# Patient Record
Sex: FEMALE | Race: WHITE | Employment: OTHER | URBAN - METROPOLITAN AREA
[De-identification: names, ages, dates, MRNs, and addresses within clinical notes are randomized per-mention and may not be internally consistent; named-entity substitution may affect disease eponyms.]

---

## 2017-03-29 DIAGNOSIS — G72.9 MYOPATHY, UNSPECIFIED: ICD-10-CM

## 2017-03-29 DIAGNOSIS — M34.9 SYSTEMIC SCLEROSIS (H): ICD-10-CM

## 2017-03-29 DIAGNOSIS — E83.59 TUMORAL CALCINOSIS: ICD-10-CM

## 2017-03-29 DIAGNOSIS — Z79.899 HISTORY OF ONGOING TREATMENT WITH HIGH-RISK MEDICATION: ICD-10-CM

## 2017-03-29 RX ORDER — PREDNISONE 10 MG/1
TABLET ORAL
Qty: 90 TABLET | Refills: 0 | Status: SHIPPED | OUTPATIENT
Start: 2017-03-29 | End: 2017-06-07

## 2017-03-29 NOTE — TELEPHONE ENCOUNTER
predniSONE (DELTASONE) 10 MG       Last Written Prescription Date:  8/31/16  Last Fill Quantity: 90,   # refills: 0  Last Office Visit : 7/21/16  Future Office visit:  NONE    Routing refill request to provider for review/approval because:   OVERDUE FOR 6 MOS RTC

## 2017-03-29 NOTE — TELEPHONE ENCOUNTER
Call placed to pt. Jeanine reports she has tapered down to 12.5 mg of prednisone daily.  Informed pt that she is over due for her follow up with Dr De La Torre. Was told that pt is on her way down to [place] with her 5 yr old granddaughter, but she will call upon return to schedule this appointment.    TASIA BlanchardN RN  Rheumatology RN Coordinator   [place]

## 2017-06-07 DIAGNOSIS — E83.59 TUMORAL CALCINOSIS: ICD-10-CM

## 2017-06-07 DIAGNOSIS — Z79.899 HISTORY OF ONGOING TREATMENT WITH HIGH-RISK MEDICATION: ICD-10-CM

## 2017-06-07 DIAGNOSIS — M34.9 SYSTEMIC SCLEROSIS (H): Primary | ICD-10-CM

## 2017-06-07 DIAGNOSIS — G72.9 MYOPATHY, UNSPECIFIED: ICD-10-CM

## 2017-06-07 RX ORDER — PREDNISONE 10 MG/1
TABLET ORAL
Qty: 30 TABLET | Refills: 0 | Status: SHIPPED | OUTPATIENT
Start: 2017-06-07 | End: 2017-08-17

## 2017-06-07 NOTE — TELEPHONE ENCOUNTER
predniSONE (DELTASONE) 10 MG tablet      Last Written Prescription Date:  3/29/2017  Last Fill Quantity: 90,   # refills: 0  Last Office Visit : 7/21/2016  Future Office visit:  6/21/2017 *[facility]                                 8/22/2017  *[facility]    Routing refill request to provider for review/approval because:  Last visit overdue > 6 months since last seen. Clinic nurse talked with patient regarding needs to be seen at last refill and Ms Schuler did call and schedule with you. *Pending appointment's with Dr De La Torre on 6/21/2017 at [facility], and  8/22/2017 at [facility].

## 2017-08-10 DIAGNOSIS — K21.9 GASTROESOPHAGEAL REFLUX DISEASE WITHOUT ESOPHAGITIS: ICD-10-CM

## 2017-08-10 DIAGNOSIS — E83.59 TUMORAL CALCINOSIS: ICD-10-CM

## 2017-08-10 DIAGNOSIS — I73.00 RAYNAUD'S DISEASE WITHOUT GANGRENE: ICD-10-CM

## 2017-08-10 DIAGNOSIS — M34.9 SYSTEMIC SCLEROSIS (H): ICD-10-CM

## 2017-08-10 DIAGNOSIS — G72.9 MYOPATHY: ICD-10-CM

## 2017-08-14 RX ORDER — PANTOPRAZOLE SODIUM 40 MG/1
40 TABLET, DELAYED RELEASE ORAL DAILY
Qty: 30 TABLET | Refills: 0 | Status: SHIPPED | OUTPATIENT
Start: 2017-08-14 | End: 2017-09-13

## 2017-08-14 NOTE — TELEPHONE ENCOUNTER
pantoprazole  40 MG      Last Written Prescription Date:  7/21/16  Last Fill Quantity: 90,   # refills: 3  Last Office Visit : 7/21/16  Future Office visit:  8/22/17    Routing refill request to provider for review/approval because:  OVER DUE MD APPT 7/21/16     PEND.APPT. 8/22/17

## 2017-08-17 DIAGNOSIS — Z79.899 HISTORY OF ONGOING TREATMENT WITH HIGH-RISK MEDICATION: ICD-10-CM

## 2017-08-17 DIAGNOSIS — E83.59 TUMORAL CALCINOSIS: ICD-10-CM

## 2017-08-17 DIAGNOSIS — G72.9 MYOPATHY, UNSPECIFIED: ICD-10-CM

## 2017-08-17 DIAGNOSIS — M34.9 SYSTEMIC SCLEROSIS (H): ICD-10-CM

## 2017-08-22 ENCOUNTER — OFFICE VISIT (OUTPATIENT)
Dept: RHEUMATOLOGY | Facility: CLINIC | Age: 57
End: 2017-08-22
Attending: INTERNAL MEDICINE
Payer: COMMERCIAL

## 2017-08-22 VITALS
DIASTOLIC BLOOD PRESSURE: 74 MMHG | BODY MASS INDEX: 29.06 KG/M2 | RESPIRATION RATE: 18 BRPM | TEMPERATURE: 97.9 F | HEART RATE: 69 BPM | WEIGHT: 196.8 LBS | SYSTOLIC BLOOD PRESSURE: 118 MMHG

## 2017-08-22 DIAGNOSIS — K21.9 GASTROESOPHAGEAL REFLUX DISEASE WITHOUT ESOPHAGITIS: ICD-10-CM

## 2017-08-22 DIAGNOSIS — R06.02 SHORTNESS OF BREATH: ICD-10-CM

## 2017-08-22 DIAGNOSIS — E83.59 TUMORAL CALCINOSIS: ICD-10-CM

## 2017-08-22 DIAGNOSIS — M34.9 SYSTEMIC SCLEROSIS (H): ICD-10-CM

## 2017-08-22 DIAGNOSIS — I73.00 RAYNAUD'S DISEASE WITHOUT GANGRENE: ICD-10-CM

## 2017-08-22 DIAGNOSIS — G72.9 MYOPATHY: ICD-10-CM

## 2017-08-22 DIAGNOSIS — M06.00 RHEUMATOID ARTHRITIS WITH NEGATIVE RHEUMATOID FACTOR, INVOLVING UNSPECIFIED SITE (H): Primary | ICD-10-CM

## 2017-08-22 LAB
ALBUMIN SERPL-MCNC: 3.5 G/DL (ref 3.4–5)
ALT SERPL W P-5'-P-CCNC: 39 U/L (ref 0–50)
AST SERPL W P-5'-P-CCNC: 38 U/L (ref 0–45)
BASOPHILS # BLD AUTO: 0 10E9/L (ref 0–0.2)
BASOPHILS NFR BLD AUTO: 0.4 %
CK SERPL-CCNC: 198 U/L (ref 30–225)
CREAT SERPL-MCNC: 1.01 MG/DL (ref 0.52–1.04)
CRP SERPL-MCNC: <2.9 MG/L (ref 0–8)
DIFFERENTIAL METHOD BLD: NORMAL
EOSINOPHIL # BLD AUTO: 0 10E9/L (ref 0–0.7)
EOSINOPHIL NFR BLD AUTO: 0.4 %
ERYTHROCYTE [DISTWIDTH] IN BLOOD BY AUTOMATED COUNT: 14.1 % (ref 10–15)
ERYTHROCYTE [SEDIMENTATION RATE] IN BLOOD BY WESTERGREN METHOD: 13 MM/H (ref 0–30)
GFR SERPL CREATININE-BSD FRML MDRD: 57 ML/MIN/1.7M2
HCT VFR BLD AUTO: 44.2 % (ref 35–47)
HGB BLD-MCNC: 14.2 G/DL (ref 11.7–15.7)
IMM GRANULOCYTES # BLD: 0.1 10E9/L (ref 0–0.4)
IMM GRANULOCYTES NFR BLD: 0.6 %
LYMPHOCYTES # BLD AUTO: 1.5 10E9/L (ref 0.8–5.3)
LYMPHOCYTES NFR BLD AUTO: 18 %
MCH RBC QN AUTO: 28.1 PG (ref 26.5–33)
MCHC RBC AUTO-ENTMCNC: 32.1 G/DL (ref 31.5–36.5)
MCV RBC AUTO: 88 FL (ref 78–100)
MONOCYTES # BLD AUTO: 0.5 10E9/L (ref 0–1.3)
MONOCYTES NFR BLD AUTO: 6.3 %
NEUTROPHILS # BLD AUTO: 6.4 10E9/L (ref 1.6–8.3)
NEUTROPHILS NFR BLD AUTO: 74.3 %
NRBC # BLD AUTO: 0 10*3/UL
NRBC BLD AUTO-RTO: 0 /100
PLATELET # BLD AUTO: 208 10E9/L (ref 150–450)
RBC # BLD AUTO: 5.05 10E12/L (ref 3.8–5.2)
WBC # BLD AUTO: 8.6 10E9/L (ref 4–11)

## 2017-08-22 PROCEDURE — 82040 ASSAY OF SERUM ALBUMIN: CPT | Performed by: INTERNAL MEDICINE

## 2017-08-22 PROCEDURE — 36415 COLL VENOUS BLD VENIPUNCTURE: CPT | Performed by: INTERNAL MEDICINE

## 2017-08-22 PROCEDURE — 85652 RBC SED RATE AUTOMATED: CPT | Performed by: INTERNAL MEDICINE

## 2017-08-22 PROCEDURE — 86140 C-REACTIVE PROTEIN: CPT | Performed by: INTERNAL MEDICINE

## 2017-08-22 PROCEDURE — 85025 COMPLETE CBC W/AUTO DIFF WBC: CPT | Performed by: INTERNAL MEDICINE

## 2017-08-22 PROCEDURE — 84460 ALANINE AMINO (ALT) (SGPT): CPT | Performed by: INTERNAL MEDICINE

## 2017-08-22 PROCEDURE — 82550 ASSAY OF CK (CPK): CPT | Performed by: INTERNAL MEDICINE

## 2017-08-22 PROCEDURE — 99212 OFFICE O/P EST SF 10 MIN: CPT | Mod: ZF

## 2017-08-22 PROCEDURE — 84450 TRANSFERASE (AST) (SGOT): CPT | Performed by: INTERNAL MEDICINE

## 2017-08-22 PROCEDURE — 82565 ASSAY OF CREATININE: CPT | Performed by: INTERNAL MEDICINE

## 2017-08-22 ASSESSMENT — PAIN SCALES - GENERAL: PAINLEVEL: NO PAIN (0)

## 2017-08-22 NOTE — NURSING NOTE
"Chief Complaint   Patient presents with     RECHECK     RA FOLLOW UP       Initial /74  Pulse 69  Temp 97.9  F (36.6  C) (Oral)  Resp 18  Wt 89.3 kg (196 lb 12.8 oz)  BMI 29.06 kg/m2 Estimated body mass index is 29.06 kg/(m^2) as calculated from the following:    Height as of 7/21/16: 1.753 m (5' 9\").    Weight as of this encounter: 89.3 kg (196 lb 12.8 oz).  Medication Reconciliation: complete   KNAIKA FORBES CMA      "

## 2017-08-22 NOTE — MR AVS SNAPSHOT
"              After Visit Summary   2017    Jeanine Schuler    MRN: [id number]           Patient Information     Date Of Birth          1960        Visit Information        Provider Department      2017 5:00 PM Nicolás De La Torre MD [facility] Rheumatology        Today's Diagnoses     Rheumatoid arthritis with negative rheumatoid factor, involving unspecified site (H)    -  1    Systemic sclerosis (H)        Shortness of breath           Follow-ups after your visit        Who to contact     If you have questions or need follow up information about today's clinic visit or your schedule please contact [facility] RHEUMATOLOGY directly at [phone].  Normal or non-critical lab and imaging results will be communicated to you by MyChart, letter or phone within 4 business days after the clinic has received the results. If you do not hear from us within 7 days, please contact the clinic through [facility] or phone. If you have a critical or abnormal lab result, we will notify you by phone as soon as possible.  Submit refill requests through [employer] or call your pharmacy and they will forward the refill request to us. Please allow 3 business days for your refill to be completed.          Additional Information About Your Visit        MyChart Information     [employer] lets you send messages to your doctor, view your test results, renew your prescriptions, schedule appointments and more. To sign up, go to [email or web address] . Click on \"Log in\" on the left side of the screen, which will take you to the Welcome page. Then click on \"Sign up Now\" on the right side of the page.     You will be asked to enter the access code listed below, as well as some personal information. Please follow the directions to create your username and password.     Your access code is: [email or web address]  Expires: 2017  6:31 AM     Your access code will  in 90 days. If you need help or a new code, please call your [place] clinic " or [phone].        Care EveryWhere ID     This is your Care EveryWhere ID. This could be used by other organizations to access your [place] medical records  [id number]        Your Vitals Were     Pulse Temperature Respirations BMI (Body Mass Index)          69 97.9  F (36.6  C) (Oral) 18 29.06 kg/m2         Blood Pressure from Last 3 Encounters:   08/22/17 118/74   07/21/16 113/77   04/19/16 (!) 121/95    Weight from Last 3 Encounters:   08/22/17 89.3 kg (196 lb 12.8 oz)   07/21/16 96.1 kg (211 lb 14.4 oz)   04/19/16 93 kg (205 lb)                 Today's Medication Changes          These changes are accurate as of: 8/22/17 11:59 PM.  If you have any questions, ask your nurse or doctor.               Stop taking these medicines if you haven't already. Please contact your care team if you have questions.     Abatacept 125 MG/ML Sosy   Commonly known as:  ORENCIA   Stopped by:  Nicolás De La Torre MD           study - Vascana/0.9% nitroglycerin in amphi-matrix vs. placebo topical cream   Commonly known as:  ids# [id number]   Stopped by:  Nicolás De La Torre MD                    Primary Care Provider Office Phone # Fax #    Aminta JOSE MD Ciaran [phone]       [address]   [facility] [address]        Equal Access to Services     [facility]: Hadii aad ku hadasho Sogomez, waaxda luqadaha, qaybta kaalmada nini, berny lorenzana. So [facility] [phone].    ATENCIÓN: Si habla español, tiene a encarnacion disposición servicios gratuitos de asistencia lingüística. Llame al [phone].    We comply with applicable federal civil rights laws and [place] laws. We do not discriminate on the basis of race, color, national origin, age, disability sex, sexual orientation or gender identity.            Thank you!     Thank you for choosing [facility] RHEUMATOLOGY  for your care. Our goal is always to provide you with excellent care. Hearing back from our patients is  "one way we can continue to improve our services. Please take a few minutes to complete the written survey that you may receive in the mail after your visit with us. Thank you!             Your Updated Medication List - Protect others around you: Learn how to safely use, store and throw away your medicines at www.disposemymeds.org.          This list is accurate as of: 8/22/17 11:59 PM.  Always use your most recent med list.                   Brand Name Dispense Instructions for use Diagnosis    insulin syringe-needle U-100 30G X 1/2\" 1 ML    BD insulin syringe ultrafine    100 each    1 Syringe once a week Use one syringe weekly  or as directed.    Systemic sclerosis (H), Raynaud's syndrome, Tumoral calcinosis, Encounter for long-term (current) use of other medications       LASIX 40 MG tablet   Generic drug:  furosemide      Take 40 mg by mouth 2 times daily.    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    Systemic sclerosis (H), Raynaud's syndrome, Tumoral calcinosis, Encounter for long-term (current) use of steroids       * methotrexate 50 MG/2ML injection CHEMO     4 mL    Inject 0.8 mL (20 mg) subcutaneously every Sunday.    Systemic sclerosis (H), Tumoral calcinosis, Myopathy       NONFORMULARY      Apply  topically. Nystatin/Triamcinolone/lidocaine cream; Apply to affected areas on lips TID        pantoprazole 40 MG EC tablet    PROTONIX    30 tablet    Take 1 tablet (40 mg) by mouth daily TAKE 30-60 MINUTES BEFORE A  MEAL    Gastroesophageal reflux disease without esophagitis, " Tumoral calcinosis, Systemic sclerosis (H), Myopathy, Raynaud's disease without gangrene       POTASSIMIN PO      40mg Monday Wednesday and Friday. 20 mg Tuesday, Thursday, Saturday and Sunday    Systemic sclerosis (H), Raynaud's syndrome, Tumoral calcinosis, Encounter for long-term (current) use of steroids       * predniSONE 2.5 MG tablet    DELTASONE    270 tablet    Take 3 tablets (7.5 mg) by mouth daily    Systemic sclerosis (H), Tumoral calcinosis       * predniSONE 10 MG tablet    DELTASONE    30 tablet    Take 1 tab daily (with 7.5 mg) daily dose 17.5 mg    Myopathy, unspecified, Systemic sclerosis (H), Tumoral calcinosis, History of ongoing treatment with high-risk medication       spironolactone-hydrochlorothiazide 25-25 MG per tablet    ALDACTAZIDE    30 tablet    Take 1 tablet by mouth daily    Edema       * Notice:  This list has 4 medication(s) that are the same as other medications prescribed for you. Read the directions carefully, and ask your doctor or other care provider to review them with you.

## 2017-08-22 NOTE — LETTER
8/22/2017      RE: Jeanine MARTÍN Schuler  [address] [address]       SUBJECTIVE:  The patient returns in f/u of her MCTD/SSC/RP/with h/o multiple DU.      PROBLEM LIST:    1.  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    2.  History of severe multiple digital ulcers, on fingers and toes.   3.  Marked keratoconjunctivitis sicca over the last several years.    4.  Marked iron deficiency anemia responsive to IV iron.    5.  Diffuse musculoskeletal pain   6.  Lower extremity edema managed with lasix and spironolactone   7.  Marked fatigue and diffuse clinical weakness.    8.  Dyspnea on exertion with lower extremity edema and other features including the anticentromere positivity worrisome for the potential development of pulmonary hypertension.    9.  Marked GERD with associated dysphagia.    10.  Status post gastric bypass with a history of intermittent constipation and diarrhea potentially consistent with bacterial small bowel overgrowth versus other gut effects of the prior surgery.    11.  History of positive rheumatoid factor consistent with MCTD, given the remainder of her clinical presentation.     12. Progressive Jaw tightening with inability to open mouth wider than 1 inch.   13. Diastolic dysfunction (3/2013)  14. Hypoalbuminemia      INTERVAL HISTORY:     Since we last saw her, she believes she is gradually feeling significantly better.  Indeed, for the first time in years she feels that her fatigue is well enough controlled that she can work through the night without a nap.  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  She actually thinks her muscle weakness is also stable or improved, and she has no significant dyspnea on exertion and no dry cough.       She does have a little bit of right  shoulder pain and the features of that are entirely consistent with impingement syndrome, and that has been going on about 6 weeks.  However, no other joints have been active and she denies any stiffness in any of the joints.       On the Orencia plus methotrexate she has had no fevers, chills, or signs or symptoms of infection.  She has not, however, tried to go down her prednisone and remains on 20 mg a day.      Other features of her disease including her GERD and her keratoconjunctivitis sicca have remained stable with no change.       She does continue to have quite significant Raynaud's, but it is under better control during the summer and she has had no digital ulcerations.     No chest pain, shortness of breath, rash, numbness/tingling in hands/feet.     AUGUST 22, 2017, INTERVAL HISTORY:  Since we have last seen the patient, she developed a right ankle wound about 2 months ago.  That opened for a while and it became hard to close, but at this point it has finally closed over.       She has had some stressors and she feels like that has contributed to her feeling like she needed more prednisone.  She had made it down to 12 mg a day for almost 6 months but increased to 20 mg a day in June when she was moving to a new house.  This was primarily due to an increase in fatigue and in joint pains.      At this point, her morning stiffness is 30-60 minutes despite remaining on the 20 mg a day of prednisone.  She does think her strength, however, is stable.      She continues to have a lot of Raynaud's phenomena but has not developed any distal digital ulcerations.       Her GERD and keratoconjunctivitis sicca, however, have remained stable and she thinks she has stable exercise tolerance.       She has not done her labs for many, many months and has not seen me for over a year, and we discussed that in some detail.         ROS as per HPI.  10-point ROS is otherwise negative.    HISTORY REVIEW:  Past Medical  History:   Diagnosis Date     Anemia      Cellulitis of leg 6/6/2013     Esophageal reflux     Better post-hiatal hernia repair and weight loss     Limb ischemia; ulcers of fingertips 4/22/2013     Raynaud's syndrome      Scleroderma (H)      Systemic sclerosis (H) 2009     Unspecified essential hypertension        Past Surgical History:   Procedure Laterality Date     BYPASS GASTRIC, CHOLECYSTECTOMY, COMBINED       CHOLECYSTECTOMY       COLONOSCOPY       ESOPHAGOSCOPY, GASTROSCOPY, DUODENOSCOPY (EGD), COMBINED N/A 3/10/2016    Procedure: COMBINED ESOPHAGOSCOPY, GASTROSCOPY, DUODENOSCOPY (EGD), BIOPSY SINGLE OR MULTIPLE;  Surgeon: Tricia Zambrano MD;  Location:  GI     INNER EAR SURGERY       PICC INSERTION  6/5/2013    5fr DL Power PICC, 44cm, left basilic vein, tip in low SVC     SURGICAL HISTORY OF -       Left ear surgery - incus transition, tympanoplasty     SURGICAL HISTORY OF -   6/05    Gastric bypass     SURGICAL HISTORY OF -   6/05    Cholecystectomy     SURGICAL HISTORY OF -   6/05    Hiatal hernia repair     TONSILLECTOMY         Family History   Problem Relation Age of Onset     CEREBROVASCULAR DISEASE Father      C.A.D. Father      C.A.D. Mother      C.A.D. 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      Social History Main Topics     Smoking status: Never Smoker      Smokeless tobacco: Never Used     Alcohol Use: Yes      occassionally     Drug Use: No     Sexually Active: Yes -- Male partner(s)     Birth Control/ Protection: None     Social History Narrative     Was  in December 2015. Longtime partner from [place].       Patient Active Problem List   Diagnosis     Essential hypertension     Thyrotoxicosis     Obesity     Prolonged QTc 460 ms,  at hr 67     Low  back pain - Suspect SI Joint dysfunction     Cough     Systemic sclerosis (H)     Tumoral calcinosis     Shortness of breath     Edema of both legs     Abnormal albumin     Myopathy     Hypoalbuminemia     Diastolic dysfunction     Limb ischemia; ulcers of fingertips     Cellulitis of leg     Other specified diffuse disease of connective tissue     Disturbed sleep rhythm     Pharyngeal dysphagia     Gastroesophageal reflux disease, esophagitis presence not specified     Rheumatoid arthritis with negative rheumatoid factor, involving unspecified site (H)     Raynaud's disease without gangrene       Allergies   Allergen Reactions     Lovenox Other (See Comments)     Blood clot      Norvasc [Amlodipine Besylate] Swelling     Lip swelling that happened on 2 different occasions     Erythromycin Rash     Rash on arms     OBJECTIVE:  Vitals: Blood pressure 118/74, pulse 69, temperature 97.9  F (36.6  C), temperature source Oral, resp. rate 18, weight 89.3 kg (196 lb 12.8 oz).  Constitutional: Pleasant woman, appears older than stated age.   Eyes: PERRL. EOMI. Normal conjunctivae and sclerae.   ENT: Dry mucous membranes. +Superior dental plate. Otherwise normal mucous membranes. Decreased oral aperture.  Neck: no mass or thyroid enlargement  Resp: lungs clear to auscultation  CV: Normal rate, regular rhythm, trace edema of ankles bilaterally  GI: Soft, nontender, nondistended. Spleen and liver not palpable.  Lymph: no cervical, supraclavicular nodes  MS:  All TMJ, neck, shoulder, elbow, wrist, MCP/PIP/DIP, spine, hip, knee, ankle, and foot MTP/IP joints were examined and  found normal. No active synovitis or deformity. Full ROM.  Hands and feet with vascular congestion.  Skin: No rash appreciated. Has tattoos present.   Neuro: Strength 5/5 in upper and lower extremities. No focal neurological deficits.     Component      Latest Ref Rng 1/26/2016   WBC      4.0 - 11.0 10e9/L 9.2   RBC Count      3.8 - 5.2 10e12/L 4.59    Hemoglobin      11.7 - 15.7 g/dL 13.2   Hematocrit      35.0 - 47.0 % 41.8   MCV      78 - 100 fl 91   MCH      26.5 - 33.0 pg 28.8   MCHC      31.5 - 36.5 g/dL 31.6   RDW      10.0 - 15.0 % 16.7 (H)   Platelet Count      150 - 450 10e9/L 195   Diff Method       Automated Method   % Neutrophils       62.9   % Lymphocytes       27.0   % Monocytes       9.1   % Eosinophils       0.4   % Basophils       0.2   % Immature Granulocytes       0.4   Nucleated RBCs      0 /100 0   Absolute Neutrophil      1.6 - 8.3 10e9/L 5.8   Absolute Lymphocytes      0.8 - 5.3 10e9/L 2.5   Absolute Monocytes      0.0 - 1.3 10e9/L 0.8   Absolute Eosinophils      0.0 - 0.7 10e9/L 0.0   Absolute Basophils      0.0 - 0.2 10e9/L 0.0   Abs Immature Granulocytes      0 - 0.4 10e9/L 0.0   Absolute Nucleated RBC       0.0   Color Urine       Yellow   Appearance Urine       Clear   Glucose Urine      NEG mg/dL Negative   Bilirubin Urine      NEG Negative   Ketones Urine      NEG mg/dL Negative   Specific Gravity Urine      1.003 - 1.035 1.010   Blood Urine      NEG Negative   pH Urine      5.0 - 7.0 pH 6.0   Protein Albumin Urine      NEG mg/dL Negative   Urobilinogen mg/dL      0.0 - 2.0 mg/dL Normal   Nitrite Urine      NEG Negative   Leukocyte Esterase Urine      NEG Negative   Source       Midstream Urine   WBC Urine      0 - 2 /HPF <1   RBC Urine      0 - 2 /HPF <1   Squamous Epithelial /HPF Urine      0 - 1 /HPF <1   Creatinine      0.52 - 1.04 mg/dL 1.11 (H)   GFR Estimate      >60 mL/min/1.7m2 51 (L)   GFR Estimate If Black      >60 mL/min/1.7m2 62   ALT      0 - 50 U/L 23   AST      0 - 45 U/L 16   Albumin      3.4 - 5.0 g/dL 3.4   CRP Inflammation      0.0 - 8.0 mg/L <2.9   Sed Rate      0 - 30 mm/h 9   CK Total      30 - 225 U/L 38   Aldolase       4.1     Biopsy from 3/10 EGD:  Esophagus, gastroesophageal junction, biopsies:   - Squamous mucosa and submucosa with marked chronic inflammation and   fibrosis   - No evidence of intestinal  metaplasia or dysplasia     Assessment/Plan:     My suspicion is this wound she has was probably due largely to the skin fragility from her chronic, heavy use of prednisone.      We have discussed that and her need to go down, and she will again try going down at this point to 17.5 mg a day.       She is way overdue for methotrexate labs and we will get those done as well, and I have renewed them for another year.       I would like to see her back in December and have pulmonary function tests at that time, for which she is also somewhat overdue, though her symptoms do not sound as though she is developing any progressive pulmonary disease.  We will need to be watching for digital ulcers as well with the colder weather, but she has been able to, despite very poor control of her Raynaud's overall, avoid those, so hopefully that will continue.       Nicolás De La Torre MD

## 2017-08-24 RX ORDER — PREDNISONE 10 MG/1
TABLET ORAL
Qty: 30 TABLET | Refills: 0 | Status: SHIPPED | OUTPATIENT
Start: 2017-08-24 | End: 2017-10-04

## 2017-08-24 NOTE — TELEPHONE ENCOUNTER
predniSONE 10 MG        Last Written Prescription Date:  6/7/17  Last Fill Quantity: 30,   # refills: 0  Last Office Visit : 8/22/17  Future Office visit:  NONE  *RT TO MD

## 2017-09-05 NOTE — PROGRESS NOTES
SUBJECTIVE:  The patient returns in f/u of her MCTD/SSC/RP/with h/o multiple DU.      PROBLEM LIST:    1.  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    2.  History of severe multiple digital ulcers, on fingers and toes.   3.  Marked keratoconjunctivitis sicca over the last several years.    4.  Marked iron deficiency anemia responsive to IV iron.    5.  Diffuse musculoskeletal pain   6.  Lower extremity edema managed with lasix and spironolactone   7.  Marked fatigue and diffuse clinical weakness.    8.  Dyspnea on exertion with lower extremity edema and other features including the anticentromere positivity worrisome for the potential development of pulmonary hypertension.    9.  Marked GERD with associated dysphagia.    10.  Status post gastric bypass with a history of intermittent constipation and diarrhea potentially consistent with bacterial small bowel overgrowth versus other gut effects of the prior surgery.    11.  History of positive rheumatoid factor consistent with MCTD, given the remainder of her clinical presentation.     12. Progressive Jaw tightening with inability to open mouth wider than 1 inch.   13. Diastolic dysfunction (3/2013)  14. Hypoalbuminemia      INTERVAL HISTORY:     Since we last saw her, she believes she is gradually feeling significantly better.  Indeed, for the first time in years she feels that her fatigue is well enough controlled that she can work through the night without a nap.  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  She actually thinks her muscle weakness is also stable or improved, and she has no significant dyspnea on exertion and no dry cough.       She does have a little bit of right shoulder pain and the features of that are entirely consistent with impingement syndrome,  and that has been going on about 6 weeks.  However, no other joints have been active and she denies any stiffness in any of the joints.       On the Orencia plus methotrexate she has had no fevers, chills, or signs or symptoms of infection.  She has not, however, tried to go down her prednisone and remains on 20 mg a day.      Other features of her disease including her GERD and her keratoconjunctivitis sicca have remained stable with no change.       She does continue to have quite significant Raynaud's, but it is under better control during the summer and she has had no digital ulcerations.     No chest pain, shortness of breath, rash, numbness/tingling in hands/feet.     AUGUST 22, 2017, INTERVAL HISTORY:  Since we have last seen the patient, she developed a right ankle wound about 2 months ago.  That opened for a while and it became hard to close, but at this point it has finally closed over.       She has had some stressors and she feels like that has contributed to her feeling like she needed more prednisone.  She had made it down to 12 mg a day for almost 6 months but increased to 20 mg a day in June when she was moving to a new house.  This was primarily due to an increase in fatigue and in joint pains.      At this point, her morning stiffness is 30-60 minutes despite remaining on the 20 mg a day of prednisone.  She does think her strength, however, is stable.      She continues to have a lot of Raynaud's phenomena but has not developed any distal digital ulcerations.       Her GERD and keratoconjunctivitis sicca, however, have remained stable and she thinks she has stable exercise tolerance.       She has not done her labs for many, many months and has not seen me for over a year, and we discussed that in some detail.         ROS as per HPI.  10-point ROS is otherwise negative.    HISTORY REVIEW:  Past Medical History:   Diagnosis Date     Anemia      Cellulitis of leg 6/6/2013     Esophageal reflux      Better post-hiatal hernia repair and weight loss     Limb ischemia; ulcers of fingertips 4/22/2013     Raynaud's syndrome      Scleroderma (H)      Systemic sclerosis (H) 2009     Unspecified essential hypertension        Past Surgical History:   Procedure Laterality Date     BYPASS GASTRIC, CHOLECYSTECTOMY, COMBINED       CHOLECYSTECTOMY       COLONOSCOPY       ESOPHAGOSCOPY, GASTROSCOPY, DUODENOSCOPY (EGD), COMBINED N/A 3/10/2016    Procedure: COMBINED ESOPHAGOSCOPY, GASTROSCOPY, DUODENOSCOPY (EGD), BIOPSY SINGLE OR MULTIPLE;  Surgeon: Tricia Zambrano MD;  Location:  GI     INNER EAR SURGERY       PICC INSERTION  6/5/2013    5fr DL Power PICC, 44cm, left basilic vein, tip in low SVC     SURGICAL HISTORY OF -       Left ear surgery - incus transition, tympanoplasty     SURGICAL HISTORY OF -   6/05    Gastric bypass     SURGICAL HISTORY OF -   6/05    Cholecystectomy     SURGICAL HISTORY OF -   6/05    Hiatal hernia repair     TONSILLECTOMY         Family History   Problem Relation Age of Onset     CEREBROVASCULAR DISEASE Father      C.A.D. Father      C.A.D. Mother      C.A.D. 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      Social History Main Topics     Smoking status: Never Smoker      Smokeless tobacco: Never Used     Alcohol Use: Yes      occassionally     Drug Use: No     Sexually Active: Yes -- Male partner(s)     Birth Control/ Protection: None     Social History Narrative     Was  in December 2015. Longtime partner from [place].       Patient Active Problem List   Diagnosis     Essential hypertension     Thyrotoxicosis     Obesity     Prolonged QTc 460 ms,  at hr 67     Low back pain - Suspect SI Joint dysfunction     Cough     Systemic sclerosis (H)     Tumoral  calcinosis     Shortness of breath     Edema of both legs     Abnormal albumin     Myopathy     Hypoalbuminemia     Diastolic dysfunction     Limb ischemia; ulcers of fingertips     Cellulitis of leg     Other specified diffuse disease of connective tissue     Disturbed sleep rhythm     Pharyngeal dysphagia     Gastroesophageal reflux disease, esophagitis presence not specified     Rheumatoid arthritis with negative rheumatoid factor, involving unspecified site (H)     Raynaud's disease without gangrene       Allergies   Allergen Reactions     Lovenox Other (See Comments)     Blood clot      Norvasc [Amlodipine Besylate] Swelling     Lip swelling that happened on 2 different occasions     Erythromycin Rash     Rash on arms     OBJECTIVE:  Vitals: Blood pressure 118/74, pulse 69, temperature 97.9  F (36.6  C), temperature source Oral, resp. rate 18, weight 89.3 kg (196 lb 12.8 oz).  Constitutional: Pleasant woman, appears older than stated age.   Eyes: PERRL. EOMI. Normal conjunctivae and sclerae.   ENT: Dry mucous membranes. +Superior dental plate. Otherwise normal mucous membranes. Decreased oral aperture.  Neck: no mass or thyroid enlargement  Resp: lungs clear to auscultation  CV: Normal rate, regular rhythm, trace edema of ankles bilaterally  GI: Soft, nontender, nondistended. Spleen and liver not palpable.  Lymph: no cervical, supraclavicular nodes  MS:  All TMJ, neck, shoulder, elbow, wrist, MCP/PIP/DIP, spine, hip, knee, ankle, and foot MTP/IP joints were examined and  found normal. No active synovitis or deformity. Full ROM.  Hands and feet with vascular congestion.  Skin: No rash appreciated. Has tattoos present.   Neuro: Strength 5/5 in upper and lower extremities. No focal neurological deficits.     Component      Latest Ref Rng 1/26/2016   WBC      4.0 - 11.0 10e9/L 9.2   RBC Count      3.8 - 5.2 10e12/L 4.59   Hemoglobin      11.7 - 15.7 g/dL 13.2   Hematocrit      35.0 - 47.0 % 41.8   MCV      78 - 100  fl 91   MCH      26.5 - 33.0 pg 28.8   MCHC      31.5 - 36.5 g/dL 31.6   RDW      10.0 - 15.0 % 16.7 (H)   Platelet Count      150 - 450 10e9/L 195   Diff Method       Automated Method   % Neutrophils       62.9   % Lymphocytes       27.0   % Monocytes       9.1   % Eosinophils       0.4   % Basophils       0.2   % Immature Granulocytes       0.4   Nucleated RBCs      0 /100 0   Absolute Neutrophil      1.6 - 8.3 10e9/L 5.8   Absolute Lymphocytes      0.8 - 5.3 10e9/L 2.5   Absolute Monocytes      0.0 - 1.3 10e9/L 0.8   Absolute Eosinophils      0.0 - 0.7 10e9/L 0.0   Absolute Basophils      0.0 - 0.2 10e9/L 0.0   Abs Immature Granulocytes      0 - 0.4 10e9/L 0.0   Absolute Nucleated RBC       0.0   Color Urine       Yellow   Appearance Urine       Clear   Glucose Urine      NEG mg/dL Negative   Bilirubin Urine      NEG Negative   Ketones Urine      NEG mg/dL Negative   Specific Gravity Urine      1.003 - 1.035 1.010   Blood Urine      NEG Negative   pH Urine      5.0 - 7.0 pH 6.0   Protein Albumin Urine      NEG mg/dL Negative   Urobilinogen mg/dL      0.0 - 2.0 mg/dL Normal   Nitrite Urine      NEG Negative   Leukocyte Esterase Urine      NEG Negative   Source       Midstream Urine   WBC Urine      0 - 2 /HPF <1   RBC Urine      0 - 2 /HPF <1   Squamous Epithelial /HPF Urine      0 - 1 /HPF <1   Creatinine      0.52 - 1.04 mg/dL 1.11 (H)   GFR Estimate      >60 mL/min/1.7m2 51 (L)   GFR Estimate If Black      >60 mL/min/1.7m2 62   ALT      0 - 50 U/L 23   AST      0 - 45 U/L 16   Albumin      3.4 - 5.0 g/dL 3.4   CRP Inflammation      0.0 - 8.0 mg/L <2.9   Sed Rate      0 - 30 mm/h 9   CK Total      30 - 225 U/L 38   Aldolase       4.1     Biopsy from 3/10 EGD:  Esophagus, gastroesophageal junction, biopsies:   - Squamous mucosa and submucosa with marked chronic inflammation and   fibrosis   - No evidence of intestinal metaplasia or dysplasia     Assessment/Plan:     My suspicion is this wound she has was probably  due largely to the skin fragility from her chronic, heavy use of prednisone.      We have discussed that and her need to go down, and she will again try going down at this point to 17.5 mg a day.       She is way overdue for methotrexate labs and we will get those done as well, and I have renewed them for another year.       I would like to see her back in December and have pulmonary function tests at that time, for which she is also somewhat overdue, though her symptoms do not sound as though she is developing any progressive pulmonary disease.  We will need to be watching for digital ulcers as well with the colder weather, but she has been able to, despite very poor control of her Raynaud's overall, avoid those, so hopefully that will continue.

## 2017-09-13 DIAGNOSIS — G72.9 MYOPATHY: ICD-10-CM

## 2017-09-13 DIAGNOSIS — E83.59 TUMORAL CALCINOSIS: ICD-10-CM

## 2017-09-13 DIAGNOSIS — K21.9 GASTROESOPHAGEAL REFLUX DISEASE WITHOUT ESOPHAGITIS: ICD-10-CM

## 2017-09-13 DIAGNOSIS — I73.00 RAYNAUD'S DISEASE WITHOUT GANGRENE: ICD-10-CM

## 2017-09-13 DIAGNOSIS — M34.9 SYSTEMIC SCLEROSIS (H): ICD-10-CM

## 2017-09-15 RX ORDER — PANTOPRAZOLE SODIUM 40 MG/1
TABLET, DELAYED RELEASE ORAL
Qty: 90 TABLET | Refills: 3 | Status: SHIPPED | OUTPATIENT
Start: 2017-09-15 | End: 2018-10-02

## 2017-09-15 NOTE — TELEPHONE ENCOUNTER
pantoprazole      Last Written Prescription Date:  8/14/17  Last Fill Quantity: 30,   # refills: 0  Last Office Visit : 8/22/17  Future Office visit:  NONE

## 2017-10-04 DIAGNOSIS — Z79.899 HISTORY OF ONGOING TREATMENT WITH HIGH-RISK MEDICATION: ICD-10-CM

## 2017-10-04 DIAGNOSIS — M34.9 SYSTEMIC SCLEROSIS (H): ICD-10-CM

## 2017-10-04 DIAGNOSIS — G72.9 MYOPATHY: ICD-10-CM

## 2017-10-04 DIAGNOSIS — E83.59 TUMORAL CALCINOSIS: ICD-10-CM

## 2017-10-05 NOTE — TELEPHONE ENCOUNTER
predniSONE 2.5 MG     Last Written Prescription Date:  8/31/16  Last Fill Quantity: 270,   # refills: 0  Last Office Visit : 8/22/17  Future Office visit:  NONE    predniSONE   10 MG      Last Written Prescription Date:  8/24/17  Last Fill Quantity: 30,   # refills: 0     RT TO MD/ AUTH

## 2017-10-08 RX ORDER — PREDNISONE 2.5 MG/1
TABLET ORAL
Qty: 270 TABLET | Refills: 1 | Status: SHIPPED | OUTPATIENT
Start: 2017-10-08 | End: 2019-07-11 | Stop reason: DRUGHIGH

## 2017-10-08 RX ORDER — PREDNISONE 10 MG/1
TABLET ORAL
Qty: 90 TABLET | Refills: 1 | Status: SHIPPED | OUTPATIENT
Start: 2017-10-08 | End: 2018-04-30

## 2017-11-27 DIAGNOSIS — M34.9 SYSTEMIC SCLEROSIS (H): ICD-10-CM

## 2017-11-27 DIAGNOSIS — E83.59 TUMORAL CALCINOSIS: ICD-10-CM

## 2017-11-29 NOTE — TELEPHONE ENCOUNTER
Last Written Prescription Date:  10/8/17  Last Fill Quantity: 270,   # refills: 1  Last Office Visit : 8/22/17  Future Office visit:  NONE

## 2017-11-30 RX ORDER — PREDNISONE 2.5 MG/1
7.5 TABLET ORAL DAILY
Qty: 270 TABLET | Refills: 1 | Status: SHIPPED | OUTPATIENT
Start: 2017-11-30 | End: 2018-10-15

## 2018-02-13 ENCOUNTER — OFFICE VISIT (OUTPATIENT)
Dept: RHEUMATOLOGY | Facility: CLINIC | Age: 58
End: 2018-02-13
Attending: INTERNAL MEDICINE
Payer: COMMERCIAL

## 2018-02-13 VITALS
DIASTOLIC BLOOD PRESSURE: 88 MMHG | WEIGHT: 202.7 LBS | BODY MASS INDEX: 29.93 KG/M2 | SYSTOLIC BLOOD PRESSURE: 142 MMHG | OXYGEN SATURATION: 99 % | HEART RATE: 75 BPM

## 2018-02-13 DIAGNOSIS — M06.00 RHEUMATOID ARTHRITIS WITH NEGATIVE RHEUMATOID FACTOR, INVOLVING UNSPECIFIED SITE (H): ICD-10-CM

## 2018-02-13 DIAGNOSIS — Z23 IMMUNIZATION DUE: Primary | ICD-10-CM

## 2018-02-13 DIAGNOSIS — I73.00 RAYNAUD'S DISEASE WITHOUT GANGRENE: ICD-10-CM

## 2018-02-13 DIAGNOSIS — M35.1 MCTD (MIXED CONNECTIVE TISSUE DISEASE) (H): ICD-10-CM

## 2018-02-13 DIAGNOSIS — R06.02 SHORTNESS OF BREATH: ICD-10-CM

## 2018-02-13 DIAGNOSIS — M34.9 SYSTEMIC SCLEROSIS (H): ICD-10-CM

## 2018-02-13 DIAGNOSIS — Z79.52 ON PREDNISONE THERAPY: ICD-10-CM

## 2018-02-13 PROCEDURE — G0463 HOSPITAL OUTPT CLINIC VISIT: HCPCS | Mod: 25

## 2018-02-13 PROCEDURE — G0008 ADMIN INFLUENZA VIRUS VAC: HCPCS | Mod: ZF

## 2018-02-13 PROCEDURE — 90686 IIV4 VACC NO PRSV 0.5 ML IM: CPT | Mod: ZF | Performed by: INTERNAL MEDICINE

## 2018-02-13 PROCEDURE — 25000128 H RX IP 250 OP 636: Mod: ZF | Performed by: INTERNAL MEDICINE

## 2018-02-13 RX ORDER — SPIRONOLACTONE 25 MG/1
25 TABLET ORAL DAILY
COMMUNITY
Start: 2017-10-16 | End: 2023-01-01

## 2018-02-13 RX ORDER — ATORVASTATIN CALCIUM 40 MG/1
40 TABLET, FILM COATED ORAL
COMMUNITY
Start: 2017-11-07 | End: 2020-08-21

## 2018-02-13 RX ORDER — ASPIRIN 81 MG/1
81 TABLET ORAL
COMMUNITY
Start: 2017-11-07 | End: 2020-08-21

## 2018-02-13 RX ORDER — HYDROXYCHLOROQUINE SULFATE 200 MG/1
200 TABLET, FILM COATED ORAL 2 TIMES DAILY
Qty: 60 TABLET | Refills: 5 | Status: SHIPPED | OUTPATIENT
Start: 2018-02-13 | End: 2018-08-28

## 2018-02-13 RX ADMIN — INFLUENZA A VIRUS A/MICHIGAN/45/2015 X-275 (H1N1) ANTIGEN (FORMALDEHYDE INACTIVATED), INFLUENZA A VIRUS A/HONG KONG/4801/2014 X-263B (H3N2) ANTIGEN (FORMALDEHYDE INACTIVATED), INFLUENZA B VIRUS B/PHUKET/3073/2013 ANTIGEN (FORMALDEHYDE INACTIVATED), AND INFLUENZA B VIRUS B/BRISBANE/60/2008 ANTIGEN (FORMALDEHYDE INACTIVATED) 0.5 ML: 15; 15; 15; 15 INJECTION, SUSPENSION INTRAMUSCULAR at 18:00

## 2018-02-13 ASSESSMENT — PAIN SCALES - GENERAL: PAINLEVEL: NO PAIN (0)

## 2018-02-13 NOTE — LETTER
2/13/2018      RE: Jeanine MARTÍN Schuler  [address] [facility] [address]       SUBJECTIVE:  The patient returns in f/u of her MCTD/SSC/RP/with h/o multiple DU.      PROBLEM LIST:    1.  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    2.  History of severe multiple digital ulcers, on fingers and toes.   3.  Marked keratoconjunctivitis sicca over the last several years.    4.  Marked iron deficiency anemia responsive to IV iron.    5.  Diffuse musculoskeletal pain   6.  Lower extremity edema managed with lasix and spironolactone   7.  Marked fatigue and diffuse clinical weakness.    8.  Dyspnea on exertion with lower extremity edema and other features including the anticentromere positivity worrisome for the potential development of pulmonary hypertension.    9.  Marked GERD with associated dysphagia.    10.  Status post gastric bypass with a history of intermittent constipation and diarrhea potentially consistent with bacterial small bowel overgrowth versus other gut effects of the prior surgery.    11.  History of positive rheumatoid factor consistent with MCTD, given the remainder of her clinical presentation.     12. Progressive Jaw tightening with inability to open mouth wider than 1 inch.   13. Diastolic dysfunction (3/2013)  14. Hypoalbuminemia      INTERVAL HISTORY:     Since we last saw her, she believes she is gradually feeling significantly better.  Indeed, for the first time in years she feels that her fatigue is well enough controlled that she can work through the night without a nap.  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  She actually thinks her muscle weakness is also stable or improved, and she has no significant dyspnea on exertion and no dry cough.       She does have a little bit of right  shoulder pain and the features of that are entirely consistent with impingement syndrome, and that has been going on about 6 weeks.  However, no other joints have been active and she denies any stiffness in any of the joints.       On the Orencia plus methotrexate she has had no fevers, chills, or signs or symptoms of infection.  She has not, however, tried to go down her prednisone and remains on 20 mg a day.      Other features of her disease including her GERD and her keratoconjunctivitis sicca have remained stable with no change.       She does continue to have quite significant Raynaud's, but it is under better control during the summer and she has had no digital ulcerations.     No chest pain, shortness of breath, rash, numbness/tingling in hands/feet.     AUGUST 22, 2017, INTERVAL HISTORY:  Since we have last seen the patient, she developed a right ankle wound about 2 months ago.  That opened for a while and it became hard to close, but at this point it has finally closed over.       She has had some stressors and she feels like that has contributed to her feeling like she needed more prednisone.  She had made it down to 12 mg a day for almost 6 months but increased to 20 mg a day in June when she was moving to a new house.  This was primarily due to an increase in fatigue and in joint pains.      At this point, her morning stiffness is 30-60 minutes despite remaining on the 20 mg a day of prednisone.  She does think her strength, however, is stable.      She continues to have a lot of Raynaud's phenomena but has not developed any distal digital ulcerations.       Her GERD and keratoconjunctivitis sicca, however, have remained stable and she thinks she has stable exercise tolerance.       She has not done her labs for many, many months and has not seen me for over a year, and we discussed that in some detail.     Feb. 13, 2018 INTERVAL HISTORY:  Since we have last seen her, she made it down to 12.5 mg a day  "of prednisone but then put herself back up to 20 mg a day because of increasing general body aches and fatigue as well as some inflammatory joint stiffness in her wrists.  Other joints have actually been okay.      She had stopped her methotrexate some time ago.  She works in a hospital and knew of several people who were admitted with \"methotrexate toxicity\" who  while in hospital and although she does not know the details, that frightened her enough that she decided to stop the drug.       She is getting worsening Raynaud's.  She really notes that this happens not only with cold, but with a variety of kinds of stress.  She has previously, at least for a short length of time, been on losartan and does not remember whether that helped at all.  I am pretty sure she has also been on calcium channel blockers, but those are relatively contraindicated in her at this point because of persistent bilateral lower extremity edema.       She is getting enough lower extremity edema and has been found to have vascular insufficiency that she will be having some vascular surgery to laser some of these areas, although an exact time for that has not been laid out.       In addition to getting Raynaud's in her hands, she also gets it in her feet, and when she does, potentially contributed to by the vascular insufficiency, she gets numbness in her feet.  She is not getting numbness in her hands by contrast.       The patient did have pulmonary function tests today.  Although she is not exercising regularly, in general she feels her exercise tolerance has been stable, and she is not having any dry cough.       She did have a decrease in her FVC to 3.68 from 4.30 and her DLCO to 21.77 from 24.74, but she has had some rib cage pain for about the last 3 weeks since she had a minor accident while dancing.  Notably then, her TLC is completely stable, going from 6.41 to 6.29.       She denies any other new problems.  Keratoconjunctivitis " sicca, GERD and other features have been stable and she denies any dysphagia.             ROS as per HPI.  10-point ROS is otherwise negative.    HISTORY REVIEW:  Past Medical History:   Diagnosis Date     Anemia      Cellulitis of leg 6/6/2013     Esophageal reflux     Better post-hiatal hernia repair and weight loss     Limb ischemia; ulcers of fingertips 4/22/2013     Raynaud's syndrome      Scleroderma (H)      Systemic sclerosis (H) 2009     Unspecified essential hypertension        Past Surgical History:   Procedure Laterality Date     BYPASS GASTRIC, CHOLECYSTECTOMY, COMBINED       CHOLECYSTECTOMY       COLONOSCOPY       ESOPHAGOSCOPY, GASTROSCOPY, DUODENOSCOPY (EGD), COMBINED N/A 3/10/2016    Procedure: COMBINED ESOPHAGOSCOPY, GASTROSCOPY, DUODENOSCOPY (EGD), BIOPSY SINGLE OR MULTIPLE;  Surgeon: Tricia Zambrano MD;  Location:  GI     INNER EAR SURGERY       PICC INSERTION  6/5/2013    5fr DL Power PICC, 44cm, left basilic vein, tip in low SVC     SURGICAL HISTORY OF -       Left ear surgery - incus transition, tympanoplasty     SURGICAL HISTORY OF -   6/05    Gastric bypass     SURGICAL HISTORY OF -   6/05    Cholecystectomy     SURGICAL HISTORY OF -   6/05    Hiatal hernia repair     TONSILLECTOMY         Family History   Problem Relation Age of Onset     CEREBROVASCULAR DISEASE Father      C.A.D. Father      C.A.D. Mother      C.A.D. 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      Social History Main Topics     Smoking status: Never Smoker      Smokeless tobacco: Never Used     Alcohol Use: Yes      occassionally     Drug Use: No     Sexually Active: Yes -- Male partner(s)     Birth Control/ Protection: None     Social History Narrative     Was  in December 2015. Longtime  partner from [place].       Patient Active Problem List   Diagnosis     Essential hypertension     Thyrotoxicosis     Obesity     Prolonged QTc 460 ms,  at hr 67     Low back pain - Suspect SI Joint dysfunction     Cough     Systemic sclerosis (H)     Tumoral calcinosis     Shortness of breath     Edema of both legs     Abnormal albumin     Myopathy     Hypoalbuminemia     Diastolic dysfunction     Limb ischemia; ulcers of fingertips     Cellulitis of leg     Other specified diffuse disease of connective tissue     Disturbed sleep rhythm     Pharyngeal dysphagia     Gastroesophageal reflux disease, esophagitis presence not specified     Rheumatoid arthritis with negative rheumatoid factor, involving unspecified site (H)     Raynaud's disease without gangrene       Allergies   Allergen Reactions     Lovenox Other (See Comments)     Blood clot      Norvasc [Amlodipine Besylate] Swelling     Lip swelling that happened on 2 different occasions     Erythromycin Rash     Rash on arms     OBJECTIVE:  Vitals: Blood pressure 142/88, pulse 75, weight 91.9 kg (202 lb 11.2 oz), SpO2 99 %.  Constitutional: Pleasant woman, appears older than stated age.   Eyes: PERRL. EOMI. Normal conjunctivae and sclerae.   ENT: Dry mucous membranes. +Superior dental plate. Otherwise normal mucous membranes. Decreased oral aperture.  Neck: no mass or thyroid enlargement  Resp: lungs clear to auscultation  CV: Normal rate, regular rhythm, trace edema of ankles bilaterally  GI: Soft, nontender, nondistended. Spleen and liver not palpable.  Lymph: no cervical, supraclavicular nodes  MS:  All TMJ, neck, shoulder, elbow, wrist, MCP/PIP/DIP, spine, hip, knee, ankle, and foot MTP/IP joints were examined and  found normal. No active synovitis or deformity. Full ROM.  Hands and feet with vascular congestion.  Skin: No rash appreciated. Has tattoos present.   Neuro: Strength 5/5 in upper and lower extremities. No focal neurological deficits.      Component      Latest Ref Rng 1/26/2016   WBC      4.0 - 11.0 10e9/L 9.2   RBC Count      3.8 - 5.2 10e12/L 4.59   Hemoglobin      11.7 - 15.7 g/dL 13.2   Hematocrit      35.0 - 47.0 % 41.8   MCV      78 - 100 fl 91   MCH      26.5 - 33.0 pg 28.8   MCHC      31.5 - 36.5 g/dL 31.6   RDW      10.0 - 15.0 % 16.7 (H)   Platelet Count      150 - 450 10e9/L 195   Diff Method       Automated Method   % Neutrophils       62.9   % Lymphocytes       27.0   % Monocytes       9.1   % Eosinophils       0.4   % Basophils       0.2   % Immature Granulocytes       0.4   Nucleated RBCs      0 /100 0   Absolute Neutrophil      1.6 - 8.3 10e9/L 5.8   Absolute Lymphocytes      0.8 - 5.3 10e9/L 2.5   Absolute Monocytes      0.0 - 1.3 10e9/L 0.8   Absolute Eosinophils      0.0 - 0.7 10e9/L 0.0   Absolute Basophils      0.0 - 0.2 10e9/L 0.0   Abs Immature Granulocytes      0 - 0.4 10e9/L 0.0   Absolute Nucleated RBC       0.0   Color Urine       Yellow   Appearance Urine       Clear   Glucose Urine      NEG mg/dL Negative   Bilirubin Urine      NEG Negative   Ketones Urine      NEG mg/dL Negative   Specific Gravity Urine      1.003 - 1.035 1.010   Blood Urine      NEG Negative   pH Urine      5.0 - 7.0 pH 6.0   Protein Albumin Urine      NEG mg/dL Negative   Urobilinogen mg/dL      0.0 - 2.0 mg/dL Normal   Nitrite Urine      NEG Negative   Leukocyte Esterase Urine      NEG Negative   Source       Midstream Urine   WBC Urine      0 - 2 /HPF <1   RBC Urine      0 - 2 /HPF <1   Squamous Epithelial /HPF Urine      0 - 1 /HPF <1   Creatinine      0.52 - 1.04 mg/dL 1.11 (H)   GFR Estimate      >60 mL/min/1.7m2 51 (L)   GFR Estimate If Black      >60 mL/min/1.7m2 62   ALT      0 - 50 U/L 23   AST      0 - 45 U/L 16   Albumin      3.4 - 5.0 g/dL 3.4   CRP Inflammation      0.0 - 8.0 mg/L <2.9   Sed Rate      0 - 30 mm/h 9   CK Total      30 - 225 U/L 38   Aldolase       4.1     Biopsy from 3/10 EGD:  Esophagus, gastroesophageal junction,  biopsies:   - Squamous mucosa and submucosa with marked chronic inflammation and   fibrosis   - No evidence of intestinal metaplasia or dysplasia     Assessment/Plan:     With  respect to her Raynaud's, particularly given the tendency for this to occur in her with stress as well as cold, I think it is worth giving her a trial of fluoxetine 20 mg a day, which in a crossover trial a few years ago appeared superior to low-dose calcium channel blockers.       We also again discussed the need for a steroid-sparing drug to try to get her prednisone down consistently below 10 mg a day.  She is willing to try this, and after discussion we decided to try hydroxychloroquine, which she was on back in 2009 before I ever saw her but we never resumed after that.  We did discuss that the onset of this can be very slow and she is aware that she will need eye exams annually.        She will try if she feels like she is doing better to go down on the prednisone, and she has multiple dosage forms available that she has gotten in the past from us that should help facilitate her decreasing the dose.      I would like to see her back in 4-6 months, sooner p.r.n.  This visit was 40 minutes in duration, over 50% in counseling.         My suspicion is this wound she has was probably due largely to the skin fragility from her chronic, heavy use of prednisone.      We have discussed that and her need to go down, and she will again try going down at this point to 17.5 mg a day.       She is way overdue for methotrexate labs and we will get those done as well, and I have renewed them for another year.       I would like to see her back in December and have pulmonary function tests at that time, for which she is also somewhat overdue, though her symptoms do not sound as though she is developing any progressive pulmonary disease.  We will need to be watching for digital ulcers as well with the colder weather, but she has been able to, despite  very poor control of her Raynaud's overall, avoid those, so hopefully that will continue.       Nicolás De La Torre MD

## 2018-02-13 NOTE — NURSING NOTE
"Chief Complaint   Patient presents with     RECHECK     Rheum follow up       Initial /88  Pulse 75  Wt 91.9 kg (202 lb 11.2 oz)  SpO2 99%  BMI 29.93 kg/m2 Estimated body mass index is 29.93 kg/(m^2) as calculated from the following:    Height as of 7/21/16: 1.753 m (5' 9\").    Weight as of this encounter: 91.9 kg (202 lb 11.2 oz).  Medication Reconciliation: complete   KANIKA FORBES CMA      "

## 2018-02-13 NOTE — MR AVS SNAPSHOT
"              After Visit Summary   2/13/2018    Jeanine Schuler    MRN: [id number]           Patient Information     Date Of Birth          1960        Visit Information        Provider Department      2/13/2018 5:30 PM Nicolás De La Torre MD [facility] Rheumatology        Today's Diagnoses     Immunization due    -  1    Raynaud's disease without gangrene        Rheumatoid arthritis with negative rheumatoid factor, involving unspecified site (H)        MCTD (mixed connective tissue disease) (H)        On prednisone therapy           Follow-ups after your visit        Who to contact     If you have questions or need follow up information about today's clinic visit or your schedule please contact [facility] RHEUMATOLOGY directly at [phone].  Normal or non-critical lab and imaging results will be communicated to you by MyChart, letter or phone within 4 business days after the clinic has received the results. If you do not hear from us within 7 days, please contact the clinic through [facility] or phone. If you have a critical or abnormal lab result, we will notify you by phone as soon as possible.  Submit refill requests through [facility] or call your pharmacy and they will forward the refill request to us. Please allow 3 business days for your refill to be completed.          Additional Information About Your Visit        MyChart Information     [facility] lets you send messages to your doctor, view your test results, renew your prescriptions, schedule appointments and more. To sign up, go to [email or web address] . Click on \"Log in\" on the left side of the screen, which will take you to the Welcome page. Then click on \"Sign up Now\" on the right side of the page.     You will be asked to enter the access code listed below, as well as some personal information. Please follow the directions to create your username and password.     Your access code is: [email or web address]  Expires: 4/30/2018  6:31 AM     Your access code " will  in 90 days. If you need help or a new code, please call your [facility] clinic or [phone].        Care EveryWhere ID     This is your Care EveryWhere ID. This could be used by other organizations to access your [facility] medical records  [id number]        Your Vitals Were     Pulse Pulse Oximetry BMI (Body Mass Index)             75 99% 29.93 kg/m2          Blood Pressure from Last 3 Encounters:   18 142/88   17 118/74   16 113/77    Weight from Last 3 Encounters:   18 91.9 kg (202 lb 11.2 oz)   17 89.3 kg (196 lb 12.8 oz)   16 96.1 kg (211 lb 14.4 oz)              Today, you had the following     No orders found for display         Today's Medication Changes          These changes are accurate as of 18 11:59 PM.  If you have any questions, ask your nurse or doctor.               Start taking these medicines.        Dose/Directions    FLUoxetine 20 MG capsule   Commonly known as:  PROzac   Used for:  Immunization due, Raynaud's disease without gangrene, Rheumatoid arthritis with negative rheumatoid factor, involving unspecified site (H), MCTD (mixed connective tissue disease) (H)   Started by:  Nicolás De La Torre MD        Dose:  20 mg   Take 1 capsule (20 mg) by mouth daily   Quantity:  30 capsule   Refills:  1       hydroxychloroquine 200 MG tablet   Commonly known as:  PLAQUENIL   Used for:  Immunization due, Raynaud's disease without gangrene, Rheumatoid arthritis with negative rheumatoid factor, involving unspecified site (H), MCTD (mixed connective tissue disease) (H)   Started by:  Nicolás De La Torre MD        Dose:  200 mg   Take 1 tablet (200 mg) by mouth 2 times daily Get annual eye exams for hydroxychloroquine (plaquenil) monitoring and fax to [phone].   Quantity:  60 tablet   Refills:  5         These medicines have changed or have updated prescriptions.        Dose/Directions    * predniSONE 2.5 MG tablet   Commonly known as:  DELTASONE   This  "may have changed:  Another medication with the same name was changed. Make sure you understand how and when to take each.   Used for:  Systemic sclerosis (H), Tumoral calcinosis        TAKE 3 TABS BY MOUTH EVERY DAY TAKE WITH 10MG TAB (TOTAL DAILY DOSE 17.5MG)   Quantity:  270 tablet   Refills:  1       * predniSONE 10 MG tablet   Commonly known as:  DELTASONE   This may have changed:    - how much to take  - when to take this  - additional instructions   Used for:  Myopathy, Systemic sclerosis (H), Tumoral calcinosis, History of ongoing treatment with high-risk medication        TAKE 1 TAB BY MOUTH EVERY DAY TAKE WITH 7.5MG  (TOTAL DAILY DOSE 17.5MG)   Quantity:  90 tablet   Refills:  1       * predniSONE 2.5 MG tablet   Commonly known as:  DELTASONE   This may have changed:  Another medication with the same name was changed. Make sure you understand how and when to take each.   Used for:  Systemic sclerosis (H), Tumoral calcinosis        Dose:  7.5 mg   Take 3 tablets (7.5 mg) by mouth daily TAKE WITH 10MG TAB FOR (TOTAL DAILY DOSE 17.5MG)   Quantity:  270 tablet   Refills:  1       * Notice:  This list has 3 medication(s) that are the same as other medications prescribed for you. Read the directions carefully, and ask your doctor or other care provider to review them with you.      Stop taking these medicines if you haven't already. Please contact your care team if you have questions.     insulin syringe-needle U-100 30G X 1/2\" 1 ML   Commonly known as:  BD insulin syringe ultrafine   Stopped by:  Nicolás De La Torre MD           methotrexate (PF) 25 MG/ML Soln   Stopped by:  Nicolás De La Torre MD           methotrexate 50 MG/2ML injection CHEMO   Stopped by:  Nicolás De La Torre MD           NONFORMULARY   Stopped by:  Nicolás De La Torre MD           spironolactone-hydrochlorothiazide 25-25 MG per tablet   Commonly known as:  ALDACTAZIDE   Stopped by:  Nicolás De La Torre MD                Where to get your medicines    "   These medications were sent to [facility]/pharmacy #[id number] - [place], [place] - [address]  [address], [place] [address]     Phone:  [phone]     FLUoxetine 20 MG capsule    hydroxychloroquine 200 MG tablet                Primary Care Provider Office Phone # Fax #    Aminta JOSE MD Ciaran [phone]       [address]  [address]  [place] [address]        Equal Access to Services     [facility] : Hadii aad ku hadasho Soomaali, waaxda luqadaha, qaybta kaalmada adeegyada, waxay idiin hayaan adeeg kharash la'shiran . So [facility] [phone].    ATENCIÓN: Si habla español, tiene a encarnacion disposición servicios gratuitos de asistencia lingüística. [facility] [phone].    We comply with applicable federal civil rights laws and [place] laws. We do not discriminate on the basis of race, color, national origin, age, disability, sex, sexual orientation, or gender identity.            Thank you!     Thank you for choosing [facility] RHEUMATOLOGY  for your care. Our goal is always to provide you with excellent care. Hearing back from our patients is one way we can continue to improve our services. Please take a few minutes to complete the written survey that you may receive in the mail after your visit with us. Thank you!             Your Updated Medication List - Protect others around you: Learn how to safely use, store and throw away your medicines at www.disposemymeds.org.          This list is accurate as of 2/13/18 11:59 PM.  Always use your most recent med list.                   Brand Name Dispense Instructions for use Diagnosis    aspirin EC 81 MG EC tablet      Take 81 mg by mouth    Immunization due, Raynaud's disease without gangrene, Rheumatoid arthritis with negative rheumatoid factor, involving unspecified site (H), MCTD (mixed connective tissue disease) (H)       atorvastatin 40 MG tablet    LIPITOR     Take 40 mg by mouth    Immunization due, Raynaud's  disease without gangrene, Rheumatoid arthritis with negative rheumatoid factor, involving unspecified site (H), MCTD (mixed connective tissue disease) (H)       FLUoxetine 20 MG capsule    PROzac    30 capsule    Take 1 capsule (20 mg) by mouth daily    Immunization due, Raynaud's disease without gangrene, Rheumatoid arthritis with negative rheumatoid factor, involving unspecified site (H), MCTD (mixed connective tissue disease) (H)       hydroxychloroquine 200 MG tablet    PLAQUENIL    60 tablet    Take 1 tablet (200 mg) by mouth 2 times daily Get annual eye exams for hydroxychloroquine (plaquenil) monitoring and fax to [phone].    Immunization due, Raynaud's disease without gangrene, Rheumatoid arthritis with negative rheumatoid factor, involving unspecified site (H), MCTD (mixed connective tissue disease) (H)       LASIX 40 MG tablet   Generic drug:  furosemide      Take 40 mg by mouth 2 times daily.    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), Systemic sclerosis (H), Raynaud's syndrome, Shortness of breath, Edema of both legs       MAGNESIUM OXIDE PO       Tumoral calcinosis, Hypoalbuminaemia, Myopathy, unspecified, Systemic sclerosis (H), Raynaud's syndrome, Other specified diffuse disease of connective tissue       pantoprazole 40 MG EC tablet    PROTONIX    90 tablet    TAKE 1 TABLET (40 MG) BY MOUTH DAILY TAKE 30-60 MINUTES BEFORE A MEAL    Gastroesophageal reflux disease without esophagitis, Tumoral calcinosis, Systemic sclerosis (H), Myopathy, Raynaud's disease without gangrene       POTASSIMIN PO      40mg Monday Wednesday and Friday. 20 mg Tuesday, Thursday, Saturday and Sunday    Systemic sclerosis (H), Raynaud's syndrome, Tumoral calcinosis, Encounter for long-term (current) use of steroids       * predniSONE 2.5 MG tablet    DELTASONE    270 tablet    TAKE 3 TABS BY MOUTH EVERY DAY TAKE WITH 10MG TAB (TOTAL DAILY DOSE 17.5MG)    Systemic  sclerosis (H), Tumoral calcinosis       * predniSONE 10 MG tablet    DELTASONE    90 tablet    TAKE 1 TAB BY MOUTH EVERY DAY TAKE WITH 7.5MG  (TOTAL DAILY DOSE 17.5MG)    Myopathy, Systemic sclerosis (H), Tumoral calcinosis, History of ongoing treatment with high-risk medication       * predniSONE 2.5 MG tablet    DELTASONE    270 tablet    Take 3 tablets (7.5 mg) by mouth daily TAKE WITH 10MG TAB FOR (TOTAL DAILY DOSE 17.5MG)    Systemic sclerosis (H), Tumoral calcinosis       spironolactone 25 MG tablet    ALDACTONE     Take 25 mg by mouth    Immunization due, Raynaud's disease without gangrene, Rheumatoid arthritis with negative rheumatoid factor, involving unspecified site (H), MCTD (mixed connective tissue disease) (H)       * Notice:  This list has 3 medication(s) that are the same as other medications prescribed for you. Read the directions carefully, and ask your doctor or other care provider to review them with you.

## 2018-02-14 NOTE — NURSING NOTE
Jeanine Schuler      1.  Has the patient received the information for the influenza vaccine? YES    2.  Does the patient have any of the following contraindications?     Allergy to eggs? No     Allergic reaction to previous influenza vaccines? No     Any other problems to previous influenza vaccines? No     Paralyzed by Guillain-Chester syndrome? No     Currently pregnant? NO     Current moderate or severe illness? No     Allergy to contact lens solution? No    3.  The vaccine has been administered in the usual fashion and the patient was instructed to wait 20 minutes before leaving the building in the event of an allergic reaction: YES    Vaccination given by [employer]IN[facility]ES.  Recorded by Skinny Forbes

## 2018-02-14 NOTE — PROGRESS NOTES
SUBJECTIVE:  The patient returns in f/u of her MCTD/SSC/RP/with h/o multiple DU.      PROBLEM LIST:    1.  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    2.  History of severe multiple digital ulcers, on fingers and toes.   3.  Marked keratoconjunctivitis sicca over the last several years.    4.  Marked iron deficiency anemia responsive to IV iron.    5.  Diffuse musculoskeletal pain   6.  Lower extremity edema managed with lasix and spironolactone   7.  Marked fatigue and diffuse clinical weakness.    8.  Dyspnea on exertion with lower extremity edema and other features including the anticentromere positivity worrisome for the potential development of pulmonary hypertension.    9.  Marked GERD with associated dysphagia.    10.  Status post gastric bypass with a history of intermittent constipation and diarrhea potentially consistent with bacterial small bowel overgrowth versus other gut effects of the prior surgery.    11.  History of positive rheumatoid factor consistent with MCTD, given the remainder of her clinical presentation.     12. Progressive Jaw tightening with inability to open mouth wider than 1 inch.   13. Diastolic dysfunction (3/2013)  14. Hypoalbuminemia      INTERVAL HISTORY:     Since we last saw her, she believes she is gradually feeling significantly better.  Indeed, for the first time in years she feels that her fatigue is well enough controlled that she can work through the night without a nap.  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  She actually thinks her muscle weakness is also stable or improved, and she has no significant dyspnea on exertion and no dry cough.       She does have a little bit of right shoulder pain and the features of that are entirely consistent with impingement syndrome,  and that has been going on about 6 weeks.  However, no other joints have been active and she denies any stiffness in any of the joints.       On the Orencia plus methotrexate she has had no fevers, chills, or signs or symptoms of infection.  She has not, however, tried to go down her prednisone and remains on 20 mg a day.      Other features of her disease including her GERD and her keratoconjunctivitis sicca have remained stable with no change.       She does continue to have quite significant Raynaud's, but it is under better control during the summer and she has had no digital ulcerations.     No chest pain, shortness of breath, rash, numbness/tingling in hands/feet.     AUGUST 22, 2017, INTERVAL HISTORY:  Since we have last seen the patient, she developed a right ankle wound about 2 months ago.  That opened for a while and it became hard to close, but at this point it has finally closed over.       She has had some stressors and she feels like that has contributed to her feeling like she needed more prednisone.  She had made it down to 12 mg a day for almost 6 months but increased to 20 mg a day in June when she was moving to a new house.  This was primarily due to an increase in fatigue and in joint pains.      At this point, her morning stiffness is 30-60 minutes despite remaining on the 20 mg a day of prednisone.  She does think her strength, however, is stable.      She continues to have a lot of Raynaud's phenomena but has not developed any distal digital ulcerations.       Her GERD and keratoconjunctivitis sicca, however, have remained stable and she thinks she has stable exercise tolerance.       She has not done her labs for many, many months and has not seen me for over a year, and we discussed that in some detail.     Feb. 13, 2018 INTERVAL HISTORY:  Since we have last seen her, she made it down to 12.5 mg a day of prednisone but then put herself back up to 20 mg a day because of increasing general  "body aches and fatigue as well as some inflammatory joint stiffness in her wrists.  Other joints have actually been okay.      She had stopped her methotrexate some time ago.  She works in a hospital and knew of several people who were admitted with \"methotrexate toxicity\" who  while in hospital and although she does not know the details, that frightened her enough that she decided to stop the drug.       She is getting worsening Raynaud's.  She really notes that this happens not only with cold, but with a variety of kinds of stress.  She has previously, at least for a short length of time, been on losartan and does not remember whether that helped at all.  I am pretty sure she has also been on calcium channel blockers, but those are relatively contraindicated in her at this point because of persistent bilateral lower extremity edema.       She is getting enough lower extremity edema and has been found to have vascular insufficiency that she will be having some vascular surgery to laser some of these areas, although an exact time for that has not been laid out.       In addition to getting Raynaud's in her hands, she also gets it in her feet, and when she does, potentially contributed to by the vascular insufficiency, she gets numbness in her feet.  She is not getting numbness in her hands by contrast.       The patient did have pulmonary function tests today.  Although she is not exercising regularly, in general she feels her exercise tolerance has been stable, and she is not having any dry cough.       She did have a decrease in her FVC to 3.68 from 4.30 and her DLCO to 21.77 from 24.74, but she has had some rib cage pain for about the last 3 weeks since she had a minor accident while dancing.  Notably then, her TLC is completely stable, going from 6.41 to 6.29.       She denies any other new problems.  Keratoconjunctivitis sicca, GERD and other features have been stable and she denies any dysphagia. "             ROS as per HPI.  10-point ROS is otherwise negative.    HISTORY REVIEW:  Past Medical History:   Diagnosis Date     Anemia      Cellulitis of leg 6/6/2013     Esophageal reflux     Better post-hiatal hernia repair and weight loss     Limb ischemia; ulcers of fingertips 4/22/2013     Raynaud's syndrome      Scleroderma (H)      Systemic sclerosis (H) 2009     Unspecified essential hypertension        Past Surgical History:   Procedure Laterality Date     BYPASS GASTRIC, CHOLECYSTECTOMY, COMBINED       CHOLECYSTECTOMY       COLONOSCOPY       ESOPHAGOSCOPY, GASTROSCOPY, DUODENOSCOPY (EGD), COMBINED N/A 3/10/2016    Procedure: COMBINED ESOPHAGOSCOPY, GASTROSCOPY, DUODENOSCOPY (EGD), BIOPSY SINGLE OR MULTIPLE;  Surgeon: Tricia Zambrano MD;  Location:  GI     INNER EAR SURGERY       PICC INSERTION  6/5/2013    5fr DL Power PICC, 44cm, left basilic vein, tip in low SVC     SURGICAL HISTORY OF -       Left ear surgery - incus transition, tympanoplasty     SURGICAL HISTORY OF -   6/05    Gastric bypass     SURGICAL HISTORY OF -   6/05    Cholecystectomy     SURGICAL HISTORY OF -   6/05    Hiatal hernia repair     TONSILLECTOMY         Family History   Problem Relation Age of Onset     CEREBROVASCULAR DISEASE Father      C.A.D. Father      C.A.D. Mother      C.A.D. 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      Social History Main Topics     Smoking status: Never Smoker      Smokeless tobacco: Never Used     Alcohol Use: Yes      occassionally     Drug Use: No     Sexually Active: Yes -- Male partner(s)     Birth Control/ Protection: None     Social History Narrative     Was  in December 2015. Longtime partner from [place].       Patient Active Problem List   Diagnosis     Essential  hypertension     Thyrotoxicosis     Obesity     Prolonged QTc 460 ms,  at hr 67     Low back pain - Suspect SI Joint dysfunction     Cough     Systemic sclerosis (H)     Tumoral calcinosis     Shortness of breath     Edema of both legs     Abnormal albumin     Myopathy     Hypoalbuminemia     Diastolic dysfunction     Limb ischemia; ulcers of fingertips     Cellulitis of leg     Other specified diffuse disease of connective tissue     Disturbed sleep rhythm     Pharyngeal dysphagia     Gastroesophageal reflux disease, esophagitis presence not specified     Rheumatoid arthritis with negative rheumatoid factor, involving unspecified site (H)     Raynaud's disease without gangrene       Allergies   Allergen Reactions     Lovenox Other (See Comments)     Blood clot      Norvasc [Amlodipine Besylate] Swelling     Lip swelling that happened on 2 different occasions     Erythromycin Rash     Rash on arms     OBJECTIVE:  Vitals: Blood pressure 142/88, pulse 75, weight 91.9 kg (202 lb 11.2 oz), SpO2 99 %.  Constitutional: Pleasant woman, appears older than stated age.   Eyes: PERRL. EOMI. Normal conjunctivae and sclerae.   ENT: Dry mucous membranes. +Superior dental plate. Otherwise normal mucous membranes. Decreased oral aperture.  Neck: no mass or thyroid enlargement  Resp: lungs clear to auscultation  CV: Normal rate, regular rhythm, trace edema of ankles bilaterally  GI: Soft, nontender, nondistended. Spleen and liver not palpable.  Lymph: no cervical, supraclavicular nodes  MS:  All TMJ, neck, shoulder, elbow, wrist, MCP/PIP/DIP, spine, hip, knee, ankle, and foot MTP/IP joints were examined and  found normal. No active synovitis or deformity. Full ROM.  Hands and feet with vascular congestion.  Skin: No rash appreciated. Has tattoos present.   Neuro: Strength 5/5 in upper and lower extremities. No focal neurological deficits.     Component      Latest Ref Rng 1/26/2016   WBC      4.0 - 11.0 10e9/L 9.2   RBC  Count      3.8 - 5.2 10e12/L 4.59   Hemoglobin      11.7 - 15.7 g/dL 13.2   Hematocrit      35.0 - 47.0 % 41.8   MCV      78 - 100 fl 91   MCH      26.5 - 33.0 pg 28.8   MCHC      31.5 - 36.5 g/dL 31.6   RDW      10.0 - 15.0 % 16.7 (H)   Platelet Count      150 - 450 10e9/L 195   Diff Method       Automated Method   % Neutrophils       62.9   % Lymphocytes       27.0   % Monocytes       9.1   % Eosinophils       0.4   % Basophils       0.2   % Immature Granulocytes       0.4   Nucleated RBCs      0 /100 0   Absolute Neutrophil      1.6 - 8.3 10e9/L 5.8   Absolute Lymphocytes      0.8 - 5.3 10e9/L 2.5   Absolute Monocytes      0.0 - 1.3 10e9/L 0.8   Absolute Eosinophils      0.0 - 0.7 10e9/L 0.0   Absolute Basophils      0.0 - 0.2 10e9/L 0.0   Abs Immature Granulocytes      0 - 0.4 10e9/L 0.0   Absolute Nucleated RBC       0.0   Color Urine       Yellow   Appearance Urine       Clear   Glucose Urine      NEG mg/dL Negative   Bilirubin Urine      NEG Negative   Ketones Urine      NEG mg/dL Negative   Specific Gravity Urine      1.003 - 1.035 1.010   Blood Urine      NEG Negative   pH Urine      5.0 - 7.0 pH 6.0   Protein Albumin Urine      NEG mg/dL Negative   Urobilinogen mg/dL      0.0 - 2.0 mg/dL Normal   Nitrite Urine      NEG Negative   Leukocyte Esterase Urine      NEG Negative   Source       Midstream Urine   WBC Urine      0 - 2 /HPF <1   RBC Urine      0 - 2 /HPF <1   Squamous Epithelial /HPF Urine      0 - 1 /HPF <1   Creatinine      0.52 - 1.04 mg/dL 1.11 (H)   GFR Estimate      >60 mL/min/1.7m2 51 (L)   GFR Estimate If Black      >60 mL/min/1.7m2 62   ALT      0 - 50 U/L 23   AST      0 - 45 U/L 16   Albumin      3.4 - 5.0 g/dL 3.4   CRP Inflammation      0.0 - 8.0 mg/L <2.9   Sed Rate      0 - 30 mm/h 9   CK Total      30 - 225 U/L 38   Aldolase       4.1     Biopsy from 3/10 EGD:  Esophagus, gastroesophageal junction, biopsies:   - Squamous mucosa and submucosa with marked chronic inflammation and    fibrosis   - No evidence of intestinal metaplasia or dysplasia     Assessment/Plan:     With  respect to her Raynaud's, particularly given the tendency for this to occur in her with stress as well as cold, I think it is worth giving her a trial of fluoxetine 20 mg a day, which in a crossover trial a few years ago appeared superior to low-dose calcium channel blockers.       We also again discussed the need for a steroid-sparing drug to try to get her prednisone down consistently below 10 mg a day.  She is willing to try this, and after discussion we decided to try hydroxychloroquine, which she was on back in 2009 before I ever saw her but we never resumed after that.  We did discuss that the onset of this can be very slow and she is aware that she will need eye exams annually.        She will try if she feels like she is doing better to go down on the prednisone, and she has multiple dosage forms available that she has gotten in the past from us that should help facilitate her decreasing the dose.      I would like to see her back in 4-6 months, sooner p.r.n.  This visit was 40 minutes in duration, over 50% in counseling.         My suspicion is this wound she has was probably due largely to the skin fragility from her chronic, heavy use of prednisone.      We have discussed that and her need to go down, and she will again try going down at this point to 17.5 mg a day.       She is way overdue for methotrexate labs and we will get those done as well, and I have renewed them for another year.       I would like to see her back in December and have pulmonary function tests at that time, for which she is also somewhat overdue, though her symptoms do not sound as though she is developing any progressive pulmonary disease.  We will need to be watching for digital ulcers as well with the colder weather, but she has been able to, despite very poor control of her Raynaud's overall, avoid those, so hopefully that will  continue.

## 2018-02-15 LAB
DLCOUNC-%PRED-PRE: 91 %
DLCOUNC-PRE: 21.77 ML/MIN/MMHG
DLCOUNC-PRED: 23.77 ML/MIN/MMHG
ERV-%PRED-PRE: 56 %
ERV-PRE: 0.44 L
ERV-PRED: 0.78 L
EXPTIME-PRE: 7.08 SEC
FEF2575-%PRED-PRE: 129 %
FEF2575-PRE: 3.31 L/SEC
FEF2575-PRED: 2.56 L/SEC
FEFMAX-%PRED-PRE: 116 %
FEFMAX-PRE: 8.31 L/SEC
FEFMAX-PRED: 7.16 L/SEC
FEV1-%PRED-PRE: 111 %
FEV1-PRE: 3.09 L
FEV1FEV6-PRE: 84 %
FEV1FEV6-PRED: 81 %
FEV1FVC-PRE: 84 %
FEV1FVC-PRED: 80 %
FEV1SVC-PRE: 85 %
FEV1SVC-PRED: 72 %
FIFMAX-PRE: 5.8 L/SEC
FRCPLETH-%PRED-PRE: 106 %
FRCPLETH-PRE: 3.11 L
FRCPLETH-PRED: 2.92 L
FVC-%PRED-PRE: 105 %
FVC-PRE: 3.68 L
FVC-PRED: 3.49 L
IC-%PRED-PRE: 102 %
IC-PRE: 3.18 L
IC-PRED: 3.1 L
RVPLETH-%PRED-PRE: 133 %
RVPLETH-PRE: 2.67 L
RVPLETH-PRED: 2.01 L
TLCPLETH-%PRED-PRE: 112 %
TLCPLETH-PRE: 6.29 L
TLCPLETH-PRED: 5.61 L
VA-%PRED-PRE: 88 %
VA-PRE: 5.03 L
VC-%PRED-PRE: 93 %
VC-PRE: 3.62 L
VC-PRED: 3.88 L

## 2018-04-19 DIAGNOSIS — Z23 IMMUNIZATION DUE: ICD-10-CM

## 2018-04-19 DIAGNOSIS — M06.00 RHEUMATOID ARTHRITIS WITH NEGATIVE RHEUMATOID FACTOR, INVOLVING UNSPECIFIED SITE (H): ICD-10-CM

## 2018-04-19 DIAGNOSIS — M35.1 MCTD (MIXED CONNECTIVE TISSUE DISEASE) (H): ICD-10-CM

## 2018-04-19 DIAGNOSIS — I73.00 RAYNAUD'S DISEASE WITHOUT GANGRENE: ICD-10-CM

## 2018-04-19 NOTE — TELEPHONE ENCOUNTER
prozac  Last Written Prescription Date:  2/13/18  Last Fill Quantity: 30  # refills: 1  Last Office Visit : 2/13/18  Future Office visit:  No future appt    Routing refill request to provider for review/approval because:  Non-Protocol

## 2018-04-30 DIAGNOSIS — E83.59 TUMORAL CALCINOSIS: ICD-10-CM

## 2018-04-30 DIAGNOSIS — M34.9 SYSTEMIC SCLEROSIS (H): ICD-10-CM

## 2018-04-30 DIAGNOSIS — G72.9 MYOPATHY: ICD-10-CM

## 2018-04-30 DIAGNOSIS — Z79.899 HISTORY OF ONGOING TREATMENT WITH HIGH-RISK MEDICATION: ICD-10-CM

## 2018-05-01 RX ORDER — PREDNISONE 10 MG/1
TABLET ORAL
Qty: 90 TABLET | Refills: 1 | Status: SHIPPED | OUTPATIENT
Start: 2018-05-01 | End: 2019-07-11 | Stop reason: DRUGHIGH

## 2018-05-01 NOTE — TELEPHONE ENCOUNTER
predniSONE (DELTASONE) 10 MG   Last Written Prescription Date:  10/8/17  Last Fill Quantity: 90,   # refills: 1  Last Office Visit : 2/13/18  Future Office visit:  NONE    Routing refill request to provider for review/approval because:  Drug not on the refill protocol

## 2018-07-21 DIAGNOSIS — Z23 IMMUNIZATION DUE: ICD-10-CM

## 2018-07-21 DIAGNOSIS — M35.1 MCTD (MIXED CONNECTIVE TISSUE DISEASE) (H): ICD-10-CM

## 2018-07-21 DIAGNOSIS — I73.00 RAYNAUD'S DISEASE WITHOUT GANGRENE: ICD-10-CM

## 2018-07-21 DIAGNOSIS — M06.00 RHEUMATOID ARTHRITIS WITH NEGATIVE RHEUMATOID FACTOR, INVOLVING UNSPECIFIED SITE (H): ICD-10-CM

## 2018-07-23 NOTE — TELEPHONE ENCOUNTER
FLUoxetine (PROZAC) 20 MG capsule      Last Written Prescription Date:  4/19/18  Last Fill Quantity: 90,   # refills: 0  Last Office Visit : 2/13/18  Future Office visit:  none    Routing refill request to on-call  provider for review/approval because:  Drug not on the Rheumatology refill protocol.

## 2018-08-28 DIAGNOSIS — I73.00 RAYNAUD'S DISEASE WITHOUT GANGRENE: ICD-10-CM

## 2018-08-28 DIAGNOSIS — Z23 IMMUNIZATION DUE: ICD-10-CM

## 2018-08-28 DIAGNOSIS — M35.1 MCTD (MIXED CONNECTIVE TISSUE DISEASE) (H): ICD-10-CM

## 2018-08-28 DIAGNOSIS — M06.00 RHEUMATOID ARTHRITIS WITH NEGATIVE RHEUMATOID FACTOR, INVOLVING UNSPECIFIED SITE (H): ICD-10-CM

## 2018-08-28 NOTE — LETTER
Dear Jeanine Schuler,     Regular eye exams are required while taking hydroxychloroquine (Plaquenil). Eye exams should be completed by an eye specialist who is experienced in monitoring for hydroxychloroquine toxicity (a rare effect of the drug that can damage your eyes and vision).  These may be yearly or as determined by your eye specialist.     Although vision problems and loss of sight while taking hydroxychloroquine are very rare, notify your doctor immediately if you notice changes in your vision. The goal of screening is to detect toxicity before your vision is significantly or noticeably impacted. Failing to get proper screening exams puts you at risk of vision changes which may or may not be reversible.    Per the American Academy of Ophthalmology recommendations (2016), screening tests performed may include a 10-2 visual field test, spectral-domain optical coherence tomography (SD OCT), or other screening tests as determined by the eye specialist, including a multifocal electroretinogram (mfERG) or fundus auto-fluorescence (FAF).    We received a refill request from your pharmacy. A copy of your current eye exam was not found in your medical record. Your hydroxychloroquine prescription has been refilled with a limited supply pending confirmation you have had an eye exam to test for hydroxychloroquine toxicity.      We encourage you to bring this letter to your eye exam to discuss the exams that will be performed during your visit. Please request your eye clinic fax or mail a copy of your eye exam report to our clinic indicating that testing was completed for hydroxychloroquine toxicity screening. The exam notes must specifically comment on the potential for hydroxychloroquine toxicity and outline recommended follow-up.    If you have questions about hydroxychloroquine or the information in this letter, please call the clinic at [phone] or talk to your provider at your next office  visit.                        1    Eye Specialist Letter  Dear Eye Specialist,  To ensure safe use of Plaquenil (hydroxychloroquine), we are requesting your assistance in providing the following information. Please return this form to our clinic via mail or fax, or incorporate this information into your visit summary. Your note must indicate whether or not there is evidence of toxicity from Plaquenil use. For questions regarding this form, please call [phone].  Patient Name:   :             Date of Exam:    The following exams were completed during this visit in accordance with the American Academy of Ophthalmology recommendations (2016):  ? 10-2 automated visual field  ? Spectral-domain optical coherence tomography (SD OCT)  ? Multifocal electroretinogram (mfERG)  ? Fundus autofluorescence (FAF)  ? Other (please specify)  Please select from the following:  ? The findings from the above exams are not suggestive of toxicity from Plaquenil (hydroxychloroquine).  ? The findings from the above exams may suggest toxicity from Plaquenil (hydroxychloroquine).  Directly contact the clinic and fax this form.  Please provide additional guidance on whether or not the medication may be continued from your perspective:  Date of next recommended Plaquenil (hydroxychloroquine) screening eye exam:   ? 5 years  ? 1 year  ? 6 months  Other (please specify):   Eye Specialist Name (print):                                                                Date:  Eye Specialist Signature:

## 2018-08-30 RX ORDER — HYDROXYCHLOROQUINE SULFATE 200 MG/1
200 TABLET, FILM COATED ORAL 2 TIMES DAILY
Qty: 180 TABLET | Refills: 0 | Status: SHIPPED | OUTPATIENT
Start: 2018-08-30 | End: 2018-12-05

## 2018-08-30 NOTE — TELEPHONE ENCOUNTER
HYDROXYCHLOROQUINE 200 MG TAB  Plaquenil      Last Written Prescription Date:  2/13/2018  Last Fill Quantity: 60,   # refills: 5  Last Office Visit: 2/13/2018  Future Office visit: None  Last Eye Exam: Not noted in chart. Eye exam letter sent.  Scheduling has been notified to contact the pt for appointment.      Routing refill request to provider for review/approval because:  Appointment due.

## 2018-10-02 DIAGNOSIS — I73.00 RAYNAUD'S DISEASE WITHOUT GANGRENE: ICD-10-CM

## 2018-10-02 DIAGNOSIS — M34.9 SYSTEMIC SCLEROSIS (H): ICD-10-CM

## 2018-10-02 DIAGNOSIS — E83.59 TUMORAL CALCINOSIS: ICD-10-CM

## 2018-10-02 DIAGNOSIS — G72.9 MYOPATHY: ICD-10-CM

## 2018-10-02 DIAGNOSIS — K21.9 GASTROESOPHAGEAL REFLUX DISEASE WITHOUT ESOPHAGITIS: ICD-10-CM

## 2018-10-03 RX ORDER — PANTOPRAZOLE SODIUM 40 MG/1
TABLET, DELAYED RELEASE ORAL
Qty: 90 TABLET | Refills: 2 | Status: SHIPPED | OUTPATIENT
Start: 2018-10-03 | End: 2019-02-11

## 2018-10-15 DIAGNOSIS — M34.9 SYSTEMIC SCLEROSIS (H): ICD-10-CM

## 2018-10-15 DIAGNOSIS — E83.59 TUMORAL CALCINOSIS: ICD-10-CM

## 2018-10-17 NOTE — TELEPHONE ENCOUNTER
predniSONE (DELTASONE) 2.5 MG tablet   Last Written Prescription Date:  11-30-17  Last Fill Quantity: 270,   # refills: 1  Last Office Visit : 2-13-18   Clinic note: She will try if she feels like she is doing better to go down on the prednisone, and she has multiple dosage forms available that she has gotten in the past from us that should help facilitate her decreasing the dose.     I would like to see her back in 4-6 months, sooner p.r.n.  This visit was 40 minutes in duration, over 50% in counseling. My suspicion is this wound she has was probably due largely to the skin fragility from her chronic, heavy use of prednisone. We have discussed that and her need to go down, and she will again try going down at this point to 17.5 mg a day.    Future Office visit:  12-18-18    Routing refill request to provider for review/approval because:  Last clinic visit more than 6 months ago. Daily prednisone dose more than 10 mg daily

## 2018-10-18 RX ORDER — PREDNISONE 2.5 MG/1
TABLET ORAL
Qty: 270 TABLET | Refills: 1 | Status: SHIPPED | OUTPATIENT
Start: 2018-10-18 | End: 2020-08-21

## 2018-12-05 DIAGNOSIS — M35.1 MCTD (MIXED CONNECTIVE TISSUE DISEASE) (H): ICD-10-CM

## 2018-12-05 DIAGNOSIS — M06.00 RHEUMATOID ARTHRITIS WITH NEGATIVE RHEUMATOID FACTOR, INVOLVING UNSPECIFIED SITE (H): ICD-10-CM

## 2018-12-05 DIAGNOSIS — Z23 IMMUNIZATION DUE: ICD-10-CM

## 2018-12-05 DIAGNOSIS — I73.00 RAYNAUD'S DISEASE WITHOUT GANGRENE: ICD-10-CM

## 2018-12-05 NOTE — LETTER
Dear Jeanine Schuler,    Jeanine Schuler  [address] [address]         Regular eye exams are required while taking hydroxychloroquine (Plaquenil). Eye exams should be completed by an eye specialist who is experienced in monitoring for hydroxychloroquine toxicity (a rare effect of the drug that can damage your eyes and vision).  These may be yearly or as determined by your eye specialist.     Although vision problems and loss of sight while taking hydroxychloroquine are very rare, notify your doctor immediately if you notice changes in your vision. The goal of screening is to detect toxicity before your vision is significantly or noticeably impacted. Failing to get proper screening exams puts you at risk of vision changes which may or may not be reversible.    Per the American Academy of Ophthalmology recommendations (2016), screening tests performed may include a 10-2 visual field test, spectral-domain optical coherence tomography (SD OCT), or other screening tests as determined by the eye specialist, including a multifocal electroretinogram (mfERG) or fundus auto-fluorescence (FAF).    We received a refill request from your pharmacy. A copy of your current eye exam was not found in your medical record. Your hydroxychloroquine prescription has been refilled with a limited supply pending confirmation you have had an eye exam to test for hydroxychloroquine toxicity.      We encourage you to bring this letter to your eye exam to discuss the exams that will be performed during your visit. Please request your eye clinic fax or mail a copy of your eye exam report to our clinic indicating that testing was completed for hydroxychloroquine toxicity screening. The exam notes must specifically comment on the potential for hydroxychloroquine toxicity and outline recommended follow-up.    If you have questions about hydroxychloroquine or the information in this letter, please call the clinic at  [phone] or talk to your provider at your next office visit.                        2    Eye Specialist Letter  Dear Eye Specialist,  To ensure safe use of Plaquenil (hydroxychloroquine), we are requesting your assistance in providing the following information. Please return this form to our clinic via mail or fax, or incorporate this information into your visit summary. Your note must indicate whether or not there is evidence of toxicity from Plaquenil use. For questions regarding this form, please call [phone].  Patient Name:   :             Date of Exam:    The following exams were completed during this visit in accordance with the American Academy of Ophthalmology recommendations (2016):  ? 10-2 automated visual field  ? Spectral-domain optical coherence tomography (SD OCT)  ? Multifocal electroretinogram (mfERG)  ? Fundus autofluorescence (FAF)  ? Other (please specify)  Please select from the following:  ? The findings from the above exams are not suggestive of toxicity from Plaquenil (hydroxychloroquine).  ? The findings from the above exams may suggest toxicity from Plaquenil (hydroxychloroquine).  Directly contact the clinic and fax this form.  Please provide additional guidance on whether or not the medication may be continued from your perspective:  Date of next recommended Plaquenil (hydroxychloroquine) screening eye exam:   ? 5 years  ? 1 year  ? 6 months  Other (please specify):   Eye Specialist Name (print):                                                                Date:  Eye Specialist Signature:

## 2018-12-06 ENCOUNTER — PATIENT OUTREACH (OUTPATIENT)
Dept: CARE COORDINATION | Facility: CLINIC | Age: 58
End: 2018-12-06

## 2018-12-10 RX ORDER — HYDROXYCHLOROQUINE SULFATE 200 MG/1
200 TABLET, FILM COATED ORAL 2 TIMES DAILY
Qty: 60 TABLET | Refills: 0 | Status: SHIPPED | OUTPATIENT
Start: 2018-12-10 | End: 2019-01-06

## 2018-12-10 NOTE — TELEPHONE ENCOUNTER
PLAQUENIL) 200 MG       Last Written Prescription Date:  8/30/18  Last Fill Quantity: 180,   # refills: 0  Last Office Visit : 2/13/18  Future Office visit:  12/18/18  *would like to see her back in December  LAST EYE EXAM   NONE   Routing refill request to provider for review/approval because:  RF PENDING   NO EYE EXAM     EYE EXAM REMINDER LETTER SENT

## 2018-12-14 DIAGNOSIS — M34.9 SYSTEMIC SCLEROSIS (H): ICD-10-CM

## 2018-12-14 DIAGNOSIS — E83.59 TUMORAL CALCINOSIS: ICD-10-CM

## 2018-12-17 RX ORDER — PREDNISONE 2.5 MG/1
TABLET ORAL
Qty: 270 TABLET | Refills: 1 | OUTPATIENT
Start: 2018-12-17

## 2019-01-02 DIAGNOSIS — M34.9 SYSTEMIC SCLEROSIS (H): ICD-10-CM

## 2019-01-02 DIAGNOSIS — E83.59 TUMORAL CALCINOSIS: ICD-10-CM

## 2019-01-06 DIAGNOSIS — M06.00 RHEUMATOID ARTHRITIS WITH NEGATIVE RHEUMATOID FACTOR, INVOLVING UNSPECIFIED SITE (H): ICD-10-CM

## 2019-01-06 DIAGNOSIS — Z23 IMMUNIZATION DUE: ICD-10-CM

## 2019-01-06 DIAGNOSIS — M35.1 MCTD (MIXED CONNECTIVE TISSUE DISEASE) (H): ICD-10-CM

## 2019-01-06 DIAGNOSIS — I73.00 RAYNAUD'S DISEASE WITHOUT GANGRENE: ICD-10-CM

## 2019-01-06 RX ORDER — PREDNISONE 2.5 MG/1
TABLET ORAL
Qty: 270 TABLET | Refills: 1 | OUTPATIENT
Start: 2019-01-06

## 2019-01-07 DIAGNOSIS — Z23 IMMUNIZATION DUE: ICD-10-CM

## 2019-01-07 DIAGNOSIS — M06.00 RHEUMATOID ARTHRITIS WITH NEGATIVE RHEUMATOID FACTOR, INVOLVING UNSPECIFIED SITE (H): ICD-10-CM

## 2019-01-07 DIAGNOSIS — M35.1 MCTD (MIXED CONNECTIVE TISSUE DISEASE) (H): ICD-10-CM

## 2019-01-07 DIAGNOSIS — I73.00 RAYNAUD'S DISEASE WITHOUT GANGRENE: ICD-10-CM

## 2019-01-07 NOTE — TELEPHONE ENCOUNTER
prozac  Last Written Prescription Date:  7/24/18  Last Fill Quantity: 90 # refills: 1  Last Office Visit : 2/13/18  Future Office visit:  No future appt    Routing refill request to provider for review/approval because:  Last appt > 6 months ago

## 2019-01-08 RX ORDER — HYDROXYCHLOROQUINE SULFATE 200 MG/1
200 TABLET, FILM COATED ORAL 2 TIMES DAILY
Qty: 60 TABLET | Refills: 0 | Status: SHIPPED | OUTPATIENT
Start: 2019-01-08 | End: 2020-12-22

## 2019-01-08 NOTE — TELEPHONE ENCOUNTER
hydroxychloroquine (PLAQUENIL) 200 MG   Last Written Prescription Date: 12/10/18  Last Fill Quantity: 60,   # refills: 0  Last Office Visit : 2/13/18  Future Office visit:  NONE   *12/18/18 NOSHOW  LAST EYE EXAM   NONE   Routing refill request to provider for review/approval because:  RF PENDING   NO EYE EXAM     EYE EXAM REMINDER LETTER SENT  12/5/18

## 2019-02-11 DIAGNOSIS — M34.9 SYSTEMIC SCLEROSIS (H): ICD-10-CM

## 2019-02-11 DIAGNOSIS — E83.59 TUMORAL CALCINOSIS: ICD-10-CM

## 2019-02-11 DIAGNOSIS — G72.9 MYOPATHY: ICD-10-CM

## 2019-02-11 DIAGNOSIS — I73.00 RAYNAUD'S DISEASE WITHOUT GANGRENE: ICD-10-CM

## 2019-02-11 DIAGNOSIS — K21.9 GASTROESOPHAGEAL REFLUX DISEASE WITHOUT ESOPHAGITIS: ICD-10-CM

## 2019-02-12 RX ORDER — PANTOPRAZOLE SODIUM 40 MG/1
TABLET, DELAYED RELEASE ORAL
Qty: 90 TABLET | Refills: 0 | Status: SHIPPED | OUTPATIENT
Start: 2019-02-12 | End: 2019-05-19

## 2019-02-12 NOTE — TELEPHONE ENCOUNTER
pantoprazole (PROTONIX) 40 MG   Last Written Prescription Date:  10/3/18  Last Fill Quantity: 90,   # refills: 2  Last Office Visit : 2/13/18  Future Office visit:  NONE    Routing refill request to provider for review/approval because:  30 DAY RF PENDING     OVER DUE APPT > 18 MOS   Scheduling has been notified to contact the pt for appointment

## 2019-04-22 DIAGNOSIS — M35.1 MCTD (MIXED CONNECTIVE TISSUE DISEASE) (H): ICD-10-CM

## 2019-04-22 DIAGNOSIS — Z23 IMMUNIZATION DUE: ICD-10-CM

## 2019-04-22 DIAGNOSIS — M06.00 RHEUMATOID ARTHRITIS WITH NEGATIVE RHEUMATOID FACTOR, INVOLVING UNSPECIFIED SITE (H): ICD-10-CM

## 2019-04-22 DIAGNOSIS — I73.00 RAYNAUD'S DISEASE WITHOUT GANGRENE: ICD-10-CM

## 2019-04-25 NOTE — TELEPHONE ENCOUNTER
hydroxychloroquine (PLAQUENIL) 200 MG   Last Written Prescription Date:  1/8/19  Last Fill Quantity: 60,   # refills: 0  Last Office Visit : 2/13/18  Future Office visit:  NONE   RTC 6 MOS    Routing refill request to provider for review/approval because:  RF PENDING   REPEAT / OVER DUE RTC (AUG. 2018)       NO EYE EXAM

## 2019-05-01 NOTE — TELEPHONE ENCOUNTER
Contacted pt. She is wishing to continue care with Dr De La Torre and will call back to schedule. Pt states she gets her eye exams at [facility] in [facility] and is probably overdue for that also. She will call them to get the last eye exam notes faxed to us, appropriate numbers provided to her.    Jose Matos, TASIAN RN  Rheumatology RN Coordinator  [facility]

## 2019-05-19 DIAGNOSIS — I73.00 RAYNAUD'S DISEASE WITHOUT GANGRENE: ICD-10-CM

## 2019-05-19 DIAGNOSIS — E83.59 TUMORAL CALCINOSIS: ICD-10-CM

## 2019-05-19 DIAGNOSIS — G72.9 MYOPATHY: ICD-10-CM

## 2019-05-19 DIAGNOSIS — K21.9 GASTROESOPHAGEAL REFLUX DISEASE WITHOUT ESOPHAGITIS: ICD-10-CM

## 2019-05-19 DIAGNOSIS — M34.9 SYSTEMIC SCLEROSIS (H): ICD-10-CM

## 2019-05-21 RX ORDER — PANTOPRAZOLE SODIUM 40 MG/1
TABLET, DELAYED RELEASE ORAL
Qty: 90 TABLET | Refills: 0 | Status: SHIPPED | OUTPATIENT
Start: 2019-05-21 | End: 2019-08-27

## 2019-07-11 ENCOUNTER — OFFICE VISIT (OUTPATIENT)
Dept: PULMONOLOGY | Facility: CLINIC | Age: 59
End: 2019-07-11
Attending: INTERNAL MEDICINE
Payer: COMMERCIAL

## 2019-07-11 VITALS
HEIGHT: 69 IN | BODY MASS INDEX: 25.77 KG/M2 | OXYGEN SATURATION: 96 % | HEART RATE: 108 BPM | TEMPERATURE: 97.7 F | WEIGHT: 174 LBS | RESPIRATION RATE: 16 BRPM | DIASTOLIC BLOOD PRESSURE: 83 MMHG | SYSTOLIC BLOOD PRESSURE: 134 MMHG

## 2019-07-11 DIAGNOSIS — R06.02 SHORTNESS OF BREATH: ICD-10-CM

## 2019-07-11 DIAGNOSIS — Z51.81 ENCOUNTER FOR THERAPEUTIC DRUG MONITORING: ICD-10-CM

## 2019-07-11 DIAGNOSIS — M35.00 SICCA SYNDROME (H): ICD-10-CM

## 2019-07-11 DIAGNOSIS — M34.9 SYSTEMIC SCLEROSIS (H): ICD-10-CM

## 2019-07-11 DIAGNOSIS — Z79.52 ON PREDNISONE THERAPY: ICD-10-CM

## 2019-07-11 DIAGNOSIS — I73.00 RAYNAUD'S DISEASE WITHOUT GANGRENE: ICD-10-CM

## 2019-07-11 DIAGNOSIS — M06.00 RHEUMATOID ARTHRITIS WITH NEGATIVE RHEUMATOID FACTOR, INVOLVING UNSPECIFIED SITE (H): Primary | ICD-10-CM

## 2019-07-11 PROCEDURE — G0463 HOSPITAL OUTPT CLINIC VISIT: HCPCS | Mod: ZF

## 2019-07-11 RX ORDER — SILDENAFIL 50 MG/1
50 TABLET, FILM COATED ORAL DAILY PRN
Qty: 30 TABLET | Refills: 3 | Status: SHIPPED | OUTPATIENT
Start: 2019-07-11 | End: 2020-08-11

## 2019-07-11 RX ORDER — LOSARTAN POTASSIUM 50 MG/1
25 TABLET ORAL DAILY
Qty: 45 TABLET | Refills: 3 | Status: SHIPPED | OUTPATIENT
Start: 2019-07-11 | End: 2020-09-01

## 2019-07-11 RX ORDER — HYDROXYCHLOROQUINE SULFATE 200 MG/1
200 TABLET, FILM COATED ORAL 2 TIMES DAILY
Qty: 180 TABLET | Refills: 3 | Status: SHIPPED | OUTPATIENT
Start: 2019-07-11 | End: 2020-09-23

## 2019-07-11 ASSESSMENT — MIFFLIN-ST. JEOR: SCORE: 1443.89

## 2019-07-11 ASSESSMENT — PAIN SCALES - GENERAL: PAINLEVEL: NO PAIN (0)

## 2019-07-11 NOTE — NURSING NOTE
Chief Complaint   Patient presents with     RECHECK     Scleroderma      Rachell Méndez CMA  Pt was due for PHQ2. Pt completed. Updated health maintenance. See Screenings.   Pt has not had mammogram done yet, contact information has been added to  AVS  Updated pt health maintenance on colonscopy. Pt has had it done in 2014

## 2019-07-11 NOTE — PROGRESS NOTES
SUBJECTIVE:  The patient returns in f/u of her MCTD/SSC/RP/with h/o multiple DU.      PROBLEM LIST:    1.  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    2.  History of severe multiple digital ulcers, on fingers and toes.   3.  Marked keratoconjunctivitis sicca over the last several years.    4.  Marked iron deficiency anemia responsive to IV iron.    5.  Diffuse musculoskeletal pain   6.  Lower extremity edema managed with lasix and spironolactone   7.  Marked fatigue and diffuse clinical weakness.    8.  Dyspnea on exertion with lower extremity edema and other features including the anticentromere positivity worrisome for the potential development of pulmonary hypertension.    9.  Marked GERD with associated dysphagia.    10.  Status post gastric bypass with a history of intermittent constipation and diarrhea potentially consistent with bacterial small bowel overgrowth versus other gut effects of the prior surgery.    11.  History of positive rheumatoid factor consistent with MCTD, given the remainder of her clinical presentation.     12. Progressive Jaw tightening with inability to open mouth wider than 1 inch.   13. Diastolic dysfunction (3/2013)  14. Hypoalbuminemia      INTERVAL HISTORY:     Since we last saw her, she believes she is gradually feeling significantly better.  Indeed, for the first time in years she feels that her fatigue is well enough controlled that she can work through the night without a nap.  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  She actually thinks her muscle weakness is also stable or improved, and she has no significant dyspnea on exertion and no dry cough.       She does have a little bit of right shoulder pain and the features of that are entirely consistent with impingement syndrome,  and that has been going on about 6 weeks.  However, no other joints have been active and she denies any stiffness in any of the joints.       On the Orencia plus methotrexate she has had no fevers, chills, or signs or symptoms of infection.  She has not, however, tried to go down her prednisone and remains on 20 mg a day.      Other features of her disease including her GERD and her keratoconjunctivitis sicca have remained stable with no change.       She does continue to have quite significant Raynaud's, but it is under better control during the summer and she has had no digital ulcerations.     No chest pain, shortness of breath, rash, numbness/tingling in hands/feet.     AUGUST 22, 2017, INTERVAL HISTORY:  Since we have last seen the patient, she developed a right ankle wound about 2 months ago.  That opened for a while and it became hard to close, but at this point it has finally closed over.       She has had some stressors and she feels like that has contributed to her feeling like she needed more prednisone.  She had made it down to 12 mg a day for almost 6 months but increased to 20 mg a day in June when she was moving to a new house.  This was primarily due to an increase in fatigue and in joint pains.      At this point, her morning stiffness is 30-60 minutes despite remaining on the 20 mg a day of prednisone.  She does think her strength, however, is stable.      She continues to have a lot of Raynaud's phenomena but has not developed any distal digital ulcerations.       Her GERD and keratoconjunctivitis sicca, however, have remained stable and she thinks she has stable exercise tolerance.       She has not done her labs for many, many months and has not seen me for over a year, and we discussed that in some detail.     Feb. 13, 2018 INTERVAL HISTORY:  Since we have last seen her, she made it down to 12.5 mg a day of prednisone but then put herself back up to 20 mg a day because of increasing general  "body aches and fatigue as well as some inflammatory joint stiffness in her wrists.  Other joints have actually been okay.      She had stopped her methotrexate some time ago.  She works in a hospital and knew of several people who were admitted with \"methotrexate toxicity\" who  while in hospital and although she does not know the details, that frightened her enough that she decided to stop the drug.       She is getting worsening Raynaud's.  She really notes that this happens not only with cold, but with a variety of kinds of stress.  She has previously, at least for a short length of time, been on losartan and does not remember whether that helped at all.  I am pretty sure she has also been on calcium channel blockers, but those are relatively contraindicated in her at this point because of persistent bilateral lower extremity edema.       She is getting enough lower extremity edema and has been found to have vascular insufficiency that she will be having some vascular surgery to laser some of these areas, although an exact time for that has not been laid out.       In addition to getting Raynaud's in her hands, she also gets it in her feet, and when she does, potentially contributed to by the vascular insufficiency, she gets numbness in her feet.  She is not getting numbness in her hands by contrast.       The patient did have pulmonary function tests today.  Although she is not exercising regularly, in general she feels her exercise tolerance has been stable, and she is not having any dry cough.       She did have a decrease in her FVC to 3.68 from 4.30 and her DLCO to 21.77 from 24.74, but she has had some rib cage pain for about the last 3 weeks since she had a minor accident while dancing.  Notably then, her TLC is completely stable, going from 6.41 to 6.29.       She denies any other new problems.  Keratoconjunctivitis sicca, GERD and other features have been stable and she denies any dysphagia.     July " 11, 2019 interval history:    Since we have last seen her she did have to miss appointment because of some family issues but she is actually been doing quite well.  Although she is continued to have some intermittent joint complaints she is been able to taper her prednisone down to 7.5 mg a day the lowest she has been on and the entire time I have been following her.    Although her joints have not worsened with that lower dose she does think she is getting some worsening of her Raynauds phenomena.  It can be pretty bad and the attacks can be hard to break even with rewarming.  She gets them in hands and feet.  Fortunately she has not had any digital ulcers.  She does recall that she was placed temporarily on tadalafil sometime ago by Dr. Bentley but she could not afford it long-term.  She thinks that may have been helpful however.    She denies any significant dyspnea on exertion or cough.  She has some muscle weakness but not marketed and she does not think that is any worse over time.  Fatigue has generally been her worst symptom and that may be slightly worse.    She does have some ongoing keratoconjunctivitis sicca but does not feel like she wants to go on a medicine for that.  She already has a full plate of dentures in both upper and lower.            ROS as per HPI.  10-point ROS is otherwise negative.    HISTORY REVIEW:  Past Medical History:   Diagnosis Date     Anemia      Cellulitis of leg 6/6/2013     Esophageal reflux     Better post-hiatal hernia repair and weight loss     Limb ischemia; ulcers of fingertips 4/22/2013     Raynaud's syndrome      Scleroderma (H)      Systemic sclerosis (H) 2009     Unspecified essential hypertension        Past Surgical History:   Procedure Laterality Date     BYPASS GASTRIC, CHOLECYSTECTOMY, COMBINED       CHOLECYSTECTOMY       COLONOSCOPY       ESOPHAGOSCOPY, GASTROSCOPY, DUODENOSCOPY (EGD), COMBINED N/A 3/10/2016    Procedure: COMBINED ESOPHAGOSCOPY, GASTROSCOPY,  DUODENOSCOPY (EGD), BIOPSY SINGLE OR MULTIPLE;  Surgeon: Tricia Zambrano MD;  Location:  GI     INNER EAR SURGERY       PICC INSERTION  6/5/2013    5fr DL Power PICC, 44cm, left basilic vein, tip in low SVC     SURGICAL HISTORY OF -       Left ear surgery - incus transition, tympanoplasty     SURGICAL HISTORY OF -   6/05    Gastric bypass     SURGICAL HISTORY OF -   6/05    Cholecystectomy     SURGICAL HISTORY OF -   6/05    Hiatal hernia repair     TONSILLECTOMY         Family History   Problem Relation Age of Onset     Cerebrovascular Disease Father      C.A.D. Father      C.A.D. Mother      C.A.D. 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      Social History Main Topics     Smoking status: Never Smoker      Smokeless tobacco: Never Used     Alcohol Use: Yes      occassionally     Drug Use: No     Sexually Active: Yes -- Male partner(s)     Birth Control/ Protection: None     Social History Narrative     Was  in December 2015. Longtime partner from [place].       Patient Active Problem List   Diagnosis     Essential hypertension     Thyrotoxicosis     Obesity     Prolonged QTc 460 ms,  at hr 67     Low back pain - Suspect SI Joint dysfunction     Cough     Systemic sclerosis (H)     Tumoral calcinosis     Shortness of breath     Edema of both legs     Abnormal albumin     Myopathy     Hypoalbuminemia     Diastolic dysfunction     Limb ischemia; ulcers of fingertips     Cellulitis of leg     Other specified diffuse disease of connective tissue     Disturbed sleep rhythm     Pharyngeal dysphagia     Gastroesophageal reflux disease, esophagitis presence not specified     Rheumatoid arthritis with negative rheumatoid factor, involving unspecified site (H)     Raynaud's disease  "without gangrene       Allergies   Allergen Reactions     Lovenox Other (See Comments)     Blood clot      Norvasc [Amlodipine Besylate] Swelling     Lip swelling that happened on 2 different occasions     Erythromycin Rash     Rash on arms     OBJECTIVE:  Vitals: Blood pressure 134/83, pulse 108, temperature 97.7  F (36.5  C), temperature source Oral, resp. rate 16, height 1.753 m (5' 9.02\"), weight 78.9 kg (174 lb), SpO2 96 %.  Constitutional: Pleasant woman, appears older than stated age.   Eyes: PERRL. EOMI. Normal conjunctivae and sclerae.   ENT: Dry mucous membranes. +Superior dental plate. Otherwise normal mucous membranes. Decreased oral aperture.  Neck: no mass or thyroid enlargement  Resp: lungs clear to auscultation  CV: Normal rate, regular rhythm, trace edema of ankles bilaterally  GI: Soft, nontender, nondistended. Spleen and liver not palpable.  Lymph: no cervical, supraclavicular nodes  MS:  All TMJ, neck, shoulder, elbow, wrist, MCP/PIP/DIP, spine, hip, knee, ankle, and foot MTP/IP joints were examined and  found normal. No active synovitis or deformity. Full ROM.  Hands and feet with vascular congestion.  Skin: No rash appreciated. Has tattoos present.   Neuro: Strength 5/5 in upper and lower extremities. No focal neurological deficits.     Component      Latest Ref Rng 1/26/2016   WBC      4.0 - 11.0 10e9/L 9.2   RBC Count      3.8 - 5.2 10e12/L 4.59   Hemoglobin      11.7 - 15.7 g/dL 13.2   Hematocrit      35.0 - 47.0 % 41.8   MCV      78 - 100 fl 91   MCH      26.5 - 33.0 pg 28.8   MCHC      31.5 - 36.5 g/dL 31.6   RDW      10.0 - 15.0 % 16.7 (H)   Platelet Count      150 - 450 10e9/L 195   Diff Method       Automated Method   % Neutrophils       62.9   % Lymphocytes       27.0   % Monocytes       9.1   % Eosinophils       0.4   % Basophils       0.2   % Immature Granulocytes       0.4   Nucleated RBCs      0 /100 0   Absolute Neutrophil      1.6 - 8.3 10e9/L 5.8   Absolute Lymphocytes      0.8 " - 5.3 10e9/L 2.5   Absolute Monocytes      0.0 - 1.3 10e9/L 0.8   Absolute Eosinophils      0.0 - 0.7 10e9/L 0.0   Absolute Basophils      0.0 - 0.2 10e9/L 0.0   Abs Immature Granulocytes      0 - 0.4 10e9/L 0.0   Absolute Nucleated RBC       0.0   Color Urine       Yellow   Appearance Urine       Clear   Glucose Urine      NEG mg/dL Negative   Bilirubin Urine      NEG Negative   Ketones Urine      NEG mg/dL Negative   Specific Gravity Urine      1.003 - 1.035 1.010   Blood Urine      NEG Negative   pH Urine      5.0 - 7.0 pH 6.0   Protein Albumin Urine      NEG mg/dL Negative   Urobilinogen mg/dL      0.0 - 2.0 mg/dL Normal   Nitrite Urine      NEG Negative   Leukocyte Esterase Urine      NEG Negative   Source       Midstream Urine   WBC Urine      0 - 2 /HPF <1   RBC Urine      0 - 2 /HPF <1   Squamous Epithelial /HPF Urine      0 - 1 /HPF <1   Creatinine      0.52 - 1.04 mg/dL 1.11 (H)   GFR Estimate      >60 mL/min/1.7m2 51 (L)   GFR Estimate If Black      >60 mL/min/1.7m2 62   ALT      0 - 50 U/L 23   AST      0 - 45 U/L 16   Albumin      3.4 - 5.0 g/dL 3.4   CRP Inflammation      0.0 - 8.0 mg/L <2.9   Sed Rate      0 - 30 mm/h 9   CK Total      30 - 225 U/L 38   Aldolase       4.1     Biopsy from 3/10 EGD:  Esophagus, gastroesophageal junction, biopsies:   - Squamous mucosa and submucosa with marked chronic inflammation and   fibrosis   - No evidence of intestinal metaplasia or dysplasia     Assessment/Plan:     Per the above problem list, with Raynaud's phenomena being the biggest current issue that is addressable.  Fortunately her fatigue though severe has been stable with prednisone taper enabling her to bring that down.    She has been off of Plaquenil for about 3 months that she does feel like musculoskeletal symptoms are getting a little worse off of it and she would like to resume it so we will do that.  She already has an ophthalmology visit scheduled.    With respect to her Raynauds we discussed the  various possible treatment options.  We have given her a trial of Prozac but that really had some paradoxical worsening of depressive symptoms for her where she really felt hopeless and very sad and so she stopped it pretty quickly.  I would instead like to start her on losartan at 25 mg daily and if she tolerates that we will go ahead and push it to 50 mg daily based on the results of the trial a number of years ago showing possible benefit of that drug.  The prolonged nature of her Raynauds makes me think that a lot of this is from intimal fibrotic proliferation which may be responsive to this mechanism.    In addition I will give her a prescription for sildenafil 50 mg tablets that she can break in half and use more on a as needed basis.  If those are highly effective and do not give her side effects she can even use the higher dose of 50 mg  as needed for her Raynauds.    I like to see her back in 3 to 4 months and reassess how she is doing with these changes.  I did tell her that if her joint symptoms are improved on the hydroxychloroquine and she feels like she is good enough to try to taper prednisone she should try to go down to 2-1/2 of the 2.5 mg tablets or 6.25 mg daily.

## 2019-07-11 NOTE — LETTER
7/11/2019       RE: Jeanine Schuler  [address] [facility] [address]     Dear Colleague,    Thank you for referring your patient, Jeanine Schuler, to the [facility] at [facility]. Please see a copy of my visit note below.    SUBJECTIVE:  The patient returns in f/u of her MCTD/SSC/RP/with h/o multiple DU.      PROBLEM LIST:    1.  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    2.  History of severe multiple digital ulcers, on fingers and toes.   3.  Marked keratoconjunctivitis sicca over the last several years.    4.  Marked iron deficiency anemia responsive to IV iron.    5.  Diffuse musculoskeletal pain   6.  Lower extremity edema managed with lasix and spironolactone   7.  Marked fatigue and diffuse clinical weakness.    8.  Dyspnea on exertion with lower extremity edema and other features including the anticentromere positivity worrisome for the potential development of pulmonary hypertension.    9.  Marked GERD with associated dysphagia.    10.  Status post gastric bypass with a history of intermittent constipation and diarrhea potentially consistent with bacterial small bowel overgrowth versus other gut effects of the prior surgery.    11.  History of positive rheumatoid factor consistent with MCTD, given the remainder of her clinical presentation.     12. Progressive Jaw tightening with inability to open mouth wider than 1 inch.   13. Diastolic dysfunction (3/2013)  14. Hypoalbuminemia      INTERVAL HISTORY:     Since we last saw her, she believes she is gradually feeling significantly better.  Indeed, for the first time in years she feels that her fatigue is well enough controlled that she can work through the night without a nap.  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  She actually thinks her muscle weakness is also stable or improved, and she has no significant dyspnea on exertion and no dry cough.       She does have a little bit of right shoulder pain and the features of that are entirely consistent with impingement syndrome, and that has been going on about 6 weeks.  However, no other joints have been active and she denies any stiffness in any of the joints.       On the Orencia plus methotrexate she has had no fevers, chills, or signs or symptoms of infection.  She has not, however, tried to go down her prednisone and remains on 20 mg a day.      Other features of her disease including her GERD and her keratoconjunctivitis sicca have remained stable with no change.       She does continue to have quite significant Raynaud's, but it is under better control during the summer and she has had no digital ulcerations.     No chest pain, shortness of breath, rash, numbness/tingling in hands/feet.     AUGUST 22, 2017, INTERVAL HISTORY:  Since we have last seen the patient, she developed a right ankle wound about 2 months ago.  That opened for a while and it became hard to close, but at this point it has finally closed over.       She has had some stressors and she feels like that has contributed to her feeling like she needed more prednisone.  She had made it down to 12 mg a day for almost 6 months but increased to 20 mg a day in June when she was moving to a new house.  This was primarily due to an increase in fatigue and in joint pains.      At this point, her morning stiffness is 30-60 minutes despite remaining on the 20 mg a day of prednisone.  She does think her strength, however, is stable.      She continues to have a lot of Raynaud's phenomena but has not developed any distal digital ulcerations.       Her GERD and keratoconjunctivitis sicca, however, have remained stable and she thinks she has stable exercise tolerance.       She  "has not done her labs for many, many months and has not seen me for over a year, and we discussed that in some detail.     2018 INTERVAL HISTORY:  Since we have last seen her, she made it down to 12.5 mg a day of prednisone but then put herself back up to 20 mg a day because of increasing general body aches and fatigue as well as some inflammatory joint stiffness in her wrists.  Other joints have actually been okay.      She had stopped her methotrexate some time ago.  She works in a hospital and knew of several people who were admitted with \"methotrexate toxicity\" who  while in hospital and although she does not know the details, that frightened her enough that she decided to stop the drug.       She is getting worsening Raynaud's.  She really notes that this happens not only with cold, but with a variety of kinds of stress.  She has previously, at least for a short length of time, been on losartan and does not remember whether that helped at all.  I am pretty sure she has also been on calcium channel blockers, but those are relatively contraindicated in her at this point because of persistent bilateral lower extremity edema.       She is getting enough lower extremity edema and has been found to have vascular insufficiency that she will be having some vascular surgery to laser some of these areas, although an exact time for that has not been laid out.       In addition to getting Raynaud's in her hands, she also gets it in her feet, and when she does, potentially contributed to by the vascular insufficiency, she gets numbness in her feet.  She is not getting numbness in her hands by contrast.       The patient did have pulmonary function tests today.  Although she is not exercising regularly, in general she feels her exercise tolerance has been stable, and she is not having any dry cough.       She did have a decrease in her FVC to 3.68 from 4.30 and her DLCO to 21.77 from 24.74, but she has had some " rib cage pain for about the last 3 weeks since she had a minor accident while dancing.  Notably then, her TLC is completely stable, going from 6.41 to 6.29.       She denies any other new problems.  Keratoconjunctivitis sicca, GERD and other features have been stable and she denies any dysphagia.     July 11, 2019 interval history:    Since we have last seen her she did have to miss appointment because of some family issues but she is actually been doing quite well.  Although she is continued to have some intermittent joint complaints she is been able to taper her prednisone down to 7.5 mg a day the lowest she has been on and the entire time I have been following her.    Although her joints have not worsened with that lower dose she does think she is getting some worsening of her Raynauds phenomena.  It can be pretty bad and the attacks can be hard to break even with rewarming.  She gets them in hands and feet.  Fortunately she has not had any digital ulcers.  She does recall that she was placed temporarily on tadalafil sometime ago by Dr. Bentley but she could not afford it long-term.  She thinks that may have been helpful however.    She denies any significant dyspnea on exertion or cough.  She has some muscle weakness but not marketed and she does not think that is any worse over time.  Fatigue has generally been her worst symptom and that may be slightly worse.    She does have some ongoing keratoconjunctivitis sicca but does not feel like she wants to go on a medicine for that.  She already has a full plate of dentures in both upper and lower.            ROS as per HPI.  10-point ROS is otherwise negative.    HISTORY REVIEW:  Past Medical History:   Diagnosis Date     Anemia      Cellulitis of leg 6/6/2013     Esophageal reflux     Better post-hiatal hernia repair and weight loss     Limb ischemia; ulcers of fingertips 4/22/2013     Raynaud's syndrome      Scleroderma (H)      Systemic sclerosis (H) 2009      Unspecified essential hypertension        Past Surgical History:   Procedure Laterality Date     BYPASS GASTRIC, CHOLECYSTECTOMY, COMBINED       CHOLECYSTECTOMY       COLONOSCOPY       ESOPHAGOSCOPY, GASTROSCOPY, DUODENOSCOPY (EGD), COMBINED N/A 3/10/2016    Procedure: COMBINED ESOPHAGOSCOPY, GASTROSCOPY, DUODENOSCOPY (EGD), BIOPSY SINGLE OR MULTIPLE;  Surgeon: Tricia Zambrano MD;  Location:  GI     INNER EAR SURGERY       PICC INSERTION  6/5/2013    5fr DL Power PICC, 44cm, left basilic vein, tip in low SVC     SURGICAL HISTORY OF -       Left ear surgery - incus transition, tympanoplasty     SURGICAL HISTORY OF -   6/05    Gastric bypass     SURGICAL HISTORY OF -   6/05    Cholecystectomy     SURGICAL HISTORY OF -   6/05    Hiatal hernia repair     TONSILLECTOMY         Family History   Problem Relation Age of Onset     Cerebrovascular Disease Father      C.A.D. Father      C.A.D. Mother      C.A.D. 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      Social History Main Topics     Smoking status: Never Smoker      Smokeless tobacco: Never Used     Alcohol Use: Yes      occassionally     Drug Use: No     Sexually Active: Yes -- Male partner(s)     Birth Control/ Protection: None     Social History Narrative     Was  in December 2015. Longtime partner from [place].       Patient Active Problem List   Diagnosis     Essential hypertension     Thyrotoxicosis     Obesity     Prolonged QTc 460 ms,  at hr 67     Low back pain - Suspect SI Joint dysfunction     Cough     Systemic sclerosis (H)     Tumoral calcinosis     Shortness of breath     Edema of both legs     Abnormal albumin     Myopathy     Hypoalbuminemia     Diastolic dysfunction     Limb ischemia; ulcers of fingertips      "Cellulitis of leg     Other specified diffuse disease of connective tissue     Disturbed sleep rhythm     Pharyngeal dysphagia     Gastroesophageal reflux disease, esophagitis presence not specified     Rheumatoid arthritis with negative rheumatoid factor, involving unspecified site (H)     Raynaud's disease without gangrene       Allergies   Allergen Reactions     Lovenox Other (See Comments)     Blood clot      Norvasc [Amlodipine Besylate] Swelling     Lip swelling that happened on 2 different occasions     Erythromycin Rash     Rash on arms     OBJECTIVE:  Vitals: Blood pressure 134/83, pulse 108, temperature 97.7  F (36.5  C), temperature source Oral, resp. rate 16, height 1.753 m (5' 9.02\"), weight 78.9 kg (174 lb), SpO2 96 %.  Constitutional: Pleasant woman, appears older than stated age.   Eyes: PERRL. EOMI. Normal conjunctivae and sclerae.   ENT: Dry mucous membranes. +Superior dental plate. Otherwise normal mucous membranes. Decreased oral aperture.  Neck: no mass or thyroid enlargement  Resp: lungs clear to auscultation  CV: Normal rate, regular rhythm, trace edema of ankles bilaterally  GI: Soft, nontender, nondistended. Spleen and liver not palpable.  Lymph: no cervical, supraclavicular nodes  MS:  All TMJ, neck, shoulder, elbow, wrist, MCP/PIP/DIP, spine, hip, knee, ankle, and foot MTP/IP joints were examined and  found normal. No active synovitis or deformity. Full ROM.  Hands and feet with vascular congestion.  Skin: No rash appreciated. Has tattoos present.   Neuro: Strength 5/5 in upper and lower extremities. No focal neurological deficits.     Component      Latest Ref Rng 1/26/2016   WBC      4.0 - 11.0 10e9/L 9.2   RBC Count      3.8 - 5.2 10e12/L 4.59   Hemoglobin      11.7 - 15.7 g/dL 13.2   Hematocrit      35.0 - 47.0 % 41.8   MCV      78 - 100 fl 91   MCH      26.5 - 33.0 pg 28.8   MCHC      31.5 - 36.5 g/dL 31.6   RDW      10.0 - 15.0 % 16.7 (H)   Platelet Count      150 - 450 10e9/L 195 "   Diff Method       Automated Method   % Neutrophils       62.9   % Lymphocytes       27.0   % Monocytes       9.1   % Eosinophils       0.4   % Basophils       0.2   % Immature Granulocytes       0.4   Nucleated RBCs      0 /100 0   Absolute Neutrophil      1.6 - 8.3 10e9/L 5.8   Absolute Lymphocytes      0.8 - 5.3 10e9/L 2.5   Absolute Monocytes      0.0 - 1.3 10e9/L 0.8   Absolute Eosinophils      0.0 - 0.7 10e9/L 0.0   Absolute Basophils      0.0 - 0.2 10e9/L 0.0   Abs Immature Granulocytes      0 - 0.4 10e9/L 0.0   Absolute Nucleated RBC       0.0   Color Urine       Yellow   Appearance Urine       Clear   Glucose Urine      NEG mg/dL Negative   Bilirubin Urine      NEG Negative   Ketones Urine      NEG mg/dL Negative   Specific Gravity Urine      1.003 - 1.035 1.010   Blood Urine      NEG Negative   pH Urine      5.0 - 7.0 pH 6.0   Protein Albumin Urine      NEG mg/dL Negative   Urobilinogen mg/dL      0.0 - 2.0 mg/dL Normal   Nitrite Urine      NEG Negative   Leukocyte Esterase Urine      NEG Negative   Source       Midstream Urine   WBC Urine      0 - 2 /HPF <1   RBC Urine      0 - 2 /HPF <1   Squamous Epithelial /HPF Urine      0 - 1 /HPF <1   Creatinine      0.52 - 1.04 mg/dL 1.11 (H)   GFR Estimate      >60 mL/min/1.7m2 51 (L)   GFR Estimate If Black      >60 mL/min/1.7m2 62   ALT      0 - 50 U/L 23   AST      0 - 45 U/L 16   Albumin      3.4 - 5.0 g/dL 3.4   CRP Inflammation      0.0 - 8.0 mg/L <2.9   Sed Rate      0 - 30 mm/h 9   CK Total      30 - 225 U/L 38   Aldolase       4.1     Biopsy from 3/10 EGD:  Esophagus, gastroesophageal junction, biopsies:   - Squamous mucosa and submucosa with marked chronic inflammation and   fibrosis   - No evidence of intestinal metaplasia or dysplasia     Assessment/Plan:     Per the above problem list, with Raynaud's phenomena being the biggest current issue that is addressable.  Fortunately her fatigue though severe has been stable with prednisone taper enabling her  to bring that down.    She has been off of Plaquenil for about 3 months that she does feel like musculoskeletal symptoms are getting a little worse off of it and she would like to resume it so we will do that.  She already has an ophthalmology visit scheduled.    With respect to her Raynauds we discussed the various possible treatment options.  We have given her a trial of Prozac but that really had some paradoxical worsening of depressive symptoms for her where she really felt hopeless and very sad and so she stopped it pretty quickly.  I would instead like to start her on losartan at 25 mg daily and if she tolerates that we will go ahead and push it to 50 mg daily based on the results of the trial a number of years ago showing possible benefit of that drug.  The prolonged nature of her Raynauds makes me think that a lot of this is from intimal fibrotic proliferation which may be responsive to this mechanism.    In addition I will give her a prescription for sildenafil 50 mg tablets that she can break in half and use more on a as needed basis.  If those are highly effective and do not give her side effects she can even use the higher dose of 50 mg  as needed for her Raynauds.    I like to see her back in 3 to 4 months and reassess how she is doing with these changes.  I did tell her that if her joint symptoms are improved on the hydroxychloroquine and she feels like she is good enough to try to taper prednisone she should try to go down to 2-1/2 of the 2.5 mg tablets or 6.25 mg daily.      Again, thank you for allowing me to participate in the care of your patient.      Sincerely,    Nicolás De La Torre MD

## 2019-07-11 NOTE — TELEPHONE ENCOUNTER
Pt was seen in clinic and this was addressed.    TASIA BlanchardN RN  Rheumatology RN Coordinator  [facility]

## 2019-07-11 NOTE — PATIENT INSTRUCTIONS
Preventive Care:     Breast Cancer Screening: During our visit today, we discussed that it is recommended you receive breast cancer screening. Please call or make an appointment with your primary care provider to discuss this with them. You may also call the [facility] scheduling line ([phone]) to set up a mammography appointment at the [facility] within the [facility].

## 2019-07-12 RX ORDER — HYDROXYCHLOROQUINE SULFATE 200 MG/1
200 TABLET, FILM COATED ORAL 2 TIMES DAILY
Qty: 60 TABLET | Refills: 0
Start: 2019-07-12

## 2019-07-15 ENCOUNTER — TELEPHONE (OUTPATIENT)
Dept: RHEUMATOLOGY | Facility: CLINIC | Age: 59
End: 2019-07-15

## 2019-07-15 DIAGNOSIS — M06.00 RHEUMATOID ARTHRITIS WITH NEGATIVE RHEUMATOID FACTOR, INVOLVING UNSPECIFIED SITE (H): ICD-10-CM

## 2019-07-15 DIAGNOSIS — I73.00 RAYNAUD'S DISEASE WITHOUT GANGRENE: ICD-10-CM

## 2019-07-15 DIAGNOSIS — R06.02 SHORTNESS OF BREATH: ICD-10-CM

## 2019-07-15 DIAGNOSIS — M34.9 SYSTEMIC SCLEROSIS (H): ICD-10-CM

## 2019-07-15 NOTE — TELEPHONE ENCOUNTER
Received PA request for Sildenafil from Pharmacy. This patient is seen at the [facility]. Will route to [facility] Rheum Pool.     TASIA ScottN, RN   Rheumatology Care Coordinator   [facility]

## 2019-07-22 NOTE — TELEPHONE ENCOUNTER
Central Prior Authorization Team   Phone: [phone]    PA Initiation    Medication: sildenafil (VIAGRA) 50 MG tablet  Insurance Company: [employer] - Phone [phone] Fax [phone]  Pharmacy Filling the Rx: [id number] - [place], [place] - [address]  Filling Pharmacy Phone: [phone]  Filling Pharmacy Fax:    Start Date: 7/22/2019

## 2019-07-26 NOTE — TELEPHONE ENCOUNTER
Called [facility], spoke to Priscila. Had to answer a couple more questions (apparently I entered our phone# as our fax# so they were unable to reach me, that has now been corrected). Should have a determination soon.

## 2019-07-29 NOTE — TELEPHONE ENCOUNTER
I am fine with her taking 20 mg tid if that is what will be approved.     The 50 mg/d was probably just to give her a dose that would be cheaper out of pocket or through [employer].

## 2019-07-29 NOTE — TELEPHONE ENCOUNTER
Rec'd voicemail from insurance stating they need to know why patient cannot take the Sildenafil 20mg twice daily (which I noted that patient had failed the 25mg twice daily), or even 20mg three times daily?  [employer] appeals [phone].

## 2019-07-30 NOTE — TELEPHONE ENCOUNTER
Prior Authorization Approval (20mg three times daily dose)    Authorization Effective Date: 7/30/2019  Authorization Expiration Date: 1/26/2020  Medication: sildenafil (VIAGRA) 50 MG tablet - P/A APPROVED  Approved Dose/Quantity: 90  Reference #: [id number]   Insurance Company: [employer] - Phone [phone] Fax [phone]  Expected CoPay:       CoPay Card Available:      Foundation Assistance Needed:    Which Pharmacy is filling the prescription (Not needed for infusion/clinic administered): [id number] - [place], [place] - [address]  Pharmacy Notified: No - new script has not yet been sent to pharmacy.  Patient Notified:

## 2019-07-30 NOTE — TELEPHONE ENCOUNTER
UPDATE: I spoke with Priscila from insurance to cancel P/A request, and advised her of Dr De La Torre's notation that he would ok with Sildenafil 20mg TID if would be approved. Turns out this also needs a Prior Auth, but because this is the suggested dose, it's likely that it will be approved. I answered all the criteria questions on the phone, and will have a determination by end of day.

## 2019-08-01 RX ORDER — SILDENAFIL 25 MG/1
25 TABLET, FILM COATED ORAL 3 TIMES DAILY
Qty: 90 TABLET | Refills: 5 | Status: CANCELLED | OUTPATIENT
Start: 2019-08-01

## 2019-08-01 RX ORDER — SILDENAFIL CITRATE 20 MG/1
20 TABLET ORAL 3 TIMES DAILY
Qty: 90 TABLET | Refills: 5 | Status: SHIPPED | OUTPATIENT
Start: 2019-08-01 | End: 2020-08-11

## 2019-08-01 NOTE — TELEPHONE ENCOUNTER
Received verbal order for Revatio 20 mg, take 1 tab by mouth three times daily, qty 90 tabs with 5 refills from [facility].     Revatio order completed.    TASIA BlanchardN RN  Rheumatology RN Coordinator  [facility]

## 2019-08-27 DIAGNOSIS — M34.9 SYSTEMIC SCLEROSIS (H): ICD-10-CM

## 2019-08-27 DIAGNOSIS — E83.59 TUMORAL CALCINOSIS: ICD-10-CM

## 2019-08-27 DIAGNOSIS — G72.9 MYOPATHY: ICD-10-CM

## 2019-08-27 DIAGNOSIS — I73.00 RAYNAUD'S DISEASE WITHOUT GANGRENE: ICD-10-CM

## 2019-08-27 DIAGNOSIS — K21.9 GASTROESOPHAGEAL REFLUX DISEASE WITHOUT ESOPHAGITIS: ICD-10-CM

## 2019-08-28 RX ORDER — PANTOPRAZOLE SODIUM 40 MG/1
40 TABLET, DELAYED RELEASE ORAL
Qty: 90 TABLET | Refills: 3 | Status: SHIPPED | OUTPATIENT
Start: 2019-08-28 | End: 2020-09-01

## 2019-09-15 ENCOUNTER — HOSPITAL ENCOUNTER (EMERGENCY)
Facility: CLINIC | Age: 59
Discharge: HOME | End: 2019-09-15
Attending: EMERGENCY MEDICINE | Admitting: EMERGENCY MEDICINE
Payer: COMMERCIAL

## 2019-09-15 ENCOUNTER — APPOINTMENT (OUTPATIENT)
Dept: GENERAL RADIOLOGY | Facility: CLINIC | Age: 59
End: 2019-09-15
Attending: EMERGENCY MEDICINE
Payer: COMMERCIAL

## 2019-09-15 VITALS
RESPIRATION RATE: 16 BRPM | HEART RATE: 70 BPM | WEIGHT: 175 LBS | SYSTOLIC BLOOD PRESSURE: 136 MMHG | DIASTOLIC BLOOD PRESSURE: 90 MMHG | BODY MASS INDEX: 25.83 KG/M2 | TEMPERATURE: 98.7 F | OXYGEN SATURATION: 99 %

## 2019-09-15 DIAGNOSIS — S69.92XA WRIST INJURY, LEFT, INITIAL ENCOUNTER: ICD-10-CM

## 2019-09-15 PROCEDURE — 73110 X-RAY EXAM OF WRIST: CPT | Mod: TC,LT

## 2019-09-15 PROCEDURE — 99283 EMERGENCY DEPT VISIT LOW MDM: CPT | Mod: Z6 | Performed by: EMERGENCY MEDICINE

## 2019-09-15 PROCEDURE — 99284 EMERGENCY DEPT VISIT MOD MDM: CPT | Performed by: EMERGENCY MEDICINE

## 2019-09-15 PROCEDURE — 29125 APPL SHORT ARM SPLINT STATIC: CPT | Mod: LT | Performed by: EMERGENCY MEDICINE

## 2019-09-15 NOTE — ED PROVIDER NOTES
History     Chief Complaint   Patient presents with     Wrist Pain     The history is provided by the patient.     Jeanine Schuler is a 58 year old female who is presenting to the emergency department with complaints of left wrist pain. The patient states that last night she was sitting on a barstool when her  tapped on the window. She claims that she turned around too fast and lost her balance. She fell off of the barstool, but does not remember falling onto her wrist. When she woke up it started to hurt and was swollen. The pain is located right in her left wrist. She mentions that she was not drunk, she sometimes loses her balance when she moves to fast.    Allergies:  Allergies   Allergen Reactions     Lovenox Other (See Comments)     Blood clot      Norvasc [Amlodipine Besylate] Swelling     Lip swelling that happened on 2 different occasions     Erythromycin Rash     Rash on arms       Problem List:    Patient Active Problem List    Diagnosis Date Noted     Raynaud's disease without gangrene 02/16/2016     Priority: Medium     Rheumatoid arthritis with negative rheumatoid factor, involving unspecified site (H) 12/15/2015     Priority: Medium     Pharyngeal dysphagia 11/17/2015     Priority: Medium     Gastroesophageal reflux disease, esophagitis presence not specified 11/17/2015     Priority: Medium     Disturbed sleep rhythm 04/07/2015     Priority: Medium     Other specified diffuse disease of connective tissue 10/28/2014     Priority: Medium     Cellulitis of leg 06/06/2013     Priority: Medium     Limb ischemia; ulcers of fingertips 04/22/2013     Priority: Medium     Hypoalbuminemia 03/22/2013     Priority: Medium     Diagnosis updated by automated process. Provider to review and confirm.       Diastolic dysfunction 03/22/2013     Priority: Medium     Noted on Cardiac Echo 3/2013 from [facility]       Abnormal albumin 02/21/2013     Priority: Medium     Myopathy 02/21/2013      Priority: Medium     Problem list name updated by automated process. Provider to review       Edema of both legs 02/14/2013     Priority: Medium     Shortness of breath 07/26/2012     Priority: Medium     Systemic sclerosis (H) 07/28/2011     Priority: Medium     Tumoral calcinosis 07/28/2011     Priority: Medium     Low back pain - Suspect SI Joint dysfunction 09/27/2005     Priority: Medium     September 27, 2005: Started 2 weeks ago.  bilateral buttocks - better with walking, worse with sitting and driving.  Tight/stiff - lossens up.  Painful to lay down.  Constant ache.  8-10/10 at its worse.  No injury.  Tender with massage.  No radiculopathy.  Relief with muscle relaxant.       Cough 09/27/2005     Priority: Medium     September 27, 2005: Persistent cough occl productive of stringy mucus, wheezes with cough.  Started 2 weeks ago.  No rhinorrhea or post nasal drip        Prolonged QTc 460 ms,  at hr 67 06/24/2005     Priority: Medium     June 24, 2005: Will check calcium, magnesium, potassium.  Will compare to old EKGs.  Reviewed meds and none are on the list for prolongation.  Anesthesiology should be aware at surgery of prolonged QT.       Essential hypertension 03/02/2005     Priority: Medium     April 22, 2005: BP controlled on prinizide 40/25, norvasc 5 September 27, 2005: BP controlled/marginal off meds post-gastric bypass.  Problem list name updated by automated process. Provider to review       Thyrotoxicosis 03/02/2005     Priority: Medium     Insomnia, lost weight since 12/02 (max was 346).  Globus sensation in neck.  Normal bowel movements.  Normal skin dryness.  No anxiety or palpitations.  TSH was normal in 2002 with Htn work-up, now <0.03 with work-up for gastric bypass.  April 22, 2005: Suspect thyroiditis which has now normalized.  I would recommend rechecking in 3 months.  Problem list name updated by automated process. Provider to review       Obesity 03/02/2005     Priority: Medium      March 2, 2005:  Gastric bypass scheduled for 6/27/05 September 27, 2005: Weight loss 50 pounds  Problem list name updated by automated process. Provider to review          Past Medical History:    Past Medical History:   Diagnosis Date     Anemia      Cellulitis of leg 6/6/2013     Esophageal reflux      Limb ischemia; ulcers of fingertips 4/22/2013     Raynaud's syndrome      Scleroderma (H)      Systemic sclerosis (H) 2009     Unspecified essential hypertension        Past Surgical History:    Past Surgical History:   Procedure Laterality Date     BYPASS GASTRIC, CHOLECYSTECTOMY, COMBINED       CHOLECYSTECTOMY       COLONOSCOPY       ESOPHAGOSCOPY, GASTROSCOPY, DUODENOSCOPY (EGD), COMBINED N/A 3/10/2016    Procedure: COMBINED ESOPHAGOSCOPY, GASTROSCOPY, DUODENOSCOPY (EGD), BIOPSY SINGLE OR MULTIPLE;  Surgeon: Tricia Zambrano MD;  Location:  GI     INNER EAR SURGERY       PICC INSERTION  6/5/2013    5fr DL Power PICC, 44cm, left basilic vein, tip in low SVC     SURGICAL HISTORY OF -       Left ear surgery - incus transition, tympanoplasty     SURGICAL HISTORY OF -   6/05    Gastric bypass     SURGICAL HISTORY OF -   6/05    Cholecystectomy     SURGICAL HISTORY OF -   6/05    Hiatal hernia repair     TONSILLECTOMY         Family History:    Family History   Problem Relation Age of Onset     Cerebrovascular Disease Father      C.A.D. Father      C.A.D. Mother      C.A.D. Sister         56 yo smoker       Social History:  Marital Status:   [2]  Social History     Tobacco Use     Smoking status: Never Smoker     Smokeless tobacco: Never Used   Substance Use Topics     Alcohol use: Yes     Comment: occassionally     Drug use: No        Medications:      aspirin EC 81 MG EC tablet   atorvastatin (LIPITOR) 40 MG tablet   furosemide (LASIX) 40 MG tablet   hydroxychloroquine (PLAQUENIL) 200 MG tablet   hydroxychloroquine (PLAQUENIL) 200 MG tablet   losartan (COZAAR) 50 MG tablet   MAGNESIUM OXIDE PO    pantoprazole (PROTONIX) 40 MG EC tablet   Potassium (POTASSIMIN PO)   predniSONE (DELTASONE) 2.5 MG tablet   sildenafil (REVATIO) 20 MG tablet   sildenafil (VIAGRA) 50 MG tablet   spironolactone (ALDACTONE) 25 MG tablet         Review of Systems   All other systems reviewed and are negative.      Physical Exam   BP: 139/80  Pulse: 76  Temp: 98.7  F (37.1  C)  Resp: 16  Weight: 79.4 kg (175 lb)  SpO2: 97 %      Physical Exam   Constitutional: She is oriented to person, place, and time. She appears well-developed and well-nourished. No distress.   HENT:   Head: Normocephalic and atraumatic.   Right Ear: External ear normal.   Left Ear: External ear normal.   Eyes: Conjunctivae are normal. No scleral icterus.   Neck: Normal range of motion. Neck supple.   Pulmonary/Chest: Effort normal. No respiratory distress. She exhibits no tenderness.   Abdominal: Soft. There is no rebound and no guarding.   Musculoskeletal: She exhibits edema and tenderness. She exhibits no deformity.        Left wrist: She exhibits decreased range of motion, tenderness and swelling.   Tenderness to palpation over the proximal dorsal wrist and over the anatomical snuffbox.  Decreased range of motion of the wrist secondary to pain.   Neurological: She is alert and oriented to person, place, and time. No cranial nerve deficit or sensory deficit.   Skin: Skin is dry. No rash noted. She is not diaphoretic. No pallor.   Psychiatric: She has a normal mood and affect. Her behavior is normal. Thought content normal.   Nursing note and vitals reviewed.      ED Course        Procedures             Results for orders placed or performed during the hospital encounter of 09/15/19 (from the past 24 hour(s))   XR Wrist Left G/E 3 Views    Narrative    WRIST LEFT THREE OR MORE VIEWS   9/15/2019 1:09 PM     HISTORY: Fall, wrist pain      Impression    IMPRESSION: No definitive evidence of fracture. However, if there is  continued clinical suspicion of occult  fracture, MRI versus delayed  repeat radiographs 7-10 days following injury should be considered.       Medications - No data to display    Assessments & Plan (with Medical Decision Making)  58-year-old with a left wrist injury.  There is concern for an occult scaphoid fracture so the patient was placed in a Titan wrist splint with thumb support and asked to follow-up within a week or so with her primary care physician to reevaluate.  I stressed the importance of following up on an injury like this as wrist fractures can be missed.  The diagnosis, treatment options, risks and follow-up discussed with a competent patient who agrees with the plan.     I have reviewed the nursing notes.    I have reviewed the findings, diagnosis, plan and need for follow up with the patient.      New Prescriptions    No medications on file       Final diagnoses:   Wrist injury, left, initial encounter     This document serves as a record of services personally performed by Paco Burks MD. It was created on their behalf by Rosa Elena Matos, a trained medical scribe. The creation of this record is based on the provider's personal observations and the statements of the patient. This document has been checked and approved by the attending provider.  Note: Chart documentation done in part with Dragon Voice Recognition software. Although reviewed after completion, some word and grammatical errors may remain.  9/15/2019   [facility] EMERGENCY DEPARTMENT     Paco Burks MD  09/15/19 9824

## 2019-09-15 NOTE — ED AVS SNAPSHOT
[facility] Emergency Department  [address] [place] [address]  Phone:  [phone]  Fax:  [phone]                                    Jeanine Schuler   MRN: [id number]    Department:  [facility] Emergency Department   Date of Visit:  9/15/2019           After Visit Summary Signature Page    I have received my discharge instructions, and my questions have been answered. I have discussed any challenges I see with this plan with the nurse or doctor.    ..........................................................................................................................................  Patient/Patient Representative Signature      ..........................................................................................................................................  Patient Representative Print Name and Relationship to Patient    ..................................................               ................................................  Date                                   Time    ..........................................................................................................................................  Reviewed by Signature/Title    ...................................................              ..............................................  Date                                               Time          22EPIC Rev 08/18

## 2019-09-15 NOTE — DISCHARGE INSTRUCTIONS
Return to the ER if you develop new or worsening symptoms.  Please immobilize your wrist until your follow-up appointment with your doctor.  Sometimes wrist fractures can be subtle.  You may need to have a repeat x-ray in a week or so.  Your x-ray today did not show any definitive fractures.

## 2019-10-21 ENCOUNTER — HOSPITAL ENCOUNTER (EMERGENCY)
Facility: CLINIC | Age: 59
Discharge: HOME | End: 2019-10-21
Attending: PHYSICIAN ASSISTANT | Admitting: PHYSICIAN ASSISTANT
Payer: COMMERCIAL

## 2019-10-21 VITALS
DIASTOLIC BLOOD PRESSURE: 72 MMHG | HEART RATE: 86 BPM | OXYGEN SATURATION: 96 % | RESPIRATION RATE: 18 BRPM | TEMPERATURE: 98.4 F | SYSTOLIC BLOOD PRESSURE: 125 MMHG

## 2019-10-21 DIAGNOSIS — R19.7 DIARRHEA: ICD-10-CM

## 2019-10-21 DIAGNOSIS — E86.0 DEHYDRATION: ICD-10-CM

## 2019-10-21 DIAGNOSIS — J10.1 INFLUENZA A: ICD-10-CM

## 2019-10-21 LAB
ANION GAP SERPL CALCULATED.3IONS-SCNC: 8 MMOL/L (ref 3–14)
BASOPHILS # BLD AUTO: 0 10E9/L (ref 0–0.2)
BASOPHILS NFR BLD AUTO: 0.3 %
BUN SERPL-MCNC: 10 MG/DL (ref 7–30)
CALCIUM SERPL-MCNC: 8.6 MG/DL (ref 8.5–10.1)
CHLORIDE SERPL-SCNC: 109 MMOL/L (ref 94–109)
CO2 SERPL-SCNC: 22 MMOL/L (ref 20–32)
CREAT SERPL-MCNC: 0.81 MG/DL (ref 0.52–1.04)
DIFFERENTIAL METHOD BLD: ABNORMAL
EOSINOPHIL NFR BLD AUTO: 0.9 %
ERYTHROCYTE [DISTWIDTH] IN BLOOD BY AUTOMATED COUNT: 13.3 % (ref 10–15)
FLUAV+FLUBV AG SPEC QL: NEGATIVE
FLUAV+FLUBV AG SPEC QL: POSITIVE
GFR SERPL CREATININE-BSD FRML MDRD: 81 ML/MIN/{1.73_M2}
GLUCOSE SERPL-MCNC: 69 MG/DL (ref 70–99)
HCT VFR BLD AUTO: 39.4 % (ref 35–47)
HGB BLD-MCNC: 12.9 G/DL (ref 11.7–15.7)
IMM GRANULOCYTES # BLD: 0 10E9/L (ref 0–0.4)
IMM GRANULOCYTES NFR BLD: 0.3 %
LYMPHOCYTES # BLD AUTO: 0.9 10E9/L (ref 0.8–5.3)
LYMPHOCYTES NFR BLD AUTO: 26 %
MCH RBC QN AUTO: 29.3 PG (ref 26.5–33)
MCHC RBC AUTO-ENTMCNC: 32.7 G/DL (ref 31.5–36.5)
MCV RBC AUTO: 89 FL (ref 78–100)
MONOCYTES # BLD AUTO: 0.4 10E9/L (ref 0–1.3)
MONOCYTES NFR BLD AUTO: 10.7 %
NEUTROPHILS # BLD AUTO: 2 10E9/L (ref 1.6–8.3)
NEUTROPHILS NFR BLD AUTO: 61.8 %
NRBC # BLD AUTO: 0 10*3/UL
NRBC BLD AUTO-RTO: 0 /100
PLATELET # BLD AUTO: 122 10E9/L (ref 150–450)
POTASSIUM SERPL-SCNC: 3.5 MMOL/L (ref 3.4–5.3)
RBC # BLD AUTO: 4.41 10E12/L (ref 3.8–5.2)
SODIUM SERPL-SCNC: 139 MMOL/L (ref 133–144)
SPECIMEN SOURCE: ABNORMAL
WBC # BLD AUTO: 3.3 10E9/L (ref 4–11)

## 2019-10-21 PROCEDURE — 87804 INFLUENZA ASSAY W/OPTIC: CPT | Mod: 59 | Performed by: PHYSICIAN ASSISTANT

## 2019-10-21 PROCEDURE — 96360 HYDRATION IV INFUSION INIT: CPT

## 2019-10-21 PROCEDURE — 85025 COMPLETE CBC W/AUTO DIFF WBC: CPT | Performed by: PHYSICIAN ASSISTANT

## 2019-10-21 PROCEDURE — 80048 BASIC METABOLIC PNL TOTAL CA: CPT | Performed by: PHYSICIAN ASSISTANT

## 2019-10-21 PROCEDURE — 25000128 H RX IP 250 OP 636: Performed by: PHYSICIAN ASSISTANT

## 2019-10-21 PROCEDURE — 99284 EMERGENCY DEPT VISIT MOD MDM: CPT | Mod: Z6 | Performed by: PHYSICIAN ASSISTANT

## 2019-10-21 PROCEDURE — 96361 HYDRATE IV INFUSION ADD-ON: CPT

## 2019-10-21 PROCEDURE — 99284 EMERGENCY DEPT VISIT MOD MDM: CPT | Mod: 25

## 2019-10-21 RX ORDER — BENZONATATE 200 MG/1
200 CAPSULE ORAL 3 TIMES DAILY PRN
Qty: 21 CAPSULE | Refills: 0 | Status: SHIPPED | OUTPATIENT
Start: 2019-10-21 | End: 2020-08-21

## 2019-10-21 RX ORDER — SODIUM CHLORIDE 9 MG/ML
INJECTION, SOLUTION INTRAVENOUS CONTINUOUS
Status: DISCONTINUED | OUTPATIENT
Start: 2019-10-21 | End: 2019-10-21 | Stop reason: HOSPADM

## 2019-10-21 RX ADMIN — SODIUM CHLORIDE 1000 ML: 9 INJECTION, SOLUTION INTRAVENOUS at 13:49

## 2019-10-21 RX ADMIN — SODIUM CHLORIDE 1000 ML: 9 INJECTION, SOLUTION INTRAVENOUS at 15:01

## 2019-10-21 NOTE — ED PROVIDER NOTES
History     Chief Complaint   Patient presents with     Cough     HPI  Jeanine Schuler is a 59 year old female with rheumatoid arthritis and Sjogren's disease who presents for evaluation of watery diarrhea 5-7 times per day for the past 6 days.  No blood or pus in it.  Denies any fevers.  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  She reported onset of respiratory symptoms 2.5 days ago with rhinorrhea, feeling warm, episodes of chills, and dry cough.  She has attempted OTC cold medications and Tylenol with limited results.  Denies any dysuria, frequency, urgency, flank pain, or gross hematuria.  No skin rashes.  No recent travel.        Allergies:  Allergies   Allergen Reactions     Lovenox Other (See Comments)     Blood clot      Norvasc [Amlodipine Besylate] Swelling     Lip swelling that happened on 2 different occasions     Erythromycin Rash     Rash on arms       Problem List:    Patient Active Problem List    Diagnosis Date Noted     Raynaud's disease without gangrene 02/16/2016     Priority: Medium     Rheumatoid arthritis with negative rheumatoid factor, involving unspecified site (H) 12/15/2015     Priority: Medium     Pharyngeal dysphagia 11/17/2015     Priority: Medium     Gastroesophageal reflux disease, esophagitis presence not specified 11/17/2015     Priority: Medium     Disturbed sleep rhythm 04/07/2015     Priority: Medium     Other specified diffuse disease of connective tissue 10/28/2014     Priority: Medium     Cellulitis of leg 06/06/2013     Priority: Medium     Limb ischemia; ulcers of fingertips 04/22/2013     Priority: Medium     Hypoalbuminemia 03/22/2013     Priority: Medium     Diagnosis updated by automated process. Provider to review and confirm.       Diastolic dysfunction 03/22/2013     Priority: Medium     Noted on Cardiac Echo 3/2013 from [facility]       Abnormal albumin 02/21/2013     Priority: Medium      Myopathy 02/21/2013     Priority: Medium     Problem list name updated by automated process. Provider to review       Edema of both legs 02/14/2013     Priority: Medium     Shortness of breath 07/26/2012     Priority: Medium     Systemic sclerosis (H) 07/28/2011     Priority: Medium     Tumoral calcinosis 07/28/2011     Priority: Medium     Low back pain - Suspect SI Joint dysfunction 09/27/2005     Priority: Medium     September 27, 2005: Started 2 weeks ago.  bilateral buttocks - better with walking, worse with sitting and driving.  Tight/stiff - lossens up.  Painful to lay down.  Constant ache.  8-10/10 at its worse.  No injury.  Tender with massage.  No radiculopathy.  Relief with muscle relaxant.       Cough 09/27/2005     Priority: Medium     September 27, 2005: Persistent cough occl productive of stringy mucus, wheezes with cough.  Started 2 weeks ago.  No rhinorrhea or post nasal drip        Prolonged QTc 460 ms,  at hr 67 06/24/2005     Priority: Medium     June 24, 2005: Will check calcium, magnesium, potassium.  Will compare to old EKGs.  Reviewed meds and none are on the list for prolongation.  Anesthesiology should be aware at surgery of prolonged QT.       Essential hypertension 03/02/2005     Priority: Medium     April 22, 2005: BP controlled on prinizide 40/25, norvasc 5 September 27, 2005: BP controlled/marginal off meds post-gastric bypass.  Problem list name updated by automated process. Provider to review       Thyrotoxicosis 03/02/2005     Priority: Medium     Insomnia, lost weight since 12/02 (max was 346).  Globus sensation in neck.  Normal bowel movements.  Normal skin dryness.  No anxiety or palpitations.  TSH was normal in 2002 with Htn work-up, now <0.03 with work-up for gastric bypass.  April 22, 2005: Suspect thyroiditis which has now normalized.  I would recommend rechecking in 3 months.  Problem list name updated by automated process. Provider to review       Obesity 03/02/2005      Priority: Medium     March 2, 2005:  Gastric bypass scheduled for 6/27/05 September 27, 2005: Weight loss 50 pounds  Problem list name updated by automated process. Provider to review          Past Medical History:    Past Medical History:   Diagnosis Date     Anemia      Cellulitis of leg 6/6/2013     Esophageal reflux      Limb ischemia; ulcers of fingertips 4/22/2013     Raynaud's syndrome      Scleroderma (H)      Systemic sclerosis (H) 2009     Unspecified essential hypertension        Past Surgical History:    Past Surgical History:   Procedure Laterality Date     BYPASS GASTRIC, CHOLECYSTECTOMY, COMBINED       CHOLECYSTECTOMY       COLONOSCOPY       ESOPHAGOSCOPY, GASTROSCOPY, DUODENOSCOPY (EGD), COMBINED N/A 3/10/2016    Procedure: COMBINED ESOPHAGOSCOPY, GASTROSCOPY, DUODENOSCOPY (EGD), BIOPSY SINGLE OR MULTIPLE;  Surgeon: Tricia Zambrano MD;  Location:  GI     INNER EAR SURGERY       PICC INSERTION  6/5/2013    5fr DL Power PICC, 44cm, left basilic vein, tip in low SVC     SURGICAL HISTORY OF -       Left ear surgery - incus transition, tympanoplasty     SURGICAL HISTORY OF -   6/05    Gastric bypass     SURGICAL HISTORY OF -   6/05    Cholecystectomy     SURGICAL HISTORY OF -   6/05    Hiatal hernia repair     TONSILLECTOMY         Family History:    Family History   Problem Relation Age of Onset     Cerebrovascular Disease Father      C.A.D. Father      C.A.D. Mother      C.A.D. Sister         56 yo smoker       Social History:  Marital Status:   [2]  Social History     Tobacco Use     Smoking status: Never Smoker     Smokeless tobacco: Never Used   Substance Use Topics     Alcohol use: Yes     Comment: occassionally     Drug use: No        Medications:    benzonatate (TESSALON) 200 MG capsule  aspirin EC 81 MG EC tablet  atorvastatin (LIPITOR) 40 MG tablet  furosemide (LASIX) 40 MG tablet  hydroxychloroquine (PLAQUENIL) 200 MG tablet  hydroxychloroquine (PLAQUENIL) 200 MG  tablet  losartan (COZAAR) 50 MG tablet  MAGNESIUM OXIDE PO  pantoprazole (PROTONIX) 40 MG EC tablet  Potassium (POTASSIMIN PO)  predniSONE (DELTASONE) 2.5 MG tablet  sildenafil (REVATIO) 20 MG tablet  sildenafil (VIAGRA) 50 MG tablet  spironolactone (ALDACTONE) 25 MG tablet          Review of Systems   All other systems reviewed and are negative.      Physical Exam   BP: 124/65  Pulse: 85  Temp: 98.4  F (36.9  C)  Resp: 18  SpO2: 96 %      Physical Exam  Vitals signs and nursing note reviewed.   Constitutional:       General: She is not in acute distress.     Appearance: She is not diaphoretic.   HENT:      Head: Normocephalic and atraumatic.      Right Ear: Tympanic membrane, ear canal and external ear normal.      Left Ear: Tympanic membrane, ear canal and external ear normal.      Nose: Nose normal.      Mouth/Throat:      Mouth: Mucous membranes are dry.      Pharynx: No oropharyngeal exudate or posterior oropharyngeal erythema.   Eyes:      General: No scleral icterus.        Right eye: No discharge.         Left eye: No discharge.      Extraocular Movements: Extraocular movements intact.      Conjunctiva/sclera: Conjunctivae normal.      Pupils: Pupils are equal, round, and reactive to light.   Neck:      Musculoskeletal: Normal range of motion and neck supple.      Thyroid: No thyromegaly.   Cardiovascular:      Rate and Rhythm: Normal rate and regular rhythm.      Heart sounds: Normal heart sounds. No murmur.   Pulmonary:      Effort: Pulmonary effort is normal. No respiratory distress.      Breath sounds: Normal breath sounds. No wheezing or rales.   Chest:      Chest wall: No tenderness.   Abdominal:      General: Bowel sounds are normal. There is no distension.      Palpations: Abdomen is soft. There is no mass.      Tenderness: There is no tenderness. There is no guarding or rebound.   Musculoskeletal: Normal range of motion.         General: No tenderness or deformity.   Lymphadenopathy:      Cervical:  No cervical adenopathy.   Skin:     General: Skin is warm and dry.      Capillary Refill: Capillary refill takes less than 2 seconds.      Findings: No erythema or rash.   Neurological:      Mental Status: She is alert and oriented to person, place, and time.      Cranial Nerves: No cranial nerve deficit.   Psychiatric:         Behavior: Behavior normal.         Thought Content: Thought content normal.         ED Course        Procedures               Critical Care time:  none               Results for orders placed or performed during the hospital encounter of 10/21/19 (from the past 24 hour(s))   CBC with platelets differential   Result Value Ref Range    WBC 3.3 (L) 4.0 - 11.0 10e9/L    RBC Count 4.41 3.8 - 5.2 10e12/L    Hemoglobin 12.9 11.7 - 15.7 g/dL    Hematocrit 39.4 35.0 - 47.0 %    MCV 89 78 - 100 fl    MCH 29.3 26.5 - 33.0 pg    MCHC 32.7 31.5 - 36.5 g/dL    RDW 13.3 10.0 - 15.0 %    Platelet Count 122 (L) 150 - 450 10e9/L    Diff Method Automated Method     % Neutrophils 61.8 %    % Lymphocytes 26.0 %    % Monocytes 10.7 %    % Eosinophils 0.9 %    % Basophils 0.3 %    % Immature Granulocytes 0.3 %    Nucleated RBCs 0 0 /100    Absolute Neutrophil 2.0 1.6 - 8.3 10e9/L    Absolute Lymphocytes 0.9 0.8 - 5.3 10e9/L    Absolute Monocytes 0.4 0.0 - 1.3 10e9/L    Absolute Basophils 0.0 0.0 - 0.2 10e9/L    Abs Immature Granulocytes 0.0 0 - 0.4 10e9/L    Absolute Nucleated RBC 0.0    Basic metabolic panel   Result Value Ref Range    Sodium 139 133 - 144 mmol/L    Potassium 3.5 3.4 - 5.3 mmol/L    Chloride 109 94 - 109 mmol/L    Carbon Dioxide 22 20 - 32 mmol/L    Anion Gap 8 3 - 14 mmol/L    Glucose 69 (L) 70 - 99 mg/dL    Urea Nitrogen 10 7 - 30 mg/dL    Creatinine 0.81 0.52 - 1.04 mg/dL    GFR Estimate 81 >60 mL/min/[1.73_m2]    GFR Estimate If Black >90 >60 mL/min/[1.73_m2]    Calcium 8.6 8.5 - 10.1 mg/dL   Influenza A/B antigen   Result Value Ref Range    Influenza A/B Agn Specimen Nasopharyngeal      Influenza A Positive (A) NEG^Negative    Influenza B Negative NEG^Negative       Medications   0.9% sodium chloride BOLUS (has no administration in time range)     Followed by   0.9% sodium chloride BOLUS (1,000 mLs Intravenous New Bag 10/21/19 4410)     Followed by   sodium chloride 0.9% infusion (has no administration in time range)       Assessments & Plan (with Medical Decision Making)     Influenza A  Diarrhea  Dehydration     59 year old female with rheumatoid arthritis and Sjogren's disease who presents for evaluation of watery diarrhea without blood or pus in it for the past 6 days occurring up to 7 times per day.  Reports onset of respiratory symptoms 2.5 days prior with feeling warm, episodes of chills, dry cough, and rhinorrhea.  Increased arthralgias.  Denies any travel.  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  On exam blood pressure 124/65, pulse 85, temperature 98.4, and oxygen saturation 96% on room air.  Dry oral mucous membranes.  Otherwise, the exam is benign.  IV established and she was given 2 L of IV normal saline for rehydration purposes.  She felt much improved after this.  She did not have any lightheadedness upon standing on is able to ambulate well at the end of her visit.  She did urinate.  Laboratory levels displayed a positive influenza A.  Basic metabolic panel was normal as well as a stable CBC.  White blood cell count on the low end of normal at 3300 with a normal differential.  More suggestive of a viral illness.  Mildly low platelet count of 122,000.  Patient does not have any symptoms of spontaneous bleeding or bruising currently.  Also likely secondary to the viral illness.  Symptomatic care measures discussed.  She is beyond the window for Tamiflu treatment.  She has not had any diarrhea during her ED stay.  Discussed to watch for any blood or pus in the stool.  Return if symptoms worsening.  Note provided for work.  Push clear fluids.   Tessalon Perles provided for the dry cough.  She was in agreement with this plan and was suitable for discharge.     I have reviewed the nursing notes.    I have reviewed the findings, diagnosis, plan and need for follow up with the patient.       New Prescriptions    BENZONATATE (TESSALON) 200 MG CAPSULE    Take 1 capsule (200 mg) by mouth 3 times daily as needed for cough       Final diagnoses:   Influenza A   Diarrhea   Dehydration       Disclaimer: This note consists of symbols derived from keyboarding, dictation and/or voice recognition software. As a result, there may be errors in the script that have gone undetected. Please consider this when interpreting information found in this chart.      10/21/2019   Emmanuel Jacobs PA-C   [facility] EMERGENCY DEPARTMENT     Emmanuel Jacobs PA-C  10/21/19 1602

## 2019-10-21 NOTE — ED TRIAGE NOTES
Pt presents with concerns of possible influenza.  Pt states that she has a non productive cough, started yesterday.  Pt states that she was exposed to Influenza A by two co workers.  Pt has chills, fevers, and weakness. No medications taken for the above.

## 2019-10-21 NOTE — LETTER
October 21, 2019      To Whom It May Concern:      Jeanine Schuler was seen in our Emergency Department today, 10/21/19.  I expect her condition to improve over the next few days.  She may return to work when improved.  She cannot work on 10/21/2019, 10/22/2019, and 10/23/2019 due to having Influenza A.  Please excuse her.        Sincerely,            Emmanuel Jacobs PA-C

## 2019-10-21 NOTE — ED AVS SNAPSHOT
[facility] Emergency Department  [address] [place] [address]  Phone:  [phone]  Fax:  [phone]                                    Jeanine Schuler   MRN: [id number]    Department:  [facility] Emergency Department   Date of Visit:  10/21/2019           After Visit Summary Signature Page    I have received my discharge instructions, and my questions have been answered. I have discussed any challenges I see with this plan with the nurse or doctor.    ..........................................................................................................................................  Patient/Patient Representative Signature      ..........................................................................................................................................  Patient Representative Print Name and Relationship to Patient    ..................................................               ................................................  Date                                   Time    ..........................................................................................................................................  Reviewed by Signature/Title    ...................................................              ..............................................  Date                                               Time          22EPIC Rev 08/18

## 2019-11-02 DIAGNOSIS — M34.9 SYSTEMIC SCLEROSIS (H): ICD-10-CM

## 2019-11-02 DIAGNOSIS — M06.00 RHEUMATOID ARTHRITIS WITH NEGATIVE RHEUMATOID FACTOR, INVOLVING UNSPECIFIED SITE (H): ICD-10-CM

## 2019-11-02 DIAGNOSIS — R06.02 SHORTNESS OF BREATH: ICD-10-CM

## 2019-11-02 DIAGNOSIS — I73.00 RAYNAUD'S DISEASE WITHOUT GANGRENE: ICD-10-CM

## 2019-11-04 RX ORDER — LOSARTAN POTASSIUM 25 MG/1
TABLET ORAL
Qty: 30 TABLET | Refills: 3 | OUTPATIENT
Start: 2019-11-04

## 2019-11-04 RX ORDER — LOSARTAN POTASSIUM 50 MG/1
25 TABLET ORAL DAILY
Qty: 45 TABLET | Refills: 2 | Status: SHIPPED | OUTPATIENT
Start: 2019-07-11 | End: 2020-08-11

## 2019-11-04 NOTE — TELEPHONE ENCOUNTER
Pharmacy was called and they said they never received Rx sent 7/11/19 for the Losartan 50 mg tabs take 1/2 tab daily-- #45 + 3 refills.  Resending as ordered with refills adjusted.

## 2019-12-02 DIAGNOSIS — E83.59 TUMORAL CALCINOSIS: ICD-10-CM

## 2019-12-02 DIAGNOSIS — M34.9 SYSTEMIC SCLEROSIS (H): ICD-10-CM

## 2019-12-03 NOTE — TELEPHONE ENCOUNTER
predniSONE (DELTASONE) 2.5 MG   Last Written Prescription Date:  10/18/18  Last Fill Quantity: 270,   # refills: 1  Last Office Visit : 7/11/19  Future Office visit:  NONE    Routing refill request to provider for review/approval because:  CLARIFY  DOSE   I like to see her back in 3 to 4 months and reassess how she is doing with these changes.  I did tell her that if her joint symptoms are improved on the hydroxychloroquine and she feels like she is good enough to try to taper prednisone she should try to go down to 2-1/2 of the 2.5 mg tablets or 6.25 mg daily.

## 2019-12-05 NOTE — TELEPHONE ENCOUNTER
Called Jeanine and left a message to have her return my call when she can.    Krystal Mendoza, BSN RN   Rheumatology Clinic Nurse   [facility]

## 2020-01-22 RX ORDER — PREDNISONE 2.5 MG/1
TABLET ORAL
Qty: 270 TABLET | Refills: 1 | OUTPATIENT
Start: 2020-01-22

## 2020-01-22 NOTE — TELEPHONE ENCOUNTER
Pt never returned call.  Will decline medication until patient calls into clinic.    Mariela Mike RN  Rheumatology Clinic

## 2020-08-11 ENCOUNTER — VIRTUAL VISIT (OUTPATIENT)
Dept: RHEUMATOLOGY | Facility: CLINIC | Age: 60
End: 2020-08-11
Attending: INTERNAL MEDICINE
Payer: COMMERCIAL

## 2020-08-11 DIAGNOSIS — M35.1 MCTD (MIXED CONNECTIVE TISSUE DISEASE) (H): ICD-10-CM

## 2020-08-11 DIAGNOSIS — G72.9 MYOPATHY: ICD-10-CM

## 2020-08-11 DIAGNOSIS — K21.9 GASTROESOPHAGEAL REFLUX DISEASE WITHOUT ESOPHAGITIS: ICD-10-CM

## 2020-08-11 DIAGNOSIS — R06.02 SHORTNESS OF BREATH: ICD-10-CM

## 2020-08-11 DIAGNOSIS — M34.9 SYSTEMIC SCLEROSIS (H): Primary | ICD-10-CM

## 2020-08-11 ASSESSMENT — PAIN SCALES - GENERAL: PAINLEVEL: NO PAIN (0)

## 2020-08-11 NOTE — PROGRESS NOTES
"Jeanine Schuler is a 59 year old female who is being evaluated via a billable telephone visit.      The patient has been notified of following:     \"This telephone visit will be conducted via a call between you and your physician/provider. We have found that certain health care needs can be provided without the need for a physical exam.  This service lets us provide the care you need with a short phone conversation.  If a prescription is necessary we can send it directly to your pharmacy.  If lab work is needed we can place an order for that and you can then stop by our lab to have the test done at a later time.    Telephone visits are billed at different rates depending on your insurance coverage. During this emergency period, for some insurers they may be billed the same as an in-person visit.  Please reach out to your insurance provider with any questions.    If during the course of the call the physician/provider feels a telephone visit is not appropriate, you will not be charged for this service.\"    Patient has given verbal consent for Telephone visit?  Yes    What phone number would you like to be contacted at? [phone]    How would you like to obtain your AVS? MyChart     SUBJECTIVE:  The patient returns in f/u of her MCTD/SSC/RP/with h/o multiple DU.      PROBLEM LIST:    1.  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    2.  History of severe multiple digital ulcers, on fingers and toes.   3.  Marked keratoconjunctivitis sicca over the last several years.    4.  Marked iron deficiency anemia responsive to IV iron.    5.  Diffuse musculoskeletal pain   6.  Lower extremity edema managed with lasix and spironolactone   7.  Marked fatigue and diffuse clinical weakness.    8.  Dyspnea on exertion with lower extremity edema and other features including the anticentromere positivity worrisome for the potential development " of pulmonary hypertension.    9.  Marked GERD with associated dysphagia.    10.  Status post gastric bypass with a history of intermittent constipation and diarrhea potentially consistent with bacterial small bowel overgrowth versus other gut effects of the prior surgery.    11.  History of positive rheumatoid factor consistent with MCTD, given the remainder of her clinical presentation.     12. Progressive Jaw tightening with inability to open mouth wider than 1 inch.   13. Diastolic dysfunction (3/2013)  14. Hypoalbuminemia      INTERVAL HISTORY:       AUGUST 22, 2017, INTERVAL HISTORY:  Since we have last seen the patient, she developed a right ankle wound about 2 months ago.  That opened for a while and it became hard to close, but at this point it has finally closed over.       She has had some stressors and she feels like that has contributed to her feeling like she needed more prednisone.  She had made it down to 12 mg a day for almost 6 months but increased to 20 mg a day in June when she was moving to a new house.  This was primarily due to an increase in fatigue and in joint pains.      At this point, her morning stiffness is 30-60 minutes despite remaining on the 20 mg a day of prednisone.  She does think her strength, however, is stable.      She continues to have a lot of Raynaud's phenomena but has not developed any distal digital ulcerations.       Her GERD and keratoconjunctivitis sicca, however, have remained stable and she thinks she has stable exercise tolerance.       She has not done her labs for many, many months and has not seen me for over a year, and we discussed that in some detail.     Feb. 13, 2018 INTERVAL HISTORY:  Since we have last seen her, she made it down to 12.5 mg a day of prednisone but then put herself back up to 20 mg a day because of increasing general body aches and fatigue as well as some inflammatory joint stiffness in her wrists.  Other joints have actually been okay.     "  She had stopped her methotrexate some time ago.  She works in a hospital and knew of several people who were admitted with \"methotrexate toxicity\" who  while in hospital and although she does not know the details, that frightened her enough that she decided to stop the drug.       She is getting worsening Raynaud's.  She really notes that this happens not only with cold, but with a variety of kinds of stress.  She has previously, at least for a short length of time, been on losartan and does not remember whether that helped at all.  I am pretty sure she has also been on calcium channel blockers, but those are relatively contraindicated in her at this point because of persistent bilateral lower extremity edema.       She is getting enough lower extremity edema and has been found to have vascular insufficiency that she will be having some vascular surgery to laser some of these areas, although an exact time for that has not been laid out.       In addition to getting Raynaud's in her hands, she also gets it in her feet, and when she does, potentially contributed to by the vascular insufficiency, she gets numbness in her feet.  She is not getting numbness in her hands by contrast.       The patient did have pulmonary function tests today.  Although she is not exercising regularly, in general she feels her exercise tolerance has been stable, and she is not having any dry cough.       She did have a decrease in her FVC to 3.68 from 4.30 and her DLCO to 21.77 from 24.74, but she has had some rib cage pain for about the last 3 weeks since she had a minor accident while dancing.  Notably then, her TLC is completely stable, going from 6.41 to 6.29.       She denies any other new problems.  Keratoconjunctivitis sicca, GERD and other features have been stable and she denies any dysphagia.     2019 interval history:    Since we have last seen her she did have to miss appointment because of some family issues but " she is actually been doing quite well.  Although she is continued to have some intermittent joint complaints she is been able to taper her prednisone down to 7.5 mg a day the lowest she has been on and the entire time I have been following her.    Although her joints have not worsened with that lower dose she does think she is getting some worsening of her Raynauds phenomena.  It can be pretty bad and the attacks can be hard to break even with rewarming.  She gets them in hands and feet.  Fortunately she has not had any digital ulcers.  She does recall that she was placed temporarily on tadalafil sometime ago by Dr. Bentley but she could not afford it long-term.  She thinks that may have been helpful however.    She denies any significant dyspnea on exertion or cough.  She has some muscle weakness but not marketed and she does not think that is any worse over time.  Fatigue has generally been her worst symptom and that may be slightly worse.    She does have some ongoing keratoconjunctivitis sicca but does not feel like she wants to go on a medicine for that.  She already has a full plate of dentures in both upper and lower.            ROS as per HPI.  10-point ROS is otherwise negative.    HISTORY REVIEW:  Past Medical History:   Diagnosis Date     Anemia      Cellulitis of leg 6/6/2013     Esophageal reflux     Better post-hiatal hernia repair and weight loss     Limb ischemia; ulcers of fingertips 4/22/2013     Raynaud's syndrome      Scleroderma (H)      Systemic sclerosis (H) 2009     Unspecified essential hypertension        Past Surgical History:   Procedure Laterality Date     BYPASS GASTRIC, CHOLECYSTECTOMY, COMBINED       CHOLECYSTECTOMY       COLONOSCOPY       ESOPHAGOSCOPY, GASTROSCOPY, DUODENOSCOPY (EGD), COMBINED N/A 3/10/2016    Procedure: COMBINED ESOPHAGOSCOPY, GASTROSCOPY, DUODENOSCOPY (EGD), BIOPSY SINGLE OR MULTIPLE;  Surgeon: Tricia Zambrano MD;  Location:  GI     INNER EAR  SURGERY       PICC INSERTION  6/5/2013    5fr DL Power PICC, 44cm, left basilic vein, tip in low SVC     SURGICAL HISTORY OF -       Left ear surgery - incus transition, tympanoplasty     SURGICAL HISTORY OF -   6/05    Gastric bypass     SURGICAL HISTORY OF -   6/05    Cholecystectomy     SURGICAL HISTORY OF -   6/05    Hiatal hernia repair     TONSILLECTOMY         Family History   Problem Relation Age of Onset     Cerebrovascular Disease Father      C.A.D. Father      C.A.D. Mother      C.A.D. 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      Social History Main Topics     Smoking status: Never Smoker      Smokeless tobacco: Never Used     Alcohol Use: Yes      occassionally     Drug Use: No     Sexually Active: Yes -- Male partner(s)     Birth Control/ Protection: None     Social History Narrative     Was  in December 2015. Longtime partner from [place].       Patient Active Problem List   Diagnosis     Essential hypertension     Thyrotoxicosis     Obesity     Prolonged QTc 460 ms,  at hr 67     Low back pain - Suspect SI Joint dysfunction     Cough     Systemic sclerosis (H)     Tumoral calcinosis     Shortness of breath     Edema of both legs     Abnormal albumin     Myopathy     Hypoalbuminemia     Diastolic dysfunction     Limb ischemia; ulcers of fingertips     Cellulitis of leg     Other specified diffuse disease of connective tissue     Disturbed sleep rhythm     Pharyngeal dysphagia     Gastroesophageal reflux disease, esophagitis presence not specified     Rheumatoid arthritis with negative rheumatoid factor, involving unspecified site (H)     Raynaud's disease without gangrene       Allergies    Allergen Reactions     Lovenox Other (See Comments)     Blood clot      Norvasc [Amlodipine Besylate] Swelling     Lip swelling that happened on 2 different occasions     Erythromycin Rash     Rash on arms     OBJECTIVE:  Vitals: There were no vitals taken for this visit.  Telephone visit.      Component      Latest Ref Rng 1/26/2016   WBC      4.0 - 11.0 10e9/L 9.2   RBC Count      3.8 - 5.2 10e12/L 4.59   Hemoglobin      11.7 - 15.7 g/dL 13.2   Hematocrit      35.0 - 47.0 % 41.8   MCV      78 - 100 fl 91   MCH      26.5 - 33.0 pg 28.8   MCHC      31.5 - 36.5 g/dL 31.6   RDW      10.0 - 15.0 % 16.7 (H)   Platelet Count      150 - 450 10e9/L 195   Diff Method       Automated Method   % Neutrophils       62.9   % Lymphocytes       27.0   % Monocytes       9.1   % Eosinophils       0.4   % Basophils       0.2   % Immature Granulocytes       0.4   Nucleated RBCs      0 /100 0   Absolute Neutrophil      1.6 - 8.3 10e9/L 5.8   Absolute Lymphocytes      0.8 - 5.3 10e9/L 2.5   Absolute Monocytes      0.0 - 1.3 10e9/L 0.8   Absolute Eosinophils      0.0 - 0.7 10e9/L 0.0   Absolute Basophils      0.0 - 0.2 10e9/L 0.0   Abs Immature Granulocytes      0 - 0.4 10e9/L 0.0   Absolute Nucleated RBC       0.0   Color Urine       Yellow   Appearance Urine       Clear   Glucose Urine      NEG mg/dL Negative   Bilirubin Urine      NEG Negative   Ketones Urine      NEG mg/dL Negative   Specific Gravity Urine      1.003 - 1.035 1.010   Blood Urine      NEG Negative   pH Urine      5.0 - 7.0 pH 6.0   Protein Albumin Urine      NEG mg/dL Negative   Urobilinogen mg/dL      0.0 - 2.0 mg/dL Normal   Nitrite Urine      NEG Negative   Leukocyte Esterase Urine      NEG Negative   Source       Midstream Urine   WBC Urine      0 - 2 /HPF <1   RBC Urine      0 - 2 /HPF <1   Squamous Epithelial /HPF Urine      0 - 1 /HPF <1   Creatinine      0.52 - 1.04 mg/dL 1.11 (H)   GFR Estimate      >60 mL/min/1.7m2 51 (L)   GFR Estimate If Black      >60  mL/min/1.7m2 62   ALT      0 - 50 U/L 23   AST      0 - 45 U/L 16   Albumin      3.4 - 5.0 g/dL 3.4   CRP Inflammation      0.0 - 8.0 mg/L <2.9   Sed Rate      0 - 30 mm/h 9   CK Total      30 - 225 U/L 38   Aldolase       4.1     Biopsy from 3/10 EGD:  Esophagus, gastroesophageal junction, biopsies:   - Squamous mucosa and submucosa with marked chronic inflammation and   fibrosis   - No evidence of intestinal metaplasia or dysplasia     August 11, 2020 interval history:    Since we last seen the patient she feels she was doing relatively stably on till July 7.  At that point she had the abrupt onset of a bad dry cough.  She did not have significant fevers but did not feel well.  She was seen had a negative coronavirus test but a chest x-ray apparently showed evidence of some infiltrates and she was placed on doxycycline.  She says she did not improve with that therapy and a CT angios was performed.  That showed bilateral pulmonary consolidation thought consistent with infection.  There was note that the pattern could actually be somewhat typical for sarcoid but it was noted to have worsened since the prior study performed there at [facility] in October 2019.  She also was noted to have mediastinal lymphadenopathy and debris in the esophagus potentially consistent with her systemic sclerosis.  No pulmonary embolism was found.  Based on the results of that she was placed on Levaquin.  She continued to fail to improve and a third antibiotic was also tried.    Finally she was seen by pulmonologist and a bronchoscopy was performed.  I am able to review the results of that and there was a high percentage of neutrophils and lymphocytes and that BAL fluid.  Viral cultures however Gram stain and culture were all unremarkable and there were no malignant cells found.    She was placed on 40 mg a day of prednisone at the time of the bronchoscopy once it was clear that there was no acute infection.  She does not have a  follow-up appointment yet.  Although her cough is mostly gone she does not feel good and feels very dyspneic just going from the bed to the bathroom or crossed the room.  She is not convinced she has had any improvement with 40 mg daily prednisone and if anything think she is worse.    In reviewing her extensive record I see that a repeat TARA was performed and a reflexive was done when it was positive.  She continues to have an anticentromere antibody which she had previously, but now notably has a high titer Gonzalez and high titer RNP antibody.  She previously was borderline positive for RNP and Gonzalez negative.  SSA is also higher titer than it was previously.    With all these autoantibody positivity she however denies any specific features that would suggest systemic lupus.  She specifically denies sun sensitivity sun sensitive rashes including a malar rash, morning stiffness in any of her joints or any joint swelling and any pleurisy at the time of her  at CT angiogram or otherwise.  \  Her Raynaud's phenomena has been stable as have other features of her limited cutaneous systemic sclerosis.    Physical examination is deferred today because of the telephone status.    Impression:    Per the prior problem list but now with the development of an inflammatory lung infiltrate based on BAL, of unclear etiology.    It is truly not clear to me if her inflammation in the lungs is related to underlying autoimmunity which she definitely has, or due to  partially treated infection.    What is clear is that she does not feel better and think she is actually worsening which is concerning.    She would like a second opinion from a pulmonologist in our system so we will go about getting that done.  I did discuss with her that they will probably need to see her imaging although that may be able to push be pushed over to our system.    I will wait to arrange follow-up until after she has had that appointment and we can discuss  next steps in her.    This telephone visit commenced at 4:04 PM was completed at 4:39 PM, 35 minutes in duration over 50% of which was in counseling      MARTÍN De La Torre MD, PhD    Rheumatology

## 2020-08-11 NOTE — PROGRESS NOTES
Left voicemail for patient to call back to set up telemedicine visit, will call again before appointment time.  Radha Resendiz CMA on 8/11/2020 at 3:08 PM

## 2020-08-11 NOTE — LETTER
"8/11/2020       RE: [facility] Schuler  [id number] [address] [facility] [address]     Dear Colleague,    Thank you for referring your patient, Jeanine Schuler, to the [facility] RHEUMATOLOGY at [facility]. Please see a copy of my visit note below.    Left voicemail for patient to call back to set up telemedicine visit, will call again before appointment time.  Radha Resendiz CMA on 8/11/2020 at 3:08 PM      Jeanine Schuler is a 59 year old female who is being evaluated via a billable telephone visit.      The patient has been notified of following:     \"This telephone visit will be conducted via a call between you and your physician/provider. We have found that certain health care needs can be provided without the need for a physical exam.  This service lets us provide the care you need with a short phone conversation.  If a prescription is necessary we can send it directly to your pharmacy.  If lab work is needed we can place an order for that and you can then stop by our lab to have the test done at a later time.    Telephone visits are billed at different rates depending on your insurance coverage. During this emergency period, for some insurers they may be billed the same as an in-person visit.  Please reach out to your insurance provider with any questions.    If during the course of the call the physician/provider feels a telephone visit is not appropriate, you will not be charged for this service.\"    Patient has given verbal consent for Telephone visit?  Yes    What phone number would you like to be contacted at? [phone]    How would you like to obtain your AVS? MyChart     SUBJECTIVE:  The patient returns in f/u of her MCTD/SSC/RP/with h/o multiple DU.      PROBLEM LIST:    1.  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    2.  History of severe multiple digital ulcers, on fingers and " toes.   3.  Marked keratoconjunctivitis sicca over the last several years.    4.  Marked iron deficiency anemia responsive to IV iron.    5.  Diffuse musculoskeletal pain   6.  Lower extremity edema managed with lasix and spironolactone   7.  Marked fatigue and diffuse clinical weakness.    8.  Dyspnea on exertion with lower extremity edema and other features including the anticentromere positivity worrisome for the potential development of pulmonary hypertension.    9.  Marked GERD with associated dysphagia.    10.  Status post gastric bypass with a history of intermittent constipation and diarrhea potentially consistent with bacterial small bowel overgrowth versus other gut effects of the prior surgery.    11.  History of positive rheumatoid factor consistent with MCTD, given the remainder of her clinical presentation.     12. Progressive Jaw tightening with inability to open mouth wider than 1 inch.   13. Diastolic dysfunction (3/2013)  14. Hypoalbuminemia      INTERVAL HISTORY:       AUGUST 22, 2017, INTERVAL HISTORY:  Since we have last seen the patient, she developed a right ankle wound about 2 months ago.  That opened for a while and it became hard to close, but at this point it has finally closed over.       She has had some stressors and she feels like that has contributed to her feeling like she needed more prednisone.  She had made it down to 12 mg a day for almost 6 months but increased to 20 mg a day in June when she was moving to a new house.  This was primarily due to an increase in fatigue and in joint pains.      At this point, her morning stiffness is 30-60 minutes despite remaining on the 20 mg a day of prednisone.  She does think her strength, however, is stable.      She continues to have a lot of Raynaud's phenomena but has not developed any distal digital ulcerations.       Her GERD and keratoconjunctivitis sicca, however, have remained stable and she thinks she has stable exercise tolerance.   "     She has not done her labs for many, many months and has not seen me for over a year, and we discussed that in some detail.     2018 INTERVAL HISTORY:  Since we have last seen her, she made it down to 12.5 mg a day of prednisone but then put herself back up to 20 mg a day because of increasing general body aches and fatigue as well as some inflammatory joint stiffness in her wrists.  Other joints have actually been okay.      She had stopped her methotrexate some time ago.  She works in a hospital and knew of several people who were admitted with \"methotrexate toxicity\" who  while in hospital and although she does not know the details, that frightened her enough that she decided to stop the drug.       She is getting worsening Raynaud's.  She really notes that this happens not only with cold, but with a variety of kinds of stress.  She has previously, at least for a short length of time, been on losartan and does not remember whether that helped at all.  I am pretty sure she has also been on calcium channel blockers, but those are relatively contraindicated in her at this point because of persistent bilateral lower extremity edema.       She is getting enough lower extremity edema and has been found to have vascular insufficiency that she will be having some vascular surgery to laser some of these areas, although an exact time for that has not been laid out.       In addition to getting Raynaud's in her hands, she also gets it in her feet, and when she does, potentially contributed to by the vascular insufficiency, she gets numbness in her feet.  She is not getting numbness in her hands by contrast.       The patient did have pulmonary function tests today.  Although she is not exercising regularly, in general she feels her exercise tolerance has been stable, and she is not having any dry cough.       She did have a decrease in her FVC to 3.68 from 4.30 and her DLCO to 21.77 from 24.74, but she has " had some rib cage pain for about the last 3 weeks since she had a minor accident while dancing.  Notably then, her TLC is completely stable, going from 6.41 to 6.29.       She denies any other new problems.  Keratoconjunctivitis sicca, GERD and other features have been stable and she denies any dysphagia.     July 11, 2019 interval history:    Since we have last seen her she did have to miss appointment because of some family issues but she is actually been doing quite well.  Although she is continued to have some intermittent joint complaints she is been able to taper her prednisone down to 7.5 mg a day the lowest she has been on and the entire time I have been following her.    Although her joints have not worsened with that lower dose she does think she is getting some worsening of her Raynauds phenomena.  It can be pretty bad and the attacks can be hard to break even with rewarming.  She gets them in hands and feet.  Fortunately she has not had any digital ulcers.  She does recall that she was placed temporarily on tadalafil sometime ago by Dr. Bentley but she could not afford it long-term.  She thinks that may have been helpful however.    She denies any significant dyspnea on exertion or cough.  She has some muscle weakness but not marketed and she does not think that is any worse over time.  Fatigue has generally been her worst symptom and that may be slightly worse.    She does have some ongoing keratoconjunctivitis sicca but does not feel like she wants to go on a medicine for that.  She already has a full plate of dentures in both upper and lower.            ROS as per HPI.  10-point ROS is otherwise negative.    HISTORY REVIEW:  Past Medical History:   Diagnosis Date     Anemia      Cellulitis of leg 6/6/2013     Esophageal reflux     Better post-hiatal hernia repair and weight loss     Limb ischemia; ulcers of fingertips 4/22/2013     Raynaud's syndrome      Scleroderma (H)      Systemic sclerosis (H)  2009     Unspecified essential hypertension        Past Surgical History:   Procedure Laterality Date     BYPASS GASTRIC, CHOLECYSTECTOMY, COMBINED       CHOLECYSTECTOMY       COLONOSCOPY       ESOPHAGOSCOPY, GASTROSCOPY, DUODENOSCOPY (EGD), COMBINED N/A 3/10/2016    Procedure: COMBINED ESOPHAGOSCOPY, GASTROSCOPY, DUODENOSCOPY (EGD), BIOPSY SINGLE OR MULTIPLE;  Surgeon: Tricia Zambrano MD;  Location:  GI     INNER EAR SURGERY       PICC INSERTION  6/5/2013    5fr DL Power PICC, 44cm, left basilic vein, tip in low SVC     SURGICAL HISTORY OF -       Left ear surgery - incus transition, tympanoplasty     SURGICAL HISTORY OF -   6/05    Gastric bypass     SURGICAL HISTORY OF -   6/05    Cholecystectomy     SURGICAL HISTORY OF -   6/05    Hiatal hernia repair     TONSILLECTOMY         Family History   Problem Relation Age of Onset     Cerebrovascular Disease Father      C.A.D. Father      C.A.D. Mother      C.A.D. 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.      Social History Main Topics     Smoking status: Never Smoker      Smokeless tobacco: Never Used     Alcohol Use: Yes      occassionally     Drug Use: No     Sexually Active: Yes -- Male partner(s)     Birth Control/ Protection: None     Social History Narrative     Was  in December 2015. Longtime partner from [place].       Patient Active Problem List   Diagnosis     Essential hypertension     Thyrotoxicosis     Obesity     Prolonged QTc 460 ms,  at hr 67     Low back pain - Suspect SI Joint dysfunction     Cough     Systemic sclerosis (H)     Tumoral calcinosis     Shortness of breath     Edema of both legs     Abnormal albumin      Myopathy     Hypoalbuminemia     Diastolic dysfunction     Limb ischemia; ulcers of fingertips     Cellulitis of leg     Other specified diffuse disease of connective tissue     Disturbed sleep rhythm     Pharyngeal dysphagia     Gastroesophageal reflux disease, esophagitis presence not specified     Rheumatoid arthritis with negative rheumatoid factor, involving unspecified site (H)     Raynaud's disease without gangrene       Allergies   Allergen Reactions     Lovenox Other (See Comments)     Blood clot      Norvasc [Amlodipine Besylate] Swelling     Lip swelling that happened on 2 different occasions     Erythromycin Rash     Rash on arms     OBJECTIVE:  Vitals: There were no vitals taken for this visit.  Telephone visit.      Component      Latest Ref Rng 1/26/2016   WBC      4.0 - 11.0 10e9/L 9.2   RBC Count      3.8 - 5.2 10e12/L 4.59   Hemoglobin      11.7 - 15.7 g/dL 13.2   Hematocrit      35.0 - 47.0 % 41.8   MCV      78 - 100 fl 91   MCH      26.5 - 33.0 pg 28.8   MCHC      31.5 - 36.5 g/dL 31.6   RDW      10.0 - 15.0 % 16.7 (H)   Platelet Count      150 - 450 10e9/L 195   Diff Method       Automated Method   % Neutrophils       62.9   % Lymphocytes       27.0   % Monocytes       9.1   % Eosinophils       0.4   % Basophils       0.2   % Immature Granulocytes       0.4   Nucleated RBCs      0 /100 0   Absolute Neutrophil      1.6 - 8.3 10e9/L 5.8   Absolute Lymphocytes      0.8 - 5.3 10e9/L 2.5   Absolute Monocytes      0.0 - 1.3 10e9/L 0.8   Absolute Eosinophils      0.0 - 0.7 10e9/L 0.0   Absolute Basophils      0.0 - 0.2 10e9/L 0.0   Abs Immature Granulocytes      0 - 0.4 10e9/L 0.0   Absolute Nucleated RBC       0.0   Color Urine       Yellow   Appearance Urine       Clear   Glucose Urine      NEG mg/dL Negative   Bilirubin Urine      NEG Negative   Ketones Urine      NEG mg/dL Negative   Specific Gravity Urine      1.003 - 1.035 1.010   Blood Urine      NEG Negative   pH Urine      5.0 - 7.0 pH 6.0    Protein Albumin Urine      NEG mg/dL Negative   Urobilinogen mg/dL      0.0 - 2.0 mg/dL Normal   Nitrite Urine      NEG Negative   Leukocyte Esterase Urine      NEG Negative   Source       Midstream Urine   WBC Urine      0 - 2 /HPF <1   RBC Urine      0 - 2 /HPF <1   Squamous Epithelial /HPF Urine      0 - 1 /HPF <1   Creatinine      0.52 - 1.04 mg/dL 1.11 (H)   GFR Estimate      >60 mL/min/1.7m2 51 (L)   GFR Estimate If Black      >60 mL/min/1.7m2 62   ALT      0 - 50 U/L 23   AST      0 - 45 U/L 16   Albumin      3.4 - 5.0 g/dL 3.4   CRP Inflammation      0.0 - 8.0 mg/L <2.9   Sed Rate      0 - 30 mm/h 9   CK Total      30 - 225 U/L 38   Aldolase       4.1     Biopsy from 3/10 EGD:  Esophagus, gastroesophageal junction, biopsies:   - Squamous mucosa and submucosa with marked chronic inflammation and   fibrosis   - No evidence of intestinal metaplasia or dysplasia     August 11, 2020 interval history:    Since we last seen the patient she feels she was doing relatively stably on till July 7.  At that point she had the abrupt onset of a bad dry cough.  She did not have significant fevers but did not feel well.  She was seen had a negative coronavirus test but a chest x-ray apparently showed evidence of some infiltrates and she was placed on doxycycline.  She says she did not improve with that therapy and a CT angios was performed.  That showed bilateral pulmonary consolidation thought consistent with infection.  There was note that the pattern could actually be somewhat typical for sarcoid but it was noted to have worsened since the prior study performed there at [facility] in October 2019.  She also was noted to have mediastinal lymphadenopathy and debris in the esophagus potentially consistent with her systemic sclerosis.  No pulmonary embolism was found.  Based on the results of that she was placed on Levaquin.  She continued to fail to improve and a third antibiotic was also tried.    Finally she was seen  by pulmonologist and a bronchoscopy was performed.  I am able to review the results of that and there was a high percentage of neutrophils and lymphocytes and that BAL fluid.  Viral cultures however Gram stain and culture were all unremarkable and there were no malignant cells found.    She was placed on 40 mg a day of prednisone at the time of the bronchoscopy once it was clear that there was no acute infection.  She does not have a follow-up appointment yet.  Although her cough is mostly gone she does not feel good and feels very dyspneic just going from the bed to the bathroom or crossed the room.  She is not convinced she has had any improvement with 40 mg daily prednisone and if anything think she is worse.    In reviewing her extensive record I see that a repeat TARA was performed and a reflexive was done when it was positive.  She continues to have an anticentromere antibody which she had previously, but now notably has a high titer Gonzalez and high titer RNP antibody.  She previously was borderline positive for RNP and Gonzalez negative.  SSA is also higher titer than it was previously.    With all these autoantibody positivity she however denies any specific features that would suggest systemic lupus.  She specifically denies sun sensitivity sun sensitive rashes including a malar rash, morning stiffness in any of her joints or any joint swelling and any pleurisy at the time of her  at CT angiogram or otherwise.  \Raynaud's phenomena has been stable as have other features of her limited cutaneous systemic sclerosis.    Physical examination is deferred today because of the telephone status.    Impression:    Per the prior problem list but now with the development of an inflammatory lung infiltrate based on BAL, of unclear etiology.    It is truly not clear to me if her inflammation in the lungs is related to underlying autoimmunity which she definitely has, or due to  partially treated infection.    What is  clear is that she does not feel better and think she is actually worsening which is concerning.    She would like a second opinion from a pulmonologist in our system so we will go about getting that done.  I did discuss with her that they will probably need to see her imaging although that may be able to push be pushed over to our system.    I will wait to arrange follow-up until after she has had that appointment and we can discuss next steps in her.    This telephone visit commenced at 4:04 PM was completed at 4:39 PM, 35 minutes in duration over 50% of which was in counseling      MARTÍN De La Torre MD, PhD    Rheumatology

## 2020-08-12 DIAGNOSIS — J84.9 ILD (INTERSTITIAL LUNG DISEASE) (H): Primary | ICD-10-CM

## 2020-08-14 PROCEDURE — 00000346 ZZHCL STATISTIC REVIEW OUTSIDE SLIDES TC 88321: Performed by: INTERNAL MEDICINE

## 2020-08-17 LAB — COPATH REPORT: NORMAL

## 2020-08-21 ENCOUNTER — ANCILLARY PROCEDURE (OUTPATIENT)
Dept: CT IMAGING | Facility: CLINIC | Age: 60
End: 2020-08-21
Attending: INTERNAL MEDICINE
Payer: COMMERCIAL

## 2020-08-21 ENCOUNTER — OFFICE VISIT (OUTPATIENT)
Dept: PULMONOLOGY | Facility: CLINIC | Age: 60
End: 2020-08-21
Attending: INTERNAL MEDICINE
Payer: COMMERCIAL

## 2020-08-21 VITALS
WEIGHT: 160 LBS | BODY MASS INDEX: 23.7 KG/M2 | OXYGEN SATURATION: 100 % | HEART RATE: 85 BPM | SYSTOLIC BLOOD PRESSURE: 126 MMHG | DIASTOLIC BLOOD PRESSURE: 85 MMHG | RESPIRATION RATE: 17 BRPM | HEIGHT: 69 IN

## 2020-08-21 DIAGNOSIS — J84.9 ILD (INTERSTITIAL LUNG DISEASE) (H): ICD-10-CM

## 2020-08-21 DIAGNOSIS — R05.9 COUGH: ICD-10-CM

## 2020-08-21 DIAGNOSIS — J84.9 ILD (INTERSTITIAL LUNG DISEASE) (H): Primary | ICD-10-CM

## 2020-08-21 DIAGNOSIS — R06.02 SHORTNESS OF BREATH: ICD-10-CM

## 2020-08-21 LAB
6 MIN WALK (FT): 940 FT
6 MIN WALK (M): 287 M
ALBUMIN SERPL-MCNC: 2.7 G/DL (ref 3.4–5)
ALP SERPL-CCNC: 90 U/L (ref 40–150)
ALT SERPL W P-5'-P-CCNC: 13 U/L (ref 0–50)
ANION GAP SERPL CALCULATED.3IONS-SCNC: 7 MMOL/L (ref 3–14)
AST SERPL W P-5'-P-CCNC: 15 U/L (ref 0–45)
BASOPHILS # BLD AUTO: 0 10E9/L (ref 0–0.2)
BASOPHILS NFR BLD AUTO: 0.2 %
BILIRUB DIRECT SERPL-MCNC: 0.3 MG/DL (ref 0–0.2)
BILIRUB SERPL-MCNC: 0.9 MG/DL (ref 0.2–1.3)
BUN SERPL-MCNC: 9 MG/DL (ref 7–30)
CALCIUM SERPL-MCNC: 8.6 MG/DL (ref 8.5–10.1)
CHLORIDE SERPL-SCNC: 94 MMOL/L (ref 94–109)
CK SERPL-CCNC: 27 U/L (ref 30–225)
CO2 SERPL-SCNC: 28 MMOL/L (ref 20–32)
CREAT SERPL-MCNC: 0.85 MG/DL (ref 0.52–1.04)
DIFFERENTIAL METHOD BLD: ABNORMAL
EOSINOPHIL # BLD AUTO: 0 10E9/L (ref 0–0.7)
EOSINOPHIL NFR BLD AUTO: 0 %
ERYTHROCYTE [DISTWIDTH] IN BLOOD BY AUTOMATED COUNT: 15.1 % (ref 10–15)
GFR SERPL CREATININE-BSD FRML MDRD: 76 ML/MIN/{1.73_M2}
GLUCOSE SERPL-MCNC: 87 MG/DL (ref 70–99)
HCT VFR BLD AUTO: 40.9 % (ref 35–47)
HGB BLD-MCNC: 13 G/DL (ref 11.7–15.7)
IMM GRANULOCYTES # BLD: 0.1 10E9/L (ref 0–0.4)
IMM GRANULOCYTES NFR BLD: 0.6 %
LYMPHOCYTES # BLD AUTO: 1.3 10E9/L (ref 0.8–5.3)
LYMPHOCYTES NFR BLD AUTO: 14.7 %
MCH RBC QN AUTO: 27.6 PG (ref 26.5–33)
MCHC RBC AUTO-ENTMCNC: 31.8 G/DL (ref 31.5–36.5)
MCV RBC AUTO: 87 FL (ref 78–100)
MONOCYTES # BLD AUTO: 0.7 10E9/L (ref 0–1.3)
MONOCYTES NFR BLD AUTO: 8.1 %
NEUTROPHILS # BLD AUTO: 6.6 10E9/L (ref 1.6–8.3)
NEUTROPHILS NFR BLD AUTO: 76.4 %
NRBC # BLD AUTO: 0 10*3/UL
NRBC BLD AUTO-RTO: 0 /100
PLATELET # BLD AUTO: 236 10E9/L (ref 150–450)
POTASSIUM SERPL-SCNC: 3.5 MMOL/L (ref 3.4–5.3)
PROT SERPL-MCNC: 6.5 G/DL (ref 6.8–8.8)
RBC # BLD AUTO: 4.71 10E12/L (ref 3.8–5.2)
SODIUM SERPL-SCNC: 130 MMOL/L (ref 133–144)
WBC # BLD AUTO: 8.6 10E9/L (ref 4–11)

## 2020-08-21 PROCEDURE — 85025 COMPLETE CBC W/AUTO DIFF WBC: CPT | Performed by: INTERNAL MEDICINE

## 2020-08-21 PROCEDURE — 86606 ASPERGILLUS ANTIBODY: CPT | Performed by: INTERNAL MEDICINE

## 2020-08-21 PROCEDURE — 82787 IGG 1 2 3 OR 4 EACH: CPT | Performed by: INTERNAL MEDICINE

## 2020-08-21 PROCEDURE — 86200 CCP ANTIBODY: CPT | Performed by: INTERNAL MEDICINE

## 2020-08-21 PROCEDURE — G0463 HOSPITAL OUTPT CLINIC VISIT: HCPCS

## 2020-08-21 PROCEDURE — 82550 ASSAY OF CK (CPK): CPT | Performed by: INTERNAL MEDICINE

## 2020-08-21 PROCEDURE — 36415 COLL VENOUS BLD VENIPUNCTURE: CPT | Performed by: INTERNAL MEDICINE

## 2020-08-21 PROCEDURE — 80048 BASIC METABOLIC PNL TOTAL CA: CPT | Performed by: INTERNAL MEDICINE

## 2020-08-21 PROCEDURE — 86255 FLUORESCENT ANTIBODY SCREEN: CPT | Performed by: INTERNAL MEDICINE

## 2020-08-21 PROCEDURE — 82784 ASSAY IGA/IGD/IGG/IGM EACH: CPT | Performed by: INTERNAL MEDICINE

## 2020-08-21 PROCEDURE — 86331 IMMUNODIFFUSION OUCHTERLONY: CPT | Performed by: INTERNAL MEDICINE

## 2020-08-21 PROCEDURE — 80076 HEPATIC FUNCTION PANEL: CPT | Performed by: INTERNAL MEDICINE

## 2020-08-21 PROCEDURE — 86235 NUCLEAR ANTIGEN ANTIBODY: CPT | Performed by: INTERNAL MEDICINE

## 2020-08-21 PROCEDURE — 82085 ASSAY OF ALDOLASE: CPT | Performed by: INTERNAL MEDICINE

## 2020-08-21 RX ORDER — BENZONATATE 100 MG/1
100 CAPSULE ORAL 3 TIMES DAILY PRN
Qty: 60 CAPSULE | Refills: 0 | Status: SHIPPED | OUTPATIENT
Start: 2020-08-21 | End: 2021-03-17

## 2020-08-21 ASSESSMENT — MIFFLIN-ST. JEOR: SCORE: 1375.14

## 2020-08-21 ASSESSMENT — PAIN SCALES - GENERAL: PAINLEVEL: NO PAIN (0)

## 2020-08-21 NOTE — NURSING NOTE
Chief Complaint   Patient presents with     Consult     New Interstitial Lung HX. Crest    Medications reviewed and vital signs taken.   Juan Rosa, CMA

## 2020-08-21 NOTE — PROGRESS NOTES
[facility]  ILD Clinic - Initial Visit -  August 21, 2020         Assessment and Plan:   Jeanine Schuler is a 59 year old female who presents for initial evaluation of interstitial lung disease.    1) Interstitial lung disease  -  Repeat TTE  Repeat Bronch w/ cryo?  CBC, CMP    RTC:   Influenza and other vaccinations:       Beena Cuellar MD [facility]  Pulmonary and Critical Care Medicine          Problem List:     1. Interstitial lung disease  a. Bronchoscopy with BAL ([facility]; 8/4/2020)  i. BAL (superior lingula) cell count (49% neutrophils, 45% lymphocytes, 3% monocytes, 3% eosinophils)  ii. Negative for malignant cells, silver stain negative for pneumocystis and fungal organisms.  iii. Pathology (anterior lingual segments of the left upper lobe): Benign bronchial epithelium and alveolar spaces.  There is no evidence of malignancy.  The findings are overall nondiagnostic.        History of Present Illness:     Jeanine Schuler is a 59 year old female who presents for initial evaluation of interstitial lung disease.         Serology ([facility]; 7/27/2020)  Chromatin DNP antibody -6.6 (less than 1.0)  Ribosomal antibody-negative  SSA-2.0 (less than 1.0)  SSB-negative anti-SM RNP-greater than 80 (less than 1.0)  SCL 70-negative  Maddy 1-negative  Centromere antibody -greater than 8.0 (less than 1.0)  SM IgG-negative  Anti-RNP-4.8 (less than 1.0)  Anti-DS DNA- negative  TARA - positive    6/10/2009  TARA- 1: 1280  RF-20 (less than 9)            Review of Systems:     Please see HPI, otherwise the complete 10 point ROS is negative.           Past Medical and Surgical History:     Past Medical History:   Diagnosis Date     Anemia      Cellulitis of leg 6/6/2013     Esophageal reflux     Better post-hiatal hernia repair and weight loss     Limb ischemia; ulcers of fingertips 4/22/2013     Raynaud's syndrome      Scleroderma (H)      Systemic sclerosis  (H) 2009     Unspecified essential hypertension      Past Surgical History:   Procedure Laterality Date     BYPASS GASTRIC, CHOLECYSTECTOMY, COMBINED       CHOLECYSTECTOMY       COLONOSCOPY       ESOPHAGOSCOPY, GASTROSCOPY, DUODENOSCOPY (EGD), COMBINED N/A 3/10/2016    Procedure: COMBINED ESOPHAGOSCOPY, GASTROSCOPY, DUODENOSCOPY (EGD), BIOPSY SINGLE OR MULTIPLE;  Surgeon: Tricia Zambrano MD;  Location:  GI     INNER EAR SURGERY       PICC INSERTION  6/5/2013    5fr DL Power PICC, 44cm, left basilic vein, tip in low SVC     SURGICAL HISTORY OF -       Left ear surgery - incus transition, tympanoplasty     SURGICAL HISTORY OF -   6/05    Gastric bypass     SURGICAL HISTORY OF -   6/05    Cholecystectomy     SURGICAL HISTORY OF -   6/05    Hiatal hernia repair     TONSILLECTOMY             Family History:     Family History   Problem Relation Age of Onset     Cerebrovascular Disease Father      C.A.D. Father      C.A.D. Mother      C.A.D. Sister         56 yo smoker            Social History:     Social History     Socioeconomic History     Marital status:      Spouse name: Not on file     Number of children: Not on file     Years of education: Not on file     Highest education level: Not on file   Occupational History     Not on file   Social Needs     Financial resource strain: Not on file     Food insecurity     Worry: Not on file     Inability: Not on file     Transportation needs     Medical: Not on file     Non-medical: Not on file   Tobacco Use     Smoking status: Never Smoker     Smokeless tobacco: Never Used   Substance and Sexual Activity     Alcohol use: Yes     Comment: occassionally     Drug use: No     Sexual activity: Yes     Partners: Male     Birth control/protection: None   Lifestyle     Physical activity     Days per week: Not on file     Minutes per session: Not on file     Stress: Not on file   Relationships     Social connections     Talks on phone: Not on file     Gets  together: Not on file     Attends Orthodoxy service: Not on file     Active member of club or organization: Not on file     Attends meetings of clubs or organizations: Not on file     Relationship status: Not on file     Intimate partner violence     Fear of current or ex partner: Not on file     Emotionally abused: Not on file     Physically abused: Not on file     Forced sexual activity: Not on file   Other Topics Concern     Parent/sibling w/ CABG, MI or angioplasty before 65F 55M? Not Asked   Social History Narrative     Not on file            Medications:     Current Outpatient Medications   Medication     aspirin EC 81 MG EC tablet     atorvastatin (LIPITOR) 40 MG tablet     benzonatate (TESSALON) 200 MG capsule     furosemide (LASIX) 40 MG tablet     hydroxychloroquine (PLAQUENIL) 200 MG tablet     hydroxychloroquine (PLAQUENIL) 200 MG tablet     losartan (COZAAR) 50 MG tablet     MAGNESIUM OXIDE PO     pantoprazole (PROTONIX) 40 MG EC tablet     Potassium (POTASSIMIN PO)     predniSONE (DELTASONE) 2.5 MG tablet     spironolactone (ALDACTONE) 25 MG tablet     No current facility-administered medications for this visit.             Physical Exam:   There were no vitals taken for this visit.    GENERAL: alert, NAD  HEENT: NCAT, EOMI, no scleral icterus, oral mucosa moist and without lesions  Neck: no cervical or supraclavicular adenopathy  Lungs: good air flow, no crackles, rhonchi or wheezing  CV: RRR, S1S2, no murmurs noted  Abdomen: normoactive BS, soft, non tender  Neuro: AAO X 3  Psychiatric: normal affect  Skin: no rash, jaundice or lesions on limited exam  Extremities: No clubbing, cyanosis or edema. No digital edema, no synovitis or joint swelling.  No ulcers, skin thickening or fissures.          Imaging:     CT Chest ([facility]; 7/20/2020)  1. Bilateral pulmonary consolidation is probably due to infection. Other inflammatory processes are also on the differential. Although radiographic pattern is  somewhat typical of sarcoid, this is less likely given significant worsening since 10/23/2019. Correlate clinically.  2. Mediastinal lymphadenopathy, probably reactive.  3. No pulmonary embolism.  4. Postoperative changes of gastric bypass with debris in the esophagus. Although this is present, the pulmonary consolidation is not typical of aspiration.           Pulmonary Function Tests:     Date Place TLC (%) FVC (%) FEV1 (%) FEV1/FVC DLCO (%) Note                                                               PFT interpretation (August 21, 2020):  Maneuver: Meets ATS criteria    Severity of Obstruction  Mild - FEV1 > 70%  Moderate - FEV1 50-69% (ATS 2005 has  Moderately Severe - 50-59% and Moderate -60-69% )  Severe - FEV1 35-49%  Very Severe - FEV <35%    Severity of Restriction  Mild - TLC 65-80%; FVC 60-80%  Moderate - TLC 50-64%; FVC 50-60%  Severe - TLC <50%; FVC < 50%    Diffusing Capacity for Carbon Monoxide (DLCO)  Mild - >60% and <LLN  Moderate - 40-60%  Severe - <40%         Laboratory:       No results found for this or any previous visit (from the past 168 hour(s)).    BAL (8/4/2020)  Negative for Legionella, RVP, Bordetella, C pneumoniae, mycoplasma pneumonia    A. Lingula bronchial alveolar lavage, cytology  1. 49% neutrophils, 45% lymphocytes, 3% monocytes, and 3% eosinophils.  2. Negative for malignant cells.  3. Silver stain negative for pneumocystis and fungal organisms.  B. Lingula and anterior segments, left upper lobe transbronchial biopsy?-Negative for malignancy, see microscopic description.    A. The smear has the following differential 49% neutrophils, 45% lymphocytes, 3% monocytes, and 3% macrophages. There is no evidence of malignancy. A silver stain is applied, with an appropriate positive control, and is negative for pneumocystis and fungal organisms.  B. The biopsy shows benign bronchial epithelium and alveolar spaces. There is no evidence of malignancy. The findings are overall  nondiagnostic.         Cardiac:     Echocardiogram ([facility]; 10/16/2017)  Interpretation Summary    * Normal left ventricular size and systolic function, estimated LVEF 60-65%.    * Normal regional wall motion.    * Normal right ventricular size and systolic function.    * The left ventricular diastolic function is mildly abnormal (Grade I).    * There is no hemodynamically significant valve disease.    * Normal estimated right ventricular systolic pressure of 28 mmHg.    * The proximal ascending aorta is mildly dilated measuring  3.9 cm.    * The IVC is normal in size (< 2.1 cm), > 50% respiratory variance, RA  pressure normal at 3 mmHg.    * Compared to prior study on 8/26/16, there has been no significant change  in left ventricular systolic function with side by side comparison.  There has  been no significant change in the size of the proximal ascending aorta  (previously measured 4.0 cm).         Other:

## 2020-08-21 NOTE — PROGRESS NOTES
[facility]  ILD Clinic - Initial Visit -  August 21, 2020         Assessment and Plan:   Jeanine Schuler is a 59 year old female who presents for initial evaluation of interstitial lung disease.    1) Interstitial lung disease  - Her CT from 7/27 (contrast study) and from today (8/21; non-contrast high resolution study) were reviewed.  There seems to be interval slight improvement in her bronchocentric, predominantly central infiltrates.  Some are nodular appearing, especially in the periphery and appears confluent centrally.  This does not appear consistent with typical IIP associated with MCTD or systemic sclerosis, which is usually in NSIP pattern.   - Her spirometry is normal.  There is an isolated increase in RV suggesting air trapping.   - Given her symptoms of weight loss and night sweats, infectious etiology needs to be ruled out.  She did undergo bronchoscopy with BAL with negative aerobic culture and and negative for Legionella, RVP, Bordetella, C pneumoniae, mycoplasma pneumonia.  I would recommend repeat bronchoscopy and BAL with nocardia, AFB smear and culture.  In addition, I will discuss her case with our interventional pulmonary colleagues to see if cryobiopsy of the infiltrate is possible.  I will also review her most recent CT at our multidisciplinary ILD conference.   - Repeat TTE; She has positive anti-centromere antibodies, and her last TTE was in 2017.    - Repeat labs today     RTC: 4 months or sooner if need arises      Beena Cuellar MD [facility]  Pulmonary and Critical Care Medicine          Problem List:     1. Interstitial lung disease  a. Bronchoscopy with BAL ([facility]; 8/4/2020)  i. BAL (superior lingula) cell count (49% neutrophils, 45% lymphocytes, 3% monocytes, 3% eosinophils)  ii. Negative for malignant cells, silver stain negative for pneumocystis and fungal organisms.  iii. Pathology (anterior lingual segments of the  left upper lobe): Benign bronchial epithelium and alveolar spaces.  There is no evidence of malignancy.  The findings are overall nondiagnostic.    2. Mixed connective tissue disease/limited cutaneous systemic sclerosis  a. On Plaquinil    3. GERD    4. HFpEF    5. History of gastric bypass 2005        History of Present Illness:     Jeanine Schuler is a 59 year old female who presents for initial evaluation of interstitial lung disease.     She has a history of mixed connective tissue disease/limited cutaneous systemic sclerosis (anticentromere antibody positive, phospholipid antibody positive; history of digital ulcers, musculoskeletal pain or lower extremity edema), followed by Dr. Borjas in rheumatology, HFpEF, history of gastric bypass, GERD.  She was in her usual state of health until around 7/7/2020, she developed a dry cough.  She received a 10-day course of amoxicillin and doxycycline, without any improvement in her symptoms.  She denies overt dyspnea on exertion, but also attributes this to being a relatively sedentary lifestyle.  She reports feeling generalized fatigue.    She was seen by pulmonologist Dr. Augusta Amos [facility]) and underwent a bronchoscopy with BAL and transbronchial biopsy on 8/4/2020.  Microbiological work-up was negative for Legionella, RVP, Bordetella, C pneumoniae, mycoplasma pneumonia.  BAL cell count showed neutrophilic predominance (49% N, 45% L).  Transbronchial biopsy specimens were read as normal bronchial epithelium and alveolar tissue.    She is empirically started on prednisone 40 mg on 8/5/2020, which she took for about 5 days.  She reported no change in her symptoms, and developed oral thrush.  Prednisone was therefore discontinued.    She states that she continues to feel dyspneic and fatigued with daily activities.  She is able to do her activities of daily living without assistance, but does not participate  "in any exercise programs.    She reports unintentional weight loss.  She has lost about 50 pounds since 2016, and states she thinks she lost about 10 pounds since the beginning of her symptoms in July.  She also reports night sweats a few times a week since July.    She has been diagnosed with her mixed connective tissue disease around 2009, and has been on Plaquenil since then.  She reports that her symptoms such as digital ulcers and Raynaud's phenomenon has improved.  She does have arthralgia of her hip, but denies any stiffness or pain of other joints.  She does have limited mobility of her jaw and limited aperture of her mouth.  She also reports sicca symptoms.    She does have a history of GERD, and is on pantoprazole 40 mg daily.  She generally reports good control, but due to stress of her lung disease recently, she reports reflux symptoms.    Her cough has overall slightly improved, but she would have what she describes as \"coughing fits\" more than once a day.            Review of Systems:     Please see HPI, otherwise the complete 10 point ROS is negative.           Past Medical and Surgical History:     Past Medical History:   Diagnosis Date     Anemia      Cellulitis of leg 6/6/2013     Esophageal reflux     Better post-hiatal hernia repair and weight loss     Limb ischemia; ulcers of fingertips 4/22/2013     Raynaud's syndrome      Scleroderma (H)      Systemic sclerosis (H) 2009     Unspecified essential hypertension      Past Surgical History:   Procedure Laterality Date     BYPASS GASTRIC, CHOLECYSTECTOMY, COMBINED       CHOLECYSTECTOMY       COLONOSCOPY       ESOPHAGOSCOPY, GASTROSCOPY, DUODENOSCOPY (EGD), COMBINED N/A 3/10/2016    Procedure: COMBINED ESOPHAGOSCOPY, GASTROSCOPY, DUODENOSCOPY (EGD), BIOPSY SINGLE OR MULTIPLE;  Surgeon: Tricia Zambrano MD;  Location:  GI     INNER EAR SURGERY       PICC INSERTION  6/5/2013    5fr DL Power PICC, 44cm, left basilic vein, tip in low SVC "     SURGICAL HISTORY OF -       Left ear surgery - incus transition, tympanoplasty     SURGICAL HISTORY OF -   6/05    Gastric bypass     SURGICAL HISTORY OF -   6/05    Cholecystectomy     SURGICAL HISTORY OF -   6/05    Hiatal hernia repair     TONSILLECTOMY             Family History:     Family History   Problem Relation Age of Onset     Cerebrovascular Disease Father      C.A.D. Father      C.A.D. Mother      C.A.D. Sister         54 yo smoker     Chronic Obstructive Pulmonary Disease Sister      Cerebrovascular Disease Brother             Social History:     Social History     Socioeconomic History     Marital status:      Spouse name: Not on file     Number of children: Not on file     Years of education: Not on file     Highest education level: Not on file   Occupational History     Not on file   Social Needs     Financial resource strain: Not on file     Food insecurity     Worry: Not on file     Inability: Not on file     Transportation needs     Medical: Not on file     Non-medical: Not on file   Tobacco Use     Smoking status: Never Smoker     Smokeless tobacco: Never Used   Substance and Sexual Activity     Alcohol use: Yes     Comment: occassionally     Drug use: No     Sexual activity: Yes     Partners: Male     Birth control/protection: None   Lifestyle     Physical activity     Days per week: Not on file     Minutes per session: Not on file     Stress: Not on file   Relationships     Social connections     Talks on phone: Not on file     Gets together: Not on file     Attends Christianity service: Not on file     Active member of club or organization: Not on file     Attends meetings of clubs or organizations: Not on file     Relationship status: Not on file     Intimate partner violence     Fear of current or ex partner: Not on file     Emotionally abused: Not on file     Physically abused: Not on file     Forced sexual activity: Not on file   Other Topics Concern     Parent/sibling w/ CABG, MI  "or angioplasty before 65F 55M? 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  She has been on medical leave since 7/27/2020        Moved about 4 years ago to a new house. No known water damage or mold.        She lives in a normal area with forms around her house, but denies any exposure to grain dust, hay, other farm animals.        No unusual hobbies                Medications:     Current Outpatient Medications   Medication     benzonatate (TESSALON) 100 MG capsule     furosemide (LASIX) 40 MG tablet     hydroxychloroquine (PLAQUENIL) 200 MG tablet     hydroxychloroquine (PLAQUENIL) 200 MG tablet     losartan (COZAAR) 50 MG tablet     pantoprazole (PROTONIX) 40 MG EC tablet     Potassium (POTASSIMIN PO)     spironolactone (ALDACTONE) 25 MG tablet     No current facility-administered medications for this visit.             Physical Exam:   /85   Pulse 85   Resp 17   Ht 1.753 m (5' 9\")   Wt 72.6 kg (160 lb)   SpO2 100%   BMI 23.63 kg/m      GENERAL: alert, NAD  HEENT: NCAT, EOMI  Neck: no cervical or supraclavicular adenopathy  Lungs: Good airflow, no crackles or wheezing.  CV: RRR, S1S2, no murmurs noted  Abdomen: normoactive BS, soft, non tender  Neuro: AAO X 3  Psychiatric: normal affect  Skin: no rash, jaundice or lesions on limited exam  Extremities: No clubbing or cyanosis.  There are some pitting under digits, but no open ulcers.  No digital edema.         Imaging:     CT Chest ([facility]; 7/20/2020)  1. Bilateral pulmonary consolidation is probably due to infection. Other inflammatory processes are also on the differential. Although radiographic pattern is somewhat typical of sarcoid, this is less likely given significant worsening since 10/23/2019. Correlate clinically.  2. Mediastinal lymphadenopathy, probably reactive.  3. No pulmonary embolism.  4. Postoperative changes of gastric bypass with debris in the esophagus. Although this " is present, the pulmonary consolidation is not typical of aspiration.           Pulmonary Function Tests:     PFT interpretation (August 21, 2020):  Maneuver: Meets ATS criteria for TLC but nor kaela or diffusing capacity  Spirometry: normal spirometry  Lung volumes: RV is increased, suggesting air trapping.   Diffusing capacity: There is no impairment in diffusing capacity.     Six-minute walk test  Date 6MWD RA SpO2 Resting O2 req Exercise O2 req Lowest SpO2 on amb   8/21/20 940 97 RA RA 90                    Laboratory:     Serology ([facility]; 7/27/2020)  Chromatin DNP antibody -6.6 (less than 1.0)  Ribosomal antibody-negative  SSA-2.0 (less than 1.0)  SSB-negative anti-SM RNP-greater than 80 (less than 1.0)  SCL 70-negative  Maddy 1-negative  Centromere antibody -greater than 8.0 (less than 1.0)  SM IgG-negative  Anti-RNP-4.8 (less than 1.0)  Anti-DS DNA- negative  TARA - positive    6/10/2009  TARA- 1: 1280  RF-20 (less than 9)    Recent Results (from the past 168 hour(s))   6 minute walk test    Collection Time: 08/21/20 12:00 AM   Result Value Ref Range    6 min walk (FT) 940 ft    6 Min Walk (M) 287 m   General PFT Lab (Please always keep checked)    Collection Time: 08/21/20  8:28 AM   Result Value Ref Range    FVC-Pred 3.44 L    FVC-Pre 3.33 L    FVC-%Pred-Pre 96 %    FEV1-Pre 2.86 L    FEV1-%Pred-Pre 104 %    FEV1FVC-Pred 80 %    FEV1FVC-Pre 86 %    FEFMax-Pred 7.08 L/sec    FEFMax-Pre 5.28 L/sec    FEFMax-%Pred-Pre 74 %    FEF2575-Pred 2.49 L/sec    FEF2575-Pre 3.52 L/sec    ETL8585-%Pred-Pre 141 %    ExpTime-Pre 5.08 sec    FIFMax-Pre 5.99 L/sec    VC-Pred 3.84 L    VC-Pre 3.13 L    VC-%Pred-Pre 81 %    IC-Pred 2.85 L    IC-Pre 2.10 L    IC-%Pred-Pre 73 %    ERV-Pred 0.99 L    ERV-Pre 1.03 L    ERV-%Pred-Pre 103 %    FEV1FEV6-Pred 81 %    FEV1FEV6-Pre 86 %    FRCPleth-Pred 2.93 L    FRCPleth-Pre 3.63 L    FRCPleth-%Pred-Pre 124 %    RVPleth-Pred 2.04 L    RVPleth-Pre 2.60 L    RVPleth-%Pred-Pre 127 %     TLCPleth-Pred 5.61 L    TLCPleth-Pre 5.73 L    TLCPleth-%Pred-Pre 102 %    DLCOunc-Pred 23.35 ml/min/mmHg    DLCOunc-Pre 19.65 ml/min/mmHg    DLCOunc-%Pred-Pre 84 %    VA-Pre 4.54 L    VA-%Pred-Pre 80 %    FEV1SVC-Pred 71 %    FEV1SVC-Pre 91 %   General PFT Lab (Please always keep checked)    Collection Time: 08/21/20 11:11 AM   Result Value Ref Range    FIO2-Pre 21.00 %   Hepatic panel    Collection Time: 08/21/20 12:15 PM   Result Value Ref Range    Bilirubin Direct 0.3 (H) 0.0 - 0.2 mg/dL    Bilirubin Total 0.9 0.2 - 1.3 mg/dL    Albumin 2.7 (L) 3.4 - 5.0 g/dL    Protein Total 6.5 (L) 6.8 - 8.8 g/dL    Alkaline Phosphatase 90 40 - 150 U/L    ALT 13 0 - 50 U/L    AST 15 0 - 45 U/L   CBC with platelets differential    Collection Time: 08/21/20 12:15 PM   Result Value Ref Range    WBC 8.6 4.0 - 11.0 10e9/L    RBC Count 4.71 3.8 - 5.2 10e12/L    Hemoglobin 13.0 11.7 - 15.7 g/dL    Hematocrit 40.9 35.0 - 47.0 %    MCV 87 78 - 100 fl    MCH 27.6 26.5 - 33.0 pg    MCHC 31.8 31.5 - 36.5 g/dL    RDW 15.1 (H) 10.0 - 15.0 %    Platelet Count 236 150 - 450 10e9/L    Diff Method Automated Method     % Neutrophils 76.4 %    % Lymphocytes 14.7 %    % Monocytes 8.1 %    % Eosinophils 0.0 %    % Basophils 0.2 %    % Immature Granulocytes 0.6 %    Nucleated RBCs 0 0 /100    Absolute Neutrophil 6.6 1.6 - 8.3 10e9/L    Absolute Lymphocytes 1.3 0.8 - 5.3 10e9/L    Absolute Monocytes 0.7 0.0 - 1.3 10e9/L    Absolute Eosinophils 0.0 0.0 - 0.7 10e9/L    Absolute Basophils 0.0 0.0 - 0.2 10e9/L    Abs Immature Granulocytes 0.1 0 - 0.4 10e9/L    Absolute Nucleated RBC 0.0    Basic metabolic panel    Collection Time: 08/21/20 12:15 PM   Result Value Ref Range    Sodium 130 (L) 133 - 144 mmol/L    Potassium 3.5 3.4 - 5.3 mmol/L    Chloride 94 94 - 109 mmol/L    Carbon Dioxide 28 20 - 32 mmol/L    Anion Gap 7 3 - 14 mmol/L    Glucose 87 70 - 99 mg/dL    Urea Nitrogen 9 7 - 30 mg/dL    Creatinine 0.85 0.52 - 1.04 mg/dL    GFR Estimate 76  >60 mL/min/[1.73_m2]    GFR Estimate If Black 88 >60 mL/min/[1.73_m2]    Calcium 8.6 8.5 - 10.1 mg/dL   CK total    Collection Time: 08/21/20 12:15 PM   Result Value Ref Range    CK Total 27 (L) 30 - 225 U/L       BAL (8/4/2020)  Negative for Legionella, RVP, Bordetella, C pneumoniae, mycoplasma pneumonia    A. Lingula bronchial alveolar lavage, cytology  1. 49% neutrophils, 45% lymphocytes, 3% monocytes, and 3% eosinophils.  2. Negative for malignant cells.  3. Silver stain negative for pneumocystis and fungal organisms.  B. Lingula and anterior segments, left upper lobe transbronchial biopsy?-Negative for malignancy, see microscopic description.    Pathology:   A. The smear has the following differential 49% neutrophils, 45% lymphocytes, 3% monocytes, and 3% macrophages. There is no evidence of malignancy. A silver stain is applied, with an appropriate positive control, and is negative for pneumocystis and fungal organisms.  B. The biopsy shows benign bronchial epithelium and alveolar spaces. There is no evidence of malignancy. The findings are overall nondiagnostic.             Cardiac:     Echocardiogram ([facility]; 10/16/2017)  Interpretation Summary    * Normal left ventricular size and systolic function, estimated LVEF 60-65%.    * Normal regional wall motion.    * Normal right ventricular size and systolic function.    * The left ventricular diastolic function is mildly abnormal (Grade I).    * There is no hemodynamically significant valve disease.    * Normal estimated right ventricular systolic pressure of 28 mmHg.    * The proximal ascending aorta is mildly dilated measuring  3.9 cm.    * The IVC is normal in size (< 2.1 cm), > 50% respiratory variance, RA  pressure normal at 3 mmHg.    * Compared to prior study on 8/26/16, there has been no significant change  in left ventricular systolic function with side by side comparison.  There has  been no significant change in the size of the proximal  ascending aorta  (previously measured 4.0 cm).         Other:

## 2020-08-21 NOTE — LETTER
8/21/2020         RE: Jeanine Schuler  [address]  [facility] [address]        Dear Colleague,    Thank you for referring your patient, Jeanine Schuler, to the [facility]. Please see a copy of my visit note below.    [facility]  ILD Clinic - Initial Visit -  August 21, 2020         Assessment and Plan:   Jeanine Schuler is a 59 year old female who presents for initial evaluation of interstitial lung disease.    1) Interstitial lung disease  - Her CT from 7/27 (contrast study) and from today (8/21; non-contrast high resolution study) were reviewed.  There seems to be interval slight improvement in her bronchocentric, predominantly central infiltrates.  Some are nodular appearing, especially in the periphery and appears confluent centrally.  This does not appear consistent with typical IIP associated with MCTD or systemic sclerosis, which is usually in NSIP pattern.   - Her spirometry is normal.  There is an isolated increase in RV suggesting air trapping.   - Given her symptoms of weight loss and night sweats, infectious etiology needs to be ruled out.  She did undergo bronchoscopy with BAL with negative aerobic culture and and negative for Legionella, RVP, Bordetella, C pneumoniae, mycoplasma pneumonia.  I would recommend repeat bronchoscopy and BAL with nocardia, AFB smear and culture.  In addition, I will discuss her case with our interventional pulmonary colleagues to see if cryobiopsy of the infiltrate is possible.  I will also review her most recent CT at our multidisciplinary ILD conference.   - Repeat TTE; She has positive anti-centromere antibodies, and her last TTE was in 2017.    - Repeat labs today     RTC: 4 months or sooner if need arises      Beena Cuellar MD [facility]  Pulmonary and Critical Care Medicine          Problem List:     1. Interstitial lung disease  a. Bronchoscopy with BAL ([facility]; 8/4/2020)  i. BAL (superior lingula) cell count (49% neutrophils, 45% lymphocytes, 3% monocytes, 3% eosinophils)  ii. Negative for malignant cells, silver stain negative for pneumocystis and fungal organisms.  iii. Pathology (anterior lingual segments of the left upper lobe): Benign bronchial epithelium and alveolar spaces.  There is no evidence of malignancy.  The findings are overall nondiagnostic.    2. Mixed connective tissue disease/limited cutaneous systemic sclerosis  a. On Plaquinil    3. GERD    4. HFpEF    5. History of gastric bypass 2005        History of Present Illness:     Jeanine Schuler is a 59 year old female who presents for initial evaluation of interstitial lung disease.     She has a history of mixed connective tissue disease/limited cutaneous systemic sclerosis (anticentromere antibody positive, phospholipid antibody positive; history of digital ulcers, musculoskeletal pain or lower extremity edema), followed by Dr. Borjas in rheumatology, HFpEF, history of gastric bypass, GERD.  She was in her usual state of health until around 7/7/2020, she developed a dry cough.  She received a 10-day course of amoxicillin and doxycycline, without any improvement in her symptoms.  She denies overt dyspnea on exertion, but also attributes this to being a relatively sedentary lifestyle.  She reports feeling generalized fatigue.    She was seen by pulmonologist Dr. Augusta Amos [facility]) and underwent a bronchoscopy with BAL and transbronchial biopsy on 8/4/2020.  Microbiological work-up was negative for Legionella, RVP, Bordetella, C pneumoniae, mycoplasma pneumonia.  BAL cell count showed neutrophilic predominance (49% N, 45% L).  Transbronchial biopsy specimens were read as normal bronchial epithelium and alveolar tissue.    She is empirically started on prednisone 40 mg on 8/5/2020, which she took for about 5 days.  She reported no  "change in her symptoms, and developed oral thrush.  Prednisone was therefore discontinued.    She states that she continues to feel dyspneic and fatigued with daily activities.  She is able to do her activities of daily living without assistance, but does not participate in any exercise programs.    She reports unintentional weight loss.  She has lost about 50 pounds since 2016, and states she thinks she lost about 10 pounds since the beginning of her symptoms in July.  She also reports night sweats a few times a week since July.    She has been diagnosed with her mixed connective tissue disease around 2009, and has been on Plaquenil since then.  She reports that her symptoms such as digital ulcers and Raynaud's phenomenon has improved.  She does have arthralgia of her hip, but denies any stiffness or pain of other joints.  She does have limited mobility of her jaw and limited aperture of her mouth.  She also reports sicca symptoms.    She does have a history of GERD, and is on pantoprazole 40 mg daily.  She generally reports good control, but due to stress of her lung disease recently, she reports reflux symptoms.    Her cough has overall slightly improved, but she would have what she describes as \"coughing fits\" more than once a day.            Review of Systems:     Please see HPI, otherwise the complete 10 point ROS is negative.           Past Medical and Surgical History:     Past Medical History:   Diagnosis Date     Anemia      Cellulitis of leg 6/6/2013     Esophageal reflux     Better post-hiatal hernia repair and weight loss     Limb ischemia; ulcers of fingertips 4/22/2013     Raynaud's syndrome      Scleroderma (H)      Systemic sclerosis (H) 2009     Unspecified essential hypertension      Past Surgical History:   Procedure Laterality Date     BYPASS GASTRIC, CHOLECYSTECTOMY, COMBINED       CHOLECYSTECTOMY       COLONOSCOPY       ESOPHAGOSCOPY, GASTROSCOPY, DUODENOSCOPY (EGD), COMBINED N/A 3/10/2016    " Procedure: COMBINED ESOPHAGOSCOPY, GASTROSCOPY, DUODENOSCOPY (EGD), BIOPSY SINGLE OR MULTIPLE;  Surgeon: Tricia Zambrano MD;  Location:  GI     INNER EAR SURGERY       PICC INSERTION  6/5/2013    5fr DL Power PICC, 44cm, left basilic vein, tip in low SVC     SURGICAL HISTORY OF -       Left ear surgery - incus transition, tympanoplasty     SURGICAL HISTORY OF -   6/05    Gastric bypass     SURGICAL HISTORY OF -   6/05    Cholecystectomy     SURGICAL HISTORY OF -   6/05    Hiatal hernia repair     TONSILLECTOMY             Family History:     Family History   Problem Relation Age of Onset     Cerebrovascular Disease Father      C.A.D. Father      C.A.D. Mother      C.A.D. Sister         56 yo smoker     Chronic Obstructive Pulmonary Disease Sister      Cerebrovascular Disease Brother             Social History:     Social History     Socioeconomic History     Marital status:      Spouse name: Not on file     Number of children: Not on file     Years of education: Not on file     Highest education level: Not on file   Occupational History     Not on file   Social Needs     Financial resource strain: Not on file     Food insecurity     Worry: Not on file     Inability: Not on file     Transportation needs     Medical: Not on file     Non-medical: Not on file   Tobacco Use     Smoking status: Never Smoker     Smokeless tobacco: Never Used   Substance and Sexual Activity     Alcohol use: Yes     Comment: occassionally     Drug use: No     Sexual activity: Yes     Partners: Male     Birth control/protection: None   Lifestyle     Physical activity     Days per week: Not on file     Minutes per session: Not on file     Stress: Not on file   Relationships     Social connections     Talks on phone: Not on file     Gets together: Not on file     Attends Yazidi service: Not on file     Active member of club or organization: Not on file     Attends meetings of clubs or organizations: Not on file      "Relationship status: Not on file     Intimate partner violence     Fear of current or ex partner: Not on file     Emotionally abused: Not on file     Physically abused: Not on file     Forced sexual activity: Not on file   Other Topics Concern     Parent/sibling w/ CABG, MI or angioplasty before 65F 55M? 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  She has been on medical leave since 7/27/2020        Moved about 4 years ago to a new house. No known water damage or mold.        She lives in a normal area with forms around her house, but denies any exposure to grain dust, hay, other farm animals.        No unusual hobbies                Medications:     Current Outpatient Medications   Medication     benzonatate (TESSALON) 100 MG capsule     furosemide (LASIX) 40 MG tablet     hydroxychloroquine (PLAQUENIL) 200 MG tablet     hydroxychloroquine (PLAQUENIL) 200 MG tablet     losartan (COZAAR) 50 MG tablet     pantoprazole (PROTONIX) 40 MG EC tablet     Potassium (POTASSIMIN PO)     spironolactone (ALDACTONE) 25 MG tablet     No current facility-administered medications for this visit.             Physical Exam:   /85   Pulse 85   Resp 17   Ht 1.753 m (5' 9\")   Wt 72.6 kg (160 lb)   SpO2 100%   BMI 23.63 kg/m      GENERAL: alert, NAD  HEENT: NCAT, EOMI  Neck: no cervical or supraclavicular adenopathy  Lungs: Good airflow, no crackles or wheezing.  CV: RRR, S1S2, no murmurs noted  Abdomen: normoactive BS, soft, non tender  Neuro: AAO X 3  Psychiatric: normal affect  Skin: no rash, jaundice or lesions on limited exam  Extremities: No clubbing or cyanosis.  There are some pitting under digits, but no open ulcers.  No digital edema.         Imaging:     CT Chest ([facility]; 7/20/2020)  1. Bilateral pulmonary consolidation is probably due to infection. Other inflammatory processes are also on the differential. Although radiographic pattern is somewhat " typical of sarcoid, this is less likely given significant worsening since 10/23/2019. Correlate clinically.  2. Mediastinal lymphadenopathy, probably reactive.  3. No pulmonary embolism.  4. Postoperative changes of gastric bypass with debris in the esophagus. Although this is present, the pulmonary consolidation is not typical of aspiration.           Pulmonary Function Tests:     PFT interpretation (August 21, 2020):  Maneuver: Meets ATS criteria for TLC but nor kaela or diffusing capacity  Spirometry: normal spirometry  Lung volumes: RV is increased, suggesting air trapping.   Diffusing capacity: There is no impairment in diffusing capacity.     Six-minute walk test  Date 6MWD RA SpO2 Resting O2 req Exercise O2 req Lowest SpO2 on amb   8/21/20 940 97 RA RA 90                    Laboratory:     Serology ([facility]; 7/27/2020)  Chromatin DNP antibody -6.6 (less than 1.0)  Ribosomal antibody-negative  SSA-2.0 (less than 1.0)  SSB-negative anti-SM RNP-greater than 80 (less than 1.0)  SCL 70-negative  Maddy 1-negative  Centromere antibody -greater than 8.0 (less than 1.0)  SM IgG-negative  Anti-RNP-4.8 (less than 1.0)  Anti-DS DNA- negative  TARA - positive    6/10/2009  TARA- 1: 1280  RF-20 (less than 9)    Recent Results (from the past 168 hour(s))   6 minute walk test    Collection Time: 08/21/20 12:00 AM   Result Value Ref Range    6 min walk (FT) 940 ft    6 Min Walk (M) 287 m   General PFT Lab (Please always keep checked)    Collection Time: 08/21/20  8:28 AM   Result Value Ref Range    FVC-Pred 3.44 L    FVC-Pre 3.33 L    FVC-%Pred-Pre 96 %    FEV1-Pre 2.86 L    FEV1-%Pred-Pre 104 %    FEV1FVC-Pred 80 %    FEV1FVC-Pre 86 %    FEFMax-Pred 7.08 L/sec    FEFMax-Pre 5.28 L/sec    FEFMax-%Pred-Pre 74 %    FEF2575-Pred 2.49 L/sec    FEF2575-Pre 3.52 L/sec    JJH4510-%Pred-Pre 141 %    ExpTime-Pre 5.08 sec    FIFMax-Pre 5.99 L/sec    VC-Pred 3.84 L    VC-Pre 3.13 L    VC-%Pred-Pre 81 %    IC-Pred 2.85 L    IC-Pre 2.10  L    IC-%Pred-Pre 73 %    ERV-Pred 0.99 L    ERV-Pre 1.03 L    ERV-%Pred-Pre 103 %    FEV1FEV6-Pred 81 %    FEV1FEV6-Pre 86 %    FRCPleth-Pred 2.93 L    FRCPleth-Pre 3.63 L    FRCPleth-%Pred-Pre 124 %    RVPleth-Pred 2.04 L    RVPleth-Pre 2.60 L    RVPleth-%Pred-Pre 127 %    TLCPleth-Pred 5.61 L    TLCPleth-Pre 5.73 L    TLCPleth-%Pred-Pre 102 %    DLCOunc-Pred 23.35 ml/min/mmHg    DLCOunc-Pre 19.65 ml/min/mmHg    DLCOunc-%Pred-Pre 84 %    VA-Pre 4.54 L    VA-%Pred-Pre 80 %    FEV1SVC-Pred 71 %    FEV1SVC-Pre 91 %   General PFT Lab (Please always keep checked)    Collection Time: 08/21/20 11:11 AM   Result Value Ref Range    FIO2-Pre 21.00 %   Hepatic panel    Collection Time: 08/21/20 12:15 PM   Result Value Ref Range    Bilirubin Direct 0.3 (H) 0.0 - 0.2 mg/dL    Bilirubin Total 0.9 0.2 - 1.3 mg/dL    Albumin 2.7 (L) 3.4 - 5.0 g/dL    Protein Total 6.5 (L) 6.8 - 8.8 g/dL    Alkaline Phosphatase 90 40 - 150 U/L    ALT 13 0 - 50 U/L    AST 15 0 - 45 U/L   CBC with platelets differential    Collection Time: 08/21/20 12:15 PM   Result Value Ref Range    WBC 8.6 4.0 - 11.0 10e9/L    RBC Count 4.71 3.8 - 5.2 10e12/L    Hemoglobin 13.0 11.7 - 15.7 g/dL    Hematocrit 40.9 35.0 - 47.0 %    MCV 87 78 - 100 fl    MCH 27.6 26.5 - 33.0 pg    MCHC 31.8 31.5 - 36.5 g/dL    RDW 15.1 (H) 10.0 - 15.0 %    Platelet Count 236 150 - 450 10e9/L    Diff Method Automated Method     % Neutrophils 76.4 %    % Lymphocytes 14.7 %    % Monocytes 8.1 %    % Eosinophils 0.0 %    % Basophils 0.2 %    % Immature Granulocytes 0.6 %    Nucleated RBCs 0 0 /100    Absolute Neutrophil 6.6 1.6 - 8.3 10e9/L    Absolute Lymphocytes 1.3 0.8 - 5.3 10e9/L    Absolute Monocytes 0.7 0.0 - 1.3 10e9/L    Absolute Eosinophils 0.0 0.0 - 0.7 10e9/L    Absolute Basophils 0.0 0.0 - 0.2 10e9/L    Abs Immature Granulocytes 0.1 0 - 0.4 10e9/L    Absolute Nucleated RBC 0.0    Basic metabolic panel    Collection Time: 08/21/20 12:15 PM   Result Value Ref Range    Sodium  130 (L) 133 - 144 mmol/L    Potassium 3.5 3.4 - 5.3 mmol/L    Chloride 94 94 - 109 mmol/L    Carbon Dioxide 28 20 - 32 mmol/L    Anion Gap 7 3 - 14 mmol/L    Glucose 87 70 - 99 mg/dL    Urea Nitrogen 9 7 - 30 mg/dL    Creatinine 0.85 0.52 - 1.04 mg/dL    GFR Estimate 76 >60 mL/min/[1.73_m2]    GFR Estimate If Black 88 >60 mL/min/[1.73_m2]    Calcium 8.6 8.5 - 10.1 mg/dL   CK total    Collection Time: 08/21/20 12:15 PM   Result Value Ref Range    CK Total 27 (L) 30 - 225 U/L       BAL (8/4/2020)  Negative for Legionella, RVP, Bordetella, C pneumoniae, mycoplasma pneumonia    A. Lingula bronchial alveolar lavage, cytology  1. 49% neutrophils, 45% lymphocytes, 3% monocytes, and 3% eosinophils.  2. Negative for malignant cells.  3. Silver stain negative for pneumocystis and fungal organisms.  B. Lingula and anterior segments, left upper lobe transbronchial biopsy?-Negative for malignancy, see microscopic description.    Pathology:   A. The smear has the following differential 49% neutrophils, 45% lymphocytes, 3% monocytes, and 3% macrophages. There is no evidence of malignancy. A silver stain is applied, with an appropriate positive control, and is negative for pneumocystis and fungal organisms.  B. The biopsy shows benign bronchial epithelium and alveolar spaces. There is no evidence of malignancy. The findings are overall nondiagnostic.             Cardiac:     Echocardiogram ([facility]; 10/16/2017)  Interpretation Summary    * Normal left ventricular size and systolic function, estimated LVEF 60-65%.    * Normal regional wall motion.    * Normal right ventricular size and systolic function.    * The left ventricular diastolic function is mildly abnormal (Grade I).    * There is no hemodynamically significant valve disease.    * Normal estimated right ventricular systolic pressure of 28 mmHg.    * The proximal ascending aorta is mildly dilated measuring  3.9 cm.    * The IVC is normal in size (< 2.1 cm), > 50%  respiratory variance, RA  pressure normal at 3 mmHg.    * Compared to prior study on 8/26/16, there has been no significant change  in left ventricular systolic function with side by side comparison.  There has  been no significant change in the size of the proximal ascending aorta  (previously measured 4.0 cm).         Other:           Again, thank you for allowing me to participate in the care of your patient.        Sincerely,        Beena Cuellar MD

## 2020-08-21 NOTE — PROGRESS NOTES
[facility]  ILD Clinic - Initial Visit -  August 21, 2020         Assessment and Plan:   Jeanine Schuler is a 59 year old female who presents for initial evaluation of interstitial lung disease.    1) Interstitial lung disease  -  Repeat TTE  Repeat Bronch w/ cryo?  CBC, CMP    RTC:   Influenza and other vaccinations:       Beena Cuellar MD [facility]  Pulmonary and Critical Care Medicine          Problem List:     1. Interstitial lung disease  a. Bronchoscopy with BAL ([facility]; 8/4/2020)  i. BAL (superior lingula) cell count (49% neutrophils, 45% lymphocytes, 3% monocytes, 3% eosinophils)  ii. Negative for malignant cells, silver stain negative for pneumocystis and fungal organisms.  iii. Pathology (anterior lingual segments of the left upper lobe): Benign bronchial epithelium and alveolar spaces.  There is no evidence of malignancy.  The findings are overall nondiagnostic.        History of Present Illness:     Jeanine Schuler is a 59 year old female who presents for initial evaluation of interstitial lung disease.     July 7, dry cough   Amox, doxy 10d, no improvement,     Prednisone started 8/5 -   40mg x 5 days, no relifef in symptoms. Got thrush.     Deep breath, fatigue,     Clean house, take care   No stairs in the house, but going from one room to another, fatigue.    Dry cough - less severe, but a couple times a day. Worse no associateion with food, or position no nasal congestion.     Dx sclerodrem 2009 has been on Plaquinil, nothing else  Raynaud's better  Hip pain , no rashes or other skin changes    Weight loss -     October 2019 had Influenza A and was treated with pneumonia     Lost weight (50lb since 2016), lost 10lb since July   Night sweats occasionally - since last episode once or twice a week.       Serology ([facility]; 7/27/2020)  Chromatin DNP antibody -6.6 (less than 1.0)  Ribosomal antibody-negative  SSA-2.0 (less than  1.0)  SSB-negative anti-SM RNP-greater than 80 (less than 1.0)  SCL 70-negative  Maddy 1-negative  Centromere antibody -greater than 8.0 (less than 1.0)  SM IgG-negative  Anti-RNP-4.8 (less than 1.0)  Anti-DS DNA- negative  TARA - positive    6/10/2009  TARA- 1: 1280  RF-20 (less than 9)            Review of Systems:     Please see HPI, otherwise the complete 10 point ROS is negative.           Past Medical and Surgical History:     Past Medical History:   Diagnosis Date     Anemia      Cellulitis of leg 6/6/2013     Esophageal reflux     Better post-hiatal hernia repair and weight loss     Limb ischemia; ulcers of fingertips 4/22/2013     Raynaud's syndrome      Scleroderma (H)      Systemic sclerosis (H) 2009     Unspecified essential hypertension      Past Surgical History:   Procedure Laterality Date     BYPASS GASTRIC, CHOLECYSTECTOMY, COMBINED       CHOLECYSTECTOMY       COLONOSCOPY       ESOPHAGOSCOPY, GASTROSCOPY, DUODENOSCOPY (EGD), COMBINED N/A 3/10/2016    Procedure: COMBINED ESOPHAGOSCOPY, GASTROSCOPY, DUODENOSCOPY (EGD), BIOPSY SINGLE OR MULTIPLE;  Surgeon: Tricia Zambrano MD;  Location:  GI     INNER EAR SURGERY       PICC INSERTION  6/5/2013    5fr DL Power PICC, 44cm, left basilic vein, tip in low SVC     SURGICAL HISTORY OF -       Left ear surgery - incus transition, tympanoplasty     SURGICAL HISTORY OF -   6/05    Gastric bypass     SURGICAL HISTORY OF -   6/05    Cholecystectomy     SURGICAL HISTORY OF -   6/05    Hiatal hernia repair     TONSILLECTOMY             Family History:     Family History   Problem Relation Age of Onset     Cerebrovascular Disease Father      C.A.D. Father      C.A.D. Mother      C.A.D. Sister         54 yo smoker            Social History:     Social History     Socioeconomic History     Marital status:      Spouse name: Not on file     Number of children: Not on file     Years of education: Not on file     Highest education level: Not on file  "  Occupational History     Not on file   Social Needs     Financial resource strain: Not on file     Food insecurity     Worry: Not on file     Inability: Not on file     Transportation needs     Medical: Not on file     Non-medical: Not on file   Tobacco Use     Smoking status: Never Smoker     Smokeless tobacco: Never Used   Substance and Sexual Activity     Alcohol use: Yes     Comment: occassionally     Drug use: No     Sexual activity: Yes     Partners: Male     Birth control/protection: None   Lifestyle     Physical activity     Days per week: Not on file     Minutes per session: Not on file     Stress: Not on file   Relationships     Social connections     Talks on phone: Not on file     Gets together: Not on file     Attends Taoism service: Not on file     Active member of club or organization: Not on file     Attends meetings of clubs or organizations: Not on file     Relationship status: Not on file     Intimate partner violence     Fear of current or ex partner: Not on file     Emotionally abused: Not on file     Physically abused: Not on file     Forced sexual activity: Not on file   Other Topics Concern     Parent/sibling w/ CABG, MI or angioplasty before 65F 55M? Not Asked   Social History Narrative     Not on file            Medications:     Current Outpatient Medications   Medication     aspirin EC 81 MG EC tablet     atorvastatin (LIPITOR) 40 MG tablet     benzonatate (TESSALON) 200 MG capsule     furosemide (LASIX) 40 MG tablet     hydroxychloroquine (PLAQUENIL) 200 MG tablet     hydroxychloroquine (PLAQUENIL) 200 MG tablet     losartan (COZAAR) 50 MG tablet     MAGNESIUM OXIDE PO     pantoprazole (PROTONIX) 40 MG EC tablet     Potassium (POTASSIMIN PO)     predniSONE (DELTASONE) 2.5 MG tablet     spironolactone (ALDACTONE) 25 MG tablet     No current facility-administered medications for this visit.             Physical Exam:   /85   Pulse 85   Resp 17   Ht 1.753 m (5' 9\")   Wt 72.6 " kg (160 lb)   SpO2 100%   BMI 23.63 kg/m      GENERAL: alert, NAD  HEENT: NCAT, EOMI, no scleral icterus, oral mucosa moist and without lesions  Neck: no cervical or supraclavicular adenopathy  Lungs: good air flow, no crackles, rhonchi or wheezing  CV: RRR, S1S2, no murmurs noted  Abdomen: normoactive BS, soft, non tender  Neuro: AAO X 3  Psychiatric: normal affect  Skin: no rash, jaundice or lesions on limited exam  Extremities: No clubbing, cyanosis or edema. No digital edema, no synovitis or joint swelling.  No ulcers, skin thickening or fissures.          Imaging:     CT Chest ([facility]; 7/20/2020)  1. Bilateral pulmonary consolidation is probably due to infection. Other inflammatory processes are also on the differential. Although radiographic pattern is somewhat typical of sarcoid, this is less likely given significant worsening since 10/23/2019. Correlate clinically.  2. Mediastinal lymphadenopathy, probably reactive.  3. No pulmonary embolism.  4. Postoperative changes of gastric bypass with debris in the esophagus. Although this is present, the pulmonary consolidation is not typical of aspiration.           Pulmonary Function Tests:     Date Place TLC (%) FVC (%) FEV1 (%) FEV1/FVC DLCO (%) Note                                                               PFT interpretation (August 21, 2020):  Maneuver: Meets ATS criteria    Severity of Obstruction  Mild - FEV1 > 70%  Moderate - FEV1 50-69% (ATS 2005 has  Moderately Severe - 50-59% and Moderate -60-69% )  Severe - FEV1 35-49%  Very Severe - FEV <35%    Severity of Restriction  Mild - TLC 65-80%; FVC 60-80%  Moderate - TLC 50-64%; FVC 50-60%  Severe - TLC <50%; FVC < 50%    Diffusing Capacity for Carbon Monoxide (DLCO)  Mild - >60% and <LLN  Moderate - 40-60%  Severe - <40%         Laboratory:       No results found for this or any previous visit (from the past 168 hour(s)).    BAL (8/4/2020)  Negative for Legionella, RVP, Bordetella, C pneumoniae,  mycoplasma pneumonia    A. Lingula bronchial alveolar lavage, cytology  1. 49% neutrophils, 45% lymphocytes, 3% monocytes, and 3% eosinophils.  2. Negative for malignant cells.  3. Silver stain negative for pneumocystis and fungal organisms.  B. Lingula and anterior segments, left upper lobe transbronchial biopsy?-Negative for malignancy, see microscopic description.    A. The smear has the following differential 49% neutrophils, 45% lymphocytes, 3% monocytes, and 3% macrophages. There is no evidence of malignancy. A silver stain is applied, with an appropriate positive control, and is negative for pneumocystis and fungal organisms.  B. The biopsy shows benign bronchial epithelium and alveolar spaces. There is no evidence of malignancy. The findings are overall nondiagnostic.         Cardiac:     Echocardiogram ([facility]; 10/16/2017)  Interpretation Summary    * Normal left ventricular size and systolic function, estimated LVEF 60-65%.    * Normal regional wall motion.    * Normal right ventricular size and systolic function.    * The left ventricular diastolic function is mildly abnormal (Grade I).    * There is no hemodynamically significant valve disease.    * Normal estimated right ventricular systolic pressure of 28 mmHg.    * The proximal ascending aorta is mildly dilated measuring  3.9 cm.    * The IVC is normal in size (< 2.1 cm), > 50% respiratory variance, RA  pressure normal at 3 mmHg.    * Compared to prior study on 8/26/16, there has been no significant change  in left ventricular systolic function with side by side comparison.  There has  been no significant change in the size of the proximal ascending aorta  (previously measured 4.0 cm).         Other:

## 2020-08-22 LAB
ALDOLASE SERPL-CCNC: 2.8 U/L (ref 1.5–8.1)
ENA SCL70 IGG SER IA-ACNC: <0.2 AI (ref 0–0.9)

## 2020-08-24 LAB
ANCA AB PATTERN SER IF-IMP: NORMAL
C-ANCA TITR SER IF: NORMAL {TITER}
CCP AB SER IA-ACNC: 1 U/ML

## 2020-08-25 ENCOUNTER — ANCILLARY PROCEDURE (OUTPATIENT)
Dept: CARDIOLOGY | Facility: CLINIC | Age: 60
End: 2020-08-25
Attending: INTERNAL MEDICINE
Payer: COMMERCIAL

## 2020-08-25 DIAGNOSIS — R06.02 SHORTNESS OF BREATH: ICD-10-CM

## 2020-08-25 LAB
A FUMIGATUS1 AB SER QL ID: NORMAL
A FUMIGATUS6 AB SER QL ID: NORMAL
A PULLULANS AB SER QL ID: NORMAL
DLCOUNC-%PRED-PRE: 84 %
DLCOUNC-PRE: 19.65 ML/MIN/MMHG
DLCOUNC-PRED: 23.35 ML/MIN/MMHG
ERV-%PRED-PRE: 103 %
ERV-PRE: 1.03 L
ERV-PRED: 0.99 L
EXPTIME-PRE: 5.08 SEC
FEF2575-%PRED-PRE: 141 %
FEF2575-PRE: 3.52 L/SEC
FEF2575-PRED: 2.49 L/SEC
FEFMAX-%PRED-PRE: 74 %
FEFMAX-PRE: 5.28 L/SEC
FEFMAX-PRED: 7.08 L/SEC
FEV1-%PRED-PRE: 104 %
FEV1-PRE: 2.86 L
FEV1FEV6-PRE: 86 %
FEV1FEV6-PRED: 81 %
FEV1FVC-PRE: 86 %
FEV1FVC-PRED: 80 %
FEV1SVC-PRE: 91 %
FEV1SVC-PRED: 71 %
FIFMAX-PRE: 5.99 L/SEC
FIO2-PRE: 21 %
FRCPLETH-%PRED-PRE: 124 %
FRCPLETH-PRE: 3.63 L
FRCPLETH-PRED: 2.93 L
FVC-%PRED-PRE: 96 %
FVC-PRE: 3.33 L
FVC-PRED: 3.44 L
IC-%PRED-PRE: 73 %
IC-PRE: 2.1 L
IC-PRED: 2.85 L
IGG SERPL-MCNC: 768 MG/DL (ref 610–1616)
IGG1 SER-MCNC: 394 MG/DL (ref 382–929)
IGG2 SER-MCNC: 250 MG/DL (ref 242–700)
IGG3 SER-MCNC: 87 MG/DL (ref 22–176)
IGG4 SER-MCNC: 19 MG/DL (ref 4–86)
PIGEON DROP IGE QN: NORMAL
RVPLETH-%PRED-PRE: 127 %
RVPLETH-PRE: 2.6 L
RVPLETH-PRED: 2.04 L
S RECTIVIRGULA AB SER QL ID: NORMAL
T VULGARIS AB SER QL ID: NORMAL
TLCPLETH-%PRED-PRE: 102 %
TLCPLETH-PRE: 5.73 L
TLCPLETH-PRED: 5.61 L
VA-%PRED-PRE: 80 %
VA-PRE: 4.54 L
VC-%PRED-PRE: 81 %
VC-PRE: 3.13 L
VC-PRED: 3.84 L

## 2020-08-26 DIAGNOSIS — I73.00 RAYNAUD'S DISEASE WITHOUT GANGRENE: ICD-10-CM

## 2020-08-26 DIAGNOSIS — M06.00 RHEUMATOID ARTHRITIS WITH NEGATIVE RHEUMATOID FACTOR, INVOLVING UNSPECIFIED SITE (H): ICD-10-CM

## 2020-08-26 DIAGNOSIS — K21.9 GASTROESOPHAGEAL REFLUX DISEASE WITHOUT ESOPHAGITIS: ICD-10-CM

## 2020-08-26 DIAGNOSIS — R06.02 SHORTNESS OF BREATH: ICD-10-CM

## 2020-08-26 DIAGNOSIS — E83.59 TUMORAL CALCINOSIS: ICD-10-CM

## 2020-08-26 DIAGNOSIS — G72.9 MYOPATHY: ICD-10-CM

## 2020-08-26 DIAGNOSIS — M34.9 SYSTEMIC SCLEROSIS (H): ICD-10-CM

## 2020-08-26 LAB
A FLAVUS AB SER QL ID: NORMAL
A FUMIGATUS2 AB SER QL ID: NORMAL
A FUMIGATUS3 AB SER QL ID: NORMAL
S VIRIDIS AB SER QL ID: NORMAL
T CANDIDUS AB SER QL: NORMAL

## 2020-08-28 ENCOUNTER — TELEPHONE (OUTPATIENT)
Dept: PULMONOLOGY | Facility: CLINIC | Age: 60
End: 2020-08-28

## 2020-08-28 NOTE — TELEPHONE ENCOUNTER
Called patient to schedule next available virtual visit with IP provider, Per Kaleigh. Left VM with hours and phone number.

## 2020-08-31 NOTE — TELEPHONE ENCOUNTER
Oncology/Surgical Oncology Referral Request:     Specialty Requested: Medical Oncology     Referring Provider: Beena Cuellar MD    Referring Clinic/Organization: [facility]    Records location: [facility]     Requested Provider (if specified): Not Specified       '

## 2020-09-01 RX ORDER — PANTOPRAZOLE SODIUM 40 MG/1
40 TABLET, DELAYED RELEASE ORAL DAILY
Qty: 90 TABLET | Refills: 3 | Status: SHIPPED | OUTPATIENT
Start: 2020-09-01 | End: 2020-11-17 | Stop reason: ALTCHOICE

## 2020-09-01 RX ORDER — LOSARTAN POTASSIUM 50 MG/1
25 TABLET ORAL DAILY
Qty: 45 TABLET | Refills: 3 | Status: SHIPPED | OUTPATIENT
Start: 2020-09-01 | End: 2023-01-01

## 2020-09-01 NOTE — TELEPHONE ENCOUNTER
Action    Action Taken 9/1/20: Imaging from [facility] resolved, and now viewable to PACS  12:38 PM       RECORDS STATUS - ALL OTHER DIAGNOSIS      RECORDS RECEIVED FROM: Epic, [facility]   DATE RECEIVED: 9/1/20   NOTES STATUS DETAILS   OFFICE NOTE from referring provider Ramin Cuellar: 8/21/20   OFFICE NOTE from medical oncologist     DISCHARGE SUMMARY from hospital     DISCHARGE REPORT from the ER     OPERATIVE REPORT CE - [facility] 8/4/20: Bronchoscopy   MEDICATION LIST     CLINICAL TRIAL TREATMENTS TO DATE     LABS     PATHOLOGY REPORTS [facility] 8/14/20: Path Consult   ANYTHING RELATED TO DIAGNOSIS Epic 8/21/20   GENONOMIC TESTING     TYPE:     IMAGING (NEED IMAGES & REPORT)     XR PACS 8/4/20, 7/27/20, 7/20/20, 7/9/20, 10/23/19: [facility], Report in CE   CT SCANS PACS 7/20/20: [facility], Report in CE   MRI     MAMMO     ULTRASOUND     PET

## 2020-09-01 NOTE — TELEPHONE ENCOUNTER
losartan 50 mg tablet (COZAAR)   Last Written Prescription Date:  7/11/2019  Last Fill Quantity: 45,   # refills: 3  Last Office Visit : 8/11/2020  Future Office visit:  None  45 Tabs, 3 Refills sent to pharm 9/1/2020      pantoprazole 40 mg tablet,delayed release (PROTONIX)   Last Written Prescription Date:  8/28/2019  Last Fill Quantity: 90,   # refills: 3  Last Office Visit : 8/11/2020  Future Office visit:  None  90 Tabs, 3 Refills sent to pharm 9/1/2020      Mell Islas RN  Central Triage Red Flags/Med Refills    Warnings Override History for losartan (COZAAR) 50 MG tablet [[id number]]     Overridden by Nicolás De La Torre MD on Jul 11, 2019 3:06 PM    Drug-Drug    1. POTASSIUM-SPARING DIURETICS / ANGIOTENSIN II RECEPTOR ANTAGONISTS [Level: Major]    Other Orders:  spironolactone (ALDACTONE) 25 MG tablet

## 2020-09-01 NOTE — TELEPHONE ENCOUNTER
RECORDS RECEIVED FROM: N/A   DATE RECEIVED: 10/7/2020   NOTES STATUS DETAILS   OFFICE NOTE from referring provider N/A    OFFICE NOTE from other specialist Internal 7/21/16, 11/17/15 Office visit with Dr. Nicolás De La Torre ([facility])     3/2/16 Office visit with Dr. Tricia Zambrano ([facility] GI)   DISCHARGE SUMMARY from hospital N/A    OPERATIVE REPORT Internal    MEDICATION LIST Internal         ENDOSCOPY  Internal EGD: 3/10/16   COLONOSCOPY N/A    ERCP N/A    EUS N/A    STOOL TESTING N/A    PERTINENT LABS Internal    PATHOLOGY REPORTS (RELATED) Internal 3/10/16   IMAGING (CT, MRI, EGD) Internal/ Care Everywhere XR Abdomen: 3/10/16  XR Chest: 3/10/16    [facility]:  - XR Chest: 8/4/2020, 7/27/2020, 7/9/2020, 10/23/19  - CT Chest: 7/20/2020  - CT Abdomen Pelvis: 1/21/18  * All images are in PACS. -[facility]      REFERRAL INFORMATION    Date referral was placed: 10/7/2020   Date all records received:    Date records were scanned into EPIC:    Date records were sent to Provider to review:    Date and recommendation received from provider:  LETTER SENT  SCHEDULE APPOINTMENT   Date patient was contacted to schedule:      9/1/2020 11:08am Fax request sent to [facility] ([phone]) for images noted above. -yulissa

## 2020-09-14 ENCOUNTER — PRE VISIT (OUTPATIENT)
Dept: PULMONOLOGY | Facility: CLINIC | Age: 60
End: 2020-09-14

## 2020-09-14 ENCOUNTER — VIRTUAL VISIT (OUTPATIENT)
Dept: PULMONOLOGY | Facility: CLINIC | Age: 60
End: 2020-09-14
Attending: INTERNAL MEDICINE
Payer: COMMERCIAL

## 2020-09-14 DIAGNOSIS — J84.9 ILD (INTERSTITIAL LUNG DISEASE) (H): Primary | ICD-10-CM

## 2020-09-14 NOTE — PROGRESS NOTES
"Jeanine Schuler is a 59 year old female who is being evaluated via a billable telephone visit.      The patient has been notified of following:     \"This telephone visit will be conducted via a call between you and your physician/provider. We have found that certain health care needs can be provided without the need for a physical exam.  This service lets us provide the care you need with a short phone conversation.  If a prescription is necessary we can send it directly to your pharmacy.  If lab work is needed we can place an order for that and you can then stop by our lab to have the test done at a later time.    Telephone visits are billed at different rates depending on your insurance coverage. During this emergency period, for some insurers they may be billed the same as an in-person visit.  Please reach out to your insurance provider with any questions.    If during the course of the call the physician/provider feels a telephone visit is not appropriate, you will not be charged for this service.\"    Patient has given verbal consent for Telephone visit?  Yes    What phone number would you like to be contacted at? [phone]    How would you like to obtain your AVS? MyChart     Vitals - Patient Reported  Weight (Patient Reported): 71.3 kg (157 lb 1.6 oz)  Height (Patient Reported): 175.3 cm (5' 9.02\")  BMI (Based on Pt Reported Ht/Wt): 23.19  Pain Score: Mild Pain (2)  Pain Loc: Other - see comment(heartburn)    Jorge Alberto Martinez LPN    59F with atypical lung findings in the setting of systemic sclerosis. Referred for cryo lung biopsy.    We discussed the procedures and risks, including pneumothorax, prolonged pneumothorax and severe bleeding.    We will schedule for next available.    Phone call duration: 7 minutes    Wesley Woodward MD      "

## 2020-09-15 ENCOUNTER — PREP FOR PROCEDURE (OUTPATIENT)
Dept: PULMONOLOGY | Facility: CLINIC | Age: 60
End: 2020-09-15

## 2020-09-15 DIAGNOSIS — J84.9 ILD (INTERSTITIAL LUNG DISEASE) (H): Primary | ICD-10-CM

## 2020-09-16 ENCOUNTER — PREP FOR PROCEDURE (OUTPATIENT)
Dept: PULMONOLOGY | Facility: CLINIC | Age: 60
End: 2020-09-16

## 2020-09-16 ENCOUNTER — TELEPHONE (OUTPATIENT)
Dept: PULMONOLOGY | Facility: CLINIC | Age: 60
End: 2020-09-16

## 2020-09-16 DIAGNOSIS — E04.1 THYROID NODULE: Primary | ICD-10-CM

## 2020-09-16 DIAGNOSIS — Z11.59 ENCOUNTER FOR SCREENING FOR OTHER VIRAL DISEASES: Primary | ICD-10-CM

## 2020-09-16 PROBLEM — J84.9 ILD (INTERSTITIAL LUNG DISEASE) (H): Status: ACTIVE | Noted: 2020-09-16

## 2020-09-16 NOTE — TELEPHONE ENCOUNTER
Spoke with patient to schedule procedure with Dr. Tacho Brooke   Procedure was scheduled on 10/01 at [facility]  Patient will have H&P with at [facility]    Patient is aware a COVID-19 test is needed before their procedure. The test should be with-in 4 days of their procedure.   Test Details: Date 09/28  Location [facility]    Patient is aware a / is needed day of surgery.   Surgery letter was sent via [employer], patient has my direct contact information for any further questions.

## 2020-09-16 NOTE — TELEPHONE ENCOUNTER
Discussed Dr. Cuellar's recommendation with patient. Patient informed she has already reached out to [facility] and will be establishing care with new PCP next week.     Patient intends to call scheduling at [facility] to set up lab-only and US appt before end of this week.   Placed requested thyroid lab orders and thyroid US.

## 2020-09-16 NOTE — TELEPHONE ENCOUNTER
"----- Message from Beena Cuellar MD sent at 9/16/2020  2:45 PM CDT -----  Thank you.    I think it's a good idea for her to establish care with a PCP locally.  In the meantime, we can order a thyroid ultrasound.  Can we also draw TSH, T3 and T4?    ThanksBeena  ----- Message -----  From: Edel Mcgovern, GABI  Sent: 9/15/2020   2:36 PM CDT  To: Beena Cuellar MD    I just spoke with Jeanine;   She seems to think she had some form of a thyroid study about 15+ years ago where she had ingested a radioactive dye and had a scan, her recollection is that everything was normal at that time and hasn't thought about it since. She doesn't think it was an ultrasound.    She doesn't have a PCP to forward this on to, but is open to establishing care at a location near her in [place] or [place] ([facility]) if necessary- says she usually \"doctors\" with [facility] if something comes up.     Do you want her to find a PCP first and then get the thyroid US?     I'm not sure how you'd want to proceed so I told Jeanine I would be in touch with her again once you had a chance to respond to me.     Meanwhile, she added that she spoke with Dr. Woodward yesterday and it sounds like they are working to get her scheduled within the next 2 weeks for the cryoprobe.    Thanks in advance for your responseEdel  ----- Message -----  From: Beena Cuellar MD  Sent: 9/14/2020  10:24 AM CDT  To: Interstitial Lung Disease Clinic Nurses-    There is a comment on a thyroid nodule - I do not see a thyroid ultrasound, but do you mind double checking with the patient?   If you don't mind sending this to her PCP that would be great.  We can order the thyroid ultrasound if s/he would like.      "

## 2020-09-18 ENCOUNTER — HOSPITAL ENCOUNTER (OUTPATIENT)
Dept: ULTRASOUND IMAGING | Facility: CLINIC | Age: 60
Discharge: HOME | End: 2020-09-18
Attending: INTERNAL MEDICINE | Admitting: INTERNAL MEDICINE
Payer: COMMERCIAL

## 2020-09-18 DIAGNOSIS — E04.1 THYROID NODULE: ICD-10-CM

## 2020-09-18 LAB
T3FREE SERPL-MCNC: 1.7 PG/ML (ref 2.3–4.2)
T4 FREE SERPL-MCNC: 1.3 NG/DL (ref 0.76–1.46)
TSH SERPL DL<=0.005 MIU/L-ACNC: 1.69 MU/L (ref 0.4–4)

## 2020-09-18 PROCEDURE — 36415 COLL VENOUS BLD VENIPUNCTURE: CPT | Performed by: INTERNAL MEDICINE

## 2020-09-18 PROCEDURE — 84481 FREE ASSAY (FT-3): CPT | Performed by: INTERNAL MEDICINE

## 2020-09-18 PROCEDURE — 76536 US EXAM OF HEAD AND NECK: CPT

## 2020-09-18 PROCEDURE — 84439 ASSAY OF FREE THYROXINE: CPT | Performed by: INTERNAL MEDICINE

## 2020-09-18 PROCEDURE — 84443 ASSAY THYROID STIM HORMONE: CPT | Performed by: INTERNAL MEDICINE

## 2020-09-23 ENCOUNTER — OFFICE VISIT (OUTPATIENT)
Dept: FAMILY MEDICINE | Facility: CLINIC | Age: 60
End: 2020-09-23
Payer: COMMERCIAL

## 2020-09-23 VITALS
HEART RATE: 64 BPM | TEMPERATURE: 97.2 F | DIASTOLIC BLOOD PRESSURE: 74 MMHG | BODY MASS INDEX: 23.01 KG/M2 | RESPIRATION RATE: 14 BRPM | WEIGHT: 155.8 LBS | OXYGEN SATURATION: 90 % | SYSTOLIC BLOOD PRESSURE: 132 MMHG

## 2020-09-23 DIAGNOSIS — I71.21 ASCENDING AORTIC ANEURYSM (H): ICD-10-CM

## 2020-09-23 DIAGNOSIS — I10 ESSENTIAL HYPERTENSION: ICD-10-CM

## 2020-09-23 DIAGNOSIS — I51.89 DIASTOLIC DYSFUNCTION: ICD-10-CM

## 2020-09-23 DIAGNOSIS — R06.02 SHORTNESS OF BREATH: ICD-10-CM

## 2020-09-23 DIAGNOSIS — Z23 NEED FOR VACCINATION: ICD-10-CM

## 2020-09-23 DIAGNOSIS — Z12.39 BREAST CANCER SCREENING: ICD-10-CM

## 2020-09-23 DIAGNOSIS — J84.9 ILD (INTERSTITIAL LUNG DISEASE) (H): ICD-10-CM

## 2020-09-23 DIAGNOSIS — Z01.818 PREOP GENERAL PHYSICAL EXAM: Primary | ICD-10-CM

## 2020-09-23 DIAGNOSIS — R05.3 CHRONIC COUGH: ICD-10-CM

## 2020-09-23 DIAGNOSIS — Z23 NEED FOR PROPHYLACTIC VACCINATION AND INOCULATION AGAINST INFLUENZA: ICD-10-CM

## 2020-09-23 PROBLEM — R93.89 ABNORMAL CT OF THE CHEST: Status: ACTIVE | Noted: 2020-08-04

## 2020-09-23 PROCEDURE — 99215 OFFICE O/P EST HI 40 MIN: CPT | Mod: 25 | Performed by: FAMILY MEDICINE

## 2020-09-23 PROCEDURE — 90682 RIV4 VACC RECOMBINANT DNA IM: CPT | Performed by: FAMILY MEDICINE

## 2020-09-23 PROCEDURE — 90471 IMMUNIZATION ADMIN: CPT | Performed by: FAMILY MEDICINE

## 2020-09-23 PROCEDURE — 90472 IMMUNIZATION ADMIN EACH ADD: CPT | Performed by: FAMILY MEDICINE

## 2020-09-23 PROCEDURE — 90715 TDAP VACCINE 7 YRS/> IM: CPT | Performed by: FAMILY MEDICINE

## 2020-09-23 ASSESSMENT — PAIN SCALES - GENERAL: PAINLEVEL: NO PAIN (0)

## 2020-09-23 NOTE — PATIENT INSTRUCTIONS
"  Preparing for Your Surgery  Getting started  A surgery nurse will call you to review your health history and instructions. They will give you an arrival time based on your scheduled surgery time.  Please be ready to share the following:    Your doctor's clinic name and phone number    Your medical, surgical and anesthesia history    A list of allergies and sensitivities    A list of medicines, including herbal treatments and over-the-counter drugs    Whether the patient has a legal guardian (ask how to send us the papers in advance)  If your child is having surgery, please ask for a copy of Preparing for Your Child's Surgery.    Preparing for surgery    Within 30 days of surgery: Have an exam at your family clinic (primary care clinic), or go to a pre-operative clinic. This exam is called a \"History and Physical,\" or H&P.    At your H&P exam, talk to your care team about all medicines you take. If you need to stop any medicines before surgery, ask when to start taking them again.  ? We do this for your safety. Many medicines can make you bleed too much during surgery. Some change how well surgery (anesthesia) drugs work.    Call your insurance company to see what it will and won't pay for. Ask if they need to pre-approve the surgery. (If no insurance, call [phone].)    Call your surgeon's clinic if there's any change in your health. This includes signs of a cold or flu (sore throat, runny nose, cough, rash, fever). It also includes a scrape or scratch near the surgery site.    If you have questions on the day of surgery, call your surgery center.  Eating and drinking guidelines  For your safety: Unless your surgeon tells you otherwise, follow the guidelines below.    Eat and drink as usual until 8 hours before surgery. After that, no food or milk.    Drink clear liquids until 2 hours before surgery. These are liquids you can see through, like water, Gatorade and Propel Water. You may also have black coffee " Warfarin dosing per pharmacist    Nika Jenkins is a 70 y.o. male. Height: 5' 6\" (167.6 cm)    Weight: 126.1 kg (278 lb)    Indication:  Hx of LLE DVT (3/2017)    Goal INR:  2-3    Home dose:  6 mg qhs     Risk factors or significant drug interactions:  metronidazole    Other anticoagulants:  none    Daily Monitoring  Date  INR     Warfarin dose HGB              Notes  5/2  1.9  6 mg  14.2    Pharmacy consulted to dose warfarin on 5/2/17. Spoke with patient who was unable to tell me his current warfarin dose. PTA med list was updated per medication list from nursing home. Will continue warfarin 6 mg qhs. Daily INR. Pharmacy will continue to follow. Please call with any questions.     Thank you,  Griffin Guillen, PharmD  Clinical Pharmacist  [phone] and tea (no cream or milk).    Nothing by mouth within 2 hours of surgery. This includes gum, candy and breath mints.    Stop alcohol the midnight before surgery.    If your family clinic tells you to take medicine on the morning of surgery, it's okay to take it with a sip of water.  Preventing infection    Shower or bathe the night before and morning of your surgery. Follow the instructions your clinic gave you. (If no instructions, use regular soap.)    Don't shave or clip hair near your surgery site. This can lead to skin infection.    Don't smoke the morning of surgery. Smoking increases the risk of infection. You may chew nicotine gum up to 2 hours before surgery. A nicotine patch is okay.  ? Note: Some surgeries require you to completely quit smoking and nicotine. Check with your surgeon.    Your care team will make every effort to keep you safe from infection. We will:  ? Clean our hands often with soap and water (or an alcohol-based hand rub).  ? Clean the skin at your surgery site with a special soap that kills germs. We'll also remove hair from the site as needed.  ? Wear special hair covers, masks, gowns and gloves during surgery.  ? Give antibiotic medicine, if prescribed. Not all surgeries need antibiotics.  What to bring on the day of surgery    Photo ID and insurance card    Copy of your health care directive, if you have one    Glasses and hearing aides (bring cases)  ? You can't wear contacts during surgery    Inhaler and eye drops, if you use them (tell us about these when you arrive)    CPAP machine or breathing device, if you use them    A few personal items, if spending the night    If you have . . .  ? A pacemaker or ICD (cardiac defibrillator): Bring the ID card.  ? An implanted stimulator: Bring the remote control.  ? A legal guardian: Bring a copy of the certified (court-stamped) guardianship papers.  Please remove any jewelry, including body piercings. Leave jewelry and other valuables at  home.  If you're going home the day of surgery  Important: If you don't follow the rules below, we must cancel your surgery.     Arrange for someone to drive you home after surgery. You may not drive, take a taxi or take public transportation by yourself (unless you'll have local anesthesia only).    Arrange for a responsible adult to stay with you overnight. If you don't, we may keep you in the hospital overnight, and you may need to pay the costs yourself.  Questions?   If you have any questions for your care team, list them here: _________________________________________________________________________________________________________________________________________________________________________________________________________________________________________________________________________________________________________________________  For informational purposes only. Not to replace the advice of your health care provider. Copyright   1066-5491 [employer]. All rights reserved. Clinically reviewed by Charlotte Pemberton MD. [employer] [id number] - REV 07/19.      Please take your medications as prescribed except.    No aspirin or NSAID (Ibuprofen, Aleve, Motrin, Naproxen, ect.) for 7 days before the procedure  Hold Losartan for 24 hours before the procedure - resume after the procedure   Take your other prescribed medications as usual on am of procedure with small sip of water.

## 2020-09-23 NOTE — PROGRESS NOTES
[address] [address]  Phone: [phone]  Fax: [phone]  Primary Provider: No Ref-Primary, Physician  Pre-op Performing Provider: DON HENRY    PREOPERATIVE EVALUATION:  Today's date: 9/23/2020    Jeanine Schuler is a 59 year old female who presents for a preoperative evaluation.    Surgical Information:  Surgery Details 9/22/2020   Surgery/Procedure: Cryogenic bronchoscopy   Surgery Location: [facility]   Surgeon: Pulmonologist   Surgery Date: 10/1/2020   Time of Surgery: 11:00   Where patient plans to recover: At home with family     Fax number for surgical facility: Note does not need to be faxed, will be available electronically in Epic.  Type of Anesthesia Anticipated: to be determined    Subjective     HPI related to upcoming procedure:   Preop Questions 9/22/2020   1. Have you ever had a heart attack or stroke? No   2. Have you ever had surgery on your heart or blood vessels, such as a stent placement, a coronary artery bypass, or surgery on an artery in your head, neck, heart, or legs? No   3. Do you have chest pain with activity? No   4. Do you have a history of  heart failure? No   5. Do you currently have a cold, bronchitis or symptoms of other infection? No   6. Do you have a cough, shortness of breath, or wheezing? YES - reason for surgery   7. Do you or anyone in your family have previous history of blood clots? YES - mother, brother   8. Do you or does anyone in your family have a serious bleeding problem such as prolonged bleeding following surgeries or cuts? No   9. Have you ever had problems with anemia or been told to take iron pills? YES - 10 years ago    10. Have you had any abnormal blood loss such as black, tarry or bloody stools, or abnormal vaginal bleeding? No   11. Have you ever had a blood transfusion? No   Are you willing to have a blood transfusion if it is medically needed before, during, or after your surgery? Yes   13. Have  "you or any of your relatives ever had problems with anesthesia? No   14. Do you have sleep apnea, excessive snoring or daytime drowsiness? No   15. Do you have any artifical heart valves or other implanted medical devices like a pacemaker, defibrillator, or continuous glucose monitor? No   16. Do you have artificial joints? No   17. Are you allergic to latex? No   18. Is there any chance that you may be pregnant? No     Patient does not have a Health Care Directive or Living Will: Discussed advance care planning with patient; information given to patient to review.    [place] reviewed: No concern - not on high risk medication    Jeanine is here today for pre-op physical. She is scheduled for bronchoscopy with transbronchial biopsy using cryoprobe on 10/1/2020 with Dr. Brooke at [facility] for ILD with chronic cough and SOB.  Stated that she had a bronchoscopy recently but was not able to obtain the sample as intended and therefore was recommended for this procedure. It is expected to be the same day procedure, but may have to observe overnight if complications or recovery concern arise.  It is also is planned for general anesthesia. No personal or family history of anesthesia complication.  She was on steroid orally for 5 days about a month ago.  Not taking Aspirin or other form of blood thinner.  Last dose of NSAID was weeks ago.       She has complex medical history which was updated.  Her medication list was also updated.  She is seeing a rheumatologist for Raynaud's disease, collagen vascular disease, crest syndrome, rheumatoid arthritis, and Sjogren's disease.  She takes Plaquenil for it.  Overall they are stable and controlled    She also sees a cardiologist at [facility] for systemic sclerosis and dilated ascending aorta.  She has no history of MI or CAD.  Last cardiology's appointment was in 3/2019 - and everything was \"stable\".  Her EKG at that time was also \"stable\".  Chart reviewed, she " "had an EKG on 7/20/2020 at [facility] which showed stable \"sinus rhythm with marked left axis deviation and moderate intraventricular conduct delay\" and compared to EKG on 3/27/2019, the \"T-wave abnormality was no longer present\".  She had an echocardiogram done on 8/21/2020 which showed normal left ventricular function with ejection of 55-60% as well as normal right ventricular function, chamber size, wall motion and thickness.  Aortic is mildly dilated, but is overall stable.  She takes Lasix, spironolactone, and losartan for high blood pressure and chronic leg swelling which are stable and controlled.    She generally is doing well and has no concern today. No headache or dizziness. No URI symptoms include running nose, nasal congestion, ST, fever or chill.  No chest pain; SOB is at baseline.  No N/V/D/C and denied of having problem with urination.  She does not smokes.  Stated that she had asthma when she was very young and has not used the inhaler for years.      Review of Systems  Constitutional, neuro, ENT, endocrine, pulmonary, cardiac, gastrointestinal, genitourinary, musculoskeletal, integument and psychiatric systems are negative, except as otherwise noted.    Patient Active Problem List    Diagnosis Date Noted     ILD (interstitial lung disease) (H) 09/16/2020     Priority: Medium     Added automatically from request for surgery [id number]       Raynaud's disease without gangrene 02/16/2016     Priority: Medium     Rheumatoid arthritis with negative rheumatoid factor, involving unspecified site (H) 12/15/2015     Priority: Medium     Pharyngeal dysphagia 11/17/2015     Priority: Medium     Gastroesophageal reflux disease, esophagitis presence not specified 11/17/2015     Priority: Medium     Disturbed sleep rhythm 04/07/2015     Priority: Medium     Other specified diffuse disease of connective tissue 10/28/2014     Priority: Medium     Cellulitis of leg 06/06/2013     Priority: Medium     Limb " ischemia; ulcers of fingertips 04/22/2013     Priority: Medium     Hypoalbuminemia 03/22/2013     Priority: Medium     Diagnosis updated by automated process. Provider to review and confirm.       Diastolic dysfunction 03/22/2013     Priority: Medium     Noted on Cardiac Echo 3/2013 from [facility]       Abnormal albumin 02/21/2013     Priority: Medium     Myopathy 02/21/2013     Priority: Medium     Problem list name updated by automated process. Provider to review       Edema of both legs 02/14/2013     Priority: Medium     Shortness of breath 07/26/2012     Priority: Medium     Systemic sclerosis (H) 07/28/2011     Priority: Medium     Tumoral calcinosis 07/28/2011     Priority: Medium     Low back pain - Suspect SI Joint dysfunction 09/27/2005     Priority: Medium     September 27, 2005: Started 2 weeks ago.  bilateral buttocks - better with walking, worse with sitting and driving.  Tight/stiff - lossens up.  Painful to lay down.  Constant ache.  8-10/10 at its worse.  No injury.  Tender with massage.  No radiculopathy.  Relief with muscle relaxant.       Cough 09/27/2005     Priority: Medium     September 27, 2005: Persistent cough occl productive of stringy mucus, wheezes with cough.  Started 2 weeks ago.  No rhinorrhea or post nasal drip        Prolonged QTc 460 ms,  at hr 67 06/24/2005     Priority: Medium     June 24, 2005: Will check calcium, magnesium, potassium.  Will compare to old EKGs.  Reviewed meds and none are on the list for prolongation.  Anesthesiology should be aware at surgery of prolonged QT.       Essential hypertension 03/02/2005     Priority: Medium     April 22, 2005: BP controlled on prinizide 40/25, norvasc 5 September 27, 2005: BP controlled/marginal off meds post-gastric bypass.  Problem list name updated by automated process. Provider to review       Thyrotoxicosis 03/02/2005     Priority: Medium     Insomnia, lost weight since 12/02 (max was 346).  Globus  sensation in neck.  Normal bowel movements.  Normal skin dryness.  No anxiety or palpitations.  TSH was normal in 2002 with Htn work-up, now <0.03 with work-up for gastric bypass.  April 22, 2005: Suspect thyroiditis which has now normalized.  I would recommend rechecking in 3 months.  Problem list name updated by automated process. Provider to review       Obesity 03/02/2005     Priority: Medium     March 2, 2005:  Gastric bypass scheduled for 6/27/05 September 27, 2005: Weight loss 50 pounds  Problem list name updated by automated process. Provider to review        Past Medical History:   Diagnosis Date     Anemia      Cellulitis of leg 6/6/2013     Esophageal reflux     Better post-hiatal hernia repair and weight loss     Limb ischemia; ulcers of fingertips 4/22/2013     Raynaud's syndrome      Scleroderma (H)      Systemic sclerosis (H) 2009     Unspecified essential hypertension      Past Surgical History:   Procedure Laterality Date     BYPASS GASTRIC, CHOLECYSTECTOMY, COMBINED       CHOLECYSTECTOMY       COLONOSCOPY       ESOPHAGOSCOPY, GASTROSCOPY, DUODENOSCOPY (EGD), COMBINED N/A 3/10/2016    Procedure: COMBINED ESOPHAGOSCOPY, GASTROSCOPY, DUODENOSCOPY (EGD), BIOPSY SINGLE OR MULTIPLE;  Surgeon: Tricia Zambrano MD;  Location:  GI     INNER EAR SURGERY       PICC INSERTION  6/5/2013    5fr DL Power PICC, 44cm, left basilic vein, tip in low SVC     SURGICAL HISTORY OF -       Left ear surgery - incus transition, tympanoplasty     SURGICAL HISTORY OF -   6/05    Gastric bypass     SURGICAL HISTORY OF -   6/05    Cholecystectomy     SURGICAL HISTORY OF -   6/05    Hiatal hernia repair     TONSILLECTOMY       Current Outpatient Medications   Medication Sig Dispense Refill     benzonatate (TESSALON) 100 MG capsule Take 1 capsule (100 mg) by mouth 3 times daily as needed for cough 60 capsule 0     furosemide (LASIX) 40 MG tablet Take 40 mg by mouth daily       hydroxychloroquine (PLAQUENIL) 200 MG  tablet Take 1 tablet (200 mg) by mouth 2 times daily * PLEASE SCHEDULE EYE EXAM 60 tablet 0     losartan (COZAAR) 50 MG tablet Take 0.5 tablets (25 mg) by mouth daily 45 tablet 3     pantoprazole (PROTONIX) 40 MG EC tablet Take 1 tablet (40 mg) by mouth daily 90 tablet 3     Potassium (POTASSIMIN PO) 50mg 3 times daily       spironolactone (ALDACTONE) 25 MG tablet Take 25 mg by mouth         Allergies   Allergen Reactions     Lovenox Other (See Comments)     Blood clot      Norvasc [Amlodipine Besylate] Swelling     Lip swelling that happened on 2 different occasions     Erythromycin Rash     Rash on arms        Social History     Tobacco Use     Smoking status: Never Smoker     Smokeless tobacco: Never Used   Substance Use Topics     Alcohol use: Yes     Comment: occassionally     Family History   Problem Relation Age of Onset     Cerebrovascular Disease Father      Heart Disease Father      Hypertension Father      Heart Disease Mother      Hypertension Mother      Chronic Obstructive Pulmonary Disease Sister      Heart Disease Sister      Hypertension Sister      Cerebrovascular Disease Brother      Heart Disease Brother      Kidney Disease Brother         on dialysis     Diabetes Brother         borderline     No Known Problems Son      No Known Problems Son      No Known Problems Daughter      Cancer Brother         small cell     Heart Disease Brother      Heart Disease Brother      Heart Disease Brother      Heart Disease Brother         tripple A     Heart Disease Sister      Substance Abuse Sister         alcoholism     History   Drug Use No            Objective   /74   Pulse 64   Temp 97.2  F (36.2  C) (Temporal)   Resp 14   Wt 70.7 kg (155 lb 12.8 oz)   SpO2 90%   BMI 23.01 kg/m    Physical Exam      GENERAL APPEARANCE: healthy, alert and no distress     EYES: EOMI,- PERRL     HENT: ear canals and TM's normal. Nares are non-congested. Oropharynx is pink and moist, no ulcers or lesions. No  tender with palpation to the sinuses.     NECK: no adenopathy, no asymmetry.  No tender with palpation to the cervical spine and its para-spinous muscle bilaterally.     RESP: lungs clear to auscultation - no rales, rhonchi or wheezes     CV: regular rates and rhythm and no murmur.     ABDOMEN:  soft, nontender, nondistended with no palpable masses and bowel sounds normal     MS: extremities normal- no gross deformities noted.  No edema.  All 4 extremities are equally in strength.     NEURO: Normal strength and tone,  mentation intact and speech normal.  No focal neurological deficit     PSYCH: mentation appears normal. and affect normal/bright         Recent Labs   Lab Test 08/21/20  1215 10/21/19  1348   HGB 13.0 12.9    122*   * 139   POTASSIUM 3.5 3.5   CR 0.85 0.81        PRE-OP Diagnostics:  Recent Results (from the past 720 hour(s))   T4 free    Collection Time: 09/18/20  2:45 PM   Result Value Ref Range    T4 Free 1.30 0.76 - 1.46 ng/dL   T3 Free    Collection Time: 09/18/20  2:45 PM   Result Value Ref Range    Free T3 1.7 (L) 2.3 - 4.2 pg/mL   TSH    Collection Time: 09/18/20  2:45 PM   Result Value Ref Range    TSH 1.69 0.40 - 4.00 mU/L     No EKG this visit, completed in the last 90 days. - was done at [facility] - stable if not better as compared last year's EKG       Assessment & Plan   The proposed surgical procedure is considered INTERMEDIATE risk.    REVISED CARDIAC RISK INDEX  The patient has the following serious cardiovascular risks for perioperative complications:  No serious cardiac risks = 0 points    INTERPRETATION: 1 points: Class II ( low risk - 0.9% complication rate)         ICD-10-CM    1. Preop general physical exam  Z01.818    2. ILD (interstitial lung disease) (H)  J84.9    3. Chronic cough  R05    4. Shortness of breath  R06.02    5. Essential hypertension  I10    6. Diastolic dysfunction  I51.89    7. Ascending aortic aneurysm (H)  I71.2    8. Breast cancer screening  " Z12.39 *MA Screening Digital Bilateral   9. Need for vaccination  Z23 TDAP VACCINE (ADACEL) [23627.002]     1st  Administration  [15515]   10. Need for prophylactic vaccination and inoculation against influenza  Z23 INFLUENZA QUAD, RECOMBINANT, P-FREE (RIV4) (FLUBLOCK) [24274]     Vaccine Administration, Each Additional [48727]     1.  HTN: Blood pressure is stable and normal.  2.  Diastolic dysfunction  - stable/resolved.  No symptoms.  Last month's echocardiogram showed EF of 50-60% with no abnormality.  3.  Ascending aortic aneurysm - last month's echocardiogram showed stable mildly dilated aortic.    The patient has the following additional risks and recommendations for perioperative complications:     - Consider consult Hospitalist / IM to assist with post-op medical management as needed for HTN and other rheumatological conditions    CARDIOVASCULAR:  - No history of MI, CAD, CVA or diabetes.  Blood pressure is well controlled.  - Medical records indicated of history of diastolic dysfunction.  Last month echocardiogram was normal with ejection fraction of 55-60%.  She is completely asymptomatic.  - Medical record indicated she has a history of prolonged QT.  EKG on 7/20/2020 at [facility] showed stable \"sinus rhythm with marked left axis deviation and moderate intraventricular conduct delay\" and as compared to EKG on 3/27/2019, \"T wave abnormality was no longer present\".  I elected not to repeat the EKG today.  Can please consider to avoid any medication that worsen the QT prolongation.  -Overall she is stable and is doing well.  I do not believe further cardiac work-up is needed for this procedure.    DIABETES:  She has no history of diabetes.    PULMONARY:   She is having a pulmonary procedure, further recommendation per the pulmonologist.   - Incentive spirometry post-op when appropriate   - Consider Respiratory Therapy (Respiratory Care IP Consult) post-op as needed for obstructive symptoms   - Oxygen " as needed to maintain O2 sat above 92% during and after procedure    OBSTRUCTIVE SLEEP APNEA:   No history of sleep apnea.  No risk for sleep apnea identified.    ANEMIA/BLEEDING/CLOTTING:   She has a history of anemia many years ago.  No history of blood transfusion.  She is okay to be transfused if necessary.    No history of DVT/PE.  DVT/PE prophylaxis per surgeon's recommendations/desire.  Please be aware that she is allergic to Lovenox.    SOCIAL and SUBSTANCE:   No social or substance concerned.    INFECTION:   No infection risks identified.  Prophylactic antibiotic for the procedure per surgeon's recommendation/desire.    COVID-19 screening was scheduled appropriate before procedure.     MEDICATION INSTRUCTIONS:  Patient is to take all scheduled medications on the day of surgery EXCEPT for modifications listed below:     Please take your medications as prescribed except.    No aspirin or NSAID (Ibuprofen, Aleve, Motrin, Naproxen, ect.) for 7 days before the procedure  Hold Losartan for 24 hours before the procedure - resume after the procedure   Take your other prescribed medications as usual on am of procedure with small sip of water.    RECOMMENDATION:  APPROVAL GIVEN to proceed with proposed procedure, without further diagnostic evaluation.    Return in about 1 month (around 10/23/2020) for Physical Exam.    Signed Electronically by: Katia Carver Mai, MD    Copy of this evaluation report is provided to requesting physician.    Preop [facility] Preop Guidelines    Revised Cardiac Risk Index

## 2020-09-28 DIAGNOSIS — Z11.59 ENCOUNTER FOR SCREENING FOR OTHER VIRAL DISEASES: ICD-10-CM

## 2020-09-28 PROCEDURE — U0003 INFECTIOUS AGENT DETECTION BY NUCLEIC ACID (DNA OR RNA); SEVERE ACUTE RESPIRATORY SYNDROME CORONAVIRUS 2 (SARS-COV-2) (CORONAVIRUS DISEASE [COVID-19]), AMPLIFIED PROBE TECHNIQUE, MAKING USE OF HIGH THROUGHPUT TECHNOLOGIES AS DESCRIBED BY CMS-2020-01-R: HCPCS | Performed by: INTERNAL MEDICINE

## 2020-09-29 LAB
SARS-COV-2 RNA SPEC QL NAA+PROBE: NOT DETECTED
SPECIMEN SOURCE: NORMAL

## 2020-09-30 DIAGNOSIS — Z11.59 ENCOUNTER FOR SCREENING FOR OTHER VIRAL DISEASES: Primary | ICD-10-CM

## 2020-09-30 NOTE — TELEPHONE ENCOUNTER
Patient was rescheduled to 10/06 due to Dr. Brooke not being available.   Patient confirmed new Date/time.   New COVID test was scheduled.

## 2020-10-03 DIAGNOSIS — Z11.59 ENCOUNTER FOR SCREENING FOR OTHER VIRAL DISEASES: ICD-10-CM

## 2020-10-03 PROCEDURE — U0003 INFECTIOUS AGENT DETECTION BY NUCLEIC ACID (DNA OR RNA); SEVERE ACUTE RESPIRATORY SYNDROME CORONAVIRUS 2 (SARS-COV-2) (CORONAVIRUS DISEASE [COVID-19]), AMPLIFIED PROBE TECHNIQUE, MAKING USE OF HIGH THROUGHPUT TECHNOLOGIES AS DESCRIBED BY CMS-2020-01-R: HCPCS | Performed by: INTERNAL MEDICINE

## 2020-10-04 LAB
SARS-COV-2 RNA SPEC QL NAA+PROBE: NOT DETECTED
SPECIMEN SOURCE: NORMAL

## 2020-10-05 ENCOUNTER — OFFICE VISIT (OUTPATIENT)
Dept: FAMILY MEDICINE | Facility: CLINIC | Age: 60
End: 2020-10-05
Payer: COMMERCIAL

## 2020-10-05 ENCOUNTER — PATIENT OUTREACH (OUTPATIENT)
Dept: GASTROENTEROLOGY | Facility: CLINIC | Age: 60
End: 2020-10-05

## 2020-10-05 VITALS
RESPIRATION RATE: 14 BRPM | HEART RATE: 77 BPM | WEIGHT: 157 LBS | BODY MASS INDEX: 23.18 KG/M2 | DIASTOLIC BLOOD PRESSURE: 74 MMHG | SYSTOLIC BLOOD PRESSURE: 112 MMHG | OXYGEN SATURATION: 100 % | TEMPERATURE: 97.4 F

## 2020-10-05 DIAGNOSIS — E03.9 HYPOTHYROIDISM, UNSPECIFIED TYPE: ICD-10-CM

## 2020-10-05 DIAGNOSIS — Z98.84 S/P GASTRIC BYPASS: ICD-10-CM

## 2020-10-05 DIAGNOSIS — E53.8 VITAMIN B 12 DEFICIENCY: ICD-10-CM

## 2020-10-05 DIAGNOSIS — I10 ESSENTIAL HYPERTENSION: ICD-10-CM

## 2020-10-05 DIAGNOSIS — Z82.49 FH: CAD (CORONARY ARTERY DISEASE): ICD-10-CM

## 2020-10-05 DIAGNOSIS — D50.9 IRON DEFICIENCY ANEMIA, UNSPECIFIED IRON DEFICIENCY ANEMIA TYPE: ICD-10-CM

## 2020-10-05 DIAGNOSIS — Z00.00 ENCOUNTER FOR ROUTINE ADULT HEALTH EXAMINATION WITHOUT ABNORMAL FINDINGS: Primary | ICD-10-CM

## 2020-10-05 DIAGNOSIS — Z23 NEED FOR VACCINATION: ICD-10-CM

## 2020-10-05 LAB
CHOLEST SERPL-MCNC: 127 MG/DL
FERRITIN SERPL-MCNC: 151 NG/ML (ref 8–252)
FOLATE SERPL-MCNC: 12 NG/ML
HDLC SERPL-MCNC: 80 MG/DL
LDLC SERPL CALC-MCNC: 33 MG/DL
NONHDLC SERPL-MCNC: 47 MG/DL
TRIGL SERPL-MCNC: 69 MG/DL
VIT B12 SERPL-MCNC: 545 PG/ML (ref 193–986)

## 2020-10-05 PROCEDURE — 80061 LIPID PANEL: CPT | Performed by: FAMILY MEDICINE

## 2020-10-05 PROCEDURE — 82728 ASSAY OF FERRITIN: CPT | Performed by: FAMILY MEDICINE

## 2020-10-05 PROCEDURE — 82746 ASSAY OF FOLIC ACID SERUM: CPT | Performed by: FAMILY MEDICINE

## 2020-10-05 PROCEDURE — 90750 HZV VACC RECOMBINANT IM: CPT | Performed by: FAMILY MEDICINE

## 2020-10-05 PROCEDURE — 36415 COLL VENOUS BLD VENIPUNCTURE: CPT | Performed by: FAMILY MEDICINE

## 2020-10-05 PROCEDURE — 90471 IMMUNIZATION ADMIN: CPT | Performed by: FAMILY MEDICINE

## 2020-10-05 PROCEDURE — 99396 PREV VISIT EST AGE 40-64: CPT | Mod: 25 | Performed by: FAMILY MEDICINE

## 2020-10-05 PROCEDURE — G0145 SCR C/V CYTO,THINLAYER,RESCR: HCPCS | Performed by: FAMILY MEDICINE

## 2020-10-05 PROCEDURE — 82306 VITAMIN D 25 HYDROXY: CPT | Performed by: FAMILY MEDICINE

## 2020-10-05 PROCEDURE — 82607 VITAMIN B-12: CPT | Performed by: FAMILY MEDICINE

## 2020-10-05 PROCEDURE — 87624 HPV HI-RISK TYP POOLED RSLT: CPT | Performed by: FAMILY MEDICINE

## 2020-10-05 ASSESSMENT — PAIN SCALES - GENERAL: PAINLEVEL: NO PAIN (0)

## 2020-10-05 NOTE — PROGRESS NOTES
Subjective     Jeanine Schuler is a 59 year old female who presents to clinic today for the following health issues:    HPI         Chief Complaint   Patient presents with     Establish Care       {additonal problems for provider to add (Optional):369367}    Review of Systems   {ROS COMP (Optional):651146}      Objective    /74   Pulse 77   Temp 97.4  F (36.3  C) (Temporal)   Resp 14   Wt 71.2 kg (157 lb)   SpO2 100%   BMI 23.18 kg/m    Body mass index is 23.18 kg/m .  Physical Exam   {Exam List (Optional):565124}    {Diagnostic Test Results (Optional):664675}        {PROVIDER CHARTING PREFERENCE:128086}

## 2020-10-05 NOTE — PROGRESS NOTES
Called patent to reschedule appt with Dr Zambrano as she is unavailable, patient mailbox is full unable to leave message. Will send a my chart to ask patient to call to reschedule the appt.

## 2020-10-05 NOTE — PROGRESS NOTES
SUBJECTIVE:   CC: Jeanine Schuler is an 59 year old woman who presents for preventive health visit.       Patient has been advised of split billing requirements and indicates understanding: No  HPI     Jeanine has a complex medical history which include systemic sclerosis, HTN, RA, CREST, strong family history of CAD and anemia who presents for a routine physical care.  Generally she is doing well. She is seeing Pulmonologist for her breathing problem, Rheumatologist for her immunological and rheumatological problem and cardiologist for her heart history. She is scheduled to see a GI specialist next week or hiatal hernia.  Has not had a physical exam for years. Last colonoscopy was in 2014 and it was normal - cleared for 10 years.  Stated that she has not has a mammogram or pap smears for years as well. She is scheduled for bronchoscopy next week.  Overall she is doing well and her chronic medical conditions are stable and tolerable. Been taking the medications as prescribed with no side effect. Been on these meds for years. No headache or dizziness.  No acute visual change. No URI symptoms include running nose, nasal congestion, coughing, fever or chill. No CP/SOB. No N/V/D/C. No problem with urination.  No abdominal pain. She is post-menopausal - no history of abnormal pap or STD.  Denied of STD risks.  No leg swelling or pain. Does not exercise.  Been feeling more tire and sleeping more - been taking naps as well.  Feels safe at home - not working.  No weight change and denies of being depressed. No other concerns today.      Today's PHQ-2 Score:   PHQ-2 ( 1999 [employer]) 10/5/2020   Q1: Little interest or pleasure in doing things 0   Q2: Feeling down, depressed or hopeless 0   PHQ-2 Score 0       Abuse: Current or Past (Physical, Sexual or Emotional) - No  Do you feel safe in your environment? Yes    Have you ever done Advance Care Planning? (For example, a Health Directive, POLST, or a discussion with a  medical provider or your loved ones about your wishes): Not addressed     Social History     Tobacco Use     Smoking status: Never Smoker     Smokeless tobacco: Never Used   Substance Use Topics     Alcohol use: Yes     Comment: occassionally     If you drink alcohol do you typically have >3 drinks per day or >7 drinks per week? Not applicable    No flowsheet data found.    Reviewed orders with patient.  Reviewed health maintenance and updated orders accordingly - Yes    BP Readings from Last 3 Encounters:   10/05/20 112/74   09/23/20 132/74   08/21/20 126/85    Wt Readings from Last 3 Encounters:   10/05/20 71.2 kg (157 lb)   09/23/20 70.7 kg (155 lb 12.8 oz)   08/21/20 72.6 kg (160 lb)                  Patient Active Problem List   Diagnosis     HTN (hypertension)     Thyrotoxicosis     Prolonged QTc 460 ms,  at hr 67     Low back pain - Suspect SI Joint dysfunction     Chronic cough     Systemic sclerosis (H)     Tumoral calcinosis     Shortness of breath     Collagen vascular disease (H)     Disturbed sleep rhythm     Pharyngeal dysphagia     Gastroesophageal reflux disease, esophagitis presence not specified     Rheumatoid arthritis with negative rheumatoid factor, involving unspecified site (H)     ILD (interstitial lung disease) (H)     Abnormal CT of the chest     Iron deficiency anemia     Ascending aortic aneurysm (H)     FH: CAD (coronary artery disease)     Hypothyroidism     Kidney stone     S/P dilatation of esophageal stricture     S/P gastric bypass     Vitamin B 12 deficiency     Sjogren's disease (H)     Raynaud's disease     CREST syndrome (H)     Past Surgical History:   Procedure Laterality Date     BYPASS GASTRIC, CHOLECYSTECTOMY, COMBINED       CHOLECYSTECTOMY       COLONOSCOPY       ESOPHAGOSCOPY, GASTROSCOPY, DUODENOSCOPY (EGD), COMBINED N/A 3/10/2016    Procedure: COMBINED ESOPHAGOSCOPY, GASTROSCOPY, DUODENOSCOPY (EGD), BIOPSY SINGLE OR MULTIPLE;  Surgeon: Tricia Zambrano  MD Naomy;  Location:  GI     INNER EAR SURGERY       PICC INSERTION  6/5/2013    5fr DL Power PICC, 44cm, left basilic vein, tip in low SVC     SURGICAL HISTORY OF -       Left ear surgery - incus transition, tympanoplasty     SURGICAL HISTORY OF -   6/05    Gastric bypass     SURGICAL HISTORY OF -   6/05    Cholecystectomy     SURGICAL HISTORY OF -   6/05    Hiatal hernia repair     TONSILLECTOMY         Social History     Tobacco Use     Smoking status: Never Smoker     Smokeless tobacco: Never Used   Substance Use Topics     Alcohol use: Yes     Comment: occassionally     Family History   Problem Relation Age of Onset     Cerebrovascular Disease Father      Heart Disease Father      Hypertension Father      Heart Disease Mother      Hypertension Mother      Chronic Obstructive Pulmonary Disease Sister      Heart Disease Sister      Hypertension Sister      Cerebrovascular Disease Brother      Heart Disease Brother      Kidney Disease Brother         on dialysis     Diabetes Brother         borderline     No Known Problems Son      No Known Problems Son      No Known Problems Daughter      Cancer Brother         small cell     Heart Disease Brother      Heart Disease Brother      Heart Disease Brother      Heart Disease Brother         tripple A     Heart Disease Sister      Substance Abuse Sister         alcoholism         Current Outpatient Medications   Medication Sig Dispense Refill     benzonatate (TESSALON) 100 MG capsule Take 1 capsule (100 mg) by mouth 3 times daily as needed for cough 60 capsule 0     furosemide (LASIX) 40 MG tablet Take 40 mg by mouth daily       hydroxychloroquine (PLAQUENIL) 200 MG tablet Take 1 tablet (200 mg) by mouth 2 times daily * PLEASE SCHEDULE EYE EXAM 60 tablet 0     losartan (COZAAR) 50 MG tablet Take 0.5 tablets (25 mg) by mouth daily 45 tablet 3     pantoprazole (PROTONIX) 40 MG EC tablet Take 1 tablet (40 mg) by mouth daily 90 tablet 3     Potassium (POTASSIMIN PO)  50mg 3 times daily       spironolactone (ALDACTONE) 25 MG tablet Take 25 mg by mouth       Allergies   Allergen Reactions     Lovenox Other (See Comments)     Blood clot      Norvasc [Amlodipine Besylate] Swelling     Lip swelling that happened on 2 different occasions     Erythromycin Rash     Rash on arms       Mammogram Screening: Patient over age 50, mutual decision to screen reflected in health maintenance.  Pertinent mammograms are reviewed under the imaging tab.  History of abnormal Pap smear: NO - age 30-65 PAP every 5 years with negative HPV co-testing recommended     Reviewed and updated as needed this visit by clinical staff  Tobacco  Allergies  Meds      Soc Hx        Reviewed and updated as needed this visit by Provider                Past Medical History:   Diagnosis Date     Anemia      Cellulitis of leg 6/6/2013     Esophageal reflux     Better post-hiatal hernia repair and weight loss     Limb ischemia; ulcers of fingertips 4/22/2013     Raynaud's syndrome      Rheumatoid arthritis (H) \     Scleroderma (H)      Sjogren-Jake syndrome      Systemic sclerosis (H) 2009     Unspecified essential hypertension       Past Surgical History:   Procedure Laterality Date     BYPASS GASTRIC, CHOLECYSTECTOMY, COMBINED       CHOLECYSTECTOMY       COLONOSCOPY       ESOPHAGOSCOPY, GASTROSCOPY, DUODENOSCOPY (EGD), COMBINED N/A 3/10/2016    Procedure: COMBINED ESOPHAGOSCOPY, GASTROSCOPY, DUODENOSCOPY (EGD), BIOPSY SINGLE OR MULTIPLE;  Surgeon: Tricia Zambrano MD;  Location:  GI     INNER EAR SURGERY       PICC INSERTION  6/5/2013    5fr DL Power PICC, 44cm, left basilic vein, tip in low SVC     SURGICAL HISTORY OF -       Left ear surgery - incus transition, tympanoplasty     SURGICAL HISTORY OF -   6/05    Gastric bypass     SURGICAL HISTORY OF -   6/05    Cholecystectomy     SURGICAL HISTORY OF -   6/05    Hiatal hernia repair     TONSILLECTOMY         Review of Systems  CONSTITUTIONAL:  NEGATIVE for fever, chills, change in weight  INTEGUMENTARY/SKIN: NEGATIVE for worrisome rashes, moles or lesions  EYES: NEGATIVE for vision changes or irritation  BREAST: NEGATIVE for masses, tenderness or discharge  CV: NEGATIVE for chest pain, palpitations or peripheral edema  GI: NEGATIVE for nausea, abdominal pain, heartburn, or change in bowel habits  : NEGATIVE for unusual urinary or vaginal symptoms. No vaginal bleeding.  MUSCULOSKELETAL:  Affirms polyarticular joint pain   NEURO: NEGATIVE for weakness, dizziness or paresthesias  ENDOCRINE: NEGATIVE for temperature intolerance, skin/hair changes  HEME/ALLERGY/IMMUNE: NEGATIVE for bleeding problems  PSYCHIATRIC: NEGATIVE for changes in mood or affect      OBJECTIVE:   /74   Pulse 77   Temp 97.4  F (36.3  C) (Temporal)   Resp 14   Wt 71.2 kg (157 lb)   SpO2 100%   BMI 23.18 kg/m    Physical Exam   GENERAL: healthy, alert and no distress.  Speaking in full sentences.  EYES: Eyes grossly normal to inspection, PERRL and conjunctivae and sclerae normal.  No nystagmus.  All 4 visual fields intact.  HENT: ear canals and TM's normal.  Nares are non-congested. Oropharynx is pink and moist. No tender with palpation to the sinuses.   NECK: no adenopathy, supple, no lymphadenopathy or thyromegaly.  No tender with palpation to the cervical spine or its paraspinous muscle.  RESP: lungs clear to auscultation - no rales, rhonchi or wheezes.  Good respiratory effort throughout.  BREAST: offer but decline; pt has no concerns   CV: regular rate and rhythm, no murmur  ABDOMEN: soft, nontender, nondistended, no palpable masses organomegaly with normal culture.  No CVA tenderness.   (female): normal female external genitalia, normal urethral meatus, vaginal mucosa, normal cervix/adnexa/uterus without masses or discharge. Pending for pap and HPV.  MS: no gross musculoskeletal defects noted, no edema. Pain with hip flexion and  shoulder abduction; knees, ankles,  elbows, wrists exams were normal. Fine motor skills of the fingers are intact. Back is straight, no tender with palpation to the spine.  SKIN: no suspicious lesions or rashes; purple coloring on dorsal aspects of bilateral hands   NEURO: Normal strength and tone, mentation intact and speech normal.  Cranial nerve II through XII intact.  DTRs +2 throughout.  No focal neurological deficit.  PSYCH: mentation appears normal, affect normal/bright.  Thoughts intact, suicidal/homicidal ideation.  No hallucination.    Diagnostic Test Results:  Labs reviewed in Epic  Results for orders placed or performed in visit on 10/05/20   Lipid panel reflex to direct LDL Fasting     Status: None   Result Value Ref Range    Cholesterol 127 <200 mg/dL    Triglycerides 69 <150 mg/dL    HDL Cholesterol 80 >49 mg/dL    LDL Cholesterol Calculated 33 <100 mg/dL    Non HDL Cholesterol 47 <130 mg/dL   Vitamin B12     Status: None   Result Value Ref Range    Vitamin B12 545 193 - 986 pg/mL   Folate     Status: None   Result Value Ref Range    Folate 12.0 >5.4 ng/mL   Ferritin     Status: None   Result Value Ref Range    Ferritin 151 8 - 252 ng/mL       ASSESSMENT/PLAN:   1. Encounter for routine adult health examination without abnormal findings  Jeanine has a very complex medical history but overall she is doing well today.  UTD for immunization and received her shingle vaccination today.  Topics appropriate for her age discussed include safety issue and healthy lifestyle modification as well as falling and depression prevention. Recommended daily exercise for at least 30 minutes as tolerated.  Emphasized on healthy diet with adequate fluid intake and resting. Feels safe at home.  Follow up in 1 year, earlier as needed.  Labs as ordered below.  UTD for her colon cancer screening.  Scheduled for mammogram and pending for pap with HPV .  All of her questions were answered.    - Pap imaged thin layer screen with HPV - recommended age 30 - 65 years  (select HPV order below)  - HPV High Risk Types DNA Cervical    2. Hypothyroidism, unspecified type  Stable and no symptoms.  Last TSH check was (9/18/20) and was normal.  Last free T4 was low (1.3) on (9/18/20); patient is feeling well, will continue to monitor for sx consistent with hypothyroidism. Not on med for it. Followed by endocrinology.     3. Essential hypertension  BP is normal and stable. Continue with the current doses of furosemide, spironolactone and losartan, been tolerating themwell.  Emphasized on healthy/low salt diet, daily excercise and weight loss.  Avoid high sugar/caffeine intake. Lab as ordered.  Follow up in 6 month, earlier as needed.    4. Iron deficiency anemia, unspecified iron deficiency anemia type    Last Hgb 12.4 (7/20/20) --> 13.0 (8/21/20). Has history of gastric bypass.  Last iron study was years ago. Recheck the ferritin level. Will treat with iron supplement if lab result indicated.     - Ferritin    5. FH: CAD (coronary artery disease)  She has a very significant family history for CAD. Will aggressively monitoring and treating any modifiable risk factors.  No smoking or drug use.  BP has been controlled.  Her weight has been stable with the BMI goal.  No diabetes and her cholesterol has been good.  Encouraged to exercise daily.  Follow up with cardiologist as per his/her recommendation.    - Lipid panel reflex to direct LDL Fasting    6. S/P gastric bypass    Has h/o gastric bypass, patient is at risk for a pernicious anemia, due to a B12 and folate deficiency. She is known to have a history of B12, vitamin D and iron deficiency.  Labs as ordered    - Vitamin B12  - Folate  - Ferritin  - Vitamin D Deficiency    7. Vitamin B 12 deficiency  With a h/o gastric bypass, she is at risk for B12 and folate deficiency; will check B12 level today, last B12 was normal (737) back in 2005.  Continue with the B12 supplememt.     - Vitamin B12    8. Need for vaccination  Recommend Shingrix  "for all patients per CDC guidelines for shingles prevention.   - SHINGRIX [48140]  - 1st  Administration  [18627]      Her rheumatological and immunological conditions overall are stable and controlled.  Encouraged her to work with the rheumatologist as per his/her recommendation.  No change in medication today.        She will continue to work with pulmonologist for further evaluation of the pulmonary mass.  She is scheduled for a bronchoscopy next week.    She is also scheduled to see a GI specialist nlater this week for hiatal hernia.  She may need upper endoscopy.  She is up-to-date for colonoscopy.    Patient has been advised of split billing requirements and indicates understanding: Yes  COUNSELING:  Reviewed preventive health counseling, as reflected in patient instructions    Estimated body mass index is 23.18 kg/m  as calculated from the following:    Height as of 8/21/20: 1.753 m (5' 9\").    Weight as of this encounter: 71.2 kg (157 lb).        She reports that she has never smoked. She has never used smokeless tobacco.      Counseling Resources:  ATP IV Guidelines  Pooled Cohorts Equation Calculator  Breast Cancer Risk Calculator  BRCA-Related Cancer Risk Assessment: FHS-7 Tool  FRAX Risk Assessment  ICSI Preventive Guidelines  Dietary Guidelines for Americans, 2010  USDA's MyPlate  ASA Prophylaxis  Lung CA Screening    Katia Carver Mai, MD  [facility]  "

## 2020-10-06 ENCOUNTER — ANESTHESIA EVENT (OUTPATIENT)
Dept: SURGERY | Facility: CLINIC | Age: 60
End: 2020-10-06
Payer: COMMERCIAL

## 2020-10-06 ENCOUNTER — HOSPITAL ENCOUNTER (OUTPATIENT)
Facility: CLINIC | Age: 60
Discharge: HOME | End: 2020-10-06
Attending: INTERNAL MEDICINE | Admitting: INTERNAL MEDICINE
Payer: COMMERCIAL

## 2020-10-06 ENCOUNTER — APPOINTMENT (OUTPATIENT)
Dept: GENERAL RADIOLOGY | Facility: CLINIC | Age: 60
End: 2020-10-06
Attending: INTERNAL MEDICINE
Payer: COMMERCIAL

## 2020-10-06 ENCOUNTER — ANESTHESIA (OUTPATIENT)
Dept: SURGERY | Facility: CLINIC | Age: 60
End: 2020-10-06
Payer: COMMERCIAL

## 2020-10-06 VITALS
TEMPERATURE: 98.1 F | HEART RATE: 88 BPM | HEIGHT: 69 IN | BODY MASS INDEX: 23.12 KG/M2 | SYSTOLIC BLOOD PRESSURE: 107 MMHG | WEIGHT: 156.09 LBS | OXYGEN SATURATION: 98 % | RESPIRATION RATE: 18 BRPM | DIASTOLIC BLOOD PRESSURE: 76 MMHG

## 2020-10-06 DIAGNOSIS — J84.9 ILD (INTERSTITIAL LUNG DISEASE) (H): ICD-10-CM

## 2020-10-06 DIAGNOSIS — E55.9 VITAMIN D DEFICIENCY: Primary | ICD-10-CM

## 2020-10-06 LAB
APPEARANCE FLD: CLEAR
CD19 CELLS NFR BRONCH: 2 %
CD3 CELLS NFR BRONCH: 95 %
CD3+CD4+ CELLS NFR BRONCH: 73 %
CD3+CD4+ CELLS/CD3+CD8+ CLL BRONCH: 3.65 %
CD3+CD8+ CELLS NFR BRONCH: 20 %
CD3-CD16+CD56+ CELLS NFR SPEC: 2 %
COLOR FLD: COLORLESS
DEPRECATED CALCIDIOL+CALCIFEROL SERPL-MC: 12 UG/L (ref 20–75)
EOSINOPHIL NFR FLD MANUAL: 2 %
GLUCOSE BLDC GLUCOMTR-MCNC: 59 MG/DL (ref 70–99)
GRAM STN SPEC: NORMAL
IFC SPECIMEN: NORMAL
LYMPHOCYTES NFR FLD MANUAL: 47 %
NEUTS BAND NFR FLD MANUAL: 19 %
OTHER CELLS FLD MANUAL: 32 %
SPECIMEN SOURCE FLD: NORMAL
SPECIMEN SOURCE: NORMAL
T-CELL SUBSET INTERPRETATION: NORMAL
WBC # FLD AUTO: 236 /UL

## 2020-10-06 PROCEDURE — 87205 SMEAR GRAM STAIN: CPT | Performed by: INTERNAL MEDICINE

## 2020-10-06 PROCEDURE — 761N000007 HC RECOVERY PHASE 2 EACH 15 MINS: Performed by: INTERNAL MEDICINE

## 2020-10-06 PROCEDURE — 999N000139 HC STATISTIC PRE-PROCEDURE ASSESSMENT II: Performed by: INTERNAL MEDICINE

## 2020-10-06 PROCEDURE — 87206 SMEAR FLUORESCENT/ACID STAI: CPT | Performed by: INTERNAL MEDICINE

## 2020-10-06 PROCEDURE — 272N000001 HC OR GENERAL SUPPLY STERILE: Performed by: INTERNAL MEDICINE

## 2020-10-06 PROCEDURE — 88312 SPECIAL STAINS GROUP 1: CPT | Mod: 26 | Performed by: PATHOLOGY

## 2020-10-06 PROCEDURE — 87081 CULTURE SCREEN ONLY: CPT | Performed by: INTERNAL MEDICINE

## 2020-10-06 PROCEDURE — 71045 X-RAY EXAM CHEST 1 VIEW: CPT | Mod: 26 | Performed by: RADIOLOGY

## 2020-10-06 PROCEDURE — 88305 TISSUE EXAM BY PATHOLOGIST: CPT | Mod: 26 | Performed by: PATHOLOGY

## 2020-10-06 PROCEDURE — 87070 CULTURE OTHR SPECIMN AEROBIC: CPT | Performed by: INTERNAL MEDICINE

## 2020-10-06 PROCEDURE — 999N001018 HC STATISTIC H-CELL BLOCK W/STAIN: Performed by: INTERNAL MEDICINE

## 2020-10-06 PROCEDURE — 86356 MONONUCLEAR CELL ANTIGEN: CPT | Performed by: INTERNAL MEDICINE

## 2020-10-06 PROCEDURE — 87102 FUNGUS ISOLATION CULTURE: CPT | Performed by: INTERNAL MEDICINE

## 2020-10-06 PROCEDURE — 250N000009 HC RX 250: Performed by: NURSE ANESTHETIST, CERTIFIED REGISTERED

## 2020-10-06 PROCEDURE — 999N000065 XR CHEST PORT 1 VW

## 2020-10-06 PROCEDURE — 999N001017 HC STATISTIC GLUCOSE BY METER IP

## 2020-10-06 PROCEDURE — 258N000003 HC RX IP 258 OP 636: Performed by: NURSE ANESTHETIST, CERTIFIED REGISTERED

## 2020-10-06 PROCEDURE — 370N000002 HC ANESTHESIA TECHNICAL FEE, EACH ADDTL 15 MIN: Performed by: INTERNAL MEDICINE

## 2020-10-06 PROCEDURE — 87305 ASPERGILLUS AG IA: CPT | Performed by: INTERNAL MEDICINE

## 2020-10-06 PROCEDURE — 370N000001 HC ANESTHESIA TECHNICAL FEE, 1ST 30 MIN: Performed by: INTERNAL MEDICINE

## 2020-10-06 PROCEDURE — 250N000011 HC RX IP 250 OP 636: Performed by: NURSE ANESTHETIST, CERTIFIED REGISTERED

## 2020-10-06 PROCEDURE — 87015 SPECIMEN INFECT AGNT CONCNTJ: CPT | Mod: 91 | Performed by: INTERNAL MEDICINE

## 2020-10-06 PROCEDURE — 250N000011 HC RX IP 250 OP 636: Performed by: ANESTHESIOLOGY

## 2020-10-06 PROCEDURE — 999N000179 XR SURGERY CARM FLUORO LESS THAN 5 MIN W STILLS: Mod: TC

## 2020-10-06 PROCEDURE — 87581 M.PNEUMON DNA AMP PROBE: CPT | Performed by: INTERNAL MEDICINE

## 2020-10-06 PROCEDURE — 88305 TISSUE EXAM BY PATHOLOGIST: CPT | Mod: TC | Performed by: INTERNAL MEDICINE

## 2020-10-06 PROCEDURE — 89051 BODY FLUID CELL COUNT: CPT | Performed by: INTERNAL MEDICINE

## 2020-10-06 PROCEDURE — 761N000003 HC RECOVERY PHASE 1 LEVEL 2 FIRST HR: Performed by: INTERNAL MEDICINE

## 2020-10-06 PROCEDURE — 360N000022 HC SURGERY LEVEL 3 1ST 30 MIN - UMMC: Performed by: INTERNAL MEDICINE

## 2020-10-06 PROCEDURE — 87102 FUNGUS ISOLATION CULTURE: CPT | Mod: 91 | Performed by: INTERNAL MEDICINE

## 2020-10-06 PROCEDURE — 87015 SPECIMEN INFECT AGNT CONCNTJ: CPT | Performed by: INTERNAL MEDICINE

## 2020-10-06 PROCEDURE — 31628 BRONCHOSCOPY/LUNG BX EACH: CPT | Performed by: INTERNAL MEDICINE

## 2020-10-06 PROCEDURE — 87107 FUNGI IDENTIFICATION MOLD: CPT | Performed by: INTERNAL MEDICINE

## 2020-10-06 PROCEDURE — 360N000023 HC SURGERY LEVEL 3 EA 15 ADDTL MIN UMMC: Performed by: INTERNAL MEDICINE

## 2020-10-06 PROCEDURE — 88108 CYTOPATH CONCENTRATE TECH: CPT | Mod: TC | Performed by: INTERNAL MEDICINE

## 2020-10-06 PROCEDURE — 31624 DX BRONCHOSCOPE/LAVAGE: CPT | Mod: 51 | Performed by: INTERNAL MEDICINE

## 2020-10-06 PROCEDURE — G0145 SCR C/V CYTO,THINLAYER,RESCR: HCPCS | Performed by: INTERNAL MEDICINE

## 2020-10-06 PROCEDURE — 87116 MYCOBACTERIA CULTURE: CPT | Performed by: INTERNAL MEDICINE

## 2020-10-06 PROCEDURE — 88312 SPECIAL STAINS GROUP 1: CPT | Mod: TC,91 | Performed by: INTERNAL MEDICINE

## 2020-10-06 RX ORDER — ERGOCALCIFEROL 1.25 MG/1
50000 CAPSULE, LIQUID FILLED ORAL WEEKLY
Qty: 12 CAPSULE | Refills: 0 | Status: SHIPPED | OUTPATIENT
Start: 2020-10-06 | End: 2020-12-23

## 2020-10-06 RX ORDER — LIDOCAINE 40 MG/G
CREAM TOPICAL
Status: DISCONTINUED | OUTPATIENT
Start: 2020-10-06 | End: 2020-10-06 | Stop reason: HOSPADM

## 2020-10-06 RX ORDER — MEPERIDINE HYDROCHLORIDE 25 MG/ML
12.5 INJECTION INTRAMUSCULAR; INTRAVENOUS; SUBCUTANEOUS
Status: DISCONTINUED | OUTPATIENT
Start: 2020-10-06 | End: 2020-10-06

## 2020-10-06 RX ORDER — SODIUM CHLORIDE, SODIUM LACTATE, POTASSIUM CHLORIDE, CALCIUM CHLORIDE 600; 310; 30; 20 MG/100ML; MG/100ML; MG/100ML; MG/100ML
INJECTION, SOLUTION INTRAVENOUS CONTINUOUS
Status: DISCONTINUED | OUTPATIENT
Start: 2020-10-06 | End: 2020-10-06 | Stop reason: HOSPADM

## 2020-10-06 RX ORDER — FENTANYL CITRATE 50 UG/ML
INJECTION, SOLUTION INTRAMUSCULAR; INTRAVENOUS PRN
Status: DISCONTINUED | OUTPATIENT
Start: 2020-10-06 | End: 2020-10-06

## 2020-10-06 RX ORDER — ONDANSETRON 2 MG/ML
INJECTION INTRAMUSCULAR; INTRAVENOUS PRN
Status: DISCONTINUED | OUTPATIENT
Start: 2020-10-06 | End: 2020-10-06

## 2020-10-06 RX ORDER — PROPOFOL 10 MG/ML
INJECTION, EMULSION INTRAVENOUS PRN
Status: DISCONTINUED | OUTPATIENT
Start: 2020-10-06 | End: 2020-10-06

## 2020-10-06 RX ORDER — DEXAMETHASONE SODIUM PHOSPHATE 4 MG/ML
INJECTION, SOLUTION INTRA-ARTICULAR; INTRALESIONAL; INTRAMUSCULAR; INTRAVENOUS; SOFT TISSUE PRN
Status: DISCONTINUED | OUTPATIENT
Start: 2020-10-06 | End: 2020-10-06

## 2020-10-06 RX ORDER — PROPOFOL 10 MG/ML
INJECTION, EMULSION INTRAVENOUS CONTINUOUS PRN
Status: DISCONTINUED | OUTPATIENT
Start: 2020-10-06 | End: 2020-10-06

## 2020-10-06 RX ORDER — LIDOCAINE HYDROCHLORIDE 20 MG/ML
INJECTION, SOLUTION INFILTRATION; PERINEURAL PRN
Status: DISCONTINUED | OUTPATIENT
Start: 2020-10-06 | End: 2020-10-06

## 2020-10-06 RX ORDER — NALOXONE HYDROCHLORIDE 0.4 MG/ML
.1-.4 INJECTION, SOLUTION INTRAMUSCULAR; INTRAVENOUS; SUBCUTANEOUS
Status: DISCONTINUED | OUTPATIENT
Start: 2020-10-06 | End: 2020-10-06 | Stop reason: HOSPADM

## 2020-10-06 RX ORDER — ONDANSETRON 4 MG/1
4 TABLET, ORALLY DISINTEGRATING ORAL EVERY 30 MIN PRN
Status: DISCONTINUED | OUTPATIENT
Start: 2020-10-06 | End: 2020-10-06

## 2020-10-06 RX ORDER — ONDANSETRON 2 MG/ML
4 INJECTION INTRAMUSCULAR; INTRAVENOUS EVERY 30 MIN PRN
Status: DISCONTINUED | OUTPATIENT
Start: 2020-10-06 | End: 2020-10-06

## 2020-10-06 RX ORDER — SODIUM CHLORIDE, SODIUM LACTATE, POTASSIUM CHLORIDE, CALCIUM CHLORIDE 600; 310; 30; 20 MG/100ML; MG/100ML; MG/100ML; MG/100ML
INJECTION, SOLUTION INTRAVENOUS CONTINUOUS PRN
Status: DISCONTINUED | OUTPATIENT
Start: 2020-10-06 | End: 2020-10-06

## 2020-10-06 RX ADMIN — PROPOFOL 150 MG: 10 INJECTION, EMULSION INTRAVENOUS at 13:14

## 2020-10-06 RX ADMIN — ONDANSETRON 4 MG: 2 INJECTION INTRAMUSCULAR; INTRAVENOUS at 14:01

## 2020-10-06 RX ADMIN — LIDOCAINE HYDROCHLORIDE 80 MG: 20 INJECTION, SOLUTION INFILTRATION; PERINEURAL at 13:14

## 2020-10-06 RX ADMIN — DEXAMETHASONE SODIUM PHOSPHATE 4 MG: 4 INJECTION, SOLUTION INTRA-ARTICULAR; INTRALESIONAL; INTRAMUSCULAR; INTRAVENOUS; SOFT TISSUE at 13:14

## 2020-10-06 RX ADMIN — SUGAMMADEX 150 MG: 100 INJECTION, SOLUTION INTRAVENOUS at 13:30

## 2020-10-06 RX ADMIN — ROCURONIUM BROMIDE 50 MG: 10 INJECTION INTRAVENOUS at 13:14

## 2020-10-06 RX ADMIN — SODIUM CHLORIDE, POTASSIUM CHLORIDE, SODIUM LACTATE AND CALCIUM CHLORIDE: 600; 310; 30; 20 INJECTION, SOLUTION INTRAVENOUS at 13:00

## 2020-10-06 RX ADMIN — PROPOFOL 150 MCG/KG/MIN: 10 INJECTION, EMULSION INTRAVENOUS at 13:14

## 2020-10-06 RX ADMIN — FENTANYL CITRATE 100 MCG: 50 INJECTION, SOLUTION INTRAMUSCULAR; INTRAVENOUS at 13:14

## 2020-10-06 RX ADMIN — ONDANSETRON 4 MG: 2 INJECTION INTRAMUSCULAR; INTRAVENOUS at 13:14

## 2020-10-06 ASSESSMENT — MIFFLIN-ST. JEOR: SCORE: 1357.38

## 2020-10-06 NOTE — PROCEDURES
INTERVENTIONAL PULMONOLOGY     Procedure(s):    A flexible bronchoscopy  Airway exam  BAL (1 site)  Transbronchial cryoprobe lung biopsies (1 sites)    Indication:  ILD, abnormal CT chest    Attending of Record:  David Mendez MD    Interventional Pulmonary Fellow   None    Trainees Present:   None     Medications:    General Anesthesia - See anesthesia flowsheet for details    Sedation Time:   General Anesthesia    Time Out:  Performed    I have reviewed the patient's medical record and indication for the procedure. Physical examination was performed.  The risks, benefits and alternatives of the procedure were discussed with the the patient in detail and she had the opportunity to ask questions.  I discussed in particular the potential complications including risks of minor or life-threatening bleeding and/or infection, respiratory failure, vocal cord trauma / paralysis, pneumothorax, and discomfort. Sedation risks were also discussed including abnormal heart rhythms, low blood pressure, and respiratory failure. All questions were answered to the best of my ability.  Verbal and written informed consent was obtained.  The proposed procedure and the patient's identification were verified prior to the procedure by the physician and the nurse.    The patient was assessed for the adequacy for the procedure and to receive medications.   Mental Status:  Alert and oriented x 3  Airway examination:  Class I (Complete visualization of soft palate)  Pulmonary:  Clear to ausculation bilaterally  CV:  RRR, no murmurs or gallops  ASA Grade:  (II)  Mild systemic disease    After clinical evaluation and reviewing the indication, risks, alternatives and benefits of the procedure the patient was deemed to be in satisfactory condition to undergo the procedure.      A Tuberculosis risk assessment was performed:  The patient has no known RISK of Tuberculosis    The procedure was performed in a negative airflow room: The patient  could not be moved to a negative airflow room because of no risk of TB    Maneuvers / Procedure:      Airway Examination: A complete airway examination was performed from the distal trachea to the subsegmental level in each lobe of both lungs.  Pertinent findings include no endobronchial lesion.       BAL: The bronchoscope was wedged into the lingula bronchus. A total of 90cc of saline was instilled and a total of 45 was aspirated. This was sent for analysis.   Transbronchial Cryoprobe Lung Biopsies: One Site -  The bronchoscope was inserted.  Epinephrine was administered.  The 1.9 mm cryoprobewas then advanced to the Lingula Inferior with fluoroscopic guidance.  The probe was charged between 4 and 4 seconds.  A total of 4 biopsies were obtained.    Any disposable equipment was visually inspected and deemed to be intact immediately post procedure.      Recommendations:     -->  CXR post procedure  -->  Home, follow up with Dr. Beena Mendez MD  [phone]

## 2020-10-06 NOTE — OR NURSING
"Text page sent to Dr. Teddy Mendez, with pulmonary, stating:    \"CXR was completed in PACU on pt Schuler in pacu bay 20. Please review and advise Diamond PACU RN. Thank you.  [phone] ext [phone]\"    Will await response from Dr. Mendez.  "

## 2020-10-06 NOTE — DISCHARGE INSTRUCTIONS
[facility], [place]  Same-Day BRONCHOSCOPY Procedure (with or without biopsy and/or intervention)  Adult Discharge Orders & Instructions     For 24 hours after BRONCHOSCOPY     1. You may cough up a small amount of blood for a day or two  2. Get plenty of rest.  A responsible adult must stay with you for at least 24 hours after you leave the hospital.   3. Do not drive or use heavy equipment.  If you have weakness or tingling, don't drive or use heavy equipment until this feeling goes away.  4. Do not drink alcohol.  5. Avoid strenuous or risky activities (gym, yoga, cycling, etc.).  Ask for help when climbing stairs.   6. You may feel lightheaded.  IF so, sit for a few minutes before standing.  Have someone help you get up.   7. If you have nausea (feel sick to your stomach): Drink only clear liquids such as apple juice, ginger ale, broth or 7-Up.  Rest may also help.  Be sure to drink enough fluids.  Move to a regular diet as you feel able.  8. You may have a slight fever. Call the doctor if your fever is over 100 F (37.7 C) (taken under the tongue) or lasts longer than 24 hours.  9. You may have a dry mouth, a sore throat, muscle aches or trouble sleeping.  These are normal and should go away after 24 hours.  10. Do not make important or legal decisions.     Call your doctor  for any of the followin. If you begin to cough up bright red blood, or large clots of blood   2. If you become increasing more short of breath or begin to have chest pains  3. Difficulty swallowing or feeling as though food or liquids are getting stuck   4. Sore throat lasting more than 2 days or pain that has worsened over time  5. Nausea and/or vomiting that is not resolving or has not responded to anti-nausea medications if prescribed to you   6. If you experience a fever above 100.5 F (38 C) for more than 24 hours.   7. It has been over 8 to 10 hours since surgery and you are still not able to  urinate (pass water).      To contact a doctor, call:  [  ] Dr. Mendez's office at [phone] (Monday thru Friday 8:00am to 4:30pm)  [  ] [phone] and ask for the PULMONARY MEDICINE resident on call (answered 24 hours a day)  [  ] Emergency Department: [facility]: [phone]    Take it easy when you get home.  Remember, same day surgery DOES NOT MEAN SAME DAY RECOVERY!  Healing is a gradual process.  You will need some time to recover - you may be more tired than you realize at first.  Rest and relax for at least the first 24 hours at home.  You'll feel better and heal faster if you take good care of yourself.          Post Bronchoscopy Patient Instructions:    October 6, 2020  Jeanine Schuler    Your procedure completed (bronchoscopy with biopsies) without any immediate complications.  You may cough up scant amount of blood for the next 12 hours. If you have excessive cough with blood, chest pain, shortness of breath, please report to the closest emergency room.    You may experience low grade (less than 100.5 F) fever next 24 hours, if so you can take tylenol. If the fever persists more than 24 hours contact to our office or your primary care provider.    Our office (Thoracic/Pulmonary--[phone]) will call you as soon as the results of biopsies are ready.    May resume your regular diet as it was prior to procedure.    Should you have any question, please do not hesitate to call our office.    JAMA Mendez MD

## 2020-10-06 NOTE — OR NURSING
Return call from Dr. Teddy Mendez, with pulmonary, stating CXR looks good and ok for patient to proceed to discharge area.

## 2020-10-06 NOTE — ANESTHESIA PROCEDURE NOTES
Airway   Date/Time: 10/6/2020 1:17 PM   Patient location during procedure: OR    Staff -   CRNA: Rachell Bullard APRN CRNA  Performed By: CRNA    Consent for Airway   Urgency: elective    Indications and Patient Condition  Indications for airway management: laureano-procedural  Induction type:intravenousMask difficulty assessment: 2 - vent by mask + OA or adjuvant +/- NMBA    Final Airway Details  Final airway type: endotracheal airway  Successful airway:ETT - single  Endotracheal Airway Details   ETT size (mm): 8.5  Cuffed: yes  Successful intubation technique: video laryngoscopy (due to large tube needed for procedure and limited mouth opening)  Grade View of Cords: 1  Adjucts: stylet  Measured from: gums/teeth  Secured at (cm): 21  Secured with: silk tape  Bite block used: None    Post intubation assessment   Placement verified by: capnometry, equal breath sounds and chest rise   Number of attempts at approach: 1  Secured with:silk tape  Ease of procedure: easy  Dentition: Intact (edentulous)Additional Comments  8.5 ETT lubricated.  Cords fully visualized with CMAC 3.  ETT passed easily through glottis.

## 2020-10-06 NOTE — ANESTHESIA CARE TRANSFER NOTE
Patient: Jeanine Schuler    Procedure(s):  BRONCHOSCOPY, FLEXIBLE, WITH TRANSBRONCHIAL BIOPSY x4 USING CRYOPROBE, bronchial alveolar lavage    Diagnosis: ILD (interstitial lung disease) (H) [J84.9]  Diagnosis Additional Information: No value filed.    Anesthesia Type:   General     Note:  Airway :Nasal Cannula    Comments: Pt to PACU.  Frequent coughing spells.  Able to make needs known.  Report to RN.      Vitals: (Last set prior to Anesthesia Care Transfer)    CRNA VITALS  10/6/2020 1304 - 10/6/2020 1342      10/6/2020             Pulse:  93    SpO2:  100 %    Resp Rate (observed):  (!) 2                Electronically Signed By: LÓPEZ Norman CRNA  October 6, 2020  1:42 PM

## 2020-10-06 NOTE — ANESTHESIA POSTPROCEDURE EVALUATION
Anesthesia POST Procedure Evaluation    Patient: Jeanine Schuler   MRN:     [id number] Gender:   female   Age:    59 year old :      1960        Preoperative Diagnosis: ILD (interstitial lung disease) (H) [J84.9]   Procedure(s):  BRONCHOSCOPY, FLEXIBLE, WITH TRANSBRONCHIAL BIOPSY x4 USING CRYOPROBE, bronchial alveolar lavage   Postop Comments: No value filed.     Anesthesia Type: General       Disposition: Outpatient   Postop Pain Control: Uneventful            Sign Out: Well controlled pain   PONV: No   Neuro/Psych: Uneventful            Sign Out: Acceptable/Baseline neuro status   Airway/Respiratory: Uneventful            Sign Out: Acceptable/Baseline resp. status   CV/Hemodynamics: Uneventful            Sign Out: Acceptable CV status   Other NRE: NONE   DID A NON-ROUTINE EVENT OCCUR? No         Last Anesthesia Record Vitals:  CRNA VITALS  10/6/2020 1304 - 10/6/2020 1354      10/6/2020             Pulse:  93    SpO2:  100 %    Resp Rate (observed):  (!) 2          Last PACU Vitals:  Vitals Value Taken Time   BP 94/74 10/06/20 1350   Temp 37  C (98.6  F) 10/06/20 1340   Pulse 85 10/06/20 1352   Resp 20 10/06/20 1345   SpO2 99 % 10/06/20 1352   Temp src     NIBP     Pulse     SpO2     Resp     Temp     Ht Rate     Temp 2     Vitals shown include unvalidated device data.      Electronically Signed By: Dustin Butt MD, 2020, 1:54 PM

## 2020-10-07 ENCOUNTER — TELEPHONE (OUTPATIENT)
Dept: GASTROENTEROLOGY | Facility: CLINIC | Age: 60
End: 2020-10-07

## 2020-10-07 ENCOUNTER — PRE VISIT (OUTPATIENT)
Dept: GASTROENTEROLOGY | Facility: CLINIC | Age: 60
End: 2020-10-07

## 2020-10-07 PROCEDURE — 88305 TISSUE EXAM BY PATHOLOGIST: CPT | Mod: 26 | Performed by: PATHOLOGY

## 2020-10-07 PROCEDURE — 88108 CYTOPATH CONCENTRATE TECH: CPT | Mod: 26 | Performed by: PATHOLOGY

## 2020-10-08 LAB
ASPERGILLUS GALACTOMANNAN ANTIGEN BAL: NEGATIVE
BACTERIA SPEC CULT: NO GROWTH
COPATH REPORT: NORMAL
COPATH REPORT: NORMAL
GALACTOMANNAN AG SERPL-ACNC: 0.07
SPECIMEN SOURCE: NORMAL

## 2020-10-09 ENCOUNTER — VIRTUAL VISIT (OUTPATIENT)
Dept: GASTROENTEROLOGY | Facility: CLINIC | Age: 60
End: 2020-10-09
Payer: COMMERCIAL

## 2020-10-09 VITALS — WEIGHT: 150 LBS | HEIGHT: 69 IN | BODY MASS INDEX: 22.22 KG/M2

## 2020-10-09 DIAGNOSIS — K21.9 GASTROESOPHAGEAL REFLUX DISEASE, UNSPECIFIED WHETHER ESOPHAGITIS PRESENT: Primary | ICD-10-CM

## 2020-10-09 DIAGNOSIS — R63.4 WEIGHT LOSS: ICD-10-CM

## 2020-10-09 DIAGNOSIS — R13.10 DYSPHAGIA, UNSPECIFIED TYPE: ICD-10-CM

## 2020-10-09 DIAGNOSIS — R05.9 COUGH: ICD-10-CM

## 2020-10-09 LAB
ACID FAST STN SPEC QL: NORMAL
COPATH REPORT: NORMAL
PAP: NORMAL
SPECIMEN SOURCE: NORMAL

## 2020-10-09 PROCEDURE — 99203 OFFICE O/P NEW LOW 30 MIN: CPT | Mod: 95 | Performed by: PHYSICIAN ASSISTANT

## 2020-10-09 ASSESSMENT — MIFFLIN-ST. JEOR: SCORE: 1329.78

## 2020-10-09 ASSESSMENT — PAIN SCALES - GENERAL
PAINLEVEL: NO PAIN (0)
PAINLEVEL: NO PAIN (0)

## 2020-10-09 NOTE — PROGRESS NOTES
GI CLINIC VISIT    CC/REFERRING MD:  No ref. provider found  REASON FOR CONSULTATION: cough, dysphagia, GERD    ASSESSMENT/PLAN:  Jeanine Schuler is a 59 year old women with a past medical history of mixed connective tissue disease/limited cutaneous systemic sclerosis, history of gastric bypass, GERD, heart failure with preserved ejection fraction presenting for evaluation of dysphasia and epigastric pain in the setting of worsened cough.      1. Cough, dysphagia, GERD, weight loss  Patient has had a history of GERD and dysphagia, with symptoms worse in the summer around the same time of worsening cough (undergoing workup with pulmonology).  Does have history of grade B esophagitis on most recent upper endoscopy, repeat EGD to assess for extent healing was not completed.  Also did not complete esophagram and gastric emptying study.  Given worsening symptoms in addition to significant weight loss, would recommend evaluation for upper endoscopy for esophagitis, Schatzki ring, and esophageal malignancy.  It is also possible that symptoms are due to underlying scleroderma and altered esophageal motility.  Discussed good swallowing hygiene and lifestyle modifications for GERD.  Would recommend following up with esophageal specialist with future consideration of manometry, potentially thoracic surgery referral to discuss hiatal hernia.     In regards to weight loss, patient reports no significant change in diet.  Has had chest imaging without malignancy.  Will evaluate for GI malignancy with colonoscopy and upper endoscopy.  TSH is in with within normal limits.  Is scheduled for mammogram, and has had recent Pap.  Pending results of endoscopic work-up, could consider CT scan of abdomen and pelvis.  --Upper endoscopy to evaluate symptoms  -- Swallowing recommendations: take small bites and drink sips of water in between. Look forward when you swallow and tuck your chin. Do not talk and eat at the same time.   GERD  Lifestyle Modifications  -- Avoid foods high in fat or high fructose corn syrup  -- do not eat within three hours of laying down or going to bed  -- raise the head of your bed 6-8 inches  -- avoid alcohol  -- aim for BMI of less than <25 and lose extra weight as needed  -- continue pantoprazole 40 mg a day     RTC 4-6 weeks with esophageal specialist     Thank you for this consultation.  It was a pleasure to participate in the care of this patient; please contact us with any further questions. I spent a total of 30 minutes face-to-face (doximity) with Jeanine Schuler during today's office visit.  Over 50% of which was counseling/coordinating care regarding the above delineated issues. Please see note for details.     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      She has previously been seen by Dr. Zambrano, most recently in 2016. Please see her documentation for additional details.  Previously followed for dysphasia and GERD.  EGD 10/31/2014- no stricture was noted, empirically dilated up to 20 mm with TTS dilator. Esophageal biopsies were negative for EOE. EGD 2016- LA Grade B esophagitis -was recommended to have follow-up EGD, gastric emptying study and esophagram which were not completed.    Today the patient notes since her last GI visit with 2016, has had occasional episodes of dysphagia and epigastric pain which resolve on their own.    Describes increased coughing in July which per patient started out of the blue.  Initially this was attributed to pneumonia, has been evaluated by pulmonology with recent bronchoscopy.  Potentially thought to be due to  interstitial lung disease.    Has had chest CTs for her symptoms.  Most recently 8/21/2020.  At this time was found to have a patulous debris-filled esophagus, consistent with impaired motility and history of scleroderma.    The patient describes the following symptoms:    Epigastric pain- Right side of rib cage, this used to be about one time a week. Now it occurs almost every day unless she eats soft food. Can be triggered by rice, noodles. Drinking water goes ok. Feels like there is something stuck there. This will last an hour after a meal. Will occasionally have to cough up food. Describes when this comes up she will have thick mucus. No pills getting stuck. Does have nausea, no vomiting. These episodes will last 2-3 minutes and go away. Lately one time a week.     GERD- avoids anything spicy, avoids drinking pop as this will cause a burning sensation. Can wake up in the middle of the night and taste something weird. Taking protonix 40 mg 1-2 tablets a day.     Cough- non-productive. Initially was at night, now seems more at the day. Tessalon does help with symptoms.     ROS:    No fevers or chills   (night sweats have improved)  + weight loss (30 lbs in the last 6 months)   No blurry vision, double vision or change in vision  No sore throat  No lymphadenopathy  No headache, + paraesthesias (fingers and toes), or weakness in a limb  + shortness of breath or wheezing  No chest pain or pressure  + arthralgias or myalgias  No rashes or skin changes  No odynophagia  + dysphagia- per above   No BRBPR, hematochezia, melena  No dysuria, frequency or urgency  No hot/cold intolerance or polyria  No anxiety or depression    PROBLEM LIST  Patient Active Problem List    Diagnosis Date Noted     ILD (interstitial lung disease) (H) 09/16/2020     Priority: Medium     Added automatically from request for surgery [id number]       Abnormal CT of the chest 08/04/2020     Priority: Medium     Rheumatoid arthritis with negative  rheumatoid factor, involving unspecified site (H) 12/15/2015     Priority: Medium     Pharyngeal dysphagia 11/17/2015     Priority: Medium     Gastroesophageal reflux disease, esophagitis presence not specified 11/17/2015     Priority: Medium     IMO Regulatory Load OCT 2020       Disturbed sleep rhythm 04/07/2015     Priority: Medium     Hypothyroidism 10/10/2012     Priority: Medium     Shortness of breath 07/26/2012     Priority: Medium     S/P dilatation of esophageal stricture 02/10/2012     Priority: Medium     FH: CAD (coronary artery disease) 09/11/2011     Priority: Medium     Sjogren's disease (H) 09/11/2011     Priority: Medium     Raynaud's disease 09/11/2011     Priority: Medium     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 Nicolás Murdockitor:   1. Limited cutaneous systemic sclerosis with high titer anticentromere antibody positive, but also phospholipid antibodies.   2. Severe multiple digital ulcers, on fingers and toes. Overall symptoms of Raynaud's seems to be worse.   3. Marked keratoconjunctivitis sicca over the last several years.   4. Marked iron deficiency anemia responsive to IV iron.   5. Diffuse musculoskeletal pain for which she has been taking Naprosyn.   6. Development of 2+ bilateral lower extremity edema with the addition of diltiazem to her regimen and prior contraindication to dihydropyridines secondary to lip swelling and tingling with amlodipine.   7. Marked fatigue and diffuse clinical weakness.   8. Dyspnea on  exertion with lower extremity edema and other features including the anticentromere positivity worrisome for the potential development of pulmonary hypertension.   9. Marked GERD with associated dysphagia.   10. Status post gastric bypass with a history of intermittent constipation and diarrhea potentially consistent with bacterial small bowel overgrowth versus other gut effects of the prior surgery.   11. History of positive rheumatoid factor consistent with MCTD, given the remainder of her clinical presentation.   12. Progressive Jaw tightening with inability to open mouth wider than 1 inch.        S/P gastric bypass 07/23/2009     Priority: Medium     Vitamin B 12 deficiency 07/23/2009     Priority: Medium     Kidney stone 05/10/2007     Priority: Medium     Low back pain - Suspect SI Joint dysfunction 09/27/2005     Priority: Medium     September 27, 2005: Started 2 weeks ago.  bilateral buttocks - better with walking, worse with sitting and driving.  Tight/stiff - lossens up.  Painful to lay down.  Constant ache.  8-10/10 at its worse.  No injury.  Tender with massage.  No radiculopathy.  Relief with muscle relaxant.       Chronic cough 09/27/2005     Priority: Medium     September 27, 2005: Persistent cough occl productive of stringy mucus, wheezes with cough.  Started 2 weeks ago.  No rhinorrhea or post nasal drip        Prolonged QTc 460 ms,  at hr 67 06/24/2005     Priority: Medium     June 24, 2005: Will check calcium, magnesium, potassium.  Will compare to old EKGs.  Reviewed meds and none are on the list for prolongation.  Anesthesiology should be aware at surgery of prolonged QT.       HTN (hypertension) 03/02/2005     Priority: Medium     April 22, 2005: BP controlled on prinizide 40/25, norvasc 5 September 27, 2005: BP controlled/marginal off meds post-gastric bypass.  Problem list name updated by automated process. Provider to review    Overview:   Normal EF, no significant pulmonary  hypertension.        Thyrotoxicosis 03/02/2005     Priority: Medium     Insomnia, lost weight since 12/02 (max was 346).  Globus sensation in neck.  Normal bowel movements.  Normal skin dryness.  No anxiety or palpitations.  TSH was normal in 2002 with Htn work-up, now <0.03 with work-up for gastric bypass.  April 22, 2005: Suspect thyroiditis which has now normalized.  I would recommend rechecking in 3 months.  Problem list name updated by automated process. Provider to review         PERTINENT PAST MEDICAL HISTORY:  Past Medical History:   Diagnosis Date     Anemia      Cellulitis of leg 6/6/2013     Esophageal reflux     Better post-hiatal hernia repair and weight loss     Limb ischemia; ulcers of fingertips 4/22/2013     Raynaud's syndrome      Rheumatoid arthritis (H) \     Scleroderma (H)      Sjogren-Jake syndrome      Systemic sclerosis (H) 2009     Unspecified essential hypertension        PREVIOUS SURGERIES:  Past Surgical History:   Procedure Laterality Date     BRONCHOSCOPY FLEXIBLE,L CRYOBIOPSY N/A 10/6/2020    Procedure: BRONCHOSCOPY, FLEXIBLE, WITH TRANSBRONCHIAL BIOPSY x4 USING CRYOPROBE, bronchial alveolar lavage;  Surgeon: Teddy Mendez MD;  Location: [facility] OR     BYPASS GASTRIC, CHOLECYSTECTOMY, COMBINED       CHOLECYSTECTOMY       COLONOSCOPY       ESOPHAGOSCOPY, GASTROSCOPY, DUODENOSCOPY (EGD), COMBINED N/A 3/10/2016    Procedure: COMBINED ESOPHAGOSCOPY, GASTROSCOPY, DUODENOSCOPY (EGD), BIOPSY SINGLE OR MULTIPLE;  Surgeon: Tricia Zambrano MD;  Location: [facility] GI     INNER EAR SURGERY       PICC INSERTION  6/5/2013    5fr DL Power PICC, 44cm, left basilic vein, tip in low SVC     SURGICAL HISTORY OF -       Left ear surgery - incus transition, tympanoplasty     SURGICAL HISTORY OF -   6/05    Gastric bypass     SURGICAL HISTORY OF -   6/05    Cholecystectomy     SURGICAL HISTORY OF -   6/05    Hiatal hernia repair     TONSILLECTOMY         PREVIOUS ENDOSCOPY:  EGD was  10/31/2014; no stricture was noted but she was empirically dilated up to 20 mm with TTS dilator. The dilating balloon was inflated at GE junction and then run up through the esophagus per the outside endoscopic report. A polypoid lesion was found in her gastric pouch (biopsies showed mild, inactive, chronic gastritis, no H pylori) and esophageal biopsies for eosinophilic esophagitis were negative (lower esophagus biopsies were suggestive of reflux, mid-esophagus biopsies with mild, non-specific lymphocytic infiltrate)    2016:   - LA Grade B reflux esophagitis. Rule out Quiroga's                        esophagus. Biopsied. Injected with 2 ML of epinephrine                        for hemostasis.                        - Lumen of the esophagus was noted to be dilated upon                        entrance of the endoscope. There were no obstructing                        lesions, narrowing.   - Eleazar-en-Y gastrojejunostomy. The gastrojejunal                        anastomosis had healthy appearing mucosa. A fistula was                        seen at the staple line. Both the surgical GJ and the                        fistula opening were traversed. Both led to the jejunum.                        - A medium amount of food (residue) in the stomach.                        - Normal examined jejunum.     Last colonoscopy in 2008 or 2009     ALLERGIES:     Allergies   Allergen Reactions     Lovenox Other (See Comments)     Blood clot      Norvasc [Amlodipine Besylate] Swelling     Lip swelling that happened on 2 different occasions     Erythromycin Rash     Rash on arms       PERTINENT MEDICATIONS:    Current Outpatient Medications:      benzonatate (TESSALON) 100 MG capsule, Take 1 capsule (100 mg) by mouth 3 times daily as needed for cough, Disp: 60 capsule, Rfl: 0     furosemide (LASIX) 40 MG tablet, Take 40 mg by mouth daily, Disp: , Rfl:      hydroxychloroquine (PLAQUENIL) 200 MG tablet, Take 1 tablet (200 mg) by mouth 2  times daily * PLEASE SCHEDULE EYE EXAM, Disp: 60 tablet, Rfl: 0     losartan (COZAAR) 50 MG tablet, Take 0.5 tablets (25 mg) by mouth daily, Disp: 45 tablet, Rfl: 3     pantoprazole (PROTONIX) 40 MG EC tablet, Take 1 tablet (40 mg) by mouth daily, Disp: 90 tablet, Rfl: 3     Potassium (POTASSIMIN PO), 50mg 3 times daily, Disp: , Rfl:      spironolactone (ALDACTONE) 25 MG tablet, Take 25 mg by mouth, Disp: , Rfl:      vitamin D2 (ERGOCALCIFEROL) 28784 units (1250 mcg) capsule, Take 1 capsule (50,000 Units) by mouth once a week for 12 doses, Disp: 12 capsule, Rfl: 0    SOCIAL HISTORY:  No drugs, alcohol occasionally   Social History     Socioeconomic History     Marital status:      Spouse name: Not on file     Number of children: Not on file     Years of education: Not on file     Highest education level: Not on file   Occupational History     Not on file   Social Needs     Financial resource strain: Not on file     Food insecurity     Worry: Not on file     Inability: Not on file     Transportation needs     Medical: Not on file     Non-medical: Not on file   Tobacco Use     Smoking status: Never Smoker     Smokeless tobacco: Never Used   Substance and Sexual Activity     Alcohol use: Yes     Comment: occassionally     Drug use: No     Sexual activity: Yes     Partners: Male     Birth control/protection: None   Lifestyle     Physical activity     Days per week: Not on file     Minutes per session: Not on file     Stress: Not on file   Relationships     Social connections     Talks on phone: Not on file     Gets together: Not on file     Attends [employer] service: Not on file     Active member of club or organization: Not on file     Attends meetings of clubs or organizations: Not on file     Relationship status: Not on file     Intimate partner violence     Fear of current or ex partner: Not on file     Emotionally abused: Not on file     Physically abused: Not on file     Forced sexual activity: Not on file    Other Topics Concern     Parent/sibling w/ CABG, MI or angioplasty before 65F 55M? 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  She has been on medical leave since 7/27/2020        Moved about 4 years ago to a new house. No known water damage or mold.        She lives in a normal area with forms around her house, but denies any exposure to grain dust, hay, other farm animals.        No unusual hobbies           FAMILY HISTORY:  FH of CRC: none   FH of IBD: none   Brother with diverticulitis   Family History   Problem Relation Age of Onset     Cerebrovascular Disease Father      Heart Disease Father      Hypertension Father      Heart Disease Mother      Hypertension Mother      Chronic Obstructive Pulmonary Disease Sister      Heart Disease Sister      Hypertension Sister      Cerebrovascular Disease Brother      Heart Disease Brother      Kidney Disease Brother         on dialysis     Diabetes Brother         borderline     No Known Problems Son      No Known Problems Son      No Known Problems Daughter      Cancer Brother         small cell     Heart Disease Brother      Heart Disease Brother      Heart Disease Brother      Heart Disease Brother         tripple A     Heart Disease Sister      Substance Abuse Sister         alcoholism       Past/family/social history reviewed and no changes    PHYSICAL EXAMINATION:  General appearance: Healthy appearing adult, in no acute distress  Eyes: Sclera anicteric, Pupils round and reactive to light  Ears, nose, mouth and throat: No obvious external lesions of ears and nose, Hearing intact  Neck: Symmetric, No obvious external lesions  Respiratory: Normal respiration, no use of accessory muscles   MSK: grossly normal movement of upper extremities   Skin: No rashes or jaundice   Psychiatric: Oriented to person, place and time, Appropriate mood and affect.     PERTINENT STUDIES:  Office Visit on 10/05/2020    Component Date Value Ref Range Status     Cholesterol 10/05/2020 127  <200 mg/dL Final     Triglycerides 10/05/2020 69  <150 mg/dL Final     HDL Cholesterol 10/05/2020 80  >49 mg/dL Final     LDL Cholesterol Calculated 10/05/2020 33  <100 mg/dL Final     Non HDL Cholesterol 10/05/2020 47  <130 mg/dL Final     Vitamin B12 10/05/2020 545  193 - 986 pg/mL Final     Folate 10/05/2020 12.0  >5.4 ng/mL Final     Ferritin 10/05/2020 151  8 - 252 ng/mL Final     Vitamin D Deficiency screening 10/05/2020 12* 20 - 75 ug/L Final

## 2020-10-09 NOTE — LETTER
10/9/2020       RE: Jeanine Schuler  [address]  [facility] [address]     Dear Colleague,    Thank you for referring your patient, Jeanine Schuler, to the [facility] GASTROENTEROLOGY CLINIC [place] at [facility]. Please see a copy of my visit note below.    GI CLINIC VISIT    CC/REFERRING MD:  No ref. provider found  REASON FOR CONSULTATION: cough, dysphagia, GERD    ASSESSMENT/PLAN:  Jeanine Schuler is a 59 year old women with a past medical history of mixed connective tissue disease/limited cutaneous systemic sclerosis, history of gastric bypass, GERD, heart failure with preserved ejection fraction presenting for evaluation of dysphasia and epigastric pain in the setting of worsened cough.      1. Cough, dysphagia, GERD, weight loss  Patient has had a history of GERD and dysphagia, with symptoms worse in the summer around the same time of worsening cough (undergoing workup with pulmonology).  Does have history of grade B esophagitis on most recent upper endoscopy, repeat EGD to assess for extent healing was not completed.  Also did not complete esophagram and gastric emptying study.  Given worsening symptoms in addition to significant weight loss, would recommend evaluation for upper endoscopy for esophagitis, Schatzki ring, and esophageal malignancy.  It is also possible that symptoms are due to underlying scleroderma and altered esophageal motility.  Discussed good swallowing hygiene and lifestyle modifications for GERD.  Would recommend following up with esophageal specialist with future consideration of manometry, potentially thoracic surgery referral to discuss hiatal hernia.     In regards to weight loss, patient reports no significant change in diet.  Has had chest imaging without malignancy.  Will evaluate for GI malignancy with colonoscopy and upper endoscopy.  TSH is in with within normal limits.  Is scheduled for mammogram, and has had recent  Pap.  Pending results of endoscopic work-up, could consider CT scan of abdomen and pelvis.  --Upper endoscopy to evaluate symptoms  -- Swallowing recommendations: take small bites and drink sips of water in between. Look forward when you swallow and tuck your chin. Do not talk and eat at the same time.   GERD Lifestyle Modifications  -- Avoid foods high in fat or high fructose corn syrup  -- do not eat within three hours of laying down or going to bed  -- raise the head of your bed 6-8 inches  -- avoid alcohol  -- aim for BMI of less than <25 and lose extra weight as needed  -- continue pantoprazole 40 mg a day     RTC 4-6 weeks with esophageal specialist     Thank you for this consultation.  It was a pleasure to participate in the care of this patient; please contact us with any further questions. I spent a total of 30 minutes [facility] Jeanine MARTÍN Schuler during today's office visit.  Over 50% of which was counseling/coordinating care regarding the above delineated issues. Please see note for details.     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      She has previously been seen by Dr. Zambrano, most recently in 2016. Please see her documentation for additional details.  Previously followed for dysphasia and GERD.  EGD 10/31/2014- no stricture was noted, empirically dilated up to 20 mm with TTS dilator. Esophageal biopsies were negative for EOE. EGD 2016- LA Grade B esophagitis -was recommended to have follow-up EGD, gastric emptying study and esophagram which were not completed.    Today the patient notes since her last GI visit with 2016, has had  occasional episodes of dysphagia and epigastric pain which resolve on their own.    Describes increased coughing in July which per patient started out of the blue.  Initially this was attributed to pneumonia, has been evaluated by pulmonology with recent bronchoscopy.  Potentially thought to be due to interstitial lung disease.    Has had chest CTs for her symptoms.  Most recently 8/21/2020.  At this time was found to have a patulous debris-filled esophagus, consistent with impaired motility and history of scleroderma.    The patient describes the following symptoms:    Epigastric pain- Right side of rib cage, this used to be about one time a week. Now it occurs almost every day unless she eats soft food. Can be triggered by rice, noodles. Drinking water goes ok. Feels like there is something stuck there. This will last an hour after a meal. Will occasionally have to cough up food. Describes when this comes up she will have thick mucus. No pills getting stuck. Does have nausea, no vomiting. These episodes will last 2-3 minutes and go away. Lately one time a week.     GERD- avoids anything spicy, avoids drinking pop as this will cause a burning sensation. Can wake up in the middle of the night and taste something weird. Taking protonix 40 mg 1-2 tablets a day.     Cough- non-productive. Initially was at night, now seems more at the day. Tessalon does help with symptoms.     ROS:    No fevers or chills   (night sweats have improved)  + weight loss (30 lbs in the last 6 months)   No blurry vision, double vision or change in vision  No sore throat  No lymphadenopathy  No headache, + paraesthesias (fingers and toes), or weakness in a limb  + shortness of breath or wheezing  No chest pain or pressure  + arthralgias or myalgias  No rashes or skin changes  No odynophagia  + dysphagia- per above   No BRBPR, hematochezia, melena  No dysuria, frequency or urgency  No hot/cold intolerance or polyria  No anxiety or  depression    PROBLEM LIST  Patient Active Problem List    Diagnosis Date Noted     ILD (interstitial lung disease) (H) 09/16/2020     Priority: Medium     Added automatically from request for surgery [id number]       Abnormal CT of the chest 08/04/2020     Priority: Medium     Rheumatoid arthritis with negative rheumatoid factor, involving unspecified site (H) 12/15/2015     Priority: Medium     Pharyngeal dysphagia 11/17/2015     Priority: Medium     Gastroesophageal reflux disease, esophagitis presence not specified 11/17/2015     Priority: Medium     IMO Regulatory Load OCT 2020       Disturbed sleep rhythm 04/07/2015     Priority: Medium     Hypothyroidism 10/10/2012     Priority: Medium     Shortness of breath 07/26/2012     Priority: Medium     S/P dilatation of esophageal stricture 02/10/2012     Priority: Medium     FH: CAD (coronary artery disease) 09/11/2011     Priority: Medium     Sjogren's disease (H) 09/11/2011     Priority: Medium     Raynaud's disease 09/11/2011     Priority: Medium     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 Echo 10/25/2011 4.0 cm        Systemic sclerosis (H) 07/28/2011     Priority: Medium     Tumoral calcinosis 07/28/2011     Priority: Medium     Iron deficiency anemia 07/30/2009     Priority: Medium     Collagen vascular disease (H) 07/23/2009     Priority: Medium     Overview:   7/26/12 Rheumatology at the [facility] Dr. Nicolás Murdockitor:   1. Limited cutaneous systemic sclerosis with high titer anticentromere antibody positive, but also phospholipid antibodies.   2. Severe multiple digital ulcers, on fingers and toes. Overall symptoms of Raynaud's seems to be worse.   3. Marked keratoconjunctivitis sicca over the last several years.   4. Marked iron deficiency anemia responsive to IV iron.   5. Diffuse musculoskeletal pain for  which she has been taking Naprosyn.   6. Development of 2+ bilateral lower extremity edema with the addition of diltiazem to her regimen and prior contraindication to dihydropyridines secondary to lip swelling and tingling with amlodipine.   7. Marked fatigue and diffuse clinical weakness.   8. Dyspnea on exertion with lower extremity edema and other features including the anticentromere positivity worrisome for the potential development of pulmonary hypertension.   9. Marked GERD with associated dysphagia.   10. Status post gastric bypass with a history of intermittent constipation and diarrhea potentially consistent with bacterial small bowel overgrowth versus other gut effects of the prior surgery.   11. History of positive rheumatoid factor consistent with MCTD, given the remainder of her clinical presentation.   12. Progressive Jaw tightening with inability to open mouth wider than 1 inch.        S/P gastric bypass 07/23/2009     Priority: Medium     Vitamin B 12 deficiency 07/23/2009     Priority: Medium     Kidney stone 05/10/2007     Priority: Medium     Low back pain - Suspect SI Joint dysfunction 09/27/2005     Priority: Medium     September 27, 2005: Started 2 weeks ago.  bilateral buttocks - better with walking, worse with sitting and driving.  Tight/stiff - lossens up.  Painful to lay down.  Constant ache.  8-10/10 at its worse.  No injury.  Tender with massage.  No radiculopathy.  Relief with muscle relaxant.       Chronic cough 09/27/2005     Priority: Medium     September 27, 2005: Persistent cough occl productive of stringy mucus, wheezes with cough.  Started 2 weeks ago.  No rhinorrhea or post nasal drip        Prolonged QTc 460 ms,  at hr 67 06/24/2005     Priority: Medium     June 24, 2005: Will check calcium, magnesium, potassium.  Will compare to old EKGs.  Reviewed meds and none are on the list for prolongation.  Anesthesiology should be aware at surgery of prolonged QT.       HTN  (hypertension) 03/02/2005     Priority: Medium     April 22, 2005: BP controlled on prinizide 40/25, norvasc 5 September 27, 2005: BP controlled/marginal off meds post-gastric bypass.  Problem list name updated by automated process. Provider to review    Overview:   Normal EF, no significant pulmonary hypertension.        Thyrotoxicosis 03/02/2005     Priority: Medium     Insomnia, lost weight since 12/02 (max was 346).  Globus sensation in neck.  Normal bowel movements.  Normal skin dryness.  No anxiety or palpitations.  TSH was normal in 2002 with Htn work-up, now <0.03 with work-up for gastric bypass.  April 22, 2005: Suspect thyroiditis which has now normalized.  I would recommend rechecking in 3 months.  Problem list name updated by automated process. Provider to review       PERTINENT PAST MEDICAL HISTORY:  Past Medical History:   Diagnosis Date     Anemia      Cellulitis of leg 6/6/2013     Esophageal reflux     Better post-hiatal hernia repair and weight loss     Limb ischemia; ulcers of fingertips 4/22/2013     Raynaud's syndrome      Rheumatoid arthritis (H) \     Scleroderma (H)      Sjogren-Jake syndrome      Systemic sclerosis (H) 2009     Unspecified essential hypertension      PREVIOUS SURGERIES:  Past Surgical History:   Procedure Laterality Date     BRONCHOSCOPY FLEXIBLE,L CRYOBIOPSY N/A 10/6/2020    Procedure: BRONCHOSCOPY, FLEXIBLE, WITH TRANSBRONCHIAL BIOPSY x4 USING CRYOPROBE, bronchial alveolar lavage;  Surgeon: Teddy Mendez MD;  Location: [facility]     BYPASS GASTRIC, CHOLECYSTECTOMY, COMBINED       CHOLECYSTECTOMY       COLONOSCOPY       ESOPHAGOSCOPY, GASTROSCOPY, DUODENOSCOPY (EGD), COMBINED N/A 3/10/2016    Procedure: COMBINED ESOPHAGOSCOPY, GASTROSCOPY, DUODENOSCOPY (EGD), BIOPSY SINGLE OR MULTIPLE;  Surgeon: Tricia Zambrano MD;  Location: [facility]     INNER EAR SURGERY       PICC INSERTION  6/5/2013    5fr DL Power PICC, 44cm, left basilic vein, tip in low SVC      SURGICAL HISTORY OF -       Left ear surgery - incus transition, tympanoplasty     SURGICAL HISTORY OF -   6/05    Gastric bypass     SURGICAL HISTORY OF -   6/05    Cholecystectomy     SURGICAL HISTORY OF -   6/05    Hiatal hernia repair     TONSILLECTOMY       PREVIOUS ENDOSCOPY:  EGD was 10/31/2014; no stricture was noted but she was empirically dilated up to 20 mm with TTS dilator. The dilating balloon was inflated at GE junction and then run up through the esophagus per the outside endoscopic report. A polypoid lesion was found in her gastric pouch (biopsies showed mild, inactive, chronic gastritis, no H pylori) and esophageal biopsies for eosinophilic esophagitis were negative (lower esophagus biopsies were suggestive of reflux, mid-esophagus biopsies with mild, non-specific lymphocytic infiltrate)    2016:   - LA Grade B reflux esophagitis. Rule out Quiroga's                        esophagus. Biopsied. Injected with 2 ML of epinephrine                        for hemostasis.                        - Lumen of the esophagus was noted to be dilated upon                        entrance of the endoscope. There were no obstructing                        lesions, narrowing.   - Eleazar-en-Y gastrojejunostomy. The gastrojejunal                        anastomosis had healthy appearing mucosa. A fistula was                        seen at the staple line. Both the surgical GJ and the                        fistula opening were traversed. Both led to the jejunum.                        - A medium amount of food (residue) in the stomach.                        - Normal examined jejunum.     Last colonoscopy in 2008 or 2009     ALLERGIES:  Allergies   Allergen Reactions     Lovenox Other (See Comments)     Blood clot      Norvasc [Amlodipine Besylate] Swelling     Lip swelling that happened on 2 different occasions     Erythromycin Rash     Rash on arms     PERTINENT MEDICATIONS:  Current Outpatient Medications:       benzonatate (TESSALON) 100 MG capsule, Take 1 capsule (100 mg) by mouth 3 times daily as needed for cough, Disp: 60 capsule, Rfl: 0     furosemide (LASIX) 40 MG tablet, Take 40 mg by mouth daily, Disp: , Rfl:      hydroxychloroquine (PLAQUENIL) 200 MG tablet, Take 1 tablet (200 mg) by mouth 2 times daily * PLEASE SCHEDULE EYE EXAM, Disp: 60 tablet, Rfl: 0     losartan (COZAAR) 50 MG tablet, Take 0.5 tablets (25 mg) by mouth daily, Disp: 45 tablet, Rfl: 3     pantoprazole (PROTONIX) 40 MG EC tablet, Take 1 tablet (40 mg) by mouth daily, Disp: 90 tablet, Rfl: 3     Potassium (POTASSIMIN PO), 50mg 3 times daily, Disp: , Rfl:      spironolactone (ALDACTONE) 25 MG tablet, Take 25 mg by mouth, Disp: , Rfl:      vitamin D2 (ERGOCALCIFEROL) 86059 units (1250 mcg) capsule, Take 1 capsule (50,000 Units) by mouth once a week for 12 doses, Disp: 12 capsule, Rfl: 0    SOCIAL HISTORY:  No drugs, alcohol occasionally   Social History     Socioeconomic History     Marital status:      Spouse name: Not on file     Number of children: Not on file     Years of education: Not on file     Highest education level: Not on file   Occupational History     Not on file   Social Needs     Financial resource strain: Not on file     Food insecurity     Worry: Not on file     Inability: Not on file     Transportation needs     Medical: Not on file     Non-medical: Not on file   Tobacco Use     Smoking status: Never Smoker     Smokeless tobacco: Never Used   Substance and Sexual Activity     Alcohol use: Yes     Comment: occassionally     Drug use: No     Sexual activity: Yes     Partners: Male     Birth control/protection: None   Lifestyle     Physical activity     Days per week: Not on file     Minutes per session: Not on file     Stress: Not on file   Relationships     Social connections     Talks on phone: Not on file     Gets together: Not on file     Attends Zoroastrian service: Not on file     Active member of club or organization: Not  on file     Attends meetings of clubs or organizations: Not on file     Relationship status: Not on file     Intimate partner violence     Fear of current or ex partner: Not on file     Emotionally abused: Not on file     Physically abused: Not on file     Forced sexual activity: Not on file   Other Topics Concern     Parent/sibling w/ CABG, MI or angioplasty before 65F 55M? 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  She has been on medical leave since 7/27/2020        Moved about 4 years ago to a new house. No known water damage or mold.        She lives in a normal area with forms around her house, but denies any exposure to grain dust, hay, other farm animals.        No unusual hobbies     FAMILY HISTORY:  FH of CRC: none   FH of IBD: none   Brother with diverticulitis   Family History   Problem Relation Age of Onset     Cerebrovascular Disease Father      Heart Disease Father      Hypertension Father      Heart Disease Mother      Hypertension Mother      Chronic Obstructive Pulmonary Disease Sister      Heart Disease Sister      Hypertension Sister      Cerebrovascular Disease Brother      Heart Disease Brother      Kidney Disease Brother         on dialysis     Diabetes Brother         borderline     No Known Problems Son      No Known Problems Son      No Known Problems Daughter      Cancer Brother         small cell     Heart Disease Brother      Heart Disease Brother      Heart Disease Brother      Heart Disease Brother         tripple A     Heart Disease Sister      Substance Abuse Sister         alcoholism     Past/family/social history reviewed and no changes    PHYSICAL EXAMINATION:  General appearance: Healthy appearing adult, in no acute distress  Eyes: Sclera anicteric, Pupils round and reactive to light  Ears, nose, mouth and throat: No obvious external lesions of ears and nose, Hearing intact  Neck: Symmetric, No obvious external  lesions  Respiratory: Normal respiration, no use of accessory muscles   MSK: grossly normal movement of upper extremities   Skin: No rashes or jaundice   Psychiatric: Oriented to person, place and time, Appropriate mood and affect.     PERTINENT STUDIES:  Office Visit on 10/05/2020   Component Date Value Ref Range Status     Cholesterol 10/05/2020 127  <200 mg/dL Final     Triglycerides 10/05/2020 69  <150 mg/dL Final     HDL Cholesterol 10/05/2020 80  >49 mg/dL Final     LDL Cholesterol Calculated 10/05/2020 33  <100 mg/dL Final     Non HDL Cholesterol 10/05/2020 47  <130 mg/dL Final     Vitamin B12 10/05/2020 545  193 - 986 pg/mL Final     Folate 10/05/2020 12.0  >5.4 ng/mL Final     Ferritin 10/05/2020 151  8 - 252 ng/mL Final     Vitamin D Deficiency screening 10/05/2020 12* 20 - 75 ug/L Final       Again, thank you for allowing me to participate in the care of your patient.  Sincerely,    Flora Elizabeth PA-C

## 2020-10-09 NOTE — LETTER
10/9/2020         RE: Jean[place] Schuler  [id number] [facility] [address]        Dear Colleague,    Thank you for referring your patient, Jeanine Schuler, to the [facility] [place]. Please see a copy of my visit note below.    GI CLINIC VISIT    CC/REFERRING MD:  No ref. provider found  REASON FOR CONSULTATION: cough, dysphagia, GERD    ASSESSMENT/PLAN:  Jeanine Schuler is a 59 year old women with a past medical history of mixed connective tissue disease/limited cutaneous systemic sclerosis, history of gastric bypass, GERD, heart failure with preserved ejection fraction presenting for evaluation of dysphasia and epigastric pain in the setting of worsened cough.      1. Cough, dysphagia, GERD, weight loss  Patient has had a history of GERD and dysphagia, with symptoms worse in the summer around the same time of worsening cough (undergoing workup with pulmonology).  Does have history of grade B esophagitis on most recent upper endoscopy, repeat EGD to assess for extent healing was not completed.  Also did not complete esophagram and gastric emptying study.  Given worsening symptoms in addition to significant weight loss, would recommend evaluation for upper endoscopy for esophagitis, Schatzki ring, and esophageal malignancy.  It is also possible that symptoms are due to underlying scleroderma and altered esophageal motility.  Discussed good swallowing hygiene and lifestyle modifications for GERD.  Would recommend following up with esophageal specialist with future consideration of manometry, potentially thoracic surgery referral to discuss hiatal hernia.     In regards to weight loss, patient reports no significant change in diet.  Has had chest imaging without malignancy.  Will evaluate for GI malignancy with colonoscopy and upper endoscopy.  TSH is in with within normal limits.  Is scheduled for mammogram, and has had recent Pap.  Pending results of endoscopic  work-up, could consider CT scan of abdomen and pelvis.  --Upper endoscopy to evaluate symptoms  -- Swallowing recommendations: take small bites and drink sips of water in between. Look forward when you swallow and tuck your chin. Do not talk and eat at the same time.   GERD Lifestyle Modifications  -- Avoid foods high in fat or high fructose corn syrup  -- do not eat within three hours of laying down or going to bed  -- raise the head of your bed 6-8 inches  -- avoid alcohol  -- aim for BMI of less than <25 and lose extra weight as needed  -- continue pantoprazole 40 mg a day     RTC 4-6 weeks with esophageal specialist     Thank you for this consultation.  It was a pleasure to participate in the care of this patient; please contact us with any further questions. I spent a total of 30 minutes face-to-face (doximity) with Jeanine Schuler during today's office visit.  Over 50% of which was counseling/coordinating care regarding the above delineated issues. Please see note for details.     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      She has previously been seen by Dr. Zambrano, most recently in 2016. Please see her documentation for additional details.  Previously followed for dysphasia and GERD.  EGD 10/31/2014- no stricture was noted, empirically dilated up to 20 mm with TTS dilator. Esophageal biopsies were negative for EOE. EGD 2016- LA Grade B esophagitis -was recommended to have follow-up EGD, gastric emptying study and esophagram which were not completed.    Today the patient notes since her last GI visit with 2016, has had occasional episodes of dysphagia  and epigastric pain which resolve on their own.    Describes increased coughing in July which per patient started out of the blue.  Initially this was attributed to pneumonia, has been evaluated by pulmonology with recent bronchoscopy.  Potentially thought to be due to interstitial lung disease.    Has had chest CTs for her symptoms.  Most recently 8/21/2020.  At this time was found to have a patulous debris-filled esophagus, consistent with impaired motility and history of scleroderma.    The patient describes the following symptoms:    Epigastric pain- Right side of rib cage, this used to be about one time a week. Now it occurs almost every day unless she eats soft food. Can be triggered by rice, noodles. Drinking water goes ok. Feels like there is something stuck there. This will last an hour after a meal. Will occasionally have to cough up food. Describes when this comes up she will have thick mucus. No pills getting stuck. Does have nausea, no vomiting. These episodes will last 2-3 minutes and go away. Lately one time a week.     GERD- avoids anything spicy, avoids drinking pop as this will cause a burning sensation. Can wake up in the middle of the night and taste something weird. Taking protonix 40 mg 1-2 tablets a day.     Cough- non-productive. Initially was at night, now seems more at the day. Tessalon does help with symptoms.     ROS:    No fevers or chills   (night sweats have improved)  + weight loss (30 lbs in the last 6 months)   No blurry vision, double vision or change in vision  No sore throat  No lymphadenopathy  No headache, + paraesthesias (fingers and toes), or weakness in a limb  + shortness of breath or wheezing  No chest pain or pressure  + arthralgias or myalgias  No rashes or skin changes  No odynophagia  + dysphagia- per above   No BRBPR, hematochezia, melena  No dysuria, frequency or urgency  No hot/cold intolerance or polyria  No anxiety or depression    PROBLEM LIST  Patient Active  Problem List    Diagnosis Date Noted     ILD (interstitial lung disease) (H) 09/16/2020     Priority: Medium     Added automatically from request for surgery [id number]       Abnormal CT of the chest 08/04/2020     Priority: Medium     Rheumatoid arthritis with negative rheumatoid factor, involving unspecified site (H) 12/15/2015     Priority: Medium     Pharyngeal dysphagia 11/17/2015     Priority: Medium     Gastroesophageal reflux disease, esophagitis presence not specified 11/17/2015     Priority: Medium     IMO Regulatory Load OCT 2020       Disturbed sleep rhythm 04/07/2015     Priority: Medium     Hypothyroidism 10/10/2012     Priority: Medium     Shortness of breath 07/26/2012     Priority: Medium     S/P dilatation of esophageal stricture 02/10/2012     Priority: Medium     FH: CAD (coronary artery disease) 09/11/2011     Priority: Medium     Sjogren's disease (H) 09/11/2011     Priority: Medium     Raynaud's disease 09/11/2011     Priority: Medium     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 Echo 10/25/2011 4.0 cm        Systemic sclerosis (H) 07/28/2011     Priority: Medium     Tumoral calcinosis 07/28/2011     Priority: Medium     Iron deficiency anemia 07/30/2009     Priority: Medium     Collagen vascular disease (H) 07/23/2009     Priority: Medium     Overview:   7/26/12 Rheumatology at the [facility] Dr. Nicolás Murdockitor:   1. Limited cutaneous systemic sclerosis with high titer anticentromere antibody positive, but also phospholipid antibodies.   2. Severe multiple digital ulcers, on fingers and toes. Overall symptoms of Raynaud's seems to be worse.   3. Marked keratoconjunctivitis sicca over the last several years.   4. Marked iron deficiency anemia responsive to IV iron.   5. Diffuse musculoskeletal pain for which she has been taking Naprosyn.   6.  Development of 2+ bilateral lower extremity edema with the addition of diltiazem to her regimen and prior contraindication to dihydropyridines secondary to lip swelling and tingling with amlodipine.   7. Marked fatigue and diffuse clinical weakness.   8. Dyspnea on exertion with lower extremity edema and other features including the anticentromere positivity worrisome for the potential development of pulmonary hypertension.   9. Marked GERD with associated dysphagia.   10. Status post gastric bypass with a history of intermittent constipation and diarrhea potentially consistent with bacterial small bowel overgrowth versus other gut effects of the prior surgery.   11. History of positive rheumatoid factor consistent with MCTD, given the remainder of her clinical presentation.   12. Progressive Jaw tightening with inability to open mouth wider than 1 inch.        S/P gastric bypass 07/23/2009     Priority: Medium     Vitamin B 12 deficiency 07/23/2009     Priority: Medium     Kidney stone 05/10/2007     Priority: Medium     Low back pain - Suspect SI Joint dysfunction 09/27/2005     Priority: Medium     September 27, 2005: Started 2 weeks ago.  bilateral buttocks - better with walking, worse with sitting and driving.  Tight/stiff - lossens up.  Painful to lay down.  Constant ache.  8-10/10 at its worse.  No injury.  Tender with massage.  No radiculopathy.  Relief with muscle relaxant.       Chronic cough 09/27/2005     Priority: Medium     September 27, 2005: Persistent cough occl productive of stringy mucus, wheezes with cough.  Started 2 weeks ago.  No rhinorrhea or post nasal drip        Prolonged QTc 460 ms,  at hr 67 06/24/2005     Priority: Medium     June 24, 2005: Will check calcium, magnesium, potassium.  Will compare to old EKGs.  Reviewed meds and none are on the list for prolongation.  Anesthesiology should be aware at surgery of prolonged QT.       HTN (hypertension) 03/02/2005     Priority: Medium      April 22, 2005: BP controlled on prinizide 40/25, norvasc 5 September 27, 2005: BP controlled/marginal off meds post-gastric bypass.  Problem list name updated by automated process. Provider to review    Overview:   Normal EF, no significant pulmonary hypertension.        Thyrotoxicosis 03/02/2005     Priority: Medium     Insomnia, lost weight since 12/02 (max was 346).  Globus sensation in neck.  Normal bowel movements.  Normal skin dryness.  No anxiety or palpitations.  TSH was normal in 2002 with Htn work-up, now <0.03 with work-up for gastric bypass.  April 22, 2005: Suspect thyroiditis which has now normalized.  I would recommend rechecking in 3 months.  Problem list name updated by automated process. Provider to review         PERTINENT PAST MEDICAL HISTORY:  Past Medical History:   Diagnosis Date     Anemia      Cellulitis of leg 6/6/2013     Esophageal reflux     Better post-hiatal hernia repair and weight loss     Limb ischemia; ulcers of fingertips 4/22/2013     Raynaud's syndrome      Rheumatoid arthritis (H) \     Scleroderma (H)      Sjogren-Jake syndrome      Systemic sclerosis (H) 2009     Unspecified essential hypertension        PREVIOUS SURGERIES:  Past Surgical History:   Procedure Laterality Date     BRONCHOSCOPY FLEXIBLE,L CRYOBIOPSY N/A 10/6/2020    Procedure: BRONCHOSCOPY, FLEXIBLE, WITH TRANSBRONCHIAL BIOPSY x4 USING CRYOPROBE, bronchial alveolar lavage;  Surgeon: Teddy Mendez MD;  Location: [facility]     BYPASS GASTRIC, CHOLECYSTECTOMY, COMBINED       CHOLECYSTECTOMY       COLONOSCOPY       ESOPHAGOSCOPY, GASTROSCOPY, DUODENOSCOPY (EGD), COMBINED N/A 3/10/2016    Procedure: COMBINED ESOPHAGOSCOPY, GASTROSCOPY, DUODENOSCOPY (EGD), BIOPSY SINGLE OR MULTIPLE;  Surgeon: Tricia Zambrano MD;  Location: [facility]     INNER EAR SURGERY       PICC INSERTION  6/5/2013    5fr DL Power PICC, 44cm, left basilic vein, tip in low SVC     SURGICAL HISTORY OF -       Left ear surgery -  incus transition, tympanoplasty     SURGICAL HISTORY OF -   6/05    Gastric bypass     SURGICAL HISTORY OF -   6/05    Cholecystectomy     SURGICAL HISTORY OF -   6/05    Hiatal hernia repair     TONSILLECTOMY         PREVIOUS ENDOSCOPY:  EGD was 10/31/2014; no stricture was noted but she was empirically dilated up to 20 mm with TTS dilator. The dilating balloon was inflated at GE junction and then run up through the esophagus per the outside endoscopic report. A polypoid lesion was found in her gastric pouch (biopsies showed mild, inactive, chronic gastritis, no H pylori) and esophageal biopsies for eosinophilic esophagitis were negative (lower esophagus biopsies were suggestive of reflux, mid-esophagus biopsies with mild, non-specific lymphocytic infiltrate)    2016:   - LA Grade B reflux esophagitis. Rule out Quiroga's                        esophagus. Biopsied. Injected with 2 ML of epinephrine                        for hemostasis.                        - Lumen of the esophagus was noted to be dilated upon                        entrance of the endoscope. There were no obstructing                        lesions, narrowing.   - Eleazar-en-Y gastrojejunostomy. The gastrojejunal                        anastomosis had healthy appearing mucosa. A fistula was                        seen at the staple line. Both the surgical GJ and the                        fistula opening were traversed. Both led to the jejunum.                        - A medium amount of food (residue) in the stomach.                        - Normal examined jejunum.     Last colonoscopy in 2008 or 2009     ALLERGIES:     Allergies   Allergen Reactions     Lovenox Other (See Comments)     Blood clot      Norvasc [Amlodipine Besylate] Swelling     Lip swelling that happened on 2 different occasions     Erythromycin Rash     Rash on arms       PERTINENT MEDICATIONS:    Current Outpatient Medications:      benzonatate (TESSALON) 100 MG capsule, Take 1  capsule (100 mg) by mouth 3 times daily as needed for cough, Disp: 60 capsule, Rfl: 0     furosemide (LASIX) 40 MG tablet, Take 40 mg by mouth daily, Disp: , Rfl:      hydroxychloroquine (PLAQUENIL) 200 MG tablet, Take 1 tablet (200 mg) by mouth 2 times daily * PLEASE SCHEDULE EYE EXAM, Disp: 60 tablet, Rfl: 0     losartan (COZAAR) 50 MG tablet, Take 0.5 tablets (25 mg) by mouth daily, Disp: 45 tablet, Rfl: 3     pantoprazole (PROTONIX) 40 MG EC tablet, Take 1 tablet (40 mg) by mouth daily, Disp: 90 tablet, Rfl: 3     Potassium (POTASSIMIN PO), 50mg 3 times daily, Disp: , Rfl:      spironolactone (ALDACTONE) 25 MG tablet, Take 25 mg by mouth, Disp: , Rfl:      vitamin D2 (ERGOCALCIFEROL) 96798 units (1250 mcg) capsule, Take 1 capsule (50,000 Units) by mouth once a week for 12 doses, Disp: 12 capsule, Rfl: 0    SOCIAL HISTORY:  No drugs, alcohol occasionally   Social History     Socioeconomic History     Marital status:      Spouse name: Not on file     Number of children: Not on file     Years of education: Not on file     Highest education level: Not on file   Occupational History     Not on file   Social Needs     Financial resource strain: Not on file     Food insecurity     Worry: Not on file     Inability: Not on file     Transportation needs     Medical: Not on file     Non-medical: Not on file   Tobacco Use     Smoking status: Never Smoker     Smokeless tobacco: Never Used   Substance and Sexual Activity     Alcohol use: Yes     Comment: occassionally     Drug use: No     Sexual activity: Yes     Partners: Male     Birth control/protection: None   Lifestyle     Physical activity     Days per week: Not on file     Minutes per session: Not on file     Stress: Not on file   Relationships     Social connections     Talks on phone: Not on file     Gets together: Not on file     Attends Synagogue service: Not on file     Active member of club or organization: Not on file     Attends meetings of clubs or  organizations: Not on file     Relationship status: Not on file     Intimate partner violence     Fear of current or ex partner: Not on file     Emotionally abused: Not on file     Physically abused: Not on file     Forced sexual activity: Not on file   Other Topics Concern     Parent/sibling w/ CABG, MI or angioplasty before 65F 55M? 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  She has been on medical leave since 7/27/2020        Moved about 4 years ago to a new house. No known water damage or mold.        She lives in a normal area with forms around her house, but denies any exposure to grain dust, hay, other farm animals.        No unusual hobbies           FAMILY HISTORY:  FH of CRC: none   FH of IBD: none   Brother with diverticulitis   Family History   Problem Relation Age of Onset     Cerebrovascular Disease Father      Heart Disease Father      Hypertension Father      Heart Disease Mother      Hypertension Mother      Chronic Obstructive Pulmonary Disease Sister      Heart Disease Sister      Hypertension Sister      Cerebrovascular Disease Brother      Heart Disease Brother      Kidney Disease Brother         on dialysis     Diabetes Brother         borderline     No Known Problems Son      No Known Problems Son      No Known Problems Daughter      Cancer Brother         small cell     Heart Disease Brother      Heart Disease Brother      Heart Disease Brother      Heart Disease Brother         tripple A     Heart Disease Sister      Substance Abuse Sister         alcoholism       Past/family/social history reviewed and no changes    PHYSICAL EXAMINATION:  General appearance: Healthy appearing adult, in no acute distress  Eyes: Sclera anicteric, Pupils round and reactive to light  Ears, nose, mouth and throat: No obvious external lesions of ears and nose, Hearing intact  Neck: Symmetric, No obvious external lesions  Respiratory: Normal respiration,  "no use of accessory muscles   MSK: grossly normal movement of upper extremities   Skin: No rashes or jaundice   Psychiatric: Oriented to person, place and time, Appropriate mood and affect.     PERTINENT STUDIES:  Office Visit on 10/05/2020   Component Date Value Ref Range Status     Cholesterol 10/05/2020 127  <200 mg/dL Final     Triglycerides 10/05/2020 69  <150 mg/dL Final     HDL Cholesterol 10/05/2020 80  >49 mg/dL Final     LDL Cholesterol Calculated 10/05/2020 33  <100 mg/dL Final     Non HDL Cholesterol 10/05/2020 47  <130 mg/dL Final     Vitamin B12 10/05/2020 545  193 - 986 pg/mL Final     Folate 10/05/2020 12.0  >5.4 ng/mL Final     Ferritin 10/05/2020 151  8 - 252 ng/mL Final     Vitamin D Deficiency screening 10/05/2020 12* 20 - 75 ug/L Final           Jeanine Schuler is a 59 year old female who is being evaluated via a billable video visit.      The patient has been notified of following:     \"This video visit will be conducted via a call between you and your physician/provider. We have found that certain health care needs can be provided without the need for an in-person physical exam.  This service lets us provide the care you need with a video conversation.  If a prescription is necessary we can send it directly to your pharmacy.  If lab work is needed we can place an order for that and you can then stop by our lab to have the test done at a later time.    Video visits are billed at different rates depending on your insurance coverage.  Please reach out to your insurance provider with any questions.    If during the course of the call the physician/provider feels a video visit is not appropriate, you will not be charged for this service.\"    Patient has given verbal consent for Video visit? Yes. However, Pt stated her connection is poor in [place] so if the connection does not work for video, can do telephone visit.  How would you like to obtain your AVS? MyChart  If you are dropped from the " video visit, the video invite should be resent to: Text to cell phone: [phone]  Will anyone else be joining your video visit? No      Video-Visit Details    Type of service:  Video Visit    Video Start Time: 7:10 am  Video End Time: 7:40 am    Originating Location (pt. Location): Home    Distant Location (provider location):  [facility] [place] (provider's home)    Platform used for Video Visit: Doxmimi Elizabeth PA-C            Again, thank you for allowing me to participate in the care of your patient.        Sincerely,        Flora Elizabeth PA-C

## 2020-10-09 NOTE — NURSING NOTE
"Chief Complaint   Patient presents with     New Patient     hiatal hernia, chronic cough -epigastric pain       Vitals:    10/09/20 0639   Weight: 68 kg (150 lb)   Height: 1.753 m (5' 9\")       Body mass index is 22.15 kg/m .      Luke Fisher LPN                        "

## 2020-10-09 NOTE — PATIENT INSTRUCTIONS
It was a pleasure taking care of you today.  I've included a brief summary of our discussion and care plan from today's visit below.  Please review this information with your primary care provider.  ______________________________________________________________________    My recommendations are summarized as follows:    --Upper endoscopy to evaluate symptoms  -- Swallowing recommendations: take small bites and drink sips of water in between. Look forward when you swallow and tuck your chin. Do not talk and eat at the same time.   GERD Lifestyle Modifications  -- Avoid foods high in fat or high fructose corn syrup  -- do not eat within three hours of laying down or going to bed  -- raise the head of your bed 6-8 inches  -- avoid alcohol  -- aim for BMI of less than <25 and lose extra weight as needed  -- continue pantoprazole 40 mg a day     Return to GI Clinic in 4-6  Weeks with esophageal specialist  to review your progress.    ______________________________________________________________________    How do I schedule labs, imaging studies, or procedures that were ordered in clinic today?   Labs: To schedule lab appointment at the [facility], use my chart or call [phone]. If you have a [facility] lab closer to home where you are regularly seen you can give them a call.     Procedures: If a colonoscopy, upper endoscopy, breath test, esophageal manometry, or pH impedence was ordered today, our endoscopy team will call you to schedule this. If you have not heard from our endoscopy team within a week, please call [phone] to schedule.     Imaging Studies: If you were scheduled for a CT scan, X-ray, MRI, ultrasound, HIDA scan or other imaging study, please call [phone] to have this scheduled.     Referral: If a referral to another specialty was ordered, expect a phone call or follow instructions above. If you have not heard from anyone regarding your referral in a week, please call our  clinic to check the status.     Who do I call with any questions after my visit?  Please be in touch if there are any further questions that arise following today's visit.  There are multiple ways to contact your gastroenterology care team.        During business hours, you may reach a Gastroenterology nurse at [phone]      To schedule or reschedule an appointment, please call [phone].       You can always send a secure message through [employer].  [employer] messages are answered by your nurse or doctor typically within 24 hours.  Please allow extra time on weekends and holidays.        For urgent/emergent questions after business hours, you may reach the on-call GI Fellow by contacting the [facility]  at [phone].     How will I get the results of any tests ordered?    You will receive all of your results.  If you have signed up for [employer], any tests ordered at your visit will be available to you after your physician reviews them.  Typically this takes 1-2 weeks.  If there are urgent results that require a change in your care plan, your physician or nurse will call you to discuss the next steps.      What is [employer]?  [employer] is a secure way for you to access all of your healthcare records from the [facility].  It is a web based computer program, so you can sign on to it from any location.  It also allows you to send secure messages to your care team.  I recommend signing up for [employer] access if you have not already done so and are comfortable with using a computer.      How to I schedule a follow-up visit?  If you did not schedule a follow-up visit today, please call [phone] to schedule a follow-up office visit.      Sincerely,    Flora Elizabeth PA-C  Division of Gastroenterology, Hepatology & Nutrition  [facility]

## 2020-10-09 NOTE — PROGRESS NOTES
"Jeanine Schuler is a 59 year old female who is being evaluated via a billable video visit.      The patient has been notified of following:     \"This video visit will be conducted via a call between you and your physician/provider. We have found that certain health care needs can be provided without the need for an in-person physical exam.  This service lets us provide the care you need with a video conversation.  If a prescription is necessary we can send it directly to your pharmacy.  If lab work is needed we can place an order for that and you can then stop by our lab to have the test done at a later time.    Video visits are billed at different rates depending on your insurance coverage.  Please reach out to your insurance provider with any questions.    If during the course of the call the physician/provider feels a video visit is not appropriate, you will not be charged for this service.\"    Patient has given verbal consent for Video visit? Yes. However, Pt stated her connection is poor in [facility] so if the connection does not work for video, can do telephone visit.  How would you like to obtain your AVS? MyChart  If you are dropped from the video visit, the video invite should be resent to: Text to cell phone: [phone]  Will anyone else be joining your video visit? No      Video-Visit Details    Type of service:  Video Visit    Video Start Time: 7:10 am  Video End Time: 7:40 am    Originating Location (pt. Location): Home    Distant Location (provider location):  [facility] [place] (provider's home)    Platform used for Video Visit: De Elizabeth PA-C        "

## 2020-10-10 LAB
M PNEUMO DNA SPEC QL NAA+PROBE: NOT DETECTED
SPECIMEN SOURCE: NORMAL

## 2020-10-12 LAB
FINAL DIAGNOSIS: NORMAL
HPV HR 12 DNA CVX QL NAA+PROBE: NEGATIVE
HPV16 DNA SPEC QL NAA+PROBE: NEGATIVE
HPV18 DNA SPEC QL NAA+PROBE: NEGATIVE
SPECIMEN DESCRIPTION: NORMAL
SPECIMEN SOURCE CVX/VAG CYTO: NORMAL

## 2020-10-19 DIAGNOSIS — Z11.59 ENCOUNTER FOR SCREENING FOR OTHER VIRAL DISEASES: Primary | ICD-10-CM

## 2020-10-20 LAB
BACTERIA SPEC CULT: NORMAL
SPECIMEN SOURCE: NORMAL

## 2020-10-30 ENCOUNTER — HOSPITAL ENCOUNTER (OUTPATIENT)
Dept: MAMMOGRAPHY | Facility: CLINIC | Age: 60
Discharge: HOME | End: 2020-10-30
Attending: FAMILY MEDICINE | Admitting: FAMILY MEDICINE
Payer: COMMERCIAL

## 2020-10-30 DIAGNOSIS — Z00.00 ENCOUNTER FOR ROUTINE ADULT HEALTH EXAMINATION WITHOUT ABNORMAL FINDINGS: ICD-10-CM

## 2020-10-30 PROCEDURE — 77067 SCR MAMMO BI INCL CAD: CPT

## 2020-11-03 ENCOUNTER — PATIENT OUTREACH (OUTPATIENT)
Dept: PULMONOLOGY | Facility: CLINIC | Age: 60
End: 2020-11-03

## 2020-11-03 DIAGNOSIS — J84.9 ILD (INTERSTITIAL LUNG DISEASE) (H): Primary | ICD-10-CM

## 2020-11-03 LAB
BACTERIA SPEC CULT: NORMAL
FUNGUS SPEC CULT: ABNORMAL
FUNGUS SPEC CULT: ABNORMAL
SPECIMEN SOURCE: ABNORMAL
SPECIMEN SOURCE: NORMAL

## 2020-11-03 RX ORDER — SULFAMETHOXAZOLE AND TRIMETHOPRIM 400; 80 MG/1; MG/1
1 TABLET ORAL DAILY
Qty: 30 TABLET | Refills: 3 | Status: SHIPPED | OUTPATIENT
Start: 2020-11-03 | End: 2021-03-17

## 2020-11-03 RX ORDER — PREDNISONE 10 MG/1
TABLET ORAL
Qty: 120 TABLET | Refills: 3 | Status: SHIPPED | OUTPATIENT
Start: 2020-11-03 | End: 2021-07-08 | Stop reason: DRUGHIGH

## 2020-11-04 NOTE — PROGRESS NOTES
Dr Cuellar reviewed results from bronch, her cultures have been finalized now, all negative.  I would recommend starting prednisone at a dose of 40 mg daily, taper by 10 mg every 3 weeks until daily dose of 10 mg, to stay on that until she has an appointment with me a mind December, which is fine.  Can you also please start her on PJP PPx with Bactrim?  Thank you.  Informed patient. Agreed with plan.

## 2020-11-13 DIAGNOSIS — Z11.59 ENCOUNTER FOR SCREENING FOR OTHER VIRAL DISEASES: ICD-10-CM

## 2020-11-13 PROCEDURE — U0003 INFECTIOUS AGENT DETECTION BY NUCLEIC ACID (DNA OR RNA); SEVERE ACUTE RESPIRATORY SYNDROME CORONAVIRUS 2 (SARS-COV-2) (CORONAVIRUS DISEASE [COVID-19]), AMPLIFIED PROBE TECHNIQUE, MAKING USE OF HIGH THROUGHPUT TECHNOLOGIES AS DESCRIBED BY CMS-2020-01-R: HCPCS | Performed by: INTERNAL MEDICINE

## 2020-11-16 LAB
SARS-COV-2 RNA SPEC QL NAA+PROBE: NOT DETECTED
SPECIMEN SOURCE: NORMAL

## 2020-11-17 ENCOUNTER — HOSPITAL ENCOUNTER (OUTPATIENT)
Facility: AMBULATORY SURGERY CENTER | Age: 60
Discharge: HOME | End: 2020-11-17
Attending: INTERNAL MEDICINE | Admitting: INTERNAL MEDICINE
Payer: COMMERCIAL

## 2020-11-17 VITALS
RESPIRATION RATE: 16 BRPM | TEMPERATURE: 98 F | HEIGHT: 69 IN | OXYGEN SATURATION: 94 % | SYSTOLIC BLOOD PRESSURE: 128 MMHG | BODY MASS INDEX: 22.96 KG/M2 | WEIGHT: 155 LBS | DIASTOLIC BLOOD PRESSURE: 55 MMHG | HEART RATE: 72 BPM

## 2020-11-17 DIAGNOSIS — R13.19 ESOPHAGEAL DYSPHAGIA: Primary | ICD-10-CM

## 2020-11-17 DIAGNOSIS — K21.9 GASTROESOPHAGEAL REFLUX DISEASE, UNSPECIFIED WHETHER ESOPHAGITIS PRESENT: ICD-10-CM

## 2020-11-17 LAB
COLONOSCOPY: NORMAL
UPPER GI ENDOSCOPY: NORMAL

## 2020-11-17 PROCEDURE — 43239 EGD BIOPSY SINGLE/MULTIPLE: CPT

## 2020-11-17 PROCEDURE — 88305 TISSUE EXAM BY PATHOLOGIST: CPT | Mod: GC | Performed by: PATHOLOGY

## 2020-11-17 PROCEDURE — 45380 COLONOSCOPY AND BIOPSY: CPT | Mod: 33

## 2020-11-17 PROCEDURE — 88341 IMHCHEM/IMCYTCHM EA ADD ANTB: CPT | Mod: GC | Performed by: PATHOLOGY

## 2020-11-17 PROCEDURE — 88342 IMHCHEM/IMCYTCHM 1ST ANTB: CPT | Mod: GC | Performed by: PATHOLOGY

## 2020-11-17 PROCEDURE — 88312 SPECIAL STAINS GROUP 1: CPT | Mod: GC | Performed by: PATHOLOGY

## 2020-11-17 RX ORDER — LIDOCAINE 40 MG/G
CREAM TOPICAL
Status: DISCONTINUED | OUTPATIENT
Start: 2020-11-17 | End: 2020-11-18 | Stop reason: HOSPADM

## 2020-11-17 RX ORDER — ONDANSETRON 2 MG/ML
4 INJECTION INTRAMUSCULAR; INTRAVENOUS
Status: DISCONTINUED | OUTPATIENT
Start: 2020-11-17 | End: 2020-11-18 | Stop reason: HOSPADM

## 2020-11-17 RX ORDER — SIMETHICONE
LIQUID (ML) MISCELLANEOUS PRN
Status: DISCONTINUED | OUTPATIENT
Start: 2020-11-17 | End: 2020-11-17 | Stop reason: HOSPADM

## 2020-11-17 RX ORDER — FENTANYL CITRATE 50 UG/ML
INJECTION, SOLUTION INTRAMUSCULAR; INTRAVENOUS PRN
Status: DISCONTINUED | OUTPATIENT
Start: 2020-11-17 | End: 2020-11-17 | Stop reason: HOSPADM

## 2020-11-17 ASSESSMENT — MIFFLIN-ST. JEOR: SCORE: 1347.46

## 2020-11-17 NOTE — DISCHARGE INSTRUCTIONS
TIME-SENSITIVE Colonoscopy Appointment Information & Prep Instructions    Procedure: Colonoscopy  Date: ***  Check-in Time: ***  Provider:  ***  Location: [facility]  [address] [place] [address]  1st Floor, [address]  Questions: [phone]-Option#3.      [email or web address]      Procedure: Colonoscopy  Date: ***  Check-in Time: ***  Provider:  ***  Location: [facility] [place]  [address]  [address]th [place] [address]  Questions: [phone]-Option#3.      [email or web address]    Procedure: Colonoscopy  Date:   Check-in Time:   Provider:    Location: [facility]  [address] [place] [address], [address]  [place] [address]    Phone: [phone]  [email or web address]           _________________________________________

## 2020-11-23 LAB — COPATH REPORT: NORMAL

## 2020-12-02 ENCOUNTER — TELEPHONE (OUTPATIENT)
Dept: GASTROENTEROLOGY | Facility: CLINIC | Age: 60
End: 2020-12-02

## 2020-12-02 NOTE — TELEPHONE ENCOUNTER
[facility] Call Center    Phone Message    May a detailed message be left on voicemail: yes     Reason for Call: Requesting Results   Name/type of test: Colonoscopy and EGD  Date of test: 11/17/20  Was test done at a location other than [facility]?: No      Action Taken: Message routed to:  [facility] ([facility] Gastro    Travel Screening: Not Applicable

## 2020-12-03 LAB
MYCOBACTERIUM SPEC CULT: NORMAL
MYCOBACTERIUM SPEC CULT: NORMAL
SPECIMEN SOURCE: NORMAL

## 2020-12-03 NOTE — TELEPHONE ENCOUNTER
Returned pt call regarding questions/clarification of egd/colonoscopy results and recommendations. Pt will start omeprazole, confirmed it was sent to the correct pharmacy. Pt will call back to schedule follow up visit in clinic.

## 2020-12-09 ENCOUNTER — VIRTUAL VISIT (OUTPATIENT)
Dept: PULMONOLOGY | Facility: CLINIC | Age: 60
End: 2020-12-09
Attending: INTERNAL MEDICINE
Payer: COMMERCIAL

## 2020-12-09 DIAGNOSIS — Z79.899 ENCOUNTER FOR LONG-TERM (CURRENT) USE OF HIGH-RISK MEDICATION: ICD-10-CM

## 2020-12-09 DIAGNOSIS — J84.9 ILD (INTERSTITIAL LUNG DISEASE) (H): Primary | ICD-10-CM

## 2020-12-09 PROCEDURE — 99214 OFFICE O/P EST MOD 30 MIN: CPT | Mod: 95 | Performed by: INTERNAL MEDICINE

## 2020-12-09 RX ORDER — PREDNISONE 5 MG/1
5 TABLET ORAL DAILY
Qty: 100 TABLET | Refills: 0 | Status: SHIPPED | OUTPATIENT
Start: 2020-12-09 | End: 2021-07-08 | Stop reason: DRUGHIGH

## 2020-12-09 NOTE — LETTER
12/9/2020         RE: [facility] [facility]uler  [id number] [facility] [address]        Dear Colleague,    Thank you for referring your patient, Jeanine Schuler, to the [facility]. Please see a copy of my visit note below.    [facility]  ILD Clinic - Return VIDEOVisit -  December 9, 2020    Jeanine Schuler is a 60 year old female who is being evaluated via a billable video visit.      Video-Visit Details    Type of service:  Video Visit    Video Start Time: 10:30 AM  Video End Time: 10:57 AM    Originating Location (pt. Location): Home    Distant Location (provider location):  [facility]     Platform used for Video Visit: Marilin Cuellar MD         Assessment and Plan:   Jeanine Schuler is a 59 year old female who presents for initial evaluation of interstitial lung disease.    1) Interstitial lung disease  - Her CT from 7/27 (contrast study) and from today (8/21; non-contrast high resolution study) were reviewed.  There seems to be interval slight improvement in her bronchocentric, predominantly central infiltrates.  Some are nodular appearing, especially in the periphery and appears confluent centrally.  This does not appear consistent with typical IIP associated with MCTD or systemic sclerosis, which is usually in NSIP pattern.   - Her case was reviewed at ILD conference, and cryobiopsy was recommended.  She underwent biopsy on 10/6/2020.  Pathology was reviewed at ILD conference on 10/12/2020 which showed ALI, what appears to be an acute exacerbation of underlying ILD with OP.  Few nonnecrotizing granulomas.  DDx include NSIP vs. HP vs. Sarcoidosis.   - She was started on Prednisone 40mg daily, tapered by 10mg every 3 weeks.  Since going down on her dose recently she has noticed increased dyspnea and some GI  symptoms.  She was given a script to Presnisone 5mg to slow down on her taper to down by 5mg every 2 weeks.    - Repeat PFT and CT to assess response to Prednisone.  We discussed if there is significant response, we may start her on steroid sparing agents such as mycophenolate.     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 North    RTC: 3 months or sooner if need arises  Influenza vaccination: She has already received Influenza vaccination for this year.     Beena Cuellar MD [employer]  Pulmonary and Critical Care Medicine          Problem List:     1. Interstitial lung disease  a. Bronchoscopy with BAL ([facility]; 8/4/2020)  i. BAL (superior lingula) cell count (49% neutrophils, 45% lymphocytes, 3% monocytes, 3% eosinophils)  ii. Negative for malignant cells, silver stain negative for pneumocystis and fungal organisms.  iii. Pathology (anterior lingual segments of the left upper lobe): Benign bronchial epithelium and alveolar spaces.  There is no evidence of malignancy.  The findings are overall nondiagnostic.    b. Bronchoscopy with BAL and cryobiopsy ([facility]; 10/6/2020)  i. BAL (, other cells 32, L 47, N 19, E 2; CD4: CD8 3.65)  ii. Microbiology-all negative except for penicillium species from fungal culture only.  Aspergillus galactomannan was negative  iii. LUNG, LEFT UPPER LOBE, TRANSBRONCHIAL CRYOBIOPSY:   - Fragments of respiratory mucosa and alveolated lung tissue with nonnecrotizing granulomas with   multinucleated giant cells, foci of organizing pneumonia and nonspecific chronic interstitial inflammation   with reactive pneumocytes type II hyperplasia.   - GMS and AFB special stains are negative for fungal and acid-fast organisms, respectively.   COMMENT:   In absence of infection (ie. Mycobacteria, please correlate with  laboratory cultures and serology), the   histologic findings raise the differential diagnosis of hypersensitivity pneumonitis and  sarcoidosis. Clinical   and radiologic correlation is necessary. The case will be discussed at the ILD interdepartmental conference.     2. Mixed connective tissue disease/limited cutaneous systemic sclerosis  a. On Plaquinil    3. GERD    4. HFpEF    5. History of gastric bypass 2005        History of Present Illness:     Jeanine Schuler is a 59 year old female who presents for initial evaluation of interstitial lung disease.     Since her last visit, she underwent a bronchoscopy with BAL and cryobiopsy on 10/6 by Dr. Dancer.  Her case was reviewed at ILD conference and her disease was thought to be most compatible with infectious etiology versus NSIP, although HP or sarcoidosis could not be ruled out.  Granulomas were not the predominant feature of her pathological slides, and this showed ALI with some organizing pneumonia.  Consensus recommendation was to start prednisone and assess for response.  She was started on prednisone 40 mg daily, to be tapered by tomograms every 3 weeks until daily dose reached 10 mg, then continue.    She states that since starting prednisone 40 mg, she has noticed increasing her energy, and improvement in her cough.  However, she went from 40 mg daily to 30 mg daily last Friday and noted some nausea, vomiting, and diarrhea.  She states her dyspnea comes and goes.  She does continue to feel fatigued with even minimal activities, which is also episodic.  She is still having night sweats occasionally, but not on a daily basis anymore.         Review of Systems:     Please see HPI, otherwise the complete 10 point ROS is negative.           Past Medical and Surgical History:     Past Medical History:   Diagnosis Date     Anemia      Cellulitis of leg 6/6/2013     Esophageal reflux     Better post-hiatal hernia repair and weight loss     Limb ischemia; ulcers of fingertips 4/22/2013     Raynaud's syndrome      Rheumatoid arthritis (H) \     Scleroderma (H)      Sjogren-Jake syndrome       Systemic sclerosis (H) 2009     Unspecified essential hypertension      Past Surgical History:   Procedure Laterality Date     BRONCHOSCOPY FLEXIBLE,L CRYOBIOPSY N/A 10/6/2020    Procedure: BRONCHOSCOPY, FLEXIBLE, WITH TRANSBRONCHIAL BIOPSY x4 USING CRYOPROBE, bronchial alveolar lavage;  Surgeon: Teddy Mendez MD;  Location: [facility] OR     BYPASS GASTRIC, CHOLECYSTECTOMY, COMBINED       CHOLECYSTECTOMY       COLONOSCOPY       COLONOSCOPY N/A 11/17/2020    Procedure: COLONOSCOPY, WITH POLYPECTOMY AND BIOPSY;  Surgeon: Jamie Cárdenas MD;  Location: [facility] OR     ESOPHAGOSCOPY, GASTROSCOPY, DUODENOSCOPY (EGD), COMBINED N/A 3/10/2016    Procedure: COMBINED ESOPHAGOSCOPY, GASTROSCOPY, DUODENOSCOPY (EGD), BIOPSY SINGLE OR MULTIPLE;  Surgeon: Tricia Zambrano MD;  Location:  GI     ESOPHAGOSCOPY, GASTROSCOPY, DUODENOSCOPY (EGD), COMBINED N/A 11/17/2020    Procedure: ESOPHAGOGASTRODUODENOSCOPY, WITH BIOPSY and Dilation;  Surgeon: Jamie Cárdenas MD;  Location: [facility] OR     INNER EAR SURGERY       PICC INSERTION  6/5/2013    5fr DL Power PICC, 44cm, left basilic vein, tip in low SVC     SURGICAL HISTORY OF -       Left ear surgery - incus transition, tympanoplasty     SURGICAL HISTORY OF -   6/05    Gastric bypass     SURGICAL HISTORY OF -   6/05    Cholecystectomy     SURGICAL HISTORY OF -   6/05    Hiatal hernia repair     TONSILLECTOMY             Family History:     Family History   Problem Relation Age of Onset     Cerebrovascular Disease Father      Heart Disease Father      Hypertension Father      Heart Disease Mother      Hypertension Mother      Chronic Obstructive Pulmonary Disease Sister      Heart Disease Sister      Hypertension Sister      Cerebrovascular Disease Brother      Heart Disease Brother      Kidney Disease Brother         on dialysis     Diabetes Brother         borderline     No Known Problems Son      No Known Problems Son      No Known Problems Daughter      Cancer Brother          small cell     Heart Disease Brother      Heart Disease Brother      Heart Disease Brother      Heart Disease Brother         sunny A     Heart Disease Sister      Substance Abuse Sister         alcoholism     Crohn's Disease No family hx of      Ulcerative Colitis No family hx of      GERD No family hx of      Celiac Disease No family hx of             Social History:     Social History     Socioeconomic History     Marital status:      Spouse name: Not on file     Number of children: Not on file     Years of education: Not on file     Highest education level: Not on file   Occupational History     Not on file   Social Needs     Financial resource strain: Not on file     Food insecurity     Worry: Not on file     Inability: Not on file     Transportation needs     Medical: Not on file     Non-medical: Not on file   Tobacco Use     Smoking status: Never Smoker     Smokeless tobacco: Never Used   Substance and Sexual Activity     Alcohol use: Yes     Comment: occassionally     Drug use: No     Sexual activity: Yes     Partners: Male     Birth control/protection: None   Lifestyle     Physical activity     Days per week: Not on file     Minutes per session: Not on file     Stress: Not on file   Relationships     Social connections     Talks on phone: Not on file     Gets together: Not on file     Attends Je[employer] witness service: Not on file     Active member of club or organization: Not on file     Attends meetings of clubs or organizations: Not on file     Relationship status: Not on file     Intimate partner violence     Fear of current or ex partner: Not on file     Emotionally abused: Not on file     Physically abused: Not on file     Forced sexual activity: Not on file   Other Topics Concern     Parent/sibling w/ CABG, MI or angioplasty before 65F 55M? 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  She has been on medical leave since 7/27/2020         Moved about 4 years ago to a new house. No known water damage or mold.        She lives in a normal area with forms around her house, but denies any exposure to grain dust, hay, other farm animals.        No unusual hobbies                Medications:     Current Outpatient Medications   Medication     benzonatate (TESSALON) 100 MG capsule     furosemide (LASIX) 40 MG tablet     hydroxychloroquine (PLAQUENIL) 200 MG tablet     losartan (COZAAR) 50 MG tablet     Potassium (POTASSIMIN PO)     predniSONE (DELTASONE) 10 MG tablet     spironolactone (ALDACTONE) 25 MG tablet     sulfamethoxazole-trimethoprim (BACTRIM) 400-80 MG tablet     vitamin D2 (ERGOCALCIFEROL) 24332 units (1250 mcg) capsule     omeprazole (PRILOSEC) 20 MG DR capsule     No current facility-administered medications for this visit.             Physical Exam:   Constitutional - looks well, in no apparent distress  Eyes - no redness or discharge  Respiratory -breathing appears comfortable.  No cough.  Skin - No appreciable discoloration or lesions (very limited exam)  Neurological - No apparent tremors. Speech fluent and articlate  Psychiatric - no signs of delirium or anxiety     Exam limited to that easily identified on a virtual visit. The rest of a comprehensive physical examination is deferred due to PHE (public health emergency) video visit restrictions.           Imaging:     CT Chest ([facility]; 7/20/2020)  1. Bilateral pulmonary consolidation is probably due to infection. Other inflammatory processes are also on the differential. Although radiographic pattern is somewhat typical of sarcoid, this is less likely given significant worsening since 10/23/2019. Correlate clinically.  2. Mediastinal lymphadenopathy, probably reactive.  3. No pulmonary embolism.  4. Postoperative changes of gastric bypass with debris in the esophagus. Although this is present, the pulmonary consolidation is not typical of aspiration.           Pulmonary Function  Tests:     PFT interpretation (August 21, 2020):  Maneuver: Meets ATS criteria for TLC but nor kaela or diffusing capacity  Spirometry: normal spirometry  Lung volumes: RV is increased, suggesting air trapping.   Diffusing capacity: There is no impairment in diffusing capacity.     Six-minute walk test  Date 6MWD RA SpO2 Resting O2 req Exercise O2 req Lowest SpO2 on amb   8/21/20 940 97 RA RA 90                    Laboratory:     Serology ([facility]; 7/27/2020)  Chromatin DNP antibody -6.6 (less than 1.0)  Ribosomal antibody-negative  SSA-2.0 (less than 1.0)  SSB-negative anti-SM RNP-greater than 80 (less than 1.0)  SCL 70-negative  Maddy 1-negative  Centromere antibody -greater than 8.0 (less than 1.0)  SM IgG-negative  Anti-RNP-4.8 (less than 1.0)  Anti-DS DNA- negative  TARA - positive    6/10/2009  TARA- 1: 1280  RF-20 (less than 9)    No results found for this or any previous visit (from the past 168 hour(s)).    BAL (8/4/2020)  Negative for Legionella, RVP, Bordetella, C pneumoniae, mycoplasma pneumonia    A. Lingula bronchial alveolar lavage, cytology  1. 49% neutrophils, 45% lymphocytes, 3% monocytes, and 3% eosinophils.  2. Negative for malignant cells.  3. Silver stain negative for pneumocystis and fungal organisms.  B. Lingula and anterior segments, left upper lobe transbronchial biopsy?-Negative for malignancy, see microscopic description.    Pathology:   A. The smear has the following differential 49% neutrophils, 45% lymphocytes, 3% monocytes, and 3% macrophages. There is no evidence of malignancy. A silver stain is applied, with an appropriate positive control, and is negative for pneumocystis and fungal organisms.  B. The biopsy shows benign bronchial epithelium and alveolar spaces. There is no evidence of malignancy. The findings are overall nondiagnostic.             Cardiac:     TTE ([facility]; 8/25/2020)  Interpretation Summary  Global and regional left ventricular function is normal with an EF of  "55-60%.  Right ventricular function, chamber size, wall motion, and thickness are  normal.  Pulmonary artery systolic pressure cannot be assessed.  Ascending aorta 3.8 cm.  Aortic index 20mm/m2,mild dilation.  The inferior vena cava is normal.  No pericardial effusion is present.    Echocardiogram ([facility]; 10/16/2017)  Interpretation Summary    * Normal left ventricular size and systolic function, estimated LVEF 60-65%.    * Normal regional wall motion.    * Normal right ventricular size and systolic function.    * The left ventricular diastolic function is mildly abnormal (Grade I).    * There is no hemodynamically significant valve disease.    * Normal estimated right ventricular systolic pressure of 28 mmHg.    * The proximal ascending aorta is mildly dilated measuring  3.9 cm.    * The IVC is normal in size (< 2.1 cm), > 50% respiratory variance, RA  pressure normal at 3 mmHg.    * Compared to prior study on 8/26/16, there has been no significant change  in left ventricular systolic function with side by side comparison.  There has  been no significant change in the size of the proximal ascending aorta  (previously measured 4.0 cm).         Other:           Jeanine Schuler is a 60 year old female who is being evaluated via a billable video visit.      The patient has been notified of following:     \"This video visit will be conducted via a call between you and your physician/provider. We have found that certain health care needs can be provided without the need for an in-person physical exam.  This service lets us provide the care you need with a video conversation.  If a prescription is necessary we can send it directly to your pharmacy.  If lab work is needed we can place an order for that and you can then stop by our lab to have the test done at a later time.    Video visits are billed at different rates depending on your insurance coverage.  Please reach out to your insurance provider with any " "questions.    If during the course of the call the physician/provider feels a video visit is not appropriate, you will not be charged for this service.\"    Patient has given verbal consent for Video visit? Yes  How would you like to obtain your AVS? MyChart  If you are dropped from the video visit, the video invite should be resent to: Text to cell phone: [phone]  Will anyone else be joining your video visit? No            Again, thank you for allowing me to participate in the care of your patient.        Sincerely,        Beena Cuellar MD    "

## 2020-12-09 NOTE — PROGRESS NOTES
[facility]  ILD Clinic - Return VIDEOVisit -  December 9, 2020    Jeanine Schuler is a 60 year old female who is being evaluated via a billable video visit.      Video-Visit Details    Type of service:  Video Visit    Video Start Time: 10:30 AM  Video End Time: 10:57 AM    Originating Location (pt. Location): Home    Distant Location (provider location):  [facility]     Platform used for Video Visit: Marilin Cuellar MD         Assessment and Plan:   Jeanine Schuler is a 59 year old female who presents for initial evaluation of interstitial lung disease.    1) Interstitial lung disease  - Her CT from 7/27 (contrast study) and from today (8/21; non-contrast high resolution study) were reviewed.  There seems to be interval slight improvement in her bronchocentric, predominantly central infiltrates.  Some are nodular appearing, especially in the periphery and appears confluent centrally.  This does not appear consistent with typical IIP associated with MCTD or systemic sclerosis, which is usually in NSIP pattern.   - Her case was reviewed at ILD conference, and cryobiopsy was recommended.  She underwent biopsy on 10/6/2020.  Pathology was reviewed at ILD conference on 10/12/2020 which showed ALI, what appears to be an acute exacerbation of underlying ILD with OP.  Few nonnecrotizing granulomas.  DDx include NSIP vs. HP vs. Sarcoidosis.   - She was started on Prednisone 40mg daily, tapered by 10mg every 3 weeks.  Since going down on her dose recently she has noticed increased dyspnea and some GI symptoms.  She was given a script to Presnisone 5mg to slow down on her taper to down by 5mg every 2 weeks.    - Repeat PFT and CT to assess response to Prednisone.  We discussed if there is significant response, we may start her on steroid sparing agents such as mycophenolate.     2) History of  limited cutaneous systemic sclerosis  - On Plaquinil, followed by Dr. North    RTC: 3 months or sooner if need arises  Influenza vaccination: She has already received Influenza vaccination for this year.     Beena Cuellar MD [facility]  Pulmonary and Critical Care Medicine          Problem List:     1. Interstitial lung disease  a. Bronchoscopy with BAL ([facility]; 8/4/2020)  i. BAL (superior lingula) cell count (49% neutrophils, 45% lymphocytes, 3% monocytes, 3% eosinophils)  ii. Negative for malignant cells, silver stain negative for pneumocystis and fungal organisms.  iii. Pathology (anterior lingual segments of the left upper lobe): Benign bronchial epithelium and alveolar spaces.  There is no evidence of malignancy.  The findings are overall nondiagnostic.    b. Bronchoscopy with BAL and cryobiopsy ([facility]; 10/6/2020)  i. BAL (, other cells 32, L 47, N 19, E 2; CD4: CD8 3.65)  ii. Microbiology-all negative except for penicillium species from fungal culture only.  Aspergillus galactomannan was negative  iii. LUNG, LEFT UPPER LOBE, TRANSBRONCHIAL CRYOBIOPSY:   - Fragments of respiratory mucosa and alveolated lung tissue with nonnecrotizing granulomas with   multinucleated giant cells, foci of organizing pneumonia and nonspecific chronic interstitial inflammation   with reactive pneumocytes type II hyperplasia.   - GMS and AFB special stains are negative for fungal and acid-fast organisms, respectively.   COMMENT:   In absence of infection (ie. Mycobacteria, please correlate with  laboratory cultures and serology), the   histologic findings raise the differential diagnosis of hypersensitivity pneumonitis and sarcoidosis. Clinical   and radiologic correlation is necessary. The case will be discussed at the ILD interdepartmental conference.     2. Mixed connective tissue disease/limited cutaneous systemic sclerosis  a. On Plaquinil    3. GERD    4. HFpEF    5. History of gastric bypass 2005        History of  Present Illness:     Jeanine Schuler is a 59 year old female who presents for initial evaluation of interstitial lung disease.     Since her last visit, she underwent a bronchoscopy with BAL and cryobiopsy on 10/6 by Dr. Dancer.  Her case was reviewed at ILD conference and her disease was thought to be most compatible with infectious etiology versus NSIP, although HP or sarcoidosis could not be ruled out.  Granulomas were not the predominant feature of her pathological slides, and this showed ALI with some organizing pneumonia.  Consensus recommendation was to start prednisone and assess for response.  She was started on prednisone 40 mg daily, to be tapered by tomograms every 3 weeks until daily dose reached 10 mg, then continue.    She states that since starting prednisone 40 mg, she has noticed increasing her energy, and improvement in her cough.  However, she went from 40 mg daily to 30 mg daily last Friday and noted some nausea, vomiting, and diarrhea.  She states her dyspnea comes and goes.  She does continue to feel fatigued with even minimal activities, which is also episodic.  She is still having night sweats occasionally, but not on a daily basis anymore.         Review of Systems:     Please see HPI, otherwise the complete 10 point ROS is negative.           Past Medical and Surgical History:     Past Medical History:   Diagnosis Date     Anemia      Cellulitis of leg 6/6/2013     Esophageal reflux     Better post-hiatal hernia repair and weight loss     Limb ischemia; ulcers of fingertips 4/22/2013     Raynaud's syndrome      Rheumatoid arthritis (H) \     Scleroderma (H)      Sjogren-Jake syndrome      Systemic sclerosis (H) 2009     Unspecified essential hypertension      Past Surgical History:   Procedure Laterality Date     BRONCHOSCOPY FLEXIBLE,L CRYOBIOPSY N/A 10/6/2020    Procedure: BRONCHOSCOPY, FLEXIBLE, WITH TRANSBRONCHIAL BIOPSY x4 USING CRYOPROBE, bronchial alveolar lavage;  Surgeon:  Teddy Mendez MD;  Location: [facility] OR     BYPASS GASTRIC, CHOLECYSTECTOMY, COMBINED       CHOLECYSTECTOMY       COLONOSCOPY       COLONOSCOPY N/A 11/17/2020    Procedure: COLONOSCOPY, WITH POLYPECTOMY AND BIOPSY;  Surgeon: Jamie Cárdenas MD;  Location: [facility] OR     ESOPHAGOSCOPY, GASTROSCOPY, DUODENOSCOPY (EGD), COMBINED N/A 3/10/2016    Procedure: COMBINED ESOPHAGOSCOPY, GASTROSCOPY, DUODENOSCOPY (EGD), BIOPSY SINGLE OR MULTIPLE;  Surgeon: Tricia Zambrano MD;  Location: [facility] GI     ESOPHAGOSCOPY, GASTROSCOPY, DUODENOSCOPY (EGD), COMBINED N/A 11/17/2020    Procedure: ESOPHAGOGASTRODUODENOSCOPY, WITH BIOPSY and Dilation;  Surgeon: Jamie Cárdenas MD;  Location: [facility] OR     INNER EAR SURGERY       PICC INSERTION  6/5/2013    5fr DL Power PICC, 44cm, left basilic vein, tip in low SVC     SURGICAL HISTORY OF -       Left ear surgery - incus transition, tympanoplasty     SURGICAL HISTORY OF -   6/05    Gastric bypass     SURGICAL HISTORY OF -   6/05    Cholecystectomy     SURGICAL HISTORY OF -   6/05    Hiatal hernia repair     TONSILLECTOMY             Family History:     Family History   Problem Relation Age of Onset     Cerebrovascular Disease Father      Heart Disease Father      Hypertension Father      Heart Disease Mother      Hypertension Mother      Chronic Obstructive Pulmonary Disease Sister      Heart Disease Sister      Hypertension Sister      Cerebrovascular Disease Brother      Heart Disease Brother      Kidney Disease Brother         on dialysis     Diabetes Brother         borderline     No Known Problems Son      No Known Problems Son      No Known Problems Daughter      Cancer Brother         small cell     Heart Disease Brother      Heart Disease Brother      Heart Disease Brother      Heart Disease Brother         tripple A     Heart Disease Sister      Substance Abuse Sister         alcoholism     Crohn's Disease No family hx of      Ulcerative Colitis No family hx of      GERD No  family hx of      Celiac Disease No family hx of             Social History:     Social History     Socioeconomic History     Marital status:      Spouse name: Not on file     Number of children: Not on file     Years of education: Not on file     Highest education level: Not on file   Occupational History     Not on file   Social Needs     Financial resource strain: Not on file     Food insecurity     Worry: Not on file     Inability: Not on file     Transportation needs     Medical: Not on file     Non-medical: Not on file   Tobacco Use     Smoking status: Never Smoker     Smokeless tobacco: Never Used   Substance and Sexual Activity     Alcohol use: Yes     Comment: occassionally     Drug use: No     Sexual activity: Yes     Partners: Male     Birth control/protection: None   Lifestyle     Physical activity     Days per week: Not on file     Minutes per session: Not on file     Stress: Not on file   Relationships     Social connections     Talks on phone: Not on file     Gets together: Not on file     Attends Yazdanism service: Not on file     Active member of club or organization: Not on file     Attends meetings of clubs or organizations: Not on file     Relationship status: Not on file     Intimate partner violence     Fear of current or ex partner: Not on file     Emotionally abused: Not on file     Physically abused: Not on file     Forced sexual activity: Not on file   Other Topics Concern     Parent/sibling w/ CABG, MI or angioplasty before 65F 55M? 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  She has been on medical leave since 7/27/2020        Moved about 4 years ago to a new house. No known water damage or mold.        She lives in a normal area with forms around her house, but denies any exposure to grain dust, hay, other farm animals.        No unusual hobbies                Medications:     Current Outpatient Medications   Medication      benzonatate (TESSALON) 100 MG capsule     furosemide (LASIX) 40 MG tablet     hydroxychloroquine (PLAQUENIL) 200 MG tablet     losartan (COZAAR) 50 MG tablet     Potassium (POTASSIMIN PO)     predniSONE (DELTASONE) 10 MG tablet     spironolactone (ALDACTONE) 25 MG tablet     sulfamethoxazole-trimethoprim (BACTRIM) 400-80 MG tablet     vitamin D2 (ERGOCALCIFEROL) 87205 units (1250 mcg) capsule     omeprazole (PRILOSEC) 20 MG DR capsule     No current facility-administered medications for this visit.             Physical Exam:   Constitutional - looks well, in no apparent distress  Eyes - no redness or discharge  Respiratory -breathing appears comfortable.  No cough.  Skin - No appreciable discoloration or lesions (very limited exam)  Neurological - No apparent tremors. Speech fluent and articlate  Psychiatric - no signs of delirium or anxiety     Exam limited to that easily identified on a virtual visit. The rest of a comprehensive physical examination is deferred due to PHE (public health emergency) video visit restrictions.           Imaging:     CT Chest ([facility]; 7/20/2020)  1. Bilateral pulmonary consolidation is probably due to infection. Other inflammatory processes are also on the differential. Although radiographic pattern is somewhat typical of sarcoid, this is less likely given significant worsening since 10/23/2019. Correlate clinically.  2. Mediastinal lymphadenopathy, probably reactive.  3. No pulmonary embolism.  4. Postoperative changes of gastric bypass with debris in the esophagus. Although this is present, the pulmonary consolidation is not typical of aspiration.           Pulmonary Function Tests:     PFT interpretation (August 21, 2020):  Maneuver: Meets ATS criteria for TLC but nor kaela or diffusing capacity  Spirometry: normal spirometry  Lung volumes: RV is increased, suggesting air trapping.   Diffusing capacity: There is no impairment in diffusing capacity.     Six-minute walk  test  Date 6MWD RA SpO2 Resting O2 req Exercise O2 req Lowest SpO2 on amb   8/21/20 940 97 RA RA 90                    Laboratory:     Serology ([facility]; 7/27/2020)  Chromatin DNP antibody -6.6 (less than 1.0)  Ribosomal antibody-negative  SSA-2.0 (less than 1.0)  SSB-negative anti-SM RNP-greater than 80 (less than 1.0)  SCL 70-negative  Maddy 1-negative  Centromere antibody -greater than 8.0 (less than 1.0)  SM IgG-negative  Anti-RNP-4.8 (less than 1.0)  Anti-DS DNA- negative  TARA - positive    6/10/2009  TARA- 1: 1280  RF-20 (less than 9)    No results found for this or any previous visit (from the past 168 hour(s)).    BAL (8/4/2020)  Negative for Legionella, RVP, Bordetella, C pneumoniae, mycoplasma pneumonia    A. Lingula bronchial alveolar lavage, cytology  1. 49% neutrophils, 45% lymphocytes, 3% monocytes, and 3% eosinophils.  2. Negative for malignant cells.  3. Silver stain negative for pneumocystis and fungal organisms.  B. Lingula and anterior segments, left upper lobe transbronchial biopsy?-Negative for malignancy, see microscopic description.    Pathology:   A. The smear has the following differential 49% neutrophils, 45% lymphocytes, 3% monocytes, and 3% macrophages. There is no evidence of malignancy. A silver stain is applied, with an appropriate positive control, and is negative for pneumocystis and fungal organisms.  B. The biopsy shows benign bronchial epithelium and alveolar spaces. There is no evidence of malignancy. The findings are overall nondiagnostic.             Cardiac:     TTE ([facility]; 8/25/2020)  Interpretation Summary  Global and regional left ventricular function is normal with an EF of 55-60%.  Right ventricular function, chamber size, wall motion, and thickness are  normal.  Pulmonary artery systolic pressure cannot be assessed.  Ascending aorta 3.8 cm.  Aortic index 20mm/m2,mild dilation.  The inferior vena cava is normal.  No pericardial effusion is present.    Echocardiogram  ([facility]; 10/16/2017)  Interpretation Summary    * Normal left ventricular size and systolic function, estimated LVEF 60-65%.    * Normal regional wall motion.    * Normal right ventricular size and systolic function.    * The left ventricular diastolic function is mildly abnormal (Grade I).    * There is no hemodynamically significant valve disease.    * Normal estimated right ventricular systolic pressure of 28 mmHg.    * The proximal ascending aorta is mildly dilated measuring  3.9 cm.    * The IVC is normal in size (< 2.1 cm), > 50% respiratory variance, RA  pressure normal at 3 mmHg.    * Compared to prior study on 8/26/16, there has been no significant change  in left ventricular systolic function with side by side comparison.  There has  been no significant change in the size of the proximal ascending aorta  (previously measured 4.0 cm).         Other:

## 2020-12-09 NOTE — PROGRESS NOTES
"Jeanine Schuler is a 60 year old female who is being evaluated via a billable video visit.      The patient has been notified of following:     \"This video visit will be conducted via a call between you and your physician/provider. We have found that certain health care needs can be provided without the need for an in-person physical exam.  This service lets us provide the care you need with a video conversation.  If a prescription is necessary we can send it directly to your pharmacy.  If lab work is needed we can place an order for that and you can then stop by our lab to have the test done at a later time.    Video visits are billed at different rates depending on your insurance coverage.  Please reach out to your insurance provider with any questions.    If during the course of the call the physician/provider feels a video visit is not appropriate, you will not be charged for this service.\"    Patient has given verbal consent for Video visit? Yes  How would you like to obtain your AVS? MyChart  If you are dropped from the video visit, the video invite should be resent to: Text to cell phone: [phone]  Will anyone else be joining your video visit? No        "

## 2020-12-16 ENCOUNTER — TELEPHONE (OUTPATIENT)
Dept: PULMONOLOGY | Facility: CLINIC | Age: 60
End: 2020-12-16

## 2020-12-16 NOTE — TELEPHONE ENCOUNTER
----- Message from Daiana Musa RN sent at 12/16/2020  4:29 PM CST -----  Hi Ali,    This is the patient that needs PFT's and a chest CT.  The orders are in.    Thank you!    Daiana

## 2020-12-18 DIAGNOSIS — M35.1 MCTD (MIXED CONNECTIVE TISSUE DISEASE) (H): ICD-10-CM

## 2020-12-18 DIAGNOSIS — M06.00 RHEUMATOID ARTHRITIS WITH NEGATIVE RHEUMATOID FACTOR, INVOLVING UNSPECIFIED SITE (H): ICD-10-CM

## 2020-12-18 DIAGNOSIS — Z23 IMMUNIZATION DUE: ICD-10-CM

## 2020-12-18 DIAGNOSIS — I73.00 RAYNAUD'S DISEASE WITHOUT GANGRENE: ICD-10-CM

## 2020-12-18 NOTE — LETTER
Jeanine Schuler  [id number] [address] [address]    Dear Jeanine Schuler,     Regular eye exams are required while taking hydroxychloroquine (Plaquenil). Eye exams should be completed by an eye specialist who is experienced in monitoring for hydroxychloroquine toxicity (a rare effect of the drug that can damage your eyes and vision).  These may be yearly or as determined by your eye specialist.     Although vision problems and loss of sight while taking hydroxychloroquine are very rare, notify your doctor immediately if you notice changes in your vision. The goal of screening is to detect toxicity before your vision is significantly or noticeably impacted. Failing to get proper screening exams puts you at risk of vision changes which may or may not be reversible.    Per the American Academy of Ophthalmology recommendations (2016), screening tests performed may include a 10-2 visual field test, spectral-domain optical coherence tomography (SD OCT), or other screening tests as determined by the eye specialist, including a multifocal electroretinogram (mfERG) or fundus auto-fluorescence (FAF).    We received a refill request from your pharmacy. A copy of your current eye exam was not found in your medical record. Your hydroxychloroquine prescription has been refilled with a limited supply pending confirmation you have had an eye exam to test for hydroxychloroquine toxicity.      We encourage you to bring this letter to your eye exam to discuss the exams that will be performed during your visit. Please request your eye clinic fax or mail a copy of your eye exam report to our clinic indicating that testing was completed for hydroxychloroquine toxicity screening. The exam notes must specifically comment on the potential for hydroxychloroquine toxicity and outline recommended follow-up.    If you have questions about hydroxychloroquine or the information in this letter, please call the clinic at [phone] or talk  to your provider at your next office visit.                        2    Eye Specialist Letter  Dear Eye Specialist,  To ensure safe use of Plaquenil (hydroxychloroquine), we are requesting your assistance in providing the following information. Please return this form to our clinic via mail or fax, or incorporate this information into your visit summary. Your note must indicate whether or not there is evidence of toxicity from Plaquenil use. For questions regarding this form, please call [phone].  Patient Name:   :             Date of Exam:    The following exams were completed during this visit in accordance with the American Academy of Ophthalmology recommendations (2016):  ? 10-2 automated visual field  ? Spectral-domain optical coherence tomography (SD OCT)  ? Multifocal electroretinogram (mfERG)  ? Fundus autofluorescence (FAF)  ? Other (please specify)  Please select from the following:  ? The findings from the above exams are not suggestive of toxicity from Plaquenil (hydroxychloroquine).  ? The findings from the above exams may suggest toxicity from Plaquenil (hydroxychloroquine).  Directly contact the clinic and fax this form.  Please provide additional guidance on whether or not the medication may be continued from your perspective:  Date of next recommended Plaquenil (hydroxychloroquine) screening eye exam:   ? 5 years  ? 1 year  ? 6 months  Other (please specify):   Eye Specialist Name (print):                                                                Date:  Eye Specialist Signature:

## 2020-12-22 ENCOUNTER — ANCILLARY PROCEDURE (OUTPATIENT)
Dept: CT IMAGING | Facility: CLINIC | Age: 60
End: 2020-12-22
Attending: INTERNAL MEDICINE
Payer: COMMERCIAL

## 2020-12-22 DIAGNOSIS — J84.9 ILD (INTERSTITIAL LUNG DISEASE) (H): ICD-10-CM

## 2020-12-22 PROCEDURE — 71250 CT THORAX DX C-: CPT | Mod: GC | Performed by: RADIOLOGY

## 2020-12-22 PROCEDURE — 94726 PLETHYSMOGRAPHY LUNG VOLUMES: CPT | Performed by: INTERNAL MEDICINE

## 2020-12-22 PROCEDURE — 94375 RESPIRATORY FLOW VOLUME LOOP: CPT | Performed by: INTERNAL MEDICINE

## 2020-12-22 PROCEDURE — 94729 DIFFUSING CAPACITY: CPT | Performed by: INTERNAL MEDICINE

## 2020-12-22 NOTE — TELEPHONE ENCOUNTER
hydroxychloroquine 200 mg tablet (PLAQUENIL)    Plaquenil      Last Written Prescription Date:  1/8/2019  Last Fill Quantity: 60,   # refills: 0  Last Office Visit: 8/11/2020  Future Office visit: None  Last Eye Exam: ???     Routing refill request to provider for review/approval because:  Needing updated eye exam on file  Letter sent      Provider notified       Mell Islas RN  Central Triage Red Flags/Med Refills

## 2020-12-23 RX ORDER — HYDROXYCHLOROQUINE SULFATE 200 MG/1
TABLET, FILM COATED ORAL
Qty: 180 TABLET | Refills: 1 | Status: ON HOLD | OUTPATIENT
Start: 2020-12-23 | End: 2021-12-21

## 2020-12-27 LAB
DLCOUNC-%PRED-PRE: 75 %
DLCOUNC-PRE: 17.64 ML/MIN/MMHG
DLCOUNC-PRED: 23.25 ML/MIN/MMHG
ERV-%PRED-PRE: 119 %
ERV-PRE: 1.27 L
ERV-PRED: 1.07 L
EXPTIME-PRE: 6.77 SEC
FEF2575-%PRED-PRE: 135 %
FEF2575-PRE: 3.31 L/SEC
FEF2575-PRED: 2.44 L/SEC
FEFMAX-%PRED-PRE: 111 %
FEFMAX-PRE: 7.86 L/SEC
FEFMAX-PRED: 7.03 L/SEC
FEV1-%PRED-PRE: 111 %
FEV1-PRE: 3.02 L
FEV1FEV6-PRE: 83 %
FEV1FEV6-PRED: 81 %
FEV1FVC-PRE: 84 %
FEV1FVC-PRED: 80 %
FEV1SVC-PRE: 82 %
FEV1SVC-PRED: 71 %
FIFMAX-PRE: 5.52 L/SEC
FRCPLETH-%PRED-PRE: 109 %
FRCPLETH-PRE: 3.22 L
FRCPLETH-PRED: 2.93 L
FVC-%PRED-PRE: 105 %
FVC-PRE: 3.61 L
FVC-PRED: 3.41 L
IC-%PRED-PRE: 86 %
IC-PRE: 2.39 L
IC-PRED: 2.75 L
RVPLETH-%PRED-PRE: 94 %
RVPLETH-PRE: 1.94 L
RVPLETH-PRED: 2.05 L
TLCPLETH-%PRED-PRE: 99 %
TLCPLETH-PRE: 5.61 L
TLCPLETH-PRED: 5.61 L
VA-%PRED-PRE: 84 %
VA-PRE: 4.78 L
VC-%PRED-PRE: 95 %
VC-PRE: 3.66 L
VC-PRED: 3.82 L

## 2021-02-04 ENCOUNTER — VIRTUAL VISIT (OUTPATIENT)
Dept: PULMONOLOGY | Facility: CLINIC | Age: 61
End: 2021-02-04
Attending: INTERNAL MEDICINE
Payer: COMMERCIAL

## 2021-02-04 DIAGNOSIS — Z79.52 ON PREDNISONE THERAPY: ICD-10-CM

## 2021-02-04 DIAGNOSIS — I73.00 RAYNAUD'S DISEASE WITHOUT GANGRENE: ICD-10-CM

## 2021-02-04 DIAGNOSIS — G58.7 MONONEURITIS MULTIPLEX: ICD-10-CM

## 2021-02-04 DIAGNOSIS — M34.9 SYSTEMIC SCLEROSIS (H): Primary | ICD-10-CM

## 2021-02-04 DIAGNOSIS — E55.9 VITAMIN D DEFICIENCY: ICD-10-CM

## 2021-02-04 PROCEDURE — 99215 OFFICE O/P EST HI 40 MIN: CPT | Mod: 95 | Performed by: INTERNAL MEDICINE

## 2021-02-04 RX ORDER — PANTOPRAZOLE SODIUM 40 MG/1
TABLET, DELAYED RELEASE ORAL
COMMUNITY
Start: 2020-09-30 | End: 2021-10-07

## 2021-02-04 NOTE — PROGRESS NOTES
Jeanine is a 60 year old who is being evaluated via a billable video visit.      How would you like to obtain your AVS? [employer]  If the video visit is dropped, the invitation should be resent by: Other e-mail: [email or web address]  Will anyone else be joining your video visit? No          Video-Visit Details    Type of service:  Video Visit      Originating Location (pt. Location): Home    Distant Location (provider location):  [facility] AND [facility]     Platform used for Video Visit: Coraid       SUBJECTIVE:  The patient returns in f/u of her MCTD/SSC/RP/with h/o multiple DU.      PROBLEM LIST:    1.  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    2.  History of severe multiple digital ulcers, on fingers and toes.   3.  Marked keratoconjunctivitis sicca over the last several years.    4.  Marked iron deficiency anemia responsive to IV iron.    5.  Diffuse musculoskeletal pain   6.  Lower extremity edema managed with lasix and spironolactone   7.  Marked fatigue and diffuse clinical weakness.    8.  Dyspnea on exertion with lower extremity edema and other features including the anticentromere positivity worrisome for the potential development of pulmonary hypertension.    9.  Marked GERD with associated dysphagia.    10.  Status post gastric bypass with a history of intermittent constipation and diarrhea potentially consistent with bacterial small bowel overgrowth versus other gut effects of the prior surgery.    11.  History of positive rheumatoid factor consistent with MCTD, given the remainder of her clinical presentation.     12. Progressive Jaw tightening with inability to open mouth wider than 1 inch.   13. Diastolic dysfunction (3/2013)  14. Hypoalbuminemia  15. ILD cryobiopsy Pathology was reviewed at ILD conference on 10/12/2020 which showed ALI, what appears to be an acute exacerbation of  underlying ILD with OP.  Few nonnecrotizing granulomas  16. Development of intermittent moderate/severe RLE Neuropathy with steroid taper below 30 mg prednisone/d Jan 2021    Plan/recommendation:    She continues to have a very complex case with very complex pathophysiology which was only further accentuated by her pulmonary work-up.    She clearly has multiple features of systemic sclerosis including severe Raynaud's and associated cyanosis and severe esophageal dysmotility.  She did have a recent GERD and will be seeing Dr. Cárdenas back periodically for that.    What is somewhat intriguing right now is the finding of granulomas on her lung biopsy.  She now has also developed neuropathy in the right lower extremity but not elsewhere.    This really raises the possibility of sarcoidosis and overlap with her autoimmunity.  Given the overall magnitude of her autoimmunity however, her neuropathy could still be due to phospholipid antibodies or to immune complexes as well.    Given all this complexity I am going to once again do a large laboratory work-up.  We will do a urinary calcium and an ACE level but also an immune complex work-up and repeat her phospholipid antibodies which has not been done for some time.    In the meantime, since she noticed the development of the neuropathy with taper of the prednisone from 30 mg a day to 25 mg a day I am going to put her up to 40 mg a day again for the next several weeks.  That will give us time to get these results and give her the opportunity to record whether the symptoms improve and remain improved on higher dose steroid.    I would then like to see her back in about 3 weeks.  Depending on what we find both symptomatically and with laboratory work-up I will plan to confer with Dr. Cuellar, to consider options for longer-term therapy.  If we really think that she might have sarcoidosis, this will get complex as TNF inhibition is often optimal therapy for that but would  "potentially be a poor choice for the rest of her autoimmune pathophysiology, as it has occasionally been shown to exacerbate or worsen patients with the spectrum of activity.    This video visit was 60 minutes in duration, 27 minutes in face-to-face with the patient, and 33 minutes in extensive chart review,  and documentation, all completed on DOS.      INTERVAL HISTORY:       2017, INTERVAL HISTORY:  Since we have last seen the patient, she developed a right ankle wound about 2 months ago.  That opened for a while and it became hard to close, but at this point it has finally closed over.       She has had some stressors and she feels like that has contributed to her feeling like she needed more prednisone.  She had made it down to 12 mg a day for almost 6 months but increased to 20 mg a day in  when she was moving to a new house.  This was primarily due to an increase in fatigue and in joint pains.      At this point, her morning stiffness is 30-60 minutes despite remaining on the 20 mg a day of prednisone.  She does think her strength, however, is stable.      She continues to have a lot of Raynaud's phenomena but has not developed any distal digital ulcerations.       Her GERD and keratoconjunctivitis sicca, however, have remained stable and she thinks she has stable exercise tolerance.       She has not done her labs for many, many months and has not seen me for over a year, and we discussed that in some detail.     2018 INTERVAL HISTORY:  Since we have last seen her, she made it down to 12.5 mg a day of prednisone but then put herself back up to 20 mg a day because of increasing general body aches and fatigue as well as some inflammatory joint stiffness in her wrists.  Other joints have actually been okay.      She had stopped her methotrexate some time ago.  She works in a hospital and knew of several people who were admitted with \"methotrexate toxicity\" who  while in " hospital and although she does not know the details, that frightened her enough that she decided to stop the drug.       She is getting worsening Raynaud's.  She really notes that this happens not only with cold, but with a variety of kinds of stress.  She has previously, at least for a short length of time, been on losartan and does not remember whether that helped at all.  I am pretty sure she has also been on calcium channel blockers, but those are relatively contraindicated in her at this point because of persistent bilateral lower extremity edema.       She is getting enough lower extremity edema and has been found to have vascular insufficiency that she will be having some vascular surgery to laser some of these areas, although an exact time for that has not been laid out.       In addition to getting Raynaud's in her hands, she also gets it in her feet, and when she does, potentially contributed to by the vascular insufficiency, she gets numbness in her feet.  She is not getting numbness in her hands by contrast.       The patient did have pulmonary function tests today.  Although she is not exercising regularly, in general she feels her exercise tolerance has been stable, and she is not having any dry cough.       She did have a decrease in her FVC to 3.68 from 4.30 and her DLCO to 21.77 from 24.74, but she has had some rib cage pain for about the last 3 weeks since she had a minor accident while dancing.  Notably then, her TLC is completely stable, going from 6.41 to 6.29.       She denies any other new problems.  Keratoconjunctivitis sicca, GERD and other features have been stable and she denies any dysphagia.     July 11, 2019 interval history:    Since we have last seen her she did have to miss appointment because of some family issues but she is actually been doing quite well.  Although she is continued to have some intermittent joint complaints she is been able to taper her prednisone down to 7.5 mg a  day the lowest she has been on and the entire time I have been following her.    Although her joints have not worsened with that lower dose she does think she is getting some worsening of her Raynauds phenomena.  It can be pretty bad and the attacks can be hard to break even with rewarming.  She gets them in hands and feet.  Fortunately she has not had any digital ulcers.  She does recall that she was placed temporarily on tadalafil sometime ago by Dr. Bentley but she could not afford it long-term.  She thinks that may have been helpful however.    She denies any significant dyspnea on exertion or cough.  She has some muscle weakness but not marketed and she does not think that is any worse over time.  Fatigue has generally been her worst symptom and that may be slightly worse.    She does have some ongoing keratoconjunctivitis sicca but does not feel like she wants to go on a medicine for that.  She already has a full plate of dentures in both upper and lower.    February 4, 2021 interval history:    Since I have last seen the patient she has had pulmonary follow-up, and because of the atypical infiltrates on high-resolution CT scan had a cryobiopsy.  The read of that as above in the problem list.  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    She was placed on 40 mg daily prednisone and tapered by 5 mg every several weeks.  When she got down to 25 mg a day several weeks ago she started developing intermittent but fairly severe numbness of the right lower extremity below the knee.  She has not noted a clear-cut rash there and she has no pain there just numbness.  She always has some cyanosis there and is not sure that that is worse.  She is getting a lot of cyanosis in her hands as well and at times her left thumb goes completely white.  She has not had development of any digital ischemia however.    She denies any asymmetric swelling  of the leg or elsewhere.  She also denies any significant new rashes.    She continues to have some keratoconjunctivitis sicca but the actual dryness is largely limited to her mouth and if anything she has been having more watery eyes than usual.    Her biggest symptom other than for the new neuropathy is actually fatigue.  That has been very bad for her for a long time but she feels it is currently even worse.  She is uncertain how much it has worsened with the steroid taper per se.        ROS as per HPI.  10-point ROS is otherwise negative.    HISTORY REVIEW:  Past Medical History:   Diagnosis Date     Anemia      Cellulitis of leg 6/6/2013     Esophageal reflux     Better post-hiatal hernia repair and weight loss     Limb ischemia; ulcers of fingertips 4/22/2013     Raynaud's syndrome      Scleroderma (H)      Systemic sclerosis (H) 2009     Unspecified essential hypertension        Past Surgical History:   Procedure Laterality Date     BYPASS GASTRIC, CHOLECYSTECTOMY, COMBINED       CHOLECYSTECTOMY       COLONOSCOPY       ESOPHAGOSCOPY, GASTROSCOPY, DUODENOSCOPY (EGD), COMBINED N/A 3/10/2016    Procedure: COMBINED ESOPHAGOSCOPY, GASTROSCOPY, DUODENOSCOPY (EGD), BIOPSY SINGLE OR MULTIPLE;  Surgeon: Tricia Zambrano MD;  Location:  GI     INNER EAR SURGERY       PICC INSERTION  6/5/2013    5fr DL Power PICC, 44cm, left basilic vein, tip in low SVC     SURGICAL HISTORY OF -       Left ear surgery - incus transition, tympanoplasty     SURGICAL HISTORY OF -   6/05    Gastric bypass     SURGICAL HISTORY OF -   6/05    Cholecystectomy     SURGICAL HISTORY OF -   6/05    Hiatal hernia repair     TONSILLECTOMY         Family History   Problem Relation Age of Onset     Cerebrovascular Disease Father      C.A.D. Father      C.A.D. Mother      C.A.REBECA. 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      Social History Main Topics     Smoking status: Never Smoker      Smokeless tobacco: Never Used     Alcohol Use: Yes      occassionally     Drug Use: No     Sexually Active: Yes -- Male partner(s)     Birth Control/ Protection: None     Social History Narrative     Was  in December 2015. Longtime partner from [place].       Patient Active Problem List   Diagnosis     Essential hypertension     Thyrotoxicosis     Obesity     Prolonged QTc 460 ms,  at hr 67     Low back pain - Suspect SI Joint dysfunction     Cough     Systemic sclerosis (H)     Tumoral calcinosis     Shortness of breath     Edema of both legs     Abnormal albumin     Myopathy     Hypoalbuminemia     Diastolic dysfunction     Limb ischemia; ulcers of fingertips     Cellulitis of leg     Other specified diffuse disease of connective tissue     Disturbed sleep rhythm     Pharyngeal dysphagia     Gastroesophageal reflux disease, esophagitis presence not specified     Rheumatoid arthritis with negative rheumatoid factor, involving unspecified site (H)     Raynaud's disease without gangrene       Allergies   Allergen Reactions     Lovenox Other (See Comments)     Blood clot      Norvasc [Amlodipine Besylate] Swelling     Lip swelling that happened on 2 different occasions     Erythromycin Rash     Rash on arms     OBJECTIVE:  Vitals: There were no vitals taken for this visit.  Physical examination by video shows her to be in no acute distress HEENT examination is essentially normal.  She is speaking in full sentences with no apparent shortness of breath.  Her hands are cyanotic but not severely so.  There are no ulcers no deformities and no notable swelling.     Component      Latest Ref Rng 1/26/2016   WBC      4.0 - 11.0 10e9/L 9.2   RBC Count      3.8 -  5.2 10e12/L 4.59   Hemoglobin      11.7 - 15.7 g/dL 13.2   Hematocrit      35.0 - 47.0 % 41.8   MCV      78 - 100 fl 91   MCH      26.5 - 33.0 pg 28.8   MCHC      31.5 - 36.5 g/dL 31.6   RDW      10.0 - 15.0 % 16.7 (H)   Platelet Count      150 - 450 10e9/L 195   Diff Method       Automated Method   % Neutrophils       62.9   % Lymphocytes       27.0   % Monocytes       9.1   % Eosinophils       0.4   % Basophils       0.2   % Immature Granulocytes       0.4   Nucleated RBCs      0 /100 0   Absolute Neutrophil      1.6 - 8.3 10e9/L 5.8   Absolute Lymphocytes      0.8 - 5.3 10e9/L 2.5   Absolute Monocytes      0.0 - 1.3 10e9/L 0.8   Absolute Eosinophils      0.0 - 0.7 10e9/L 0.0   Absolute Basophils      0.0 - 0.2 10e9/L 0.0   Abs Immature Granulocytes      0 - 0.4 10e9/L 0.0   Absolute Nucleated RBC       0.0   Color Urine       Yellow   Appearance Urine       Clear   Glucose Urine      NEG mg/dL Negative   Bilirubin Urine      NEG Negative   Ketones Urine      NEG mg/dL Negative   Specific Gravity Urine      1.003 - 1.035 1.010   Blood Urine      NEG Negative   pH Urine      5.0 - 7.0 pH 6.0   Protein Albumin Urine      NEG mg/dL Negative   Urobilinogen mg/dL      0.0 - 2.0 mg/dL Normal   Nitrite Urine      NEG Negative   Leukocyte Esterase Urine      NEG Negative   Source       Midstream Urine   WBC Urine      0 - 2 /HPF <1   RBC Urine      0 - 2 /HPF <1   Squamous Epithelial /HPF Urine      0 - 1 /HPF <1   Creatinine      0.52 - 1.04 mg/dL 1.11 (H)   GFR Estimate      >60 mL/min/1.7m2 51 (L)   GFR Estimate If Black      >60 mL/min/1.7m2 62   ALT      0 - 50 U/L 23   AST      0 - 45 U/L 16   Albumin      3.4 - 5.0 g/dL 3.4   CRP Inflammation      0.0 - 8.0 mg/L <2.9   Sed Rate      0 - 30 mm/h 9   CK Total      30 - 225 U/L 38   Aldolase       4.1     Biopsy from 3/10 EGD:  Esophagus, gastroesophageal junction, biopsies:   - Squamous mucosa and submucosa with marked chronic inflammation and   fibrosis   - No  evidence of intestinal metaplasia or dysplasia     August 11, 2020 interval history:    Since we last seen the patient she feels she was doing relatively stably on till July 7.  At that point she had the abrupt onset of a bad dry cough.  She did not have significant fevers but did not feel well.  She was seen had a negative coronavirus test but a chest x-ray apparently showed evidence of some infiltrates and she was placed on doxycycline.  She says she did not improve with that therapy and a CT angios was performed.  That showed bilateral pulmonary consolidation thought consistent with infection.  There was note that the pattern could actually be somewhat typical for sarcoid but it was noted to have worsened since the prior study performed there at [facility] in October 2019.  She also was noted to have mediastinal lymphadenopathy and debris in the esophagus potentially consistent with her systemic sclerosis.  No pulmonary embolism was found.  Based on the results of that she was placed on Levaquin.  She continued to fail to improve and a third antibiotic was also tried.    Finally she was seen by pulmonologist and a bronchoscopy was performed.  I am able to review the results of that and there was a high percentage of neutrophils and lymphocytes and that BAL fluid.  Viral cultures however Gram stain and culture were all unremarkable and there were no malignant cells found.    She was placed on 40 mg a day of prednisone at the time of the bronchoscopy once it was clear that there was no acute infection.  She does not have a follow-up appointment yet.  Although her cough is mostly gone she does not feel good and feels very dyspneic just going from the bed to the bathroom or crossed the room.  She is not convinced she has had any improvement with 40 mg daily prednisone and if anything think she is worse.    In reviewing her extensive record I see that a repeat TARA was performed and a reflexive was done when it was  positive.  She continues to have an anticentromere antibody which she had previously, but now notably has a high titer Gonzalez and high titer RNP antibody.  She previously was borderline positive for RNP and Gonzalez negative.  SSA is also higher titer than it was previously.    With all these autoantibody positivity she however denies any specific features that would suggest systemic lupus.  She specifically denies sun sensitivity sun sensitive rashes including a malar rash, morning stiffness in any of her joints or any joint swelling and any pleurisy at the time of her  at CT angiogram or otherwise.  \  Her Raynaud's phenomena has been stable as have other features of her limited cutaneous systemic sclerosis.    Physical examination is deferred today because of the telephone status.    Impression:    Per the prior problem list but now with the development of an inflammatory lung infiltrate based on BAL, of unclear etiology.    It is truly not clear to me if her inflammation in the lungs is related to underlying autoimmunity which she definitely has, or due to  partially treated infection.    What is clear is that she does not feel better and think she is actually worsening which is concerning.    She would like a second opinion from a pulmonologist in our system so we will go about getting that done.  I did discuss with her that they will probably need to see her imaging although that may be able to push be pushed over to our system.    I will wait to arrange follow-up until after she has had that appointment and we can discuss next steps in her.    This telephone visit commenced at 4:04 PM was completed at 4:39 PM, 35 minutes in duration over 50% of which was in counseling      MARTÍN De La Torre MD, PhD    Rheumatology

## 2021-02-04 NOTE — LETTER
2/4/2021         RE: Jeanine Schuler  [address]  [facility] [address]        Dear Colleague,    Thank you for referring your patient, Jeanine Schuler, to the [facility]. Please see a copy of my visit note below.    Jeanine is a 60 year old who is being evaluated via a billable video visit.      How would you like to obtain your AVS? [employer]  If the video visit is dropped, the invitation should be resent by: Other e-mail: [email or web address]  Will anyone else be joining your video visit? No          Video-Visit Details    Type of service:  Video Visit      Originating Location (pt. Location): Home    Distant Location (provider location):  [facility]     Platform used for Video Visit: [employer]       SUBJECTIVE:  The patient returns in f/u of her MCTD/SSC/RP/with h/o multiple DU.      PROBLEM LIST:    1.  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    2.  History of severe multiple digital ulcers, on fingers and toes.   3.  Marked keratoconjunctivitis sicca over the last several years.    4.  Marked iron deficiency anemia responsive to IV iron.    5.  Diffuse musculoskeletal pain   6.  Lower extremity edema managed with lasix and spironolactone   7.  Marked fatigue and diffuse clinical weakness.    8.  Dyspnea on exertion with lower extremity edema and other features including the anticentromere positivity worrisome for the potential development of pulmonary hypertension.    9.  Marked GERD with associated dysphagia.    10.  Status post gastric bypass with a history of intermittent constipation and diarrhea potentially consistent with bacterial small bowel overgrowth versus other gut effects of the prior surgery.    11.  History of positive rheumatoid factor consistent with MCTD, given the remainder of her clinical  presentation.     12. Progressive Jaw tightening with inability to open mouth wider than 1 inch.   13. Diastolic dysfunction (3/2013)  14. Hypoalbuminemia  15. ILD cryobiopsy Pathology was reviewed at ILD conference on 10/12/2020 which showed ALI, what appears to be an acute exacerbation of underlying ILD with OP.  Few nonnecrotizing granulomas  16. Development of intermittent moderate/severe RLE Neuropathy with steroid taper below 30 mg prednisone/d Jan 2021    Plan/recommendation:    She continues to have a very complex case with very complex pathophysiology which was only further accentuated by her pulmonary work-up.    She clearly has multiple features of systemic sclerosis including severe Raynaud's and associated cyanosis and severe esophageal dysmotility.  She did have a recent GERD and will be seeing Dr. Cárdenas back periodically for that.    What is somewhat intriguing right now is the finding of granulomas on her lung biopsy.  She now has also developed neuropathy in the right lower extremity but not elsewhere.    This really raises the possibility of sarcoidosis and overlap with her autoimmunity.  Given the overall magnitude of her autoimmunity however, her neuropathy could still be due to phospholipid antibodies or to immune complexes as well.    Given all this complexity I am going to once again do a large laboratory work-up.  We will do a urinary calcium and an ACE level but also an immune complex work-up and repeat her phospholipid antibodies which has not been done for some time.    In the meantime, since she noticed the development of the neuropathy with taper of the prednisone from 30 mg a day to 25 mg a day I am going to put her up to 40 mg a day again for the next several weeks.  That will give us time to get these results and give her the opportunity to record whether the symptoms improve and remain improved on higher dose steroid.    I would then like to see her back in about 3 weeks.  Depending  on what we find both symptomatically and with laboratory work-up I will plan to confer with Dr. Cuellar, to consider options for longer-term therapy.  If we really think that she might have sarcoidosis, this will get complex as TNF inhibition is often optimal therapy for that but would potentially be a poor choice for the rest of her autoimmune pathophysiology, as it has occasionally been shown to exacerbate or worsen patients with the spectrum of activity.    This video visit was 60 minutes in duration, 27 minutes in face-to-face with the patient, and 33 minutes in extensive chart review,  and documentation, all completed on DOS.      INTERVAL HISTORY:       AUGUST 22, 2017, INTERVAL HISTORY:  Since we have last seen the patient, she developed a right ankle wound about 2 months ago.  That opened for a while and it became hard to close, but at this point it has finally closed over.       She has had some stressors and she feels like that has contributed to her feeling like she needed more prednisone.  She had made it down to 12 mg a day for almost 6 months but increased to 20 mg a day in June when she was moving to a new house.  This was primarily due to an increase in fatigue and in joint pains.      At this point, her morning stiffness is 30-60 minutes despite remaining on the 20 mg a day of prednisone.  She does think her strength, however, is stable.      She continues to have a lot of Raynaud's phenomena but has not developed any distal digital ulcerations.       Her GERD and keratoconjunctivitis sicca, however, have remained stable and she thinks she has stable exercise tolerance.       She has not done her labs for many, many months and has not seen me for over a year, and we discussed that in some detail.     Feb. 13, 2018 INTERVAL HISTORY:  Since we have last seen her, she made it down to 12.5 mg a day of prednisone but then put herself back up to 20 mg a day because of increasing general body  "aches and fatigue as well as some inflammatory joint stiffness in her wrists.  Other joints have actually been okay.      She had stopped her methotrexate some time ago.  She works in a hospital and knew of several people who were admitted with \"methotrexate toxicity\" who  while in hospital and although she does not know the details, that frightened her enough that she decided to stop the drug.       She is getting worsening Raynaud's.  She really notes that this happens not only with cold, but with a variety of kinds of stress.  She has previously, at least for a short length of time, been on losartan and does not remember whether that helped at all.  I am pretty sure she has also been on calcium channel blockers, but those are relatively contraindicated in her at this point because of persistent bilateral lower extremity edema.       She is getting enough lower extremity edema and has been found to have vascular insufficiency that she will be having some vascular surgery to laser some of these areas, although an exact time for that has not been laid out.       In addition to getting Raynaud's in her hands, she also gets it in her feet, and when she does, potentially contributed to by the vascular insufficiency, she gets numbness in her feet.  She is not getting numbness in her hands by contrast.       The patient did have pulmonary function tests today.  Although she is not exercising regularly, in general she feels her exercise tolerance has been stable, and she is not having any dry cough.       She did have a decrease in her FVC to 3.68 from 4.30 and her DLCO to 21.77 from 24.74, but she has had some rib cage pain for about the last 3 weeks since she had a minor accident while dancing.  Notably then, her TLC is completely stable, going from 6.41 to 6.29.       She denies any other new problems.  Keratoconjunctivitis sicca, GERD and other features have been stable and she denies any dysphagia.     " 2019 interval history:    Since we have last seen her she did have to miss appointment because of some family issues but she is actually been doing quite well.  Although she is continued to have some intermittent joint complaints she is been able to taper her prednisone down to 7.5 mg a day the lowest she has been on and the entire time I have been following her.    Although her joints have not worsened with that lower dose she does think she is getting some worsening of her Raynauds phenomena.  It can be pretty bad and the attacks can be hard to break even with rewarming.  She gets them in hands and feet.  Fortunately she has not had any digital ulcers.  She does recall that she was placed temporarily on tadalafil sometime ago by Dr. Bentley but she could not afford it long-term.  She thinks that may have been helpful however.    She denies any significant dyspnea on exertion or cough.  She has some muscle weakness but not marketed and she does not think that is any worse over time.  Fatigue has generally been her worst symptom and that may be slightly worse.    She does have some ongoing keratoconjunctivitis sicca but does not feel like she wants to go on a medicine for that.  She already has a full plate of dentures in both upper and lower.    February 4, 2021 interval history:    Since I have last seen the patient she has had pulmonary follow-up, and because of the atypical infiltrates on high-resolution CT scan had a cryobiopsy.  The read of that as above in the problem list.  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    She was placed on 40 mg daily prednisone and tapered by 5 mg every several weeks.  When she got down to 25 mg a day several weeks ago she started developing intermittent but fairly severe numbness of the right lower extremity below the knee.  She has not noted a clear-cut rash there and she has no pain there just  numbness.  She always has some cyanosis there and is not sure that that is worse.  She is getting a lot of cyanosis in her hands as well and at times her left thumb goes completely white.  She has not had development of any digital ischemia however.    She denies any asymmetric swelling of the leg or elsewhere.  She also denies any significant new rashes.    She continues to have some keratoconjunctivitis sicca but the actual dryness is largely limited to her mouth and if anything she has been having more watery eyes than usual.    Her biggest symptom other than for the new neuropathy is actually fatigue.  That has been very bad for her for a long time but she feels it is currently even worse.  She is uncertain how much it has worsened with the steroid taper per se.        ROS as per HPI.  10-point ROS is otherwise negative.    HISTORY REVIEW:  Past Medical History:   Diagnosis Date     Anemia      Cellulitis of leg 6/6/2013     Esophageal reflux     Better post-hiatal hernia repair and weight loss     Limb ischemia; ulcers of fingertips 4/22/2013     Raynaud's syndrome      Scleroderma (H)      Systemic sclerosis (H) 2009     Unspecified essential hypertension        Past Surgical History:   Procedure Laterality Date     BYPASS GASTRIC, CHOLECYSTECTOMY, COMBINED       CHOLECYSTECTOMY       COLONOSCOPY       ESOPHAGOSCOPY, GASTROSCOPY, DUODENOSCOPY (EGD), COMBINED N/A 3/10/2016    Procedure: COMBINED ESOPHAGOSCOPY, GASTROSCOPY, DUODENOSCOPY (EGD), BIOPSY SINGLE OR MULTIPLE;  Surgeon: Tricia Zambrano MD;  Location:  GI     INNER EAR SURGERY       PICC INSERTION  6/5/2013    5fr DL Power PICC, 44cm, left basilic vein, tip in low SVC     SURGICAL HISTORY OF -       Left ear surgery - incus transition, tympanoplasty     SURGICAL HISTORY OF -   6/05    Gastric bypass     SURGICAL HISTORY OF -   6/05    Cholecystectomy     SURGICAL HISTORY OF -   6/05    Hiatal hernia repair     TONSILLECTOMY          Family History   Problem Relation Age of Onset     Cerebrovascular Disease Father      C.A.D. Father      C.A.D. Mother      C.A.D. 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      Social History Main Topics     Smoking status: Never Smoker      Smokeless tobacco: Never Used     Alcohol Use: Yes      occassionally     Drug Use: No     Sexually Active: Yes -- Male partner(s)     Birth Control/ Protection: None     Social History Narrative     Was  in December 2015. Longtime partner from [place].       Patient Active Problem List   Diagnosis     Essential hypertension     Thyrotoxicosis     Obesity     Prolonged QTc 460 ms,  at hr 67     Low back pain - Suspect SI Joint dysfunction     Cough     Systemic sclerosis (H)     Tumoral calcinosis     Shortness of breath     Edema of both legs     Abnormal albumin     Myopathy     Hypoalbuminemia     Diastolic dysfunction     Limb ischemia; ulcers of fingertips     Cellulitis of leg     Other specified diffuse disease of connective tissue     Disturbed sleep rhythm     Pharyngeal dysphagia     Gastroesophageal reflux disease, esophagitis presence not specified     Rheumatoid arthritis with negative rheumatoid factor, involving unspecified site (H)     Raynaud's disease without gangrene       Allergies   Allergen Reactions     Lovenox Other (See Comments)     Blood clot      Norvasc [Amlodipine Besylate] Swelling     Lip swelling that happened on 2 different occasions     Erythromycin Rash     Rash on arms     OBJECTIVE:  Vitals: There were no vitals taken for this visit.  Physical examination by video shows her to be in no acute distress HEENT examination is  essentially normal.  She is speaking in full sentences with no apparent shortness of breath.  Her hands are cyanotic but not severely so.  There are no ulcers no deformities and no notable swelling.     Component      Latest Ref Rng 1/26/2016   WBC      4.0 - 11.0 10e9/L 9.2   RBC Count      3.8 - 5.2 10e12/L 4.59   Hemoglobin      11.7 - 15.7 g/dL 13.2   Hematocrit      35.0 - 47.0 % 41.8   MCV      78 - 100 fl 91   MCH      26.5 - 33.0 pg 28.8   MCHC      31.5 - 36.5 g/dL 31.6   RDW      10.0 - 15.0 % 16.7 (H)   Platelet Count      150 - 450 10e9/L 195   Diff Method       Automated Method   % Neutrophils       62.9   % Lymphocytes       27.0   % Monocytes       9.1   % Eosinophils       0.4   % Basophils       0.2   % Immature Granulocytes       0.4   Nucleated RBCs      0 /100 0   Absolute Neutrophil      1.6 - 8.3 10e9/L 5.8   Absolute Lymphocytes      0.8 - 5.3 10e9/L 2.5   Absolute Monocytes      0.0 - 1.3 10e9/L 0.8   Absolute Eosinophils      0.0 - 0.7 10e9/L 0.0   Absolute Basophils      0.0 - 0.2 10e9/L 0.0   Abs Immature Granulocytes      0 - 0.4 10e9/L 0.0   Absolute Nucleated RBC       0.0   Color Urine       Yellow   Appearance Urine       Clear   Glucose Urine      NEG mg/dL Negative   Bilirubin Urine      NEG Negative   Ketones Urine      NEG mg/dL Negative   Specific Gravity Urine      1.003 - 1.035 1.010   Blood Urine      NEG Negative   pH Urine      5.0 - 7.0 pH 6.0   Protein Albumin Urine      NEG mg/dL Negative   Urobilinogen mg/dL      0.0 - 2.0 mg/dL Normal   Nitrite Urine      NEG Negative   Leukocyte Esterase Urine      NEG Negative   Source       Midstream Urine   WBC Urine      0 - 2 /HPF <1   RBC Urine      0 - 2 /HPF <1   Squamous Epithelial /HPF Urine      0 - 1 /HPF <1   Creatinine      0.52 - 1.04 mg/dL 1.11 (H)   GFR Estimate      >60 mL/min/1.7m2 51 (L)   GFR Estimate If Black      >60 mL/min/1.7m2 62   ALT      0 - 50 U/L 23   AST      0 - 45 U/L 16   Albumin      3.4 - 5.0 g/dL  3.4   CRP Inflammation      0.0 - 8.0 mg/L <2.9   Sed Rate      0 - 30 mm/h 9   CK Total      30 - 225 U/L 38   Aldolase       4.1     Biopsy from 3/10 EGD:  Esophagus, gastroesophageal junction, biopsies:   - Squamous mucosa and submucosa with marked chronic inflammation and   fibrosis   - No evidence of intestinal metaplasia or dysplasia     August 11, 2020 interval history:    Since we last seen the patient she feels she was doing relatively stably on till July 7.  At that point she had the abrupt onset of a bad dry cough.  She did not have significant fevers but did not feel well.  She was seen had a negative coronavirus test but a chest x-ray apparently showed evidence of some infiltrates and she was placed on doxycycline.  She says she did not improve with that therapy and a CT angios was performed.  That showed bilateral pulmonary consolidation thought consistent with infection.  There was note that the pattern could actually be somewhat typical for sarcoid but it was noted to have worsened since the prior study performed there at [facility] in October 2019.  She also was noted to have mediastinal lymphadenopathy and debris in the esophagus potentially consistent with her systemic sclerosis.  No pulmonary embolism was found.  Based on the results of that she was placed on Levaquin.  She continued to fail to improve and a third antibiotic was also tried.    Finally she was seen by pulmonologist and a bronchoscopy was performed.  I am able to review the results of that and there was a high percentage of neutrophils and lymphocytes and that BAL fluid.  Viral cultures however Gram stain and culture were all unremarkable and there were no malignant cells found.    She was placed on 40 mg a day of prednisone at the time of the bronchoscopy once it was clear that there was no acute infection.  She does not have a follow-up appointment yet.  Although her cough is mostly gone she does not feel good and feels very  dyspneic just going from the bed to the bathroom or crossed the room.  She is not convinced she has had any improvement with 40 mg daily prednisone and if anything think she is worse.    In reviewing her extensive record I see that a repeat TARA was performed and a reflexive was done when it was positive.  She continues to have an anticentromere antibody which she had previously, but now notably has a high titer Gonzalez and high titer RNP antibody.  She previously was borderline positive for RNP and Gonzalez negative.  SSA is also higher titer than it was previously.    With all these autoantibody positivity she however denies any specific features that would suggest systemic lupus.  She specifically denies sun sensitivity sun sensitive rashes including a malar rash, morning stiffness in any of her joints or any joint swelling and any pleurisy at the time of her  at CT angiogram or otherwise.  \  Her Raynaud's phenomena has been stable as have other features of her limited cutaneous systemic sclerosis.    Physical examination is deferred today because of the telephone status.    Impression:    Per the prior problem list but now with the development of an inflammatory lung infiltrate based on BAL, of unclear etiology.    It is truly not clear to me if her inflammation in the lungs is related to underlying autoimmunity which she definitely has, or due to  partially treated infection.    What is clear is that she does not feel better and think she is actually worsening which is concerning.    She would like a second opinion from a pulmonologist in our system so we will go about getting that done.  I did discuss with her that they will probably need to see her imaging although that may be able to push be pushed over to our system.    I will wait to arrange follow-up until after she has had that appointment and we can discuss next steps in her.    This telephone visit commenced at 4:04 PM was completed at 4:39 PM, 35  minutes in duration over 50% of which was in counseling      MARTÍN De La Torre MD, PhD    Rheumatology

## 2021-02-05 DIAGNOSIS — E55.9 VITAMIN D DEFICIENCY: ICD-10-CM

## 2021-02-05 DIAGNOSIS — I73.00 RAYNAUD'S DISEASE WITHOUT GANGRENE: ICD-10-CM

## 2021-02-05 DIAGNOSIS — M34.9 SYSTEMIC SCLEROSIS (H): ICD-10-CM

## 2021-02-05 DIAGNOSIS — G58.7 MONONEURITIS MULTIPLEX: ICD-10-CM

## 2021-02-05 DIAGNOSIS — Z79.52 ON PREDNISONE THERAPY: ICD-10-CM

## 2021-02-05 LAB
BASOPHILS # BLD AUTO: 0 10E9/L (ref 0–0.2)
BASOPHILS NFR BLD AUTO: 0.4 %
CALCIUM SERPL-MCNC: 9.2 MG/DL (ref 8.5–10.1)
CALCIUM UR-MCNC: <5 MG/DL
CALCIUM/CREAT UR: NORMAL G/G CR
CREAT UR-MCNC: 12 MG/DL
CRP SERPL-MCNC: <2.9 MG/L (ref 0–8)
DIFFERENTIAL METHOD BLD: ABNORMAL
EOSINOPHIL NFR BLD AUTO: 0.3 %
ERYTHROCYTE [DISTWIDTH] IN BLOOD BY AUTOMATED COUNT: 15.7 % (ref 10–15)
HCT VFR BLD AUTO: 42.6 % (ref 35–47)
HGB BLD-MCNC: 14.2 G/DL (ref 11.7–15.7)
IMM GRANULOCYTES # BLD: 0.1 10E9/L (ref 0–0.4)
IMM GRANULOCYTES NFR BLD: 1 %
LYMPHOCYTES # BLD AUTO: 2.1 10E9/L (ref 0.8–5.3)
LYMPHOCYTES NFR BLD AUTO: 21.9 %
MCH RBC QN AUTO: 29.6 PG (ref 26.5–33)
MCHC RBC AUTO-ENTMCNC: 33.3 G/DL (ref 31.5–36.5)
MCV RBC AUTO: 89 FL (ref 78–100)
MONOCYTES # BLD AUTO: 0.6 10E9/L (ref 0–1.3)
MONOCYTES NFR BLD AUTO: 6.4 %
NEUTROPHILS # BLD AUTO: 6.6 10E9/L (ref 1.6–8.3)
NEUTROPHILS NFR BLD AUTO: 70 %
NRBC # BLD AUTO: 0 10*3/UL
NRBC BLD AUTO-RTO: 0 /100
PLATELET # BLD AUTO: 227 10E9/L (ref 150–450)
PTH-INTACT SERPL-MCNC: 226 PG/ML (ref 18–80)
RBC # BLD AUTO: 4.8 10E12/L (ref 3.8–5.2)
WBC # BLD AUTO: 9.4 10E9/L (ref 4–11)

## 2021-02-05 PROCEDURE — 83970 ASSAY OF PARATHORMONE: CPT | Performed by: INTERNAL MEDICINE

## 2021-02-05 PROCEDURE — 82164 ANGIOTENSIN I ENZYME TEST: CPT | Mod: 90 | Performed by: INTERNAL MEDICINE

## 2021-02-05 PROCEDURE — 85613 RUSSELL VIPER VENOM DILUTED: CPT | Performed by: INTERNAL MEDICINE

## 2021-02-05 PROCEDURE — 86146 BETA-2 GLYCOPROTEIN ANTIBODY: CPT | Performed by: INTERNAL MEDICINE

## 2021-02-05 PROCEDURE — 36415 COLL VENOUS BLD VENIPUNCTURE: CPT | Performed by: INTERNAL MEDICINE

## 2021-02-05 PROCEDURE — 82306 VITAMIN D 25 HYDROXY: CPT | Performed by: INTERNAL MEDICINE

## 2021-02-05 PROCEDURE — 86332 IMMUNE COMPLEX ASSAY: CPT | Mod: 90 | Performed by: INTERNAL MEDICINE

## 2021-02-05 PROCEDURE — 85025 COMPLETE CBC W/AUTO DIFF WBC: CPT | Performed by: INTERNAL MEDICINE

## 2021-02-05 PROCEDURE — 99N1023 PR STATISTIC INR NC: Performed by: INTERNAL MEDICINE

## 2021-02-05 PROCEDURE — 86140 C-REACTIVE PROTEIN: CPT | Performed by: INTERNAL MEDICINE

## 2021-02-05 PROCEDURE — 99N1035 PR STATISTIC THROMBIN TIME NC: Performed by: INTERNAL MEDICINE

## 2021-02-05 PROCEDURE — 86160 COMPLEMENT ANTIGEN: CPT | Performed by: INTERNAL MEDICINE

## 2021-02-05 PROCEDURE — 86147 CARDIOLIPIN ANTIBODY EA IG: CPT | Performed by: INTERNAL MEDICINE

## 2021-02-05 PROCEDURE — 99000 SPECIMEN HANDLING OFFICE-LAB: CPT | Performed by: INTERNAL MEDICINE

## 2021-02-05 PROCEDURE — 85730 THROMBOPLASTIN TIME PARTIAL: CPT | Performed by: INTERNAL MEDICINE

## 2021-02-05 PROCEDURE — 82310 ASSAY OF CALCIUM: CPT | Performed by: INTERNAL MEDICINE

## 2021-02-05 PROCEDURE — 82340 ASSAY OF CALCIUM IN URINE: CPT | Performed by: INTERNAL MEDICINE

## 2021-02-06 LAB
CARDIOLIPIN ANTIBODY IGG: <1.6 GPL-U/ML (ref 0–19.9)
CARDIOLIPIN ANTIBODY IGM: 2 MPL-U/ML (ref 0–19.9)

## 2021-02-07 LAB
ACE SERPL-CCNC: 12 U/L (ref 9–67)
C3 SERPL-MCNC: 108 MG/DL (ref 81–157)
C4 SERPL-MCNC: 14 MG/DL (ref 13–39)

## 2021-02-08 LAB
B2 GLYCOPROT1 IGG SERPL IA-ACNC: <0.6 U/ML
B2 GLYCOPROT1 IGM SERPL IA-ACNC: 17 U/ML
DEPRECATED CALCIDIOL+CALCIFEROL SERPL-MC: 6 UG/L (ref 20–75)

## 2021-02-09 LAB — LA PPP-IMP: NEGATIVE

## 2021-02-11 LAB
IC SERPL C1Q BIND-ACNC: 1.8 UG EQ/ML (ref 0–3.9)
IC SERPL RAJI CELL-ACNC: 23 UGE/ML

## 2021-02-25 ENCOUNTER — VIRTUAL VISIT (OUTPATIENT)
Dept: PULMONOLOGY | Facility: CLINIC | Age: 61
End: 2021-02-25
Attending: INTERNAL MEDICINE
Payer: COMMERCIAL

## 2021-02-25 DIAGNOSIS — F19.982 DRUG-INDUCED INSOMNIA (H): ICD-10-CM

## 2021-02-25 DIAGNOSIS — J84.9 ILD (INTERSTITIAL LUNG DISEASE) (H): ICD-10-CM

## 2021-02-25 DIAGNOSIS — T38.0X5A ADVERSE EFFECT OF PREDNISONE, INITIAL ENCOUNTER: ICD-10-CM

## 2021-02-25 DIAGNOSIS — M34.9 SYSTEMIC SCLEROSIS (H): Primary | ICD-10-CM

## 2021-02-25 PROCEDURE — 99215 OFFICE O/P EST HI 40 MIN: CPT | Mod: 95 | Performed by: INTERNAL MEDICINE

## 2021-02-25 RX ORDER — ERGOCALCIFEROL 1.25 MG/1
50000 CAPSULE, LIQUID FILLED ORAL WEEKLY
COMMUNITY
End: 2023-01-01

## 2021-02-25 RX ORDER — TEMAZEPAM 15 MG/1
15 CAPSULE ORAL
Qty: 30 CAPSULE | Refills: 1 | Status: SHIPPED | OUTPATIENT
Start: 2021-02-25 | End: 2022-01-28

## 2021-02-25 NOTE — LETTER
2/25/2021         RE: Jeanine Schuler  [address]  [facility] [address]        Dear Colleague,    Thank you for referring your patient, Jeanine Schuler, to the [facility]. Please see a copy of my visit note below.    Jeanine is a 60 year old who is being evaluated via a billable video visit.      How would you like to obtain your AVS? [email or web address]  If the video visit is dropped, the invitation should be resent by: Other e-mail: [email or web address]  Will anyone else be joining your video visit? No    Video-Visit Details    Type of service:  Video Visit    Originating Location (pt. Location): Home    Distant Location (provider location):  [facility]     Platform used for Video Visit: [employer]       SUBJECTIVE:  The patient returns in f/u of her MCTD/SSC/RP/with h/o multiple DU.      PROBLEM LIST:    1.  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    2.  History of severe multiple digital ulcers, on fingers and toes.   3.  Marked keratoconjunctivitis sicca over the last several years.    4.  Marked iron deficiency anemia responsive to IV iron.    5.  Diffuse musculoskeletal pain   6.  Lower extremity edema managed with lasix and spironolactone   7.  Marked fatigue and diffuse clinical weakness.    8.  Dyspnea on exertion with lower extremity edema and other features including the anticentromere positivity worrisome for the potential development of pulmonary hypertension.    9.  Marked GERD with associated dysphagia.    10.  Status post gastric bypass with a history of intermittent constipation and diarrhea potentially consistent with bacterial small bowel overgrowth versus other gut effects of the prior surgery.    11.  History of positive rheumatoid factor consistent with MCTD, given the remainder of her clinical  presentation.     12. Progressive Jaw tightening with inability to open mouth wider than 1 inch.   13. Diastolic dysfunction (3/2013)  14. Hypoalbuminemia  15. ILD cryobiopsy Pathology was reviewed at ILD conference on 10/12/2020 which showed ALI, what appears to be an acute exacerbation of underlying ILD with OP.  Few nonnecrotizing granulomas  16. Development of intermittent moderate/severe RLE Neuropathy with steroid taper below 30 mg prednisone/d Jan 2021    Plan/recommendation:    She continues to have a very complex case with very complex pathophysiology which was only further accentuated by her pulmonary work-up.    She clearly has multiple features of systemic sclerosis including severe Raynaud's and associated cyanosis and severe esophageal dysmotility.  She did have a recent GERD and will be seeing Dr. Cárdenas back periodically for that.    What is somewhat intriguing right now is the finding of granulomas on her lung biopsy.  She now has also developed neuropathy in the right lower extremity but not elsewhere.    This really raises the possibility of sarcoidosis and overlap with her autoimmunity.  Given the overall magnitude of her autoimmunity however, her neuropathy could still be due to phospholipid antibodies or to immune complexes as well.    Given all this complexity I am going to once again do a large laboratory work-up.  We will do a urinary calcium and an ACE level but also an immune complex work-up and repeat her phospholipid antibodies which has not been done for some time.    In the meantime, since she noticed the development of the neuropathy with taper of the prednisone from 30 mg a day to 25 mg a day I am going to put her up to 40 mg a day again for the next several weeks.  That will give us time to get these results and give her the opportunity to record whether the symptoms improve and remain improved on higher dose steroid.    I would then like to see her back in about 3 weeks.  Depending  on what we find both symptomatically and with laboratory work-up I will plan to confer with Dr. Cuellar, to consider options for longer-term therapy.  If we really think that she might have sarcoidosis, this will get complex as TNF inhibition is often optimal therapy for that but would potentially be a poor choice for the rest of her autoimmune pathophysiology, as it has occasionally been shown to exacerbate or worsen patients with the spectrum of activity.    This video visit was 60 minutes in duration, 27 minutes in face-to-face with the patient, and 33 minutes in extensive chart review,  and documentation, all completed on DOS.      INTERVAL HISTORY:       AUGUST 22, 2017, INTERVAL HISTORY:  Since we have last seen the patient, she developed a right ankle wound about 2 months ago.  That opened for a while and it became hard to close, but at this point it has finally closed over.       She has had some stressors and she feels like that has contributed to her feeling like she needed more prednisone.  She had made it down to 12 mg a day for almost 6 months but increased to 20 mg a day in June when she was moving to a new house.  This was primarily due to an increase in fatigue and in joint pains.      At this point, her morning stiffness is 30-60 minutes despite remaining on the 20 mg a day of prednisone.  She does think her strength, however, is stable.      She continues to have a lot of Raynaud's phenomena but has not developed any distal digital ulcerations.       Her GERD and keratoconjunctivitis sicca, however, have remained stable and she thinks she has stable exercise tolerance.       She has not done her labs for many, many months and has not seen me for over a year, and we discussed that in some detail.     Feb. 13, 2018 INTERVAL HISTORY:  Since we have last seen her, she made it down to 12.5 mg a day of prednisone but then put herself back up to 20 mg a day because of increasing general body  "aches and fatigue as well as some inflammatory joint stiffness in her wrists.  Other joints have actually been okay.      She had stopped her methotrexate some time ago.  She works in a hospital and knew of several people who were admitted with \"methotrexate toxicity\" who  while in hospital and although she does not know the details, that frightened her enough that she decided to stop the drug.       She is getting worsening Raynaud's.  She really notes that this happens not only with cold, but with a variety of kinds of stress.  She has previously, at least for a short length of time, been on losartan and does not remember whether that helped at all.  I am pretty sure she has also been on calcium channel blockers, but those are relatively contraindicated in her at this point because of persistent bilateral lower extremity edema.       She is getting enough lower extremity edema and has been found to have vascular insufficiency that she will be having some vascular surgery to laser some of these areas, although an exact time for that has not been laid out.       In addition to getting Raynaud's in her hands, she also gets it in her feet, and when she does, potentially contributed to by the vascular insufficiency, she gets numbness in her feet.  She is not getting numbness in her hands by contrast.       The patient did have pulmonary function tests today.  Although she is not exercising regularly, in general she feels her exercise tolerance has been stable, and she is not having any dry cough.       She did have a decrease in her FVC to 3.68 from 4.30 and her DLCO to 21.77 from 24.74, but she has had some rib cage pain for about the last 3 weeks since she had a minor accident while dancing.  Notably then, her TLC is completely stable, going from 6.41 to 6.29.       She denies any other new problems.  Keratoconjunctivitis sicca, GERD and other features have been stable and she denies any dysphagia.     " 2019 interval history:    Since we have last seen her she did have to miss appointment because of some family issues but she is actually been doing quite well.  Although she is continued to have some intermittent joint complaints she is been able to taper her prednisone down to 7.5 mg a day the lowest she has been on and the entire time I have been following her.    Although her joints have not worsened with that lower dose she does think she is getting some worsening of her Raynauds phenomena.  It can be pretty bad and the attacks can be hard to break even with rewarming.  She gets them in hands and feet.  Fortunately she has not had any digital ulcers.  She does recall that she was placed temporarily on tadalafil sometime ago by Dr. Bentley but she could not afford it long-term.  She thinks that may have been helpful however.    She denies any significant dyspnea on exertion or cough.  She has some muscle weakness but not marketed and she does not think that is any worse over time.  Fatigue has generally been her worst symptom and that may be slightly worse.    She does have some ongoing keratoconjunctivitis sicca but does not feel like she wants to go on a medicine for that.  She already has a full plate of dentures in both upper and lower.    February 4, 2021 interval history:    Since I have last seen the patient she has had pulmonary follow-up, and because of the atypical infiltrates on high-resolution CT scan had a cryobiopsy.  The read of that as above in the problem list.  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    She was placed on 40 mg daily prednisone and tapered by 5 mg every several weeks.  When she got down to 25 mg a day several weeks ago she started developing intermittent but fairly severe numbness of the right lower extremity below the knee.  She has not noted a clear-cut rash there and she has no pain there just  numbness.  She always has some cyanosis there and is not sure that that is worse.  She is getting a lot of cyanosis in her hands as well and at times her left thumb goes completely white.  She has not had development of any digital ischemia however.    She denies any asymmetric swelling of the leg or elsewhere.  She also denies any significant new rashes.    She continues to have some keratoconjunctivitis sicca but the actual dryness is largely limited to her mouth and if anything she has been having more watery eyes than usual.    Her biggest symptom other than for the new neuropathy is actually fatigue.  That has been very bad for her for a long time but she feels it is currently even worse.  She is uncertain how much it has worsened with the steroid taper per se.        ROS as per HPI.  10-point ROS is otherwise negative.    HISTORY REVIEW:  Past Medical History:   Diagnosis Date     Anemia      Cellulitis of leg 6/6/2013     Esophageal reflux     Better post-hiatal hernia repair and weight loss     Limb ischemia; ulcers of fingertips 4/22/2013     Raynaud's syndrome      Scleroderma (H)      Systemic sclerosis (H) 2009     Unspecified essential hypertension        Past Surgical History:   Procedure Laterality Date     BYPASS GASTRIC, CHOLECYSTECTOMY, COMBINED       CHOLECYSTECTOMY       COLONOSCOPY       ESOPHAGOSCOPY, GASTROSCOPY, DUODENOSCOPY (EGD), COMBINED N/A 3/10/2016    Procedure: COMBINED ESOPHAGOSCOPY, GASTROSCOPY, DUODENOSCOPY (EGD), BIOPSY SINGLE OR MULTIPLE;  Surgeon: Tricia Zambrano MD;  Location:  GI     INNER EAR SURGERY       PICC INSERTION  6/5/2013    5fr DL Power PICC, 44cm, left basilic vein, tip in low SVC     SURGICAL HISTORY OF -       Left ear surgery - incus transition, tympanoplasty     SURGICAL HISTORY OF -   6/05    Gastric bypass     SURGICAL HISTORY OF -   6/05    Cholecystectomy     SURGICAL HISTORY OF -   6/05    Hiatal hernia repair     TONSILLECTOMY          Family History   Problem Relation Age of Onset     Cerebrovascular Disease Father      C.A.D. Father      C.A.D. Mother      C.A.D. 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      Social History Main Topics     Smoking status: Never Smoker      Smokeless tobacco: Never Used     Alcohol Use: Yes      occassionally     Drug Use: No     Sexually Active: Yes -- Male partner(s)     Birth Control/ Protection: None     Social History Narrative     Was  in December 2015. Longtime partner from [place].       Patient Active Problem List   Diagnosis     Essential hypertension     Thyrotoxicosis     Obesity     Prolonged QTc 460 ms,  at hr 67     Low back pain - Suspect SI Joint dysfunction     Cough     Systemic sclerosis (H)     Tumoral calcinosis     Shortness of breath     Edema of both legs     Abnormal albumin     Myopathy     Hypoalbuminemia     Diastolic dysfunction     Limb ischemia; ulcers of fingertips     Cellulitis of leg     Other specified diffuse disease of connective tissue     Disturbed sleep rhythm     Pharyngeal dysphagia     Gastroesophageal reflux disease, esophagitis presence not specified     Rheumatoid arthritis with negative rheumatoid factor, involving unspecified site (H)     Raynaud's disease without gangrene       Allergies   Allergen Reactions     Lovenox Other (See Comments)     Blood clot      Norvasc [Amlodipine Besylate] Swelling     Lip swelling that happened on 2 different occasions     Erythromycin Rash     Rash on arms     OBJECTIVE:  Vitals: There were no vitals taken for this visit.  Physical examination by video shows her to be in no acute distress HEENT examination is  essentially normal.  She is speaking in full sentences with no apparent shortness of breath.  Her hands are cyanotic but not severely so.  There are no ulcers no deformities and no notable swelling.     Component      Latest Ref Rng 1/26/2016   WBC      4.0 - 11.0 10e9/L 9.2   RBC Count      3.8 - 5.2 10e12/L 4.59   Hemoglobin      11.7 - 15.7 g/dL 13.2   Hematocrit      35.0 - 47.0 % 41.8   MCV      78 - 100 fl 91   MCH      26.5 - 33.0 pg 28.8   MCHC      31.5 - 36.5 g/dL 31.6   RDW      10.0 - 15.0 % 16.7 (H)   Platelet Count      150 - 450 10e9/L 195   Diff Method       Automated Method   % Neutrophils       62.9   % Lymphocytes       27.0   % Monocytes       9.1   % Eosinophils       0.4   % Basophils       0.2   % Immature Granulocytes       0.4   Nucleated RBCs      0 /100 0   Absolute Neutrophil      1.6 - 8.3 10e9/L 5.8   Absolute Lymphocytes      0.8 - 5.3 10e9/L 2.5   Absolute Monocytes      0.0 - 1.3 10e9/L 0.8   Absolute Eosinophils      0.0 - 0.7 10e9/L 0.0   Absolute Basophils      0.0 - 0.2 10e9/L 0.0   Abs Immature Granulocytes      0 - 0.4 10e9/L 0.0   Absolute Nucleated RBC       0.0   Color Urine       Yellow   Appearance Urine       Clear   Glucose Urine      NEG mg/dL Negative   Bilirubin Urine      NEG Negative   Ketones Urine      NEG mg/dL Negative   Specific Gravity Urine      1.003 - 1.035 1.010   Blood Urine      NEG Negative   pH Urine      5.0 - 7.0 pH 6.0   Protein Albumin Urine      NEG mg/dL Negative   Urobilinogen mg/dL      0.0 - 2.0 mg/dL Normal   Nitrite Urine      NEG Negative   Leukocyte Esterase Urine      NEG Negative   Source       Midstream Urine   WBC Urine      0 - 2 /HPF <1   RBC Urine      0 - 2 /HPF <1   Squamous Epithelial /HPF Urine      0 - 1 /HPF <1   Creatinine      0.52 - 1.04 mg/dL 1.11 (H)   GFR Estimate      >60 mL/min/1.7m2 51 (L)   GFR Estimate If Black      >60 mL/min/1.7m2 62   ALT      0 - 50 U/L 23   AST      0 - 45 U/L 16   Albumin      3.4 - 5.0 g/dL  3.4   CRP Inflammation      0.0 - 8.0 mg/L <2.9   Sed Rate      0 - 30 mm/h 9   CK Total      30 - 225 U/L 38   Aldolase       4.1     Biopsy from 3/10 EGD:  Esophagus, gastroesophageal junction, biopsies:   - Squamous mucosa and submucosa with marked chronic inflammation and   fibrosis   - No evidence of intestinal metaplasia or dysplasia     August 11, 2020 interval history:    Since we last seen the patient she feels she was doing relatively stably on till July 7.  At that point she had the abrupt onset of a bad dry cough.  She did not have significant fevers but did not feel well.  She was seen had a negative coronavirus test but a chest x-ray apparently showed evidence of some infiltrates and she was placed on doxycycline.  She says she did not improve with that therapy and a CT angios was performed.  That showed bilateral pulmonary consolidation thought consistent with infection.  There was note that the pattern could actually be somewhat typical for sarcoid but it was noted to have worsened since the prior study performed there at [facility] in October 2019.  She also was noted to have mediastinal lymphadenopathy and debris in the esophagus potentially consistent with her systemic sclerosis.  No pulmonary embolism was found.  Based on the results of that she was placed on Levaquin.  She continued to fail to improve and a third antibiotic was also tried.    Finally she was seen by pulmonologist and a bronchoscopy was performed.  I am able to review the results of that and there was a high percentage of neutrophils and lymphocytes and that BAL fluid.  Viral cultures however Gram stain and culture were all unremarkable and there were no malignant cells found.    She was placed on 40 mg a day of prednisone at the time of the bronchoscopy once it was clear that there was no acute infection.  She does not have a follow-up appointment yet.  Although her cough is mostly gone she does not feel good and feels very  dyspneic just going from the bed to the bathroom or crossed the room.  She is not convinced she has had any improvement with 40 mg daily prednisone and if anything think she is worse.    In reviewing her extensive record I see that a repeat TARA was performed and a reflexive was done when it was positive.  She continues to have an anticentromere antibody which she had previously, but now notably has a high titer Gonzalez and high titer RNP antibody.  She previously was borderline positive for RNP and Gonzalez negative.  SSA is also higher titer than it was previously.    With all these autoantibody positivity she however denies any specific features that would suggest systemic lupus.  She specifically denies sun sensitivity sun sensitive rashes including a malar rash, morning stiffness in any of her joints or any joint swelling and any pleurisy at the time of her  at CT angiogram or otherwise.  \  Her Raynaud's phenomena has been stable as have other features of her limited cutaneous systemic sclerosis.        February 25, 2021 interval history:    At the time that I saw the patient about 3 weeks ago we decided that because her neuropathy sounded rather rapidly progressive, and her HRCT scan was being read as potentially consistent with sarcoidosis, that neurosarcoid was on the differential.  I also wondered about an immune complex mediated small vessel vasculitis causing neuropathy.  We therefore did laboratory testing as well as started her on some steroid.    Laboratory testing has been largely unrevealing.  She did not have elevated calcium or elevated calcium in the urine.  I did also do a parathyroid hormone in case we did find that however her parathyroid hormone was significantly elevated.  Her vitamin D was very low and she has started supplementation for that.    Most notably however her prednisone at 40 mg a day virtually resolved her symptoms over 3 to 4 days.  She had at least an 80% decrease in her pain  and numbness although she still has some residual tingling in the legs bilaterally.    She is having some prednisone side effects.  Specifically she is having a hard time sleeping and is also feeling irritable which she thinks could be due partly due to the loss of sleep but partly directly due to the prednisone.  She has got a lot of prior experience with prednisone and has had some difficulties with it before.    She did have some right-sided chest pain.  That seem to occur with a deep breath.  She has a cardiologist and saw them will be getting a follow-up echo.  She is no longer having that.    In going through her history she does not believe she is ever been on Imuran and is quite sure and I am sure as well that we have never had her on TNF inhibition.  She was on methotrexate and tolerated oral methotrexate poorly due to nausea but was better able to tolerate subcutaneous methotrexate.      Impression:    Per the problem list, with known RNP positivity and more recently with this HRCT finding that is concerning for the possibility of overlapping sarcoidosis.    She had a very nice response to steroid quite quickly, and so we now have the conundrum of not knowing exactly why.  I certainly suspect that she has an immune mediated neuropathy given this rapid response and neurosarcoid is a concern.    I have sent a message to Dr. Cuellar in pulmonary for her thoughts.  I have also ordered a repeat HRCT that to be done in another week or so before she is seen back in pulmonary.    I am hoping these can be reviewed so that there could be a decision whether we can feel strongly enough that presumptively this could be sarcoidosis to put her on appropriate therapy for that or whether a biopsy might be justified.    We could simply presumptively try steroid sparing medications.  I am somewhat concerned about using TNF inhibitors however unless we are pretty confident this is sarcoid, as they have been associated with  worsening of systemic lupus patients and autoimmune associated interstitial lung disease in some settings.    We also have the  finding of the elevated PTH, and very low vitamin D levels, which can be seen in sarcoidosis, but is unclear here given normal Calcium levels. and will refer her to endocrinology for that.    I have given her prescription for temazepam to help her with the sleep and irritability.  I did warn her to be careful about driving in the morning until she knows how it affects her as it does have a long half-life.    I will plan on seeing her back in about 6 to 8 weeks sooner as needed.    This video visit commenced at 3:03 PM and was completed at 3:25 PM for 23 minutes of face-to-face time, with an additional 17 minutes of chart review, physician to physician messaging,  and documentation completed on DOS.     MARTÍN De La Torre MD, PhD    Rheumatology

## 2021-02-25 NOTE — PROGRESS NOTES
Jeanine is a 60 year old who is being evaluated via a billable video visit.      How would you like to obtain your AVS? [employer]  If the video visit is dropped, the invitation should be resent by: Other e-mail: [employer]  Will anyone else be joining your video visit? No    Video-Visit Details    Type of service:  Video Visit    Originating Location (pt. Location): Home    Distant Location (provider location):  [facility]     Platform used for Video Visit: [employer]       SUBJECTIVE:  The patient returns in f/u of her MCTD/SSC/RP/with h/o multiple DU.      PROBLEM LIST:    1.  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    2.  History of severe multiple digital ulcers, on fingers and toes.   3.  Marked keratoconjunctivitis sicca over the last several years.    4.  Marked iron deficiency anemia responsive to IV iron.    5.  Diffuse musculoskeletal pain   6.  Lower extremity edema managed with lasix and spironolactone   7.  Marked fatigue and diffuse clinical weakness.    8.  Dyspnea on exertion with lower extremity edema and other features including the anticentromere positivity worrisome for the potential development of pulmonary hypertension.    9.  Marked GERD with associated dysphagia.    10.  Status post gastric bypass with a history of intermittent constipation and diarrhea potentially consistent with bacterial small bowel overgrowth versus other gut effects of the prior surgery.    11.  History of positive rheumatoid factor consistent with MCTD, given the remainder of her clinical presentation.     12. Progressive Jaw tightening with inability to open mouth wider than 1 inch.   13. Diastolic dysfunction (3/2013)  14. Hypoalbuminemia  15. ILD cryobiopsy Pathology was reviewed at ILD conference on 10/12/2020 which showed ALI, what appears to be an acute exacerbation of  underlying ILD with OP.  Few nonnecrotizing granulomas  16. Development of intermittent moderate/severe RLE Neuropathy with steroid taper below 30 mg prednisone/d Jan 2021    Plan/recommendation:    She continues to have a very complex case with very complex pathophysiology which was only further accentuated by her pulmonary work-up.    She clearly has multiple features of systemic sclerosis including severe Raynaud's and associated cyanosis and severe esophageal dysmotility.  She did have a recent GERD and will be seeing Dr. Cárdenas back periodically for that.    What is somewhat intriguing right now is the finding of granulomas on her lung biopsy.  She now has also developed neuropathy in the right lower extremity but not elsewhere.    This really raises the possibility of sarcoidosis and overlap with her autoimmunity.  Given the overall magnitude of her autoimmunity however, her neuropathy could still be due to phospholipid antibodies or to immune complexes as well.    Given all this complexity I am going to once again do a large laboratory work-up.  We will do a urinary calcium and an ACE level but also an immune complex work-up and repeat her phospholipid antibodies which has not been done for some time.    In the meantime, since she noticed the development of the neuropathy with taper of the prednisone from 30 mg a day to 25 mg a day I am going to put her up to 40 mg a day again for the next several weeks.  That will give us time to get these results and give her the opportunity to record whether the symptoms improve and remain improved on higher dose steroid.    I would then like to see her back in about 3 weeks.  Depending on what we find both symptomatically and with laboratory work-up I will plan to confer with Dr. Cuellar, to consider options for longer-term therapy.  If we really think that she might have sarcoidosis, this will get complex as TNF inhibition is often optimal therapy for that but would  "potentially be a poor choice for the rest of her autoimmune pathophysiology, as it has occasionally been shown to exacerbate or worsen patients with the spectrum of activity.    This video visit was 60 minutes in duration, 27 minutes in face-to-face with the patient, and 33 minutes in extensive chart review,  and documentation, all completed on DOS.      INTERVAL HISTORY:       2017, INTERVAL HISTORY:  Since we have last seen the patient, she developed a right ankle wound about 2 months ago.  That opened for a while and it became hard to close, but at this point it has finally closed over.       She has had some stressors and she feels like that has contributed to her feeling like she needed more prednisone.  She had made it down to 12 mg a day for almost 6 months but increased to 20 mg a day in  when she was moving to a new house.  This was primarily due to an increase in fatigue and in joint pains.      At this point, her morning stiffness is 30-60 minutes despite remaining on the 20 mg a day of prednisone.  She does think her strength, however, is stable.      She continues to have a lot of Raynaud's phenomena but has not developed any distal digital ulcerations.       Her GERD and keratoconjunctivitis sicca, however, have remained stable and she thinks she has stable exercise tolerance.       She has not done her labs for many, many months and has not seen me for over a year, and we discussed that in some detail.     2018 INTERVAL HISTORY:  Since we have last seen her, she made it down to 12.5 mg a day of prednisone but then put herself back up to 20 mg a day because of increasing general body aches and fatigue as well as some inflammatory joint stiffness in her wrists.  Other joints have actually been okay.      She had stopped her methotrexate some time ago.  She works in a hospital and knew of several people who were admitted with \"methotrexate toxicity\" who  while in " hospital and although she does not know the details, that frightened her enough that she decided to stop the drug.       She is getting worsening Raynaud's.  She really notes that this happens not only with cold, but with a variety of kinds of stress.  She has previously, at least for a short length of time, been on losartan and does not remember whether that helped at all.  I am pretty sure she has also been on calcium channel blockers, but those are relatively contraindicated in her at this point because of persistent bilateral lower extremity edema.       She is getting enough lower extremity edema and has been found to have vascular insufficiency that she will be having some vascular surgery to laser some of these areas, although an exact time for that has not been laid out.       In addition to getting Raynaud's in her hands, she also gets it in her feet, and when she does, potentially contributed to by the vascular insufficiency, she gets numbness in her feet.  She is not getting numbness in her hands by contrast.       The patient did have pulmonary function tests today.  Although she is not exercising regularly, in general she feels her exercise tolerance has been stable, and she is not having any dry cough.       She did have a decrease in her FVC to 3.68 from 4.30 and her DLCO to 21.77 from 24.74, but she has had some rib cage pain for about the last 3 weeks since she had a minor accident while dancing.  Notably then, her TLC is completely stable, going from 6.41 to 6.29.       She denies any other new problems.  Keratoconjunctivitis sicca, GERD and other features have been stable and she denies any dysphagia.     July 11, 2019 interval history:    Since we have last seen her she did have to miss appointment because of some family issues but she is actually been doing quite well.  Although she is continued to have some intermittent joint complaints she is been able to taper her prednisone down to 7.5 mg a  day the lowest she has been on and the entire time I have been following her.    Although her joints have not worsened with that lower dose she does think she is getting some worsening of her Raynauds phenomena.  It can be pretty bad and the attacks can be hard to break even with rewarming.  She gets them in hands and feet.  Fortunately she has not had any digital ulcers.  She does recall that she was placed temporarily on tadalafil sometime ago by Dr. Bentley but she could not afford it long-term.  She thinks that may have been helpful however.    She denies any significant dyspnea on exertion or cough.  She has some muscle weakness but not marketed and she does not think that is any worse over time.  Fatigue has generally been her worst symptom and that may be slightly worse.    She does have some ongoing keratoconjunctivitis sicca but does not feel like she wants to go on a medicine for that.  She already has a full plate of dentures in both upper and lower.    February 4, 2021 interval history:    Since I have last seen the patient she has had pulmonary follow-up, and because of the atypical infiltrates on high-resolution CT scan had a cryobiopsy.  The read of that as above in the problem list.  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    She was placed on 40 mg daily prednisone and tapered by 5 mg every several weeks.  When she got down to 25 mg a day several weeks ago she started developing intermittent but fairly severe numbness of the right lower extremity below the knee.  She has not noted a clear-cut rash there and she has no pain there just numbness.  She always has some cyanosis there and is not sure that that is worse.  She is getting a lot of cyanosis in her hands as well and at times her left thumb goes completely white.  She has not had development of any digital ischemia however.    She denies any asymmetric swelling  of the leg or elsewhere.  She also denies any significant new rashes.    She continues to have some keratoconjunctivitis sicca but the actual dryness is largely limited to her mouth and if anything she has been having more watery eyes than usual.    Her biggest symptom other than for the new neuropathy is actually fatigue.  That has been very bad for her for a long time but she feels it is currently even worse.  She is uncertain how much it has worsened with the steroid taper per se.        ROS as per HPI.  10-point ROS is otherwise negative.    HISTORY REVIEW:  Past Medical History:   Diagnosis Date     Anemia      Cellulitis of leg 6/6/2013     Esophageal reflux     Better post-hiatal hernia repair and weight loss     Limb ischemia; ulcers of fingertips 4/22/2013     Raynaud's syndrome      Scleroderma (H)      Systemic sclerosis (H) 2009     Unspecified essential hypertension        Past Surgical History:   Procedure Laterality Date     BYPASS GASTRIC, CHOLECYSTECTOMY, COMBINED       CHOLECYSTECTOMY       COLONOSCOPY       ESOPHAGOSCOPY, GASTROSCOPY, DUODENOSCOPY (EGD), COMBINED N/A 3/10/2016    Procedure: COMBINED ESOPHAGOSCOPY, GASTROSCOPY, DUODENOSCOPY (EGD), BIOPSY SINGLE OR MULTIPLE;  Surgeon: Tricia Zambrano MD;  Location:  GI     INNER EAR SURGERY       PICC INSERTION  6/5/2013    5fr DL Power PICC, 44cm, left basilic vein, tip in low SVC     SURGICAL HISTORY OF -       Left ear surgery - incus transition, tympanoplasty     SURGICAL HISTORY OF -   6/05    Gastric bypass     SURGICAL HISTORY OF -   6/05    Cholecystectomy     SURGICAL HISTORY OF -   6/05    Hiatal hernia repair     TONSILLECTOMY         Family History   Problem Relation Age of Onset     Cerebrovascular Disease Father      C.A.D. Father      C.A.D. Mother      C.A.REBECA. 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      Social History Main Topics     Smoking status: Never Smoker      Smokeless tobacco: Never Used     Alcohol Use: Yes      occassionally     Drug Use: No     Sexually Active: Yes -- Male partner(s)     Birth Control/ Protection: None     Social History Narrative     Was  in December 2015. Longtime partner from [place].       Patient Active Problem List   Diagnosis     Essential hypertension     Thyrotoxicosis     Obesity     Prolonged QTc 460 ms,  at hr 67     Low back pain - Suspect SI Joint dysfunction     Cough     Systemic sclerosis (H)     Tumoral calcinosis     Shortness of breath     Edema of both legs     Abnormal albumin     Myopathy     Hypoalbuminemia     Diastolic dysfunction     Limb ischemia; ulcers of fingertips     Cellulitis of leg     Other specified diffuse disease of connective tissue     Disturbed sleep rhythm     Pharyngeal dysphagia     Gastroesophageal reflux disease, esophagitis presence not specified     Rheumatoid arthritis with negative rheumatoid factor, involving unspecified site (H)     Raynaud's disease without gangrene       Allergies   Allergen Reactions     Lovenox Other (See Comments)     Blood clot      Norvasc [Amlodipine Besylate] Swelling     Lip swelling that happened on 2 different occasions     Erythromycin Rash     Rash on arms     OBJECTIVE:  Vitals: There were no vitals taken for this visit.  Physical examination by video shows her to be in no acute distress HEENT examination is essentially normal.  She is speaking in full sentences with no apparent shortness of breath.  Her hands are cyanotic but not severely so.  There are no ulcers no deformities and no notable swelling.     Component      Latest Ref Rng 1/26/2016   WBC      4.0 - 11.0 10e9/L 9.2   RBC Count      3.8 -  5.2 10e12/L 4.59   Hemoglobin      11.7 - 15.7 g/dL 13.2   Hematocrit      35.0 - 47.0 % 41.8   MCV      78 - 100 fl 91   MCH      26.5 - 33.0 pg 28.8   MCHC      31.5 - 36.5 g/dL 31.6   RDW      10.0 - 15.0 % 16.7 (H)   Platelet Count      150 - 450 10e9/L 195   Diff Method       Automated Method   % Neutrophils       62.9   % Lymphocytes       27.0   % Monocytes       9.1   % Eosinophils       0.4   % Basophils       0.2   % Immature Granulocytes       0.4   Nucleated RBCs      0 /100 0   Absolute Neutrophil      1.6 - 8.3 10e9/L 5.8   Absolute Lymphocytes      0.8 - 5.3 10e9/L 2.5   Absolute Monocytes      0.0 - 1.3 10e9/L 0.8   Absolute Eosinophils      0.0 - 0.7 10e9/L 0.0   Absolute Basophils      0.0 - 0.2 10e9/L 0.0   Abs Immature Granulocytes      0 - 0.4 10e9/L 0.0   Absolute Nucleated RBC       0.0   Color Urine       Yellow   Appearance Urine       Clear   Glucose Urine      NEG mg/dL Negative   Bilirubin Urine      NEG Negative   Ketones Urine      NEG mg/dL Negative   Specific Gravity Urine      1.003 - 1.035 1.010   Blood Urine      NEG Negative   pH Urine      5.0 - 7.0 pH 6.0   Protein Albumin Urine      NEG mg/dL Negative   Urobilinogen mg/dL      0.0 - 2.0 mg/dL Normal   Nitrite Urine      NEG Negative   Leukocyte Esterase Urine      NEG Negative   Source       Midstream Urine   WBC Urine      0 - 2 /HPF <1   RBC Urine      0 - 2 /HPF <1   Squamous Epithelial /HPF Urine      0 - 1 /HPF <1   Creatinine      0.52 - 1.04 mg/dL 1.11 (H)   GFR Estimate      >60 mL/min/1.7m2 51 (L)   GFR Estimate If Black      >60 mL/min/1.7m2 62   ALT      0 - 50 U/L 23   AST      0 - 45 U/L 16   Albumin      3.4 - 5.0 g/dL 3.4   CRP Inflammation      0.0 - 8.0 mg/L <2.9   Sed Rate      0 - 30 mm/h 9   CK Total      30 - 225 U/L 38   Aldolase       4.1     Biopsy from 3/10 EGD:  Esophagus, gastroesophageal junction, biopsies:   - Squamous mucosa and submucosa with marked chronic inflammation and   fibrosis   - No  evidence of intestinal metaplasia or dysplasia     August 11, 2020 interval history:    Since we last seen the patient she feels she was doing relatively stably on till July 7.  At that point she had the abrupt onset of a bad dry cough.  She did not have significant fevers but did not feel well.  She was seen had a negative coronavirus test but a chest x-ray apparently showed evidence of some infiltrates and she was placed on doxycycline.  She says she did not improve with that therapy and a CT angios was performed.  That showed bilateral pulmonary consolidation thought consistent with infection.  There was note that the pattern could actually be somewhat typical for sarcoid but it was noted to have worsened since the prior study performed there at [facility] in October 2019.  She also was noted to have mediastinal lymphadenopathy and debris in the esophagus potentially consistent with her systemic sclerosis.  No pulmonary embolism was found.  Based on the results of that she was placed on Levaquin.  She continued to fail to improve and a third antibiotic was also tried.    Finally she was seen by pulmonologist and a bronchoscopy was performed.  I am able to review the results of that and there was a high percentage of neutrophils and lymphocytes and that BAL fluid.  Viral cultures however Gram stain and culture were all unremarkable and there were no malignant cells found.    She was placed on 40 mg a day of prednisone at the time of the bronchoscopy once it was clear that there was no acute infection.  She does not have a follow-up appointment yet.  Although her cough is mostly gone she does not feel good and feels very dyspneic just going from the bed to the bathroom or crossed the room.  She is not convinced she has had any improvement with 40 mg daily prednisone and if anything think she is worse.    In reviewing her extensive record I see that a repeat TARA was performed and a reflexive was done when it was  positive.  She continues to have an anticentromere antibody which she had previously, but now notably has a high titer Gonzalez and high titer RNP antibody.  She previously was borderline positive for RNP and Gonzalez negative.  SSA is also higher titer than it was previously.    With all these autoantibody positivity she however denies any specific features that would suggest systemic lupus.  She specifically denies sun sensitivity sun sensitive rashes including a malar rash, morning stiffness in any of her joints or any joint swelling and any pleurisy at the time of her  at CT angiogram or otherwise.  \  Her Raynaud's phenomena has been stable as have other features of her limited cutaneous systemic sclerosis.        February 25, 2021 interval history:    At the time that I saw the patient about 3 weeks ago we decided that because her neuropathy sounded rather rapidly progressive, and her HRCT scan was being read as potentially consistent with sarcoidosis, that neurosarcoid was on the differential.  I also wondered about an immune complex mediated small vessel vasculitis causing neuropathy.  We therefore did laboratory testing as well as started her on some steroid.    Laboratory testing has been largely unrevealing.  She did not have elevated calcium or elevated calcium in the urine.  I did also do a parathyroid hormone in case we did find that however her parathyroid hormone was significantly elevated.  Her vitamin D was very low and she has started supplementation for that.    Most notably however her prednisone at 40 mg a day virtually resolved her symptoms over 3 to 4 days.  She had at least an 80% decrease in her pain and numbness although she still has some residual tingling in the legs bilaterally.    She is having some prednisone side effects.  Specifically she is having a hard time sleeping and is also feeling irritable which she thinks could be due partly due to the loss of sleep but partly directly due to  the prednisone.  She has got a lot of prior experience with prednisone and has had some difficulties with it before.    She did have some right-sided chest pain.  That seem to occur with a deep breath.  She has a cardiologist and saw them will be getting a follow-up echo.  She is no longer having that.    In going through her history she does not believe she is ever been on Imuran and is quite sure and I am sure as well that we have never had her on TNF inhibition.  She was on methotrexate and tolerated oral methotrexate poorly due to nausea but was better able to tolerate subcutaneous methotrexate.      Impression:    Per the problem list, with known RNP positivity and more recently with this HRCT finding that is concerning for the possibility of overlapping sarcoidosis.    She had a very nice response to steroid quite quickly, and so we now have the conundrum of not knowing exactly why.  I certainly suspect that she has an immune mediated neuropathy given this rapid response and neurosarcoid is a concern.    I have sent a message to Dr. Cuellar in pulmonary for her thoughts.  I have also ordered a repeat HRCT that to be done in another week or so before she is seen back in pulmonary.    I am hoping these can be reviewed so that there could be a decision whether we can feel strongly enough that presumptively this could be sarcoidosis to put her on appropriate therapy for that or whether a biopsy might be justified.    We could simply presumptively try steroid sparing medications.  I am somewhat concerned about using TNF inhibitors however unless we are pretty confident this is sarcoid, as they have been associated with worsening of systemic lupus patients and autoimmune associated interstitial lung disease in some settings.    We also have the  finding of the elevated PTH, and very low vitamin D levels, which can be seen in sarcoidosis, but is unclear here given normal Calcium levels. and will refer her to  endocrinology for that.    I have given her prescription for temazepam to help her with the sleep and irritability.  I did warn her to be careful about driving in the morning until she knows how it affects her as it does have a long half-life.    I will plan on seeing her back in about 6 to 8 weeks sooner as needed.    This video visit commenced at 3:03 PM and was completed at 3:25 PM for 23 minutes of face-to-face time, with an additional 17 minutes of chart review, physician to physician messaging,  and documentation completed on DOS.     MARTÍN De La Torre MD, PhD    Rheumatology

## 2021-03-16 ENCOUNTER — TELEPHONE (OUTPATIENT)
Dept: RHEUMATOLOGY | Facility: CLINIC | Age: 61
End: 2021-03-16

## 2021-03-16 DIAGNOSIS — R05.9 COUGH: ICD-10-CM

## 2021-03-16 DIAGNOSIS — J84.9 ILD (INTERSTITIAL LUNG DISEASE) (H): ICD-10-CM

## 2021-03-16 DIAGNOSIS — R79.89 ELEVATED PARATHYROID HORMONE: Primary | ICD-10-CM

## 2021-03-16 NOTE — TELEPHONE ENCOUNTER
----- Message from Nicolás De La Torre MD sent at 2/25/2021  3:26 PM CST -----  Regarding: endocrine referral  Could you please refer her to endocrine because of her elevated PTH.  I do not think anything needs to be done about it urgently except to watch it but she should be plugged in.

## 2021-03-16 NOTE — TELEPHONE ENCOUNTER
Referral placed per Dr De La Torre's request. Informed pt that this had been done and that she should get a call to schedule the appointment. Asked pt to contact us if she hasn't heard from scheduling by the end of the week.    TASIA BlanchardN, RN  Scleroderma Care Coordinator  [facility]

## 2021-03-17 ENCOUNTER — TELEPHONE (OUTPATIENT)
Dept: ENDOCRINOLOGY | Facility: CLINIC | Age: 61
End: 2021-03-17

## 2021-03-17 RX ORDER — SULFAMETHOXAZOLE AND TRIMETHOPRIM 400; 80 MG/1; MG/1
TABLET ORAL
Qty: 30 TABLET | Refills: 3 | Status: SHIPPED | OUTPATIENT
Start: 2021-03-17 | End: 2021-10-06

## 2021-03-17 RX ORDER — BENZONATATE 100 MG/1
CAPSULE ORAL
Qty: 60 CAPSULE | Refills: 0 | Status: SHIPPED | OUTPATIENT
Start: 2021-03-17 | End: 2021-05-04

## 2021-03-17 NOTE — TELEPHONE ENCOUNTER
LVM for pt to c/b to schedule 1st available new visit virtual/in-person.     Ok to schedule 1st available AND then send encounter for triage, if beyond 2 weeks. We will call them with sooner appt if needed.

## 2021-03-18 NOTE — TELEPHONE ENCOUNTER
APPT NOTES: E34.9 (ICD-10-CM) - Elevated parathyroid hormone, Nicolás De La Torre, per pt   APPT DATE: 3.31.21    NOTES (FOR ALL VISITS) STATUS DETAILS   OFFICE NOTES from referring provider Internal  Nicolás De La Torre   OFFICE NOTES from other specialist Internal     ED NOTES na    OPERATIVE REPORT  (thyroid, pituitary, adrenal, parathyroid)  (All op notes related to diagnoses) na    MEDICATION LIST Internal     IMAGING      DEXASCAN    (ALL) Internal  4.22.14,    MRI (BRAIN)    (ALL) na    XR (Chest)    (ALL) ce/Internal  Internal - 10.6.20, 3.10.16,   [facility]- 8/4/20, 7/27/20, 7/20/20, 7/9/20, 10.23.19, 10.31.10   CT (HEAD/NECK/CHEST/ABDOMEN)    (ALL) ce/Internal  [facility]- 7/20/20, 1.21.18, 6.8.11, 10.31.10,   Internal - 12.22.20, 8.21.20, 5.31.11   NUCLEAR    (ALL)  na    ULTRASOUND (HEAD/NECK) FNA BIOPSIES    (ALL) These images have pathology reports that need to be collected na    ULTRASOUND (HEAD/NECK)    (ALL) Internal  9/18/20    LABS     DIABETES: HBGA1C, CREATININE, FASTING LIPIDS, MICROALBUMIN URINE, POTASSIUM, TSH, T4    THYROID: TSH, T4, CBC, THYRODLONULIN, TOTAL T3, FREE T4, CALCITONIN, CEA Internal /ce  Calcium 2.5.21   Cbc 7.20.20   Creatinine 2.5.21  Glucose by meter 10.6.20  HBGA1C 3.4.14  LIPIDS 10.5.20  Potassium 6/7/13  T3- 9.18.20   TSH- 9/18/20  T4- 9/18/20   Vitamin D Deficiency 2.5.21  Parathyroid Hormone Intact 2.5.21    PATHOLOGY REPORTS WITH CASE NUMBER  *All pathology reports, list case number related to DX  *Just report, no path slides  *Surgical path reports for endocrine organs (ovaries, testes, pancreas, etc)          Action 3.18.21 sv    Action Taken [facility]  CXR-  8/4/20, 7/27/20, 7/20/20, 7/9/20, 10.23.19, 10.31.10  CT- 7/20/20, 1.21.18, 6.8.11, 10.31.10--- received all requested images in PACS--

## 2021-03-22 ENCOUNTER — IMMUNIZATION (OUTPATIENT)
Dept: PEDIATRICS | Facility: CLINIC | Age: 61
End: 2021-03-22
Payer: COMMERCIAL

## 2021-03-22 PROCEDURE — 0001A PR COVID VAC PFIZER DIL RECON 30 MCG/0.3 ML IM: CPT

## 2021-03-22 PROCEDURE — 91300 PR COVID VAC PFIZER DIL RECON 30 MCG/0.3 ML IM: CPT

## 2021-03-29 NOTE — PROGRESS NOTES
Jeanine is a 60 year old who is being evaluated via a billable video visit.      How would you like to obtain your AVS? [facility]     Text video visit link to: [phone] ([employer])    Will anyone else be joining your video visit? Judy Paul MA

## 2021-03-30 ASSESSMENT — ENCOUNTER SYMPTOMS
SINUS CONGESTION: 1
NUMBNESS: 1
PALPITATIONS: 0
DIZZINESS: 1
FATIGUE: 1
TASTE DISTURBANCE: 1
SORE THROAT: 1
SHORTNESS OF BREATH: 1
DISTURBANCES IN COORDINATION: 0
STIFFNESS: 1
EXERCISE INTOLERANCE: 1
SLEEP DISTURBANCES DUE TO BREATHING: 0
POLYPHAGIA: 0
HEMOPTYSIS: 0
MYALGIAS: 1
WEAKNESS: 1
NECK PAIN: 0
SPUTUM PRODUCTION: 0
HYPERTENSION: 0
NAIL CHANGES: 0
HOARSE VOICE: 0
WEIGHT GAIN: 1
HEADACHES: 1
RECTAL PAIN: 0
SINUS PAIN: 0
EYE PAIN: 0
PANIC: 0
POSTURAL DYSPNEA: 0
POLYDIPSIA: 0
MUSCLE CRAMPS: 0
WHEEZING: 0
INCREASED ENERGY: 1
TREMORS: 0
BLOOD IN STOOL: 0
CONSTIPATION: 0
DYSURIA: 0
SKIN CHANGES: 1
SNORES LOUDLY: 0
NAUSEA: 1
NECK MASS: 0
SEIZURES: 0
SYNCOPE: 0
TROUBLE SWALLOWING: 1
EYE WATERING: 1
NIGHT SWEATS: 0
COUGH DISTURBING SLEEP: 1
DYSPNEA ON EXERTION: 1
HALLUCINATIONS: 0
ARTHRALGIAS: 1
MEMORY LOSS: 1
JOINT SWELLING: 0
JAUNDICE: 0
POOR WOUND HEALING: 0
PARALYSIS: 0
DEPRESSION: 0
WEIGHT LOSS: 0
ABDOMINAL PAIN: 0
EYE REDNESS: 0
INSOMNIA: 1
BLOATING: 0
SPEECH CHANGE: 0
DIARRHEA: 1
ALTERED TEMPERATURE REGULATION: 1
NERVOUS/ANXIOUS: 0
FLANK PAIN: 0
BACK PAIN: 0
ORTHOPNEA: 0
DIFFICULTY URINATING: 0
TINGLING: 1
BOWEL INCONTINENCE: 0
HEMATURIA: 0
LOSS OF CONSCIOUSNESS: 0
COUGH: 1
DECREASED CONCENTRATION: 0
EYE IRRITATION: 0
DECREASED APPETITE: 1
VOMITING: 1
CHILLS: 1
FEVER: 0
HEARTBURN: 0
DOUBLE VISION: 0
MUSCLE WEAKNESS: 1
LEG PAIN: 0
SMELL DISTURBANCE: 0
HYPOTENSION: 0
LIGHT-HEADEDNESS: 0

## 2021-03-30 NOTE — PROGRESS NOTES
[facility]  ILD Clinic - Return Visit-  March 31, 20201         Assessment and Plan:   Jeanine Schuler is a 59 year old female who presents for initial evaluation of interstitial lung disease.    1) Interstitial lung disease  - Her CT from 7/27 (contrast study) and from today (8/21; non-contrast high resolution study) were reviewed.  There seems to be interval slight improvement in her bronchocentric, predominantly central infiltrates.  Some are nodular appearing, especially in the periphery and appears confluent centrally.  Sarcoidosis is still in the differential however definite tissue diagnosis has not been obtained.   - On prednisone, her PFT and nodular consolidations improved along with symptomatic improvement.  However her PFT from today shows 200mL decline in FVC and 150mL decline in FEV1 despite increased dose of Prednisone.  Will repeat CT today.   - Treatment options include MMF vs. TNFa inhibitor - MMF has been used to treat sarcoidosis as a first line agent in some centers, and I believe this would be a reasonable option for her.  I have discussed this with Dr. De La Torre who is also in agreement as well.   - Her case was reviewed at ILD conference, and cryobiopsy was recommended.  She underwent biopsy on 10/6/2020.  Pathology was reviewed at ILD conference on 10/12/2020 which showed ALI, what appears to be an acute exacerbation of underlying ILD with OP.  Few nonnecrotizing granulomas.  DDx include NSIP vs. HP vs. Sarcoidosis.   -We will obtain sarcoidosis labs, such as 1, 25-OH vitamin D, soluble IL2R, fungal serologies and HIV.   - She was started on Prednisone 40mg daily, tapered by 10mg every 3 weeks, however she developed neuropathy while on 20mg of prednisone and her dose was recently increased back to 40mg daily. Taper per Dr. De La Torre.    2) History of limited cutaneous systemic sclerosis  - On Plaquinil, followed by   [facility]    ADDENDUM: Her case was reviewed at Sarcoid conference on 4/8/2021.  Imaging: Upper lobe predominant perilymphatic nodules, some of which are confluent.  Dramatic improvement from 8/2022 most recent CT.  There is no lymphadenopathy currently, however there were some and earlier studies at [facility].  There are some is a continuation in the bases suggestive of small vessel or small airways disease.  Given the clinical context of her mixed connective tissue disease and patulous esophagus, chronic aspiration is suspected.  Pathology: There are lymphoid aggregates in many places, including lymphocytic infiltrate in the alveolar septum, compatible with cellular NSIP.  This is a nonspecific finding that can be seen in early sarcoidosis as well.  There are some noncaseating granulomas (transitioning to naked granulomas).  This may be compatible with early sarcoidosis versus chronic HP.  There is also NSIP, but given the clinical context idiopathic NSIP seems unlikely.  Fungal and AFB stains were negative.    Plan: Recommend methotrexate subcu, since she did not tolerate p.o. in the past.  We will obtain EKG, Zio patch and echocardiogram to rule out cardiac involvement and to screen for pulmonary hypertension.  Will send referral to neurology Dr. Abel for possible large or small fiber neuropathy.    RTC: 3 months or sooner if need arises  Influenza vaccination: She has already received Influenza vaccination for this year. She has received     Beena Cuellar MD [employer]  Pulmonary and Critical Care Medicine          Problem List:     1. Interstitial lung disease  a. Symptom: Cough, fatigue, sone subjective dyspnea  b. Treatment: (11/2020-) Prednisone, currently at 40mg   c. Diagnosis: Pathology: NSIP vs. HP, sarcoidosis can not be r/o. (ALI, what appears to be an acute exacerbation of underlying ILD with OP.  Few nonnecrotizing granulomas)  Radiology: bronchocentric nodular infiltrates  d. Serology:TARA 1: 1280,  "anticentromere antibody greater than 8, anti-RNP-4.8 (less than 1), chromatin DNP-6.6 (less than 1.0)  e. Other w/u  i. Bronchoscopy with BAL ([facility]; 8/4/2020)  1. BAL (superior lingula) cell count (49% neutrophils, 45% lymphocytes, 3% monocytes, 3% eosinophils)  2. Negative for malignant cells, silver stain negative for pneumocystis and fungal organisms.  3. Pathology (anterior lingual segments of the left upper lobe): Benign bronchial epithelium and alveolar spaces.  There is no evidence of malignancy.  The findings are overall nondiagnostic.  ii. Bronchoscopy with BAL and cryobiopsy ([facility]; 10/6/2020)  1. BAL (, other cells 32, L 47, N 19, E 2; CD4: CD8 3.65)  2. Microbiology-all negative except for penicillium species from fungal culture only.  Aspergillus galactomannan was negative  iii. LUNG, LEFT UPPER LOBE, TRANSBRONCHIAL CRYOBIOPSY:   - Fragments of respiratory mucosa and alveolated lung tissue with nonnecrotizing granulomas with multinucleated giant cells, foci of organizing pneumonia and nonspecific chronic interstitial inflammation   with reactive pneumocytes type II hyperplasia.   - GMS and AFB special stains are negative for fungal and acid-fast organisms, respectively.   COMMENT:   In absence of infection (ie. Mycobacteria, please correlate with  laboratory cultures and serology), the histologic findings raise the differential diagnosis of hypersensitivity pneumonitis and sarcoidosis. Clinical   and radiologic correlation is necessary. The case will be discussed at the ILD interdepartmental conference.     2. Mixed connective tissue disease/limited cutaneous systemic sclerosis  a. Followed by Dr. Indio lopez. Treatment: (\"years ago\") Methotrexate PO -> SQ due to GI intolerance, unclear why stoped  Plaquinil    3. GERD    4. HFpEF    5. History of gastric bypass 2005        History of Present Illness:     Jeanine Schuler is a 59 year old female who presents for initial evaluation of " interstitial lung disease. She was last seen by me virtually in 12/2020.     Since her last visit, she is unsure if there has been any change in her respiratory status.  She currently complains of extreme fatigue and pain/numbness in her feet and legs.  She occasionally also experiences pain in her upper chest/throat area.  In addition, she has gained about 5 to 10 pounds on prednisone.    Since her last visit, her prednisone dose has been tapered slowly (5 mg every 3 weeks).  However, when she reached about 25 mg, she started noticing increased pain in her feet.  This has progressively worsened on lower doses of 20 mg, and she was evaluated by Dr. Loomis that week, who increase her prednisone to 40 mg daily, which she is currently on.    With initiation of higher doses of prednisone, she feels as though her pain has improved somewhat but has not resolved.  She denies any fever/chills, but states she still occasionally experiences night sweats, about once a week.  This has improved in terms of frequency with initiation of prednisone.           Review of Systems:     Please see HPI, otherwise the complete 10 point ROS is negative.           Past Medical and Surgical History:     Past Medical History:   Diagnosis Date     Anemia      Cellulitis of leg 6/6/2013     Esophageal reflux     Better post-hiatal hernia repair and weight loss     Limb ischemia; ulcers of fingertips 4/22/2013     Raynaud's syndrome      Rheumatoid arthritis (H) \     Scleroderma (H)      Sjogren-Jake syndrome      Systemic sclerosis (H) 2009     Unspecified essential hypertension      Past Surgical History:   Procedure Laterality Date     BRONCHOSCOPY FLEXIBLE,L CRYOBIOPSY N/A 10/6/2020    Procedure: BRONCHOSCOPY, FLEXIBLE, WITH TRANSBRONCHIAL BIOPSY x4 USING CRYOPROBE, bronchial alveolar lavage;  Surgeon: Teddy Mendez MD;  Location: [facility] OR     BYPASS GASTRIC, CHOLECYSTECTOMY, COMBINED       CHOLECYSTECTOMY       COLONOSCOPY        COLONOSCOPY N/A 11/17/2020    Procedure: COLONOSCOPY, WITH POLYPECTOMY AND BIOPSY;  Surgeon: Jamie Cárdenas MD;  [place] [facility] [place]     ESOPHAGOSCOPY, GASTROSCOPY, DUODENOSCOPY (EGD), COMBINED N/A 3/10/2016    Procedure: COMBINED ESOPHAGOSCOPY, GASTROSCOPY, DUODENOSCOPY (EGD), BIOPSY SINGLE OR MULTIPLE;  Surgeon: Tricia Zambrano MD;  Location:  GI     ESOPHAGOSCOPY, GASTROSCOPY, DUODENOSCOPY (EGD), COMBINED N/A 11/17/2020    Procedure: ESOPHAGOGASTRODUODENOSCOPY, WITH BIOPSY and Dilation;  Surgeon: Jamie Cárdenas MD;  [place] [facility] [place]     INNER EAR SURGERY       PICC INSERTION  6/5/2013    5fr DL Power PICC, 44cm, left basilic vein, tip in low SVC     SURGICAL HISTORY OF -       Left ear surgery - incus transition, tympanoplasty     SURGICAL HISTORY OF -   6/05    Gastric bypass     SURGICAL HISTORY OF -   6/05    Cholecystectomy     SURGICAL HISTORY OF -   6/05    Hiatal hernia repair     TONSILLECTOMY             Family History:     Family History   Problem Relation Age of Onset     Cerebrovascular Disease Father      Heart Disease Father      Hypertension Father      Heart Disease Mother      Hypertension Mother      Chronic Obstructive Pulmonary Disease Sister      Heart Disease Sister      Hypertension Sister      Cerebrovascular Disease Brother      Heart Disease Brother      Kidney Disease Brother         on dialysis     Diabetes Brother         borderline     No Known Problems Son      No Known Problems Son      No Known Problems Daughter      Cancer Brother         small cell     Heart Disease Brother      Heart Disease Brother      Heart Disease Brother      Heart Disease Brother         tripple A     Heart Disease Sister      Substance Abuse Sister         alcoholism     Crohn's Disease No family hx of      Ulcerative Colitis No family hx of      GERD No family hx of      Celiac Disease No family hx of             Social History:     Social History     Socioeconomic History     Marital  status:      Spouse name: Not on file     Number of children: Not on file     Years of education: Not on file     Highest education level: Not on file   Occupational History     Not on file   Social Needs     Financial resource strain: Not on file     Food insecurity     Worry: Not on file     Inability: Not on file     Transportation needs     Medical: Not on file     Non-medical: Not on file   Tobacco Use     Smoking status: Never Smoker     Smokeless tobacco: Never Used   Substance and Sexual Activity     Alcohol use: Yes     Comment: occassionally     Drug use: No     Sexual activity: Yes     Partners: Male     Birth control/protection: None   Lifestyle     Physical activity     Days per week: Not on file     Minutes per session: Not on file     Stress: Not on file   Relationships     Social connections     Talks on phone: Not on file     Gets together: Not on file     Attends [employer] service: Not on file     Active member of club or organization: Not on file     Attends meetings of clubs or organizations: Not on file     Relationship status: Not on file     Intimate partner violence     Fear of current or ex partner: Not on file     Emotionally abused: Not on file     Physically abused: Not on file     Forced sexual activity: Not on file   Other Topics Concern     Parent/sibling w/ CABG, MI or angioplasty before 65F 55M? 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  She has been on medical leave since 7/27/2020        Moved about 4 years ago to a new house. No known water damage or mold.        She lives in a normal area with forms around her house, but denies any exposure to grain dust, hay, other farm animals.        No unusual hobbies                Medications:     Current Outpatient Medications   Medication     benzonatate (TESSALON) 100 MG capsule     furosemide (LASIX) 40 MG tablet     hydroxychloroquine (PLAQUENIL) 200 MG tablet      "losartan (COZAAR) 50 MG tablet     omeprazole (PRILOSEC) 20 MG DR capsule     pantoprazole (PROTONIX) 40 MG EC tablet     Potassium (POTASSIMIN PO)     predniSONE (DELTASONE) 10 MG tablet     predniSONE (DELTASONE) 5 MG tablet     spironolactone (ALDACTONE) 25 MG tablet     sulfamethoxazole-trimethoprim (BACTRIM) 400-80 MG tablet     temazepam (RESTORIL) 15 MG capsule     vitamin D2 (ERGOCALCIFEROL) 73139 units (1250 mcg) capsule     No current facility-administered medications for this visit.             Physical Exam:   BP (!) 156/61   Pulse 70   Resp 17   Ht 1.753 m (5' 9\")   Wt 72.6 kg (160 lb)   SpO2 90%   BMI 23.63 kg/m    GENERAL: alert, NAD  HEENT: NCAT, EOMI, masked  Neck: no cervical or supraclavicular adenopathy  Lungs: good air flow, no crackles, rhonchi or wheezing  CV: RRR, S1S2, no murmurs noted  Abdomen: normoactive BS, soft, non tender  Neuro: AAO X 3  Psychiatric: normal affect, good eye contact  Skin: no rash, jaundice or lesions on limited exam  Extremities: No clubbing, cyanosis or edema.  No digital edema, no synovitis or joint swelling.  No ulcers, skin thickening or fissure.           Imaging:     CT Chest ([facility]; 7/20/2020)  1. Bilateral pulmonary consolidation is probably due to infection. Other inflammatory processes are also on the differential. Although radiographic pattern is somewhat typical of sarcoid, this is less likely given significant worsening since 10/23/2019. Correlate clinically.  2. Mediastinal lymphadenopathy, probably reactive.  3. No pulmonary embolism.  4. Postoperative changes of gastric bypass with debris in the esophagus. Although this is present, the pulmonary consolidation is not typical of aspiration.           Pulmonary Function Tests:     PFT interpretation (March 31, 2021):  Maneuver: ATS criteria is not met.  Interpret with caution.  Spirometry: Normal spirometry.  Lung volumes: Normal lung volumes.  Diffusing capacity: No impairment in diffusing " capacity.  Compared to her prior examination on 12/2020, there has been 200 earlier decline in FVC, while TLC remained essentially stable.    Six-minute walk test  Date 6MWD RA SpO2 Resting O2 req Exercise O2 req Lowest SpO2 on amb   8/21/20 940 97 RA RA 90                    Laboratory:     Serology ([facility]; 7/27/2020)  Chromatin DNP antibody -6.6 (less than 1.0)  Ribosomal antibody-negative  SSA-2.0 (less than 1.0)  SSB-negative anti-SM RNP-greater than 80 (less than 1.0)  SCL 70-negative  Maddy 1-negative  Centromere antibody -greater than 8.0 (less than 1.0)  SM IgG-negative  Anti-RNP-4.8 (less than 1.0)  Anti-DS DNA- negative  TARA - positive    6/10/2009  TARA- 1: 1280  RF-20 (less than 9)    No results found for this or any previous visit (from the past 168 hour(s)).    BAL (8/4/2020)  Negative for Legionella, RVP, Bordetella, C pneumoniae, mycoplasma pneumonia    A. Lingula bronchial alveolar lavage, cytology  1. 49% neutrophils, 45% lymphocytes, 3% monocytes, and 3% eosinophils.  2. Negative for malignant cells.  3. Silver stain negative for pneumocystis and fungal organisms.  B. Lingula and anterior segments, left upper lobe transbronchial biopsy?-Negative for malignancy, see microscopic description.    Pathology:   A. The smear has the following differential 49% neutrophils, 45% lymphocytes, 3% monocytes, and 3% macrophages. There is no evidence of malignancy. A silver stain is applied, with an appropriate positive control, and is negative for pneumocystis and fungal organisms.  B. The biopsy shows benign bronchial epithelium and alveolar spaces. There is no evidence of malignancy. The findings are overall nondiagnostic.             Cardiac:     TTE ([facility]; 8/25/2020)  Interpretation Summary  Global and regional left ventricular function is normal with an EF of 55-60%.  Right ventricular function, chamber size, wall motion, and thickness are  normal.  Pulmonary artery systolic pressure cannot be  assessed.  Ascending aorta 3.8 cm.  Aortic index 20mm/m2,mild dilation.  The inferior vena cava is normal.  No pericardial effusion is present.    Echocardiogram ([facility]; 10/16/2017)  Interpretation Summary    * Normal left ventricular size and systolic function, estimated LVEF 60-65%.    * Normal regional wall motion.    * Normal right ventricular size and systolic function.    * The left ventricular diastolic function is mildly abnormal (Grade I).    * There is no hemodynamically significant valve disease.    * Normal estimated right ventricular systolic pressure of 28 mmHg.    * The proximal ascending aorta is mildly dilated measuring  3.9 cm.    * The IVC is normal in size (< 2.1 cm), > 50% respiratory variance, RA  pressure normal at 3 mmHg.    * Compared to prior study on 8/26/16, there has been no significant change  in left ventricular systolic function with side by side comparison.  There has  been no significant change in the size of the proximal ascending aorta  (previously measured 4.0 cm).         Other:

## 2021-03-31 ENCOUNTER — OFFICE VISIT (OUTPATIENT)
Dept: PULMONOLOGY | Facility: CLINIC | Age: 61
End: 2021-03-31
Attending: INTERNAL MEDICINE
Payer: COMMERCIAL

## 2021-03-31 ENCOUNTER — VIRTUAL VISIT (OUTPATIENT)
Dept: ENDOCRINOLOGY | Facility: CLINIC | Age: 61
End: 2021-03-31
Attending: INTERNAL MEDICINE
Payer: COMMERCIAL

## 2021-03-31 ENCOUNTER — PRE VISIT (OUTPATIENT)
Dept: ENDOCRINOLOGY | Facility: CLINIC | Age: 61
End: 2021-03-31

## 2021-03-31 ENCOUNTER — APPOINTMENT (OUTPATIENT)
Dept: LAB | Facility: CLINIC | Age: 61
End: 2021-03-31
Payer: COMMERCIAL

## 2021-03-31 VITALS
HEART RATE: 70 BPM | OXYGEN SATURATION: 90 % | BODY MASS INDEX: 23.7 KG/M2 | RESPIRATION RATE: 17 BRPM | DIASTOLIC BLOOD PRESSURE: 61 MMHG | WEIGHT: 160 LBS | SYSTOLIC BLOOD PRESSURE: 156 MMHG | HEIGHT: 69 IN

## 2021-03-31 DIAGNOSIS — E55.9 HYPOVITAMINOSIS D: ICD-10-CM

## 2021-03-31 DIAGNOSIS — Z98.84 S/P GASTRIC BYPASS: Primary | ICD-10-CM

## 2021-03-31 DIAGNOSIS — J84.9 ILD (INTERSTITIAL LUNG DISEASE) (H): Primary | ICD-10-CM

## 2021-03-31 DIAGNOSIS — M35.9 COLLAGEN VASCULAR DISEASE (H): ICD-10-CM

## 2021-03-31 DIAGNOSIS — Z78.0 POST-MENOPAUSAL: ICD-10-CM

## 2021-03-31 DIAGNOSIS — Z79.52 ON CORTICOSTEROID THERAPY: ICD-10-CM

## 2021-03-31 DIAGNOSIS — Z79.899 ENCOUNTER FOR LONG-TERM (CURRENT) USE OF HIGH-RISK MEDICATION: ICD-10-CM

## 2021-03-31 DIAGNOSIS — R79.89 HIGH SERUM PARATHYROID HORMONE (PTH): ICD-10-CM

## 2021-03-31 PROBLEM — R93.89 ABNORMAL CT OF THE CHEST: Status: RESOLVED | Noted: 2020-08-04 | Resolved: 2021-03-31

## 2021-03-31 LAB
ALBUMIN SERPL-MCNC: 3.5 G/DL (ref 3.4–5)
ALP SERPL-CCNC: 72 U/L (ref 40–150)
ALT SERPL W P-5'-P-CCNC: 31 U/L (ref 0–50)
ANION GAP SERPL CALCULATED.3IONS-SCNC: 5 MMOL/L (ref 3–14)
AST SERPL W P-5'-P-CCNC: 22 U/L (ref 0–45)
BASOPHILS # BLD AUTO: 0.1 10E9/L (ref 0–0.2)
BASOPHILS NFR BLD AUTO: 0.4 %
BILIRUB DIRECT SERPL-MCNC: 0.2 MG/DL (ref 0–0.2)
BILIRUB SERPL-MCNC: 0.6 MG/DL (ref 0.2–1.3)
BUN SERPL-MCNC: 17 MG/DL (ref 7–30)
CALCIUM SERPL-MCNC: 9.4 MG/DL (ref 8.5–10.1)
CHLORIDE SERPL-SCNC: 101 MMOL/L (ref 94–109)
CO2 SERPL-SCNC: 31 MMOL/L (ref 20–32)
CREAT SERPL-MCNC: 1.2 MG/DL (ref 0.52–1.04)
CRP SERPL-MCNC: <2.9 MG/L (ref 0–8)
DEPRECATED CALCIDIOL+CALCIFEROL SERPL-MC: 29 UG/L (ref 20–75)
DIFFERENTIAL METHOD BLD: ABNORMAL
EOSINOPHIL # BLD AUTO: 0 10E9/L (ref 0–0.7)
EOSINOPHIL NFR BLD AUTO: 0.2 %
ERYTHROCYTE [DISTWIDTH] IN BLOOD BY AUTOMATED COUNT: 15 % (ref 10–15)
ERYTHROCYTE [SEDIMENTATION RATE] IN BLOOD BY WESTERGREN METHOD: 9 MM/H (ref 0–30)
GFR SERPL CREATININE-BSD FRML MDRD: 50 ML/MIN/{1.73_M2}
GLUCOSE SERPL-MCNC: 85 MG/DL (ref 70–99)
HAV IGG SER QL IA: NONREACTIVE
HBV CORE AB SERPL QL IA: NONREACTIVE
HBV SURFACE AB SERPL IA-ACNC: 2.05 M[IU]/ML
HBV SURFACE AG SERPL QL IA: NONREACTIVE
HCT VFR BLD AUTO: 43.4 % (ref 35–47)
HCV AB SERPL QL IA: NONREACTIVE
HGB BLD-MCNC: 13.9 G/DL (ref 11.7–15.7)
HIV 1+2 AB+HIV1 P24 AG SERPL QL IA: NONREACTIVE
IMM GRANULOCYTES # BLD: 0.2 10E9/L (ref 0–0.4)
IMM GRANULOCYTES NFR BLD: 1.6 %
LYMPHOCYTES # BLD AUTO: 3.5 10E9/L (ref 0.8–5.3)
LYMPHOCYTES NFR BLD AUTO: 27.4 %
MCH RBC QN AUTO: 30.9 PG (ref 26.5–33)
MCHC RBC AUTO-ENTMCNC: 32 G/DL (ref 31.5–36.5)
MCV RBC AUTO: 96 FL (ref 78–100)
MONOCYTES # BLD AUTO: 1 10E9/L (ref 0–1.3)
MONOCYTES NFR BLD AUTO: 7.6 %
NEUTROPHILS # BLD AUTO: 8.1 10E9/L (ref 1.6–8.3)
NEUTROPHILS NFR BLD AUTO: 62.8 %
NRBC # BLD AUTO: 0 10*3/UL
NRBC BLD AUTO-RTO: 0 /100
PLATELET # BLD AUTO: 251 10E9/L (ref 150–450)
POTASSIUM SERPL-SCNC: 3.2 MMOL/L (ref 3.4–5.3)
PROT SERPL-MCNC: 6.4 G/DL (ref 6.8–8.8)
RBC # BLD AUTO: 4.5 10E12/L (ref 3.8–5.2)
SODIUM SERPL-SCNC: 138 MMOL/L (ref 133–144)
WBC # BLD AUTO: 12.9 10E9/L (ref 4–11)

## 2021-03-31 PROCEDURE — G0463 HOSPITAL OUTPT CLINIC VISIT: HCPCS | Mod: 25

## 2021-03-31 PROCEDURE — 94375 RESPIRATORY FLOW VOLUME LOOP: CPT | Performed by: INTERNAL MEDICINE

## 2021-03-31 PROCEDURE — 82652 VIT D 1 25-DIHYDROXY: CPT | Performed by: INTERNAL MEDICINE

## 2021-03-31 PROCEDURE — 94726 PLETHYSMOGRAPHY LUNG VOLUMES: CPT | Performed by: INTERNAL MEDICINE

## 2021-03-31 PROCEDURE — 82306 VITAMIN D 25 HYDROXY: CPT | Performed by: INTERNAL MEDICINE

## 2021-03-31 PROCEDURE — 86612 BLASTOMYCES ANTIBODY: CPT | Performed by: INTERNAL MEDICINE

## 2021-03-31 PROCEDURE — 83520 IMMUNOASSAY QUANT NOS NONAB: CPT | Performed by: INTERNAL MEDICINE

## 2021-03-31 PROCEDURE — 85025 COMPLETE CBC W/AUTO DIFF WBC: CPT | Performed by: INTERNAL MEDICINE

## 2021-03-31 PROCEDURE — 87389 HIV-1 AG W/HIV-1&-2 AB AG IA: CPT | Performed by: INTERNAL MEDICINE

## 2021-03-31 PROCEDURE — 86803 HEPATITIS C AB TEST: CPT | Performed by: INTERNAL MEDICINE

## 2021-03-31 PROCEDURE — 80048 BASIC METABOLIC PNL TOTAL CA: CPT | Performed by: INTERNAL MEDICINE

## 2021-03-31 PROCEDURE — 80076 HEPATIC FUNCTION PANEL: CPT | Performed by: INTERNAL MEDICINE

## 2021-03-31 PROCEDURE — 99214 OFFICE O/P EST MOD 30 MIN: CPT | Mod: 25 | Performed by: INTERNAL MEDICINE

## 2021-03-31 PROCEDURE — 82164 ANGIOTENSIN I ENZYME TEST: CPT | Performed by: INTERNAL MEDICINE

## 2021-03-31 PROCEDURE — 87340 HEPATITIS B SURFACE AG IA: CPT | Performed by: INTERNAL MEDICINE

## 2021-03-31 PROCEDURE — 86481 TB AG RESPONSE T-CELL SUSP: CPT | Performed by: INTERNAL MEDICINE

## 2021-03-31 PROCEDURE — 86606 ASPERGILLUS ANTIBODY: CPT | Performed by: INTERNAL MEDICINE

## 2021-03-31 PROCEDURE — 87385 HISTOPLASMA CAPSUL AG IA: CPT | Performed by: INTERNAL MEDICINE

## 2021-03-31 PROCEDURE — 86635 COCCIDIOIDES ANTIBODY: CPT | Performed by: INTERNAL MEDICINE

## 2021-03-31 PROCEDURE — 36415 COLL VENOUS BLD VENIPUNCTURE: CPT | Performed by: INTERNAL MEDICINE

## 2021-03-31 PROCEDURE — 86704 HEP B CORE ANTIBODY TOTAL: CPT | Performed by: INTERNAL MEDICINE

## 2021-03-31 PROCEDURE — 86706 HEP B SURFACE ANTIBODY: CPT | Performed by: INTERNAL MEDICINE

## 2021-03-31 PROCEDURE — 86140 C-REACTIVE PROTEIN: CPT | Performed by: INTERNAL MEDICINE

## 2021-03-31 PROCEDURE — 86698 HISTOPLASMA ANTIBODY: CPT | Performed by: INTERNAL MEDICINE

## 2021-03-31 PROCEDURE — 86708 HEPATITIS A ANTIBODY: CPT | Performed by: INTERNAL MEDICINE

## 2021-03-31 PROCEDURE — 94729 DIFFUSING CAPACITY: CPT | Performed by: INTERNAL MEDICINE

## 2021-03-31 PROCEDURE — 85652 RBC SED RATE AUTOMATED: CPT | Performed by: INTERNAL MEDICINE

## 2021-03-31 PROCEDURE — 99205 OFFICE O/P NEW HI 60 MIN: CPT | Mod: 95

## 2021-03-31 ASSESSMENT — MIFFLIN-ST. JEOR: SCORE: 1370.14

## 2021-03-31 ASSESSMENT — PAIN SCALES - GENERAL: PAINLEVEL: NO PAIN (0)

## 2021-03-31 NOTE — LETTER
3/31/2021         RE: Jeanine Schuler  [address]  [facility] [address]        Dear Colleague,    Thank you for referring your patient, Jeanine Schuler, to the [facility] [place]. Please see a copy of my visit note below.    General acute hospital Lung [facility] and [facility]  ILD Clinic - Return Visit-  March 31, 20201         Assessment and Plan:   Jeanine Schuler is a 59 year old female who presents for initial evaluation of interstitial lung disease.    1) Interstitial lung disease  - Her CT from 7/27 (contrast study) and from today (8/21; non-contrast high resolution study) were reviewed.  There seems to be interval slight improvement in her bronchocentric, predominantly central infiltrates.  Some are nodular appearing, especially in the periphery and appears confluent centrally.  Sarcoidosis is still in the differential however definite tissue diagnosis has not been obtained.   - On prednisone, her PFT and nodular consolidations improved along with symptomatic improvement.  However her PFT from today shows 200mL decline in FVC and 150mL decline in FEV1 despite increased dose of Prednisone.  Will repeat CT today.   - Treatment options include MMF vs. TNFa inhibitor - MMF has been used to treat sarcoidosis as a first line agent in some centers, and I believe this would be a reasonable option for her.  I have discussed this with Dr. De La Torre who is also in agreement as well.   - Her case was reviewed at ILD conference, and cryobiopsy was recommended.  She underwent biopsy on 10/6/2020.  Pathology was reviewed at ILD conference on 10/12/2020 which showed ALI, what appears to be an acute exacerbation of underlying ILD with OP.  Few nonnecrotizing granulomas.  DDx include NSIP vs. HP vs. Sarcoidosis.   -We will obtain sarcoidosis labs, such as 1, 25-OH vitamin D, soluble IL2R, fungal serologies and HIV.   - She was started on  Prednisone 40mg daily, tapered by 10mg every 3 weeks, however she developed neuropathy while on 20mg of prednisone and her dose was recently increased back to 40mg daily. Taper per Dr. De La Torre.    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 North    RTC: 3 months or sooner if need arises  Influenza vaccination: She has already received Influenza vaccination for this year. She has received     Beena Cuellar MD [employer]  Pulmonary and Critical Care Medicine          Problem List:     1. Interstitial lung disease  a. Symptom: Cough, fatigue, sone subjective dyspnea  b. Treatment: (11/2020-) Prednisone, currently at 40mg   c. Diagnosis: Pathology: NSIP vs. HP, sarcoidosis can not be r/o. (ALI, what appears to be an acute exacerbation of underlying ILD with OP.  Few nonnecrotizing granulomas)  Radiology: bronchocentric nodular infiltrates  d. Serology:TARA 1: 1280, anticentromere antibody greater than 8, anti-RNP-4.8 (less than 1), chromatin DNP-6.6 (less than 1.0)  e. Other w/u  i. Bronchoscopy with BAL ([facility]; 8/4/2020)  1. BAL (superior lingula) cell count (49% neutrophils, 45% lymphocytes, 3% monocytes, 3% eosinophils)  2. Negative for malignant cells, silver stain negative for pneumocystis and fungal organisms.  3. Pathology (anterior lingual segments of the left upper lobe): Benign bronchial epithelium and alveolar spaces.  There is no evidence of malignancy.  The findings are overall nondiagnostic.  ii. Bronchoscopy with BAL and cryobiopsy ([facility]; 10/6/2020)  1. BAL (, other cells 32, L 47, N 19, E 2; CD4: CD8 3.65)  2. Microbiology-all negative except for penicillium species from fungal culture only.  Aspergillus galactomannan was negative  iii. LUNG, LEFT UPPER LOBE, TRANSBRONCHIAL CRYOBIOPSY:   - Fragments of respiratory mucosa and alveolated lung tissue with nonnecrotizing granulomas with multinucleated giant cells, foci of organizing pneumonia and  "nonspecific chronic interstitial inflammation   with reactive pneumocytes type II hyperplasia.   - GMS and AFB special stains are negative for fungal and acid-fast organisms, respectively.   COMMENT:   In absence of infection (ie. Mycobacteria, please correlate with  laboratory cultures and serology), the histologic findings raise the differential diagnosis of hypersensitivity pneumonitis and sarcoidosis. Clinical   and radiologic correlation is necessary. The case will be discussed at the ILD interdepartmental conference.     2. Mixed connective tissue disease/limited cutaneous systemic sclerosis  a. Followed by Dr. Indio lopez. Treatment: (\"years ago\") Methotrexate PO -> SQ due to GI intolerance, unclear why stoped  Plaquinil    3. GERD    4. HFpEF    5. History of gastric bypass 2005        History of Present Illness:     Jeanine Schuler is a 59 year old female who presents for initial evaluation of interstitial lung disease. She was last seen by me virtually in 12/2020.     Since her last visit, she is unsure if there has been any change in her respiratory status.  She currently complains of extreme fatigue and pain/numbness in her feet and legs.  She occasionally also experiences pain in her upper chest/throat area.  In addition, she has gained about 5 to 10 pounds on prednisone.    Since her last visit, her prednisone dose has been tapered slowly (5 mg every 3 weeks).  However, when she reached about 25 mg, she started noticing increased pain in her feet.  This has progressively worsened on lower doses of 20 mg, and she was evaluated by Dr. Loomis that week, who increase her prednisone to 40 mg daily, which she is currently on.    With initiation of higher doses of prednisone, she feels as though her pain has improved somewhat but has not resolved.  She denies any fever/chills, but states she still occasionally experiences night sweats, about once a week.  This has improved in terms of frequency with " initiation of prednisone.           Review of Systems:     Please see HPI, otherwise the complete 10 point ROS is negative.           Past Medical and Surgical History:     Past Medical History:   Diagnosis Date     Anemia      Cellulitis of leg 6/6/2013     Esophageal reflux     Better post-hiatal hernia repair and weight loss     Limb ischemia; ulcers of fingertips 4/22/2013     Raynaud's syndrome      Rheumatoid arthritis (H) \     Scleroderma (H)      Sjogren-Jake syndrome      Systemic sclerosis (H) 2009     Unspecified essential hypertension      Past Surgical History:   Procedure Laterality Date     BRONCHOSCOPY FLEXIBLE,L CRYOBIOPSY N/A 10/6/2020    Procedure: BRONCHOSCOPY, FLEXIBLE, WITH TRANSBRONCHIAL BIOPSY x4 USING CRYOPROBE, bronchial alveolar lavage;  Surgeon: Teddy Mendez MD;  Location:  OR     BYPASS GASTRIC, CHOLECYSTECTOMY, COMBINED       CHOLECYSTECTOMY       COLONOSCOPY       COLONOSCOPY N/A 11/17/2020    Procedure: COLONOSCOPY, WITH POLYPECTOMY AND BIOPSY;  Surgeon: Jamie Cárdenas MD;  Location: [facility] OR     ESOPHAGOSCOPY, GASTROSCOPY, DUODENOSCOPY (EGD), COMBINED N/A 3/10/2016    Procedure: COMBINED ESOPHAGOSCOPY, GASTROSCOPY, DUODENOSCOPY (EGD), BIOPSY SINGLE OR MULTIPLE;  Surgeon: Tricia Zambrano MD;  Location:  GI     ESOPHAGOSCOPY, GASTROSCOPY, DUODENOSCOPY (EGD), COMBINED N/A 11/17/2020    Procedure: ESOPHAGOGASTRODUODENOSCOPY, WITH BIOPSY and Dilation;  Surgeon: Jamie Cárdenas MD;  Location: [facility] OR     INNER EAR SURGERY       PICC INSERTION  6/5/2013    5fr DL Power PICC, 44cm, left basilic vein, tip in low SVC     SURGICAL HISTORY OF -       Left ear surgery - incus transition, tympanoplasty     SURGICAL HISTORY OF -   6/05    Gastric bypass     SURGICAL HISTORY OF -   6/05    Cholecystectomy     SURGICAL HISTORY OF -   6/05    Hiatal hernia repair     TONSILLECTOMY             Family History:     Family History   Problem Relation Age of Onset      Cerebrovascular Disease Father      Heart Disease Father      Hypertension Father      Heart Disease Mother      Hypertension Mother      Chronic Obstructive Pulmonary Disease Sister      Heart Disease Sister      Hypertension Sister      Cerebrovascular Disease Brother      Heart Disease Brother      Kidney Disease Brother         on dialysis     Diabetes Brother         borderline     No Known Problems Son      No Known Problems Son      No Known Problems Daughter      Cancer Brother         small cell     Heart Disease Brother      Heart Disease Brother      Heart Disease Brother      Heart Disease Brother         tripple A     Heart Disease Sister      Substance Abuse Sister         alcoholism     Crohn's Disease No family hx of      Ulcerative Colitis No family hx of      GERD No family hx of      Celiac Disease No family hx of             Social History:     Social History     Socioeconomic History     Marital status:      Spouse name: Not on file     Number of children: Not on file     Years of education: Not on file     Highest education level: Not on file   Occupational History     Not on file   Social Needs     Financial resource strain: Not on file     Food insecurity     Worry: Not on file     Inability: Not on file     Transportation needs     Medical: Not on file     Non-medical: Not on file   Tobacco Use     Smoking status: Never Smoker     Smokeless tobacco: Never Used   Substance and Sexual Activity     Alcohol use: Yes     Comment: occassionally     Drug use: No     Sexual activity: Yes     Partners: Male     Birth control/protection: None   Lifestyle     Physical activity     Days per week: Not on file     Minutes per session: Not on file     Stress: Not on file   Relationships     Social connections     Talks on phone: Not on file     Gets together: Not on file     Attends Confucianist service: Not on file     Active member of club or organization: Not on file     Attends meetings of clubs or  "organizations: Not on file     Relationship status: Not on file     Intimate partner violence     Fear of current or ex partner: Not on file     Emotionally abused: Not on file     Physically abused: Not on file     Forced sexual activity: Not on file   Other Topics Concern     Parent/sibling w/ CABG, MI or angioplasty before 65F 55M? 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  She has been on medical leave since 7/27/2020        Moved about 4 years ago to a new house. No known water damage or mold.        She lives in a normal area with forms around her house, but denies any exposure to grain dust, hay, other farm animals.        No unusual hobbies                Medications:     Current Outpatient Medications   Medication     benzonatate (TESSALON) 100 MG capsule     furosemide (LASIX) 40 MG tablet     hydroxychloroquine (PLAQUENIL) 200 MG tablet     losartan (COZAAR) 50 MG tablet     omeprazole (PRILOSEC) 20 MG DR capsule     pantoprazole (PROTONIX) 40 MG EC tablet     Potassium (POTASSIMIN PO)     predniSONE (DELTASONE) 10 MG tablet     predniSONE (DELTASONE) 5 MG tablet     spironolactone (ALDACTONE) 25 MG tablet     sulfamethoxazole-trimethoprim (BACTRIM) 400-80 MG tablet     temazepam (RESTORIL) 15 MG capsule     vitamin D2 (ERGOCALCIFEROL) 20682 units (1250 mcg) capsule     No current facility-administered medications for this visit.             Physical Exam:   BP (!) 156/61   Pulse 70   Resp 17   Ht 1.753 m (5' 9\")   Wt 72.6 kg (160 lb)   SpO2 90%   BMI 23.63 kg/m    GENERAL: alert, NAD  HEENT: NCAT, EOMI, masked  Neck: no cervical or supraclavicular adenopathy  Lungs: good air flow, no crackles, rhonchi or wheezing  CV: RRR, S1S2, no murmurs noted  Abdomen: normoactive BS, soft, non tender  Neuro: AAO X 3  Psychiatric: normal affect, good eye contact  Skin: no rash, jaundice or lesions on limited exam  Extremities: No clubbing, cyanosis or " edema.  No digital edema, no synovitis or joint swelling.  No ulcers, skin thickening or fissure.           Imaging:     CT Chest ([facility]; 7/20/2020)  1. Bilateral pulmonary consolidation is probably due to infection. Other inflammatory processes are also on the differential. Although radiographic pattern is somewhat typical of sarcoid, this is less likely given significant worsening since 10/23/2019. Correlate clinically.  2. Mediastinal lymphadenopathy, probably reactive.  3. No pulmonary embolism.  4. Postoperative changes of gastric bypass with debris in the esophagus. Although this is present, the pulmonary consolidation is not typical of aspiration.           Pulmonary Function Tests:     PFT interpretation (March 31, 2021):  Maneuver: ATS criteria is not met.  Interpret with caution.  Spirometry: Normal spirometry.  Lung volumes: Normal lung volumes.  Diffusing capacity: No impairment in diffusing capacity.  Compared to her prior examination on 12/2020, there has been 200 earlier decline in FVC, while TLC remained essentially stable.    Six-minute walk test  Date 6MWD RA SpO2 Resting O2 req Exercise O2 req Lowest SpO2 on amb   8/21/20 940 97 RA RA 90                    Laboratory:     Serology ([facility]; 7/27/2020)  Chromatin DNP antibody -6.6 (less than 1.0)  Ribosomal antibody-negative  SSA-2.0 (less than 1.0)  SSB-negative anti-SM RNP-greater than 80 (less than 1.0)  SCL 70-negative  Maddy 1-negative  Centromere antibody -greater than 8.0 (less than 1.0)  SM IgG-negative  Anti-RNP-4.8 (less than 1.0)  Anti-DS DNA- negative  TARA - positive    6/10/2009  TARA- 1: 1280  RF-20 (less than 9)    No results found for this or any previous visit (from the past 168 hour(s)).    BAL (8/4/2020)  Negative for Legionella, RVP, Bordetella, C pneumoniae, mycoplasma pneumonia    A. Lingula bronchial alveolar lavage, cytology  1. 49% neutrophils, 45% lymphocytes, 3% monocytes, and 3% eosinophils.  2. Negative for  malignant cells.  3. Silver stain negative for pneumocystis and fungal organisms.  B. Lingula and anterior segments, left upper lobe transbronchial biopsy?-Negative for malignancy, see microscopic description.    Pathology:   A. The smear has the following differential 49% neutrophils, 45% lymphocytes, 3% monocytes, and 3% macrophages. There is no evidence of malignancy. A silver stain is applied, with an appropriate positive control, and is negative for pneumocystis and fungal organisms.  B. The biopsy shows benign bronchial epithelium and alveolar spaces. There is no evidence of malignancy. The findings are overall nondiagnostic.             Cardiac:     TTE ([facility]; 8/25/2020)  Interpretation Summary  Global and regional left ventricular function is normal with an EF of 55-60%.  Right ventricular function, chamber size, wall motion, and thickness are  normal.  Pulmonary artery systolic pressure cannot be assessed.  Ascending aorta 3.8 cm.  Aortic index 20mm/m2,mild dilation.  The inferior vena cava is normal.  No pericardial effusion is present.    Echocardiogram ([facility]; 10/16/2017)  Interpretation Summary    * Normal left ventricular size and systolic function, estimated LVEF 60-65%.    * Normal regional wall motion.    * Normal right ventricular size and systolic function.    * The left ventricular diastolic function is mildly abnormal (Grade I).    * There is no hemodynamically significant valve disease.    * Normal estimated right ventricular systolic pressure of 28 mmHg.    * The proximal ascending aorta is mildly dilated measuring  3.9 cm.    * The IVC is normal in size (< 2.1 cm), > 50% respiratory variance, RA  pressure normal at 3 mmHg.    * Compared to prior study on 8/26/16, there has been no significant change  in left ventricular systolic function with side by side comparison.  There has  been no significant change in the size of the proximal ascending aorta  (previously measured 4.0 cm).          Other:           Again, thank you for allowing me to participate in the care of your patient.        Sincerely,        Beena Cuellar MD

## 2021-03-31 NOTE — PROGRESS NOTES
Endocrine Consult Video visit note-     Attending Assessment/Plan :     High PTH with normal calcium.  This is most likely secondary hyperparathyroidism due to the hypovitaminosis D and inadequate calcium intake in the setting of history of bariatric surgery .  She had kidney stone in 2007 and one month ago by report. I am unable to document the recent stone .  Her dietary calcium intake is low and she is not on calcium supplements    Hypovitaminosis D has been documented twice since 10/5/2020. It was lst documented in 2011. The history of bartiatric surgery   She is uncertain of her current vitamin D dose.   Vitamin D levels are pending from today.     Labs from 3/31/2021 1,25 OHD 84.2, 25OHD 29, Ca 9.4, albumin 3.5, creatinine 1.2  She should have repeat calcium, phos, PTH in about 6 months.      Right thyroid nodule - subcm.      Low bone density noted 2014 age [age].  Repeat DXA      Postmenopausal woman.  This is a risk for the bone    On corticosteroid- prednisone 40 mg/day now .  This is a risk for the bone .    History of bariatric surgery, BMI 22.9    Due to the COVID 19 pandemic this visit was a telephone/video visit in order to help prevent spread of infection in this high risk patient and the general population. The patient gave verbal consent for the visit today. I have independently reviewed and interpreted labs, imaging as indicated.     Chart review/prep time 1  [id number]; 1981-0012  Visit Start time doximity invite [id number]  Visit Stop time 1716  _43_ minutes spent on the date of the encounter doing chart review, history and exam, documentation and further activities as noted above.    Lilia Valenzuela MD    Chief complaint:  Jeanine is a 60 year old female seen in consultation at the request of Dr De La Torre For high PTH.  I have reviewed Care Everywhere including [facility] lab reports, imaging reports and provider notes as indicated.        HISTORY OF PRESENT ILLNESS    Jeanine notes she has been  "sick since July 4.  She wonders about the high PTH.      Following the bariatric surgery, which she says was a abdi en Y gastric bypass, she lost about 100 lbs from around 280 peak.  She is currently at 150-160, with weight loss from 200 when off steriods .   She is currently on prednisone 40 mg/day since 11/2020.  She notes that every time she tapers she has more symptoms which causes her to increase the dose.  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      She had a kidney stone about one month ago and also in 2007.  She has history of rib fracture- tripped/ran into countertop - (approx 13 years ago).    Herh maximum adult height was 5'11.  She is now 5'9\".      She is on vitamin D ? Unknown dose - \"maximum dose\" once/day- she has been on it about a month. She was not on it before.  She is not on calcium.  She does not drink milk - due to lactose intolerance  She eats cheese/cottage cheese < daily  No ice cream or yogurt.      2/10/2005 TSH < 0.03  5/8/2005 TSH 2.17  5/19/11 25OHD 7  4/27/12 [facility] TSH 7.96  2/14/13 TSH 6.36, free T4 1.43  7/20/2020 [facility] Ca 9.1  8/21/2020 Ca 8.6, albumin 2.7, alk phos 90  9/18/2020 TSH 1.69, free t4 1.3, free T3 1.7  10/5/2020 25OHD 12, ferritin 151  2/5/2021 , 25OHD 6, calcium 9.2, ACE 12, urine calcium < 5 mg/dl    Imaging as read by me:   3/7/2005 thyroid US heterogeneous thyroid per report - I am unable to see images on PACS  3/18/05 123I thyroid uptake/scan: mostly homogeneous - ? Cold nodule right inferior ; 24 hour uptake 49%  4/22/14 DXA lowest T-score -1.6 left femoral neck   9/18/2020 thyroid US:   Right thyroid nodule 0.6 x 0.5 x 0.9 cm   12/22/2020 chest CT as read by me pertaining to endocrine organs: subtle thyroid heterogeneity; adrenals appear normal     REVIEW OF SYSTEMS  Feels worse again in the last 2 months  Weight is stable   Not sleeping -   Energy got down to 145 Sept/Oct and she is now back up to 160  Cardiac: " negative  Respiratory: ROUSE with walking; uses shower chair; cooks sitting ; don't have nearly the energy /stamina I had 6 months ago  GI: nausea and vomiting every other day; awakened from sleep with abdominal pain the other night; diarreha x 2-3 /week; retch  2/2021 was falling when leg went numb. This resolved with steroid treatment  alof of numbness and neuropathy in feet  Joint pain  Pain in feet and toes-- knife like  No bone pain  Right upper chest pain x 1 month - ? Muscular ; blue emu spray or tylenol resolves.    No back pain-- doesn't like peole touching low back due to tenderness;   LMP prior to ablation around 2014  Prednisone is making me irritable .  10 system ROS otherwise as per the HPI or negative    Past Medical History  Past Medical History:   Diagnosis Date     Anemia      Cellulitis of leg 6/6/2013     Esophageal reflux     Better post-hiatal hernia repair and weight loss     Limb ischemia; ulcers of fingertips 4/22/2013     Raynaud's syndrome      Rheumatoid arthritis (H) \     Scleroderma (H)      Sjogren-Jake syndrome      Systemic sclerosis (H) 2009     Unspecified essential hypertension      Ascending aortic aneurysm    Past Surgical History:   Procedure Laterality Date     BRONCHOSCOPY FLEXIBLE,L CRYOBIOPSY N/A 10/6/2020    Procedure: BRONCHOSCOPY, FLEXIBLE, WITH TRANSBRONCHIAL BIOPSY x4 USING CRYOPROBE, bronchial alveolar lavage;  Surgeon: Teddy Mendez MD;  Location:  OR     BYPASS GASTRIC, CHOLECYSTECTOMY, COMBINED       CHOLECYSTECTOMY       COLONOSCOPY       COLONOSCOPY N/A 11/17/2020    Procedure: COLONOSCOPY, WITH POLYPECTOMY AND BIOPSY;  Surgeon: Jamie Cárdenas MD;  Location: [facility] OR     ESOPHAGOSCOPY, GASTROSCOPY, DUODENOSCOPY (EGD), COMBINED N/A 3/10/2016    Procedure: COMBINED ESOPHAGOSCOPY, GASTROSCOPY, DUODENOSCOPY (EGD), BIOPSY SINGLE OR MULTIPLE;  Surgeon: Tricia Zambrano MD;  Location:  GI     ESOPHAGOSCOPY, GASTROSCOPY, DUODENOSCOPY (EGD),  COMBINED N/A 11/17/2020    Procedure: ESOPHAGOGASTRODUODENOSCOPY, WITH BIOPSY and Dilation;  Surgeon: Jamie Cárdenas MD;  Location: [facility] OR     INNER EAR SURGERY       PICC INSERTION  6/5/2013    5fr DL Power PICC, 44cm, left basilic vein, tip in low SVC     SURGICAL HISTORY OF -       Left ear surgery - incus transition, tympanoplasty     SURGICAL HISTORY OF -   6/05    Gastric bypass     SURGICAL HISTORY OF -   6/05    Cholecystectomy     SURGICAL HISTORY OF -   6/05    Hiatal hernia repair     TONSILLECTOMY         Medications  Current Outpatient Medications   Medication Sig Dispense Refill     benzonatate (TESSALON) 100 MG capsule Take 1 capsule (100 mg) by mouth three times a day as needed for cough 60 capsule 0     furosemide (LASIX) 40 MG tablet Take 40 mg by mouth daily       hydroxychloroquine (PLAQUENIL) 200 MG tablet Take 1 tablet (200 mg) by mouth twice a day. Annual Plaquenil toxicity eye screening required. 180 tablet 1     losartan (COZAAR) 50 MG tablet Take 0.5 tablets (25 mg) by mouth daily 45 tablet 3     omeprazole (PRILOSEC) 20 MG DR capsule Take 1 capsule (20 mg) by mouth 2 times daily 180 capsule 3     pantoprazole (PROTONIX) 40 MG EC tablet        Potassium (POTASSIMIN PO) 50mg 3 times daily       predniSONE (DELTASONE) 10 MG tablet Take 40 mg daily for 3 weeks, then taper by 10 mg every 3 weeks until to 10 mg, stay on that until follow up. 120 tablet 3     predniSONE (DELTASONE) 5 MG tablet Take 1 tablet (5 mg) by mouth daily 100 tablet 0     spironolactone (ALDACTONE) 25 MG tablet Take 25 mg by mouth       sulfamethoxazole-trimethoprim (BACTRIM) 400-80 MG tablet Take 1 tablet by mouth once daily. 30 tablet 3     temazepam (RESTORIL) 15 MG capsule Take 1 capsule (15 mg) by mouth nightly as needed for sleep 30 capsule 1     vitamin D2 (ERGOCALCIFEROL) 30622 units (1250 mcg) capsule Take 50,000 Units by mouth once a week       Vitamin :D dose is actually unknown    Allergies  Allergies  "  Allergen Reactions     Lovenox Other (See Comments)     Blood clot      Norvasc [Amlodipine Besylate] Swelling     Lip swelling that happened on 2 different occasions     Erythromycin Rash     Rash on arms       Family History  Family History   Problem Relation Age of Onset     Cerebrovascular Disease Father      Heart Disease Father      Hypertension Father      Heart Disease Mother      Hypertension Mother      Chronic Obstructive Pulmonary Disease Sister      Heart Disease Sister      Hypertension Sister      Cerebrovascular Disease Brother      Heart Disease Brother      Kidney Disease Brother         on dialysis     Diabetes Brother         borderline     No Known Problems Son      No Known Problems Son      No Known Problems Daughter      Cancer Brother         small cell     Heart Disease Brother      Heart Disease Brother      Heart Disease Brother      Heart Disease Brother         tripple A     Heart Disease Sister      Substance Abuse Sister         alcoholism     Crohn's Disease No family hx of      Ulcerative Colitis No family hx of      GERD No family hx of      Celiac Disease No family hx of      Social History  Social History     Tobacco Use     Smoking status: Never Smoker     Smokeless tobacco: Never Used   Substance Use Topics     Alcohol use: Yes     Comment: occassionally     Drug use: No       Physical Exam  There were no vitals taken for this visit.  There is no height or weight on file to calculate BMI.   BP Readings from Last 1 Encounters:   03/31/21 (!) 156/61      Pulse Readings from Last 1 Encounters:   03/31/21 70      Resp Readings from Last 1 Encounters:   03/31/21 17      Temp Readings from Last 1 Encounters:   11/17/20 98  F (36.7  C) (Temporal)      SpO2 Readings from Last 1 Encounters:   03/31/21 90%      Wt Readings from Last 1 Encounters:   03/31/21 72.6 kg (160 lb)      Ht Readings from Last 1 Encounters:   03/31/21 1.753 m (5' 9\")     GENERAL: middle aged woman in  no " distress. She appears to be in a public place.   SKIN: Visible skin clear. No significant rash, abnormal pigmentation or lesions.  EYES: Eyes grossly normal to inspection.  No discharge or erythema, or obvious scleral/conjunctival abnormalities.  NECK: no visible masses  RESP: No audible wheeze, cough, or visible cyanosis.  No visible retractions or increased work of breathing.    NEURO: .  Awake, alert, responds appropriately to questions.  Mentation and speech fluent.  PSYCH:affect herminia, judgement and insight intact, and appearance well-groomed.    DATA REVIEW    Results for OSCAR REYES (MRN [id number]) as of 3/31/2021 17:11   Ref. Range 10/5/2020 15:19 10/5/2020 15:20 2/5/2021 13:21 2/5/2021 13:36 3/31/2021 13:34   Sodium Latest Ref Range: 133 - 144 mmol/L     138   Potassium Latest Ref Range: 3.4 - 5.3 mmol/L     3.2 (L)   Chloride Latest Ref Range: 94 - 109 mmol/L     101   Carbon Dioxide Latest Ref Range: 20 - 32 mmol/L     31   Urea Nitrogen Latest Ref Range: 7 - 30 mg/dL     17   Creatinine Latest Ref Range: 0.52 - 1.04 mg/dL     1.20 (H)   GFR Estimate Latest Ref Range: >60 mL/min/1.73_m2     50 (L)   GFR Estimate If Black Latest Ref Range: >60 mL/min/1.73_m2     58 (L)   Calcium Latest Ref Range: 8.5 - 10.1 mg/dL   9.2  9.4   Anion Gap Latest Ref Range: 3 - 14 mmol/L     5   Albumin Latest Ref Range: 3.4 - 5.0 g/dL     3.5   Protein Total Latest Ref Range: 6.8 - 8.8 g/dL     6.4 (L)   Bilirubin Total Latest Ref Range: 0.2 - 1.3 mg/dL     0.6   Alkaline Phosphatase Latest Ref Range: 40 - 150 U/L     72   ALT Latest Ref Range: 0 - 50 U/L     31   AST Latest Ref Range: 0 - 45 U/L     22   Angiotensin Converting Enzyme Latest Ref Range: 9 - 67 U/L   12     Bilirubin Direct Latest Ref Range: 0.0 - 0.2 mg/dL     0.2   Calcium Urine g/g Cr Latest Units: g/g Cr    Unable to calculate due to low value    Calcium Urine mg/dL Latest Units: mg/dL    <5.0    Cholesterol Latest Ref Range: <200 mg/dL 127        Complement C1Q Latest Ref Range: 0.0 - 3.9 ug Eq/mL   1.8     Creatinine Urine Latest Units: mg/dL    12    CRP Inflammation Latest Ref Range: 0.0 - 8.0 mg/L   <2.9  <2.9   Ferritin Latest Ref Range: 8 - 252 ng/mL 151       Folate Latest Ref Range: >5.4 ng/mL  12.0      HDL Cholesterol Latest Ref Range: >49 mg/dL 80       LDL Cholesterol Calculated Latest Ref Range: <100 mg/dL 33       Non HDL Cholesterol Latest Ref Range: <130 mg/dL 47       Triglycerides Latest Ref Range: <150 mg/dL 69       Vitamin B12 Latest Ref Range: 193 - 986 pg/mL 545       Vitamin D Deficiency screening Latest Ref Range: 20 - 75 ug/L 12 (L)  6 (L)       CT CHEST W/O CONTRAST  12/22/2020 9:45 AM     History:  Lung nodule (Pulmonary nodule); Interstitial lung disease;  ILD; ILD (interstitial lung disease) (H).      COMPARISON: 8/21/2020.     TECHNIQUE: CT imaging obtained through the chest without intravenous  contrast. Coronal and axial MIP reformatted images obtained.     FINDINGS:     Overall improvement of the severity with unchanged distribution of the  patchy and nodular and small groundglass opacities spread throughout  the lung fields involving all pulmonary lobes although the posterior  inferior aspects of the upper lobes appear most heavily involved.  There is decreased consolidation associated with these findings. Some  subpleural reticular interstitial opacities remain in the lung bases  although these findings also appear. The central airways are clear.  There is no pneumothorax or pleural effusion. There is no  honeycombing. There may be subtle air trapping on expiratory images.     The heart is normal size ascending aorta and main pulmonary artery are  normal caliber. There is moderate atherosclerotic calcification of the  thoracic aorta and mild calcification of the coronary arteries. There  is no suspicious lymphadenopathy in the chest.     Limited evaluation of the upper abdomen: Patulous esophagus.  Postsurgical changes  of the stomach with a small hiatal hernia.  Cholecystectomy. Moderate atherosclerotic calcification of the  abdominal aorta. Unchanged small calcified splenic artery aneurysm.  Small medial splenule. Mildly atrophic left kidney which is partially  visualized and similar to prior. Otherwise unremarkable.     Bones: Mild degenerative changes of the spine. No suspicious osseous  lesions.                                                                      IMPRESSION: Overall improvement in the severity/quantity with  relatively unchanged distribution of the patchy nodular and  groundglass opacities throughout the lung fields. Decreased associated  consolidation. Subtle air trapping on expiratory views raises the  possibility of hypersensitivity pneumonitis and sarcoidosis although  the distribution and characterization of the nodular and groundglass  opacities are  somewhat nonspecific and atypical for these entities. A  mixed process interstitial lung disease may be possible. Atypical  infectious process cannot be excluded, although as above the severity  of this process is improving compared with 8/21/2020.     I have personally reviewed the examination and initial interpretation  and I agree with the findings.     BETH KELLOGG MD

## 2021-03-31 NOTE — PATIENT INSTRUCTIONS
Send me a mychart and let me know what dose of vitamin D  you are taking    Repeat the bone density one of these days ([phone] to schedule)

## 2021-03-31 NOTE — LETTER
3/31/2021       RE: Jeanine Schuler  [address]  [facility] [address]     Dear Colleague,    Thank you for referring your patient, Jeanine Schuler, to the [facility] [place] at [facility]. Please see a copy of my visit note below.    Jeanine is a 60 year old who is being evaluated via a billable video visit.      How would you like to obtain your AVS? [facility]     Text video visit link to: [phone] (Doximity)    Will anyone else be joining your video visit? No     Kari Paul MA        Endocrine Consult Video visit note-     Attending Assessment/Plan :     High PTH with normal calcium.  This is most likely secondary hyperparathyroidism due to the hypovitaminosis D and inadequate calcium intake in the setting of history of bariatric surgery .  She had kidney stone in 2007 and one month ago by report. I am unable to   Her dietary calcium intake is low and she is not on calcium supplements    Hypovitaminosis D has been documented twice since 10/5/2020. It was lst documented in 2011. The history of bartiatric surgery   She is uncertain of her current vitamin D dose.   Vitamin D levels are pending from today.     Right thyroid nodule - subcm.      Low bone density noted 2014 age [age].  Repeat DXA      Postmenopausal woman.  This is a risk for the bone    On corticosteroid- prednisone 40 mg/day now .  This is a risk for the bone .    History of bariatric surgery, BMI 22.9    Due to the COVID 19 pandemic this visit was a telephone/video visit in order to help prevent spread of infection in this high risk patient and the general population. The patient gave verbal consent for the visit today. I have independently reviewed and interpreted labs, imaging as indicated.     Chart review/prep time 1  [id number]; [id number]  Visit Start time doximity invite [id number]  Visit Stop time 1716  _43_ minutes spent on the date of the encounter doing  "chart review, history and exam, documentation and further activities as noted above.    Lilia Valenzuela MD    Chief complaint:  Jeanine is a 60 year old female seen in consultation at the request of Dr De La Torre For high PTH.  I have reviewed Care Everywhere including [facility] lab reports, imaging reports and provider notes as indicated.        HISTORY OF PRESENT ILLNESS    Jeanine notes she has been sick since July 4.  She wonders about the high PTH.      Following the bariatric surgery, which she says was a abdi en Y gastric bypass, she lost about 100 lbs from around 280 peak.  She is currently at 150-160, with weight loss from 200 when off steriods .   She is currently on prednisone 40 mg/day since 11/2020.  She notes that every time she tapers she has more symptoms which causes her to increase the dose.  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      She had a kidney stone about one month ago and also in 2007.  She has history of rib fracture- tripped/ran into countertop - (approx 13 years ago).    Herh maximum adult height was 5'11.  She is now 5'9\".      She is on vitamin D ? Unknown dose - \"maximum dose\" once/day- she has been on it about a month. She was not on it before.  She is not on calcium.  She does not drink milk - due to lactose intolerance  She eats cheese/cottage cheese < daily  No ice cream or yogurt.      2/10/2005 TSH < 0.03  5/8/2005 TSH 2.17  5/19/11 25OHD 7  4/27/12 [facility] TSH 7.96  2/14/13 TSH 6.36, free T4 1.43  7/20/2020 [facility] Ca 9.1  8/21/2020 Ca 8.6, albumin 2.7, alk phos 90  9/18/2020 TSH 1.69, free t4 1.3, free T3 1.7  10/5/2020 25OHD 12, ferritin 151  2/5/2021 , 25OHD 6, calcium 9.2, ACE 12, urine calcium < 5 mg/dl    Imaging as read by me:   3/7/2005 thyroid US heterogeneous thyroid per report - I am unable to see images on PACS  3/18/05 123I thyroid uptake/scan: mostly homogeneous - ? Cold nodule right inferior ; 24 hour uptake " 49%  4/22/14 DXA lowest T-score -1.6 left femoral neck   9/18/2020 thyroid US:   Right thyroid nodule 0.6 x 0.5 x 0.9 cm   12/22/2020 chest CT as read by me pertaining to endocrine organs: subtle thyroid heterogeneity; adrenals appear normal     REVIEW OF SYSTEMS  Feels worse again in the last 2 months  Weight is stable   Not sleeping -   Energy got down to 145 Sept/Oct and she is now back up to 160  Cardiac: negative  Respiratory: ROUSE with walking; uses shower chair; cooks sitting ; don't have nearly the energy /stamina I had 6 months ago  GI: nausea and vomiting every other day; awakened from sleep with abdominal pain the other night; diarreha x 2-3 /week; retch  2/2021 was falling when leg went numb. This resolved with steroid treatment  alof of numbness and neuropathy in feet  Joint pain  Pain in feet and toes-- knife like  No bone pain  Right upper chest pain x 1 month - ? Muscular ; blue emu spray or tylenol resolves.    No back pain-- doesn't like peole touching low back due to tenderness;   LMP prior to ablation around 2014  Prednisone is making me irritable .  10 system ROS otherwise as per the HPI or negative    Past Medical History  Past Medical History:   Diagnosis Date     Anemia      Cellulitis of leg 6/6/2013     Esophageal reflux     Better post-hiatal hernia repair and weight loss     Limb ischemia; ulcers of fingertips 4/22/2013     Raynaud's syndrome      Rheumatoid arthritis (H) \     Scleroderma (H)      Sjogren-Jake syndrome      Systemic sclerosis (H) 2009     Unspecified essential hypertension      Ascending aortic aneurysm    Past Surgical History:   Procedure Laterality Date     BRONCHOSCOPY FLEXIBLE,L CRYOBIOPSY N/A 10/6/2020    Procedure: BRONCHOSCOPY, FLEXIBLE, WITH TRANSBRONCHIAL BIOPSY x4 USING CRYOPROBE, bronchial alveolar lavage;  Surgeon: Teddy Mendez MD;  Location: [facility] OR     BYPASS GASTRIC, CHOLECYSTECTOMY, COMBINED       CHOLECYSTECTOMY       COLONOSCOPY        COLONOSCOPY N/A 11/17/2020    Procedure: COLONOSCOPY, WITH POLYPECTOMY AND BIOPSY;  Surgeon: Jamie Cárdenas MD;  Location: [facility] OR     ESOPHAGOSCOPY, GASTROSCOPY, DUODENOSCOPY (EGD), COMBINED N/A 3/10/2016    Procedure: COMBINED ESOPHAGOSCOPY, GASTROSCOPY, DUODENOSCOPY (EGD), BIOPSY SINGLE OR MULTIPLE;  Surgeon: Tricia Zambrano MD;  Location:  GI     ESOPHAGOSCOPY, GASTROSCOPY, DUODENOSCOPY (EGD), COMBINED N/A 11/17/2020    Procedure: ESOPHAGOGASTRODUODENOSCOPY, WITH BIOPSY and Dilation;  Surgeon: Jamie Cárdenas MD;  Location: [facility] OR     INNER EAR SURGERY       PICC INSERTION  6/5/2013    5fr DL Power PICC, 44cm, left basilic vein, tip in low SVC     SURGICAL HISTORY OF -       Left ear surgery - incus transition, tympanoplasty     SURGICAL HISTORY OF -   6/05    Gastric bypass     SURGICAL HISTORY OF -   6/05    Cholecystectomy     SURGICAL HISTORY OF -   6/05    Hiatal hernia repair     TONSILLECTOMY         Medications  Current Outpatient Medications   Medication Sig Dispense Refill     benzonatate (TESSALON) 100 MG capsule Take 1 capsule (100 mg) by mouth three times a day as needed for cough 60 capsule 0     furosemide (LASIX) 40 MG tablet Take 40 mg by mouth daily       hydroxychloroquine (PLAQUENIL) 200 MG tablet Take 1 tablet (200 mg) by mouth twice a day. Annual Plaquenil toxicity eye screening required. 180 tablet 1     losartan (COZAAR) 50 MG tablet Take 0.5 tablets (25 mg) by mouth daily 45 tablet 3     omeprazole (PRILOSEC) 20 MG DR capsule Take 1 capsule (20 mg) by mouth 2 times daily 180 capsule 3     pantoprazole (PROTONIX) 40 MG EC tablet        Potassium (POTASSIMIN PO) 50mg 3 times daily       predniSONE (DELTASONE) 10 MG tablet Take 40 mg daily for 3 weeks, then taper by 10 mg every 3 weeks until to 10 mg, stay on that until follow up. 120 tablet 3     predniSONE (DELTASONE) 5 MG tablet Take 1 tablet (5 mg) by mouth daily 100 tablet 0     spironolactone (ALDACTONE) 25 MG tablet  Take 25 mg by mouth       sulfamethoxazole-trimethoprim (BACTRIM) 400-80 MG tablet Take 1 tablet by mouth once daily. 30 tablet 3     temazepam (RESTORIL) 15 MG capsule Take 1 capsule (15 mg) by mouth nightly as needed for sleep 30 capsule 1     vitamin D2 (ERGOCALCIFEROL) 91402 units (1250 mcg) capsule Take 50,000 Units by mouth once a week       Vitamin :D dose is actually unknown    Allergies  Allergies   Allergen Reactions     Lovenox Other (See Comments)     Blood clot      Norvasc [Amlodipine Besylate] Swelling     Lip swelling that happened on 2 different occasions     Erythromycin Rash     Rash on arms       Family History  Family History   Problem Relation Age of Onset     Cerebrovascular Disease Father      Heart Disease Father      Hypertension Father      Heart Disease Mother      Hypertension Mother      Chronic Obstructive Pulmonary Disease Sister      Heart Disease Sister      Hypertension Sister      Cerebrovascular Disease Brother      Heart Disease Brother      Kidney Disease Brother         on dialysis     Diabetes Brother         borderline     No Known Problems Son      No Known Problems Son      No Known Problems Daughter      Cancer Brother         small cell     Heart Disease Brother      Heart Disease Brother      Heart Disease Brother      Heart Disease Brother         tripple A     Heart Disease Sister      Substance Abuse Sister         alcoholism     Crohn's Disease No family hx of      Ulcerative Colitis No family hx of      GERD No family hx of      Celiac Disease No family hx of      Social History  Social History     Tobacco Use     Smoking status: Never Smoker     Smokeless tobacco: Never Used   Substance Use Topics     Alcohol use: Yes     Comment: occassionally     Drug use: No       Physical Exam  There were no vitals taken for this visit.  There is no height or weight on file to calculate BMI.   BP Readings from Last 1 Encounters:   03/31/21 (!) 156/61      Pulse Readings from  "Last 1 Encounters:   03/31/21 70      Resp Readings from Last 1 Encounters:   03/31/21 17      Temp Readings from Last 1 Encounters:   11/17/20 98  F (36.7  C) (Temporal)      SpO2 Readings from Last 1 Encounters:   03/31/21 90%      Wt Readings from Last 1 Encounters:   03/31/21 72.6 kg (160 lb)      Ht Readings from Last 1 Encounters:   03/31/21 1.753 m (5' 9\")     GENERAL: middle aged woman in  no distress. She appears to be in a public place.   SKIN: Visible skin clear. No significant rash, abnormal pigmentation or lesions.  EYES: Eyes grossly normal to inspection.  No discharge or erythema, or obvious scleral/conjunctival abnormalities.  NECK: no visible masses  RESP: No audible wheeze, cough, or visible cyanosis.  No visible retractions or increased work of breathing.    NEURO: .  Awake, alert, responds appropriately to questions.  Mentation and speech fluent.  PSYCH:affect herminia, judgement and insight intact, and appearance well-groomed.    DATA REVIEW    Results for OSCAR REYES (MRN [id number]) as of 3/31/2021 17:11   Ref. Range 10/5/2020 15:19 10/5/2020 15:20 2/5/2021 13:21 2/5/2021 13:36 3/31/2021 13:34   Sodium Latest Ref Range: 133 - 144 mmol/L     138   Potassium Latest Ref Range: 3.4 - 5.3 mmol/L     3.2 (L)   Chloride Latest Ref Range: 94 - 109 mmol/L     101   Carbon Dioxide Latest Ref Range: 20 - 32 mmol/L     31   Urea Nitrogen Latest Ref Range: 7 - 30 mg/dL     17   Creatinine Latest Ref Range: 0.52 - 1.04 mg/dL     1.20 (H)   GFR Estimate Latest Ref Range: >60 mL/min/1.73_m2     50 (L)   GFR Estimate If Black Latest Ref Range: >60 mL/min/1.73_m2     58 (L)   Calcium Latest Ref Range: 8.5 - 10.1 mg/dL   9.2  9.4   Anion Gap Latest Ref Range: 3 - 14 mmol/L     5   Albumin Latest Ref Range: 3.4 - 5.0 g/dL     3.5   Protein Total Latest Ref Range: 6.8 - 8.8 g/dL     6.4 (L)   Bilirubin Total Latest Ref Range: 0.2 - 1.3 mg/dL     0.6   Alkaline Phosphatase Latest Ref Range: 40 - 150 U/L     " 72   ALT Latest Ref Range: 0 - 50 U/L     31   AST Latest Ref Range: 0 - 45 U/L     22   Angiotensin Converting Enzyme Latest Ref Range: 9 - 67 U/L   12     Bilirubin Direct Latest Ref Range: 0.0 - 0.2 mg/dL     0.2   Calcium Urine g/g Cr Latest Units: g/g Cr    Unable to calculate due to low value    Calcium Urine mg/dL Latest Units: mg/dL    <5.0    Cholesterol Latest Ref Range: <200 mg/dL 127       Complement C1Q Latest Ref Range: 0.0 - 3.9 ug Eq/mL   1.8     Creatinine Urine Latest Units: mg/dL    12    CRP Inflammation Latest Ref Range: 0.0 - 8.0 mg/L   <2.9  <2.9   Ferritin Latest Ref Range: 8 - 252 ng/mL 151       Folate Latest Ref Range: >5.4 ng/mL  12.0      HDL Cholesterol Latest Ref Range: >49 mg/dL 80       LDL Cholesterol Calculated Latest Ref Range: <100 mg/dL 33       Non HDL Cholesterol Latest Ref Range: <130 mg/dL 47       Triglycerides Latest Ref Range: <150 mg/dL 69       Vitamin B12 Latest Ref Range: 193 - 986 pg/mL 545       Vitamin D Deficiency screening Latest Ref Range: 20 - 75 ug/L 12 (L)  6 (L)       CT CHEST W/O CONTRAST  12/22/2020 9:45 AM     History:  Lung nodule (Pulmonary nodule); Interstitial lung disease;  ILD; ILD (interstitial lung disease) (H).      COMPARISON: 8/21/2020.     TECHNIQUE: CT imaging obtained through the chest without intravenous  contrast. Coronal and axial MIP reformatted images obtained.     FINDINGS:     Overall improvement of the severity with unchanged distribution of the  patchy and nodular and small groundglass opacities spread throughout  the lung fields involving all pulmonary lobes although the posterior  inferior aspects of the upper lobes appear most heavily involved.  There is decreased consolidation associated with these findings. Some  subpleural reticular interstitial opacities remain in the lung bases  although these findings also appear. The central airways are clear.  There is no pneumothorax or pleural effusion. There is no  honeycombing. There  may be subtle air trapping on expiratory images.     The heart is normal size ascending aorta and main pulmonary artery are  normal caliber. There is moderate atherosclerotic calcification of the  thoracic aorta and mild calcification of the coronary arteries. There  is no suspicious lymphadenopathy in the chest.     Limited evaluation of the upper abdomen: Patulous esophagus.  Postsurgical changes of the stomach with a small hiatal hernia.  Cholecystectomy. Moderate atherosclerotic calcification of the  abdominal aorta. Unchanged small calcified splenic artery aneurysm.  Small medial splenule. Mildly atrophic left kidney which is partially  visualized and similar to prior. Otherwise unremarkable.     Bones: Mild degenerative changes of the spine. No suspicious osseous  lesions.                                                                      IMPRESSION: Overall improvement in the severity/quantity with  relatively unchanged distribution of the patchy nodular and  groundglass opacities throughout the lung fields. Decreased associated  consolidation. Subtle air trapping on expiratory views raises the  possibility of hypersensitivity pneumonitis and sarcoidosis although  the distribution and characterization of the nodular and groundglass  opacities are  somewhat nonspecific and atypical for these entities. A  mixed process interstitial lung disease may be possible. Atypical  infectious process cannot be excluded, although as above the severity  of this process is improving compared with 8/21/2020.     I have personally reviewed the examination and initial interpretation  and I agree with the findings.     BETH KELLOGG MD      Again, thank you for allowing me to participate in the care of your patient.      Sincerely,    Lilia Valenzuela MD

## 2021-04-01 ENCOUNTER — HOSPITAL ENCOUNTER (OUTPATIENT)
Dept: CT IMAGING | Facility: CLINIC | Age: 61
Discharge: HOME | End: 2021-04-01
Attending: INTERNAL MEDICINE | Admitting: INTERNAL MEDICINE
Payer: COMMERCIAL

## 2021-04-01 LAB
1,25(OH)2D SERPL-MCNC: 84.2 PG/ML (ref 19.9–79.3)
ACE SERPL-CCNC: 15 U/L (ref 9–67)
DLCOUNC-%PRED-PRE: 81 %
DLCOUNC-PRE: 18.88 ML/MIN/MMHG
DLCOUNC-PRED: 23.21 ML/MIN/MMHG
ERV-%PRED-PRE: 89 %
ERV-PRE: 0.9 L
ERV-PRED: 1 L
EXPTIME-PRE: 5.76 SEC
FEF2575-%PRED-PRE: 128 %
FEF2575-PRE: 3.1 L/SEC
FEF2575-PRED: 2.42 L/SEC
FEFMAX-%PRED-PRE: 98 %
FEFMAX-PRE: 6.92 L/SEC
FEFMAX-PRED: 7.01 L/SEC
FEV1-%PRED-PRE: 106 %
FEV1-PRE: 2.86 L
FEV1FEV6-PRE: 83 %
FEV1FEV6-PRED: 81 %
FEV1FVC-PRE: 84 %
FEV1FVC-PRED: 80 %
FEV1SVC-PRE: 87 %
FEV1SVC-PRED: 71 %
FIFMAX-PRE: 4.85 L/SEC
FRCPLETH-%PRED-PRE: 114 %
FRCPLETH-PRE: 3.35 L
FRCPLETH-PRED: 2.93 L
FVC-%PRED-PRE: 100 %
FVC-PRE: 3.39 L
FVC-PRED: 3.39 L
GAMMA INTERFERON BACKGROUND BLD IA-ACNC: 0.05 IU/ML
IC-%PRED-PRE: 85 %
IC-PRE: 2.4 L
IC-PRED: 2.81 L
M TB IFN-G CD4+ BCKGRND COR BLD-ACNC: 9.95 IU/ML
M TB TUBERC IFN-G BLD QL: NEGATIVE
MITOGEN IGNF BCKGRD COR BLD-ACNC: 0 IU/ML
MITOGEN IGNF BCKGRD COR BLD-ACNC: 0 IU/ML
RVPLETH-%PRED-PRE: 119 %
RVPLETH-PRE: 2.45 L
RVPLETH-PRED: 2.06 L
TLCPLETH-%PRED-PRE: 102 %
TLCPLETH-PRE: 5.75 L
TLCPLETH-PRED: 5.61 L
VA-%PRED-PRE: 83 %
VA-PRE: 4.73 L
VC-%PRED-PRE: 86 %
VC-PRE: 3.3 L
VC-PRED: 3.81 L

## 2021-04-01 PROCEDURE — 71250 CT THORAX DX C-: CPT

## 2021-04-02 LAB — LAB SCANNED RESULT: ABNORMAL

## 2021-04-04 LAB
ASPERGILLUS AB TITR SER CF: NORMAL {TITER}
B DERMAT AB SER-ACNC: 0.1 IV
COCCIDIOIDES AB TITR SER CF: NORMAL {TITER}
H CAPSUL MYC AB TITR SER CF: NORMAL {TITER}
H CAPSUL YST AB TITR SER CF: NORMAL {TITER}

## 2021-04-05 LAB — LAB SCANNED RESULT: NORMAL

## 2021-04-06 ENCOUNTER — TELEPHONE (OUTPATIENT)
Dept: PULMONOLOGY | Facility: CLINIC | Age: 61
End: 2021-04-06

## 2021-04-06 NOTE — TELEPHONE ENCOUNTER
Left voicemail informing patient of follow up provided by Dr. Cuellar; mentioned that CT would be further reviewed at upcoming [place] meeting later this week and additional follow up call would be made to patient. Direct call back number provided if further conversation desired.     Edel Mcgovern, RN, BSN  ILD Nurse Care Coordinator  (P) [phone]

## 2021-04-06 NOTE — TELEPHONE ENCOUNTER
----- Message from Beena Cuellar MD sent at 4/5/2021  4:42 PM CDT -----  Regarding: RE: Got CT ???  So it looks further improved from her prior study despite her decline in PFT - we should review it at [facility].  ----- Message -----  From: Daiana Musa RN  Sent: 4/2/2021   4:16 PM CDT  To: Beena Cuellar MD, #  Subject: FW: Got CT ???                                   Since we aren't having a meeting on Monday, wondering if you want to do anything with this next week or wait until it is reviewed the following week?    Daiana  ----- Message -----  From: Wesley Camejo  Sent: 4/2/2021   9:19 AM CDT  To: Interstitial [facility] Disease Clinic Nurses-  Subject: Got CT ???                                       Hello girls. She had her Chest CT done yesterday - please review ...Thanks !!!!

## 2021-04-08 ENCOUNTER — VIRTUAL VISIT (OUTPATIENT)
Dept: PULMONOLOGY | Facility: CLINIC | Age: 61
End: 2021-04-08
Attending: INTERNAL MEDICINE
Payer: COMMERCIAL

## 2021-04-08 DIAGNOSIS — M34.9 SYSTEMIC SCLEROSIS (H): ICD-10-CM

## 2021-04-08 DIAGNOSIS — M06.00 RHEUMATOID ARTHRITIS WITH NEGATIVE RHEUMATOID FACTOR, INVOLVING UNSPECIFIED SITE (H): ICD-10-CM

## 2021-04-08 DIAGNOSIS — I73.00 RAYNAUD'S DISEASE WITHOUT GANGRENE: ICD-10-CM

## 2021-04-08 DIAGNOSIS — Z51.81 ENCOUNTER FOR THERAPEUTIC DRUG MONITORING: ICD-10-CM

## 2021-04-08 DIAGNOSIS — D86.9 SARCOIDOSIS: ICD-10-CM

## 2021-04-08 DIAGNOSIS — M35.1 MCTD (MIXED CONNECTIVE TISSUE DISEASE) (H): ICD-10-CM

## 2021-04-08 DIAGNOSIS — R06.02 SHORTNESS OF BREATH: Primary | ICD-10-CM

## 2021-04-08 DIAGNOSIS — G62.9 NEUROPATHY: ICD-10-CM

## 2021-04-08 DIAGNOSIS — E83.59 TUMORAL CALCINOSIS: ICD-10-CM

## 2021-04-08 DIAGNOSIS — M35.9 COLLAGEN VASCULAR DISEASE (H): Primary | ICD-10-CM

## 2021-04-08 PROCEDURE — 99215 OFFICE O/P EST HI 40 MIN: CPT | Mod: 95 | Performed by: INTERNAL MEDICINE

## 2021-04-08 RX ORDER — METHOTREXATE 25 MG/ML
10 INJECTION, SOLUTION INTRA-ARTERIAL; INTRAMUSCULAR; INTRAVENOUS
Qty: 4 ML | Refills: 2 | Status: ON HOLD | OUTPATIENT
Start: 2021-04-08 | End: 2021-12-21

## 2021-04-08 RX ORDER — PEN NEEDLE, DIABETIC 29 G X1/2"
1 NEEDLE, DISPOSABLE MISCELLANEOUS WEEKLY
Qty: 100 EACH | Refills: 0 | Status: ON HOLD | OUTPATIENT
Start: 2021-04-08 | End: 2021-12-21

## 2021-04-08 RX ORDER — FOLIC ACID 1 MG/1
1 TABLET ORAL DAILY
Qty: 90 TABLET | Refills: 3 | Status: SHIPPED | OUTPATIENT
Start: 2021-04-08 | End: 2023-01-01

## 2021-04-08 NOTE — LETTER
4/8/2021         RE: Jeanine Schuler  [address]  [facility] [address]        Dear Colleague,    Thank you for referring your patient, Jeanine Schuler, to the [facility]. Please see a copy of my visit note below.    Jeanine is a 60 year old who is being evaluated via a billable video visit.      How would you like to obtain your AVS? [employer]  If the video visit is dropped, the invitation should be resent by: Other e-mail: [email or web address]  Will anyone else be joining your video visit? No  Video-Visit Details    Type of service:  Video Visit    Originating Location (pt. Location): Home    Distant Location (provider location):  [facility]     Platform used for Video Visit: [employer]       SUBJECTIVE:  The patient returns in f/u of her MCTD/SSC/RP/with h/o multiple DU.      PROBLEM LIST:    1.  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    2.  History of severe multiple digital ulcers, on fingers and toes.   3.  Marked keratoconjunctivitis sicca over the last several years.    4.  Marked iron deficiency anemia responsive to IV iron.    5.  Diffuse musculoskeletal pain   6.  Lower extremity edema managed with lasix and spironolactone   7.  Marked fatigue and diffuse clinical weakness.    8.  Dyspnea on exertion with lower extremity edema and other features including the anticentromere positivity worrisome for the potential development of pulmonary hypertension.    9.  Marked GERD with associated dysphagia.    10.  Status post gastric bypass with a history of intermittent constipation and diarrhea potentially consistent with bacterial small bowel overgrowth versus other gut effects of the prior surgery.    11.  History of positive rheumatoid factor consistent with MCTD, given the remainder of her clinical presentation.      12. Progressive Jaw tightening with inability to open mouth wider than 1 inch.   13. Diastolic dysfunction (3/2013)  14. Hypoalbuminemia  15. ILD cryobiopsy Pathology was reviewed at ILD conference on 10/12/2020 which showed ALI, what appears to be an acute exacerbation of underlying ILD with OP.  Few nonnecrotizing granulomas  16. Development of intermittent moderate/severe RLE Neuropathy with steroid taper below 30 mg prednisone/d Jan 2021    Plan/recommendation:    She continues to have a very complex case with very complex pathophysiology which was only further accentuated by her pulmonary work-up.    She clearly has multiple features of systemic sclerosis including severe Raynaud's and associated cyanosis and severe esophageal dysmotility.  She did have a recent GERD and will be seeing Dr. Cárdenas back periodically for that.    What is somewhat intriguing right now is the finding of granulomas on her lung biopsy.  She now has also developed neuropathy in the right lower extremity but not elsewhere.    This really raises the possibility of sarcoidosis and overlap with her autoimmunity.  Given the overall magnitude of her autoimmunity however, her neuropathy could still be due to phospholipid antibodies or to immune complexes as well.    Given all this complexity I am going to once again do a large laboratory work-up.  We will do a urinary calcium and an ACE level but also an immune complex work-up and repeat her phospholipid antibodies which has not been done for some time.    In the meantime, since she noticed the development of the neuropathy with taper of the prednisone from 30 mg a day to 25 mg a day I am going to put her up to 40 mg a day again for the next several weeks.  That will give us time to get these results and give her the opportunity to record whether the symptoms improve and remain improved on higher dose steroid.    I would then like to see her back in about 3 weeks.  Depending on what we find  both symptomatically and with laboratory work-up I will plan to confer with Dr. Cuellar, to consider options for longer-term therapy.  If we really think that she might have sarcoidosis, this will get complex as TNF inhibition is often optimal therapy for that but would potentially be a poor choice for the rest of her autoimmune pathophysiology, as it has occasionally been shown to exacerbate or worsen patients with the spectrum of activity.    This video visit was 60 minutes in duration, 27 minutes in face-to-face with the patient, and 33 minutes in extensive chart review,  and documentation, all completed on DOS.      INTERVAL HISTORY:       AUGUST 22, 2017, INTERVAL HISTORY:  Since we have last seen the patient, she developed a right ankle wound about 2 months ago.  That opened for a while and it became hard to close, but at this point it has finally closed over.       She has had some stressors and she feels like that has contributed to her feeling like she needed more prednisone.  She had made it down to 12 mg a day for almost 6 months but increased to 20 mg a day in June when she was moving to a new house.  This was primarily due to an increase in fatigue and in joint pains.      At this point, her morning stiffness is 30-60 minutes despite remaining on the 20 mg a day of prednisone.  She does think her strength, however, is stable.      She continues to have a lot of Raynaud's phenomena but has not developed any distal digital ulcerations.       Her GERD and keratoconjunctivitis sicca, however, have remained stable and she thinks she has stable exercise tolerance.       She has not done her labs for many, many months and has not seen me for over a year, and we discussed that in some detail.     Feb. 13, 2018 INTERVAL HISTORY:  Since we have last seen her, she made it down to 12.5 mg a day of prednisone but then put herself back up to 20 mg a day because of increasing general body aches and fatigue  "as well as some inflammatory joint stiffness in her wrists.  Other joints have actually been okay.      She had stopped her methotrexate some time ago.  She works in a hospital and knew of several people who were admitted with \"methotrexate toxicity\" who  while in hospital and although she does not know the details, that frightened her enough that she decided to stop the drug.       She is getting worsening Raynaud's.  She really notes that this happens not only with cold, but with a variety of kinds of stress.  She has previously, at least for a short length of time, been on losartan and does not remember whether that helped at all.  I am pretty sure she has also been on calcium channel blockers, but those are relatively contraindicated in her at this point because of persistent bilateral lower extremity edema.       She is getting enough lower extremity edema and has been found to have vascular insufficiency that she will be having some vascular surgery to laser some of these areas, although an exact time for that has not been laid out.       In addition to getting Raynaud's in her hands, she also gets it in her feet, and when she does, potentially contributed to by the vascular insufficiency, she gets numbness in her feet.  She is not getting numbness in her hands by contrast.       The patient did have pulmonary function tests today.  Although she is not exercising regularly, in general she feels her exercise tolerance has been stable, and she is not having any dry cough.       She did have a decrease in her FVC to 3.68 from 4.30 and her DLCO to 21.77 from 24.74, but she has had some rib cage pain for about the last 3 weeks since she had a minor accident while dancing.  Notably then, her TLC is completely stable, going from 6.41 to 6.29.       She denies any other new problems.  Keratoconjunctivitis sicca, GERD and other features have been stable and she denies any dysphagia.     2019 interval " history:    Since we have last seen her she did have to miss appointment because of some family issues but she is actually been doing quite well.  Although she is continued to have some intermittent joint complaints she is been able to taper her prednisone down to 7.5 mg a day the lowest she has been on and the entire time I have been following her.    Although her joints have not worsened with that lower dose she does think she is getting some worsening of her Raynauds phenomena.  It can be pretty bad and the attacks can be hard to break even with rewarming.  She gets them in hands and feet.  Fortunately she has not had any digital ulcers.  She does recall that she was placed temporarily on tadalafil sometime ago by Dr. Bentley but she could not afford it long-term.  She thinks that may have been helpful however.    She denies any significant dyspnea on exertion or cough.  She has some muscle weakness but not marketed and she does not think that is any worse over time.  Fatigue has generally been her worst symptom and that may be slightly worse.    She does have some ongoing keratoconjunctivitis sicca but does not feel like she wants to go on a medicine for that.  She already has a full plate of dentures in both upper and lower.    February 4, 2021 interval history:    Since I have last seen the patient she has had pulmonary follow-up, and because of the atypical infiltrates on high-resolution CT scan had a cryobiopsy.  The read of that as above in the problem list.  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    She was placed on 40 mg daily prednisone and tapered by 5 mg every several weeks.  When she got down to 25 mg a day several weeks ago she started developing intermittent but fairly severe numbness of the right lower extremity below the knee.  She has not noted a clear-cut rash there and she has no pain there just numbness.  She  always has some cyanosis there and is not sure that that is worse.  She is getting a lot of cyanosis in her hands as well and at times her left thumb goes completely white.  She has not had development of any digital ischemia however.    She denies any asymmetric swelling of the leg or elsewhere.  She also denies any significant new rashes.    She continues to have some keratoconjunctivitis sicca but the actual dryness is largely limited to her mouth and if anything she has been having more watery eyes than usual.    Her biggest symptom other than for the new neuropathy is actually fatigue.  That has been very bad for her for a long time but she feels it is currently even worse.  She is uncertain how much it has worsened with the steroid taper per se.        ROS as per HPI.  10-point ROS is otherwise negative.    HISTORY REVIEW:  Past Medical History:   Diagnosis Date     Anemia      Cellulitis of leg 6/6/2013     Esophageal reflux     Better post-hiatal hernia repair and weight loss     Limb ischemia; ulcers of fingertips 4/22/2013     Raynaud's syndrome      Scleroderma (H)      Systemic sclerosis (H) 2009     Unspecified essential hypertension        Past Surgical History:   Procedure Laterality Date     BYPASS GASTRIC, CHOLECYSTECTOMY, COMBINED       CHOLECYSTECTOMY       COLONOSCOPY       ESOPHAGOSCOPY, GASTROSCOPY, DUODENOSCOPY (EGD), COMBINED N/A 3/10/2016    Procedure: COMBINED ESOPHAGOSCOPY, GASTROSCOPY, DUODENOSCOPY (EGD), BIOPSY SINGLE OR MULTIPLE;  Surgeon: Tricia Zambrano MD;  Location:  GI     INNER EAR SURGERY       PICC INSERTION  6/5/2013    5fr DL Power PICC, 44cm, left basilic vein, tip in low SVC     SURGICAL HISTORY OF -       Left ear surgery - incus transition, tympanoplasty     SURGICAL HISTORY OF -   6/05    Gastric bypass     SURGICAL HISTORY OF -   6/05    Cholecystectomy     SURGICAL HISTORY OF -   6/05    Hiatal hernia repair     TONSILLECTOMY         Family History    Problem Relation Age of Onset     Cerebrovascular Disease Father      C.A.D. Father      C.A.D. Mother      C.A.D. 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      Social History Main Topics     Smoking status: Never Smoker      Smokeless tobacco: Never Used     Alcohol Use: Yes      occassionally     Drug Use: No     Sexually Active: Yes -- Male partner(s)     Birth Control/ Protection: None     Social History Narrative     Was  in December 2015. Longtime partner from [place].       Patient Active Problem List   Diagnosis     Essential hypertension     Thyrotoxicosis     Obesity     Prolonged QTc 460 ms,  at hr 67     Low back pain - Suspect SI Joint dysfunction     Cough     Systemic sclerosis (H)     Tumoral calcinosis     Shortness of breath     Edema of both legs     Abnormal albumin     Myopathy     Hypoalbuminemia     Diastolic dysfunction     Limb ischemia; ulcers of fingertips     Cellulitis of leg     Other specified diffuse disease of connective tissue     Disturbed sleep rhythm     Pharyngeal dysphagia     Gastroesophageal reflux disease, esophagitis presence not specified     Rheumatoid arthritis with negative rheumatoid factor, involving unspecified site (H)     Raynaud's disease without gangrene       Allergies   Allergen Reactions     Lovenox Other (See Comments)     Blood clot      Norvasc [Amlodipine Besylate] Swelling     Lip swelling that happened on 2 different occasions     Erythromycin Rash     Rash on arms     OBJECTIVE:  Vitals: There were no vitals taken for this visit.  Physical examination by video shows her to be in no acute distress HEENT examination is essentially normal.   She is speaking in full sentences with no apparent shortness of breath.  Her hands are cyanotic but not severely so.  There are no ulcers no deformities and no notable swelling.     Component      Latest Ref Rng 1/26/2016   WBC      4.0 - 11.0 10e9/L 9.2   RBC Count      3.8 - 5.2 10e12/L 4.59   Hemoglobin      11.7 - 15.7 g/dL 13.2   Hematocrit      35.0 - 47.0 % 41.8   MCV      78 - 100 fl 91   MCH      26.5 - 33.0 pg 28.8   MCHC      31.5 - 36.5 g/dL 31.6   RDW      10.0 - 15.0 % 16.7 (H)   Platelet Count      150 - 450 10e9/L 195   Diff Method       Automated Method   % Neutrophils       62.9   % Lymphocytes       27.0   % Monocytes       9.1   % Eosinophils       0.4   % Basophils       0.2   % Immature Granulocytes       0.4   Nucleated RBCs      0 /100 0   Absolute Neutrophil      1.6 - 8.3 10e9/L 5.8   Absolute Lymphocytes      0.8 - 5.3 10e9/L 2.5   Absolute Monocytes      0.0 - 1.3 10e9/L 0.8   Absolute Eosinophils      0.0 - 0.7 10e9/L 0.0   Absolute Basophils      0.0 - 0.2 10e9/L 0.0   Abs Immature Granulocytes      0 - 0.4 10e9/L 0.0   Absolute Nucleated RBC       0.0   Color Urine       Yellow   Appearance Urine       Clear   Glucose Urine      NEG mg/dL Negative   Bilirubin Urine      NEG Negative   Ketones Urine      NEG mg/dL Negative   Specific Gravity Urine      1.003 - 1.035 1.010   Blood Urine      NEG Negative   pH Urine      5.0 - 7.0 pH 6.0   Protein Albumin Urine      NEG mg/dL Negative   Urobilinogen mg/dL      0.0 - 2.0 mg/dL Normal   Nitrite Urine      NEG Negative   Leukocyte Esterase Urine      NEG Negative   Source       Midstream Urine   WBC Urine      0 - 2 /HPF <1   RBC Urine      0 - 2 /HPF <1   Squamous Epithelial /HPF Urine      0 - 1 /HPF <1   Creatinine      0.52 - 1.04 mg/dL 1.11 (H)   GFR Estimate      >60 mL/min/1.7m2 51 (L)   GFR Estimate If Black      >60 mL/min/1.7m2 62   ALT      0 - 50 U/L 23   AST      0 - 45 U/L 16   Albumin      3.4 - 5.0 g/dL 3.4   CRP  Inflammation      0.0 - 8.0 mg/L <2.9   Sed Rate      0 - 30 mm/h 9   CK Total      30 - 225 U/L 38   Aldolase       4.1     Biopsy from 3/10 EGD:  Esophagus, gastroesophageal junction, biopsies:   - Squamous mucosa and submucosa with marked chronic inflammation and   fibrosis   - No evidence of intestinal metaplasia or dysplasia     August 11, 2020 interval history:    Since we last seen the patient she feels she was doing relatively stably on till July 7.  At that point she had the abrupt onset of a bad dry cough.  She did not have significant fevers but did not feel well.  She was seen had a negative coronavirus test but a chest x-ray apparently showed evidence of some infiltrates and she was placed on doxycycline.  She says she did not improve with that therapy and a CT angios was performed.  That showed bilateral pulmonary consolidation thought consistent with infection.  There was note that the pattern could actually be somewhat typical for sarcoid but it was noted to have worsened since the prior study performed there at [facility] in October 2019.  She also was noted to have mediastinal lymphadenopathy and debris in the esophagus potentially consistent with her systemic sclerosis.  No pulmonary embolism was found.  Based on the results of that she was placed on Levaquin.  She continued to fail to improve and a third antibiotic was also tried.    Finally she was seen by pulmonologist and a bronchoscopy was performed.  I am able to review the results of that and there was a high percentage of neutrophils and lymphocytes and that BAL fluid.  Viral cultures however Gram stain and culture were all unremarkable and there were no malignant cells found.    She was placed on 40 mg a day of prednisone at the time of the bronchoscopy once it was clear that there was no acute infection.  She does not have a follow-up appointment yet.  Although her cough is mostly gone she does not feel good and feels very dyspneic  just going from the bed to the bathroom or crossed the room.  She is not convinced she has had any improvement with 40 mg daily prednisone and if anything think she is worse.    In reviewing her extensive record I see that a repeat TARA was performed and a reflexive was done when it was positive.  She continues to have an anticentromere antibody which she had previously, but now notably has a high titer Gonzalez and high titer RNP antibody.  She previously was borderline positive for RNP and Gonzalez negative.  SSA is also higher titer than it was previously.    With all these autoantibody positivity she however denies any specific features that would suggest systemic lupus.  She specifically denies sun sensitivity sun sensitive rashes including a malar rash, morning stiffness in any of her joints or any joint swelling and any pleurisy at the time of her  at CT angiogram or otherwise.  \  Her Raynaud's phenomena has been stable as have other features of her limited cutaneous systemic sclerosis.        February 25, 2021 interval history:    At the time that I saw the patient about 3 weeks ago we decided that because her neuropathy sounded rather rapidly progressive, and her HRCT scan was being read as potentially consistent with sarcoidosis, that neurosarcoid was on the differential.  I also wondered about an immune complex mediated small vessel vasculitis causing neuropathy.  We therefore did laboratory testing as well as started her on some steroid.    Laboratory testing has been largely unrevealing.  She did not have elevated calcium or elevated calcium in the urine.  I did also do a parathyroid hormone in case we did find that however her parathyroid hormone was significantly elevated.  Her vitamin D was very low and she has started supplementation for that.    Most notably however her prednisone at 40 mg a day virtually resolved her symptoms over 3 to 4 days.  She had at least an 80% decrease in her pain and  numbness although she still has some residual tingling in the legs bilaterally.    She is having some prednisone side effects.  Specifically she is having a hard time sleeping and is also feeling irritable which she thinks could be due partly due to the loss of sleep but partly directly due to the prednisone.  She has got a lot of prior experience with prednisone and has had some difficulties with it before.    She did have some right-sided chest pain.  That seem to occur with a deep breath.  She has a cardiologist and saw them will be getting a follow-up echo.  She is no longer having that.    In going through her history she does not believe she is ever been on Imuran and is quite sure and I am sure as well that we have never had her on TNF inhibition.  She was on methotrexate and tolerated oral methotrexate poorly due to nausea but was better able to tolerate subcutaneous methotrexate.      Impression:    Per the problem list, with known RNP positivity and more recently with this HRCT finding that is concerning for the possibility of overlapping sarcoidosis.    She had a very nice response to steroid quite quickly, and so we now have the conundrum of not knowing exactly why.  I certainly suspect that she has an immune mediated neuropathy given this rapid response and neurosarcoid is a concern.    I have sent a message to Dr. Cuellar in pulmonary for her thoughts.  I have also ordered a repeat HRCT that to be done in another week or so before she is seen back in pulmonary.    I am hoping these can be reviewed so that there could be a decision whether we can feel strongly enough that presumptively this could be sarcoidosis to put her on appropriate therapy for that or whether a biopsy might be justified.    We could simply presumptively try steroid sparing medications.  I am somewhat concerned about using TNF inhibitors however unless we are pretty confident this is sarcoid, as they have been associated with  worsening of systemic lupus patients and autoimmune associated interstitial lung disease in some settings.    We also have the  finding of the elevated PTH, and very low vitamin D levels, which can be seen in sarcoidosis, but is unclear here given normal Calcium levels. and will refer her to endocrinology for that.    I have given her prescription for temazepam to help her with the sleep and irritability.  I did warn her to be careful about driving in the morning until she knows how it affects her as it does have a long half-life.    I will plan on seeing her back in about 6 to 8 weeks sooner as needed.      April 8, 2021 interval history:    Since we have last seen the patient she has had initial evaluation done, and Dr. Cuellar has a recent note on the chart and an addendum placed a day after the patient's studies were reviewed in sarcoid conference.    The upshot of that is that there is evidence from her studies, in particular the biopsy studies that suggest that this could be sarcoidosis.  Because she has neurologic symptoms and inadequately explain dyspnea on exertion she will also be getting neurology evaluation and cardiac evaluation.    She remains at this time on 40 mg a day of prednisone.  She is not liking this dose.  Although she still has dyspnea on exertion she also is irritable and on edge on that dose and is also experiencing some nausea..    After consideration of the issues, Dr. Cuellar had recommended the patient go on track today.  She has in fact been on both this and MMF before and although she had some difficulties at time with tolerating these medication she think she would tolerate them again.  We have briefly discussed possibility of using a TNF inhibitor but that does give me pause, given that she has had features of mixed connective tissue disease and in TARA positive individuals TNF inhibitors have at times worsen their disease process, particularly in those with lupus associated  autoantibodies.  She has had in the past of borderline positive RNP.    She believes most of her other historical clinical features are stable.  She still gets pretty bad Raynaud's phenomena and some features of arthritis although the inflammatory features are currently pretty well controlled on this dose of prednisone.    Physical examination by video shows her to be in no acute distress HEENT examination is normal.  Her upper extremities show diffuse puffiness of her hands consistent with what there has been in the past but no significant individual joint swelling and no significant deformities.    Impression:    Per the problem list but now with features of lung disease potentially consistent with sarcoidosis and overlap with her prior autoimmune features.    I think this is indeed tricky management and I am in favor of starting with methotrexate and I would also have a low threshold to add MMF to methotrexate which we do routinely with our systemic sclerosis patients and in some other situations.    I would prefer for the time being to avoid TNF inhibition unless it is absolutely necessary.    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    I also would like to try to start coming down on her prednisone will go down to 30 mg daily.    I will communicate all this with Dr. Cuellar.  I would like to see her back in approximately 6 weeks with methotrexate monitoring labs at that time.  She will let us know if she is having difficulties in the meantime.    This video visit commenced at 4:37 PM was completed at 5:11 PM, 34 minutes in face-to-face time, with an additional 11 minutes in chart review, , messaging and documentation completed on DOS.     MARTÍN De La Torre MD, PhD    Rheumatology

## 2021-04-08 NOTE — PROGRESS NOTES
Jeanine is a 60 year old who is being evaluated via a billable video visit.      How would you like to obtain your AVS? [email or web address]  If the video visit is dropped, the invitation should be resent by: Other e-mail: [email or web address]  Will anyone else be joining your video visit? No  Video-Visit Details    Type of service:  Video Visit    Originating Location (pt. Location): Home    Distant Location (provider location):  [facility]     Platform used for Video Visit: [employer]       SUBJECTIVE:  The patient returns in f/u of her MCTD/SSC/RP/with h/o multiple DU.      PROBLEM LIST:    1.  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    2.  History of severe multiple digital ulcers, on fingers and toes.   3.  Marked keratoconjunctivitis sicca over the last several years.    4.  Marked iron deficiency anemia responsive to IV iron.    5.  Diffuse musculoskeletal pain   6.  Lower extremity edema managed with lasix and spironolactone   7.  Marked fatigue and diffuse clinical weakness.    8.  Dyspnea on exertion with lower extremity edema and other features including the anticentromere positivity worrisome for the potential development of pulmonary hypertension.    9.  Marked GERD with associated dysphagia.    10.  Status post gastric bypass with a history of intermittent constipation and diarrhea potentially consistent with bacterial small bowel overgrowth versus other gut effects of the prior surgery.    11.  History of positive rheumatoid factor consistent with MCTD, given the remainder of her clinical presentation.     12. Progressive Jaw tightening with inability to open mouth wider than 1 inch.   13. Diastolic dysfunction (3/2013)  14. Hypoalbuminemia  15. ILD cryobiopsy Pathology was reviewed at ILD conference on 10/12/2020 which showed ALI, what appears to be an acute exacerbation of underlying  ILD with OP.  Few nonnecrotizing granulomas  16. Development of intermittent moderate/severe RLE Neuropathy with steroid taper below 30 mg prednisone/d Jan 2021    Plan/recommendation:    She continues to have a very complex case with very complex pathophysiology which was only further accentuated by her pulmonary work-up.    She clearly has multiple features of systemic sclerosis including severe Raynaud's and associated cyanosis and severe esophageal dysmotility.  She did have a recent GERD and will be seeing Dr. Cárdenas back periodically for that.    What is somewhat intriguing right now is the finding of granulomas on her lung biopsy.  She now has also developed neuropathy in the right lower extremity but not elsewhere.    This really raises the possibility of sarcoidosis and overlap with her autoimmunity.  Given the overall magnitude of her autoimmunity however, her neuropathy could still be due to phospholipid antibodies or to immune complexes as well.    Given all this complexity I am going to once again do a large laboratory work-up.  We will do a urinary calcium and an ACE level but also an immune complex work-up and repeat her phospholipid antibodies which has not been done for some time.    In the meantime, since she noticed the development of the neuropathy with taper of the prednisone from 30 mg a day to 25 mg a day I am going to put her up to 40 mg a day again for the next several weeks.  That will give us time to get these results and give her the opportunity to record whether the symptoms improve and remain improved on higher dose steroid.    I would then like to see her back in about 3 weeks.  Depending on what we find both symptomatically and with laboratory work-up I will plan to confer with Dr. Cuellar, to consider options for longer-term therapy.  If we really think that she might have sarcoidosis, this will get complex as TNF inhibition is often optimal therapy for that but would potentially be a  "poor choice for the rest of her autoimmune pathophysiology, as it has occasionally been shown to exacerbate or worsen patients with the spectrum of activity.    This video visit was 60 minutes in duration, 27 minutes in face-to-face with the patient, and 33 minutes in extensive chart review,  and documentation, all completed on DOS.      INTERVAL HISTORY:       2017, INTERVAL HISTORY:  Since we have last seen the patient, she developed a right ankle wound about 2 months ago.  That opened for a while and it became hard to close, but at this point it has finally closed over.       She has had some stressors and she feels like that has contributed to her feeling like she needed more prednisone.  She had made it down to 12 mg a day for almost 6 months but increased to 20 mg a day in  when she was moving to a new house.  This was primarily due to an increase in fatigue and in joint pains.      At this point, her morning stiffness is 30-60 minutes despite remaining on the 20 mg a day of prednisone.  She does think her strength, however, is stable.      She continues to have a lot of Raynaud's phenomena but has not developed any distal digital ulcerations.       Her GERD and keratoconjunctivitis sicca, however, have remained stable and she thinks she has stable exercise tolerance.       She has not done her labs for many, many months and has not seen me for over a year, and we discussed that in some detail.     2018 INTERVAL HISTORY:  Since we have last seen her, she made it down to 12.5 mg a day of prednisone but then put herself back up to 20 mg a day because of increasing general body aches and fatigue as well as some inflammatory joint stiffness in her wrists.  Other joints have actually been okay.      She had stopped her methotrexate some time ago.  She works in a hospital and knew of several people who were admitted with \"methotrexate toxicity\" who  while in hospital and " although she does not know the details, that frightened her enough that she decided to stop the drug.       She is getting worsening Raynaud's.  She really notes that this happens not only with cold, but with a variety of kinds of stress.  She has previously, at least for a short length of time, been on losartan and does not remember whether that helped at all.  I am pretty sure she has also been on calcium channel blockers, but those are relatively contraindicated in her at this point because of persistent bilateral lower extremity edema.       She is getting enough lower extremity edema and has been found to have vascular insufficiency that she will be having some vascular surgery to laser some of these areas, although an exact time for that has not been laid out.       In addition to getting Raynaud's in her hands, she also gets it in her feet, and when she does, potentially contributed to by the vascular insufficiency, she gets numbness in her feet.  She is not getting numbness in her hands by contrast.       The patient did have pulmonary function tests today.  Although she is not exercising regularly, in general she feels her exercise tolerance has been stable, and she is not having any dry cough.       She did have a decrease in her FVC to 3.68 from 4.30 and her DLCO to 21.77 from 24.74, but she has had some rib cage pain for about the last 3 weeks since she had a minor accident while dancing.  Notably then, her TLC is completely stable, going from 6.41 to 6.29.       She denies any other new problems.  Keratoconjunctivitis sicca, GERD and other features have been stable and she denies any dysphagia.     July 11, 2019 interval history:    Since we have last seen her she did have to miss appointment because of some family issues but she is actually been doing quite well.  Although she is continued to have some intermittent joint complaints she is been able to taper her prednisone down to 7.5 mg a day the  lowest she has been on and the entire time I have been following her.    Although her joints have not worsened with that lower dose she does think she is getting some worsening of her Raynauds phenomena.  It can be pretty bad and the attacks can be hard to break even with rewarming.  She gets them in hands and feet.  Fortunately she has not had any digital ulcers.  She does recall that she was placed temporarily on tadalafil sometime ago by Dr. Bentley but she could not afford it long-term.  She thinks that may have been helpful however.    She denies any significant dyspnea on exertion or cough.  She has some muscle weakness but not marketed and she does not think that is any worse over time.  Fatigue has generally been her worst symptom and that may be slightly worse.    She does have some ongoing keratoconjunctivitis sicca but does not feel like she wants to go on a medicine for that.  She already has a full plate of dentures in both upper and lower.    February 4, 2021 interval history:    Since I have last seen the patient she has had pulmonary follow-up, and because of the atypical infiltrates on high-resolution CT scan had a cryobiopsy.  The read of that as above in the problem list.  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    She was placed on 40 mg daily prednisone and tapered by 5 mg every several weeks.  When she got down to 25 mg a day several weeks ago she started developing intermittent but fairly severe numbness of the right lower extremity below the knee.  She has not noted a clear-cut rash there and she has no pain there just numbness.  She always has some cyanosis there and is not sure that that is worse.  She is getting a lot of cyanosis in her hands as well and at times her left thumb goes completely white.  She has not had development of any digital ischemia however.    She denies any asymmetric swelling of the  leg or elsewhere.  She also denies any significant new rashes.    She continues to have some keratoconjunctivitis sicca but the actual dryness is largely limited to her mouth and if anything she has been having more watery eyes than usual.    Her biggest symptom other than for the new neuropathy is actually fatigue.  That has been very bad for her for a long time but she feels it is currently even worse.  She is uncertain how much it has worsened with the steroid taper per se.        ROS as per HPI.  10-point ROS is otherwise negative.    HISTORY REVIEW:  Past Medical History:   Diagnosis Date     Anemia      Cellulitis of leg 6/6/2013     Esophageal reflux     Better post-hiatal hernia repair and weight loss     Limb ischemia; ulcers of fingertips 4/22/2013     Raynaud's syndrome      Scleroderma (H)      Systemic sclerosis (H) 2009     Unspecified essential hypertension        Past Surgical History:   Procedure Laterality Date     BYPASS GASTRIC, CHOLECYSTECTOMY, COMBINED       CHOLECYSTECTOMY       COLONOSCOPY       ESOPHAGOSCOPY, GASTROSCOPY, DUODENOSCOPY (EGD), COMBINED N/A 3/10/2016    Procedure: COMBINED ESOPHAGOSCOPY, GASTROSCOPY, DUODENOSCOPY (EGD), BIOPSY SINGLE OR MULTIPLE;  Surgeon: Tricia Zambrano MD;  Location:  GI     INNER EAR SURGERY       PICC INSERTION  6/5/2013    5fr DL Power PICC, 44cm, left basilic vein, tip in low SVC     SURGICAL HISTORY OF -       Left ear surgery - incus transition, tympanoplasty     SURGICAL HISTORY OF -   6/05    Gastric bypass     SURGICAL HISTORY OF -   6/05    Cholecystectomy     SURGICAL HISTORY OF -   6/05    Hiatal hernia repair     TONSILLECTOMY         Family History   Problem Relation Age of Onset     Cerebrovascular Disease Father      OR[place]O.A.D. Father      [place].ARubiD. Mother      [place].AMECHELLE. 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      Social History Main Topics     Smoking status: Never Smoker      Smokeless tobacco: Never Used     Alcohol Use: Yes      occassionally     Drug Use: No     Sexually Active: Yes -- Male partner(s)     Birth Control/ Protection: None     Social History Narrative     Was  in December 2015. Longtime partner from [place].       Patient Active Problem List   Diagnosis     Essential hypertension     Thyrotoxicosis     Obesity     Prolonged QTc 460 ms,  at hr 67     Low back pain - Suspect SI Joint dysfunction     Cough     Systemic sclerosis (H)     Tumoral calcinosis     Shortness of breath     Edema of both legs     Abnormal albumin     Myopathy     Hypoalbuminemia     Diastolic dysfunction     Limb ischemia; ulcers of fingertips     Cellulitis of leg     Other specified diffuse disease of connective tissue     Disturbed sleep rhythm     Pharyngeal dysphagia     Gastroesophageal reflux disease, esophagitis presence not specified     Rheumatoid arthritis with negative rheumatoid factor, involving unspecified site (H)     Raynaud's disease without gangrene       Allergies   Allergen Reactions     Lovenox Other (See Comments)     Blood clot      Norvasc [Amlodipine Besylate] Swelling     Lip swelling that happened on 2 different occasions     Erythromycin Rash     Rash on arms     OBJECTIVE:  Vitals: There were no vitals taken for this visit.  Physical examination by video shows her to be in no acute distress HEENT examination is essentially normal.  She is speaking in full sentences with no apparent shortness of breath.  Her hands are cyanotic but not severely so.  There are no ulcers no deformities and no notable swelling.     Component      Latest Ref Rng 1/26/2016   WBC      4.0 - 11.0 10e9/L 9.2   RBC Count      3.8 - 5.2  10e12/L 4.59   Hemoglobin      11.7 - 15.7 g/dL 13.2   Hematocrit      35.0 - 47.0 % 41.8   MCV      78 - 100 fl 91   MCH      26.5 - 33.0 pg 28.8   MCHC      31.5 - 36.5 g/dL 31.6   RDW      10.0 - 15.0 % 16.7 (H)   Platelet Count      150 - 450 10e9/L 195   Diff Method       Automated Method   % Neutrophils       62.9   % Lymphocytes       27.0   % Monocytes       9.1   % Eosinophils       0.4   % Basophils       0.2   % Immature Granulocytes       0.4   Nucleated RBCs      0 /100 0   Absolute Neutrophil      1.6 - 8.3 10e9/L 5.8   Absolute Lymphocytes      0.8 - 5.3 10e9/L 2.5   Absolute Monocytes      0.0 - 1.3 10e9/L 0.8   Absolute Eosinophils      0.0 - 0.7 10e9/L 0.0   Absolute Basophils      0.0 - 0.2 10e9/L 0.0   Abs Immature Granulocytes      0 - 0.4 10e9/L 0.0   Absolute Nucleated RBC       0.0   Color Urine       Yellow   Appearance Urine       Clear   Glucose Urine      NEG mg/dL Negative   Bilirubin Urine      NEG Negative   Ketones Urine      NEG mg/dL Negative   Specific Gravity Urine      1.003 - 1.035 1.010   Blood Urine      NEG Negative   pH Urine      5.0 - 7.0 pH 6.0   Protein Albumin Urine      NEG mg/dL Negative   Urobilinogen mg/dL      0.0 - 2.0 mg/dL Normal   Nitrite Urine      NEG Negative   Leukocyte Esterase Urine      NEG Negative   Source       Midstream Urine   WBC Urine      0 - 2 /HPF <1   RBC Urine      0 - 2 /HPF <1   Squamous Epithelial /HPF Urine      0 - 1 /HPF <1   Creatinine      0.52 - 1.04 mg/dL 1.11 (H)   GFR Estimate      >60 mL/min/1.7m2 51 (L)   GFR Estimate If Black      >60 mL/min/1.7m2 62   ALT      0 - 50 U/L 23   AST      0 - 45 U/L 16   Albumin      3.4 - 5.0 g/dL 3.4   CRP Inflammation      0.0 - 8.0 mg/L <2.9   Sed Rate      0 - 30 mm/h 9   CK Total      30 - 225 U/L 38   Aldolase       4.1     Biopsy from 3/10 EGD:  Esophagus, gastroesophageal junction, biopsies:   - Squamous mucosa and submucosa with marked chronic inflammation and   fibrosis   - No evidence  of intestinal metaplasia or dysplasia     August 11, 2020 interval history:    Since we last seen the patient she feels she was doing relatively stably on till July 7.  At that point she had the abrupt onset of a bad dry cough.  She did not have significant fevers but did not feel well.  She was seen had a negative coronavirus test but a chest x-ray apparently showed evidence of some infiltrates and she was placed on doxycycline.  She says she did not improve with that therapy and a CT angios was performed.  That showed bilateral pulmonary consolidation thought consistent with infection.  There was note that the pattern could actually be somewhat typical for sarcoid but it was noted to have worsened since the prior study performed there at [facility] in October 2019.  She also was noted to have mediastinal lymphadenopathy and debris in the esophagus potentially consistent with her systemic sclerosis.  No pulmonary embolism was found.  Based on the results of that she was placed on Levaquin.  She continued to fail to improve and a third antibiotic was also tried.    Finally she was seen by pulmonologist and a bronchoscopy was performed.  I am able to review the results of that and there was a high percentage of neutrophils and lymphocytes and that BAL fluid.  Viral cultures however Gram stain and culture were all unremarkable and there were no malignant cells found.    She was placed on 40 mg a day of prednisone at the time of the bronchoscopy once it was clear that there was no acute infection.  She does not have a follow-up appointment yet.  Although her cough is mostly gone she does not feel good and feels very dyspneic just going from the bed to the bathroom or crossed the room.  She is not convinced she has had any improvement with 40 mg daily prednisone and if anything think she is worse.    In reviewing her extensive record I see that a repeat TARA was performed and a reflexive was done when it was  positive.  She continues to have an anticentromere antibody which she had previously, but now notably has a high titer Gonzalez and high titer RNP antibody.  She previously was borderline positive for RNP and Gonzalez negative.  SSA is also higher titer than it was previously.    With all these autoantibody positivity she however denies any specific features that would suggest systemic lupus.  She specifically denies sun sensitivity sun sensitive rashes including a malar rash, morning stiffness in any of her joints or any joint swelling and any pleurisy at the time of her  at CT angiogram or otherwise.  \  Her Raynaud's phenomena has been stable as have other features of her limited cutaneous systemic sclerosis.        February 25, 2021 interval history:    At the time that I saw the patient about 3 weeks ago we decided that because her neuropathy sounded rather rapidly progressive, and her HRCT scan was being read as potentially consistent with sarcoidosis, that neurosarcoid was on the differential.  I also wondered about an immune complex mediated small vessel vasculitis causing neuropathy.  We therefore did laboratory testing as well as started her on some steroid.    Laboratory testing has been largely unrevealing.  She did not have elevated calcium or elevated calcium in the urine.  I did also do a parathyroid hormone in case we did find that however her parathyroid hormone was significantly elevated.  Her vitamin D was very low and she has started supplementation for that.    Most notably however her prednisone at 40 mg a day virtually resolved her symptoms over 3 to 4 days.  She had at least an 80% decrease in her pain and numbness although she still has some residual tingling in the legs bilaterally.    She is having some prednisone side effects.  Specifically she is having a hard time sleeping and is also feeling irritable which she thinks could be due partly due to the loss of sleep but partly directly due to  the prednisone.  She has got a lot of prior experience with prednisone and has had some difficulties with it before.    She did have some right-sided chest pain.  That seem to occur with a deep breath.  She has a cardiologist and saw them will be getting a follow-up echo.  She is no longer having that.    In going through her history she does not believe she is ever been on Imuran and is quite sure and I am sure as well that we have never had her on TNF inhibition.  She was on methotrexate and tolerated oral methotrexate poorly due to nausea but was better able to tolerate subcutaneous methotrexate.      Impression:    Per the problem list, with known RNP positivity and more recently with this HRCT finding that is concerning for the possibility of overlapping sarcoidosis.    She had a very nice response to steroid quite quickly, and so we now have the conundrum of not knowing exactly why.  I certainly suspect that she has an immune mediated neuropathy given this rapid response and neurosarcoid is a concern.    I have sent a message to Dr. Cuellar in pulmonary for her thoughts.  I have also ordered a repeat HRCT that to be done in another week or so before she is seen back in pulmonary.    I am hoping these can be reviewed so that there could be a decision whether we can feel strongly enough that presumptively this could be sarcoidosis to put her on appropriate therapy for that or whether a biopsy might be justified.    We could simply presumptively try steroid sparing medications.  I am somewhat concerned about using TNF inhibitors however unless we are pretty confident this is sarcoid, as they have been associated with worsening of systemic lupus patients and autoimmune associated interstitial lung disease in some settings.    We also have the  finding of the elevated PTH, and very low vitamin D levels, which can be seen in sarcoidosis, but is unclear here given normal Calcium levels. and will refer her to  endocrinology for that.    I have given her prescription for temazepam to help her with the sleep and irritability.  I did warn her to be careful about driving in the morning until she knows how it affects her as it does have a long half-life.    I will plan on seeing her back in about 6 to 8 weeks sooner as needed.      April 8, 2021 interval history:    Since we have last seen the patient she has had initial evaluation done, and Dr. Cuellar has a recent note on the chart and an addendum placed a day after the patient's studies were reviewed in sarcoid conference.    The upshot of that is that there is evidence from her studies, in particular the biopsy studies that suggest that this could be sarcoidosis.  Because she has neurologic symptoms and inadequately explain dyspnea on exertion she will also be getting neurology evaluation and cardiac evaluation.    She remains at this time on 40 mg a day of prednisone.  She is not liking this dose.  Although she still has dyspnea on exertion she also is irritable and on edge on that dose and is also experiencing some nausea..    After consideration of the issues, Dr. Cuellar had recommended the patient go on track today.  She has in fact been on both this and MMF before and although she had some difficulties at time with tolerating these medication she think she would tolerate them again.  We have briefly discussed possibility of using a TNF inhibitor but that does give me pause, given that she has had features of mixed connective tissue disease and in TARA positive individuals TNF inhibitors have at times worsen their disease process, particularly in those with lupus associated autoantibodies.  She has had in the past of borderline positive RNP.    She believes most of her other historical clinical features are stable.  She still gets pretty bad Raynaud's phenomena and some features of arthritis although the inflammatory features are currently pretty well controlled on this  dose of prednisone.    Physical examination by video shows her to be in no acute distress HEENT examination is normal.  Her upper extremities show diffuse puffiness of her hands consistent with what there has been in the past but no significant individual joint swelling and no significant deformities.    Impression:    Per the problem list but now with features of lung disease potentially consistent with sarcoidosis and overlap with her prior autoimmune features.    I think this is indeed tricky management and I am in favor of starting with methotrexate and I would also have a low threshold to add MMF to methotrexate which we do routinely with our systemic sclerosis patients and in some other situations.    I would prefer for the time being to avoid TNF inhibition unless it is absolutely necessary.    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    I also would like to try to start coming down on her prednisone will go down to 30 mg daily.    I will communicate all this with Dr. Cuellar.  I would like to see her back in approximately 6 weeks with methotrexate monitoring labs at that time.  She will let us know if she is having difficulties in the meantime.    This video visit commenced at 4:37 PM was completed at 5:11 PM, 34 minutes in face-to-face time, with an additional 11 minutes in chart review, , messaging and documentation completed on DOS.     MARTÍN De La Torre MD, PhD    Rheumatology

## 2021-04-09 ENCOUNTER — TELEPHONE (OUTPATIENT)
Dept: PULMONOLOGY | Facility: CLINIC | Age: 61
End: 2021-04-09

## 2021-04-09 ENCOUNTER — PATIENT OUTREACH (OUTPATIENT)
Dept: PULMONOLOGY | Facility: CLINIC | Age: 61
End: 2021-04-09

## 2021-04-09 DIAGNOSIS — Z51.81 ENCOUNTER FOR THERAPEUTIC DRUG MONITORING: ICD-10-CM

## 2021-04-09 DIAGNOSIS — M35.9 COLLAGEN VASCULAR DISEASE (H): ICD-10-CM

## 2021-04-09 DIAGNOSIS — M06.00 RHEUMATOID ARTHRITIS WITH NEGATIVE RHEUMATOID FACTOR, INVOLVING UNSPECIFIED SITE (H): ICD-10-CM

## 2021-04-09 DIAGNOSIS — M35.1 MCTD (MIXED CONNECTIVE TISSUE DISEASE) (H): ICD-10-CM

## 2021-04-09 LAB
ALBUMIN SERPL-MCNC: 2.9 G/DL (ref 3.4–5)
ALBUMIN UR-MCNC: NEGATIVE MG/DL
ALT SERPL W P-5'-P-CCNC: 53 U/L (ref 0–50)
APPEARANCE UR: ABNORMAL
AST SERPL W P-5'-P-CCNC: 46 U/L (ref 0–45)
BACTERIA #/AREA URNS HPF: ABNORMAL /HPF
BASOPHILS # BLD AUTO: 0 10E9/L (ref 0–0.2)
BASOPHILS NFR BLD AUTO: 0.5 %
BILIRUB UR QL STRIP: NEGATIVE
COLOR UR AUTO: YELLOW
CREAT SERPL-MCNC: 1.04 MG/DL (ref 0.52–1.04)
CRP SERPL-MCNC: <2.9 MG/L (ref 0–8)
DIFFERENTIAL METHOD BLD: NORMAL
ENA RNP IGG SER IA-ACNC: 1.2 AI (ref 0–0.9)
ENA SM IGG SER-ACNC: <0.2 AI (ref 0–0.9)
ENA SS-A IGG SER IA-ACNC: 0.7 AI (ref 0–0.9)
ENA SS-B IGG SER IA-ACNC: <0.2 AI (ref 0–0.9)
EOSINOPHIL # BLD AUTO: 0 10E9/L (ref 0–0.7)
EOSINOPHIL NFR BLD AUTO: 0.4 %
ERYTHROCYTE [DISTWIDTH] IN BLOOD BY AUTOMATED COUNT: 14.8 % (ref 10–15)
ERYTHROCYTE [SEDIMENTATION RATE] IN BLOOD BY WESTERGREN METHOD: 9 MM/H (ref 0–30)
GFR SERPL CREATININE-BSD FRML MDRD: 59 ML/MIN/{1.73_M2}
GLUCOSE UR STRIP-MCNC: NEGATIVE MG/DL
HCT VFR BLD AUTO: 40.2 % (ref 35–47)
HGB BLD-MCNC: 13.2 G/DL (ref 11.7–15.7)
HGB UR QL STRIP: NEGATIVE
IMM GRANULOCYTES # BLD: 0.1 10E9/L (ref 0–0.4)
IMM GRANULOCYTES NFR BLD: 1.1 %
KETONES UR STRIP-MCNC: NEGATIVE MG/DL
LEUKOCYTE ESTERASE UR QL STRIP: ABNORMAL
LYMPHOCYTES # BLD AUTO: 3 10E9/L (ref 0.8–5.3)
LYMPHOCYTES NFR BLD AUTO: 37.3 %
MCH RBC QN AUTO: 31 PG (ref 26.5–33)
MCHC RBC AUTO-ENTMCNC: 32.8 G/DL (ref 31.5–36.5)
MCV RBC AUTO: 94 FL (ref 78–100)
MONOCYTES # BLD AUTO: 0.6 10E9/L (ref 0–1.3)
MONOCYTES NFR BLD AUTO: 7.5 %
MUCOUS THREADS #/AREA URNS LPF: PRESENT /LPF
NEUTROPHILS # BLD AUTO: 4.3 10E9/L (ref 1.6–8.3)
NEUTROPHILS NFR BLD AUTO: 53.2 %
NITRATE UR QL: NEGATIVE
NRBC # BLD AUTO: 0 10*3/UL
NRBC BLD AUTO-RTO: 0 /100
PH UR STRIP: 5 PH (ref 5–7)
PLATELET # BLD AUTO: 221 10E9/L (ref 150–450)
RBC # BLD AUTO: 4.26 10E12/L (ref 3.8–5.2)
RBC #/AREA URNS AUTO: <1 /HPF (ref 0–2)
SOURCE: ABNORMAL
SP GR UR STRIP: 1.01 (ref 1–1.03)
SQUAMOUS #/AREA URNS AUTO: 1 /HPF (ref 0–1)
UROBILINOGEN UR STRIP-MCNC: 2 MG/DL (ref 0–2)
WBC # BLD AUTO: 8 10E9/L (ref 4–11)
WBC #/AREA URNS AUTO: 180 /HPF (ref 0–5)

## 2021-04-09 PROCEDURE — 82565 ASSAY OF CREATININE: CPT | Performed by: INTERNAL MEDICINE

## 2021-04-09 PROCEDURE — 86225 DNA ANTIBODY NATIVE: CPT | Performed by: INTERNAL MEDICINE

## 2021-04-09 PROCEDURE — 86038 ANTINUCLEAR ANTIBODIES: CPT | Performed by: INTERNAL MEDICINE

## 2021-04-09 PROCEDURE — 36415 COLL VENOUS BLD VENIPUNCTURE: CPT | Performed by: INTERNAL MEDICINE

## 2021-04-09 PROCEDURE — 86140 C-REACTIVE PROTEIN: CPT | Performed by: INTERNAL MEDICINE

## 2021-04-09 PROCEDURE — 86160 COMPLEMENT ANTIGEN: CPT | Performed by: INTERNAL MEDICINE

## 2021-04-09 PROCEDURE — 85025 COMPLETE CBC W/AUTO DIFF WBC: CPT | Performed by: INTERNAL MEDICINE

## 2021-04-09 PROCEDURE — 82040 ASSAY OF SERUM ALBUMIN: CPT | Performed by: INTERNAL MEDICINE

## 2021-04-09 PROCEDURE — 86200 CCP ANTIBODY: CPT | Performed by: INTERNAL MEDICINE

## 2021-04-09 PROCEDURE — 85652 RBC SED RATE AUTOMATED: CPT | Performed by: INTERNAL MEDICINE

## 2021-04-09 PROCEDURE — 84450 TRANSFERASE (AST) (SGOT): CPT | Performed by: INTERNAL MEDICINE

## 2021-04-09 PROCEDURE — 86039 ANTINUCLEAR ANTIBODIES (ANA): CPT | Performed by: INTERNAL MEDICINE

## 2021-04-09 PROCEDURE — 81001 URINALYSIS AUTO W/SCOPE: CPT | Performed by: INTERNAL MEDICINE

## 2021-04-09 PROCEDURE — 86235 NUCLEAR ANTIGEN ANTIBODY: CPT | Performed by: INTERNAL MEDICINE

## 2021-04-09 PROCEDURE — 86431 RHEUMATOID FACTOR QUANT: CPT | Performed by: INTERNAL MEDICINE

## 2021-04-09 PROCEDURE — 84460 ALANINE AMINO (ALT) (SGPT): CPT | Performed by: INTERNAL MEDICINE

## 2021-04-09 NOTE — PROGRESS NOTES
Contacted patient to discuss plan from sarcoidosis conference.  Diagnosis most consistent with sarcoidosis.  Patient will need to get additional testing (echom zio patch, EKG) and see neurology for small fiber neuropathy and ophthalmology to assess for occular sarcoid involvement.  Plan was to start patient on methotrexate after Dr. Cuellar spoke with Dr. De La Torre to get his okay.  Patient states that she had a virtual appointment with Dr. De La Torre.  Dr. De La Torre prescribed subcutaneous methotrexate after reviewing Dr. Cuellar's chart addendum from sarcoidosis meeting.   Patient states that she did have an echocardiogram done in March at an outside facility.  She is fine proceeding with the other testing and referrals.  WIll obtain outside echo result and schedule patient for other testing and appointments.

## 2021-04-09 NOTE — TELEPHONE ENCOUNTER
Pt was seen yesterday virtually and called to schedule 6 week follow up with Dr. De La Torre. She requested in-person and appt was scheduled. Details confirmed with pt.

## 2021-04-10 DIAGNOSIS — N34.2 INFECTIVE URETHRITIS: ICD-10-CM

## 2021-04-10 DIAGNOSIS — M35.1 MCTD (MIXED CONNECTIVE TISSUE DISEASE) (H): Primary | ICD-10-CM

## 2021-04-10 DIAGNOSIS — D84.9 IMMUNOSUPPRESSED STATUS (H): ICD-10-CM

## 2021-04-12 ENCOUNTER — IMMUNIZATION (OUTPATIENT)
Dept: PEDIATRICS | Facility: CLINIC | Age: 61
End: 2021-04-12
Attending: INTERNAL MEDICINE
Payer: COMMERCIAL

## 2021-04-12 LAB
ANA PAT SER IF-IMP: ABNORMAL
ANA SER QL IF: POSITIVE
ANA TITR SER IF: ABNORMAL {TITER}
C3 SERPL-MCNC: 100 MG/DL (ref 81–157)
C4 SERPL-MCNC: 14 MG/DL (ref 13–39)
RHEUMATOID FACT SER NEPH-ACNC: 8 IU/ML (ref 0–20)

## 2021-04-12 PROCEDURE — 0002A PR COVID VAC PFIZER DIL RECON 30 MCG/0.3 ML IM: CPT

## 2021-04-12 PROCEDURE — 91300 PR COVID VAC PFIZER DIL RECON 30 MCG/0.3 ML IM: CPT

## 2021-04-13 LAB
CCP AB SER IA-ACNC: <1 U/ML
DSDNA AB SER-ACNC: <1 IU/ML

## 2021-04-14 DIAGNOSIS — D84.9 IMMUNOSUPPRESSED STATUS (H): ICD-10-CM

## 2021-04-14 DIAGNOSIS — M35.1 MCTD (MIXED CONNECTIVE TISSUE DISEASE) (H): ICD-10-CM

## 2021-04-14 DIAGNOSIS — N34.2 INFECTIVE URETHRITIS: ICD-10-CM

## 2021-04-14 LAB
ALBUMIN UR-MCNC: NEGATIVE MG/DL
APPEARANCE UR: CLEAR
BACTERIA #/AREA URNS HPF: ABNORMAL /HPF
BILIRUB UR QL STRIP: NEGATIVE
COLOR UR AUTO: YELLOW
GLUCOSE UR STRIP-MCNC: NEGATIVE MG/DL
HGB UR QL STRIP: NEGATIVE
HYALINE CASTS #/AREA URNS LPF: 3 /LPF (ref 0–2)
KETONES UR STRIP-MCNC: NEGATIVE MG/DL
LEUKOCYTE ESTERASE UR QL STRIP: ABNORMAL
MUCOUS THREADS #/AREA URNS LPF: PRESENT /LPF
NITRATE UR QL: NEGATIVE
PH UR STRIP: 5 PH (ref 5–7)
RBC #/AREA URNS AUTO: 0 /HPF (ref 0–2)
SOURCE: ABNORMAL
SP GR UR STRIP: 1.02 (ref 1–1.03)
SQUAMOUS #/AREA URNS AUTO: 1 /HPF (ref 0–1)
UROBILINOGEN UR STRIP-MCNC: 0 MG/DL (ref 0–2)
WBC #/AREA URNS AUTO: 42 /HPF (ref 0–5)

## 2021-04-14 PROCEDURE — 81001 URINALYSIS AUTO W/SCOPE: CPT | Performed by: INTERNAL MEDICINE

## 2021-04-14 PROCEDURE — 87086 URINE CULTURE/COLONY COUNT: CPT | Performed by: INTERNAL MEDICINE

## 2021-04-14 PROCEDURE — 87186 SC STD MICRODIL/AGAR DIL: CPT | Performed by: INTERNAL MEDICINE

## 2021-04-14 PROCEDURE — 87088 URINE BACTERIA CULTURE: CPT | Performed by: INTERNAL MEDICINE

## 2021-04-15 NOTE — LETTER
"9/14/2020       RE: Jeanine Schuler  [address]  [facility] [address]     Dear Colleague,    Thank you for referring your patient, Jeanine Schuler, to the [facility] at [facility]. Please see a copy of my visit note below.    Jeanine Schuler is a 59 year old female who is being evaluated via a billable telephone visit.      The patient has been notified of following:     \"This telephone visit will be conducted via a call between you and your physician/provider. We have found that certain health care needs can be provided without the need for a physical exam.  This service lets us provide the care you need with a short phone conversation.  If a prescription is necessary we can send it directly to your pharmacy.  If lab work is needed we can place an order for that and you can then stop by our lab to have the test done at a later time.    Telephone visits are billed at different rates depending on your insurance coverage. During this emergency period, for some insurers they may be billed the same as an in-person visit.  Please reach out to your insurance provider with any questions.    If during the course of the call the physician/provider feels a telephone visit is not appropriate, you will not be charged for this service.\"    Patient has given verbal consent for Telephone visit?  Yes    What phone number would you like to be contacted at? [phone]    How would you like to obtain your AVS? Frantz     Vitals - Patient Reported  Weight (Patient Reported): 71.3 kg (157 lb 1.6 oz)  Height (Patient Reported): 175.3 cm (5' 9.02\")  BMI (Based on Pt Reported Ht/Wt): 23.19  Pain Score: Mild Pain (2)  Pain Loc: Other - see comment(heartburn)    Jorge Alberto Martinez LPN    59F with atypical lung findings in the setting of systemic sclerosis. Referred for cryo lung biopsy.    We discussed the procedures and risks, including pneumothorax, prolonged " pneumothorax and severe bleeding.    We will schedule for next available.    Phone call duration: 7 minutes    Wesley Woodward MD        Again, thank you for allowing me to participate in the care of your patient.      Sincerely,    Wesley Woodward MD       Nasalis-Muscle-Based Myocutaneous Island Pedicle Flap Text: Using a #15 blade, an incision was made around the donor flap to the level of the nasalis muscle. Wide lateral undermining was then performed in both the subcutaneous plane above the nasalis muscle, and in a submuscular plane just above periosteum. This allowed the formation of a free nasalis muscle axial pedicle (based on the angular artery) which was still attached to the actual cutaneous flap, increasing its mobility and vascular viability. Hemostasis was obtained with pinpoint electrocoagulation. The flap was mobilized into position and the pivotal anchor points positioned and stabilized with buried interrupted sutures. Subcutaneous and dermal tissues were closed in a multilayered fashion with sutures. Tissue redundancies were excised, and the epidermal edges were apposed without significant tension and sutured with sutures.

## 2021-04-16 ENCOUNTER — TELEPHONE (OUTPATIENT)
Dept: RHEUMATOLOGY | Facility: CLINIC | Age: 61
End: 2021-04-16

## 2021-04-16 DIAGNOSIS — N30.01 ACUTE CYSTITIS WITH HEMATURIA: Primary | ICD-10-CM

## 2021-04-16 LAB
BACTERIA SPEC CULT: ABNORMAL
SPECIMEN SOURCE: ABNORMAL

## 2021-04-16 RX ORDER — NITROFURANTOIN 25; 75 MG/1; MG/1
100 CAPSULE ORAL 2 TIMES DAILY
Qty: 14 CAPSULE | Refills: 0 | Status: SHIPPED | OUTPATIENT
Start: 2021-04-16 | End: 2021-11-12

## 2021-04-16 NOTE — TELEPHONE ENCOUNTER
From: Nicolás De La Torre MD   Sent: 4/16/2021  12:18 PM CDT   To: Lisbeth Kong RN     Her strain is resistant to Bactrim; which explains things.     Please ask her if she has antibiotic preference. If not would probably give 3 days of Nitrofurantoin...     Called and spoke with pt, she has no preference on antibiotics.  Pharmacy indicated.  Will send to Dr. De La Torre to fill prescription.    Mariela Mike RN  Rheumatology Clinic

## 2021-04-19 ENCOUNTER — ANCILLARY PROCEDURE (OUTPATIENT)
Dept: CARDIOLOGY | Facility: CLINIC | Age: 61
End: 2021-04-19
Attending: INTERNAL MEDICINE
Payer: COMMERCIAL

## 2021-04-19 ENCOUNTER — APPOINTMENT (OUTPATIENT)
Dept: LAB | Facility: CLINIC | Age: 61
End: 2021-04-19
Payer: COMMERCIAL

## 2021-04-19 ENCOUNTER — OFFICE VISIT (OUTPATIENT)
Dept: OPHTHALMOLOGY | Facility: CLINIC | Age: 61
End: 2021-04-19
Attending: OPHTHALMOLOGY
Payer: COMMERCIAL

## 2021-04-19 DIAGNOSIS — D86.9 SARCOIDOSIS: ICD-10-CM

## 2021-04-19 DIAGNOSIS — H25.13 NUCLEAR SCLEROSIS OF BOTH EYES: ICD-10-CM

## 2021-04-19 DIAGNOSIS — R06.02 SHORTNESS OF BREATH: ICD-10-CM

## 2021-04-19 DIAGNOSIS — G62.9 NEUROPATHY: ICD-10-CM

## 2021-04-19 DIAGNOSIS — Z79.899 HIGH RISK MEDICATION USE: ICD-10-CM

## 2021-04-19 DIAGNOSIS — M35.01 SJOGREN'S SYNDROME WITH KERATOCONJUNCTIVITIS SICCA (H): Primary | ICD-10-CM

## 2021-04-19 DIAGNOSIS — H35.89 RETINAL PIGMENT EPITHELIAL MOTTLING OF MACULA: ICD-10-CM

## 2021-04-19 PROCEDURE — 93246 EXT ECG>7D<15D RECORDING: CPT

## 2021-04-19 PROCEDURE — 93248 EXT ECG>7D<15D REV&INTERPJ: CPT | Performed by: INTERNAL MEDICINE

## 2021-04-19 PROCEDURE — 99204 OFFICE O/P NEW MOD 45 MIN: CPT | Performed by: OPHTHALMOLOGY

## 2021-04-19 PROCEDURE — 92134 CPTRZ OPH DX IMG PST SGM RTA: CPT | Performed by: OPHTHALMOLOGY

## 2021-04-19 PROCEDURE — G0463 HOSPITAL OUTPT CLINIC VISIT: HCPCS

## 2021-04-19 PROCEDURE — 92250 FUNDUS PHOTOGRAPHY W/I&R: CPT | Performed by: OPHTHALMOLOGY

## 2021-04-19 ASSESSMENT — CONF VISUAL FIELD
METHOD: COUNTING FINGERS
OS_NORMAL: 1
OD_NORMAL: 1

## 2021-04-19 ASSESSMENT — VISUAL ACUITY
METHOD: SNELLEN - LINEAR
OS_SC: 20/25
METHOD_MR: DECLINES MR TODAY
OD_SC: 20/20
OD_SC+: -1

## 2021-04-19 ASSESSMENT — TONOMETRY
OS_IOP_MMHG: 15
OD_IOP_MMHG: 15
IOP_METHOD: TONOPEN

## 2021-04-19 ASSESSMENT — CUP TO DISC RATIO
OS_RATIO: 0.4
OD_RATIO: 0.4

## 2021-04-19 ASSESSMENT — EXTERNAL EXAM - RIGHT EYE: OD_EXAM: NORMAL

## 2021-04-19 ASSESSMENT — EXTERNAL EXAM - LEFT EYE: OS_EXAM: NORMAL

## 2021-04-19 NOTE — NURSING NOTE
Chief Complaints and History of Present Illnesses   Patient presents with     Uveitis Evaluation     Chief Complaint(s) and History of Present Illness(es)     Uveitis Evaluation     Laterality: both eyes    Quality: Blurry va with the discharge       Associated symptoms: tearing, dryness (with Sjogren's but have decreased) and flashes.  Negative for floaters and photophobia    Treatments tried: no treatments    Pain scale: 0/10              Comments     Since sx with Sarcoid her eyes have had more discharge  Plaquienil use for the past 5 yrs and has never had an exam for plaquienil use.  Dary Cardoso COT 1:06 PM April 19, 2021

## 2021-04-19 NOTE — PATIENT INSTRUCTIONS
The eyes are healthy without signs of uveitis from Sarcoidosis. You can try some artificial tears such as Refresh or Systane over the counter to see if this helps with the watering. If you have any pain/redness/light sensitivity, this could be a sign of uveitis, so call/send us a my chart message for an appointment    We will plan to do a check in 6 months just to be sure there are no signs of changes to the eyes from Plaquenil    In the meantime, continue taking the Plaquenil at the current dose and adjusting your Methotrexate as your Prednisone dose reduces

## 2021-04-19 NOTE — PROGRESS NOTES
Chief Complaint/Presenting Concern: Uveitis evaluation    History of Present Illness:   Jeanine Schuler is a 60 year old patient who presents for evaluation of possible signs of uveitis from Dr. Beena Cuellar, Pulmonologist in [facility].    There is no known history of uveitis, but there was a diagnosis of Sjogren's made years ago, treated with punctal cauter on the right lower lid and punctal plug on the left lower lid. Ms. Schuler reports having more watering of the eyes with intermittent blurring, but no eye pain. She wears sunglasses outside but does not need them in the house.    Additional Ocular History:   1. Sjogren's Syndrome diagnosed in 2009. Previously with punctal cautery right lower lid and punctal laser left lower lid.     Relevant Past Medical/Family/Social History:  1. Plaquenil use (5 years) without known eye issues (but no prior baseline eye scans)  2. Hypertension  3. Systemic Sclerosis/CREST  4. Rheumatoid Arthritis  5. Raynaud's  6. GERD   7. Sarcoidosis, recently re-started Methotrexate this week, previously used as well. No prior infusion therapy. There has been some breathing issues since Fall 2020 necessitating prednisone use and methotrexate.     No family history of eye conditions. Lives in [place], [place].     Relevant Review of Systems: Breathing still short and fatigue, but less coughing. Still limited in activity.     Laboratory Testing April 2021 (reviewed)  Slight elevation of ALT, AST. Normal creatinine, CBC. Negative RF, CCP.TARA + 1:640    Current eye related medications: Plaquenil 200 mg twice daily (last five years), Prednisone 30 mg daily (tapering down since last week)), Sub-cutaneous Methotrexate 10 mg/0.4mL (with plans to increase), No eye drops regularly    Retina/Uveitis Imaging:  OCT Spectralis Macula April 19, 2021  right eye: Normal contour, no fluid. One small outer segment change just inferior to fovea, otherwise normal outer segments. Normal choroidal  thickness.   left eye: Normal contour, no fluid. One small outer segment change just under fovea, otherwise normal outer segments. Normal choroidal thickness.     Optos Fundus Autofluorescence April 19, 2021 ([place] attempted, not comfortable)  right eye: Generally normal Optos autofluorescence without hyper-AF spots at macula  left eye: Generally normal Optos autofluorescence without hyper-AF spots at macula    Assessment:    1. Sjogren's syndrome with keratoconjunctivitis sicca (H)  With eye watering and mild dry spots.    2. 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 Discussed below.     3. Nuclear sclerosis of both eyes  Not visually significant    4. Sarcoidosis  With persistent breathing issues    5. High risk medication use  Plaquenil 200 mg twice daily,Prednisone 30 mg daily,Sub-cutaneous Methotrexate 10 mg/0.4mL weekly (with plans to increase)    Plan/Recommendations:    Discussed findings with patient. There are no signs of anterior nor posterior uveitis in either eye from Sarcoidosis. Advised patient on warning signs of uveitis such as pain/redness/light sensitivity/floaters, which could be a sign of uveitis, so call/send us a [employer] message for an appointment. No steroid drops needed at this time as there is no uveitis in either eye    The eyes have been watering recently, which may be related to the eyes compensating for mild dryness. Recommended artificial tears such as Refresh or Systane over the counter to see if this helps with the watering.     Do not recommend additional diagnostic testing at this time    We did baseline eye scans today to screen for signs of retina toxicity from Plaquenil given five years of use. There are mild changes in the retina of each eye which are likely related to aging of the retina more than Plaquenil. We will recommend repeat imaging with some additional testing in 6 months to  recheck. In the interim, recommended continued use of Plaquenil 200 mg twice daily     Support the use of Methotrexate as prednisone is taper. This can also help the Sjogren's and reduce risk of uveitis. As no signs of prior or current uveitis, no specific treatment modifications needed specifically for the eyes. Mild LFT changes noted and being monitored by Rheumatology     Return for 6 months , Tonopen, VF 10-2 OU, no dilation but with, OCT (ordered).     Physician Attestation     Attending Physician Attestation:  Complete documentation of historical and exam elements from today's encounter can be found in the full encounter summary report (not reduplicated in this progress note). I personally obtained the chief complaint(s) and history of present illness. I confirmed and edited as necessary the review of systems, past medical/surgical history, family history, social history, and examination findings as documented by others; and I examined the patient myself. I personally reviewed the relevant tests, images, and reports as documented above. I formulated and edited as necessary the assessment and plan and discussed the findings and management plan with the patient and any family members present at the time of the visit.  José Luis De Leon M.D., Uveitis and Medical Retina, April 19, 2021

## 2021-04-19 NOTE — LETTER
4/19/2021       RE: Jeanine Schuler  [address]  [facility] [address]     Dear Colleague,    Thank you for referring your patient, Jeanine Schuler, to the [facility] at [facility]. Please see a copy of my visit note below.    Chief Complaint/Presenting Concern: Uveitis evaluation    History of Present Illness:   Jeanine Schuler is a 60 year old patient who presents for evaluation of possible signs of uveitis from Dr. Beena Cuellar, pulmonologist in the [facility].    There is no known history of uveitis, but there was a diagnosis of Sjogren's made years ago, treated with punctal cauter on the right lower lid and punctal plug on the left lower lid. Ms. Schuler reports having more watering of the eyes with intermittent blurring, but no eye pain. She wears sunglasses outside but does not need them in the house.    Additional Ocular History:   1. Sjogren's Syndrome diagnosed in 2009. Previously with punctal cautery right lower lid and punctal laser left lower lid.     Relevant Past Medical/Family/Social History:  1. Plaquenil use (5 years) without known eye issues (but no prior baseline eye scans)  2. Hypertension  3. Systemic sclerosis/CREST  4. Rheumatoid Arthritis  5. Raynaud's  6. GERD   7. Sarcoidosis, recently re-started Methotrexate this week, previously used as well. No prior infusion therapy. There has been some breathing issues since fall 2020 necessitating prednisone use and methotrexate.     No family history of eye conditions. Lives in [place], [place].     Relevant Review of Systems: Breathing still short and fatigue, but less coughing. Still limited in activity.     Laboratory Testing April 2021 (reviewed)  Slight elevation of ALT, AST. Normal creatinine, CBC. Negative RF, CCP.TARA + 1:640    Current eye related medications: Plaquenil 200 mg twice daily (last five years), Prednisone 30 mg daily (tapering down since last week)),  Sub-cutaneous Methotrexate 10 mg/0.4mL (with plans to increase), No eye drops regularly    Retina/Uveitis Imaging:  OCT Spectralis Macula April 19, 2021  right eye: Normal contour, no fluid. One small outer segment change just inferior to fovea, otherwise normal outer segments. Normal choroidal thickness.   left eye: Normal contour, no fluid. One small outer segment change just under fovea, otherwise normal outer segments. Normal choroidal thickness.     Optos Fundus Autofluorescence April 19, 2021 ([place] attempted, not comfortable)  right eye: Generally normal Optos autofluorescence without hyper-AF spots at macula  left eye: Generally normal Optos autofluorescence without hyper-AF spots at macula    Assessment:    1. Sjogren's syndrome with keratoconjunctivitis sicca (H)  With eye watering and mild dry spots.    2. Nuclear sclerosis of both eyes  Not visually significant    3. Sarcoidosis  With persistent breathing issues    4. High risk medication use  Plaquenil 200 mg twice daily,Prednisone 30 mg daily,Sub-cutaneous Methotrexate 10 mg/0.4mL weekly (with plans to increase)    Plan/Recommendations:    Discussed findings with patient. There are no signs of anterior nor posterior uveitis in either eye from Sarcoidosis. Advised patient on warning signs of uveitis such as pain/redness/light sensitivity/floaters, which could be a sign of uveitis, so call/send us a [employer] chart message for an appointment. No steroid drops needed at this time as there is no uveitis in either eye    The eyes have been watering recently, which may be related to the eyes compensating for mild dryness. Recommended artificial tears such as Refresh or Systane over the counter to see if this helps with the watering.     Do not recommend additional diagnostic testing at this time    We did baseline eye scans today to screen for signs of retina toxicity from Plaquenil given five years of use. There are mild changes in the retina of each eye  which are likely related to aging of the retina more than Plaquenil. We will recommend repeat imaging with some additional testing in 6 months to recheck. In the interim, recommended continued use of Plaquenil 200 mg twice daily         Support the use of Methotrexate as prednisone is taper. This can also help the Sjogren's and reduce risk of uveitis. As no signs of prior or current uveitis, no specific treatment modifications needed specifically for the eyes. Mild LFT changes noted and being monitored by Rheumatology     Return for 6 months , Tonopen, VF 10-2 OU, no dilation but with, OCT (ordered).     Physician Attestation     Attending Physician Attestation:  Complete documentation of historical and exam elements from today's encounter can be found in the full encounter summary report (not reduplicated in this progress note). I personally obtained the chief complaint(s) and history of present illness. I confirmed and edited as necessary the review of systems, past medical/surgical history, family history, social history, and examination findings as documented by others; and I examined the patient myself. I personally reviewed the relevant tests, images, and reports as documented above. I formulated and edited as necessary the assessment and plan and discussed the findings and management plan with the patient and any family members present at the time of the visit.  José Luis De Leon M.D., Uveitis and Medical Retina, April 19, 2021     Sincerely,    José Luis De Leon MD  [facility] Dept of Ophthalmology  Uveitis and Medical Retina

## 2021-04-19 NOTE — PROGRESS NOTES
Per Dr. Cuellar, patient to have Zio monitor placed.  Diagnosis: Shortness of breath and sarcoidosis  Monitor placed: Yes  Patient Instructed: Yes  Patient verbalized understanding: Yes  Holter # [id number]

## 2021-04-20 LAB — INTERPRETATION ECG - MUSE: NORMAL

## 2021-05-04 DIAGNOSIS — R05.9 COUGH: ICD-10-CM

## 2021-05-04 RX ORDER — BENZONATATE 100 MG/1
CAPSULE ORAL
Qty: 60 CAPSULE | Refills: 0 | Status: SHIPPED | OUTPATIENT
Start: 2021-05-04 | End: 2021-08-19

## 2021-05-11 ENCOUNTER — OFFICE VISIT (OUTPATIENT)
Dept: NEUROLOGY | Facility: CLINIC | Age: 61
End: 2021-05-11
Attending: INTERNAL MEDICINE
Payer: COMMERCIAL

## 2021-05-11 VITALS
TEMPERATURE: 97.3 F | BODY MASS INDEX: 25.7 KG/M2 | HEART RATE: 71 BPM | WEIGHT: 174 LBS | SYSTOLIC BLOOD PRESSURE: 142 MMHG | DIASTOLIC BLOOD PRESSURE: 74 MMHG

## 2021-05-11 DIAGNOSIS — G62.9 NEUROPATHY: Primary | ICD-10-CM

## 2021-05-11 LAB — CK SERPL-CCNC: 31 U/L (ref 30–225)

## 2021-05-11 NOTE — LETTER
5/11/2021       RE: Jeanine Schuler  [id number] [facility] [address]     Dear Colleague,    Thank you for referring your patient, Jeanine Schuler, to the [facility] at [facility]. Please see a copy of my visit note below.    Chief Complaint: numbness and pain in feet    History of Present Illness:    Jeanine cShuler is a 60 year old woman who I am seeing at the request of Dr. De La Torre for neuropathy evaluation. She has a complex history that includes MCTD/limited cutaneous systemic sclerosis and suspected sarcoidosis. Her current immunotherapy is methotrexate (started April 2021) and prednisone (started November 2020).       She reports that her left foot has felt numb and tingling for a couple years. In January 2021 the sensory symptoms changed. The tingling sensation became painful and the area of numbness became more dense. Her left foot is now affected below the ankle. The left foot started before the right foot, but now the right foot feels similar to the left. She feels weak in her legs, especially the left. She does not describe foot drop, but she feels weak in her left leg when climb stairs or getting up from a chair. In January 2021 she noticed numbness and paresthesias (no pain) in her finger tips. Both hands, all fingers are affected. She also feels weak in her hands.     In February 2021 she reduced prednisone to 20 mg. During that time her right leg worsened. She noticed more more numbness and weakness in her foot. Things like driving ad walking were difficult. She increased prednisone to 40 mg and symptoms resolved. She was subsequently started on methotrexate as a steroid sparing agent.     Prior pertinent laboratory work-up:  4/9/21: TARA 1:640. RNP 1.2. Negative/normal DS DNA, SSa, SSb, SM ab, ESR, CRP, RF  3/21: Normal ACE, Hep B, Hep C, HIV  2/21: Beta 2 glycoprotein 17.   10/20: Left transbronchial upper lobe biopsy showed  nonnecrotizing granulomas with multinucleated giant cells, foci of organizing pneumonia and nonspecific chronic interstitial inflammation with reactive pneumocytes type II hyperplasia.     Prior pertinent radiology work-up:  4/21: CT chest showed micronodular infiltrative processes in the bilateral lungs with mid lung predominance are again noted and do not appear significantly worsened or improved since the prior study from 12/22/2020. This has a  pattern which can be associated with sarcoidosis    Prior electrophysiologic work-up:  None    Other data:   3/21: PFT , Normal inspiratory flow rate and diffusion capacity    Past Medical History:   Past Medical History:   Diagnosis Date     Anemia      Bariatric surgery status 06/27/2005    abdi en Y- [facility];     Cellulitis of leg 06/06/2013     Esophageal reflux     Better post-hiatal hernia repair and weight loss     Hyperplastic colonic polyp      Hypovitaminosis D 2011     ILD (interstitial lung disease) (H)      Kidney stone 04/29/2007     Kidney stone 05/10/2007    surgically removed     Limb ischemia; ulcers of fingertips 04/22/2013     Raynaud's syndrome      Rheumatoid arthritis (H) \     Scleroderma (H)      Sjogren-Jake syndrome      Systemic sclerosis (H) 2009     Unspecified essential hypertension      Past Surgical History:  Past Surgical History:   Procedure Laterality Date     BRONCHOSCOPY FLEXIBLE,L CRYOBIOPSY N/A 10/06/2020    Procedure: BRONCHOSCOPY, FLEXIBLE, WITH TRANSBRONCHIAL BIOPSY x4 USING CRYOPROBE, bronchial alveolar lavage;  Surgeon: Teddy Mendez MD;  Location: [facility] OR     BYPASS GASTRIC, CHOLECYSTECTOMY, COMBINED  06/27/2005    [facility]     CHOLECYSTECTOMY       COLONOSCOPY       COLONOSCOPY N/A 11/17/2020    Procedure: COLONOSCOPY, WITH POLYPECTOMY AND BIOPSY;  Surgeon: Jamie Cárdenas MD;  Location: [facility] OR     ESOPHAGOSCOPY, GASTROSCOPY, DUODENOSCOPY (EGD), COMBINED N/A 03/10/2016    Procedure: COMBINED  "ESOPHAGOSCOPY, GASTROSCOPY, DUODENOSCOPY (EGD), BIOPSY SINGLE OR MULTIPLE;  Surgeon: Tricia Zambrano MD;  Location:  GI     ESOPHAGOSCOPY, GASTROSCOPY, DUODENOSCOPY (EGD), COMBINED N/A 11/17/2020    Procedure: ESOPHAGOGASTRODUODENOSCOPY, WITH BIOPSY and Dilation;  Surgeon: Jamie Cárdenas MD;  Location: [facility] OR     INNER EAR SURGERY       PICC INSERTION  06/05/2013    5fr DL Power PICC, 44cm, left basilic vein, tip in low SVC     SURGICAL HISTORY OF -       Left ear surgery - incus transition, tympanoplasty     SURGICAL HISTORY OF -   06/01/2005    Hiatal hernia repair     TONSILLECTOMY       Family history:    There is no known family history of hereditary neuropathies or other neuromuscular disorders.    Social History:    Rare alcohol. She denies tobacco or illicit drug use.     Medical Allergies:    Allergies   Allergen Reactions     Lovenox Other (See Comments)     Blood clot      Norvasc [Amlodipine Besylate] Swelling     Lip swelling that happened on 2 different occasions     Erythromycin Rash     Rash on arms     Current Medications:    Current Outpatient Medications   Medication     benzonatate (TESSALON) 100 MG capsule     folic acid (FOLVITE) 1 MG tablet     furosemide (LASIX) 40 MG tablet     hydroxychloroquine (PLAQUENIL) 200 MG tablet     insulin syringe-needle U-100 (BD INSULIN SYRINGE ULTRAFINE) 30G X 1/2\" 1 ML miscellaneous     losartan (COZAAR) 50 MG tablet     methotrexate 50 MG/2ML injection     nitroFURantoin macrocrystal-monohydrate (MACROBID) 100 MG capsule     omeprazole (PRILOSEC) 20 MG DR capsule     pantoprazole (PROTONIX) 40 MG EC tablet     Potassium (POTASSIMIN PO)     predniSONE (DELTASONE) 10 MG tablet     predniSONE (DELTASONE) 5 MG tablet     spironolactone (ALDACTONE) 25 MG tablet     sulfamethoxazole-trimethoprim (BACTRIM) 400-80 MG tablet     temazepam (RESTORIL) 15 MG capsule     vitamin D2 (ERGOCALCIFEROL) 09910 units (1250 mcg) capsule     No current " facility-administered medications for this visit.      Review of Systems: A complete review of systems was obtained and was negative except for what was noted above.    Physical examination:    BP (!) 142/74 (BP Location: Left arm, Patient Position: Sitting)   Pulse 71   Temp 97.3  F (36.3  C) (Temporal)   Wt 78.9 kg (174 lb)   BMI 25.70 kg/m      General Appearance: NAD    Skin: Mild lower limb edema.      Musculoskeletal:  There is no scoliosis, lordosis, kyphosis, pes cavus, or hammertoes.    Neurologic examination:    Mental status:  Patient is alert, attentive, and oriented x 3.  Language is coherent and fluent without aphasia.  Memory, comprehension and ability to follow commands were intact.       Cranial nerves:  Pupils were round and reacted to light.  Extraocular movements full. There was no face, jaw, palate or tongue weakness or atrophy. Hearing was grossly intact.  Shoulder shrug was normal.       Motor exam: No atrophy or fasciculations.  Manual muscle testing revealed the following MRC grade muscle power:   Right Left   Neck flexion 5    Neck extension 5 5   Shoulder ext rotation 4 4   Shoulder abduction:  4+ 4+   Elbow extension: 5 5   Elbow flexion:  5 5   Wrist flexion:  5 5   Wrist extension:  5 5   FDI 5 4   APB 5 5   Hip flexion 4 4   Knee flexion 5 5   Knee extension 5 5   Dorsiflexion 5 5   Plantar flexion 5 5     Complex motor skills: No tremor or ataxia     Sensory exam:  Vibration reduced in toes in 4-5 seconds. Right slightly more affected than the left. Pin reduced in the left foot below the ankle and the right foot in the toe tips. Pin slightly reduced in the finger tips as well.     Gait: Antalgic. She walks with a limb, but stable.      Deep tendon reflexes:   Right Left   Triceps 2 2   Biceps 2 2   Brachioradialis 2 2   Knee jerk 2 2   Ankle jerk 0 0   Plantar responses were flexor bilaterally.       Assessment:    Jeanine Schuler is a 60 year old woman with clinical evidence of  a large fiber polyneuropathy. Presently her examination largely conforms to a length-dependant pattern, but the evolution raises concern for a multifocal neuropathy that has now become almost confluent. She also has a pattern of weakness that is suggestive of a superimposed myopathy. In the context of a complex combination of  MCTD/limited cutaneous systemic sclerosis and suspected sarcoidosis these findings raise concern for a vasculitic neuropathy related to the connective tissue disease or inflammatory neuropathy/myopathy related to sarcoid. I am going to check her CK and aldolase today. I will arrange NCS/EMG for this week or next. Pending the NCS/EMG I anticipate proceeding with a nerve and possibly muscle biopsy, specifically to look for the presence of sarcoid or vasculitic changes. The specifics of the biopsy will be determined at the time of the NCS/EMG. Additional plan discussed, as detailed below. All questions answered.     Plan:      1. Labs today: CK, aldolase  2. Nerve conduction studies/EMG: Characterize polyneuropathy and also look for myopathy  3. Pending results of #2 will determine appropriateness of nerve and/or muscle biopsy  4. Gait instability: Referral for physical therapy provided  5. Symptomatic management of pain and paresthesias: Discussed symptomatic medications. She defers for now, but if positive sensory symptoms become more problematic.   6. Foot care: Encouraged daily foot inspection, supportive/comfortable foot wear, good foot hygeine  7. Follow up in 2-3 months, but will be in touch before re: anticipated biopsy as above.  ---  5/11/21: Normal CK, aldolase  5/18/21: NCS showed an axonal polyneuropathy with asymmetric peroneal motor responses. The peroneal motor responses could be due to a multifocal pattern of neuropathy (which raises the possibility that sarcoid or MCTD associated vasculitis are contributory), but radiculopathy can also cause these electrophysiologic findings. We  will proceed with nerve biopsy of the RIGHT sural or superficial peroneal nerve to look for sarcoid or vasculitic changes.     Again, thank you for allowing me to participate in the care of your patient.      Sincerely,    Farooq Abel MD

## 2021-05-11 NOTE — PROGRESS NOTES
Chief Complaint: numbness and pain in feet    History of Present Illness:    Jeanine Schuler is a 60 year old woman who I am seeing at the request of Dr. De La Torre for neuropathy evaluation. She has a complex history that includes MCTD/limited cutaneous systemic sclerosis and suspected sarcoidosis. Her current immunotherapy is methotrexate (started April 2021) and prednisone (started November 2020).       She reports that her left foot has felt numb and tingling for a couple years. In January 2021 the sensory symptoms changed. The tingling sensation became painful and the area of numbness became more dense. Her left foot is now affected below the ankle. The left foot started before the right foot, but now the right foot feels similar to the left. She feels weak in her legs, especially the left. She does not describe foot drop, but she feels weak in her left leg when climb stairs or getting up from a chair. In January 2021 she noticed numbness and paresthesias (no pain) in her finger tips. Both hands, all fingers are affected. She also feels weak in her hands.     In February 2021 she reduced prednisone to 20 mg. During that time her right leg worsened. She noticed more more numbness and weakness in her foot. Things like driving ad walking were difficult. She increased prednisone to 40 mg and symptoms resolved. She was subsequently started on methotrexate as a steroid sparing agent.     Prior pertinent laboratory work-up:  4/9/21: TARA 1:640. RNP 1.2. Negative/normal DS DNA, SSa, SSb, SM ab, ESR, CRP, RF  3/21: Normal ACE, Hep B, Hep C, HIV  2/21: Beta 2 glycoprotein 17.   10/20: Left transbronchial upper lobe biopsy showed nonnecrotizing granulomas with multinucleated giant cells, foci of organizing pneumonia and nonspecific chronic interstitial inflammation with reactive pneumocytes type II hyperplasia.     Prior pertinent radiology work-up:  4/21: CT chest showed micronodular infiltrative processes in the bilateral  lungs with mid lung predominance are again noted and do not appear significantly worsened or improved since the prior study from 12/22/2020. This has a  pattern which can be associated with sarcoidosis    Prior electrophysiologic work-up:  None    Other data:   3/21: PFT , Normal inspiratory flow rate and diffusion capacity    Past Medical History:   Past Medical History:   Diagnosis Date     Anemia      Bariatric surgery status 06/27/2005    [facility]di en Y- [facility];     Cellulitis of leg 06/06/2013     Esophageal reflux     Better post-hiatal hernia repair and weight loss     Hyperplastic colonic polyp      Hypovitaminosis D 2011     ILD (interstitial lung disease) (H)      Kidney stone 04/29/2007     Kidney stone 05/10/2007    surgically removed     Limb ischemia; ulcers of fingertips 04/22/2013     Raynaud's syndrome      Rheumatoid arthritis (H) \     Scleroderma (H)      Sjogren-Jake syndrome      Systemic sclerosis (H) 2009     Unspecified essential hypertension      Past Surgical History:  Past Surgical History:   Procedure Laterality Date     BRONCHOSCOPY FLEXIBLE,L CRYOBIOPSY N/A 10/06/2020    Procedure: BRONCHOSCOPY, FLEXIBLE, WITH TRANSBRONCHIAL BIOPSY x4 USING CRYOPROBE, bronchial alveolar lavage;  Surgeon: Teddy Mendez MD;  Location:  OR     BYPASS GASTRIC, CHOLECYSTECTOMY, COMBINED  06/27/2005    [facility]     CHOLECYSTECTOMY       COLONOSCOPY       COLONOSCOPY N/A 11/17/2020    Procedure: COLONOSCOPY, WITH POLYPECTOMY AND BIOPSY;  Surgeon: Jamie Cárdenas MD;  Location: [facility] OR     ESOPHAGOSCOPY, GASTROSCOPY, DUODENOSCOPY (EGD), COMBINED N/A 03/10/2016    Procedure: COMBINED ESOPHAGOSCOPY, GASTROSCOPY, DUODENOSCOPY (EGD), BIOPSY SINGLE OR MULTIPLE;  Surgeon: Tricia Zambrano MD;  Location:  GI     ESOPHAGOSCOPY, GASTROSCOPY, DUODENOSCOPY (EGD), COMBINED N/A 11/17/2020    Procedure: ESOPHAGOGASTRODUODENOSCOPY, WITH BIOPSY and Dilation;  Surgeon: Jamie Cárdenas  "MD;  Location: [facility] OR     INNER EAR SURGERY       PICC INSERTION  06/05/2013    5fr DL Power PICC, 44cm, left basilic vein, tip in low SVC     SURGICAL HISTORY OF -       Left ear surgery - incus transition, tympanoplasty     SURGICAL HISTORY OF -   06/01/2005    Hiatal hernia repair     TONSILLECTOMY       Family history:    There is no known family history of hereditary neuropathies or other neuromuscular disorders.    Social History:    Rare alcohol. She denies tobacco or illicit drug use.     Medical Allergies:    Allergies   Allergen Reactions     Lovenox Other (See Comments)     Blood clot      Norvasc [Amlodipine Besylate] Swelling     Lip swelling that happened on 2 different occasions     Erythromycin Rash     Rash on arms     Current Medications:    Current Outpatient Medications   Medication     benzonatate (TESSALON) 100 MG capsule     folic acid (FOLVITE) 1 MG tablet     furosemide (LASIX) 40 MG tablet     hydroxychloroquine (PLAQUENIL) 200 MG tablet     insulin syringe-needle U-100 (BD INSULIN SYRINGE ULTRAFINE) 30G X 1/2\" 1 ML miscellaneous     losartan (COZAAR) 50 MG tablet     methotrexate 50 MG/2ML injection     nitroFURantoin macrocrystal-monohydrate (MACROBID) 100 MG capsule     omeprazole (PRILOSEC) 20 MG DR capsule     pantoprazole (PROTONIX) 40 MG EC tablet     Potassium (POTASSIMIN PO)     predniSONE (DELTASONE) 10 MG tablet     predniSONE (DELTASONE) 5 MG tablet     spironolactone (ALDACTONE) 25 MG tablet     sulfamethoxazole-trimethoprim (BACTRIM) 400-80 MG tablet     temazepam (RESTORIL) 15 MG capsule     vitamin D2 (ERGOCALCIFEROL) 69189 units (1250 mcg) capsule     No current facility-administered medications for this visit.      Review of Systems: A complete review of systems was obtained and was negative except for what was noted above.    Physical examination:    BP (!) 142/74 (BP Location: Left arm, Patient Position: Sitting)   Pulse 71   Temp 97.3  F (36.3  C) (Temporal)   Wt " 78.9 kg (174 lb)   BMI 25.70 kg/m      General Appearance: NAD    Skin: Mild lower limb edema.      Musculoskeletal:  There is no scoliosis, lordosis, kyphosis, pes cavus, or hammertoes.    Neurologic examination:    Mental status:  Patient is alert, attentive, and oriented x 3.  Language is coherent and fluent without aphasia.  Memory, comprehension and ability to follow commands were intact.       Cranial nerves:  Pupils were round and reacted to light.  Extraocular movements full. There was no face, jaw, palate or tongue weakness or atrophy. Hearing was grossly intact.  Shoulder shrug was normal.       Motor exam: No atrophy or fasciculations.  Manual muscle testing revealed the following MRC grade muscle power:   Right Left   Neck flexion 5    Neck extension 5 5   Shoulder ext rotation 4 4   Shoulder abduction:  4+ 4+   Elbow extension: 5 5   Elbow flexion:  5 5   Wrist flexion:  5 5   Wrist extension:  5 5   FDI 5 4   APB 5 5   Hip flexion 4 4   Knee flexion 5 5   Knee extension 5 5   Dorsiflexion 5 5   Plantar flexion 5 5     Complex motor skills: No tremor or ataxia     Sensory exam:  Vibration reduced in toes in 4-5 seconds. Right slightly more affected than the left. Pin reduced in the left foot below the ankle and the right foot in the toe tips. Pin slightly reduced in the finger tips as well.     Gait: Antalgic. She walks with a limb, but stable.      Deep tendon reflexes:   Right Left   Triceps 2 2   Biceps 2 2   Brachioradialis 2 2   Knee jerk 2 2   Ankle jerk 0 0   Plantar responses were flexor bilaterally.       Assessment:    Jeanine Schuler is a 60 year old woman with clinical evidence of a large fiber polyneuropathy. Presently her examination largely conforms to a length-dependant pattern, but the evolution raises concern for a multifocal neuropathy that has now become almost confluent. She also has a pattern of weakness that is suggestive of a superimposed myopathy. In the context of a complex  combination of  MCTD/limited cutaneous systemic sclerosis and suspected sarcoidosis these findings raise concern for a vasculitic neuropathy related to the connective tissue disease or inflammatory neuropathy/myopathy related to sarcoid. I am going to check her CK and aldolase today. I will arrange NCS/EMG for this week or next. Pending the NCS/EMG I anticipate proceeding with a nerve and possibly muscle biopsy, specifically to look for the presence of sarcoid or vasculitic changes. The specifics of the biopsy will be determined at the time of the NCS/EMG. Additional plan discussed, as detailed below. All questions answered.     Plan:      1. Labs today: CK, aldolase  2. Nerve conduction studies/EMG: Characterize polyneuropathy and also look for myopathy  3. Pending results of #2 will determine appropriateness of nerve and/or muscle biopsy  4. Gait instability: Referral for physical therapy provided  5. Symptomatic management of pain and paresthesias: Discussed symptomatic medications. She defers for now, but if positive sensory symptoms become more problematic.   6. Foot care: Encouraged daily foot inspection, supportive/comfortable foot wear, good foot hygeine  7. Follow up in 2-3 months, but will be in touch before re: anticipated biopsy as above.  ---  5/11/21: Normal CK, aldolase  5/18/21: NCS showed an axonal polyneuropathy with asymmetric peroneal motor responses. The peroneal motor responses could be due to a multifocal pattern of neuropathy (which raises the possibility that sarcoid or MCTD associated vasculitis are contributory), but radiculopathy can also cause these electrophysiologic findings. We will proceed with nerve biopsy of the RIGHT sural or superficial peroneal nerve to look for sarcoid or vasculitic changes.

## 2021-05-13 ENCOUNTER — OFFICE VISIT (OUTPATIENT)
Dept: PULMONOLOGY | Facility: CLINIC | Age: 61
End: 2021-05-13
Attending: INTERNAL MEDICINE
Payer: COMMERCIAL

## 2021-05-13 VITALS — HEART RATE: 52 BPM | DIASTOLIC BLOOD PRESSURE: 76 MMHG | SYSTOLIC BLOOD PRESSURE: 129 MMHG | OXYGEN SATURATION: 96 %

## 2021-05-13 DIAGNOSIS — M34.9 SYSTEMIC SCLEROSIS (H): ICD-10-CM

## 2021-05-13 DIAGNOSIS — M35.1 MCTD (MIXED CONNECTIVE TISSUE DISEASE) (H): ICD-10-CM

## 2021-05-13 DIAGNOSIS — I73.00 RAYNAUD'S DISEASE WITHOUT GANGRENE: ICD-10-CM

## 2021-05-13 DIAGNOSIS — G62.9 NEUROPATHY: ICD-10-CM

## 2021-05-13 DIAGNOSIS — E83.59 TUMORAL CALCINOSIS: ICD-10-CM

## 2021-05-13 DIAGNOSIS — J84.9 ILD (INTERSTITIAL LUNG DISEASE) (H): ICD-10-CM

## 2021-05-13 DIAGNOSIS — M35.9 COLLAGEN VASCULAR DISEASE (H): Primary | ICD-10-CM

## 2021-05-13 DIAGNOSIS — Z51.81 ENCOUNTER FOR THERAPEUTIC DRUG MONITORING: ICD-10-CM

## 2021-05-13 DIAGNOSIS — D86.9 SARCOIDOSIS: ICD-10-CM

## 2021-05-13 LAB — ALDOLASE SERPL-CCNC: 2.2 U/L (ref 1.5–8.1)

## 2021-05-13 PROCEDURE — G0463 HOSPITAL OUTPT CLINIC VISIT: HCPCS

## 2021-05-13 PROCEDURE — 99215 OFFICE O/P EST HI 40 MIN: CPT | Mod: GC | Performed by: INTERNAL MEDICINE

## 2021-05-13 RX ORDER — MYCOPHENOLATE MOFETIL 250 MG/1
250 CAPSULE ORAL 2 TIMES DAILY
Qty: 120 CAPSULE | Refills: 1 | Status: ON HOLD | OUTPATIENT
Start: 2021-05-13 | End: 2021-12-21

## 2021-05-13 NOTE — LETTER
5/13/2021         RE: Jeanine Schuler  [address]  [facility] [address]        Dear Colleague,    Thank you for referring your patient, Jeanine Schuler, to the [facility] LUNG [facility]ENCE AND [facility]. Please see a copy of my visit note below.      In Person-Visit       SUBJECTIVE:  The patient returns in f/u of her MCTD/SSC/RP/with h/o multiple DU.      PROBLEM LIST:    1.  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    2.  History of severe multiple digital ulcers, on fingers and toes.   3.  Marked keratoconjunctivitis sicca over the last several years.    4.  Marked iron deficiency anemia responsive to IV iron.    5.  Diffuse musculoskeletal pain   6.  Lower extremity edema managed with lasix and spironolactone   7.  Marked fatigue and diffuse clinical weakness.    8.  Dyspnea on exertion with lower extremity edema and other features including the anticentromere positivity worrisome for the potential development of pulmonary hypertension.    9.  Marked GERD with associated dysphagia.    10.  Status post gastric bypass with a history of intermittent constipation and diarrhea potentially consistent with bacterial small bowel overgrowth versus other gut effects of the prior surgery.    11.  History of positive rheumatoid factor consistent with MCTD, given the remainder of her clinical presentation.     12. Progressive Jaw tightening with inability to open mouth wider than 1 inch.   13. Diastolic dysfunction (3/2013)  14. Hypoalbuminemia  15. ILD cryobiopsy Pathology was reviewed at ILD conference on 10/12/2020 which showed ALI, what appears to be an acute exacerbation of underlying ILD with OP.  Few nonnecrotizing granulomas  16. Development of intermittent moderate/severe RLE Neuropathy with steroid taper below 30 mg prednisone/d Jan 2021    Plan/recommendation:    She continues to have a  very complex case with very complex pathophysiology which was only further accentuated by her pulmonary work-up.    She clearly has multiple features of systemic sclerosis including severe Raynaud's and associated cyanosis and severe esophageal dysmotility.  She did have a recent GERD and will be seeing Dr. Cárdenas back periodically for that.    What is somewhat intriguing right now is the finding of granulomas on her lung biopsy.  She now has also developed neuropathy in the right lower extremity but not elsewhere.    This really raises the possibility of sarcoidosis and overlap with her autoimmunity.  The patient has been on prednisone for possible sarcoidosis since November. Overall her pulmonary disease has not appreciably improved, however her neuropathic symptoms have at a higher dose.    She was started on methotrexate 1 month ago with uptitration of dose with prednisone taper. Currently at 12.5 mg methotrexate and 30 mg prednisone.  Given that she is not having as much of a improvement as hoped, will start low dose CellCept at 250 mg BID, with plan to increase to 500 mg BID in a couple weeks if she is doing well. Have chosen this over TNF-inhibitor, which would be primary treatment for sarcoidosis, however may exacerbate other autoimmune etiologies.    To be complete for her neuropathy and granulomatous pulmonary disease, will also order ANCA IgG with reflex to titers. If she has concerning ANCA pattern, this will change her therapeutic strategy.      -Continue prednisone taper of 5 mg q2week and increase methotrexate by 0.1 mL u0hrtly, with goal dose of 0.8 mL qWeek  -Start  mg BID x 2 weeks, if tolerating increase to 500 mg BID  -Check ANCA titers  -Continue  mg BID  -Continue Bactrim for PCP ppx  -F/u w/ Neurology for EMG  -F/u w/ pulmonary  -Ok to consider trial of sildenafil/tadalafil for Raynauds but patient does not want to start too many medications at once, so will hold off until next  "visit    RTC 7/15/21    Magda Anton MD  Internal Medicine-Dermatology, PGY-5    Staffed with Dr. De La Torre    I saw the patient with the resident.  My exam and recomendations are as described.      Nicolás De La Torre MD      INTERVAL HISTORY:       2017, INTERVAL HISTORY:  Since we have last seen the patient, she developed a right ankle wound about 2 months ago.  That opened for a while and it became hard to close, but at this point it has finally closed over.       She has had some stressors and she feels like that has contributed to her feeling like she needed more prednisone.  She had made it down to 12 mg a day for almost 6 months but increased to 20 mg a day in  when she was moving to a new house.  This was primarily due to an increase in fatigue and in joint pains.      At this point, her morning stiffness is 30-60 minutes despite remaining on the 20 mg a day of prednisone.  She does think her strength, however, is stable.      She continues to have a lot of Raynaud's phenomena but has not developed any distal digital ulcerations.       Her GERD and keratoconjunctivitis sicca, however, have remained stable and she thinks she has stable exercise tolerance.       She has not done her labs for many, many months and has not seen me for over a year, and we discussed that in some detail.     2018 INTERVAL HISTORY:  Since we have last seen her, she made it down to 12.5 mg a day of prednisone but then put herself back up to 20 mg a day because of increasing general body aches and fatigue as well as some inflammatory joint stiffness in her wrists.  Other joints have actually been okay.      She had stopped her methotrexate some time ago.  She works in a hospital and knew of several people who were admitted with \"methotrexate toxicity\" who  while in hospital and although she does not know the details, that frightened her enough that she decided to stop the drug.       She is getting " worsening Raynaud's.  She really notes that this happens not only with cold, but with a variety of kinds of stress.  She has previously, at least for a short length of time, been on losartan and does not remember whether that helped at all.  I am pretty sure she has also been on calcium channel blockers, but those are relatively contraindicated in her at this point because of persistent bilateral lower extremity edema.       She is getting enough lower extremity edema and has been found to have vascular insufficiency that she will be having some vascular surgery to laser some of these areas, although an exact time for that has not been laid out.       In addition to getting Raynaud's in her hands, she also gets it in her feet, and when she does, potentially contributed to by the vascular insufficiency, she gets numbness in her feet.  She is not getting numbness in her hands by contrast.       The patient did have pulmonary function tests today.  Although she is not exercising regularly, in general she feels her exercise tolerance has been stable, and she is not having any dry cough.       She did have a decrease in her FVC to 3.68 from 4.30 and her DLCO to 21.77 from 24.74, but she has had some rib cage pain for about the last 3 weeks since she had a minor accident while dancing.  Notably then, her TLC is completely stable, going from 6.41 to 6.29.       She denies any other new problems.  Keratoconjunctivitis sicca, GERD and other features have been stable and she denies any dysphagia.     July 11, 2019 interval history:    Since we have last seen her she did have to miss appointment because of some family issues but she is actually been doing quite well.  Although she is continued to have some intermittent joint complaints she is been able to taper her prednisone down to 7.5 mg a day the lowest she has been on and the entire time I have been following her.    Although her joints have not worsened with that lower  dose she does think she is getting some worsening of her Raynauds phenomena.  It can be pretty bad and the attacks can be hard to break even with rewarming.  She gets them in hands and feet.  Fortunately she has not had any digital ulcers.  She does recall that she was placed temporarily on tadalafil sometime ago by Dr. Bentley but she could not afford it long-term.  She thinks that may have been helpful however.    She denies any significant dyspnea on exertion or cough.  She has some muscle weakness but not marketed and she does not think that is any worse over time.  Fatigue has generally been her worst symptom and that may be slightly worse.    She does have some ongoing keratoconjunctivitis sicca but does not feel like she wants to go on a medicine for that.  She already has a full plate of dentures in both upper and lower.    February 4, 2021 interval history:    Since I have last seen the patient she has had pulmonary follow-up, and because of the atypical infiltrates on high-resolution CT scan had a cryobiopsy.  The read of that as above in the problem list.  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    She was placed on 40 mg daily prednisone and tapered by 5 mg every several weeks.  When she got down to 25 mg a day several weeks ago she started developing intermittent but fairly severe numbness of the right lower extremity below the knee.  She has not noted a clear-cut rash there and she has no pain there just numbness.  She always has some cyanosis there and is not sure that that is worse.  She is getting a lot of cyanosis in her hands as well and at times her left thumb goes completely white.  She has not had development of any digital ischemia however.    She denies any asymmetric swelling of the leg or elsewhere.  She also denies any significant new rashes.    She continues to have some keratoconjunctivitis sicca but the  actual dryness is largely limited to her mouth and if anything she has been having more watery eyes than usual.    Her biggest symptom other than for the new neuropathy is actually fatigue.  That has been very bad for her for a long time but she feels it is currently even worse.  She is uncertain how much it has worsened with the steroid taper per se.        ROS as per HPI.  10-point ROS is otherwise negative.    HISTORY REVIEW:  Past Medical History:   Diagnosis Date     Anemia      Cellulitis of leg 6/6/2013     Esophageal reflux     Better post-hiatal hernia repair and weight loss     Limb ischemia; ulcers of fingertips 4/22/2013     Raynaud's syndrome      Scleroderma (H)      Systemic sclerosis (H) 2009     Unspecified essential hypertension        Past Surgical History:   Procedure Laterality Date     BYPASS GASTRIC, CHOLECYSTECTOMY, COMBINED       CHOLECYSTECTOMY       COLONOSCOPY       ESOPHAGOSCOPY, GASTROSCOPY, DUODENOSCOPY (EGD), COMBINED N/A 3/10/2016    Procedure: COMBINED ESOPHAGOSCOPY, GASTROSCOPY, DUODENOSCOPY (EGD), BIOPSY SINGLE OR MULTIPLE;  Surgeon: Tricia Zambrano MD;  Location:  GI     INNER EAR SURGERY       PICC INSERTION  6/5/2013    5fr DL Power PICC, 44cm, left basilic vein, tip in low SVC     SURGICAL HISTORY OF -       Left ear surgery - incus transition, tympanoplasty     SURGICAL HISTORY OF -   6/05    Gastric bypass     SURGICAL HISTORY OF -   6/05    Cholecystectomy     SURGICAL HISTORY OF -   6/05    Hiatal hernia repair     TONSILLECTOMY         Family History   Problem Relation Age of Onset     Cerebrovascular Disease Father      C.A.D. Father      C.A.D. Mother      C.A.D. 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      Social History Main Topics     Smoking status: Never Smoker      Smokeless tobacco: Never Used     Alcohol Use: Yes      occassionally     Drug Use: No     Sexually Active: Yes -- Male partner(s)     Birth Control/ Protection: None     Social History Narrative     Was  in December 2015. Longtime partner from [place].       Patient Active Problem List   Diagnosis     Essential hypertension     Thyrotoxicosis     Obesity     Prolonged QTc 460 ms,  at hr 67     Low back pain - Suspect SI Joint dysfunction     Cough     Systemic sclerosis (H)     Tumoral calcinosis     Shortness of breath     Edema of both legs     Abnormal albumin     Myopathy     Hypoalbuminemia     Diastolic dysfunction     Limb ischemia; ulcers of fingertips     Cellulitis of leg     Other specified diffuse disease of connective tissue     Disturbed sleep rhythm     Pharyngeal dysphagia     Gastroesophageal reflux disease, esophagitis presence not specified     Rheumatoid arthritis with negative rheumatoid factor, involving unspecified site (H)     Raynaud's disease without gangrene       Allergies   Allergen Reactions     Lovenox Other (See Comments)     Blood clot      Norvasc [Amlodipine Besylate] Swelling     Lip swelling that happened on 2 different occasions     Erythromycin Rash     Rash on arms     OBJECTIVE:  Vitals: Reviewed  Gen: A+Ox3, NAD  HEENT: NCAT, EOMI  Pulm: CTAB  CVS: RRR, no m/r/g  Skin: purpuric patches on fingertips and toes, no ulcerations/erosions. There is platelike xerosis of lower extremities; hyperpigmented nonblanchable ovoid patches on shins, mildly tender  -mild sclerosis of distal fingers  Msk: No active synovitis       Component      Latest Ref Rng 1/26/2016   WBC      4.0 - 11.0 10e9/L 9.2   RBC Count      3.8 - 5.2 10e12/L 4.59   Hemoglobin      11.7 - 15.7 g/dL 13.2   Hematocrit      35.0 - 47.0 % 41.8   MCV      78 - 100 fl 91   MCH      26.5 -  33.0 pg 28.8   MCHC      31.5 - 36.5 g/dL 31.6   RDW      10.0 - 15.0 % 16.7 (H)   Platelet Count      150 - 450 10e9/L 195   Diff Method       Automated Method   % Neutrophils       62.9   % Lymphocytes       27.0   % Monocytes       9.1   % Eosinophils       0.4   % Basophils       0.2   % Immature Granulocytes       0.4   Nucleated RBCs      0 /100 0   Absolute Neutrophil      1.6 - 8.3 10e9/L 5.8   Absolute Lymphocytes      0.8 - 5.3 10e9/L 2.5   Absolute Monocytes      0.0 - 1.3 10e9/L 0.8   Absolute Eosinophils      0.0 - 0.7 10e9/L 0.0   Absolute Basophils      0.0 - 0.2 10e9/L 0.0   Abs Immature Granulocytes      0 - 0.4 10e9/L 0.0   Absolute Nucleated RBC       0.0   Color Urine       Yellow   Appearance Urine       Clear   Glucose Urine      NEG mg/dL Negative   Bilirubin Urine      NEG Negative   Ketones Urine      NEG mg/dL Negative   Specific Gravity Urine      1.003 - 1.035 1.010   Blood Urine      NEG Negative   pH Urine      5.0 - 7.0 pH 6.0   Protein Albumin Urine      NEG mg/dL Negative   Urobilinogen mg/dL      0.0 - 2.0 mg/dL Normal   Nitrite Urine      NEG Negative   Leukocyte Esterase Urine      NEG Negative   Source       Midstream Urine   WBC Urine      0 - 2 /HPF <1   RBC Urine      0 - 2 /HPF <1   Squamous Epithelial /HPF Urine      0 - 1 /HPF <1   Creatinine      0.52 - 1.04 mg/dL 1.11 (H)   GFR Estimate      >60 mL/min/1.7m2 51 (L)   GFR Estimate If Black      >60 mL/min/1.7m2 62   ALT      0 - 50 U/L 23   AST      0 - 45 U/L 16   Albumin      3.4 - 5.0 g/dL 3.4   CRP Inflammation      0.0 - 8.0 mg/L <2.9   Sed Rate      0 - 30 mm/h 9   CK Total      30 - 225 U/L 38   Aldolase       4.1     Biopsy from 3/10 EGD:  Esophagus, gastroesophageal junction, biopsies:   - Squamous mucosa and submucosa with marked chronic inflammation and   fibrosis   - No evidence of intestinal metaplasia or dysplasia     August 11, 2020 interval history:    Since we last seen the patient she feels she was doing  relatively stably on till July 7.  At that point she had the abrupt onset of a bad dry cough.  She did not have significant fevers but did not feel well.  She was seen had a negative coronavirus test but a chest x-ray apparently showed evidence of some infiltrates and she was placed on doxycycline.  She says she did not improve with that therapy and a CT angios was performed.  That showed bilateral pulmonary consolidation thought consistent with infection.  There was note that the pattern could actually be somewhat typical for sarcoid but it was noted to have worsened since the prior study performed there at [facility] in October 2019.  She also was noted to have mediastinal lymphadenopathy and debris in the esophagus potentially consistent with her systemic sclerosis.  No pulmonary embolism was found.  Based on the results of that she was placed on Levaquin.  She continued to fail to improve and a third antibiotic was also tried.    Finally she was seen by pulmonologist and a bronchoscopy was performed.  I am able to review the results of that and there was a high percentage of neutrophils and lymphocytes and that BAL fluid.  Viral cultures however Gram stain and culture were all unremarkable and there were no malignant cells found.    She was placed on 40 mg a day of prednisone at the time of the bronchoscopy once it was clear that there was no acute infection.  She does not have a follow-up appointment yet.  Although her cough is mostly gone she does not feel good and feels very dyspneic just going from the bed to the bathroom or crossed the room.  She is not convinced she has had any improvement with 40 mg daily prednisone and if anything think she is worse.    In reviewing her extensive record I see that a repeat TARA was performed and a reflexive was done when it was positive.  She continues to have an anticentromere antibody which she had previously, but now notably has a high titer Smith and high titer  RNP antibody.  She previously was borderline positive for RNP and Gonzalez negative.  SSA is also higher titer than it was previously.    With all these autoantibody positivity she however denies any specific features that would suggest systemic lupus.  She specifically denies sun sensitivity sun sensitive rashes including a malar rash, morning stiffness in any of her joints or any joint swelling and any pleurisy at the time of her  at CT angiogram or otherwise.  \  Her Raynaud's phenomena has been stable as have other features of her limited cutaneous systemic sclerosis.        February 25, 2021 interval history:    At the time that I saw the patient about 3 weeks ago we decided that because her neuropathy sounded rather rapidly progressive, and her HRCT scan was being read as potentially consistent with sarcoidosis, that neurosarcoid was on the differential.  I also wondered about an immune complex mediated small vessel vasculitis causing neuropathy.  We therefore did laboratory testing as well as started her on some steroid.    Laboratory testing has been largely unrevealing.  She did not have elevated calcium or elevated calcium in the urine.  I did also do a parathyroid hormone in case we did find that however her parathyroid hormone was significantly elevated.  Her vitamin D was very low and she has started supplementation for that.    Most notably however her prednisone at 40 mg a day virtually resolved her symptoms over 3 to 4 days.  She had at least an 80% decrease in her pain and numbness although she still has some residual tingling in the legs bilaterally.    She is having some prednisone side effects.  Specifically she is having a hard time sleeping and is also feeling irritable which she thinks could be due partly due to the loss of sleep but partly directly due to the prednisone.  She has got a lot of prior experience with prednisone and has had some difficulties with it before.    She did have some  right-sided chest pain.  That seem to occur with a deep breath.  She has a cardiologist and saw them will be getting a follow-up echo.  She is no longer having that.    In going through her history she does not believe she is ever been on Imuran and is quite sure and I am sure as well that we have never had her on TNF inhibition.  She was on methotrexate and tolerated oral methotrexate poorly due to nausea but was better able to tolerate subcutaneous methotrexate.      Impression:    Per the problem list, with known RNP positivity and more recently with this HRCT finding that is concerning for the possibility of overlapping sarcoidosis.    She had a very nice response to steroid quite quickly, and so we now have the conundrum of not knowing exactly why.  I certainly suspect that she has an immune mediated neuropathy given this rapid response and neurosarcoid is a concern.    I have sent a message to Dr. Cuellar in pulmonary for her thoughts.  I have also ordered a repeat HRCT that to be done in another week or so before she is seen back in pulmonary.    I am hoping these can be reviewed so that there could be a decision whether we can feel strongly enough that presumptively this could be sarcoidosis to put her on appropriate therapy for that or whether a biopsy might be justified.    We could simply presumptively try steroid sparing medications.  I am somewhat concerned about using TNF inhibitors however unless we are pretty confident this is sarcoid, as they have been associated with worsening of systemic lupus patients and autoimmune associated interstitial lung disease in some settings.    We also have the  finding of the elevated PTH, and very low vitamin D levels, which can be seen in sarcoidosis, but is unclear here given normal Calcium levels. and will refer her to endocrinology for that.    I have given her prescription for temazepam to help her with the sleep and irritability.  I did warn her to be careful  about driving in the morning until she knows how it affects her as it does have a long half-life.    I will plan on seeing her back in about 6 to 8 weeks sooner as needed.      April 8, 2021 interval history:    Since we have last seen the patient she has had initial evaluation done, and Dr. Cuellar has a recent note on the chart and an addendum placed a day after the patient's studies were reviewed in sarcoid conference.    The upshot of that is that there is evidence from her studies, in particular the biopsy studies that suggest that this could be sarcoidosis.  Because she has neurologic symptoms and inadequately explain dyspnea on exertion she will also be getting neurology evaluation and cardiac evaluation.    She remains at this time on 40 mg a day of prednisone.  She is not liking this dose.  Although she still has dyspnea on exertion she also is irritable and on edge on that dose and is also experiencing some nausea..    After consideration of the issues, Dr. Cuellar had recommended the patient go on track today.  She has in fact been on both this and MMF before and although she had some difficulties at time with tolerating these medication she think she would tolerate them again.  We have briefly discussed possibility of using a TNF inhibitor but that does give me pause, given that she has had features of mixed connective tissue disease and in TARA positive individuals TNF inhibitors have at times worsen their disease process, particularly in those with lupus associated autoantibodies.  She has had in the past of borderline positive RNP.    She believes most of her other historical clinical features are stable.  She still gets pretty bad Raynaud's phenomena and some features of arthritis although the inflammatory features are currently pretty well controlled on this dose of prednisone.    Physical examination by video shows her to be in no acute distress HEENT examination is normal.  Her upper extremities show  diffuse puffiness of her hands consistent with what there has been in the past but no significant individual joint swelling and no significant deformities.      Interval history:    Tapering by 5 mg of prednisone q2 weeks. Currently on 30 mg.  She started methotrexate 1 month ago. Increasing by 0.1 mL every 2 weeks. Currently on 0.5 mL better.  Still with dyspnea on exertion upon minimal exertion. Feels fatigued. Doesn't feel that she has noticed improvement, however she does state that her neuropathy is improved on the higher dose of prednisone.  Patient is seeing Neurology for neuropathy. They will do EMG next Tuesday.   Raynauds still severe but controllable. She has chronic purple discoloration of hands/feets. Previously was not able to get sildenafil due to insurance not covering.  Denies F/C. Denies hemoptysis or hematuria.          Again, thank you for allowing me to participate in the care of your patient.        Sincerely,        Nicolás De La Torre MD

## 2021-05-13 NOTE — NURSING NOTE
Chief Complaint   Patient presents with     Follow Up     6wk f/u     Vitals were taken and medications were reconciled.     JOHN Wilhelm

## 2021-05-13 NOTE — PROGRESS NOTES
In Person-Visit       SUBJECTIVE:  The patient returns in f/u of her MCTD/SSC/RP/with h/o multiple DU.      PROBLEM LIST:    1.  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    2.  History of severe multiple digital ulcers, on fingers and toes.   3.  Marked keratoconjunctivitis sicca over the last several years.    4.  Marked iron deficiency anemia responsive to IV iron.    5.  Diffuse musculoskeletal pain   6.  Lower extremity edema managed with lasix and spironolactone   7.  Marked fatigue and diffuse clinical weakness.    8.  Dyspnea on exertion with lower extremity edema and other features including the anticentromere positivity worrisome for the potential development of pulmonary hypertension.    9.  Marked GERD with associated dysphagia.    10.  Status post gastric bypass with a history of intermittent constipation and diarrhea potentially consistent with bacterial small bowel overgrowth versus other gut effects of the prior surgery.    11.  History of positive rheumatoid factor consistent with MCTD, given the remainder of her clinical presentation.     12. Progressive Jaw tightening with inability to open mouth wider than 1 inch.   13. Diastolic dysfunction (3/2013)  14. Hypoalbuminemia  15. ILD cryobiopsy Pathology was reviewed at ILD conference on 10/12/2020 which showed ALI, what appears to be an acute exacerbation of underlying ILD with OP.  Few nonnecrotizing granulomas  16. Development of intermittent moderate/severe RLE Neuropathy with steroid taper below 30 mg prednisone/d Jan 2021    Plan/recommendation:    She continues to have a very complex case with very complex pathophysiology which was only further accentuated by her pulmonary work-up.    She clearly has multiple features of systemic sclerosis including severe Raynaud's and associated cyanosis and severe esophageal dysmotility.  She did have a recent GERD and  will be seeing Dr. Cárdenas back periodically for that.    What is somewhat intriguing right now is the finding of granulomas on her lung biopsy.  She now has also developed neuropathy in the right lower extremity but not elsewhere.    This really raises the possibility of sarcoidosis and overlap with her autoimmunity.  The patient has been on prednisone for possible sarcoidosis since November. Overall her pulmonary disease has not appreciably improved, however her neuropathic symptoms have at a higher dose.    She was started on methotrexate 1 month ago with uptitration of dose with prednisone taper. Currently at 12.5 mg methotrexate and 30 mg prednisone.  Given that she is not having as much of a improvement as hoped, will start low dose CellCept at 250 mg BID, with plan to increase to 500 mg BID in a couple weeks if she is doing well. Have chosen this over TNF-inhibitor, which would be primary treatment for sarcoidosis, however may exacerbate other autoimmune etiologies.    To be complete for her neuropathy and granulomatous pulmonary disease, will also order ANCA IgG with reflex to titers. If she has concerning ANCA pattern, this will change her therapeutic strategy.      -Continue prednisone taper of 5 mg q2week and increase methotrexate by 0.1 mL [id number], with goal dose of 0.8 mL qWeek  -Start  mg BID x 2 weeks, if tolerating increase to 500 mg BID  -Check ANCA titers  -Continue  mg BID  -Continue Bactrim for PCP ppx  -F/u w/ Neurology for EMG  -F/u w/ pulmonary  -Ok to consider trial of sildenafil/tadalafil for Raynauds but patient does not want to start too many medications at once, so will hold off until next visit    RTC 7/15/21    Magda Anton MD  Internal Medicine-Dermatology, PGY-5    Staffed with Dr. De La Torre    I saw the patient with the resident.  My exam and recomendations are as described.      Nicolás De La Torre MD      INTERVAL HISTORY:       AUGUST 22, 2017, INTERVAL HISTORY:   "Since we have last seen the patient, she developed a right ankle wound about 2 months ago.  That opened for a while and it became hard to close, but at this point it has finally closed over.       She has had some stressors and she feels like that has contributed to her feeling like she needed more prednisone.  She had made it down to 12 mg a day for almost 6 months but increased to 20 mg a day in  when she was moving to a new house.  This was primarily due to an increase in fatigue and in joint pains.      At this point, her morning stiffness is 30-60 minutes despite remaining on the 20 mg a day of prednisone.  She does think her strength, however, is stable.      She continues to have a lot of Raynaud's phenomena but has not developed any distal digital ulcerations.       Her GERD and keratoconjunctivitis sicca, however, have remained stable and she thinks she has stable exercise tolerance.       She has not done her labs for many, many months and has not seen me for over a year, and we discussed that in some detail.     2018 INTERVAL HISTORY:  Since we have last seen her, she made it down to 12.5 mg a day of prednisone but then put herself back up to 20 mg a day because of increasing general body aches and fatigue as well as some inflammatory joint stiffness in her wrists.  Other joints have actually been okay.      She had stopped her methotrexate some time ago.  She works in a hospital and knew of several people who were admitted with \"methotrexate toxicity\" who  while in hospital and although she does not know the details, that frightened her enough that she decided to stop the drug.       She is getting worsening Raynaud's.  She really notes that this happens not only with cold, but with a variety of kinds of stress.  She has previously, at least for a short length of time, been on losartan and does not remember whether that helped at all.  I am pretty sure she has also been on calcium " channel blockers, but those are relatively contraindicated in her at this point because of persistent bilateral lower extremity edema.       She is getting enough lower extremity edema and has been found to have vascular insufficiency that she will be having some vascular surgery to laser some of these areas, although an exact time for that has not been laid out.       In addition to getting Raynaud's in her hands, she also gets it in her feet, and when she does, potentially contributed to by the vascular insufficiency, she gets numbness in her feet.  She is not getting numbness in her hands by contrast.       The patient did have pulmonary function tests today.  Although she is not exercising regularly, in general she feels her exercise tolerance has been stable, and she is not having any dry cough.       She did have a decrease in her FVC to 3.68 from 4.30 and her DLCO to 21.77 from 24.74, but she has had some rib cage pain for about the last 3 weeks since she had a minor accident while dancing.  Notably then, her TLC is completely stable, going from 6.41 to 6.29.       She denies any other new problems.  Keratoconjunctivitis sicca, GERD and other features have been stable and she denies any dysphagia.     July 11, 2019 interval history:    Since we have last seen her she did have to miss appointment because of some family issues but she is actually been doing quite well.  Although she is continued to have some intermittent joint complaints she is been able to taper her prednisone down to 7.5 mg a day the lowest she has been on and the entire time I have been following her.    Although her joints have not worsened with that lower dose she does think she is getting some worsening of her Raynauds phenomena.  It can be pretty bad and the attacks can be hard to break even with rewarming.  She gets them in hands and feet.  Fortunately she has not had any digital ulcers.  She does recall that she was placed temporarily  on tadalafil sometime ago by Dr. Bentley but she could not afford it long-term.  She thinks that may have been helpful however.    She denies any significant dyspnea on exertion or cough.  She has some muscle weakness but not marketed and she does not think that is any worse over time.  Fatigue has generally been her worst symptom and that may be slightly worse.    She does have some ongoing keratoconjunctivitis sicca but does not feel like she wants to go on a medicine for that.  She already has a full plate of dentures in both upper and lower.    February 4, 2021 interval history:    Since I have last seen the patient she has had pulmonary follow-up, and because of the atypical infiltrates on high-resolution CT scan had a cryobiopsy.  The read of that as above in the problem list.  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    She was placed on 40 mg daily prednisone and tapered by 5 mg every several weeks.  When she got down to 25 mg a day several weeks ago she started developing intermittent but fairly severe numbness of the right lower extremity below the knee.  She has not noted a clear-cut rash there and she has no pain there just numbness.  She always has some cyanosis there and is not sure that that is worse.  She is getting a lot of cyanosis in her hands as well and at times her left thumb goes completely white.  She has not had development of any digital ischemia however.    She denies any asymmetric swelling of the leg or elsewhere.  She also denies any significant new rashes.    She continues to have some keratoconjunctivitis sicca but the actual dryness is largely limited to her mouth and if anything she has been having more watery eyes than usual.    Her biggest symptom other than for the new neuropathy is actually fatigue.  That has been very bad for her for a long time but she feels it is currently even worse.  She is  uncertain how much it has worsened with the steroid taper per se.        ROS as per HPI.  10-point ROS is otherwise negative.    HISTORY REVIEW:  Past Medical History:   Diagnosis Date     Anemia      Cellulitis of leg 6/6/2013     Esophageal reflux     Better post-hiatal hernia repair and weight loss     Limb ischemia; ulcers of fingertips 4/22/2013     Raynaud's syndrome      Scleroderma (H)      Systemic sclerosis (H) 2009     Unspecified essential hypertension        Past Surgical History:   Procedure Laterality Date     BYPASS GASTRIC, CHOLECYSTECTOMY, COMBINED       CHOLECYSTECTOMY       COLONOSCOPY       ESOPHAGOSCOPY, GASTROSCOPY, DUODENOSCOPY (EGD), COMBINED N/A 3/10/2016    Procedure: COMBINED ESOPHAGOSCOPY, GASTROSCOPY, DUODENOSCOPY (EGD), BIOPSY SINGLE OR MULTIPLE;  Surgeon: Tricia Zambrano MD;  Location:  GI     INNER EAR SURGERY       PICC INSERTION  6/5/2013    5fr DL Power PICC, 44cm, left basilic vein, tip in low SVC     SURGICAL HISTORY OF -       Left ear surgery - incus transition, tympanoplasty     SURGICAL HISTORY OF -   6/05    Gastric bypass     SURGICAL HISTORY OF -   6/05    Cholecystectomy     SURGICAL HISTORY OF -   6/05    Hiatal hernia repair     TONSILLECTOMY         Family History   Problem Relation Age of Onset     Cerebrovascular Disease Father      C.A.D. Father      C.A.D. Mother      C.A.D. Sister         56 yo smoker     Chronic Obstructive Pulmonary Disease Sister      Cerebrovascular Disease Brother        History     Social History     Marital Status:      Spouse Name: N/A     Number of Children: N/A     Years of Education: N/A     Occupational History     She works as a nursing supervisor at [employer]      Social History Main Topics     Smoking status: Never Smoker      Smokeless tobacco: Never Used     Alcohol Use: Yes      occassionally     Drug Use: No     Sexually Active: Yes -- Male partner(s)     Birth Control/ Protection: None      Social History Narrative     Was  in December 2015. Longtime partner from [place].       Patient Active Problem List   Diagnosis     Essential hypertension     Thyrotoxicosis     Obesity     Prolonged QTc 460 ms,  at hr 67     Low back pain - Suspect SI Joint dysfunction     Cough     Systemic sclerosis (H)     Tumoral calcinosis     Shortness of breath     Edema of both legs     Abnormal albumin     Myopathy     Hypoalbuminemia     Diastolic dysfunction     Limb ischemia; ulcers of fingertips     Cellulitis of leg     Other specified diffuse disease of connective tissue     Disturbed sleep rhythm     Pharyngeal dysphagia     Gastroesophageal reflux disease, esophagitis presence not specified     Rheumatoid arthritis with negative rheumatoid factor, involving unspecified site (H)     Raynaud's disease without gangrene       Allergies   Allergen Reactions     Lovenox Other (See Comments)     Blood clot      Norvasc [Amlodipine Besylate] Swelling     Lip swelling that happened on 2 different occasions     Erythromycin Rash     Rash on arms     OBJECTIVE:  Vitals: Reviewed  Gen: A+Ox3, NAD  HEENT: NCAT, EOMI  Pulm: CTAB  CVS: RRR, no m/r/g  Skin: purpuric patches on fingertips and toes, no ulcerations/erosions. There is platelike xerosis of lower extremities; hyperpigmented nonblanchable ovoid patches on shins, mildly tender  -mild sclerosis of distal fingers  Msk: No active synovitis       Component      Latest Ref Rng 1/26/2016   WBC      4.0 - 11.0 10e9/L 9.2   RBC Count      3.8 - 5.2 10e12/L 4.59   Hemoglobin      11.7 - 15.7 g/dL 13.2   Hematocrit      35.0 - 47.0 % 41.8   MCV      78 - 100 fl 91   MCH      26.5 - 33.0 pg 28.8   MCHC      31.5 - 36.5 g/dL 31.6   RDW      10.0 - 15.0 % 16.7 (H)   Platelet Count      150 - 450 10e9/L 195   Diff Method       Automated Method   % Neutrophils       62.9   % Lymphocytes       27.0   % Monocytes       9.1   % Eosinophils       0.4   % Basophils        0.2   % Immature Granulocytes       0.4   Nucleated RBCs      0 /100 0   Absolute Neutrophil      1.6 - 8.3 10e9/L 5.8   Absolute Lymphocytes      0.8 - 5.3 10e9/L 2.5   Absolute Monocytes      0.0 - 1.3 10e9/L 0.8   Absolute Eosinophils      0.0 - 0.7 10e9/L 0.0   Absolute Basophils      0.0 - 0.2 10e9/L 0.0   Abs Immature Granulocytes      0 - 0.4 10e9/L 0.0   Absolute Nucleated RBC       0.0   Color Urine       Yellow   Appearance Urine       Clear   Glucose Urine      NEG mg/dL Negative   Bilirubin Urine      NEG Negative   Ketones Urine      NEG mg/dL Negative   Specific Gravity Urine      1.003 - 1.035 1.010   Blood Urine      NEG Negative   pH Urine      5.0 - 7.0 pH 6.0   Protein Albumin Urine      NEG mg/dL Negative   Urobilinogen mg/dL      0.0 - 2.0 mg/dL Normal   Nitrite Urine      NEG Negative   Leukocyte Esterase Urine      NEG Negative   Source       Midstream Urine   WBC Urine      0 - 2 /HPF <1   RBC Urine      0 - 2 /HPF <1   Squamous Epithelial /HPF Urine      0 - 1 /HPF <1   Creatinine      0.52 - 1.04 mg/dL 1.11 (H)   GFR Estimate      >60 mL/min/1.7m2 51 (L)   GFR Estimate If Black      >60 mL/min/1.7m2 62   ALT      0 - 50 U/L 23   AST      0 - 45 U/L 16   Albumin      3.4 - 5.0 g/dL 3.4   CRP Inflammation      0.0 - 8.0 mg/L <2.9   Sed Rate      0 - 30 mm/h 9   CK Total      30 - 225 U/L 38   Aldolase       4.1     Biopsy from 3/10 EGD:  Esophagus, gastroesophageal junction, biopsies:   - Squamous mucosa and submucosa with marked chronic inflammation and   fibrosis   - No evidence of intestinal metaplasia or dysplasia     August 11, 2020 interval history:    Since we last seen the patient she feels she was doing relatively stably on till July 7.  At that point she had the abrupt onset of a bad dry cough.  She did not have significant fevers but did not feel well.  She was seen had a negative coronavirus test but a chest x-ray apparently showed evidence of some infiltrates and she was placed  on doxycycline.  She says she did not improve with that therapy and a CT angios was performed.  That showed bilateral pulmonary consolidation thought consistent with infection.  There was note that the pattern could actually be somewhat typical for sarcoid but it was noted to have worsened since the prior study performed there at [facility] in October 2019.  She also was noted to have mediastinal lymphadenopathy and debris in the esophagus potentially consistent with her systemic sclerosis.  No pulmonary embolism was found.  Based on the results of that she was placed on Levaquin.  She continued to fail to improve and a third antibiotic was also tried.    Finally she was seen by pulmonologist and a bronchoscopy was performed.  I am able to review the results of that and there was a high percentage of neutrophils and lymphocytes and that BAL fluid.  Viral cultures however Gram stain and culture were all unremarkable and there were no malignant cells found.    She was placed on 40 mg a day of prednisone at the time of the bronchoscopy once it was clear that there was no acute infection.  She does not have a follow-up appointment yet.  Although her cough is mostly gone she does not feel good and feels very dyspneic just going from the bed to the bathroom or crossed the room.  She is not convinced she has had any improvement with 40 mg daily prednisone and if anything think she is worse.    In reviewing her extensive record I see that a repeat TARA was performed and a reflexive was done when it was positive.  She continues to have an anticentromere antibody which she had previously, but now notably has a high titer Gonzalez and high titer RNP antibody.  She previously was borderline positive for RNP and Gonzalez negative.  SSA is also higher titer than it was previously.    With all these autoantibody positivity she however denies any specific features that would suggest systemic lupus.  She specifically denies sun  sensitivity sun sensitive rashes including a malar rash, morning stiffness in any of her joints or any joint swelling and any pleurisy at the time of her  at CT angiogram or otherwise.  \  Her Raynaud's phenomena has been stable as have other features of her limited cutaneous systemic sclerosis.        February 25, 2021 interval history:    At the time that I saw the patient about 3 weeks ago we decided that because her neuropathy sounded rather rapidly progressive, and her HRCT scan was being read as potentially consistent with sarcoidosis, that neurosarcoid was on the differential.  I also wondered about an immune complex mediated small vessel vasculitis causing neuropathy.  We therefore did laboratory testing as well as started her on some steroid.    Laboratory testing has been largely unrevealing.  She did not have elevated calcium or elevated calcium in the urine.  I did also do a parathyroid hormone in case we did find that however her parathyroid hormone was significantly elevated.  Her vitamin D was very low and she has started supplementation for that.    Most notably however her prednisone at 40 mg a day virtually resolved her symptoms over 3 to 4 days.  She had at least an 80% decrease in her pain and numbness although she still has some residual tingling in the legs bilaterally.    She is having some prednisone side effects.  Specifically she is having a hard time sleeping and is also feeling irritable which she thinks could be due partly due to the loss of sleep but partly directly due to the prednisone.  She has got a lot of prior experience with prednisone and has had some difficulties with it before.    She did have some right-sided chest pain.  That seem to occur with a deep breath.  She has a cardiologist and saw them will be getting a follow-up echo.  She is no longer having that.    In going through her history she does not believe she is ever been on Imuran and is quite sure and I am sure as  well that we have never had her on TNF inhibition.  She was on methotrexate and tolerated oral methotrexate poorly due to nausea but was better able to tolerate subcutaneous methotrexate.      Impression:    Per the problem list, with known RNP positivity and more recently with this HRCT finding that is concerning for the possibility of overlapping sarcoidosis.    She had a very nice response to steroid quite quickly, and so we now have the conundrum of not knowing exactly why.  I certainly suspect that she has an immune mediated neuropathy given this rapid response and neurosarcoid is a concern.    I have sent a message to Dr. Cuellar in pulmonary for her thoughts.  I have also ordered a repeat HRCT that to be done in another week or so before she is seen back in pulmonary.    I am hoping these can be reviewed so that there could be a decision whether we can feel strongly enough that presumptively this could be sarcoidosis to put her on appropriate therapy for that or whether a biopsy might be justified.    We could simply presumptively try steroid sparing medications.  I am somewhat concerned about using TNF inhibitors however unless we are pretty confident this is sarcoid, as they have been associated with worsening of systemic lupus patients and autoimmune associated interstitial lung disease in some settings.    We also have the  finding of the elevated PTH, and very low vitamin D levels, which can be seen in sarcoidosis, but is unclear here given normal Calcium levels. and will refer her to endocrinology for that.    I have given her prescription for temazepam to help her with the sleep and irritability.  I did warn her to be careful about driving in the morning until she knows how it affects her as it does have a long half-life.    I will plan on seeing her back in about 6 to 8 weeks sooner as needed.      April 8, 2021 interval history:    Since we have last seen the patient she has had initial evaluation  done, and Dr. Cuellar has a recent note on the chart and an addendum placed a day after the patient's studies were reviewed in sarcoid conference.    The upshot of that is that there is evidence from her studies, in particular the biopsy studies that suggest that this could be sarcoidosis.  Because she has neurologic symptoms and inadequately explain dyspnea on exertion she will also be getting neurology evaluation and cardiac evaluation.    She remains at this time on 40 mg a day of prednisone.  She is not liking this dose.  Although she still has dyspnea on exertion she also is irritable and on edge on that dose and is also experiencing some nausea..    After consideration of the issues, Dr. Cuellar had recommended the patient go on track today.  She has in fact been on both this and MMF before and although she had some difficulties at time with tolerating these medication she think she would tolerate them again.  We have briefly discussed possibility of using a TNF inhibitor but that does give me pause, given that she has had features of mixed connective tissue disease and in TARA positive individuals TNF inhibitors have at times worsen their disease process, particularly in those with lupus associated autoantibodies.  She has had in the past of borderline positive RNP.    She believes most of her other historical clinical features are stable.  She still gets pretty bad Raynaud's phenomena and some features of arthritis although the inflammatory features are currently pretty well controlled on this dose of prednisone.    Physical examination by video shows her to be in no acute distress HEENT examination is normal.  Her upper extremities show diffuse puffiness of her hands consistent with what there has been in the past but no significant individual joint swelling and no significant deformities.      Interval history:    Tapering by 5 mg of prednisone q2 weeks. Currently on 30 mg.  She started methotrexate 1 month  ago. Increasing by 0.1 mL every 2 weeks. Currently on 0.5 mL better.  Still with dyspnea on exertion upon minimal exertion. Feels fatigued. Doesn't feel that she has noticed improvement, however she does state that her neuropathy is improved on the higher dose of prednisone.  Patient is seeing Neurology for neuropathy. They will do EMG next Tuesday.   Raynauds still severe but controllable. She has chronic purple discoloration of hands/feets. Previously was not able to get sildenafil due to insurance not covering.  Denies F/C. Denies hemoptysis or hematuria.

## 2021-05-17 ENCOUNTER — TELEPHONE (OUTPATIENT)
Dept: PULMONOLOGY | Facility: CLINIC | Age: 61
End: 2021-05-17

## 2021-05-17 NOTE — TELEPHONE ENCOUNTER
Pt returned call and able to arrange follow up with Dr. De La Torre on July 15th. Details confirmed with pt.

## 2021-05-17 NOTE — TELEPHONE ENCOUNTER
LVM with direct call back to schedule follow up with Dr. De La Torre (instructions state July 15th and [facility] spot is okay).

## 2021-05-18 ENCOUNTER — OFFICE VISIT (OUTPATIENT)
Dept: NEUROLOGY | Facility: CLINIC | Age: 61
End: 2021-05-18
Attending: PSYCHIATRY & NEUROLOGY
Payer: COMMERCIAL

## 2021-05-18 DIAGNOSIS — G62.9 NEUROPATHY: ICD-10-CM

## 2021-05-18 DIAGNOSIS — M54.17 L-S RADICULOPATHY: Primary | ICD-10-CM

## 2021-05-18 NOTE — PROGRESS NOTES
[facility]  Electrodiagnostic Laboratory    Nerve Conduction & EMG Report          Patient:       Jeanine Schuler  Patient ID:    [id number]  Gender:        Female  YOB: 1960  Age:           60 Years 7 Months        History & Examination:  60 year old woman with MCTD/limited cutaneous systemic sclerosis and suspected sarcoidosis on  methotrexate and prednisone who reports left foot  Numbness for years, and then in 2021 had worsening left foot numbness and also right foot numbness. Evaluate for neuropathy.     Techniques: Motor and sensory conduction studies were done with surface recording electrodes. EMG was done with a concentric needle electrode.     Results:  Nerve conduction studies:   1. Bilateral sural and SP sensory responses are absent.   2. Left median-D2, ulnar-D5, and radial sensory responses are normal.   3. Right peroneal-EDB motor response shows normal DL, moderate to severely reduced amplitude and mildly slowed CV in the foreleg.   4. Left peroneal, bilateral tibial-AH, left median-APB and left ulnar-ADM motor responses are normal.     Needle EM. Fibrillation potentials and positive sharp waves were seen in the right gastrocnemius and LS paraspinal muscles.   2. Large amplitude and/or long duration motor unit potentials (MUP) were seen in the right TA and gastrocnemius muscles.   3. Rapidly firing MUPs with reduced recruitment were seen in the right TA muscle.     Interpretation:  This is an abnormal study. There is electrophysiologic evidence of a moderate, axonal, sensory and probably motor polyneuropathy. There is also evidence of a chronic right-sided L5/S1 radiculopathy superimposed upon the polyneuropathy. This study was performed specifically to explore the possibility of a multifocal neuropathy pattern as can be seen in vasculitis or sarcoid. Although asymmetric peroneal motor responses raises that possibility, the superimposed radiculopathy  precludes identification of multifocal process with certainty. Clinical correlation is recommended.     Farooq Abel MD  Department of Neurology         Sensory NCS      Nerve / Sites Rec. Site Onset Peak NP Amp Ref. PP Amp Dist Ck Ref. Temp     ms ms  V  V  V cm m/s m/s  C   L MEDIAN - Dig II Anti      Wrist Dig II 2.71 3.70 29.7 10.0 50.6 14 51.7 48.0 32.4   L ULNAR - Dig V Anti      Wrist Dig V 2.55 3.39 15.5 8.0 27.3 12.5 49.0 48.0 32.4   L RADIAL - Snuff      Forearm Snuff 1.98 2.55 23.9 15.0 37.1 12.5 63.2 48.0 33   L SURAL - Lat Mall 60      Calf Ankle NR NR NR 5.0 NR 14 NR 38.0 31.7   R SURAL - Lat Mall 60      Calf Ankle NR NR NR 5.0 NR 14 NR 38.0 30.4   L SUP PERONEAL      Lat Leg Beavers NR NR NR  NR 12.5 NR 38.0 29.9   R SUP PERONEAL      Lat Leg Beavers NR NR NR  NR 12.5 NR 38.0 30.4       Motor NCS      Nerve / Sites Rec. Site Lat Ref. Amp Ref. Rel Amp Dist Ck Ref. Dur. Area Temp.     ms ms mV mV % cm m/s m/s ms %  C   L MEDIAN - APB      Wrist APB 3.07 4.40 7.5 5.0 100 8   8.39 100 33      Elbow APB 7.03  7.6  101 22 55.6 48.0 8.65 90.8 33.1   L ULNAR - ADM      Wrist ADM 2.76 3.50 11.4 5.0 100 8   8.02 100 32.6      B.Elbow ADM 6.46  9.4  82.7 22 59.5 48.0 8.28 89.6 32.6      A.Elbow ADM 7.92  8.6  76 8 54.9 48.0 8.18 86.4 32.5   L DEEP PERONEAL - EDB 60      Ankle EDB 4.43 6.00 2.7 2.0 100 8   7.03 100 31.2      FibHead EDB 14.06  2.1  76.2 39 40.5 38.0 8.07 99.3 31.2      Pop Fos EDB 16.35  1.7  63.7 9 39.3 38.0 7.86 82.4 31.2   R DEEP PERONEAL - EDB 60      Ankle EDB 4.74 6.00 0.5 2.0 100 8   10.16 100 30.4      FibHead EDB 14.11  0.4  72.9 34 36.3 38.0 10.57 112 26.4      Pop Fos EDB 16.46  0.3  46.9 10 42.7 38.0 8.70 55.3 30.4   L TIBIAL - AH      Ankle AH 4.90 6.00 4.0 4.0 100 8   6.51 100 31.1      Pop Fos AH 14.58  0.9  22.7 42 43.4 38.0 10.57 39.9 31   R TIBIAL - AH      Ankle AH 4.79 6.00 4.1 4.0 100 8   6.82 100 31       EMG Summary Table     Spontaneous MUAP Recruitment    IA Fib/PSW Fasc  H.F. Amp Dur. PPP Pattern   R. VAST LATERALIS N None None None N N N Normal   R. TIB ANTERIOR N None None None 2+ N N Mildly Reduced   R. GASTROCN (MED) Increased 1+ None None N 1+ 1+ Normal   R. GLUTEUS MED N None None None N N N Normal   R. LUMB PSP (L) Increased 1+ None CRD

## 2021-05-18 NOTE — LETTER
2021       RE: Jeanine Schuler  [address]  [facility] [address]     Dear Colleague,    Thank you for referring your patient, Jeanine Schuler, to the [facility] at [facility]. Please see a copy of my visit note below.        [facility]  Electrodiagnostic Laboratory    Nerve Conduction & EMG Report          Patient:       Jeanine Schuler  Patient ID:    [id number]  Gender:        Female  YOB: 1960  Age:           60 Years 7 Months        History & Examination:  60 year old woman with MCTD/limited cutaneous systemic sclerosis and suspected sarcoidosis on  methotrexate and prednisone who reports left foot  Numbness for years, and then in 2021 had worsening left foot numbness and also right foot numbness. Evaluate for neuropathy.     Techniques: Motor and sensory conduction studies were done with surface recording electrodes. EMG was done with a concentric needle electrode.     Results:  Nerve conduction studies:   1. Bilateral sural and SP sensory responses are absent.   2. Left median-D2, ulnar-D5, and radial sensory responses are normal.   3. Right peroneal-EDB motor response shows normal DL, moderate to severely reduced amplitude and mildly slowed CV in the foreleg.   4. Left peroneal, bilateral tibial-AH, left median-APB and left ulnar-ADM motor responses are normal.     Needle EM. Fibrillation potentials and positive sharp waves were seen in the right gastrocnemius and LS paraspinal muscles.   2. Large amplitude and/or long duration motor unit potentials (MUP) were seen in the right TA and gastrocnemius muscles.   3. Rapidly firing MUPs with reduced recruitment were seen in the right TA muscle.     Interpretation:  This is an abnormal study. There is electrophysiologic evidence of a moderate, axonal, sensory and probably motor polyneuropathy. There is also evidence of a chronic right-sided L5/S1  radiculopathy superimposed upon the polyneuropathy. This study was performed specifically to explore the possibility of a multifocal neuropathy pattern as can be seen in vasculitis or sarcoid. Although asymmetric peroneal motor responses raises that possibility, the superimposed radiculopathy precludes identification of multifocal process with certainty. Clinical correlation is recommended.     Farooq Abel MD  Department of Neurology         Sensory NCS      Nerve / Sites Rec. Site Onset Peak NP Amp Ref. PP Amp Dist Ck Ref. Temp     ms ms  V  V  V cm m/s m/s  C   L MEDIAN - Dig II Anti      Wrist Dig II 2.71 3.70 29.7 10.0 50.6 14 51.7 48.0 32.4   L ULNAR - Dig V Anti      Wrist Dig V 2.55 3.39 15.5 8.0 27.3 12.5 49.0 48.0 32.4   L RADIAL - Snuff      Forearm Snuff 1.98 2.55 23.9 15.0 37.1 12.5 63.2 48.0 33   L SURAL - Lat Mall 60      Calf Ankle NR NR NR 5.0 NR 14 NR 38.0 31.7   R SURAL - Lat Mall 60      Calf Ankle NR NR NR 5.0 NR 14 NR 38.0 30.4   L SUP PERONEAL      Lat Leg Beavers NR NR NR  NR 12.5 NR 38.0 29.9   R SUP PERONEAL      Lat Leg Beavers NR NR NR  NR 12.5 NR 38.0 30.4       Motor NCS      Nerve / Sites Rec. Site Lat Ref. Amp Ref. Rel Amp Dist Ck Ref. Dur. Area Temp.     ms ms mV mV % cm m/s m/s ms %  C   L MEDIAN - APB      Wrist APB 3.07 4.40 7.5 5.0 100 8   8.39 100 33      Elbow APB 7.03  7.6  101 22 55.6 48.0 8.65 90.8 33.1   L ULNAR - ADM      Wrist ADM 2.76 3.50 11.4 5.0 100 8   8.02 100 32.6      B.Elbow ADM 6.46  9.4  82.7 22 59.5 48.0 8.28 89.6 32.6      A.Elbow ADM 7.92  8.6  76 8 54.9 48.0 8.18 86.4 32.5   L DEEP PERONEAL - EDB 60      Ankle EDB 4.43 6.00 2.7 2.0 100 8   7.03 100 31.2      FibHead EDB 14.06  2.1  76.2 39 40.5 38.0 8.07 99.3 31.2      Pop Fos EDB 16.35  1.7  63.7 9 39.3 38.0 7.86 82.4 31.2   R DEEP PERONEAL - EDB 60      Ankle EDB 4.74 6.00 0.5 2.0 100 8   10.16 100 30.4      FibHead EDB 14.11  0.4  72.9 34 36.3 38.0 10.57 112 26.4      Pop Fos EDB 16.46  0.3  46.9 10 42.7  38.0 8.70 55.3 30.4   L TIBIAL - AH      Ankle AH 4.90 6.00 4.0 4.0 100 8   6.51 100 31.1      Pop Fos AH 14.58  0.9  22.7 42 43.4 38.0 10.57 39.9 31   R TIBIAL - AH      Ankle AH 4.79 6.00 4.1 4.0 100 8   6.82 100 31       EMG Summary Table     Spontaneous MUAP Recruitment    IA Fib/PSW Fasc H.F. Amp Dur. PPP Pattern   R. VAST LATERALIS N None None None N N N Normal   R. TIB ANTERIOR N None None None 2+ N N Mildly Reduced   R. GASTROCN (MED) Increased 1+ None None N 1+ 1+ Normal   R. GLUTEUS MED N None None None N N N Normal   R. LUMB PSP (L) Increased 1+ None CRD                                        Again, thank you for allowing me to participate in the care of your patient.      Sincerely,    Farooq Abel MD

## 2021-05-24 ENCOUNTER — HOSPITAL ENCOUNTER (OUTPATIENT)
Dept: PHYSICAL THERAPY | Facility: CLINIC | Age: 61
Setting detail: THERAPIES SERIES
End: 2021-05-24
Attending: PSYCHIATRY & NEUROLOGY
Payer: COMMERCIAL

## 2021-05-24 DIAGNOSIS — G62.9 NEUROPATHY: ICD-10-CM

## 2021-05-24 PROCEDURE — 97110 THERAPEUTIC EXERCISES: CPT | Mod: GP | Performed by: PHYSICAL THERAPIST

## 2021-05-24 PROCEDURE — 97162 PT EVAL MOD COMPLEX 30 MIN: CPT | Mod: GP | Performed by: PHYSICAL THERAPIST

## 2021-05-24 NOTE — PROGRESS NOTES
"   05/24/21 1250   Quick Adds   Type of Visit Initial OP PT Evaluation       Present No   General Information   Start of Care Date 05/24/21   Referring Physician Dr. 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 Patient presents this date with chief complaint of concerns about falling, lower extremity weakness (unable to stand from a chair without her arms) and poor sensation of bilateral feet. She attributed these symptoms to what she reports as a \"flair of my autoimmune disease with my lungs worsening and both feet neuropathy). Patient reports she has fallen many times since early January and is unable to get off the ground without a support surface, due to impaired sensation of bilateral feet, mis stepping and legs giving way. She previously experienced LLE neuropathy, however with the recent change in symptoms the right is now with impaired sensation and worsened motor control than the left. She reports her impaired sensation is worse at night, if she does not ensure her feet on placed properly on the ground she has a propensity to fall. She reports dual task and decreased intention of specific foot placement has also contributed to her falls. She reports overall fatigue and shortness of breath with her ADLs for which her  has increased his assistance with chore in the home, and decreased standing as well as talking tolerance. Patient seen by Dr. Reji Abel 5/11/21 for neurology follow up, she expressed changes in her left foot " numbness and tingling to now increased pain and greater density of bilateral Les as well as bilateral hands paresthesia, numbness and weakness (notes thumb, 1st and 2nd fingers corelated to stress). He speculated that she is demonstrating large fiber polyneuropathy with superimposed myopathy. She indicated LE weakness and impaired gait with changes in balance and gait for which he placed the physical therapy order. Patient with a complex medical history including limited cutaneous systemic sclerosis, MCTD, suspected sarcoidosis with micronodular infiltrates, interstitial lung disease, Raynaud's disease, rheumatoid arthritis, scleroderma, Sjogren-yuri syndrome, collagen vascular disease, S/P abdi en y 6/27/2005. Her current immunotherapy treatment includes methotrexate and prednisone, reports beginning new medication.    Pertinent Visual History  decreased muscle tone throughout hips, thighs, trunk. Bilataral LE edema, L > R. Arrived in ballet flats.    Prior level of functional mobility Transfers;Ambulation;ADL   Transfers IND   Ambulation IND   ADL IND, admits to sitting down to copelte tasks    Prior level of function comment IND   Previous/Current Treatment Medication(s)   Current Community Support Family/friend caregiver   Patient role/Employment history Disabled  (Previously a nurse )   Living environment [place]   Home/Community Accessibility Comments  assists with housekeeping, heavy lifting, cooking at the stove, dishes. completed online grocery ordering   Current Assistive Devices   (none)   ADL Devices Shower/Tub Chair   Patient/Family Goals Statement decrease risk of falling, get off the ground without hand support, stand from a chair without hands, and improve standing endurance   Fall Risk Screen   Fall screen completed by PT   Have you fallen 2 or more times in the past year? Yes   Have you fallen and had an injury in the past year? No   Timed Up and Go score (seconds)  "12.38   Is patient a fall risk? Yes   Fall screen comments Falls due to the RLE, getting in to the car (did not fully get the foot into the car), leg giving way in the parking lot, side step to pay at the register- unable to feel lateral side of foot   Abuse Screen (yes response referral indicated)   Feels Unsafe at Home or Work/School no   Feels Threatened by Someone no   Does Anyone Try to Keep You From Having Contact with Others or Doing Things Outside Your Home? no   Physical Signs of Abuse Present no   Pain   Patient currently in pain No   Cognitive Status Examination   Orientation orientation to person, place and time   Level of Consciousness alert   Follows Commands and Answers Questions 100% of the time;able to follow multistep instructions   Personal Safety and Judgment intact   Memory intact   Integumentary   Integumentary Comments lacerations of shins, reports due to puppy. chronic skin changes bilatera gator regions L > R   Posture   Posture Forward head position;Protracted shoulders   Posture Comments decreased spinal curves, flat butt   Range of Motion (ROM)   ROM Comment right hamstring 50 degrees with tension, left hamstring 62 degrees with tension. Prone quad right 60 degrees knee flexion, left 80 degrees knee flexion   Strength   Manual Muscle Testing Quick Adds MMT: Trunk;MMT: Hip;MMT: Knee;MMT: Ankle   MMT: Trunk, Rehab Eval   Trunk Muscle Testing Results bridge with poor core and glute engagement, lift off 4\" with greater lift on the left side than right    MMT: Hip, Rehab Eval   Hip Flexion - Left Side (3+/5) fair plus, left   Hip Extension - Left Side (4/5) good, left   Hip ABduction - Left Side (3+/5) fair plus, left   Hip ADduction - Left Side (4-/5) good minus, left   Hip Flexion - Right Side (3+/5) fair plus, right   Hip Extension - Right Side (4/5) good, right   Hip ABduction - Right Side (4-/5) good minus, right   Hip ADduction - Right Side (4-/5) good minus, right   MMT: Knee, Rehab " Eval   Knee Flexion - Left Side (3+/5) fair plus, left   Knee Extension - Left Side (4/5) good, left   Knee Flexion - Right Side (3+/5) fair plus, right   Knee Extension - Right Side (4/5) good, right   MMT: Ankle, Rehab Eval   Ankle Dorsiflexion - Left Side (3+/5) fair plus, left   Ankle Plantarflexion - Left Side (4-/5) good minus, left   Ankle Inversion - Left Side (3+/5) fair plus, left   Ankle Eversion - Left Side (3+/5) fair plus, left   Ankle Dorsiflexion - Right Side (3+/5) fair plus, right   Ankle Plantarflexion - Right Side (4-/5) good minus, right   Ankle Inversion - Right Side (3+/5) fair plus, right   Ankle Eversion - Right Side (3+/5) fair plus, right   Bed Mobility   Bed Mobility Comments IND   Transfer Skills   Transfer Transfer Skill: Sit/Stand   Transfer Comments IND with use of hands   Transfer Skill: Sit to Stand   Level of [place]: Sit to Stand independent   Assistive Device: Sit to Stand   (hands and trunk flexion to rise)   Gait   Gait Gait Skill;Gait Analysis   Gait Skills   Level of [place]: Gait independent   Gait Distance 100 feet   Gait Analysis   Gait Pattern Used swing-through gait   Gait Deviations Noted decreased toe-to-floor clearance;decreased step length;decreased jolanta  (trendelburg gait bilaterally, decreased hip flexion )   Impairments Contributing to Gait Deviations impaired balance;decreased strength;decreased ROM   Balance Special Tests   Balance Special Tests Caal balance;Single leg stance right;Single leg stance left;Romberg;Timed up and go   Balance Special Tests Timed Up and Go   Seconds 12.38 Seconds   Comments no LOB however weaving path along the marked line with trendelenburg gait bilateraly   Balance Special Tests Caal Balance   Score out of 56 48   Comments patient fearful of single limb tasks, transitional movements are slow however safe   Balance Special Tests Single Leg Stance Right,   Right, seconds 6 Seconds   Comments seeking support surfaces    Balance Special Tests Single Leg Stance Left   Left, seconds 5 Seconds   Comments fearful and no confidence   Balance Special Tests Romberg   Seconds 22 Seconds   Comments right anterior. left anterior 8 seconds with hand support for set up   Sensory Examination   Sensory Perception Comments impaired bilaterally throughout the feet, ankles and distal calf   Coordination   Coordination no deficits were identified   Muscle Tone   Muscle Tone Comments low muscle tone likely due to deconditioning   Modality Interventions   Planned Modality Interventions   (Pool therapy)   Planned Therapy Interventions   Planned Therapy Interventions balance training;gait training;neuromuscular re-education;manual therapy;stretching;ROM;strengthening   Clinical Impression   Criteria for Skilled Therapeutic Interventions Met yes, treatment indicated   PT Diagnosis impaired balance, muscle weakness, muscle tension, neural tension, impaired posture   Influenced by the following impairments chronic medical status, acute increase in neuropathy symptoms   Functional limitations due to impairments increased risk of falling, decreased activity tolerance   Clinical Presentation Evolving/Changing   Clinical Presentation Rationale fatigue and shortness of breath with HEP prescription, clinical judgement, medical status and changes occuring recently   Clinical Decision Making (Complexity) Moderate complexity   Therapy Frequency 2 times/Week   Predicted Duration of Therapy Intervention (days/wks) 8-10 weeks   Risk & Benefits of therapy have been explained Yes   Patient, Family & other staff in agreement with plan of care Yes   Clinical Impression Comments Patient presents with signs and symptoms consistent with peripherall neuropathy as well as muscle weakness increasing her risk of falling and decreasing her quality of life. Patient would benefit from skilled physical therapy intervention to address the above impairments in order to improve  patient's ability to safely engage in her hobbies, family activities and ADLs for improved quality of life and improved safety.    Education Assessment   Preferred Learning Style Demonstration;Pictures/video   Barriers to Learning No barriers   GOALS   PT Charles Goals 1;2;3;4   Goal 1   Goal Identifier Sit to/from stand   Goal Description Patient will demonstrate improved lower quarter and core strength as evidence by the ability to complete 5 sit to/from stands from standard height chair without use of her hands.    Target Date 06/21/21   Goal 2   Goal Identifier Balance   Goal Description Patient will demonstrate improved balance as evidence by the ability to complete single limb stance bilaterally for > 10 seconds for decreased risk of falls and improved confidence.   Target Date 07/05/21   Goal 3   Goal Identifier Ground transfer   Goal Description Patient will demonstrate ground level sitting to standing without use of support surface to rise as evidence of improved lower quarter and core strength, balance and self confidence n order to achieve IND goal of transfering up from the ground with her grandchildren.   Target Date 08/02/21   Goal 4   Goal Identifier Endurance   Goal Description Patient will complete the 6 minute walk test in order to establish a baseline measure for patient's functional endurance and to establish a activity endurance goal for patient's program.   Target Date 06/07/21   Total Evaluation Time   PT Eval, Moderate Complexity Minutes (15336) 25     Thank you for your referral.  Rachel Oconnell, PT, DPT, ATC    [facility]  O: [phone]  E: [email or web address]

## 2021-06-01 ENCOUNTER — HOSPITAL ENCOUNTER (OUTPATIENT)
Dept: PHYSICAL THERAPY | Facility: CLINIC | Age: 61
Setting detail: THERAPIES SERIES
End: 2021-06-01
Attending: PSYCHIATRY & NEUROLOGY
Payer: COMMERCIAL

## 2021-06-01 PROCEDURE — 97110 THERAPEUTIC EXERCISES: CPT | Mod: GP | Performed by: PHYSICAL THERAPIST

## 2021-06-11 ENCOUNTER — HOSPITAL ENCOUNTER (OUTPATIENT)
Dept: PHYSICAL THERAPY | Facility: CLINIC | Age: 61
Setting detail: THERAPIES SERIES
End: 2021-06-11
Attending: PSYCHIATRY & NEUROLOGY
Payer: COMMERCIAL

## 2021-06-11 PROCEDURE — 97110 THERAPEUTIC EXERCISES: CPT | Mod: GP | Performed by: PHYSICAL THERAPIST

## 2021-06-15 DIAGNOSIS — Z11.59 ENCOUNTER FOR SCREENING FOR OTHER VIRAL DISEASES: ICD-10-CM

## 2021-06-16 DIAGNOSIS — G62.9 NEUROPATHY: Primary | ICD-10-CM

## 2021-06-30 NOTE — PROGRESS NOTES
[facility]  ILD Clinic - Return Visit-  July 1, 2021         Assessment and Plan:   Jeanine Schluer is a 59 year old female who presents for initial evaluation of interstitial lung disease.    1) Interstitial lung disease  - Her CT shows bronchocentric, predominantly central infiltrates.  Some are nodular appearing, especially in the periphery and appears confluent centrally.  Definite tissue diagnosis has not been obtained for sarcoidosis however her case was reviewed at sarcoid conference.  Imaging finding as well as pathology showing some noncaseating granulomas, sarcoidosis was thought to be a reasonable diagnosis at that time.  Other differential diagnosis included chronic HP.  -She was started on methotrexate subQ, now uptitrated to 20 mg weekly.  In addition, she was started on CellCept 500 mg twice daily by Dr. Ann on 5/2021.  -Despite this, she has had progressive respiratory symptoms and her PFT today shows a significant decline in her FVC by more than 1L.  -Repeat CT today.  If this CT does not show any worsening/progression of her lung disease, will obtain TTE.  -Sarcoidosis labs obtained on her last visit showed elevated 1, 25 OH-vitamin D, with low normal 25-OH vitamin D, consistent with granulomatous inflammation.  Soluble IL-2 receptor was also high at 995 (reference range 137-838).  ACE was normal.  -Repeat CRP, ESR, ACE and soluble IL-2 receptor  -Her prednisone dose is currently at 20 mg daily    2) History of limited cutaneous systemic sclerosis  - On Plaquinil and CellCept, followed by Dr. North    3) Neuropathy  -She was seen by Dr. Abel from neurology in 5/2021, and nerve conduction studies and EMG was abnormal. She is planned for a nerve biopsy of her right foot.    RTC: 3 months or sooner if need arises  Influenza vaccination: She has already received Influenza vaccination for this year. She has received Pfizer COVID-19  vaccine in April, received her first dose of Shingrix 10/2020.  Second dose of Shingrix on her next visit.    Beena Cuellar MD [employer]  Pulmonary and Critical Care Medicine          Problem List:     1. Interstitial lung disease  a. Symptom: Cough, fatigue, sone subjective dyspnea  b. Treatment: (11/2020-) Prednisone, currently at 40mg   c. Diagnosis: Cryobiopsy Patholo  d. Your note gy: NSIP vs. HP, sarcoidosis can not be r/o. (ALI, what appears to be an acute exacerbation of underlying ILD with OP.  Few nonnecrotizing granulomas, cellular NSIP that can be seen in early sarcodosis)  Radiology: bronchocentric nodular infiltrates  e. Serology:TARA 1: 1280, anticentromere antibody greater than 8, anti-RNP-4.8 (less than 1), chromatin DNP-6.6 (less than 1.0)  f. Other w/u  i. Bronchoscopy with BAL ([facility]; 8/4/2020)  1. BAL (superior lingula) cell count (49% neutrophils, 45% lymphocytes, 3% monocytes, 3% eosinophils)  2. Negative for malignant cells, silver stain negative for pneumocystis and fungal organisms.  3. Pathology (anterior lingual segments of the left upper lobe): Benign bronchial epithelium and alveolar spaces.  There is no evidence of malignancy.  The findings are overall nondiagnostic.  ii. Bronchoscopy with BAL and cryobiopsy ([facility]; 10/6/2020)  1. BAL (, other cells 32, L 47, N 19, E 2; CD4: CD8 3.65)  2. Microbiology-all negative except for penicillium species from fungal culture only.  Aspergillus galactomannan was negative  iii. LUNG, LEFT UPPER LOBE, TRANSBRONCHIAL CRYOBIOPSY:   - Fragments of respiratory mucosa and alveolated lung tissue with nonnecrotizing granulomas with multinucleated giant cells, foci of organizing pneumonia and nonspecific chronic interstitial inflammation   with reactive pneumocytes type II hyperplasia.   - GMS and AFB special stains are negative for fungal and acid-fast organisms, respectively.   COMMENT:   In absence of infection (ie. Mycobacteria, please correlate  "with  laboratory cultures and serology), the histologic findings raise the differential diagnosis of hypersensitivity pneumonitis and sarcoidosis. Clinical   and radiologic correlation is necessary. The case will be discussed at the ILD interdepartmental conference.     2. Mixed connective tissue disease/limited cutaneous systemic sclerosis  a. Followed by Dr. Indio lopez. Treatment: (\"years ago\") Methotrexate PO -> SQ due to GI intolerance, unclear why stoped  Plaquinil    3. GERD    4. HFpEF    5. History of gastric bypass 2005        History of Present Illness:     Jeanine Schuler is a 59 year old female who presents for follow=up for interstitial lung disease. She was last seen by me virtually in 3/2021.    Since her last visit, she was started on methotrexate, now uptitrated to 20 mg weekly.  She has been methotrexate for a total of about 2 months, and does not note any improvement in her breathing.  In fact, she feels as though her respiratory status has been worse, and she is overall fatigued.  She saw Dr. Ann in May, and was started on CellCept 500 mg twice daily.  Her prednisone dose has been down titrated to 20 mg daily.  Despite the above, she does not feel as though her symptoms are any better.  She does note that her coughing seems to have improved with initiation of methotrexate.    She is generally tolerating the methotrexate well, but does experience some nausea and dry heaving about 48 hours after her weekly dose.    She denies any fevers, chills, night sweats, chest pain except she started noticing left lower anterior chest/upper abdominal pain along with left-sided shoulder pain which started last night.  She has had similar pains in the past, but was on the contralateral side earlier in the year.     She denies any changes in her appetite, she has gained about 10 pounds since 5/2021, which he attributes to increased lower extremity edema.  She continues to take the same dose of Lasix and " spironolactone.    Interval history (3/2021)  Since her last visit, she is unsure if there has been any change in her respiratory status.  She currently complains of extreme fatigue and pain/numbness in her feet and legs.  She occasionally also experiences pain in her upper chest/throat area.  In addition, she has gained about 5 to 10 pounds on prednisone.    Since her last visit, her prednisone dose has been tapered slowly (5 mg every 3 weeks).  However, when she reached about 25 mg, she started noticing increased pain in her feet.  This has progressively worsened on lower doses of 20 mg, and she was evaluated by Dr. Loomis that week, who increase her prednisone to 40 mg daily, which she is currently on.    With initiation of higher doses of prednisone, she feels as though her pain has improved somewhat but has not resolved.  She denies any fever/chills, but states she still occasionally experiences night sweats, about once a week.  This has improved in terms of frequency with initiation of prednisone.           Review of Systems:     Please see HPI, otherwise the complete 10 point ROS is negative.           Past Medical and Surgical History:     Past Medical History:   Diagnosis Date     Anemia      Bariatric surgery status 06/27/2005    [facility];     Cellulitis of leg 06/06/2013     Esophageal reflux     Better post-hiatal hernia repair and weight loss     Hyperplastic colonic polyp      Hypovitaminosis D 2011     ILD (interstitial lung disease) (H)      Kidney stone 04/29/2007     Kidney stone 05/10/2007    surgically removed     Limb ischemia; ulcers of fingertips 04/22/2013     Raynaud's syndrome      Rheumatoid arthritis (H) \     Scleroderma (H)      Sjogren-Jake syndrome      Systemic sclerosis (H) 2009     Unspecified essential hypertension      Past Surgical History:   Procedure Laterality Date     BRONCHOSCOPY FLEXIBLE,L CRYOBIOPSY N/A 10/06/2020    Procedure: BRONCHOSCOPY, FLEXIBLE,  WITH TRANSBRONCHIAL BIOPSY x4 USING CRYOPROBE, bronchial alveolar lavage;  Surgeon: Teddy Mendez MD;  Location: [facility] OR     BYPASS GASTRIC, CHOLECYSTECTOMY, COMBINED  06/27/2005    [facility]     CHOLECYSTECTOMY       COLONOSCOPY       COLONOSCOPY N/A 11/17/2020    Procedure: COLONOSCOPY, WITH POLYPECTOMY AND BIOPSY;  Surgeon: Jamie Cárdenas MD;  Location: [facility] OR     ESOPHAGOSCOPY, GASTROSCOPY, DUODENOSCOPY (EGD), COMBINED N/A 03/10/2016    Procedure: COMBINED ESOPHAGOSCOPY, GASTROSCOPY, DUODENOSCOPY (EGD), BIOPSY SINGLE OR MULTIPLE;  Surgeon: Tricia Zambrano MD;  Location: [facility] GI     ESOPHAGOSCOPY, GASTROSCOPY, DUODENOSCOPY (EGD), COMBINED N/A 11/17/2020    Procedure: ESOPHAGOGASTRODUODENOSCOPY, WITH BIOPSY and Dilation;  Surgeon: Jamie Cárdenas MD;  Location: [facility] OR     INNER EAR SURGERY       PICC INSERTION  06/05/2013    5fr DL Power PICC, 44cm, left basilic vein, tip in low SVC     SURGICAL HISTORY OF -       Left ear surgery - incus transition, tympanoplasty     SURGICAL HISTORY OF -   06/01/2005    Hiatal hernia repair     TONSILLECTOMY            Social History:     Social History     Tobacco Use     Smoking status: Never Smoker     Smokeless tobacco: Never Used   Substance Use Topics     Alcohol use: Yes     Comment: occassionally     Drug use: No     Social History     Social History Narrative    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  She has been on medical leave since 7/27/2020        Moved about 4 years ago to a new house. No known water damage or mold.        She lives in a normal area with forms around her house, but denies any exposure to grain dust, hay, other farm animals.        No unusual hobbies                  Medications:     Current Outpatient Medications   Medication     benzonatate (TESSALON) 100 MG capsule     folic acid (FOLVITE) 1 MG tablet     furosemide (LASIX) 40 MG tablet     hydroxychloroquine (PLAQUENIL) 200 MG tablet     insulin  "syringe-needle U-100 (BD INSULIN SYRINGE ULTRAFINE) 30G X 1/2\" 1 ML miscellaneous     losartan (COZAAR) 50 MG tablet     methotrexate 50 MG/2ML injection     mycophenolate (GENERIC EQUIVALENT) 250 MG capsule     nitroFURantoin macrocrystal-monohydrate (MACROBID) 100 MG capsule     omeprazole (PRILOSEC) 20 MG DR capsule     pantoprazole (PROTONIX) 40 MG EC tablet     Potassium (POTASSIMIN PO)     predniSONE (DELTASONE) 10 MG tablet     predniSONE (DELTASONE) 5 MG tablet     spironolactone (ALDACTONE) 25 MG tablet     sulfamethoxazole-trimethoprim (BACTRIM) 400-80 MG tablet     temazepam (RESTORIL) 15 MG capsule     vitamin D2 (ERGOCALCIFEROL) 83106 units (1250 mcg) capsule     No current facility-administered medications for this visit.             Physical Exam:   BP (!) 146/90   Pulse 80   Resp 17   Ht 1.753 m (5' 9\")   Wt 83.5 kg (184 lb)   SpO2 100%   BMI 27.17 kg/m    GENERAL: alert, NAD  HEENT: NCAT, EOMI, masked  Neck: no cervical or supraclavicular adenopathy  Lungs: good air flow, faint inspiratory squeak   CV: RRR, S1S2, no murmurs noted  Abdomen: normoactive BS, soft, non tender  Neuro: AAO X 3  Psychiatric: normal affect, good eye contact  Skin: no rash, jaundice or lesions on limited exam  Extremities: 1-2+ pitting edema b/l.            Imaging:     CT Chest ([facility]; 7/20/2020)  1. Bilateral pulmonary consolidation is probably due to infection. Other inflammatory processes are also on the differential. Although radiographic pattern is somewhat typical of sarcoid, this is less likely given significant worsening since 10/23/2019. Correlate clinically.  2. Mediastinal lymphadenopathy, probably reactive.  3. No pulmonary embolism.  4. Postoperative changes of gastric bypass with debris in the esophagus. Although this is present, the pulmonary consolidation is not typical of aspiration.           Pulmonary Function Tests:     PFT interpretation (July 1, 2021):  Maneuver: ATS criteria i not met.  " Interpret with caution.  Spirometry: Suggestive of moderate restriction.  Lung volumes: TLC is 74% predicted, c/w mild restriction.   Diffusing capacity: There is mild impairment in diffusing capacity      Six-minute walk test  Date 6MWD RA SpO2 Resting O2 req Exercise O2 req Lowest SpO2 on amb   8/21/20 940 97 RA RA 90                    Laboratory:     Serology ([facility]; 7/27/2020)  Chromatin DNP antibody -6.6 (less than 1.0)  Ribosomal antibody-negative  SSA-2.0 (less than 1.0)  SSB-negative anti-SM RNP-greater than 80 (less than 1.0)  SCL 70-negative  Maddy 1-negative  Centromere antibody -greater than 8.0 (less than 1.0)  SM IgG-negative  Anti-RNP-4.8 (less than 1.0)  Anti-DS DNA- negative  TARA - positive    6/10/2009  TARA- 1: 1280  RF-20 (less than 9)    No results found for this or any previous visit (from the past 168 hour(s)).    BAL (8/4/2020)  Negative for Legionella, RVP, Bordetella, C pneumoniae, mycoplasma pneumonia    A. Lingula bronchial alveolar lavage, cytology  1. 49% neutrophils, 45% lymphocytes, 3% monocytes, and 3% eosinophils.  2. Negative for malignant cells.  3. Silver stain negative for pneumocystis and fungal organisms.  B. Lingula and anterior segments, left upper lobe transbronchial biopsy?-Negative for malignancy, see microscopic description.    Pathology:   A. The smear has the following differential 49% neutrophils, 45% lymphocytes, 3% monocytes, and 3% macrophages. There is no evidence of malignancy. A silver stain is applied, with an appropriate positive control, and is negative for pneumocystis and fungal organisms.  B. The biopsy shows benign bronchial epithelium and alveolar spaces. There is no evidence of malignancy. The findings are overall nondiagnostic.             Cardiac:     TTE ([facility]; 8/25/2020)  Interpretation Summary  Global and regional left ventricular function is normal with an EF of 55-60%.  Right ventricular function, chamber size, wall motion, and thickness  are  normal.  Pulmonary artery systolic pressure cannot be assessed.  Ascending aorta 3.8 cm.  Aortic index 20mm/m2,mild dilation.  The inferior vena cava is normal.  No pericardial effusion is present.    Echocardiogram ([facility]; 10/16/2017)  Interpretation Summary    * Normal left ventricular size and systolic function, estimated LVEF 60-65%.    * Normal regional wall motion.    * Normal right ventricular size and systolic function.    * The left ventricular diastolic function is mildly abnormal (Grade I).    * There is no hemodynamically significant valve disease.    * Normal estimated right ventricular systolic pressure of 28 mmHg.    * The proximal ascending aorta is mildly dilated measuring  3.9 cm.    * The IVC is normal in size (< 2.1 cm), > 50% respiratory variance, RA  pressure normal at 3 mmHg.    * Compared to prior study on 8/26/16, there has been no significant change  in left ventricular systolic function with side by side comparison.  There has  been no significant change in the size of the proximal ascending aorta  (previously measured 4.0 cm).         Other:

## 2021-07-01 ENCOUNTER — ANCILLARY PROCEDURE (OUTPATIENT)
Dept: CT IMAGING | Facility: CLINIC | Age: 61
End: 2021-07-01
Attending: INTERNAL MEDICINE
Payer: COMMERCIAL

## 2021-07-01 ENCOUNTER — OFFICE VISIT (OUTPATIENT)
Dept: PULMONOLOGY | Facility: CLINIC | Age: 61
End: 2021-07-01
Attending: INTERNAL MEDICINE
Payer: COMMERCIAL

## 2021-07-01 VITALS
OXYGEN SATURATION: 100 % | WEIGHT: 184 LBS | SYSTOLIC BLOOD PRESSURE: 146 MMHG | RESPIRATION RATE: 17 BRPM | DIASTOLIC BLOOD PRESSURE: 90 MMHG | HEIGHT: 69 IN | BODY MASS INDEX: 27.25 KG/M2 | HEART RATE: 80 BPM

## 2021-07-01 DIAGNOSIS — Z51.81 ENCOUNTER FOR THERAPEUTIC DRUG MONITORING: ICD-10-CM

## 2021-07-01 DIAGNOSIS — D86.9 SARCOIDOSIS: ICD-10-CM

## 2021-07-01 DIAGNOSIS — D86.9 SARCOIDOSIS: Primary | ICD-10-CM

## 2021-07-01 DIAGNOSIS — J96.11 CHRONIC HYPOXEMIC RESPIRATORY FAILURE (H): ICD-10-CM

## 2021-07-01 DIAGNOSIS — J84.9 ILD (INTERSTITIAL LUNG DISEASE) (H): ICD-10-CM

## 2021-07-01 DIAGNOSIS — M06.00 RHEUMATOID ARTHRITIS WITH NEGATIVE RHEUMATOID FACTOR, INVOLVING UNSPECIFIED SITE (H): ICD-10-CM

## 2021-07-01 DIAGNOSIS — Z23 NEED FOR SHINGLES VACCINE: ICD-10-CM

## 2021-07-01 LAB
ALBUMIN SERPL-MCNC: 3.1 G/DL (ref 3.4–5)
ALBUMIN UR-MCNC: NEGATIVE MG/DL
ALT SERPL W P-5'-P-CCNC: 33 U/L (ref 0–50)
APPEARANCE UR: CLEAR
AST SERPL W P-5'-P-CCNC: 39 U/L (ref 0–45)
BASOPHILS # BLD AUTO: 0 10E9/L (ref 0–0.2)
BASOPHILS NFR BLD AUTO: 0.3 %
BILIRUB UR QL STRIP: NEGATIVE
COLOR UR AUTO: YELLOW
CREAT SERPL-MCNC: 0.69 MG/DL (ref 0.52–1.04)
CRP SERPL-MCNC: <2.9 MG/L (ref 0–8)
DIFFERENTIAL METHOD BLD: NORMAL
EOSINOPHIL # BLD AUTO: 0 10E9/L (ref 0–0.7)
EOSINOPHIL NFR BLD AUTO: 0.5 %
ERYTHROCYTE [DISTWIDTH] IN BLOOD BY AUTOMATED COUNT: 13.7 % (ref 10–15)
ERYTHROCYTE [SEDIMENTATION RATE] IN BLOOD BY WESTERGREN METHOD: 12 MM/H (ref 0–30)
GFR SERPL CREATININE-BSD FRML MDRD: >90 ML/MIN/{1.73_M2}
GLUCOSE UR STRIP-MCNC: NEGATIVE MG/DL
HCT VFR BLD AUTO: 39.8 % (ref 35–47)
HGB BLD-MCNC: 13.2 G/DL (ref 11.7–15.7)
HGB UR QL STRIP: NEGATIVE
HYALINE CASTS #/AREA URNS LPF: 3 /LPF (ref 0–2)
IMM GRANULOCYTES # BLD: 0 10E9/L (ref 0–0.4)
IMM GRANULOCYTES NFR BLD: 0.3 %
KETONES UR STRIP-MCNC: NEGATIVE MG/DL
LEUKOCYTE ESTERASE UR QL STRIP: NEGATIVE
LYMPHOCYTES # BLD AUTO: 1.7 10E9/L (ref 0.8–5.3)
LYMPHOCYTES NFR BLD AUTO: 27.9 %
MCH RBC QN AUTO: 30.3 PG (ref 26.5–33)
MCHC RBC AUTO-ENTMCNC: 33.2 G/DL (ref 31.5–36.5)
MCV RBC AUTO: 92 FL (ref 78–100)
MONOCYTES # BLD AUTO: 0.5 10E9/L (ref 0–1.3)
MONOCYTES NFR BLD AUTO: 7.7 %
MUCOUS THREADS #/AREA URNS LPF: PRESENT /LPF
NEUTROPHILS # BLD AUTO: 3.9 10E9/L (ref 1.6–8.3)
NEUTROPHILS NFR BLD AUTO: 63.3 %
NITRATE UR QL: NEGATIVE
NRBC # BLD AUTO: 0 10*3/UL
NRBC BLD AUTO-RTO: 0 /100
PH UR STRIP: 5 PH (ref 5–7)
PLATELET # BLD AUTO: 201 10E9/L (ref 150–450)
RBC # BLD AUTO: 4.35 10E12/L (ref 3.8–5.2)
RBC #/AREA URNS AUTO: 0 /HPF (ref 0–2)
SOURCE: ABNORMAL
SP GR UR STRIP: 1.01 (ref 1–1.03)
SQUAMOUS #/AREA URNS AUTO: 1 /HPF (ref 0–1)
UROBILINOGEN UR STRIP-MCNC: 0 MG/DL (ref 0–2)
WBC # BLD AUTO: 6.1 10E9/L (ref 4–11)
WBC #/AREA URNS AUTO: 2 /HPF (ref 0–5)

## 2021-07-01 PROCEDURE — 94729 DIFFUSING CAPACITY: CPT | Performed by: INTERNAL MEDICINE

## 2021-07-01 PROCEDURE — 84450 TRANSFERASE (AST) (SGOT): CPT | Performed by: INTERNAL MEDICINE

## 2021-07-01 PROCEDURE — 85025 COMPLETE CBC W/AUTO DIFF WBC: CPT | Performed by: INTERNAL MEDICINE

## 2021-07-01 PROCEDURE — 81001 URINALYSIS AUTO W/SCOPE: CPT | Performed by: INTERNAL MEDICINE

## 2021-07-01 PROCEDURE — 94375 RESPIRATORY FLOW VOLUME LOOP: CPT | Performed by: INTERNAL MEDICINE

## 2021-07-01 PROCEDURE — 82164 ANGIOTENSIN I ENZYME TEST: CPT | Performed by: INTERNAL MEDICINE

## 2021-07-01 PROCEDURE — 86140 C-REACTIVE PROTEIN: CPT | Performed by: INTERNAL MEDICINE

## 2021-07-01 PROCEDURE — 71250 CT THORAX DX C-: CPT | Mod: GC | Performed by: RADIOLOGY

## 2021-07-01 PROCEDURE — 82040 ASSAY OF SERUM ALBUMIN: CPT | Performed by: INTERNAL MEDICINE

## 2021-07-01 PROCEDURE — 99215 OFFICE O/P EST HI 40 MIN: CPT | Mod: 25 | Performed by: INTERNAL MEDICINE

## 2021-07-01 PROCEDURE — 82565 ASSAY OF CREATININE: CPT | Performed by: INTERNAL MEDICINE

## 2021-07-01 PROCEDURE — G0463 HOSPITAL OUTPT CLINIC VISIT: HCPCS | Mod: 25

## 2021-07-01 PROCEDURE — 94726 PLETHYSMOGRAPHY LUNG VOLUMES: CPT | Performed by: INTERNAL MEDICINE

## 2021-07-01 PROCEDURE — 85652 RBC SED RATE AUTOMATED: CPT | Performed by: INTERNAL MEDICINE

## 2021-07-01 PROCEDURE — 36415 COLL VENOUS BLD VENIPUNCTURE: CPT | Performed by: INTERNAL MEDICINE

## 2021-07-01 PROCEDURE — 84460 ALANINE AMINO (ALT) (SGPT): CPT | Performed by: INTERNAL MEDICINE

## 2021-07-01 ASSESSMENT — MIFFLIN-ST. JEOR: SCORE: 1479

## 2021-07-01 ASSESSMENT — PAIN SCALES - GENERAL: PAINLEVEL: MODERATE PAIN (4)

## 2021-07-01 NOTE — LETTER
7/1/2021         RE: Jeanine Schuler  [address]  [facility] [address]        Dear Colleague,    Thank you for referring your patient, Jeanine Schuler, to the [facility] [place]. Please see a copy of my visit note below.    [facility]  ILD Clinic - Return Visit-  July 1, 2021         Assessment and Plan:   Jeanine Schuler is a 59 year old female who presents for initial evaluation of interstitial lung disease.    1) Interstitial lung disease  - Her CT shows bronchocentric, predominantly central infiltrates.  Some are nodular appearing, especially in the periphery and appears confluent centrally.  Definite tissue diagnosis has not been obtained for sarcoidosis however her case was reviewed at sarcoid conference.  Imaging finding as well as pathology showing some noncaseating granulomas, sarcoidosis was thought to be a reasonable diagnosis at that time.  Other differential diagnosis included chronic HP.  -She was started on methotrexate subQ, now uptitrated to 20 mg weekly.  In addition, she was started on CellCept 500 mg twice daily by Dr. Ann on 5/2021.  -Despite this, she has had progressive respiratory symptoms and her PFT today shows a significant decline in her FVC by more than 1L.  -Repeat CT today.  If this CT does not show any worsening/progression of her lung disease, will obtain TTE.  -Sarcoidosis labs obtained on her last visit showed elevated 1, 25 OH-vitamin D, with low normal 25-OH vitamin D, consistent with granulomatous inflammation.  Soluble IL-2 receptor was also high at 995 (reference range 137-838).  ACE was normal.  -Repeat CRP, ESR, ACE and soluble IL-2 receptor  -Her prednisone dose is currently at 20 mg daily    2) History of limited cutaneous systemic sclerosis  - On Plaquinil and CellCept, followed by Dr. North    3) Neuropathy  -She was seen by Dr. Abel  from neurology in 5/2021, and nerve conduction studies and EMG was abnormal. She is planned for a nerve biopsy of her right foot.    RTC: 3 months or sooner if need arises  Influenza vaccination: She has already received Influenza vaccination for this year. She has received Pfizer COVID-19 vaccine in April, received her first dose of Shingrix 10/2020.  Second dose of Shingrix on her next visit.    Beena Cuellar MD [facility]  Pulmonary and Critical Care Medicine          Problem List:     1. Interstitial lung disease  a. Symptom: Cough, fatigue, sone subjective dyspnea  b. Treatment: (11/2020-) Prednisone, currently at 40mg   c. Diagnosis: Cryobiopsy Patholo  d. Your note gy: NSIP vs. HP, sarcoidosis can not be r/o. (ALI, what appears to be an acute exacerbation of underlying ILD with OP.  Few nonnecrotizing granulomas, cellular NSIP that can be seen in early sarcodosis)  Radiology: bronchocentric nodular infiltrates  e. Serology:TARA 1: 1280, anticentromere antibody greater than 8, anti-RNP-4.8 (less than 1), chromatin DNP-6.6 (less than 1.0)  f. Other w/u  i. Bronchoscopy with BAL ([facility]; 8/4/2020)  1. BAL (superior lingula) cell count (49% neutrophils, 45% lymphocytes, 3% monocytes, 3% eosinophils)  2. Negative for malignant cells, silver stain negative for pneumocystis and fungal organisms.  3. Pathology (anterior lingual segments of the left upper lobe): Benign bronchial epithelium and alveolar spaces.  There is no evidence of malignancy.  The findings are overall nondiagnostic.  ii. Bronchoscopy with BAL and cryobiopsy ([facility]; 10/6/2020)  1. BAL (, other cells 32, L 47, N 19, E 2; CD4: CD8 3.65)  2. Microbiology-all negative except for penicillium species from fungal culture only.  Aspergillus galactomannan was negative  iii. LUNG, LEFT UPPER LOBE, TRANSBRONCHIAL CRYOBIOPSY:   - Fragments of respiratory mucosa and alveolated lung tissue with nonnecrotizing granulomas with multinucleated giant cells,  "foci of organizing pneumonia and nonspecific chronic interstitial inflammation   with reactive pneumocytes type II hyperplasia.   - GMS and AFB special stains are negative for fungal and acid-fast organisms, respectively.   COMMENT:   In absence of infection (ie. Mycobacteria, please correlate with  laboratory cultures and serology), the histologic findings raise the differential diagnosis of hypersensitivity pneumonitis and sarcoidosis. Clinical   and radiologic correlation is necessary. The case will be discussed at the ILD interdepartmental conference.     2. Mixed connective tissue disease/limited cutaneous systemic sclerosis  a. Followed by Dr. Indio lopez. Treatment: (\"years ago\") Methotrexate PO -> SQ due to GI intolerance, unclear why stoped  Plaquinil    3. GERD    4. HFpEF    5. History of gastric bypass 2005        History of Present Illness:     Jeanine Schuler is a 59 year old female who presents for follow=up for interstitial lung disease. She was last seen by me virtually in 3/2021.    Since her last visit, she was started on methotrexate, now uptitrated to 20 mg weekly.  She has been methotrexate for a total of about 2 months, and does not note any improvement in her breathing.  In fact, she feels as though her respiratory status has been worse, and she is overall fatigued.  She saw Dr. Ann in May, and was started on CellCept 500 mg twice daily.  Her prednisone dose has been down titrated to 20 mg daily.  Despite the above, she does not feel as though her symptoms are any better.  She does note that her coughing seems to have improved with initiation of methotrexate.    She is generally tolerating the methotrexate well, but does experience some nausea and dry heaving about 48 hours after her weekly dose.    She denies any fevers, chills, night sweats, chest pain except she started noticing left lower anterior chest/upper abdominal pain along with left-sided shoulder pain which started last " night.  She has had similar pains in the past, but was on the contralateral side earlier in the year.     She denies any changes in her appetite, she has gained about 10 pounds since 5/2021, which he attributes to increased lower extremity edema.  She continues to take the same dose of Lasix and spironolactone.    Interval history (3/2021)  Since her last visit, she is unsure if there has been any change in her respiratory status.  She currently complains of extreme fatigue and pain/numbness in her feet and legs.  She occasionally also experiences pain in her upper chest/throat area.  In addition, she has gained about 5 to 10 pounds on prednisone.    Since her last visit, her prednisone dose has been tapered slowly (5 mg every 3 weeks).  However, when she reached about 25 mg, she started noticing increased pain in her feet.  This has progressively worsened on lower doses of 20 mg, and she was evaluated by Dr. Loomis that week, who increase her prednisone to 40 mg daily, which she is currently on.    With initiation of higher doses of prednisone, she feels as though her pain has improved somewhat but has not resolved.  She denies any fever/chills, but states she still occasionally experiences night sweats, about once a week.  This has improved in terms of frequency with initiation of prednisone.           Review of Systems:     Please see HPI, otherwise the complete 10 point ROS is negative.           Past Medical and Surgical History:     Past Medical History:   Diagnosis Date     Anemia      Bariatric surgery status 06/27/2005    [facility];     Cellulitis of leg 06/06/2013     Esophageal reflux     Better post-hiatal hernia repair and weight loss     Hyperplastic colonic polyp      Hypovitaminosis D 2011     ILD (interstitial lung disease) (H)      Kidney stone 04/29/2007     Kidney stone 05/10/2007    surgically removed     Limb ischemia; ulcers of fingertips 04/22/2013     Raynaud's syndrome       Rheumatoid arthritis (H) \     Scleroderma (H)      Sjogren-Jake syndrome      Systemic sclerosis (H) 2009     Unspecified essential hypertension      Past Surgical History:   Procedure Laterality Date     BRONCHOSCOPY FLEXIBLE,L CRYOBIOPSY N/A 10/06/2020    Procedure: BRONCHOSCOPY, FLEXIBLE, WITH TRANSBRONCHIAL BIOPSY x4 USING CRYOPROBE, bronchial alveolar lavage;  Surgeon: Teddy Mendez MD;  Location: [facility] OR     BYPASS GASTRIC, CHOLECYSTECTOMY, COMBINED  06/27/2005    [facility]     CHOLECYSTECTOMY       COLONOSCOPY       COLONOSCOPY N/A 11/17/2020    Procedure: COLONOSCOPY, WITH POLYPECTOMY AND BIOPSY;  Surgeon: Jamie Cárdenas MD;  Location: [facility] OR     ESOPHAGOSCOPY, GASTROSCOPY, DUODENOSCOPY (EGD), COMBINED N/A 03/10/2016    Procedure: COMBINED ESOPHAGOSCOPY, GASTROSCOPY, DUODENOSCOPY (EGD), BIOPSY SINGLE OR MULTIPLE;  Surgeon: Tricia Zambrano MD;  Location: [facility]     ESOPHAGOSCOPY, GASTROSCOPY, DUODENOSCOPY (EGD), COMBINED N/A 11/17/2020    Procedure: ESOPHAGOGASTRODUODENOSCOPY, WITH BIOPSY and Dilation;  Surgeon: Jamie Cárdenas MD;  Location: [facility] OR     INNER EAR SURGERY       PICC INSERTION  06/05/2013    5fr DL Power PICC, 44cm, left basilic vein, tip in low SVC     SURGICAL HISTORY OF -       Left ear surgery - incus transition, tympanoplasty     SURGICAL HISTORY OF -   06/01/2005    Hiatal hernia repair     TONSILLECTOMY            Social History:     Social History     Tobacco Use     Smoking status: Never Smoker     Smokeless tobacco: Never Used   Substance Use Topics     Alcohol use: Yes     Comment: occassionally     Drug use: No     Social History     Social History Narrative    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  She has been on medical leave since 7/27/2020        Moved about 4 years ago to a new house. No known water damage or mold.        She lives in a normal area with forms around her house, but denies any exposure to grain dust, hay,  "other farm animals.        No unusual hobbies                  Medications:     Current Outpatient Medications   Medication     benzonatate (TESSALON) 100 MG capsule     folic acid (FOLVITE) 1 MG tablet     furosemide (LASIX) 40 MG tablet     hydroxychloroquine (PLAQUENIL) 200 MG tablet     insulin syringe-needle U-100 (BD INSULIN SYRINGE ULTRAFINE) 30G X 1/2\" 1 ML miscellaneous     losartan (COZAAR) 50 MG tablet     methotrexate 50 MG/2ML injection     mycophenolate (GENERIC EQUIVALENT) 250 MG capsule     nitroFURantoin macrocrystal-monohydrate (MACROBID) 100 MG capsule     omeprazole (PRILOSEC) 20 MG DR capsule     pantoprazole (PROTONIX) 40 MG EC tablet     Potassium (POTASSIMIN PO)     predniSONE (DELTASONE) 10 MG tablet     predniSONE (DELTASONE) 5 MG tablet     spironolactone (ALDACTONE) 25 MG tablet     sulfamethoxazole-trimethoprim (BACTRIM) 400-80 MG tablet     temazepam (RESTORIL) 15 MG capsule     vitamin D2 (ERGOCALCIFEROL) 96501 units (1250 mcg) capsule     No current facility-administered medications for this visit.             Physical Exam:   BP (!) 146/90   Pulse 80   Resp 17   Ht 1.753 m (5' 9\")   Wt 83.5 kg (184 lb)   SpO2 100%   BMI 27.17 kg/m    GENERAL: alert, NAD  HEENT: NCAT, EOMI, masked  Neck: no cervical or supraclavicular adenopathy  Lungs: good air flow, faint inspiratory squeak   CV: RRR, S1S2, no murmurs noted  Abdomen: normoactive BS, soft, non tender  Neuro: AAO X 3  Psychiatric: normal affect, good eye contact  Skin: no rash, jaundice or lesions on limited exam  Extremities: 1-2+ pitting edema b/l.            Imaging:     CT Chest ([facility]; 7/20/2020)  1. Bilateral pulmonary consolidation is probably due to infection. Other inflammatory processes are also on the differential. Although radiographic pattern is somewhat typical of sarcoid, this is less likely given significant worsening since 10/23/2019. Correlate clinically.  2. Mediastinal lymphadenopathy, probably " reactive.  3. No pulmonary embolism.  4. Postoperative changes of gastric bypass with debris in the esophagus. Although this is present, the pulmonary consolidation is not typical of aspiration.           Pulmonary Function Tests:     PFT interpretation (July 1, 2021):  Maneuver: ATS criteria i not met.  Interpret with caution.  Spirometry: Suggestive of moderate restriction.  Lung volumes: TLC is 74% predicted, c/w mild restriction.   Diffusing capacity: There is mild impairment in diffusing capacity      Six-minute walk test  Date 6MWD RA SpO2 Resting O2 req Exercise O2 req Lowest SpO2 on amb   8/21/20 940 97 RA RA 90                    Laboratory:     Serology ([facility]; 7/27/2020)  Chromatin DNP antibody -6.6 (less than 1.0)  Ribosomal antibody-negative  SSA-2.0 (less than 1.0)  SSB-negative anti-SM RNP-greater than 80 (less than 1.0)  SCL 70-negative  Maddy 1-negative  Centromere antibody -greater than 8.0 (less than 1.0)  SM IgG-negative  Anti-RNP-4.8 (less than 1.0)  Anti-DS DNA- negative  TARA - positive    6/10/2009  TARA- 1: 1280  RF-20 (less than 9)    No results found for this or any previous visit (from the past 168 hour(s)).    BAL (8/4/2020)  Negative for Legionella, RVP, Bordetella, C pneumoniae, mycoplasma pneumonia    A. Lingula bronchial alveolar lavage, cytology  1. 49% neutrophils, 45% lymphocytes, 3% monocytes, and 3% eosinophils.  2. Negative for malignant cells.  3. Silver stain negative for pneumocystis and fungal organisms.  B. Lingula and anterior segments, left upper lobe transbronchial biopsy?-Negative for malignancy, see microscopic description.    Pathology:   A. The smear has the following differential 49% neutrophils, 45% lymphocytes, 3% monocytes, and 3% macrophages. There is no evidence of malignancy. A silver stain is applied, with an appropriate positive control, and is negative for pneumocystis and fungal organisms.  B. The biopsy shows benign bronchial epithelium and alveolar  spaces. There is no evidence of malignancy. The findings are overall nondiagnostic.             Cardiac:     TTE ([facility]; 8/25/2020)  Interpretation Summary  Global and regional left ventricular function is normal with an EF of 55-60%.  Right ventricular function, chamber size, wall motion, and thickness are  normal.  Pulmonary artery systolic pressure cannot be assessed.  Ascending aorta 3.8 cm.  Aortic index 20mm/m2,mild dilation.  The inferior vena cava is normal.  No pericardial effusion is present.    Echocardiogram ([facility]; 10/16/2017)  Interpretation Summary    * Normal left ventricular size and systolic function, estimated LVEF 60-65%.    * Normal regional wall motion.    * Normal right ventricular size and systolic function.    * The left ventricular diastolic function is mildly abnormal (Grade I).    * There is no hemodynamically significant valve disease.    * Normal estimated right ventricular systolic pressure of 28 mmHg.    * The proximal ascending aorta is mildly dilated measuring  3.9 cm.    * The IVC is normal in size (< 2.1 cm), > 50% respiratory variance, RA  pressure normal at 3 mmHg.    * Compared to prior study on 8/26/16, there has been no significant change  in left ventricular systolic function with side by side comparison.  There has  been no significant change in the size of the proximal ascending aorta  (previously measured 4.0 cm).         Other:           Again, thank you for allowing me to participate in the care of your patient.        Sincerely,        Beena Cuellar MD

## 2021-07-06 ENCOUNTER — APPOINTMENT (OUTPATIENT)
Dept: CT IMAGING | Facility: CLINIC | Age: 61
End: 2021-07-06
Attending: FAMILY MEDICINE
Payer: COMMERCIAL

## 2021-07-06 ENCOUNTER — HOSPITAL ENCOUNTER (EMERGENCY)
Facility: CLINIC | Age: 61
Discharge: HOME | End: 2021-07-06
Attending: FAMILY MEDICINE | Admitting: FAMILY MEDICINE
Payer: COMMERCIAL

## 2021-07-06 VITALS
WEIGHT: 184 LBS | BODY MASS INDEX: 27.17 KG/M2 | OXYGEN SATURATION: 94 % | HEART RATE: 86 BPM | DIASTOLIC BLOOD PRESSURE: 66 MMHG | TEMPERATURE: 97.4 F | SYSTOLIC BLOOD PRESSURE: 111 MMHG | RESPIRATION RATE: 20 BRPM

## 2021-07-06 DIAGNOSIS — R07.9 ACUTE CHEST PAIN: ICD-10-CM

## 2021-07-06 LAB
ALBUMIN SERPL-MCNC: 3.1 G/DL (ref 3.4–5)
ALP SERPL-CCNC: 123 U/L (ref 40–150)
ALT SERPL W P-5'-P-CCNC: 29 U/L (ref 0–50)
ANION GAP SERPL CALCULATED.3IONS-SCNC: 9 MMOL/L (ref 3–14)
AST SERPL W P-5'-P-CCNC: 42 U/L (ref 0–45)
BASOPHILS # BLD AUTO: 0 10E9/L (ref 0–0.2)
BASOPHILS NFR BLD AUTO: 0.3 %
BILIRUB SERPL-MCNC: 0.7 MG/DL (ref 0.2–1.3)
BUN SERPL-MCNC: 10 MG/DL (ref 7–30)
CALCIUM SERPL-MCNC: 8.3 MG/DL (ref 8.5–10.1)
CHLORIDE SERPL-SCNC: 99 MMOL/L (ref 94–109)
CO2 SERPL-SCNC: 23 MMOL/L (ref 20–32)
CREAT SERPL-MCNC: 0.74 MG/DL (ref 0.52–1.04)
CRP SERPL-MCNC: 14.3 MG/L (ref 0–8)
D DIMER PPP FEU-MCNC: 1.1 UG/ML FEU (ref 0–0.5)
DIFFERENTIAL METHOD BLD: NORMAL
EOSINOPHIL # BLD AUTO: 0 10E9/L (ref 0–0.7)
EOSINOPHIL NFR BLD AUTO: 0.3 %
ERYTHROCYTE [DISTWIDTH] IN BLOOD BY AUTOMATED COUNT: 13.4 % (ref 10–15)
GFR SERPL CREATININE-BSD FRML MDRD: 89 ML/MIN/{1.73_M2}
GLUCOSE SERPL-MCNC: 83 MG/DL (ref 70–99)
HCT VFR BLD AUTO: 38.8 % (ref 35–47)
HGB BLD-MCNC: 12.9 G/DL (ref 11.7–15.7)
IMM GRANULOCYTES # BLD: 0 10E9/L (ref 0–0.4)
IMM GRANULOCYTES NFR BLD: 0.3 %
LYMPHOCYTES # BLD AUTO: 1.4 10E9/L (ref 0.8–5.3)
LYMPHOCYTES NFR BLD AUTO: 20.9 %
MCH RBC QN AUTO: 30.1 PG (ref 26.5–33)
MCHC RBC AUTO-ENTMCNC: 33.2 G/DL (ref 31.5–36.5)
MCV RBC AUTO: 91 FL (ref 78–100)
MONOCYTES # BLD AUTO: 0.5 10E9/L (ref 0–1.3)
MONOCYTES NFR BLD AUTO: 7.8 %
NEUTROPHILS # BLD AUTO: 4.6 10E9/L (ref 1.6–8.3)
NEUTROPHILS NFR BLD AUTO: 70.4 %
NRBC # BLD AUTO: 0 10*3/UL
NRBC BLD AUTO-RTO: 0 /100
NT-PROBNP SERPL-MCNC: 1158 PG/ML (ref 0–900)
PLATELET # BLD AUTO: 190 10E9/L (ref 150–450)
POTASSIUM SERPL-SCNC: 3.3 MMOL/L (ref 3.4–5.3)
PROT SERPL-MCNC: 6.2 G/DL (ref 6.8–8.8)
RBC # BLD AUTO: 4.28 10E12/L (ref 3.8–5.2)
SODIUM SERPL-SCNC: 131 MMOL/L (ref 133–144)
TROPONIN I SERPL-MCNC: 0.02 UG/L (ref 0–0.04)
WBC # BLD AUTO: 6.6 10E9/L (ref 4–11)

## 2021-07-06 PROCEDURE — 96361 HYDRATE IV INFUSION ADD-ON: CPT | Performed by: FAMILY MEDICINE

## 2021-07-06 PROCEDURE — 93005 ELECTROCARDIOGRAM TRACING: CPT | Performed by: FAMILY MEDICINE

## 2021-07-06 PROCEDURE — 85025 COMPLETE CBC W/AUTO DIFF WBC: CPT | Performed by: FAMILY MEDICINE

## 2021-07-06 PROCEDURE — 85379 FIBRIN DEGRADATION QUANT: CPT | Performed by: FAMILY MEDICINE

## 2021-07-06 PROCEDURE — 250N000011 HC RX IP 250 OP 636: Performed by: FAMILY MEDICINE

## 2021-07-06 PROCEDURE — 96376 TX/PRO/DX INJ SAME DRUG ADON: CPT | Performed by: FAMILY MEDICINE

## 2021-07-06 PROCEDURE — 80053 COMPREHEN METABOLIC PANEL: CPT | Performed by: FAMILY MEDICINE

## 2021-07-06 PROCEDURE — 258N000003 HC RX IP 258 OP 636: Performed by: FAMILY MEDICINE

## 2021-07-06 PROCEDURE — 84484 ASSAY OF TROPONIN QUANT: CPT | Performed by: FAMILY MEDICINE

## 2021-07-06 PROCEDURE — 99285 EMERGENCY DEPT VISIT HI MDM: CPT | Mod: 25 | Performed by: FAMILY MEDICINE

## 2021-07-06 PROCEDURE — 86140 C-REACTIVE PROTEIN: CPT | Performed by: FAMILY MEDICINE

## 2021-07-06 PROCEDURE — 99285 EMERGENCY DEPT VISIT HI MDM: CPT | Performed by: FAMILY MEDICINE

## 2021-07-06 PROCEDURE — 96375 TX/PRO/DX INJ NEW DRUG ADDON: CPT | Performed by: FAMILY MEDICINE

## 2021-07-06 PROCEDURE — 83880 ASSAY OF NATRIURETIC PEPTIDE: CPT | Performed by: FAMILY MEDICINE

## 2021-07-06 PROCEDURE — 96374 THER/PROPH/DIAG INJ IV PUSH: CPT | Mod: 59 | Performed by: FAMILY MEDICINE

## 2021-07-06 PROCEDURE — 71275 CT ANGIOGRAPHY CHEST: CPT

## 2021-07-06 PROCEDURE — 250N000013 HC RX MED GY IP 250 OP 250 PS 637: Performed by: FAMILY MEDICINE

## 2021-07-06 RX ORDER — HYDROCODONE BITARTRATE AND ACETAMINOPHEN 5; 325 MG/1; MG/1
1 TABLET ORAL EVERY 6 HOURS PRN
Qty: 10 TABLET | Refills: 0 | Status: SHIPPED | OUTPATIENT
Start: 2021-07-06 | End: 2021-07-09

## 2021-07-06 RX ORDER — NITROGLYCERIN 0.4 MG/1
0.4 TABLET SUBLINGUAL EVERY 5 MIN PRN
Status: DISCONTINUED | OUTPATIENT
Start: 2021-07-06 | End: 2021-07-06 | Stop reason: HOSPADM

## 2021-07-06 RX ORDER — IOPAMIDOL 755 MG/ML
500 INJECTION, SOLUTION INTRAVASCULAR ONCE
Status: COMPLETED | OUTPATIENT
Start: 2021-07-06 | End: 2021-07-06

## 2021-07-06 RX ORDER — HYDROMORPHONE HYDROCHLORIDE 1 MG/ML
0.5 INJECTION, SOLUTION INTRAMUSCULAR; INTRAVENOUS; SUBCUTANEOUS
Status: COMPLETED | OUTPATIENT
Start: 2021-07-06 | End: 2021-07-06

## 2021-07-06 RX ORDER — NAPROXEN 500 MG/1
500 TABLET ORAL 2 TIMES DAILY PRN
Qty: 10 TABLET | Refills: 0 | Status: ON HOLD | OUTPATIENT
Start: 2021-07-06 | End: 2022-04-05

## 2021-07-06 RX ORDER — ASPIRIN 81 MG/1
324 TABLET, CHEWABLE ORAL ONCE
Status: COMPLETED | OUTPATIENT
Start: 2021-07-06 | End: 2021-07-06

## 2021-07-06 RX ORDER — KETOROLAC TROMETHAMINE 30 MG/ML
30 INJECTION, SOLUTION INTRAMUSCULAR; INTRAVENOUS ONCE
Status: COMPLETED | OUTPATIENT
Start: 2021-07-06 | End: 2021-07-06

## 2021-07-06 RX ADMIN — IOPAMIDOL 75 ML: 755 INJECTION, SOLUTION INTRAVENOUS at 06:46

## 2021-07-06 RX ADMIN — HYDROMORPHONE HYDROCHLORIDE 0.5 MG: 1 INJECTION, SOLUTION INTRAMUSCULAR; INTRAVENOUS; SUBCUTANEOUS at 07:00

## 2021-07-06 RX ADMIN — NITROGLYCERIN 0.4 MG: 0.4 TABLET SUBLINGUAL at 06:16

## 2021-07-06 RX ADMIN — SODIUM CHLORIDE 250 ML: 9 INJECTION, SOLUTION INTRAVENOUS at 06:28

## 2021-07-06 RX ADMIN — ASPIRIN 324 MG: 81 TABLET, CHEWABLE ORAL at 06:14

## 2021-07-06 RX ADMIN — KETOROLAC TROMETHAMINE 30 MG: 30 INJECTION, SOLUTION INTRAMUSCULAR; INTRAVENOUS at 08:15

## 2021-07-06 RX ADMIN — HYDROMORPHONE HYDROCHLORIDE 0.5 MG: 1 INJECTION, SOLUTION INTRAMUSCULAR; INTRAVENOUS; SUBCUTANEOUS at 06:25

## 2021-07-06 RX ADMIN — HYDROMORPHONE HYDROCHLORIDE 0.5 MG: 1 INJECTION, SOLUTION INTRAMUSCULAR; INTRAVENOUS; SUBCUTANEOUS at 07:51

## 2021-07-06 NOTE — ED NOTES
Patient reports SL nitroglycerin has not helped. Per provider, will give IV Dilaudid and hold SL nitroglycerin.

## 2021-07-06 NOTE — ED TRIAGE NOTES
"Presents with midsternal chest pain that radiates to the left arm that started around 0300. States she has a \"stable\" AAA. Rates pain 9/10 and describes it as \"someone sitting on my chest.\" patient also reports 15+ lb weight gain in the last month and notes bilateral ankle edema.   "

## 2021-07-06 NOTE — ED PROVIDER NOTES
[facility] ED Provider Note   Patient: Jeanine Schuler  MRN #:  [id number]  Date of Visit: July 6, 2021    CC:     Chief Complaint   Patient presents with     Chest Pain     HPI:  Jeanine Schuler is a 60 year old female who presented to the emergency department with acute onset of sternal chest pain with radiation into the left shoulder, neck and jaw tightness with increasing intensity starting at 3 AM this morning when she was awakened.  Patient states that she felt fine when she went to bed.  She was awakened by the pain and increasing shortness of breath.  She has chronic shortness of breath due to interstitial lung disease.  She underwent a recent CT scan of the chest noted below.  Patient denies recent injury, fever, cough.  She has a complex past medical history including autoimmune/collagen vascular diseases.  Patient drove herself to the emergency department since her  does not drive.  Patient rates her current pain level 9/10.  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 Findings suggestive for an interstitial lung  disease process including sarcoidosis or HP although superimposed  atypical infection are difficult to exclude.    Echocardiogram dated August 25, 2020:    Interpretation Summary  Global and regional left ventricular function is normal with an EF of 55-60%.  Right ventricular function, chamber size, wall motion, and thickness are  normal.  Pulmonary artery systolic pressure cannot be assessed.  Ascending aorta 3.8 cm.  Aortic index 20mm/m2,mild dilation.  The inferior vena cava is normal.  No pericardial effusion is present        Problem  List:  Patient Active Problem List    Diagnosis Date Noted     High risk medication use 04/19/2021     Priority: Medium       Plaquenil: Started 2016 for Sjogren's. 4/19/21: No uveitis from Sarcoid, but focal subretinal deposits each eye, Outer segments otherwise WNL. Optos FAF normal. 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 4/19/21: No uveitis from Sarcoid, but focal subretinal deposits each eye, Outer segments otherwise WNL. Optos FAF normal. Repeat OCT with 10-2 in Fall 2021       Post-menopausal 03/31/2021     Priority: Medium     Hypovitaminosis D 03/31/2021     Priority: Medium     High serum parathyroid hormone (PTH) 03/31/2021     Priority: Medium     On corticosteroid therapy 03/31/2021     Priority: Medium     ILD (interstitial lung disease) (H) 09/16/2020     Priority: Medium     Added automatically from request for surgery [id number]       Rheumatoid arthritis with negative rheumatoid factor, involving unspecified site (H) 12/15/2015     Priority: Medium     Pharyngeal dysphagia 11/17/2015     Priority: Medium     Gastroesophageal reflux disease, esophagitis presence not specified 11/17/2015     Priority: Medium     IMO Regulatory Load OCT 2020       Disturbed sleep rhythm 04/07/2015     Priority: Medium     S/P dilatation of esophageal stricture 02/10/2012     Priority: Medium     Sjogren's disease (H) 09/11/2011     Priority: Medium     Raynaud's disease 09/11/2011     Priority: Medium     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 Echo 10/25/2011 4.0 cm        Systemic sclerosis (H) 07/28/2011     Priority: Medium     Tumoral  calcinosis 07/28/2011     Priority: Medium     Iron deficiency anemia 07/30/2009     Priority: Medium     Collagen vascular disease (H) 07/23/2009     Priority: Medium     Overview:   7/26/12 Rheumatology at the [facility] Dr. Nicolás De La Torre:   1. Limited cutaneous systemic sclerosis with high titer anticentromere antibody positive, but also phospholipid antibodies.   2. Severe multiple digital ulcers, on fingers and toes. Overall symptoms of Raynaud's seems to be worse.   3. Marked keratoconjunctivitis sicca over the last several years.   4. Marked iron deficiency anemia responsive to IV iron.   5. Diffuse musculoskeletal pain for which she has been taking Naprosyn.   6. Development of 2+ bilateral lower extremity edema with the addition of diltiazem to her regimen and prior contraindication to dihydropyridines secondary to lip swelling and tingling with amlodipine.   7. Marked fatigue and diffuse clinical weakness.   8. Dyspnea on exertion with lower extremity edema and other features including the anticentromere positivity worrisome for the potential development of pulmonary hypertension.   9. Marked GERD with associated dysphagia.   10. Status post gastric bypass with a history of intermittent constipation and diarrhea potentially consistent with bacterial small bowel overgrowth versus other gut effects of the prior surgery.   11. History of positive rheumatoid factor consistent with MCTD, given the remainder of her clinical presentation.   12. Progressive Jaw tightening with inability to open mouth wider than 1 inch.        S/P gastric bypass 07/23/2009     Priority: Medium     Vitamin B 12 deficiency 07/23/2009     Priority: Medium     Low back pain - Suspect SI Joint dysfunction 09/27/2005     Priority: Medium     September 27, 2005: Started 2 weeks ago.  bilateral buttocks - better with walking, worse with sitting and driving.  Tight/stiff - lossens up.  Painful to lay down.  Constant ache.  8-10/10 at  "its worse.  No injury.  Tender with massage.  No radiculopathy.  Relief with muscle relaxant.       Chronic cough 09/27/2005     Priority: Medium     September 27, 2005: Persistent cough occl productive of stringy mucus, wheezes with cough.  Started 2 weeks ago.  No rhinorrhea or post nasal drip        Prolonged QTc 460 ms,  at hr 67 06/24/2005     Priority: Medium     June 24, 2005: Will check calcium, magnesium, potassium.  Will compare to old EKGs.  Reviewed meds and none are on the list for prolongation.  Anesthesiology should be aware at surgery of prolonged QT.       HTN (hypertension) 03/02/2005     Priority: Medium     April 22, 2005: BP controlled on prinizide 40/25, norvasc 5 September 27, 2005: BP controlled/marginal off meds post-gastric bypass.  Problem list name updated by automated process. Provider to review    Overview:   Normal EF, no significant pulmonary hypertension.          Past Medical History:   Diagnosis Date     Anemia      Bariatric surgery status 06/27/2005     Cellulitis of leg 06/06/2013     Esophageal reflux      Hyperplastic colonic polyp      Hypovitaminosis D 2011     ILD (interstitial lung disease) (H)      Kidney stone 04/29/2007     Kidney stone 05/10/2007     Limb ischemia; ulcers of fingertips 04/22/2013     Raynaud's syndrome      Rheumatoid arthritis (H) \     Scleroderma (H)      Sjogren-Jake syndrome      Systemic sclerosis (H) 2009     Unspecified essential hypertension        MEDS: benzonatate (TESSALON) 100 MG capsule  folic acid (FOLVITE) 1 MG tablet  furosemide (LASIX) 40 MG tablet  hydroxychloroquine (PLAQUENIL) 200 MG tablet  insulin syringe-needle U-100 (BD INSULIN SYRINGE ULTRAFINE) 30G X 1/2\" 1 ML miscellaneous  losartan (COZAAR) 50 MG tablet  methotrexate 50 MG/2ML injection  mycophenolate (GENERIC EQUIVALENT) 250 MG capsule  nitroFURantoin macrocrystal-monohydrate (MACROBID) 100 MG capsule  omeprazole (PRILOSEC) 20 MG DR capsule  pantoprazole " (PROTONIX) 40 MG EC tablet  Potassium (POTASSIMIN PO)  predniSONE (DELTASONE) 10 MG tablet  predniSONE (DELTASONE) 5 MG tablet  spironolactone (ALDACTONE) 25 MG tablet  sulfamethoxazole-trimethoprim (BACTRIM) 400-80 MG tablet  temazepam (RESTORIL) 15 MG capsule  vitamin D2 (ERGOCALCIFEROL) 14027 units (1250 mcg) capsule        ALLERGIES:    Allergies   Allergen Reactions     Lovenox Other (See Comments)     Blood clot      Norvasc [Amlodipine Besylate] Swelling     Lip swelling that happened on 2 different occasions     Erythromycin Rash     Rash on arms       Past Surgical History:   Procedure Laterality Date     BRONCHOSCOPY FLEXIBLE,L CRYOBIOPSY N/A 10/06/2020    Procedure: BRONCHOSCOPY, FLEXIBLE, WITH TRANSBRONCHIAL BIOPSY x4 USING CRYOPROBE, bronchial alveolar lavage;  Surgeon: Teddy Mendez MD;  Location:  OR     BYPASS GASTRIC, CHOLECYSTECTOMY, COMBINED  06/27/2005    [facility]     CHOLECYSTECTOMY       COLONOSCOPY       COLONOSCOPY N/A 11/17/2020    Procedure: COLONOSCOPY, WITH POLYPECTOMY AND BIOPSY;  Surgeon: Jaime Cárdenas MD;  [place] [facility] [place]     ESOPHAGOSCOPY, GASTROSCOPY, DUODENOSCOPY (EGD), COMBINED N/A 03/10/2016    Procedure: COMBINED ESOPHAGOSCOPY, GASTROSCOPY, DUODENOSCOPY (EGD), BIOPSY SINGLE OR MULTIPLE;  Surgeon: Tricia Zambrano MD;  Location:  GI     ESOPHAGOSCOPY, GASTROSCOPY, DUODENOSCOPY (EGD), COMBINED N/A 11/17/2020    Procedure: ESOPHAGOGASTRODUODENOSCOPY, WITH BIOPSY and Dilation;  Surgeon: Jamie Cárdenas MD;  [place] [facility] [place]     INNER EAR SURGERY       PICC INSERTION  06/05/2013    5fr DL Power PICC, 44cm, left basilic vein, tip in low SVC     SURGICAL HISTORY OF -       Left ear surgery - incus transition, tympanoplasty     SURGICAL HISTORY OF -   06/01/2005    Hiatal hernia repair     TONSILLECTOMY         Social History     Tobacco Use     Smoking status: Never Smoker     Smokeless tobacco: Never Used   Substance Use Topics     Alcohol use: Yes      Comment: occassionally     Drug use: No         Review of Systems   Except as noted in HPI, all other systems were reviewed and are negative    Physical Exam     Vitals were reviewed  Patient Vitals for the past 12 hrs:   BP Temp Temp src Pulse Resp SpO2 Weight   07/06/21 0640 130/75 -- -- 88 (!) 31 96 % --   07/06/21 0630 (!) 151/79 -- -- 91 (!) 36 96 % --   07/06/21 0625 (!) 151/79 -- -- 100 -- -- --   07/06/21 0620 134/87 -- -- 96 (!) 38 97 % --   07/06/21 0615 (!) 147/92 -- -- 93 -- -- --   07/06/21 0558 (!) 159/89 97.4  F (36.3  C) Oral 95 26 98 % 83.5 kg (184 lb)     GENERAL APPEARANCE: Alert, moderate to severe distress due to pain; patient is grunting/moaning  FACE: normal facies  EYES: Pupils are equal  HENT: normal external exam  NECK: no adenopathy or asymmetry  RESP: Increased respiratory effort and rate; subtle bilateral wheezes  CV: regular rate and rhythm; no significant murmurs, gallops or rubs  ABD: soft, no tenderness; no rebound or guarding; bowel sounds are normal  MS: no gross deformities noted; normal muscle tone.  EXT: 2+ edema  SKIN: no worrisome rash  NEURO: no facial droop; no focal deficits, speech is labored  PSYCH: Flat affect      Available Lab/Imaging Results     Results for orders placed or performed during the hospital encounter of 07/06/21 (from the past 24 hour(s))   CBC with platelets differential   Result Value Ref Range    WBC 6.6 4.0 - 11.0 10e9/L    RBC Count 4.28 3.8 - 5.2 10e12/L    Hemoglobin 12.9 11.7 - 15.7 g/dL    Hematocrit 38.8 35.0 - 47.0 %    MCV 91 78 - 100 fl    MCH 30.1 26.5 - 33.0 pg    MCHC 33.2 31.5 - 36.5 g/dL    RDW 13.4 10.0 - 15.0 %    Platelet Count 190 150 - 450 10e9/L    Diff Method Automated Method     % Neutrophils 70.4 %    % Lymphocytes 20.9 %    % Monocytes 7.8 %    % Eosinophils 0.3 %    % Basophils 0.3 %    % Immature Granulocytes 0.3 %    Nucleated RBCs 0 0 /100    Absolute Neutrophil 4.6 1.6 - 8.3 10e9/L    Absolute Lymphocytes 1.4 0.8 - 5.3  10e9/L    Absolute Monocytes 0.5 0.0 - 1.3 10e9/L    Absolute Eosinophils 0.0 0.0 - 0.7 10e9/L    Absolute Basophils 0.0 0.0 - 0.2 10e9/L    Abs Immature Granulocytes 0.0 0 - 0.4 10e9/L    Absolute Nucleated RBC 0.0    Comprehensive metabolic panel   Result Value Ref Range    Sodium 131 (L) 133 - 144 mmol/L    Potassium 3.3 (L) 3.4 - 5.3 mmol/L    Chloride 99 94 - 109 mmol/L    Carbon Dioxide 23 20 - 32 mmol/L    Anion Gap 9 3 - 14 mmol/L    Glucose 83 70 - 99 mg/dL    Urea Nitrogen 10 7 - 30 mg/dL    Creatinine 0.74 0.52 - 1.04 mg/dL    GFR Estimate 89 >60 mL/min/[1.73_m2]    GFR Estimate If Black >90 >60 mL/min/[1.73_m2]    Calcium 8.3 (L) 8.5 - 10.1 mg/dL    Bilirubin Total 0.7 0.2 - 1.3 mg/dL    Albumin 3.1 (L) 3.4 - 5.0 g/dL    Protein Total 6.2 (L) 6.8 - 8.8 g/dL    Alkaline Phosphatase 123 40 - 150 U/L    ALT 29 0 - 50 U/L    AST 42 0 - 45 U/L   Troponin I   Result Value Ref Range    Troponin I ES 0.018 0.000 - 0.045 ug/L   D dimer quantitative   Result Value Ref Range    D Dimer 1.1 (H) 0.0 - 0.50 ug/ml FEU   CRP inflammation   Result Value Ref Range    CRP Inflammation 14.3 (H) 0.0 - 8.0 mg/L   Nt probnp inpatient   Result Value Ref Range    N-Terminal Pro BNP Inpatient 1,158 (H) 0 - 900 pg/mL       EKG reviewed by me: Normal sinus rhythm with heart rate of 95.  Occasional ectopic ventricular beat.  Poor R wave progression through the anterior lateral leads.  Nonspecific ST depression.  Impression: Normal sinus rhythm.  Nondiagnostic EKG.  No definite acute ischemic changes.           Impression     Final diagnoses:   Acute chest pain         ED Course & Medical Decision Making   Jeanine Schuler is a 60 year old female who presented to the emergency department with acute onset of midsternal chest pain that started at 3 AM this morning.  Patient was awakened by the pain.  She describes a sharp pain that radiates into the left shoulder, neck and jaw with tightness into the jaw.  She rated her pain level  9/10 and presented to the emergency department by private car.  Patient had driven herself here.  Patient denied any previous episodes of similar chest pain.  She had recent CT scan of the chest on July 1 with noncontrast high-resolution study showing interval increase in multifocal centrilobular groundglass and micronodular opacities with increase basilar predominant subpleural reticulations.  These findings are suggestive for an interstitial lung disease process including sarcoidosis or HP.  Patient echo cardiogram from last August revealed a global and regional left ventricular function that is normal at 55 to 60%.  Pulmonary artery systolic pressure cannot be assessed.  The ascending aorta measures 3.8 cm, although elsewhere I have seen measurements of 4.1 cm.  Patient received a single dose of nitroglycerin sublingually and it did not help.  She received Dilaudid IV for pain with some improvement.  A CT scan of the chest with contrast was ordered to rule out PE in addition to dissecting aortic aneurysm.  The patient's work-up reveals a normal CBC, comprehensive metabolic panel revealed slightly low sodium of 131 and potassium of 3.3.  Normal kidney function.  Troponin is 0.018, D-dimer is high at 1.1, CRP is 14.3 and sed rate is 1.158.    7:00 AM: Patient was signed out to Dr. Domenico Lagunas at the change of shift.      Medications   HYDROmorphone (PF) (DILAUDID) injection 0.5 mg (0.5 mg Intravenous Given 7/6/21 0625)   nitroGLYcerin (NITROSTAT) sublingual tablet 0.4 mg (0.4 mg Sublingual Given 7/6/21 0616)   0.9% sodium chloride BOLUS (250 mLs Intravenous New Bag 7/6/21 0628)   aspirin (ASA) chewable tablet 324 mg (324 mg Oral Given 7/6/21 0614)   sodium chloride (PF) 0.9% PF flush 100 mL (70 mLs Intracatheter Given 7/6/21 0646)   sodium chloride (PF) 0.9% PF flush 3 mL (3 mLs Intravenous Given 7/6/21 0646)   iopamidol (ISOVUE-370) solution 500 mL (75 mLs Intravenous Given 7/6/21 0646)             Disclaimer:  This note consists of words and symbols derived from keyboarding and dictation using voice recognition software.  As a result, there may be errors that have gone undetected.  Please consider this when interpreting information found in this note.       Benjamin Cottrell MD  07/07/21 0420

## 2021-07-06 NOTE — ED NOTES
Assumed care of pt. Awaiting CT report. Pain medication resulted in minimal relief. States it affected her head melissa than it help. Blood pressure improved at this time.

## 2021-07-06 NOTE — ED PROVIDER NOTES
Transfer of Care Note  This patient was signed out to me and I assumed care from Dr. Cottrell at change of shift.  Jeanine Schuler is a 60 year old female who presented to the emergency department with a chief complaint of Chest Pain  .  Please see the original providers history and physical for complete details.    The following issues were signed out to me to follow up on:  disposition      Pertant Lab/Imaging Findings During this Visit was     Results for orders placed or performed during the hospital encounter of 07/06/21 (from the past 24 hour(s))   CBC with platelets differential   Result Value Ref Range    WBC 6.6 4.0 - 11.0 10e9/L    RBC Count 4.28 3.8 - 5.2 10e12/L    Hemoglobin 12.9 11.7 - 15.7 g/dL    Hematocrit 38.8 35.0 - 47.0 %    MCV 91 78 - 100 fl    MCH 30.1 26.5 - 33.0 pg    MCHC 33.2 31.5 - 36.5 g/dL    RDW 13.4 10.0 - 15.0 %    Platelet Count 190 150 - 450 10e9/L    Diff Method Automated Method     % Neutrophils 70.4 %    % Lymphocytes 20.9 %    % Monocytes 7.8 %    % Eosinophils 0.3 %    % Basophils 0.3 %    % Immature Granulocytes 0.3 %    Nucleated RBCs 0 0 /100    Absolute Neutrophil 4.6 1.6 - 8.3 10e9/L    Absolute Lymphocytes 1.4 0.8 - 5.3 10e9/L    Absolute Monocytes 0.5 0.0 - 1.3 10e9/L    Absolute Eosinophils 0.0 0.0 - 0.7 10e9/L    Absolute Basophils 0.0 0.0 - 0.2 10e9/L    Abs Immature Granulocytes 0.0 0 - 0.4 10e9/L    Absolute Nucleated RBC 0.0    Comprehensive metabolic panel   Result Value Ref Range    Sodium 131 (L) 133 - 144 mmol/L    Potassium 3.3 (L) 3.4 - 5.3 mmol/L    Chloride 99 94 - 109 mmol/L    Carbon Dioxide 23 20 - 32 mmol/L    Anion Gap 9 3 - 14 mmol/L    Glucose 83 70 - 99 mg/dL    Urea Nitrogen 10 7 - 30 mg/dL    Creatinine 0.74 0.52 - 1.04 mg/dL    GFR Estimate 89 >60 mL/min/[1.73_m2]    GFR Estimate If Black >90 >60 mL/min/[1.73_m2]    Calcium 8.3 (L) 8.5 - 10.1 mg/dL    Bilirubin Total 0.7 0.2 - 1.3 mg/dL    Albumin 3.1 (L) 3.4 - 5.0 g/dL    Protein Total 6.2 (L)  6.8 - 8.8 g/dL    Alkaline Phosphatase 123 40 - 150 U/L    ALT 29 0 - 50 U/L    AST 42 0 - 45 U/L   Troponin I   Result Value Ref Range    Troponin I ES 0.018 0.000 - 0.045 ug/L   D dimer quantitative   Result Value Ref Range    D Dimer 1.1 (H) 0.0 - 0.50 ug/ml FEU   CRP inflammation   Result Value Ref Range    CRP Inflammation 14.3 (H) 0.0 - 8.0 mg/L   Nt probnp inpatient   Result Value Ref Range    N-Terminal Pro BNP Inpatient 1,158 (H) 0 - 900 pg/mL   CT Chest Pulmonary Embolism w Contrast    Narrative    EXAM: CT CHEST PULMONARY EMBOLISM W CONTRAST  LOCATION: [facility]  DATE/TIME: 7/6/2021 6:33 AM    INDICATION: Acute midsternal chest pain with radiation to the left arm and jaw; History of esophageal reflux, hypertension, interstitial lung disease;  COMPARISON: High-resolution chest CT 07/01/2021  TECHNIQUE: CT chest pulmonary angiogram during arterial phase injection of IV contrast. Multiplanar reformats and MIP reconstructions were performed. Dose reduction techniques were used.   CONTRAST: Isovue 370, 75mL    FINDINGS:  ANGIOGRAM CHEST: Main pulmonary artery measures 3.1 cm and the right and left pulmonary arteries are also mildly enlarged. Pulmonary arteries taper normally as they extend towards the pleura. No pulmonary artery filling defects are present.    Mid ascending aorta measures 4.0 cm in diameter. Mild mixed attenuation atheroma in the arch, proximal left subclavian artery throughout the descending thoracic aorta. No plaque ulceration. No findings to suggest acute aortic syndrome.     LUNGS AND PLEURA: Images were acquired at incomplete inspiration. Pattern of patchy nodular interstitial thickening and perilymphatic nodularity with a mid and upper zone predominance, slightly more extensive on the left than right. New mosaicism in the   lower lobes is due to incomplete inflation. Trachea and central airways are patent. Trace pleural fluid has developed. No right pleural  fluid.    MEDIASTINUM: Mildly enlarged left atrium. Other cardiac chambers are normal in size. Mild mitral annular calcifications are present. Physiologic pericardial fluid.    Retained fluid in the middle and distal thirds of the esophagus. No enlarged mediastinal or hilar lymph nodes. Thyroid gland is heterogeneous without enlargement or discrete nodule.    CORONARY ARTERY CALCIFICATION: Mild.    UPPER ABDOMEN: Prior cholecystectomy and gastric bypass.    MUSCULOSKELETAL: Sclerosis of the right anterior seventh rib from an old rib fracture deformity. There are additional healed fracture deformities of several low anterior left ribs. No acute fracture or aggressive/destructive bone lesion.      Impression    IMPRESSION:    1.  No acute pulmonary embolism or aortopathy. Enlargement of the central pulmonary arteries could relate to pulmonary hypertension.  2.  Pattern of mid and upper zone predominant nodular interstitial thickening and perilymphatic nodules. The pattern is typical of pulmonary involvement by sarcoidosis.  3.  Status post gastric bypass surgery. Patulous, mid to distal esophagus suggesting esophageal dysmotility.  4.  Minimal left pleural fluid has developed of uncertain etiology.  5.  Mid ascending aorta measures 4.0 cm. This is well within 2 standard deviations of the mean for a 60-year-old female and should not be considered aneurysmal.         My focused follow up physical exam shows:   Vitals:  B/P: 111/66, T: 97.4, P: 86, R: 20  Gen:  Pt appears stable and no apparent distress      ED Course & Medical Decision Making:  CT scan came back and was negative for any acute findings.  There is no PE or signs of worsening aneurysm.  Patient was only getting moderate relief with the Dilaudid, patient was given some Toradol and feels significantly better.  I think that the pain is most likely chest wall pain.  I do not think that this is likely cardiac.  We will discharge the patient home.  Patient will  follow up with his doctor later on this week if things are improving.  Patient will be sent home with a few pills of hydrocodone and naproxen.         Impression and Disposition:  Chest pain           This note was completed in part using Dragon voice recognition, and may contain word and grammatical errors.        Domenico Lagunas MD  07/06/21 [id number]

## 2021-07-07 LAB
DLCOUNC-%PRED-PRE: 66 %
DLCOUNC-PRE: 15.44 ML/MIN/MMHG
DLCOUNC-PRED: 23.25 ML/MIN/MMHG
ERV-%PRED-PRE: 93 %
ERV-PRE: 0.8 L
ERV-PRED: 0.86 L
EXPTIME-PRE: 6.28 SEC
FEF2575-%PRED-PRE: 94 %
FEF2575-PRE: 2.31 L/SEC
FEF2575-PRED: 2.44 L/SEC
FEFMAX-%PRED-PRE: 81 %
FEFMAX-PRE: 5.76 L/SEC
FEFMAX-PRED: 7.03 L/SEC
FEV1-%PRED-PRE: 69 %
FEV1-PRE: 1.87 L
FEV1FEV6-PRE: 84 %
FEV1FEV6-PRED: 81 %
FEV1FVC-PRE: 86 %
FEV1FVC-PRED: 80 %
FEV1SVC-PRE: 81 %
FEV1SVC-PRED: 71 %
FIFMAX-PRE: 3.5 L/SEC
FRCPLETH-%PRED-PRE: 92 %
FRCPLETH-PRE: 2.7 L
FRCPLETH-PRED: 2.93 L
FVC-%PRED-PRE: 64 %
FVC-PRE: 2.18 L
FVC-PRED: 3.41 L
IC-%PRED-PRE: 50 %
IC-PRE: 1.49 L
IC-PRED: 2.97 L
RVPLETH-%PRED-PRE: 92 %
RVPLETH-PRE: 1.9 L
RVPLETH-PRED: 2.05 L
TLCPLETH-%PRED-PRE: 74 %
TLCPLETH-PRE: 4.19 L
TLCPLETH-PRED: 5.61 L
VA-%PRED-PRE: 52 %
VA-PRE: 2.98 L
VC-%PRED-PRE: 59 %
VC-PRE: 2.29 L
VC-PRED: 3.82 L

## 2021-07-08 ENCOUNTER — TEAM CONFERENCE (OUTPATIENT)
Dept: PULMONOLOGY | Facility: CLINIC | Age: 61
End: 2021-07-08

## 2021-07-08 ENCOUNTER — TELEPHONE (OUTPATIENT)
Dept: PULMONOLOGY | Facility: CLINIC | Age: 61
End: 2021-07-08

## 2021-07-08 DIAGNOSIS — J84.9 ILD (INTERSTITIAL LUNG DISEASE) (H): Primary | ICD-10-CM

## 2021-07-08 DIAGNOSIS — M35.1 MCTD (MIXED CONNECTIVE TISSUE DISEASE) (H): ICD-10-CM

## 2021-07-08 DIAGNOSIS — D86.9 SARCOIDOSIS: ICD-10-CM

## 2021-07-08 RX ORDER — PREDNISONE 20 MG/1
40 TABLET ORAL DAILY
Qty: 180 TABLET | Refills: 3 | Status: SHIPPED | OUTPATIENT
Start: 2021-07-08 | End: 2021-11-08

## 2021-07-08 NOTE — NURSING NOTE
Sarcoid Conference      Patient Name: Jeanine Schuler    Physician: Beena Cuellar MD     Reason for conference discussion/Specific Question:  Treatment, diagnosis    Radiology Interpretation:   December 2020: Perilymphatic nodules, upper lobe predominate.   April 2021: CT improved. (pt on 40 mg prednisone at this time; after this CT started methotrexate)  July 2021: CT worsened from April 2021. Not a lot of adenopathy. Could support diagnosis of sarcoidosis.   Paco Hoskins MD      Plan:   -Consider stopping methotrexate   -Consider maximizing dose of mycophenolate for mixed CTD  -Increase prednisone back to 40 mg

## 2021-07-08 NOTE — TELEPHONE ENCOUNTER
----- Message from Edel Mcgovern RN sent at 7/8/2021  2:58 PM CDT -----  Beena,     I just spoke with Jeanine and reviewed the meeting outcome, updates to your questions below:    1) Please clarify with her whether she had any adverse effect to full dose (1000mg) CelLCept   No. She has been on 500 mg BID and doing fine.    2) Please check TPMT level   I will place this order and she will make her lab appt at [facility]    3) Go up on prednisone 40mg daily   I will issue refill for her; she has been on 20 mg and agrees to increase.    4) stop methotrexate   She has been completing these injections on Tuesdays; she will stop.     Thank you,    Edel  ----- Message -----  From: Beena Cuellar MD  Sent: 7/8/2021  12:41 PM CDT  To: [facility],    Per today's discussion,     1) agustin clarify with her whether she had any adverse effect to full dose (1000mg) CelLCept  2) Please check TPMT level  3) Go up on prednisone 40mg daily  4) stop methotrexate

## 2021-07-15 ENCOUNTER — LAB (OUTPATIENT)
Dept: LAB | Facility: CLINIC | Age: 61
End: 2021-07-15
Attending: PSYCHIATRY & NEUROLOGY

## 2021-07-15 ENCOUNTER — OFFICE VISIT (OUTPATIENT)
Dept: PULMONOLOGY | Facility: CLINIC | Age: 61
End: 2021-07-15
Attending: INTERNAL MEDICINE
Payer: COMMERCIAL

## 2021-07-15 ENCOUNTER — VIRTUAL VISIT (OUTPATIENT)
Dept: GASTROENTEROLOGY | Facility: CLINIC | Age: 61
End: 2021-07-15
Payer: COMMERCIAL

## 2021-07-15 ENCOUNTER — LAB (OUTPATIENT)
Dept: LAB | Facility: CLINIC | Age: 61
End: 2021-07-15
Attending: INTERNAL MEDICINE

## 2021-07-15 VITALS — DIASTOLIC BLOOD PRESSURE: 87 MMHG | OXYGEN SATURATION: 93 % | SYSTOLIC BLOOD PRESSURE: 132 MMHG | HEART RATE: 83 BPM

## 2021-07-15 VITALS — WEIGHT: 184 LBS | BODY MASS INDEX: 27.17 KG/M2

## 2021-07-15 DIAGNOSIS — M35.1 MCTD (MIXED CONNECTIVE TISSUE DISEASE) (H): ICD-10-CM

## 2021-07-15 DIAGNOSIS — I73.00 RAYNAUD'S DISEASE WITHOUT GANGRENE: ICD-10-CM

## 2021-07-15 DIAGNOSIS — J84.9 ILD (INTERSTITIAL LUNG DISEASE) (H): ICD-10-CM

## 2021-07-15 DIAGNOSIS — R13.19 ESOPHAGEAL DYSPHAGIA: Primary | ICD-10-CM

## 2021-07-15 DIAGNOSIS — M35.9 COLLAGEN VASCULAR DISEASE (H): ICD-10-CM

## 2021-07-15 DIAGNOSIS — K22.4 ESOPHAGEAL SPASM: Primary | ICD-10-CM

## 2021-07-15 DIAGNOSIS — D86.9 SARCOIDOSIS: ICD-10-CM

## 2021-07-15 DIAGNOSIS — E83.59 TUMORAL CALCINOSIS: ICD-10-CM

## 2021-07-15 DIAGNOSIS — Z11.59 ENCOUNTER FOR SCREENING FOR OTHER VIRAL DISEASES: ICD-10-CM

## 2021-07-15 LAB — SARS-COV-2 RNA RESP QL NAA+PROBE: NEGATIVE

## 2021-07-15 PROCEDURE — G0463 HOSPITAL OUTPT CLINIC VISIT: HCPCS

## 2021-07-15 PROCEDURE — U0003 INFECTIOUS AGENT DETECTION BY NUCLEIC ACID (DNA OR RNA); SEVERE ACUTE RESPIRATORY SYNDROME CORONAVIRUS 2 (SARS-COV-2) (CORONAVIRUS DISEASE [COVID-19]), AMPLIFIED PROBE TECHNIQUE, MAKING USE OF HIGH THROUGHPUT TECHNOLOGIES AS DESCRIBED BY CMS-2020-01-R: HCPCS | Mod: 90 | Performed by: PATHOLOGY

## 2021-07-15 PROCEDURE — U0005 INFEC AGEN DETEC AMPLI PROBE: HCPCS | Mod: 90 | Performed by: PATHOLOGY

## 2021-07-15 PROCEDURE — 82164 ANGIOTENSIN I ENZYME TEST: CPT | Mod: 90 | Performed by: PATHOLOGY

## 2021-07-15 PROCEDURE — 99213 OFFICE O/P EST LOW 20 MIN: CPT | Mod: 95 | Performed by: INTERNAL MEDICINE

## 2021-07-15 PROCEDURE — 36415 COLL VENOUS BLD VENIPUNCTURE: CPT | Performed by: PATHOLOGY

## 2021-07-15 PROCEDURE — 82657 ENZYME CELL ACTIVITY: CPT | Mod: 90 | Performed by: PATHOLOGY

## 2021-07-15 PROCEDURE — 99215 OFFICE O/P EST HI 40 MIN: CPT | Performed by: INTERNAL MEDICINE

## 2021-07-15 PROCEDURE — 83520 IMMUNOASSAY QUANT NOS NONAB: CPT | Mod: 90 | Performed by: PATHOLOGY

## 2021-07-15 RX ORDER — NITROGLYCERIN 0.3 MG/1
0.3 TABLET SUBLINGUAL EVERY 5 MIN PRN
Qty: 12 TABLET | Refills: 1 | Status: ON HOLD | OUTPATIENT
Start: 2021-07-15 | End: 2023-01-01

## 2021-07-15 NOTE — LETTER
7/15/2021         RE: Jeanine Schuler  [address]  [facility] [address]        Dear Colleague,    Thank you for referring your patient, Jeanine Schuler, to the [facility] FOR LUNG SCIENCE AND [facility]. Please see a copy of my visit note below.      In Person-Visit       SUBJECTIVE:  The patient returns in f/u of her MCTD/SSC/RP/with h/o multiple DU.      PROBLEM LIST:    1.  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    2.  History of severe multiple digital ulcers, on fingers and toes.   3.  Marked keratoconjunctivitis sicca over the last several years.    4.  Marked iron deficiency anemia responsive to IV iron.    5.  Diffuse musculoskeletal pain   6.  Lower extremity edema managed with lasix and spironolactone   7.  Marked fatigue and diffuse clinical weakness.    8.  Dyspnea on exertion with lower extremity edema and other features including the anticentromere positivity worrisome for the potential development of pulmonary hypertension.    9.  Marked GERD with associated dysphagia.    10.  Status post gastric bypass with a history of intermittent constipation and diarrhea potentially consistent with bacterial small bowel overgrowth versus other gut effects of the prior surgery.    11.  History of positive rheumatoid factor consistent with MCTD, given the remainder of her clinical presentation.     12. Progressive Jaw tightening with inability to open mouth wider than 1 inch.   13. Diastolic dysfunction (3/2013)  14. Hypoalbuminemia  15. ILD cryobiopsy Pathology was reviewed at ILD conference on 10/12/2020 which showed ALI, what appears to be an acute exacerbation of underlying ILD with OP.  Few nonnecrotizing granulomas  16. 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    As noted below given this picture I am uncomfortable with TNF inhibitors as first-line in her for her sarcoid lung disease although they would otherwise be treatment of choice.  I have discussed that with her today.  We have agreed that azathioprine would be the next drug to try in her.  If however she fails to improve on that consideration could be given to rituximab and I would even consider the possibility that we would add TNF inhibitor her to the azathioprine with very careful monitoring of her overall disease state by serologies and close follow-up of her ILD with HRCT and pulmonary function test.    I am suspicious that her recent severe chest pain represented esophageal spasm.  Going against that is the failure of sublingual nitroglycerin to help but she only got a single dose and it sounds like her pain was quite severe by that point.    I have given her some additional sublingual nitroglycerin that she can use in the event of another such episode particular now that she has had cardiovascular etiology for this ruled out.  We have also talked about possible use of altoid mints.  If this recurs but either both of these together are rapidly helpful I think that this is likely explanation.  If it is occurring more frequently consideration could be given to given her diltiazem.    I discussed for her that given her severe esophageal dysmotility and the problem she is having with pill dysphagia that it might be helpful to have Jell-O cubes available to her that she can eat once she is taken pills that are feeling like they are getting stuck to help lubricate her esophagus and work them through.  She will try that.    I would like to see her back in approximately 3 months  sooner as needed.  We did discuss that if she is doing definitely better with the azathioprine that we will need to be try to get her steroid dose down which she understands.    This clinic was 25 minutes in face-to-face time, with an additional 15 minutes spent in chart review, , face-to-face discussion with Dr. Cuellar, and documentation completed on the date of service.    MARTÍN De La Torre MD, PhD    Rheumatology                INTERVAL HISTORY:       AUGUST 22, 2017, INTERVAL HISTORY:  Since we have last seen the patient, she developed a right ankle wound about 2 months ago.  That opened for a while and it became hard to close, but at this point it has finally closed over.       She has had some stressors and she feels like that has contributed to her feeling like she needed more prednisone.  She had made it down to 12 mg a day for almost 6 months but increased to 20 mg a day in June when she was moving to a new house.  This was primarily due to an increase in fatigue and in joint pains.      At this point, her morning stiffness is 30-60 minutes despite remaining on the 20 mg a day of prednisone.  She does think her strength, however, is stable.      She continues to have a lot of Raynaud's phenomena but has not developed any distal digital ulcerations.       Her GERD and keratoconjunctivitis sicca, however, have remained stable and she thinks she has stable exercise tolerance.       She has not done her labs for many, many months and has not seen me for over a year, and we discussed that in some detail.     Feb. 13, 2018 INTERVAL HISTORY:  Since we have last seen her, she made it down to 12.5 mg a day of prednisone but then put herself back up to 20 mg a day because of increasing general body aches and fatigue as well as some inflammatory joint stiffness in her wrists.  Other joints have actually been okay.      She had stopped her methotrexate some time ago.  She works in a hospital  "and knew of several people who were admitted with \"methotrexate toxicity\" who  while in hospital and although she does not know the details, that frightened her enough that she decided to stop the drug.       She is getting worsening Raynaud's.  She really notes that this happens not only with cold, but with a variety of kinds of stress.  She has previously, at least for a short length of time, been on losartan and does not remember whether that helped at all.  I am pretty sure she has also been on calcium channel blockers, but those are relatively contraindicated in her at this point because of persistent bilateral lower extremity edema.       She is getting enough lower extremity edema and has been found to have vascular insufficiency that she will be having some vascular surgery to laser some of these areas, although an exact time for that has not been laid out.       In addition to getting Raynaud's in her hands, she also gets it in her feet, and when she does, potentially contributed to by the vascular insufficiency, she gets numbness in her feet.  She is not getting numbness in her hands by contrast.       The patient did have pulmonary function tests today.  Although she is not exercising regularly, in general she feels her exercise tolerance has been stable, and she is not having any dry cough.       She did have a decrease in her FVC to 3.68 from 4.30 and her DLCO to 21.77 from 24.74, but she has had some rib cage pain for about the last 3 weeks since she had a minor accident while dancing.  Notably then, her TLC is completely stable, going from 6.41 to 6.29.       She denies any other new problems.  Keratoconjunctivitis sicca, GERD and other features have been stable and she denies any dysphagia.     2019 interval history:    Since we have last seen her she did have to miss appointment because of some family issues but she is actually been doing quite well.  Although she is continued to have " some intermittent joint complaints she is been able to taper her prednisone down to 7.5 mg a day the lowest she has been on and the entire time I have been following her.    Although her joints have not worsened with that lower dose she does think she is getting some worsening of her Raynauds phenomena.  It can be pretty bad and the attacks can be hard to break even with rewarming.  She gets them in hands and feet.  Fortunately she has not had any digital ulcers.  She does recall that she was placed temporarily on tadalafil sometime ago by Dr. Bentley but she could not afford it long-term.  She thinks that may have been helpful however.    She denies any significant dyspnea on exertion or cough.  She has some muscle weakness but not marketed and she does not think that is any worse over time.  Fatigue has generally been her worst symptom and that may be slightly worse.    She does have some ongoing keratoconjunctivitis sicca but does not feel like she wants to go on a medicine for that.  She already has a full plate of dentures in both upper and lower.    February 4, 2021 interval history:    Since I have last seen the patient she has had pulmonary follow-up, and because of the atypical infiltrates on high-resolution CT scan had a cryobiopsy.  The read of that as above in the problem list.  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    She was placed on 40 mg daily prednisone and tapered by 5 mg every several weeks.  When she got down to 25 mg a day several weeks ago she started developing intermittent but fairly severe numbness of the right lower extremity below the knee.  She has not noted a clear-cut rash there and she has no pain there just numbness.  She always has some cyanosis there and is not sure that that is worse.  She is getting a lot of cyanosis in her hands as well and at times her left thumb goes completely white.  She  has not had development of any digital ischemia however.    She denies any asymmetric swelling of the leg or elsewhere.  She also denies any significant new rashes.    She continues to have some keratoconjunctivitis sicca but the actual dryness is largely limited to her mouth and if anything she has been having more watery eyes than usual.    Her biggest symptom other than for the new neuropathy is actually fatigue.  That has been very bad for her for a long time but she feels it is currently even worse.  She is uncertain how much it has worsened with the steroid taper per se.        ROS as per HPI.  10-point ROS is otherwise negative.    HISTORY REVIEW:  Past Medical History:   Diagnosis Date     Anemia      Cellulitis of leg 6/6/2013     Esophageal reflux     Better post-hiatal hernia repair and weight loss     Limb ischemia; ulcers of fingertips 4/22/2013     Raynaud's syndrome      Scleroderma (H)      Systemic sclerosis (H) 2009     Unspecified essential hypertension        Past Surgical History:   Procedure Laterality Date     BYPASS GASTRIC, CHOLECYSTECTOMY, COMBINED       CHOLECYSTECTOMY       COLONOSCOPY       ESOPHAGOSCOPY, GASTROSCOPY, DUODENOSCOPY (EGD), COMBINED N/A 3/10/2016    Procedure: COMBINED ESOPHAGOSCOPY, GASTROSCOPY, DUODENOSCOPY (EGD), BIOPSY SINGLE OR MULTIPLE;  Surgeon: Tricia Zambrano MD;  Location:  GI     INNER EAR SURGERY       PICC INSERTION  6/5/2013    5fr DL Power PICC, 44cm, left basilic vein, tip in low SVC     SURGICAL HISTORY OF -       Left ear surgery - incus transition, tympanoplasty     SURGICAL HISTORY OF -   6/05    Gastric bypass     SURGICAL HISTORY OF -   6/05    Cholecystectomy     SURGICAL HISTORY OF -   6/05    Hiatal hernia repair     TONSILLECTOMY         Family History   Problem Relation Age of Onset     Cerebrovascular Disease Father      WHITLEY. Father      WHITLEY. Mother      BETITOAMECHELLE. 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      Social History Main Topics     Smoking status: Never Smoker      Smokeless tobacco: Never Used     Alcohol Use: Yes      occassionally     Drug Use: No     Sexually Active: Yes -- Male partner(s)     Birth Control/ Protection: None     Social History Narrative     Was  in December 2015. Longtime partner from [place].       Patient Active Problem List   Diagnosis     Essential hypertension     Thyrotoxicosis     Obesity     Prolonged QTc 460 ms,  at hr 67     Low back pain - Suspect SI Joint dysfunction     Cough     Systemic sclerosis (H)     Tumoral calcinosis     Shortness of breath     Edema of both legs     Abnormal albumin     Myopathy     Hypoalbuminemia     Diastolic dysfunction     Limb ischemia; ulcers of fingertips     Cellulitis of leg     Other specified diffuse disease of connective tissue     Disturbed sleep rhythm     Pharyngeal dysphagia     Gastroesophageal reflux disease, esophagitis presence not specified     Rheumatoid arthritis with negative rheumatoid factor, involving unspecified site (H)     Raynaud's disease without gangrene       Allergies   Allergen Reactions     Lovenox Other (See Comments)     Blood clot      Norvasc [Amlodipine Besylate] Swelling     Lip swelling that happened on 2 different occasions     Erythromycin Rash     Rash on arms     OBJECTIVE:  Vitals: Reviewed  Gen: A+Ox3, NAD  HEENT: NCAT, EOMI  Pulm: CTAB  CVS: RRR, no m/r/g  Skin: purpuric patches on fingertips and toes, no ulcerations/erosions. There is platelike xerosis of lower extremities; hyperpigmented nonblanchable ovoid patches on shins, mildly tender  -mild sclerosis of distal fingers  Msk: No active synovitis        Component      Latest Ref Rng 1/26/2016   WBC      4.0 - 11.0 10e9/L 9.2   RBC Count      3.8 - 5.2 10e12/L 4.59   Hemoglobin      11.7 - 15.7 g/dL 13.2   Hematocrit      35.0 - 47.0 % 41.8   MCV      78 - 100 fl 91   MCH      26.5 - 33.0 pg 28.8   MCHC      31.5 - 36.5 g/dL 31.6   RDW      10.0 - 15.0 % 16.7 (H)   Platelet Count      150 - 450 10e9/L 195   Diff Method       Automated Method   % Neutrophils       62.9   % Lymphocytes       27.0   % Monocytes       9.1   % Eosinophils       0.4   % Basophils       0.2   % Immature Granulocytes       0.4   Nucleated RBCs      0 /100 0   Absolute Neutrophil      1.6 - 8.3 10e9/L 5.8   Absolute Lymphocytes      0.8 - 5.3 10e9/L 2.5   Absolute Monocytes      0.0 - 1.3 10e9/L 0.8   Absolute Eosinophils      0.0 - 0.7 10e9/L 0.0   Absolute Basophils      0.0 - 0.2 10e9/L 0.0   Abs Immature Granulocytes      0 - 0.4 10e9/L 0.0   Absolute Nucleated RBC       0.0   Color Urine       Yellow   Appearance Urine       Clear   Glucose Urine      NEG mg/dL Negative   Bilirubin Urine      NEG Negative   Ketones Urine      NEG mg/dL Negative   Specific Gravity Urine      1.003 - 1.035 1.010   Blood Urine      NEG Negative   pH Urine      5.0 - 7.0 pH 6.0   Protein Albumin Urine      NEG mg/dL Negative   Urobilinogen mg/dL      0.0 - 2.0 mg/dL Normal   Nitrite Urine      NEG Negative   Leukocyte Esterase Urine      NEG Negative   Source       Midstream Urine   WBC Urine      0 - 2 /HPF <1   RBC Urine      0 - 2 /HPF <1   Squamous Epithelial /HPF Urine      0 - 1 /HPF <1   Creatinine      0.52 - 1.04 mg/dL 1.11 (H)   GFR Estimate      >60 mL/min/1.7m2 51 (L)   GFR Estimate If Black      >60 mL/min/1.7m2 62   ALT      0 - 50 U/L 23   AST      0 - 45 U/L 16   Albumin      3.4 - 5.0 g/dL 3.4   CRP Inflammation      0.0 - 8.0 mg/L <2.9   Sed Rate      0 - 30 mm/h 9   CK Total      30 - 225 U/L 38   Aldolase       4.1     Biopsy from 3/10 EGD:  Esophagus, gastroesophageal junction,  biopsies:   - Squamous mucosa and submucosa with marked chronic inflammation and   fibrosis   - No evidence of intestinal metaplasia or dysplasia     August 11, 2020 interval history:    Since we last seen the patient she feels she was doing relatively stably on till July 7.  At that point she had the abrupt onset of a bad dry cough.  She did not have significant fevers but did not feel well.  She was seen had a negative coronavirus test but a chest x-ray apparently showed evidence of some infiltrates and she was placed on doxycycline.  She says she did not improve with that therapy and a CT angios was performed.  That showed bilateral pulmonary consolidation thought consistent with infection.  There was note that the pattern could actually be somewhat typical for sarcoid but it was noted to have worsened since the prior study performed there at [facility] in October 2019.  She also was noted to have mediastinal lymphadenopathy and debris in the esophagus potentially consistent with her systemic sclerosis.  No pulmonary embolism was found.  Based on the results of that she was placed on Levaquin.  She continued to fail to improve and a third antibiotic was also tried.    Finally she was seen by pulmonologist and a bronchoscopy was performed.  I am able to review the results of that and there was a high percentage of neutrophils and lymphocytes and that BAL fluid.  Viral cultures however Gram stain and culture were all unremarkable and there were no malignant cells found.    She was placed on 40 mg a day of prednisone at the time of the bronchoscopy once it was clear that there was no acute infection.  She does not have a follow-up appointment yet.  Although her cough is mostly gone she does not feel good and feels very dyspneic just going from the bed to the bathroom or crossed the room.  She is not convinced she has had any improvement with 40 mg daily prednisone and if anything think she is worse.    In  reviewing her extensive record I see that a repeat TARA was performed and a reflexive was done when it was positive.  She continues to have an anticentromere antibody which she had previously, but now notably has a high titer Gonzalez and high titer RNP antibody.  She previously was borderline positive for RNP and Gonzalez negative.  SSA is also higher titer than it was previously.    With all these autoantibody positivity she however denies any specific features that would suggest systemic lupus.  She specifically denies sun sensitivity sun sensitive rashes including a malar rash, morning stiffness in any of her joints or any joint swelling and any pleurisy at the time of her  at CT angiogram or otherwise.  \  Her Raynaud's phenomena has been stable as have other features of her limited cutaneous systemic sclerosis.        February 25, 2021 interval history:    At the time that I saw the patient about 3 weeks ago we decided that because her neuropathy sounded rather rapidly progressive, and her HRCT scan was being read as potentially consistent with sarcoidosis, that neurosarcoid was on the differential.  I also wondered about an immune complex mediated small vessel vasculitis causing neuropathy.  We therefore did laboratory testing as well as started her on some steroid.    Laboratory testing has been largely unrevealing.  She did not have elevated calcium or elevated calcium in the urine.  I did also do a parathyroid hormone in case we did find that however her parathyroid hormone was significantly elevated.  Her vitamin D was very low and she has started supplementation for that.    Most notably however her prednisone at 40 mg a day virtually resolved her symptoms over 3 to 4 days.  She had at least an 80% decrease in her pain and numbness although she still has some residual tingling in the legs bilaterally.    She is having some prednisone side effects.  Specifically she is having a hard time sleeping and is also  feeling irritable which she thinks could be due partly due to the loss of sleep but partly directly due to the prednisone.  She has got a lot of prior experience with prednisone and has had some difficulties with it before.    She did have some right-sided chest pain.  That seem to occur with a deep breath.  She has a cardiologist and saw them will be getting a follow-up echo.  She is no longer having that.    In going through her history she does not believe she is ever been on Imuran and is quite sure and I am sure as well that we have never had her on TNF inhibition.  She was on methotrexate and tolerated oral methotrexate poorly due to nausea but was better able to tolerate subcutaneous methotrexate.      Impression:    Per the problem list, with known RNP positivity and more recently with this HRCT finding that is concerning for the possibility of overlapping sarcoidosis.    She had a very nice response to steroid quite quickly, and so we now have the conundrum of not knowing exactly why.  I certainly suspect that she has an immune mediated neuropathy given this rapid response and neurosarcoid is a concern.    I have sent a message to Dr. Cuellar in pulmonary for her thoughts.  I have also ordered a repeat HRCT that to be done in another week or so before she is seen back in pulmonary.    I am hoping these can be reviewed so that there could be a decision whether we can feel strongly enough that presumptively this could be sarcoidosis to put her on appropriate therapy for that or whether a biopsy might be justified.    We could simply presumptively try steroid sparing medications.  I am somewhat concerned about using TNF inhibitors however unless we are pretty confident this is sarcoid, as they have been associated with worsening of systemic lupus patients and autoimmune associated interstitial lung disease in some settings.    We also have the  finding of the elevated PTH, and very low vitamin D levels, which  can be seen in sarcoidosis, but is unclear here given normal Calcium levels. and will refer her to endocrinology for that.    I have given her prescription for temazepam to help her with the sleep and irritability.  I did warn her to be careful about driving in the morning until she knows how it affects her as it does have a long half-life.    I will plan on seeing her back in about 6 to 8 weeks sooner as needed.      April 8, 2021 interval history:    Since we have last seen the patient she has had initial evaluation done, and Dr. Cuellar has a recent note on the chart and an addendum placed a day after the patient's studies were reviewed in sarcoid conference.    The upshot of that is that there is evidence from her studies, in particular the biopsy studies that suggest that this could be sarcoidosis.  Because she has neurologic symptoms and inadequately explain dyspnea on exertion she will also be getting neurology evaluation and cardiac evaluation.    She remains at this time on 40 mg a day of prednisone.  She is not liking this dose.  Although she still has dyspnea on exertion she also is irritable and on edge on that dose and is also experiencing some nausea..    After consideration of the issues, Dr. Cuellar had recommended the patient go on track today.  She has in fact been on both this and MMF before and although she had some difficulties at time with tolerating these medication she think she would tolerate them again.  We have briefly discussed possibility of using a TNF inhibitor but that does give me pause, given that she has had features of mixed connective tissue disease and in TARA positive individuals TNF inhibitors have at times worsen their disease process, particularly in those with lupus associated autoantibodies.  She has had in the past of borderline positive RNP.    She believes most of her other historical clinical features are stable.  She still gets pretty bad Raynaud's phenomena and some  features of arthritis although the inflammatory features are currently pretty well controlled on this dose of prednisone.    July 15, 2021 interval history:    Since we have last seen the patient she has continued to have a lot of dyspnea on exertion despite being on the MMF 40 mg a day of prednisone and until just earlier this week methotrexate.    Because of her lung biopsy showing features consistent with sarcoidosis, she was presented at sarcoidosis conference last week.  I attended this discussion.  After much discussion it was decided that azathioprine may be the next drug to try in her.  This was influenced in part by my concerns of giving a TNF inhibitor to her given her underlying autoimmune connective tissue disease, and the possibility of that disease process worsening with TNF inhibition as has been described and as I have seen previously albeit rarely.    She did just get TPMT levels earlier today to help decide what kind of azathioprine dose to start with.  She continues on MMF.    She will be getting a nerve biopsy performed on Monday.    She also was recently in the emergency room because of very severe midsternal chest pain.  She says this developed during the night.  When it would not go away she went to the ER.  There she was given narcotics after first getting a single sublingual nitroglycerin that did not help.  Extensive cardiopulmonary work-up was negative for any evidence of ischemia or clot.  Eventually she got Toradol after having gotten narcotics and did improve and went home.    She has had some costochondral type pains before but nothing like this and this was not exacerbated or relieved by pressure over the area.    Notably, she is been having worsening problems with GERD and dysphagia as well.  She just spoke with Dr. Cárdenas earlier this morning.  She is getting a lot of pill dysphagia at this point which is very frustrating for her and very uncomfortable.  She had dilatation in the fall  and thought it helped for a while but she says this almost feels like it is just a dysmotility problem rather than stricturing to her.    Again, thank you for allowing me to participate in the care of your patient.        Sincerely,        Nicolás De La Torre MD

## 2021-07-15 NOTE — PROGRESS NOTES
"Jeanine Schuler is a 60 year old female who is being evaluated via a billable video visit.      Please send link to email:  [phone]    The patient has been notified of following:     \"This video visit will be conducted via a call between you and your physician/provider. We have found that certain health care needs can be provided without the need for an in-person physical exam.  This service lets us provide the care you need with a video conversation.  If a prescription is necessary we can send it directly to your pharmacy.  If lab work is needed we can place an order for that and you can then stop by our lab to have the test done at a later time.    If during the course of the call the physician/provider feels a video visit is not appropriate, you will not be charged for this service.\"     Patient confirmed that they are in [place] for today's visit yes.    Video-Visit Details  Type of service:  Video Visit    Video Start Time: 10:51 AM  Video End Time:  11:30    Originating Location (pt. Location): Home    Distant Location (provider location):  [facility] [place]     Platform used: [facility] Gastroenterology Consultation [facility]    Provider: Jamie Cárdenas MD    PCP:   Katia Boss MD    Assessment:  Esophageal dysphagia related to MCTD, Severe GERD  On Naprosyn and Potassium PO (high risk for pill esophagitis).     Recommendations:   Patient would like to focus on more pressing issues  If things worse check Esophagram and or EGD.    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      1. Cough, dysphagia, GERD, weight loss  Patient has had a history of GERD " and dysphagia, with symptoms worse in the summer around the same time of worsening cough (undergoing workup with pulmonology).  Does have history of grade B esophagitis on most recent upper endoscopy, repeat EGD to assess for extent healing was not completed.  Also did not complete esophagram and gastric emptying study.  Given worsening symptoms in addition to significant weight loss, would recommend evaluation for upper endoscopy for esophagitis, Schatzki ring, and esophageal malignancy.  It is also possible that symptoms are due to underlying scleroderma and altered esophageal motility.  Discussed good swallowing hygiene and lifestyle modifications for GERD.  Would recommend following up with esophageal specialist with future consideration of manometry, potentially thoracic surgery referral to discuss hiatal hernia.     In regards to weight loss, patient reports no significant change in diet.  Has had chest imaging without malignancy.  Will evaluate for GI malignancy with colonoscopy and upper endoscopy.  TSH is in with within normal limits.  Is scheduled for mammogram, and has had recent Pap.  Pending results of endoscopic work-up, could consider CT scan of abdomen and pelvis.  --Upper endoscopy to evaluate symptoms  -- Swallowing recommendations: take small bites and drink sips of water in between. Look forward when you swallow and tuck your chin. Do not talk and eat at the same time.   GERD Lifestyle Modifications  -- Avoid foods high in fat or high fructose corn syrup  -- do not eat within three hours of laying down or going to bed  -- raise the head of your bed 6-8 inches  -- avoid alcohol  -- aim for BMI of less than <25 and lose extra weight as needed  -- continue pantoprazole 40 mg a day     RTC 4-6 weeks with esophageal specialist       EGD 11/2020  Findings:        The lumen of the esophagus was moderately dilated.        Abnormal motility was noted in the esophagus. There is a decrease in         motility of the esophageal body. The distal esophagus/lower esophageal        sphincter is patulous. Normal peristalsis not noted.        LA Grade B (one or more mucosal breaks greater than 5 mm, not extending        between the tops of two mucosal folds) esophagitis with no bleeding was        found 36 to 38 cm from the incisors. Biopsies were taken with a cold        forceps for histology.        Evidence of a Eleazar-en-Y anastomosis was found in the gastric fundus.        This was characterized by healthy appearing mucosa. Biopsies were taken        with a cold forceps for histology.        The examined jejunum was normal.                                                                                     Impression:          - Dilation in the entire esophagus.                        - Abnormal esophageal motility, consistent with                        scleroderma. Biopsied.                        - A Eleazar-en-Y anastomosis was found, characterized by                        healthy appearing mucosa. Biopsied.                        - Normal examined jejunum.       Colonoscopy 11/2020  Findings:        A 6 mm polyp was found in the proximal sigmoid colon. The polyp was        sessile. The polyp was removed with a jumbo cold forceps. Resection and        retrieval were complete.        Multiple small-mouthed diverticula were found in the sigmoid colon.        The colon (entire examined portion) was significantly redundant.        The exam was otherwise normal throughout the examined colon.        The terminal ileum appeared normal.       Pathology 11/2020  A: Gastric biopsies   B: Esophageal biopsies   C: Sigmoid colon polyp     FINAL DIAGNOSIS:   A. STOMACH, BIOPSY:   - Gastric mucosa with minimal inflammation   - No H. pylori like organisms identified on routine staining   - Negative for intestinal metaplasia or dysplasia     B. ESOPHAGUS, BIOPSY:   - Squamous epithelium with acute inflammation and ulceration   -  "Negative for HSV and CMV by immunohistochemistry   - PAS special stain is negative for fungal elements     C. COLON, SIGMOID, POLYPECTOMY:   - Hyperplastic polyp     High Res Chest CT 7/1/2021  IMPRESSION: Interval increase in multifocal centrilobular groundglass  and micronodular opacities with increased basilar predominant  subpleural reticulations. Findings suggestive for an interstitial lung  disease process including sarcoidosis or HP although superimposed  atypical infection are difficult to exclude.    Since January spiraling down hill, neuropathy in legs in her hands and feet.  Food is getting stuck    Is there anything there you can.      Current Outpatient Medications   Medication Sig Dispense Refill     benzonatate (TESSALON) 100 MG capsule Take 1 capsule (100 mg) by mouth three times a day as needed for cough 60 capsule 0     folic acid (FOLVITE) 1 MG tablet Take 1 tablet (1 mg) by mouth daily 90 tablet 3     furosemide (LASIX) 40 MG tablet Take 40 mg by mouth daily       hydroxychloroquine (PLAQUENIL) 200 MG tablet Take 1 tablet (200 mg) by mouth twice a day. Annual Plaquenil toxicity eye screening required. 180 tablet 1     insulin syringe-needle U-100 (BD INSULIN SYRINGE ULTRAFINE) 30G X 1/2\" 1 ML miscellaneous 1 Syringe once a week Use one syringe weekly  or as directed. 100 each 0     losartan (COZAAR) 50 MG tablet Take 0.5 tablets (25 mg) by mouth daily 45 tablet 3     methotrexate 50 MG/2ML injection Inject 0.4 mLs (10 mg) Subcutaneous every 7 days : Increase dose by 0.1 mL q 2 wks until @ 0.8mL q 7 d. 4 mL 2     mycophenolate (GENERIC EQUIVALENT) 250 MG capsule Take 1 capsule (250 mg) by mouth 2 times daily : increase to 500 mg bid in 2 wks if tolerated. 120 capsule 1     naproxen (NAPROSYN) 500 MG tablet Take 1 tablet (500 mg) by mouth 2 times daily as needed for moderate pain 10 tablet 0     nitroFURantoin macrocrystal-monohydrate (MACROBID) 100 MG capsule Take 1 capsule (100 mg) by mouth 2 " times daily 14 capsule 0     omeprazole (PRILOSEC) 20 MG DR capsule Take 1 capsule (20 mg) by mouth 2 times daily 180 capsule 3     pantoprazole (PROTONIX) 40 MG EC tablet        Potassium (POTASSIMIN PO) 50mg 3 times daily       predniSONE (DELTASONE) 20 MG tablet Take 2 tablets (40 mg) by mouth daily 180 tablet 3     spironolactone (ALDACTONE) 25 MG tablet Take 25 mg by mouth       sulfamethoxazole-trimethoprim (BACTRIM) 400-80 MG tablet Take 1 tablet by mouth once daily. 30 tablet 3     temazepam (RESTORIL) 15 MG capsule Take 1 capsule (15 mg) by mouth nightly as needed for sleep 30 capsule 1     vitamin D2 (ERGOCALCIFEROL) 25773 units (1250 mcg) capsule Take 50,000 Units by mouth once a week         Past Surgical History:   Procedure Laterality Date     BRONCHOSCOPY FLEXIBLE,L CRYOBIOPSY N/A 10/06/2020    Procedure: BRONCHOSCOPY, FLEXIBLE, WITH TRANSBRONCHIAL BIOPSY x4 USING CRYOPROBE, bronchial alveolar lavage;  Surgeon: Teddy Mendez MD;  Location:  OR     BYPASS GASTRIC, CHOLECYSTECTOMY, COMBINED  06/27/2005    [facility]     CHOLECYSTECTOMY       COLONOSCOPY       COLONOSCOPY N/A 11/17/2020    Procedure: COLONOSCOPY, WITH POLYPECTOMY AND BIOPSY;  Surgeon: Jamie Cárdenas MD;  Location: [facility] OR     ESOPHAGOSCOPY, GASTROSCOPY, DUODENOSCOPY (EGD), COMBINED N/A 03/10/2016    Procedure: COMBINED ESOPHAGOSCOPY, GASTROSCOPY, DUODENOSCOPY (EGD), BIOPSY SINGLE OR MULTIPLE;  Surgeon: Tricia Zambrano MD;  Location: [facility]     ESOPHAGOSCOPY, GASTROSCOPY, DUODENOSCOPY (EGD), COMBINED N/A 11/17/2020    Procedure: ESOPHAGOGASTRODUODENOSCOPY, WITH BIOPSY and Dilation;  Surgeon: Jamie Cárdenas MD;  Location: [facility] OR     INNER EAR SURGERY       PICC INSERTION  06/05/2013    5fr DL Power PICC, 44cm, left basilic vein, tip in low SVC     SURGICAL HISTORY OF -       Left ear surgery - incus transition, tympanoplasty     SURGICAL HISTORY OF -   06/01/2005    Hiatal hernia repair     TONSILLECTOMY          Past Medical History:   Diagnosis Date     Anemia      Bariatric surgery status 06/27/2005    [facility];     Cellulitis of leg 06/06/2013     Esophageal reflux     Better post-hiatal hernia repair and weight loss     Hyperplastic colonic polyp      Hypovitaminosis D 2011     ILD (interstitial lung disease) (H)      Kidney stone 04/29/2007     Kidney stone 05/10/2007    surgically removed     Limb ischemia; ulcers of fingertips 04/22/2013     Raynaud's syndrome      Rheumatoid arthritis (H) \     Scleroderma (H)      Sjogren-Jake syndrome      Systemic sclerosis (H) 2009     Unspecified essential hypertension        Family History   Problem Relation Age of Onset     Cerebrovascular Disease Father      Heart Disease Father      Hypertension Father      Heart Disease Mother      Hypertension Mother      Chronic Obstructive Pulmonary Disease Sister      Heart Disease Sister      Hypertension Sister      Cerebrovascular Disease Brother      Heart Disease Brother      Kidney Disease Brother         on dialysis     Diabetes Brother         borderline     No Known Problems Son      No Known Problems Son      No Known Problems Daughter      Cancer Brother         small cell     Heart Disease Brother      Heart Disease Brother      Heart Disease Brother      Heart Disease Brother         tripple A     Heart Disease Sister      Substance Abuse Sister         alcoholism     Crohn's Disease No family hx of      Ulcerative Colitis No family hx of      GERD No family hx of      Celiac Disease No family hx of      Nephrolithiasis No family hx of      Parathyroid Disorders No family hx of      Calcium Disorder No family hx of         reports that she has never smoked. She has never used smokeless tobacco. She reports current alcohol use. She reports that she does not use drugs.    Physical Exam

## 2021-07-15 NOTE — PATIENT INSTRUCTIONS
It was a pleasure taking care of you today. I've included a brief summary of our discussion and care plan from today's visit below.  Please review this information with your primary care provider.  _______________________________________________________________________    My recommendations are summarized as follows:     Recommendations:   Patient would like to focus on more pressing issues  If things worse check Esophagram and or EGD.    Follow up PRN    Return to GI Clinic in PRN to review your progress.     If you need any follow-up appointments, please use the following phone numbers below.    To schedule or reschedule a follow-up GI appointment, call [phone] option 1    To schedule your endoscopy procedure, call [phone] option 2    To schedule imaging, please call [phone]     To schedule your lab appointment at [address] floor lab call [phone]. Call your [facility] lab directly if it is not [facility]. If you use a non-[facility] lab, please let us know where to fax your lab order (call Gloria at [phone]).      _______________________________________________________________________    Please be in touch if there are any further questions that arise following today's visit.  There are multiple ways to contact your gastroenterology care team.      During business hours, you may reach your gastroenterology RN Care Coordinator, Gloria Pyle, at [phone].      You can always send a secure message through get2play. get2play messages are answered by your nurse or doctor typically within 24 hours. Please allow extra time on weekends and holidays.     What is get2play?  get2play is a secure way for you to access all of your healthcare records from the [facility].  It is a web based computer program, so you can sign on to it from any location.  It also allows you to send secure messages to your care team.  I recommend signing up for get2play access if you have  not already done so and are comfortable with using a computer.     For urgent/emergent questions after business hours, you may reach the on-call GI Fellow by contacting the [facility]  at [phone].     How will I get the results of any tests ordered?    You will receive all of your results.  If you have signed up for [employer], any tests ordered at your visit will be available to you after your physician reviews them.  Typically this takes 1-2 weeks.  If there are urgent results that require a change in your care plan, your physician or nurse will call you to discuss the next steps.      Thank you for choosing [facility]!       Sincerely,    Jamie Cárdenas MD  [facility]  Division of Gastroenterology

## 2021-07-16 NOTE — PROGRESS NOTES
In Person-Visit       SUBJECTIVE:  The patient returns in f/u of her MCTD/SSC/RP/with h/o multiple DU.      PROBLEM LIST:    1.  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    2.  History of severe multiple digital ulcers, on fingers and toes.   3.  Marked keratoconjunctivitis sicca over the last several years.    4.  Marked iron deficiency anemia responsive to IV iron.    5.  Diffuse musculoskeletal pain   6.  Lower extremity edema managed with lasix and spironolactone   7.  Marked fatigue and diffuse clinical weakness.    8.  Dyspnea on exertion with lower extremity edema and other features including the anticentromere positivity worrisome for the potential development of pulmonary hypertension.    9.  Marked GERD with associated dysphagia.    10.  Status post gastric bypass with a history of intermittent constipation and diarrhea potentially consistent with bacterial small bowel overgrowth versus other gut effects of the prior surgery.    11.  History of positive rheumatoid factor consistent with MCTD, given the remainder of her clinical presentation.     12. Progressive Jaw tightening with inability to open mouth wider than 1 inch.   13. Diastolic dysfunction (3/2013)  14. Hypoalbuminemia  15. ILD cryobiopsy Pathology was reviewed at ILD conference on 10/12/2020 which showed ALI, what appears to be an acute exacerbation of underlying ILD with OP.  Few nonnecrotizing granulomas  16. 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    As noted below given this picture I am  uncomfortable with TNF inhibitors as first-line in her for her sarcoid lung disease although they would otherwise be treatment of choice.  I have discussed that with her today.  We have agreed that azathioprine would be the next drug to try in her.  If however she fails to improve on that consideration could be given to rituximab and I would even consider the possibility that we would add TNF inhibitor her to the azathioprine with very careful monitoring of her overall disease state by serologies and close follow-up of her ILD with HRCT and pulmonary function test.    I am suspicious that her recent severe chest pain represented esophageal spasm.  Going against that is the failure of sublingual nitroglycerin to help but she only got a single dose and it sounds like her pain was quite severe by that point.    I have given her some additional sublingual nitroglycerin that she can use in the event of another such episode particular now that she has had cardiovascular etiology for this ruled out.  We have also talked about possible use of altoid mints.  If this recurs but either both of these together are rapidly helpful I think that this is likely explanation.  If it is occurring more frequently consideration could be given to given her diltiazem.    I discussed for her that given her severe esophageal dysmotility and the problem she is having with pill dysphagia that it might be helpful to have Jell-O cubes available to her that she can eat once she is taken pills that are feeling like they are getting stuck to help lubricate her esophagus and work them through.  She will try that.    I would like to see her back in approximately 3 months sooner as needed.  We did discuss that if she is doing definitely better with the azathioprine that we will need to be try to get her steroid dose down which she understands.    This clinic was 25 minutes in face-to-face time, with an additional 15 minutes spent in chart review,  ", face-to-face discussion with Dr. Cuellar, and documentation completed on the date of service.    MARTÍN De La Torre MD, PhD    Rheumatology                INTERVAL HISTORY:       2017, INTERVAL HISTORY:  Since we have last seen the patient, she developed a right ankle wound about 2 months ago.  That opened for a while and it became hard to close, but at this point it has finally closed over.       She has had some stressors and she feels like that has contributed to her feeling like she needed more prednisone.  She had made it down to 12 mg a day for almost 6 months but increased to 20 mg a day in  when she was moving to a new house.  This was primarily due to an increase in fatigue and in joint pains.      At this point, her morning stiffness is 30-60 minutes despite remaining on the 20 mg a day of prednisone.  She does think her strength, however, is stable.      She continues to have a lot of Raynaud's phenomena but has not developed any distal digital ulcerations.       Her GERD and keratoconjunctivitis sicca, however, have remained stable and she thinks she has stable exercise tolerance.       She has not done her labs for many, many months and has not seen me for over a year, and we discussed that in some detail.     2018 INTERVAL HISTORY:  Since we have last seen her, she made it down to 12.5 mg a day of prednisone but then put herself back up to 20 mg a day because of increasing general body aches and fatigue as well as some inflammatory joint stiffness in her wrists.  Other joints have actually been okay.      She had stopped her methotrexate some time ago.  She works in a hospital and knew of several people who were admitted with \"methotrexate toxicity\" who  while in hospital and although she does not know the details, that frightened her enough that she decided to stop the drug.       She is getting worsening Raynaud's.  She really notes that this " happens not only with cold, but with a variety of kinds of stress.  She has previously, at least for a short length of time, been on losartan and does not remember whether that helped at all.  I am pretty sure she has also been on calcium channel blockers, but those are relatively contraindicated in her at this point because of persistent bilateral lower extremity edema.       She is getting enough lower extremity edema and has been found to have vascular insufficiency that she will be having some vascular surgery to laser some of these areas, although an exact time for that has not been laid out.       In addition to getting Raynaud's in her hands, she also gets it in her feet, and when she does, potentially contributed to by the vascular insufficiency, she gets numbness in her feet.  She is not getting numbness in her hands by contrast.       The patient did have pulmonary function tests today.  Although she is not exercising regularly, in general she feels her exercise tolerance has been stable, and she is not having any dry cough.       She did have a decrease in her FVC to 3.68 from 4.30 and her DLCO to 21.77 from 24.74, but she has had some rib cage pain for about the last 3 weeks since she had a minor accident while dancing.  Notably then, her TLC is completely stable, going from 6.41 to 6.29.       She denies any other new problems.  Keratoconjunctivitis sicca, GERD and other features have been stable and she denies any dysphagia.     July 11, 2019 interval history:    Since we have last seen her she did have to miss appointment because of some family issues but she is actually been doing quite well.  Although she is continued to have some intermittent joint complaints she is been able to taper her prednisone down to 7.5 mg a day the lowest she has been on and the entire time I have been following her.    Although her joints have not worsened with that lower dose she does think she is getting some worsening  of her Raynauds phenomena.  It can be pretty bad and the attacks can be hard to break even with rewarming.  She gets them in hands and feet.  Fortunately she has not had any digital ulcers.  She does recall that she was placed temporarily on tadalafil sometime ago by Dr. Bentley but she could not afford it long-term.  She thinks that may have been helpful however.    She denies any significant dyspnea on exertion or cough.  She has some muscle weakness but not marketed and she does not think that is any worse over time.  Fatigue has generally been her worst symptom and that may be slightly worse.    She does have some ongoing keratoconjunctivitis sicca but does not feel like she wants to go on a medicine for that.  She already has a full plate of dentures in both upper and lower.    February 4, 2021 interval history:    Since I have last seen the patient she has had pulmonary follow-up, and because of the atypical infiltrates on high-resolution CT scan had a cryobiopsy.  The read of that as above in the problem list.  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    She was placed on 40 mg daily prednisone and tapered by 5 mg every several weeks.  When she got down to 25 mg a day several weeks ago she started developing intermittent but fairly severe numbness of the right lower extremity below the knee.  She has not noted a clear-cut rash there and she has no pain there just numbness.  She always has some cyanosis there and is not sure that that is worse.  She is getting a lot of cyanosis in her hands as well and at times her left thumb goes completely white.  She has not had development of any digital ischemia however.    She denies any asymmetric swelling of the leg or elsewhere.  She also denies any significant new rashes.    She continues to have some keratoconjunctivitis sicca but the actual dryness is largely limited to her mouth and  if anything she has been having more watery eyes than usual.    Her biggest symptom other than for the new neuropathy is actually fatigue.  That has been very bad for her for a long time but she feels it is currently even worse.  She is uncertain how much it has worsened with the steroid taper per se.        ROS as per HPI.  10-point ROS is otherwise negative.    HISTORY REVIEW:  Past Medical History:   Diagnosis Date     Anemia      Cellulitis of leg 6/6/2013     Esophageal reflux     Better post-hiatal hernia repair and weight loss     Limb ischemia; ulcers of fingertips 4/22/2013     Raynaud's syndrome      Scleroderma (H)      Systemic sclerosis (H) 2009     Unspecified essential hypertension        Past Surgical History:   Procedure Laterality Date     BYPASS GASTRIC, CHOLECYSTECTOMY, COMBINED       CHOLECYSTECTOMY       COLONOSCOPY       ESOPHAGOSCOPY, GASTROSCOPY, DUODENOSCOPY (EGD), COMBINED N/A 3/10/2016    Procedure: COMBINED ESOPHAGOSCOPY, GASTROSCOPY, DUODENOSCOPY (EGD), BIOPSY SINGLE OR MULTIPLE;  Surgeon: Tricia Zambrano MD;  Location:  GI     INNER EAR SURGERY       PICC INSERTION  6/5/2013    5fr DL Power PICC, 44cm, left basilic vein, tip in low SVC     SURGICAL HISTORY OF -       Left ear surgery - incus transition, tympanoplasty     SURGICAL HISTORY OF -   6/05    Gastric bypass     SURGICAL HISTORY OF -   6/05    Cholecystectomy     SURGICAL HISTORY OF -   6/05    Hiatal hernia repair     TONSILLECTOMY         Family History   Problem Relation Age of Onset     Cerebrovascular Disease Father      C.A.D. Father      C.A.D. Mother      C.A.D. 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       Social History Main Topics     Smoking status: Never Smoker      Smokeless tobacco: Never Used     Alcohol Use: Yes      occassionally     Drug Use: No     Sexually Active: Yes -- Male partner(s)     Birth Control/ Protection: None     Social History Narrative     Was  in December 2015. Longtime partner from [place].       Patient Active Problem List   Diagnosis     Essential hypertension     Thyrotoxicosis     Obesity     Prolonged QTc 460 ms,  at hr 67     Low back pain - Suspect SI Joint dysfunction     Cough     Systemic sclerosis (H)     Tumoral calcinosis     Shortness of breath     Edema of both legs     Abnormal albumin     Myopathy     Hypoalbuminemia     Diastolic dysfunction     Limb ischemia; ulcers of fingertips     Cellulitis of leg     Other specified diffuse disease of connective tissue     Disturbed sleep rhythm     Pharyngeal dysphagia     Gastroesophageal reflux disease, esophagitis presence not specified     Rheumatoid arthritis with negative rheumatoid factor, involving unspecified site (H)     Raynaud's disease without gangrene       Allergies   Allergen Reactions     Lovenox Other (See Comments)     Blood clot      Norvasc [Amlodipine Besylate] Swelling     Lip swelling that happened on 2 different occasions     Erythromycin Rash     Rash on arms     OBJECTIVE:  Vitals: Reviewed  Gen: A+Ox3, NAD  HEENT: NCAT, EOMI  Pulm: CTAB  CVS: RRR, no m/r/g  Skin: purpuric patches on fingertips and toes, no ulcerations/erosions. There is platelike xerosis of lower extremities; hyperpigmented nonblanchable ovoid patches on shins, mildly tender  -mild sclerosis of distal fingers  Msk: No active synovitis       Component      Latest Ref Rng 1/26/2016   WBC      4.0 - 11.0 10e9/L 9.2   RBC Count      3.8 - 5.2 10e12/L 4.59   Hemoglobin      11.7 - 15.7 g/dL 13.2   Hematocrit      35.0 - 47.0 % 41.8   MCV      78 - 100 fl 91   MCH      26.5 - 33.0 pg 28.8   MCHC      31.5 - 36.5 g/dL 31.6    RDW      10.0 - 15.0 % 16.7 (H)   Platelet Count      150 - 450 10e9/L 195   Diff Method       Automated Method   % Neutrophils       62.9   % Lymphocytes       27.0   % Monocytes       9.1   % Eosinophils       0.4   % Basophils       0.2   % Immature Granulocytes       0.4   Nucleated RBCs      0 /100 0   Absolute Neutrophil      1.6 - 8.3 10e9/L 5.8   Absolute Lymphocytes      0.8 - 5.3 10e9/L 2.5   Absolute Monocytes      0.0 - 1.3 10e9/L 0.8   Absolute Eosinophils      0.0 - 0.7 10e9/L 0.0   Absolute Basophils      0.0 - 0.2 10e9/L 0.0   Abs Immature Granulocytes      0 - 0.4 10e9/L 0.0   Absolute Nucleated RBC       0.0   Color Urine       Yellow   Appearance Urine       Clear   Glucose Urine      NEG mg/dL Negative   Bilirubin Urine      NEG Negative   Ketones Urine      NEG mg/dL Negative   Specific Gravity Urine      1.003 - 1.035 1.010   Blood Urine      NEG Negative   pH Urine      5.0 - 7.0 pH 6.0   Protein Albumin Urine      NEG mg/dL Negative   Urobilinogen mg/dL      0.0 - 2.0 mg/dL Normal   Nitrite Urine      NEG Negative   Leukocyte Esterase Urine      NEG Negative   Source       Midstream Urine   WBC Urine      0 - 2 /HPF <1   RBC Urine      0 - 2 /HPF <1   Squamous Epithelial /HPF Urine      0 - 1 /HPF <1   Creatinine      0.52 - 1.04 mg/dL 1.11 (H)   GFR Estimate      >60 mL/min/1.7m2 51 (L)   GFR Estimate If Black      >60 mL/min/1.7m2 62   ALT      0 - 50 U/L 23   AST      0 - 45 U/L 16   Albumin      3.4 - 5.0 g/dL 3.4   CRP Inflammation      0.0 - 8.0 mg/L <2.9   Sed Rate      0 - 30 mm/h 9   CK Total      30 - 225 U/L 38   Aldolase       4.1     Biopsy from 3/10 EGD:  Esophagus, gastroesophageal junction, biopsies:   - Squamous mucosa and submucosa with marked chronic inflammation and   fibrosis   - No evidence of intestinal metaplasia or dysplasia     August 11, 2020 interval history:    Since we last seen the patient she feels she was doing relatively stably on till July 7.  At that  point she had the abrupt onset of a bad dry cough.  She did not have significant fevers but did not feel well.  She was seen had a negative coronavirus test but a chest x-ray apparently showed evidence of some infiltrates and she was placed on doxycycline.  She says she did not improve with that therapy and a CT angios was performed.  That showed bilateral pulmonary consolidation thought consistent with infection.  There was note that the pattern could actually be somewhat typical for sarcoid but it was noted to have worsened since the prior study performed there at [facility] in October 2019.  She also was noted to have mediastinal lymphadenopathy and debris in the esophagus potentially consistent with her systemic sclerosis.  No pulmonary embolism was found.  Based on the results of that she was placed on Levaquin.  She continued to fail to improve and a third antibiotic was also tried.    Finally she was seen by pulmonologist and a bronchoscopy was performed.  I am able to review the results of that and there was a high percentage of neutrophils and lymphocytes and that BAL fluid.  Viral cultures however Gram stain and culture were all unremarkable and there were no malignant cells found.    She was placed on 40 mg a day of prednisone at the time of the bronchoscopy once it was clear that there was no acute infection.  She does not have a follow-up appointment yet.  Although her cough is mostly gone she does not feel good and feels very dyspneic just going from the bed to the bathroom or crossed the room.  She is not convinced she has had any improvement with 40 mg daily prednisone and if anything think she is worse.    In reviewing her extensive record I see that a repeat TARA was performed and a reflexive was done when it was positive.  She continues to have an anticentromere antibody which she had previously, but now notably has a high titer Gonzalez and high titer RNP antibody.  She previously was borderline  positive for RNP and Gonzalez negative.  SSA is also higher titer than it was previously.    With all these autoantibody positivity she however denies any specific features that would suggest systemic lupus.  She specifically denies sun sensitivity sun sensitive rashes including a malar rash, morning stiffness in any of her joints or any joint swelling and any pleurisy at the time of her  at CT angiogram or otherwise.  \  Her Raynaud's phenomena has been stable as have other features of her limited cutaneous systemic sclerosis.        February 25, 2021 interval history:    At the time that I saw the patient about 3 weeks ago we decided that because her neuropathy sounded rather rapidly progressive, and her HRCT scan was being read as potentially consistent with sarcoidosis, that neurosarcoid was on the differential.  I also wondered about an immune complex mediated small vessel vasculitis causing neuropathy.  We therefore did laboratory testing as well as started her on some steroid.    Laboratory testing has been largely unrevealing.  She did not have elevated calcium or elevated calcium in the urine.  I did also do a parathyroid hormone in case we did find that however her parathyroid hormone was significantly elevated.  Her vitamin D was very low and she has started supplementation for that.    Most notably however her prednisone at 40 mg a day virtually resolved her symptoms over 3 to 4 days.  She had at least an 80% decrease in her pain and numbness although she still has some residual tingling in the legs bilaterally.    She is having some prednisone side effects.  Specifically she is having a hard time sleeping and is also feeling irritable which she thinks could be due partly due to the loss of sleep but partly directly due to the prednisone.  She has got a lot of prior experience with prednisone and has had some difficulties with it before.    She did have some right-sided chest pain.  That seem to occur  with a deep breath.  She has a cardiologist and saw them will be getting a follow-up echo.  She is no longer having that.    In going through her history she does not believe she is ever been on Imuran and is quite sure and I am sure as well that we have never had her on TNF inhibition.  She was on methotrexate and tolerated oral methotrexate poorly due to nausea but was better able to tolerate subcutaneous methotrexate.      Impression:    Per the problem list, with known RNP positivity and more recently with this HRCT finding that is concerning for the possibility of overlapping sarcoidosis.    She had a very nice response to steroid quite quickly, and so we now have the conundrum of not knowing exactly why.  I certainly suspect that she has an immune mediated neuropathy given this rapid response and neurosarcoid is a concern.    I have sent a message to Dr. Cuellar in pulmonary for her thoughts.  I have also ordered a repeat HRCT that to be done in another week or so before she is seen back in pulmonary.    I am hoping these can be reviewed so that there could be a decision whether we can feel strongly enough that presumptively this could be sarcoidosis to put her on appropriate therapy for that or whether a biopsy might be justified.    We could simply presumptively try steroid sparing medications.  I am somewhat concerned about using TNF inhibitors however unless we are pretty confident this is sarcoid, as they have been associated with worsening of systemic lupus patients and autoimmune associated interstitial lung disease in some settings.    We also have the  finding of the elevated PTH, and very low vitamin D levels, which can be seen in sarcoidosis, but is unclear here given normal Calcium levels. and will refer her to endocrinology for that.    I have given her prescription for temazepam to help her with the sleep and irritability.  I did warn her to be careful about driving in the morning until she knows  how it affects her as it does have a long half-life.    I will plan on seeing her back in about 6 to 8 weeks sooner as needed.      April 8, 2021 interval history:    Since we have last seen the patient she has had initial evaluation done, and Dr. Cuellar has a recent note on the chart and an addendum placed a day after the patient's studies were reviewed in sarcoid conference.    The upshot of that is that there is evidence from her studies, in particular the biopsy studies that suggest that this could be sarcoidosis.  Because she has neurologic symptoms and inadequately explain dyspnea on exertion she will also be getting neurology evaluation and cardiac evaluation.    She remains at this time on 40 mg a day of prednisone.  She is not liking this dose.  Although she still has dyspnea on exertion she also is irritable and on edge on that dose and is also experiencing some nausea..    After consideration of the issues, Dr. Cuellar had recommended the patient go on track today.  She has in fact been on both this and MMF before and although she had some difficulties at time with tolerating these medication she think she would tolerate them again.  We have briefly discussed possibility of using a TNF inhibitor but that does give me pause, given that she has had features of mixed connective tissue disease and in TARA positive individuals TNF inhibitors have at times worsen their disease process, particularly in those with lupus associated autoantibodies.  She has had in the past of borderline positive RNP.    She believes most of her other historical clinical features are stable.  She still gets pretty bad Raynaud's phenomena and some features of arthritis although the inflammatory features are currently pretty well controlled on this dose of prednisone.    July 15, 2021 interval history:    Since we have last seen the patient she has continued to have a lot of dyspnea on exertion despite being on the MMF 40 mg a day of  prednisone and until just earlier this week methotrexate.    Because of her lung biopsy showing features consistent with sarcoidosis, she was presented at sarcoidosis conference last week.  I attended this discussion.  After much discussion it was decided that azathioprine may be the next drug to try in her.  This was influenced in part by my concerns of giving a TNF inhibitor to her given her underlying autoimmune connective tissue disease, and the possibility of that disease process worsening with TNF inhibition as has been described and as I have seen previously albeit rarely.    She did just get TPMT levels earlier today to help decide what kind of azathioprine dose to start with.  She continues on MMF.    She will be getting a nerve biopsy performed on Monday.    She also was recently in the emergency room because of very severe midsternal chest pain.  She says this developed during the night.  When it would not go away she went to the ER.  There she was given narcotics after first getting a single sublingual nitroglycerin that did not help.  Extensive cardiopulmonary work-up was negative for any evidence of ischemia or clot.  Eventually she got Toradol after having gotten narcotics and did improve and went home.    She has had some costochondral type pains before but nothing like this and this was not exacerbated or relieved by pressure over the area.    Notably, she is been having worsening problems with GERD and dysphagia as well.  She just spoke with Dr. Cárdenas earlier this morning.  She is getting a lot of pill dysphagia at this point which is very frustrating for her and very uncomfortable.  She had dilatation in the fall and thought it helped for a while but she says this almost feels like it is just a dysmotility problem rather than stricturing to her.

## 2021-07-19 ENCOUNTER — HOSPITAL ENCOUNTER (OUTPATIENT)
Facility: AMBULATORY SURGERY CENTER | Age: 61
Discharge: HOME | End: 2021-07-19
Attending: PSYCHIATRY & NEUROLOGY | Admitting: PSYCHIATRY & NEUROLOGY
Payer: COMMERCIAL

## 2021-07-19 VITALS
HEIGHT: 69 IN | BODY MASS INDEX: 25.18 KG/M2 | HEART RATE: 73 BPM | TEMPERATURE: 98.6 F | WEIGHT: 170 LBS | SYSTOLIC BLOOD PRESSURE: 122 MMHG | OXYGEN SATURATION: 93 % | RESPIRATION RATE: 14 BRPM | DIASTOLIC BLOOD PRESSURE: 66 MMHG

## 2021-07-19 DIAGNOSIS — G62.9 NEUROPATHY: Primary | ICD-10-CM

## 2021-07-19 LAB — ACE SERPL-CCNC: 25 U/L

## 2021-07-19 PROCEDURE — 20205 DEEP MUSCLE BIOPSY: CPT

## 2021-07-19 PROCEDURE — 64795 BIOPSY OF NERVE: CPT | Performed by: PSYCHIATRY & NEUROLOGY

## 2021-07-19 ASSESSMENT — MIFFLIN-ST. JEOR: SCORE: 1415.49

## 2021-07-19 NOTE — DISCHARGE INSTRUCTIONS
[facility]  Home Care Following Your Procedure  Call a doctor if you have signs of infection (fever, growing tenderness at the surgery site, a large amount of drainage or bleeding, severe pain, foul-smelling drainage, redness, swelling).         Tylenol/Acetaminophen Consumption  To help encourage the safe use of acetaminophen, the makers of TYLENOL  have lowered the maximum daily dose for single-ingredient Extra Strength TYLENOL  (acetaminophen) products sold in the [place] from 8 pills per day (4,000 mg) to 6 pills per day (3,000 mg). The dosing interval has also changed from 2 pills every 4-6 hours to 2 pills every 6 hours.    If you feel your pain relief is insufficient, you may take Tylenol/Acetaminophen in addition to your narcotic pain medication.     Be careful not to exceed 3,000 mg of Tylenol/Acetaminophen in a 24 hour period from all sources.    If you are taking extra strength Tylenol/acetaminophen (500 mg), the maximum dose is 6 tablets in 24 hours.    If you are taking regular strength acetaminophen (325 mg), the maximum dose is 9 tablets in 24 hours.  Your doctor is:  Dr. Farooq Abel, General Surgery: [phone]                                    Or dial [phone] and ask for the resident on call for:  General Surgery  For emergency care, call the:  [place]:  [phone] (TTY for hearing impaired: [phone])

## 2021-07-19 NOTE — OP NOTE
SURGEON: Farooq Abel MD     ASSISTANT: None    OPERATION: Right sural nerve biopsy     PREOPERATIVE DIAGNOSIS: Neuropathy     POSTOPERATIVE DIAGNOSIS: Neuropathy     INDICATION: Rule out neuropathy     DESCRIPTION OF PROCEDURE:  Informed consent was obtained on the patient to do a right sural nerve biopsy.  The right lower leg was prepped with chroloprep and sterilely draped.  Lidocaine 1% 10 mL total was injected locally during the biopsy procedure.  A 1.5-inch incision was made behind the lateral malleolus. The sural nerve was exposed.  A 3 cm segment was biopsied and sent for routine histochemistry and electron microscopy.  The fascia and subcutaneous tissues closed with 4-0 Vicryl.  Steri-Strips and pressure dressing were applied over the wound site. There were no complications.  Blood loss was minimal. Wound care instructions were given to the patient. The patient was informed that results of the biopsy should be available in 2-3 weeks.

## 2021-07-20 LAB — TPMT BLD-CCNC: 32.2 U/ML

## 2021-07-21 ENCOUNTER — TELEPHONE (OUTPATIENT)
Dept: PULMONOLOGY | Facility: CLINIC | Age: 61
End: 2021-07-21

## 2021-07-21 DIAGNOSIS — J84.9 ILD (INTERSTITIAL LUNG DISEASE) (H): Primary | ICD-10-CM

## 2021-07-21 LAB — SCANNED LAB RESULT: ABNORMAL

## 2021-07-21 RX ORDER — AZATHIOPRINE 50 MG/1
100 TABLET ORAL DAILY
Qty: 60 TABLET | Refills: 1 | Status: SHIPPED | OUTPATIENT
Start: 2021-07-21 | End: 2021-09-16

## 2021-07-21 NOTE — TELEPHONE ENCOUNTER
Left message for patient to call back to discuss recent results and plan of care.   Direct call back number provided.    Edel Mcgovern, RN, BSN  ILD Nurse Care Coordinator  (P) [phone]

## 2021-07-21 NOTE — TELEPHONE ENCOUNTER
----- Message from Edel Mcgovern RN sent at 7/21/2021  8:16 AM CDT -----  Beena,    I see that Jeanine's TPMT recently resulted and is within normal range:    Thiopurine Methyltransferase RBC 24.0 - 44.0 U/mL 32.2     I will reach out to her today to get Imuran started at 100 mg daily and stop Cellcept.       Fazal,    Please connect with Jeanien as she is recommended to have follow up in about a #month (from now) to repeat PFT and visit with Dr Cuellar.  Please add a lab appt for #two weeks from now (CBC w/diff and hepatic panel will be ordered).   I will inform her that this will be plan for follow up when I speak with her about the medication.      Thank you,    Edel Mcgovern, RN, BSN  ILD Nurse Care Coordinator  (P) [phone]        ----- Message -----  From: Beena Cuellar MD  Sent: 7/9/2021   4:04 PM CDT  To: Nicolás De La Torre MD, Edel Mcgovern, RN, #    Hi Edel,      I think she should see me in about a month or so after we start the Imuran and get a repeat PFT, we can discuss uptitration then.   ----- Message -----  From: Edel Mcgovern RN  Sent: 7/9/2021   2:34 PM CDT  To: Nicolás De La Torre MD, Beena Cuellar MD, #    Beena,     I will keep an eye out for the results so as to order the azathioprine if TPMT results normal. I will have her stop cellcept at the time she starts azathioprine.     #Please describe what you would like her uptitration schedule to look like.    Thank you in advance,    Edel  ----- Message -----  From: Beena Cuellar MD  Sent: 7/9/2021   1:55 PM CDT  To: Nicolás De La Torre MD, Edel Mcgovern, RN, #    Thanks Nena,    When her TPMT is back, let's plan on starting Azathioprine 100mg daily and uptitrate slowly.  I would stop CellCept at that point as well.  Nicolás, let me know if you have any other thoughts.     Beena  ----- Message -----  From: Edel Mcgovern RN  Sent: 7/8/2021   2:58 PM CDT  To: Beena Cuellar MD, #    Beena,     I just spoke with Jeanine and  reviewed the meeting outcome, updates to your questions below:    1) Please clarify with her whether she had any adverse effect to full dose (1000mg) CelLCept   No. She has been on 500 mg BID and doing fine.    2) Please check TPMT level   I will place this order and she will make her lab appt at [facility]    3) Go up on prednisone 40mg daily   I will issue refill for her; she has been on 20 mg and agrees to increase.    4) stop methotrexate   She has been completing these injections on Tuesdays; she will stop.     Thank you,    Edel  ----- Message -----  From: Beena Cuellar MD  Sent: 7/8/2021  12:41 PM CDT  To: [facility] Nurses[facility],    Per today's discussion,     1) agustin clarify with her whether she had any adverse effect to full dose (1000mg) CelLCept  2) Please check TPMT level  3) Go up on prednisone 40mg daily  4) stop methotrexate

## 2021-07-21 NOTE — TELEPHONE ENCOUNTER
Spoke with patient and discussed plan as outlined in earlier note.     -Patient stopped Cellcept last week at recommendation of Dr. De La Torre r/t recent surgery and healing time. Patient notes she will simply plan to not resume medication at all.  -Imuran sent to requested pharmacy; notes [place] location will mail Rx to her.   -Pt verbalized plan to make lab appointment for two weeks after starting Imuran    -Writer will place lab orders for CBC w/diff and hepatic pane.    -Clinic Coordinator will modify scheduled return October follow up visit with Dr. Cuellar to about 6 weeks from now to complete PFTs and visit with provider to determine dose adjustment.    Edel Mcgovern, RN, BSN  ILD Nurse Care Coordinator  (P) [phone]

## 2021-07-27 ENCOUNTER — OFFICE VISIT (OUTPATIENT)
Dept: NEUROLOGY | Facility: CLINIC | Age: 61
End: 2021-07-27

## 2021-07-27 ENCOUNTER — OFFICE VISIT (OUTPATIENT)
Dept: NEUROLOGY | Facility: CLINIC | Age: 61
End: 2021-07-27
Payer: COMMERCIAL

## 2021-07-27 VITALS
BODY MASS INDEX: 26.55 KG/M2 | DIASTOLIC BLOOD PRESSURE: 80 MMHG | HEART RATE: 76 BPM | SYSTOLIC BLOOD PRESSURE: 162 MMHG | TEMPERATURE: 97.7 F | WEIGHT: 179.8 LBS

## 2021-07-27 DIAGNOSIS — D86.9 SARCOIDOSIS: ICD-10-CM

## 2021-07-27 DIAGNOSIS — G58.7: Primary | ICD-10-CM

## 2021-07-27 DIAGNOSIS — G62.89 OTHER POLYNEUROPATHY: Primary | ICD-10-CM

## 2021-07-27 PROCEDURE — 99207 PR SATISFY VISIT NUMBER: CPT | Performed by: PSYCHIATRY & NEUROLOGY

## 2021-07-27 NOTE — LETTER
7/27/2021       RE: Jean[place] Schuler  [id number] [address] [facility] [address]     Dear Colleague,    Thank you for referring your patient, Jeanine Schuler, to the [facility] at [facility]. Please see a copy of my visit note below.    History of neuropathy:    Jeanine Schuler is a 60 year old woman with a complex history that includes MCTD/limited cutaneous systemic sclerosis and suspected sarcoidosis that I have seen for neuropathy evaluation. She reports left foot numbness and tingling since around 2017 or 2018. In January 2021 the sensory symptoms changed. The tingling sensation became painful and the area of numbness became more dense. Her left foot is now affected below the ankle. The left foot started before the right foot, but the right foot eventually became more affected than the left. She feels weak in her legs, especially noticeable when she climbs stairs. In January 2021 she noticed numbness and paresthesias (no pain) in her finger tips. Both hands, all fingers are affected. She also feels weak in her hands. In 11/20 she started prednisone. In 2/2021 she reduced prednisone to 20 mg. During that time her right leg worsened. She noticed more more numbness and weakness in her foot. Things like driving ad walking were difficult. She increased prednisone to 40 mg and symptoms resolved. She was subsequently started on methotrexate (4/21) as a steroid sparing agent.     Interval History:  I last saw her in clinic 5/11/21. Since that time NCS were performed, which showed an asymmetric axonal neuropathy. A right sural nerve biopsy was performed about a week ago. Results are pending. Her neurologic deficits are about the same. She generally feels weaker (which she attributes to the heat) but no new focal weakness. Numbness persists below mid leg, more dense on eh right. Numbness in her finger tips is also about the same. Yesterday she stopped methotrexate  and started azathioprine. Part of the decision to switch to azathioprine was worsening of the interstitial lung disease on CT (7/1/21).    Prior pertinent laboratory work-up:  5/11/21: Normal CK, aldolase  4/9/21: TARA 1:640. RNP 1.2. Negative/normal DS DNA, SSa, SSb, SM ab, ESR, CRP, RF  3/21: Normal ACE, Hep B, Hep C, HIV  2/21: Beta 2 glycoprotein 17.   10/20: Left transbronchial upper lobe biopsy showed nonnecrotizing granulomas with multinucleated giant cells, foci of organizing pneumonia and nonspecific chronic interstitial inflammation with reactive pneumocytes type II hyperplasia.     Prior pertinent radiology work-up:  7/21: CT chest shows interval increase in multifocal centrilobular groundglass and micronodular opacities with increased basilar predominant subpleural reticulations. Findings suggestive for an interstitial lung disease process including sarcoidosis or HP although superimposed  atypical infection are difficult to exclude  4/21: CT chest showed micronodular infiltrative processes in the bilateral lungs with mid lung predominance are again noted and do not appear significantly worsened or improved since the prior study from 12/22/2020. This has a pattern which can be associated with sarcoidosis    Prior electrophysiologic work-up:  5/18/21: NCS showed an axonal polyneuropathy with asymmetric peroneal motor responses.     Other data:   3/21: PFT , Normal inspiratory flow rate and diffusion capacity    Past Medical History:   Past Medical History:   Diagnosis Date     Anemia      Bariatric surgery status 06/27/2005    abdi en Y- [facility];     Cellulitis of leg 06/06/2013     Esophageal reflux     Better post-hiatal hernia repair and weight loss     Hyperplastic colonic polyp      Hypovitaminosis D 2011     ILD (interstitial lung disease) (H)      Kidney stone 04/29/2007     Kidney stone 05/10/2007    surgically removed     Limb ischemia; ulcers of fingertips 04/22/2013     Other  chronic pain     Left shoulder - rheumatoid arthritis     Raynaud's syndrome      Rheumatoid arthritis (H) \     Scleroderma (H)      Sjogren-Jake syndrome      Systemic sclerosis (H) 2009     Unspecified essential hypertension      Past Surgical History:  Past Surgical History:   Procedure Laterality Date     BIOPSY MUSCLE DIAGNOSTIC (LOCATION) Right 7/19/2021    Procedure: Right SURAL nerve BIOPSY;  Surgeon: Farooq Abel MD;  Location: [facility] OR     BRONCHOSCOPY FLEXIBLE,L CRYOBIOPSY N/A 10/06/2020    Procedure: BRONCHOSCOPY, FLEXIBLE, WITH TRANSBRONCHIAL BIOPSY x4 USING CRYOPROBE, bronchial alveolar lavage;  Surgeon: Teddy Mendez MD;  Location:  OR     BYPASS GASTRIC, CHOLECYSTECTOMY, COMBINED  06/27/2005    [facility]     CHOLECYSTECTOMY       COLONOSCOPY       COLONOSCOPY N/A 11/17/2020    Procedure: COLONOSCOPY, WITH POLYPECTOMY AND BIOPSY;  Surgeon: Jamie Cárdenas MD;  Location: [facility] OR     ESOPHAGOSCOPY, GASTROSCOPY, DUODENOSCOPY (EGD), COMBINED N/A 03/10/2016    Procedure: COMBINED ESOPHAGOSCOPY, GASTROSCOPY, DUODENOSCOPY (EGD), BIOPSY SINGLE OR MULTIPLE;  Surgeon: Tricia Zambrano MD;  Location:  GI     ESOPHAGOSCOPY, GASTROSCOPY, DUODENOSCOPY (EGD), COMBINED N/A 11/17/2020    Procedure: ESOPHAGOGASTRODUODENOSCOPY, WITH BIOPSY and Dilation;  Surgeon: Jamie Cárdenas MD;  Location: [facility] OR     INNER EAR SURGERY       PICC INSERTION  06/05/2013    5fr DL Power PICC, 44cm, left basilic vein, tip in low SVC     SURGICAL HISTORY OF -       Left ear surgery - incus transition, tympanoplasty     SURGICAL HISTORY OF -   06/01/2005    Hiatal hernia repair     TONSILLECTOMY       Family history:    There is no known family history of hereditary neuropathies or other neuromuscular disorders.    Social History:    Rare alcohol. She denies tobacco or illicit drug use.     Medical Allergies:    Allergies   Allergen Reactions     Lovenox Other (See Comments)     Blood clot      Norvasc  "[Amlodipine Besylate] Swelling     Lip swelling that happened on 2 different occasions     Erythromycin Rash     Rash on arms     Current Medications:    Current Outpatient Medications   Medication     azaTHIOprine (IMURAN) 50 MG tablet     benzonatate (TESSALON) 100 MG capsule     folic acid (FOLVITE) 1 MG tablet     furosemide (LASIX) 40 MG tablet     hydroxychloroquine (PLAQUENIL) 200 MG tablet     insulin syringe-needle U-100 (BD INSULIN SYRINGE ULTRAFINE) 30G X 1/2\" 1 ML miscellaneous     losartan (COZAAR) 50 MG tablet     methotrexate 50 MG/2ML injection     mycophenolate (GENERIC EQUIVALENT) 250 MG capsule     naproxen (NAPROSYN) 500 MG tablet     nitroGLYcerin (NITROSTAT) 0.3 MG sublingual tablet     omeprazole (PRILOSEC) 20 MG DR capsule     pantoprazole (PROTONIX) 40 MG EC tablet     Potassium (POTASSIMIN PO)     predniSONE (DELTASONE) 20 MG tablet     spironolactone (ALDACTONE) 25 MG tablet     sulfamethoxazole-trimethoprim (BACTRIM) 400-80 MG tablet     temazepam (RESTORIL) 15 MG capsule     vitamin D2 (ERGOCALCIFEROL) 95721 units (1250 mcg) capsule     nitroFURantoin macrocrystal-monohydrate (MACROBID) 100 MG capsule     No current facility-administered medications for this visit.     Review of Systems: A complete review of systems was obtained and was negative except for what was noted above.    Physical examination:    BP (!) 162/80   Pulse 76   Temp 97.7  F (36.5  C) (Temporal)   Wt 81.6 kg (179 lb 12.8 oz)   BMI 26.55 kg/m      General Appearance: NAD    Skin:  Right post ankle healing incision from biopsy    Neurologic examination:    Mental status:  Patient is alert, attentive, and oriented x 3.  Language is coherent and fluent without aphasia.  Memory, comprehension and ability to follow commands were intact.       Cranial nerves:  Pupils were round and reacted to light.  Extraocular movements full. N face, jaw, palate or tongue weakness or atrophy.       Motor exam: No atrophy or " fasciculations.  Manual muscle testing revealed the following MRC grade muscle power:   Right Left   Neck flexion 5    Neck extension 5 5   Shoulder ext rotation 4 4   Shoulder abduction:  4+ 4+   Elbow extension: 5 5   Elbow flexion:  5 5   Wrist flexion:  5 5   Wrist extension:  5 5   FDI 4+ 4   APB 5 5   Hip flexion 4 4   Knee flexion 5 5   Knee extension 5 5   Dorsiflexion 4 (pain limited) 5   Plantar flexion 5 5     Complex motor skills: No tremor or ataxia     Sensory exam:  Vibration reduced in toes on right >  Left. Pin reduced in the left foot below the ankle and the right foot in the toe tips. Pin slightly reduced in the finger tips.    Gait: Antalgic.       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 Her neurologic examination is stable, but her neuropathic symptoms raise high concern for a vasculitic neuropathy related to the connective tissue disease or inflammatory neuropathy to sarcoid. She was recently changed from methotrexate to azathioprine (started 1 day ago) for worsening lung disease despite methotrexate and prednisone. I will await the nerve biopsy results. I agree with the plan as thoughtfully outlined by Dr. De La Torre. If sarcoid or vasculitic changes are found in the nerve this may give us reason to escalate immunotherapy with rituximab or even a TNF inhibitor. I will comment further when those results are available.     Plan:      1. Right sural nerve biopsy pending (completed 7/19/21, awaiting path results).  2. Pending results of #2 will determine appropriateness of nerve and/or muscle biopsy  3. Symptomatic management of pain and paresthesias: Discussed symptomatic  medications. She defers for now, but if positive sensory symptoms become more problematic.   4. Foot care: Encouraged daily foot inspection, supportive/comfortable foot wear, good foot hygeine  5. Follow up in 2 months, but will discuss nerve biopsy results with her and her continuity team when available.   ---    Again, thank you for allowing me to participate in the care of your patient.      Sincerely,    Farooq Abel MD

## 2021-07-27 NOTE — PROGRESS NOTES
History of neuropathy:    Jeanine Schuler is a 60 year old woman with a complex history that includes MCTD/limited cutaneous systemic sclerosis and suspected sarcoidosis that I have seen for neuropathy evaluation. She reports left foot numbness and tingling since around 2017 or 2018. In January 2021 the sensory symptoms changed. The tingling sensation became painful and the area of numbness became more dense. Her left foot is now affected below the ankle. The left foot started before the right foot, but the right foot eventually became more affected than the left. She feels weak in her legs, especially noticeable when she climbs stairs. In January 2021 she noticed numbness and paresthesias (no pain) in her finger tips. Both hands, all fingers are affected. She also feels weak in her hands. In 11/20 she started prednisone. In 2/2021 she reduced prednisone to 20 mg. During that time her right leg worsened. She noticed more more numbness and weakness in her foot. Things like driving ad walking were difficult. She increased prednisone to 40 mg and symptoms resolved. She was subsequently started on methotrexate (4/21) as a steroid sparing agent.     Interval History:  I last saw her in clinic 5/11/21. Since that time NCS were performed, which showed an asymmetric axonal neuropathy. A right sural nerve biopsy was performed about a week ago. Results are pending. Her neurologic deficits are about the same. She generally feels weaker (which she attributes to the heat) but no new focal weakness. Numbness persists below mid leg, more dense on eh right. Numbness in her finger tips is also about the same. Yesterday she stopped methotrexate and started azathioprine. Part of the decision to switch to azathioprine was worsening of the interstitial lung disease on CT (7/1/21).    Prior pertinent laboratory work-up:  5/11/21: Normal CK, aldolase  4/9/21: TARA 1:640. RNP 1.2. Negative/normal DS DNA, SSa, SSb, SM ab, ESR, CRP,  RF  3/21: Normal ACE, Hep B, Hep C, HIV  2/21: Beta 2 glycoprotein 17.   10/20: Left transbronchial upper lobe biopsy showed nonnecrotizing granulomas with multinucleated giant cells, foci of organizing pneumonia and nonspecific chronic interstitial inflammation with reactive pneumocytes type II hyperplasia.     Prior pertinent radiology work-up:  7/21: CT chest shows interval increase in multifocal centrilobular groundglass and micronodular opacities with increased basilar predominant subpleural reticulations. Findings suggestive for an interstitial lung disease process including sarcoidosis or HP although superimposed  atypical infection are difficult to exclude  4/21: CT chest showed micronodular infiltrative processes in the bilateral lungs with mid lung predominance are again noted and do not appear significantly worsened or improved since the prior study from 12/22/2020. This has a pattern which can be associated with sarcoidosis    Prior electrophysiologic work-up:  5/18/21: NCS showed an axonal polyneuropathy with asymmetric peroneal motor responses.     Other data:   3/21: PFT , Normal inspiratory flow rate and diffusion capacity    Past Medical History:   Past Medical History:   Diagnosis Date     Anemia      Bariatric surgery status 06/27/2005    [facility];     Cellulitis of leg 06/06/2013     Esophageal reflux     Better post-hiatal hernia repair and weight loss     Hyperplastic colonic polyp      Hypovitaminosis D 2011     ILD (interstitial lung disease) (H)      Kidney stone 04/29/2007     Kidney stone 05/10/2007    surgically removed     Limb ischemia; ulcers of fingertips 04/22/2013     Other chronic pain     Left shoulder - rheumatoid arthritis     Raynaud's syndrome      Rheumatoid arthritis (H) \     Scleroderma (H)      Sjogren-Jake syndrome      Systemic sclerosis (H) 2009     Unspecified essential hypertension      Past Surgical History:  Past Surgical History:    Procedure Laterality Date     BIOPSY MUSCLE DIAGNOSTIC (LOCATION) Right 7/19/2021    Procedure: Right SURAL nerve BIOPSY;  Surgeon: Farooq Abel MD;  Location: [facility] OR     BRONCHOSCOPY FLEXIBLE,L CRYOBIOPSY N/A 10/06/2020    Procedure: BRONCHOSCOPY, FLEXIBLE, WITH TRANSBRONCHIAL BIOPSY x4 USING CRYOPROBE, bronchial alveolar lavage;  Surgeon: Teddy Mendez MD;  Location: [facility] OR     BYPASS GASTRIC, CHOLECYSTECTOMY, COMBINED  06/27/2005    [facility]     CHOLECYSTECTOMY       COLONOSCOPY       COLONOSCOPY N/A 11/17/2020    Procedure: COLONOSCOPY, WITH POLYPECTOMY AND BIOPSY;  Surgeon: Jamie Cárdenas MD;  [place] [facility] [place]     ESOPHAGOSCOPY, GASTROSCOPY, DUODENOSCOPY (EGD), COMBINED N/A 03/10/2016    Procedure: COMBINED ESOPHAGOSCOPY, GASTROSCOPY, DUODENOSCOPY (EGD), BIOPSY SINGLE OR MULTIPLE;  Surgeon: Tricia Zambrano MD;  Location:  GI     ESOPHAGOSCOPY, GASTROSCOPY, DUODENOSCOPY (EGD), COMBINED N/A 11/17/2020    Procedure: ESOPHAGOGASTRODUODENOSCOPY, WITH BIOPSY and Dilation;  Surgeon: Jamie Cárdenas MD;  [place] [facility] [place]     INNER EAR SURGERY       PICC INSERTION  06/05/2013    5fr DL Power PICC, 44cm, left basilic vein, tip in low SVC     SURGICAL HISTORY OF -       Left ear surgery - incus transition, tympanoplasty     SURGICAL HISTORY OF -   06/01/2005    Hiatal hernia repair     TONSILLECTOMY       Family history:    There is no known family history of hereditary neuropathies or other neuromuscular disorders.    Social History:    Rare alcohol. She denies tobacco or illicit drug use.     Medical Allergies:    Allergies   Allergen Reactions     Lovenox Other (See Comments)     Blood clot      Norvasc [Amlodipine Besylate] Swelling     Lip swelling that happened on 2 different occasions     Erythromycin Rash     Rash on arms     Current Medications:    Current Outpatient Medications   Medication     azaTHIOprine (IMURAN) 50 MG tablet     benzonatate (TESSALON) 100 MG capsule      "folic acid (FOLVITE) 1 MG tablet     furosemide (LASIX) 40 MG tablet     hydroxychloroquine (PLAQUENIL) 200 MG tablet     insulin syringe-needle U-100 (BD INSULIN SYRINGE ULTRAFINE) 30G X 1/2\" 1 ML miscellaneous     losartan (COZAAR) 50 MG tablet     methotrexate 50 MG/2ML injection     mycophenolate (GENERIC EQUIVALENT) 250 MG capsule     naproxen (NAPROSYN) 500 MG tablet     nitroGLYcerin (NITROSTAT) 0.3 MG sublingual tablet     omeprazole (PRILOSEC) 20 MG DR capsule     pantoprazole (PROTONIX) 40 MG EC tablet     Potassium (POTASSIMIN PO)     predniSONE (DELTASONE) 20 MG tablet     spironolactone (ALDACTONE) 25 MG tablet     sulfamethoxazole-trimethoprim (BACTRIM) 400-80 MG tablet     temazepam (RESTORIL) 15 MG capsule     vitamin D2 (ERGOCALCIFEROL) 50463 units (1250 mcg) capsule     nitroFURantoin macrocrystal-monohydrate (MACROBID) 100 MG capsule     No current facility-administered medications for this visit.     Review of Systems: A complete review of systems was obtained and was negative except for what was noted above.    Physical examination:    BP (!) 162/80   Pulse 76   Temp 97.7  F (36.5  C) (Temporal)   Wt 81.6 kg (179 lb 12.8 oz)   BMI 26.55 kg/m      General Appearance: NAD    Skin:  Right post ankle healing incision from biopsy    Neurologic examination:    Mental status:  Patient is alert, attentive, and oriented x 3.  Language is coherent and fluent without aphasia.  Memory, comprehension and ability to follow commands were intact.       Cranial nerves:  Pupils were round and reacted to light.  Extraocular movements full. N face, jaw, palate or tongue weakness or atrophy.       Motor exam: No atrophy or fasciculations.  Manual muscle testing revealed the following MRC grade muscle power:   Right Left   Neck flexion 5    Neck extension 5 5   Shoulder ext rotation 4 4   Shoulder abduction:  4+ 4+   Elbow extension: 5 5   Elbow flexion:  5 5   Wrist flexion:  5 5   Wrist extension:  5 5   FDI 4+ " 4   APB 5 5   Hip flexion 4 4   Knee flexion 5 5   Knee extension 5 5   Dorsiflexion 4 (pain limited) 5   Plantar flexion 5 5     Complex motor skills: No tremor or ataxia     Sensory exam:  Vibration reduced in toes on right >  Left. Pin reduced in the left foot below the ankle and the right foot in the toe tips. Pin slightly reduced in the finger tips.    Gait: Antalgic.       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 Her neurologic examination is stable, but her neuropathic symptoms raise high concern for a vasculitic neuropathy related to the connective tissue disease or inflammatory neuropathy to sarcoid. She was recently changed from methotrexate to azathioprine (started 1 day ago) for worsening lung disease despite methotrexate and prednisone. I will await the nerve biopsy results. I agree with the plan as thoughtfully outlined by Dr. De La Torre. If sarcoid or vasculitic changes are found in the nerve this may give us reason to escalate immunotherapy with rituximab or even a TNF inhibitor. I will comment further when those results are available.     Plan:      1. Right sural nerve biopsy pending (completed 7/19/21, awaiting path results).  2. Pending results of #2 will determine appropriateness of nerve and/or muscle biopsy  3. Symptomatic management of pain and paresthesias: Discussed symptomatic medications. She defers for now, but if positive sensory symptoms become more problematic.   4. Foot care: Encouraged daily foot inspection, supportive/comfortable foot wear, good foot hygeine  5. Follow up in 2 months, but will discuss nerve biopsy results with her and her continuity team when  available.   ---  ADDENDUM:   Sural nerve biopsy dated 7/19/21 showed only an axonal neuropathy. No evidence for vasculitis or sarcoid. Advised her to continue immunotherapy per Dr. De La Torre MCTD/limited cutaneous systemic sclerosis and sarcoidosis. Also offered gabapentin for pain. She will let me know if she would like to give it a try.

## 2021-07-28 ENCOUNTER — TELEPHONE (OUTPATIENT)
Dept: NEUROLOGY | Facility: CLINIC | Age: 61
End: 2021-07-28

## 2021-07-28 NOTE — TELEPHONE ENCOUNTER
LVM for check out to schedule return appt w Dr. Abel, follow up in 2-3 months, provided clinic number for call back.

## 2021-08-10 ENCOUNTER — LAB (OUTPATIENT)
Dept: LAB | Facility: CLINIC | Age: 61
End: 2021-08-10
Payer: COMMERCIAL

## 2021-08-10 DIAGNOSIS — M35.9 COLLAGEN VASCULAR DISEASE (H): ICD-10-CM

## 2021-08-10 DIAGNOSIS — J84.9 ILD (INTERSTITIAL LUNG DISEASE) (H): ICD-10-CM

## 2021-08-10 LAB
ALBUMIN SERPL-MCNC: 3.6 G/DL (ref 3.4–5)
ALP SERPL-CCNC: 73 U/L (ref 40–150)
ALT SERPL W P-5'-P-CCNC: 26 U/L (ref 0–50)
AST SERPL W P-5'-P-CCNC: 32 U/L (ref 0–45)
BASOPHILS # BLD AUTO: 0.1 10E3/UL (ref 0–0.2)
BASOPHILS NFR BLD AUTO: 1 %
BILIRUB DIRECT SERPL-MCNC: 0.3 MG/DL (ref 0–0.2)
BILIRUB SERPL-MCNC: 0.9 MG/DL (ref 0.2–1.3)
EOSINOPHIL # BLD AUTO: 0.1 10E3/UL (ref 0–0.7)
EOSINOPHIL NFR BLD AUTO: 1 %
ERYTHROCYTE [DISTWIDTH] IN BLOOD BY AUTOMATED COUNT: 14.8 % (ref 10–15)
HCT VFR BLD AUTO: 40.3 % (ref 35–47)
HGB BLD-MCNC: 13.3 G/DL (ref 11.7–15.7)
IMM GRANULOCYTES # BLD: 0 10E3/UL
IMM GRANULOCYTES NFR BLD: 1 %
LYMPHOCYTES # BLD AUTO: 2.5 10E3/UL (ref 0.8–5.3)
LYMPHOCYTES NFR BLD AUTO: 36 %
MCH RBC QN AUTO: 29.6 PG (ref 26.5–33)
MCHC RBC AUTO-ENTMCNC: 33 G/DL (ref 31.5–36.5)
MCV RBC AUTO: 90 FL (ref 78–100)
MONOCYTES # BLD AUTO: 0.5 10E3/UL (ref 0–1.3)
MONOCYTES NFR BLD AUTO: 8 %
NEUTROPHILS # BLD AUTO: 3.6 10E3/UL (ref 1.6–8.3)
NEUTROPHILS NFR BLD AUTO: 53 %
NRBC # BLD AUTO: 0 10E3/UL
NRBC BLD AUTO-RTO: 0 /100
PLATELET # BLD AUTO: 227 10E3/UL (ref 150–450)
PROT SERPL-MCNC: 6.3 G/DL (ref 6.8–8.8)
RBC # BLD AUTO: 4.5 10E6/UL (ref 3.8–5.2)
WBC # BLD AUTO: 6.8 10E3/UL (ref 4–11)

## 2021-08-10 PROCEDURE — 85025 COMPLETE CBC W/AUTO DIFF WBC: CPT

## 2021-08-10 PROCEDURE — 80076 HEPATIC FUNCTION PANEL: CPT

## 2021-08-10 PROCEDURE — 86255 FLUORESCENT ANTIBODY SCREEN: CPT

## 2021-08-10 PROCEDURE — 86256 FLUORESCENT ANTIBODY TITER: CPT

## 2021-08-10 PROCEDURE — 36415 COLL VENOUS BLD VENIPUNCTURE: CPT

## 2021-08-11 LAB
ANCA AB PATTERN SER IF-IMP: NORMAL
C-ANCA TITR SER IF: NORMAL {TITER}

## 2021-08-13 NOTE — PROGRESS NOTES
[facility] MR#: [id number]  NEUROMUSCULAR PATHOLOGY REPORT NAME: Jeanine Schuler  NEUROMUSCULAR LABORATORY [phone] / [phone]    NERVE BIOPSY REPORT  DATE OF BIOPSY: 07/27/2021   DATE OF REPORT: 08/05/2021  SPECIMEN NO:   SURGEON: Dr. Abel  REFERRING PHYSICIAN: Dr. North    CLINICAL INFORMATION:  Jeanine Schuler is a 60 year old woman with clinical evidence of a large fiber polyneuropathy. Neurological examination largely conforms to a length-dependant pattern, but the evolution raises concern for a multifocal neuropathy that has now become almost confluent. This study was requested to evaluate for possible inflammation.      RIGHT SURAL NERVE NERVE BIOPSY:  One segment of right sural nerve was submitted for paraffin embedding for light microscopy and special staining. A second segment of nerve was fixed in 2.5% glutaraldehyde/paraformaldehyde for plastic embedding.    LIGHT MICROSCOPY:  Sections cut from paraffin-embedded material were stained with H&E, Priyanka Trichrome, LFB/PAS, and Congo red. One-micron sections were cut from plastic embedded nerve and stained with toluidine blue. H&E preparation did not show vasculitis (transmural inflammation, fibrinoid necrosis, eccentric fibrosis or vessel occlusion) or inflammation. Congo red stain was negative for amyloid deposits. Plastic sections reduced myelinated fiber density was overall moderately to severely reduced in all the fascicles in a heterogeneous distribution. Of the remaining few fibers many were thinly myelinated fibers. There were no onion bulbs. There were many degenerating fibers in each fascicle but no regenerative clusters. Assessment of subperineurial spaces showed no signficicant abnormalities. There were a few regions of perineurial thickening appreciated mostly on H&E, and some capillaries with thickened walls.    DIAGNOSIS:  Axonal neuropathy without a specific cause. There is no overt evidence for  inflammation, primary demyelination or granulomatose findings.    Hema Gage MD    CC  Patient Care Team:  Katia Boss MD as PCP - General (Family Practice)  Katia Boss MD as Assigned PCP  Nicolás De La Torre MD as Assigned Rheumatology Provider  Beena Cuellar MD as Assigned Pulmonology Provider  José Luis De Leon MD as MD (Ophthalmology)  Beena Cuellar MD as Referring Physician (Pulmonary Disease)  Lilia Valenzuela MD as Assigned Endocrinology Provider  José Luis De Leon MD as Assigned Surgical Provider  Farooq Abel MD as Assigned Neuroscience Provider  Jamie Cárdenas MD as Assigned Gastroenterology Provider

## 2021-08-17 DIAGNOSIS — J84.9 ILD (INTERSTITIAL LUNG DISEASE) (H): Primary | ICD-10-CM

## 2021-08-17 NOTE — PROGRESS NOTES
Outpatient Physical Therapy Discharge Note     Patient: Jeanine Schuler  : 1960    Beginning/End Dates of Reporting Period:  21 to 21    Referring Provider: Dr. Farooq Abel    Therapy Diagnosis: impaired balance, muscle weakness, muscle tension, neural tension, impaired posture     Client Self Report: Pt reprorts fatiged,  she reports that the heat is getting to her.     Objective Measurements:  Symptoms: concerns with weakness rising from the chair.  Balance: no LOB and no falls since last visit.   HEP: no questions. completing once daily in the morning.     Outcome Measures (most recent score):  Initial BARAHONA balance test: 48 pts  Initial Single limb stance: 5 seconds  Initial Romber seconds    Goals:  Goal 1   Goal Identifier Sit to/from stand   Goal Description Patient will demonstrate improved lower quarter and core strength as evidence by the ability to complete 5 sit to/from stands from standard height chair without use of her hands.    Target Date 21   Goal 2   Goal Identifier Balance   Goal Description Patient will demonstrate improved balance as evidence by the ability to complete single limb stance bilaterally for > 10 seconds for decreased risk of falls and improved confidence.   Target Date 21   Goal 3   Goal Identifier Ground transfer   Goal Description Patient will demonstrate ground level sitting to standing without use of support surface to rise as evidence of improved lower quarter and core strength, balance and self confidence n order to achieve IND goal of transfering up from the ground with her grandchildren.   Target Date 21   Goal 4   Goal Identifier Endurance   Goal Description Patient will complete the 6 minute walk test in order to establish a baseline measure for patient's functional endurance and to establish a activity endurance goal for patient's program.   Target Date 21     Plan:  Discharge from therapy.    Discharge:    Reason for  Discharge: Patient chooses to discontinue therapy.  Patient has failed to schedule further appointments.  Patient has not been seen in clinic for greater than 30 days and is currently undergoing additional assessment regarding the neuropathy. This current episode of care is closed. Should patient require additional outpatient physical therapy services please place additional order.    Equipment Issued:     Discharge Plan: Patient to continue home program.    Thank you for your referral.  Rachel Oconnell, PT, DPT, ATC, [facility]  O: [phone]  E: [email or web address]

## 2021-08-19 DIAGNOSIS — R05.9 COUGH: ICD-10-CM

## 2021-08-19 RX ORDER — BENZONATATE 100 MG/1
100 CAPSULE ORAL 3 TIMES DAILY PRN
Qty: 60 CAPSULE | Refills: 0 | Status: SHIPPED | OUTPATIENT
Start: 2021-08-19 | End: 2021-10-06

## 2021-09-16 ENCOUNTER — IMMUNIZATION (OUTPATIENT)
Dept: NURSING | Facility: CLINIC | Age: 61
End: 2021-09-16
Payer: COMMERCIAL

## 2021-09-16 ENCOUNTER — ANCILLARY PROCEDURE (OUTPATIENT)
Dept: CT IMAGING | Facility: CLINIC | Age: 61
End: 2021-09-16
Attending: INTERNAL MEDICINE
Payer: COMMERCIAL

## 2021-09-16 ENCOUNTER — OFFICE VISIT (OUTPATIENT)
Dept: PULMONOLOGY | Facility: CLINIC | Age: 61
End: 2021-09-16
Attending: INTERNAL MEDICINE
Payer: COMMERCIAL

## 2021-09-16 ENCOUNTER — LAB (OUTPATIENT)
Dept: LAB | Facility: CLINIC | Age: 61
End: 2021-09-16
Attending: INTERNAL MEDICINE
Payer: COMMERCIAL

## 2021-09-16 VITALS — DIASTOLIC BLOOD PRESSURE: 84 MMHG | OXYGEN SATURATION: 100 % | HEART RATE: 87 BPM | SYSTOLIC BLOOD PRESSURE: 136 MMHG

## 2021-09-16 DIAGNOSIS — J84.9 ILD (INTERSTITIAL LUNG DISEASE) (H): Primary | ICD-10-CM

## 2021-09-16 DIAGNOSIS — M34.9 SYSTEMIC SCLEROSIS (H): Primary | ICD-10-CM

## 2021-09-16 DIAGNOSIS — J84.9 ILD (INTERSTITIAL LUNG DISEASE) (H): ICD-10-CM

## 2021-09-16 DIAGNOSIS — D86.9 SARCOIDOSIS: ICD-10-CM

## 2021-09-16 DIAGNOSIS — T38.0X5S: ICD-10-CM

## 2021-09-16 DIAGNOSIS — M34.9 SYSTEMIC SCLEROSIS (H): ICD-10-CM

## 2021-09-16 LAB
6 MIN WALK (FT): 640 FT
6 MIN WALK (M): 195 M
BASOPHILS # BLD AUTO: 0 10E3/UL (ref 0–0.2)
BASOPHILS NFR BLD AUTO: 1 %
DLCOCOR-%PRED-PRE: 74 %
DLCOCOR-PRE: 17.23 ML/MIN/MMHG
DLCOUNC-%PRED-PRE: 74 %
DLCOUNC-PRE: 17.33 ML/MIN/MMHG
DLCOUNC-PRED: 23.25 ML/MIN/MMHG
EOSINOPHIL # BLD AUTO: 0 10E3/UL (ref 0–0.7)
EOSINOPHIL NFR BLD AUTO: 1 %
ERV-%PRED-PRE: 122 %
ERV-PRE: 1.11 L
ERV-PRED: 0.9 L
ERYTHROCYTE [DISTWIDTH] IN BLOOD BY AUTOMATED COUNT: 16.1 % (ref 10–15)
EXPTIME-PRE: 5.88 SEC
FEF2575-%PRED-PRE: 139 %
FEF2575-PRE: 3.4 L/SEC
FEF2575-PRED: 2.44 L/SEC
FEFMAX-%PRED-PRE: 96 %
FEFMAX-PRE: 6.77 L/SEC
FEFMAX-PRED: 7.03 L/SEC
FEV1-%PRED-PRE: 97 %
FEV1-PRE: 2.64 L
FEV1FEV6-PRE: 87 %
FEV1FEV6-PRED: 81 %
FEV1FVC-PRE: 87 %
FEV1FVC-PRED: 80 %
FEV1SVC-PRE: 87 %
FEV1SVC-PRED: 71 %
FIFMAX-PRE: 4.51 L/SEC
FRCPLETH-%PRED-PRE: 100 %
FRCPLETH-PRE: 2.95 L
FRCPLETH-PRED: 2.93 L
FVC-%PRED-PRE: 89 %
FVC-PRE: 3.05 L
FVC-PRED: 3.41 L
HCT VFR BLD AUTO: 41.8 % (ref 35–47)
HGB BLD-MCNC: 13.6 G/DL (ref 11.7–15.7)
IC-%PRED-PRE: 66 %
IC-PRE: 1.94 L
IC-PRED: 2.92 L
IMM GRANULOCYTES # BLD: 0 10E3/UL
IMM GRANULOCYTES NFR BLD: 1 %
LYMPHOCYTES # BLD AUTO: 1.5 10E3/UL (ref 0.8–5.3)
LYMPHOCYTES NFR BLD AUTO: 27 %
MCH RBC QN AUTO: 29.2 PG (ref 26.5–33)
MCHC RBC AUTO-ENTMCNC: 32.5 G/DL (ref 31.5–36.5)
MCV RBC AUTO: 90 FL (ref 78–100)
MONOCYTES # BLD AUTO: 0.6 10E3/UL (ref 0–1.3)
MONOCYTES NFR BLD AUTO: 12 %
NEUTROPHILS # BLD AUTO: 3.3 10E3/UL (ref 1.6–8.3)
NEUTROPHILS NFR BLD AUTO: 58 %
NRBC # BLD AUTO: 0 10E3/UL
NRBC BLD AUTO-RTO: 0 /100
PLATELET # BLD AUTO: 215 10E3/UL (ref 150–450)
RBC # BLD AUTO: 4.65 10E6/UL (ref 3.8–5.2)
RVPLETH-%PRED-PRE: 89 %
RVPLETH-PRE: 1.84 L
RVPLETH-PRED: 2.05 L
TLCPLETH-%PRED-PRE: 87 %
TLCPLETH-PRE: 4.89 L
TLCPLETH-PRED: 5.61 L
VA-%PRED-PRE: 73 %
VA-PRE: 4.16 L
VC-%PRED-PRE: 79 %
VC-PRE: 3.05 L
VC-PRED: 3.82 L
WBC # BLD AUTO: 5.5 10E3/UL (ref 4–11)

## 2021-09-16 PROCEDURE — 85025 COMPLETE CBC W/AUTO DIFF WBC: CPT | Performed by: PATHOLOGY

## 2021-09-16 PROCEDURE — 94726 PLETHYSMOGRAPHY LUNG VOLUMES: CPT | Performed by: INTERNAL MEDICINE

## 2021-09-16 PROCEDURE — 94729 DIFFUSING CAPACITY: CPT | Performed by: INTERNAL MEDICINE

## 2021-09-16 PROCEDURE — 71250 CT THORAX DX C-: CPT | Mod: GC | Performed by: RADIOLOGY

## 2021-09-16 PROCEDURE — 0003A PR ADMIN COVID VAC PFIZER, 3RD DOSE IMM COMP PT: CPT

## 2021-09-16 PROCEDURE — 99215 OFFICE O/P EST HI 40 MIN: CPT | Mod: 25 | Performed by: INTERNAL MEDICINE

## 2021-09-16 PROCEDURE — 90682 RIV4 VACC RECOMBINANT DNA IM: CPT

## 2021-09-16 PROCEDURE — 91300 PR COVID VAC PFIZER DIL RECON 30 MCG/0.3 ML IM: CPT

## 2021-09-16 PROCEDURE — 94618 PULMONARY STRESS TESTING: CPT | Performed by: INTERNAL MEDICINE

## 2021-09-16 PROCEDURE — G0463 HOSPITAL OUTPT CLINIC VISIT: HCPCS | Mod: 25

## 2021-09-16 PROCEDURE — 94375 RESPIRATORY FLOW VOLUME LOOP: CPT | Performed by: INTERNAL MEDICINE

## 2021-09-16 PROCEDURE — 90471 IMMUNIZATION ADMIN: CPT

## 2021-09-16 PROCEDURE — 36415 COLL VENOUS BLD VENIPUNCTURE: CPT | Performed by: PATHOLOGY

## 2021-09-16 RX ORDER — AZATHIOPRINE 50 MG/1
150 TABLET ORAL DAILY
Qty: 90 TABLET | Refills: 3 | Status: SHIPPED | OUTPATIENT
Start: 2021-09-16 | End: 2023-01-01

## 2021-09-16 NOTE — PROGRESS NOTES
[facility]  ILD Clinic - Return Visit-  September 16, 2021         Assessment and Plan:   Jeanine Schuler is a 60 year old female who presents for initial evaluation of interstitial lung disease.    1) Interstitial lung disease  - Her CT shows bronchocentric, predominantly central infiltrates.  Some are nodular appearing, especially in the periphery and appears confluent centrally.  Definite tissue diagnosis has not been obtained for sarcoidosis however her case was reviewed at sarcoid conference.  Imaging finding as well as pathology showing some noncaseating granulomas, sarcoidosis was thought to be a reasonable diagnosis at that time.  Other differential diagnosis included chronic HP.  -She was started on methotrexate subQ, now uptitrated to 20 mg weekly.  In addition, she was started on CellCept 500 mg twice daily by Dr. Ann on 5/2021.  -Despite this, she has had progressive respiratory symptoms and her PFT today shows a significant decline in her FVC by more than 1L.  -Repeat CT today.   ADDENDUM: Repeat CT was reviewed at ILD conference and shows significant improvement in her bilateral nodular opacities.  Will continue plan as below.   -Sarcoidosis labs obtained on her last visit showed elevated 1, 25 OH-vitamin D, with low normal 25-OH vitamin D, consistent with granulomatous inflammation.  Soluble IL-2 receptor was also high at 995 (reference range 137-838).  ACE was normal.  -Repeat CRP, ESR, ACE and soluble IL-2 receptor  -Her prednisone dose is currently at 40 mg daily.  Will increase Imuran to 150mg, then titrate down Prednisone by 5mg every month, to be kept at 30mg daily until she sees Dr. De La Torre in November.      2) History of limited cutaneous systemic sclerosis  - On Plaquinil and CellCept, followed by Dr. North    3) Neuropathy   -She was seen by Dr. Abel from neurology in 5/2021, and nerve conduction studies and EMG was  abnormal. She is planned for a nerve biopsy of her right foot.    RTC: 3 months or sooner if need arises  Influenza vaccination: She has already received Influenza vaccination for this year. She has received Pfizer COVID-19 vaccine in April, received her first dose of Shingrix 10/2020.  Second dose of Shingrix on her next visit.    Beena Cuellar MD [employer]  Pulmonary and Critical Care Medicine          Problem List:     1. Interstitial lung disease  a. Symptom: Cough, fatigue, sone subjective dyspnea  b. Treatment: (11/2020-) Prednisone, currently at 20mg   c. Diagnosis: Cryobiopsy Patholo  d. Your note gy: NSIP vs. HP, sarcoidosis can not be r/o. (ALI, what appears to be an acute exacerbation of underlying ILD with OP.  Few nonnecrotizing granulomas, cellular NSIP that can be seen in early sarcodosis)  Radiology: bronchocentric nodular infiltrates  e. Serology:TARA 1: 1280, anticentromere antibody greater than 8, anti-RNP-4.8 (less than 1), chromatin DNP-6.6 (less than 1.0)  f. Other w/u  i. Bronchoscopy with BAL ([facility]; 8/4/2020)  1. BAL (superior lingula) cell count (49% neutrophils, 45% lymphocytes, 3% monocytes, 3% eosinophils)  2. Negative for malignant cells, silver stain negative for pneumocystis and fungal organisms.  3. Pathology (anterior lingual segments of the left upper lobe): Benign bronchial epithelium and alveolar spaces.  There is no evidence of malignancy.  The findings are overall nondiagnostic.  ii. Bronchoscopy with BAL and cryobiopsy ([facility]; 10/6/2020)  1. BAL (, other cells 32, L 47, N 19, E 2; CD4: CD8 3.65)  2. Microbiology-all negative except for penicillium species from fungal culture only.  Aspergillus galactomannan was negative  iii. LUNG, LEFT UPPER LOBE, TRANSBRONCHIAL CRYOBIOPSY:   - Fragments of respiratory mucosa and alveolated lung tissue with nonnecrotizing granulomas with multinucleated giant cells, foci of organizing pneumonia and nonspecific chronic interstitial  "inflammation   with reactive pneumocytes type II hyperplasia.   - GMS and AFB special stains are negative for fungal and acid-fast organisms, respectively.   COMMENT:   In absence of infection (ie. Mycobacteria, please correlate with  laboratory cultures and serology), the histologic findings raise the differential diagnosis of hypersensitivity pneumonitis and sarcoidosis. Clinical   and radiologic correlation is necessary. The case will be discussed at the ILD interdepartmental conference.     2. Mixed connective tissue disease/limited cutaneous systemic sclerosis  a. Followed by Dr. Indio lopez. Treatment: (\"years ago\") Methotrexate PO -> SQ due to GI intolerance, unclear why stoped  Plaquinil    3. GERD    4. HFpEF    5. History of gastric bypass 2005        History of Present Illness:     Jeanine Schuler is a 60 year old female who presents for follow=up for interstitial lung disease.     Couldn't finish her 6MWT due to dyspnea.  Whole hands turning purple and felt like she was goint to pass out.    Can walk around in the living room and in the house but has to sit down and rest with short distances.     Had shingles on her left anterior thoracic wall.  Never really had a rash, but did get a sharp pain and a small 2 little blisters.  Pain is improved significantly but still feels pain / tight. Nonpainful touch is painful.     Still on 40mg of prednisone,     Only got one dose of SHingrix     Hard to tell if SOB or fatigue, feels like she can't get enough air.  Whole hand turns purple.      A little bit of weight gain, troule sleeping, irritability.      Imuran - tolerating well. Has a little more looser stools but not bothersome. A  Having a hard time eating due to esophageal dysmotility.  Solids are the worst. Trying to drink more water.     Prednisone - has been off of it until last July.  Took off and then  Has been on this dose     Did some rehab    [place] - for rehab.   [facility] -     Interval history: " 7/2021  Since her last visit, she was started on methotrexate, now uptitrated to 20 mg weekly.  She has been methotrexate for a total of about 2 months, and does not note any improvement in her breathing.  In fact, she feels as though her respiratory status has been worse, and she is overall fatigued.  She saw Dr. Ann in May, and was started on CellCept 500 mg twice daily.  Her prednisone dose has been down titrated to 20 mg daily.  Despite the above, she does not feel as though her symptoms are any better.  She does note that her coughing seems to have improved with initiation of methotrexate.    She is generally tolerating the methotrexate well, but does experience some nausea and dry heaving about 48 hours after her weekly dose.    She denies any fevers, chills, night sweats, chest pain except she started noticing left lower anterior chest/upper abdominal pain along with left-sided shoulder pain which started last night.  She has had similar pains in the past, but was on the contralateral side earlier in the year.     She denies any changes in her appetite, she has gained about 10 pounds since 5/2021, which he attributes to increased lower extremity edema.  She continues to take the same dose of Lasix and spironolactone.    Interval history (3/2021)  Since her last visit, she is unsure if there has been any change in her respiratory status.  She currently complains of extreme fatigue and pain/numbness in her feet and legs.  She occasionally also experiences pain in her upper chest/throat area.  In addition, she has gained about 5 to 10 pounds on prednisone.    Since her last visit, her prednisone dose has been tapered slowly (5 mg every 3 weeks).  However, when she reached about 25 mg, she started noticing increased pain in her feet.  This has progressively worsened on lower doses of 20 mg, and she was evaluated by Dr. Loomis that week, who increase her prednisone to 40 mg daily, which she is currently  on.    With initiation of higher doses of prednisone, she feels as though her pain has improved somewhat but has not resolved.  She denies any fever/chills, but states she still occasionally experiences night sweats, about once a week.  This has improved in terms of frequency with initiation of prednisone.           Review of Systems:     Please see HPI, otherwise the complete 10 point ROS is negative.           Past Medical and Surgical History:     Past Medical History:   Diagnosis Date     Anemia      Bariatric surgery status 06/27/2005    abdi en Y- [facility];     Cellulitis of leg 06/06/2013     Esophageal reflux     Better post-hiatal hernia repair and weight loss     Hyperplastic colonic polyp      Hypovitaminosis D 2011     ILD (interstitial lung disease) (H)      Kidney stone 04/29/2007     Kidney stone 05/10/2007    surgically removed     Limb ischemia; ulcers of fingertips 04/22/2013     Other chronic pain     Left shoulder - rheumatoid arthritis     Raynaud's syndrome      Rheumatoid arthritis (H) \     Scleroderma (H)      Sjogren-Jake syndrome      Systemic sclerosis (H) 2009     Unspecified essential hypertension      Past Surgical History:   Procedure Laterality Date     BIOPSY MUSCLE DIAGNOSTIC (LOCATION) Right 7/19/2021    Procedure: Right SURAL nerve BIOPSY;  Surgeon: Farooq Abel MD;  [facility]     BRONCHOSCOPY FLEXIBLE,L CRYOBIOPSY N/A 10/06/2020    Procedure: BRONCHOSCOPY, FLEXIBLE, WITH TRANSBRONCHIAL BIOPSY x4 USING CRYOPROBE, bronchial alveolar lavage;  Surgeon: Teddy Mendez MD;  Location: [facility] OR     BYPASS GASTRIC, CHOLECYSTECTOMY, COMBINED  06/27/2005    [facility]     CHOLECYSTECTOMY       COLONOSCOPY       COLONOSCOPY N/A 11/17/2020    Procedure: COLONOSCOPY, WITH POLYPECTOMY AND BIOPSY;  Surgeon: Jamie Cárdenas MD;  [facility] [place] [facility]     ESOPHAGOSCOPY, GASTROSCOPY, DUODENOSCOPY (EGD), COMBINED N/A 03/10/2016    Procedure: COMBINED ESOPHAGOSCOPY,  "GASTROSCOPY, DUODENOSCOPY (EGD), BIOPSY SINGLE OR MULTIPLE;  Surgeon: Tricia Zambrano MD;  Location:  GI     ESOPHAGOSCOPY, GASTROSCOPY, DUODENOSCOPY (EGD), COMBINED N/A 11/17/2020    Procedure: ESOPHAGOGASTRODUODENOSCOPY, WITH BIOPSY and Dilation;  Surgeon: Jamie Cárdenas MD;  Location: [facility] OR     INNER EAR SURGERY       PICC INSERTION  06/05/2013    5fr DL Power PICC, 44cm, left basilic vein, tip in low SVC     SURGICAL HISTORY OF -       Left ear surgery - incus transition, tympanoplasty     SURGICAL HISTORY OF -   06/01/2005    Hiatal hernia repair     TONSILLECTOMY            Social History:     Social History     Tobacco Use     Smoking status: Never Smoker     Smokeless tobacco: Never Used   Substance Use Topics     Alcohol use: Yes     Comment: occassionally     Drug use: No     Social History     Social History Narrative    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  She has been on medical leave since 7/27/2020        Moved about 4 years ago to a new house. No known water damage or mold.        She lives in a normal area with forms around her house, but denies any exposure to grain dust, hay, other farm animals.        No unusual hobbies                  Medications:     Current Outpatient Medications   Medication     azaTHIOprine (IMURAN) 50 MG tablet     benzonatate (TESSALON) 100 MG capsule     folic acid (FOLVITE) 1 MG tablet     furosemide (LASIX) 40 MG tablet     hydroxychloroquine (PLAQUENIL) 200 MG tablet     insulin syringe-needle U-100 (BD INSULIN SYRINGE ULTRAFINE) 30G X 1/2\" 1 ML miscellaneous     losartan (COZAAR) 50 MG tablet     methotrexate 50 MG/2ML injection     mycophenolate (GENERIC EQUIVALENT) 250 MG capsule     naproxen (NAPROSYN) 500 MG tablet     nitroFURantoin macrocrystal-monohydrate (MACROBID) 100 MG capsule     nitroGLYcerin (NITROSTAT) 0.3 MG sublingual tablet     omeprazole (PRILOSEC) 20 MG DR capsule     pantoprazole (PROTONIX) 40 MG EC " tablet     Potassium (POTASSIMIN PO)     predniSONE (DELTASONE) 20 MG tablet     spironolactone (ALDACTONE) 25 MG tablet     sulfamethoxazole-trimethoprim (BACTRIM) 400-80 MG tablet     temazepam (RESTORIL) 15 MG capsule     vitamin D2 (ERGOCALCIFEROL) 81950 units (1250 mcg) capsule     No current facility-administered medications for this visit.            Physical Exam:   There were no vitals taken for this visit.  GENERAL: alert, NAD  HEENT: NCAT, EOMI, masked  Neck: no cervical or supraclavicular adenopathy  Lungs: good air flow, faint inspiratory squeak   CV: RRR, S1S2, no murmurs noted  Abdomen: normoactive BS, soft, non tender  Neuro: AAO X 3  Psychiatric: normal affect, good eye contact  Skin: no rash, jaundice or lesions on limited exam  Extremities: 1-2+ pitting edema b/l.            Imaging:     CT Chest ([facility]; 7/20/2020)  1. Bilateral pulmonary consolidation is probably due to infection. Other inflammatory processes are also on the differential. Although radiographic pattern is somewhat typical of sarcoid, this is less likely given significant worsening since 10/23/2019. Correlate clinically.  2. Mediastinal lymphadenopathy, probably reactive.  3. No pulmonary embolism.  4. Postoperative changes of gastric bypass with debris in the esophagus. Although this is present, the pulmonary consolidation is not typical of aspiration.           Pulmonary Function Tests:     PFT interpretation (July 1, 2021):  Maneuver: ATS criteria i not met.  Interpret with caution.  Spirometry: Suggestive of moderate restriction.  Lung volumes: TLC is 74% predicted, c/w mild restriction.   Diffusing capacity: There is mild impairment in diffusing capacity      Six-minute walk test  Date 6MWD RA SpO2 Resting O2 req Exercise O2 req Lowest SpO2 on amb   8/21/20 940 97 RA RA 90                    Laboratory:     Serology ([facility]; 7/27/2020)  Chromatin DNP antibody -6.6 (less than 1.0)  Ribosomal  antibody-negative  SSA-2.0 (less than 1.0)  SSB-negative anti-SM RNP-greater than 80 (less than 1.0)  SCL 70-negative  Maddy 1-negative  Centromere antibody -greater than 8.0 (less than 1.0)  SM IgG-negative  Anti-RNP-4.8 (less than 1.0)  Anti-DS DNA- negative  TARA - positive    6/10/2009  TARA- 1: 1280  RF-20 (less than 9)    No results found for this or any previous visit (from the past 168 hour(s)).    BAL (8/4/2020)  Negative for Legionella, RVP, Bordetella, C pneumoniae, mycoplasma pneumonia    A. Lingula bronchial alveolar lavage, cytology  1. 49% neutrophils, 45% lymphocytes, 3% monocytes, and 3% eosinophils.  2. Negative for malignant cells.  3. Silver stain negative for pneumocystis and fungal organisms.  B. Lingula and anterior segments, left upper lobe transbronchial biopsy?-Negative for malignancy, see microscopic description.    Pathology:   A. The smear has the following differential 49% neutrophils, 45% lymphocytes, 3% monocytes, and 3% macrophages. There is no evidence of malignancy. A silver stain is applied, with an appropriate positive control, and is negative for pneumocystis and fungal organisms.  B. The biopsy shows benign bronchial epithelium and alveolar spaces. There is no evidence of malignancy. The findings are overall nondiagnostic.             Cardiac:     TTE ([facility]; 8/25/2020)  Interpretation Summary  Global and regional left ventricular function is normal with an EF of 55-60%.  Right ventricular function, chamber size, wall motion, and thickness are  normal.  Pulmonary artery systolic pressure cannot be assessed.  Ascending aorta 3.8 cm.  Aortic index 20mm/m2,mild dilation.  The inferior vena cava is normal.  No pericardial effusion is present.    Echocardiogram ([facility]; 10/16/2017)  Interpretation Summary    * Normal left ventricular size and systolic function, estimated LVEF 60-65%.    * Normal regional wall motion.    * Normal right ventricular size and systolic function.    *  The left ventricular diastolic function is mildly abnormal (Grade I).    * There is no hemodynamically significant valve disease.    * Normal estimated right ventricular systolic pressure of 28 mmHg.    * The proximal ascending aorta is mildly dilated measuring  3.9 cm.    * The IVC is normal in size (< 2.1 cm), > 50% respiratory variance, RA  pressure normal at 3 mmHg.    * Compared to prior study on 8/26/16, there has been no significant change  in left ventricular systolic function with side by side comparison.  There has  been no significant change in the size of the proximal ascending aorta  (previously measured 4.0 cm).         Other:

## 2021-09-16 NOTE — PATIENT INSTRUCTIONS
- Please increase your Imuran (azathioprine) to 150mg daily  - Please decrease your prednisone by 5mg every month, until you reach 30mg.  Keep taking 30mg until you see Dr. De La Torre.

## 2021-09-16 NOTE — NURSING NOTE
Chief Complaint   Patient presents with     Interstitial Lung Disease (ILD)     follow up     Vitals were taken and medications were reconciled.     JOHN Wilhelm

## 2021-09-16 NOTE — LETTER
9/16/2021         RE: Jeanine Schuler  [address]  [facility] [address]        Dear Colleague,    Thank you for referring your patient, Jeanine Schuler, to the [facility] [place]. Please see a copy of my visit note below.    [facility]  ILD Clinic - Return Visit-  September 16, 2021         Assessment and Plan:   Jeanine Schuler is a 60 year old female who presents for initial evaluation of interstitial lung disease.    1) Interstitial lung disease  - Her CT shows bronchocentric, predominantly central infiltrates.  Some are nodular appearing, especially in the periphery and appears confluent centrally.  Definite tissue diagnosis has not been obtained for sarcoidosis however her case was reviewed at sarcoid conference.  Imaging finding as well as pathology showing some noncaseating granulomas, sarcoidosis was thought to be a reasonable diagnosis at that time.  Other differential diagnosis included chronic HP.  -She was started on methotrexate subQ, now uptitrated to 20 mg weekly.  In addition, she was started on CellCept 500 mg twice daily by Dr. Ann on 5/2021.  -Despite this, she has had progressive respiratory symptoms and her PFT today shows a significant decline in her FVC by more than 1L.  -Repeat CT today.   ADDENDUM: Repeat CT was reviewed at ILD conference and shows significant improvement in her bilateral nodular opacities.  Will continue plan as below.   -Sarcoidosis labs obtained on her last visit showed elevated 1, 25 OH-vitamin D, with low normal 25-OH vitamin D, consistent with granulomatous inflammation.  Soluble IL-2 receptor was also high at 995 (reference range 137-838).  ACE was normal.  -Repeat CRP, ESR, ACE and soluble IL-2 receptor  -Her prednisone dose is currently at 40 mg daily.  Will increase Imuran to 150mg, then titrate down Prednisone by 5mg every month, to  be kept at 30mg daily until she sees Dr. De La Torre in November.      2) History of limited cutaneous systemic sclerosis  - On Plaquinil and CellCept, followed by Dr. North    3) Neuropathy   -She was seen by Dr. Abel from neurology in 5/2021, and nerve conduction studies and EMG was abnormal. She is planned for a nerve biopsy of her right foot.    RTC: 3 months or sooner if need arises  Influenza vaccination: She has already received Influenza vaccination for this year. She has received Pfizer COVID-19 vaccine in April, received her first dose of Shingrix 10/2020.  Second dose of Shingrix on her next visit.    Beena Cuellar MD [employer]  Pulmonary and Critical Care Medicine          Problem List:     1. Interstitial lung disease  a. Symptom: Cough, fatigue, sone subjective dyspnea  b. Treatment: (11/2020-) Prednisone, currently at 20mg   c. Diagnosis: Cryobiopsy Patholo  d. Your note gy: NSIP vs. HP, sarcoidosis can not be r/o. (ALI, what appears to be an acute exacerbation of underlying ILD with OP.  Few nonnecrotizing granulomas, cellular NSIP that can be seen in early sarcodosis)  Radiology: bronchocentric nodular infiltrates  e. Serology:TARA 1: 1280, anticentromere antibody greater than 8, anti-RNP-4.8 (less than 1), chromatin DNP-6.6 (less than 1.0)  f. Other w/u  i. Bronchoscopy with BAL ([facility]; 8/4/2020)  1. BAL (superior lingula) cell count (49% neutrophils, 45% lymphocytes, 3% monocytes, 3% eosinophils)  2. Negative for malignant cells, silver stain negative for pneumocystis and fungal organisms.  3. Pathology (anterior lingual segments of the left upper lobe): Benign bronchial epithelium and alveolar spaces.  There is no evidence of malignancy.  The findings are overall nondiagnostic.  ii. Bronchoscopy with BAL and cryobiopsy ([facility]; 10/6/2020)  1. BAL (, other cells 32, L 47, N 19, E 2; CD4: CD8 3.65)  2. Microbiology-all negative except for penicillium species from fungal culture only.   "Aspergillus galactomannan was negative  iii. LUNG, LEFT UPPER LOBE, TRANSBRONCHIAL CRYOBIOPSY:   - Fragments of respiratory mucosa and alveolated lung tissue with nonnecrotizing granulomas with multinucleated giant cells, foci of organizing pneumonia and nonspecific chronic interstitial inflammation   with reactive pneumocytes type II hyperplasia.   - GMS and AFB special stains are negative for fungal and acid-fast organisms, respectively.   COMMENT:   In absence of infection (ie. Mycobacteria, please correlate with  laboratory cultures and serology), the histologic findings raise the differential diagnosis of hypersensitivity pneumonitis and sarcoidosis. Clinical   and radiologic correlation is necessary. The case will be discussed at the ILD interdepartmental conference.     2. Mixed connective tissue disease/limited cutaneous systemic sclerosis  a. Followed by Dr. Indio lopez. Treatment: (\"years ago\") Methotrexate PO -> SQ due to GI intolerance, unclear why stoped  Plaquinil    3. GERD    4. HFpEF    5. History of gastric bypass 2005        History of Present Illness:     Jeanine Schuler is a 60 year old female who presents for follow=up for interstitial lung disease.     Couldn't finish her 6MWT due to dyspnea.  Whole hands turning purple and felt like she was goint to pass out.    Can walk around in the living room and in the house but has to sit down and rest with short distances.     Had shingles on her left anterior thoracic wall.  Never really had a rash, but did get a sharp pain and a small 2 little blisters.  Pain is improved significantly but still feels pain / tight. Nonpainful touch is painful.     Still on 40mg of prednisone,     Only got one dose of SHingrix     Hard to tell if SOB or fatigue, feels like she can't get enough air.  Whole hand turns purple.      A little bit of weight gain, troule sleeping, irritability.      Imuran - tolerating well. Has a little more looser stools but not " bothersome. A  Having a hard time eating due to esophageal dysmotility.  Solids are the worst. Trying to drink more water.     Prednisone - has been off of it until last July.  Took off and then  Has been on this dose     Did some rehab    [place] - for rehab.   [facility] -     Interval history: 7/2021  Since her last visit, she was started on methotrexate, now uptitrated to 20 mg weekly.  She has been methotrexate for a total of about 2 months, and does not note any improvement in her breathing.  In fact, she feels as though her respiratory status has been worse, and she is overall fatigued.  She saw Dr. Ann in May, and was started on CellCept 500 mg twice daily.  Her prednisone dose has been down titrated to 20 mg daily.  Despite the above, she does not feel as though her symptoms are any better.  She does note that her coughing seems to have improved with initiation of methotrexate.    She is generally tolerating the methotrexate well, but does experience some nausea and dry heaving about 48 hours after her weekly dose.    She denies any fevers, chills, night sweats, chest pain except she started noticing left lower anterior chest/upper abdominal pain along with left-sided shoulder pain which started last night.  She has had similar pains in the past, but was on the contralateral side earlier in the year.     She denies any changes in her appetite, she has gained about 10 pounds since 5/2021, which he attributes to increased lower extremity edema.  She continues to take the same dose of Lasix and spironolactone.    Interval history (3/2021)  Since her last visit, she is unsure if there has been any change in her respiratory status.  She currently complains of extreme fatigue and pain/numbness in her feet and legs.  She occasionally also experiences pain in her upper chest/throat area.  In addition, she has gained about 5 to 10 pounds on prednisone.    Since her last visit, her prednisone dose has been tapered  slowly (5 mg every 3 weeks).  However, when she reached about 25 mg, she started noticing increased pain in her feet.  This has progressively worsened on lower doses of 20 mg, and she was evaluated by Dr. Loomis that week, who increase her prednisone to 40 mg daily, which she is currently on.    With initiation of higher doses of prednisone, she feels as though her pain has improved somewhat but has not resolved.  She denies any fever/chills, but states she still occasionally experiences night sweats, about once a week.  This has improved in terms of frequency with initiation of prednisone.           Review of Systems:     Please see HPI, otherwise the complete 10 point ROS is negative.           Past Medical and Surgical History:     Past Medical History:   Diagnosis Date     Anemia      Bariatric surgery status 06/27/2005    abdi en Y- [facility];     Cellulitis of leg 06/06/2013     Esophageal reflux     Better post-hiatal hernia repair and weight loss     Hyperplastic colonic polyp      Hypovitaminosis D 2011     ILD (interstitial lung disease) (H)      Kidney stone 04/29/2007     Kidney stone 05/10/2007    surgically removed     Limb ischemia; ulcers of fingertips 04/22/2013     Other chronic pain     Left shoulder - rheumatoid arthritis     Raynaud's syndrome      Rheumatoid arthritis (H) \     Scleroderma (H)      Sjogren-Jake syndrome      Systemic sclerosis (H) 2009     Unspecified essential hypertension      Past Surgical History:   Procedure Laterality Date     BIOPSY MUSCLE DIAGNOSTIC (LOCATION) Right 7/19/2021    Procedure: Right SURAL nerve BIOPSY;  Surgeon: Farooq Abel MD;  Location: [facility] OR     BRONCHOSCOPY FLEXIBLE,L CRYOBIOPSY N/A 10/06/2020    Procedure: BRONCHOSCOPY, FLEXIBLE, WITH TRANSBRONCHIAL BIOPSY x4 USING CRYOPROBE, bronchial alveolar lavage;  Surgeon: Teddy Mendez MD;  Location: [facility] OR     BYPASS GASTRIC, CHOLECYSTECTOMY, COMBINED  06/27/2005    [facility]      "CHOLECYSTECTOMY       COLONOSCOPY       COLONOSCOPY N/A 11/17/2020    Procedure: COLONOSCOPY, WITH POLYPECTOMY AND BIOPSY;  Surgeon: Jamie Cárdenas MD;  Location: [facility] OR     ESOPHAGOSCOPY, GASTROSCOPY, DUODENOSCOPY (EGD), COMBINED N/A 03/10/2016    Procedure: COMBINED ESOPHAGOSCOPY, GASTROSCOPY, DUODENOSCOPY (EGD), BIOPSY SINGLE OR MULTIPLE;  Surgeon: Tricia Zambrano MD;  Location: [facility] GI     ESOPHAGOSCOPY, GASTROSCOPY, DUODENOSCOPY (EGD), COMBINED N/A 11/17/2020    Procedure: ESOPHAGOGASTRODUODENOSCOPY, WITH BIOPSY and Dilation;  Surgeon: Jamie Cárdenas MD;  Location: [facility] OR     INNER EAR SURGERY       PICC INSERTION  06/05/2013    5fr DL Power PICC, 44cm, left basilic vein, tip in low SVC     SURGICAL HISTORY OF -       Left ear surgery - incus transition, tympanoplasty     SURGICAL HISTORY OF -   06/01/2005    Hiatal hernia repair     TONSILLECTOMY            Social History:     Social History     Tobacco Use     Smoking status: Never Smoker     Smokeless tobacco: Never Used   Substance Use Topics     Alcohol use: Yes     Comment: occassionally     Drug use: No     Social History     Social History Narrative    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  She has been on medical leave since 7/27/2020        Moved about 4 years ago to a new house. No known water damage or mold.        She lives in a normal area with forms around her house, but denies any exposure to grain dust, hay, other farm animals.        No unusual hobbies                  Medications:     Current Outpatient Medications   Medication     azaTHIOprine (IMURAN) 50 MG tablet     benzonatate (TESSALON) 100 MG capsule     folic acid (FOLVITE) 1 MG tablet     furosemide (LASIX) 40 MG tablet     hydroxychloroquine (PLAQUENIL) 200 MG tablet     insulin syringe-needle U-100 (BD INSULIN SYRINGE ULTRAFINE) 30G X 1/2\" 1 ML miscellaneous     losartan (COZAAR) 50 MG tablet     methotrexate 50 MG/2ML injection     " mycophenolate (GENERIC EQUIVALENT) 250 MG capsule     naproxen (NAPROSYN) 500 MG tablet     nitroFURantoin macrocrystal-monohydrate (MACROBID) 100 MG capsule     nitroGLYcerin (NITROSTAT) 0.3 MG sublingual tablet     omeprazole (PRILOSEC) 20 MG DR capsule     pantoprazole (PROTONIX) 40 MG EC tablet     Potassium (POTASSIMIN PO)     predniSONE (DELTASONE) 20 MG tablet     spironolactone (ALDACTONE) 25 MG tablet     sulfamethoxazole-trimethoprim (BACTRIM) 400-80 MG tablet     temazepam (RESTORIL) 15 MG capsule     vitamin D2 (ERGOCALCIFEROL) 48640 units (1250 mcg) capsule     No current facility-administered medications for this visit.            Physical Exam:   There were no vitals taken for this visit.  GENERAL: alert, NAD  HEENT: NCAT, EOMI, masked  Neck: no cervical or supraclavicular adenopathy  Lungs: good air flow, faint inspiratory squeak   CV: RRR, S1S2, no murmurs noted  Abdomen: normoactive BS, soft, non tender  Neuro: AAO X 3  Psychiatric: normal affect, good eye contact  Skin: no rash, jaundice or lesions on limited exam  Extremities: 1-2+ pitting edema b/l.            Imaging:     CT Chest ([facility]; 7/20/2020)  1. Bilateral pulmonary consolidation is probably due to infection. Other inflammatory processes are also on the differential. Although radiographic pattern is somewhat typical of sarcoid, this is less likely given significant worsening since 10/23/2019. Correlate clinically.  2. Mediastinal lymphadenopathy, probably reactive.  3. No pulmonary embolism.  4. Postoperative changes of gastric bypass with debris in the esophagus. Although this is present, the pulmonary consolidation is not typical of aspiration.           Pulmonary Function Tests:     PFT interpretation (July 1, 2021):  Maneuver: ATS criteria i not met.  Interpret with caution.  Spirometry: Suggestive of moderate restriction.  Lung volumes: TLC is 74% predicted, c/w mild restriction.   Diffusing capacity: There is mild  impairment in diffusing capacity      Six-minute walk test  Date 6MWD RA SpO2 Resting O2 req Exercise O2 req Lowest SpO2 on amb   8/21/20 940 97 RA RA 90                    Laboratory:     Serology ([facility]; 7/27/2020)  Chromatin DNP antibody -6.6 (less than 1.0)  Ribosomal antibody-negative  SSA-2.0 (less than 1.0)  SSB-negative anti-SM RNP-greater than 80 (less than 1.0)  SCL 70-negative  Maddy 1-negative  Centromere antibody -greater than 8.0 (less than 1.0)  SM IgG-negative  Anti-RNP-4.8 (less than 1.0)  Anti-DS DNA- negative  TARA - positive    6/10/2009  TARA- 1: 1280  RF-20 (less than 9)    No results found for this or any previous visit (from the past 168 hour(s)).    BAL (8/4/2020)  Negative for Legionella, RVP, Bordetella, C pneumoniae, mycoplasma pneumonia    A. Lingula bronchial alveolar lavage, cytology  1. 49% neutrophils, 45% lymphocytes, 3% monocytes, and 3% eosinophils.  2. Negative for malignant cells.  3. Silver stain negative for pneumocystis and fungal organisms.  B. Lingula and anterior segments, left upper lobe transbronchial biopsy?-Negative for malignancy, see microscopic description.    Pathology:   A. The smear has the following differential 49% neutrophils, 45% lymphocytes, 3% monocytes, and 3% macrophages. There is no evidence of malignancy. A silver stain is applied, with an appropriate positive control, and is negative for pneumocystis and fungal organisms.  B. The biopsy shows benign bronchial epithelium and alveolar spaces. There is no evidence of malignancy. The findings are overall nondiagnostic.             Cardiac:     TTE ([facility]; 8/25/2020)  Interpretation Summary  Global and regional left ventricular function is normal with an EF of 55-60%.  Right ventricular function, chamber size, wall motion, and thickness are  normal.  Pulmonary artery systolic pressure cannot be assessed.  Ascending aorta 3.8 cm.  Aortic index 20mm/m2,mild dilation.  The inferior vena cava is normal.  No  pericardial effusion is present.    Echocardiogram ([facility]; 10/16/2017)  Interpretation Summary    * Normal left ventricular size and systolic function, estimated LVEF 60-65%.    * Normal regional wall motion.    * Normal right ventricular size and systolic function.    * The left ventricular diastolic function is mildly abnormal (Grade I).    * There is no hemodynamically significant valve disease.    * Normal estimated right ventricular systolic pressure of 28 mmHg.    * The proximal ascending aorta is mildly dilated measuring  3.9 cm.    * The IVC is normal in size (< 2.1 cm), > 50% respiratory variance, RA  pressure normal at 3 mmHg.    * Compared to prior study on 8/26/16, there has been no significant change  in left ventricular systolic function with side by side comparison.  There has  been no significant change in the size of the proximal ascending aorta  (previously measured 4.0 cm).         Other:           Again, thank you for allowing me to participate in the care of your patient.        Sincerely,        Beena Cuellar MD

## 2021-09-20 ENCOUNTER — TEAM CONFERENCE (OUTPATIENT)
Dept: PULMONOLOGY | Facility: CLINIC | Age: 61
End: 2021-09-20

## 2021-09-21 NOTE — TELEPHONE ENCOUNTER
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 Was started on methotrexate and prednisone which did not improve symptoms and PFT's declined.  Changed to Cellcept with no improvement.  Now on Imuran and prednisone at 40 mg and PFT's are finally improving.  Imuran increased to 150 mg daily .      Reason for conference discussion/Specific Question:  Review recent chest Ct and discuss plan.     Referring Physician:      Radiology Interpretation: CT improving when compared to previous chest CT.      Pathology Interpretation: N/A      There was a consensus recommendation for the following actions:     Maintain plan of increasing Imuran to 150 mg daily.  Slowly titrate prednisone by 5 mg per month.  Patient will be seeing Dr. De La Torre in November at which time she will be on 30 mg of prednisone.    Pulmonary/ILD Provider: Dr. Beena Cuellar

## 2021-10-05 DIAGNOSIS — K21.9 GASTRO-ESOPHAGEAL REFLUX DISEASE WITHOUT ESOPHAGITIS: ICD-10-CM

## 2021-10-05 DIAGNOSIS — E83.59 OTHER DISORDERS OF CALCIUM METABOLISM: ICD-10-CM

## 2021-10-06 DIAGNOSIS — R05.9 COUGH: ICD-10-CM

## 2021-10-06 DIAGNOSIS — J84.9 ILD (INTERSTITIAL LUNG DISEASE) (H): ICD-10-CM

## 2021-10-06 RX ORDER — BENZONATATE 100 MG/1
100 CAPSULE ORAL 3 TIMES DAILY PRN
Qty: 60 CAPSULE | Refills: 0 | Status: SHIPPED | OUTPATIENT
Start: 2021-10-06 | End: 2022-01-28

## 2021-10-06 RX ORDER — SULFAMETHOXAZOLE AND TRIMETHOPRIM 400; 80 MG/1; MG/1
1 TABLET ORAL DAILY
Qty: 30 TABLET | Refills: 3 | Status: SHIPPED | OUTPATIENT
Start: 2021-10-06 | End: 2023-01-01

## 2021-10-07 RX ORDER — PANTOPRAZOLE SODIUM 40 MG/1
40 TABLET, DELAYED RELEASE ORAL DAILY
Qty: 90 TABLET | Refills: 3 | Status: SHIPPED | OUTPATIENT
Start: 2021-10-07 | End: 2022-01-01

## 2021-10-07 NOTE — TELEPHONE ENCOUNTER
pantoprazole 40 mg tablet,delayed release (PROTONIX)  Last Written Prescription Date:  9/30/2020  Last Fill Quantity: ?,   # refills: ?  Last Office Visit : 9/16/2021  Future Office visit:  11/11/2021    Routing refill request to provider for review/approval because:  Medication is reported/historical        Mell Islas RN  Central Triage Red Flags/Med Refills

## 2021-11-08 DIAGNOSIS — M35.1 MCTD (MIXED CONNECTIVE TISSUE DISEASE) (H): ICD-10-CM

## 2021-11-08 DIAGNOSIS — J84.9 ILD (INTERSTITIAL LUNG DISEASE) (H): ICD-10-CM

## 2021-11-08 DIAGNOSIS — D86.9 SARCOIDOSIS: ICD-10-CM

## 2021-11-08 RX ORDER — PREDNISONE 10 MG/1
30 TABLET ORAL DAILY
Qty: 90 TABLET | Refills: 3 | Status: SHIPPED | OUTPATIENT
Start: 2021-11-08 | End: 2022-04-20

## 2021-11-11 ENCOUNTER — OFFICE VISIT (OUTPATIENT)
Dept: PULMONOLOGY | Facility: CLINIC | Age: 61
End: 2021-11-11
Attending: INTERNAL MEDICINE
Payer: COMMERCIAL

## 2021-11-11 ENCOUNTER — LAB (OUTPATIENT)
Dept: LAB | Facility: CLINIC | Age: 61
End: 2021-11-11

## 2021-11-11 VITALS
BODY MASS INDEX: 27 KG/M2 | HEART RATE: 76 BPM | DIASTOLIC BLOOD PRESSURE: 66 MMHG | OXYGEN SATURATION: 100 % | SYSTOLIC BLOOD PRESSURE: 124 MMHG | HEIGHT: 67 IN | WEIGHT: 172 LBS

## 2021-11-11 DIAGNOSIS — M34.1 CREST SYNDROME (H): ICD-10-CM

## 2021-11-11 DIAGNOSIS — M06.00 RHEUMATOID ARTHRITIS WITH NEGATIVE RHEUMATOID FACTOR, INVOLVING UNSPECIFIED SITE (H): ICD-10-CM

## 2021-11-11 DIAGNOSIS — Z51.81 ENCOUNTER FOR THERAPEUTIC DRUG MONITORING: ICD-10-CM

## 2021-11-11 DIAGNOSIS — G72.9 MYOPATHY: ICD-10-CM

## 2021-11-11 DIAGNOSIS — M35.02 SJOGREN'S SYNDROME WITH LUNG INVOLVEMENT (H): ICD-10-CM

## 2021-11-11 DIAGNOSIS — Z79.899 HIGH RISK MEDICATION USE: ICD-10-CM

## 2021-11-11 DIAGNOSIS — E83.59 TUMORAL CALCINOSIS: ICD-10-CM

## 2021-11-11 DIAGNOSIS — D84.9 IMMUNOSUPPRESSED STATUS (H): ICD-10-CM

## 2021-11-11 DIAGNOSIS — M34.9 SYSTEMIC SCLEROSIS (H): ICD-10-CM

## 2021-11-11 DIAGNOSIS — J84.9 ILD (INTERSTITIAL LUNG DISEASE) (H): Primary | ICD-10-CM

## 2021-11-11 LAB
ALBUMIN SERPL-MCNC: 3.4 G/DL (ref 3.4–5)
ALBUMIN UR-MCNC: NEGATIVE MG/DL
ALT SERPL W P-5'-P-CCNC: 18 U/L (ref 0–50)
APPEARANCE UR: ABNORMAL
AST SERPL W P-5'-P-CCNC: 21 U/L (ref 0–45)
BACTERIA #/AREA URNS HPF: ABNORMAL /HPF
BASOPHILS # BLD AUTO: 0 10E3/UL (ref 0–0.2)
BASOPHILS NFR BLD AUTO: 0 %
BILIRUB UR QL STRIP: NEGATIVE
CK SERPL-CCNC: 28 U/L (ref 30–225)
COLOR UR AUTO: YELLOW
CREAT SERPL-MCNC: 1.17 MG/DL (ref 0.52–1.04)
CRP SERPL-MCNC: <2.9 MG/L (ref 0–8)
EOSINOPHIL # BLD AUTO: 0 10E3/UL (ref 0–0.7)
EOSINOPHIL NFR BLD AUTO: 0 %
ERYTHROCYTE [DISTWIDTH] IN BLOOD BY AUTOMATED COUNT: 14.3 % (ref 10–15)
ERYTHROCYTE [SEDIMENTATION RATE] IN BLOOD BY WESTERGREN METHOD: 11 MM/HR (ref 0–30)
GFR SERPL CREATININE-BSD FRML MDRD: 51 ML/MIN/1.73M2
GLUCOSE UR STRIP-MCNC: NEGATIVE MG/DL
GRANULAR CAST: 3 /LPF
HCT VFR BLD AUTO: 43.3 % (ref 35–47)
HGB BLD-MCNC: 14 G/DL (ref 11.7–15.7)
HGB UR QL STRIP: NEGATIVE
HYALINE CASTS: 7 /LPF
IMM GRANULOCYTES # BLD: 0 10E3/UL
IMM GRANULOCYTES NFR BLD: 1 %
KETONES UR STRIP-MCNC: NEGATIVE MG/DL
LEUKOCYTE ESTERASE UR QL STRIP: ABNORMAL
LYMPHOCYTES # BLD AUTO: 0.8 10E3/UL (ref 0.8–5.3)
LYMPHOCYTES NFR BLD AUTO: 14 %
MCH RBC QN AUTO: 31 PG (ref 26.5–33)
MCHC RBC AUTO-ENTMCNC: 32.3 G/DL (ref 31.5–36.5)
MCV RBC AUTO: 96 FL (ref 78–100)
MONOCYTES # BLD AUTO: 0.3 10E3/UL (ref 0–1.3)
MONOCYTES NFR BLD AUTO: 6 %
MUCOUS THREADS #/AREA URNS LPF: PRESENT /LPF
NEUTROPHILS # BLD AUTO: 4.3 10E3/UL (ref 1.6–8.3)
NEUTROPHILS NFR BLD AUTO: 79 %
NITRATE UR QL: NEGATIVE
NRBC # BLD AUTO: 0 10E3/UL
NRBC BLD AUTO-RTO: 0 /100
PH UR STRIP: 5 [PH] (ref 5–7)
PLATELET # BLD AUTO: 182 10E3/UL (ref 150–450)
RBC # BLD AUTO: 4.52 10E6/UL (ref 3.8–5.2)
RBC URINE: 2 /HPF
SP GR UR STRIP: 1.01 (ref 1–1.03)
SQUAMOUS EPITHELIAL: 6 /HPF
UROBILINOGEN UR STRIP-MCNC: 2 MG/DL
WBC # BLD AUTO: 5.5 10E3/UL (ref 4–11)
WBC URINE: 28 /HPF

## 2021-11-11 PROCEDURE — 86769 SARS-COV-2 COVID-19 ANTIBODY: CPT | Mod: 90 | Performed by: PATHOLOGY

## 2021-11-11 PROCEDURE — 84460 ALANINE AMINO (ALT) (SGPT): CPT | Performed by: INTERNAL MEDICINE

## 2021-11-11 PROCEDURE — 82565 ASSAY OF CREATININE: CPT | Performed by: INTERNAL MEDICINE

## 2021-11-11 PROCEDURE — 99000 SPECIMEN HANDLING OFFICE-LAB: CPT | Performed by: PATHOLOGY

## 2021-11-11 PROCEDURE — 81001 URINALYSIS AUTO W/SCOPE: CPT | Performed by: INTERNAL MEDICINE

## 2021-11-11 PROCEDURE — 99215 OFFICE O/P EST HI 40 MIN: CPT | Mod: GC | Performed by: INTERNAL MEDICINE

## 2021-11-11 PROCEDURE — 82040 ASSAY OF SERUM ALBUMIN: CPT | Performed by: INTERNAL MEDICINE

## 2021-11-11 PROCEDURE — 93005 ELECTROCARDIOGRAM TRACING: CPT

## 2021-11-11 PROCEDURE — 36415 COLL VENOUS BLD VENIPUNCTURE: CPT | Performed by: INTERNAL MEDICINE

## 2021-11-11 PROCEDURE — 84450 TRANSFERASE (AST) (SGOT): CPT | Performed by: INTERNAL MEDICINE

## 2021-11-11 PROCEDURE — G0463 HOSPITAL OUTPT CLINIC VISIT: HCPCS | Mod: 25

## 2021-11-11 PROCEDURE — 86140 C-REACTIVE PROTEIN: CPT | Performed by: INTERNAL MEDICINE

## 2021-11-11 PROCEDURE — 82550 ASSAY OF CK (CPK): CPT | Mod: 90 | Performed by: PATHOLOGY

## 2021-11-11 PROCEDURE — 85025 COMPLETE CBC W/AUTO DIFF WBC: CPT | Performed by: INTERNAL MEDICINE

## 2021-11-11 PROCEDURE — 85652 RBC SED RATE AUTOMATED: CPT | Performed by: INTERNAL MEDICINE

## 2021-11-11 ASSESSMENT — MIFFLIN-ST. JEOR: SCORE: 1387.82

## 2021-11-11 ASSESSMENT — PAIN SCALES - GENERAL: PAINLEVEL: NO PAIN (0)

## 2021-11-11 NOTE — LETTER
11/11/2021         RE: Jeanine Schuler  [address]  [place] [address]        Dear Colleague,    Thank you for referring your patient, Jeanine Schuler, to the [facility] FOR LUNG SCIENCE AND [facility] [place]. Please see a copy of my visit note below.      In Person-Visit       SUBJECTIVE:  The patient returns in f/u of her MCTD/SSC/RP/with h/o multiple DU.      PROBLEM LIST:    1.  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    2.  History of severe multiple digital ulcers, on fingers and toes.   3.  Marked keratoconjunctivitis sicca over the last several years.    4.  Marked iron deficiency anemia responsive to IV iron.    5.  Diffuse musculoskeletal pain   6.  Lower extremity edema managed with lasix and spironolactone   7.  Marked fatigue and diffuse clinical weakness.    8.  Dyspnea on exertion with lower extremity edema and other features including the anticentromere positivity worrisome for the potential development of pulmonary hypertension.    9.  Marked GERD with associated dysphagia.    10.  Status post gastric bypass with a history of intermittent constipation and diarrhea potentially consistent with bacterial small bowel overgrowth versus other gut effects of the prior surgery.    11.  History of positive rheumatoid factor consistent with MCTD, given the remainder of her clinical presentation.     12. Progressive Jaw tightening with inability to open mouth wider than 1 inch.   13. Diastolic dysfunction (3/2013)  14. Hypoalbuminemia  15. ILD cryobiopsy Pathology was reviewed at ILD conference on 10/12/2020 which showed ALI, what appears to be an acute exacerbation of underlying ILD with OP.  Few nonnecrotizing granulomas  16. 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    She continues to be on a considerable dose of prednisone right now, currently at 30 mg/day.  Patient feels that her neuropathy has somewhat worsened since starting her prednisone taper course.  However, we are concerned risks of infection, myopathy and other adverse effects of long-term prednisone outweigh the potential benefits.  We feel that patient may be a good candidate for rituximab eventually.  However, she will have to reduce her steroid dosing first.    Discussed with the patient that the long-term goal should be to reduce use of prednisone, given risks with long-term high dose of prednisone.  We will start by decreasing prednisone to 25 mg today, and decreasing by 5 mg every month if her symptoms allow.  Our goal right now is to eventually taper to 15 mg a day.  If she is still having symptoms despite tapering off of prednisone, will consider starting rituximab.    On exam, there is notable weakness in the bilateral lower extremities against gravity.  This may be concerning for steroid myopathy, so this will be worked up with CK levels.  On exam there was also an irregular irregular rhythm.  EKG showed sinus rhythm with sinus arrhythmia and premature supraventricular complexes.  There is also left atrial enlargement and left axis deviation.  We recommended that the patient follow-up with cardiology to work this up further.  Prednisone may very well have an impact on her arrhythmia as well. Patient has a cardiologist at [facility].    Of note, patient is fully vaccinated with Covid and has recently received a booster vaccination.  However in the setting of her steroid and immunosuppressive use she may not have sufficient Covid antibodies.  We will order COVID-19  antibody level, and may suggest to additional booster depending on the results.      Edwin Bhakta  Medicine-Dermatology (PGY-1)    I saw the patient with the resident.  My exam and recomendations are as described. She continues chronically ill, requiring prednisone doses that are very concerning in terms of multiple side effect risks, not the least the magnitude of immunosuppression. Her myopathy could be autoimmune or sarcoid driven inflammatory disease, or I suspect more likely secondary to chronic steroid myopathy. This could require MRI to better clarify,if prednisone taper does not provide clinical evidnce for one or the other. The steroid could be contributing to arrythmias, but Sarcoid cardiac involvement remains possible, and she needs close cardiology f/u.      MD MARTÍN Corcoran MD, PhD    Rheumatology          INTERVAL HISTORY:       AUGUST 22, 2017, INTERVAL HISTORY:  Since we have last seen the patient, she developed a right ankle wound about 2 months ago.  That opened for a while and it became hard to close, but at this point it has finally closed over.       She has had some stressors and she feels like that has contributed to her feeling like she needed more prednisone.  She had made it down to 12 mg a day for almost 6 months but increased to 20 mg a day in June when she was moving to a new house.  This was primarily due to an increase in fatigue and in joint pains.      At this point, her morning stiffness is 30-60 minutes despite remaining on the 20 mg a day of prednisone.  She does think her strength, however, is stable.      She continues to have a lot of Raynaud's phenomena but has not developed any distal digital ulcerations.       Her GERD and keratoconjunctivitis sicca, however, have remained stable and she thinks she has stable exercise tolerance.       She has not done her labs for many, many months and has not seen me for over a year, and we  "discussed that in some detail.     2018 INTERVAL HISTORY:  Since we have last seen her, she made it down to 12.5 mg a day of prednisone but then put herself back up to 20 mg a day because of increasing general body aches and fatigue as well as some inflammatory joint stiffness in her wrists.  Other joints have actually been okay.      She had stopped her methotrexate some time ago.  She works in a hospital and knew of several people who were admitted with \"methotrexate toxicity\" who  while in hospital and although she does not know the details, that frightened her enough that she decided to stop the drug.       She is getting worsening Raynaud's.  She really notes that this happens not only with cold, but with a variety of kinds of stress.  She has previously, at least for a short length of time, been on losartan and does not remember whether that helped at all.  I am pretty sure she has also been on calcium channel blockers, but those are relatively contraindicated in her at this point because of persistent bilateral lower extremity edema.       She is getting enough lower extremity edema and has been found to have vascular insufficiency that she will be having some vascular surgery to laser some of these areas, although an exact time for that has not been laid out.       In addition to getting Raynaud's in her hands, she also gets it in her feet, and when she does, potentially contributed to by the vascular insufficiency, she gets numbness in her feet.  She is not getting numbness in her hands by contrast.       The patient did have pulmonary function tests today.  Although she is not exercising regularly, in general she feels her exercise tolerance has been stable, and she is not having any dry cough.       She did have a decrease in her FVC to 3.68 from 4.30 and her DLCO to 21.77 from 24.74, but she has had some rib cage pain for about the last 3 weeks since she had a minor accident while dancing.  " Notably then, her TLC is completely stable, going from 6.41 to 6.29.       She denies any other new problems.  Keratoconjunctivitis sicca, GERD and other features have been stable and she denies any dysphagia.     July 11, 2019 interval history:    Since we have last seen her she did have to miss appointment because of some family issues but she is actually been doing quite well.  Although she is continued to have some intermittent joint complaints she is been able to taper her prednisone down to 7.5 mg a day the lowest she has been on and the entire time I have been following her.    Although her joints have not worsened with that lower dose she does think she is getting some worsening of her Raynauds phenomena.  It can be pretty bad and the attacks can be hard to break even with rewarming.  She gets them in hands and feet.  Fortunately she has not had any digital ulcers.  She does recall that she was placed temporarily on tadalafil sometime ago by Dr. Bentley but she could not afford it long-term.  She thinks that may have been helpful however.    She denies any significant dyspnea on exertion or cough.  She has some muscle weakness but not marketed and she does not think that is any worse over time.  Fatigue has generally been her worst symptom and that may be slightly worse.    She does have some ongoing keratoconjunctivitis sicca but does not feel like she wants to go on a medicine for that.  She already has a full plate of dentures in both upper and lower.      August 11, 2020 interval history:    Since we last seen the patient she feels she was doing relatively stably on till July 7.  At that point she had the abrupt onset of a bad dry cough.  She did not have significant fevers but did not feel well.  She was seen had a negative coronavirus test but a chest x-ray apparently showed evidence of some infiltrates and she was placed on doxycycline.  She says she did not improve with that therapy and a CT angios  was performed.  That showed bilateral pulmonary consolidation thought consistent with infection.  There was note that the pattern could actually be somewhat typical for sarcoid but it was noted to have worsened since the prior study performed there at [facility] in October 2019.  She also was noted to have mediastinal lymphadenopathy and debris in the esophagus potentially consistent with her systemic sclerosis.  No pulmonary embolism was found.  Based on the results of that she was placed on Levaquin.  She continued to fail to improve and a third antibiotic was also tried.    Finally she was seen by pulmonologist and a bronchoscopy was performed.  I am able to review the results of that and there was a high percentage of neutrophils and lymphocytes and that BAL fluid.  Viral cultures however Gram stain and culture were all unremarkable and there were no malignant cells found.    She was placed on 40 mg a day of prednisone at the time of the bronchoscopy once it was clear that there was no acute infection.  She does not have a follow-up appointment yet.  Although her cough is mostly gone she does not feel good and feels very dyspneic just going from the bed to the bathroom or crossed the room.  She is not convinced she has had any improvement with 40 mg daily prednisone and if anything think she is worse.    In reviewing her extensive record I see that a repeat TARA was performed and a reflexive was done when it was positive.  She continues to have an anticentromere antibody which she had previously, but now notably has a high titer Gonzalez and high titer RNP antibody.  She previously was borderline positive for RNP and Gonzalez negative.  SSA is also higher titer than it was previously.    With all these autoantibody positivity she however denies any specific features that would suggest systemic lupus.  She specifically denies sun sensitivity sun sensitive rashes including a malar rash, morning stiffness in any of  her joints or any joint swelling and any pleurisy at the time of her  at CT angiogram or otherwise.  \  Her Raynaud's phenomena has been stable as have other features of her limited cutaneous systemic sclerosis.        February 25, 2021 interval history:    At the time that I saw the patient about 3 weeks ago we decided that because her neuropathy sounded rather rapidly progressive, and her HRCT scan was being read as potentially consistent with sarcoidosis, that neurosarcoid was on the differential.  I also wondered about an immune complex mediated small vessel vasculitis causing neuropathy.  We therefore did laboratory testing as well as started her on some steroid.    Laboratory testing has been largely unrevealing.  She did not have elevated calcium or elevated calcium in the urine.  I did also do a parathyroid hormone in case we did find that however her parathyroid hormone was significantly elevated.  Her vitamin D was very low and she has started supplementation for that.    Most notably however her prednisone at 40 mg a day virtually resolved her symptoms over 3 to 4 days.  She had at least an 80% decrease in her pain and numbness although she still has some residual tingling in the legs bilaterally.    She is having some prednisone side effects.  Specifically she is having a hard time sleeping and is also feeling irritable which she thinks could be due partly due to the loss of sleep but partly directly due to the prednisone.  She has got a lot of prior experience with prednisone and has had some difficulties with it before.    She did have some right-sided chest pain.  That seem to occur with a deep breath.  She has a cardiologist and saw them will be getting a follow-up echo.  She is no longer having that.    In going through her history she does not believe she is ever been on Imuran and is quite sure and I am sure as well that we have never had her on TNF inhibition.  She was on methotrexate and  tolerated oral methotrexate poorly due to nausea but was better able to tolerate subcutaneous methotrexate.      Impression:    Per the problem list, with known RNP positivity and more recently with this HRCT finding that is concerning for the possibility of overlapping sarcoidosis.    She had a very nice response to steroid quite quickly, and so we now have the conundrum of not knowing exactly why.  I certainly suspect that she has an immune mediated neuropathy given this rapid response and neurosarcoid is a concern.    I have sent a message to Dr. Cuellar in pulmonary for her thoughts.  I have also ordered a repeat HRCT that to be done in another week or so before she is seen back in pulmonary.    I am hoping these can be reviewed so that there could be a decision whether we can feel strongly enough that presumptively this could be sarcoidosis to put her on appropriate therapy for that or whether a biopsy might be justified.    We could simply presumptively try steroid sparing medications.  I am somewhat concerned about using TNF inhibitors however unless we are pretty confident this is sarcoid, as they have been associated with worsening of systemic lupus patients and autoimmune associated interstitial lung disease in some settings.    We also have the  finding of the elevated PTH, and very low vitamin D levels, which can be seen in sarcoidosis, but is unclear here given normal Calcium levels. and will refer her to endocrinology for that.    I have given her prescription for temazepam to help her with the sleep and irritability.  I did warn her to be careful about driving in the morning until she knows how it affects her as it does have a long half-life.    I will plan on seeing her back in about 6 to 8 weeks sooner as needed.      April 8, 2021 interval history:    Since we have last seen the patient she has had initial evaluation done, and Dr. Cuellar has a recent note on the chart and an addendum placed a day  after the patient's studies were reviewed in sarcoid conference.    The upshot of that is that there is evidence from her studies, in particular the biopsy studies that suggest that this could be sarcoidosis.  Because she has neurologic symptoms and inadequately explain dyspnea on exertion she will also be getting neurology evaluation and cardiac evaluation.    She remains at this time on 40 mg a day of prednisone.  She is not liking this dose.  Although she still has dyspnea on exertion she also is irritable and on edge on that dose and is also experiencing some nausea..    After consideration of the issues, Dr. Cuellar had recommended the patient go on track today.  She has in fact been on both this and MMF before and although she had some difficulties at time with tolerating these medication she think she would tolerate them again.  We have briefly discussed possibility of using a TNF inhibitor but that does give me pause, given that she has had features of mixed connective tissue disease and in TARA positive individuals TNF inhibitors have at times worsen their disease process, particularly in those with lupus associated autoantibodies.  She has had in the past of borderline positive RNP.    She believes most of her other historical clinical features are stable.  She still gets pretty bad Raynaud's phenomena and some features of arthritis although the inflammatory features are currently pretty well controlled on this dose of prednisone.    July 15, 2021 interval history:    Since we have last seen the patient she has continued to have a lot of dyspnea on exertion despite being on the MMF 40 mg a day of prednisone and until just earlier this week methotrexate.    Because of her lung biopsy showing features consistent with sarcoidosis, she was presented at sarcoidosis conference last week.  I attended this discussion.  After much discussion it was decided that azathioprine may be the next drug to try in her.  This  was influenced in part by my concerns of giving a TNF inhibitor to her given her underlying autoimmune connective tissue disease, and the possibility of that disease process worsening with TNF inhibition as has been described and as I have seen previously albeit rarely.    She did just get TPMT levels earlier today to help decide what kind of azathioprine dose to start with.  She continues on MMF.    She will be getting a nerve biopsy performed on Monday.    She also was recently in the emergency room because of very severe midsternal chest pain.  She says this developed during the night.  When it would not go away she went to the ER.  There she was given narcotics after first getting a single sublingual nitroglycerin that did not help.  Extensive cardiopulmonary work-up was negative for any evidence of ischemia or clot.  Eventually she got Toradol after having gotten narcotics and did improve and went home.    She has had some costochondral type pains before but nothing like this and this was not exacerbated or relieved by pressure over the area.    Notably, she is been having worsening problems with GERD and dysphagia as well.  She just spoke with Dr. Cárdenas earlier this morning.  She is getting a lot of pill dysphagia at this point which is very frustrating for her and very uncomfortable.  She had dilatation in the fall and thought it helped for a while but she says this almost feels like it is just a dysmotility problem rather than stricturing to her.        November 11, 2021 interval history:    Patient had a right sural nerve biopsy on 7/19/2021.  Biopsy was notable for axonal neuropathy without a specific cause.  There is no overt evidence of inflammation, primary demyelination, or granulomatous findings.  Patient had return follow-up would pulmonology on 9/16/2021.  Her CT was reviewed, showed bronchocentric and predominantly central infiltrates.Overall findings have not significantly progressed from prior CT  on 7/1/2021.  Patient's case was discussed in ILD conference.  Plan was to start Imuran 150 mg daily, as well as starting a prednisone taper (starting at 40 mg daily, then tapering 5 mg every month to be kept at 30 mg daily until follow-up with rheumatology).     Patient overall has no significant change in her health from prior.  She is currently taking hydroxychloroquine, prednisone 30 mg daily, and Imuran 150 daily.  Continues to have fatigue, which she feels has been gradually worsening.  She notes that she can only lift 2-3 grocery bags before feeling tired, and needs assistance when cooking.  Continues to have neuropathy symptoms in her bilateral lower extremities (more in the right side).  Continues to have shortness of breath with limited activity (e.g. moving from bed to kitchen).  Is currently treating her GERD with Protonix and omeprazole, which she feels controls her symptoms.  Has some mouth dryness, but notices that her eyes are watery which is new for her. She has received her COVID-19 booster immunization.       ROS as per HPI.  10-point ROS is otherwise negative.    HISTORY REVIEW:  Past Medical History:   Diagnosis Date     Anemia      Cellulitis of leg 6/6/2013     Esophageal reflux     Better post-hiatal hernia repair and weight loss     Limb ischemia; ulcers of fingertips 4/22/2013     Raynaud's syndrome      Scleroderma (H)      Systemic sclerosis (H) 2009     Unspecified essential hypertension        Past Surgical History:   Procedure Laterality Date     BYPASS GASTRIC, CHOLECYSTECTOMY, COMBINED       CHOLECYSTECTOMY       COLONOSCOPY       ESOPHAGOSCOPY, GASTROSCOPY, DUODENOSCOPY (EGD), COMBINED N/A 3/10/2016    Procedure: COMBINED ESOPHAGOSCOPY, GASTROSCOPY, DUODENOSCOPY (EGD), BIOPSY SINGLE OR MULTIPLE;  Surgeon: Tricia Zambrano MD;  Location:  GI     INNER EAR SURGERY       PICC INSERTION  6/5/2013    5fr DL Power PICC, 44cm, left basilic vein, tip in low SVC     SURGICAL  HISTORY OF -       Left ear surgery - incus transition, tympanoplasty     SURGICAL HISTORY OF -   6/05    Gastric bypass     SURGICAL HISTORY OF -   6/05    Cholecystectomy     SURGICAL HISTORY OF -   6/05    Hiatal hernia repair     TONSILLECTOMY         Family History   Problem Relation Age of Onset     Cerebrovascular Disease Father      C.A.D. Father      C.A.D. Mother      C.A.D. 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      Social History Main Topics     Smoking status: Never Smoker      Smokeless tobacco: Never Used     Alcohol Use: Yes      occassionally     Drug Use: No     Sexually Active: Yes -- Male partner(s)     Birth Control/ Protection: None     Social History Narrative     Was  in December 2015. Longtime partner from [place].       Patient Active Problem List   Diagnosis     Essential hypertension     Thyrotoxicosis     Obesity     Prolonged QTc 460 ms,  at hr 67     Low back pain - Suspect SI Joint dysfunction     Cough     Systemic sclerosis (H)     Tumoral calcinosis     Shortness of breath     Edema of both legs     Abnormal albumin     Myopathy     Hypoalbuminemia     Diastolic dysfunction     Limb ischemia; ulcers of fingertips     Cellulitis of leg     Other specified diffuse disease of connective tissue     Disturbed sleep rhythm     Pharyngeal dysphagia     Gastroesophageal reflux disease, esophagitis presence not specified     Rheumatoid arthritis with negative rheumatoid factor, involving unspecified site (H)     Raynaud's disease without gangrene       Allergies   Allergen Reactions     Lovenox Other (See Comments)     Blood clot      Norvasc [Amlodipine Besylate] Swelling      Lip swelling that happened on 2 different occasions     Erythromycin Rash     Rash on arms     OBJECTIVE:  Vitals: Reviewed  Gen: A+Ox3, NAD  HEENT: NCAT, EOMI  Pulm: CTAB, minimal coarse sounds on basilar lobes  CVS: Irregularly irregular rhythm. No murmurs appreciated.  Skin: purpuric patches on fingertips no ulcerations/erosions. There is platelike xerosis of lower extremities  -mild sclerosis of distal fingers  Msk: No active synovitis.  4/5 upper extremity strength, 5/5 lower extremity strength bilaterally.    EKG (11/12/2021)  Sinus rhythm with marked sinus arrhythmia with premature supraventricular complexes  Possible left atrial enlargement  Left axis deviation  Left ventricular hypertrophy with QRS widening  T wave abnormality consider lateral ischemia  Prolonged QT  Abnormal ECG  Ventricular rate 81, , , QT/QTc 418/485, P/R/T 29/-32/132     Component      Latest Ref Rng 1/26/2016   WBC      4.0 - 11.0 10e9/L 9.2   RBC Count      3.8 - 5.2 10e12/L 4.59   Hemoglobin      11.7 - 15.7 g/dL 13.2   Hematocrit      35.0 - 47.0 % 41.8   MCV      78 - 100 fl 91   MCH      26.5 - 33.0 pg 28.8   MCHC      31.5 - 36.5 g/dL 31.6   RDW      10.0 - 15.0 % 16.7 (H)   Platelet Count      150 - 450 10e9/L 195   Diff Method       Automated Method   % Neutrophils       62.9   % Lymphocytes       27.0   % Monocytes       9.1   % Eosinophils       0.4   % Basophils       0.2   % Immature Granulocytes       0.4   Nucleated RBCs      0 /100 0   Absolute Neutrophil      1.6 - 8.3 10e9/L 5.8   Absolute Lymphocytes      0.8 - 5.3 10e9/L 2.5   Absolute Monocytes      0.0 - 1.3 10e9/L 0.8   Absolute Eosinophils      0.0 - 0.7 10e9/L 0.0   Absolute Basophils      0.0 - 0.2 10e9/L 0.0   Abs Immature Granulocytes      0 - 0.4 10e9/L 0.0   Absolute Nucleated RBC       0.0   Color Urine       Yellow   Appearance Urine       Clear   Glucose Urine      NEG mg/dL Negative   Bilirubin Urine      NEG Negative   Ketones Urine       NEG mg/dL Negative   Specific Gravity Urine      1.003 - 1.035 1.010   Blood Urine      NEG Negative   pH Urine      5.0 - 7.0 pH 6.0   Protein Albumin Urine      NEG mg/dL Negative   Urobilinogen mg/dL      0.0 - 2.0 mg/dL Normal   Nitrite Urine      NEG Negative   Leukocyte Esterase Urine      NEG Negative   Source       Midstream Urine   WBC Urine      0 - 2 /HPF <1   RBC Urine      0 - 2 /HPF <1   Squamous Epithelial /HPF Urine      0 - 1 /HPF <1   Creatinine      0.52 - 1.04 mg/dL 1.11 (H)   GFR Estimate      >60 mL/min/1.7m2 51 (L)   GFR Estimate If Black      >60 mL/min/1.7m2 62   ALT      0 - 50 U/L 23   AST      0 - 45 U/L 16   Albumin      3.4 - 5.0 g/dL 3.4   CRP Inflammation      0.0 - 8.0 mg/L <2.9   Sed Rate      0 - 30 mm/h 9   CK Total      30 - 225 U/L 38   Aldolase       4.1     Biopsy from 3/10 EGD:  Esophagus, gastroesophageal junction, biopsies:   - Squamous mucosa and submucosa with marked chronic inflammation and   fibrosis   - No evidence of intestinal metaplasia or dysplasia                 Again, thank you for allowing me to participate in the care of your patient.        Sincerely,        Nicolás De La Torre MD

## 2021-11-11 NOTE — NURSING NOTE
Chief Complaint   Patient presents with     Follow Up     Scleroderma      Vitals were taken and medications were reconciled.     Nancy Melgar RMA  10:00 AM

## 2021-11-11 NOTE — PROGRESS NOTES
In Person-Visit       SUBJECTIVE:  The patient returns in f/u of her MCTD/SSC/RP/with h/o multiple DU.      PROBLEM LIST:    1.  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    2.  History of severe multiple digital ulcers, on fingers and toes.   3.  Marked keratoconjunctivitis sicca over the last several years.    4.  Marked iron deficiency anemia responsive to IV iron.    5.  Diffuse musculoskeletal pain   6.  Lower extremity edema managed with lasix and spironolactone   7.  Marked fatigue and diffuse clinical weakness.    8.  Dyspnea on exertion with lower extremity edema and other features including the anticentromere positivity worrisome for the potential development of pulmonary hypertension.    9.  Marked GERD with associated dysphagia.    10.  Status post gastric bypass with a history of intermittent constipation and diarrhea potentially consistent with bacterial small bowel overgrowth versus other gut effects of the prior surgery.    11.  History of positive rheumatoid factor consistent with MCTD, given the remainder of her clinical presentation.     12. Progressive Jaw tightening with inability to open mouth wider than 1 inch.   13. Diastolic dysfunction (3/2013)  14. Hypoalbuminemia  15. ILD cryobiopsy Pathology was reviewed at ILD conference on 10/12/2020 which showed ALI, what appears to be an acute exacerbation of underlying ILD with OP.  Few nonnecrotizing granulomas  16. 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    She continues to be on a considerable dose of  prednisone right now, currently at 30 mg/day.  Patient feels that her neuropathy has somewhat worsened since starting her prednisone taper course.  However, we are concerned risks of infection, myopathy and other adverse effects of long-term prednisone outweigh the potential benefits.  We feel that patient may be a good candidate for rituximab eventually.  However, she will have to reduce her steroid dosing first.    Discussed with the patient that the long-term goal should be to reduce use of prednisone, given risks with long-term high dose of prednisone.  We will start by decreasing prednisone to 25 mg today, and decreasing by 5 mg every month if her symptoms allow.  Our goal right now is to eventually taper to 15 mg a day.  If she is still having symptoms despite tapering off of prednisone, will consider starting rituximab.    On exam, there is notable weakness in the bilateral lower extremities against gravity.  This may be concerning for steroid myopathy, so this will be worked up with CK levels.  On exam there was also an irregular irregular rhythm.  EKG showed sinus rhythm with sinus arrhythmia and premature supraventricular complexes.  There is also left atrial enlargement and left axis deviation.  We recommended that the patient follow-up with cardiology to work this up further.  Prednisone may very well have an impact on her arrhythmia as well. Patient has a cardiologist at [facility].    Of note, patient is fully vaccinated with Covid and has recently received a booster vaccination.  However in the setting of her steroid and immunosuppressive use she may not have sufficient Covid antibodies.  We will order COVID-19 antibody level, and may suggest to additional booster depending on the results.      Edwin Bhakta  Medicine-Dermatology (PGY-1)    I saw the patient with the resident.  My exam and recomendations are as described. She continues chronically ill, requiring prednisone doses that are very  concerning in terms of multiple side effect risks, not the least the magnitude of immunosuppression. Her myopathy could be autoimmune or sarcoid driven inflammatory disease, or I suspect more likely secondary to chronic steroid myopathy. This could require MRI to better clarify,if prednisone taper does not provide clinical evidnce for one or the other. The steroid could be contributing to arrythmias, but Sarcoid cardiac involvement remains possible, and she needs close cardiology f/u.      MD MARTÍN Corcoran MD, PhD    Rheumatology          INTERVAL HISTORY:       AUGUST 22, 2017, INTERVAL HISTORY:  Since we have last seen the patient, she developed a right ankle wound about 2 months ago.  That opened for a while and it became hard to close, but at this point it has finally closed over.       She has had some stressors and she feels like that has contributed to her feeling like she needed more prednisone.  She had made it down to 12 mg a day for almost 6 months but increased to 20 mg a day in June when she was moving to a new house.  This was primarily due to an increase in fatigue and in joint pains.      At this point, her morning stiffness is 30-60 minutes despite remaining on the 20 mg a day of prednisone.  She does think her strength, however, is stable.      She continues to have a lot of Raynaud's phenomena but has not developed any distal digital ulcerations.       Her GERD and keratoconjunctivitis sicca, however, have remained stable and she thinks she has stable exercise tolerance.       She has not done her labs for many, many months and has not seen me for over a year, and we discussed that in some detail.     Feb. 13, 2018 INTERVAL HISTORY:  Since we have last seen her, she made it down to 12.5 mg a day of prednisone but then put herself back up to 20 mg a day because of increasing general body aches and fatigue as well as some inflammatory joint stiffness in her  "wrists.  Other joints have actually been okay.      She had stopped her methotrexate some time ago.  She works in a hospital and knew of several people who were admitted with \"methotrexate toxicity\" who  while in hospital and although she does not know the details, that frightened her enough that she decided to stop the drug.       She is getting worsening Raynaud's.  She really notes that this happens not only with cold, but with a variety of kinds of stress.  She has previously, at least for a short length of time, been on losartan and does not remember whether that helped at all.  I am pretty sure she has also been on calcium channel blockers, but those are relatively contraindicated in her at this point because of persistent bilateral lower extremity edema.       She is getting enough lower extremity edema and has been found to have vascular insufficiency that she will be having some vascular surgery to laser some of these areas, although an exact time for that has not been laid out.       In addition to getting Raynaud's in her hands, she also gets it in her feet, and when she does, potentially contributed to by the vascular insufficiency, she gets numbness in her feet.  She is not getting numbness in her hands by contrast.       The patient did have pulmonary function tests today.  Although she is not exercising regularly, in general she feels her exercise tolerance has been stable, and she is not having any dry cough.       She did have a decrease in her FVC to 3.68 from 4.30 and her DLCO to 21.77 from 24.74, but she has had some rib cage pain for about the last 3 weeks since she had a minor accident while dancing.  Notably then, her TLC is completely stable, going from 6.41 to 6.29.       She denies any other new problems.  Keratoconjunctivitis sicca, GERD and other features have been stable and she denies any dysphagia.     2019 interval history:    Since we have last seen her she did have to " miss appointment because of some family issues but she is actually been doing quite well.  Although she is continued to have some intermittent joint complaints she is been able to taper her prednisone down to 7.5 mg a day the lowest she has been on and the entire time I have been following her.    Although her joints have not worsened with that lower dose she does think she is getting some worsening of her Raynauds phenomena.  It can be pretty bad and the attacks can be hard to break even with rewarming.  She gets them in hands and feet.  Fortunately she has not had any digital ulcers.  She does recall that she was placed temporarily on tadalafil sometime ago by Dr. Bentley but she could not afford it long-term.  She thinks that may have been helpful however.    She denies any significant dyspnea on exertion or cough.  She has some muscle weakness but not marketed and she does not think that is any worse over time.  Fatigue has generally been her worst symptom and that may be slightly worse.    She does have some ongoing keratoconjunctivitis sicca but does not feel like she wants to go on a medicine for that.  She already has a full plate of dentures in both upper and lower.      August 11, 2020 interval history:    Since we last seen the patient she feels she was doing relatively stably on till July 7.  At that point she had the abrupt onset of a bad dry cough.  She did not have significant fevers but did not feel well.  She was seen had a negative coronavirus test but a chest x-ray apparently showed evidence of some infiltrates and she was placed on doxycycline.  She says she did not improve with that therapy and a CT angios was performed.  That showed bilateral pulmonary consolidation thought consistent with infection.  There was note that the pattern could actually be somewhat typical for sarcoid but it was noted to have worsened since the prior study performed there at [facility] in October 2019.  She also  was noted to have mediastinal lymphadenopathy and debris in the esophagus potentially consistent with her systemic sclerosis.  No pulmonary embolism was found.  Based on the results of that she was placed on Levaquin.  She continued to fail to improve and a third antibiotic was also tried.    Finally she was seen by pulmonologist and a bronchoscopy was performed.  I am able to review the results of that and there was a high percentage of neutrophils and lymphocytes and that BAL fluid.  Viral cultures however Gram stain and culture were all unremarkable and there were no malignant cells found.    She was placed on 40 mg a day of prednisone at the time of the bronchoscopy once it was clear that there was no acute infection.  She does not have a follow-up appointment yet.  Although her cough is mostly gone she does not feel good and feels very dyspneic just going from the bed to the bathroom or crossed the room.  She is not convinced she has had any improvement with 40 mg daily prednisone and if anything think she is worse.    In reviewing her extensive record I see that a repeat TARA was performed and a reflexive was done when it was positive.  She continues to have an anticentromere antibody which she had previously, but now notably has a high titer Gonzalez and high titer RNP antibody.  She previously was borderline positive for RNP and Gonzalez negative.  SSA is also higher titer than it was previously.    With all these autoantibody positivity she however denies any specific features that would suggest systemic lupus.  She specifically denies sun sensitivity sun sensitive rashes including a malar rash, morning stiffness in any of her joints or any joint swelling and any pleurisy at the time of her  at CT angiogram or otherwise.  \  Her Raynaud's phenomena has been stable as have other features of her limited cutaneous systemic sclerosis.        February 25, 2021 interval history:    At the time that I saw the patient  about 3 weeks ago we decided that because her neuropathy sounded rather rapidly progressive, and her HRCT scan was being read as potentially consistent with sarcoidosis, that neurosarcoid was on the differential.  I also wondered about an immune complex mediated small vessel vasculitis causing neuropathy.  We therefore did laboratory testing as well as started her on some steroid.    Laboratory testing has been largely unrevealing.  She did not have elevated calcium or elevated calcium in the urine.  I did also do a parathyroid hormone in case we did find that however her parathyroid hormone was significantly elevated.  Her vitamin D was very low and she has started supplementation for that.    Most notably however her prednisone at 40 mg a day virtually resolved her symptoms over 3 to 4 days.  She had at least an 80% decrease in her pain and numbness although she still has some residual tingling in the legs bilaterally.    She is having some prednisone side effects.  Specifically she is having a hard time sleeping and is also feeling irritable which she thinks could be due partly due to the loss of sleep but partly directly due to the prednisone.  She has got a lot of prior experience with prednisone and has had some difficulties with it before.    She did have some right-sided chest pain.  That seem to occur with a deep breath.  She has a cardiologist and saw them will be getting a follow-up echo.  She is no longer having that.    In going through her history she does not believe she is ever been on Imuran and is quite sure and I am sure as well that we have never had her on TNF inhibition.  She was on methotrexate and tolerated oral methotrexate poorly due to nausea but was better able to tolerate subcutaneous methotrexate.      Impression:    Per the problem list, with known RNP positivity and more recently with this HRCT finding that is concerning for the possibility of overlapping sarcoidosis.    She had a  very nice response to steroid quite quickly, and so we now have the conundrum of not knowing exactly why.  I certainly suspect that she has an immune mediated neuropathy given this rapid response and neurosarcoid is a concern.    I have sent a message to Dr. Cuellar in pulmonary for her thoughts.  I have also ordered a repeat HRCT that to be done in another week or so before she is seen back in pulmonary.    I am hoping these can be reviewed so that there could be a decision whether we can feel strongly enough that presumptively this could be sarcoidosis to put her on appropriate therapy for that or whether a biopsy might be justified.    We could simply presumptively try steroid sparing medications.  I am somewhat concerned about using TNF inhibitors however unless we are pretty confident this is sarcoid, as they have been associated with worsening of systemic lupus patients and autoimmune associated interstitial lung disease in some settings.    We also have the  finding of the elevated PTH, and very low vitamin D levels, which can be seen in sarcoidosis, but is unclear here given normal Calcium levels. and will refer her to endocrinology for that.    I have given her prescription for temazepam to help her with the sleep and irritability.  I did warn her to be careful about driving in the morning until she knows how it affects her as it does have a long half-life.    I will plan on seeing her back in about 6 to 8 weeks sooner as needed.      April 8, 2021 interval history:    Since we have last seen the patient she has had initial evaluation done, and Dr. Cuellar has a recent note on the chart and an addendum placed a day after the patient's studies were reviewed in sarcoid conference.    The upshot of that is that there is evidence from her studies, in particular the biopsy studies that suggest that this could be sarcoidosis.  Because she has neurologic symptoms and inadequately explain dyspnea on exertion she will  also be getting neurology evaluation and cardiac evaluation.    She remains at this time on 40 mg a day of prednisone.  She is not liking this dose.  Although she still has dyspnea on exertion she also is irritable and on edge on that dose and is also experiencing some nausea..    After consideration of the issues, Dr. Cuellar had recommended the patient go on track today.  She has in fact been on both this and MMF before and although she had some difficulties at time with tolerating these medication she think she would tolerate them again.  We have briefly discussed possibility of using a TNF inhibitor but that does give me pause, given that she has had features of mixed connective tissue disease and in TARA positive individuals TNF inhibitors have at times worsen their disease process, particularly in those with lupus associated autoantibodies.  She has had in the past of borderline positive RNP.    She believes most of her other historical clinical features are stable.  She still gets pretty bad Raynaud's phenomena and some features of arthritis although the inflammatory features are currently pretty well controlled on this dose of prednisone.    July 15, 2021 interval history:    Since we have last seen the patient she has continued to have a lot of dyspnea on exertion despite being on the MMF 40 mg a day of prednisone and until just earlier this week methotrexate.    Because of her lung biopsy showing features consistent with sarcoidosis, she was presented at sarcoidosis conference last week.  I attended this discussion.  After much discussion it was decided that azathioprine may be the next drug to try in her.  This was influenced in part by my concerns of giving a TNF inhibitor to her given her underlying autoimmune connective tissue disease, and the possibility of that disease process worsening with TNF inhibition as has been described and as I have seen previously albeit rarely.    She did just get TPMT  levels earlier today to help decide what kind of azathioprine dose to start with.  She continues on MMF.    She will be getting a nerve biopsy performed on Monday.    She also was recently in the emergency room because of very severe midsternal chest pain.  She says this developed during the night.  When it would not go away she went to the ER.  There she was given narcotics after first getting a single sublingual nitroglycerin that did not help.  Extensive cardiopulmonary work-up was negative for any evidence of ischemia or clot.  Eventually she got Toradol after having gotten narcotics and did improve and went home.    She has had some costochondral type pains before but nothing like this and this was not exacerbated or relieved by pressure over the area.    Notably, she is been having worsening problems with GERD and dysphagia as well.  She just spoke with Dr. Cárdenas earlier this morning.  She is getting a lot of pill dysphagia at this point which is very frustrating for her and very uncomfortable.  She had dilatation in the fall and thought it helped for a while but she says this almost feels like it is just a dysmotility problem rather than stricturing to her.        November 11, 2021 interval history:    Patient had a right sural nerve biopsy on 7/19/2021.  Biopsy was notable for axonal neuropathy without a specific cause.  There is no overt evidence of inflammation, primary demyelination, or granulomatous findings.  Patient had return follow-up would pulmonology on 9/16/2021.  Her CT was reviewed, showed bronchocentric and predominantly central infiltrates.Overall findings have not significantly progressed from prior CT on 7/1/2021.  Patient's case was discussed in ILD conference.  Plan was to start Imuran 150 mg daily, as well as starting a prednisone taper (starting at 40 mg daily, then tapering 5 mg every month to be kept at 30 mg daily until follow-up with rheumatology).     Patient overall has no  significant change in her health from prior.  She is currently taking hydroxychloroquine, prednisone 30 mg daily, and Imuran 150 daily.  Continues to have fatigue, which she feels has been gradually worsening.  She notes that she can only lift 2-3 grocery bags before feeling tired, and needs assistance when cooking.  Continues to have neuropathy symptoms in her bilateral lower extremities (more in the right side).  Continues to have shortness of breath with limited activity (e.g. moving from bed to kitchen).  Is currently treating her GERD with Protonix and omeprazole, which she feels controls her symptoms.  Has some mouth dryness, but notices that her eyes are watery which is new for her. She has received her COVID-19 booster immunization.       ROS as per HPI.  10-point ROS is otherwise negative.    HISTORY REVIEW:  Past Medical History:   Diagnosis Date     Anemia      Cellulitis of leg 6/6/2013     Esophageal reflux     Better post-hiatal hernia repair and weight loss     Limb ischemia; ulcers of fingertips 4/22/2013     Raynaud's syndrome      Scleroderma (H)      Systemic sclerosis (H) 2009     Unspecified essential hypertension        Past Surgical History:   Procedure Laterality Date     BYPASS GASTRIC, CHOLECYSTECTOMY, COMBINED       CHOLECYSTECTOMY       COLONOSCOPY       ESOPHAGOSCOPY, GASTROSCOPY, DUODENOSCOPY (EGD), COMBINED N/A 3/10/2016    Procedure: COMBINED ESOPHAGOSCOPY, GASTROSCOPY, DUODENOSCOPY (EGD), BIOPSY SINGLE OR MULTIPLE;  Surgeon: Tricia Zambrano MD;  Location:  GI     INNER EAR SURGERY       PICC INSERTION  6/5/2013    5fr DL Power PICC, 44cm, left basilic vein, tip in low SVC     SURGICAL HISTORY OF -       Left ear surgery - incus transition, tympanoplasty     SURGICAL HISTORY OF -   6/05    Gastric bypass     SURGICAL HISTORY OF -   6/05    Cholecystectomy     SURGICAL HISTORY OF -   6/05    Hiatal hernia repair     TONSILLECTOMY         Family History   Problem  Relation Age of Onset     Cerebrovascular Disease Father      C.A.D. Father      C.A.D. Mother      C.A.D. 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      Social History Main Topics     Smoking status: Never Smoker      Smokeless tobacco: Never Used     Alcohol Use: Yes      occassionally     Drug Use: No     Sexually Active: Yes -- Male partner(s)     Birth Control/ Protection: None     Social History Narrative     Was  in December 2015. Longtime partner from [place].       Patient Active Problem List   Diagnosis     Essential hypertension     Thyrotoxicosis     Obesity     Prolonged QTc 460 ms,  at hr 67     Low back pain - Suspect SI Joint dysfunction     Cough     Systemic sclerosis (H)     Tumoral calcinosis     Shortness of breath     Edema of both legs     Abnormal albumin     Myopathy     Hypoalbuminemia     Diastolic dysfunction     Limb ischemia; ulcers of fingertips     Cellulitis of leg     Other specified diffuse disease of connective tissue     Disturbed sleep rhythm     Pharyngeal dysphagia     Gastroesophageal reflux disease, esophagitis presence not specified     Rheumatoid arthritis with negative rheumatoid factor, involving unspecified site (H)     Raynaud's disease without gangrene       Allergies   Allergen Reactions     Lovenox Other (See Comments)     Blood clot      Norvasc [Amlodipine Besylate] Swelling     Lip swelling that happened on 2 different occasions     Erythromycin Rash     Rash on arms     OBJECTIVE:  Vitals: Reviewed  Gen: A+Ox3, NAD  HEENT: NCAT, EOMI  Pulm: CTAB, minimal coarse sounds on basilar lobes  CVS: Irregularly irregular rhythm. No murmurs appreciated.  Skin:  purpuric patches on fingertips no ulcerations/erosions. There is platelike xerosis of lower extremities  -mild sclerosis of distal fingers  Msk: No active synovitis.  4/5 upper extremity strength, 5/5 lower extremity strength bilaterally.    EKG (11/12/2021)  Sinus rhythm with marked sinus arrhythmia with premature supraventricular complexes  Possible left atrial enlargement  Left axis deviation  Left ventricular hypertrophy with QRS widening  T wave abnormality consider lateral ischemia  Prolonged QT  Abnormal ECG  Ventricular rate 81, , , QT/QTc 418/485, P/R/T 29/-32/132     Component      Latest Ref Rng 1/26/2016   WBC      4.0 - 11.0 10e9/L 9.2   RBC Count      3.8 - 5.2 10e12/L 4.59   Hemoglobin      11.7 - 15.7 g/dL 13.2   Hematocrit      35.0 - 47.0 % 41.8   MCV      78 - 100 fl 91   MCH      26.5 - 33.0 pg 28.8   MCHC      31.5 - 36.5 g/dL 31.6   RDW      10.0 - 15.0 % 16.7 (H)   Platelet Count      150 - 450 10e9/L 195   Diff Method       Automated Method   % Neutrophils       62.9   % Lymphocytes       27.0   % Monocytes       9.1   % Eosinophils       0.4   % Basophils       0.2   % Immature Granulocytes       0.4   Nucleated RBCs      0 /100 0   Absolute Neutrophil      1.6 - 8.3 10e9/L 5.8   Absolute Lymphocytes      0.8 - 5.3 10e9/L 2.5   Absolute Monocytes      0.0 - 1.3 10e9/L 0.8   Absolute Eosinophils      0.0 - 0.7 10e9/L 0.0   Absolute Basophils      0.0 - 0.2 10e9/L 0.0   Abs Immature Granulocytes      0 - 0.4 10e9/L 0.0   Absolute Nucleated RBC       0.0   Color Urine       Yellow   Appearance Urine       Clear   Glucose Urine      NEG mg/dL Negative   Bilirubin Urine      NEG Negative   Ketones Urine      NEG mg/dL Negative   Specific Gravity Urine      1.003 - 1.035 1.010   Blood Urine      NEG Negative   pH Urine      5.0 - 7.0 pH 6.0   Protein Albumin Urine      NEG mg/dL Negative   Urobilinogen mg/dL      0.0 - 2.0 mg/dL Normal   Nitrite Urine      NEG Negative   Leukocyte  Esterase Urine      NEG Negative   Source       Midstream Urine   WBC Urine      0 - 2 /HPF <1   RBC Urine      0 - 2 /HPF <1   Squamous Epithelial /HPF Urine      0 - 1 /HPF <1   Creatinine      0.52 - 1.04 mg/dL 1.11 (H)   GFR Estimate      >60 mL/min/1.7m2 51 (L)   GFR Estimate If Black      >60 mL/min/1.7m2 62   ALT      0 - 50 U/L 23   AST      0 - 45 U/L 16   Albumin      3.4 - 5.0 g/dL 3.4   CRP Inflammation      0.0 - 8.0 mg/L <2.9   Sed Rate      0 - 30 mm/h 9   CK Total      30 - 225 U/L 38   Aldolase       4.1     Biopsy from 3/10 EGD:  Esophagus, gastroesophageal junction, biopsies:   - Squamous mucosa and submucosa with marked chronic inflammation and   fibrosis   - No evidence of intestinal metaplasia or dysplasia

## 2021-11-12 DIAGNOSIS — D84.9 IMMUNOSUPPRESSED STATUS (H): Primary | ICD-10-CM

## 2021-11-12 DIAGNOSIS — N30.01 ACUTE CYSTITIS WITH HEMATURIA: ICD-10-CM

## 2021-11-12 LAB
ATRIAL RATE - MUSE: 81 BPM
DIASTOLIC BLOOD PRESSURE - MUSE: NORMAL MMHG
INTERPRETATION ECG - MUSE: NORMAL
P AXIS - MUSE: 29 DEGREES
PR INTERVAL - MUSE: 154 MS
QRS DURATION - MUSE: 118 MS
QT - MUSE: 418 MS
QTC - MUSE: 485 MS
R AXIS - MUSE: -32 DEGREES
SARS-COV-2 AB SERPL IA-ACNC: >250 U/ML
SARS-COV-2 AB SERPL-IMP: POSITIVE
SYSTOLIC BLOOD PRESSURE - MUSE: NORMAL MMHG
T AXIS - MUSE: 132 DEGREES
VENTRICULAR RATE- MUSE: 81 BPM

## 2021-11-12 RX ORDER — NITROFURANTOIN 25; 75 MG/1; MG/1
100 CAPSULE ORAL 2 TIMES DAILY
Qty: 14 CAPSULE | Refills: 0 | Status: SHIPPED | OUTPATIENT
Start: 2021-11-12 | End: 2021-12-08

## 2021-11-13 ENCOUNTER — HEALTH MAINTENANCE LETTER (OUTPATIENT)
Age: 61
End: 2021-11-13

## 2021-12-01 DIAGNOSIS — N30.01 ACUTE CYSTITIS WITH HEMATURIA: ICD-10-CM

## 2021-12-01 DIAGNOSIS — D84.9 IMMUNOSUPPRESSED STATUS (H): ICD-10-CM

## 2021-12-01 NOTE — TELEPHONE ENCOUNTER
Called Jeanine on 12/01/21 2:02 PM and left a message asking that pt return call. If patient calls back please, I ask that the Call center contact RN Care Coordinator at [phone] so that I can connect with pt.    I need to verify that pt did show her EKG to Dr Perera.

## 2021-12-01 NOTE — TELEPHONE ENCOUNTER
----- Message from Nicolás De La Torre MD sent at 11/15/2021  7:52 PM CST -----  I gave her a copy of her EKG to share with Dr. Perera, who she just saw in oct. I am concerned about the axis changes and Twave inversions.     Please check with her, make sure she is in contact with Dr. Perera office, and/or determine if we need to send a copy of her EKG. .

## 2021-12-02 ENCOUNTER — MYC MEDICAL ADVICE (OUTPATIENT)
Dept: RHEUMATOLOGY | Facility: CLINIC | Age: 61
End: 2021-12-02

## 2021-12-02 NOTE — TELEPHONE ENCOUNTER
Pt reported that she has not shown her cardiologist the EKG, pt is out of town, but said she will send a message to her so that the cardiologist can review it on Care Everywhere.    STACEY Blanchard, RN  Scleroderma Care Coordinator  [facility]

## 2021-12-02 NOTE — TELEPHONE ENCOUNTER
Called Jeanine on 12/02/21 9:25 AM and left a message asking that pt return call. If patient calls back please, I ask that the Call center contact RN Care Coordinator at [phone] so that I can connect with pt.    Frantz message also sent asking if she did give her cardiologist the EKG that was done on her appointment on 11/11/21.    STACEY Blanchard, RN  Rheumatology Care Coordinator  [facility]

## 2021-12-07 NOTE — TELEPHONE ENCOUNTER
Received the following [employer] message from pt on 12/6/21 in a closed encounter:    Jeanine Schuler  to Me      I did reach out to Dr Perera by phone today.  I spoke with the nurse.  I told her about the ECG and asked her to have Dr Perera review it.  The nurse could see the written results of the ECG, but was not able to view the actual strip so she was supposed to reach out to your office to have one faxed over to them.  I was also wondering in Dr De La Torre would call another prescription for an antibiotic for a bladder infection.  I had one when I saw him and got a prescription for macobid.  The infection cleared but it is back.  If he could send it to the [facility] in [place] I could pick it up ASAP.  If he want a urine culture I do that at [facility] lab also.  Just would need the order.  Nicci Beatriz    Responded to pt asking if she still had the EKG copy Dr De La Torre had given her to give to the cardiologist. Also suggested to pt to contact her primary care provider regarding the UTI symptoms. Will forward to Dr De La Torre for his recommendation.    Jose Matos, TASIAN, RN  Scleroderma Care Coordinator  [facility]

## 2021-12-08 ENCOUNTER — MYC REFILL (OUTPATIENT)
Dept: RHEUMATOLOGY | Facility: CLINIC | Age: 61
End: 2021-12-08

## 2021-12-08 DIAGNOSIS — D84.9 IMMUNOSUPPRESSED STATUS (H): ICD-10-CM

## 2021-12-08 DIAGNOSIS — N30.01 ACUTE CYSTITIS WITH HEMATURIA: ICD-10-CM

## 2021-12-08 RX ORDER — NITROFURANTOIN 25; 75 MG/1; MG/1
100 CAPSULE ORAL 2 TIMES DAILY
Qty: 14 CAPSULE | Refills: 0 | Status: ON HOLD | OUTPATIENT
Start: 2021-12-08 | End: 2021-12-21

## 2021-12-08 NOTE — TELEPHONE ENCOUNTER
She should reach out to her PCP; she may need chronic  suppressive antibiotics. We can refill her prior prescription for a week until she is seen.

## 2021-12-08 NOTE — TELEPHONE ENCOUNTER
Relayed Dr De La Torre's response to pt via [place]. Prescription set up and routed to Dr De La Torre to complete.    STACEY Blanchard, RN  Rheumatology Care Coordinator  [facility]

## 2021-12-08 NOTE — TELEPHONE ENCOUNTER
nitroFURantoin macrocrystal-monohydrate (MACROBID) 100 MG capsule  Last Written Prescription Date:  *11/12/2021  Last Fill Quantity: 14,   # refills: 0  Last Office Visit : 11/11/2021  Future Office visit:  None    Routing refill request to provider for review/approval because:  Drug not on the [facility] or [facility] refill protocol or controlled substance      Mell Islas RN  Central Triage Red Flags/Med Refills

## 2021-12-20 ENCOUNTER — HOSPITAL ENCOUNTER (EMERGENCY)
Facility: CLINIC | Age: 61
Discharge: ANOTHER HOSPITAL | End: 2021-12-21
Attending: EMERGENCY MEDICINE | Admitting: EMERGENCY MEDICINE
Payer: COMMERCIAL

## 2021-12-20 DIAGNOSIS — J84.9 ILD (INTERSTITIAL LUNG DISEASE) (H): ICD-10-CM

## 2021-12-20 DIAGNOSIS — N30.00 ACUTE CYSTITIS WITHOUT HEMATURIA: ICD-10-CM

## 2021-12-20 DIAGNOSIS — I27.20 PULMONARY HYPERTENSION (H): ICD-10-CM

## 2021-12-20 DIAGNOSIS — I21.4 NSTEMI (NON-ST ELEVATED MYOCARDIAL INFARCTION) (H): ICD-10-CM

## 2021-12-20 LAB
BASOPHILS # BLD AUTO: 0 10E3/UL (ref 0–0.2)
BASOPHILS NFR BLD AUTO: 0 %
D DIMER PPP FEU-MCNC: 1.07 UG/ML FEU (ref 0–0.5)
EOSINOPHIL # BLD AUTO: 0 10E3/UL (ref 0–0.7)
EOSINOPHIL NFR BLD AUTO: 0 %
ERYTHROCYTE [DISTWIDTH] IN BLOOD BY AUTOMATED COUNT: 13.8 % (ref 10–15)
HCT VFR BLD AUTO: 40.6 % (ref 35–47)
HGB BLD-MCNC: 13.4 G/DL (ref 11.7–15.7)
IMM GRANULOCYTES # BLD: 0 10E3/UL
IMM GRANULOCYTES NFR BLD: 0 %
LACTATE SERPL-SCNC: 1.8 MMOL/L (ref 0.7–2)
LYMPHOCYTES # BLD AUTO: 0.7 10E3/UL (ref 0.8–5.3)
LYMPHOCYTES NFR BLD AUTO: 23 %
MCH RBC QN AUTO: 30.9 PG (ref 26.5–33)
MCHC RBC AUTO-ENTMCNC: 33 G/DL (ref 31.5–36.5)
MCV RBC AUTO: 94 FL (ref 78–100)
MONOCYTES # BLD AUTO: 0.2 10E3/UL (ref 0–1.3)
MONOCYTES NFR BLD AUTO: 8 %
NEUTROPHILS # BLD AUTO: 2.1 10E3/UL (ref 1.6–8.3)
NEUTROPHILS NFR BLD AUTO: 69 %
NRBC # BLD AUTO: 0 10E3/UL
NRBC BLD AUTO-RTO: 0 /100
PLATELET # BLD AUTO: 213 10E3/UL (ref 150–450)
RBC # BLD AUTO: 4.34 10E6/UL (ref 3.8–5.2)
WBC # BLD AUTO: 3.1 10E3/UL (ref 4–11)

## 2021-12-20 PROCEDURE — 36415 COLL VENOUS BLD VENIPUNCTURE: CPT | Performed by: EMERGENCY MEDICINE

## 2021-12-20 PROCEDURE — 96375 TX/PRO/DX INJ NEW DRUG ADDON: CPT | Performed by: EMERGENCY MEDICINE

## 2021-12-20 PROCEDURE — 250N000013 HC RX MED GY IP 250 OP 250 PS 637: Performed by: EMERGENCY MEDICINE

## 2021-12-20 PROCEDURE — 258N000003 HC RX IP 258 OP 636: Performed by: EMERGENCY MEDICINE

## 2021-12-20 PROCEDURE — 99285 EMERGENCY DEPT VISIT HI MDM: CPT | Mod: 25 | Performed by: EMERGENCY MEDICINE

## 2021-12-20 PROCEDURE — 96361 HYDRATE IV INFUSION ADD-ON: CPT | Performed by: EMERGENCY MEDICINE

## 2021-12-20 PROCEDURE — 80053 COMPREHEN METABOLIC PANEL: CPT | Performed by: EMERGENCY MEDICINE

## 2021-12-20 PROCEDURE — 93005 ELECTROCARDIOGRAM TRACING: CPT | Performed by: EMERGENCY MEDICINE

## 2021-12-20 PROCEDURE — 84443 ASSAY THYROID STIM HORMONE: CPT | Performed by: EMERGENCY MEDICINE

## 2021-12-20 PROCEDURE — 83605 ASSAY OF LACTIC ACID: CPT | Performed by: EMERGENCY MEDICINE

## 2021-12-20 PROCEDURE — C9803 HOPD COVID-19 SPEC COLLECT: HCPCS | Performed by: EMERGENCY MEDICINE

## 2021-12-20 PROCEDURE — 87636 SARSCOV2 & INF A&B AMP PRB: CPT | Performed by: EMERGENCY MEDICINE

## 2021-12-20 PROCEDURE — 85379 FIBRIN DEGRADATION QUANT: CPT | Performed by: EMERGENCY MEDICINE

## 2021-12-20 PROCEDURE — 250N000011 HC RX IP 250 OP 636: Performed by: EMERGENCY MEDICINE

## 2021-12-20 PROCEDURE — 85025 COMPLETE CBC W/AUTO DIFF WBC: CPT | Performed by: EMERGENCY MEDICINE

## 2021-12-20 PROCEDURE — 84484 ASSAY OF TROPONIN QUANT: CPT | Performed by: EMERGENCY MEDICINE

## 2021-12-20 PROCEDURE — 86140 C-REACTIVE PROTEIN: CPT | Performed by: EMERGENCY MEDICINE

## 2021-12-20 PROCEDURE — 93010 ELECTROCARDIOGRAM REPORT: CPT | Performed by: EMERGENCY MEDICINE

## 2021-12-20 RX ORDER — ONDANSETRON 2 MG/ML
4 INJECTION INTRAMUSCULAR; INTRAVENOUS EVERY 30 MIN PRN
Status: DISCONTINUED | OUTPATIENT
Start: 2021-12-20 | End: 2021-12-21 | Stop reason: HOSPADM

## 2021-12-20 RX ORDER — ASPIRIN 81 MG/1
324 TABLET, CHEWABLE ORAL ONCE
Status: COMPLETED | OUTPATIENT
Start: 2021-12-20 | End: 2021-12-20

## 2021-12-20 RX ORDER — MORPHINE SULFATE 4 MG/ML
4 INJECTION, SOLUTION INTRAMUSCULAR; INTRAVENOUS
Status: COMPLETED | OUTPATIENT
Start: 2021-12-20 | End: 2021-12-21

## 2021-12-20 RX ADMIN — SODIUM CHLORIDE, POTASSIUM CHLORIDE, SODIUM LACTATE AND CALCIUM CHLORIDE 500 ML: 600; 310; 30; 20 INJECTION, SOLUTION INTRAVENOUS at 23:46

## 2021-12-20 RX ADMIN — ASPIRIN 81 MG CHEWABLE TABLET 324 MG: 81 TABLET CHEWABLE at 23:46

## 2021-12-20 RX ADMIN — ONDANSETRON 4 MG: 2 INJECTION INTRAMUSCULAR; INTRAVENOUS at 23:46

## 2021-12-21 ENCOUNTER — APPOINTMENT (OUTPATIENT)
Dept: CARDIOLOGY | Facility: CLINIC | Age: 61
End: 2021-12-21
Attending: HOSPITALIST
Payer: COMMERCIAL

## 2021-12-21 ENCOUNTER — APPOINTMENT (OUTPATIENT)
Dept: CT IMAGING | Facility: CLINIC | Age: 61
End: 2021-12-21
Attending: EMERGENCY MEDICINE
Payer: COMMERCIAL

## 2021-12-21 ENCOUNTER — HOSPITAL ENCOUNTER (INPATIENT)
Facility: CLINIC | Age: 61
LOS: 1 days | Discharge: HOME | End: 2021-12-22
Attending: HOSPITALIST | Admitting: HOSPITALIST
Payer: COMMERCIAL

## 2021-12-21 VITALS
HEART RATE: 82 BPM | SYSTOLIC BLOOD PRESSURE: 115 MMHG | DIASTOLIC BLOOD PRESSURE: 75 MMHG | WEIGHT: 174 LBS | RESPIRATION RATE: 16 BRPM | TEMPERATURE: 97.8 F | OXYGEN SATURATION: 92 % | BODY MASS INDEX: 27.25 KG/M2

## 2021-12-21 DIAGNOSIS — I42.9 CARDIOMYOPATHY, UNSPECIFIED TYPE (H): ICD-10-CM

## 2021-12-21 DIAGNOSIS — E16.2 HYPOGLYCEMIA: ICD-10-CM

## 2021-12-21 DIAGNOSIS — E87.6 HYPOKALEMIA: ICD-10-CM

## 2021-12-21 DIAGNOSIS — I42.9 SECONDARY CARDIOMYOPATHY (H): ICD-10-CM

## 2021-12-21 DIAGNOSIS — I25.10 MILD CORONARY ARTERY DISEASE: ICD-10-CM

## 2021-12-21 DIAGNOSIS — R82.90 ABNORMAL URINALYSIS: ICD-10-CM

## 2021-12-21 DIAGNOSIS — I49.3 PVC'S (PREMATURE VENTRICULAR CONTRACTIONS): ICD-10-CM

## 2021-12-21 DIAGNOSIS — I24.9 ACS (ACUTE CORONARY SYNDROME) (H): Primary | ICD-10-CM

## 2021-12-21 LAB
ALBUMIN SERPL-MCNC: 2.9 G/DL (ref 3.4–5)
ALBUMIN UR-MCNC: 30 MG/DL
ALP SERPL-CCNC: 81 U/L (ref 40–150)
ALT SERPL W P-5'-P-CCNC: 15 U/L (ref 0–50)
ANION GAP SERPL CALCULATED.3IONS-SCNC: 8 MMOL/L (ref 3–14)
ANION GAP SERPL CALCULATED.3IONS-SCNC: 9 MMOL/L (ref 3–14)
APPEARANCE UR: ABNORMAL
AST SERPL W P-5'-P-CCNC: 17 U/L (ref 0–45)
BACTERIA #/AREA URNS HPF: ABNORMAL /HPF
BASOPHILS # BLD AUTO: 0 10E3/UL (ref 0–0.2)
BASOPHILS NFR BLD AUTO: 0 %
BILIRUB SERPL-MCNC: 0.7 MG/DL (ref 0.2–1.3)
BILIRUB UR QL STRIP: NEGATIVE
BUN SERPL-MCNC: 10 MG/DL (ref 7–30)
BUN SERPL-MCNC: 12 MG/DL (ref 7–30)
CALCIUM SERPL-MCNC: 8.5 MG/DL (ref 8.5–10.1)
CALCIUM SERPL-MCNC: 9.1 MG/DL (ref 8.5–10.1)
CHLORIDE BLD-SCNC: 109 MMOL/L (ref 94–109)
CHLORIDE BLD-SCNC: 111 MMOL/L (ref 94–109)
CO2 SERPL-SCNC: 20 MMOL/L (ref 20–32)
CO2 SERPL-SCNC: 22 MMOL/L (ref 20–32)
COLOR UR AUTO: YELLOW
CREAT SERPL-MCNC: 0.67 MG/DL (ref 0.52–1.04)
CREAT SERPL-MCNC: 0.98 MG/DL (ref 0.52–1.04)
CRP SERPL-MCNC: 6.3 MG/L (ref 0–8)
EOSINOPHIL # BLD AUTO: 0 10E3/UL (ref 0–0.7)
EOSINOPHIL NFR BLD AUTO: 0 %
ERYTHROCYTE [DISTWIDTH] IN BLOOD BY AUTOMATED COUNT: 14.1 % (ref 10–15)
FLUAV RNA SPEC QL NAA+PROBE: NEGATIVE
FLUBV RNA RESP QL NAA+PROBE: NEGATIVE
GFR SERPL CREATININE-BSD FRML MDRD: 63 ML/MIN/1.73M2
GFR SERPL CREATININE-BSD FRML MDRD: >90 ML/MIN/1.73M2
GLUCOSE BLD-MCNC: 149 MG/DL (ref 70–99)
GLUCOSE BLD-MCNC: 94 MG/DL (ref 70–99)
GLUCOSE BLDC GLUCOMTR-MCNC: 121 MG/DL (ref 70–99)
GLUCOSE BLDC GLUCOMTR-MCNC: 30 MG/DL (ref 70–99)
GLUCOSE BLDC GLUCOMTR-MCNC: 60 MG/DL (ref 70–99)
GLUCOSE BLDC GLUCOMTR-MCNC: 61 MG/DL (ref 70–99)
GLUCOSE BLDC GLUCOMTR-MCNC: 62 MG/DL (ref 70–99)
GLUCOSE BLDC GLUCOMTR-MCNC: 64 MG/DL (ref 70–99)
GLUCOSE BLDC GLUCOMTR-MCNC: 73 MG/DL (ref 70–99)
GLUCOSE BLDC GLUCOMTR-MCNC: 91 MG/DL (ref 70–99)
GLUCOSE BLDC GLUCOMTR-MCNC: 99 MG/DL (ref 70–99)
GLUCOSE UR STRIP-MCNC: NEGATIVE MG/DL
HCT VFR BLD AUTO: 38.5 % (ref 35–47)
HGB BLD-MCNC: 12.5 G/DL (ref 11.7–15.7)
HGB UR QL STRIP: NEGATIVE
HYALINE CASTS: 11 /LPF
IMM GRANULOCYTES # BLD: 0 10E3/UL
IMM GRANULOCYTES NFR BLD: 0 %
KETONES UR STRIP-MCNC: 5 MG/DL
LEUKOCYTE ESTERASE UR QL STRIP: ABNORMAL
LVEF ECHO: NORMAL
LYMPHOCYTES # BLD AUTO: 1.8 10E3/UL (ref 0.8–5.3)
LYMPHOCYTES NFR BLD AUTO: 37 %
MCH RBC QN AUTO: 30.6 PG (ref 26.5–33)
MCHC RBC AUTO-ENTMCNC: 32.5 G/DL (ref 31.5–36.5)
MCV RBC AUTO: 94 FL (ref 78–100)
MONOCYTES # BLD AUTO: 0.5 10E3/UL (ref 0–1.3)
MONOCYTES NFR BLD AUTO: 11 %
MUCOUS THREADS #/AREA URNS LPF: PRESENT /LPF
NEUTROPHILS # BLD AUTO: 2.4 10E3/UL (ref 1.6–8.3)
NEUTROPHILS NFR BLD AUTO: 52 %
NITRATE UR QL: NEGATIVE
NRBC # BLD AUTO: 0 10E3/UL
NRBC BLD AUTO-RTO: 0 /100
PH UR STRIP: 5 [PH] (ref 5–7)
PLATELET # BLD AUTO: 209 10E3/UL (ref 150–450)
POTASSIUM BLD-SCNC: 3.6 MMOL/L (ref 3.4–5.3)
POTASSIUM BLD-SCNC: 4.1 MMOL/L (ref 3.4–5.3)
POTASSIUM BLD-SCNC: 4.2 MMOL/L (ref 3.4–5.3)
POTASSIUM BLD-SCNC: 4.4 MMOL/L (ref 3.4–5.3)
PROT SERPL-MCNC: 6.3 G/DL (ref 6.8–8.8)
RBC # BLD AUTO: 4.08 10E6/UL (ref 3.8–5.2)
RBC URINE: 1 /HPF
SARS-COV-2 RNA RESP QL NAA+PROBE: NEGATIVE
SODIUM SERPL-SCNC: 139 MMOL/L (ref 133–144)
SODIUM SERPL-SCNC: 140 MMOL/L (ref 133–144)
SP GR UR STRIP: 1.01 (ref 1–1.03)
SQUAMOUS EPITHELIAL: 2 /HPF
TROPONIN I SERPL HS-MCNC: 140 NG/L
TROPONIN I SERPL HS-MCNC: 175 NG/L
TROPONIN I SERPL HS-MCNC: 219 NG/L
TROPONIN I SERPL HS-MCNC: 246 NG/L
TSH SERPL DL<=0.005 MIU/L-ACNC: 0.49 MU/L (ref 0.4–4)
UFH PPP CHRO-ACNC: 0.25 IU/ML
UFH PPP CHRO-ACNC: 0.3 IU/ML
UFH PPP CHRO-ACNC: 0.38 IU/ML
UROBILINOGEN UR STRIP-MCNC: 2 MG/DL
WBC # BLD AUTO: 4.8 10E3/UL (ref 4–11)
WBC URINE: 10 /HPF

## 2021-12-21 PROCEDURE — C1760 CLOSURE DEV, VASC: HCPCS | Performed by: INTERNAL MEDICINE

## 2021-12-21 PROCEDURE — 99223 1ST HOSP IP/OBS HIGH 75: CPT | Mod: 25 | Performed by: INTERNAL MEDICINE

## 2021-12-21 PROCEDURE — 99223 1ST HOSP IP/OBS HIGH 75: CPT | Mod: AI | Performed by: HOSPITALIST

## 2021-12-21 PROCEDURE — 99152 MOD SED SAME PHYS/QHP 5/>YRS: CPT | Performed by: INTERNAL MEDICINE

## 2021-12-21 PROCEDURE — 250N000011 HC RX IP 250 OP 636: Performed by: INTERNAL MEDICINE

## 2021-12-21 PROCEDURE — 255N000002 HC RX 255 OP 636: Performed by: HOSPITALIST

## 2021-12-21 PROCEDURE — 999N000208 ECHOCARDIOGRAM COMPLETE

## 2021-12-21 PROCEDURE — C1887 CATHETER, GUIDING: HCPCS | Performed by: INTERNAL MEDICINE

## 2021-12-21 PROCEDURE — B2111ZZ FLUOROSCOPY OF MULTIPLE CORONARY ARTERIES USING LOW OSMOLAR CONTRAST: ICD-10-PCS | Performed by: INTERNAL MEDICINE

## 2021-12-21 PROCEDURE — 36415 COLL VENOUS BLD VENIPUNCTURE: CPT | Performed by: HOSPITALIST

## 2021-12-21 PROCEDURE — 250N000013 HC RX MED GY IP 250 OP 250 PS 637: Performed by: HOSPITALIST

## 2021-12-21 PROCEDURE — 85025 COMPLETE CBC W/AUTO DIFF WBC: CPT | Performed by: HOSPITALIST

## 2021-12-21 PROCEDURE — 96365 THER/PROPH/DIAG IV INF INIT: CPT | Performed by: EMERGENCY MEDICINE

## 2021-12-21 PROCEDURE — 258N000003 HC RX IP 258 OP 636: Performed by: HOSPITALIST

## 2021-12-21 PROCEDURE — 96366 THER/PROPH/DIAG IV INF ADDON: CPT | Performed by: EMERGENCY MEDICINE

## 2021-12-21 PROCEDURE — 87086 URINE CULTURE/COLONY COUNT: CPT | Performed by: HOSPITALIST

## 2021-12-21 PROCEDURE — 250N000011 HC RX IP 250 OP 636: Performed by: EMERGENCY MEDICINE

## 2021-12-21 PROCEDURE — 85520 HEPARIN ASSAY: CPT | Performed by: HOSPITALIST

## 2021-12-21 PROCEDURE — 84484 ASSAY OF TROPONIN QUANT: CPT | Performed by: EMERGENCY MEDICINE

## 2021-12-21 PROCEDURE — 250N000011 HC RX IP 250 OP 636: Performed by: HOSPITALIST

## 2021-12-21 PROCEDURE — C1894 INTRO/SHEATH, NON-LASER: HCPCS | Performed by: INTERNAL MEDICINE

## 2021-12-21 PROCEDURE — 80048 BASIC METABOLIC PNL TOTAL CA: CPT | Performed by: HOSPITALIST

## 2021-12-21 PROCEDURE — 96375 TX/PRO/DX INJ NEW DRUG ADDON: CPT | Performed by: EMERGENCY MEDICINE

## 2021-12-21 PROCEDURE — 250N000009 HC RX 250: Performed by: EMERGENCY MEDICINE

## 2021-12-21 PROCEDURE — 84132 ASSAY OF SERUM POTASSIUM: CPT | Performed by: HOSPITALIST

## 2021-12-21 PROCEDURE — 81001 URINALYSIS AUTO W/SCOPE: CPT | Performed by: EMERGENCY MEDICINE

## 2021-12-21 PROCEDURE — 99153 MOD SED SAME PHYS/QHP EA: CPT | Performed by: INTERNAL MEDICINE

## 2021-12-21 PROCEDURE — 93454 CORONARY ARTERY ANGIO S&I: CPT | Mod: 26 | Performed by: INTERNAL MEDICINE

## 2021-12-21 PROCEDURE — 210N000002 HC R&B HEART CARE

## 2021-12-21 PROCEDURE — 96376 TX/PRO/DX INJ SAME DRUG ADON: CPT | Performed by: EMERGENCY MEDICINE

## 2021-12-21 PROCEDURE — 272N000001 HC OR GENERAL SUPPLY STERILE: Performed by: INTERNAL MEDICINE

## 2021-12-21 PROCEDURE — 71275 CT ANGIOGRAPHY CHEST: CPT

## 2021-12-21 PROCEDURE — 84484 ASSAY OF TROPONIN QUANT: CPT | Performed by: HOSPITALIST

## 2021-12-21 PROCEDURE — 93306 TTE W/DOPPLER COMPLETE: CPT | Mod: 26 | Performed by: INTERNAL MEDICINE

## 2021-12-21 PROCEDURE — 93454 CORONARY ARTERY ANGIO S&I: CPT | Performed by: INTERNAL MEDICINE

## 2021-12-21 PROCEDURE — 36415 COLL VENOUS BLD VENIPUNCTURE: CPT | Performed by: EMERGENCY MEDICINE

## 2021-12-21 PROCEDURE — 250N000013 HC RX MED GY IP 250 OP 250 PS 637: Performed by: INTERNAL MEDICINE

## 2021-12-21 PROCEDURE — 250N000012 HC RX MED GY IP 250 OP 636 PS 637: Performed by: HOSPITALIST

## 2021-12-21 DEVICE — CLOSURE ANGIOSEAL 6FR 610130: Type: IMPLANTABLE DEVICE | Status: FUNCTIONAL

## 2021-12-21 RX ORDER — LOSARTAN POTASSIUM 25 MG/1
25 TABLET ORAL DAILY
Status: DISCONTINUED | OUTPATIENT
Start: 2021-12-21 | End: 2021-12-22 | Stop reason: HOSPADM

## 2021-12-21 RX ORDER — BISACODYL 10 MG
10 SUPPOSITORY, RECTAL RECTAL DAILY PRN
Status: DISCONTINUED | OUTPATIENT
Start: 2021-12-21 | End: 2021-12-22 | Stop reason: HOSPADM

## 2021-12-21 RX ORDER — LIDOCAINE 40 MG/G
CREAM TOPICAL
Status: DISCONTINUED | OUTPATIENT
Start: 2021-12-21 | End: 2021-12-21 | Stop reason: HOSPADM

## 2021-12-21 RX ORDER — HEPARIN SODIUM 10000 [USP'U]/100ML
0-5000 INJECTION, SOLUTION INTRAVENOUS CONTINUOUS
Status: DISCONTINUED | OUTPATIENT
Start: 2021-12-21 | End: 2021-12-21 | Stop reason: HOSPADM

## 2021-12-21 RX ORDER — FENTANYL CITRATE 50 UG/ML
25 INJECTION, SOLUTION INTRAMUSCULAR; INTRAVENOUS
Status: DISCONTINUED | OUTPATIENT
Start: 2021-12-21 | End: 2021-12-22 | Stop reason: HOSPADM

## 2021-12-21 RX ORDER — IOPAMIDOL 755 MG/ML
INJECTION, SOLUTION INTRAVASCULAR
Status: DISCONTINUED | OUTPATIENT
Start: 2021-12-21 | End: 2021-12-21 | Stop reason: HOSPADM

## 2021-12-21 RX ORDER — ASPIRIN 325 MG
325 TABLET ORAL ONCE
Status: DISCONTINUED | OUTPATIENT
Start: 2021-12-21 | End: 2021-12-21 | Stop reason: HOSPADM

## 2021-12-21 RX ORDER — ACETAMINOPHEN 325 MG/1
650 TABLET ORAL EVERY 6 HOURS PRN
Status: CANCELLED | OUTPATIENT
Start: 2021-12-21

## 2021-12-21 RX ORDER — FLUMAZENIL 0.1 MG/ML
0.2 INJECTION, SOLUTION INTRAVENOUS
Status: DISCONTINUED | OUTPATIENT
Start: 2021-12-21 | End: 2021-12-22

## 2021-12-21 RX ORDER — ACETAMINOPHEN 650 MG/1
650 SUPPOSITORY RECTAL EVERY 6 HOURS PRN
Status: DISCONTINUED | OUTPATIENT
Start: 2021-12-21 | End: 2021-12-22 | Stop reason: HOSPADM

## 2021-12-21 RX ORDER — SULFAMETHOXAZOLE AND TRIMETHOPRIM 400; 80 MG/1; MG/1
1 TABLET ORAL DAILY
Status: DISCONTINUED | OUTPATIENT
Start: 2021-12-21 | End: 2021-12-22 | Stop reason: HOSPADM

## 2021-12-21 RX ORDER — NICOTINE POLACRILEX 4 MG
15-30 LOZENGE BUCCAL
Status: DISCONTINUED | OUTPATIENT
Start: 2021-12-21 | End: 2021-12-22 | Stop reason: HOSPADM

## 2021-12-21 RX ORDER — OXYCODONE HYDROCHLORIDE 5 MG/1
10 TABLET ORAL EVERY 4 HOURS PRN
Status: DISCONTINUED | OUTPATIENT
Start: 2021-12-21 | End: 2021-12-22 | Stop reason: HOSPADM

## 2021-12-21 RX ORDER — OXYCODONE HYDROCHLORIDE 5 MG/1
5 TABLET ORAL EVERY 4 HOURS PRN
Status: CANCELLED | OUTPATIENT
Start: 2021-12-21

## 2021-12-21 RX ORDER — ACETAMINOPHEN 325 MG/1
650 TABLET ORAL EVERY 4 HOURS PRN
Status: DISCONTINUED | OUTPATIENT
Start: 2021-12-21 | End: 2021-12-22 | Stop reason: HOSPADM

## 2021-12-21 RX ORDER — ONDANSETRON 2 MG/ML
4 INJECTION INTRAMUSCULAR; INTRAVENOUS EVERY 6 HOURS PRN
Status: CANCELLED | OUTPATIENT
Start: 2021-12-21

## 2021-12-21 RX ORDER — LORAZEPAM 0.5 MG/1
0.5 TABLET ORAL
Status: DISCONTINUED | OUTPATIENT
Start: 2021-12-21 | End: 2021-12-21 | Stop reason: HOSPADM

## 2021-12-21 RX ORDER — POTASSIUM CHLORIDE 1500 MG/1
20 TABLET, EXTENDED RELEASE ORAL
Status: COMPLETED | OUTPATIENT
Start: 2021-12-21 | End: 2021-12-21

## 2021-12-21 RX ORDER — IOPAMIDOL 755 MG/ML
500 INJECTION, SOLUTION INTRAVASCULAR ONCE
Status: COMPLETED | OUTPATIENT
Start: 2021-12-21 | End: 2021-12-21

## 2021-12-21 RX ORDER — ONDANSETRON 4 MG/1
4 TABLET, ORALLY DISINTEGRATING ORAL EVERY 6 HOURS PRN
Status: DISCONTINUED | OUTPATIENT
Start: 2021-12-21 | End: 2021-12-22 | Stop reason: HOSPADM

## 2021-12-21 RX ORDER — BENZONATATE 100 MG/1
100 CAPSULE ORAL EVERY MORNING
COMMUNITY
End: 2022-01-30

## 2021-12-21 RX ORDER — ALBUTEROL SULFATE 90 UG/1
2 AEROSOL, METERED RESPIRATORY (INHALATION) EVERY 6 HOURS PRN
COMMUNITY
End: 2023-01-01

## 2021-12-21 RX ORDER — CEFTRIAXONE 1 G/1
1 INJECTION, POWDER, FOR SOLUTION INTRAMUSCULAR; INTRAVENOUS ONCE
Status: COMPLETED | OUTPATIENT
Start: 2021-12-21 | End: 2021-12-21

## 2021-12-21 RX ORDER — NALOXONE HYDROCHLORIDE 0.4 MG/ML
0.2 INJECTION, SOLUTION INTRAMUSCULAR; INTRAVENOUS; SUBCUTANEOUS
Status: DISCONTINUED | OUTPATIENT
Start: 2021-12-21 | End: 2021-12-22

## 2021-12-21 RX ORDER — SODIUM CHLORIDE 9 MG/ML
INJECTION, SOLUTION INTRAVENOUS CONTINUOUS
Status: DISCONTINUED | OUTPATIENT
Start: 2021-12-21 | End: 2021-12-21

## 2021-12-21 RX ORDER — SODIUM CHLORIDE 9 MG/ML
75 INJECTION, SOLUTION INTRAVENOUS CONTINUOUS
Status: DISCONTINUED | OUTPATIENT
Start: 2021-12-21 | End: 2021-12-21

## 2021-12-21 RX ORDER — ACETAMINOPHEN 650 MG/1
650 SUPPOSITORY RECTAL EVERY 6 HOURS PRN
Status: CANCELLED | OUTPATIENT
Start: 2021-12-21

## 2021-12-21 RX ORDER — AZATHIOPRINE 50 MG/1
150 TABLET ORAL DAILY
Status: DISCONTINUED | OUTPATIENT
Start: 2021-12-21 | End: 2021-12-22 | Stop reason: HOSPADM

## 2021-12-21 RX ORDER — ONDANSETRON 4 MG/1
4 TABLET, ORALLY DISINTEGRATING ORAL EVERY 6 HOURS PRN
Status: CANCELLED | OUTPATIENT
Start: 2021-12-21

## 2021-12-21 RX ORDER — ASPIRIN 81 MG/1
324 TABLET, CHEWABLE ORAL ONCE
Status: DISCONTINUED | OUTPATIENT
Start: 2021-12-21 | End: 2021-12-21

## 2021-12-21 RX ORDER — NALOXONE HYDROCHLORIDE 0.4 MG/ML
0.4 INJECTION, SOLUTION INTRAMUSCULAR; INTRAVENOUS; SUBCUTANEOUS
Status: DISCONTINUED | OUTPATIENT
Start: 2021-12-21 | End: 2021-12-22

## 2021-12-21 RX ORDER — CEFTRIAXONE 1 G/1
1 INJECTION, POWDER, FOR SOLUTION INTRAMUSCULAR; INTRAVENOUS EVERY 24 HOURS
Status: CANCELLED | OUTPATIENT
Start: 2021-12-21

## 2021-12-21 RX ORDER — LORAZEPAM 2 MG/ML
0.5 INJECTION INTRAMUSCULAR
Status: DISCONTINUED | OUTPATIENT
Start: 2021-12-21 | End: 2021-12-21 | Stop reason: HOSPADM

## 2021-12-21 RX ORDER — ASPIRIN 81 MG/1
243 TABLET, CHEWABLE ORAL ONCE
Status: DISCONTINUED | OUTPATIENT
Start: 2021-12-21 | End: 2021-12-21 | Stop reason: HOSPADM

## 2021-12-21 RX ORDER — FOLIC ACID 1 MG/1
1 TABLET ORAL DAILY
Status: DISCONTINUED | OUTPATIENT
Start: 2021-12-21 | End: 2021-12-22 | Stop reason: HOSPADM

## 2021-12-21 RX ORDER — OXYCODONE HYDROCHLORIDE 5 MG/1
5 TABLET ORAL EVERY 4 HOURS PRN
Status: DISCONTINUED | OUTPATIENT
Start: 2021-12-21 | End: 2021-12-22 | Stop reason: HOSPADM

## 2021-12-21 RX ORDER — DEXTROSE MONOHYDRATE 25 G/50ML
25-50 INJECTION, SOLUTION INTRAVENOUS
Status: DISCONTINUED | OUTPATIENT
Start: 2021-12-21 | End: 2021-12-22 | Stop reason: HOSPADM

## 2021-12-21 RX ORDER — ASPIRIN 81 MG/1
81 TABLET, CHEWABLE ORAL DAILY
Status: DISCONTINUED | OUTPATIENT
Start: 2021-12-22 | End: 2021-12-22 | Stop reason: HOSPADM

## 2021-12-21 RX ORDER — FUROSEMIDE 10 MG/ML
20 INJECTION INTRAMUSCULAR; INTRAVENOUS EVERY 12 HOURS
Status: COMPLETED | OUTPATIENT
Start: 2021-12-21 | End: 2021-12-22

## 2021-12-21 RX ORDER — HEPARIN SODIUM 10000 [USP'U]/100ML
0-5000 INJECTION, SOLUTION INTRAVENOUS CONTINUOUS
Status: DISCONTINUED | OUTPATIENT
Start: 2021-12-21 | End: 2021-12-21

## 2021-12-21 RX ORDER — POTASSIUM CHLORIDE 750 MG/1
30 TABLET, EXTENDED RELEASE ORAL DAILY
COMMUNITY
End: 2022-01-28

## 2021-12-21 RX ORDER — ONDANSETRON 2 MG/ML
4 INJECTION INTRAMUSCULAR; INTRAVENOUS EVERY 6 HOURS PRN
Status: DISCONTINUED | OUTPATIENT
Start: 2021-12-21 | End: 2021-12-22 | Stop reason: HOSPADM

## 2021-12-21 RX ORDER — FENTANYL CITRATE 50 UG/ML
INJECTION, SOLUTION INTRAMUSCULAR; INTRAVENOUS
Status: DISCONTINUED | OUTPATIENT
Start: 2021-12-21 | End: 2021-12-21 | Stop reason: HOSPADM

## 2021-12-21 RX ORDER — BENZONATATE 100 MG/1
100 CAPSULE ORAL EVERY MORNING
Status: DISCONTINUED | OUTPATIENT
Start: 2021-12-21 | End: 2021-12-22 | Stop reason: HOSPADM

## 2021-12-21 RX ORDER — POLYETHYLENE GLYCOL 3350 17 G/17G
17 POWDER, FOR SOLUTION ORAL DAILY PRN
Status: DISCONTINUED | OUTPATIENT
Start: 2021-12-21 | End: 2021-12-22 | Stop reason: HOSPADM

## 2021-12-21 RX ORDER — PANTOPRAZOLE SODIUM 40 MG/1
40 TABLET, DELAYED RELEASE ORAL DAILY
Status: DISCONTINUED | OUTPATIENT
Start: 2021-12-21 | End: 2021-12-22 | Stop reason: HOSPADM

## 2021-12-21 RX ORDER — LIDOCAINE 40 MG/G
CREAM TOPICAL
Status: CANCELLED | OUTPATIENT
Start: 2021-12-21

## 2021-12-21 RX ORDER — ACETAMINOPHEN 325 MG/1
650 TABLET ORAL EVERY 6 HOURS PRN
Status: DISCONTINUED | OUTPATIENT
Start: 2021-12-21 | End: 2021-12-21

## 2021-12-21 RX ORDER — LIDOCAINE 40 MG/G
CREAM TOPICAL
Status: DISCONTINUED | OUTPATIENT
Start: 2021-12-21 | End: 2021-12-21

## 2021-12-21 RX ORDER — FUROSEMIDE 10 MG/ML
40 INJECTION INTRAMUSCULAR; INTRAVENOUS ONCE
Status: COMPLETED | OUTPATIENT
Start: 2021-12-21 | End: 2021-12-21

## 2021-12-21 RX ORDER — KETOROLAC TROMETHAMINE 30 MG/ML
15 INJECTION, SOLUTION INTRAMUSCULAR; INTRAVENOUS ONCE
Status: COMPLETED | OUTPATIENT
Start: 2021-12-21 | End: 2021-12-21

## 2021-12-21 RX ORDER — CEFTRIAXONE 1 G/1
1 INJECTION, POWDER, FOR SOLUTION INTRAMUSCULAR; INTRAVENOUS EVERY 24 HOURS
Status: DISCONTINUED | OUTPATIENT
Start: 2021-12-22 | End: 2021-12-22 | Stop reason: HOSPADM

## 2021-12-21 RX ORDER — ATROPINE SULFATE 0.1 MG/ML
0.5 INJECTION INTRAVENOUS
Status: DISCONTINUED | OUTPATIENT
Start: 2021-12-21 | End: 2021-12-22

## 2021-12-21 RX ADMIN — SULFAMETHOXAZOLE AND TRIMETHOPRIM 1 TABLET: 400; 80 TABLET ORAL at 12:40

## 2021-12-21 RX ADMIN — BENZONATATE 100 MG: 100 CAPSULE ORAL at 11:04

## 2021-12-21 RX ADMIN — FOLIC ACID 1 MG: 1 TABLET ORAL at 11:04

## 2021-12-21 RX ADMIN — HUMAN ALBUMIN MICROSPHERES AND PERFLUTREN 6 ML: 10; .22 INJECTION, SOLUTION INTRAVENOUS at 09:01

## 2021-12-21 RX ADMIN — FUROSEMIDE 20 MG: 10 INJECTION, SOLUTION INTRAVENOUS at 12:40

## 2021-12-21 RX ADMIN — MORPHINE SULFATE 4 MG: 4 INJECTION INTRAVENOUS at 01:54

## 2021-12-21 RX ADMIN — AZATHIOPRINE 150 MG: 50 TABLET ORAL at 12:40

## 2021-12-21 RX ADMIN — METOPROLOL TARTRATE 12.5 MG: 25 TABLET, FILM COATED ORAL at 11:04

## 2021-12-21 RX ADMIN — CEFTRIAXONE SODIUM 1 G: 1 INJECTION, POWDER, FOR SOLUTION INTRAMUSCULAR; INTRAVENOUS at 00:44

## 2021-12-21 RX ADMIN — PREDNISONE 30 MG: 10 TABLET ORAL at 11:04

## 2021-12-21 RX ADMIN — POTASSIUM CHLORIDE 20 MEQ: 1500 TABLET, EXTENDED RELEASE ORAL at 15:46

## 2021-12-21 RX ADMIN — KETOROLAC TROMETHAMINE 15 MG: 30 INJECTION, SOLUTION INTRAMUSCULAR at 00:58

## 2021-12-21 RX ADMIN — METOPROLOL TARTRATE 12.5 MG: 25 TABLET, FILM COATED ORAL at 21:17

## 2021-12-21 RX ADMIN — DEXTROSE AND SODIUM CHLORIDE: 5; 900 INJECTION, SOLUTION INTRAVENOUS at 14:19

## 2021-12-21 RX ADMIN — SODIUM CHLORIDE 70 ML: 9 INJECTION, SOLUTION INTRAVENOUS at 00:32

## 2021-12-21 RX ADMIN — IOPAMIDOL 65 ML: 755 INJECTION, SOLUTION INTRAVENOUS at 00:32

## 2021-12-21 RX ADMIN — PANTOPRAZOLE SODIUM 40 MG: 40 TABLET, DELAYED RELEASE ORAL at 11:04

## 2021-12-21 RX ADMIN — HEPARIN SODIUM 950 UNITS/HR: 10000 INJECTION, SOLUTION INTRAVENOUS at 00:47

## 2021-12-21 RX ADMIN — FUROSEMIDE 40 MG: 10 INJECTION, SOLUTION INTRAVENOUS at 08:15

## 2021-12-21 RX ADMIN — DEXTROSE 15 G: 15 GEL ORAL at 11:57

## 2021-12-21 RX ADMIN — LOSARTAN POTASSIUM 25 MG: 25 TABLET, FILM COATED ORAL at 11:04

## 2021-12-21 RX ADMIN — DEXTROSE 15 G: 15 GEL ORAL at 12:15

## 2021-12-21 ASSESSMENT — ACTIVITIES OF DAILY LIVING (ADL)
ADLS_ACUITY_SCORE: 14

## 2021-12-21 NOTE — ED TRIAGE NOTES
Presents to ED [facility] with concerns of worsening shortness of breath. Has lung disease. Not on home O2. Recently treated for UTI and feels symptoms returning as well.. Granddaughter at home has pneumonia.

## 2021-12-21 NOTE — CONSULTS
"PULMONOLOGY CONSULTATION  Date of service: 12/21/2021    [facility]    _____________________________________________________    Jeanine Schuler  61 year old female  [id number]  [id number]  [facility] [address]    Primary Care Provider:  Katia Boss  Admission Date: 12/21/2021  Hospital Attending Physician:  Robbi Suarez MD  ________________________________________    CHIEF COMPLAINT : I was asked to see this patient by Dr. Suarez for evaluation of ILD & sarcoidosis.    HISTORY OF PRESENT ILLNESS     Per IM H&P: \"61 year old woman with past medical history that is most notable for mixed connective tissue disorder and sarcoidosis, among others; who presents with dyspnea and palpitations and is found to have suspected ACS.    History of ILD, sarcoidosis, and mixed connective tissue disease/limited cutaneous systemic sclerosis: She is followed at [facility] by Rheumatology and other services. She has had multiple digital ulcers from Raynaud's Disease in the past, as well as sicca syndrome and GERD. She has had chronic lower extremity edema managed with diuretics. TTE 8/2020 showed preserved LVEF; PASP could not be assessed. In 2020 ILD cryobiopsy revealed ILD with non-necrotizing granulomas. Pulmonary sarcoid is suspected reportedly. She has severe chronic dyspnea and reportedly failed a 6 minute walk test 9/2021, though she reportedly does not use oxygen at home. Currently she takes high doses of Prednisone as well as Imuran, Plaquenil, and Methotrexate.\"    HOME MEDICATIONS     Medications Prior to Admission   Medication Sig Dispense Refill Last Dose     albuterol (PROAIR HFA/PROVENTIL HFA/VENTOLIN HFA) 108 (90 Base) MCG/ACT inhaler Inhale 2 puffs into the lungs every 6 hours as needed for shortness of breath / dyspnea or wheezing    at PRN     azaTHIOprine (IMURAN) 50 MG tablet Take 3 tablets (150 mg) by mouth daily And have labs drawn two weeks after starting medication. 90 tablet 3 " 12/20/2021 at afternoon     benzonatate (TESSALON) 100 MG capsule Take 100 mg by mouth every morning and PRN   12/20/2021 at Unknown time     benzonatate (TESSALON) 100 MG capsule Take 1 capsule (100 mg) by mouth 3 times daily as needed for cough 60 capsule 0  at PRN     folic acid (FOLVITE) 1 MG tablet Take 1 tablet (1 mg) by mouth daily 90 tablet 3 12/20/2021 at AM     furosemide (LASIX) 40 MG tablet Take 80 mg by mouth 2 times daily    12/20/2021 at Unknown time     losartan (COZAAR) 50 MG tablet Take 0.5 tablets (25 mg) by mouth daily 45 tablet 3 12/20/2021 at AM     naproxen (NAPROSYN) 500 MG tablet Take 1 tablet (500 mg) by mouth 2 times daily as needed for moderate pain 10 tablet 0  at PRN     nitroGLYcerin (NITROSTAT) 0.3 MG sublingual tablet Place 1 tablet (0.3 mg) under the tongue every 5 minutes as needed for chest pain (esophageal spasm) For chest pain place 1 tablet under the tongue every 5 minutes for 3 doses. If symptoms persist 5 minutes after 3rd dose call 911. 12 tablet 1  at PRN     omeprazole (PRILOSEC) 20 MG DR capsule Take 1 capsule (20 mg) by mouth 2 times daily 180 capsule 3 12/20/2021 at AM     pantoprazole (PROTONIX) 40 MG EC tablet Take 1 tablet (40 mg) by mouth daily 90 tablet 3 12/20/2021 at AM     potassium chloride ER (KLOR-CON M) 10 MEQ CR tablet Take 30 mEq by mouth daily   12/20/2021 at AM     predniSONE (DELTASONE) 10 MG tablet Take 3 tablets (30 mg) by mouth daily 90 tablet 3 12/20/2021 at Unknown time     spironolactone (ALDACTONE) 25 MG tablet Take 25 mg by mouth daily    12/20/2021 at AM     sulfamethoxazole-trimethoprim (BACTRIM) 400-80 MG tablet Take 1 tablet by mouth daily (Patient taking differently: Take 1 tablet by mouth daily ) 30 tablet 3 12/20/2021 at Unknown time     temazepam (RESTORIL) 15 MG capsule Take 1 capsule (15 mg) by mouth nightly as needed for sleep 30 capsule 1 More than a month at PRN     vitamin D2 (ERGOCALCIFEROL) 30718 units (1250 mcg) capsule Take  50,000 Units by mouth once a week   12/20/2021 at AM       PAST MEDICAL HISTORY      Past Medical History:   Diagnosis Date     Anemia      Bariatric surgery status 06/27/2005    abdi en Y- [facility];     Cellulitis of leg 06/06/2013     Esophageal reflux     Better post-hiatal hernia repair and weight loss     Hyperplastic colonic polyp      Hypovitaminosis D 2011     ILD (interstitial lung disease) (H)      Kidney stone 04/29/2007     Kidney stone 05/10/2007    surgically removed     Limb ischemia; ulcers of fingertips 04/22/2013     Other chronic pain     Left shoulder - rheumatoid arthritis     Raynaud's syndrome      Rheumatoid arthritis (H) \     Scleroderma (H)      Sjogren-Jake syndrome      Systemic sclerosis (H) 2009     Unspecified essential hypertension      Past Surgical History:   Procedure Laterality Date     BIOPSY MUSCLE DIAGNOSTIC (LOCATION) Right 7/19/2021    Procedure: Right SURAL nerve BIOPSY;  Surgeon: Farooq Abel MD;  Location: [facility] [place]     BRONCHOSCOPY FLEXIBLE,L CRYOBIOPSY N/A 10/06/2020    Procedure: BRONCHOSCOPY, FLEXIBLE, WITH TRANSBRONCHIAL BIOPSY x4 USING CRYOPROBE, bronchial alveolar lavage;  Surgeon: Teddy Mendez MD;  Location:  OR     BYPASS GASTRIC, CHOLECYSTECTOMY, COMBINED  06/27/2005    [facility]     CHOLECYSTECTOMY       COLONOSCOPY       COLONOSCOPY N/A 11/17/2020    Procedure: COLONOSCOPY, WITH POLYPECTOMY AND BIOPSY;  Surgeon: Jamie Cárdenas MD;  Location: [facility] OR     ESOPHAGOSCOPY, GASTROSCOPY, DUODENOSCOPY (EGD), COMBINED N/A 03/10/2016    Procedure: COMBINED ESOPHAGOSCOPY, GASTROSCOPY, DUODENOSCOPY (EGD), BIOPSY SINGLE OR MULTIPLE;  Surgeon: Tricia Zambrano MD;  Location:  GI     ESOPHAGOSCOPY, GASTROSCOPY, DUODENOSCOPY (EGD), COMBINED N/A 11/17/2020    Procedure: ESOPHAGOGASTRODUODENOSCOPY, WITH BIOPSY and Dilation;  Surgeon: Jamie Cárdenas MD;  Location: [facility] [place]     INNER EAR SURGERY       PICC INSERTION  06/05/2013    5fr DL  Power PICC, 44cm, left basilic vein, tip in low SVC     SURGICAL HISTORY OF -       Left ear surgery - incus transition, tympanoplasty     SURGICAL HISTORY OF -   06/01/2005    Hiatal hernia repair     TONSILLECTOMY         ALLERGIES     Allergies   Allergen Reactions     Lovenox Other (See Comments)     Blood clot      Norvasc [Amlodipine Besylate] Swelling     Lip swelling that happened on 2 different occasions     Erythromycin Rash     Rash on arms       SOCIAL / SUBSTANCE HISTORY     Social History     Socioeconomic History     Marital status:      Spouse name: Not on file     Number of children: Not on file     Years of education: Not on file     Highest education level: Not on file   Occupational History     Not on file   Tobacco Use     Smoking status: Never Smoker     Smokeless tobacco: Never Used   Substance and Sexual Activity     Alcohol use: Yes     Comment: occassionally     Drug use: No     Sexual activity: Yes     Partners: Male     Birth control/protection: None   Other Topics Concern     Parent/sibling w/ CABG, MI or angioplasty before 65F 55M? 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  She has been on medical leave since 7/27/2020        Moved about 4 years ago to a new house. No known water damage or mold.        She lives in a normal area with forms around her house, but denies any exposure to grain dust, hay, other farm animals.        No unusual hobbies         Social Determinants of Health     Financial Resource Strain: Not on file   Food Insecurity: Not on file   Transportation Needs: Not on file   Physical Activity: Not on file   Stress: Not on file   Social Connections: Not on file   Intimate Partner Violence: Not on file   Housing Stability: Not on file       FAMILY HISTORY     Family History   Problem Relation Age of Onset     Cerebrovascular Disease Father      Heart Disease Father      Hypertension Father      Heart  "Disease Mother      Hypertension Mother      Chronic Obstructive Pulmonary Disease Sister      Heart Disease Sister      Hypertension Sister      Cerebrovascular Disease Brother      Heart Disease Brother      Kidney Disease Brother         on dialysis     Diabetes Brother         borderline     No Known Problems Son      No Known Problems Son      No Known Problems Daughter      Cancer Brother         small cell     Heart Disease Brother      Heart Disease Brother      Heart Disease Brother      Heart Disease Brother         tripple A     Heart Disease Sister      Substance Abuse Sister         alcoholism     Crohn's Disease No family hx of      Ulcerative Colitis No family hx of      GERD No family hx of      Celiac Disease No family hx of      Nephrolithiasis No family hx of      Parathyroid Disorders No family hx of      Calcium Disorder No family hx of        REVIEW OF SYSTEMS   A comprehensive review of systems was negative except for items noted in HPI/Subjective.    PHYSICAL EXAMINATION   Temp (24hrs), Av.1  F (36.7  C), Min:97.8  F (36.6  C), Max:98.4  F (36.9  C)    Vital signs:  Temp: 98  F (36.7  C) Temp src: Oral BP: 128/82 Pulse: 93   Resp: 18 SpO2: 94 % O2 Device: None (Room air)     Weight: 84.9 kg (187 lb 3.2 oz)  Estimated body mass index is 29.32 kg/m  as calculated from the following:    Height as of 21: 1.702 m (5' 7\").    Weight as of this encounter: 84.9 kg (187 lb 3.2 oz).        No intake/output data recorded.    CONSTITUTIONAL/GENERAL APPEARANCE: Alert female. No Apparent Distress.  PSYCHIATRIC: Pleasant and appropriate mood and affect. Oriented x 3.  EARS, NOSE,THROAT,MOUTH: External ears and nose overall normal.  NECK: Neck appearance normal. No neck masses and the thyroid not enlarged.   RESPIRATORY: Non-labored effort. Decreased BS bases, few crackles, no wheezes.  CARDIOVASCULAR: S1, S2, regular rate and rhythm.  ABDOMEN: round, soft, non-tender, non-distended, no palpable " masses. No presence of hernia.  LYMPHATIC: no cervical or axillary lymphadenopathy.  SKIN: Skin color was normal. No clubbing or cyanosis. No palpable skin abnormalities.    LABORATORY ASSESSMENT    Arterial Blood GasNo lab results found in last 7 days.  CBC  Recent Labs   Lab 12/21/21  0719 12/20/21  2335   WBC 4.8 3.1*   RBC 4.08 4.34   HGB 12.5 13.4   HCT 38.5 40.6   MCV 94 94   MCH 30.6 30.9   MCHC 32.5 33.0   RDW 14.1 13.8    213     BMP  Recent Labs   Lab 12/21/21  0719 12/20/21  2335    140   POTASSIUM 4.2 4.1   CHLORIDE 109 111*   DAVID 8.5 9.1   CO2 22 20   BUN 12 10   CR 0.98 0.67   GLC 94 149*     INRNo lab results found in last 7 days.   BNPNo lab results found in last 7 days.  VENOUS BLOOD GASESNo lab results found in last 7 days.      Additional labs and/or comments:    IMAGING      CT Chest 12/21/21 -  IMPRESSION:  1.  There is no pulmonary embolus.  2.  Borderline aneurysmal ascending thoracic aorta at 4.0 cm.  3.  Probable pulmonary edema and bilateral pleural effusions.  4.  Cardiomegaly.    CT Chest 9/17/21 -  IMPRESSION: Multifocal centrilobular micronodularity with basilar  predominant subpleural reticulation. Overall findings have not  significantly progressed from comparison CT chest on 7/1/2021.  Differential remains broad including sarcoidosis as well as  scleroderma given the fluid-filled distended esophagus. Mild air  trapping during expiration.    PFT & OTHER TESTING       ASSESSMENT / PLAN      Pulmonary diagnoses:  Abnl CT/CXR R91.8  Sarcoidosis D86.9  SOB R06.02  Mixed connective tissue disease    Additional COVID-19 diagnoses:  Concern of possible exposure to COVID-19, Now RULED OUT Z03.818      ASSESSMENT: 61-year-old female with a history of mixed connective tissue disorder and sarcoidosis is admitted with suspected ACS.  Patient is followed at [facility] by rheumatology and pulmonary.  Left upper lobe transbronchial cryobiopsy done 10/6/2020 showed nonnecrotizing granulomas  with multi nucleated giant cells consistent with sarcoidosis.  Patient was started on methotrexate and prednisone which did not improve her symptoms and her PFTs declined.  She was changed to CellCept with no improvement.  Patient most recently has been treated with Imuran and prednisone with improvement in pulmonary function.  She was presented at ILD conference 9/20/2021 where the recommendation was to increase Imuran to 150 mg/day and slowly taper down prednisone from 40 mg/day by 5 mg/month.  She was scheduled to follow-up with rheumatology last month at which time she would be on 30 mg of prednisone.  CT scan of the chest 12/21/2021 was negative for PE and showed bilateral interstitial and groundglass infiltrates consistent with pulmonary edema with small bilateral pleural effusions.  Her sarcoidosis is currently being well managed by her physicians at [facility].  Would continue present immunosuppressive therapy for now.  Respiratory status is currently stable on room air.    PLAN:  1. Steroids - continue Prednisone 30 mg/day.  2. Continue Imuran 150 mg/day.  3. Antibiotics - Rocephin (for UTI), Bactrim prophylaxis.  4. Diuresis - Lasix as ordered.    Case discussed with Cardiology.  Updated  at bedside.    No further suggestions at this time, please call if needed. Thanks,      Anish Arias M.D.    [facility]  [phone]   (pager)  [phone]   (office)

## 2021-12-21 NOTE — PROVIDER NOTIFICATION
Paged Dr. Dia regarding pt continuing to have low BG 60, 62, 64 after getting 30 g glucose gel and 1 4 oz apple juice. Pt NPO for angio this afternoon. Pt states she hasn't had a decent meal since Sunday. Wondering if pt would need any IV fluids started. Waiting to hear back from Dr. Dia     Addendum: Spoke with Dr. Dia and she placed order for D5 NS @ 30 ml/hr.   Cardiology did not want pt to get typical NS bolus prior to cath due to new lower EF and because she is being diuresed. Will continue to monitor closely.

## 2021-12-21 NOTE — PROGRESS NOTES
[facility] [place]    Hospitalist Progress Note      Assessment & Plan   Jeanine Schuler is a 61 year old female who was admitted on 12/21/2021 with dyspnea and palpitations and is found to have suspected ACS. Initially presented to [facility], transferred to [facility] for cardiology evaluation.    Suspected ACS  CHF exacerbation with newly reduced EF 30-35%  Severe pulmonary hypertension  She does not seem to have known prior CAD. She presents with dyspnea and palpitations and is found to have myonecrosis and ST segment depressions on EKG. CTPA suggests acute pulmonary edema as well, though she is not hypoxic at the sending ED. Note is made of her complex pulmonary and rheumatologic history as below. She could have demand ischemia due to possible acute diastolic CHF exacerbation and//or pulmonary hypertension. She could have symptomatic arrhythmia, noting prior history of PVC's. Her COVID PCR was negative tonight. A Heparin infusion was started in the ED prior to transfer.  12/21 Echo: LVEF 35% with distal hypokinesis and moderate MR, mod-severe TR and severe pulmonary hypertension. August 2020 EF was 55-60%  - PTA spironolactone/lasix/potassium supplement held  - cardiology consulted  - lasix IV 20mg BID  - metoprolol tartrate 12.5mg BID  - continue losartan 25mg daily  - daily weights, I/Os  - coronary angiogram 12/21  - heparin gtt while awaiting angio  - ASA 81 mg daily  - eventual cardiac MRI if angio is unremarkable given hx sarcoidosis    Hypoglycemia  Occurred while NPO while awaiting angio  - D5 NS @30ml/hr while NPO, resume diet  - continue q4h glucose checks overnight, then transition to ACHS on 12/22     History of ILD, sarcoidosis, and mixed connective tissue disease/limited cutaneous systemic sclerosis  She is followed at [facility] by Rheumatology and other services. She has had multiple digital ulcers from Raynaud's Disease in the past, as well as sicca syndrome and GERD. She has had  chronic lower extremity edema managed with diuretics. TTE 8/2020 showed preserved LVEF; PASP could not be assessed. In 2020 ILD cryobiopsy revealed ILD with non-necrotizing granulomas. Pulmonary sarcoid is suspected reportedly. She has severe chronic dyspnea and reportedly failed a 6 minute walk test 9/2021, though she reportedly does not use oxygen at home. Currently she takes high doses of Prednisone as well as Imuran, Plaquenil, and Methotrexate  - Pulmonology appreciated  - PTA prednisone 30mg daily, imuran 150mg daily continued  - bactrim prophylaxis continued  - protonix continued    Aortic dilation  - continue surveillance, follow up CTA or MRA in 6mo     Possible UTI vs asymptomatic bacteruria  Given Ceftriaxone in the ED. UA with 10 WBCs, many bacteria, negative nitrites, trace leuk esterase  - Ceftriaxone continued, possibly 3-5 days total.   - UA collected at [facility], no UCx ordered--added 12/21 she has had one dose of ceftriaxone so far     Chronic iron deficiency anemia  By history. Noted     COVID-19 screening, negative  Vaccinated with pfizer, last dose was booster on 9/16/21. Tested for antibodies in November 2021 and was positive (indicating vaccination)    Clinically Significant Risk Factors Present on Admission                   DVT Prophylaxis: Pneumatic Compression Devices  Code Status: Full Code  Expected Discharge: 12/24/2021  Anticipated discharge location:  Awaiting care coordination huddle  Delays:     Specialist Consult        Flora Dia DO  Hospitalist Service  [facility]  Securely message with the Vocera Web Console (learn more here)  Text Page (7am - 6pm) via [facility] Paging/Directory      Interval History   Patient seen and examined. She is doing okay.  is at bedside. She had lower blood sugars this afternoon after poor intake and NPO status. No chest pain or shortness of breath at rest. Does have some dyspnea with up to the bathroom however, which  is slightly better from admission. She does report chronic dyspnea at baseline. No nausea, vomiting.  Spent 35 minutes with >50% time spent reviewing chart, evaluating patient, reviewing specialist recommendations, discussing care plan with RN and addressing hypoglycemia issue.    -Data reviewed today: I reviewed all new labs and imaging results over the last 24 hours. I personally reviewed no images or EKG's today.    Physical Exam   Temp: 97.7  F (36.5  C) Temp src: Oral BP: 100/56 Pulse: 86   Resp: 16 SpO2: 96 % O2 Device: Nasal cannula Oxygen Delivery: 2 LPM  Vitals:    12/21/21 0700   Weight: 84.9 kg (187 lb 3.2 oz)     Vital Signs with Ranges  Temp:  [97.7  F (36.5  C)-98.4  F (36.9  C)] 97.7  F (36.5  C)  Pulse:  [] 86  Resp:  [13-34] 16  BP: (100-159)/() 100/56  SpO2:  [90 %-100 %] 96 %  I/O last 3 completed shifts:  In: -   Out: 1625 [Urine:1625]    Constitutional: Awake, alert, cooperative, no apparent distress  Respiratory: decreased breath sounds overall, occasional crackles no wheezes  Cardiovascular: Regular rate and rhythm, normal S1 and S2, and no murmur noted  GI: Normal bowel sounds, soft, non-distended, non-tender  Skin/Integumen: No rashes, no cyanosis, +1 edema noted  Other:     Medications     Continuing ACE inhibitor/ARB/ARNI from home medication list OR ACE inhibitor/ARB order already placed during this visit       - MEDICATION INSTRUCTIONS -       - MEDICATION INSTRUCTIONS -       dextrose 5% and 0.9% NaCl 30 mL/hr at 12/21/21 1419     heparin Stopped (12/21/21 1610)     - MEDICATION INSTRUCTIONS -       - MEDICATION INSTRUCTIONS -         aspirin  325 mg Oral Once    Or     aspirin  243 mg Oral Once     [START ON 12/22/2021] aspirin  81 mg Oral Daily     azaTHIOprine  150 mg Oral Daily     benzonatate  100 mg Oral QAM     [START ON 12/22/2021] cefTRIAXone  1 g Intravenous Q24H     folic acid  1 mg Oral Daily     furosemide  20 mg Intravenous Q12H     insulin aspart  1-6 Units  Subcutaneous Q4H     losartan  25 mg Oral Daily     metoprolol tartrate  12.5 mg Oral BID     pantoprazole  40 mg Oral Daily     predniSONE  30 mg Oral Daily     sodium chloride (PF)  10 mL Intracatheter Once     sodium chloride (PF)  3 mL Intracatheter Q8H     sulfamethoxazole-trimethoprim  1 tablet Oral Daily       Data   Recent Labs   Lab 12/21/21  1434 12/21/21  1350 12/21/21  1259 12/21/21  1235 12/21/21  0805 12/21/21  0719 12/20/21  2335   WBC  --   --   --   --   --  4.8 3.1*   HGB  --   --   --   --   --  12.5 13.4   MCV  --   --   --   --   --  94 94   PLT  --   --   --   --   --  209 213   NA  --   --   --   --   --  139 140   POTASSIUM 3.6  --   --   --   --  4.2 4.1   CHLORIDE  --   --   --   --   --  109 111*   CO2  --   --   --   --   --  22 20   BUN  --   --   --   --   --  12 10   CR  --   --   --   --   --  0.98 0.67   ANIONGAP  --   --   --   --   --  8 9   DAVID  --   --   --   --   --  8.5 9.1   GLC  --  91 64* 62*   < > 94 149*   ALBUMIN  --   --   --   --   --   --  2.9*   PROTTOTAL  --   --   --   --   --   --  6.3*   BILITOTAL  --   --   --   --   --   --  0.7   ALKPHOS  --   --   --   --   --   --  81   ALT  --   --   --   --   --   --  15   AST  --   --   --   --   --   --  17    < > = values in this interval not displayed.       Recent Results (from the past 24 hour(s))   CT Chest Pulmonary Embolism w Contrast    Narrative    EXAM: CT CHEST PULMONARY EMBOLISM W CONTRAST  LOCATION: [facility]  DATE/TIME: 12/21/2021 12:23 AM    INDICATION: Shortness of breath.  COMPARISON: 9/16/2021.  TECHNIQUE: CT chest pulmonary angiogram during arterial phase injection of IV contrast. Multiplanar reformats and MIP reconstructions were performed. Dose reduction techniques were used.   CONTRAST: 65 mL-Isovue 370    FINDINGS:  ANGIOGRAM CHEST: The central pulmonary arteries are large in caliber suggesting pulmonary artery hypertension. No acute pulmonary embolus. The thoracic  aorta is poorly opacified limiting evaluation for aortic dissection. The ascending aorta is borderline   aneurysmal at 4.0 cm. The heart is enlarged.    LUNGS AND PLEURA: There are bilateral interstitial and ground-glass infiltrates suggesting pulmonary edema. Small bilateral pleural effusions. A few bands of scar or atelectasis at the periphery of the lungs. No pneumothorax.    MEDIASTINUM/AXILLAE: No lymph node enlargement. The stomach is distended with fluid and gas without evidence of obstruction.    CORONARY ARTERY CALCIFICATION: Mild.    UPPER ABDOMEN: The gallbladder is absent. Postoperative changes about the stomach.    MUSCULOSKELETAL: Normal.      Impression    IMPRESSION:  1.  There is no pulmonary embolus.  2.  Borderline aneurysmal ascending thoracic aorta at 4.0 cm.  3.  Probable pulmonary edema and bilateral pleural effusions.  4.  Cardiomegaly.   Echocardiogram Complete   Result Value    LVEF  35%    Narrative    [id number]  [id number]  [id number]  [id number]^SETH^CHARISSE^INGRIS     [facility]  Echocardiography Laboratory  [address] [place] [address]     Name: OSCAR REYES  MRN: [id number]  : 1960  Study Date: 2021 08:35 AM  Age: 61 yrs  Gender: Female  Patient Location: [facility]  Reason For Study: Chest Pain, Chest Tightness, Chest Pressure  Ordering Physician: CHARISSE ANN  Referring Physician: CHARISSE ANN  Performed By: Jose Danielle     BSA: 2.0 m2  Height: 67 in  Weight: 187 lb  HR: 90  BP: 145/100 mmHg  ______________________________________________________________________________  Procedure  Complete Echo Adult. Optison (NDC #[id number]) given intravenously.  ______________________________________________________________________________  Interpretation Summary     1. The left ventricle is normal in size. The visual ejection fraction is  estimated at 35%. Global hypokinesis, distal LV segments (anteroseptal) appear  more hypokinetic  than other areas.  2. The right ventricle is mildly dilated. The right ventricular systolic  function is normal.  3. There is mild to moderate (1-2+) mitral regurgitation.  4. There is moderately severe (3+) tricuspid regurgitation.  5. Moderate (46-55mmHg) pulmonary hypertension is present. The right  ventricular systolic pressure is approximated at 54mmHg plus the right atrial  pressure.  6. The ascending aorta is Mildly dilated. 4.2cm.     Echo from 8/2020 showed EF 55%, mild TR, aorta 3.8cm.  ______________________________________________________________________________  Left Ventricle  The left ventricle is normal in size. There is normal left ventricular wall  thickness. The visual ejection fraction is estimated at 35%. Left ventricular  diastolic function is indeterminate. Global hypokinesis, distal LV segments  (anteroseptal) appear more hypokinetic than other areas.     Right Ventricle  The right ventricle is mildly dilated. The right ventricular systolic function  is normal.     Atria  The left atrium is severely dilated. The right atrium is moderately dilated.  There is no atrial shunt seen.     Mitral Valve  There is mild mitral annular calcification. There is mild to moderate (1-2+)  mitral regurgitation.     Tricuspid Valve  There is moderately severe (3+) tricuspid regurgitation. Moderate (46-55mmHg)  pulmonary hypertension is present. The right ventricular systolic pressure is  approximated at 54mmHg plus the right atrial pressure.     Aortic Valve  There is trace to mild aortic regurgitation.     Pulmonic Valve  There is mild to moderate (1-2+) pulmonic valvular regurgitation.     Vessels  The ascending aorta is Mildly dilated. Normal IVC size, cannot assess collapse  due to limited image quality.     Pericardium  There is no pericardial effusion.     Rhythm  Sinus rhythm was noted.  ______________________________________________________________________________  MMode/2D Measurements &  Calculations  IVSd: 1.1 cm     LVIDd: 5.4 cm  LVIDs: 4.0 cm  LVPWd: 0.88 cm  FS: 25.8 %  LV mass(C)d: 207.4 grams  LV mass(C)dI: 105.5 grams/m2  Ao root diam: 3.7 cm  asc Aorta Diam: 4.2 cm  LVOT diam: 2.1 cm  LVOT area: 3.4 cm2  LA Volume (BP): 130.0 ml  LA Volume Index (BP): 66.0 ml/m2  RWT: 0.32     Doppler Measurements & Calculations  MV E max ck: 87.8 cm/sec  MV A max ck: 88.8 cm/sec  MV E/A: 0.99  Ao V2 max: 156.2 cm/sec  Ao max PG: 10.0 mmHg  ALLISON(V,D): 2.0 cm2  LV V1 max PG: 3.3 mmHg  LV V1 max: 91.2 cm/sec  LV V1 VTI: 17.6 cm  SV(LVOT): 59.4 ml  SI(LVOT): 30.2 ml/m2  PA V2 max: 96.9 cm/sec  PA max PG: 3.8 mmHg  PA acc time: 0.06 sec  PI end-d ck: 168.7 cm/sec  TR max ck: 369.2 cm/sec  TR max P.5 mmHg  AV Ck Ratio (DI): 0.58  Lateral E/e': 11.1     ______________________________________________________________________________  Report approved by: Mirza Gleason 2021 09:53 AM

## 2021-12-21 NOTE — ED NOTES
Delay in medications (Ceftriaxone and Heparin) due to CT PE study. Pt currently at CT with [employer].

## 2021-12-21 NOTE — PLAN OF CARE
VSS. Tele: SR. Denies any pain. Heparin drip continues. BG low this afternoon, D5 NS started while NPO for angio. Echo completed. Potassium recheck at 1500. Voiding well with IV lasix. Will continue to monitor.

## 2021-12-21 NOTE — H&P
[facility]    History and Physical  Hospitalist       Date of Admission:  (Not on file)  Date of Service (when I saw the patient): 12/21/21    ASSESSMENT  Jeanine Schuler is a markedly pleasant 61 year old woman with past medical history that is most notable for mixed connective tissue disorder and sarcoidosis, among others; who presents with dyspnea and palpitations and is found to have suspected ACS.    PLAN    Suspected ACS: She does not seem to have known prior CAD. She presents with dyspnea and palpitations and is found to have myonecrosis and ST segment depressions on EKG. CTPA suggests acute pulmonary edema as well, though she is not hypoxic at the sending ED. Note is made of her complex pulmonary and rheumatologic history as below. She could have demand ischemia due to possible acute diastolic CHF exacerbation and//or pulmonary hypertension. She could have symptomatic arrhythmia, noting prior history of PVC's. Her COVID PCR was negative tonight. A Heparin infusion was started in the ED prior to transfer.    -- Inpatient. Telemetry, serial enzymes, TTE. Cardiology consulted. ASA and heparin infusion continued.    -- She seems to be prescribed 40 mg Lasix daily with 25 mg Spironolactone for lower extremity edema; I will order one time dose of 40 mg IV now; she may need more this morning pending further cardiology recommendations    History of ILD, sarcoidosis, and mixed connective tissue disease/limited cutaneous systemic sclerosis: She is followed at [facility] by Rheumatology and other services. She has had multiple digital ulcers from Raynaud's Disease in the past, as well as sicca syndrome and GERD. She has had chronic lower extremity edema managed with diuretics. TTE 8/2020 showed preserved LVEF; PASP could not be assessed. In 2020 ILD cryobiopsy revealed ILD with non-necrotizing granulomas. Pulmonary sarcoid is suspected reportedly. She has severe chronic dyspnea and reportedly  failed a 6 minute walk test 9/2021, though she reportedly does not use oxygen at home. Currently she takes high doses of Prednisone as well as Imuran, Plaquenil, and Methotrexate    -- Pulmonology consulted    -- Prednisone and home immunosuppressives to be resumed with prophylactic antibiotics and ISS insulin as soon as verified    -- Resume PPI when verified    Possible UTI: vs asymptomatic bacteruria: Given Ceftriaxone in the ED. Will continue for now, possibly 3-5 days total.     Chronic iron deficiency anemia: By history. Noted    Rule Out COVID-19 infection  This patient was evaluated during a global COVID-19 pandemic, which necessitated consideration that the patient might be at risk for infection with the SARS-CoV-2 virus that causes COVID-19. Applicable protocols for evaluation were followed during the patient's care. Low suspicion for infection. Reportedly she has been vaccinated and boosted for COVID though most recent PCR 11/2021 was positive.  -negative COVID-19 PCR test result tonight  -no current indication for precautions    Chief Complaint   Dyspnea    History is obtained from the patient and the ED physician whom I have spoken with    History of Present Illness   Jeanine Schuler is a markedly pleasant 61 year old woman who presents with dyspnea. This started acutely 3 days ago per her report, while she was talking to her ex-; it was associated with racing heart beat. She denies chest pain or pressure to me with this dyspnea. It has been intermittent since onset, exacerbated by exertion or laying flat. It has seemed to come on especially at night time, again with recurrent palpitations in the past couple of night; especially bad last night causing her to seek attention at the [facility] ED. Her symptoms resolved while she was in the ED. She adds that for the past few days she has also developed worsening non-productive cough and worsening of her chronic bilateral lower extremity edema.  "She has felt chills. Several family contacts have gotten ill with pneumonia. She has had 3 COVID vaccine boosters this year and mentions that she has an upcoming appointment at [facility] in 1/2021 to determine if she can start Rituxan for her ILD. She has also noted on review of systems that she has recently had some dysuria. She otherwise denies any other acute complaints.    In the ED, BP: (!) 145/108  Pulse: 110  Temp: 97.8  F (36.6  C)  Resp: 18  Weight: 78.9 kg (174 lb)  SpO2: 95 %    CBC and CMP were notable for WBC 3.1, Cl 111, alb 2.9, tprot 6.3, glucose 149, otherwise were within the normal reference range. Lactate was 1.8. CRP 6.3. TSH 0.49. Troponins were 140 and then 219. EKG showed NSR with ST segment depressions and TWI in the inferior leads. D-Dimer was 1.07. COVID and Influenza tests were negative. UA showed 10 WBC and 1 RBC.     CT PA showed: \"IMPRESSION:  1.  There is no pulmonary embolus.  2.  Borderline aneurysmal ascending thoracic aorta at 4.0 cm.  3.  Probable pulmonary edema and bilateral pleural effusions.  4.  Cardiomegaly.\"    She was given Zofran, Dilaudid and Toradol as well as ASA in the ED. A Heparin infusion was started prior to transfer. She was also given Ceftriaxone.    PHYSICAL EXAM  There were no vitals taken for this visit.  Constitutional: Alert and oriented to person, place and time; no apparent distress  HEENT: no evident facial trama; moist mucus membranes  Respiratory: lungs have crackles to auscultation bilaterally  Cardiovascular: regular S1 S2  GI: abdomen soft non tender non distended bowel sounds positive  Lymph/Hematologic: pale, no cervical lymphadenopathy  Skin: no rash, good turgor  Musculoskeletal: moderate bilateral LE edema; her digits have several small calcification deposits  Neurologic: extra-ocular muscles intact; moves all four extremities  Psychiatric: appropriate affect, insight and judgment     DVT Prophylaxis: Heparin IV  Code Status: Full " Code    Disposition: Expected discharge in 2-3 days    Robbi Suarez MD    Past Medical History    I have reviewed this patient's medical history and updated it with pertinent information if needed.   Past Medical History:   Diagnosis Date     Anemia      Bariatric surgery status 06/27/2005    abdi en [facility];     Cellulitis of leg 06/06/2013     Esophageal reflux     Better post-hiatal hernia repair and weight loss     Hyperplastic colonic polyp      Hypovitaminosis D 2011     ILD (interstitial lung disease) (H)      Kidney stone 04/29/2007     Kidney stone 05/10/2007    surgically removed     Limb ischemia; ulcers of fingertips 04/22/2013     Other chronic pain     Left shoulder - rheumatoid arthritis     Raynaud's syndrome      Rheumatoid arthritis (H) \     Scleroderma (H)      Sjogren-Jake syndrome      Systemic sclerosis (H) 2009     Unspecified essential hypertension        Past Surgical History   I have reviewed this patient's surgical history and updated it with pertinent information if needed.  Past Surgical History:   Procedure Laterality Date     BIOPSY MUSCLE DIAGNOSTIC (LOCATION) Right 7/19/2021    Procedure: Right SURAL nerve BIOPSY;  Surgeon: Farooq Abel MD;  Location: [facility] OR     BRONCHOSCOPY FLEXIBLE,L CRYOBIOPSY N/A 10/06/2020    Procedure: BRONCHOSCOPY, FLEXIBLE, WITH TRANSBRONCHIAL BIOPSY x4 USING CRYOPROBE, bronchial alveolar lavage;  Surgeon: Teddy Mendez MD;  Location:  OR     BYPASS GASTRIC, CHOLECYSTECTOMY, COMBINED  06/27/2005    [facility]     CHOLECYSTECTOMY       COLONOSCOPY       COLONOSCOPY N/A 11/17/2020    Procedure: COLONOSCOPY, WITH POLYPECTOMY AND BIOPSY;  Surgeon: Jamie Cárdenas MD;  Location: [facility] OR     ESOPHAGOSCOPY, GASTROSCOPY, DUODENOSCOPY (EGD), COMBINED N/A 03/10/2016    Procedure: COMBINED ESOPHAGOSCOPY, GASTROSCOPY, DUODENOSCOPY (EGD), BIOPSY SINGLE OR MULTIPLE;  Surgeon: Tricia Zambrano MD;  Location:  GI      ESOPHAGOSCOPY, GASTROSCOPY, DUODENOSCOPY (EGD), COMBINED N/A 11/17/2020    Procedure: ESOPHAGOGASTRODUODENOSCOPY, WITH BIOPSY and Dilation;  Surgeon: Jamie Cárdenas MD;  Location: [facility] OR     INNER EAR SURGERY       PICC INSERTION  06/05/2013    5fr DL Power PICC, 44cm, left basilic vein, tip in low SVC     SURGICAL HISTORY OF -       Left ear surgery - incus transition, tympanoplasty     SURGICAL HISTORY OF -   06/01/2005    Hiatal hernia repair     TONSILLECTOMY         Prior to Admission Medications   Cannot display prior to admission medications because the patient has not been admitted in this contact.     Allergies   Allergies   Allergen Reactions     Lovenox Other (See Comments)     Blood clot      Norvasc [Amlodipine Besylate] Swelling     Lip swelling that happened on 2 different occasions     Erythromycin Rash     Rash on arms       Social History   I have reviewed this patient's social history and updated it with pertinent information if needed. Jeanine Schuler  reports that she has never smoked. She has never used smokeless tobacco. She reports current alcohol use. She reports that she does not use drugs.    Family History   Family history assessed and, except as above, is non-contributory.    Family History   Problem Relation Age of Onset     Cerebrovascular Disease Father      Heart Disease Father      Hypertension Father      Heart Disease Mother      Hypertension Mother      Chronic Obstructive Pulmonary Disease Sister      Heart Disease Sister      Hypertension Sister      Cerebrovascular Disease Brother      Heart Disease Brother      Kidney Disease Brother         on dialysis     Diabetes Brother         borderline     No Known Problems Son      No Known Problems Son      No Known Problems Daughter      Cancer Brother         small cell     Heart Disease Brother      Heart Disease Brother      Heart Disease Brother      Heart Disease Brother         tripple A     Heart Disease Sister       Substance Abuse Sister         alcoholism     Crohn's Disease No family hx of      Ulcerative Colitis No family hx of      GERD No family hx of      Celiac Disease No family hx of      Nephrolithiasis No family hx of      Parathyroid Disorders No family hx of      Calcium Disorder No family hx of        Review of Systems   The 10 point Review of Systems is negative other than noted in the HPI or here.     Primary Care Physician   Katia Carver Mai    Data   Labs Ordered and Resulted from Time of ED Arrival to Time of ED Departure - No data to display    Data reviewed today:  I personally reviewed the EKG tracing showing ST depressions in inferior leads.    Recent Results (from the past 24 hour(s))   CT Chest Pulmonary Embolism w Contrast    Narrative    EXAM: CT CHEST PULMONARY EMBOLISM W CONTRAST  LOCATION: [facility]  DATE/TIME: 12/21/2021 12:23 AM    INDICATION: Shortness of breath.  COMPARISON: 9/16/2021.  TECHNIQUE: CT chest pulmonary angiogram during arterial phase injection of IV contrast. Multiplanar reformats and MIP reconstructions were performed. Dose reduction techniques were used.   CONTRAST: 65 mL-Isovue 370    FINDINGS:  ANGIOGRAM CHEST: The central pulmonary arteries are large in caliber suggesting pulmonary artery hypertension. No acute pulmonary embolus. The thoracic aorta is poorly opacified limiting evaluation for aortic dissection. The ascending aorta is borderline   aneurysmal at 4.0 cm. The heart is enlarged.    LUNGS AND PLEURA: There are bilateral interstitial and ground-glass infiltrates suggesting pulmonary edema. Small bilateral pleural effusions. A few bands of scar or atelectasis at the periphery of the lungs. No pneumothorax.    MEDIASTINUM/AXILLAE: No lymph node enlargement. The stomach is distended with fluid and gas without evidence of obstruction.    CORONARY ARTERY CALCIFICATION: Mild.    UPPER ABDOMEN: The gallbladder is absent. Postoperative changes  about the stomach.    MUSCULOSKELETAL: Normal.      Impression    IMPRESSION:  1.  There is no pulmonary embolus.  2.  Borderline aneurysmal ascending thoracic aorta at 4.0 cm.  3.  Probable pulmonary edema and bilateral pleural effusions.  4.  Cardiomegaly.

## 2021-12-21 NOTE — PHARMACY-ADMISSION MEDICATION HISTORY
Pharmacy Medication History  Admission medication history interview status for the 12/21/2021 admission is complete. See EPIC admission navigator for prior to admission medications     Location of Interview: Patient room  Medication history sources: Patient    Significant changes made to the medication list:  Removed: Hydroxychloroquine, Mycophenolate, Methotrexate, Nitrofurantoin  Changed Furosemide and Potassium  Added Albuterol (Old inhaler but used this week)    In the past week, patient estimated taking medication this percent of the time: greater than 90%    Additional medication history information:       Medication reconciliation completed by provider prior to medication history? No    Time spent in this activity: 15 minutes     Prior to Admission medications    Medication Sig Last Dose Taking? Auth Provider   albuterol (PROAIR HFA/PROVENTIL HFA/VENTOLIN HFA) 108 (90 Base) MCG/ACT inhaler Inhale 2 puffs into the lungs every 6 hours as needed for shortness of breath / dyspnea or wheezing  at PRN Yes Unknown, Entered By History   azaTHIOprine (IMURAN) 50 MG tablet Take 3 tablets (150 mg) by mouth daily And have labs drawn two weeks after starting medication. 12/20/2021 at afternoon Yes Beena Cuellar MD   benzonatate (TESSALON) 100 MG capsule Take 100 mg by mouth every morning and PRN 12/20/2021 at Unknown time Yes Unknown, Entered By History   benzonatate (TESSALON) 100 MG capsule Take 1 capsule (100 mg) by mouth 3 times daily as needed for cough  at PRN Yes Beena Cuellar MD   folic acid (FOLVITE) 1 MG tablet Take 1 tablet (1 mg) by mouth daily 12/20/2021 at AM Yes Nicolás De La Torre MD   furosemide (LASIX) 40 MG tablet Take 80 mg by mouth 2 times daily  12/20/2021 at Unknown time Yes Reported, Patient   losartan (COZAAR) 50 MG tablet Take 0.5 tablets (25 mg) by mouth daily 12/20/2021 at AM Yes Nicolás De La Torre MD   naproxen (NAPROSYN) 500 MG tablet Take 1 tablet (500 mg) by mouth 2 times daily as needed for  moderate pain  at PRN Yes Domenico Lagunas MD   nitroGLYcerin (NITROSTAT) 0.3 MG sublingual tablet Place 1 tablet (0.3 mg) under the tongue every 5 minutes as needed for chest pain (esophageal spasm) For chest pain place 1 tablet under the tongue every 5 minutes for 3 doses. If symptoms persist 5 minutes after 3rd dose call 911.  at PRN Yes Nicolás De La Torre MD   omeprazole (PRILOSEC) 20 MG DR capsule Take 1 capsule (20 mg) by mouth 2 times daily 12/20/2021 at AM Yes Jamie Cárdenas MD   pantoprazole (PROTONIX) 40 MG EC tablet Take 1 tablet (40 mg) by mouth daily 12/20/2021 at AM Yes Nicolás De La Torre MD   potassium chloride ER (KLOR-CON M) 10 MEQ CR tablet Take 30 mEq by mouth daily 12/20/2021 at AM Yes Unknown, Entered By History   predniSONE (DELTASONE) 10 MG tablet Take 3 tablets (30 mg) by mouth daily 12/20/2021 at Unknown time Yes Beena Cuellar MD   spironolactone (ALDACTONE) 25 MG tablet Take 25 mg by mouth daily  12/20/2021 at AM Yes Reported, Patient   sulfamethoxazole-trimethoprim (BACTRIM) 400-80 MG tablet Take 1 tablet by mouth daily  Patient taking differently: Take 1 tablet by mouth daily  12/20/2021 at Unknown time Yes Beena Cuellar MD   temazepam (RESTORIL) 15 MG capsule Take 1 capsule (15 mg) by mouth nightly as needed for sleep More than a month at PRN Yes Nicolás De La Torre MD   vitamin D2 (ERGOCALCIFEROL) 37539 units (1250 mcg) capsule Take 50,000 Units by mouth once a week 12/20/2021 at AM Yes Reported, Patient       The information provided in this note is only as accurate as the sources available at the time of update(s)

## 2021-12-21 NOTE — ED PROVIDER NOTES
History     Chief Complaint   Patient presents with     Shortness of Breath     HPI  Jeanine Schuler is a 61 year old female who presents to the emergency room via EMS for complaint of shortness of breath.  Symptoms started today.  She said she was just sitting on the edge of her bed this evening, rolling cigarettes for her , when she became diaphoretic, felt very short of breath and had chest pain.  Associated with nausea but has not had any vomiting.  Yesterday noted she had some nausea as well but was not diaphoretic.  Is also concerned about possible UTI.  Recently completed a course of Macrobid, but feels that her UTI may be coming back.  Has had urinary frequency but not much comes out.  She states that even though her  smokes, she does not smoke.  No recent sick contacts.  Has been vaccinated against COVID-19.    Allergies:  Allergies   Allergen Reactions     Lovenox Other (See Comments)     Blood clot      Norvasc [Amlodipine Besylate] Swelling     Lip swelling that happened on 2 different occasions     Erythromycin Rash     Rash on arms       Problem List:    Patient Active Problem List    Diagnosis Date Noted     High risk medication use 04/19/2021     Priority: Medium       Plaquenil: Started 2016 for Sjogren's. 4/19/21: No uveitis from Sarcoid, but focal subretinal deposits each eye, Outer segments otherwise WNL. Optos FAF normal. 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 4/19/21: No uveitis from Sarcoid, but focal subretinal deposits each eye, Outer segments otherwise WNL. Optos FAF normal. Repeat OCT with 10-2 in Fall 2021       Post-menopausal 03/31/2021     Priority: Medium     Hypovitaminosis D 03/31/2021     Priority: Medium     High serum parathyroid  hormone (PTH) 03/31/2021     Priority: Medium     On corticosteroid therapy 03/31/2021     Priority: Medium     ILD (interstitial lung disease) (H) 09/16/2020     Priority: Medium     Added automatically from request for surgery [id number]       Rheumatoid arthritis with negative rheumatoid factor, involving unspecified site (H) 12/15/2015     Priority: Medium     Pharyngeal dysphagia 11/17/2015     Priority: Medium     Gastroesophageal reflux disease, esophagitis presence not specified 11/17/2015     Priority: Medium     IMO Regulatory Load OCT 2020       Disturbed sleep rhythm 04/07/2015     Priority: Medium     S/P dilatation of esophageal stricture 02/10/2012     Priority: Medium     Sjogren's disease (H) 09/11/2011     Priority: Medium     Raynaud's disease 09/11/2011     Priority: Medium     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 Nicolás Murdockitor:   1. Limited cutaneous systemic sclerosis with high titer anticentromere antibody positive, but also phospholipid antibodies.   2. Severe multiple digital ulcers, on fingers and toes. Overall symptoms of Raynaud's seems to be worse.   3. Marked keratoconjunctivitis sicca over the last several years.   4. Marked iron deficiency anemia responsive to IV iron.   5. Diffuse musculoskeletal pain for which she has been taking Naprosyn.   6. Development of 2+ bilateral lower extremity edema with the addition of diltiazem to her regimen and prior contraindication to dihydropyridines secondary to lip swelling  and tingling with amlodipine.   7. Marked fatigue and diffuse clinical weakness.   8. Dyspnea on exertion with lower extremity edema and other features including the anticentromere positivity worrisome for the potential development of pulmonary hypertension.   9. Marked GERD with associated dysphagia.   10. Status post gastric bypass with a history of intermittent constipation and diarrhea potentially consistent with bacterial small bowel overgrowth versus other gut effects of the prior surgery.   11. History of positive rheumatoid factor consistent with MCTD, given the remainder of her clinical presentation.   12. Progressive Jaw tightening with inability to open mouth wider than 1 inch.        S/P gastric bypass 07/23/2009     Priority: Medium     Vitamin B 12 deficiency 07/23/2009     Priority: Medium     Low back pain - Suspect SI Joint dysfunction 09/27/2005     Priority: Medium     September 27, 2005: Started 2 weeks ago.  bilateral buttocks - better with walking, worse with sitting and driving.  Tight/stiff - lossens up.  Painful to lay down.  Constant ache.  8-10/10 at its worse.  No injury.  Tender with massage.  No radiculopathy.  Relief with muscle relaxant.       Chronic cough 09/27/2005     Priority: Medium     September 27, 2005: Persistent cough occl productive of stringy mucus, wheezes with cough.  Started 2 weeks ago.  No rhinorrhea or post nasal drip        Prolonged QTc 460 ms,  at hr 67 06/24/2005     Priority: Medium     June 24, 2005: Will check calcium, magnesium, potassium.  Will compare to old EKGs.  Reviewed meds and none are on the list for prolongation.  Anesthesiology should be aware at surgery of prolonged QT.       HTN (hypertension) 03/02/2005     Priority: Medium     April 22, 2005: BP controlled on prinizide 40/25, norvasc 5 September 27, 2005: BP controlled/marginal off meds post-gastric bypass.  Problem list name updated by automated process. Provider to review    Overview:    Normal EF, no significant pulmonary hypertension.           Past Medical History:    Past Medical History:   Diagnosis Date     Anemia      Bariatric surgery status 06/27/2005     Cellulitis of leg 06/06/2013     Esophageal reflux      Hyperplastic colonic polyp      Hypovitaminosis D 2011     ILD (interstitial lung disease) (H)      Kidney stone 04/29/2007     Kidney stone 05/10/2007     Limb ischemia; ulcers of fingertips 04/22/2013     Other chronic pain      Raynaud's syndrome      Rheumatoid arthritis (H) \     Scleroderma (H)      Sjogren-Jake syndrome      Systemic sclerosis (H) 2009     Unspecified essential hypertension        Past Surgical History:    Past Surgical History:   Procedure Laterality Date     BIOPSY MUSCLE DIAGNOSTIC (LOCATION) Right 7/19/2021    Procedure: Right SURAL nerve BIOPSY;  Surgeon: Farooq Abel MD;  Location: [facility] OR     BRONCHOSCOPY FLEXIBLE,L CRYOBIOPSY N/A 10/06/2020    Procedure: BRONCHOSCOPY, FLEXIBLE, WITH TRANSBRONCHIAL BIOPSY x4 USING CRYOPROBE, bronchial alveolar lavage;  Surgeon: Teddy Mendez MD;  Location: [facility] OR     BYPASS GASTRIC, CHOLECYSTECTOMY, COMBINED  06/27/2005    [facility]     CHOLECYSTECTOMY       COLONOSCOPY       COLONOSCOPY N/A 11/17/2020    Procedure: COLONOSCOPY, WITH POLYPECTOMY AND BIOPSY;  Surgeon: Jamie Cárdenas MD;  [place] [facility] [place]     ESOPHAGOSCOPY, GASTROSCOPY, DUODENOSCOPY (EGD), COMBINED N/A 03/10/2016    Procedure: COMBINED ESOPHAGOSCOPY, GASTROSCOPY, DUODENOSCOPY (EGD), BIOPSY SINGLE OR MULTIPLE;  Surgeon: Tricia Zambrano MD;  [facility]     ESOPHAGOSCOPY, GASTROSCOPY, DUODENOSCOPY (EGD), COMBINED N/A 11/17/2020    Procedure: ESOPHAGOGASTRODUODENOSCOPY, WITH BIOPSY and Dilation;  Surgeon: Jamie Cárdenas MD;  Location: [facility] [place]     INNER EAR SURGERY       PICC INSERTION  06/05/2013    5fr DL Power PICC, 44cm, left basilic vein, tip in low SVC     SURGICAL HISTORY OF -       Left ear surgery - incus  "transition, tympanoplasty     SURGICAL HISTORY OF -   06/01/2005    Hiatal hernia repair     TONSILLECTOMY         Family History:    Family History   Problem Relation Age of Onset     Cerebrovascular Disease Father      Heart Disease Father      Hypertension Father      Heart Disease Mother      Hypertension Mother      Chronic Obstructive Pulmonary Disease Sister      Heart Disease Sister      Hypertension Sister      Cerebrovascular Disease Brother      Heart Disease Brother      Kidney Disease Brother         on dialysis     Diabetes Brother         borderline     No Known Problems Son      No Known Problems Son      No Known Problems Daughter      Cancer Brother         small cell     Heart Disease Brother      Heart Disease Brother      Heart Disease Brother      Heart Disease Brother         tripple A     Heart Disease Sister      Substance Abuse Sister         alcoholism     Crohn's Disease No family hx of      Ulcerative Colitis No family hx of      GERD No family hx of      Celiac Disease No family hx of      Nephrolithiasis No family hx of      Parathyroid Disorders No family hx of      Calcium Disorder No family hx of        Social History:  Marital Status:   [2]  Social History     Tobacco Use     Smoking status: Never Smoker     Smokeless tobacco: Never Used   Substance Use Topics     Alcohol use: Yes     Comment: occassionally     Drug use: No        Medications:    azaTHIOprine (IMURAN) 50 MG tablet  benzonatate (TESSALON) 100 MG capsule  folic acid (FOLVITE) 1 MG tablet  furosemide (LASIX) 40 MG tablet  hydroxychloroquine (PLAQUENIL) 200 MG tablet  insulin syringe-needle U-100 (BD INSULIN SYRINGE ULTRAFINE) 30G X 1/2\" 1 ML miscellaneous  losartan (COZAAR) 50 MG tablet  methotrexate 50 MG/2ML injection  mycophenolate (GENERIC EQUIVALENT) 250 MG capsule  naproxen (NAPROSYN) 500 MG tablet  nitroFURantoin macrocrystal-monohydrate (MACROBID) 100 MG capsule  nitroFURantoin macrocrystal-monohydrate " (MACROBID) 100 MG capsule  nitroGLYcerin (NITROSTAT) 0.3 MG sublingual tablet  omeprazole (PRILOSEC) 20 MG DR capsule  pantoprazole (PROTONIX) 40 MG EC tablet  Potassium (POTASSIMIN PO)  predniSONE (DELTASONE) 10 MG tablet  spironolactone (ALDACTONE) 25 MG tablet  sulfamethoxazole-trimethoprim (BACTRIM) 400-80 MG tablet  temazepam (RESTORIL) 15 MG capsule  vitamin D2 (ERGOCALCIFEROL) 86483 units (1250 mcg) capsule          Review of Systems   All other systems reviewed and are negative.      Physical Exam   BP: (!) 145/108  Pulse: 110  Temp: 97.8  F (36.6  C)  Resp: 18  Weight: 78.9 kg (174 lb)  SpO2: 95 %      Physical Exam  Vitals and nursing note reviewed.   Constitutional:       General: She is not in acute distress.     Appearance: She is not diaphoretic.   HENT:      Head: Atraumatic.      Mouth/Throat:      Mouth: Mucous membranes are moist.      Pharynx: No oropharyngeal exudate.      Comments: Adentulous  Eyes:      General: No scleral icterus.     Pupils: Pupils are equal, round, and reactive to light.   Cardiovascular:      Rate and Rhythm: Normal rate and regular rhythm.      Heart sounds: Normal heart sounds.   Pulmonary:      Effort: Pulmonary effort is normal. No respiratory distress.      Breath sounds: Normal breath sounds. No wheezing.   Abdominal:      General: Bowel sounds are normal.      Palpations: Abdomen is soft.      Tenderness: There is no abdominal tenderness.   Musculoskeletal:         General: No tenderness.   Skin:     General: Skin is warm.      Findings: No rash.   Neurological:      Mental Status: She is alert.         ED Course                 Procedures              EKG Interpretation:      Interpreted by Edwina Chaney DO  Time reviewed: 2330  Symptoms at time of EKG: Dyspnea, chest pressure   Rhythm: sinus tachycardia  Rate: Tachycardia and 109 bpm  Axis: Normal  Ectopy: premature atrial contraction  Conduction: normal  ST Segments/ T Waves: ST Segment Depression  Anterolateral  Q Waves: nonspecific  Comparison to prior: Previously NSR, no evidence of ventricular hypertrophy, ischemia    Clinical Impression: sinus tachycardia with new ST depression                Critical Care time:  none               Results for orders placed or performed during the hospital encounter of 12/20/21 (from the past 24 hour(s))   CBC with platelets differential    Narrative    The following orders were created for panel order CBC with platelets differential.  Procedure                               Abnormality         Status                     ---------                               -----------         ------                     CBC with platelets and d...[[id number]]  Abnormal            Final result                 Please view results for these tests on the individual orders.   D dimer quantitative   Result Value Ref Range    D-Dimer Quantitative 1.07 (H) 0.00 - 0.50 ug/mL FEU    Narrative    This D-dimer assay is intended for use in conjunction with a clinical pretest probability assessment model to exclude pulmonary embolism (PE) and deep venous thrombosis (DVT) in outpatients suspected of PE or DVT. The cut-off value is 0.50 ug/mL FEU.   Comprehensive metabolic panel   Result Value Ref Range    Sodium 140 133 - 144 mmol/L    Potassium 4.1 3.4 - 5.3 mmol/L    Chloride 111 (H) 94 - 109 mmol/L    Carbon Dioxide (CO2) 20 20 - 32 mmol/L    Anion Gap 9 3 - 14 mmol/L    Urea Nitrogen 10 7 - 30 mg/dL    Creatinine 0.67 0.52 - 1.04 mg/dL    Calcium 9.1 8.5 - 10.1 mg/dL    Glucose 149 (H) 70 - 99 mg/dL    Alkaline Phosphatase 81 40 - 150 U/L    AST 17 0 - 45 U/L    ALT 15 0 - 50 U/L    Protein Total 6.3 (L) 6.8 - 8.8 g/dL    Albumin 2.9 (L) 3.4 - 5.0 g/dL    Bilirubin Total 0.7 0.2 - 1.3 mg/dL    GFR Estimate >90 >60 mL/min/1.73m2   Lactic acid whole blood   Result Value Ref Range    Lactic Acid 1.8 0.7 - 2.0 mmol/L   Troponin I   Result Value Ref Range    Troponin I High Sensitivity 140 (HH) <54  ng/L   CRP inflammation   Result Value Ref Range    CRP Inflammation 6.3 0.0 - 8.0 mg/L   TSH with free T4 reflex   Result Value Ref Range    TSH 0.49 0.40 - 4.00 mU/L   CBC with platelets and differential   Result Value Ref Range    WBC Count 3.1 (L) 4.0 - 11.0 10e3/uL    RBC Count 4.34 3.80 - 5.20 10e6/uL    Hemoglobin 13.4 11.7 - 15.7 g/dL    Hematocrit 40.6 35.0 - 47.0 %    MCV 94 78 - 100 fL    MCH 30.9 26.5 - 33.0 pg    MCHC 33.0 31.5 - 36.5 g/dL    RDW 13.8 10.0 - 15.0 %    Platelet Count 213 150 - 450 10e3/uL    % Neutrophils 69 %    % Lymphocytes 23 %    % Monocytes 8 %    % Eosinophils 0 %    % Basophils 0 %    % Immature Granulocytes 0 %    NRBCs per 100 WBC 0 <1 /100    Absolute Neutrophils 2.1 1.6 - 8.3 10e3/uL    Absolute Lymphocytes 0.7 (L) 0.8 - 5.3 10e3/uL    Absolute Monocytes 0.2 0.0 - 1.3 10e3/uL    Absolute Eosinophils 0.0 0.0 - 0.7 10e3/uL    Absolute Basophils 0.0 0.0 - 0.2 10e3/uL    Absolute Immature Granulocytes 0.0 <=0.4 10e3/uL    Absolute NRBCs 0.0 10e3/uL   Symptomatic; Yes; 12/19/2021 Influenza A/B & SARS-CoV2 (COVID-19) Virus PCR Multiplex Nasopharyngeal    Specimen: Nasopharyngeal; Swab   Result Value Ref Range    Influenza A PCR Negative Negative    Influenza B PCR Negative Negative    SARS CoV2 PCR Negative Negative    Narrative    Testing was performed using the liz SARS-CoV-2 & Influenza A/B Assay on the liz Kirst[employer] System. This test should be ordered for the detection of SARS-CoV-2 and influenza viruses in individuals who meet clinical and/or epidemiological criteria. Test performance is unknown in asymptomatic patients. This test is for in vitro diagnostic use under the FDA EUA for laboratories certified under CLIA to perform moderate and/or high complexity testing. This test has not been FDA cleared or approved. A negative result does not rule out the presence of PCR inhibitors in the specimen or target RNA in concentration below the limit of detection for the assay. If only  one viral target is positive but coinfection with multiple targets is suspected, the sample should be re-tested with another FDA cleared, approved or authorized test, if coinfection would change clinical management. [facility] are certified under the Clinical Laboratory Improvement Amendments of 1988 (CLIA-88) as  qualified to perform moderate and/or high complexity laboratory testing.   UA with Microscopic reflex to Culture    Specimen: Urine, Midstream   Result Value Ref Range    Color Urine Yellow Colorless, Straw, Light Yellow, Yellow    Appearance Urine Slightly Cloudy (A) Clear    Glucose Urine Negative Negative mg/dL    Bilirubin Urine Negative Negative    Ketones Urine 5  (A) Negative mg/dL    Specific Gravity Urine 1.009 1.003 - 1.035    Blood Urine Negative Negative    pH Urine 5.0 5.0 - 7.0    Protein Albumin Urine 30  (A) Negative mg/dL    Urobilinogen Urine 2.0 Normal, 2.0 mg/dL    Nitrite Urine Negative Negative    Leukocyte Esterase Urine Trace (A) Negative    Bacteria Urine Many (A) None Seen /HPF    Mucus Urine Present (A) None Seen /LPF    RBC Urine 1 <=2 /HPF    WBC Urine 10 (H) <=5 /HPF    Squamous Epithelials Urine 2 (H) <=1 /HPF    Hyaline Casts Urine 11 (H) <=2 /LPF    Narrative    Urine Culture not indicated   CT Chest Pulmonary Embolism w Contrast    Narrative    EXAM: CT CHEST PULMONARY EMBOLISM W CONTRAST  LOCATION: [facility]  DATE/TIME: 12/21/2021 12:23 AM    INDICATION: Shortness of breath.  COMPARISON: 9/16/2021.  TECHNIQUE: CT chest pulmonary angiogram during arterial phase injection of IV contrast. Multiplanar reformats and MIP reconstructions were performed. Dose reduction techniques were used.   CONTRAST: 65 mL-Isovue 370    FINDINGS:  ANGIOGRAM CHEST: The central pulmonary arteries are large in caliber suggesting pulmonary artery hypertension. No acute pulmonary embolus. The thoracic aorta is poorly opacified limiting evaluation  for aortic dissection. The ascending aorta is borderline   aneurysmal at 4.0 cm. The heart is enlarged.    LUNGS AND PLEURA: There are bilateral interstitial and ground-glass infiltrates suggesting pulmonary edema. Small bilateral pleural effusions. A few bands of scar or atelectasis at the periphery of the lungs. No pneumothorax.    MEDIASTINUM/AXILLAE: No lymph node enlargement. The stomach is distended with fluid and gas without evidence of obstruction.    CORONARY ARTERY CALCIFICATION: Mild.    UPPER ABDOMEN: The gallbladder is absent. Postoperative changes about the stomach.    MUSCULOSKELETAL: Normal.      Impression    IMPRESSION:  1.  There is no pulmonary embolus.  2.  Borderline aneurysmal ascending thoracic aorta at 4.0 cm.  3.  Probable pulmonary edema and bilateral pleural effusions.  4.  Cardiomegaly.   Troponin I (now)   Result Value Ref Range    Troponin I High Sensitivity 219 (HH) <54 ng/L       Medications   ondansetron (ZOFRAN) injection 4 mg (4 mg Intravenous Given 12/20/21 2346)   heparin 25,000 units in 0.45% NaCl 250 mL ANTICOAGULANT infusion (950 Units/hr Intravenous New Bag 12/21/21 0047)   lactated ringers BOLUS 500 mL (0 mLs Intravenous Stopped 12/21/21 0044)   morphine (PF) injection 4 mg (4 mg Intravenous Given 12/21/21 0154)   aspirin (ASA) chewable tablet 324 mg (324 mg Oral Given 12/20/21 2346)   heparin loading dose for LOW INTENSITY TREATMENT * Give BEFORE starting heparin infusion (4,750 Units Intravenous Given 12/21/21 0046)   cefTRIAXone (ROCEPHIN) 1 g vial to attach to  mL bag for ADULTS or NS 50 mL bag for PEDS (0 g Intravenous Stopped 12/21/21 0059)   sodium chloride 0.9 % bag 100mL for CT scan flush use (70 mLs Intravenous Given 12/21/21 0032)   iopamidol (ISOVUE-370) solution 500 mL (65 mLs Intravenous Given 12/21/21 0032)   ketorolac (TORADOL) injection 15 mg (15 mg Intravenous Given 12/21/21 0058)       Assessments & Plan (with Medical Decision Making)  Jeanine bustos  61-year-old who presents to the emergency room for shortness of breath and concern over UTI.  See history and focused physical exam as above  Patient is tachycardic with a heart rate up to 113, but remainder of vital signs are stable.  She is not diaphoretic, afebrile.  EKG was obtained and shows sinus tachycardia with PACs and ST depression.  This is a change from previous EKG that showed normal sinus rhythm without any ST depression.  Labs and imaging done as above.  She did have an elevated D-dimer, but no evidence of PE on CT scan.  Also had an elevated troponin of 140.  Was initiated on heparin drip for NSTEMI.  UA did reveal ongoing UTI, was given IV Rocephin this will need to be sent for culture to determine appropriate therapy since Macrobid given on outpatient basis was not effective.  Patient required transfer to higher level of care for NSTEMI and underlying interstitial lung disease and other medical comorbidities.  Was accepted by hospitalist at [facility].  Transport not available until 0400, patient remained on heparin drip in the ED. second troponin was drawn and is going up, now to 19.  Patient remains comfortable and vitally stable.  She declined the morphine that was ordered initially, and was given Toradol.  Remained vitally stable duration of ED stay and left the department in no acute distress.     I have reviewed the nursing notes.    I have reviewed the findings, diagnosis, plan and need for follow up with the patient.       ED to Inpatient Handoff:    Discussed with Dr. Suarez at 0051  Patient accepted for Observation Stay  Pending studies include N/A  Code Status: Not Addressed           New Prescriptions    No medications on file       Final diagnoses:   NSTEMI (non-ST elevated myocardial infarction) (H)   Acute cystitis without hematuria   ILD (interstitial lung disease) (H)   Pulmonary hypertension (H)       12/20/2021   [facility] EMERGENCY DEPT     Edwina Chaney  Ashli, DO  12/21/21 0312       Edwina Chaney, DO  12/21/21 0330

## 2021-12-21 NOTE — CONSULTS
[facility]    Cardiology Consultation     Date of Admission:  12/21/2021    Assessment & Plan     This is a 61 year old female with PMH significant for ILD, sarcoidosis, mixed connective tissue disorder, limited cutaneous systemic sclerosis followed by [facility] rheumatology, with GERD and Raynauds who presents with dyspnea for 3 days. Symptoms begun somewhat abruptly and was associated with palpitations. No chest pain or pressure, but symptoms occurred with exertion and improved with rest. She had positive TN to 246 (trending up) and EKG findings of ST depressions/TWIs, echocardiogram with newly reduced LV function and apical wall motion abnormality. Differential includes ischemic cardiomyopathy versus stress (Takotsubo) cardiomyopathy or tachy mediated cardiomyopathy if she has underlying arrhythmia undiagnosed.     1. CHF exacerbation with newly reduced LV function Ef 30-35%  -start lasix IV 20 bid   -daily weights, I's/Os  -low dose ACE/ARB  -beta blocker with 12.5 mg twice a day  -coronary angiogram today to determine if underlying coronary disease as cause of myopathy   -heparin infusion, aspirin load then 81 mg daily  -eventual cardiac MRI to evaluate myopathy if coronary angiogram is unremarkable (cardiac sarcoid for example)    2. Palpitations: monitor on telemetry, consider outpatient rhythm monitor.     3. Severe pulmonary hypertension: likely mixed, due to elevated LV filling pressures and also pulmonary disease  -will consider eventual right heart catheterization after adequate diuresis    4. Aortic dilation: h/o connective tissue disorder  -does not meet threshold for repair  -continue adequate surveillance, will need CTA or MRA in 6 months     Dania Arshad MD   Staff Cardiologist     Code Status    Full Code    Reason for Consult   ACS    Primary Care Physician   *Katia Carver Mai    Chief Complaint   Palpitations    History of Present Illness      This is a 61 year old female  with PMH significant for ILD, sarcoidosis, mixed connective tissue disorder, limited cutaneous systemic sclerosis followed by [facility] rheumatology, with GERD and Raynauds who presents with dyspnea for 3 days. Symptoms begun somewhat abruptly and was associated with palpitations. No chest pain or pressure, but symptoms occurred with exertion and improved with rest.     She went to [facility] where she she was tachycardic with labs notable for the following:  CBC and CMP were notable for WBC 3.1, Cl 111, alb 2.9, tprot 6.3, glucose 149, otherwise were within the normal reference range. Lactate was 1.8. CRP 6.3. TSH 0.49. Troponins were 140 and then 219 then 246. EKG showed NSR with ST segment depressions and TWI in the inferior leads. D-Dimer was 1.07. COVID and Influenza tests were negative. UA showed 10 WBC and 1 RBC.     CTPA demonstrated no PE, but there was borderline aortic dilation at 4.0 cm and pulmonary edema, cardiomegaly and pleural effusions.     Echocardiogram with LVEF 35%, distal hypokinesis and moderate MR, mod-severe TR, and severe pulmonary hypertension with PASP 54 mHg over RA pressure. Compared to 2020 this is a significant drop in LV function.       Past Medical History   I have reviewed this patient's medical history and updated it with pertinent information if needed.   Past Medical History:   Diagnosis Date     Anemia      Bariatric surgery status 06/27/2005    [facility];     Cellulitis of leg 06/06/2013     Esophageal reflux     Better post-hiatal hernia repair and weight loss     Hyperplastic colonic polyp      Hypovitaminosis D 2011     ILD (interstitial lung disease) (H)      Kidney stone 04/29/2007     Kidney stone 05/10/2007    surgically removed     Limb ischemia; ulcers of fingertips 04/22/2013     Other chronic pain     Left shoulder - rheumatoid arthritis     Raynaud's syndrome      Rheumatoid arthritis (H) \     Scleroderma (H)      Sjogren-Jake syndrome       Systemic sclerosis (H) 2009     Unspecified essential hypertension        Past Surgical History   I have reviewed this patient's surgical history and updated it with pertinent information if needed.  Past Surgical History:   Procedure Laterality Date     BIOPSY MUSCLE DIAGNOSTIC (LOCATION) Right 7/19/2021    Procedure: Right SURAL nerve BIOPSY;  Surgeon: Farooq Abel MD;  Location: [facility] OR     BRONCHOSCOPY FLEXIBLE,L CRYOBIOPSY N/A 10/06/2020    Procedure: BRONCHOSCOPY, FLEXIBLE, WITH TRANSBRONCHIAL BIOPSY x4 USING CRYOPROBE, bronchial alveolar lavage;  Surgeon: Teddy Mendez MD;  Location: [facility] OR     BYPASS GASTRIC, CHOLECYSTECTOMY, COMBINED  06/27/2005    Health system     CHOLECYSTECTOMY       COLONOSCOPY       COLONOSCOPY N/A 11/17/2020    Procedure: COLONOSCOPY, WITH POLYPECTOMY AND BIOPSY;  Surgeon: Jamie Cárdenas MD;  Location: [facility] OR     ESOPHAGOSCOPY, GASTROSCOPY, DUODENOSCOPY (EGD), COMBINED N/A 03/10/2016    Procedure: COMBINED ESOPHAGOSCOPY, GASTROSCOPY, DUODENOSCOPY (EGD), BIOPSY SINGLE OR MULTIPLE;  Surgeon: Tricia Zambrano MD;  [facility]     ESOPHAGOSCOPY, GASTROSCOPY, DUODENOSCOPY (EGD), COMBINED N/A 11/17/2020    Procedure: ESOPHAGOGASTRODUODENOSCOPY, WITH BIOPSY and Dilation;  Surgeon: Jamie Cárdenas MD;  Location: [facility] OR     INNER EAR SURGERY       PICC INSERTION  06/05/2013    5fr DL Power PICC, 44cm, left basilic vein, tip in low SVC     SURGICAL HISTORY OF -       Left ear surgery - incus transition, tympanoplasty     SURGICAL HISTORY OF -   06/01/2005    Hiatal hernia repair     TONSILLECTOMY         Prior to Admission Medications   Prior to Admission Medications   Prescriptions Last Dose Informant Patient Reported? Taking?   albuterol (PROAIR HFA/PROVENTIL HFA/VENTOLIN HFA) 108 (90 Base) MCG/ACT inhaler  at PRN  Yes Yes   Sig: Inhale 2 puffs into the lungs every 6 hours as needed for shortness of breath / dyspnea or wheezing   azaTHIOprine (IMURAN) 50 MG  tablet 12/20/2021 at afternoon  No Yes   Sig: Take 3 tablets (150 mg) by mouth daily And have labs drawn two weeks after starting medication.   benzonatate (TESSALON) 100 MG capsule  at PRN  No Yes   Sig: Take 1 capsule (100 mg) by mouth 3 times daily as needed for cough   benzonatate (TESSALON) 100 MG capsule 12/20/2021 at Unknown time  Yes Yes   Sig: Take 100 mg by mouth every morning and PRN   folic acid (FOLVITE) 1 MG tablet 12/20/2021 at AM  No Yes   Sig: Take 1 tablet (1 mg) by mouth daily   furosemide (LASIX) 40 MG tablet 12/20/2021 at Unknown time  Yes Yes   Sig: Take 80 mg by mouth 2 times daily    losartan (COZAAR) 50 MG tablet 12/20/2021 at AM  No Yes   Sig: Take 0.5 tablets (25 mg) by mouth daily   naproxen (NAPROSYN) 500 MG tablet  at PRN  No Yes   Sig: Take 1 tablet (500 mg) by mouth 2 times daily as needed for moderate pain   nitroGLYcerin (NITROSTAT) 0.3 MG sublingual tablet  at PRN  No Yes   Sig: Place 1 tablet (0.3 mg) under the tongue every 5 minutes as needed for chest pain (esophageal spasm) For chest pain place 1 tablet under the tongue every 5 minutes for 3 doses. If symptoms persist 5 minutes after 3rd dose call 911.   omeprazole (PRILOSEC) 20 MG DR capsule 12/20/2021 at AM  No Yes   Sig: Take 1 capsule (20 mg) by mouth 2 times daily   pantoprazole (PROTONIX) 40 MG EC tablet 12/20/2021 at AM  No Yes   Sig: Take 1 tablet (40 mg) by mouth daily   potassium chloride ER (KLOR-CON M) 10 MEQ CR tablet 12/20/2021 at AM  Yes Yes   Sig: Take 30 mEq by mouth daily   predniSONE (DELTASONE) 10 MG tablet 12/20/2021 at Unknown time  No Yes   Sig: Take 3 tablets (30 mg) by mouth daily   spironolactone (ALDACTONE) 25 MG tablet 12/20/2021 at AM  Yes Yes   Sig: Take 25 mg by mouth daily    sulfamethoxazole-trimethoprim (BACTRIM) 400-80 MG tablet 12/20/2021 at Unknown time  No Yes   Sig: Take 1 tablet by mouth daily   Patient taking differently: Take 1 tablet by mouth daily    temazepam (RESTORIL) 15 MG capsule  More than a month at PRN  No Yes   Sig: Take 1 capsule (15 mg) by mouth nightly as needed for sleep   vitamin D2 (ERGOCALCIFEROL) 89994 units (1250 mcg) capsule 12/20/2021 at AM  Yes Yes   Sig: Take 50,000 Units by mouth once a week      Facility-Administered Medications: None     Allergies   Allergies   Allergen Reactions     Lovenox Other (See Comments)     Blood clot      Norvasc [Amlodipine Besylate] Swelling     Lip swelling that happened on 2 different occasions     Erythromycin Rash     Rash on arms       Social History   I have reviewed this patient's social history and updated it with pertinent information if needed. Jeanine Schuler  reports that she has never smoked. She has never used smokeless tobacco. She reports current alcohol use. She reports that she does not use drugs.    Family History   I have reviewed this patient's family history and updated it with pertinent information if needed.   Family History   Problem Relation Age of Onset     Cerebrovascular Disease Father      Heart Disease Father      Hypertension Father      Heart Disease Mother      Hypertension Mother      Chronic Obstructive Pulmonary Disease Sister      Heart Disease Sister      Hypertension Sister      Cerebrovascular Disease Brother      Heart Disease Brother      Kidney Disease Brother         on dialysis     Diabetes Brother         borderline     No Known Problems Son      No Known Problems Son      No Known Problems Daughter      Cancer Brother         small cell     Heart Disease Brother      Heart Disease Brother      Heart Disease Brother      Heart Disease Brother         tripple A     Heart Disease Sister      Substance Abuse Sister         alcoholism     Crohn's Disease No family hx of      Ulcerative Colitis No family hx of      GERD No family hx of      Celiac Disease No family hx of      Nephrolithiasis No family hx of      Parathyroid Disorders No family hx of      Calcium Disorder No family hx of         Review of Systems   The 10 point Review of Systems is negative other than noted in the HPI or here.     Physical Exam   Temp: 98  F (36.7  C) Temp src: Oral BP: 128/82 Pulse: 93   Resp: 18 SpO2: 94 % O2 Device: None (Room air)    Vital Signs with Ranges  Temp:  [97.8  F (36.6  C)-98.4  F (36.9  C)] 98  F (36.7  C)  Pulse:  [] 93  Resp:  [13-34] 18  BP: (109-159)/() 128/82  SpO2:  [90 %-100 %] 94 %  187 lbs 3.2 oz    Constitutional: Awake, alert, cooperative, no apparent distress.  Eyes: Conjunctiva and pupils examined and normal.  HEENT: Moist mucous membranes, normal dentition.  Respiratory: Bibasilar crackles  Cardiovascular: Regular rate and rhythm, normal S1 and S2, and no murmur noted.  GI: Soft, non-distended, non-tender, normal bowel sounds.  Lymph/Hematologic: No anterior cervical or supraclavicular adenopathy.  Skin: No rashes, no cyanosis, 1++ edema.  Musculoskeletal: No joint swelling, erythema or tenderness.  Neurologic: Cranial nerves 2-12 intact, normal strength and sensation.  Psychiatric: Alert, oriented to person, place and time, no obvious anxiety or depression.     Data   Results for orders placed or performed during the hospital encounter of 12/21/21 (from the past 24 hour(s))   Troponin I   Result Value Ref Range    Troponin I High Sensitivity 246 (HH) <54 ng/L   Basic metabolic panel   Result Value Ref Range    Sodium 139 133 - 144 mmol/L    Potassium 4.2 3.4 - 5.3 mmol/L    Chloride 109 94 - 109 mmol/L    Carbon Dioxide (CO2) 22 20 - 32 mmol/L    Anion Gap 8 3 - 14 mmol/L    Urea Nitrogen 12 7 - 30 mg/dL    Creatinine 0.98 0.52 - 1.04 mg/dL    Calcium 8.5 8.5 - 10.1 mg/dL    Glucose 94 70 - 99 mg/dL    GFR Estimate 63 >60 mL/min/1.73m2   CBC with platelets differential    Narrative    The following orders were created for panel order CBC with platelets differential.  Procedure                               Abnormality         Status                     ---------                                -----------         ------                     CBC with platelets and d...[[id number]]                      Final result                 Please view results for these tests on the individual orders.   Heparin Unfractionated Anti Xa Level   Result Value Ref Range    Anti Xa Unfractionated Heparin 0.38 For Reference Range, See Comment IU/mL    Narrative    Therapeutic Range: UFH: 0.25-0.50 IU/mL for low intensity dosing,  0.30-0.70 IU/mL for high intensity dosing DVT and PE.  This test is not validated for other direct factor X inhibitors (e.g. rivaroxaban, apixaban, edoxaban, betrixaban, fondaparinux) and should not be used for monitoring of other medications.   CBC with platelets and differential   Result Value Ref Range    WBC Count 4.8 4.0 - 11.0 10e3/uL    RBC Count 4.08 3.80 - 5.20 10e6/uL    Hemoglobin 12.5 11.7 - 15.7 g/dL    Hematocrit 38.5 35.0 - 47.0 %    MCV 94 78 - 100 fL    MCH 30.6 26.5 - 33.0 pg    MCHC 32.5 31.5 - 36.5 g/dL    RDW 14.1 10.0 - 15.0 %    Platelet Count 209 150 - 450 10e3/uL    % Neutrophils 52 %    % Lymphocytes 37 %    % Monocytes 11 %    % Eosinophils 0 %    % Basophils 0 %    % Immature Granulocytes 0 %    NRBCs per 100 WBC 0 <1 /100    Absolute Neutrophils 2.4 1.6 - 8.3 10e3/uL    Absolute Lymphocytes 1.8 0.8 - 5.3 10e3/uL    Absolute Monocytes 0.5 0.0 - 1.3 10e3/uL    Absolute Eosinophils 0.0 0.0 - 0.7 10e3/uL    Absolute Basophils 0.0 0.0 - 0.2 10e3/uL    Absolute Immature Granulocytes 0.0 <=0.4 10e3/uL    Absolute NRBCs 0.0 10e3/uL   Echocardiogram Complete   Result Value Ref Range    LVEF  35%     Narrative    [id number]  [id number]  [id number]  [id number]^SETH^CHARISSE^[facility]  Echocardiography Laboratory  [address] [place] [id number]     Name: OSCAR REYES  MRN: [id number]  : 1960  Study Date: 2021 08:35 AM  Age: 61 yrs  Gender: Female  Patient Location: [facility]  Reason For Study: Chest Pain, Chest  Tightness, Chest Pressure  Ordering Physician: CHARISSE ANN  Referring Physician: CHARISSE ANN  Performed By: Jose Shashi     BSA: 2.0 m2  Height: 67 in  Weight: 187 lb  HR: 90  BP: 145/100 mmHg  ______________________________________________________________________________  Procedure  Complete Echo Adult. Optison (NDC #[id number]) given intravenously.  ______________________________________________________________________________  Interpretation Summary     1. The left ventricle is normal in size. The visual ejection fraction is  estimated at 35%. Global hypokinesis, distal LV segments (anteroseptal) appear  more hypokinetic than other areas.  2. The right ventricle is mildly dilated. The right ventricular systolic  function is normal.  3. There is mild to moderate (1-2+) mitral regurgitation.  4. There is moderately severe (3+) tricuspid regurgitation.  5. Moderate (46-55mmHg) pulmonary hypertension is present. The right  ventricular systolic pressure is approximated at 54mmHg plus the right atrial  pressure.  6. The ascending aorta is Mildly dilated. 4.2cm.     Echo from 8/2020 showed EF 55%, mild TR, aorta 3.8cm.  ______________________________________________________________________________  Left Ventricle  The left ventricle is normal in size. There is normal left ventricular wall  thickness. The visual ejection fraction is estimated at 35%. Left ventricular  diastolic function is indeterminate. Global hypokinesis, distal LV segments  (anteroseptal) appear more hypokinetic than other areas.     Right Ventricle  The right ventricle is mildly dilated. The right ventricular systolic function  is normal.     Atria  The left atrium is severely dilated. The right atrium is moderately dilated.  There is no atrial shunt seen.     Mitral Valve  There is mild mitral annular calcification. There is mild to moderate (1-2+)  mitral regurgitation.     Tricuspid Valve  There is moderately severe (3+)  tricuspid regurgitation. Moderate (46-55mmHg)  pulmonary hypertension is present. The right ventricular systolic pressure is  approximated at 54mmHg plus the right atrial pressure.     Aortic Valve  There is trace to mild aortic regurgitation.     Pulmonic Valve  There is mild to moderate (1-2+) pulmonic valvular regurgitation.     Vessels  The ascending aorta is Mildly dilated. Normal IVC size, cannot assess collapse  due to limited image quality.     Pericardium  There is no pericardial effusion.     Rhythm  Sinus rhythm was noted.  ______________________________________________________________________________  MMode/2D Measurements & Calculations  IVSd: 1.1 cm     LVIDd: 5.4 cm  LVIDs: 4.0 cm  LVPWd: 0.88 cm  FS: 25.8 %  LV mass(C)d: 207.4 grams  LV mass(C)dI: 105.5 grams/m2  Ao root diam: 3.7 cm  asc Aorta Diam: 4.2 cm  LVOT diam: 2.1 cm  LVOT area: 3.4 cm2  LA Volume (BP): 130.0 ml  LA Volume Index (BP): 66.0 ml/m2  RWT: 0.32     Doppler Measurements & Calculations  MV E max ck: 87.8 cm/sec  MV A max ck: 88.8 cm/sec  MV E/A: 0.99  Ao V2 max: 156.2 cm/sec  Ao max PG: 10.0 mmHg  ALLISON(V,D): 2.0 cm2  LV V1 max PG: 3.3 mmHg  LV V1 max: 91.2 cm/sec  LV V1 VTI: 17.6 cm  SV(LVOT): 59.4 ml  SI(LVOT): 30.2 ml/m2  PA V2 max: 96.9 cm/sec  PA max PG: 3.8 mmHg  PA acc time: 0.06 sec  PI end-d ck: 168.7 cm/sec  TR max ck: 369.2 cm/sec  TR max P.5 mmHg  AV Ck Ratio (DI): 0.58  Lateral E/e': 11.1     ______________________________________________________________________________  Report approved by: Mirza Gleason 2021 09:53 AM

## 2021-12-21 NOTE — PRE-PROCEDURE
GENERAL PRE-PROCEDURE:     Written consent obtained?: Yes    Risks and benefits: Risks, benefits and alternatives were discussed    Consent given by:  Patient  Patient states understanding of procedure being performed: Yes    Patient's understanding of procedure matches consent: Yes    Procedure consent matches procedure scheduled: Yes    Expected level of sedation:  Moderate  Appropriately NPO:  Yes  ASA Class:  3  Mallampati  :  Grade 2- soft palate, base of uvula, tonsillar pillars, and portion of posterior pharyngeal wall visible  Lungs:  Lungs clear with good breath sounds bilaterally  Heart:  Normal heart sounds and rate  History & Physical reviewed:  History and physical reviewed and no updates needed  Statement of review:  I have reviewed the lab findings, diagnostic data, medications, and the plan for sedation

## 2021-12-22 ENCOUNTER — APPOINTMENT (OUTPATIENT)
Dept: ULTRASOUND IMAGING | Facility: CLINIC | Age: 61
End: 2021-12-22
Attending: INTERNAL MEDICINE
Payer: COMMERCIAL

## 2021-12-22 ENCOUNTER — APPOINTMENT (OUTPATIENT)
Dept: CARDIOLOGY | Facility: CLINIC | Age: 61
End: 2021-12-22
Attending: PHYSICIAN ASSISTANT
Payer: COMMERCIAL

## 2021-12-22 ENCOUNTER — APPOINTMENT (OUTPATIENT)
Dept: OCCUPATIONAL THERAPY | Facility: CLINIC | Age: 61
End: 2021-12-22
Attending: HOSPITALIST
Payer: COMMERCIAL

## 2021-12-22 VITALS
HEART RATE: 81 BPM | WEIGHT: 180 LBS | TEMPERATURE: 97.8 F | DIASTOLIC BLOOD PRESSURE: 67 MMHG | RESPIRATION RATE: 16 BRPM | BODY MASS INDEX: 28.19 KG/M2 | SYSTOLIC BLOOD PRESSURE: 132 MMHG | OXYGEN SATURATION: 93 %

## 2021-12-22 LAB
ANION GAP SERPL CALCULATED.3IONS-SCNC: 5 MMOL/L (ref 3–14)
BACTERIA UR CULT: NO GROWTH
BUN SERPL-MCNC: 13 MG/DL (ref 7–30)
CALCIUM SERPL-MCNC: 8.2 MG/DL (ref 8.5–10.1)
CHLORIDE BLD-SCNC: 110 MMOL/L (ref 94–109)
CHOLEST SERPL-MCNC: 116 MG/DL
CO2 SERPL-SCNC: 25 MMOL/L (ref 20–32)
CREAT SERPL-MCNC: 0.89 MG/DL (ref 0.52–1.04)
ERYTHROCYTE [DISTWIDTH] IN BLOOD BY AUTOMATED COUNT: 14.3 % (ref 10–15)
GFR SERPL CREATININE-BSD FRML MDRD: 74 ML/MIN/1.73M2
GLUCOSE BLD-MCNC: 90 MG/DL (ref 70–99)
GLUCOSE BLDC GLUCOMTR-MCNC: 60 MG/DL (ref 70–99)
GLUCOSE BLDC GLUCOMTR-MCNC: 79 MG/DL (ref 70–99)
GLUCOSE BLDC GLUCOMTR-MCNC: 91 MG/DL (ref 70–99)
HCT VFR BLD AUTO: 33.2 % (ref 35–47)
HDLC SERPL-MCNC: 47 MG/DL
HGB BLD-MCNC: 10.8 G/DL (ref 11.7–15.7)
LDLC SERPL CALC-MCNC: 55 MG/DL
MAGNESIUM SERPL-MCNC: 1.7 MG/DL (ref 1.6–2.3)
MCH RBC QN AUTO: 30.8 PG (ref 26.5–33)
MCHC RBC AUTO-ENTMCNC: 32.5 G/DL (ref 31.5–36.5)
MCV RBC AUTO: 95 FL (ref 78–100)
NONHDLC SERPL-MCNC: 69 MG/DL
PHOSPHATE SERPL-MCNC: 3.7 MG/DL (ref 2.5–4.5)
PLATELET # BLD AUTO: 177 10E3/UL (ref 150–450)
POTASSIUM BLD-SCNC: 3.8 MMOL/L (ref 3.4–5.3)
RBC # BLD AUTO: 3.51 10E6/UL (ref 3.8–5.2)
SODIUM SERPL-SCNC: 140 MMOL/L (ref 133–144)
TRIGL SERPL-MCNC: 71 MG/DL
WBC # BLD AUTO: 3.8 10E3/UL (ref 4–11)

## 2021-12-22 PROCEDURE — 250N000011 HC RX IP 250 OP 636: Performed by: HOSPITALIST

## 2021-12-22 PROCEDURE — 250N000012 HC RX MED GY IP 250 OP 636 PS 637: Performed by: HOSPITALIST

## 2021-12-22 PROCEDURE — 93244 EXT ECG>48HR<7D REV&INTERPJ: CPT | Performed by: INTERNAL MEDICINE

## 2021-12-22 PROCEDURE — 84100 ASSAY OF PHOSPHORUS: CPT | Performed by: HOSPITALIST

## 2021-12-22 PROCEDURE — 250N000013 HC RX MED GY IP 250 OP 250 PS 637: Performed by: HOSPITALIST

## 2021-12-22 PROCEDURE — 93926 LOWER EXTREMITY STUDY: CPT

## 2021-12-22 PROCEDURE — 85027 COMPLETE CBC AUTOMATED: CPT | Performed by: HOSPITALIST

## 2021-12-22 PROCEDURE — 80061 LIPID PANEL: CPT | Performed by: PHYSICIAN ASSISTANT

## 2021-12-22 PROCEDURE — 82310 ASSAY OF CALCIUM: CPT | Performed by: HOSPITALIST

## 2021-12-22 PROCEDURE — 83735 ASSAY OF MAGNESIUM: CPT | Performed by: HOSPITALIST

## 2021-12-22 PROCEDURE — 93242 EXT ECG>48HR<7D RECORDING: CPT

## 2021-12-22 PROCEDURE — 99239 HOSP IP/OBS DSCHRG MGMT >30: CPT | Performed by: HOSPITALIST

## 2021-12-22 PROCEDURE — 250N000013 HC RX MED GY IP 250 OP 250 PS 637: Performed by: INTERNAL MEDICINE

## 2021-12-22 PROCEDURE — 97165 OT EVAL LOW COMPLEX 30 MIN: CPT | Mod: GO | Performed by: OCCUPATIONAL THERAPIST

## 2021-12-22 PROCEDURE — 97535 SELF CARE MNGMENT TRAINING: CPT | Mod: GO | Performed by: OCCUPATIONAL THERAPIST

## 2021-12-22 PROCEDURE — 250N000011 HC RX IP 250 OP 636: Performed by: INTERNAL MEDICINE

## 2021-12-22 PROCEDURE — 99233 SBSQ HOSP IP/OBS HIGH 50: CPT | Mod: 25 | Performed by: INTERNAL MEDICINE

## 2021-12-22 PROCEDURE — 36415 COLL VENOUS BLD VENIPUNCTURE: CPT | Performed by: HOSPITALIST

## 2021-12-22 RX ORDER — METOPROLOL TARTRATE 25 MG/1
12.5 TABLET, FILM COATED ORAL 2 TIMES DAILY
Qty: 30 TABLET | Refills: 0 | Status: SHIPPED | OUTPATIENT
Start: 2021-12-22 | End: 2022-01-26

## 2021-12-22 RX ORDER — SPIRONOLACTONE 25 MG/1
25 TABLET ORAL DAILY
Status: DISCONTINUED | OUTPATIENT
Start: 2021-12-23 | End: 2021-12-22 | Stop reason: HOSPADM

## 2021-12-22 RX ORDER — LANCETS
EACH MISCELLANEOUS
Qty: 100 EACH | Refills: 0 | Status: SHIPPED | OUTPATIENT
Start: 2021-12-22

## 2021-12-22 RX ORDER — ASPIRIN 81 MG/1
81 TABLET, CHEWABLE ORAL DAILY
Qty: 30 TABLET | Refills: 0 | Status: SHIPPED | OUTPATIENT
Start: 2021-12-23 | End: 2022-04-19

## 2021-12-22 RX ORDER — TORSEMIDE 20 MG/1
20 TABLET ORAL
Status: DISCONTINUED | OUTPATIENT
Start: 2021-12-23 | End: 2021-12-22 | Stop reason: HOSPADM

## 2021-12-22 RX ORDER — TORSEMIDE 20 MG/1
20 TABLET ORAL
Qty: 60 TABLET | Refills: 0 | Status: SHIPPED | OUTPATIENT
Start: 2021-12-23 | End: 2022-01-28

## 2021-12-22 RX ORDER — CEFDINIR 300 MG/1
300 CAPSULE ORAL 2 TIMES DAILY
Qty: 6 CAPSULE | Refills: 0 | Status: SHIPPED | OUTPATIENT
Start: 2021-12-22 | End: 2022-01-28

## 2021-12-22 RX ORDER — GLUCOSAMINE HCL/CHONDROITIN SU 500-400 MG
CAPSULE ORAL
Qty: 100 EACH | Refills: 3 | Status: ON HOLD | OUTPATIENT
Start: 2021-12-22 | End: 2022-04-06

## 2021-12-22 RX ORDER — POTASSIUM CHLORIDE 1500 MG/1
20 TABLET, EXTENDED RELEASE ORAL ONCE
Status: COMPLETED | OUTPATIENT
Start: 2021-12-22 | End: 2021-12-22

## 2021-12-22 RX ORDER — MAGNESIUM OXIDE 400 MG/1
400 TABLET ORAL DAILY
Qty: 30 TABLET | Refills: 0 | Status: SHIPPED | OUTPATIENT
Start: 2021-12-22 | End: 2022-01-28

## 2021-12-22 RX ORDER — MAGNESIUM OXIDE 400 MG/1
400 TABLET ORAL DAILY
Status: DISCONTINUED | OUTPATIENT
Start: 2021-12-22 | End: 2021-12-22 | Stop reason: HOSPADM

## 2021-12-22 RX ADMIN — SULFAMETHOXAZOLE AND TRIMETHOPRIM 1 TABLET: 400; 80 TABLET ORAL at 09:04

## 2021-12-22 RX ADMIN — BENZONATATE 100 MG: 100 CAPSULE ORAL at 09:04

## 2021-12-22 RX ADMIN — FUROSEMIDE 20 MG: 10 INJECTION, SOLUTION INTRAVENOUS at 11:35

## 2021-12-22 RX ADMIN — PANTOPRAZOLE SODIUM 40 MG: 40 TABLET, DELAYED RELEASE ORAL at 09:04

## 2021-12-22 RX ADMIN — FOLIC ACID 1 MG: 1 TABLET ORAL at 09:04

## 2021-12-22 RX ADMIN — METOPROLOL TARTRATE 12.5 MG: 25 TABLET, FILM COATED ORAL at 09:04

## 2021-12-22 RX ADMIN — Medication 400 MG: at 11:35

## 2021-12-22 RX ADMIN — LOSARTAN POTASSIUM 25 MG: 25 TABLET, FILM COATED ORAL at 09:04

## 2021-12-22 RX ADMIN — PREDNISONE 30 MG: 10 TABLET ORAL at 09:04

## 2021-12-22 RX ADMIN — POTASSIUM CHLORIDE 20 MEQ: 1500 TABLET, EXTENDED RELEASE ORAL at 11:35

## 2021-12-22 RX ADMIN — FUROSEMIDE 20 MG: 10 INJECTION, SOLUTION INTRAVENOUS at 00:39

## 2021-12-22 RX ADMIN — AZATHIOPRINE 150 MG: 50 TABLET ORAL at 09:04

## 2021-12-22 RX ADMIN — ASPIRIN 81 MG CHEWABLE TABLET 81 MG: 81 TABLET CHEWABLE at 09:04

## 2021-12-22 RX ADMIN — CEFTRIAXONE SODIUM 1 G: 1 INJECTION, POWDER, FOR SOLUTION INTRAMUSCULAR; INTRAVENOUS at 00:39

## 2021-12-22 ASSESSMENT — ACTIVITIES OF DAILY LIVING (ADL)
ADLS_ACUITY_SCORE: 14
ADLS_ACUITY_SCORE: 14
ADLS_ACUITY_SCORE: 7
ADLS_ACUITY_SCORE: 14
ADLS_ACUITY_SCORE: 14
ADLS_ACUITY_SCORE: 7
ADLS_ACUITY_SCORE: 14
ADLS_ACUITY_SCORE: 7
ADLS_ACUITY_SCORE: 7
ADLS_ACUITY_SCORE: 14
ADLS_ACUITY_SCORE: 7
ADLS_ACUITY_SCORE: 14
ADLS_ACUITY_SCORE: 14

## 2021-12-22 NOTE — DISCHARGE INSTRUCTIONS
Please call [facility] at [phone] to schedule your follow up cardiac MRI. The order has been sent.  Voicemail was left with staff at [facility] in [place] about scheduling a follow up appointment. They have your contact information. If you have not heard from them, please call [phone] to schedule. [facility]    Cardiac Angiogram Discharge Instructions - Femoral    After you go home:      Have an adult stay with you until tomorrow.    Drink extra fluids for 2 days.    You may resume your normal diet.    No smoking       For 24 hours - due to the sedation you received:    Relax and take it easy.    Do NOT make any important or legal decisions.    Do NOT drive or operate machines at home or at work.    Do NOT drink alcohol.    Care of Groin Puncture Site:      For the first 24 hrs - check the puncture site every 1-2 hours while awake.    For 2 days, when you cough, sneeze, laugh or move your bowels, hold your hand over the puncture site and press firmly.    Remove the bandaid after 24 hours. If there is minor oozing, apply another bandaid and remove it after 12 hours.    It is normal to have a small bruise or pea size lump at the site.    You may shower tomorrow. Do NOT take a bath, or use a hot tub or pool for at least 3 days. Do NOT scrub the site. Do not use lotion or powder near the puncture site.    Activity:            For 2 days:    No stooping or squatting    Do NOT do any heavy activity such as exercise, lifting, or straining.     No housework, yard work or any activity that make you sweat    Do NOT lift more than 10 pounds    Bleeding:      If you start bleeding from the site in your groin, lie down flat and press firmly on/above the site for 10 minutes.     Once bleeding stops, lay flat for 2 hours.     Call [facility] as soon as you can.       Call [phone] right away if you have heavy bleeding or bleeding that does  not stop.      Medicines:      If you are taking an antiplatelet medication such as Plavix, Brilinta or Effient, do not stop taking it until you talk to your cardiologist.      If you are on Metformin (Glucophage), do not restart it until you have blood tests (within 2 to 3 days after discharge).  After you have your blood drawn, you may restart the Metformin.     Take your medications, including blood thinners, unless your provider tells you not to.      If you take Coumadin (Warfarin), have your INR checked by your provider in  3-5 days. Call your clinic to schedule this.    If you have stopped any medicines, check with your provider about when to restart them.    Follow Up Appointments:      Follow up with [facility] Nurse Practitioner at [facility] of patient preference in 7-10 days.    Call the clinic if:      You have increased pain or a large or growing hard lump around the site.    The site is red, swollen, hot or tender.    Blood or fluid is draining from the site.    You have chills or a fever greater than 101 F (38 C).    Your leg feels numb, cool or changes color.    You have hives, a rash or unusual itching.    New pain in the back or belly that you cannot control with Tylenol.    Any questions or concerns.          [facility] [place]:    [phone] [facility] (7 days a week)

## 2021-12-22 NOTE — PROVIDER NOTIFICATION
Pt call nurse into her room with c/o severe right groin pain. She states it was the size of a tennis ball. RN held pressure for 5 minutes, area soften up but pt still having pain. No bruit noted. Cardiology notified. STAT US of right groin ordered, pt sent to US with flying squad.

## 2021-12-22 NOTE — PLAN OF CARE
Occupational Therapy Discharge Summary    Reason for therapy discharge:    Discharged to home.    Progress towards therapy goal(s). See goals on Care Plan in [facility] electronic health record for goal details.  Goals met    Therapy recommendation(s):    No further therapy is recommended.

## 2021-12-22 NOTE — DISCHARGE SUMMARY
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 Initially presented to [facility], transferred to [facility] for cardiology evaluation. She underwent coronary angiogram and was found to have mild CAD. Further work-up for her reduced EF will include outpatient cardiac MRI    Hospital Course   Jeanine Schuler was admitted on 12/21/2021.  The following problems were addressed during her hospitalization:    Mild CAD  CHF exacerbation with newly reduced EF 30-35%  Severe pulmonary hypertension  Palpitations, hx PVCs  She does not seem to have known prior CAD. She presents with dyspnea and palpitations and is found to have myonecrosis and ST segment depressions on EKG. CTPA suggests acute pulmonary edema as well, though she is not hypoxic at the sending ED. Note is made of her complex pulmonary and rheumatologic history as below. She could have demand ischemia due to acute diastolic CHF exacerbation and pulmonary hypertension. She could have symptomatic arrhythmia, noting prior history of PVC's. Her COVID PCR was negative tonight. A Heparin infusion was started in the ED prior to transfer.  12/21 Echo: LVEF 35% with distal hypokinesis and moderate MR, mod-severe TR and severe pulmonary hypertension. August 2020 EF was 55-60%  *12/21  coronary angio: minimal nonobstructive CAD. Ost LAD to Prox LAD lesion and prox RCA lesion both 20% stenosed.  - cardiology consult appreciated  - lasix IV 20mg BID transitioned to torsemide 20mg BID  - continue PTA spironolactone and potassium supplement  - metoprolol tartrate 12.5mg BID  - continue losartan 25mg daily  - weights daily on discharge, keep track and call cardiology if weight increases >2lbs in a day or >5lbs in a week (wt 180 on day of discharge)  - ASA 81 mg daily continue on discharge  - plan outpatient cardiac MRI with her primary cardiologist at [facility] (or can switch to [facility] cardiology)  - zio patch x2 weeks given ongoing issues with PVCs (now with beta blocker)  - BMP in one week to monitor potassium, magnesium and renal function  - follow up with PCP in one week for hospital follow-up     Hypoglycemia  Occurred while NPO while awaiting angio and again next morning while she was eating dinner. 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 She has had multiple digital ulcers from Raynaud's Disease in the past, as well as sicca syndrome and GERD. She has had chronic lower extremity edema managed with diuretics. TTE 8/2020 showed preserved LVEF; PASP could not be assessed. In 2020 ILD cryobiopsy revealed ILD with non-necrotizing granulomas. Pulmonary sarcoid is suspected reportedly. She has severe chronic dyspnea and reportedly failed a 6 minute walk test 9/2021, though she reportedly does not use oxygen at home. Currently she takes high doses of Prednisone as well as Imuran, Plaquenil, and Methotrexate  - Pulmonology appreciated  - PTA prednisone 30mg daily, imuran 150mg daily continued  - bactrim  prophylaxis continued  - protonix continued     Aortic dilation  - continue surveillance, follow up CTA or MRA in 6mo with cardiology at [facility] (or [facility] cardiologist)     Abnormal UA, Possible UTI vs asymptomatic bacteruria  Given Ceftriaxone in the ED. UA with 10 WBCs, many bacteria, negative nitrites, trace leuk esterase. UA collected at [facility], no UCx ordered, UCx added 12/21 after one dose ceftriaxone so far without growth  - Ceftriaxone continued, received 2 doses, discharged with 3 more days of cefdinir BID to complete course.     Chronic iron deficiency anemia  By history. Noted     COVID-19 screening, negative  Vaccinated with pfizer, last dose was booster on 9/16/21. Tested for antibodies in November 2021 and was positive (indicating vaccination)    Flora Dia, DO    Significant Results and Procedures   12/21 Echo: LVEF 35% with distal hypokinesis and moderate MR, mod-severe TR and severe pulmonary hypertension. August 2020 EF was 55-60%  12/21 coronary angio: minimal nonobstructive CAD. Ost LAD to Prox LAD lesion and prox RCA lesion both 20% stenosed.    Pending Results   These results will be followed up by PCP  Unresulted Labs Ordered in the Past 30 Days of this Admission     Date and Time Order Name Status Description    12/21/2021  4:07 PM Urine Culture Preliminary           Code Status   Full Code       Primary Care Physician   Katia Carver Mai    Physical Exam   Temp: 97.8  F (36.6  C) Temp src: Oral BP: 120/72 Pulse: 72   Resp: 16 SpO2: 93 % O2 Device: None (Room air) Oxygen Delivery: 2 LPM  Vitals:    12/21/21 0700 12/22/21 0546   Weight: 84.9 kg (187 lb 3.2 oz) 81.6 kg (180 lb)     Vital Signs with Ranges  Temp:  [97.7  F (36.5  C)-98.2  F (36.8  C)] 97.8  F (36.6  C)  Pulse:  [66-83] 72  Resp:  [14-16] 16  BP: (100-120)/(62-78) 120/72  SpO2:  [93 %-97 %] 93 %  I/O last 3 completed shifts:  In: 360 [P.O.:360]  Out: 1800 [Urine:1800]    Patient seen and examined. She is  doing okay. Worried about her PVCs and what she will do on discharge. She feels like her overall breathing is improved. She has had a few episodes of shortness of breath overnight, but not nearly as bad. Her edema has also improved. Discussed blood sugars and plan for monitoring on discharge. Stable for discharge to home with close follow up with primary cardiologist.    Constitutional: Awake, alert, cooperative, no apparent distress.  Eyes: Conjunctiva and pupils examined and normal.  HEENT: Moist mucous membranes, normal dentition.  Respiratory: Clear to auscultation bilaterally, no crackles or wheezing.  Cardiovascular: Regular rate and rhythm, normal S1 and S2, and no murmur noted.  GI: Soft, non-distended, non-tender, normal bowel sounds.  Lymph/Hematologic: No anterior cervical or supraclavicular adenopathy.  Skin: No rashes, no cyanosis, trace lower extremity edema.  Musculoskeletal: No joint swelling, erythema or tenderness.  Neurologic: Cranial nerves 2-12 intact, normal strength and sensation.  Psychiatric: Alert, oriented to person, place and time, mild anxiety, no depression.    Discharge Disposition   Discharged to home  Condition at discharge: Stable    Consultations This Hospital Stay   CARDIAC REHAB IP CONSULT  CARDIOLOGY IP CONSULT  PULMONARY IP CONSULT  PHARMACY IP CONSULT  PHARMACY IP CONSULT  PHARMACY IP CONSULT  PHARMACY IP CONSULT  SMOKING CESSATION PROGRAM IP CONSULT  SMOKING CESSATION PROGRAM IP CONSULT    Time Spent on this Encounter   IFlora DO, personally saw the patient today and spent greater than 30 minutes discharging this patient.    Discharge Orders      Basic metabolic panel     Magnesium     Reason for your hospital stay    Shortness of breath, congestive heart failure with worsening ejection fraction.     Follow-up and recommended labs and tests     Follow up with primary care provider, Katia Carver Mai, within 7 days for hospital follow- up.  BMP and magnesium level in one  week    Follow up with cardiology at [facility] for outpatient cardiac MRI     Activity    Your activity upon discharge: activity as tolerated     Monitor and record    blood pressure daily  weight every day    Keep a log. If your weight increases by >2lbs in one day or >5lbs in one week, you may need additional diuretics      Check your blood sugars twice daily and as needed to monitor for low blood sugars. If you blood sugars are low (<70), try to have a little piece of candy nearby that you can eat quickly.     Discharge Instructions    1. Your diuretic regimen was adjusted while you were in the hospital. You were switched to torsemide 20mg twice daily (this will take the place of your oral lasix). You will continue to take your spironolactone and potassium    2. Get your basic metabolic panel and magnesium checked in one week to ensure your potassium and magnesium levels and kidney function are stable    3. You were started on metoprolol 12.5mg BID to help stabilize your heart rhythm.    4. You will wear a zio patch for 2 weeks to monitor for heart rhythm changes.    5. You will start taking 81mg aspirin daily for your mild coronary artery disease seen on cardiac angiogram.    6. Your magnesium level was borderline low so you were started on daily supplement. This might make your stools a bit softer.    7. For your abnormal urine test you were managed on ceftriaxone x2 doses in the hospital. You were continue cefdinir twice daily for 3 more days to finish a 5 day course. Continue to follow up with your PCP for urinary symptoms.     Leadless EKG Monitor 8 to 14 Days     Diet    Follow this diet upon discharge: Regular Diet Adult     Discharge Medications   Current Discharge Medication List      START taking these medications    Details   alcohol swab prep pads Use to swab area of injection/alf as directed.  Qty: 100 each, Refills: 3    Comments: Future refills by PCP Dr. Katia Carver Mai with phone number  [phone].  Associated Diagnoses: Hypoglycemia      aspirin (ASA) 81 MG chewable tablet Take 1 tablet (81 mg) by mouth daily  Qty: 30 tablet, Refills: 0    Comments: Future refills by PCP Dr. Katia Carver Mai with phone number [phone].  Associated Diagnoses: Mild coronary artery disease      blood glucose (NO BRAND SPECIFIED) test strip Use to test blood sugar 2 times daily or as directed.  Qty: 100 strip, Refills: 6    Comments: To accompany: glucometer per insurance.  Associated Diagnoses: Hypoglycemia      blood glucose calibration (NO BRAND SPECIFIED) solution Use to calibrate blood glucose monitor as needed as directed.  Qty: 1 each, Refills: 0    Comments: To accompany: Glucometer per insurance.  Associated Diagnoses: Hypoglycemia      blood glucose monitoring (NO BRAND SPECIFIED) meter device kit Use to test blood sugar 2 times daily or as directed.  Qty: 1 kit, Refills: 0    Comments: Preferred blood glucose meter OR supplies to accompany: glucometer per insurance  Associated Diagnoses: Hypoglycemia      cefdinir (OMNICEF) 300 MG capsule Take 1 capsule (300 mg) by mouth 2 times daily  Qty: 6 capsule, Refills: 0    Comments: Future refills by PCP Dr. Katia Carver Mai with phone number [phone].  Associated Diagnoses: Abnormal urinalysis      magnesium oxide (MAG-OX) 400 MG tablet Take 1 tablet (400 mg) by mouth daily  Qty: 30 tablet, Refills: 0    Comments: Future refills by PCP Dr. Katia Carver Mai with phone number [phone].  Associated Diagnoses: Hypokalemia      metoprolol tartrate (LOPRESSOR) 25 MG tablet Take 0.5 tablets (12.5 mg) by mouth 2 times daily  Qty: 30 tablet, Refills: 0    Comments: Future refills by PCP Dr. Katia Carver Mai with phone number [phone].  Associated Diagnoses: ACS (acute coronary syndrome) (H)      thin (NO BRAND SPECIFIED) lancets Use with lanceting device twice daily  Qty: 100 each, Refills: 0    Comments: To accompany: per insurance.  Associated Diagnoses: Hypoglycemia       torsemide (DEMADEX) 20 MG tablet Take 1 tablet (20 mg) by mouth 2 times daily  Qty: 60 tablet, Refills: 0    Comments: Future refills by PCP Dr. Katia Carver Mai with phone number [phone].  Associated Diagnoses: Secondary cardiomyopathy (H)         CONTINUE these medications which have NOT CHANGED    Details   albuterol (PROAIR HFA/PROVENTIL HFA/VENTOLIN HFA) 108 (90 Base) MCG/ACT inhaler Inhale 2 puffs into the lungs every 6 hours as needed for shortness of breath / dyspnea or wheezing      azaTHIOprine (IMURAN) 50 MG tablet Take 3 tablets (150 mg) by mouth daily And have labs drawn two weeks after starting medication.  Qty: 90 tablet, Refills: 3    Associated Diagnoses: ILD (interstitial lung disease) (H)      !! benzonatate (TESSALON) 100 MG capsule Take 100 mg by mouth every morning and PRN      !! benzonatate (TESSALON) 100 MG capsule Take 1 capsule (100 mg) by mouth 3 times daily as needed for cough  Qty: 60 capsule, Refills: 0    Associated Diagnoses: Cough      folic acid (FOLVITE) 1 MG tablet Take 1 tablet (1 mg) by mouth daily  Qty: 90 tablet, Refills: 3    Associated Diagnoses: Rheumatoid arthritis with negative rheumatoid factor, involving unspecified site (H); MCTD (mixed connective tissue disease) (H); Systemic sclerosis (H)      losartan (COZAAR) 50 MG tablet Take 0.5 tablets (25 mg) by mouth daily  Qty: 45 tablet, Refills: 3    Associated Diagnoses: Rheumatoid arthritis with negative rheumatoid factor, involving unspecified site (H); Systemic sclerosis (H); Shortness of breath; Raynaud's disease without gangrene      naproxen (NAPROSYN) 500 MG tablet Take 1 tablet (500 mg) by mouth 2 times daily as needed for moderate pain  Qty: 10 tablet, Refills: 0      nitroGLYcerin (NITROSTAT) 0.3 MG sublingual tablet Place 1 tablet (0.3 mg) under the tongue every 5 minutes as needed for chest pain (esophageal spasm) For chest pain place 1 tablet under the tongue every 5 minutes for 3 doses. If symptoms persist  5 minutes after 3rd dose call 911.  Qty: 12 tablet, Refills: 1    Associated Diagnoses: Esophageal spasm      omeprazole (PRILOSEC) 20 MG DR capsule Take 1 capsule (20 mg) by mouth 2 times daily  Qty: 180 capsule, Refills: 3    Associated Diagnoses: Esophageal dysphagia; Gastroesophageal reflux disease, unspecified whether esophagitis present      pantoprazole (PROTONIX) 40 MG EC tablet Take 1 tablet (40 mg) by mouth daily  Qty: 90 tablet, Refills: 3    Associated Diagnoses: Gastro-esophageal reflux disease without esophagitis; Other disorders of calcium metabolism      potassium chloride ER (KLOR-CON M) 10 MEQ CR tablet Take 30 mEq by mouth daily      predniSONE (DELTASONE) 10 MG tablet Take 3 tablets (30 mg) by mouth daily  Qty: 90 tablet, Refills: 3    Associated Diagnoses: ILD (interstitial lung disease) (H); MCTD (mixed connective tissue disease) (H); Sarcoidosis      spironolactone (ALDACTONE) 25 MG tablet Take 25 mg by mouth daily     Associated Diagnoses: Immunization due; Raynaud's disease without gangrene; Rheumatoid arthritis with negative rheumatoid factor, involving unspecified site (H); MCTD (mixed connective tissue disease) (H)      sulfamethoxazole-trimethoprim (BACTRIM) 400-80 MG tablet Take 1 tablet by mouth daily  Qty: 30 tablet, Refills: 3    Associated Diagnoses: ILD (interstitial lung disease) (H)      temazepam (RESTORIL) 15 MG capsule Take 1 capsule (15 mg) by mouth nightly as needed for sleep  Qty: 30 capsule, Refills: 1    Associated Diagnoses: ILD (interstitial lung disease) (H); Drug-induced insomnia (H); Adverse effect of prednisone, initial encounter      vitamin D2 (ERGOCALCIFEROL) 64563 units (1250 mcg) capsule Take 50,000 Units by mouth once a week       !! - Potential duplicate medications found. Please discuss with provider.      STOP taking these medications       furosemide (LASIX) 40 MG tablet Comments:   Reason for Stopping:             Allergies   Allergies   Allergen  Reactions     Lovenox Other (See Comments)     Blood clot      Norvasc [Amlodipine Besylate] Swelling     Lip swelling that happened on 2 different occasions     Erythromycin Rash     Rash on arms     Data   Most Recent 3 CBC's:  Recent Labs   Lab Test 12/22/21  0534 12/21/21 0719 12/20/21  2335   WBC 3.8* 4.8 3.1*   HGB 10.8* 12.5 13.4   MCV 95 94 94    209 213      Most Recent 3 BMP's:  Recent Labs   Lab Test 12/22/21  1155 12/22/21  0811 12/22/21  0534 12/22/21  0215 12/21/21  2255 12/21/21  1702 12/21/21  1434 12/21/21  0805 12/21/21  0719 12/20/21  2335   NA  --   --  140  --   --   --   --   --  139 140   POTASSIUM  --   --  3.8  --  4.4  --  3.6  --  4.2 4.1   CHLORIDE  --   --  110*  --   --   --   --   --  109 111*   CO2  --   --  25  --   --   --   --   --  22 20   BUN  --   --  13  --   --   --   --   --  12 10   CR  --   --  0.89  --   --   --   --   --  0.98 0.67   ANIONGAP  --   --  5  --   --   --   --   --  8 9   DAVID  --   --  8.2*  --   --   --   --   --  8.5 9.1   GLC 79 60* 90   < >  --    < >  --    < > 94 149*    < > = values in this interval not displayed.     Most Recent 2 LFT's:  Recent Labs   Lab Test 12/20/21  2335 11/11/21  1133 08/10/21  1419   AST 17 21 32   ALT 15 18 26   ALKPHOS 81  --  73   BILITOTAL 0.7  --  0.9     Most Recent INR's and Anticoagulation Dosing History:  Anticoagulation Dose History    There is no flowsheet data to display.       Most Recent 3 Troponin's:  Recent Labs   Lab Test 07/06/21  0619   TROPI 0.018     Most Recent Cholesterol Panel:  Recent Labs   Lab Test 12/22/21  0534   CHOL 116   LDL 55   HDL 47*   TRIG 71     Most Recent 6 Bacteria Isolates From Any Culture (See EPIC Reports for Culture Details):  Recent Labs   Lab Test 04/14/21  1033 10/06/20  1319   CULT 50,000 to 100,000 colonies/mL  Klebsiella oxytoca  * Culture negative for acid fast bacilli  Assayed at [facility] [address] [place] [address] [phone]  No growth after 4  weeks  Penicillium species  isolated  *  No additional fungi cultured after 4 weeks incubation  No Legionella species isolated  No growth     Most Recent TSH, T4 and A1c Labs:  Recent Labs   Lab Test 21  2335 20  1445 14  1059   TSH 0.49 1.69  --    T4  --  1.30  --    A1C  --   --  4.7     Results for orders placed or performed during the hospital encounter of 21   Echocardiogram Complete     Value    LVEF  35%    [facility]    [id number]  [id number]  [id number]^SETH^CHARISSE^INGRIS     [facility]  Echocardiography Laboratory  [address] [place] [id number]     Name: OSCAR REYES  MRN: [id number]  : 1960  Study Date: 2021 08:35 AM  Age: 61 yrs  Gender: Female  Patient Location: [facility]  Reason For Study: Chest Pain, Chest Tightness, Chest Pressure  Ordering Physician: CHARISSE ANN  Referring Physician: CHARISSE ANN  Performed By: Jose Danielle     BSA: 2.0 m2  Height: 67 in  Weight: 187 lb  HR: 90  BP: 145/100 mmHg  ______________________________________________________________________________  Procedure  Complete Echo Adult. Optison (NDC #3212-9484) given intravenously.  ______________________________________________________________________________  Interpretation Summary     1. The left ventricle is normal in size. The visual ejection fraction is  estimated at 35%. Global hypokinesis, distal LV segments (anteroseptal) appear  more hypokinetic than other areas.  2. The right ventricle is mildly dilated. The right ventricular systolic  function is normal.  3. There is mild to moderate (1-2+) mitral regurgitation.  4. There is moderately severe (3+) tricuspid regurgitation.  5. Moderate (46-55mmHg) pulmonary hypertension is present. The right  ventricular systolic pressure is approximated at 54mmHg plus the right atrial  pressure.  6. The ascending aorta is Mildly dilated. 4.2cm.     Echo from 2020 showed EF 55%, mild  TR, aorta 3.8cm.  ______________________________________________________________________________  Left Ventricle  The left ventricle is normal in size. There is normal left ventricular wall  thickness. The visual ejection fraction is estimated at 35%. Left ventricular  diastolic function is indeterminate. Global hypokinesis, distal LV segments  (anteroseptal) appear more hypokinetic than other areas.     Right Ventricle  The right ventricle is mildly dilated. The right ventricular systolic function  is normal.     Atria  The left atrium is severely dilated. The right atrium is moderately dilated.  There is no atrial shunt seen.     Mitral Valve  There is mild mitral annular calcification. There is mild to moderate (1-2+)  mitral regurgitation.     Tricuspid Valve  There is moderately severe (3+) tricuspid regurgitation. Moderate (46-55mmHg)  pulmonary hypertension is present. The right ventricular systolic pressure is  approximated at 54mmHg plus the right atrial pressure.     Aortic Valve  There is trace to mild aortic regurgitation.     Pulmonic Valve  There is mild to moderate (1-2+) pulmonic valvular regurgitation.     Vessels  The ascending aorta is Mildly dilated. Normal IVC size, cannot assess collapse  due to limited image quality.     Pericardium  There is no pericardial effusion.     Rhythm  Sinus rhythm was noted.  ______________________________________________________________________________  MMode/2D Measurements & Calculations  IVSd: 1.1 cm     LVIDd: 5.4 cm  LVIDs: 4.0 cm  LVPWd: 0.88 cm  FS: 25.8 %  LV mass(C)d: 207.4 grams  LV mass(C)dI: 105.5 grams/m2  Ao root diam: 3.7 cm  asc Aorta Diam: 4.2 cm  LVOT diam: 2.1 cm  LVOT area: 3.4 cm2  LA Volume (BP): 130.0 ml  LA Volume Index (BP): 66.0 ml/m2  RWT: 0.32     Doppler Measurements & Calculations  MV E max emmett: 87.8 cm/sec  MV A max emmett: 88.8 cm/sec  MV E/A: 0.99  Ao V2 max: 156.2 cm/sec  Ao max PG: 10.0 mmHg  ALLISON(V,D): 2.0 cm2  LV V1 max PG: 3.3  mmHg  LV V1 max: 91.2 cm/sec  LV V1 VTI: 17.6 cm  SV(LVOT): 59.4 ml  SI(LVOT): 30.2 ml/m2  PA V2 max: 96.9 cm/sec  PA max PG: 3.8 mmHg  PA acc time: 0.06 sec  PI end-d ck: 168.7 cm/sec  TR max ck: 369.2 cm/sec  TR max P.5 mmHg  AV Ck Ratio (DI): 0.58  Lateral E/e': 11.1     ______________________________________________________________________________  Report approved by: Mirza Gleason 2021 09:53 AM         Cardiac Catheterization    Narrative    1.  Minimal non-obstructive CAD  2.  Successful closure of the 6F RFA access site with a single 6F   Angioseal closure device

## 2021-12-22 NOTE — PROGRESS NOTES
Met with patient at bedside to confirm her preference for cardiology f/u. Pt expresses that she wants to transfer her cardiology care to the [facility], as all of her other providers are through [facility]. Pt reports she has been seen in the past by Dr. Bentley. Will call to schedule f/u cMRI and MD appointment.  Addendum: Left  for Ananda Kumar at [facility] CORE/HF clinic. Provided pt's phone # and writer's contact number.  Sweetie Bell will put order for outpatient cMRI into discharge paperwork.  Writer will provide instructions for pt to schedule her cardiology follow ups.

## 2021-12-22 NOTE — PROGRESS NOTES
12/22/21 1102   Quick Adds   Type of Visit Initial Occupational Therapy Evaluation   Living Environment   People in home child(anurag), adult;child(anurag), dependent;sibling(s);spouse  (6 people including 3 dependent children)   Current Living Arrangements house  (1 level home, no basement)   Home Accessibility no concerns   Transportation Anticipated car, drives self;family or friend will provide  (pt drives, spouse does not)   Living Environment Comments spouse, son and daughter-in-law, 3 grandchildren, older sister   Self-Care   Usual Activity Tolerance fair   Current Activity Tolerance poor   Regular Exercise No   Equipment Currently Used at Home shower chair;other (see comments)  (pt has chair she uses for activities)   Disability/Function   Hearing Difficulty or Deaf no   Wear Glasses or Blind yes   Vision Management reading glasses   Concentrating, Remembering or Making Decisions Difficulty no   Difficulty Communicating no   Difficulty Eating/Swallowing no   Walking or Climbing Stairs Difficulty no   Dressing/Bathing Difficulty no   Doing Errands Independently Difficulty (such as shopping) no   Fall history within last six months no   Change in Functional Status Since Onset of Current Illness/Injury yes  (limited endurance for activities)   General Information   Onset of Illness/Injury or Date of Surgery 12/21/21   Referring Physician Robbi Suarez   Patient/Family Therapy Goal Statement (OT) return home   Additional Occupational Profile Info/Pertinent History of Current Problem Pt is a 61 year old female admitted for increasing dyspnea and palpitations.  Suspected ACS and CHF, angiorgam also done to check heart function.  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  Pt also has Shogren's syndrome.   Performance Patterns (Routines, Roles, Habits) Pt states she dresses and bathes herself.  Helps with  housework but has low endurance so spouse and other family members do most of it.     Existing Precautions/Restrictions no known precautions/restrictions   General Observations and Info Pt laying in bed willing to participate.   Cognitive Status Examination   Orientation Status orientation to person, place and time   Affect/Mental Status (Cognitive) WNL   Follows Commands WNL   Visual Perception   Visual Impairment/Limitations corrective lenses for reading   Pain Assessment   Patient Currently in Pain Yes, see Vital Sign flowsheet   Range of Motion Comprehensive   General Range of Motion no range of motion deficits identified   Strength Comprehensive (MMT)   General Manual Muscle Testing (MMT) Assessment no strength deficits identified   Coordination   Upper Extremity Coordination No deficits were identified   Bed Mobility   Bed Mobility No deficits identified   Transfers   Transfers sit-stand transfer   Sit-Stand Transfer   Sit-Stand [place] (Transfers) independent   Clinical Impression   Criteria for Skilled Therapeutic Interventions Met (OT) yes   OT Diagnosis decreased endurance and independence for ADLS   OT Problem List-Impairments impacting ADL problems related to;activity tolerance impaired;mobility;strength;pain   Assessment of Occupational Performance 3-5 Performance Deficits   Identified Performance Deficits decreased endurance for dressing, bathring, functional mobility, household chores   Clinical Decision Making Complexity (OT) low complexity   Therapy Frequency (OT) 1x eval and treat   Risk & Benefits of therapy have been explained evaluation/treatment results reviewed;care plan/treatment goals reviewed;patient   OT Discharge Planning    OT Discharge Recommendation (DC Rec) Home with assist   OT Rationale for DC Rec Pt has good overall mobility with limited physical endurance.  Is familiar with some energy conservaton strategies, would benefit from skilled OT to increase endurance and develop  strategies to maintain energy level and pariticipation in daily activities   OT Brief overview of current status  SBA for bed mobility and energy conservation techniques.  May benefit from toilet safety frame to help with toilet transfers, engagement in walking program at home.   Total Evaluation Time (Minutes)   Total Evaluation Time (Minutes) 15

## 2021-12-22 NOTE — PROGRESS NOTES
[facility]  Cardiology Progress Note    Date of Service (when I saw the patient): 12/22/2021  Primary Cardiologist: [facility]       Interval History:   No new issues today.    ----------------------------------------------------------------------------------------    Assessment:  Jeanine Schuler is a 61 year old female who was admitted on 12/21/2021 with shortness of breath and peripheral edema.    #Acute HFrEF (improved)  --Initiated on IV Lasix 20 mg twice daily  --Home regimen includes Lasix 40 mg twice daily as well as spironolactone   --Weight trended down and had adequate output    #New systolic cardiomyopathy  --LVEF down to 3035% with distal hypokinesis  --Coronary angiogram showed minimal nonobstructive CAD  --Etiology is unclear but differential includes Takotsubo, cardiac sarcoidosis, and myocarditis--will benefit from cardiac MRI as outpatient  --On losartan, low-dose metoprolol, and spironolactone    #Palpitations  -History of PVCs; telemetry shows PVCs and no other arrhythmias    #Severe pulmonary hypertension  -Likely multifactorial in etiology; has connective tissue disorder as well as interstitial lung disease    #Mild aortic dilation  --4 cm    #Connective tissue disorder    #Probable sarcoidosis  --Patient reports this was not biopsy confirmed    ----------------------------------------------------------------------------------------    Plan:  --Transition to torsemide 20 mg twice daily  --Resume PTA spironolactone  --Continue with ARB and beta-blocker  --1 week Zio patch monitor today prior to discharge  --Outpatient cardiac MRI and follow-up with her cardiology team at [facility]    ----------------------------------------------------------------------------------------  Physical Exam   Temp: 97.8  F (36.6  C) Temp src: Oral BP: 120/72 Pulse: 72   Resp: 16 SpO2: 93 % O2 Device: None (Room air) Oxygen Delivery: 2 LPM  Vitals:    12/21/21  0700 12/22/21 0546   Weight: 84.9 kg (187 lb 3.2 oz) 81.6 kg (180 lb)     GEN: NAD.  Oxygen on room air.  HEENT: Mucous membranes moist.  NECK: No JVD.  C/V:  Regular rate and rhythm, no murmur, rub or gallop.   RESP: Clear to auscultation bilaterally.  GI: Abdomen soft, nontender, nondistended.    EXTREM: No pitting LE edema.   NEURO: Alert and oriented, cooperative.   PSYCH: Normal affect.  SKIN: Warm and dry.   VASC: 2+ radial and dorsalis pedis pulses bilaterally.      Medications     Continuing ACE inhibitor/ARB/ARNI from home medication list OR ACE inhibitor/ARB order already placed during this visit       - MEDICATION INSTRUCTIONS -       - MEDICATION INSTRUCTIONS -       - MEDICATION INSTRUCTIONS -         aspirin  81 mg Oral Daily     azaTHIOprine  150 mg Oral Daily     benzonatate  100 mg Oral QAM     cefTRIAXone  1 g Intravenous Q24H     folic acid  1 mg Oral Daily     insulin aspart  1-6 Units Subcutaneous Q4H     losartan  25 mg Oral Daily     magnesium oxide  400 mg Oral Daily     metoprolol tartrate  12.5 mg Oral BID     pantoprazole  40 mg Oral Daily     predniSONE  30 mg Oral Daily     sodium chloride (PF)  10 mL Intracatheter Once     sulfamethoxazole-trimethoprim  1 tablet Oral Daily       Data   Reviewed.    Tele: NICK Bell PA-C   12/22/2021  Pager: [phone]

## 2021-12-22 NOTE — PLAN OF CARE
..Neuro- AxO 4  Tele/Cardiac- SR  Resp- RA  Activity- Ind  Pain- Denies  Drips- Intermittent Antibiotics  Drains/Tubes- PIV x 1  Skin- R groin site CDI  GI/- WDL  Aggression Color- Green  COVID status- Neg  Plan- Pending Discharge

## 2021-12-23 ENCOUNTER — PATIENT OUTREACH (OUTPATIENT)
Dept: CARE COORDINATION | Facility: CLINIC | Age: 61
End: 2021-12-23

## 2021-12-23 DIAGNOSIS — Z71.89 OTHER SPECIFIED COUNSELING: ICD-10-CM

## 2021-12-23 NOTE — PLAN OF CARE
VSS. Tele: SR. BG but asymptomatic. Glucometer teaching done by float resource nurse. IV out and monitor off. Zio patch placed by EKG tech. Cardiology reviewed US results of right groin and are ok with discharge tonight. Reviewed discharge instructions and limitations with pt. Medications filled here and sent home with pt. All questions and concerns addressed. Pt has phone numbers of who to call for [facility] and clinic number incase she has any further issues with her groin site. Pt has all of her belongings. Pt brought down to door 6 where her son picked her up.

## 2021-12-23 NOTE — PROGRESS NOTES
"Clinic Care Coordination Contact  [facility]: Post-Discharge Note  SITUATION                                                      Admission:    Admission Date: 12/20/21   Reason for Admission: SOB  Discharge:   Discharge Date: 12/21/21  Discharge Diagnosis: Mild CAD    BACKGROUND                                                      Jeanine Schuler was admitted on 12/21/2021.  The following problems were addressed during her hospitalization:     Mild CAD  CHF exacerbation with newly reduced EF 30-35%  Severe pulmonary hypertension  Palpitations, hx PVCs  She does not seem to have known prior CAD. She presents with dyspnea and palpitations and is found to have myonecrosis and ST segment depressions on EKG. CTPA suggests acute pulmonary edema as well, though she is not hypoxic at the sending ED. Note is made of her complex pulmonary and rheumatologic history as below. She could have demand ischemia due to acute diastolic CHF exacerbation and pulmonary hypertension. She could have symptomatic arrhythmia, noting prior history of PVC's. Her COVID PCR was negative tonight. A Heparin infusion was started in the ED prior to transfer.  12/21 Echo: LVEF 35% with distal hypokinesis and moderate MR, mod-severe TR and severe pulmonary hypertension. August 2020 EF was 55-60%  *12/21 coronary angio: minimal nonobstructive CAD. Ost LAD to Prox LAD lesion and prox RCA lesion both 20% stenosed.    ASSESSMENT           Discharge Assessment  How are you doing now that you are home?: \" I am doing alright\"  How are your symptoms? (Red Flag symptoms escalate to triage hotline per guidelines): Improved  Do you feel your condition is stable enough to be safe at home until your provider visit?: Yes  Does the patient have their discharge instructions? : Yes  Does the patient have questions regarding their discharge instructions? : No  Were you started on any new medications or were there changes to any of your previous medications? " : Yes  Does the patient have all of their medications?: Yes  Do you have questions regarding any of your medications? : No  Do you have all of your needed medical supplies or equipment (DME)?  (i.e. oxygen tank, CPAP, cane, etc.): Yes  Discharge follow-up appointment scheduled within 14 calendar days? : Yes  Discharge Follow Up Appointment Date: 01/06/22  Discharge Follow Up Appointment Scheduled with?: Specialty Care Provider    Post-op ([facility] CTA Only)  If the patient had a surgery or procedure, do they have any questions for a nurse?: No             PLAN                                                      Outpatient Plan:    Follow up with primary care provider, Katia Carver Mai, within 7 days for hospital follow- up.  BMP and magnesium level in one week     Follow up with cardiology at [facility] for outpatient cardiac MRI          Future Appointments   Date Time Provider Department Center   1/6/2022 12:30 PM [id number] [facility]   1/6/2022  1:00 PM UC PFL D UCPFT [facility]   1/6/2022  2:00 PM Beena Cuellar MD [facility]   1/13/2022  4:30 PM Nicolás De La Torre MD [facility]         For any urgent concerns, please contact our 24 hour nurse triage line: [phone] ([phone])         Marielos Briones

## 2022-01-01 ENCOUNTER — TELEPHONE (OUTPATIENT)
Dept: CARDIOLOGY | Facility: CLINIC | Age: 62
End: 2022-01-01

## 2022-01-01 ENCOUNTER — VIRTUAL VISIT (OUTPATIENT)
Dept: RHEUMATOLOGY | Facility: CLINIC | Age: 62
End: 2022-01-01
Attending: INTERNAL MEDICINE
Payer: COMMERCIAL

## 2022-01-01 ENCOUNTER — ANCILLARY PROCEDURE (OUTPATIENT)
Dept: CT IMAGING | Facility: CLINIC | Age: 62
End: 2022-01-01
Payer: COMMERCIAL

## 2022-01-01 ENCOUNTER — HEALTH MAINTENANCE LETTER (OUTPATIENT)
Age: 62
End: 2022-01-01

## 2022-01-01 ENCOUNTER — OFFICE VISIT (OUTPATIENT)
Dept: PULMONOLOGY | Facility: CLINIC | Age: 62
End: 2022-01-01
Attending: INTERNAL MEDICINE
Payer: COMMERCIAL

## 2022-01-01 VITALS — DIASTOLIC BLOOD PRESSURE: 73 MMHG | HEART RATE: 85 BPM | OXYGEN SATURATION: 98 % | SYSTOLIC BLOOD PRESSURE: 106 MMHG

## 2022-01-01 DIAGNOSIS — D86.9 SARCOIDOSIS: Primary | ICD-10-CM

## 2022-01-01 DIAGNOSIS — D84.9 IMMUNOSUPPRESSED STATUS (H): ICD-10-CM

## 2022-01-01 DIAGNOSIS — I73.00 RAYNAUD'S DISEASE WITHOUT GANGRENE: ICD-10-CM

## 2022-01-01 DIAGNOSIS — K21.9 GASTRO-ESOPHAGEAL REFLUX DISEASE WITHOUT ESOPHAGITIS: ICD-10-CM

## 2022-01-01 DIAGNOSIS — D86.9 SARCOIDOSIS: ICD-10-CM

## 2022-01-01 DIAGNOSIS — G62.9 NEUROPATHY: ICD-10-CM

## 2022-01-01 DIAGNOSIS — E83.59 OTHER DISORDERS OF CALCIUM METABOLISM: ICD-10-CM

## 2022-01-01 DIAGNOSIS — Z79.624 ENCOUNTER FOR LONG TERM CURRENT USE OF AZATHIOPRINE: ICD-10-CM

## 2022-01-01 DIAGNOSIS — M34.9 SYSTEMIC SCLEROSIS (H): ICD-10-CM

## 2022-01-01 DIAGNOSIS — M35.00 SJOGREN'S SYNDROME, WITH UNSPECIFIED ORGAN INVOLVEMENT (H): ICD-10-CM

## 2022-01-01 DIAGNOSIS — M35.1 MCTD (MIXED CONNECTIVE TISSUE DISEASE) (H): ICD-10-CM

## 2022-01-01 DIAGNOSIS — Z79.52 ON PREDNISONE THERAPY: ICD-10-CM

## 2022-01-01 DIAGNOSIS — Z79.899 HIGH RISK MEDICATION USE: ICD-10-CM

## 2022-01-01 DIAGNOSIS — J84.9 ILD (INTERSTITIAL LUNG DISEASE) (H): ICD-10-CM

## 2022-01-01 LAB
6 MIN WALK (FT): 545 FT
6 MIN WALK (M): 166 M
DLCOUNC-%PRED-PRE: 74 %
DLCOUNC-PRE: 17.26 ML/MIN/MMHG
DLCOUNC-PRED: 23.16 ML/MIN/MMHG
ERV-%PRED-PRE: 79 %
ERV-PRE: 0.86 L
ERV-PRED: 1.08 L
EXPTIME-PRE: 3.94 SEC
FEF2575-%PRED-PRE: 167 %
FEF2575-PRE: 4.01 L/SEC
FEF2575-PRED: 2.39 L/SEC
FEFMAX-%PRED-PRE: 81 %
FEFMAX-PRE: 5.67 L/SEC
FEFMAX-PRED: 6.98 L/SEC
FEV1-%PRED-PRE: 95 %
FEV1-PRE: 2.55 L
FEV1FEV6-PRE: 98 %
FEV1FEV6-PRED: 81 %
FEV1FVC-PRE: 98 %
FEV1FVC-PRED: 80 %
FEV1SVC-PRE: 97 %
FEV1SVC-PRED: 70 %
FIFMAX-PRE: 4.56 L/SEC
FIO2-PRE: 21 %
FVC-%PRED-PRE: 77 %
FVC-PRE: 2.61 L
FVC-PRED: 3.38 L
IC-%PRED-PRE: 64 %
IC-PRE: 1.76 L
IC-PRED: 2.72 L
VA-%PRED-PRE: 76 %
VA-PRE: 4.31 L
VC-%PRED-PRE: 68 %
VC-PRE: 2.62 L
VC-PRED: 3.8 L

## 2022-01-01 PROCEDURE — 99214 OFFICE O/P EST MOD 30 MIN: CPT | Mod: 95 | Performed by: INTERNAL MEDICINE

## 2022-01-01 PROCEDURE — 99215 OFFICE O/P EST HI 40 MIN: CPT | Mod: 25 | Performed by: INTERNAL MEDICINE

## 2022-01-01 PROCEDURE — 94729 DIFFUSING CAPACITY: CPT | Performed by: INTERNAL MEDICINE

## 2022-01-01 PROCEDURE — 94618 PULMONARY STRESS TESTING: CPT | Performed by: INTERNAL MEDICINE

## 2022-01-01 PROCEDURE — 94375 RESPIRATORY FLOW VOLUME LOOP: CPT | Performed by: INTERNAL MEDICINE

## 2022-01-01 PROCEDURE — 71250 CT THORAX DX C-: CPT | Mod: GC | Performed by: RADIOLOGY

## 2022-01-01 PROCEDURE — G0463 HOSPITAL OUTPT CLINIC VISIT: HCPCS | Mod: 25

## 2022-01-01 PROCEDURE — G0463 HOSPITAL OUTPT CLINIC VISIT: HCPCS | Mod: PN,RTG | Performed by: INTERNAL MEDICINE

## 2022-01-01 RX ORDER — GABAPENTIN 100 MG/1
200 CAPSULE ORAL
Qty: 60 CAPSULE | Refills: 5 | Status: SHIPPED | OUTPATIENT
Start: 2022-01-01 | End: 2023-01-01

## 2022-01-01 RX ORDER — PANTOPRAZOLE SODIUM 40 MG/1
40 TABLET, DELAYED RELEASE ORAL DAILY
Qty: 90 TABLET | Refills: 1 | Status: ON HOLD | OUTPATIENT
Start: 2022-01-01 | End: 2023-01-01

## 2022-01-06 ENCOUNTER — OFFICE VISIT (OUTPATIENT)
Dept: PULMONOLOGY | Facility: CLINIC | Age: 62
End: 2022-01-06
Attending: INTERNAL MEDICINE
Payer: COMMERCIAL

## 2022-01-06 VITALS
OXYGEN SATURATION: 96 % | HEIGHT: 67 IN | RESPIRATION RATE: 17 BRPM | HEART RATE: 87 BPM | WEIGHT: 174 LBS | DIASTOLIC BLOOD PRESSURE: 92 MMHG | BODY MASS INDEX: 27.31 KG/M2 | SYSTOLIC BLOOD PRESSURE: 149 MMHG

## 2022-01-06 DIAGNOSIS — J84.9 ILD (INTERSTITIAL LUNG DISEASE) (H): ICD-10-CM

## 2022-01-06 DIAGNOSIS — D86.9 SARCOIDOSIS: Primary | ICD-10-CM

## 2022-01-06 DIAGNOSIS — Z23 NEED FOR SHINGLES VACCINE: ICD-10-CM

## 2022-01-06 DIAGNOSIS — Z79.899 ENCOUNTER FOR LONG-TERM (CURRENT) USE OF HIGH-RISK MEDICATION: ICD-10-CM

## 2022-01-06 PROCEDURE — 90750 HZV VACC RECOMBINANT IM: CPT | Performed by: INTERNAL MEDICINE

## 2022-01-06 PROCEDURE — 90471 IMMUNIZATION ADMIN: CPT | Performed by: INTERNAL MEDICINE

## 2022-01-06 PROCEDURE — 250N000021 HC RX MED A9270 GY (STAT IND- M) 250: Performed by: INTERNAL MEDICINE

## 2022-01-06 PROCEDURE — 94375 RESPIRATORY FLOW VOLUME LOOP: CPT | Performed by: INTERNAL MEDICINE

## 2022-01-06 PROCEDURE — 94726 PLETHYSMOGRAPHY LUNG VOLUMES: CPT | Performed by: INTERNAL MEDICINE

## 2022-01-06 PROCEDURE — 99215 OFFICE O/P EST HI 40 MIN: CPT | Mod: 25 | Performed by: INTERNAL MEDICINE

## 2022-01-06 PROCEDURE — G0463 HOSPITAL OUTPT CLINIC VISIT: HCPCS | Mod: 25

## 2022-01-06 PROCEDURE — 94729 DIFFUSING CAPACITY: CPT | Performed by: INTERNAL MEDICINE

## 2022-01-06 RX ADMIN — ZOSTER VACCINE RECOMBINANT, ADJUVANTED 0.5 ML: KIT at 14:26

## 2022-01-06 ASSESSMENT — MIFFLIN-ST. JEOR: SCORE: 1396.89

## 2022-01-06 ASSESSMENT — PAIN SCALES - GENERAL: PAINLEVEL: NO PAIN (0)

## 2022-01-06 NOTE — LETTER
1/6/2022     RE: Jeanine Schuler  [address]  [facility] [address]    Dear Colleague,    Thank you for referring your patient, Jeanine Schuler, to the [facility] [place]. Please see a copy of my visit note below.    [facility]  ILD Clinic - Return Visit-  January 6, 2022         Assessment and Plan:   Jeanine Schuler is a 61 year old female who presents for initial evaluation of interstitial lung disease.    1) Pulmonary sarcoidosis vs. Chronic HP  - Definitive tissue diagnosis has not been obtained for sarcoidosis however her case was reviewed at sarcoid conference and imaging as well as pathology showing some noncaseating granulomas, sarcoidosis was thought to be a reasonable diagnosis..  Other differential diagnosis included chronic HP.  - Progression of symptoms and   -Methotrexate subQ 20mg and CellCept 500 mg BID discontinued due to lack of response.   - Currently on Imuran 150mg (dose uptitrated 9/2021-) and Prednisone which resulted in marked improvement in chest CT and PFTs.  However patients dyspnea continued to worsen, culminating in an admission 12/21-22.  There, TTE showed new depressed EF w/ global hypokinesis. Cardiac MRI was ordered but the patients has not scheduled it yet.    - Schedule cardiac MRI to be done at [facility].  She has already completed 7 day Ziopatch and we will review these studies at the cardiac sarcoidosis conference.  She will likely need a right heart catheterization in the future.  -Sarcoidosis labs obtained on 3/2021 showed elevated 1, 25 OH-vitamin D, with low normal 25-OH vitamin D, consistent with granulomatous inflammation.  Soluble IL-2 receptor (7/2021) was also high at 995 (reference range 137-838).  ACE was normal.  -Repeat CRP, ESR, ACE and soluble IL-2 receptor  -Repeat a 6-minute walk test to assess for exertional hypoxemia today    2) History of  limited cutaneous systemic sclerosis  - On Plaquinil and CellCept, followed by Dr. North    3) Neuropathy   -She was seen by Dr. Abel from neurology in 5/2021, and nerve conduction studies and EMG was abnormal. She is planned for a nerve biopsy of her right foot.    RTC: 3 months or sooner if need arises  Influenza vaccination: She has already received Influenza vaccination for this year. She has received Pfizer COVID-19 vaccine in April, received her first dose of Shingrix 10/2020.  Second dose of Shingrix today.    I spent 55 minutes  total today, reviewing medical records including progress notes, multiple imaging studies from her previous available records, and documentation.      Beena Cuellar MD [employer]  Pulmonary and Critical Care Medicine          Problem List:     1. Interstitial lung disease  a. Symptom: Cough, fatigue, sone subjective dyspnea  b. Treatment: (11/2020-) Prednisone, currently at mg, CellCept (5/2021-9/2021) Azathioprine 100mg daily (7/2021-9/2021), 150mg daily (9/2021-), MTX (4/2021-9/2021)  c. Diagnosis: Cryobiopsy Pathology  d. NSIP vs. HP, sarcoidosis can not be r/o. (ALI, what appears to be an acute exacerbation of underlying ILD with OP.  Few nonnecrotizing granulomas, cellular NSIP that can be seen in early sarcodosis)  Radiology: bronchocentric nodular infiltrates  e. Serology:TARA 1: 1280, anticentromere antibody greater than 8, anti-RNP-4.8 (less than 1), chromatin DNP-6.6 (less than 1.0)  f. Other w/u  i. Bronchoscopy with BAL ([facility]; 8/4/2020)  1. BAL (superior lingula) cell count (49% neutrophils, 45% lymphocytes, 3% monocytes, 3% eosinophils)  2. Negative for malignant cells, silver stain negative for pneumocystis and fungal organisms.  3. Pathology (anterior lingual segments of the left upper lobe): Benign bronchial epithelium and alveolar spaces.  There is no evidence of malignancy.  The findings are overall nondiagnostic.  ii. Bronchoscopy with BAL and cryobiopsy  "([facility]; 10/6/2020)  1. BAL (, other cells 32, L 47, N 19, E 2; CD4: CD8 3.65)  2. Microbiology-all negative except for penicillium species from fungal culture only.  Aspergillus galactomannan was negative  iii. LUNG, LEFT UPPER LOBE, TRANSBRONCHIAL CRYOBIOPSY:   - Fragments of respiratory mucosa and alveolated lung tissue with nonnecrotizing granulomas with multinucleated giant cells, foci of organizing pneumonia and nonspecific chronic interstitial inflammation   with reactive pneumocytes type II hyperplasia.   - GMS and AFB special stains are negative for fungal and acid-fast organisms, respectively.   COMMENT:   In absence of infection (ie. Mycobacteria, please correlate with  laboratory cultures and serology), the histologic findings raise the differential diagnosis of hypersensitivity pneumonitis and sarcoidosis. Clinical   and radiologic correlation is necessary. The case will be discussed at the ILD interdepartmental conference.     2. Mixed connective tissue disease/limited cutaneous systemic sclerosis  a. Followed by Dr. Indio lopez. Treatment: (\"years ago\") Methotrexate PO -> SQ due to GI intolerance, unclear why stoped  Plaquinil    3. GERD    4. HFpEF    5. History of gastric bypass 2005        History of Present Illness:     Jeanine Schuler is a 61 year old female who presents for followup for interstitial lung disease. She was last seen in 9/2021.      Since her last visit, she sawDr. De La Torre 11/11/2021, and was instructed to decrease Prednisone by 5mg q month if symptoms allow. She was complaining of bilateral lower extremity weakness, CK was 28.     She was also briefly hospitalized at [facility] from 12/21-22. She presented with tachycardia and shortness of breath. CT PE protocol was negative. A subsequent echocardiogram revealed newly depressed LVEF of 35% with global hypokinesis with distal of the segments appear more hypokinetic. In addition, there was 3+ TR, moderate pulmonary " hypertension. She also states that she gained weight in a short amount of time prior to her admission.  She underwent a LHC that showed minimal nonobstructive CAD.  She was started on IV diuresis, transition to p.o. torsemide.  She was already on spironolactone prior to her admission.    Since her admission, she states that her shortness of breath has improved compared to right remission but not back to her baseline.  She states that she becomes dyspneic with minimal exertion and still feels palpitation with exertion. She has not had a 6MWT since 9/2021 (no O2 requirement at that time).      Interval history (9/2021):  Couldn't finish her 6MWT due to dyspnea.  Whole hands turning purple and felt like she was goint to pass out.  Can walk around in the living room and in the house but has to sit down and rest with short distances.     Had shingles on her left anterior thoracic wall.  Never really had a rash, but did get a sharp pain and a small 2 little blisters.  Pain is improved significantly but still feels pain / tight. Nonpainful touch is painful.     Still on 40mg of prednisone, Only got one dose of SHingrix     Hard to tell if SOB or fatigue, feels like she can't get enough air.  Whole hand turns purple.      A little bit of weight gain, troule sleeping, irritability.      Imuran - tolerating well. Has a little more looser stools but not bothersome.  Having a hard time eating due to esophageal dysmotility.  Solids are the worst. Trying to drink more water.     Interval history: 7/2021  Since her last visit, she was started on methotrexate, now uptitrated to 20 mg weekly.  She has been methotrexate for a total of about 2 months, and does not note any improvement in her breathing.  In fact, she feels as though her respiratory status has been worse, and she is overall fatigued.  She saw Dr. Ann in May, and was started on CellCept 500 mg twice daily.  Her prednisone dose has been down titrated to 20 mg daily.   Despite the above, she does not feel as though her symptoms are any better.  She does note that her coughing seems to have improved with initiation of methotrexate.    She is generally tolerating the methotrexate well, but does experience some nausea and dry heaving about 48 hours after her weekly dose.    She denies any fevers, chills, night sweats, chest pain except she started noticing left lower anterior chest/upper abdominal pain along with left-sided shoulder pain which started last night.  She has had similar pains in the past, but was on the contralateral side earlier in the year.     She denies any changes in her appetite, she has gained about 10 pounds since 5/2021, which he attributes to increased lower extremity edema.  She continues to take the same dose of Lasix and spironolactone.    Interval history (3/2021)  Since her last visit, she is unsure if there has been any change in her respiratory status.  She currently complains of extreme fatigue and pain/numbness in her feet and legs.  She occasionally also experiences pain in her upper chest/throat area.  In addition, she has gained about 5 to 10 pounds on prednisone.    Since her last visit, her prednisone dose has been tapered slowly (5 mg every 3 weeks).  However, when she reached about 25 mg, she started noticing increased pain in her feet.  This has progressively worsened on lower doses of 20 mg, and she was evaluated by Dr. Loomis that week, who increase her prednisone to 40 mg daily, which she is currently on.    With initiation of higher doses of prednisone, she feels as though her pain has improved somewhat but has not resolved.  She denies any fever/chills, but states she still occasionally experiences night sweats, about once a week.  This has improved in terms of frequency with initiation of prednisone.           Review of Systems:     Please see HPI, otherwise the complete 10 point ROS is negative.           Past Medical and Surgical  History:     Past Medical History:   Diagnosis Date     Anemia      Bariatric surgery status 06/27/2005    abdi en [facility];     Cellulitis of leg 06/06/2013     Esophageal reflux     Better post-hiatal hernia repair and weight loss     Hyperplastic colonic polyp      Hypovitaminosis D 2011     ILD (interstitial lung disease) (H)      Kidney stone 04/29/2007     Kidney stone 05/10/2007    surgically removed     Limb ischemia; ulcers of fingertips 04/22/2013     Other chronic pain     Left shoulder - rheumatoid arthritis     Raynaud's syndrome      Rheumatoid arthritis (H) \     Scleroderma (H)      Sjogren-Jake syndrome      Systemic sclerosis (H) 2009     Unspecified essential hypertension      Past Surgical History:   Procedure Laterality Date     BIOPSY MUSCLE DIAGNOSTIC (LOCATION) Right 7/19/2021    Procedure: Right SURAL nerve BIOPSY;  Surgeon: Farooq Abel MD;  Location: [facility] OR     BRONCHOSCOPY FLEXIBLE,L CRYOBIOPSY N/A 10/06/2020    Procedure: BRONCHOSCOPY, FLEXIBLE, WITH TRANSBRONCHIAL BIOPSY x4 USING CRYOPROBE, bronchial alveolar lavage;  Surgeon: Teddy Mendez MD;  Location:  OR     BYPASS GASTRIC, CHOLECYSTECTOMY, COMBINED  06/27/2005    [facility]     CHOLECYSTECTOMY       COLONOSCOPY       COLONOSCOPY N/A 11/17/2020    Procedure: COLONOSCOPY, WITH POLYPECTOMY AND BIOPSY;  Surgeon: Jamie Cárdenas MD;  Location: [facility] OR     CV HEART CATHETERIZATION WITH POSSIBLE INTERVENTION Left 12/21/2021    Procedure: Heart Catheterization with Possible Intervention;  Surgeon: Wesley Mclean MD;  Location: [facility] CARDIAC CATH LAB     ESOPHAGOSCOPY, GASTROSCOPY, DUODENOSCOPY (EGD), COMBINED N/A 03/10/2016    Procedure: COMBINED ESOPHAGOSCOPY, GASTROSCOPY, DUODENOSCOPY (EGD), BIOPSY SINGLE OR MULTIPLE;  Surgeon: Tricia Zambrano MD;  Location:  GI     ESOPHAGOSCOPY, GASTROSCOPY, DUODENOSCOPY (EGD), COMBINED N/A 11/17/2020    Procedure: ESOPHAGOGASTRODUODENOSCOPY,  WITH BIOPSY and Dilation;  Surgeon: Jamie Cárdenas MD;  Location: [facility] OR     INNER EAR SURGERY       PICC INSERTION  06/05/2013    5fr DL Power PICC, 44cm, left basilic vein, tip in low SVC     SURGICAL HISTORY OF -       Left ear surgery - incus transition, tympanoplasty     SURGICAL HISTORY OF -   06/01/2005    Hiatal hernia repair     TONSILLECTOMY            Social History:     Social History     Tobacco Use     Smoking status: Never Smoker     Smokeless tobacco: Never Used   Substance Use Topics     Alcohol use: Yes     Comment: occassionally     Drug use: No     Social History     Social History Narrative    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  She has been on medical leave since 7/27/2020        Moved about 4 years ago to a new house. No known water damage or mold.        She lives in a normal area with forms around her house, but denies any exposure to grain dust, hay, other farm animals.        No unusual hobbies                  Medications:     Current Outpatient Medications   Medication     albuterol (PROAIR HFA/PROVENTIL HFA/VENTOLIN HFA) 108 (90 Base) MCG/ACT inhaler     alcohol swab prep pads     aspirin (ASA) 81 MG chewable tablet     azaTHIOprine (IMURAN) 50 MG tablet     benzonatate (TESSALON) 100 MG capsule     benzonatate (TESSALON) 100 MG capsule     blood glucose (NO BRAND SPECIFIED) test strip     blood glucose calibration (NO BRAND SPECIFIED) solution     blood glucose monitoring (NO BRAND SPECIFIED) meter device kit     cefdinir (OMNICEF) 300 MG capsule     folic acid (FOLVITE) 1 MG tablet     losartan (COZAAR) 50 MG tablet     magnesium oxide (MAG-OX) 400 MG tablet     metoprolol tartrate (LOPRESSOR) 25 MG tablet     naproxen (NAPROSYN) 500 MG tablet     nitroGLYcerin (NITROSTAT) 0.3 MG sublingual tablet     omeprazole (PRILOSEC) 20 MG DR capsule     pantoprazole (PROTONIX) 40 MG EC tablet     potassium chloride ER (KLOR-CON M) 10 MEQ CR tablet     predniSONE  "(DELTASONE) 10 MG tablet     spironolactone (ALDACTONE) 25 MG tablet     sulfamethoxazole-trimethoprim (BACTRIM) 400-80 MG tablet     temazepam (RESTORIL) 15 MG capsule     thin (NO BRAND SPECIFIED) lancets     torsemide (DEMADEX) 20 MG tablet     vitamin D2 (ERGOCALCIFEROL) 40753 units (1250 mcg) capsule     No current facility-administered medications for this visit.            Physical Exam:   BP (!) 149/92   Pulse 87   Resp 17   Ht 1.702 m (5' 7\")   Wt 78.9 kg (174 lb)   SpO2 96%   BMI 27.25 kg/m    GENERAL: alert, NAD  HEENT: NCAT, EOMI, masked  Neck: no cervical or supraclavicular adenopathy  Lungs: good air flow, faint inspiratory squeak   CV: RRR, S1S2, no murmurs noted  Abdomen: normoactive BS, soft, non tender  Neuro: AAO X 3  Psychiatric: normal affect, good eye contact  Skin: no rash, jaundice or lesions on limited exam  Extremities: 1-2+ pitting edema b/l.            Imaging:     CT chest PE protocol ([facility]; 12/21/2021):                                  IMPRESSION:  1.  There is no pulmonary embolus.  2.  Borderline aneurysmal ascending thoracic aorta at 4.0 cm.  3.  Probable pulmonary edema and bilateral pleural effusions.  4.  Cardiomegaly.    CT chest w/o contrast ([facility]; 9/17/2021)  IMPRESSION: Multifocal centrilobular micronodularity with basilar predominant subpleural reticulation. Overall findings have not significantly progressed from comparison CT chest on 7/1/2021.  Differential remains broad including sarcoidosis as well as scleroderma given the fluid-filled distended esophagus. Mild air trapping during expiration.     CT Chest ([facility]; 7/20/2020)  1. Bilateral pulmonary consolidation is probably due to infection. Other inflammatory processes are also on the differential. Although radiographic pattern is somewhat typical of sarcoid, this is less likely given significant worsening since 10/23/2019. Correlate clinically.  2. Mediastinal lymphadenopathy, probably reactive.  3. No " pulmonary embolism.  4. Postoperative changes of gastric bypass with debris in the esophagus. Although this is present, the pulmonary consolidation is not typical of aspiration.           Pulmonary Function Tests:     PFT interpretation (January 6, 2022):  Maneuver: ATS criteria i not met.  Interpret with caution.  Spirometry: Suggestive of moderate restriction.  Lung volumes: TLC is 74% predicted, c/w mild restriction.   Diffusing capacity: There is mild impairment in diffusing capacity      Six-minute walk test  Date 6MWD RA SpO2 Resting O2 req Exercise O2 req Lowest SpO2 on amb   8/21/20 940 97 RA RA 90                    Laboratory:     Serology ([facility]; 7/27/2020)  Chromatin DNP antibody -6.6 (less than 1.0)  Ribosomal antibody-negative  SSA-2.0 (less than 1.0)  SSB-negative anti-SM RNP-greater than 80 (less than 1.0)  SCL 70-negative  Maddy 1-negative  Centromere antibody -greater than 8.0 (less than 1.0)  SM IgG-negative  Anti-RNP-4.8 (less than 1.0)  Anti-DS DNA- negative  TARA - positive    6/10/2009  TARA- 1: 1280  RF-20 (less than 9)    No results found for this or any previous visit (from the past 168 hour(s)).    BAL (8/4/2020)  Negative for Legionella, RVP, Bordetella, C pneumoniae, mycoplasma pneumonia    A. Lingula bronchial alveolar lavage, cytology  1. 49% neutrophils, 45% lymphocytes, 3% monocytes, and 3% eosinophils.  2. Negative for malignant cells.  3. Silver stain negative for pneumocystis and fungal organisms.  B. Lingula and anterior segments, left upper lobe transbronchial biopsy?-Negative for malignancy, see microscopic description.    Pathology:   A. The smear has the following differential 49% neutrophils, 45% lymphocytes, 3% monocytes, and 3% macrophages. There is no evidence of malignancy. A silver stain is applied, with an appropriate positive control, and is negative for pneumocystis and fungal organisms.  B. The biopsy shows benign bronchial epithelium and alveolar spaces. There  is no evidence of malignancy. The findings are overall nondiagnostic.             Cardiac:     TTE ([facility]; 8/25/2020)  Interpretation Summary  Global and regional left ventricular function is normal with an EF of 55-60%.  Right ventricular function, chamber size, wall motion, and thickness are  normal.  Pulmonary artery systolic pressure cannot be assessed.  Ascending aorta 3.8 cm.  Aortic index 20mm/m2,mild dilation.  The inferior vena cava is normal.  No pericardial effusion is present.    Echocardiogram ([facility]; 10/16/2017)  Interpretation Summary    * Normal left ventricular size and systolic function, estimated LVEF 60-65%.    * Normal regional wall motion.    * Normal right ventricular size and systolic function.    * The left ventricular diastolic function is mildly abnormal (Grade I).    * There is no hemodynamically significant valve disease.    * Normal estimated right ventricular systolic pressure of 28 mmHg.    * The proximal ascending aorta is mildly dilated measuring  3.9 cm.    * The IVC is normal in size (< 2.1 cm), > 50% respiratory variance, RA  pressure normal at 3 mmHg.    * Compared to prior study on 8/26/16, there has been no significant change  in left ventricular systolic function with side by side comparison.  There has  been no significant change in the size of the proximal ascending aorta  (previously measured 4.0 cm).         Other:     Again, thank you for allowing me to participate in the care of your patient.      Sincerely,    Beena Cuellar MD

## 2022-01-06 NOTE — PROGRESS NOTES
[facility]  ILD Clinic - Return Visit-  January 6, 2022         Assessment and Plan:   Jeanine Schuler is a 61 year old female who presents for initial evaluation of interstitial lung disease.    1) Pulmonary sarcoidosis vs. Chronic HP  - Definitive tissue diagnosis has not been obtained for sarcoidosis however her case was reviewed at sarcoid conference and imaging as well as pathology showing some noncaseating granulomas, sarcoidosis was thought to be a reasonable diagnosis..  Other differential diagnosis included chronic HP.  - Progression of symptoms and   -Methotrexate subQ 20mg and CellCept 500 mg BID discontinued due to lack of response.   - Currently on Imuran 150mg (dose uptitrated 9/2021-) and Prednisone which resulted in marked improvement in chest CT and PFTs.  However patients dyspnea continued to worsen, culminating in an admission 12/21-22.  There, TTE showed new depressed EF w/ global hypokinesis. Cardiac MRI was ordered but the patients has not scheduled it yet.    - Schedule cardiac MRI to [facility].  She has already completed 7 day Ziopatch and we will review these studies at the cardiac sarcoidosis conference.  She will likely need a right heart catheterization in the future.  -Sarcoidosis labs obtained on 3/2021 showed elevated 1, 25 OH-vitamin D, with low normal 25-OH vitamin D, consistent with granulomatous inflammation.  Soluble IL-2 receptor (7/2021) was also high at 995 (reference range 137-838).  ACE was normal.  -Repeat CRP, ESR, ACE and soluble IL-2 receptor  -Repeat a 6-minute walk test to assess for exertional hypoxemia today    2) History of limited cutaneous systemic sclerosis  - On Plaquinil and CellCept, followed by Dr. North    3) Neuropathy   -She was seen by Dr. Abel from neurology in 5/2021, and nerve conduction studies and EMG was abnormal. She is planned for a nerve biopsy of her right foot.    RTC: 3  months or sooner if need arises  Influenza vaccination: She has already received Influenza vaccination for this year. She has received Pfizer COVID-19 vaccine in April, received her first dose of Shingrix 10/2020.  Second dose of Shingrix today.    I spent 55 minutes  total today, reviewing medical records including progress notes, multiple imaging studies from her previous available records, and documentation.      Beena Cuellar MD [employer]  Pulmonary and Critical Care Medicine          Problem List:     1. Interstitial lung disease  a. Symptom: Cough, fatigue, sone subjective dyspnea  b. Treatment: (11/2020-) Prednisone, currently at mg, CellCept (5/2021-9/2021) Azathioprine 100mg daily (7/2021-9/2021), 150mg daily (9/2021-), MTX (4/2021-9/2021)  c. Diagnosis: Cryobiopsy Pathology  d. NSIP vs. HP, sarcoidosis can not be r/o. (ALI, what appears to be an acute exacerbation of underlying ILD with OP.  Few nonnecrotizing granulomas, cellular NSIP that can be seen in early sarcodosis)  Radiology: bronchocentric nodular infiltrates  e. Serology:TARA 1: 1280, anticentromere antibody greater than 8, anti-RNP-4.8 (less than 1), chromatin DNP-6.6 (less than 1.0)  f. Other w/u  i. Bronchoscopy with BAL ([facility]; 8/4/2020)  1. BAL (superior lingula) cell count (49% neutrophils, 45% lymphocytes, 3% monocytes, 3% eosinophils)  2. Negative for malignant cells, silver stain negative for pneumocystis and fungal organisms.  3. Pathology (anterior lingual segments of the left upper lobe): Benign bronchial epithelium and alveolar spaces.  There is no evidence of malignancy.  The findings are overall nondiagnostic.  ii. Bronchoscopy with BAL and cryobiopsy ([facility]; 10/6/2020)  1. BAL (, other cells 32, L 47, N 19, E 2; CD4: CD8 3.65)  2. Microbiology-all negative except for penicillium species from fungal culture only.  Aspergillus galactomannan was negative  iii. LUNG, LEFT UPPER LOBE, TRANSBRONCHIAL CRYOBIOPSY:   - Fragments  "of respiratory mucosa and alveolated lung tissue with nonnecrotizing granulomas with multinucleated giant cells, foci of organizing pneumonia and nonspecific chronic interstitial inflammation   with reactive pneumocytes type II hyperplasia.   - GMS and AFB special stains are negative for fungal and acid-fast organisms, respectively.   COMMENT:   In absence of infection (ie. Mycobacteria, please correlate with  laboratory cultures and serology), the histologic findings raise the differential diagnosis of hypersensitivity pneumonitis and sarcoidosis. Clinical   and radiologic correlation is necessary. The case will be discussed at the ILD interdepartmental conference.     2. Mixed connective tissue disease/limited cutaneous systemic sclerosis  a. Followed by Dr. Indio lopez. Treatment: (\"years ago\") Methotrexate PO -> SQ due to GI intolerance, unclear why stoped  Plaquinil    3. GERD    4. HFpEF    5. History of gastric bypass 2005        History of Present Illness:     Jeanine Schuler is a 61 year old female who presents for followup for interstitial lung disease. She was last seen in 9/2021.      Since her last visit, she sawDr. De La Torre 11/11/2021, and was instructed to decrease Prednisone by 5mg q month if symptoms allow. She was complaining of bilateral lower extremity weakness, CK was 28.     She was also briefly hospitalized at [facility] from 12/21-22. She presented with tachycardia and shortness of breath. CT PE protocol was negative. A subsequent echocardiogram revealed newly depressed LVEF of 35% with global hypokinesis with distal of the segments appear more hypokinetic. In addition, there was 3+ TR, moderate pulmonary hypertension. She also states that she gained weight in a short amount of time prior to her admission.  She underwent a LHC that showed minimal nonobstructive CAD.  She was started on IV diuresis, transition to p.o. torsemide.  She was already on spironolactone prior to her " admission.    Since her admission, she states that her shortness of breath has improved compared to right remission but not back to her baseline.  She states that she becomes dyspneic with minimal exertion and still feels palpitation with exertion. She has not had a 6MWT since 9/2021 (no O2 requirement at that time).      Interval history (9/2021):  Couldn't finish her 6MWT due to dyspnea.  Whole hands turning purple and felt like she was goint to pass out.  Can walk around in the living room and in the house but has to sit down and rest with short distances.     Had shingles on her left anterior thoracic wall.  Never really had a rash, but did get a sharp pain and a small 2 little blisters.  Pain is improved significantly but still feels pain / tight. Nonpainful touch is painful.     Still on 40mg of prednisone, Only got one dose of SHingrix     Hard to tell if SOB or fatigue, feels like she can't get enough air.  Whole hand turns purple.      A little bit of weight gain, troule sleeping, irritability.      Imuran - tolerating well. Has a little more looser stools but not bothersome.  Having a hard time eating due to esophageal dysmotility.  Solids are the worst. Trying to drink more water.     Interval history: 7/2021  Since her last visit, she was started on methotrexate, now uptitrated to 20 mg weekly.  She has been methotrexate for a total of about 2 months, and does not note any improvement in her breathing.  In fact, she feels as though her respiratory status has been worse, and she is overall fatigued.  She saw Dr. Ann in May, and was started on CellCept 500 mg twice daily.  Her prednisone dose has been down titrated to 20 mg daily.  Despite the above, she does not feel as though her symptoms are any better.  She does note that her coughing seems to have improved with initiation of methotrexate.    She is generally tolerating the methotrexate well, but does experience some nausea and dry heaving about 48  hours after her weekly dose.    She denies any fevers, chills, night sweats, chest pain except she started noticing left lower anterior chest/upper abdominal pain along with left-sided shoulder pain which started last night.  She has had similar pains in the past, but was on the contralateral side earlier in the year.     She denies any changes in her appetite, she has gained about 10 pounds since 5/2021, which he attributes to increased lower extremity edema.  She continues to take the same dose of Lasix and spironolactone.    Interval history (3/2021)  Since her last visit, she is unsure if there has been any change in her respiratory status.  She currently complains of extreme fatigue and pain/numbness in her feet and legs.  She occasionally also experiences pain in her upper chest/throat area.  In addition, she has gained about 5 to 10 pounds on prednisone.    Since her last visit, her prednisone dose has been tapered slowly (5 mg every 3 weeks).  However, when she reached about 25 mg, she started noticing increased pain in her feet.  This has progressively worsened on lower doses of 20 mg, and she was evaluated by Dr. Loomis that week, who increase her prednisone to 40 mg daily, which she is currently on.    With initiation of higher doses of prednisone, she feels as though her pain has improved somewhat but has not resolved.  She denies any fever/chills, but states she still occasionally experiences night sweats, about once a week.  This has improved in terms of frequency with initiation of prednisone.           Review of Systems:     Please see HPI, otherwise the complete 10 point ROS is negative.           Past Medical and Surgical History:     Past Medical History:   Diagnosis Date     Anemia      Bariatric surgery status 06/27/2005    abdi en [facility];     Cellulitis of leg 06/06/2013     Esophageal reflux     Better post-hiatal hernia repair and weight loss     Hyperplastic colonic polyp       Hypovitaminosis D 2011     ILD (interstitial lung disease) (H)      Kidney stone 04/29/2007     Kidney stone 05/10/2007    surgically removed     Limb ischemia; ulcers of fingertips 04/22/2013     Other chronic pain     Left shoulder - rheumatoid arthritis     Raynaud's syndrome      Rheumatoid arthritis (H) \     Scleroderma (H)      Sjogren-Jake syndrome      Systemic sclerosis (H) 2009     Unspecified essential hypertension      Past Surgical History:   Procedure Laterality Date     BIOPSY MUSCLE DIAGNOSTIC (LOCATION) Right 7/19/2021    Procedure: Right SURAL nerve BIOPSY;  Surgeon: Farooq Abel MD;  Location: [facility] OR     BRONCHOSCOPY FLEXIBLE,L CRYOBIOPSY N/A 10/06/2020    Procedure: BRONCHOSCOPY, FLEXIBLE, WITH TRANSBRONCHIAL BIOPSY x4 USING CRYOPROBE, bronchial alveolar lavage;  Surgeon: Teddy Mendez MD;  Location: [facility] OR     BYPASS GASTRIC, CHOLECYSTECTOMY, COMBINED  06/27/2005    [facility]     CHOLECYSTECTOMY       COLONOSCOPY       COLONOSCOPY N/A 11/17/2020    Procedure: COLONOSCOPY, WITH POLYPECTOMY AND BIOPSY;  Surgeon: Jamie Cárdenas MD;  Location: [facility] OR     CV HEART CATHETERIZATION WITH POSSIBLE INTERVENTION Left 12/21/2021    Procedure: Heart Catheterization with Possible Intervention;  Surgeon: Wesley Mclean MD;  Location: [facility] CARDIAC CATH LAB     ESOPHAGOSCOPY, GASTROSCOPY, DUODENOSCOPY (EGD), COMBINED N/A 03/10/2016    Procedure: COMBINED ESOPHAGOSCOPY, GASTROSCOPY, DUODENOSCOPY (EGD), BIOPSY SINGLE OR MULTIPLE;  Surgeon: Tricia Zambrano MD;  Location:  GI     ESOPHAGOSCOPY, GASTROSCOPY, DUODENOSCOPY (EGD), COMBINED N/A 11/17/2020    Procedure: ESOPHAGOGASTRODUODENOSCOPY, WITH BIOPSY and Dilation;  Surgeon: Jamie Cárdenas MD;  Location: [facility] OR     INNER EAR SURGERY       PICC INSERTION  06/05/2013    5fr DL Power PICC, 44cm, left basilic vein, tip in low SVC     SURGICAL HISTORY OF -       Left ear surgery - incus transition, tympanoplasty      SURGICAL HISTORY OF -   06/01/2005    Hiatal hernia repair     TONSILLECTOMY            Social History:     Social History     Tobacco Use     Smoking status: Never Smoker     Smokeless tobacco: Never Used   Substance Use Topics     Alcohol use: Yes     Comment: occassionally     Drug use: No     Social History     Social History Narrative    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  She has been on medical leave since 7/27/2020        Moved about 4 years ago to a new house. No known water damage or mold.        She lives in a normal area with forms around her house, but denies any exposure to grain dust, hay, other farm animals.        No unusual hobbies                  Medications:     Current Outpatient Medications   Medication     albuterol (PROAIR HFA/PROVENTIL HFA/VENTOLIN HFA) 108 (90 Base) MCG/ACT inhaler     alcohol swab prep pads     aspirin (ASA) 81 MG chewable tablet     azaTHIOprine (IMURAN) 50 MG tablet     benzonatate (TESSALON) 100 MG capsule     benzonatate (TESSALON) 100 MG capsule     blood glucose (NO BRAND SPECIFIED) test strip     blood glucose calibration (NO BRAND SPECIFIED) solution     blood glucose monitoring (NO BRAND SPECIFIED) meter device kit     cefdinir (OMNICEF) 300 MG capsule     folic acid (FOLVITE) 1 MG tablet     losartan (COZAAR) 50 MG tablet     magnesium oxide (MAG-OX) 400 MG tablet     metoprolol tartrate (LOPRESSOR) 25 MG tablet     naproxen (NAPROSYN) 500 MG tablet     nitroGLYcerin (NITROSTAT) 0.3 MG sublingual tablet     omeprazole (PRILOSEC) 20 MG DR capsule     pantoprazole (PROTONIX) 40 MG EC tablet     potassium chloride ER (KLOR-CON M) 10 MEQ CR tablet     predniSONE (DELTASONE) 10 MG tablet     spironolactone (ALDACTONE) 25 MG tablet     sulfamethoxazole-trimethoprim (BACTRIM) 400-80 MG tablet     temazepam (RESTORIL) 15 MG capsule     thin (NO BRAND SPECIFIED) lancets     torsemide (DEMADEX) 20 MG tablet     vitamin D2 (ERGOCALCIFEROL) 43957  "units (1250 mcg) capsule     No current facility-administered medications for this visit.            Physical Exam:   BP (!) 149/92   Pulse 87   Resp 17   Ht 1.702 m (5' 7\")   Wt 78.9 kg (174 lb)   SpO2 96%   BMI 27.25 kg/m    GENERAL: alert, NAD  HEENT: NCAT, EOMI, masked  Neck: no cervical or supraclavicular adenopathy  Lungs: good air flow, faint inspiratory squeak   CV: RRR, S1S2, no murmurs noted  Abdomen: normoactive BS, soft, non tender  Neuro: AAO X 3  Psychiatric: normal affect, good eye contact  Skin: no rash, jaundice or lesions on limited exam  Extremities: 1-2+ pitting edema b/l.            Imaging:     CT chest PE protocol ([facility]; 12/21/2021):                                  IMPRESSION:  1.  There is no pulmonary embolus.  2.  Borderline aneurysmal ascending thoracic aorta at 4.0 cm.  3.  Probable pulmonary edema and bilateral pleural effusions.  4.  Cardiomegaly.    CT chest w/o contrast ([facility]; 9/17/2021)  IMPRESSION: Multifocal centrilobular micronodularity with basilar predominant subpleural reticulation. Overall findings have not significantly progressed from comparison CT chest on 7/1/2021.  Differential remains broad including sarcoidosis as well as scleroderma given the fluid-filled distended esophagus. Mild air trapping during expiration.     CT Chest ([facility]; 7/20/2020)  1. Bilateral pulmonary consolidation is probably due to infection. Other inflammatory processes are also on the differential. Although radiographic pattern is somewhat typical of sarcoid, this is less likely given significant worsening since 10/23/2019. Correlate clinically.  2. Mediastinal lymphadenopathy, probably reactive.  3. No pulmonary embolism.  4. Postoperative changes of gastric bypass with debris in the esophagus. Although this is present, the pulmonary consolidation is not typical of aspiration.           Pulmonary Function Tests:     PFT interpretation (January 6, 2022):  Maneuver: ATS criteria i not " met.  Interpret with caution.  Spirometry: Suggestive of moderate restriction.  Lung volumes: TLC is 74% predicted, c/w mild restriction.   Diffusing capacity: There is mild impairment in diffusing capacity      Six-minute walk test  Date 6MWD RA SpO2 Resting O2 req Exercise O2 req Lowest SpO2 on amb   8/21/20 940 97 RA RA 90                    Laboratory:     Serology ([facility]; 7/27/2020)  Chromatin DNP antibody -6.6 (less than 1.0)  Ribosomal antibody-negative  SSA-2.0 (less than 1.0)  SSB-negative anti-SM RNP-greater than 80 (less than 1.0)  SCL 70-negative  Maddy 1-negative  Centromere antibody -greater than 8.0 (less than 1.0)  SM IgG-negative  Anti-RNP-4.8 (less than 1.0)  Anti-DS DNA- negative  TARA - positive    6/10/2009  TARA- 1: 1280  RF-20 (less than 9)    No results found for this or any previous visit (from the past 168 hour(s)).    BAL (8/4/2020)  Negative for Legionella, RVP, Bordetella, C pneumoniae, mycoplasma pneumonia    A. Lingula bronchial alveolar lavage, cytology  1. 49% neutrophils, 45% lymphocytes, 3% monocytes, and 3% eosinophils.  2. Negative for malignant cells.  3. Silver stain negative for pneumocystis and fungal organisms.  B. Lingula and anterior segments, left upper lobe transbronchial biopsy?-Negative for malignancy, see microscopic description.    Pathology:   A. The smear has the following differential 49% neutrophils, 45% lymphocytes, 3% monocytes, and 3% macrophages. There is no evidence of malignancy. A silver stain is applied, with an appropriate positive control, and is negative for pneumocystis and fungal organisms.  B. The biopsy shows benign bronchial epithelium and alveolar spaces. There is no evidence of malignancy. The findings are overall nondiagnostic.             Cardiac:     TTE ([facility]; 8/25/2020)  Interpretation Summary  Global and regional left ventricular function is normal with an EF of 55-60%.  Right ventricular function, chamber size, wall motion, and  thickness are  normal.  Pulmonary artery systolic pressure cannot be assessed.  Ascending aorta 3.8 cm.  Aortic index 20mm/m2,mild dilation.  The inferior vena cava is normal.  No pericardial effusion is present.    Echocardiogram ([facility]; 10/16/2017)  Interpretation Summary    * Normal left ventricular size and systolic function, estimated LVEF 60-65%.    * Normal regional wall motion.    * Normal right ventricular size and systolic function.    * The left ventricular diastolic function is mildly abnormal (Grade I).    * There is no hemodynamically significant valve disease.    * Normal estimated right ventricular systolic pressure of 28 mmHg.    * The proximal ascending aorta is mildly dilated measuring  3.9 cm.    * The IVC is normal in size (< 2.1 cm), > 50% respiratory variance, RA  pressure normal at 3 mmHg.    * Compared to prior study on 8/26/16, there has been no significant change  in left ventricular systolic function with side by side comparison.  There has  been no significant change in the size of the proximal ascending aorta  (previously measured 4.0 cm).         Other:

## 2022-01-06 NOTE — NURSING NOTE
Chief Complaint   Patient presents with     RECHECK     Return Interstitial Lung- 3 month follow up     Medications reviewed and vital signs taken.   Juan Rosa, CMA

## 2022-01-12 ENCOUNTER — TELEPHONE (OUTPATIENT)
Dept: CARDIOLOGY | Facility: CLINIC | Age: 62
End: 2022-01-12

## 2022-01-12 ENCOUNTER — HOSPITAL ENCOUNTER (OUTPATIENT)
Dept: MRI IMAGING | Facility: CLINIC | Age: 62
Discharge: HOME | End: 2022-01-12
Attending: PHYSICIAN ASSISTANT | Admitting: PHYSICIAN ASSISTANT
Payer: COMMERCIAL

## 2022-01-12 DIAGNOSIS — D86.9 SARCOIDOSIS: Primary | ICD-10-CM

## 2022-01-12 DIAGNOSIS — I42.9 CARDIOMYOPATHY, UNSPECIFIED TYPE (H): ICD-10-CM

## 2022-01-12 LAB
DLCOUNC-%PRED-PRE: 66 %
DLCOUNC-PRE: 15.41 ML/MIN/MMHG
DLCOUNC-PRED: 23.16 ML/MIN/MMHG
ERV-%PRED-PRE: 116 %
ERV-PRE: 1.06 L
ERV-PRED: 0.91 L
EXPTIME-PRE: 6.63 SEC
FEF2575-%PRED-PRE: 144 %
FEF2575-PRE: 3.46 L/SEC
FEF2575-PRED: 2.39 L/SEC
FEFMAX-%PRED-PRE: 97 %
FEFMAX-PRE: 6.82 L/SEC
FEFMAX-PRED: 6.98 L/SEC
FEV1-%PRED-PRE: 101 %
FEV1-PRE: 2.72 L
FEV1FEV6-PRE: 86 %
FEV1FEV6-PRED: 81 %
FEV1FVC-PRE: 86 %
FEV1FVC-PRED: 80 %
FEV1SVC-PRE: 82 %
FEV1SVC-PRED: 70 %
FIFMAX-PRE: 4.08 L/SEC
FRCPLETH-%PRED-PRE: 104 %
FRCPLETH-PRE: 3.06 L
FRCPLETH-PRED: 2.93 L
FVC-%PRED-PRE: 93 %
FVC-PRE: 3.16 L
FVC-PRED: 3.38 L
IC-%PRED-PRE: 78 %
IC-PRE: 2.27 L
IC-PRED: 2.89 L
RVPLETH-%PRED-PRE: 96 %
RVPLETH-PRE: 2 L
RVPLETH-PRED: 2.07 L
TLCPLETH-%PRED-PRE: 94 %
TLCPLETH-PRE: 5.33 L
TLCPLETH-PRED: 5.61 L
VA-%PRED-PRE: 70 %
VA-PRE: 3.97 L
VC-%PRED-PRE: 87 %
VC-PRE: 3.32 L
VC-PRED: 3.8 L

## 2022-01-12 PROCEDURE — 75561 CARDIAC MRI FOR MORPH W/DYE: CPT | Mod: 26 | Performed by: INTERNAL MEDICINE

## 2022-01-12 PROCEDURE — A9585 GADOBUTROL INJECTION: HCPCS | Performed by: PHYSICIAN ASSISTANT

## 2022-01-12 PROCEDURE — 75561 CARDIAC MRI FOR MORPH W/DYE: CPT

## 2022-01-12 PROCEDURE — 255N000002 HC RX 255 OP 636: Performed by: PHYSICIAN ASSISTANT

## 2022-01-12 RX ORDER — GADOBUTROL 604.72 MG/ML
10 INJECTION INTRAVENOUS ONCE
Status: COMPLETED | OUTPATIENT
Start: 2022-01-12 | End: 2022-01-12

## 2022-01-12 RX ADMIN — GADOBUTROL 10 ML: 604.72 INJECTION INTRAVENOUS at 08:05

## 2022-01-12 NOTE — TELEPHONE ENCOUNTER
Monicoopatch completed as below, ordered at discharge after patient's hospitalization for acute CHF, new cardiomyopathy. MRI today is pending. Pt has a cardiology team in [facility]. Will route results to Dr Arshad once MRI is available.     Predominately NSR w/ BBB/IVCD, HR 56-222bpm, avg 78. QRS morphology changes noted. 138 runs SVT, longest x11.6sec at 136bpm. Runs of wide-complex tachycardia likely represent SVT w/ aberrancy. 12% PACs.       MRI  1. The left ventricle is normal in size. There is mild inferoseptal thickening (14 mm). The global systolic  function is mildly reduced. The LVEF is 44%. There is mild diffuse hypokinesis.  2. The right ventricle is normal in cavity size. The global systolic function is normal. The RVEF is 54%.   3. Left atrium is mildly enlarged. Right atrium is normal in size.   4. There is no significant valvular disease.   5. There is mid-myocardial late gadolinium enhancement in the basal septum with extension into the mid inferoseptal segment and inferior right ventricular insertion site. In addition, there is subendocardial hyperenhancement in the basal inferolateral segment. This variable pattern of hyperenhancement is  concerning for infiltrative cardiomyopathy such as sarcoidosis.    6. There is no pericardial effusion.  7. There is no intracardiac thrombus.  8. There is no myocardial edema.   CONCLUSIONS:   1. Non ischemic cardiomyopathy with mildly reduced left ventricular function and normal right ventricular function.   2. Variable patterns of hyperenhancement in the septal mid-myocardium and inferolateral subendocardium are concerning for cardiac sarcoidosis.     Addendum 1100: results routed to Dr Arshad.

## 2022-01-13 ENCOUNTER — ANCILLARY PROCEDURE (OUTPATIENT)
Dept: BONE DENSITY | Facility: CLINIC | Age: 62
End: 2022-01-13
Attending: INTERNAL MEDICINE
Payer: COMMERCIAL

## 2022-01-13 ENCOUNTER — OFFICE VISIT (OUTPATIENT)
Dept: PULMONOLOGY | Facility: CLINIC | Age: 62
End: 2022-01-13
Attending: INTERNAL MEDICINE
Payer: COMMERCIAL

## 2022-01-13 VITALS
DIASTOLIC BLOOD PRESSURE: 97 MMHG | WEIGHT: 174 LBS | HEART RATE: 88 BPM | SYSTOLIC BLOOD PRESSURE: 155 MMHG | HEIGHT: 68 IN | BODY MASS INDEX: 26.37 KG/M2 | OXYGEN SATURATION: 99 %

## 2022-01-13 DIAGNOSIS — F19.982 DRUG-INDUCED INSOMNIA (H): ICD-10-CM

## 2022-01-13 DIAGNOSIS — M06.00 RHEUMATOID ARTHRITIS WITH NEGATIVE RHEUMATOID FACTOR, INVOLVING UNSPECIFIED SITE (H): ICD-10-CM

## 2022-01-13 DIAGNOSIS — Z79.899 HIGH RISK MEDICATION USE: ICD-10-CM

## 2022-01-13 DIAGNOSIS — D86.9 SARCOIDOSIS: ICD-10-CM

## 2022-01-13 DIAGNOSIS — Z79.899 ENCOUNTER FOR LONG-TERM (CURRENT) USE OF HIGH-RISK MEDICATION: ICD-10-CM

## 2022-01-13 DIAGNOSIS — J84.9 ILD (INTERSTITIAL LUNG DISEASE) (H): Primary | ICD-10-CM

## 2022-01-13 LAB
6 MIN WALK (FT): 415 FT
6 MIN WALK (M): 126 M

## 2022-01-13 PROCEDURE — 99215 OFFICE O/P EST HI 40 MIN: CPT | Performed by: INTERNAL MEDICINE

## 2022-01-13 PROCEDURE — G0463 HOSPITAL OUTPT CLINIC VISIT: HCPCS | Mod: 25

## 2022-01-13 PROCEDURE — 94618 PULMONARY STRESS TESTING: CPT | Performed by: INTERNAL MEDICINE

## 2022-01-13 PROCEDURE — 77080 DXA BONE DENSITY AXIAL: CPT | Performed by: INTERNAL MEDICINE

## 2022-01-13 RX ORDER — CLONAZEPAM 0.5 MG/1
0.5 TABLET ORAL
Qty: 60 TABLET | Refills: 1 | Status: SHIPPED | OUTPATIENT
Start: 2022-01-13 | End: 2023-01-01

## 2022-01-13 ASSESSMENT — MIFFLIN-ST. JEOR: SCORE: 1412.76

## 2022-01-13 NOTE — PROGRESS NOTES
In Person-Visit       SUBJECTIVE:  The patient returns in f/u of her MCTD/SSC/RP/with h/o multiple DU.      PROBLEM LIST:    1.  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    2.  History of severe multiple digital ulcers, on fingers and toes.   3.  Marked keratoconjunctivitis sicca over the last several years.    4.  Marked iron deficiency anemia responsive to IV iron.    5.  Diffuse musculoskeletal pain   6.  Lower extremity edema managed with lasix and spironolactone   7.  Marked fatigue and diffuse clinical weakness.    8.  Dyspnea on exertion with lower extremity edema and other features including the anticentromere positivity worrisome for the potential development of pulmonary hypertension.    9.  Marked GERD with associated dysphagia.    10.  Status post gastric bypass with a history of intermittent constipation and diarrhea potentially consistent with bacterial small bowel overgrowth versus other gut effects of the prior surgery.    11.  History of positive rheumatoid factor consistent with MCTD, given the remainder of her clinical presentation.     12. Progressive Jaw tightening with inability to open mouth wider than 1 inch.   13. Diastolic dysfunction (3/2013)  14. Hypoalbuminemia  15. ILD cryobiopsy Pathology was reviewed at ILD conference on 10/12/2020 which showed ALI, what appears to be an acute exacerbation of underlying ILD with OP.  Few nonnecrotizing granulomas  16. 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    Serologically she is RNP positive persistently  over time, historically rheumatoid factor positive but currently negative, and TARA positive in a speckled pattern with the remainder of the VERNON panel normal.    Since we have last seen her she has had worsening cardiac palpitations and dyspnea on exertion with taper of steroid.  She was unable to complete a 6-minute walk test today but had no desaturation though she was very short of breath.    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  I have discussed her case in detail today following her visit with Dr. Cuellar.    I have increased her prednisone back up to 30 mg a day.  She has great difficulty sleeping on these higher doses of prednisone so I also gave her clonazepam to help her sleep.  She previously tried temazepam and was not that helpful but will see if she does better with this.  She is also having a lot of understandable anxiety which this may help although I emphasized to her that if that is a persistent problem she will need to discuss that with her PCP for a better long-term approach.    I am going to have a very low threshold to restart methotrexate and her.  I would consider giving her IV Remicade, but we will want to watch her autoimmunity and interstitial lung disease features very closely if we do that as there are case reports of TNF inhibitors making autoimmune interstitial lung disease worse.  Nonetheless her probable cardiac sarcoid is a grave threat to her and if it can be successfully treated with this approach even for a number of months to get it stabilized this may be the better part of valor.    I want to see her back for a video visit to discuss everything and see how she is doing in 3 weeks sooner as needed.      This visit was 33 minutes in face-to-face time, which an additional 32 minutes spent in chart review, review of the medical literature regarding treatment for cardiac sarcoidosis options, and direct discussion with Dr. Cuellar, as  "well as messaging to Dr. Cuellar and to my nurse for institution of further therapy.  This was all completed on the date of service.      MARTÍN De La Torre MD, PhD    Rheumatology          INTERVAL HISTORY:       2017, INTERVAL HISTORY:  Since we have last seen the patient, she developed a right ankle wound about 2 months ago.  That opened for a while and it became hard to close, but at this point it has finally closed over.       She has had some stressors and she feels like that has contributed to her feeling like she needed more prednisone.  She had made it down to 12 mg a day for almost 6 months but increased to 20 mg a day in  when she was moving to a new house.  This was primarily due to an increase in fatigue and in joint pains.      At this point, her morning stiffness is 30-60 minutes despite remaining on the 20 mg a day of prednisone.  She does think her strength, however, is stable.      She continues to have a lot of Raynaud's phenomena but has not developed any distal digital ulcerations.       Her GERD and keratoconjunctivitis sicca, however, have remained stable and she thinks she has stable exercise tolerance.       She has not done her labs for many, many months and has not seen me for over a year, and we discussed that in some detail.     2018 INTERVAL HISTORY:  Since we have last seen her, she made it down to 12.5 mg a day of prednisone but then put herself back up to 20 mg a day because of increasing general body aches and fatigue as well as some inflammatory joint stiffness in her wrists.  Other joints have actually been okay.      She had stopped her methotrexate some time ago.  She works in a hospital and knew of several people who were admitted with \"methotrexate toxicity\" who  while in hospital and although she does not know the details, that frightened her enough that she decided to stop the drug.       She is getting worsening Raynaud's.  She really " notes that this happens not only with cold, but with a variety of kinds of stress.  She has previously, at least for a short length of time, been on losartan and does not remember whether that helped at all.  I am pretty sure she has also been on calcium channel blockers, but those are relatively contraindicated in her at this point because of persistent bilateral lower extremity edema.       She is getting enough lower extremity edema and has been found to have vascular insufficiency that she will be having some vascular surgery to laser some of these areas, although an exact time for that has not been laid out.       In addition to getting Raynaud's in her hands, she also gets it in her feet, and when she does, potentially contributed to by the vascular insufficiency, she gets numbness in her feet.  She is not getting numbness in her hands by contrast.       The patient did have pulmonary function tests today.  Although she is not exercising regularly, in general she feels her exercise tolerance has been stable, and she is not having any dry cough.       She did have a decrease in her FVC to 3.68 from 4.30 and her DLCO to 21.77 from 24.74, but she has had some rib cage pain for about the last 3 weeks since she had a minor accident while dancing.  Notably then, her TLC is completely stable, going from 6.41 to 6.29.       She denies any other new problems.  Keratoconjunctivitis sicca, GERD and other features have been stable and she denies any dysphagia.     July 11, 2019 interval history:    Since we have last seen her she did have to miss appointment because of some family issues but she is actually been doing quite well.  Although she is continued to have some intermittent joint complaints she is been able to taper her prednisone down to 7.5 mg a day the lowest she has been on and the entire time I have been following her.    Although her joints have not worsened with that lower dose she does think she is getting  some worsening of her Raynauds phenomena.  It can be pretty bad and the attacks can be hard to break even with rewarming.  She gets them in hands and feet.  Fortunately she has not had any digital ulcers.  She does recall that she was placed temporarily on tadalafil sometime ago by Dr. Bentley but she could not afford it long-term.  She thinks that may have been helpful however.    She denies any significant dyspnea on exertion or cough.  She has some muscle weakness but not marketed and she does not think that is any worse over time.  Fatigue has generally been her worst symptom and that may be slightly worse.    She does have some ongoing keratoconjunctivitis sicca but does not feel like she wants to go on a medicine for that.  She already has a full plate of dentures in both upper and lower.      August 11, 2020 interval history:    Since we last seen the patient she feels she was doing relatively stably on till July 7.  At that point she had the abrupt onset of a bad dry cough.  She did not have significant fevers but did not feel well.  She was seen had a negative coronavirus test but a chest x-ray apparently showed evidence of some infiltrates and she was placed on doxycycline.  She says she did not improve with that therapy and a CT angios was performed.  That showed bilateral pulmonary consolidation thought consistent with infection.  There was note that the pattern could actually be somewhat typical for sarcoid but it was noted to have worsened since the prior study performed there at [facility] in October 2019.  She also was noted to have mediastinal lymphadenopathy and debris in the esophagus potentially consistent with her systemic sclerosis.  No pulmonary embolism was found.  Based on the results of that she was placed on Levaquin.  She continued to fail to improve and a third antibiotic was also tried.    Finally she was seen by pulmonologist and a bronchoscopy was performed.  I am able to review  the results of that and there was a high percentage of neutrophils and lymphocytes and that BAL fluid.  Viral cultures however Gram stain and culture were all unremarkable and there were no malignant cells found.    She was placed on 40 mg a day of prednisone at the time of the bronchoscopy once it was clear that there was no acute infection.  She does not have a follow-up appointment yet.  Although her cough is mostly gone she does not feel good and feels very dyspneic just going from the bed to the bathroom or crossed the room.  She is not convinced she has had any improvement with 40 mg daily prednisone and if anything think she is worse.    In reviewing her extensive record I see that a repeat TARA was performed and a reflexive was done when it was positive.  She continues to have an anticentromere antibody which she had previously, but now notably has a high titer Gonzalez and high titer RNP antibody.  She previously was borderline positive for RNP and Gonzalez negative.  SSA is also higher titer than it was previously.    With all these autoantibody positivity she however denies any specific features that would suggest systemic lupus.  She specifically denies sun sensitivity sun sensitive rashes including a malar rash, morning stiffness in any of her joints or any joint swelling and any pleurisy at the time of her  at CT angiogram or otherwise.  \  Her Raynaud's phenomena has been stable as have other features of her limited cutaneous systemic sclerosis.        February 25, 2021 interval history:    At the time that I saw the patient about 3 weeks ago we decided that because her neuropathy sounded rather rapidly progressive, and her HRCT scan was being read as potentially consistent with sarcoidosis, that neurosarcoid was on the differential.  I also wondered about an immune complex mediated small vessel vasculitis causing neuropathy.  We therefore did laboratory testing as well as started her on some  steroid.    Laboratory testing has been largely unrevealing.  She did not have elevated calcium or elevated calcium in the urine.  I did also do a parathyroid hormone in case we did find that however her parathyroid hormone was significantly elevated.  Her vitamin D was very low and she has started supplementation for that.    Most notably however her prednisone at 40 mg a day virtually resolved her symptoms over 3 to 4 days.  She had at least an 80% decrease in her pain and numbness although she still has some residual tingling in the legs bilaterally.    She is having some prednisone side effects.  Specifically she is having a hard time sleeping and is also feeling irritable which she thinks could be due partly due to the loss of sleep but partly directly due to the prednisone.  She has got a lot of prior experience with prednisone and has had some difficulties with it before.    She did have some right-sided chest pain.  That seem to occur with a deep breath.  She has a cardiologist and saw them will be getting a follow-up echo.  She is no longer having that.    In going through her history she does not believe she is ever been on Imuran and is quite sure and I am sure as well that we have never had her on TNF inhibition.  She was on methotrexate and tolerated oral methotrexate poorly due to nausea but was better able to tolerate subcutaneous methotrexate.      Impression:    Per the problem list, with known RNP positivity and more recently with this HRCT finding that is concerning for the possibility of overlapping sarcoidosis.    She had a very nice response to steroid quite quickly, and so we now have the conundrum of not knowing exactly why.  I certainly suspect that she has an immune mediated neuropathy given this rapid response and neurosarcoid is a concern.    I have sent a message to Dr. Cuellar in pulmonary for her thoughts.  I have also ordered a repeat HRCT that to be done in another week or so before  she is seen back in pulmonary.    I am hoping these can be reviewed so that there could be a decision whether we can feel strongly enough that presumptively this could be sarcoidosis to put her on appropriate therapy for that or whether a biopsy might be justified.    We could simply presumptively try steroid sparing medications.  I am somewhat concerned about using TNF inhibitors however unless we are pretty confident this is sarcoid, as they have been associated with worsening of systemic lupus patients and autoimmune associated interstitial lung disease in some settings.    We also have the  finding of the elevated PTH, and very low vitamin D levels, which can be seen in sarcoidosis, but is unclear here given normal Calcium levels. and will refer her to endocrinology for that.    I have given her prescription for temazepam to help her with the sleep and irritability.  I did warn her to be careful about driving in the morning until she knows how it affects her as it does have a long half-life.    I will plan on seeing her back in about 6 to 8 weeks sooner as needed.      April 8, 2021 interval history:    Since we have last seen the patient she has had initial evaluation done, and Dr. Cuellar has a recent note on the chart and an addendum placed a day after the patient's studies were reviewed in sarcoid conference.    The upshot of that is that there is evidence from her studies, in particular the biopsy studies that suggest that this could be sarcoidosis.  Because she has neurologic symptoms and inadequately explain dyspnea on exertion she will also be getting neurology evaluation and cardiac evaluation.    She remains at this time on 40 mg a day of prednisone.  She is not liking this dose.  Although she still has dyspnea on exertion she also is irritable and on edge on that dose and is also experiencing some nausea..    After consideration of the issues, Dr. Cuellar had recommended the patient go on track today.   She has in fact been on both this and MMF before and although she had some difficulties at time with tolerating these medication she think she would tolerate them again.  We have briefly discussed possibility of using a TNF inhibitor but that does give me pause, given that she has had features of mixed connective tissue disease and in TARA positive individuals TNF inhibitors have at times worsen their disease process, particularly in those with lupus associated autoantibodies.  She has had in the past of borderline positive RNP.    She believes most of her other historical clinical features are stable.  She still gets pretty bad Raynaud's phenomena and some features of arthritis although the inflammatory features are currently pretty well controlled on this dose of prednisone.    July 15, 2021 interval history:    Since we have last seen the patient she has continued to have a lot of dyspnea on exertion despite being on the MMF 40 mg a day of prednisone and until just earlier this week methotrexate.    Because of her lung biopsy showing features consistent with sarcoidosis, she was presented at sarcoidosis conference last week.  I attended this discussion.  After much discussion it was decided that azathioprine may be the next drug to try in her.  This was influenced in part by my concerns of giving a TNF inhibitor to her given her underlying autoimmune connective tissue disease, and the possibility of that disease process worsening with TNF inhibition as has been described and as I have seen previously albeit rarely.    She did just get TPMT levels earlier today to help decide what kind of azathioprine dose to start with.  She continues on MMF.    She will be getting a nerve biopsy performed on Monday.    She also was recently in the emergency room because of very severe midsternal chest pain.  She says this developed during the night.  When it would not go away she went to the ER.  There she was given narcotics  after first getting a single sublingual nitroglycerin that did not help.  Extensive cardiopulmonary work-up was negative for any evidence of ischemia or clot.  Eventually she got Toradol after having gotten narcotics and did improve and went home.    She has had some costochondral type pains before but nothing like this and this was not exacerbated or relieved by pressure over the area.    Notably, she is been having worsening problems with GERD and dysphagia as well.  She just spoke with Dr. Cárdenas earlier this morning.  She is getting a lot of pill dysphagia at this point which is very frustrating for her and very uncomfortable.  She had dilatation in the fall and thought it helped for a while but she says this almost feels like it is just a dysmotility problem rather than stricturing to her.        November 11, 2021 interval history:    Patient had a right sural nerve biopsy on 7/19/2021.  Biopsy was notable for axonal neuropathy without a specific cause.  There is no overt evidence of inflammation, primary demyelination, or granulomatous findings.  Patient had return follow-up would pulmonology on 9/16/2021.  Her CT was reviewed, showed bronchocentric and predominantly central infiltrates.Overall findings have not significantly progressed from prior CT on 7/1/2021.  Patient's case was discussed in ILD conference.  Plan was to start Imuran 150 mg daily, as well as starting a prednisone taper (starting at 40 mg daily, then tapering 5 mg every month to be kept at 30 mg daily until follow-up with rheumatology).     Patient overall has no significant change in her health from prior.  She is currently taking hydroxychloroquine, prednisone 30 mg daily, and Imuran 150 daily.  Continues to have fatigue, which she feels has been gradually worsening.  She notes that she can only lift 2-3 grocery bags before feeling tired, and needs assistance when cooking.  Continues to have neuropathy symptoms in her bilateral lower  extremities (more in the right side).  Continues to have shortness of breath with limited activity (e.g. moving from bed to kitchen).  Is currently treating her GERD with Protonix and omeprazole, which she feels controls her symptoms.  Has some mouth dryness, but notices that her eyes are watery which is new for her. She has received her COVID-19 booster immunization.     January 13, 2022 interval history:    Since we have last seen the patient she was admitted to the hospital because of worsening shortness of breath and palpitations.  Coronary angiogram showed minimal areas of stenosis of less than 20%.  Echocardiogram however suggested very significant pulmonary hypertension and a markedly reduced ejection fraction.    She saw Dr. Cuellar back in pulmonary clinic last week.  Cardiac MRI was performed yesterday.  I have reviewed those results today.  There is significant LGE, consistent with an infiltrative process such as sarcoidosis.  She is quite symptomatic.  She had a 6-minute walk test today and had to stop because of how dyspneic she was and how many palpitation she was having but she had absolutely no desaturation.    She has been tapering her prednisone and is down to 20 mg daily but she believes all of the symptoms have worsened with that taper.      ROS as per HPI.  10-point ROS is otherwise negative.    HISTORY REVIEW:  Past Medical History:   Diagnosis Date     Anemia      Cellulitis of leg 6/6/2013     Esophageal reflux     Better post-hiatal hernia repair and weight loss     Limb ischemia; ulcers of fingertips 4/22/2013     Raynaud's syndrome      Scleroderma (H)      Systemic sclerosis (H) 2009     Unspecified essential hypertension        Past Surgical History:   Procedure Laterality Date     BYPASS GASTRIC, CHOLECYSTECTOMY, COMBINED       CHOLECYSTECTOMY       COLONOSCOPY       ESOPHAGOSCOPY, GASTROSCOPY, DUODENOSCOPY (EGD), COMBINED N/A 3/10/2016    Procedure: COMBINED ESOPHAGOSCOPY, GASTROSCOPY,  DUODENOSCOPY (EGD), BIOPSY SINGLE OR MULTIPLE;  Surgeon: Tricia Zambrano MD;  Location:  GI     INNER EAR SURGERY       PICC INSERTION  6/5/2013    5fr DL Power PICC, 44cm, left basilic vein, tip in low SVC     SURGICAL HISTORY OF -       Left ear surgery - incus transition, tympanoplasty     SURGICAL HISTORY OF -   6/05    Gastric bypass     SURGICAL HISTORY OF -   6/05    Cholecystectomy     SURGICAL HISTORY OF -   6/05    Hiatal hernia repair     TONSILLECTOMY         Family History   Problem Relation Age of Onset     Cerebrovascular Disease Father      C.A.D. Father      C.A.D. Mother      C.A.D. 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      Social History Main Topics     Smoking status: Never Smoker      Smokeless tobacco: Never Used     Alcohol Use: Yes      occassionally     Drug Use: No     Sexually Active: Yes -- Male partner(s)     Birth Control/ Protection: None     Social History Narrative     Was  in December 2015. Longtime partner from [place].       Patient Active Problem List   Diagnosis     Essential hypertension     Thyrotoxicosis     Obesity     Prolonged QTc 460 ms,  at hr 67     Low back pain - Suspect SI Joint dysfunction     Cough     Systemic sclerosis (H)     Tumoral calcinosis     Shortness of breath     Edema of both legs     Abnormal albumin     Myopathy     Hypoalbuminemia     Diastolic dysfunction     Limb ischemia; ulcers of fingertips     Cellulitis of leg     Other specified diffuse disease of connective tissue     Disturbed sleep rhythm     Pharyngeal dysphagia     Gastroesophageal reflux disease, esophagitis presence not specified     Rheumatoid arthritis  with negative rheumatoid factor, involving unspecified site (H)     Raynaud's disease without gangrene       Allergies   Allergen Reactions     Lovenox Other (See Comments)     Blood clot      Norvasc [Amlodipine Besylate] Swelling     Lip swelling that happened on 2 different occasions     Erythromycin Rash     Rash on arms     OBJECTIVE:  Vitals: Reviewed  Gen: A+Ox3, NAD  HEENT: NCAT, EOMI  Pulm: CTAB, minimal coarse sounds on basilar lobes  CVS: She has a regular rate and rhythm but this is punctuated by very frequent ectopy then restoration of a regular rhythm.  No murmurs appreciated.  Skin: Her fingers are intermittently diffusely cyanotic.  Purpuric patches on fingertips no ulcerations/erosions. There is platelike xerosis of lower extremities  -mild sclerosis of distal fingers  Msk: No active synovitis.  4/5 upper extremity strength, 5/5 lower extremity strength bilaterally.    EKG (11/12/2021)  Sinus rhythm with marked sinus arrhythmia with premature supraventricular complexes  Possible left atrial enlargement  Left axis deviation  Left ventricular hypertrophy with QRS widening  T wave abnormality consider lateral ischemia  Prolonged QT  Abnormal ECG  Ventricular rate 81, , , QT/QTc 418/485, P/R/T 29/-32/132     Component      Latest Ref Rng 1/26/2016   WBC      4.0 - 11.0 10e9/L 9.2   RBC Count      3.8 - 5.2 10e12/L 4.59   Hemoglobin      11.7 - 15.7 g/dL 13.2   Hematocrit      35.0 - 47.0 % 41.8   MCV      78 - 100 fl 91   MCH      26.5 - 33.0 pg 28.8   MCHC      31.5 - 36.5 g/dL 31.6   RDW      10.0 - 15.0 % 16.7 (H)   Platelet Count      150 - 450 10e9/L 195   Diff Method       Automated Method   % Neutrophils       62.9   % Lymphocytes       27.0   % Monocytes       9.1   % Eosinophils       0.4   % Basophils       0.2   % Immature Granulocytes       0.4   Nucleated RBCs      0 /100 0   Absolute Neutrophil      1.6 - 8.3 10e9/L 5.8   Absolute Lymphocytes      0.8 - 5.3 10e9/L 2.5    Absolute Monocytes      0.0 - 1.3 10e9/L 0.8   Absolute Eosinophils      0.0 - 0.7 10e9/L 0.0   Absolute Basophils      0.0 - 0.2 10e9/L 0.0   Abs Immature Granulocytes      0 - 0.4 10e9/L 0.0   Absolute Nucleated RBC       0.0   Color Urine       Yellow   Appearance Urine       Clear   Glucose Urine      NEG mg/dL Negative   Bilirubin Urine      NEG Negative   Ketones Urine      NEG mg/dL Negative   Specific Gravity Urine      1.003 - 1.035 1.010   Blood Urine      NEG Negative   pH Urine      5.0 - 7.0 pH 6.0   Protein Albumin Urine      NEG mg/dL Negative   Urobilinogen mg/dL      0.0 - 2.0 mg/dL Normal   Nitrite Urine      NEG Negative   Leukocyte Esterase Urine      NEG Negative   Source       Midstream Urine   WBC Urine      0 - 2 /HPF <1   RBC Urine      0 - 2 /HPF <1   Squamous Epithelial /HPF Urine      0 - 1 /HPF <1   Creatinine      0.52 - 1.04 mg/dL 1.11 (H)   GFR Estimate      >60 mL/min/1.7m2 51 (L)   GFR Estimate If Black      >60 mL/min/1.7m2 62   ALT      0 - 50 U/L 23   AST      0 - 45 U/L 16   Albumin      3.4 - 5.0 g/dL 3.4   CRP Inflammation      0.0 - 8.0 mg/L <2.9   Sed Rate      0 - 30 mm/h 9   CK Total      30 - 225 U/L 38   Aldolase       4.1     Biopsy from 3/10 EGD:  Esophagus, gastroesophageal junction, biopsies:   - Squamous mucosa and submucosa with marked chronic inflammation and   fibrosis   - No evidence of intestinal metaplasia or dysplasia

## 2022-01-13 NOTE — LETTER
1/13/2022         RE: Jeanine Schuler  [address]  [facility] [address]        Dear Colleague,    Thank you for referring your patient, Jeanine Schuler, to the [facility] FOR LUNG SCIENCE AND [facility]. Please see a copy of my visit note below.      In Person-Visit       SUBJECTIVE:  The patient returns in f/u of her MCTD/SSC/RP/with h/o multiple DU.      PROBLEM LIST:    1.  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    2.  History of severe multiple digital ulcers, on fingers and toes.   3.  Marked keratoconjunctivitis sicca over the last several years.    4.  Marked iron deficiency anemia responsive to IV iron.    5.  Diffuse musculoskeletal pain   6.  Lower extremity edema managed with lasix and spironolactone   7.  Marked fatigue and diffuse clinical weakness.    8.  Dyspnea on exertion with lower extremity edema and other features including the anticentromere positivity worrisome for the potential development of pulmonary hypertension.    9.  Marked GERD with associated dysphagia.    10.  Status post gastric bypass with a history of intermittent constipation and diarrhea potentially consistent with bacterial small bowel overgrowth versus other gut effects of the prior surgery.    11.  History of positive rheumatoid factor consistent with MCTD, given the remainder of her clinical presentation.     12. Progressive Jaw tightening with inability to open mouth wider than 1 inch.   13. Diastolic dysfunction (3/2013)  14. Hypoalbuminemia  15. ILD cryobiopsy Pathology was reviewed at ILD conference on 10/12/2020 which showed ALI, what appears to be an acute exacerbation of underlying ILD with OP.  Few nonnecrotizing granulomas  16. 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    Serologically she is RNP positive persistently over time, historically rheumatoid factor positive but currently negative, and TARA positive in a speckled pattern with the remainder of the VERNON panel normal.    Since we have last seen her she has had worsening cardiac palpitations and dyspnea on exertion with taper of steroid.  She was unable to complete a 6-minute walk test today but had no desaturation though she was very short of breath.    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  I have discussed her case in detail today following her visit with Dr. Cuellar.    I have increased her prednisone back up to 30 mg a day.  She has great difficulty sleeping on these higher doses of prednisone so I also gave her clonazepam to help her sleep.  She previously tried temazepam and was not that helpful but will see if she does better with this.  She is also having a lot of understandable anxiety which this may help although I emphasized to her that if that is a persistent problem she will need to discuss that with her PCP for a better long-term approach.    I am going to have a very low threshold to restart methotrexate and her.  I would consider giving her IV Remicade, but we will want to watch her autoimmunity and interstitial lung disease features very closely if we do that as there are case reports of TNF inhibitors making autoimmune interstitial lung disease worse.  Nonetheless her probable cardiac sarcoid is a grave threat to her and if it can be successfully treated with this approach even for a number of months to get it stabilized this may be the better part of valor.    I want to see her back for a video visit to discuss everything and  see how she is doing in 3 weeks sooner as needed.      This visit was 33 minutes in face-to-face time, which an additional 32 minutes spent in chart review, review of the medical literature regarding treatment for cardiac sarcoidosis options, and direct discussion with Dr. Cuellar, as well as messaging to Dr. Cuellar and to my nurse for institution of further therapy.  This was all completed on the date of service.      MARTÍN De La Torre MD, PhD    Rheumatology          INTERVAL HISTORY:       AUGUST 22, 2017, INTERVAL HISTORY:  Since we have last seen the patient, she developed a right ankle wound about 2 months ago.  That opened for a while and it became hard to close, but at this point it has finally closed over.       She has had some stressors and she feels like that has contributed to her feeling like she needed more prednisone.  She had made it down to 12 mg a day for almost 6 months but increased to 20 mg a day in June when she was moving to a new house.  This was primarily due to an increase in fatigue and in joint pains.      At this point, her morning stiffness is 30-60 minutes despite remaining on the 20 mg a day of prednisone.  She does think her strength, however, is stable.      She continues to have a lot of Raynaud's phenomena but has not developed any distal digital ulcerations.       Her GERD and keratoconjunctivitis sicca, however, have remained stable and she thinks she has stable exercise tolerance.       She has not done her labs for many, many months and has not seen me for over a year, and we discussed that in some detail.     Feb. 13, 2018 INTERVAL HISTORY:  Since we have last seen her, she made it down to 12.5 mg a day of prednisone but then put herself back up to 20 mg a day because of increasing general body aches and fatigue as well as some inflammatory joint stiffness in her wrists.  Other joints have actually been okay.      She had stopped her methotrexate some time ago.   "She works in a hospital and knew of several people who were admitted with \"methotrexate toxicity\" who  while in hospital and although she does not know the details, that frightened her enough that she decided to stop the drug.       She is getting worsening Raynaud's.  She really notes that this happens not only with cold, but with a variety of kinds of stress.  She has previously, at least for a short length of time, been on losartan and does not remember whether that helped at all.  I am pretty sure she has also been on calcium channel blockers, but those are relatively contraindicated in her at this point because of persistent bilateral lower extremity edema.       She is getting enough lower extremity edema and has been found to have vascular insufficiency that she will be having some vascular surgery to laser some of these areas, although an exact time for that has not been laid out.       In addition to getting Raynaud's in her hands, she also gets it in her feet, and when she does, potentially contributed to by the vascular insufficiency, she gets numbness in her feet.  She is not getting numbness in her hands by contrast.       The patient did have pulmonary function tests today.  Although she is not exercising regularly, in general she feels her exercise tolerance has been stable, and she is not having any dry cough.       She did have a decrease in her FVC to 3.68 from 4.30 and her DLCO to 21.77 from 24.74, but she has had some rib cage pain for about the last 3 weeks since she had a minor accident while dancing.  Notably then, her TLC is completely stable, going from 6.41 to 6.29.       She denies any other new problems.  Keratoconjunctivitis sicca, GERD and other features have been stable and she denies any dysphagia.     2019 interval history:    Since we have last seen her she did have to miss appointment because of some family issues but she is actually been doing quite well.  Although " she is continued to have some intermittent joint complaints she is been able to taper her prednisone down to 7.5 mg a day the lowest she has been on and the entire time I have been following her.    Although her joints have not worsened with that lower dose she does think she is getting some worsening of her Raynauds phenomena.  It can be pretty bad and the attacks can be hard to break even with rewarming.  She gets them in hands and feet.  Fortunately she has not had any digital ulcers.  She does recall that she was placed temporarily on tadalafil sometime ago by Dr. Bentley but she could not afford it long-term.  She thinks that may have been helpful however.    She denies any significant dyspnea on exertion or cough.  She has some muscle weakness but not marketed and she does not think that is any worse over time.  Fatigue has generally been her worst symptom and that may be slightly worse.    She does have some ongoing keratoconjunctivitis sicca but does not feel like she wants to go on a medicine for that.  She already has a full plate of dentures in both upper and lower.      August 11, 2020 interval history:    Since we last seen the patient she feels she was doing relatively stably on till July 7.  At that point she had the abrupt onset of a bad dry cough.  She did not have significant fevers but did not feel well.  She was seen had a negative coronavirus test but a chest x-ray apparently showed evidence of some infiltrates and she was placed on doxycycline.  She says she did not improve with that therapy and a CT angios was performed.  That showed bilateral pulmonary consolidation thought consistent with infection.  There was note that the pattern could actually be somewhat typical for sarcoid but it was noted to have worsened since the prior study performed there at [facility] in October 2019.  She also was noted to have mediastinal lymphadenopathy and debris in the esophagus potentially consistent  with her systemic sclerosis.  No pulmonary embolism was found.  Based on the results of that she was placed on Levaquin.  She continued to fail to improve and a third antibiotic was also tried.    Finally she was seen by pulmonologist and a bronchoscopy was performed.  I am able to review the results of that and there was a high percentage of neutrophils and lymphocytes and that BAL fluid.  Viral cultures however Gram stain and culture were all unremarkable and there were no malignant cells found.    She was placed on 40 mg a day of prednisone at the time of the bronchoscopy once it was clear that there was no acute infection.  She does not have a follow-up appointment yet.  Although her cough is mostly gone she does not feel good and feels very dyspneic just going from the bed to the bathroom or crossed the room.  She is not convinced she has had any improvement with 40 mg daily prednisone and if anything think she is worse.    In reviewing her extensive record I see that a repeat TARA was performed and a reflexive was done when it was positive.  She continues to have an anticentromere antibody which she had previously, but now notably has a high titer Gonzalez and high titer RNP antibody.  She previously was borderline positive for RNP and Gonzalez negative.  SSA is also higher titer than it was previously.    With all these autoantibody positivity she however denies any specific features that would suggest systemic lupus.  She specifically denies sun sensitivity sun sensitive rashes including a malar rash, morning stiffness in any of her joints or any joint swelling and any pleurisy at the time of her  at CT angiogram or otherwise.  \  Her Raynaud's phenomena has been stable as have other features of her limited cutaneous systemic sclerosis.        February 25, 2021 interval history:    At the time that I saw the patient about 3 weeks ago we decided that because her neuropathy sounded rather rapidly progressive, and  her HRCT scan was being read as potentially consistent with sarcoidosis, that neurosarcoid was on the differential.  I also wondered about an immune complex mediated small vessel vasculitis causing neuropathy.  We therefore did laboratory testing as well as started her on some steroid.    Laboratory testing has been largely unrevealing.  She did not have elevated calcium or elevated calcium in the urine.  I did also do a parathyroid hormone in case we did find that however her parathyroid hormone was significantly elevated.  Her vitamin D was very low and she has started supplementation for that.    Most notably however her prednisone at 40 mg a day virtually resolved her symptoms over 3 to 4 days.  She had at least an 80% decrease in her pain and numbness although she still has some residual tingling in the legs bilaterally.    She is having some prednisone side effects.  Specifically she is having a hard time sleeping and is also feeling irritable which she thinks could be due partly due to the loss of sleep but partly directly due to the prednisone.  She has got a lot of prior experience with prednisone and has had some difficulties with it before.    She did have some right-sided chest pain.  That seem to occur with a deep breath.  She has a cardiologist and saw them will be getting a follow-up echo.  She is no longer having that.    In going through her history she does not believe she is ever been on Imuran and is quite sure and I am sure as well that we have never had her on TNF inhibition.  She was on methotrexate and tolerated oral methotrexate poorly due to nausea but was better able to tolerate subcutaneous methotrexate.      Impression:    Per the problem list, with known RNP positivity and more recently with this HRCT finding that is concerning for the possibility of overlapping sarcoidosis.    She had a very nice response to steroid quite quickly, and so we now have the conundrum of not knowing exactly  why.  I certainly suspect that she has an immune mediated neuropathy given this rapid response and neurosarcoid is a concern.    I have sent a message to Dr. Cuellar in pulmonary for her thoughts.  I have also ordered a repeat HRCT that to be done in another week or so before she is seen back in pulmonary.    I am hoping these can be reviewed so that there could be a decision whether we can feel strongly enough that presumptively this could be sarcoidosis to put her on appropriate therapy for that or whether a biopsy might be justified.    We could simply presumptively try steroid sparing medications.  I am somewhat concerned about using TNF inhibitors however unless we are pretty confident this is sarcoid, as they have been associated with worsening of systemic lupus patients and autoimmune associated interstitial lung disease in some settings.    We also have the  finding of the elevated PTH, and very low vitamin D levels, which can be seen in sarcoidosis, but is unclear here given normal Calcium levels. and will refer her to endocrinology for that.    I have given her prescription for temazepam to help her with the sleep and irritability.  I did warn her to be careful about driving in the morning until she knows how it affects her as it does have a long half-life.    I will plan on seeing her back in about 6 to 8 weeks sooner as needed.      April 8, 2021 interval history:    Since we have last seen the patient she has had initial evaluation done, and Dr. Cuellar has a recent note on the chart and an addendum placed a day after the patient's studies were reviewed in sarcoid conference.    The upshot of that is that there is evidence from her studies, in particular the biopsy studies that suggest that this could be sarcoidosis.  Because she has neurologic symptoms and inadequately explain dyspnea on exertion she will also be getting neurology evaluation and cardiac evaluation.    She remains at this time on 40 mg a  day of prednisone.  She is not liking this dose.  Although she still has dyspnea on exertion she also is irritable and on edge on that dose and is also experiencing some nausea..    After consideration of the issues, Dr. Cuellar had recommended the patient go on track today.  She has in fact been on both this and MMF before and although she had some difficulties at time with tolerating these medication she think she would tolerate them again.  We have briefly discussed possibility of using a TNF inhibitor but that does give me pause, given that she has had features of mixed connective tissue disease and in TARA positive individuals TNF inhibitors have at times worsen their disease process, particularly in those with lupus associated autoantibodies.  She has had in the past of borderline positive RNP.    She believes most of her other historical clinical features are stable.  She still gets pretty bad Raynaud's phenomena and some features of arthritis although the inflammatory features are currently pretty well controlled on this dose of prednisone.    July 15, 2021 interval history:    Since we have last seen the patient she has continued to have a lot of dyspnea on exertion despite being on the MMF 40 mg a day of prednisone and until just earlier this week methotrexate.    Because of her lung biopsy showing features consistent with sarcoidosis, she was presented at sarcoidosis conference last week.  I attended this discussion.  After much discussion it was decided that azathioprine may be the next drug to try in her.  This was influenced in part by my concerns of giving a TNF inhibitor to her given her underlying autoimmune connective tissue disease, and the possibility of that disease process worsening with TNF inhibition as has been described and as I have seen previously albeit rarely.    She did just get TPMT levels earlier today to help decide what kind of azathioprine dose to start with.  She continues on  MMF.    She will be getting a nerve biopsy performed on Monday.    She also was recently in the emergency room because of very severe midsternal chest pain.  She says this developed during the night.  When it would not go away she went to the ER.  There she was given narcotics after first getting a single sublingual nitroglycerin that did not help.  Extensive cardiopulmonary work-up was negative for any evidence of ischemia or clot.  Eventually she got Toradol after having gotten narcotics and did improve and went home.    She has had some costochondral type pains before but nothing like this and this was not exacerbated or relieved by pressure over the area.    Notably, she is been having worsening problems with GERD and dysphagia as well.  She just spoke with Dr. Cárdenas earlier this morning.  She is getting a lot of pill dysphagia at this point which is very frustrating for her and very uncomfortable.  She had dilatation in the fall and thought it helped for a while but she says this almost feels like it is just a dysmotility problem rather than stricturing to her.        November 11, 2021 interval history:    Patient had a right sural nerve biopsy on 7/19/2021.  Biopsy was notable for axonal neuropathy without a specific cause.  There is no overt evidence of inflammation, primary demyelination, or granulomatous findings.  Patient had return follow-up would pulmonology on 9/16/2021.  Her CT was reviewed, showed bronchocentric and predominantly central infiltrates.Overall findings have not significantly progressed from prior CT on 7/1/2021.  Patient's case was discussed in ILD conference.  Plan was to start Imuran 150 mg daily, as well as starting a prednisone taper (starting at 40 mg daily, then tapering 5 mg every month to be kept at 30 mg daily until follow-up with rheumatology).     Patient overall has no significant change in her health from prior.  She is currently taking hydroxychloroquine, prednisone 30 mg  daily, and Imuran 150 daily.  Continues to have fatigue, which she feels has been gradually worsening.  She notes that she can only lift 2-3 grocery bags before feeling tired, and needs assistance when cooking.  Continues to have neuropathy symptoms in her bilateral lower extremities (more in the right side).  Continues to have shortness of breath with limited activity (e.g. moving from bed to kitchen).  Is currently treating her GERD with Protonix and omeprazole, which she feels controls her symptoms.  Has some mouth dryness, but notices that her eyes are watery which is new for her. She has received her COVID-19 booster immunization.     January 13, 2022 interval history:    Since we have last seen the patient she was admitted to the hospital because of worsening shortness of breath and palpitations.  Coronary angiogram showed minimal areas of stenosis of less than 20%.  Echocardiogram however suggested very significant pulmonary hypertension and a markedly reduced ejection fraction.    She saw Dr. Cuellar back in pulmonary clinic last week.  Cardiac MRI was performed yesterday.  I have reviewed those results today.  There is significant LGE, consistent with an infiltrative process such as sarcoidosis.  She is quite symptomatic.  She had a 6-minute walk test today and had to stop because of how dyspneic she was and how many palpitation she was having but she had absolutely no desaturation.    She has been tapering her prednisone and is down to 20 mg daily but she believes all of the symptoms have worsened with that taper.      ROS as per HPI.  10-point ROS is otherwise negative.    HISTORY REVIEW:  Past Medical History:   Diagnosis Date     Anemia      Cellulitis of leg 6/6/2013     Esophageal reflux     Better post-hiatal hernia repair and weight loss     Limb ischemia; ulcers of fingertips 4/22/2013     Raynaud's syndrome      Scleroderma (H)      Systemic sclerosis (H) 2009     Unspecified essential hypertension         Past Surgical History:   Procedure Laterality Date     BYPASS GASTRIC, CHOLECYSTECTOMY, COMBINED       CHOLECYSTECTOMY       COLONOSCOPY       ESOPHAGOSCOPY, GASTROSCOPY, DUODENOSCOPY (EGD), COMBINED N/A 3/10/2016    Procedure: COMBINED ESOPHAGOSCOPY, GASTROSCOPY, DUODENOSCOPY (EGD), BIOPSY SINGLE OR MULTIPLE;  Surgeon: Tricia Zambrano MD;  Location:  GI     INNER EAR SURGERY       PICC INSERTION  6/5/2013    5fr DL Power PICC, 44cm, left basilic vein, tip in low SVC     SURGICAL HISTORY OF -       Left ear surgery - incus transition, tympanoplasty     SURGICAL HISTORY OF -   6/05    Gastric bypass     SURGICAL HISTORY OF -   6/05    Cholecystectomy     SURGICAL HISTORY OF -   6/05    Hiatal hernia repair     TONSILLECTOMY         Family History   Problem Relation Age of Onset     Cerebrovascular Disease Father      C.A.D. Father      C.A.D. Mother      C.A.D. 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      Social History Main Topics     Smoking status: Never Smoker      Smokeless tobacco: Never Used     Alcohol Use: Yes      occassionally     Drug Use: No     Sexually Active: Yes -- Male partner(s)     Birth Control/ Protection: None     Social History Narrative     Was  in December 2015. Longtime partner from [place].       Patient Active Problem List   Diagnosis     Essential hypertension     Thyrotoxicosis     Obesity     Prolonged QTc 460 ms,  at hr 67     Low back pain - Suspect SI Joint dysfunction     Cough     Systemic sclerosis (H)     Tumoral calcinosis     Shortness of breath     Edema of both legs     Abnormal albumin     Myopathy     Hypoalbuminemia     Diastolic  dysfunction     Limb ischemia; ulcers of fingertips     Cellulitis of leg     Other specified diffuse disease of connective tissue     Disturbed sleep rhythm     Pharyngeal dysphagia     Gastroesophageal reflux disease, esophagitis presence not specified     Rheumatoid arthritis with negative rheumatoid factor, involving unspecified site (H)     Raynaud's disease without gangrene       Allergies   Allergen Reactions     Lovenox Other (See Comments)     Blood clot      Norvasc [Amlodipine Besylate] Swelling     Lip swelling that happened on 2 different occasions     Erythromycin Rash     Rash on arms     OBJECTIVE:  Vitals: Reviewed  Gen: A+Ox3, NAD  HEENT: NCAT, EOMI  Pulm: CTAB, minimal coarse sounds on basilar lobes  CVS: She has a regular rate and rhythm but this is punctuated by very frequent ectopy then restoration of a regular rhythm.  No murmurs appreciated.  Skin: Her fingers are intermittently diffusely cyanotic.  Purpuric patches on fingertips no ulcerations/erosions. There is platelike xerosis of lower extremities  -mild sclerosis of distal fingers  Msk: No active synovitis.  4/5 upper extremity strength, 5/5 lower extremity strength bilaterally.    EKG (11/12/2021)  Sinus rhythm with marked sinus arrhythmia with premature supraventricular complexes  Possible left atrial enlargement  Left axis deviation  Left ventricular hypertrophy with QRS widening  T wave abnormality consider lateral ischemia  Prolonged QT  Abnormal ECG  Ventricular rate 81, , , QT/QTc 418/485, P/R/T 29/-32/132     Component      Latest Ref Rng 1/26/2016   WBC      4.0 - 11.0 10e9/L 9.2   RBC Count      3.8 - 5.2 10e12/L 4.59   Hemoglobin      11.7 - 15.7 g/dL 13.2   Hematocrit      35.0 - 47.0 % 41.8   MCV      78 - 100 fl 91   MCH      26.5 - 33.0 pg 28.8   MCHC      31.5 - 36.5 g/dL 31.6   RDW      10.0 - 15.0 % 16.7 (H)   Platelet Count      150 - 450 10e9/L 195   Diff Method       Automated Method   % Neutrophils        62.9   % Lymphocytes       27.0   % Monocytes       9.1   % Eosinophils       0.4   % Basophils       0.2   % Immature Granulocytes       0.4   Nucleated RBCs      0 /100 0   Absolute Neutrophil      1.6 - 8.3 10e9/L 5.8   Absolute Lymphocytes      0.8 - 5.3 10e9/L 2.5   Absolute Monocytes      0.0 - 1.3 10e9/L 0.8   Absolute Eosinophils      0.0 - 0.7 10e9/L 0.0   Absolute Basophils      0.0 - 0.2 10e9/L 0.0   Abs Immature Granulocytes      0 - 0.4 10e9/L 0.0   Absolute Nucleated RBC       0.0   Color Urine       Yellow   Appearance Urine       Clear   Glucose Urine      NEG mg/dL Negative   Bilirubin Urine      NEG Negative   Ketones Urine      NEG mg/dL Negative   Specific Gravity Urine      1.003 - 1.035 1.010   Blood Urine      NEG Negative   pH Urine      5.0 - 7.0 pH 6.0   Protein Albumin Urine      NEG mg/dL Negative   Urobilinogen mg/dL      0.0 - 2.0 mg/dL Normal   Nitrite Urine      NEG Negative   Leukocyte Esterase Urine      NEG Negative   Source       Midstream Urine   WBC Urine      0 - 2 /HPF <1   RBC Urine      0 - 2 /HPF <1   Squamous Epithelial /HPF Urine      0 - 1 /HPF <1   Creatinine      0.52 - 1.04 mg/dL 1.11 (H)   GFR Estimate      >60 mL/min/1.7m2 51 (L)   GFR Estimate If Black      >60 mL/min/1.7m2 62   ALT      0 - 50 U/L 23   AST      0 - 45 U/L 16   Albumin      3.4 - 5.0 g/dL 3.4   CRP Inflammation      0.0 - 8.0 mg/L <2.9   Sed Rate      0 - 30 mm/h 9   CK Total      30 - 225 U/L 38   Aldolase       4.1     Biopsy from 3/10 EGD:  Esophagus, gastroesophageal junction, biopsies:   - Squamous mucosa and submucosa with marked chronic inflammation and   fibrosis   - No evidence of intestinal metaplasia or dysplasia       Again, thank you for allowing me to participate in the care of your patient.        Sincerely,        Nicolás De La Torre MD

## 2022-01-14 NOTE — TELEPHONE ENCOUNTER
Reply from Dr. Arshad:  I recommend referral to Dr. Stanley in heart failure to do a sarcoid work up.     Dr. Arshad     [id number] spoke with patient, she states she reviewed the MRI with her pulmonology team yesterday and is awaiting the results of a care conference taking place today. She is aware of the possible cardiac sarcoid and is interested in moving her care to [facility] or [facility] to work that up.  She will decided on which cardiac service to use once she talks with her care team. She will not be returning to [facility] at this time.  Gave patient Dr. Arshad's referral to Dr. Stanley and the phone # for scheduling.

## 2022-01-17 ENCOUNTER — TELEPHONE (OUTPATIENT)
Dept: RHEUMATOLOGY | Facility: CLINIC | Age: 62
End: 2022-01-17

## 2022-01-17 DIAGNOSIS — D86.9 SARCOIDOSIS: Primary | ICD-10-CM

## 2022-01-17 NOTE — TELEPHONE ENCOUNTER
Frantz appointment request received from pt requesting Tuesday afternoon video visit be scheduled with Dr. De La Torre-per his instruction at 1/13/22 OV, he would like to see her back in 3 weeks or sooner as needed.     Video visit scheduled for his first available video visit, which is not until 3/15/22.   If earlier appt time is needed, please advise when she can be fit in somewhere?

## 2022-01-17 NOTE — PROGRESS NOTES
1301: Spoke with patient to update about results of recent cMRI with concern for Cardiac Sarcoidosis. Patient appreciative and interested in both referrals to EP and HF Cardiologist with specialty in Sarcoidosis management. Pt aware that her case will be discussed at next Sarcoid MDD meeting with update to follow.   Pt will await call from both cardiology departments for scheduling.     Edel Mcgovern RN, BSN  ILD Nurse Care Coordinator  (P) [phone]

## 2022-01-17 NOTE — TELEPHONE ENCOUNTER
"Per Dr. De La Torre's follow up instructions in last office visit note dated 1/13/2022:    \"Instructions  Return RTC Feb 15 Tues PM video [facility]..\"    There are 2 [facility] slot openings on 2/15/2022. Routing back to scheduling to contact patient and offer appointment in this day.    Syeda Paniagua, LINDA   1/17/2022 9:23 AM        "

## 2022-01-18 DIAGNOSIS — D86.9 SARCOIDOSIS: Primary | ICD-10-CM

## 2022-01-19 NOTE — TELEPHONE ENCOUNTER
RECORDS RECEIVED FROM:   DATE RECEIVED:   NOTES STATUS DETAILS   OFFICE NOTE from referring provider    Internal Dr. Cuellar 1-17-22 Pulm   OFFICE NOTE from other cardiologist    Care Everywhere 10-14-21 Dr. Perera [place]   DISCHARGE SUMMARY from hospital    Internal 12-21-21 [facility]   DISCHARGE REPORT from the ER   Internal 12-20-21 [facility]   OPERATIVE REPORT    N/A    MEDICATION LIST   Internal    LABS     BMP   Internal 12-22-21   CBC   Internal 12-22-21   CMP   Internal 12-20-21   Lipids   Internal 12-22-21   TSH   Internal 9-18-20   DIAGNOSTIC PROCEDURES     EKG   Internal 12-20-21   Monitor Reports   Internal 12-22-21   IMAGING (DISC & REPORT)      Echo   Internal 12-21-21   Stress Tests   N/A    Cath   Internal 12-21-21   MRI/MRA   Internal cMRI 1-12-22   CT/CTA   Internal 12-20-21 CT Chest     Action 1/19/22 CJ   Action Taken CT Chest 7-20-20 from NM already in PACS    EKGs 10-14-21, 2-22-21, 7-20-20 from [facility] viewable under Media tab     Requested echo 3-8-21 from [facility] imaging     Second request for echo to [facility] 1/24    --1.25.22 sv- received disc from [facility] Echo US,OT 3.8.21 -sent to 4th floor for uploading      Action 01.26.2022 RM   Action Taken Image received took to 4n to get uploaded to chart.

## 2022-01-24 ENCOUNTER — TELEPHONE (OUTPATIENT)
Dept: CARDIOLOGY | Facility: CLINIC | Age: 62
End: 2022-01-24

## 2022-01-25 NOTE — TELEPHONE ENCOUNTER
*Made an attempt to contact patient to schedule initial appointment with pulmonary hypertension on 1/7/2022. Left voicemail and letter mailed.    No further follow-up completed and encounter closed.    Prema Rodriguez  Clinic   Pulmonary Hypertension  [facility]  (P) [phone]

## 2022-01-26 ENCOUNTER — OFFICE VISIT (OUTPATIENT)
Dept: CARDIOLOGY | Facility: CLINIC | Age: 62
End: 2022-01-26
Attending: INTERNAL MEDICINE
Payer: COMMERCIAL

## 2022-01-26 ENCOUNTER — PRE VISIT (OUTPATIENT)
Dept: CARDIOLOGY | Facility: CLINIC | Age: 62
End: 2022-01-26

## 2022-01-26 VITALS — SYSTOLIC BLOOD PRESSURE: 137 MMHG | BODY MASS INDEX: 26.61 KG/M2 | WEIGHT: 175 LBS | DIASTOLIC BLOOD PRESSURE: 91 MMHG

## 2022-01-26 DIAGNOSIS — D86.85 CARDIAC SARCOIDOSIS: Primary | ICD-10-CM

## 2022-01-26 DIAGNOSIS — D86.9 SARCOIDOSIS: ICD-10-CM

## 2022-01-26 PROCEDURE — G0463 HOSPITAL OUTPT CLINIC VISIT: HCPCS | Mod: 25

## 2022-01-26 PROCEDURE — 93005 ELECTROCARDIOGRAM TRACING: CPT

## 2022-01-26 PROCEDURE — 99215 OFFICE O/P EST HI 40 MIN: CPT | Performed by: INTERNAL MEDICINE

## 2022-01-26 RX ORDER — METOPROLOL SUCCINATE 50 MG/1
50 TABLET, EXTENDED RELEASE ORAL DAILY
Qty: 90 TABLET | Refills: 3 | Status: ON HOLD | OUTPATIENT
Start: 2022-01-26 | End: 2023-01-01

## 2022-01-26 ASSESSMENT — PAIN SCALES - GENERAL: PAINLEVEL: NO PAIN (0)

## 2022-01-26 NOTE — PROGRESS NOTES
ELECTROPHYSIOLOGY CLINIC VISIT    Assessment/Recommendations   Assessment/Plan:    Ms. Schuler is a 61 year old female who has a past medical history significant for pulmonary sarcoid, ILD, mixed connective tissue disorder, cutaneous systemic sclerosis, RA, Raynauds, NICM LVEF 35%, kidney stone, HTN, and GERD.     NICM LVEF 35% on echo 44% on CMR, NYHA II-III:  1. ACEi/ARB: Continue Losartan.  2. BB: Change to  Toprol XL 50 mg daily.  3. Aldosterone antagonist: not currently on.  4. SCD prophylaxis: not currently indicated as LVEF last >35% and still optimizing GDMT.  5. Fluid status: Continue Torsemide.  6. Her zio patch monitor showed frequent short preethi of AT and PACs. No AF noted. We are changing her to Toprol XL.   7. Etiology of CM: Given history of pulmonary sarcoid there is consideration that this could be cardiac sarcoid. Overall, CMR imaging not classic for cardiac sarcoid. However, it is also not consistent with a CM related to her systemic sclerosis either. We would favor getting a Cardiac PET to evaluate for any possible cardiac sarcoid. Otherwise, her CM could likely be stress induced given already recovering LVEF vs idiopathic NICM. We will also discuss and review her at the multidisciplinary sarcoid meeting up coming. She is also establishing with Dr. Royal vega for his expertise.     Follow up to be determined based on results.        History of Present Illness/Subjective    Ms. Jeanine Schuler is a 61 year old female who comes in today for EP consultation of ? cardiac sarcoid.    Ms. Schuler is a 61 year old female who has a past medical history significant for pulmonary sarcoid, ILD, mixed connective tissue disorder, cutaneous systemic sclerosis, RA, Raynauds, NICM LVEF 35%, kidney stone, HTN, and GERD.     She presented to [facility] ER in 12/2021 with a 3 day history of dyspnea and tachycardia. Echo showed LVEF 35%, hypokinesis, moderate MR, moderate severe TR, and severe  pulmonary HTN. She was admitted to [facility]. Coronary angiogram showed no obstructive CAD. CMR showed LVEF 44%, mid-myocardial late gadolinium enhancement in the basal septum with extension into the mid inferoseptal segment and inferior right ventricular insertion site. In addition, there is subendocardial  hyperenhancement in the basal inferolateral segment.  She was started on GDMT. She does have known ILD and biopsy did show pathology showing some noncaseating granulomas. This was felt c/w pulmonary sarcoid. She was treated with methotrexate, cellcept, and prednisone. Repeat CT showed improvement in her bilateral nodular opacities.     She was referred for possible cardiac sarcoid. We reviewed CMR with imaging experts who did not feel these patterns were consistent with sarcoid or sclerosis. She reports feeling fatigued and continues to have some ROUSE. She denies chest discomfort, palpitations, abdominal fullness/bloating or peripheral edema, shortness of breath, paroxysmal nocturnal dyspnea, orthopnea, lightheadedness, dizziness, pre-syncope, or syncope. A zio patch monitor from 12/22/21- 12/29/21 showed 138 PSVT up to 11.6 seconds and ~12% PAC burden. Presenting 12 lead ECG shows SR with PACs Vent Rate 80 bpm,  ms,  ms, QTc 491 ms. Current cardiac medications include: ASA, Torsemide, Metoprolol, and Losartan.     I have reviewed and updated the patient's Past Medical History, Social History, Family History and Medication List.     Cardiographics (Personally Reviewed) :   12/21/21 Echo:   Interpretation Summary  1. The left ventricle is normal in size. The visual ejection fraction is estimated at 35%. Global hypokinesis, distal LV segments (anteroseptal) appear more hypokinetic than other areas.  2. The right ventricle is mildly dilated. The right ventricular systolic function is normal.  3. There is mild to moderate (1-2+) mitral regurgitation.  4. There is moderately severe (3+) tricuspid  regurgitation.  5. Moderate (46-55mmHg) pulmonary hypertension is present. The right ventricular systolic pressure is approximated at 54mmHg plus the right atrial pressure.  6. The ascending aorta is Mildly dilated. 4.2cm.     Echo from 8/2020 showed EF 55%, mild TR, aorta 3.8cm.    12/21/21 Coronary Angiogram:  Conclusion  1.  Minimal non-obstructive CAD  2.  Successful closure of the 6F RFA access site with a single 6F Angioseal closure device    1/12/22 CMR:  1. The left ventricle is normal in size. There is mild inferoseptal thickening (14 mm). The global systolic function is mildly reduced. The LVEF is 44%. There is mild diffuse hypokinesis.  2. The right ventricle is normal in cavity size. The global systolic function is normal. The RVEF is 54%.   3. Left atrium is mildly enlarged. Right atrium is normal in size.   4. There is no significant valvular disease.   5. There is mid-myocardial late gadolinium enhancement in the basal septum with extension into the mid inferoseptal segment and inferior right ventricular insertion site. In addition, there is subendocardial hyperenhancement in the basal inferolateral segment. This variable pattern of hyperenhancement is concerning for infiltrative cardiomyopathy such as sarcoidosis.    6. There is no pericardial effusion.  7. There is no intracardiac thrombus.  8. There is no myocardial edema.   CONCLUSIONS:   1. Non ischemic cardiomyopathy with mildly reduced left ventricular function and normal right ventricular function.   2. Variable patterns of hyperenhancement in the septal mid-myocardium and inferolateral subendocardium are concerning for cardiac sarcoidosis.        Physical Examination   BP (!) 137/91 (BP Location: Left arm, Patient Position: Chair, Cuff Size: Adult Regular)   Wt 79.4 kg (175 lb)   BMI 26.61 kg/m    Wt Readings from Last 3 Encounters:   01/13/22 78.9 kg (174 lb)   01/06/22 78.9 kg (174 lb)   12/22/21 81.6 kg (180 lb)     General Appearance:    Alert, well-appearing and in no acute distress.   HEENT: Atraumatic, normocephalic. PERRL.  MMM.   Chest/Lungs:   Respirations unlabored.  Lungs are clear to auscultation.   Cardiovascular:   Regular rate and rhythm.  Murmur present.    Abdomen:  Soft, nontender, nondistended.   Extremities: No cyanosis or clubbing. Trace edema.    Musculoskeletal: Moves all extremities.  Gait normal.   Skin: Warm, dry, intact.    Neurologic: Mood and affect are appropriate.  Alert and oriented to person, place, time, and situation.          Medications  Allergies   Current Outpatient Medications   Medication Sig Dispense Refill     albuterol (PROAIR HFA/PROVENTIL HFA/VENTOLIN HFA) 108 (90 Base) MCG/ACT inhaler Inhale 2 puffs into the lungs every 6 hours as needed for shortness of breath / dyspnea or wheezing       alcohol swab prep pads Use to swab area of injection/alf as directed. 100 each 3     aspirin (ASA) 81 MG chewable tablet Take 1 tablet (81 mg) by mouth daily 30 tablet 0     azaTHIOprine (IMURAN) 50 MG tablet Take 3 tablets (150 mg) by mouth daily And have labs drawn two weeks after starting medication. 90 tablet 3     benzonatate (TESSALON) 100 MG capsule Take 100 mg by mouth every morning and PRN       benzonatate (TESSALON) 100 MG capsule Take 1 capsule (100 mg) by mouth 3 times daily as needed for cough 60 capsule 0     blood glucose (NO BRAND SPECIFIED) test strip Use to test blood sugar 2 times daily or as directed. 100 strip 6     blood glucose calibration (NO BRAND SPECIFIED) solution Use to calibrate blood glucose monitor as needed as directed. 1 each 0     blood glucose monitoring (NO BRAND SPECIFIED) meter device kit Use to test blood sugar 2 times daily or as directed. 1 kit 0     cefdinir (OMNICEF) 300 MG capsule Take 1 capsule (300 mg) by mouth 2 times daily 6 capsule 0     clonazePAM (KLONOPIN) 0.5 MG tablet Take 1 tablet (0.5 mg) by mouth nightly as needed for sleep (secondary to prednisone) MRx 1 60  tablet 1     folic acid (FOLVITE) 1 MG tablet Take 1 tablet (1 mg) by mouth daily 90 tablet 3     losartan (COZAAR) 50 MG tablet Take 0.5 tablets (25 mg) by mouth daily 45 tablet 3     magnesium oxide (MAG-OX) 400 MG tablet Take 1 tablet (400 mg) by mouth daily 30 tablet 0     metoprolol tartrate (LOPRESSOR) 25 MG tablet Take 0.5 tablets (12.5 mg) by mouth 2 times daily 30 tablet 0     naproxen (NAPROSYN) 500 MG tablet Take 1 tablet (500 mg) by mouth 2 times daily as needed for moderate pain 10 tablet 0     nitroGLYcerin (NITROSTAT) 0.3 MG sublingual tablet Place 1 tablet (0.3 mg) under the tongue every 5 minutes as needed for chest pain (esophageal spasm) For chest pain place 1 tablet under the tongue every 5 minutes for 3 doses. If symptoms persist 5 minutes after 3rd dose call 911. 12 tablet 1     omeprazole (PRILOSEC) 20 MG DR capsule Take 1 capsule (20 mg) by mouth 2 times daily 180 capsule 3     pantoprazole (PROTONIX) 40 MG EC tablet Take 1 tablet (40 mg) by mouth daily 90 tablet 3     potassium chloride ER (KLOR-CON M) 10 MEQ CR tablet Take 30 mEq by mouth daily       predniSONE (DELTASONE) 10 MG tablet Take 3 tablets (30 mg) by mouth daily 90 tablet 3     spironolactone (ALDACTONE) 25 MG tablet Take 25 mg by mouth daily        sulfamethoxazole-trimethoprim (BACTRIM) 400-80 MG tablet Take 1 tablet by mouth daily (Patient taking differently: Take 1 tablet by mouth daily ) 30 tablet 3     temazepam (RESTORIL) 15 MG capsule Take 1 capsule (15 mg) by mouth nightly as needed for sleep 30 capsule 1     thin (NO BRAND SPECIFIED) lancets Use with lanceting device twice daily 100 each 0     torsemide (DEMADEX) 20 MG tablet Take 1 tablet (20 mg) by mouth 2 times daily 60 tablet 0     vitamin D2 (ERGOCALCIFEROL) 90015 units (1250 mcg) capsule Take 50,000 Units by mouth once a week      Allergies   Allergen Reactions     Lovenox Other (See Comments)     Blood clot      Norvasc [Amlodipine Besylate] Swelling     Lip  swelling that happened on 2 different occasions     Erythromycin Rash     Rash on arms         Lab Results (Personally Reviewed)    Chemistry/lipid CBC Cardiac Enzymes/BNP/TSH/INR   Lab Results   Component Value Date    BUN 13 12/22/2021     12/22/2021    CO2 25 12/22/2021     Creatinine   Date Value Ref Range Status   12/22/2021 0.89 0.52 - 1.04 mg/dL Final   07/06/2021 0.74 0.52 - 1.04 mg/dL Final       Lab Results   Component Value Date    CHOL 116 12/22/2021    HDL 47 (L) 12/22/2021    LDL 55 12/22/2021      Lab Results   Component Value Date    WBC 3.8 (L) 12/22/2021    HGB 10.8 (L) 12/22/2021    HCT 33.2 (L) 12/22/2021    MCV 95 12/22/2021     12/22/2021    Lab Results   Component Value Date    TSH 0.49 12/20/2021        The patient states understanding and is agreeable with the plan.   Abundio Ag MD [facility]  Cardiology - Electrophysiology    Total time spent on patient visit, reviewing notes, imaging, labs, orders, and completing necessary documentation: 60 minutes.

## 2022-01-26 NOTE — NURSING NOTE
Chief Complaint   Patient presents with     New Patient     Discuss possible ICD in setting of cardiac sarcoid- referral: Beena Cuellar MD . Hx pulmonary and cutaneous sarcoid.     Vitals were taken, medications reconciled, and EKG was performed.    Mike Oviedo, EMT  3:07 PM

## 2022-01-26 NOTE — LETTER
1/26/2022      RE: Jeanine Schuler  [address]  [facility] [address]       Dear Colleague,    Thank you for the opportunity to participate in the care of your patient, Jeanine Schuler, at the [facility] [place] at [facility]. Please see a copy of my visit note below.        ELECTROPHYSIOLOGY CLINIC VISIT    Assessment/Recommendations   Assessment/Plan:    Ms. Schuler is a 61 year old female who has a past medical history significant for pulmonary sarcoid, ILD, mixed connective tissue disorder, cutaneous systemic sclerosis, RA, Raynauds, NICM LVEF 35%, kidney stone, HTN, and GERD.     NICM LVEF 35% on echo 44% on CMR, NYHA II-III:  1. ACEi/ARB: Continue Losartan.  2. BB: Change to  Toprol XL 50 mg daily.  3. Aldosterone antagonist: not currently on.  4. SCD prophylaxis: not currently indicated as LVEF last >35% and still optimizing GDMT.  5. Fluid status: Continue Torsemide.  6. Her zio patch monitor showed frequent short preethi of AT and PACs. No AF noted. We are changing her to Toprol XL.   7. Etiology of CM: Given history of pulmonary sarcoid there is consideration that this could be cardiac sarcoid. Overall, CMR imaging not classic for cardiac sarcoid. However, it is also not consistent with a CM related to her systemic sclerosis either. We would favor getting a Cardiac PET to evaluate for any possible cardiac sarcoid. Otherwise, her CM could likely be stress induced given already recovering LVEF vs idiopathic NICM. We will also discuss and review her at the multidisciplinary sarcoid meeting up coming. She is also establishing with Dr. Royal vega for his expertise.     Follow up to be determined based on results.        History of Present Illness/Subjective    Ms. Jeanine Schuler is a 61 year old female who comes in today for EP consultation of ? cardiac sarcoid.    Ms. Schuler is a 61 year old female who has a past medical  history significant for pulmonary sarcoid, ILD, mixed connective tissue disorder, cutaneous systemic sclerosis, RA, Raynauds, NICM LVEF 35%, kidney stone, HTN, and GERD.     She presented to [facility] ER in 12/2021 with a 3 day history of dyspnea and tachycardia. Echo showed LVEF 35%, hypokinesis, moderate MR, moderate severe TR, and severe pulmonary HTN. She was admitted to [facility]. Coronary angiogram showed no obstructive CAD. CMR showed LVEF 44%, mid-myocardial late gadolinium enhancement in the basal septum with extension into the mid inferoseptal segment and inferior right ventricular insertion site. In addition, there is subendocardial  hyperenhancement in the basal inferolateral segment.  She was started on GDMT. She does have known ILD and biopsy did show pathology showing some noncaseating granulomas. This was felt c/w pulmonary sarcoid. She was treated with methotrexate, cellcept, and prednisone. Repeat CT showed improvement in her bilateral nodular opacities.     She was referred for possible cardiac sarcoid. We reviewed CMR with imaging experts who did not feel these patterns were consistent with sarcoid or sclerosis. She reports feeling fatigued and continues to have some ROUSE. She denies chest discomfort, palpitations, abdominal fullness/bloating or peripheral edema, shortness of breath, paroxysmal nocturnal dyspnea, orthopnea, lightheadedness, dizziness, pre-syncope, or syncope. A zio patch monitor from 12/22/21- 12/29/21 showed 138 PSVT up to 11.6 seconds and ~12% PAC burden. Presenting 12 lead ECG shows SR with PACs Vent Rate 80 bpm,  ms,  ms, QTc 491 ms. Current cardiac medications include: ASA, Torsemide, Metoprolol, and Losartan.     I have reviewed and updated the patient's Past Medical History, Social History, Family History and Medication List.     Cardiographics (Personally Reviewed) :   12/21/21 Echo:   Interpretation Summary  1. The left ventricle is normal in size. The visual  ejection fraction is estimated at 35%. Global hypokinesis, distal LV segments (anteroseptal) appear more hypokinetic than other areas.  2. The right ventricle is mildly dilated. The right ventricular systolic function is normal.  3. There is mild to moderate (1-2+) mitral regurgitation.  4. There is moderately severe (3+) tricuspid regurgitation.  5. Moderate (46-55mmHg) pulmonary hypertension is present. The right ventricular systolic pressure is approximated at 54mmHg plus the right atrial pressure.  6. The ascending aorta is Mildly dilated. 4.2cm.     Echo from 8/2020 showed EF 55%, mild TR, aorta 3.8cm.    12/21/21 Coronary Angiogram:  Conclusion  1.  Minimal non-obstructive CAD  2.  Successful closure of the 6F RFA access site with a single 6F Angioseal closure device    1/12/22 CMR:  1. The left ventricle is normal in size. There is mild inferoseptal thickening (14 mm). The global systolic function is mildly reduced. The LVEF is 44%. There is mild diffuse hypokinesis.  2. The right ventricle is normal in cavity size. The global systolic function is normal. The RVEF is 54%.   3. Left atrium is mildly enlarged. Right atrium is normal in size.   4. There is no significant valvular disease.   5. There is mid-myocardial late gadolinium enhancement in the basal septum with extension into the mid inferoseptal segment and inferior right ventricular insertion site. In addition, there is subendocardial hyperenhancement in the basal inferolateral segment. This variable pattern of hyperenhancement is concerning for infiltrative cardiomyopathy such as sarcoidosis.    6. There is no pericardial effusion.  7. There is no intracardiac thrombus.  8. There is no myocardial edema.   CONCLUSIONS:   1. Non ischemic cardiomyopathy with mildly reduced left ventricular function and normal right ventricular function.   2. Variable patterns of hyperenhancement in the septal mid-myocardium and inferolateral subendocardium are concerning  for cardiac sarcoidosis.        Physical Examination   BP (!) 137/91 (BP Location: Left arm, Patient Position: Chair, Cuff Size: Adult Regular)   Wt 79.4 kg (175 lb)   BMI 26.61 kg/m    Wt Readings from Last 3 Encounters:   01/13/22 78.9 kg (174 lb)   01/06/22 78.9 kg (174 lb)   12/22/21 81.6 kg (180 lb)     General Appearance:   Alert, well-appearing and in no acute distress.   HEENT: Atraumatic, normocephalic. PERRL.  MMM.   Chest/Lungs:   Respirations unlabored.  Lungs are clear to auscultation.   Cardiovascular:   Regular rate and rhythm.  Murmur present.    Abdomen:  Soft, nontender, nondistended.   Extremities: No cyanosis or clubbing. Trace edema.    Musculoskeletal: Moves all extremities.  Gait normal.   Skin: Warm, dry, intact.    Neurologic: Mood and affect are appropriate.  Alert and oriented to person, place, time, and situation.          Medications  Allergies   Current Outpatient Medications   Medication Sig Dispense Refill     albuterol (PROAIR HFA/PROVENTIL HFA/VENTOLIN HFA) 108 (90 Base) MCG/ACT inhaler Inhale 2 puffs into the lungs every 6 hours as needed for shortness of breath / dyspnea or wheezing       alcohol swab prep pads Use to swab area of injection/alf as directed. 100 each 3     aspirin (ASA) 81 MG chewable tablet Take 1 tablet (81 mg) by mouth daily 30 tablet 0     azaTHIOprine (IMURAN) 50 MG tablet Take 3 tablets (150 mg) by mouth daily And have labs drawn two weeks after starting medication. 90 tablet 3     benzonatate (TESSALON) 100 MG capsule Take 100 mg by mouth every morning and PRN       benzonatate (TESSALON) 100 MG capsule Take 1 capsule (100 mg) by mouth 3 times daily as needed for cough 60 capsule 0     blood glucose (NO BRAND SPECIFIED) test strip Use to test blood sugar 2 times daily or as directed. 100 strip 6     blood glucose calibration (NO BRAND SPECIFIED) solution Use to calibrate blood glucose monitor as needed as directed. 1 each 0     blood glucose monitoring  (NO BRAND SPECIFIED) meter device kit Use to test blood sugar 2 times daily or as directed. 1 kit 0     cefdinir (OMNICEF) 300 MG capsule Take 1 capsule (300 mg) by mouth 2 times daily 6 capsule 0     clonazePAM (KLONOPIN) 0.5 MG tablet Take 1 tablet (0.5 mg) by mouth nightly as needed for sleep (secondary to prednisone) MRx 1 60 tablet 1     folic acid (FOLVITE) 1 MG tablet Take 1 tablet (1 mg) by mouth daily 90 tablet 3     losartan (COZAAR) 50 MG tablet Take 0.5 tablets (25 mg) by mouth daily 45 tablet 3     magnesium oxide (MAG-OX) 400 MG tablet Take 1 tablet (400 mg) by mouth daily 30 tablet 0     metoprolol tartrate (LOPRESSOR) 25 MG tablet Take 0.5 tablets (12.5 mg) by mouth 2 times daily 30 tablet 0     naproxen (NAPROSYN) 500 MG tablet Take 1 tablet (500 mg) by mouth 2 times daily as needed for moderate pain 10 tablet 0     nitroGLYcerin (NITROSTAT) 0.3 MG sublingual tablet Place 1 tablet (0.3 mg) under the tongue every 5 minutes as needed for chest pain (esophageal spasm) For chest pain place 1 tablet under the tongue every 5 minutes for 3 doses. If symptoms persist 5 minutes after 3rd dose call 911. 12 tablet 1     omeprazole (PRILOSEC) 20 MG DR capsule Take 1 capsule (20 mg) by mouth 2 times daily 180 capsule 3     pantoprazole (PROTONIX) 40 MG EC tablet Take 1 tablet (40 mg) by mouth daily 90 tablet 3     potassium chloride ER (KLOR-CON M) 10 MEQ CR tablet Take 30 mEq by mouth daily       predniSONE (DELTASONE) 10 MG tablet Take 3 tablets (30 mg) by mouth daily 90 tablet 3     spironolactone (ALDACTONE) 25 MG tablet Take 25 mg by mouth daily        sulfamethoxazole-trimethoprim (BACTRIM) 400-80 MG tablet Take 1 tablet by mouth daily (Patient taking differently: Take 1 tablet by mouth daily ) 30 tablet 3     temazepam (RESTORIL) 15 MG capsule Take 1 capsule (15 mg) by mouth nightly as needed for sleep 30 capsule 1     thin (NO BRAND SPECIFIED) lancets Use with lanceting device twice daily 100 each 0      torsemide (DEMADEX) 20 MG tablet Take 1 tablet (20 mg) by mouth 2 times daily 60 tablet 0     vitamin D2 (ERGOCALCIFEROL) 17346 units (1250 mcg) capsule Take 50,000 Units by mouth once a week      Allergies   Allergen Reactions     Lovenox Other (See Comments)     Blood clot      Norvasc [Amlodipine Besylate] Swelling     Lip swelling that happened on 2 different occasions     Erythromycin Rash     Rash on arms         Lab Results (Personally Reviewed)    Chemistry/lipid CBC Cardiac Enzymes/BNP/TSH/INR   Lab Results   Component Value Date    BUN 13 12/22/2021     12/22/2021    CO2 25 12/22/2021     Creatinine   Date Value Ref Range Status   12/22/2021 0.89 0.52 - 1.04 mg/dL Final   07/06/2021 0.74 0.52 - 1.04 mg/dL Final       Lab Results   Component Value Date    CHOL 116 12/22/2021    HDL 47 (L) 12/22/2021    LDL 55 12/22/2021      Lab Results   Component Value Date    WBC 3.8 (L) 12/22/2021    HGB 10.8 (L) 12/22/2021    HCT 33.2 (L) 12/22/2021    MCV 95 12/22/2021     12/22/2021    Lab Results   Component Value Date    TSH 0.49 12/20/2021        The patient states understanding and is agreeable with the plan.   Abundio Ag MD [facility]  Cardiology - Electrophysiology    Total time spent on patient visit, reviewing notes, imaging, labs, orders, and completing necessary documentation: 60 minutes.

## 2022-01-26 NOTE — NURSING NOTE
Medication Reconciliation:  Rooming staff reviewed and verified all current medications with patient. An updated med list was included in the AVS.    Medication Change:  Patient was educated regarding medication change: Stop metoprolol tartrate, start metoprolol succinate 50mg daily . Discussed indication, administration, side effects, and when to report to MD or RN. Patient verbalized understanding and agreed to call with further questions or concerns.     Test Results Reviewed:  EKG results were reviewed by provider with patient today.     Cardiac Testing:  Patient was given instructions regarding Cardiac PET. Discussed purpose, preparation, procedure, and when to expect results. Patient verbalized understanding and agreed to call with further questions or concerns.     Return appointment:   Patient given instructions regarding scheduling next clinic visit. Patient verbalized understanding.       Madhuri Wade RN on 1/26/2022 at 4:16 PM

## 2022-01-26 NOTE — PATIENT INSTRUCTIONS
Plan:     Stop metoprolol tartrate    Start metoprolol succinate 50mg daily    Cardiac PET scan. They should call you, but you can also call them at [phone].    Follow-up based on outcome of meeting       Your Care Team:  EP Cardiology   Telephone Number     Madhuri Wade RN [phone]     For scheduling appts or procedures:    Kajal Heredia   [phone]   For the Device Clinic (Pacemakers, ICDs, Loop Recorders)    During business hours: [phone]  After business hours:   [phone]- select option 4 and ask for job code [id number].       Cardiovascular Clinic:   [address] [place] [address]. [place] [address]      As always, Thank you for trusting us with your health care needs!

## 2022-01-27 LAB
ATRIAL RATE - MUSE: 80 BPM
DIASTOLIC BLOOD PRESSURE - MUSE: NORMAL MMHG
INTERPRETATION ECG - MUSE: NORMAL
P AXIS - MUSE: 49 DEGREES
PR INTERVAL - MUSE: 158 MS
QRS DURATION - MUSE: 122 MS
QT - MUSE: 426 MS
QTC - MUSE: 491 MS
R AXIS - MUSE: -40 DEGREES
SYSTOLIC BLOOD PRESSURE - MUSE: NORMAL MMHG
T AXIS - MUSE: 115 DEGREES
VENTRICULAR RATE- MUSE: 80 BPM

## 2022-01-28 ENCOUNTER — OFFICE VISIT (OUTPATIENT)
Dept: FAMILY MEDICINE | Facility: CLINIC | Age: 62
End: 2022-01-28
Payer: COMMERCIAL

## 2022-01-28 VITALS
WEIGHT: 177 LBS | TEMPERATURE: 97.1 F | DIASTOLIC BLOOD PRESSURE: 82 MMHG | SYSTOLIC BLOOD PRESSURE: 128 MMHG | BODY MASS INDEX: 26.91 KG/M2

## 2022-01-28 DIAGNOSIS — Z09 HOSPITAL DISCHARGE FOLLOW-UP: Primary | ICD-10-CM

## 2022-01-28 DIAGNOSIS — I42.9 SECONDARY CARDIOMYOPATHY (H): ICD-10-CM

## 2022-01-28 DIAGNOSIS — I24.9 ACS (ACUTE CORONARY SYNDROME) (H): ICD-10-CM

## 2022-01-28 DIAGNOSIS — J02.9 PHARYNGITIS, UNSPECIFIED ETIOLOGY: ICD-10-CM

## 2022-01-28 DIAGNOSIS — Z87.440 PERSONAL HISTORY OF URINARY TRACT INFECTION: ICD-10-CM

## 2022-01-28 DIAGNOSIS — E87.6 HYPOKALEMIA: ICD-10-CM

## 2022-01-28 DIAGNOSIS — D86.85 CARDIAC SARCOIDOSIS: ICD-10-CM

## 2022-01-28 LAB
ALBUMIN UR-MCNC: NEGATIVE MG/DL
ANION GAP SERPL CALCULATED.3IONS-SCNC: 9 MMOL/L (ref 3–14)
APPEARANCE UR: CLEAR
BASOPHILS # BLD AUTO: 0 10E3/UL (ref 0–0.2)
BASOPHILS NFR BLD AUTO: 0 %
BILIRUB UR QL STRIP: NEGATIVE
BUN SERPL-MCNC: 12 MG/DL (ref 7–30)
CALCIUM SERPL-MCNC: 9 MG/DL (ref 8.5–10.1)
CHLORIDE BLD-SCNC: 101 MMOL/L (ref 94–109)
CO2 SERPL-SCNC: 27 MMOL/L (ref 20–32)
COLOR UR AUTO: YELLOW
CREAT SERPL-MCNC: 0.7 MG/DL (ref 0.52–1.04)
EOSINOPHIL # BLD AUTO: 0 10E3/UL (ref 0–0.7)
EOSINOPHIL NFR BLD AUTO: 1 %
ERYTHROCYTE [DISTWIDTH] IN BLOOD BY AUTOMATED COUNT: 13.2 % (ref 10–15)
GFR SERPL CREATININE-BSD FRML MDRD: >90 ML/MIN/1.73M2
GLUCOSE BLD-MCNC: 84 MG/DL (ref 70–99)
GLUCOSE UR STRIP-MCNC: NEGATIVE MG/DL
HCT VFR BLD AUTO: 41.8 % (ref 35–47)
HGB BLD-MCNC: 13.9 G/DL (ref 11.7–15.7)
HGB UR QL STRIP: NEGATIVE
IMM GRANULOCYTES # BLD: 0 10E3/UL
IMM GRANULOCYTES NFR BLD: 0 %
KETONES UR STRIP-MCNC: NEGATIVE MG/DL
LEUKOCYTE ESTERASE UR QL STRIP: NEGATIVE
LYMPHOCYTES # BLD AUTO: 1.3 10E3/UL (ref 0.8–5.3)
LYMPHOCYTES NFR BLD AUTO: 35 %
MCH RBC QN AUTO: 30.8 PG (ref 26.5–33)
MCHC RBC AUTO-ENTMCNC: 33.3 G/DL (ref 31.5–36.5)
MCV RBC AUTO: 93 FL (ref 78–100)
MONOCYTES # BLD AUTO: 0.4 10E3/UL (ref 0–1.3)
MONOCYTES NFR BLD AUTO: 11 %
NEUTROPHILS # BLD AUTO: 1.9 10E3/UL (ref 1.6–8.3)
NEUTROPHILS NFR BLD AUTO: 53 %
NITRATE UR QL: NEGATIVE
NRBC # BLD AUTO: 0 10E3/UL
NRBC BLD AUTO-RTO: 0 /100
PH UR STRIP: 5 [PH] (ref 5–7)
PLATELET # BLD AUTO: 227 10E3/UL (ref 150–450)
POTASSIUM BLD-SCNC: 3.7 MMOL/L (ref 3.4–5.3)
RBC # BLD AUTO: 4.52 10E6/UL (ref 3.8–5.2)
SODIUM SERPL-SCNC: 137 MMOL/L (ref 133–144)
SP GR UR STRIP: 1.01 (ref 1–1.03)
UROBILINOGEN UR STRIP-MCNC: NORMAL MG/DL
WBC # BLD AUTO: 3.6 10E3/UL (ref 4–11)

## 2022-01-28 PROCEDURE — 85025 COMPLETE CBC W/AUTO DIFF WBC: CPT | Performed by: FAMILY MEDICINE

## 2022-01-28 PROCEDURE — 81003 URINALYSIS AUTO W/O SCOPE: CPT | Performed by: FAMILY MEDICINE

## 2022-01-28 PROCEDURE — 36415 COLL VENOUS BLD VENIPUNCTURE: CPT | Performed by: FAMILY MEDICINE

## 2022-01-28 PROCEDURE — 99214 OFFICE O/P EST MOD 30 MIN: CPT | Performed by: FAMILY MEDICINE

## 2022-01-28 PROCEDURE — 80048 BASIC METABOLIC PNL TOTAL CA: CPT | Performed by: FAMILY MEDICINE

## 2022-01-28 RX ORDER — ATORVASTATIN CALCIUM 40 MG/1
40 TABLET, FILM COATED ORAL
COMMUNITY
Start: 2021-02-22 | End: 2023-01-01

## 2022-01-28 RX ORDER — MAGNESIUM OXIDE 400 MG/1
400 TABLET ORAL DAILY
Qty: 90 TABLET | Refills: 0 | Status: SHIPPED | OUTPATIENT
Start: 2022-01-28 | End: 2023-01-01

## 2022-01-28 RX ORDER — POTASSIUM CHLORIDE 750 MG/1
30 CAPSULE, EXTENDED RELEASE ORAL
COMMUNITY
Start: 2021-02-22 | End: 2023-01-01

## 2022-01-28 RX ORDER — TORSEMIDE 20 MG/1
20 TABLET ORAL
Qty: 180 TABLET | Refills: 1 | Status: SHIPPED | OUTPATIENT
Start: 2022-01-28 | End: 2023-01-01

## 2022-01-28 ASSESSMENT — PAIN SCALES - GENERAL: PAINLEVEL: NO PAIN (0)

## 2022-01-28 NOTE — PROGRESS NOTES
"  Assessment & Plan       ICD-10-CM    1. Hospital discharge follow-up  Z09    2. ACS (acute coronary syndrome) (H)  I24.9    3. Cardiac sarcoidosis  D86.85    4. Secondary cardiomyopathy (H)  I42.9 torsemide (DEMADEX) 20 MG tablet     Basic metabolic panel  (Ca, Cl, CO2, Creat, Gluc, K, Na, BUN)     Basic metabolic panel  (Ca, Cl, CO2, Creat, Gluc, K, Na, BUN)   5. Hypokalemia  E87.6 magnesium oxide (MAG-OX) 400 MG tablet   6. Personal history of urinary tract infection  Z87.440 UA reflex to Microscopic - lab collect     UA reflex to Microscopic - lab collect   7. Pharyngitis, unspecified etiology  J02.9 CBC with platelets and differential     CBC with platelets and differential     I reviewed the medical record formed the hospital, pulmonologist and cardiologist in details.  She has been overall doing quite well since discharge from the hospital.  Vital signs are normal and stable.  She did not look acutely sick.  Medications reviewed as well.  Will continue with torsemide, Aldactone, losartan and metoprolol.  Encouraged to avoid high salt intake.  Also encouraged to monitor her weight closely, call in if gaining more than 3 pounds in 24 hours or 5 pounds in 3 days.  Symptom of CHF exacerbation of severe pulmonary hypertension discussed.  Follow-up with the pulmonologist and cardiologist as per the recommendation.  Call in or follow-up if any concerns or question.    BMP and CBC were normal today.  UA also was normal.    Refilled the magnesium oxide and Lipitor.    Pharyngitis wise, unlikely strep pharyngitis.  Encouraged increased fluid intake.  Unlikely is caused by postnasal drainage.  Her acid reflux has been controlled, continue with omeprazole.  Call in if it persists or gets worse.       BMI:   Estimated body mass index is 26.91 kg/m  as calculated from the following:    Height as of 1/13/22: 1.727 m (5' 8\").    Weight as of this encounter: 80.3 kg (177 lb).   Weight management plan: Discussed healthy diet " and exercise guidelines      Return in about 3 months (around 4/28/2022) for Physical Exam.    Katia Carver Mai, MD  [facility]    Tete Solorzano is a 61 year old who presents for the following health issues     HPI       Hospital Follow-up Visit:    Hospital/Nursing Home/IP Rehab Facility: [facility]  Date of Admission: 12/21/21  Date of Discharge: 12/22/21  Reason(s) for Admission: acute coronary syndrome      Was your hospitalization related to COVID-19? No   Problems taking medications regularly:  None  Medication changes since discharge: added metoprolol, magnesium. Changed lasix to torsemide  Problems adhering to non-medication therapy:  None    Summary of hospitalization:  [facility] discharge summary reviewed  Diagnostic Tests/Treatments reviewed.  Follow up needed: bmp and CBC  Other Healthcare Providers Involved in Patient s Care:         None  Update since discharge: improved. Post Discharge Medication Reconciliation: discharge medications reconciled, continue medications without change.  Plan of care communicated with patient          Jeanine has a very complex medical history which include interstitial lung disease, pulmonary sarcoidosis, rheumatoid arthritis, Sojourn syndrome, CREST syndrome, systemic sclerosis and hypertension who presented to the ER on the day of admission on 12/2121 for dyspnea and hyperventilation and was found to have suspected ACS.  She was transferred to [facility] where she had a coronary angiogram which found to have mild CAD.  Echocardiogram at that time showed ejection fraction of 30 to 35% with severe pulmonary hypertension as well as distal hypokinesis and moderate MR and moderate to severe TR.  She was treated with IV Lasix and sent home the next day with torsemide 20 mg twice daily, continued with the Aldactone and potassium supplement.  She was also continue with the aspirin, metoprolol,  losartan.  Outpatient cardiac MRI on 1/12/22 showed left ventricle with normal size but has mildly reduced global systolic function with ejection fraction of 44%.  There was also an enhancement at the basilar septum was attention into the mid inferoseptal segment and inferior right ventricular insertion site, concern of infiltrative cardiomyopathy such as sarcoidosis.  Since then, she had follow-up appointments with both of her pulmonologist and cardiologist.  The pulmonologist to increase the prednisone dose.  The cardiologist changed the Toprol tartrate to metoprolol succinate and set her up for cardiac PET.    Stated that she been doing pretty well since discharge from the hospital, overall is feeling better.  Still gets shortness of breath and gets winded easily, but not as bad.  Still have episodes of heart palpitation..  Been taking the medication as prescribed with no side effect.  She needs a refill on the Lipitor and magnesium oxide.  She also would like to be screened for bladder infection.  Been treated for bladder infection quite a few times in the last several months.  Her UTI symptoms today.  Been working with her cardiologist, pulmonologist and rheumatologist closely.  No pain. Has he ST for couple week and is getting better. No trouble with swallowing.  Eating and drinking well.  No problems with sleeping.  No problem urination or bowel movement.  No other concern.    Review of Systems   Constitutional, HEENT, cardiovascular, pulmonary, gi and gu systems are negative, except as otherwise noted.      Objective    /82 (Cuff Size: Adult Regular)   Temp 97.1  F (36.2  C) (Temporal)   Wt 80.3 kg (177 lb)   BMI 26.91 kg/m    Body mass index is 26.91 kg/m .  Physical Exam   GENERAL: alert and no distress.  Speaking in full sentences.  HENT: ear canals and TM's normal, nose and mouth without ulcers or lesions.  No tonsillar hypertrophy or exudate or erythema.  No tender with palpation to the  sinuses.  He has a noncongested.  General dry mouth noted  NECK: Supple, no lymphadenopathy or thyromegaly  RESP: lungs clear to auscultation - no rales, rhonchi or wheezes.  Good respiratory effort throughout  CV: regular rate and rhythm, normal S1 S2, no S3 or S4, no murmur, click or rub, no peripheral edema  ABDOMEN: soft, nontender, no hepatosplenomegaly, no masses and bowel sounds normal  MS: no gross musculoskeletal defects noted, no edema.  No focal weakness  NEURO: Normal strength and tone, mentation intact and speech normal.  No focal neurological deficit  PSYCH: mentation appears normal, affect normal/bright    Results for orders placed or performed in visit on 01/28/22   UA reflex to Microscopic - lab collect     Status: Normal   Result Value Ref Range    Color Urine Yellow Colorless, Straw, Light Yellow, Yellow    Appearance Urine Clear Clear    Glucose Urine Negative Negative mg/dL    Bilirubin Urine Negative Negative    Ketones Urine Negative Negative mg/dL    Specific Gravity Urine 1.006 1.003 - 1.035    Blood Urine Negative Negative    pH Urine 5.0 5.0 - 7.0    Protein Albumin Urine Negative Negative mg/dL    Urobilinogen Urine Normal Normal, 2.0 mg/dL    Nitrite Urine Negative Negative    Leukocyte Esterase Urine Negative Negative    Narrative    Microscopic not indicated   Basic metabolic panel  (Ca, Cl, CO2, Creat, Gluc, K, Na, BUN)     Status: Normal   Result Value Ref Range    Sodium 137 133 - 144 mmol/L    Potassium 3.7 3.4 - 5.3 mmol/L    Chloride 101 94 - 109 mmol/L    Carbon Dioxide (CO2) 27 20 - 32 mmol/L    Anion Gap 9 3 - 14 mmol/L    Urea Nitrogen 12 7 - 30 mg/dL    Creatinine 0.70 0.52 - 1.04 mg/dL    Calcium 9.0 8.5 - 10.1 mg/dL    Glucose 84 70 - 99 mg/dL    GFR Estimate >90 >60 mL/min/1.73m2   CBC with platelets and differential     Status: Abnormal   Result Value Ref Range    WBC Count 3.6 (L) 4.0 - 11.0 10e3/uL    RBC Count 4.52 3.80 - 5.20 10e6/uL    Hemoglobin 13.9 11.7 - 15.7  g/dL    Hematocrit 41.8 35.0 - 47.0 %    MCV 93 78 - 100 fL    MCH 30.8 26.5 - 33.0 pg    MCHC 33.3 31.5 - 36.5 g/dL    RDW 13.2 10.0 - 15.0 %    Platelet Count 227 150 - 450 10e3/uL    % Neutrophils 53 %    % Lymphocytes 35 %    % Monocytes 11 %    % Eosinophils 1 %    % Basophils 0 %    % Immature Granulocytes 0 %    NRBCs per 100 WBC 0 <1 /100    Absolute Neutrophils 1.9 1.6 - 8.3 10e3/uL    Absolute Lymphocytes 1.3 0.8 - 5.3 10e3/uL    Absolute Monocytes 0.4 0.0 - 1.3 10e3/uL    Absolute Eosinophils 0.0 0.0 - 0.7 10e3/uL    Absolute Basophils 0.0 0.0 - 0.2 10e3/uL    Absolute Immature Granulocytes 0.0 <=0.4 10e3/uL    Absolute NRBCs 0.0 10e3/uL   CBC with platelets and differential     Status: Abnormal    Narrative    The following orders were created for panel order CBC with platelets and differential.  Procedure                               Abnormality         Status                     ---------                               -----------         ------                     CBC with platelets and d...[[id number]]  Abnormal            Final result                 Please view results for these tests on the individual orders.

## 2022-02-09 ENCOUNTER — TEAM CONFERENCE (OUTPATIENT)
Dept: PULMONOLOGY | Facility: CLINIC | Age: 62
End: 2022-02-09
Payer: COMMERCIAL

## 2022-02-12 ASSESSMENT — ENCOUNTER SYMPTOMS
WHEEZING: 1
HYPERTENSION: 0
MYALGIAS: 1
CHILLS: 1
PALPITATIONS: 1
LEG PAIN: 0
WEIGHT LOSS: 0
DYSPNEA ON EXERTION: 1
FEVER: 0
RECTAL PAIN: 0
EYE REDNESS: 0
POSTURAL DYSPNEA: 1
NAUSEA: 1
DIZZINESS: 1
NAIL CHANGES: 0
LIGHT-HEADEDNESS: 0
POOR WOUND HEALING: 0
INSOMNIA: 1
MEMORY LOSS: 0
TASTE DISTURBANCE: 0
SNORES LOUDLY: 0
DECREASED CONCENTRATION: 0
POLYDIPSIA: 0
SINUS CONGESTION: 0
EYE IRRITATION: 0
INCREASED ENERGY: 1
MUSCLE CRAMPS: 0
HEARTBURN: 1
SLEEP DISTURBANCES DUE TO BREATHING: 1
COUGH DISTURBING SLEEP: 1
BOWEL INCONTINENCE: 0
WEIGHT GAIN: 0
TROUBLE SWALLOWING: 1
PANIC: 1
MUSCLE WEAKNESS: 1
SKIN CHANGES: 1
ORTHOPNEA: 1
SYNCOPE: 0
WEAKNESS: 1
BLOATING: 1
BLOOD IN STOOL: 0
CONSTIPATION: 0
FATIGUE: 1
HOARSE VOICE: 0
POLYPHAGIA: 0
HEMOPTYSIS: 0
NERVOUS/ANXIOUS: 1
SPEECH CHANGE: 0
VOMITING: 1
BRUISES/BLEEDS EASILY: 0
JAUNDICE: 0
EXERCISE INTOLERANCE: 1
DOUBLE VISION: 0
DIARRHEA: 0
NECK MASS: 1
NUMBNESS: 1
DEPRESSION: 1
NECK PAIN: 1
HYPOTENSION: 0
SEIZURES: 0
HEADACHES: 1
SHORTNESS OF BREATH: 1
SMELL DISTURBANCE: 0
LOSS OF CONSCIOUSNESS: 0
DECREASED APPETITE: 1
COUGH: 1
ABDOMINAL PAIN: 0
BACK PAIN: 1
NIGHT SWEATS: 1
SORE THROAT: 1
ARTHRALGIAS: 1
TREMORS: 1
EYE WATERING: 1
PARALYSIS: 0
HALLUCINATIONS: 0
TINGLING: 1
DISTURBANCES IN COORDINATION: 0
SPUTUM PRODUCTION: 0
SINUS PAIN: 1
STIFFNESS: 1
EYE PAIN: 0
SWOLLEN GLANDS: 1
ALTERED TEMPERATURE REGULATION: 1
JOINT SWELLING: 0

## 2022-02-15 ENCOUNTER — VIRTUAL VISIT (OUTPATIENT)
Dept: RHEUMATOLOGY | Facility: CLINIC | Age: 62
End: 2022-02-15
Attending: INTERNAL MEDICINE
Payer: COMMERCIAL

## 2022-02-15 ENCOUNTER — ANCILLARY PROCEDURE (OUTPATIENT)
Dept: CARDIOLOGY | Facility: CLINIC | Age: 62
End: 2022-02-15
Attending: INTERNAL MEDICINE
Payer: COMMERCIAL

## 2022-02-15 ENCOUNTER — LAB (OUTPATIENT)
Dept: LAB | Facility: CLINIC | Age: 62
End: 2022-02-15
Payer: COMMERCIAL

## 2022-02-15 VITALS
WEIGHT: 167.8 LBS | BODY MASS INDEX: 25.43 KG/M2 | DIASTOLIC BLOOD PRESSURE: 88 MMHG | SYSTOLIC BLOOD PRESSURE: 124 MMHG | HEART RATE: 98 BPM | HEIGHT: 68 IN

## 2022-02-15 DIAGNOSIS — I27.20 PULMONARY HYPERTENSION (H): ICD-10-CM

## 2022-02-15 DIAGNOSIS — M06.00 RHEUMATOID ARTHRITIS WITH NEGATIVE RHEUMATOID FACTOR, INVOLVING UNSPECIFIED SITE (H): ICD-10-CM

## 2022-02-15 DIAGNOSIS — D86.85 CARDIAC SARCOIDOSIS: Primary | ICD-10-CM

## 2022-02-15 DIAGNOSIS — D86.9 SARCOIDOSIS: ICD-10-CM

## 2022-02-15 DIAGNOSIS — G72.9 MYOPATHY: ICD-10-CM

## 2022-02-15 DIAGNOSIS — Z79.899 HIGH RISK MEDICATION USE: ICD-10-CM

## 2022-02-15 DIAGNOSIS — Z79.52 ON PREDNISONE THERAPY: ICD-10-CM

## 2022-02-15 DIAGNOSIS — Z51.81 ENCOUNTER FOR THERAPEUTIC DRUG MONITORING: ICD-10-CM

## 2022-02-15 DIAGNOSIS — M34.9 SYSTEMIC SCLEROSIS (H): ICD-10-CM

## 2022-02-15 DIAGNOSIS — M35.1 MCTD (MIXED CONNECTIVE TISSUE DISEASE) (H): ICD-10-CM

## 2022-02-15 DIAGNOSIS — I42.9 SECONDARY CARDIOMYOPATHY (H): ICD-10-CM

## 2022-02-15 DIAGNOSIS — D84.9 IMMUNOSUPPRESSED STATUS (H): Primary | ICD-10-CM

## 2022-02-15 DIAGNOSIS — I47.10 SVT (SUPRAVENTRICULAR TACHYCARDIA) (H): ICD-10-CM

## 2022-02-15 DIAGNOSIS — R00.2 PALPITATIONS: ICD-10-CM

## 2022-02-15 DIAGNOSIS — I73.00 RAYNAUD'S DISEASE WITHOUT GANGRENE: ICD-10-CM

## 2022-02-15 DIAGNOSIS — R06.02 SHORTNESS OF BREATH: ICD-10-CM

## 2022-02-15 LAB
ALBUMIN SERPL-MCNC: 2.9 G/DL (ref 3.4–5)
ALBUMIN UR-MCNC: 30 MG/DL
ALT SERPL W P-5'-P-CCNC: 34 U/L (ref 0–50)
ANION GAP SERPL CALCULATED.3IONS-SCNC: 7 MMOL/L (ref 3–14)
APPEARANCE UR: CLEAR
AST SERPL W P-5'-P-CCNC: 46 U/L (ref 0–45)
BASOPHILS # BLD AUTO: 0 10E3/UL (ref 0–0.2)
BASOPHILS NFR BLD AUTO: 1 %
BILIRUB UR QL STRIP: NEGATIVE
BUN SERPL-MCNC: 10 MG/DL (ref 7–30)
CALCIUM SERPL-MCNC: 8.6 MG/DL (ref 8.5–10.1)
CHLORIDE BLD-SCNC: 99 MMOL/L (ref 94–109)
CK SERPL-CCNC: 37 U/L (ref 30–225)
CO2 SERPL-SCNC: 29 MMOL/L (ref 20–32)
COLOR UR AUTO: YELLOW
CREAT SERPL-MCNC: 0.8 MG/DL (ref 0.52–1.04)
CRP SERPL-MCNC: 3.9 MG/L (ref 0–8)
EOSINOPHIL # BLD AUTO: 0 10E3/UL (ref 0–0.7)
EOSINOPHIL NFR BLD AUTO: 1 %
ERYTHROCYTE [DISTWIDTH] IN BLOOD BY AUTOMATED COUNT: 13.6 % (ref 10–15)
ERYTHROCYTE [SEDIMENTATION RATE] IN BLOOD BY WESTERGREN METHOD: 21 MM/HR (ref 0–30)
GFR SERPL CREATININE-BSD FRML MDRD: 84 ML/MIN/1.73M2
GLUCOSE BLD-MCNC: 88 MG/DL (ref 70–99)
GLUCOSE UR STRIP-MCNC: NEGATIVE MG/DL
HCT VFR BLD AUTO: 41.6 % (ref 35–47)
HGB BLD-MCNC: 13.7 G/DL (ref 11.7–15.7)
HGB UR QL STRIP: NEGATIVE
IMM GRANULOCYTES # BLD: 0 10E3/UL
IMM GRANULOCYTES NFR BLD: 0 %
KETONES UR STRIP-MCNC: NEGATIVE MG/DL
LEUKOCYTE ESTERASE UR QL STRIP: NEGATIVE
LYMPHOCYTES # BLD AUTO: 0.9 10E3/UL (ref 0.8–5.3)
LYMPHOCYTES NFR BLD AUTO: 30 %
MCH RBC QN AUTO: 29.5 PG (ref 26.5–33)
MCHC RBC AUTO-ENTMCNC: 32.9 G/DL (ref 31.5–36.5)
MCV RBC AUTO: 90 FL (ref 78–100)
MONOCYTES # BLD AUTO: 0.5 10E3/UL (ref 0–1.3)
MONOCYTES NFR BLD AUTO: 17 %
MUCOUS THREADS #/AREA URNS LPF: PRESENT /LPF
NEUTROPHILS # BLD AUTO: 1.5 10E3/UL (ref 1.6–8.3)
NEUTROPHILS NFR BLD AUTO: 51 %
NITRATE UR QL: NEGATIVE
NRBC # BLD AUTO: 0 10E3/UL
NRBC BLD AUTO-RTO: 0 /100
NT-PROBNP SERPL-MCNC: 911 PG/ML (ref 0–125)
PH UR STRIP: 5 [PH] (ref 5–7)
PLATELET # BLD AUTO: 199 10E3/UL (ref 150–450)
POTASSIUM BLD-SCNC: 4 MMOL/L (ref 3.4–5.3)
RBC # BLD AUTO: 4.64 10E6/UL (ref 3.8–5.2)
RBC URINE: 1 /HPF
SODIUM SERPL-SCNC: 135 MMOL/L (ref 133–144)
SP GR UR STRIP: 1.01 (ref 1–1.03)
SQUAMOUS EPITHELIAL: 1 /HPF
TROPONIN I SERPL HS-MCNC: 59 NG/L
UROBILINOGEN UR STRIP-MCNC: 2 MG/DL
WBC # BLD AUTO: 2.9 10E3/UL (ref 4–11)
WBC URINE: 3 /HPF

## 2022-02-15 PROCEDURE — 93242 EXT ECG>48HR<7D RECORDING: CPT

## 2022-02-15 PROCEDURE — 82550 ASSAY OF CK (CPK): CPT | Mod: 90 | Performed by: PATHOLOGY

## 2022-02-15 PROCEDURE — 99214 OFFICE O/P EST MOD 30 MIN: CPT | Mod: 95 | Performed by: INTERNAL MEDICINE

## 2022-02-15 PROCEDURE — 85025 COMPLETE CBC W/AUTO DIFF WBC: CPT | Performed by: PATHOLOGY

## 2022-02-15 PROCEDURE — 84460 ALANINE AMINO (ALT) (SGPT): CPT | Mod: 90 | Performed by: PATHOLOGY

## 2022-02-15 PROCEDURE — 80048 BASIC METABOLIC PNL TOTAL CA: CPT | Mod: 90 | Performed by: PATHOLOGY

## 2022-02-15 PROCEDURE — 99417 PROLNG OP E/M EACH 15 MIN: CPT | Performed by: INTERNAL MEDICINE

## 2022-02-15 PROCEDURE — 82040 ASSAY OF SERUM ALBUMIN: CPT | Mod: 90 | Performed by: PATHOLOGY

## 2022-02-15 PROCEDURE — G0463 HOSPITAL OUTPT CLINIC VISIT: HCPCS

## 2022-02-15 PROCEDURE — 84450 TRANSFERASE (AST) (SGOT): CPT | Mod: 90 | Performed by: PATHOLOGY

## 2022-02-15 PROCEDURE — 36415 COLL VENOUS BLD VENIPUNCTURE: CPT | Performed by: PATHOLOGY

## 2022-02-15 PROCEDURE — 81001 URINALYSIS AUTO W/SCOPE: CPT | Performed by: PATHOLOGY

## 2022-02-15 PROCEDURE — 93248 EXT ECG>7D<15D REV&INTERPJ: CPT | Performed by: INTERNAL MEDICINE

## 2022-02-15 PROCEDURE — 99000 SPECIMEN HANDLING OFFICE-LAB: CPT | Performed by: PATHOLOGY

## 2022-02-15 PROCEDURE — 86140 C-REACTIVE PROTEIN: CPT | Mod: 90 | Performed by: PATHOLOGY

## 2022-02-15 PROCEDURE — 85652 RBC SED RATE AUTOMATED: CPT | Performed by: PATHOLOGY

## 2022-02-15 PROCEDURE — 99215 OFFICE O/P EST HI 40 MIN: CPT | Mod: 25 | Performed by: INTERNAL MEDICINE

## 2022-02-15 PROCEDURE — 84484 ASSAY OF TROPONIN QUANT: CPT | Mod: 90 | Performed by: PATHOLOGY

## 2022-02-15 PROCEDURE — 83880 ASSAY OF NATRIURETIC PEPTIDE: CPT | Mod: 90 | Performed by: PATHOLOGY

## 2022-02-15 ASSESSMENT — MIFFLIN-ST. JEOR: SCORE: 1379.51

## 2022-02-15 ASSESSMENT — PAIN SCALES - GENERAL: PAINLEVEL: NO PAIN (0)

## 2022-02-15 NOTE — PROGRESS NOTES
Jeanine is a 61 year old who is being evaluated via a billable video visit.      How would you like to obtain your AVS? MyChart  If the video visit is dropped, the invitation should be resent by: Text to cell phone: [phone]  Will anyone else be joining your video visit? No    Video-Visit Details    Type of service:  Video Visit    Originating Location (pt. Location): Home    Distant Location (provider location):  [facility] [place]     Platform used for Video Visit: [id number]       In Person-Visit       SUBJECTIVE:  The patient returns in f/u of her MCTD/SSC/RP/with h/o multiple DU, Sarcoidosis .      PROBLEM LIST:    1.  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    2.  History of severe multiple digital ulcers, on fingers and toes.   3.  Marked keratoconjunctivitis sicca over the last several years.    4.  Marked iron deficiency anemia responsive to IV iron.    5.  Diffuse musculoskeletal pain   6.  Lower extremity edema managed with lasix and spironolactone   7.  Marked fatigue and diffuse clinical weakness.    8.  Dyspnea on exertion with lower extremity edema and other features including the anticentromere positivity worrisome for the potential development of pulmonary hypertension.    9.  Marked GERD with associated dysphagia.    10.  Status post gastric bypass with a history of intermittent constipation and diarrhea potentially consistent with bacterial small bowel overgrowth versus other gut effects of the prior surgery.    11.  History of positive rheumatoid factor consistent with MCTD, given the remainder of her clinical presentation.     12. Progressive Jaw tightening with inability to open mouth wider than 1 inch.   13. Diastolic dysfunction (3/2013)  14. Hypoalbuminemia  15. ILD cryobiopsy Pathology was reviewed at ILD conference on 10/12/2020 which showed ALI, what appears to be an acute  "exacerbation of underlying ILD with OP.  Few nonnecrotizing granulomas  16. Development of intermittent moderate/severe RLE Neuropathy with steroid taper below 30 mg prednisone/d 2021  17. Sarcoidosis      MARTÍN De La Torre MD, PhD    Rheumatology          INTERVAL HISTORY:       2017, INTERVAL HISTORY:  Since we have last seen the patient, she developed a right ankle wound about 2 months ago.  That opened for a while and it became hard to close, but at this point it has finally closed over.       She has had some stressors and she feels like that has contributed to her feeling like she needed more prednisone.  She had made it down to 12 mg a day for almost 6 months but increased to 20 mg a day in  when she was moving to a new house.  This was primarily due to an increase in fatigue and in joint pains.      At this point, her morning stiffness is 30-60 minutes despite remaining on the 20 mg a day of prednisone.  She does think her strength, however, is stable.      She continues to have a lot of Raynaud's phenomena but has not developed any distal digital ulcerations.       Her GERD and keratoconjunctivitis sicca, however, have remained stable and she thinks she has stable exercise tolerance.       She has not done her labs for many, many months and has not seen me for over a year, and we discussed that in some detail.     2018 INTERVAL HISTORY:  Since we have last seen her, she made it down to 12.5 mg a day of prednisone but then put herself back up to 20 mg a day because of increasing general body aches and fatigue as well as some inflammatory joint stiffness in her wrists.  Other joints have actually been okay.      She had stopped her methotrexate some time ago.  She works in a hospital and knew of several people who were admitted with \"methotrexate toxicity\" who  while in hospital and although she does not know the details, that frightened her enough that she " decided to stop the drug.       She is getting worsening Raynaud's.  She really notes that this happens not only with cold, but with a variety of kinds of stress.  She has previously, at least for a short length of time, been on losartan and does not remember whether that helped at all.  I am pretty sure she has also been on calcium channel blockers, but those are relatively contraindicated in her at this point because of persistent bilateral lower extremity edema.       She is getting enough lower extremity edema and has been found to have vascular insufficiency that she will be having some vascular surgery to laser some of these areas, although an exact time for that has not been laid out.       In addition to getting Raynaud's in her hands, she also gets it in her feet, and when she does, potentially contributed to by the vascular insufficiency, she gets numbness in her feet.  She is not getting numbness in her hands by contrast.       The patient did have pulmonary function tests today.  Although she is not exercising regularly, in general she feels her exercise tolerance has been stable, and she is not having any dry cough.       She did have a decrease in her FVC to 3.68 from 4.30 and her DLCO to 21.77 from 24.74, but she has had some rib cage pain for about the last 3 weeks since she had a minor accident while dancing.  Notably then, her TLC is completely stable, going from 6.41 to 6.29.       She denies any other new problems.  Keratoconjunctivitis sicca, GERD and other features have been stable and she denies any dysphagia.     July 11, 2019 interval history:    Since we have last seen her she did have to miss appointment because of some family issues but she is actually been doing quite well.  Although she is continued to have some intermittent joint complaints she is been able to taper her prednisone down to 7.5 mg a day the lowest she has been on and the entire time I have been following her.    Although  her joints have not worsened with that lower dose she does think she is getting some worsening of her Raynauds phenomena.  It can be pretty bad and the attacks can be hard to break even with rewarming.  She gets them in hands and feet.  Fortunately she has not had any digital ulcers.  She does recall that she was placed temporarily on tadalafil sometime ago by Dr. Bentley but she could not afford it long-term.  She thinks that may have been helpful however.    She denies any significant dyspnea on exertion or cough.  She has some muscle weakness but not marketed and she does not think that is any worse over time.  Fatigue has generally been her worst symptom and that may be slightly worse.    She does have some ongoing keratoconjunctivitis sicca but does not feel like she wants to go on a medicine for that.  She already has a full plate of dentures in both upper and lower.      August 11, 2020 interval history:    Since we last seen the patient she feels she was doing relatively stably on till July 7.  At that point she had the abrupt onset of a bad dry cough.  She did not have significant fevers but did not feel well.  She was seen had a negative coronavirus test but a chest x-ray apparently showed evidence of some infiltrates and she was placed on doxycycline.  She says she did not improve with that therapy and a CT angios was performed.  That showed bilateral pulmonary consolidation thought consistent with infection.  There was note that the pattern could actually be somewhat typical for sarcoid but it was noted to have worsened since the prior study performed there at [facility] in October 2019.  She also was noted to have mediastinal lymphadenopathy and debris in the esophagus potentially consistent with her systemic sclerosis.  No pulmonary embolism was found.  Based on the results of that she was placed on Levaquin.  She continued to fail to improve and a third antibiotic was also tried.    Finally she  was seen by pulmonologist and a bronchoscopy was performed.  I am able to review the results of that and there was a high percentage of neutrophils and lymphocytes and that BAL fluid.  Viral cultures however Gram stain and culture were all unremarkable and there were no malignant cells found.    She was placed on 40 mg a day of prednisone at the time of the bronchoscopy once it was clear that there was no acute infection.  She does not have a follow-up appointment yet.  Although her cough is mostly gone she does not feel good and feels very dyspneic just going from the bed to the bathroom or crossed the room.  She is not convinced she has had any improvement with 40 mg daily prednisone and if anything think she is worse.    In reviewing her extensive record I see that a repeat TARA was performed and a reflexive was done when it was positive.  She continues to have an anticentromere antibody which she had previously, but now notably has a high titer Gonzalez and high titer RNP antibody.  She previously was borderline positive for RNP and Gonzalez negative.  SSA is also higher titer than it was previously.    With all these autoantibody positivity she however denies any specific features that would suggest systemic lupus.  She specifically denies sun sensitivity sun sensitive rashes including a malar rash, morning stiffness in any of her joints or any joint swelling and any pleurisy at the time of her  at CT angiogram or otherwise.  \  Her Raynaud's phenomena has been stable as have other features of her limited cutaneous systemic sclerosis.        February 25, 2021 interval history:    At the time that I saw the patient about 3 weeks ago we decided that because her neuropathy sounded rather rapidly progressive, and her HRCT scan was being read as potentially consistent with sarcoidosis, that neurosarcoid was on the differential.  I also wondered about an immune complex mediated small vessel vasculitis causing neuropathy.   We therefore did laboratory testing as well as started her on some steroid.    Laboratory testing has been largely unrevealing.  She did not have elevated calcium or elevated calcium in the urine.  I did also do a parathyroid hormone in case we did find that however her parathyroid hormone was significantly elevated.  Her vitamin D was very low and she has started supplementation for that.    Most notably however her prednisone at 40 mg a day virtually resolved her symptoms over 3 to 4 days.  She had at least an 80% decrease in her pain and numbness although she still has some residual tingling in the legs bilaterally.    She is having some prednisone side effects.  Specifically she is having a hard time sleeping and is also feeling irritable which she thinks could be due partly due to the loss of sleep but partly directly due to the prednisone.  She has got a lot of prior experience with prednisone and has had some difficulties with it before.    She did have some right-sided chest pain.  That seem to occur with a deep breath.  She has a cardiologist and saw them will be getting a follow-up echo.  She is no longer having that.    In going through her history she does not believe she is ever been on Imuran and is quite sure and I am sure as well that we have never had her on TNF inhibition.  She was on methotrexate and tolerated oral methotrexate poorly due to nausea but was better able to tolerate subcutaneous methotrexate.      Impression:    Per the problem list, with known RNP positivity and more recently with this HRCT finding that is concerning for the possibility of overlapping sarcoidosis.    She had a very nice response to steroid quite quickly, and so we now have the conundrum of not knowing exactly why.  I certainly suspect that she has an immune mediated neuropathy given this rapid response and neurosarcoid is a concern.    I have sent a message to Dr. Cuellar in pulmonary for her thoughts.  I have also  ordered a repeat HRCT that to be done in another week or so before she is seen back in pulmonary.    I am hoping these can be reviewed so that there could be a decision whether we can feel strongly enough that presumptively this could be sarcoidosis to put her on appropriate therapy for that or whether a biopsy might be justified.    We could simply presumptively try steroid sparing medications.  I am somewhat concerned about using TNF inhibitors however unless we are pretty confident this is sarcoid, as they have been associated with worsening of systemic lupus patients and autoimmune associated interstitial lung disease in some settings.    We also have the  finding of the elevated PTH, and very low vitamin D levels, which can be seen in sarcoidosis, but is unclear here given normal Calcium levels. and will refer her to endocrinology for that.    I have given her prescription for temazepam to help her with the sleep and irritability.  I did warn her to be careful about driving in the morning until she knows how it affects her as it does have a long half-life.    I will plan on seeing her back in about 6 to 8 weeks sooner as needed.      April 8, 2021 interval history:    Since we have last seen the patient she has had initial evaluation done, and Dr. Cuellar has a recent note on the chart and an addendum placed a day after the patient's studies were reviewed in sarcoid conference.    The upshot of that is that there is evidence from her studies, in particular the biopsy studies that suggest that this could be sarcoidosis.  Because she has neurologic symptoms and inadequately explain dyspnea on exertion she will also be getting neurology evaluation and cardiac evaluation.    She remains at this time on 40 mg a day of prednisone.  She is not liking this dose.  Although she still has dyspnea on exertion she also is irritable and on edge on that dose and is also experiencing some nausea..    After consideration of the  issues, Dr. Cuellar had recommended the patient go on track today.  She has in fact been on both this and MMF before and although she had some difficulties at time with tolerating these medication she think she would tolerate them again.  We have briefly discussed possibility of using a TNF inhibitor but that does give me pause, given that she has had features of mixed connective tissue disease and in TARA positive individuals TNF inhibitors have at times worsen their disease process, particularly in those with lupus associated autoantibodies.  She has had in the past of borderline positive RNP.    She believes most of her other historical clinical features are stable.  She still gets pretty bad Raynaud's phenomena and some features of arthritis although the inflammatory features are currently pretty well controlled on this dose of prednisone.    July 15, 2021 interval history:    Since we have last seen the patient she has continued to have a lot of dyspnea on exertion despite being on the MMF 40 mg a day of prednisone and until just earlier this week methotrexate.    Because of her lung biopsy showing features consistent with sarcoidosis, she was presented at sarcoidosis conference last week.  I attended this discussion.  After much discussion it was decided that azathioprine may be the next drug to try in her.  This was influenced in part by my concerns of giving a TNF inhibitor to her given her underlying autoimmune connective tissue disease, and the possibility of that disease process worsening with TNF inhibition as has been described and as I have seen previously albeit rarely.    She did just get TPMT levels earlier today to help decide what kind of azathioprine dose to start with.  She continues on MMF.    She will be getting a nerve biopsy performed on Monday.    She also was recently in the emergency room because of very severe midsternal chest pain.  She says this developed during the night.  When it would  not go away she went to the ER.  There she was given narcotics after first getting a single sublingual nitroglycerin that did not help.  Extensive cardiopulmonary work-up was negative for any evidence of ischemia or clot.  Eventually she got Toradol after having gotten narcotics and did improve and went home.    She has had some costochondral type pains before but nothing like this and this was not exacerbated or relieved by pressure over the area.    Notably, she is been having worsening problems with GERD and dysphagia as well.  She just spoke with Dr. Cárdenas earlier this morning.  She is getting a lot of pill dysphagia at this point which is very frustrating for her and very uncomfortable.  She had dilatation in the fall and thought it helped for a while but she says this almost feels like it is just a dysmotility problem rather than stricturing to her.        November 11, 2021 interval history:    Patient had a right sural nerve biopsy on 7/19/2021.  Biopsy was notable for axonal neuropathy without a specific cause.  There is no overt evidence of inflammation, primary demyelination, or granulomatous findings.  Patient had return follow-up would pulmonology on 9/16/2021.  Her CT was reviewed, showed bronchocentric and predominantly central infiltrates.Overall findings have not significantly progressed from prior CT on 7/1/2021.  Patient's case was discussed in ILD conference.  Plan was to start Imuran 150 mg daily, as well as starting a prednisone taper (starting at 40 mg daily, then tapering 5 mg every month to be kept at 30 mg daily until follow-up with rheumatology).     Patient overall has no significant change in her health from prior.  She is currently taking hydroxychloroquine, prednisone 30 mg daily, and Imuran 150 daily.  Continues to have fatigue, which she feels has been gradually worsening.  She notes that she can only lift 2-3 grocery bags before feeling tired, and needs assistance when cooking.   Continues to have neuropathy symptoms in her bilateral lower extremities (more in the right side).  Continues to have shortness of breath with limited activity (e.g. moving from bed to kitchen).  Is currently treating her GERD with Protonix and omeprazole, which she feels controls her symptoms.  Has some mouth dryness, but notices that her eyes are watery which is new for her. She has received her COVID-19 booster immunization.     January 13, 2022 interval history:    Since we have last seen the patient she was admitted to the hospital because of worsening shortness of breath and palpitations.  Coronary angiogram showed minimal areas of stenosis of less than 20%.  Echocardiogram however suggested very significant pulmonary hypertension and a markedly reduced ejection fraction.    She saw Dr. Cuellar back in pulmonary clinic last week.  Cardiac MRI was performed yesterday.  I have reviewed those results today.  There is significant LGE, consistent with an infiltrative process such as sarcoidosis.  She is quite symptomatic.  She had a 6-minute walk test today and had to stop because of how dyspneic she was and how many palpitation she was having but she had absolutely no desaturation.    She has been tapering her prednisone and is down to 20 mg daily but she believes all of the symptoms have worsened with that taper.      ROS as per HPI.  10-point ROS is otherwise negative.    HISTORY REVIEW:  Past Medical History:   Diagnosis Date     Anemia      Cellulitis of leg 6/6/2013     Esophageal reflux     Better post-hiatal hernia repair and weight loss     Limb ischemia; ulcers of fingertips 4/22/2013     Raynaud's syndrome      Scleroderma (H)      Systemic sclerosis (H) 2009     Unspecified essential hypertension        Past Surgical History:   Procedure Laterality Date     BYPASS GASTRIC, CHOLECYSTECTOMY, COMBINED       CHOLECYSTECTOMY       COLONOSCOPY       ESOPHAGOSCOPY, GASTROSCOPY, DUODENOSCOPY (EGD), COMBINED N/A  3/10/2016    Procedure: COMBINED ESOPHAGOSCOPY, GASTROSCOPY, DUODENOSCOPY (EGD), BIOPSY SINGLE OR MULTIPLE;  Surgeon: Tricia Zambrano MD;  Location:  GI     INNER EAR SURGERY       PICC INSERTION  6/5/2013    5fr DL Power PICC, 44cm, left basilic vein, tip in low SVC     SURGICAL HISTORY OF -       Left ear surgery - incus transition, tympanoplasty     SURGICAL HISTORY OF -   6/05    Gastric bypass     SURGICAL HISTORY OF -   6/05    Cholecystectomy     SURGICAL HISTORY OF -   6/05    Hiatal hernia repair     TONSILLECTOMY         Family History   Problem Relation Age of Onset     Cerebrovascular Disease Father      C.A.D. Father      C.A.D. Mother      C.A.D. 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      Social History Main Topics     Smoking status: Never Smoker      Smokeless tobacco: Never Used     Alcohol Use: Yes      occassionally     Drug Use: No     Sexually Active: Yes -- Male partner(s)     Birth Control/ Protection: None     Social History Narrative     Was  in December 2015. Longtime partner from [place].       Patient Active Problem List   Diagnosis     Essential hypertension     Thyrotoxicosis     Obesity     Prolonged QTc 460 ms,  at hr 67     Low back pain - Suspect SI Joint dysfunction     Cough     Systemic sclerosis (H)     Tumoral calcinosis     Shortness of breath     Edema of both legs     Abnormal albumin     Myopathy     Hypoalbuminemia     Diastolic dysfunction     Limb ischemia; ulcers of fingertips     Cellulitis of leg     Other specified diffuse disease of connective tissue     Disturbed sleep rhythm     Pharyngeal dysphagia     Gastroesophageal reflux  disease, esophagitis presence not specified     Rheumatoid arthritis with negative rheumatoid factor, involving unspecified site (H)     Raynaud's disease without gangrene       Allergies   Allergen Reactions     Lovenox Other (See Comments)     Blood clot      Norvasc [Amlodipine Besylate] Swelling     Lip swelling that happened on 2 different occasions     Erythromycin Rash     Rash on arms     OBJECTIVE:  Vitals: Reviewed  Gen: A+Ox3, NAD  HEENT: NCAT, EOMI  Pulm: CTAB, minimal coarse sounds on basilar lobes  CVS: She has a regular rate and rhythm but this is punctuated by very frequent ectopy then restoration of a regular rhythm.  No murmurs appreciated.  Skin: Her fingers are intermittently diffusely cyanotic.  Purpuric patches on fingertips no ulcerations/erosions. There is platelike xerosis of lower extremities  -mild sclerosis of distal fingers  Msk: No active synovitis.  4/5 upper extremity strength, 5/5 lower extremity strength bilaterally.    EKG (11/12/2021)  Sinus rhythm with marked sinus arrhythmia with premature supraventricular complexes  Possible left atrial enlargement  Left axis deviation  Left ventricular hypertrophy with QRS widening  T wave abnormality consider lateral ischemia  Prolonged QT  Abnormal ECG  Ventricular rate 81, , , QT/QTc 418/485, P/R/T 29/-32/132     Component      Latest Ref Rng 1/26/2016   WBC      4.0 - 11.0 10e9/L 9.2   RBC Count      3.8 - 5.2 10e12/L 4.59   Hemoglobin      11.7 - 15.7 g/dL 13.2   Hematocrit      35.0 - 47.0 % 41.8   MCV      78 - 100 fl 91   MCH      26.5 - 33.0 pg 28.8   MCHC      31.5 - 36.5 g/dL 31.6   RDW      10.0 - 15.0 % 16.7 (H)   Platelet Count      150 - 450 10e9/L 195   Diff Method       Automated Method   % Neutrophils       62.9   % Lymphocytes       27.0   % Monocytes       9.1   % Eosinophils       0.4   % Basophils       0.2   % Immature Granulocytes       0.4   Nucleated RBCs      0 /100 0   Absolute Neutrophil      1.6 - 8.3  10e9/L 5.8   Absolute Lymphocytes      0.8 - 5.3 10e9/L 2.5   Absolute Monocytes      0.0 - 1.3 10e9/L 0.8   Absolute Eosinophils      0.0 - 0.7 10e9/L 0.0   Absolute Basophils      0.0 - 0.2 10e9/L 0.0   Abs Immature Granulocytes      0 - 0.4 10e9/L 0.0   Absolute Nucleated RBC       0.0   Color Urine       Yellow   Appearance Urine       Clear   Glucose Urine      NEG mg/dL Negative   Bilirubin Urine      NEG Negative   Ketones Urine      NEG mg/dL Negative   Specific Gravity Urine      1.003 - 1.035 1.010   Blood Urine      NEG Negative   pH Urine      5.0 - 7.0 pH 6.0   Protein Albumin Urine      NEG mg/dL Negative   Urobilinogen mg/dL      0.0 - 2.0 mg/dL Normal   Nitrite Urine      NEG Negative   Leukocyte Esterase Urine      NEG Negative   Source       Midstream Urine   WBC Urine      0 - 2 /HPF <1   RBC Urine      0 - 2 /HPF <1   Squamous Epithelial /HPF Urine      0 - 1 /HPF <1   Creatinine      0.52 - 1.04 mg/dL 1.11 (H)   GFR Estimate      >60 mL/min/1.7m2 51 (L)   GFR Estimate If Black      >60 mL/min/1.7m2 62   ALT      0 - 50 U/L 23   AST      0 - 45 U/L 16   Albumin      3.4 - 5.0 g/dL 3.4   CRP Inflammation      0.0 - 8.0 mg/L <2.9   Sed Rate      0 - 30 mm/h 9   CK Total      30 - 225 U/L 38   Aldolase       4.1     Biopsy from 3/10 EGD:  Esophagus, gastroesophageal junction, biopsies:   - Squamous mucosa and submucosa with marked chronic inflammation and   fibrosis   - No evidence of intestinal metaplasia or dysplasia               February 15, 2022 interval history:    Since we have last seen the patient she has felt about the same in terms of heart palpitations on the 30 mg daily prednisone.  She believes her neuropathy symptoms actually worsened a little.  Her Raynauds phenomena has also been worse but it has of course been rather cold.    Interestingly, she was actually in [place] last week and despite the warm weather there she still had a lot of Raynauds that was not much different.    She  saw Dr. Francis earlier today.  I have read his note which is up.  He is starting her on a Zio patch for 1 week and hoping that we can significantly decrease her prednisone so that a PET scan can be performed and identify whether there is any cardiac inflammation at a time that she is on more minimal prednisone.    She does remain on azathioprine.  She did have labs earlier today which look quite good with perhaps some slight worsening of ESR but in the context of a lower CRP which I am more inclined to believe.  Her AST was up above the upper limit of normal very slightly but she denies any recent alcohol and ALT was okay.    Physical examination by video shows her to be in no acute distress HEENT examination is normal.  She is speaking in full sentences with no shortness of breath.  Her hand examination does not show marked cyanosis and there is no apparent swelling or joint deformity.    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    Plan/recommendation:    I have asked her to go down to 25 mg a day for the next 3 days of prednisone.  If she does not feel markedly worse she can go further to 20 mg a day for the remainder of the time she is on the Zio patch and another week thereafter.  If at that point she still feels okay she can go down to 15 mg a day.    After she has been on that dose for a couple of weeks I want her to do labs again so we can reassess for inflammation.  I would also like to see her at about that time on 15 March when I do have another video visit that I can see her at and we can decide whether to go down further and if so how fast.    She will let us know if she is having difficulty in the meantime.    This video visit commenced at 3:36 PM was completed at 3:53 PM    MARTÍN De La Torre MD,  PhD    Rheumatology              Isabelle Cameron, [facility]

## 2022-02-15 NOTE — NURSING NOTE
Diet: Patient instructed regarding a heart failure healthy diet, including discussion of reduced fat and 2000 mg daily sodium restriction, daily weights, medication purpose and compliance, fluid restrictions and resources for patient and family to access for assistance with heart failure management.       Labs: Patient was given results of the laboratory testing obtained today and patient was instructed about when to return for the next laboratory testing.     Med Reconcile: Reviewed and verified all current medications with the patient. The updated medication list was printed and given to the patient.     Med changes: none at this time    Return Appointment: Patient given instructions regarding scheduling next clinic visit: Referral for PH clinic, 7 day zio patch, PET scan TBD based on zio and plan for predisone taper    Patient stated she understood all health information given and agreed to call with further questions or concerns.     Marsha Butt RN

## 2022-02-15 NOTE — LETTER
2/15/2022      RE: Jeanine Schuler  [address]  [place] [place] [address]       Dear Colleague,    Thank you for the opportunity to participate in the care of your patient, Jeanine Schuler, at the [facility] [place] [facility]. Please see a copy of my visit note below.    Chief complaint: Possible cardiac sarcoidosis    HPI:   Jeanine Schuler is a 61 year old female with history of pulmonary sarcoidosis, ILD, MCTD/limited scleroderma, and recently-diagnosed non-ischemic cardiomyopathy referred for evaluation of possible cardiac sarcoidosis.    She presented to [facility] 12/2021 with 3-day history of dyspnea and tachycardia. Echo showed LVEF 35%, hypokinesis, moderate MR, moderate severe TR, and severe pulmonary HTN. She was admitted to [facility]. Coronary angiogram showed no obstructive CAD. CMR was obtained 1/12/22 (see below) and was read as showing multifocal LGE, raising the question of cardiac sarcoidosis.     She is followed by Dr. Cuellar for her ILD/pulmonary sarcoidosis, whom she last saw 1/6/22 (note reviewed.) She was seen by Dr. Ag 1/26/22 (note reviewed), who transitioned beta blocker to Toprol XL.    Her case was discussed at 1/28/22 Multidisciplinary Sarcoidosis Conference and images were reviewed. CMR was felt to have similar LGE to the prior in 2013 and in pattern somewhat atypical for cardiac sarcoidosis. Plan was made to taper prednisone and obtain cardiac PET and assess arrhythmia burden after steroid taper. She is referred to me to establish care for possible cardiac sarcoidosis.    Current immunosuppression is prednisone 30 mg daily (had been decreased to 20 mg but was increased to 30 mg after seeing Dr. De La Torre on 1/13/22.) No one has contacted her regarding scheduling PET scan.    She reports frequent palpitations which she describes as heart racing, sensation of skipped beat followed by strong heartbeat. She is not  sure if she is having more palpitations compared with the time of her ZioPatch but does feel that she is more attuned to them and noticing them more now. She denies syncope. She does have lightheadedness with prolonged standing. She is mildly dyspneic at rest and significantly more dyspneic with minimal exertion, including walking across the room to the bathroom. She feels that her dyspnea is stable over the past few weeks but is worse compared with even 1 month ago. She also reports 2-pillow orthopnea and PND as well as bilateral lower extremity edema which she takes torsemide for. She denies any chest pain.    Current cardiac medications:  -torsemide 20 mg daily  -Toprol XL 50 mg daily  -losartan 50 mg daily  -spironolactone 25 mg daily  -atorvastatin 40 mg daily  -ASA 81 mg daily      Sarcoid history:  Initial presentation:  ILD (cough/dyspnea, fatigue) ~8/2020  Organs involved: lungs  Cardiac sarcoidosis? Possible/TBD  Tissue diagnosis:  EBUS/LLL transbronchial biopsy 10/6/20 (non-necrotizing granulomas+foci of organizing pneumonia, sarcoidosis vs. Hypersensitivity pneumonitis-- reviewed at sarcoid pathology conference, thought to be sarcoidosis)  Device:   no  Arrhythmias:   PACs, SVT; IVCD  CMR:    1/12/22, 4/25/13  PET:    no  LVEF:    44% CMR 1/12/22  Immunosuppresion:      Methotrexate 20 mg subcutaneous/wk 4/2021-9/2021    MMF 5/2021-9/2021     Azathioprine 100 mg daily 7/2021-9/2021     Azathioprine 150 mg 9/2021-current    Prednisone 30 mg      PAST MEDICAL HISTORY:  Past Medical History:   Diagnosis Date     Anemia      Bariatric surgery status 06/27/2005    [facility];     Cellulitis of leg 06/06/2013     Esophageal reflux     Better post-hiatal hernia repair and weight loss     Hyperplastic colonic polyp      Hypovitaminosis D 2011     ILD (interstitial lung disease) (H)      Kidney stone 04/29/2007     Kidney stone 05/10/2007    surgically removed     Limb ischemia; ulcers of fingertips  04/22/2013     Other chronic pain     Left shoulder - rheumatoid arthritis     Raynaud's syndrome      Rheumatoid arthritis (H) \     Scleroderma (H)      Sjogren-Jake syndrome      Systemic sclerosis (H) 2009     Unspecified essential hypertension        CURRENT MEDICATIONS:  Current Outpatient Medications   Medication Sig Dispense Refill     albuterol (PROAIR HFA/PROVENTIL HFA/VENTOLIN HFA) 108 (90 Base) MCG/ACT inhaler Inhale 2 puffs into the lungs every 6 hours as needed for shortness of breath / dyspnea or wheezing       alcohol swab prep pads Use to swab area of injection/alf as directed. 100 each 3     aspirin (ASA) 81 MG chewable tablet Take 1 tablet (81 mg) by mouth daily 30 tablet 0     atorvastatin (LIPITOR) 40 MG tablet Take 40 mg by mouth       azaTHIOprine (IMURAN) 50 MG tablet Take 3 tablets (150 mg) by mouth daily And have labs drawn two weeks after starting medication. 90 tablet 3     blood glucose (NO BRAND SPECIFIED) test strip Use to test blood sugar 2 times daily or as directed. 100 strip 6     blood glucose calibration (NO BRAND SPECIFIED) solution Use to calibrate blood glucose monitor as needed as directed. 1 each 0     blood glucose monitoring (NO BRAND SPECIFIED) meter device kit Use to test blood sugar 2 times daily or as directed. 1 kit 0     clonazePAM (KLONOPIN) 0.5 MG tablet Take 1 tablet (0.5 mg) by mouth nightly as needed for sleep (secondary to prednisone) MRx 1 60 tablet 1     folic acid (FOLVITE) 1 MG tablet Take 1 tablet (1 mg) by mouth daily 90 tablet 3     losartan (COZAAR) 50 MG tablet Take 0.5 tablets (25 mg) by mouth daily 45 tablet 3     magnesium oxide (MAG-OX) 400 MG tablet Take 1 tablet (400 mg) by mouth daily 90 tablet 0     metoprolol succinate ER (TOPROL XL) 50 MG 24 hr tablet Take 1 tablet (50 mg) by mouth daily 90 tablet 3     naproxen (NAPROSYN) 500 MG tablet Take 1 tablet (500 mg) by mouth 2 times daily as needed for moderate pain 10 tablet 0      nitroGLYcerin (NITROSTAT) 0.3 MG sublingual tablet Place 1 tablet (0.3 mg) under the tongue every 5 minutes as needed for chest pain (esophageal spasm) For chest pain place 1 tablet under the tongue every 5 minutes for 3 doses. If symptoms persist 5 minutes after 3rd dose call 911. 12 tablet 1     omeprazole (PRILOSEC) 20 MG DR capsule Take 1 capsule (20 mg) by mouth 2 times daily 180 capsule 3     pantoprazole (PROTONIX) 40 MG EC tablet Take 1 tablet (40 mg) by mouth daily 90 tablet 3     potassium chloride ER (MICRO-K) 10 MEQ CR capsule Take 30 mEq by mouth       predniSONE (DELTASONE) 10 MG tablet Take 3 tablets (30 mg) by mouth daily 90 tablet 3     spironolactone (ALDACTONE) 25 MG tablet Take 25 mg by mouth daily        sulfamethoxazole-trimethoprim (BACTRIM) 400-80 MG tablet Take 1 tablet by mouth daily (Patient taking differently: Take 1 tablet by mouth daily ) 30 tablet 3     thin (NO BRAND SPECIFIED) lancets Use with lanceting device twice daily 100 each 0     torsemide (DEMADEX) 20 MG tablet Take 1 tablet (20 mg) by mouth 2 times daily 180 tablet 1     vitamin D2 (ERGOCALCIFEROL) 71715 units (1250 mcg) capsule Take 50,000 Units by mouth once a week         ALLERGIES:     Allergies   Allergen Reactions     Lovenox Other (See Comments)     Blood clot      Norvasc [Amlodipine Besylate] Swelling     Lip swelling that happened on 2 different occasions     Erythromycin Rash     Rash on arms       FAMILY HISTORY:  Family History   Problem Relation Age of Onset     Cerebrovascular Disease Father      Heart Disease Father      Hypertension Father      Heart Disease Mother      Hypertension Mother      Chronic Obstructive Pulmonary Disease Sister      Heart Disease Sister      Hypertension Sister      Cerebrovascular Disease Brother      Heart Disease Brother      Kidney Disease Brother         on dialysis     Diabetes Brother         borderline     No Known Problems Son      No Known Problems Son      No Known  "Problems Daughter      Cancer Brother         small cell     Heart Disease Brother      Heart Disease Brother      Heart Disease Brother      Heart Disease Brother         tripple A     Heart Disease Sister      Substance Abuse Sister         alcoholism     Crohn's Disease No family hx of      Ulcerative Colitis No family hx of      GERD No family hx of      Celiac Disease No family hx of      Nephrolithiasis No family hx of      Parathyroid Disorders No family hx of      Calcium Disorder No family hx of        SOCIAL HISTORY:  Social History     Tobacco Use     Smoking status: Never Smoker     Smokeless tobacco: Never Used   Substance Use Topics     Alcohol use: Yes     Comment: occassionally     Drug use: No       ROS:   A comprehensive 14 point review of systems is negative other than as mentioned in HPI.    Exam:  /88 (BP Location: Right arm, Patient Position: Chair, Cuff Size: Adult Regular)   Pulse 98   Ht 1.719 m (5' 7.68\")   Wt 76.1 kg (167 lb 12.8 oz)   BMI 25.76 kg/m    GENERAL APPEARANCE: healthy, alert and no distress  EYES: no icterus, no xanthelasmas  ENT: normal palate, mucosa moist, no central cyanosis  RESPIRATORY: lungs clear to auscultation - no rales, rhonchi or wheezes, no use of accessory muscles, no retractions, respirations are unlabored, normal respiratory rate  CARDIOVASCULAR:  irregular rhythm, no murmurs; JVP ~8 cmH2O.  GI: soft, non tender, bowel sounds normal,no abdominal bruits  EXTREMITIES: cyanosis distal fingers; no pitting edema bilateral legs  NEURO: alert and oriented to person/place/time, normal speech, gait and affect  SKIN: xerosis/scaling bilateral lower extremities  PSYCH: cooperative, affect appropriate.     Labs:  Reviewed.     EBUS/LLL transbronchial biopsy 10/6/2020:  LUNG, LEFT UPPER LOBE, TRANSBRONCHIAL CRYOBIOPSY:   - Fragments of respiratory mucosa and alveolated lung tissue with nonnecrotizing granulomas with multinucleated giant cells, foci of organizing " pneumonia and nonspecific chronic interstitial inflammation with reactive pneumocytes type II hyperplasia.   - GMS and AFB special stains are negative for fungal and acid-fast organisms, respectively.   - See comment.     COMMENT:   In absence of infection (ie. Mycobacteria, please correlate with laboratory cultures and serology), the histologic findings raise the differential diagnosis of hypersensitivity pneumonitis and sarcoidosis. Clinical and radiologic correlation is necessary. The case will be discussed at the  ILD interdepartmental conference.     Testing/Procedures:  I personally visualized and interpreted:  Coronary angiogram 12/21/21: minimal non-obstructive CAD    TTE 12/21/21: Moderately reduced LV function, LVEF=30-35%. Diffuse hypokinesis most prominent at the apex with relative sparing of the basal segments, a pattern suggestive of stress cardiomyopathy. Probably normal RV size and function. Moderate TR. Elevated PA pressure (RVSP 54 mmHg+RA pressure.)    CMR 1/12/22: Mildly reduced LVEF=44%. Normal RV size/function, RVEF=54%. Subendocardial LGE involving the lateral wall, mid-myocardial LGE involving the septum, RV insertion point LGE; distribution and extent appear similar to 4/25/2013 but LGE is better imaged on the 1/12/22 study. Overall findings are consistent with a mild non-ischemic cardiomyopathy; pattern of LGE would be somewhat atypical for cardiac sarcoidosis. Frequent ectopy during the study likely accounts for mild reduction in LVEF. Compared with TTE from 12/2021, LV/RV function have improved.    CMR 4/25/13: Normal LV/RV size/function. Subendocardial LGE involving the lateral wall, mid-myocardial LGE involving the septum, RV insertion point LGE.    7-day ZioPatch 12/22/21-12/29/21: Baseline sinus rhythm with nonspecific IVCD. Frequent PACs (~12%) and 138 runs of SVT (logest 12 sec), including SVT with aberrancy. Rare (<1%) PVCs, no sustained or nonsustained VT.    ECG 1/12/22: sinus  with frequent PACs, IVCD and nonspecific ST/T abnormality, VR 80,   QTc 491        Assessment and Plan:   1. Pulmonary sarcoidosis  2. Limited scleroderma  3. Possible cardiac sarcoidosis  4. NICM with partially-recovered LVEF=44% CMR 1/2022  5. Palpitations  6. PACs  7. SVT  8. Pulmonary hypertension  Jeanine's clinical scenario is complex and her symptoms are likely multifactorial.  Her cardiac MRI is not strongly suggestive of cardiac sarcoidosis and shows only mild LV dysfunction with significant provement in EF from her 12/2021 echocardiogram suggesting that reduced LVEF at that time was due at least in part to stress cardiomyopathy which is subsequently recovered.    The presence of similar late gadolinium enhancement on her 2013 cardiac MRI somewhat suggest against active cardiac sarcoidosis as the etiology of her symptoms.  It would nonetheless be useful to more definitively exclude active cardiac inflammation from sarcoidosis as the etiology of her palpitations and heart failure symptoms.  This would be best done with cardiac PET, however this would be most useful if obtained on was little suppression as is feasible, ideally at least on her baseline prednisone dose in order to maximize sensitivity.    Given her possible increase in palpitations, plan to repeat 7-day ZioPatch.  If this shows new ventricular arrhythmias or significant AV block, I would plan to proceed with cardiac PET more timely fashion, irrespective of her current immunosuppression.  I suspect it is more likely that her palpitations are related to her frequent PACs and short runs of SVT, neither of which in and of itself would raise concern for cardiac sarcoidosis.     I do suspect she has some component of HFpEF but she does not appear obviously hypervolemic on exam today.    Given her dyspnea, which has worsened somewhat, and which appears disproportionate to both her LV dysfunction and her volume status (and per my  discussion with Dr. Cuellar today, also to her lung disease) and her TTE 12/2021 showing elevated estimated PA pressure, I agree that evaluation for possible pulmonary hypertension is prudent, particularly as she has 3 diseases associated with PH (sarcoidosis, systemic sclerosis, and ILD.)     Tentative plan is for cardiac PET, however per my discussion with Dr. Cuellar, this will be deferred until after prednisone is tapered if possible, unless ZioPatch discloses significant arrhythmias to raise concern for cardiac sarcoidosis.    Plan in brief:  -7-day Ziopatch today  -tentatively plan for cardiac PET (after tapering of prednisone to baseline dose if possible)  -agree with PH referral as previously planned  -follow up with me after PET    The patient states understanding and is agreeable with plan.   Chart review time today: 51 minutes  Visit time today: 37 minutes  Total time spent today: 88 minutes            Please do not hesitate to contact me if you have any questions/concerns.     Sincerely,     Jacobo Francis MD

## 2022-02-15 NOTE — LETTER
2/15/2022       RE: Jeanine Schuler  [address]  [place] [address]     Dear Colleague,    Thank you for referring your patient, Jeanine Schuler, to the [facility] [place] at [facility]. Please see a copy of my visit note below.    In Person-Visit       SUBJECTIVE:  The patient returns in f/u of her MCTD/SSC/RP/with h/o multiple DU, Sarcoidosis .      PROBLEM LIST:    1.  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    2.  History of severe multiple digital ulcers, on fingers and toes.   3.  Marked keratoconjunctivitis sicca over the last several years.    4.  Marked iron deficiency anemia responsive to IV iron.    5.  Diffuse musculoskeletal pain   6.  Lower extremity edema managed with lasix and spironolactone   7.  Marked fatigue and diffuse clinical weakness.    8.  Dyspnea on exertion with lower extremity edema and other features including the anticentromere positivity worrisome for the potential development of pulmonary hypertension.    9.  Marked GERD with associated dysphagia.    10.  Status post gastric bypass with a history of intermittent constipation and diarrhea potentially consistent with bacterial small bowel overgrowth versus other gut effects of the prior surgery.    11.  History of positive rheumatoid factor consistent with MCTD, given the remainder of her clinical presentation.     12. Progressive Jaw tightening with inability to open mouth wider than 1 inch.   13. Diastolic dysfunction (3/2013)  14. Hypoalbuminemia  15. ILD cryobiopsy Pathology was reviewed at ILD conference on 10/12/2020 which showed ALI, what appears to be an acute exacerbation of underlying ILD with OP.  Few nonnecrotizing granulomas  16. Development of intermittent moderate/severe RLE Neuropathy with steroid taper below 30 mg prednisone/d Jan 2021  17.  "Sarcoidosis      MARTÍN De La Torre MD, PhD    Rheumatology          INTERVAL HISTORY:       2017, INTERVAL HISTORY:  Since we have last seen the patient, she developed a right ankle wound about 2 months ago.  That opened for a while and it became hard to close, but at this point it has finally closed over.       She has had some stressors and she feels like that has contributed to her feeling like she needed more prednisone.  She had made it down to 12 mg a day for almost 6 months but increased to 20 mg a day in  when she was moving to a new house.  This was primarily due to an increase in fatigue and in joint pains.      At this point, her morning stiffness is 30-60 minutes despite remaining on the 20 mg a day of prednisone.  She does think her strength, however, is stable.      She continues to have a lot of Raynaud's phenomena but has not developed any distal digital ulcerations.       Her GERD and keratoconjunctivitis sicca, however, have remained stable and she thinks she has stable exercise tolerance.       She has not done her labs for many, many months and has not seen me for over a year, and we discussed that in some detail.     2018 INTERVAL HISTORY:  Since we have last seen her, she made it down to 12.5 mg a day of prednisone but then put herself back up to 20 mg a day because of increasing general body aches and fatigue as well as some inflammatory joint stiffness in her wrists.  Other joints have actually been okay.      She had stopped her methotrexate some time ago.  She works in a hospital and knew of several people who were admitted with \"methotrexate toxicity\" who  while in hospital and although she does not know the details, that frightened her enough that she decided to stop the drug.       She is getting worsening Raynaud's.  She really notes that this happens not only with cold, but with a variety of kinds of stress.  She has previously, at least for a " short length of time, been on losartan and does not remember whether that helped at all.  I am pretty sure she has also been on calcium channel blockers, but those are relatively contraindicated in her at this point because of persistent bilateral lower extremity edema.       She is getting enough lower extremity edema and has been found to have vascular insufficiency that she will be having some vascular surgery to laser some of these areas, although an exact time for that has not been laid out.       In addition to getting Raynaud's in her hands, she also gets it in her feet, and when she does, potentially contributed to by the vascular insufficiency, she gets numbness in her feet.  She is not getting numbness in her hands by contrast.       The patient did have pulmonary function tests today.  Although she is not exercising regularly, in general she feels her exercise tolerance has been stable, and she is not having any dry cough.       She did have a decrease in her FVC to 3.68 from 4.30 and her DLCO to 21.77 from 24.74, but she has had some rib cage pain for about the last 3 weeks since she had a minor accident while dancing.  Notably then, her TLC is completely stable, going from 6.41 to 6.29.       She denies any other new problems.  Keratoconjunctivitis sicca, GERD and other features have been stable and she denies any dysphagia.     July 11, 2019 interval history:    Since we have last seen her she did have to miss appointment because of some family issues but she is actually been doing quite well.  Although she is continued to have some intermittent joint complaints she is been able to taper her prednisone down to 7.5 mg a day the lowest she has been on and the entire time I have been following her.    Although her joints have not worsened with that lower dose she does think she is getting some worsening of her Raynauds phenomena.  It can be pretty bad and the attacks can be hard to break even with  rewarming.  She gets them in hands and feet.  Fortunately she has not had any digital ulcers.  She does recall that she was placed temporarily on tadalafil sometime ago by Dr. Bentley but she could not afford it long-term.  She thinks that may have been helpful however.    She denies any significant dyspnea on exertion or cough.  She has some muscle weakness but not marketed and she does not think that is any worse over time.  Fatigue has generally been her worst symptom and that may be slightly worse.    She does have some ongoing keratoconjunctivitis sicca but does not feel like she wants to go on a medicine for that.  She already has a full plate of dentures in both upper and lower.      August 11, 2020 interval history:    Since we last seen the patient she feels she was doing relatively stably on till July 7.  At that point she had the abrupt onset of a bad dry cough.  She did not have significant fevers but did not feel well.  She was seen had a negative coronavirus test but a chest x-ray apparently showed evidence of some infiltrates and she was placed on doxycycline.  She says she did not improve with that therapy and a CT angios was performed.  That showed bilateral pulmonary consolidation thought consistent with infection.  There was note that the pattern could actually be somewhat typical for sarcoid but it was noted to have worsened since the prior study performed there at [facility] in October 2019.  She also was noted to have mediastinal lymphadenopathy and debris in the esophagus potentially consistent with her systemic sclerosis.  No pulmonary embolism was found.  Based on the results of that she was placed on Levaquin.  She continued to fail to improve and a third antibiotic was also tried.    Finally she was seen by pulmonologist and a bronchoscopy was performed.  I am able to review the results of that and there was a high percentage of neutrophils and lymphocytes and that BAL fluid.  Viral  cultures however Gram stain and culture were all unremarkable and there were no malignant cells found.    She was placed on 40 mg a day of prednisone at the time of the bronchoscopy once it was clear that there was no acute infection.  She does not have a follow-up appointment yet.  Although her cough is mostly gone she does not feel good and feels very dyspneic just going from the bed to the bathroom or crossed the room.  She is not convinced she has had any improvement with 40 mg daily prednisone and if anything think she is worse.    In reviewing her extensive record I see that a repeat TARA was performed and a reflexive was done when it was positive.  She continues to have an anticentromere antibody which she had previously, but now notably has a high titer Gonzalez and high titer RNP antibody.  She previously was borderline positive for RNP and Gonzalez negative.  SSA is also higher titer than it was previously.    With all these autoantibody positivity she however denies any specific features that would suggest systemic lupus.  She specifically denies sun sensitivity sun sensitive rashes including a malar rash, morning stiffness in any of her joints or any joint swelling and any pleurisy at the time of her  at CT angiogram or otherwise.  \  Her Raynaud's phenomena has been stable as have other features of her limited cutaneous systemic sclerosis.        February 25, 2021 interval history:    At the time that I saw the patient about 3 weeks ago we decided that because her neuropathy sounded rather rapidly progressive, and her HRCT scan was being read as potentially consistent with sarcoidosis, that neurosarcoid was on the differential.  I also wondered about an immune complex mediated small vessel vasculitis causing neuropathy.  We therefore did laboratory testing as well as started her on some steroid.    Laboratory testing has been largely unrevealing.  She did not have elevated calcium or elevated calcium in the  urine.  I did also do a parathyroid hormone in case we did find that however her parathyroid hormone was significantly elevated.  Her vitamin D was very low and she has started supplementation for that.    Most notably however her prednisone at 40 mg a day virtually resolved her symptoms over 3 to 4 days.  She had at least an 80% decrease in her pain and numbness although she still has some residual tingling in the legs bilaterally.    She is having some prednisone side effects.  Specifically she is having a hard time sleeping and is also feeling irritable which she thinks could be due partly due to the loss of sleep but partly directly due to the prednisone.  She has got a lot of prior experience with prednisone and has had some difficulties with it before.    She did have some right-sided chest pain.  That seem to occur with a deep breath.  She has a cardiologist and saw them will be getting a follow-up echo.  She is no longer having that.    In going through her history she does not believe she is ever been on Imuran and is quite sure and I am sure as well that we have never had her on TNF inhibition.  She was on methotrexate and tolerated oral methotrexate poorly due to nausea but was better able to tolerate subcutaneous methotrexate.      Impression:    Per the problem list, with known RNP positivity and more recently with this HRCT finding that is concerning for the possibility of overlapping sarcoidosis.    She had a very nice response to steroid quite quickly, and so we now have the conundrum of not knowing exactly why.  I certainly suspect that she has an immune mediated neuropathy given this rapid response and neurosarcoid is a concern.    I have sent a message to Dr. Cuellar in pulmonary for her thoughts.  I have also ordered a repeat HRCT that to be done in another week or so before she is seen back in pulmonary.    I am hoping these can be reviewed so that there could be a decision whether we can feel  strongly enough that presumptively this could be sarcoidosis to put her on appropriate therapy for that or whether a biopsy might be justified.    We could simply presumptively try steroid sparing medications.  I am somewhat concerned about using TNF inhibitors however unless we are pretty confident this is sarcoid, as they have been associated with worsening of systemic lupus patients and autoimmune associated interstitial lung disease in some settings.    We also have the  finding of the elevated PTH, and very low vitamin D levels, which can be seen in sarcoidosis, but is unclear here given normal Calcium levels. and will refer her to endocrinology for that.    I have given her prescription for temazepam to help her with the sleep and irritability.  I did warn her to be careful about driving in the morning until she knows how it affects her as it does have a long half-life.    I will plan on seeing her back in about 6 to 8 weeks sooner as needed.      April 8, 2021 interval history:    Since we have last seen the patient she has had initial evaluation done, and Dr. Cuellar has a recent note on the chart and an addendum placed a day after the patient's studies were reviewed in sarcoid conference.    The upshot of that is that there is evidence from her studies, in particular the biopsy studies that suggest that this could be sarcoidosis.  Because she has neurologic symptoms and inadequately explain dyspnea on exertion she will also be getting neurology evaluation and cardiac evaluation.    She remains at this time on 40 mg a day of prednisone.  She is not liking this dose.  Although she still has dyspnea on exertion she also is irritable and on edge on that dose and is also experiencing some nausea..    After consideration of the issues, Dr. Cuellar had recommended the patient go on track today.  She has in fact been on both this and MMF before and although she had some difficulties at time with tolerating these  medication she think she would tolerate them again.  We have briefly discussed possibility of using a TNF inhibitor but that does give me pause, given that she has had features of mixed connective tissue disease and in TARA positive individuals TNF inhibitors have at times worsen their disease process, particularly in those with lupus associated autoantibodies.  She has had in the past of borderline positive RNP.    She believes most of her other historical clinical features are stable.  She still gets pretty bad Raynaud's phenomena and some features of arthritis although the inflammatory features are currently pretty well controlled on this dose of prednisone.    July 15, 2021 interval history:    Since we have last seen the patient she has continued to have a lot of dyspnea on exertion despite being on the MMF 40 mg a day of prednisone and until just earlier this week methotrexate.    Because of her lung biopsy showing features consistent with sarcoidosis, she was presented at sarcoidosis conference last week.  I attended this discussion.  After much discussion it was decided that azathioprine may be the next drug to try in her.  This was influenced in part by my concerns of giving a TNF inhibitor to her given her underlying autoimmune connective tissue disease, and the possibility of that disease process worsening with TNF inhibition as has been described and as I have seen previously albeit rarely.    She did just get TPMT levels earlier today to help decide what kind of azathioprine dose to start with.  She continues on MMF.    She will be getting a nerve biopsy performed on Monday.    She also was recently in the emergency room because of very severe midsternal chest pain.  She says this developed during the night.  When it would not go away she went to the ER.  There she was given narcotics after first getting a single sublingual nitroglycerin that did not help.  Extensive cardiopulmonary work-up was negative  for any evidence of ischemia or clot.  Eventually she got Toradol after having gotten narcotics and did improve and went home.    She has had some costochondral type pains before but nothing like this and this was not exacerbated or relieved by pressure over the area.    Notably, she is been having worsening problems with GERD and dysphagia as well.  She just spoke with Dr. Cárdenas earlier this morning.  She is getting a lot of pill dysphagia at this point which is very frustrating for her and very uncomfortable.  She had dilatation in the fall and thought it helped for a while but she says this almost feels like it is just a dysmotility problem rather than stricturing to her.        November 11, 2021 interval history:    Patient had a right sural nerve biopsy on 7/19/2021.  Biopsy was notable for axonal neuropathy without a specific cause.  There is no overt evidence of inflammation, primary demyelination, or granulomatous findings.  Patient had return follow-up would pulmonology on 9/16/2021.  Her CT was reviewed, showed bronchocentric and predominantly central infiltrates.Overall findings have not significantly progressed from prior CT on 7/1/2021.  Patient's case was discussed in ILD conference.  Plan was to start Imuran 150 mg daily, as well as starting a prednisone taper (starting at 40 mg daily, then tapering 5 mg every month to be kept at 30 mg daily until follow-up with rheumatology).     Patient overall has no significant change in her health from prior.  She is currently taking hydroxychloroquine, prednisone 30 mg daily, and Imuran 150 daily.  Continues to have fatigue, which she feels has been gradually worsening.  She notes that she can only lift 2-3 grocery bags before feeling tired, and needs assistance when cooking.  Continues to have neuropathy symptoms in her bilateral lower extremities (more in the right side).  Continues to have shortness of breath with limited activity (e.g. moving from bed to  kitchen).  Is currently treating her GERD with Protonix and omeprazole, which she feels controls her symptoms.  Has some mouth dryness, but notices that her eyes are watery which is new for her. She has received her COVID-19 booster immunization.     January 13, 2022 interval history:    Since we have last seen the patient she was admitted to the hospital because of worsening shortness of breath and palpitations.  Coronary angiogram showed minimal areas of stenosis of less than 20%.  Echocardiogram however suggested very significant pulmonary hypertension and a markedly reduced ejection fraction.    She saw Dr. Cuellar back in pulmonary clinic last week.  Cardiac MRI was performed yesterday.  I have reviewed those results today.  There is significant LGE, consistent with an infiltrative process such as sarcoidosis.  She is quite symptomatic.  She had a 6-minute walk test today and had to stop because of how dyspneic she was and how many palpitation she was having but she had absolutely no desaturation.    She has been tapering her prednisone and is down to 20 mg daily but she believes all of the symptoms have worsened with that taper.      ROS as per HPI.  10-point ROS is otherwise negative.    HISTORY REVIEW:  Past Medical History:   Diagnosis Date     Anemia      Cellulitis of leg 6/6/2013     Esophageal reflux     Better post-hiatal hernia repair and weight loss     Limb ischemia; ulcers of fingertips 4/22/2013     Raynaud's syndrome      Scleroderma (H)      Systemic sclerosis (H) 2009     Unspecified essential hypertension        Past Surgical History:   Procedure Laterality Date     BYPASS GASTRIC, CHOLECYSTECTOMY, COMBINED       CHOLECYSTECTOMY       COLONOSCOPY       ESOPHAGOSCOPY, GASTROSCOPY, DUODENOSCOPY (EGD), COMBINED N/A 3/10/2016    Procedure: COMBINED ESOPHAGOSCOPY, GASTROSCOPY, DUODENOSCOPY (EGD), BIOPSY SINGLE OR MULTIPLE;  Surgeon: Tricia Zambrano MD;  Location:  GI     INNER EAR  SURGERY       PICC INSERTION  6/5/2013    5fr DL Power PICC, 44cm, left basilic vein, tip in low SVC     SURGICAL HISTORY OF -       Left ear surgery - incus transition, tympanoplasty     SURGICAL HISTORY OF -   6/05    Gastric bypass     SURGICAL HISTORY OF -   6/05    Cholecystectomy     SURGICAL HISTORY OF -   6/05    Hiatal hernia repair     TONSILLECTOMY         Family History   Problem Relation Age of Onset     Cerebrovascular Disease Father      C.A.D. Father      C.A.D. Mother      C.A.D. 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      Social History Main Topics     Smoking status: Never Smoker      Smokeless tobacco: Never Used     Alcohol Use: Yes      occassionally     Drug Use: No     Sexually Active: Yes -- Male partner(s)     Birth Control/ Protection: None     Social History Narrative     Was  in December 2015. Longtime partner from [place].       Patient Active Problem List   Diagnosis     Essential hypertension     Thyrotoxicosis     Obesity     Prolonged QTc 460 ms,  at hr 67     Low back pain - Suspect SI Joint dysfunction     Cough     Systemic sclerosis (H)     Tumoral calcinosis     Shortness of breath     Edema of both legs     Abnormal albumin     Myopathy     Hypoalbuminemia     Diastolic dysfunction     Limb ischemia; ulcers of fingertips     Cellulitis of leg     Other specified diffuse disease of connective tissue     Disturbed sleep rhythm     Pharyngeal dysphagia     Gastroesophageal reflux disease, esophagitis presence not specified     Rheumatoid arthritis with negative rheumatoid factor, involving unspecified site (H)     Raynaud's disease without gangrene       Allergies    Allergen Reactions     Lovenox Other (See Comments)     Blood clot      Norvasc [Amlodipine Besylate] Swelling     Lip swelling that happened on 2 different occasions     Erythromycin Rash     Rash on arms     OBJECTIVE:  Vitals: Reviewed  Gen: A+Ox3, NAD  HEENT: NCAT, EOMI  Pulm: CTAB, minimal coarse sounds on basilar lobes  CVS: She has a regular rate and rhythm but this is punctuated by very frequent ectopy then restoration of a regular rhythm.  No murmurs appreciated.  Skin: Her fingers are intermittently diffusely cyanotic.  Purpuric patches on fingertips no ulcerations/erosions. There is platelike xerosis of lower extremities  -mild sclerosis of distal fingers  Msk: No active synovitis.  4/5 upper extremity strength, 5/5 lower extremity strength bilaterally.    EKG (11/12/2021)  Sinus rhythm with marked sinus arrhythmia with premature supraventricular complexes  Possible left atrial enlargement  Left axis deviation  Left ventricular hypertrophy with QRS widening  T wave abnormality consider lateral ischemia  Prolonged QT  Abnormal ECG  Ventricular rate 81, , , QT/QTc 418/485, P/R/T 29/-32/132     Component      Latest Ref Rng 1/26/2016   WBC      4.0 - 11.0 10e9/L 9.2   RBC Count      3.8 - 5.2 10e12/L 4.59   Hemoglobin      11.7 - 15.7 g/dL 13.2   Hematocrit      35.0 - 47.0 % 41.8   MCV      78 - 100 fl 91   MCH      26.5 - 33.0 pg 28.8   MCHC      31.5 - 36.5 g/dL 31.6   RDW      10.0 - 15.0 % 16.7 (H)   Platelet Count      150 - 450 10e9/L 195   Diff Method       Automated Method   % Neutrophils       62.9   % Lymphocytes       27.0   % Monocytes       9.1   % Eosinophils       0.4   % Basophils       0.2   % Immature Granulocytes       0.4   Nucleated RBCs      0 /100 0   Absolute Neutrophil      1.6 - 8.3 10e9/L 5.8   Absolute Lymphocytes      0.8 - 5.3 10e9/L 2.5   Absolute Monocytes      0.0 - 1.3 10e9/L 0.8   Absolute Eosinophils      0.0 - 0.7 10e9/L 0.0   Absolute Basophils      0.0 -  0.2 10e9/L 0.0   Abs Immature Granulocytes      0 - 0.4 10e9/L 0.0   Absolute Nucleated RBC       0.0   Color Urine       Yellow   Appearance Urine       Clear   Glucose Urine      NEG mg/dL Negative   Bilirubin Urine      NEG Negative   Ketones Urine      NEG mg/dL Negative   Specific Gravity Urine      1.003 - 1.035 1.010   Blood Urine      NEG Negative   pH Urine      5.0 - 7.0 pH 6.0   Protein Albumin Urine      NEG mg/dL Negative   Urobilinogen mg/dL      0.0 - 2.0 mg/dL Normal   Nitrite Urine      NEG Negative   Leukocyte Esterase Urine      NEG Negative   Source       Midstream Urine   WBC Urine      0 - 2 /HPF <1   RBC Urine      0 - 2 /HPF <1   Squamous Epithelial /HPF Urine      0 - 1 /HPF <1   Creatinine      0.52 - 1.04 mg/dL 1.11 (H)   GFR Estimate      >60 mL/min/1.7m2 51 (L)   GFR Estimate If Black      >60 mL/min/1.7m2 62   ALT      0 - 50 U/L 23   AST      0 - 45 U/L 16   Albumin      3.4 - 5.0 g/dL 3.4   CRP Inflammation      0.0 - 8.0 mg/L <2.9   Sed Rate      0 - 30 mm/h 9   CK Total      30 - 225 U/L 38   Aldolase       4.1     Biopsy from 3/10 EGD:  Esophagus, gastroesophageal junction, biopsies:   - Squamous mucosa and submucosa with marked chronic inflammation and   fibrosis   - No evidence of intestinal metaplasia or dysplasia               February 15, 2022 interval history:    Since we have last seen the patient she has felt about the same in terms of heart palpitations on the 30 mg daily prednisone.  She believes her neuropathy symptoms actually worsened a little.  Her Raynauds phenomena has also been worse but it has of course been rather cold.    Interestingly, she was actually in [place] last week and despite the warm weather there she still had a lot of Raynauds that was not much different.    She saw Dr. Francis earlier today.  I have read his note which is up.  He is starting her on a Zio patch for 1 week and hoping that we can significantly decrease her prednisone so that a PET  scan can be performed and identify whether there is any cardiac inflammation at a time that she is on more minimal prednisone.    She does remain on azathioprine.  She did have labs earlier today which look quite good with perhaps some slight worsening of ESR but in the context of a lower CRP which I am more inclined to believe.  Her AST was up above the upper limit of normal very slightly but she denies any recent alcohol and ALT was okay.    Physical examination by video shows her to be in no acute distress HEENT examination is normal.  She is speaking in full sentences with no shortness of breath.  Her hand examination does not show marked cyanosis and there is no apparent swelling or joint deformity.    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    Plan/recommendation:    I have asked her to go down to 25 mg a day for the next 3 days of prednisone.  If she does not feel markedly worse she can go further to 20 mg a day for the remainder of the time she is on the Zio patch and another week thereafter.  If at that point she still feels okay she can go down to 15 mg a day.    After she has been on that dose for a couple of weeks I want her to do labs again so we can reassess for inflammation.  I would also like to see her at about that time on 15 March when I do have another video visit that I can see her at and we can decide whether to go down further and if so how fast.    She will let us know if she is having difficulty in the meantime.    This video visit commenced at 3:36 PM was completed at 3:53 PM    MARTÍN De La Torre MD, PhD    Rheumatology

## 2022-02-15 NOTE — PROGRESS NOTES
Per Dr. Francis, patient to have 7 day Zio Patch monitor placed.  Diagnosis: Palpitations  Monitor placed: Yes  Patient Instructed: Yes  Patient verbalized understanding: Yes  Holter # [id number]

## 2022-02-15 NOTE — PATIENT INSTRUCTIONS
"Cardiology Providers you saw during your visit:  Dr. Francis    Medication changes:  1.  No changes- until you have spoken with Dr De La Torre    Follow up:  1.  7 day zio patch to be placed today  2.  Referral to the pulmonary hypertension clinic-  3.  Possible PET scan in the future depending on Zio patch results and prednisone taper    Labs:      Please call if you have :  1. Weight gain of more than 2 pounds in a day or 5 pounds in a week  2. Increased shortness of breath, swelling or bloating  3. Dizziness, lightheadedness   4. Any questions or concerns.       Follow the American Heart Association Diet and Lifestyle recommendations:  Limit saturated fat, trans fat, sodium, red meat, sweets and sugar-sweetened beverages. If you choose to eat red meat, compare labels and select the leanest cuts available.  Aim for at least 150 minutes of moderate physical activity or 75 minutes of vigorous physical activity - or an equal combination of both - each week.      During business hours: [phone], press option # 1 to schedule or leave a message for your care team      After hours, weekends or holidays: On Call Cardiologist- [phone]   option #4 and ask to speak to the on-call Cardiologist. Inform them you are a CORE/heart failure patient at the [facility].      Marsha Butt RN BSN CHFN  Cardiology Care Coordinator - Heart Failure/ [facility] Clinic  [facility]   Questions and [phone]   First press #1 for the [facility] and then press #4 for \"To send a message to your care team\"    "

## 2022-02-15 NOTE — PROGRESS NOTES
Chief complaint: Possible cardiac sarcoidosis    HPI:   Jeanine Schuler is a 61 year old female with history of pulmonary sarcoidosis, ILD, MCTD/limited scleroderma, and recently-diagnosed non-ischemic cardiomyopathy referred for evaluation of possible cardiac sarcoidosis.    She presented to [facility] 12/2021 with 3-day history of dyspnea and tachycardia. Echo showed LVEF 35%, hypokinesis, moderate MR, moderate severe TR, and severe pulmonary HTN. She was admitted to [facility]. Coronary angiogram showed no obstructive CAD. CMR was obtained 1/12/22 (see below) and was read as showing multifocal LGE, raising the question of cardiac sarcoidosis.     She is followed by Dr. Cuellar for her ILD/pulmonary sarcoidosis, whom she last saw 1/6/22 (note reviewed.) She was seen by Dr. Ag 1/26/22 (note reviewed), who transitioned beta blocker to Toprol XL.    Her case was discussed at 1/28/22 Multidisciplinary Sarcoidosis Conference and images were reviewed. CMR was felt to have similar LGE to the prior in 2013 and in pattern somewhat atypical for cardiac sarcoidosis. Plan was made to taper prednisone and obtain cardiac PET and assess arrhythmia burden after steroid taper. She is referred to me to establish care for possible cardiac sarcoidosis.    Current immunosuppression is prednisone 30 mg daily (had been decreased to 20 mg but was increased to 30 mg after seeing Dr. De La Torre on 1/13/22.) No one has contacted her regarding scheduling PET scan.    She reports frequent palpitations which she describes as heart racing, sensation of skipped beat followed by strong heartbeat. She is not sure if she is having more palpitations compared with the time of her ZioPatch but does feel that she is more attuned to them and noticing them more now. She denies syncope. She does have lightheadedness with prolonged standing. She is mildly dyspneic at rest and significantly more dyspneic with minimal exertion, including walking across the  room to the bathroom. She feels that her dyspnea is stable over the past few weeks but is worse compared with even 1 month ago. She also reports 2-pillow orthopnea and PND as well as bilateral lower extremity edema which she takes torsemide for. She denies any chest pain.    Current cardiac medications:  -torsemide 20 mg daily  -Toprol XL 50 mg daily  -losartan 50 mg daily  -spironolactone 25 mg daily  -atorvastatin 40 mg daily  -ASA 81 mg daily      Sarcoid history:  Initial presentation:  ILD (cough/dyspnea, fatigue) ~8/2020  Organs involved: lungs  Cardiac sarcoidosis? Possible/TBD  Tissue diagnosis:  EBUS/LLL transbronchial biopsy 10/6/20 (non-necrotizing granulomas+foci of organizing pneumonia, sarcoidosis vs. Hypersensitivity pneumonitis-- reviewed at sarcoid pathology conference, thought to be sarcoidosis)  Device:   no  Arrhythmias:   PACs, SVT; IVCD  CMR:    1/12/22, 4/25/13  PET:    no  LVEF:    44% CMR 1/12/22  Immunosuppresion:      Methotrexate 20 mg subcutaneous/wk 4/2021-9/2021    MMF 5/2021-9/2021     Azathioprine 100 mg daily 7/2021-9/2021     Azathioprine 150 mg 9/2021-current    Prednisone 30 mg      PAST MEDICAL HISTORY:  Past Medical History:   Diagnosis Date     Anemia      Bariatric surgery status 06/27/2005    abdi [facility];     Cellulitis of leg 06/06/2013     Esophageal reflux     Better post-hiatal hernia repair and weight loss     Hyperplastic colonic polyp      Hypovitaminosis D 2011     ILD (interstitial lung disease) (H)      Kidney stone 04/29/2007     Kidney stone 05/10/2007    surgically removed     Limb ischemia; ulcers of fingertips 04/22/2013     Other chronic pain     Left shoulder - rheumatoid arthritis     Raynaud's syndrome      Rheumatoid arthritis (H) \     Scleroderma (H)      Sjogren-Jake syndrome      Systemic sclerosis (H) 2009     Unspecified essential hypertension        CURRENT MEDICATIONS:  Current Outpatient Medications   Medication Sig Dispense  Refill     albuterol (PROAIR HFA/PROVENTIL HFA/VENTOLIN HFA) 108 (90 Base) MCG/ACT inhaler Inhale 2 puffs into the lungs every 6 hours as needed for shortness of breath / dyspnea or wheezing       alcohol swab prep pads Use to swab area of injection/alf as directed. 100 each 3     aspirin (ASA) 81 MG chewable tablet Take 1 tablet (81 mg) by mouth daily 30 tablet 0     atorvastatin (LIPITOR) 40 MG tablet Take 40 mg by mouth       azaTHIOprine (IMURAN) 50 MG tablet Take 3 tablets (150 mg) by mouth daily And have labs drawn two weeks after starting medication. 90 tablet 3     blood glucose (NO BRAND SPECIFIED) test strip Use to test blood sugar 2 times daily or as directed. 100 strip 6     blood glucose calibration (NO BRAND SPECIFIED) solution Use to calibrate blood glucose monitor as needed as directed. 1 each 0     blood glucose monitoring (NO BRAND SPECIFIED) meter device kit Use to test blood sugar 2 times daily or as directed. 1 kit 0     clonazePAM (KLONOPIN) 0.5 MG tablet Take 1 tablet (0.5 mg) by mouth nightly as needed for sleep (secondary to prednisone) MRx 1 60 tablet 1     folic acid (FOLVITE) 1 MG tablet Take 1 tablet (1 mg) by mouth daily 90 tablet 3     losartan (COZAAR) 50 MG tablet Take 0.5 tablets (25 mg) by mouth daily 45 tablet 3     magnesium oxide (MAG-OX) 400 MG tablet Take 1 tablet (400 mg) by mouth daily 90 tablet 0     metoprolol succinate ER (TOPROL XL) 50 MG 24 hr tablet Take 1 tablet (50 mg) by mouth daily 90 tablet 3     naproxen (NAPROSYN) 500 MG tablet Take 1 tablet (500 mg) by mouth 2 times daily as needed for moderate pain 10 tablet 0     nitroGLYcerin (NITROSTAT) 0.3 MG sublingual tablet Place 1 tablet (0.3 mg) under the tongue every 5 minutes as needed for chest pain (esophageal spasm) For chest pain place 1 tablet under the tongue every 5 minutes for 3 doses. If symptoms persist 5 minutes after 3rd dose call 911. 12 tablet 1     omeprazole (PRILOSEC) 20 MG DR capsule Take 1  capsule (20 mg) by mouth 2 times daily 180 capsule 3     pantoprazole (PROTONIX) 40 MG EC tablet Take 1 tablet (40 mg) by mouth daily 90 tablet 3     potassium chloride ER (MICRO-K) 10 MEQ CR capsule Take 30 mEq by mouth       predniSONE (DELTASONE) 10 MG tablet Take 3 tablets (30 mg) by mouth daily 90 tablet 3     spironolactone (ALDACTONE) 25 MG tablet Take 25 mg by mouth daily        sulfamethoxazole-trimethoprim (BACTRIM) 400-80 MG tablet Take 1 tablet by mouth daily (Patient taking differently: Take 1 tablet by mouth daily ) 30 tablet 3     thin (NO BRAND SPECIFIED) lancets Use with lanceting device twice daily 100 each 0     torsemide (DEMADEX) 20 MG tablet Take 1 tablet (20 mg) by mouth 2 times daily 180 tablet 1     vitamin D2 (ERGOCALCIFEROL) 03728 units (1250 mcg) capsule Take 50,000 Units by mouth once a week         ALLERGIES:     Allergies   Allergen Reactions     Lovenox Other (See Comments)     Blood clot      Norvasc [Amlodipine Besylate] Swelling     Lip swelling that happened on 2 different occasions     Erythromycin Rash     Rash on arms       FAMILY HISTORY:  Family History   Problem Relation Age of Onset     Cerebrovascular Disease Father      Heart Disease Father      Hypertension Father      Heart Disease Mother      Hypertension Mother      Chronic Obstructive Pulmonary Disease Sister      Heart Disease Sister      Hypertension Sister      Cerebrovascular Disease Brother      Heart Disease Brother      Kidney Disease Brother         on dialysis     Diabetes Brother         borderline     No Known Problems Son      No Known Problems Son      No Known Problems Daughter      Cancer Brother         small cell     Heart Disease Brother      Heart Disease Brother      Heart Disease Brother      Heart Disease Brother         tripple A     Heart Disease Sister      Substance Abuse Sister         alcoholism     Crohn's Disease No family hx of      Ulcerative Colitis No family hx of      GERD No family  "hx of      Celiac Disease No family hx of      Nephrolithiasis No family hx of      Parathyroid Disorders No family hx of      Calcium Disorder No family hx of        SOCIAL HISTORY:  Social History     Tobacco Use     Smoking status: Never Smoker     Smokeless tobacco: Never Used   Substance Use Topics     Alcohol use: Yes     Comment: occassionally     Drug use: No       ROS:   A comprehensive 14 point review of systems is negative other than as mentioned in HPI.    Exam:  /88 (BP Location: Right arm, Patient Position: Chair, Cuff Size: Adult Regular)   Pulse 98   Ht 1.719 m (5' 7.68\")   Wt 76.1 kg (167 lb 12.8 oz)   BMI 25.76 kg/m    GENERAL APPEARANCE: healthy, alert and no distress  EYES: no icterus, no xanthelasmas  ENT: normal palate, mucosa moist, no central cyanosis  RESPIRATORY: lungs clear to auscultation - no rales, rhonchi or wheezes, no use of accessory muscles, no retractions, respirations are unlabored, normal respiratory rate  CARDIOVASCULAR:  irregular rhythm, no murmurs; JVP ~8 cmH2O.  GI: soft, non tender, bowel sounds normal,no abdominal bruits  EXTREMITIES: cyanosis distal fingers; no pitting edema bilateral legs  NEURO: alert and oriented to person/place/time, normal speech, gait and affect  SKIN: xerosis/scaling bilateral lower extremities  PSYCH: cooperative, affect appropriate.     Labs:  Reviewed.     EBUS/LLL transbronchial biopsy 10/6/2020:  LUNG, LEFT UPPER LOBE, TRANSBRONCHIAL CRYOBIOPSY:   - Fragments of respiratory mucosa and alveolated lung tissue with nonnecrotizing granulomas with multinucleated giant cells, foci of organizing pneumonia and nonspecific chronic interstitial inflammation with reactive pneumocytes type II hyperplasia.   - GMS and AFB special stains are negative for fungal and acid-fast organisms, respectively.   - See comment.     COMMENT:   In absence of infection (ie. Mycobacteria, please correlate with laboratory cultures and serology), the histologic " findings raise the differential diagnosis of hypersensitivity pneumonitis and sarcoidosis. Clinical and radiologic correlation is necessary. The case will be discussed at the  ILD interdepartmental conference.     Testing/Procedures:  I personally visualized and interpreted:  Coronary angiogram 12/21/21: minimal non-obstructive CAD    TTE 12/21/21: Moderately reduced LV function, LVEF=30-35%. Diffuse hypokinesis most prominent at the apex with relative sparing of the basal segments, a pattern suggestive of stress cardiomyopathy. Probably normal RV size and function. Moderate TR. Elevated PA pressure (RVSP 54 mmHg+RA pressure.)    CMR 1/12/22: Mildly reduced LVEF=44%. Normal RV size/function, RVEF=54%. Subendocardial LGE involving the lateral wall, mid-myocardial LGE involving the septum, RV insertion point LGE; distribution and extent appear similar to 4/25/2013 but LGE is better imaged on the 1/12/22 study. Overall findings are consistent with a mild non-ischemic cardiomyopathy; pattern of LGE would be somewhat atypical for cardiac sarcoidosis. Frequent ectopy during the study likely accounts for mild reduction in LVEF. Compared with TTE from 12/2021, LV/RV function have improved.    CMR 4/25/13: Normal LV/RV size/function. Subendocardial LGE involving the lateral wall, mid-myocardial LGE involving the septum, RV insertion point LGE.    7-day ZioPatch 12/22/21-12/29/21: Baseline sinus rhythm with nonspecific IVCD. Frequent PACs (~12%) and 138 runs of SVT (logest 12 sec), including SVT with aberrancy. Rare (<1%) PVCs, no sustained or nonsustained VT.    ECG 1/12/22: sinus with frequent PACs, IVCD and nonspecific ST/T abnormality, VR 80,   QTc 491        Assessment and Plan:   1. Pulmonary sarcoidosis  2. Limited scleroderma  3. Possible cardiac sarcoidosis  4. NICM with partially-recovered LVEF=44% CMR 1/2022  5. Palpitations  6. PACs  7. SVT  8. Pulmonary hypertension  Jeanine's clinical scenario is  complex and her symptoms are likely multifactorial.  Her cardiac MRI is not strongly suggestive of cardiac sarcoidosis and shows only mild LV dysfunction with significant provement in EF from her 12/2021 echocardiogram suggesting that reduced LVEF at that time was due at least in part to stress cardiomyopathy which is subsequently recovered.    The presence of similar late gadolinium enhancement on her 2013 cardiac MRI somewhat suggest against active cardiac sarcoidosis as the etiology of her symptoms.  It would nonetheless be useful to more definitively exclude active cardiac inflammation from sarcoidosis as the etiology of her palpitations and heart failure symptoms.  This would be best done with cardiac PET, however this would be most useful if obtained on was little suppression as is feasible, ideally at least on her baseline prednisone dose in order to maximize sensitivity.    Given her possible increase in palpitations, plan to repeat 7-day ZioPatch.  If this shows new ventricular arrhythmias or significant AV block, I would plan to proceed with cardiac PET more timely fashion, irrespective of her current immunosuppression.  I suspect it is more likely that her palpitations are related to her frequent PACs and short runs of SVT, neither of which in and of itself would raise concern for cardiac sarcoidosis.     I do suspect she has some component of HFpEF but she does not appear obviously hypervolemic on exam today.    Given her dyspnea, which has worsened somewhat, and which appears disproportionate to both her LV dysfunction and her volume status (and per my discussion with Dr. Ceullar today, also to her lung disease) and her TTE 12/2021 showing elevated estimated PA pressure, I agree that evaluation for possible pulmonary hypertension is prudent, particularly as she has 3 diseases associated with PH (sarcoidosis, systemic sclerosis, and ILD.)     Tentative plan is for cardiac PET, however per my discussion  with Dr. Cuellar, this will be deferred until after prednisone is tapered if possible, unless ZioPatch discloses significant arrhythmias to raise concern for cardiac sarcoidosis.    Plan in brief:  -7-day Ziopatch today  -tentatively plan for cardiac PET (after tapering of prednisone to baseline dose if possible)  -agree with PH referral as previously planned  -follow up with me after PET    The patient states understanding and is agreeable with plan.   Chart review time today: 51 minutes  Visit time today: 37 minutes  Total time spent today: 88 minutes    Jacobo Francis MD  Cardiology    CC

## 2022-02-15 NOTE — NURSING NOTE
Chief Complaint   Patient presents with     New Patient     Tito Sarcoid, labs prior     Vitals were taken and medications reconciled.    Yanick Rcihardson, EMT  11:23 AM

## 2022-02-16 ENCOUNTER — TELEPHONE (OUTPATIENT)
Dept: CARDIOLOGY | Facility: CLINIC | Age: 62
End: 2022-02-16
Payer: COMMERCIAL

## 2022-02-16 NOTE — TELEPHONE ENCOUNTER
NEW PH REFERRAL PER TARUN with Dr NGUYEN or DR MAYFIELD     Let the patient know nurse looked over things and said either one would be a good fit for her.

## 2022-02-16 NOTE — TELEPHONE ENCOUNTER
----- Message from Debbi White RN sent at 2/15/2022 12:49 PM CST -----  Either Dr. Sears or Dr. Quinteros - I think [facility] has availability much sooner  ----- Message -----  From: Dorita Knowles  Sent: 2/15/2022  12:47 PM CST  To: Cardiology Ph Nurse-, #    Hey this patient came out to the pod to schedule NEW PH she stated she thinks natty or her lung Dr wanted to refer dr SMITH?    I wanted to make sure  if that was the appropriate and first available with him is 3/28

## 2022-02-17 PROBLEM — Z79.899 HIGH RISK MEDICATION USE: Status: ACTIVE | Noted: 2021-04-19

## 2022-02-17 PROBLEM — H35.89 RETINAL PIGMENT EPITHELIAL MOTTLING OF MACULA: Status: ACTIVE | Noted: 2021-04-19

## 2022-02-21 PROBLEM — D86.9 SARCOIDOSIS: Status: ACTIVE | Noted: 2022-02-21

## 2022-02-21 PROBLEM — Z79.52 ON PREDNISONE THERAPY: Status: ACTIVE | Noted: 2022-02-21

## 2022-02-21 PROBLEM — M35.1 MCTD (MIXED CONNECTIVE TISSUE DISEASE) (H): Status: ACTIVE | Noted: 2022-02-21

## 2022-02-21 PROBLEM — D84.9 IMMUNOSUPPRESSED STATUS (H): Status: ACTIVE | Noted: 2022-02-21

## 2022-03-05 ASSESSMENT — ENCOUNTER SYMPTOMS
BLOATING: 0
FEVER: 0
LEG PAIN: 0
DIARRHEA: 0
LOSS OF CONSCIOUSNESS: 0
HEADACHES: 0
BLOOD IN STOOL: 0
SINUS PAIN: 0
ORTHOPNEA: 1
NAIL CHANGES: 0
DIZZINESS: 0
SLEEP DISTURBANCES DUE TO BREATHING: 1
DYSURIA: 0
MYALGIAS: 0
POLYDIPSIA: 0
WHEEZING: 0
SYNCOPE: 0
TREMORS: 0
SEIZURES: 0
HALLUCINATIONS: 0
HEMATURIA: 0
DECREASED CONCENTRATION: 0
SPEECH CHANGE: 0
MUSCLE WEAKNESS: 1
JOINT SWELLING: 0
NECK MASS: 0
TINGLING: 1
ARTHRALGIAS: 1
FLANK PAIN: 0
HOARSE VOICE: 0
TASTE DISTURBANCE: 0
NAUSEA: 1
PANIC: 0
COUGH DISTURBING SLEEP: 1
ALTERED TEMPERATURE REGULATION: 1
COUGH: 1
HYPERTENSION: 0
DYSPNEA ON EXERTION: 1
SPUTUM PRODUCTION: 0
CONSTIPATION: 0
INCREASED ENERGY: 1
SHORTNESS OF BREATH: 1
WEIGHT GAIN: 0
DISTURBANCES IN COORDINATION: 0
CHILLS: 1
WEAKNESS: 1
EYE PAIN: 0
PARALYSIS: 0
EYE IRRITATION: 0
SNORES LOUDLY: 0
HEMOPTYSIS: 0
POSTURAL DYSPNEA: 1
WEIGHT LOSS: 1
DIFFICULTY URINATING: 0
BOWEL INCONTINENCE: 0
SORE THROAT: 0
EYE REDNESS: 0
DEPRESSION: 0
LIGHT-HEADEDNESS: 0
PALPITATIONS: 1
POOR WOUND HEALING: 0
HEARTBURN: 1
RECTAL PAIN: 0
VOMITING: 0
SKIN CHANGES: 0
STIFFNESS: 1
HYPOTENSION: 0
INSOMNIA: 1
DOUBLE VISION: 0
FATIGUE: 1
NERVOUS/ANXIOUS: 0
SINUS CONGESTION: 0
JAUNDICE: 0
MUSCLE CRAMPS: 0
ABDOMINAL PAIN: 0
NECK PAIN: 0
NUMBNESS: 1
EXERCISE INTOLERANCE: 1
TROUBLE SWALLOWING: 1
BACK PAIN: 1
SMELL DISTURBANCE: 0
DECREASED APPETITE: 1
NIGHT SWEATS: 0
EYE WATERING: 1
POLYPHAGIA: 0
MEMORY LOSS: 0

## 2022-03-07 NOTE — PROGRESS NOTES
Atul Bentley M.D.  Cardiovascular Medicine    I personally saw and examined this patient,     Problem List  1. Sarcoid?  2. Abnormal left ventricular function, normal coronary arteries  3. Raynauds  4. Scleroderma  5. Digital ulcerations  6. Raynauds  7. Iron deficiency  8. 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 ILD  10 Restriction of mouth opening  11. SBO  12. Reduced albumin with reduced pre-albumin consistent with malnutrition    1. Right heart catheterization with endomyocardial biopsy to look for evidence of myocardial sarcoid (see cardiac MRI)  2. Labs: iron, iron binding capacity, soluble transferrin receptor, pre-albumin  3. CT scan chest without contrast/done see below  4. Parenteral iron replacement  5. Augment feeding schedule to 5 meals day, BOOST milk shakes made with almond milk (lactose intolerance)    Discussion:  It would be very unusual to have sarcoid and scleroderma, but laureano-lymphatic nodules are suggestive of sarcoid phenotype, though CT does not show hilar adenopathy.  ZEO shows predominantly atrial arrhythmia.  MRI not inconsistent with sarcoid.  Patient feels terrible with rapid steroid taper to facilitate PET to clarify MRI findings/significance.  Striking elevation of BNP with normal coronary arteries and LVEF of 44l%.  While bx has low yield, positive bx findings would be helpful.  Iron deficiency requires parenteral replacement and evaluation as to loss, hemolysis, malabsorption in light of previous gastric surgery.        History    The patient returns for follow-up of PAH   There is no interim history of chest pain, tightness, paroxysmal nocturnal dyspnea, orthopnea, peripheral edema, palpitation, pre-syncope, syncope,  Exercise tolerance is Patient has poor oral intake.  Patient has marked shortness of breath, without cough, hemoptysis..  Medications are reviewed and the patient is taking medications as prescribed.  The patient is generally sleeping well.       Allergies, family history,  and 13 system review performed and confirmed in chart.  Objective    Alert, oriented, JVP within normal limits, murmur of TR and aortic outflow systolic murmur without evidence of aortic valve disease, no ascites, basilar rales do not clear with cough, no edema, Raynauds, sclerodema, no open ulcers    Wt Readings from Last 5 Encounters:   02/15/22 76.1 kg (167 lb 12.8 oz)   01/28/22 80.3 kg (177 lb)   01/26/22 79.4 kg (175 lb)   01/13/22 78.9 kg (174 lb)   01/06/22 78.9 kg (174 lb)       Meds  Current Outpatient Medications   Medication     albuterol (PROAIR HFA/PROVENTIL HFA/VENTOLIN HFA) 108 (90 Base) MCG/ACT inhaler     alcohol swab prep pads     aspirin (ASA) 81 MG chewable tablet     atorvastatin (LIPITOR) 40 MG tablet     azaTHIOprine (IMURAN) 50 MG tablet     blood glucose (NO BRAND SPECIFIED) test strip     blood glucose calibration (NO BRAND SPECIFIED) solution     blood glucose monitoring (NO BRAND SPECIFIED) meter device kit     clonazePAM (KLONOPIN) 0.5 MG tablet     folic acid (FOLVITE) 1 MG tablet     losartan (COZAAR) 50 MG tablet     magnesium oxide (MAG-OX) 400 MG tablet     metoprolol succinate ER (TOPROL XL) 50 MG 24 hr tablet     naproxen (NAPROSYN) 500 MG tablet     nitroGLYcerin (NITROSTAT) 0.3 MG sublingual tablet     omeprazole (PRILOSEC) 20 MG DR capsule     pantoprazole (PROTONIX) 40 MG EC tablet     potassium chloride ER (MICRO-K) 10 MEQ CR capsule     predniSONE (DELTASONE) 10 MG tablet     spironolactone (ALDACTONE) 25 MG tablet     sulfamethoxazole-trimethoprim (BACTRIM) 400-80 MG tablet     thin (NO BRAND SPECIFIED) lancets     torsemide (DEMADEX) 20 MG tablet     vitamin D2 (ERGOCALCIFEROL) 25156 units (1250 mcg) capsule     No current facility-administered medications for this visit.       Labs    Results for OSCAR REYES (MRN [id number]) as of 3/8/2022 17:13   Ref. Range 2/15/2022 12:37 3/8/2022 11:30 3/8/2022 13:19   Sodium Latest Ref Range: 133 - 144 mmol/L  138     Potassium Latest Ref Range: 3.4 - 5.3 mmol/L  3.0 (L)    Chloride Latest Ref Range: 94 - 109 mmol/L  97    Carbon Dioxide Latest Ref Range: 20 - 32 mmol/L  30    Urea Nitrogen Latest Ref Range: 7 - 30 mg/dL  10    Creatinine Latest Ref Range: 0.52 - 1.04 mg/dL  1.04    GFR Estimate Latest Ref Range: >60 mL/min/1.73m2  62    Calcium Latest Ref Range: 8.5 - 10.1 mg/dL  8.5    Anion Gap Latest Ref Range: 3 - 14 mmol/L  11    Albumin Latest Ref Range: 3.4 - 5.0 g/dL  3.0 (L)    Prealbumin Latest Ref Range: 15 - 45 mg/dL  7 (L)    Protein Total Latest Ref Range: 6.8 - 8.8 g/dL  6.8    Bilirubin Total Latest Ref Range: 0.2 - 1.3 mg/dL  0.9    Alkaline Phosphatase Latest Ref Range: 40 - 150 U/L  102    ALT Latest Ref Range: 0 - 50 U/L  26    AST Latest Ref Range: 0 - 45 U/L  34    CRP Inflammation Latest Ref Range: 0.0 - 8.0 mg/L  7.4    Ferritin Latest Ref Range: 8 - 252 ng/mL  96    Iron Latest Ref Range: 35 - 180 ug/dL  32 (L)    Iron Binding Cap Latest Ref Range: 240 - 430 ug/dL  230 (L)    Iron Saturation Index Latest Ref Range: 15 - 46 %  14 (L)    N-Terminal Pro Bnp Latest Ref Range: 0 - 125 pg/mL  7,353 (H)    T4 Free Latest Ref Range: 0.76 - 1.46 ng/dL  1.76 (H)    TSH Latest Ref Range: 0.40 - 4.00 mU/L  0.30 (L)    Glucose Latest Ref Range: 70 - 99 mg/dL  94    WBC Latest Ref Range: 4.0 - 11.0 10e3/uL  5.0    Hemoglobin Latest Ref Range: 11.7 - 15.7 g/dL  14.4    Hematocrit Latest Ref Range: 35.0 - 47.0 %  45.0    Platelet Count Latest Ref Range: 150 - 450 10e3/uL  257    RBC Count Latest Ref Range: 3.80 - 5.20 10e6/uL  5.04    MCV Latest Ref Range: 78 - 100 fL  89    MCH Latest Ref Range: 26.5 - 33.0 pg  28.6    MCHC Latest Ref Range: 31.5 - 36.5 g/dL  32.0    RDW Latest Ref Range: 10.0 - 15.0 %  14.1        Imaging   EXAMINATION: CT CHEST W/O CONTRAST, 3/8/2022 1:19 PM     CLINICAL HISTORY: Sarcoidosis; ; Sarcoidosis     COMPARISON: CT chest 12/20/2021.     TECHNIQUE: CT imaging obtained through the chest  without contrast.  Coronal and axial MIP reformatted images obtained. Inspiratory and  expiratory imaging was included. The high resolution interstitial lung  disease protocol.     CONTRAST:  none.     FINDINGS:  Cardiac size within normal limits on the  topogram. No  pericardial effusion. Unchanged mild thoracic aortic ectasia measuring  up to 4.4 cm in the proximal thoracic descending aorta. Enlarged main  pulmonary trunk measuring up to 3.6 cm, unchanged. No suspicious  lymphadenopathy within the chest. Again seen dilated and fluid-filled  esophagus proximal to the gastroesophageal anastomosis of patient's  Eleazar-en-Y. This appears similar to 9/16/2021.     Central tracheobronchial tree is patent. No pneumothorax or pleural  effusions. Again seen and not relatively changed numerous  perilymphatic distribution nodules greatest in the mid to left upper  lung field. Fine reticular opacities in the subpleural lung bases with  immediate subpleural sparing. Air trapping demonstrated in the mid to  upper lung fields on expiratory imaging.     Imaged unenhanced upper abdomen demonstrates cholecystectomy and  partially imaged surgical changes of Eleazar-en-Y gastric bypass.  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 No  acute or suspicious osseous abnormalities. Soft tissues unremarkable.                                                                      IMPRESSION:  1. Relatively unchanged perilymphatic distribution nodules, mid to  upper lung fields, indicative of sarcoidosis phenotype in conjunction  with basilar and peripheral predominant reticular changes suggestive  of NSIP given history of scleroderma.. Findings may represent a  combination of features of sarcoidosis and scleroderma associated ILD.  Lobular gas trapping on expiratory view or small airways disease  component. Again seen dilated and fluid-filled esophagus consistent  with scleroderma.     I have personally reviewed the  examination and initial interpretation  and I agree with the findings.       Coronary angiogram:    Diagnostic  Dominance: Right    Left Main   Very short LM; essentially separate coronary ostia   Left Anterior Descending   Ost LAD to Prox LAD lesion is 20% stenosed. Mild CAD vs possible mild catheter induced vasospasm   Left Circumflex   The vessel is angiographically normal.   Right Coronary Artery   Prox RCA lesion is 20% stenosed.       Intervention        MRI  Clinical history: 61 year old woman with ILD, sarcoidosis, mixed connective tissue disorder, limited  cutaneous systemic sclerosis, and Raynauds. Recently admitted for troponin elevation and cardiomyopathy. No  significant obstructive CAD on coronary angiogram.       Comparison CMR: April 2013     1. The left ventricle is normal in size. There is mild inferoseptal thickening (14 mm). The global systolic  function is mildly reduced. The LVEF is 44%. There is mild diffuse hypokinesis.     2. The right ventricle is normal in cavity size. The global systolic function is normal. The RVEF is 54%.      3. Left atrium is mildly enlarged. Right atrium is normal in size.      4. There is no significant valvular disease.      5. There is mid-myocardial late gadolinium enhancement in the basal septum with extension into the mid  inferoseptal segment and inferior right ventricular insertion site. In addition, there is subendocardial  hyperenhancement in the basal inferolateral segment. This variable pattern of hyperenhancement is  concerning for infiltrative cardiomyopathy such as sarcoidosis.       6. There is no pericardial effusion.     7. There is no intracardiac thrombus.     8. There is no myocardial edema.      CONCLUSIONS:   1. Non ischemic cardiomyopathy with mildly reduced left ventricular function and normal right ventricular  function.   2. Variable patterns of hyperenhancement in the septal mid-myocardium and inferolateral subendocardium are  concerning  for cardiac sarcoidosis.      These findings are new compared to prior cMRI done on April 2013.      CORE EXAM   ==========================================================================================================     MEASUREMENTS   ----------------------------------------------------------------------------------------      VOLUMETRIC ANALYSIS       ----------------------------------------------  .-------------------------------------------------------.                   LV    Reference  RV    Reference   +-----+-----------+------+-----------+------+-----------+   EDV  ml          165   ()   131   ()         ml/m^2       87   (56-90)     69   (53-90)     ESV  ml           92   (22-59)     60   (15-68)          ml/m^2       48   (14-33)     31   (11-37)     CO   L/min      5.04             4.91                    L/min/m^2  2.64             2.58               SV   ml           73   ()    71   ()         ml/m^2       38   (37-62)     37   (36-60)     EF   %            44   (59-77)     54   (55-79)    '-----+-----------+------+-----------+------+-----------'             CARDIAC OUTPUT HR:  69 BPM      LV DIMENSIONS       ----------------------------------------------          WALL THICKNESS - INFEROLATERAL:  0.5 cm          LV RODOLFO:  4.9 cm          LV ESD:  3.8 cm         LA DIMENSIONS (LV SYSTOLE)       ----------------------------------------------          DIAMETER:  4 cm         EXTRACELLULAR VOLUME MEASUREMENT       ----------------------------------------------          PRE-CONTRAST T1 MYOCARDIUM:  1101 msec          PRE-CONTRAST T1 LV CAVITY:  1486 msec          POST-CONTRAST T1 MYOCARDIUM:  530 msec          POST-CONTRAST T1 LV CAVITY:  428 msec          ECV:  39 %        ANATOMY   ----------------------------------------------------------------------------------------      LEFT VENTRICLE        ----------------------------------------------          CAVITY SIZE:  Normal         RIGHT VENTRICLE       ----------------------------------------------          WALL THICKNESS:  Normal         INTERVENTRICULAR SEPTUM       ----------------------------------------------               VENTRICULAR SEPTUM:  Normal          INTERATRIAL SEPTUM       ----------------------------------------------               ATRIAL SEPTUM:          Normal          LEFT ATRIUM       ----------------------------------------------          CAVITY SIZE:  MILDLY ENLARGED         RIGHT ATRIUM       ----------------------------------------------          CAVITY SIZE:  Normal         PERICARDIUM       ----------------------------------------------          Normal         PLEURAL EFFUSION       ----------------------------------------------               None         17 SEGMENT   ----------------------------------------------------------------------------------------  .-------------------------------------------------------------------------------------------------.   Segments            Wall Motion    Hyperenhancement  Stress Perfusion  Interpretation         +--------------------+---------------+------------------+------------------+----------------------+   Base Anterior       Mild/Mod Hypo  None                                Abnormal Wall Motion    Base Anteroseptal   Mild/Mod Hypo  1-25%                               Non-CAD Scar            Base Inferoseptal   Mild/Mod Hypo  1-25%                               Non-CAD Scar            Base Inferior       Mild/Mod Hypo  None                                Abnormal Wall Motion    Base Inferolateral  Mild/Mod Hypo  1-25%                               Non-CAD Scar            Base Anterolateral  Mild/Mod Hypo  None                                Abnormal Wall Motion    Mid Anterior        Mild/Mod Hypo  None                                Abnormal  Wall Motion    Mid Anteroseptal    Mild/Mod Hypo  None                                Abnormal Wall Motion    Mid Inferoseptal    Mild/Mod Hypo  1-25%                               Non-CAD Scar            Mid Inferior        Mild/Mod Hypo  None                                Abnormal Wall Motion    Mid Inferolateral   Mild/Mod Hypo  None                                Abnormal Wall Motion    Mid Anterolateral   Mild/Mod Hypo  None                                Abnormal Wall Motion    Apical Anterior     Mild/Mod Hypo  None                                Abnormal Wall Motion    Apical Septal       Mild/Mod Hypo  None                                Abnormal Wall Motion    Apical Inferior     Mild/Mod Hypo  None                                Abnormal Wall Motion    Apical Lateral      Mild/Mod Hypo  None                                Abnormal Wall Motion    [place]                Mild/Mod Hypo  None                                Abnormal Wall Motion   +--------------------+---------------+------------------+------------------+----------------------+   RV Segments         Wall Motion    Hyperenhancement                    Interpretation         +--------------------+---------------+------------------+------------------+----------------------+   RV Basal Anterior   Normal/Hyper   None                                Normal                  RV Basal Inferior   Normal/Hyper   None                                Normal                  RV Mid              Normal/Hyper   None                                Normal                  RV Apical           Normal/Hyper   None                                Normal                 '--------------------+---------------+------------------+------------------+----------------------'     Echo    Name: OSCAR REYES MARTÍN  MRN: [id number]  : 1960  Study Date: 2021 08:35 AM  Age: 61  yrs  Gender: Female  Patient Location: [facility]  Reason For Study: Chest Pain, Chest Tightness, Chest Pressure  Ordering Physician: CHARISSE ANN  Referring Physician: CHARISSE ANN  Performed By: Jose Danielle     BSA: 2.0 m2  Height: 67 in  Weight: 187 lb  HR: 90  BP: 145/100 mmHg  ______________________________________________________________________________  Procedure  Complete Echo Adult. Optison (NDC #[id number]) given intravenously.  ______________________________________________________________________________  Interpretation Summary     1. The left ventricle is normal in size. The visual ejection fraction is  estimated at 35%. Global hypokinesis, distal LV segments (anteroseptal) appear  more hypokinetic than other areas.  2. The right ventricle is mildly dilated. The right ventricular systolic  function is normal.  3. There is mild to moderate (1-2+) mitral regurgitation.  4. There is moderately severe (3+) tricuspid regurgitation.  5. Moderate (46-55mmHg) pulmonary hypertension is present. The right  ventricular systolic pressure is approximated at 54mmHg plus the right atrial  pressure.  6. The ascending aorta is Mildly dilated. 4.2cm.     Echo from 8/2020 showed EF 55%, mild TR, aorta 3.8cm.  ______________________________________________________________________________  Left Ventricle  The left ventricle is normal in size. There is normal left ventricular wall  thickness. The visual ejection fraction is estimated at 35%. Left ventricular  diastolic function is indeterminate. Global hypokinesis, distal LV segments  (anteroseptal) appear more hypokinetic than other areas.     Right Ventricle  The right ventricle is mildly dilated. The right ventricular systolic function  is normal.     Atria  The left atrium is severely dilated. The right atrium is moderately dilated.  There is no atrial shunt seen.     Mitral Valve  There is mild mitral annular calcification. There is mild to moderate  (1-2+)  mitral regurgitation.     Tricuspid Valve  There is moderately severe (3+) tricuspid regurgitation. Moderate (46-55mmHg)  pulmonary hypertension is present. The right ventricular systolic pressure is  approximated at 54mmHg plus the right atrial pressure.     Aortic Valve  There is trace to mild aortic regurgitation.     Pulmonic Valve  There is mild to moderate (1-2+) pulmonic valvular regurgitation.     Vessels  The ascending aorta is Mildly dilated. Normal IVC size, cannot assess collapse  due to limited image quality.     Pericardium  There is no pericardial effusion.     Rhythm  Sinus rhythm was noted.  ______________________________________________________________________________  MMode/2D Measurements & Calculations  IVSd: 1.1 cm     LVIDd: 5.4 cm  LVIDs: 4.0 cm  LVPWd: 0.88 cm  FS: 25.8 %  LV mass(C)d: 207.4 grams  LV mass(C)dI: 105.5 grams/m2  Ao root diam: 3.7 cm  asc Aorta Diam: 4.2 cm  LVOT diam: 2.1 cm  LVOT area: 3.4 cm2  LA Volume (BP): 130.0 ml  LA Volume Index (BP): 66.0 ml/m2  RWT: 0.32     Doppler Measurements & Calculations  MV E max ck: 87.8 cm/sec  MV A max ck: 88.8 cm/sec  MV E/A: 0.99  Ao V2 max: 156.2 cm/sec  Ao max PG: 10.0 mmHg  ALLISON(V,D): 2.0 cm2  LV V1 max PG: 3.3 mmHg  LV V1 max: 91.2 cm/sec  LV V1 VTI: 17.6 cm  SV(LVOT): 59.4 ml  SI(LVOT): 30.2 ml/m2  PA V2 max: 96.9 cm/sec  PA max PG: 3.8 mmHg  PA acc time: 0.06 sec  PI end-d ck: 168.7 cm/sec  TR max ck: 369.2 cm/sec  TR max P.5 mmHg  AV Ck Ratio (DI): 0.58  Lateral E/e': 11.1       Assessment/Plan         Answers for HPI/ROS submitted by the patient on 3/5/2022  General Symptoms: Yes  Skin Symptoms: Yes  HENT Symptoms: Yes  EYE SYMPTOMS: Yes  HEART SYMPTOMS: Yes  LUNG SYMPTOMS: Yes  INTESTINAL SYMPTOMS: Yes  URINARY SYMPTOMS: Yes  GYNECOLOGIC SYMPTOMS: No  BREAST SYMPTOMS: No  SKELETAL SYMPTOMS: Yes  BLOOD SYMPTOMS: No  NERVOUS SYSTEM SYMPTOMS: Yes  MENTAL HEALTH SYMPTOMS: Yes  Ear pain: No  Ear discharge:  No  Hearing loss: Yes  Tinnitus: No  Nosebleeds: No  Congestion: No  Sinus pain: No  Trouble swallowing: Yes   Voice hoarseness: No  Mouth sores: No  Sore throat: No  Tooth pain: No  Gum tenderness: No  Bleeding gums: No  Change in taste: No  Change in sense of smell: No  Dry mouth: Yes  Hearing aid used: No  Neck lump: No  Fever: No  Loss of appetite: Yes  Weight loss: Yes  Weight gain: No  Fatigue: Yes  Night sweats: No  Chills: Yes  Increased stress: Yes  Excessive hunger: No  Excessive thirst: No  Feeling hot or cold when others believe the temperature is normal: Yes  Loss of height: No  Post-operative complications: No  Surgical site pain: No  Hallucinations: No  Change in or Loss of Energy: Yes  Hyperactivity: No  Confusion: No  Changes in hair: No  Changes in moles/birth marks: No  Itching: No  Rashes: No  Changes in nails: No  Acne: No  Hair in places you don't want it: No  Change in facial hair: No  Warts: No  Non-healing sores: No  Scarring: No  Flaking of skin: No  Color changes of hands/feet in cold : Yes  Sun sensitivity: No  Skin thickening: No  Eye pain: No  Vision loss: Yes  Dry eyes: No  Watery eyes: Yes  Eye bulging: No  Double vision: No  Flashing of lights: No  Spots: No  Floaters: No  Redness: No  Crossed eyes: No  Tunnel Vision: No  Yellowing of eyes: No  Eye irritation: No  Chest pain or pressure: Yes  Fast or irregular heartbeat: Yes  Pain in legs with walking: No  Trouble breathing while lying down: Yes  Fingers or toes appear blue: Yes  High blood pressure: No  Low blood pressure: No  Fainting: No  Murmurs: No  Pacemaker: No  Varicose veins: No  Edema or swelling: Yes  Wake up at night with shortness of breath: Yes  Light-headedness: No  Exercise intolerance: Yes  Cough: Yes  Sputum or phlegm: No  Coughing up blood: No  Difficulty breating or shortness of breath: Yes  Snoring: No  Wheezing: No  Difficulty breathing on exertion: Yes  Nighttime Cough: Yes  Difficulty breathing when lying  flat: Yes  Heart burn or indigestion: Yes  Nausea: Yes  Vomiting: No  Abdominal pain: No  Bloating: No  Constipation: No  Diarrhea: No  Blood in stool: No  Black stools: No  Rectal or Anal pain: No  Fecal incontinence: No  Yellowing of skin or eyes: No  Vomit with blood: No  Change in stools: No  Trouble holding urine or incontinence: No  Pain or burning: No  Trouble starting or stopping: No  Increased frequency of urination: No  Blood in urine: No  Decreased frequency of urination: No  Frequent nighttime urination: Yes  Flank pain: No  Difficulty emptying bladder: No  Back pain: Yes  Muscle aches: No  Neck pain: No  Swollen joints: No  Joint pain: Yes  Bone pain: No  Muscle cramps: No  Muscle weakness: Yes  Joint stiffness: Yes  Bone fracture: No  Trouble with coordination: No  Dizziness or trouble with balance: No  Fainting or black-out spells: No  Memory loss: No  Headache: No  Seizures: No  Speech problems: No  Tingling: Yes  Tremor: No  Weakness: Yes  Difficulty walking: Yes  Paralysis: No  Numbness: Yes  Nervous or Anxious: No  Depression: No  Trouble sleeping: Yes  Trouble thinking or concentrating: No  Mood changes: Yes  Panic attacks: No

## 2022-03-08 ENCOUNTER — LAB (OUTPATIENT)
Dept: LAB | Facility: CLINIC | Age: 62
End: 2022-03-08
Payer: COMMERCIAL

## 2022-03-08 ENCOUNTER — PATIENT OUTREACH (OUTPATIENT)
Dept: CARDIOLOGY | Facility: CLINIC | Age: 62
End: 2022-03-08
Payer: COMMERCIAL

## 2022-03-08 ENCOUNTER — ANCILLARY PROCEDURE (OUTPATIENT)
Dept: CT IMAGING | Facility: CLINIC | Age: 62
End: 2022-03-08
Attending: INTERNAL MEDICINE
Payer: COMMERCIAL

## 2022-03-08 ENCOUNTER — OFFICE VISIT (OUTPATIENT)
Dept: CARDIOLOGY | Facility: CLINIC | Age: 62
End: 2022-03-08
Attending: INTERNAL MEDICINE
Payer: COMMERCIAL

## 2022-03-08 VITALS
HEART RATE: 46 BPM | SYSTOLIC BLOOD PRESSURE: 121 MMHG | OXYGEN SATURATION: 93 % | DIASTOLIC BLOOD PRESSURE: 82 MMHG | HEIGHT: 68 IN | WEIGHT: 164 LBS | BODY MASS INDEX: 24.86 KG/M2

## 2022-03-08 DIAGNOSIS — Z11.59 ENCOUNTER FOR SCREENING FOR OTHER VIRAL DISEASES: Primary | ICD-10-CM

## 2022-03-08 DIAGNOSIS — R06.09 DOE (DYSPNEA ON EXERTION): ICD-10-CM

## 2022-03-08 DIAGNOSIS — D86.9 SARCOIDOSIS: ICD-10-CM

## 2022-03-08 DIAGNOSIS — D50.9 IRON DEFICIENCY ANEMIA, UNSPECIFIED IRON DEFICIENCY ANEMIA TYPE: ICD-10-CM

## 2022-03-08 DIAGNOSIS — I27.20 PULMONARY HYPERTENSION (H): ICD-10-CM

## 2022-03-08 DIAGNOSIS — D86.9 SARCOIDOSIS: Primary | ICD-10-CM

## 2022-03-08 LAB
ALBUMIN SERPL-MCNC: 3 G/DL (ref 3.4–5)
ALP SERPL-CCNC: 102 U/L (ref 40–150)
ALT SERPL W P-5'-P-CCNC: 26 U/L (ref 0–50)
ANION GAP SERPL CALCULATED.3IONS-SCNC: 11 MMOL/L (ref 3–14)
AST SERPL W P-5'-P-CCNC: 34 U/L (ref 0–45)
BILIRUB SERPL-MCNC: 0.9 MG/DL (ref 0.2–1.3)
BUN SERPL-MCNC: 10 MG/DL (ref 7–30)
CALCIUM SERPL-MCNC: 8.5 MG/DL (ref 8.5–10.1)
CHLORIDE BLD-SCNC: 97 MMOL/L (ref 94–109)
CO2 SERPL-SCNC: 30 MMOL/L (ref 20–32)
CORTIS SERPL-MCNC: 18.6 UG/DL (ref 4–22)
CREAT SERPL-MCNC: 1.04 MG/DL (ref 0.52–1.04)
CRP SERPL-MCNC: 7.4 MG/L (ref 0–8)
ERYTHROCYTE [DISTWIDTH] IN BLOOD BY AUTOMATED COUNT: 14.1 % (ref 10–15)
FERRITIN SERPL-MCNC: 96 NG/ML (ref 8–252)
GFR SERPL CREATININE-BSD FRML MDRD: 62 ML/MIN/1.73M2
GLUCOSE BLD-MCNC: 94 MG/DL (ref 70–99)
HCT VFR BLD AUTO: 45 % (ref 35–47)
HGB BLD-MCNC: 14.4 G/DL (ref 11.7–15.7)
IRON SATN MFR SERPL: 14 % (ref 15–46)
IRON SERPL-MCNC: 32 UG/DL (ref 35–180)
MCH RBC QN AUTO: 28.6 PG (ref 26.5–33)
MCHC RBC AUTO-ENTMCNC: 32 G/DL (ref 31.5–36.5)
MCV RBC AUTO: 89 FL (ref 78–100)
NT-PROBNP SERPL-MCNC: 7353 PG/ML (ref 0–125)
PLATELET # BLD AUTO: 257 10E3/UL (ref 150–450)
POTASSIUM BLD-SCNC: 3 MMOL/L (ref 3.4–5.3)
PREALB SERPL IA-MCNC: 7 MG/DL (ref 15–45)
PROT SERPL-MCNC: 6.8 G/DL (ref 6.8–8.8)
RBC # BLD AUTO: 5.04 10E6/UL (ref 3.8–5.2)
SODIUM SERPL-SCNC: 138 MMOL/L (ref 133–144)
T4 FREE SERPL-MCNC: 1.76 NG/DL (ref 0.76–1.46)
TIBC SERPL-MCNC: 230 UG/DL (ref 240–430)
TSH SERPL DL<=0.005 MIU/L-ACNC: 0.3 MU/L (ref 0.4–4)
WBC # BLD AUTO: 5 10E3/UL (ref 4–11)

## 2022-03-08 PROCEDURE — 82533 TOTAL CORTISOL: CPT | Mod: 90 | Performed by: PATHOLOGY

## 2022-03-08 PROCEDURE — 84134 ASSAY OF PREALBUMIN: CPT | Performed by: PATHOLOGY

## 2022-03-08 PROCEDURE — 83880 ASSAY OF NATRIURETIC PEPTIDE: CPT | Performed by: PATHOLOGY

## 2022-03-08 PROCEDURE — G0463 HOSPITAL OUTPT CLINIC VISIT: HCPCS

## 2022-03-08 PROCEDURE — 84238 ASSAY NONENDOCRINE RECEPTOR: CPT | Mod: 90 | Performed by: PATHOLOGY

## 2022-03-08 PROCEDURE — 99213 OFFICE O/P EST LOW 20 MIN: CPT | Performed by: INTERNAL MEDICINE

## 2022-03-08 PROCEDURE — 86140 C-REACTIVE PROTEIN: CPT | Performed by: PATHOLOGY

## 2022-03-08 PROCEDURE — 82728 ASSAY OF FERRITIN: CPT | Performed by: PATHOLOGY

## 2022-03-08 PROCEDURE — 84439 ASSAY OF FREE THYROXINE: CPT | Performed by: PATHOLOGY

## 2022-03-08 PROCEDURE — 80053 COMPREHEN METABOLIC PANEL: CPT | Performed by: PATHOLOGY

## 2022-03-08 PROCEDURE — 99000 SPECIMEN HANDLING OFFICE-LAB: CPT | Performed by: PATHOLOGY

## 2022-03-08 PROCEDURE — 84443 ASSAY THYROID STIM HORMONE: CPT | Performed by: PATHOLOGY

## 2022-03-08 PROCEDURE — 85027 COMPLETE CBC AUTOMATED: CPT | Performed by: PATHOLOGY

## 2022-03-08 PROCEDURE — 36415 COLL VENOUS BLD VENIPUNCTURE: CPT | Performed by: PATHOLOGY

## 2022-03-08 PROCEDURE — 83550 IRON BINDING TEST: CPT | Performed by: PATHOLOGY

## 2022-03-08 PROCEDURE — 71250 CT THORAX DX C-: CPT | Mod: GC | Performed by: RADIOLOGY

## 2022-03-08 RX ORDER — MEPERIDINE HYDROCHLORIDE 25 MG/ML
25 INJECTION INTRAMUSCULAR; INTRAVENOUS; SUBCUTANEOUS EVERY 30 MIN PRN
Status: CANCELLED | OUTPATIENT
Start: 2022-03-08

## 2022-03-08 RX ORDER — METHYLPREDNISOLONE SODIUM SUCCINATE 125 MG/2ML
125 INJECTION, POWDER, LYOPHILIZED, FOR SOLUTION INTRAMUSCULAR; INTRAVENOUS
Status: CANCELLED
Start: 2022-03-08

## 2022-03-08 RX ORDER — HEPARIN SODIUM,PORCINE 10 UNIT/ML
5 VIAL (ML) INTRAVENOUS
Status: CANCELLED | OUTPATIENT
Start: 2022-03-08

## 2022-03-08 RX ORDER — ALBUTEROL SULFATE 90 UG/1
1-2 AEROSOL, METERED RESPIRATORY (INHALATION)
Status: CANCELLED
Start: 2022-03-08

## 2022-03-08 RX ORDER — NALOXONE HYDROCHLORIDE 0.4 MG/ML
0.2 INJECTION, SOLUTION INTRAMUSCULAR; INTRAVENOUS; SUBCUTANEOUS
Status: CANCELLED | OUTPATIENT
Start: 2022-03-08

## 2022-03-08 RX ORDER — ALBUTEROL SULFATE 0.83 MG/ML
2.5 SOLUTION RESPIRATORY (INHALATION)
Status: CANCELLED | OUTPATIENT
Start: 2022-03-08

## 2022-03-08 RX ORDER — HEPARIN SODIUM (PORCINE) LOCK FLUSH IV SOLN 100 UNIT/ML 100 UNIT/ML
5 SOLUTION INTRAVENOUS
Status: CANCELLED | OUTPATIENT
Start: 2022-03-08

## 2022-03-08 RX ORDER — LIDOCAINE 40 MG/G
CREAM TOPICAL
Status: CANCELLED | OUTPATIENT
Start: 2022-03-08

## 2022-03-08 RX ORDER — EPINEPHRINE 1 MG/ML
0.3 INJECTION, SOLUTION, CONCENTRATE INTRAVENOUS EVERY 5 MIN PRN
Status: CANCELLED | OUTPATIENT
Start: 2022-03-08

## 2022-03-08 RX ORDER — DIPHENHYDRAMINE HYDROCHLORIDE 50 MG/ML
50 INJECTION INTRAMUSCULAR; INTRAVENOUS
Status: CANCELLED
Start: 2022-03-08

## 2022-03-08 ASSESSMENT — PAIN SCALES - GENERAL: PAINLEVEL: NO PAIN (0)

## 2022-03-08 NOTE — LETTER
3/8/2022      RE: Jean[facility] Schuler  [address] [facility] [address]       Dear Colleague,    Thank you for the opportunity to participate in the care of your patient, Jeanine Schuler, at the [facility] [place] [facility]. Please see a copy of my visit note below.    Atul Bentley M.D.  Cardiovascular Medicine    I personally saw and examined this patient,     Problem List  1. Sarcoid?  2. Abnormal left ventricular function, normal coronary arteries  3. Raynauds  4. Scleroderma  5. Digital ulcerations  6. Raynauds  7. Iron deficiency  8. 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 ILD  10 Restriction of mouth opening  11. SBO  12. Reduced albumin with reduced pre-albumin consistent with malnutrition    1. Right heart catheterization with endomyocardial biopsy to look for evidence of myocardial sarcoid (see cardiac MRI)  2. Labs: iron, iron binding capacity, soluble transferrin receptor, pre-albumin  3. CT scan chest without contrast/done see below  4. Parenteral iron replacement  5. Augment feeding schedule to 5 meals day, BOOST milk shakes made with almond milk (lactose intolerance)    Discussion:  It would be very unusual to have sarcoid and scleroderma, but laureano-lymphatic nodules are suggestive of sarcoid phenotype, though CT does not show hilar adenopathy.  ZEO shows predominantly atrial arrhythmia.  MRI not inconsistent with sarcoid.  Patient feels terrible with rapid steroid taper to facilitate PET to clarify MRI findings/significance.  Striking elevation of BNP with normal coronary arteries and LVEF of 44l%.  While bx has low yield, positive bx findings would be helpful.  Iron deficiency requires parenteral replacement and evaluation as to loss, hemolysis, malabsorption in light of previous gastric surgery.        History    The patient returns for follow-up of PAH   There is no interim history of chest pain, tightness, paroxysmal nocturnal  dyspnea, orthopnea, peripheral edema, palpitation, pre-syncope, syncope,  Exercise tolerance is Patient has poor oral intake.  Patient has marked shortness of breath, without cough, hemoptysis..  Medications are reviewed and the patient is taking medications as prescribed.  The patient is generally sleeping well.       Allergies, family history, and 13 system review performed and confirmed in chart.  Objective    Alert, oriented, JVP within normal limits, murmur of TR and aortic outflow systolic murmur without evidence of aortic valve disease, no ascites, basilar rales do not clear with cough, no edema, Raynauds, sclerodema, no open ulcers    Wt Readings from Last 5 Encounters:   02/15/22 76.1 kg (167 lb 12.8 oz)   01/28/22 80.3 kg (177 lb)   01/26/22 79.4 kg (175 lb)   01/13/22 78.9 kg (174 lb)   01/06/22 78.9 kg (174 lb)       Meds  Current Outpatient Medications   Medication     albuterol (PROAIR HFA/PROVENTIL HFA/VENTOLIN HFA) 108 (90 Base) MCG/ACT inhaler     alcohol swab prep pads     aspirin (ASA) 81 MG chewable tablet     atorvastatin (LIPITOR) 40 MG tablet     azaTHIOprine (IMURAN) 50 MG tablet     blood glucose (NO BRAND SPECIFIED) test strip     blood glucose calibration (NO BRAND SPECIFIED) solution     blood glucose monitoring (NO BRAND SPECIFIED) meter device kit     clonazePAM (KLONOPIN) 0.5 MG tablet     folic acid (FOLVITE) 1 MG tablet     losartan (COZAAR) 50 MG tablet     magnesium oxide (MAG-OX) 400 MG tablet     metoprolol succinate ER (TOPROL XL) 50 MG 24 hr tablet     naproxen (NAPROSYN) 500 MG tablet     nitroGLYcerin (NITROSTAT) 0.3 MG sublingual tablet     omeprazole (PRILOSEC) 20 MG DR capsule     pantoprazole (PROTONIX) 40 MG EC tablet     potassium chloride ER (MICRO-K) 10 MEQ CR capsule     predniSONE (DELTASONE) 10 MG tablet     spironolactone (ALDACTONE) 25 MG tablet     sulfamethoxazole-trimethoprim (BACTRIM) 400-80 MG tablet     thin (NO BRAND SPECIFIED) lancets     torsemide  (DEMADEX) 20 MG tablet     vitamin D2 (ERGOCALCIFEROL) 20313 units (1250 mcg) capsule     No current facility-administered medications for this visit.       Labs    Results for OSCAR REYES (MRN [id number]) as of 3/8/2022 17:13   Ref. Range 2/15/2022 12:37 3/8/2022 11:30 3/8/2022 13:19   Sodium Latest Ref Range: 133 - 144 mmol/L  138    Potassium Latest Ref Range: 3.4 - 5.3 mmol/L  3.0 (L)    Chloride Latest Ref Range: 94 - 109 mmol/L  97    Carbon Dioxide Latest Ref Range: 20 - 32 mmol/L  30    Urea Nitrogen Latest Ref Range: 7 - 30 mg/dL  10    Creatinine Latest Ref Range: 0.52 - 1.04 mg/dL  1.04    GFR Estimate Latest Ref Range: >60 mL/min/1.73m2  62    Calcium Latest Ref Range: 8.5 - 10.1 mg/dL  8.5    Anion Gap Latest Ref Range: 3 - 14 mmol/L  11    Albumin Latest Ref Range: 3.4 - 5.0 g/dL  3.0 (L)    Prealbumin Latest Ref Range: 15 - 45 mg/dL  7 (L)    Protein Total Latest Ref Range: 6.8 - 8.8 g/dL  6.8    Bilirubin Total Latest Ref Range: 0.2 - 1.3 mg/dL  0.9    Alkaline Phosphatase Latest Ref Range: 40 - 150 U/L  102    ALT Latest Ref Range: 0 - 50 U/L  26    AST Latest Ref Range: 0 - 45 U/L  34    CRP Inflammation Latest Ref Range: 0.0 - 8.0 mg/L  7.4    Ferritin Latest Ref Range: 8 - 252 ng/mL  96    Iron Latest Ref Range: 35 - 180 ug/dL  32 (L)    Iron Binding Cap Latest Ref Range: 240 - 430 ug/dL  230 (L)    Iron Saturation Index Latest Ref Range: 15 - 46 %  14 (L)    N-Terminal Pro Bnp Latest Ref Range: 0 - 125 pg/mL  7,353 (H)    T4 Free Latest Ref Range: 0.76 - 1.46 ng/dL  1.76 (H)    TSH Latest Ref Range: 0.40 - 4.00 mU/L  0.30 (L)    Glucose Latest Ref Range: 70 - 99 mg/dL  94    WBC Latest Ref Range: 4.0 - 11.0 10e3/uL  5.0    Hemoglobin Latest Ref Range: 11.7 - 15.7 g/dL  14.4    Hematocrit Latest Ref Range: 35.0 - 47.0 %  45.0    Platelet Count Latest Ref Range: 150 - 450 10e3/uL  257    RBC Count Latest Ref Range: 3.80 - 5.20 10e6/uL  5.04    MCV Latest Ref Range: 78 - 100 fL  89    MCH  Latest Ref Range: 26.5 - 33.0 pg  28.6    MCHC Latest Ref Range: 31.5 - 36.5 g/dL  32.0    RDW Latest Ref Range: 10.0 - 15.0 %  14.1        Imaging   EXAMINATION: CT CHEST W/O CONTRAST, 3/8/2022 1:19 PM     CLINICAL HISTORY: Sarcoidosis; ; Sarcoidosis     COMPARISON: CT chest 12/20/2021.     TECHNIQUE: CT imaging obtained through the chest without contrast.  Coronal and axial MIP reformatted images obtained. Inspiratory and  expiratory imaging was included. The high resolution interstitial lung  disease protocol.     CONTRAST:  none.     FINDINGS:  Cardiac size within normal limits on the  topogram. No  pericardial effusion. Unchanged mild thoracic aortic ectasia measuring  up to 4.4 cm in the proximal thoracic descending aorta. Enlarged main  pulmonary trunk measuring up to 3.6 cm, unchanged. No suspicious  lymphadenopathy within the chest. Again seen dilated and fluid-filled  esophagus proximal to the gastroesophageal anastomosis of patient's  Eleazar-en-Y. This appears similar to 9/16/2021.     Central tracheobronchial tree is patent. No pneumothorax or pleural  effusions. Again seen and not relatively changed numerous  perilymphatic distribution nodules greatest in the mid to left upper  lung field. Fine reticular opacities in the subpleural lung bases with  immediate subpleural sparing. Air trapping demonstrated in the mid to  upper lung fields on expiratory imaging.     Imaged unenhanced upper abdomen demonstrates cholecystectomy and  partially imaged surgical changes of Eleazar-en-Y gastric bypass.  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 No  acute or suspicious osseous abnormalities. Soft tissues unremarkable.                                                                      IMPRESSION:  1. Relatively unchanged perilymphatic distribution nodules, mid to  upper lung fields, indicative of sarcoidosis phenotype in conjunction  with basilar and peripheral predominant reticular changes  suggestive  of NSIP given history of scleroderma.. Findings may represent a  combination of features of sarcoidosis and scleroderma associated ILD.  Lobular gas trapping on expiratory view or small airways disease  component. Again seen dilated and fluid-filled esophagus consistent  with scleroderma.     I have personally reviewed the examination and initial interpretation  and I agree with the findings.       Coronary angiogram:    Diagnostic  Dominance: Right    Left Main   Very short LM; essentially separate coronary ostia   Left Anterior Descending   Ost LAD to Prox LAD lesion is 20% stenosed. Mild CAD vs possible mild catheter induced vasospasm   Left Circumflex   The vessel is angiographically normal.   Right Coronary Artery   Prox RCA lesion is 20% stenosed.       Intervention        MRI  Clinical history: 61 year old woman with ILD, sarcoidosis, mixed connective tissue disorder, limited  cutaneous systemic sclerosis, and Raynauds. Recently admitted for troponin elevation and cardiomyopathy. No  significant obstructive CAD on coronary angiogram.       Comparison CMR: April 2013     1. The left ventricle is normal in size. There is mild inferoseptal thickening (14 mm). The global systolic  function is mildly reduced. The LVEF is 44%. There is mild diffuse hypokinesis.     2. The right ventricle is normal in cavity size. The global systolic function is normal. The RVEF is 54%.      3. Left atrium is mildly enlarged. Right atrium is normal in size.      4. There is no significant valvular disease.      5. There is mid-myocardial late gadolinium enhancement in the basal septum with extension into the mid  inferoseptal segment and inferior right ventricular insertion site. In addition, there is subendocardial  hyperenhancement in the basal inferolateral segment. This variable pattern of hyperenhancement is  concerning for infiltrative cardiomyopathy such as sarcoidosis.       6. There is no pericardial  effusion.     7. There is no intracardiac thrombus.     8. There is no myocardial edema.      CONCLUSIONS:   1. Non ischemic cardiomyopathy with mildly reduced left ventricular function and normal right ventricular  function.   2. Variable patterns of hyperenhancement in the septal mid-myocardium and inferolateral subendocardium are  concerning for cardiac sarcoidosis.      These findings are new compared to prior cMRI done on April 2013.      CORE EXAM   ==========================================================================================================     MEASUREMENTS   ----------------------------------------------------------------------------------------      VOLUMETRIC ANALYSIS       ----------------------------------------------  .-------------------------------------------------------.                   LV    Reference  RV    Reference   +-----+-----------+------+-----------+------+-----------+   EDV  ml          165   ()   131   ()         ml/m^2       87   (56-90)     69   (53-90)     ESV  ml           92   (22-59)     60   (15-68)          ml/m^2       48   (14-33)     31   (11-37)     CO   L/min      5.04             4.91                    L/min/m^2  2.64             2.58               SV   ml           73   ()    71   ()         ml/m^2       38   (37-62)     37   (36-60)     EF   %            44   (59-77)     54   (55-79)    '-----+-----------+------+-----------+------+-----------'             CARDIAC OUTPUT HR:  69 BPM      LV DIMENSIONS       ----------------------------------------------          WALL THICKNESS - INFEROLATERAL:  0.5 cm          LV RODOLFO:  4.9 cm          LV ESD:  3.8 cm         LA DIMENSIONS (LV SYSTOLE)       ----------------------------------------------          DIAMETER:  4 cm         EXTRACELLULAR VOLUME MEASUREMENT       ----------------------------------------------           PRE-CONTRAST T1 MYOCARDIUM:  1101 msec          PRE-CONTRAST T1 LV CAVITY:  1486 msec          POST-CONTRAST T1 MYOCARDIUM:  530 msec          POST-CONTRAST T1 LV CAVITY:  428 msec          ECV:  39 %        ANATOMY   ----------------------------------------------------------------------------------------      LEFT VENTRICLE       ----------------------------------------------          CAVITY SIZE:  Normal         RIGHT VENTRICLE       ----------------------------------------------          WALL THICKNESS:  Normal         INTERVENTRICULAR SEPTUM       ----------------------------------------------               VENTRICULAR SEPTUM:  Normal          INTERATRIAL SEPTUM       ----------------------------------------------               ATRIAL SEPTUM:          Normal          LEFT ATRIUM       ----------------------------------------------          CAVITY SIZE:  MILDLY ENLARGED         RIGHT ATRIUM       ----------------------------------------------          CAVITY SIZE:  Normal         PERICARDIUM       ----------------------------------------------          Normal         PLEURAL EFFUSION       ----------------------------------------------               None         17 SEGMENT   ----------------------------------------------------------------------------------------  .-------------------------------------------------------------------------------------------------.   Segments            Wall Motion    Hyperenhancement  Stress Perfusion  Interpretation         +--------------------+---------------+------------------+------------------+----------------------+   Base Anterior       Mild/Mod Hypo  None                                Abnormal Wall Motion    Base Anteroseptal   Mild/Mod Hypo  1-25%                               Non-CAD Scar            Base Inferoseptal   Mild/Mod Hypo  1-25%                               Non-CAD Scar            Base Inferior       Mild/Mod Hypo  None                                 Abnormal Wall Motion    Base Inferolateral  Mild/Mod Hypo  1-25%                               Non-CAD Scar            Base Anterolateral  Mild/Mod Hypo  None                                Abnormal Wall Motion    Mid Anterior        Mild/Mod Hypo  None                                Abnormal Wall Motion    Mid Anteroseptal    Mild/Mod Hypo  None                                Abnormal Wall Motion    Mid Inferoseptal    Mild/Mod Hypo  1-25%                               Non-CAD Scar            Mid Inferior        Mild/Mod Hypo  None                                Abnormal Wall Motion    Mid Inferolateral   Mild/Mod Hypo  None                                Abnormal Wall Motion    Mid Anterolateral   Mild/Mod Hypo  None                                Abnormal Wall Motion    Apical Anterior     Mild/Mod Hypo  None                                Abnormal Wall Motion    Apical Septal       Mild/Mod Hypo  None                                Abnormal Wall Motion    Apical Inferior     Mild/Mod Hypo  None                                Abnormal Wall Motion    Apical Lateral      Mild/Mod Hypo  None                                Abnormal Wall Motion    Graford                Mild/Mod Hypo  None                                Abnormal Wall Motion   +--------------------+---------------+------------------+------------------+----------------------+   RV Segments         Wall Motion    Hyperenhancement                    Interpretation         +--------------------+---------------+------------------+------------------+----------------------+   RV Basal Anterior   Normal/Hyper   None                                Normal                  RV Basal Inferior   Normal/Hyper   None                                Normal                  RV Mid              Normal/Hyper   None                                Normal                   RV Apical           Normal/Hyper   None                                Normal                 '--------------------+---------------+------------------+------------------+----------------------'     Echo    Name: OSCAR REYES  MRN: [id number]  : 1960  Study Date: 2021 08:35 AM  Age: 61 yrs  Gender: Female  Patient [facility]: [facility]  Reason For Study: Chest Pain, Chest Tightness, Chest Pressure  Ordering Physician: CHARISSE ANN  Referring Physician: CHARISSE ANN  Performed By: Jose Danielle     BSA: 2.0 m2  Height: 67 in  Weight: 187 lb  HR: 90  BP: 145/100 mmHg  ______________________________________________________________________________  Procedure  Complete Echo Adult. Optison (NDC #2551-2032) given intravenously.  ______________________________________________________________________________  Interpretation Summary     1. The left ventricle is normal in size. The visual ejection fraction is  estimated at 35%. Global hypokinesis, distal LV segments (anteroseptal) appear  more hypokinetic than other areas.  2. The right ventricle is mildly dilated. The right ventricular systolic  function is normal.  3. There is mild to moderate (1-2+) mitral regurgitation.  4. There is moderately severe (3+) tricuspid regurgitation.  5. Moderate (46-55mmHg) pulmonary hypertension is present. The right  ventricular systolic pressure is approximated at 54mmHg plus the right atrial  pressure.  6. The ascending aorta is Mildly dilated. 4.2cm.     Echo from 2020 showed EF 55%, mild TR, aorta 3.8cm.  ______________________________________________________________________________  Left Ventricle  The left ventricle is normal in size. There is normal left ventricular wall  thickness. The visual ejection fraction is estimated at 35%. Left ventricular  diastolic function is indeterminate. Global hypokinesis, distal LV segments  (anteroseptal) appear more hypokinetic than other  areas.     Right Ventricle  The right ventricle is mildly dilated. The right ventricular systolic function  is normal.     Atria  The left atrium is severely dilated. The right atrium is moderately dilated.  There is no atrial shunt seen.     Mitral Valve  There is mild mitral annular calcification. There is mild to moderate (1-2+)  mitral regurgitation.     Tricuspid Valve  There is moderately severe (3+) tricuspid regurgitation. Moderate (46-55mmHg)  pulmonary hypertension is present. The right ventricular systolic pressure is  approximated at 54mmHg plus the right atrial pressure.     Aortic Valve  There is trace to mild aortic regurgitation.     Pulmonic Valve  There is mild to moderate (1-2+) pulmonic valvular regurgitation.     Vessels  The ascending aorta is Mildly dilated. Normal IVC size, cannot assess collapse  due to limited image quality.     Pericardium  There is no pericardial effusion.     Rhythm  Sinus rhythm was noted.  ______________________________________________________________________________  MMode/2D Measurements & Calculations  IVSd: 1.1 cm     LVIDd: 5.4 cm  LVIDs: 4.0 cm  LVPWd: 0.88 cm  FS: 25.8 %  LV mass(C)d: 207.4 grams  LV mass(C)dI: 105.5 grams/m2  Ao root diam: 3.7 cm  asc Aorta Diam: 4.2 cm  LVOT diam: 2.1 cm  LVOT area: 3.4 cm2  LA Volume (BP): 130.0 ml  LA Volume Index (BP): 66.0 ml/m2  RWT: 0.32     Doppler Measurements & Calculations  MV E max ck: 87.8 cm/sec  MV A max ck: 88.8 cm/sec  MV E/A: 0.99  Ao V2 max: 156.2 cm/sec  Ao max PG: 10.0 mmHg  ALLISON(V,D): 2.0 cm2  LV V1 max PG: 3.3 mmHg  LV V1 max: 91.2 cm/sec  LV V1 VTI: 17.6 cm  SV(LVOT): 59.4 ml  SI(LVOT): 30.2 ml/m2  PA V2 max: 96.9 cm/sec  PA max PG: 3.8 mmHg  PA acc time: 0.06 sec  PI end-d ck: 168.7 cm/sec  TR max ck: 369.2 cm/sec  TR max P.5 mmHg  AV Ck Ratio (DI): 0.58  Lateral E/e': 11.1       Assessment/Plan       Please do not hesitate to contact me if you have any questions/concerns.     Sincerely,      Atul Bentley MD

## 2022-03-08 NOTE — PATIENT INSTRUCTIONS
Medication Changes:  No medication changes at this time. Please continue current medication regiment.    Patient Instructions:  1. Continue staying active and eat a heart healthy diet.    2. Please keep current list of medications with you at all times.    3. Remember to weigh yourself daily after voiding and before you consume any food or beverages and log the numbers.  If you have gained 2 pounds overnight or 5 pounds in a week contact us immediately for medication adjustments or further instructions.    4. **Please call us immediately if you have any syncope (fainting or passing out), chest pain, edema (swelling or weight gain), or decline in your functional status (general decline in how you are feeling).    5. Patients on Remodulin (treprostinil) or Veletri (epoprostenol): Please make sure that you have your backup pump and supplies with you at all times, your mixing instructions, and contact information for your specialty pharmacy.    Follow up Appointment Information:  Please call to schedule your iron infusions [phone]    Right heart catheterization with Biopsy and Lab  Follow up after testing    *Mandatory COVID Testing:   Pt will need to complete a COVID test no sooner than 4 days prior to their procedure.      To schedule COVID testing Please call [phone]    If you want to complete this at an outside facility please call them to find out if they will have the results within the appropriate time frame and their fax number.  You will need to provide us with that information so we can send them the order.    They will need to fax the results to [phone]    If you are running into and issues please call us.       Check-In  Time Check-In Location Estimated Length Procedure   Name        [facility]  waiting room 60-90 minutes Right Heart Catheterization**     Procedure Preparations & Instructions     This is an invasive procedure that DOES require preparation:    - Take your temperature in the  morning prior to coming in.  If your temperature is 100 F please call [phone] (Opt. 1) and notify them.  If you do not have access to a thermometer at home, please come in for testing.  If you are running a temperature your procedure may be rescheduled.  - Nothing to eat for 6 hours   - You may have clear liquids up until the time of your procedure  - You can take your morning medications (except diabetic and blood thinners) with sips of water  - Please arrange for a ride to drop you off and pick you up in the instance you are unable to drive home, however you should be able to function as you normally would after the procedure       We are located on the third floor of the [facility] ([facility]) on the [facility].  Our address is     [address] [place] [address] on [address] [place] [address]    Thank you for allowing us to be a part of your care here at the [facility]    If you have questions or concerns please contact us at:    Bharti Perdue, RN, BSN, PHN  Prema Rodriguez (Scheduling,Prior Auth)  Nurse Coordinator     Clinic   Pulmonary Hypertension   Pulmonary Hypertension  [facility]  (Phone)[phone]   (Phone) [phone]        (Fax) [phone]    ** Please note that you will NOT receive a reminder call regarding your scheduled testing, reminder calls are for provider appointments only.  If you are scheduled for testing within the [email or web address] system you may receive a call regarding pre-registration for billing purposes only.**     Support Group:  Pulmonary Hypertension Association  Https://www.phassociation.org/  **Look at the Events Tab** They even have Support Groups that you can call into    [facility] PH Support Group  Second Saturday of the Month from 1-3 PM   Location: [address] [place] [address]  Leader: Silvana Long and Trinidad  Kadeem  Phone: [phone] or [phone]  Email: [email or web address]

## 2022-03-08 NOTE — TELEPHONE ENCOUNTER
Reviewed current prednsione dose with Jeanine- she is on 10 mg daily, but having some fatigue and SOB.  Is seeing PH team today.

## 2022-03-11 ENCOUNTER — LAB (OUTPATIENT)
Dept: LAB | Facility: CLINIC | Age: 62
End: 2022-03-11
Attending: INTERNAL MEDICINE
Payer: COMMERCIAL

## 2022-03-11 ENCOUNTER — TELEPHONE (OUTPATIENT)
Dept: CARDIOLOGY | Facility: CLINIC | Age: 62
End: 2022-03-11
Payer: COMMERCIAL

## 2022-03-11 DIAGNOSIS — I27.20 PULMONARY HYPERTENSION (H): ICD-10-CM

## 2022-03-11 DIAGNOSIS — D86.9 SARCOIDOSIS: ICD-10-CM

## 2022-03-11 DIAGNOSIS — R06.09 DOE (DYSPNEA ON EXERTION): ICD-10-CM

## 2022-03-11 DIAGNOSIS — D50.9 IRON DEFICIENCY ANEMIA, UNSPECIFIED IRON DEFICIENCY ANEMIA TYPE: ICD-10-CM

## 2022-03-11 LAB — STFR SERPL-MCNC: 5.3 MG/L

## 2022-03-11 PROCEDURE — U0005 INFEC AGEN DETEC AMPLI PROBE: HCPCS

## 2022-03-11 PROCEDURE — U0003 INFECTIOUS AGENT DETECTION BY NUCLEIC ACID (DNA OR RNA); SEVERE ACUTE RESPIRATORY SYNDROME CORONAVIRUS 2 (SARS-COV-2) (CORONAVIRUS DISEASE [COVID-19]), AMPLIFIED PROBE TECHNIQUE, MAKING USE OF HIGH THROUGHPUT TECHNOLOGIES AS DESCRIBED BY CMS-2020-01-R: HCPCS

## 2022-03-11 NOTE — TELEPHONE ENCOUNTER
Left voicemail for patient to complete Travel Screen for Cardiac Cath Lab appointment on 3/14 and inform patient of updated Visitor Policy.       Per chart review, covid test at 320p

## 2022-03-11 NOTE — TELEPHONE ENCOUNTER
Called pt and clarified where she wanted to receive these IV Iron infusions?  She was hoping to have them completed in [place], so I gave her the phone number to call & schedule. Patient verbalized understanding, agreed with plan and denied any further questions. Bharti Perdue, RN on 3/11/2022 at 2:41 PM        ----- Message from Ute Figueroa RN sent at 3/9/2022  3:53 PM CST -----  Regarding: FW: Therapy Plan  Infusion site: pt preference  Pt is aware  Scheduled next available in the next week or so  ----- Message -----  From: Connor Donald  Sent: 3/9/2022   9:58 AM CST  To: Atul Bentley MD, Ute Figueroa RN  Subject: Therapy Plan                                     Orders have come to the [facility] [place] [facility]que - I am reaching out for clarity on these orders:    Date ordered : 3/8/2022       Type of Infusion: Injectafer    #If this is being done at outside of [facility] or through [facility] please remove the Infusion Appointment Request from therapy plan. Please discontinue if therapy plan if infusion is not needed.     Preferred infusion location (if applicable):    Is patient aware they need infusion Y/N:    When does it need to be done/does to be scheduled?      [facility] Connor Donald

## 2022-03-12 ENCOUNTER — TELEPHONE (OUTPATIENT)
Dept: NURSING | Facility: CLINIC | Age: 62
End: 2022-03-12
Payer: COMMERCIAL

## 2022-03-12 LAB — SARS-COV-2 RNA RESP QL NAA+PROBE: POSITIVE

## 2022-03-12 NOTE — TELEPHONE ENCOUNTER
Coronavirus (COVID-19) Notification    Caller Name (Patient, parent, daughter/son, grandparent, etc)  Patient    Reason for call  Notify of Positive Coronavirus (COVID-19) lab results, assess symptoms,  review [facility] recommendations    Lab Result    Lab test:  2019-nCoV rRt-PCR or SARS-CoV-2 PCR    Oropharyngeal AND/OR nasopharyngeal swabs is POSITIVE for 2019-nCoV RNA/SARS-COV-2 PCR (COVID-19 virus)      Gather patient reported symptoms   Assessment   Current Symptoms at time of phone call, reported by patient: (if no symptoms, document: No symptoms] No symptoms   Date of symptom(s) onset (if applicable) N/A     If at time of call, Patients symptoms have worsened, the Patient should contact 911 or have someone drive them to Emergency Dept promptly:      If Patient calling 911, inform 911 personal that you have tested positive for the Coronavirus (COVID-19).  Place mask on and await 911 to arrive.    If Emergency Dept, If possible, please have another adult drive you to the Emergency Dept but you need to wear mask when in contact with other people.      Treatment Options:   Patient classified as COVID treatment eligible by Epic high risk algorithm: Yes  Is the patient symptomatic at the time of result notification? No    Review information with Patient    Your result was positive. This means you have COVID-19 (coronavirus).    How can I protect others?    These guidelines are for isolating before returning to work, school or .    If you DO have symptoms    Stay home and away from others     For at least 5 days after your symptoms started, AND    You are fever free for 24 hours (with no medicine that reduces fever), AND    Your other symptoms are better    Wear a mask for 10 full days anytime you are around others    If you DON'T have symptoms    Stay home and away from others for at least 5 days after your positive test    Wear a mask for 10 full days anytime you are around others    There may be  different guidelines for healthcare facilities.  Please check with the specific sites before arriving.    If you have been told by a doctor that you were severely ill with COVID-19 or are immunocompromised, you should isolate for at least 10 days.    You should not go back to work until you meet the guidelines above for ending your home isolation. You don't need to be retested for COVID-19 before going back to work--studies show that you won't spread the virus if it's been at least 10 days since your symptoms started (or 20 days, if you have a weak immune system).    Employers, schools, and daycares: This is an official notice for this person's medical guidelines for returning in-person.  They must meet the above guidelines before going back to work, school or  in person.    You will receive a positive COVID-19 letter via [employer] or the mail soon with additional self-care information (exception, no letters will be sent to presurgical/preprocedure patients).    Would you like me to review some of that information with you now?  No    If you were tested for an upcoming procedure, please contact your provider for next steps.    Patient will call her procedure doctor.    Mandi Martinez LPN

## 2022-03-14 ENCOUNTER — TELEPHONE (OUTPATIENT)
Dept: CARDIOLOGY | Facility: CLINIC | Age: 62
End: 2022-03-14
Payer: COMMERCIAL

## 2022-03-14 NOTE — TELEPHONE ENCOUNTER
[facility] Call Center    Phone Message    May a detailed message be left on voicemail: no     Reason for Call: Other: Jeanine called to advise her COVID test came back positive on Friday, she will need to cancel the procedure scheduled for today. Please reach out to her with any questions.      Action Taken: Message routed to:  [facility] ([facility] Cardiology    Travel Screening: Not Applicable           --------------------------  Replied to patient via SET message and cancelled IV infusion for tomorrow. Bharti Perdue RN on 3/14/2022 at 10:05 AM        -------------------------  Re-scheduled everything and sent patient [employer] with new schedule. Bharti Perdue RN on 3/15/2022 at 2:30 PM

## 2022-03-15 ENCOUNTER — VIRTUAL VISIT (OUTPATIENT)
Dept: RHEUMATOLOGY | Facility: CLINIC | Age: 62
End: 2022-03-15
Attending: INTERNAL MEDICINE
Payer: COMMERCIAL

## 2022-03-15 ENCOUNTER — MYC MEDICAL ADVICE (OUTPATIENT)
Dept: CARDIOLOGY | Facility: CLINIC | Age: 62
End: 2022-03-15

## 2022-03-15 DIAGNOSIS — Z79.52 ON PREDNISONE THERAPY: ICD-10-CM

## 2022-03-15 DIAGNOSIS — Z79.899 HIGH RISK MEDICATION USE: ICD-10-CM

## 2022-03-15 DIAGNOSIS — Z11.59 ENCOUNTER FOR SCREENING FOR OTHER VIRAL DISEASES: Primary | ICD-10-CM

## 2022-03-15 DIAGNOSIS — D86.9 SARCOIDOSIS: ICD-10-CM

## 2022-03-15 DIAGNOSIS — D84.9 IMMUNOSUPPRESSED STATUS (H): Primary | ICD-10-CM

## 2022-03-15 DIAGNOSIS — M35.1 MCTD (MIXED CONNECTIVE TISSUE DISEASE) (H): ICD-10-CM

## 2022-03-15 DIAGNOSIS — Z79.624 ENCOUNTER FOR LONG TERM CURRENT USE OF AZATHIOPRINE: ICD-10-CM

## 2022-03-15 DIAGNOSIS — I73.00 RAYNAUD'S DISEASE WITHOUT GANGRENE: ICD-10-CM

## 2022-03-15 PROCEDURE — 99215 OFFICE O/P EST HI 40 MIN: CPT | Mod: 95 | Performed by: INTERNAL MEDICINE

## 2022-03-15 NOTE — LETTER
3/15/2022       RE: Jeanine Schuler  [address]  [facility] [address]     Dear Colleague,    Thank you for referring your patient, Jeanine Schuler, to the [facility] [place] at [facility]. Please see a copy of my visit note below.      In Person-Visit       SUBJECTIVE:  The patient returns in f/u of her MCTD/SSC/RP/with h/o multiple DU, Sarcoidosis .      PROBLEM LIST:    1.  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    2.  History of severe multiple digital ulcers, on fingers and toes.   3.  Marked keratoconjunctivitis sicca over the last several years.    4.  Marked iron deficiency anemia responsive to IV iron.    5.  Diffuse musculoskeletal pain   6.  Lower extremity edema managed with lasix and spironolactone   7.  Marked fatigue and diffuse clinical weakness.    8.  Dyspnea on exertion with lower extremity edema and other features including the anticentromere positivity worrisome for the potential development of pulmonary hypertension.    9.  Marked GERD with associated dysphagia.    10.  Status post gastric bypass with a history of intermittent constipation and diarrhea potentially consistent with bacterial small bowel overgrowth versus other gut effects of the prior surgery.    11.  History of positive rheumatoid factor consistent with MCTD, given the remainder of her clinical presentation.     12. Progressive Jaw tightening with inability to open mouth wider than 1 inch.   13. Diastolic dysfunction (3/2013)  14. Hypoalbuminemia  15. ILD cryobiopsy Pathology was reviewed at ILD conference on 10/12/2020 which showed ALI, what appears to be an acute exacerbation of underlying ILD with OP.  Few nonnecrotizing granulomas  16. Development of intermittent moderate/severe RLE Neuropathy with steroid taper below 30 mg prednisone/d Jan 2021  17.  "Sarcoidosis      MARTÍN De La Torre MD, PhD    Rheumatology          INTERVAL HISTORY:       2017, INTERVAL HISTORY:  Since we have last seen the patient, she developed a right ankle wound about 2 months ago.  That opened for a while and it became hard to close, but at this point it has finally closed over.       She has had some stressors and she feels like that has contributed to her feeling like she needed more prednisone.  She had made it down to 12 mg a day for almost 6 months but increased to 20 mg a day in  when she was moving to a new house.  This was primarily due to an increase in fatigue and in joint pains.      At this point, her morning stiffness is 30-60 minutes despite remaining on the 20 mg a day of prednisone.  She does think her strength, however, is stable.      She continues to have a lot of Raynaud's phenomena but has not developed any distal digital ulcerations.       Her GERD and keratoconjunctivitis sicca, however, have remained stable and she thinks she has stable exercise tolerance.       She has not done her labs for many, many months and has not seen me for over a year, and we discussed that in some detail.     2018 INTERVAL HISTORY:  Since we have last seen her, she made it down to 12.5 mg a day of prednisone but then put herself back up to 20 mg a day because of increasing general body aches and fatigue as well as some inflammatory joint stiffness in her wrists.  Other joints have actually been okay.      She had stopped her methotrexate some time ago.  She works in a hospital and knew of several people who were admitted with \"methotrexate toxicity\" who  while in hospital and although she does not know the details, that frightened her enough that she decided to stop the drug.       She is getting worsening Raynaud's.  She really notes that this happens not only with cold, but with a variety of kinds of stress.  She has previously, at least for a " short length of time, been on losartan and does not remember whether that helped at all.  I am pretty sure she has also been on calcium channel blockers, but those are relatively contraindicated in her at this point because of persistent bilateral lower extremity edema.       She is getting enough lower extremity edema and has been found to have vascular insufficiency that she will be having some vascular surgery to laser some of these areas, although an exact time for that has not been laid out.       In addition to getting Raynaud's in her hands, she also gets it in her feet, and when she does, potentially contributed to by the vascular insufficiency, she gets numbness in her feet.  She is not getting numbness in her hands by contrast.       The patient did have pulmonary function tests today.  Although she is not exercising regularly, in general she feels her exercise tolerance has been stable, and she is not having any dry cough.       She did have a decrease in her FVC to 3.68 from 4.30 and her DLCO to 21.77 from 24.74, but she has had some rib cage pain for about the last 3 weeks since she had a minor accident while dancing.  Notably then, her TLC is completely stable, going from 6.41 to 6.29.       She denies any other new problems.  Keratoconjunctivitis sicca, GERD and other features have been stable and she denies any dysphagia.     July 11, 2019 interval history:    Since we have last seen her she did have to miss appointment because of some family issues but she is actually been doing quite well.  Although she is continued to have some intermittent joint complaints she is been able to taper her prednisone down to 7.5 mg a day the lowest she has been on and the entire time I have been following her.    Although her joints have not worsened with that lower dose she does think she is getting some worsening of her Raynauds phenomena.  It can be pretty bad and the attacks can be hard to break even with  rewarming.  She gets them in hands and feet.  Fortunately she has not had any digital ulcers.  She does recall that she was placed temporarily on tadalafil sometime ago by Dr. Bentley but she could not afford it long-term.  She thinks that may have been helpful however.    She denies any significant dyspnea on exertion or cough.  She has some muscle weakness but not marketed and she does not think that is any worse over time.  Fatigue has generally been her worst symptom and that may be slightly worse.    She does have some ongoing keratoconjunctivitis sicca but does not feel like she wants to go on a medicine for that.  She already has a full plate of dentures in both upper and lower.      August 11, 2020 interval history:    Since we last seen the patient she feels she was doing relatively stably on till July 7.  At that point she had the abrupt onset of a bad dry cough.  She did not have significant fevers but did not feel well.  She was seen had a negative coronavirus test but a chest x-ray apparently showed evidence of some infiltrates and she was placed on doxycycline.  She says she did not improve with that therapy and a CT angios was performed.  That showed bilateral pulmonary consolidation thought consistent with infection.  There was note that the pattern could actually be somewhat typical for sarcoid but it was noted to have worsened since the prior study performed there at [facility] in October 2019.  She also was noted to have mediastinal lymphadenopathy and debris in the esophagus potentially consistent with her systemic sclerosis.  No pulmonary embolism was found.  Based on the results of that she was placed on Levaquin.  She continued to fail to improve and a third antibiotic was also tried.    Finally she was seen by pulmonologist and a bronchoscopy was performed.  I am able to review the results of that and there was a high percentage of neutrophils and lymphocytes and that BAL fluid.  Viral  cultures however Gram stain and culture were all unremarkable and there were no malignant cells found.    She was placed on 40 mg a day of prednisone at the time of the bronchoscopy once it was clear that there was no acute infection.  She does not have a follow-up appointment yet.  Although her cough is mostly gone she does not feel good and feels very dyspneic just going from the bed to the bathroom or crossed the room.  She is not convinced she has had any improvement with 40 mg daily prednisone and if anything think she is worse.    In reviewing her extensive record I see that a repeat TARA was performed and a reflexive was done when it was positive.  She continues to have an anticentromere antibody which she had previously, but now notably has a high titer Gonzalez and high titer RNP antibody.  She previously was borderline positive for RNP and Gonzalez negative.  SSA is also higher titer than it was previously.    With all these autoantibody positivity she however denies any specific features that would suggest systemic lupus.  She specifically denies sun sensitivity sun sensitive rashes including a malar rash, morning stiffness in any of her joints or any joint swelling and any pleurisy at the time of her  at CT angiogram or otherwise.  \  Her Raynaud's phenomena has been stable as have other features of her limited cutaneous systemic sclerosis.        February 25, 2021 interval history:    At the time that I saw the patient about 3 weeks ago we decided that because her neuropathy sounded rather rapidly progressive, and her HRCT scan was being read as potentially consistent with sarcoidosis, that neurosarcoid was on the differential.  I also wondered about an immune complex mediated small vessel vasculitis causing neuropathy.  We therefore did laboratory testing as well as started her on some steroid.    Laboratory testing has been largely unrevealing.  She did not have elevated calcium or elevated calcium in the  urine.  I did also do a parathyroid hormone in case we did find that however her parathyroid hormone was significantly elevated.  Her vitamin D was very low and she has started supplementation for that.    Most notably however her prednisone at 40 mg a day virtually resolved her symptoms over 3 to 4 days.  She had at least an 80% decrease in her pain and numbness although she still has some residual tingling in the legs bilaterally.    She is having some prednisone side effects.  Specifically she is having a hard time sleeping and is also feeling irritable which she thinks could be due partly due to the loss of sleep but partly directly due to the prednisone.  She has got a lot of prior experience with prednisone and has had some difficulties with it before.    She did have some right-sided chest pain.  That seem to occur with a deep breath.  She has a cardiologist and saw them will be getting a follow-up echo.  She is no longer having that.    In going through her history she does not believe she is ever been on Imuran and is quite sure and I am sure as well that we have never had her on TNF inhibition.  She was on methotrexate and tolerated oral methotrexate poorly due to nausea but was better able to tolerate subcutaneous methotrexate.      Impression:    Per the problem list, with known RNP positivity and more recently with this HRCT finding that is concerning for the possibility of overlapping sarcoidosis.    She had a very nice response to steroid quite quickly, and so we now have the conundrum of not knowing exactly why.  I certainly suspect that she has an immune mediated neuropathy given this rapid response and neurosarcoid is a concern.    I have sent a message to Dr. Cuellar in pulmonary for her thoughts.  I have also ordered a repeat HRCT that to be done in another week or so before she is seen back in pulmonary.    I am hoping these can be reviewed so that there could be a decision whether we can feel  strongly enough that presumptively this could be sarcoidosis to put her on appropriate therapy for that or whether a biopsy might be justified.    We could simply presumptively try steroid sparing medications.  I am somewhat concerned about using TNF inhibitors however unless we are pretty confident this is sarcoid, as they have been associated with worsening of systemic lupus patients and autoimmune associated interstitial lung disease in some settings.    We also have the  finding of the elevated PTH, and very low vitamin D levels, which can be seen in sarcoidosis, but is unclear here given normal Calcium levels. and will refer her to endocrinology for that.    I have given her prescription for temazepam to help her with the sleep and irritability.  I did warn her to be careful about driving in the morning until she knows how it affects her as it does have a long half-life.    I will plan on seeing her back in about 6 to 8 weeks sooner as needed.      April 8, 2021 interval history:    Since we have last seen the patient she has had initial evaluation done, and Dr. Cuellar has a recent note on the chart and an addendum placed a day after the patient's studies were reviewed in sarcoid conference.    The upshot of that is that there is evidence from her studies, in particular the biopsy studies that suggest that this could be sarcoidosis.  Because she has neurologic symptoms and inadequately explain dyspnea on exertion she will also be getting neurology evaluation and cardiac evaluation.    She remains at this time on 40 mg a day of prednisone.  She is not liking this dose.  Although she still has dyspnea on exertion she also is irritable and on edge on that dose and is also experiencing some nausea..    After consideration of the issues, Dr. Cuellar had recommended the patient go on track today.  She has in fact been on both this and MMF before and although she had some difficulties at time with tolerating these  medication she think she would tolerate them again.  We have briefly discussed possibility of using a TNF inhibitor but that does give me pause, given that she has had features of mixed connective tissue disease and in TARA positive individuals TNF inhibitors have at times worsen their disease process, particularly in those with lupus associated autoantibodies.  She has had in the past of borderline positive RNP.    She believes most of her other historical clinical features are stable.  She still gets pretty bad Raynaud's phenomena and some features of arthritis although the inflammatory features are currently pretty well controlled on this dose of prednisone.    July 15, 2021 interval history:    Since we have last seen the patient she has continued to have a lot of dyspnea on exertion despite being on the MMF 40 mg a day of prednisone and until just earlier this week methotrexate.    Because of her lung biopsy showing features consistent with sarcoidosis, she was presented at sarcoidosis conference last week.  I attended this discussion.  After much discussion it was decided that azathioprine may be the next drug to try in her.  This was influenced in part by my concerns of giving a TNF inhibitor to her given her underlying autoimmune connective tissue disease, and the possibility of that disease process worsening with TNF inhibition as has been described and as I have seen previously albeit rarely.    She did just get TPMT levels earlier today to help decide what kind of azathioprine dose to start with.  She continues on MMF.    She will be getting a nerve biopsy performed on Monday.    She also was recently in the emergency room because of very severe midsternal chest pain.  She says this developed during the night.  When it would not go away she went to the ER.  There she was given narcotics after first getting a single sublingual nitroglycerin that did not help.  Extensive cardiopulmonary work-up was negative  for any evidence of ischemia or clot.  Eventually she got Toradol after having gotten narcotics and did improve and went home.    She has had some costochondral type pains before but nothing like this and this was not exacerbated or relieved by pressure over the area.    Notably, she is been having worsening problems with GERD and dysphagia as well.  She just spoke with Dr. Cárdenas earlier this morning.  She is getting a lot of pill dysphagia at this point which is very frustrating for her and very uncomfortable.  She had dilatation in the fall and thought it helped for a while but she says this almost feels like it is just a dysmotility problem rather than stricturing to her.        November 11, 2021 interval history:    Patient had a right sural nerve biopsy on 7/19/2021.  Biopsy was notable for axonal neuropathy without a specific cause.  There is no overt evidence of inflammation, primary demyelination, or granulomatous findings.  Patient had return follow-up would pulmonology on 9/16/2021.  Her CT was reviewed, showed bronchocentric and predominantly central infiltrates.Overall findings have not significantly progressed from prior CT on 7/1/2021.  Patient's case was discussed in ILD conference.  Plan was to start Imuran 150 mg daily, as well as starting a prednisone taper (starting at 40 mg daily, then tapering 5 mg every month to be kept at 30 mg daily until follow-up with rheumatology).     Patient overall has no significant change in her health from prior.  She is currently taking hydroxychloroquine, prednisone 30 mg daily, and Imuran 150 daily.  Continues to have fatigue, which she feels has been gradually worsening.  She notes that she can only lift 2-3 grocery bags before feeling tired, and needs assistance when cooking.  Continues to have neuropathy symptoms in her bilateral lower extremities (more in the right side).  Continues to have shortness of breath with limited activity (e.g. moving from bed to  kitchen).  Is currently treating her GERD with Protonix and omeprazole, which she feels controls her symptoms.  Has some mouth dryness, but notices that her eyes are watery which is new for her. She has received her COVID-19 booster immunization.     January 13, 2022 interval history:    Since we have last seen the patient she was admitted to the hospital because of worsening shortness of breath and palpitations.  Coronary angiogram showed minimal areas of stenosis of less than 20%.  Echocardiogram however suggested very significant pulmonary hypertension and a markedly reduced ejection fraction.    She saw Dr. Cuellar back in pulmonary clinic last week.  Cardiac MRI was performed yesterday.  I have reviewed those results today.  There is significant LGE, consistent with an infiltrative process such as sarcoidosis.  She is quite symptomatic.  She had a 6-minute walk test today and had to stop because of how dyspneic she was and how many palpitation she was having but she had absolutely no desaturation.    She has been tapering her prednisone and is down to 20 mg daily but she believes all of the symptoms have worsened with that taper.      ROS as per HPI.  10-point ROS is otherwise negative.    HISTORY REVIEW:  Past Medical History:   Diagnosis Date     Anemia      Cellulitis of leg 6/6/2013     Esophageal reflux     Better post-hiatal hernia repair and weight loss     Limb ischemia; ulcers of fingertips 4/22/2013     Raynaud's syndrome      Scleroderma (H)      Systemic sclerosis (H) 2009     Unspecified essential hypertension        Past Surgical History:   Procedure Laterality Date     BYPASS GASTRIC, CHOLECYSTECTOMY, COMBINED       CHOLECYSTECTOMY       COLONOSCOPY       ESOPHAGOSCOPY, GASTROSCOPY, DUODENOSCOPY (EGD), COMBINED N/A 3/10/2016    Procedure: COMBINED ESOPHAGOSCOPY, GASTROSCOPY, DUODENOSCOPY (EGD), BIOPSY SINGLE OR MULTIPLE;  Surgeon: Tricia Zambrano MD;  Location:  GI     INNER EAR  SURGERY       PICC INSERTION  6/5/2013    5fr DL Power PICC, 44cm, left basilic vein, tip in low SVC     SURGICAL HISTORY OF -       Left ear surgery - incus transition, tympanoplasty     SURGICAL HISTORY OF -   6/05    Gastric bypass     SURGICAL HISTORY OF -   6/05    Cholecystectomy     SURGICAL HISTORY OF -   6/05    Hiatal hernia repair     TONSILLECTOMY         Family History   Problem Relation Age of Onset     Cerebrovascular Disease Father      C.A.D. Father      C.A.D. Mother      C.A.D. 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      Social History Main Topics     Smoking status: Never Smoker      Smokeless tobacco: Never Used     Alcohol Use: Yes      occassionally     Drug Use: No     Sexually Active: Yes -- Male partner(s)     Birth Control/ Protection: None     Social History Narrative     Was  in December 2015. Longtime partner from [place].       Patient Active Problem List   Diagnosis     Essential hypertension     Thyrotoxicosis     Obesity     Prolonged QTc 460 ms,  at hr 67     Low back pain - Suspect SI Joint dysfunction     Cough     Systemic sclerosis (H)     Tumoral calcinosis     Shortness of breath     Edema of both legs     Abnormal albumin     Myopathy     Hypoalbuminemia     Diastolic dysfunction     Limb ischemia; ulcers of fingertips     Cellulitis of leg     Other specified diffuse disease of connective tissue     Disturbed sleep rhythm     Pharyngeal dysphagia     Gastroesophageal reflux disease, esophagitis presence not specified     Rheumatoid arthritis with negative rheumatoid factor, involving unspecified site (H)     Raynaud's disease without gangrene       Allergies    Allergen Reactions     Lovenox Other (See Comments)     Blood clot      Norvasc [Amlodipine Besylate] Swelling     Lip swelling that happened on 2 different occasions     Erythromycin Rash     Rash on arms     OBJECTIVE:  Vitals: Reviewed  Gen: A+Ox3, NAD  HEENT: NCAT, EOMI  Pulm: CTAB, minimal coarse sounds on basilar lobes  CVS: She has a regular rate and rhythm but this is punctuated by very frequent ectopy then restoration of a regular rhythm.  No murmurs appreciated.  Skin: Her fingers are intermittently diffusely cyanotic.  Purpuric patches on fingertips no ulcerations/erosions. There is platelike xerosis of lower extremities  -mild sclerosis of distal fingers  Msk: No active synovitis.  4/5 upper extremity strength, 5/5 lower extremity strength bilaterally.    EKG (11/12/2021)  Sinus rhythm with marked sinus arrhythmia with premature supraventricular complexes  Possible left atrial enlargement  Left axis deviation  Left ventricular hypertrophy with QRS widening  T wave abnormality consider lateral ischemia  Prolonged QT  Abnormal ECG  Ventricular rate 81, , , QT/QTc 418/485, P/R/T 29/-32/132     Component      Latest Ref Rng 1/26/2016   WBC      4.0 - 11.0 10e9/L 9.2   RBC Count      3.8 - 5.2 10e12/L 4.59   Hemoglobin      11.7 - 15.7 g/dL 13.2   Hematocrit      35.0 - 47.0 % 41.8   MCV      78 - 100 fl 91   MCH      26.5 - 33.0 pg 28.8   MCHC      31.5 - 36.5 g/dL 31.6   RDW      10.0 - 15.0 % 16.7 (H)   Platelet Count      150 - 450 10e9/L 195   Diff Method       Automated Method   % Neutrophils       62.9   % Lymphocytes       27.0   % Monocytes       9.1   % Eosinophils       0.4   % Basophils       0.2   % Immature Granulocytes       0.4   Nucleated RBCs      0 /100 0   Absolute Neutrophil      1.6 - 8.3 10e9/L 5.8   Absolute Lymphocytes      0.8 - 5.3 10e9/L 2.5   Absolute Monocytes      0.0 - 1.3 10e9/L 0.8   Absolute Eosinophils      0.0 - 0.7 10e9/L 0.0   Absolute Basophils      0.0 -  0.2 10e9/L 0.0   Abs Immature Granulocytes      0 - 0.4 10e9/L 0.0   Absolute Nucleated RBC       0.0   Color Urine       Yellow   Appearance Urine       Clear   Glucose Urine      NEG mg/dL Negative   Bilirubin Urine      NEG Negative   Ketones Urine      NEG mg/dL Negative   Specific Gravity Urine      1.003 - 1.035 1.010   Blood Urine      NEG Negative   pH Urine      5.0 - 7.0 pH 6.0   Protein Albumin Urine      NEG mg/dL Negative   Urobilinogen mg/dL      0.0 - 2.0 mg/dL Normal   Nitrite Urine      NEG Negative   Leukocyte Esterase Urine      NEG Negative   Source       Midstream Urine   WBC Urine      0 - 2 /HPF <1   RBC Urine      0 - 2 /HPF <1   Squamous Epithelial /HPF Urine      0 - 1 /HPF <1   Creatinine      0.52 - 1.04 mg/dL 1.11 (H)   GFR Estimate      >60 mL/min/1.7m2 51 (L)   GFR Estimate If Black      >60 mL/min/1.7m2 62   ALT      0 - 50 U/L 23   AST      0 - 45 U/L 16   Albumin      3.4 - 5.0 g/dL 3.4   CRP Inflammation      0.0 - 8.0 mg/L <2.9   Sed Rate      0 - 30 mm/h 9   CK Total      30 - 225 U/L 38   Aldolase       4.1     Biopsy from 3/10 EGD:  Esophagus, gastroesophageal junction, biopsies:   - Squamous mucosa and submucosa with marked chronic inflammation and   fibrosis   - No evidence of intestinal metaplasia or dysplasia               February 15, 2022 interval history:    Since we have last seen the patient she has felt about the same in terms of heart palpitations on the 30 mg daily prednisone.  She believes her neuropathy symptoms actually worsened a little.  Her Raynauds phenomena has also been worse but it has of course been rather cold.    Interestingly, she was actually in [place] last week and despite the warm weather there she still had a lot of Raynauds that was not much different.    She saw Dr. Francis earlier today.  I have read his note which is up.  He is starting her on a Zio patch for 1 week and hoping that we can significantly decrease her prednisone so that a PET  scan can be performed and identify whether there is any cardiac inflammation at a time that she is on more minimal prednisone.    She does remain on azathioprine.  She did have labs earlier today which look quite good with perhaps some slight worsening of ESR but in the context of a lower CRP which I am more inclined to believe.  Her AST was up above the upper limit of normal very slightly but she denies any recent alcohol and ALT was okay.    Physical examination by video shows her to be in no acute distress HEENT examination is normal.  She is speaking in full sentences with no shortness of breath.  Her hand examination does not show marked cyanosis and there is no apparent swelling or joint deformity.    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    Plan/recommendation:    I have asked her to go down to 25 mg a day for the next 3 days of prednisone.  If she does not feel markedly worse she can go further to 20 mg a day for the remainder of the time she is on the Zio patch and another week thereafter.  If at that point she still feels okay she can go down to 15 mg a day.    After she has been on that dose for a couple of weeks I want her to do labs again so we can reassess for inflammation.  I would also like to see her at about that time on 15 March when I do have another video visit that I can see her at and we can decide whether to go down further and if so how fast.    She will let us know if she is having difficulty in the meantime.    This video visit commenced at 3:36 PM was completed at 3:53 PM    March 15, 2022 interval history:    Since we have last seen her she has been able to taper prednisone down to 10 mg daily as of last week.  Notably, she is not had any worsening of joint symptoms or fatigue  with that taper.  Her neuropathy however in her feet has definitely worsened.  She has not yet reached out to Dr. Abel but he had let her know before that if her neuropathy worsen she could go on gabapentin or pregabalin.  At this point she has been toughing it out but is considering trying this.    She unfortunately had right heart cath scheduled but it had to be delayed as her screen Covid test was positive.  Symptomatically she felt perhaps a little bit more short of breath at the time but could barely notice a difference.  She did not have any fevers or chills.  She has been vaccinated, we have not checked her antispike antibody levels.  It is possible that they are not good, as she has been on a lot of immunosuppression including substantial doses of steroid.    She did have her booster shot back in September or October.    The right heart catheterization with planned cardiac biopsy has now been put off until April.  She was also to get IV iron as her iron levels were low but that had to be delayed as well.  Notably, although her iron levels were somewhat low, her hemoglobin and hematocrit have remained normal.    She continues on azathioprine 150 mg daily and is tolerating that well.  She is also on Bactrim prophylaxis.    Physical examination by video shows her to be in no acute distress.  HEENT examination is normal and she does not appear to be acutely short of breath.  She is speaking in full sentences with no difficulty.  Musculoskeletal examination of the hands shows normal range of motion and no convincing areas of joint swelling and no active cyanosis.    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    Plan/recommendation:    I think her right heart catheterization and cardiac biopsy are clearly going to be important.  She was taken off of methotrexate because we are not convinced it was helping all of her clinical features, but if she has evidence of cardiac sarcoidosis, methotrexate would be the drug with the greatest data to suggest benefit in that situation.  We might then wish to have her on a combination of several medications including methotrexate and Imuran.    I am also concerned about her multiple positive autoantibodies including the SSA autoantibody which can be a marker of a type I interferon mediated autoimmune process that could worsen with TNF inhibition.  In this circumstance it may be better to consider the use of a medication such as abatacept, which we would conceivably use instead of azathioprine.  Unfortunately there is not a lot of clinical experience with using it in this situation.    Given the overall complexity of her situation Dr. Bentley is raised the possibility of getting a second opinion and I agree that that could be worthwhile.  I have sent an email to Dr. Dre Mayorga at the [facility] for his thoughts.    I do think we should get a Covid antibody test with her next blood draw.  If she does not have a good level of antispike protein antibodies, she should probably get evusheld as soon as it is available for her risk group.    This video visit commenced at 3:07 PM is completed at 3:30 PM, with an additional 30 minutes spent in literature review relevant to her situation, chart review, , email messaging as noted, and documentation completed on the date of service.      MARTÍN De La Torre MD, PhD    Rheumatology

## 2022-03-15 NOTE — PROGRESS NOTES
Jeanine is a 61 year old who is being evaluated via a billable video visit.      How would you like to obtain your AVS? MyChart  If the video visit is dropped, the invitation should be resent by: Text to cell phone: [phone]  Will anyone else be joining your video visit? No       Jeanine is a 61 year old who is being evaluated via a billable video visit.      How would you like to obtain your AVS? MyChart  If the video visit is dropped, the invitation should be resent by: Text to cell phone: [phone]  Will anyone else be joining your video visit? No    Video-Visit Details    Type of service:  Video Visit    Originating Location (pt. Location): Home    Distant Location (provider location):  [facility] [place]     Platform used for Video Visit: Accent       In Person-Visit       SUBJECTIVE:  The patient returns in f/u of her MCTD/SSC/RP/with h/o multiple DU, Sarcoidosis .      PROBLEM LIST:    1.  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    2.  History of severe multiple digital ulcers, on fingers and toes.   3.  Marked keratoconjunctivitis sicca over the last several years.    4.  Marked iron deficiency anemia responsive to IV iron.    5.  Diffuse musculoskeletal pain   6.  Lower extremity edema managed with lasix and spironolactone   7.  Marked fatigue and diffuse clinical weakness.    8.  Dyspnea on exertion with lower extremity edema and other features including the anticentromere positivity worrisome for the potential development of pulmonary hypertension.    9.  Marked GERD with associated dysphagia.    10.  Status post gastric bypass with a history of intermittent constipation and diarrhea potentially consistent with bacterial small bowel overgrowth versus other gut effects of the prior surgery.    11.  History of positive rheumatoid factor consistent with MCTD, given the remainder of her  clinical presentation.     12. Progressive Jaw tightening with inability to open mouth wider than 1 inch.   13. Diastolic dysfunction (3/2013)  14. Hypoalbuminemia  15. ILD cryobiopsy Pathology was reviewed at ILD conference on 10/12/2020 which showed ALI, what appears to be an acute exacerbation of underlying ILD with OP.  Few nonnecrotizing granulomas  16. Development of intermittent moderate/severe RLE Neuropathy with steroid taper below 30 mg prednisone/d Jan 2021  17. Sarcoidosis      MARTÍN De La Torre MD, PhD    Rheumatology          INTERVAL HISTORY:       AUGUST 22, 2017, INTERVAL HISTORY:  Since we have last seen the patient, she developed a right ankle wound about 2 months ago.  That opened for a while and it became hard to close, but at this point it has finally closed over.       She has had some stressors and she feels like that has contributed to her feeling like she needed more prednisone.  She had made it down to 12 mg a day for almost 6 months but increased to 20 mg a day in June when she was moving to a new house.  This was primarily due to an increase in fatigue and in joint pains.      At this point, her morning stiffness is 30-60 minutes despite remaining on the 20 mg a day of prednisone.  She does think her strength, however, is stable.      She continues to have a lot of Raynaud's phenomena but has not developed any distal digital ulcerations.       Her GERD and keratoconjunctivitis sicca, however, have remained stable and she thinks she has stable exercise tolerance.       She has not done her labs for many, many months and has not seen me for over a year, and we discussed that in some detail.     Feb. 13, 2018 INTERVAL HISTORY:  Since we have last seen her, she made it down to 12.5 mg a day of prednisone but then put herself back up to 20 mg a day because of increasing general body aches and fatigue as well as some inflammatory joint stiffness in her wrists.  Other joints have  "actually been okay.      She had stopped her methotrexate some time ago.  She works in a hospital and knew of several people who were admitted with \"methotrexate toxicity\" who  while in hospital and although she does not know the details, that frightened her enough that she decided to stop the drug.       She is getting worsening Raynaud's.  She really notes that this happens not only with cold, but with a variety of kinds of stress.  She has previously, at least for a short length of time, been on losartan and does not remember whether that helped at all.  I am pretty sure she has also been on calcium channel blockers, but those are relatively contraindicated in her at this point because of persistent bilateral lower extremity edema.       She is getting enough lower extremity edema and has been found to have vascular insufficiency that she will be having some vascular surgery to laser some of these areas, although an exact time for that has not been laid out.       In addition to getting Raynaud's in her hands, she also gets it in her feet, and when she does, potentially contributed to by the vascular insufficiency, she gets numbness in her feet.  She is not getting numbness in her hands by contrast.       The patient did have pulmonary function tests today.  Although she is not exercising regularly, in general she feels her exercise tolerance has been stable, and she is not having any dry cough.       She did have a decrease in her FVC to 3.68 from 4.30 and her DLCO to 21.77 from 24.74, but she has had some rib cage pain for about the last 3 weeks since she had a minor accident while dancing.  Notably then, her TLC is completely stable, going from 6.41 to 6.29.       She denies any other new problems.  Keratoconjunctivitis sicca, GERD and other features have been stable and she denies any dysphagia.     2019 interval history:    Since we have last seen her she did have to miss appointment because of " some family issues but she is actually been doing quite well.  Although she is continued to have some intermittent joint complaints she is been able to taper her prednisone down to 7.5 mg a day the lowest she has been on and the entire time I have been following her.    Although her joints have not worsened with that lower dose she does think she is getting some worsening of her Raynauds phenomena.  It can be pretty bad and the attacks can be hard to break even with rewarming.  She gets them in hands and feet.  Fortunately she has not had any digital ulcers.  She does recall that she was placed temporarily on tadalafil sometime ago by Dr. Bentley but she could not afford it long-term.  She thinks that may have been helpful however.    She denies any significant dyspnea on exertion or cough.  She has some muscle weakness but not marketed and she does not think that is any worse over time.  Fatigue has generally been her worst symptom and that may be slightly worse.    She does have some ongoing keratoconjunctivitis sicca but does not feel like she wants to go on a medicine for that.  She already has a full plate of dentures in both upper and lower.      August 11, 2020 interval history:    Since we last seen the patient she feels she was doing relatively stably on till July 7.  At that point she had the abrupt onset of a bad dry cough.  She did not have significant fevers but did not feel well.  She was seen had a negative coronavirus test but a chest x-ray apparently showed evidence of some infiltrates and she was placed on doxycycline.  She says she did not improve with that therapy and a CT angios was performed.  That showed bilateral pulmonary consolidation thought consistent with infection.  There was note that the pattern could actually be somewhat typical for sarcoid but it was noted to have worsened since the prior study performed there at [facility] in October 2019.  She also was noted to have  mediastinal lymphadenopathy and debris in the esophagus potentially consistent with her systemic sclerosis.  No pulmonary embolism was found.  Based on the results of that she was placed on Levaquin.  She continued to fail to improve and a third antibiotic was also tried.    Finally she was seen by pulmonologist and a bronchoscopy was performed.  I am able to review the results of that and there was a high percentage of neutrophils and lymphocytes and that BAL fluid.  Viral cultures however Gram stain and culture were all unremarkable and there were no malignant cells found.    She was placed on 40 mg a day of prednisone at the time of the bronchoscopy once it was clear that there was no acute infection.  She does not have a follow-up appointment yet.  Although her cough is mostly gone she does not feel good and feels very dyspneic just going from the bed to the bathroom or crossed the room.  She is not convinced she has had any improvement with 40 mg daily prednisone and if anything think she is worse.    In reviewing her extensive record I see that a repeat TARA was performed and a reflexive was done when it was positive.  She continues to have an anticentromere antibody which she had previously, but now notably has a high titer Gonzalez and high titer RNP antibody.  She previously was borderline positive for RNP and Gonzalez negative.  SSA is also higher titer than it was previously.    With all these autoantibody positivity she however denies any specific features that would suggest systemic lupus.  She specifically denies sun sensitivity sun sensitive rashes including a malar rash, morning stiffness in any of her joints or any joint swelling and any pleurisy at the time of her  at CT angiogram or otherwise.  \  Her Raynaud's phenomena has been stable as have other features of her limited cutaneous systemic sclerosis.        February 25, 2021 interval history:    At the time that I saw the patient about 3 weeks ago  we decided that because her neuropathy sounded rather rapidly progressive, and her HRCT scan was being read as potentially consistent with sarcoidosis, that neurosarcoid was on the differential.  I also wondered about an immune complex mediated small vessel vasculitis causing neuropathy.  We therefore did laboratory testing as well as started her on some steroid.    Laboratory testing has been largely unrevealing.  She did not have elevated calcium or elevated calcium in the urine.  I did also do a parathyroid hormone in case we did find that however her parathyroid hormone was significantly elevated.  Her vitamin D was very low and she has started supplementation for that.    Most notably however her prednisone at 40 mg a day virtually resolved her symptoms over 3 to 4 days.  She had at least an 80% decrease in her pain and numbness although she still has some residual tingling in the legs bilaterally.    She is having some prednisone side effects.  Specifically she is having a hard time sleeping and is also feeling irritable which she thinks could be due partly due to the loss of sleep but partly directly due to the prednisone.  She has got a lot of prior experience with prednisone and has had some difficulties with it before.    She did have some right-sided chest pain.  That seem to occur with a deep breath.  She has a cardiologist and saw them will be getting a follow-up echo.  She is no longer having that.    In going through her history she does not believe she is ever been on Imuran and is quite sure and I am sure as well that we have never had her on TNF inhibition.  She was on methotrexate and tolerated oral methotrexate poorly due to nausea but was better able to tolerate subcutaneous methotrexate.      Impression:    Per the problem list, with known RNP positivity and more recently with this HRCT finding that is concerning for the possibility of overlapping sarcoidosis.    She had a very nice response to  steroid quite quickly, and so we now have the conundrum of not knowing exactly why.  I certainly suspect that she has an immune mediated neuropathy given this rapid response and neurosarcoid is a concern.    I have sent a message to Dr. Cuellar in pulmonary for her thoughts.  I have also ordered a repeat HRCT that to be done in another week or so before she is seen back in pulmonary.    I am hoping these can be reviewed so that there could be a decision whether we can feel strongly enough that presumptively this could be sarcoidosis to put her on appropriate therapy for that or whether a biopsy might be justified.    We could simply presumptively try steroid sparing medications.  I am somewhat concerned about using TNF inhibitors however unless we are pretty confident this is sarcoid, as they have been associated with worsening of systemic lupus patients and autoimmune associated interstitial lung disease in some settings.    We also have the  finding of the elevated PTH, and very low vitamin D levels, which can be seen in sarcoidosis, but is unclear here given normal Calcium levels. and will refer her to endocrinology for that.    I have given her prescription for temazepam to help her with the sleep and irritability.  I did warn her to be careful about driving in the morning until she knows how it affects her as it does have a long half-life.    I will plan on seeing her back in about 6 to 8 weeks sooner as needed.      April 8, 2021 interval history:    Since we have last seen the patient she has had initial evaluation done, and Dr. Cuellar has a recent note on the chart and an addendum placed a day after the patient's studies were reviewed in sarcoid conference.    The upshot of that is that there is evidence from her studies, in particular the biopsy studies that suggest that this could be sarcoidosis.  Because she has neurologic symptoms and inadequately explain dyspnea on exertion she will also be getting  neurology evaluation and cardiac evaluation.    She remains at this time on 40 mg a day of prednisone.  She is not liking this dose.  Although she still has dyspnea on exertion she also is irritable and on edge on that dose and is also experiencing some nausea..    After consideration of the issues, Dr. Cuellar had recommended the patient go on track today.  She has in fact been on both this and MMF before and although she had some difficulties at time with tolerating these medication she think she would tolerate them again.  We have briefly discussed possibility of using a TNF inhibitor but that does give me pause, given that she has had features of mixed connective tissue disease and in TARA positive individuals TNF inhibitors have at times worsen their disease process, particularly in those with lupus associated autoantibodies.  She has had in the past of borderline positive RNP.    She believes most of her other historical clinical features are stable.  She still gets pretty bad Raynaud's phenomena and some features of arthritis although the inflammatory features are currently pretty well controlled on this dose of prednisone.    July 15, 2021 interval history:    Since we have last seen the patient she has continued to have a lot of dyspnea on exertion despite being on the MMF 40 mg a day of prednisone and until just earlier this week methotrexate.    Because of her lung biopsy showing features consistent with sarcoidosis, she was presented at sarcoidosis conference last week.  I attended this discussion.  After much discussion it was decided that azathioprine may be the next drug to try in her.  This was influenced in part by my concerns of giving a TNF inhibitor to her given her underlying autoimmune connective tissue disease, and the possibility of that disease process worsening with TNF inhibition as has been described and as I have seen previously albeit rarely.    She did just get TPMT levels earlier today  to help decide what kind of azathioprine dose to start with.  She continues on MMF.    She will be getting a nerve biopsy performed on Monday.    She also was recently in the emergency room because of very severe midsternal chest pain.  She says this developed during the night.  When it would not go away she went to the ER.  There she was given narcotics after first getting a single sublingual nitroglycerin that did not help.  Extensive cardiopulmonary work-up was negative for any evidence of ischemia or clot.  Eventually she got Toradol after having gotten narcotics and did improve and went home.    She has had some costochondral type pains before but nothing like this and this was not exacerbated or relieved by pressure over the area.    Notably, she is been having worsening problems with GERD and dysphagia as well.  She just spoke with Dr. Cárdenas earlier this morning.  She is getting a lot of pill dysphagia at this point which is very frustrating for her and very uncomfortable.  She had dilatation in the fall and thought it helped for a while but she says this almost feels like it is just a dysmotility problem rather than stricturing to her.        November 11, 2021 interval history:    Patient had a right sural nerve biopsy on 7/19/2021.  Biopsy was notable for axonal neuropathy without a specific cause.  There is no overt evidence of inflammation, primary demyelination, or granulomatous findings.  Patient had return follow-up would pulmonology on 9/16/2021.  Her CT was reviewed, showed bronchocentric and predominantly central infiltrates.Overall findings have not significantly progressed from prior CT on 7/1/2021.  Patient's case was discussed in ILD conference.  Plan was to start Imuran 150 mg daily, as well as starting a prednisone taper (starting at 40 mg daily, then tapering 5 mg every month to be kept at 30 mg daily until follow-up with rheumatology).     Patient overall has no significant change in her  health from prior.  She is currently taking hydroxychloroquine, prednisone 30 mg daily, and Imuran 150 daily.  Continues to have fatigue, which she feels has been gradually worsening.  She notes that she can only lift 2-3 grocery bags before feeling tired, and needs assistance when cooking.  Continues to have neuropathy symptoms in her bilateral lower extremities (more in the right side).  Continues to have shortness of breath with limited activity (e.g. moving from bed to kitchen).  Is currently treating her GERD with Protonix and omeprazole, which she feels controls her symptoms.  Has some mouth dryness, but notices that her eyes are watery which is new for her. She has received her COVID-19 booster immunization.     January 13, 2022 interval history:    Since we have last seen the patient she was admitted to the hospital because of worsening shortness of breath and palpitations.  Coronary angiogram showed minimal areas of stenosis of less than 20%.  Echocardiogram however suggested very significant pulmonary hypertension and a markedly reduced ejection fraction.    She saw Dr. Cuellar back in pulmonary clinic last week.  Cardiac MRI was performed yesterday.  I have reviewed those results today.  There is significant LGE, consistent with an infiltrative process such as sarcoidosis.  She is quite symptomatic.  She had a 6-minute walk test today and had to stop because of how dyspneic she was and how many palpitation she was having but she had absolutely no desaturation.    She has been tapering her prednisone and is down to 20 mg daily but she believes all of the symptoms have worsened with that taper.      ROS as per HPI.  10-point ROS is otherwise negative.    HISTORY REVIEW:  Past Medical History:   Diagnosis Date     Anemia      Cellulitis of leg 6/6/2013     Esophageal reflux     Better post-hiatal hernia repair and weight loss     Limb ischemia; ulcers of fingertips 4/22/2013     Raynaud's syndrome       Scleroderma (H)      Systemic sclerosis (H) 2009     Unspecified essential hypertension        Past Surgical History:   Procedure Laterality Date     BYPASS GASTRIC, CHOLECYSTECTOMY, COMBINED       CHOLECYSTECTOMY       COLONOSCOPY       ESOPHAGOSCOPY, GASTROSCOPY, DUODENOSCOPY (EGD), COMBINED N/A 3/10/2016    Procedure: COMBINED ESOPHAGOSCOPY, GASTROSCOPY, DUODENOSCOPY (EGD), BIOPSY SINGLE OR MULTIPLE;  Surgeon: Tricia Zambrano MD;  Location:  GI     INNER EAR SURGERY       PICC INSERTION  6/5/2013    5fr DL Power PICC, 44cm, left basilic vein, tip in low SVC     SURGICAL HISTORY OF -       Left ear surgery - incus transition, tympanoplasty     SURGICAL HISTORY OF -   6/05    Gastric bypass     SURGICAL HISTORY OF -   6/05    Cholecystectomy     SURGICAL HISTORY OF -   6/05    Hiatal hernia repair     TONSILLECTOMY         Family History   Problem Relation Age of Onset     Cerebrovascular Disease Father      C.A.D. Father      C.A.D. Mother      C.A.D. 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.      Social History Main Topics     Smoking status: Never Smoker      Smokeless tobacco: Never Used     Alcohol Use: Yes      occassionally     Drug Use: No     Sexually Active: Yes -- Male partner(s)     Birth Control/ Protection: None     Social History Narrative     Was  in December 2015. Longtime partner from [place].       Patient Active Problem List   Diagnosis     Essential hypertension     Thyrotoxicosis     Obesity     Prolonged QTc 460 ms,  at hr 67     Low back pain - Suspect SI Joint dysfunction     Cough     Systemic sclerosis (H)     Tumoral calcinosis     Shortness of breath     Edema  of both legs     Abnormal albumin     Myopathy     Hypoalbuminemia     Diastolic dysfunction     Limb ischemia; ulcers of fingertips     Cellulitis of leg     Other specified diffuse disease of connective tissue     Disturbed sleep rhythm     Pharyngeal dysphagia     Gastroesophageal reflux disease, esophagitis presence not specified     Rheumatoid arthritis with negative rheumatoid factor, involving unspecified site (H)     Raynaud's disease without gangrene       Allergies   Allergen Reactions     Lovenox Other (See Comments)     Blood clot      Norvasc [Amlodipine Besylate] Swelling     Lip swelling that happened on 2 different occasions     Erythromycin Rash     Rash on arms     OBJECTIVE:  Vitals: Reviewed  Gen: A+Ox3, NAD  HEENT: NCAT, EOMI  Pulm: CTAB, minimal coarse sounds on basilar lobes  CVS: She has a regular rate and rhythm but this is punctuated by very frequent ectopy then restoration of a regular rhythm.  No murmurs appreciated.  Skin: Her fingers are intermittently diffusely cyanotic.  Purpuric patches on fingertips no ulcerations/erosions. There is platelike xerosis of lower extremities  -mild sclerosis of distal fingers  Msk: No active synovitis.  4/5 upper extremity strength, 5/5 lower extremity strength bilaterally.    EKG (11/12/2021)  Sinus rhythm with marked sinus arrhythmia with premature supraventricular complexes  Possible left atrial enlargement  Left axis deviation  Left ventricular hypertrophy with QRS widening  T wave abnormality consider lateral ischemia  Prolonged QT  Abnormal ECG  Ventricular rate 81, , , QT/QTc 418/485, P/R/T 29/-32/132     Component      Latest Ref Rng 1/26/2016   WBC      4.0 - 11.0 10e9/L 9.2   RBC Count      3.8 - 5.2 10e12/L 4.59   Hemoglobin      11.7 - 15.7 g/dL 13.2   Hematocrit      35.0 - 47.0 % 41.8   MCV      78 - 100 fl 91   MCH      26.5 - 33.0 pg 28.8   MCHC      31.5 - 36.5 g/dL 31.6   RDW      10.0 - 15.0 % 16.7 (H)   Platelet Count       150 - 450 10e9/L 195   Diff Method       Automated Method   % Neutrophils       62.9   % Lymphocytes       27.0   % Monocytes       9.1   % Eosinophils       0.4   % Basophils       0.2   % Immature Granulocytes       0.4   Nucleated RBCs      0 /100 0   Absolute Neutrophil      1.6 - 8.3 10e9/L 5.8   Absolute Lymphocytes      0.8 - 5.3 10e9/L 2.5   Absolute Monocytes      0.0 - 1.3 10e9/L 0.8   Absolute Eosinophils      0.0 - 0.7 10e9/L 0.0   Absolute Basophils      0.0 - 0.2 10e9/L 0.0   Abs Immature Granulocytes      0 - 0.4 10e9/L 0.0   Absolute Nucleated RBC       0.0   Color Urine       Yellow   Appearance Urine       Clear   Glucose Urine      NEG mg/dL Negative   Bilirubin Urine      NEG Negative   Ketones Urine      NEG mg/dL Negative   Specific Gravity Urine      1.003 - 1.035 1.010   Blood Urine      NEG Negative   pH Urine      5.0 - 7.0 pH 6.0   Protein Albumin Urine      NEG mg/dL Negative   Urobilinogen mg/dL      0.0 - 2.0 mg/dL Normal   Nitrite Urine      NEG Negative   Leukocyte Esterase Urine      NEG Negative   Source       Midstream Urine   WBC Urine      0 - 2 /HPF <1   RBC Urine      0 - 2 /HPF <1   Squamous Epithelial /HPF Urine      0 - 1 /HPF <1   Creatinine      0.52 - 1.04 mg/dL 1.11 (H)   GFR Estimate      >60 mL/min/1.7m2 51 (L)   GFR Estimate If Black      >60 mL/min/1.7m2 62   ALT      0 - 50 U/L 23   AST      0 - 45 U/L 16   Albumin      3.4 - 5.0 g/dL 3.4   CRP Inflammation      0.0 - 8.0 mg/L <2.9   Sed Rate      0 - 30 mm/h 9   CK Total      30 - 225 U/L 38   Aldolase       4.1     Biopsy from 3/10 EGD:  Esophagus, gastroesophageal junction, biopsies:   - Squamous mucosa and submucosa with marked chronic inflammation and   fibrosis   - No evidence of intestinal metaplasia or dysplasia               February 15, 2022 interval history:    Since we have last seen the patient she has felt about the same in terms of heart palpitations on the 30 mg daily prednisone.  She believes her  neuropathy symptoms actually worsened a little.  Her Raynauds phenomena has also been worse but it has of course been rather cold.    Interestingly, she was actually in [place] last week and despite the warm weather there she still had a lot of Raynauds that was not much different.    She saw Dr. Francis earlier today.  I have read his note which is up.  He is starting her on a Zio patch for 1 week and hoping that we can significantly decrease her prednisone so that a PET scan can be performed and identify whether there is any cardiac inflammation at a time that she is on more minimal prednisone.    She does remain on azathioprine.  She did have labs earlier today which look quite good with perhaps some slight worsening of ESR but in the context of a lower CRP which I am more inclined to believe.  Her AST was up above the upper limit of normal very slightly but she denies any recent alcohol and ALT was okay.    Physical examination by video shows her to be in no acute distress HEENT examination is normal.  She is speaking in full sentences with no shortness of breath.  Her hand examination does not show marked cyanosis and there is no apparent swelling or joint deformity.    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    Plan/recommendation:    I have asked her to go down to 25 mg a day for the next 3 days of prednisone.  If she does not feel markedly worse she can go further to 20 mg a day for the remainder of the time she is on the Zio patch and another week thereafter.  If at that point she still feels okay she can go down to 15 mg a day.    After she has been on that dose for a couple of weeks I want her to do labs again so we can reassess for inflammation.  I would also like to see her at about that time on  15 March when I do have another video visit that I can see her at and we can decide whether to go down further and if so how fast.    She will let us know if she is having difficulty in the meantime.    This video visit commenced at 3:36 PM was completed at 3:53 PM    March 15, 2022 interval history:    Since we have last seen her she has been able to taper prednisone down to 10 mg daily as of last week.  Notably, she is not had any worsening of joint symptoms or fatigue with that taper.  Her neuropathy however in her feet has definitely worsened.  She has not yet reached out to Dr. Abel but he had let her know before that if her neuropathy worsen she could go on gabapentin or pregabalin.  At this point she has been toughing it out but is considering trying this.    She unfortunately had right heart cath scheduled but it had to be delayed as her screen Covid test was positive.  Symptomatically she felt perhaps a little bit more short of breath at the time but could barely notice a difference.  She did not have any fevers or chills.  She has been vaccinated, we have not checked her antispike antibody levels.  It is possible that they are not good, as she has been on a lot of immunosuppression including substantial doses of steroid.    She did have her booster shot back in September or October.    The right heart catheterization with planned cardiac biopsy has now been put off until April.  She was also to get IV iron as her iron levels were low but that had to be delayed as well.  Notably, although her iron levels were somewhat low, her hemoglobin and hematocrit have remained normal.    She continues on azathioprine 150 mg daily and is tolerating that well.  She is also on Bactrim prophylaxis.    Physical examination by video shows her to be in no acute distress.  HEENT examination is normal and she does not appear to be acutely short of breath.  She is speaking in full sentences with no difficulty.  Musculoskeletal  examination of the hands shows normal range of motion and no convincing areas of joint swelling and no active cyanosis.    Impression:    Per the problem list above with historical features primarily of mixed connective tissue disease with severe Raynauds, SICCA, lung disease, ANJU, SSA and  RNP positivity, for recently with clinical features of sarcoidosis including neurosarcoidosis which has been steroid responsive and is worsening with steroid taper.    Plan/recommendation:    I think her right heart catheterization and cardiac biopsy are clearly going to be important.  She was taken off of methotrexate because we are not convinced it was helping all of her clinical features, but if she has evidence of cardiac sarcoidosis, methotrexate would be the drug with the greatest data to suggest benefit in that situation.  We might then wish to have her on a combination of several medications including methotrexate and Imuran.    I am also concerned about her multiple positive autoantibodies including the SSA autoantibody which can be a marker of a type I interferon mediated autoimmune process that could worsen with TNF inhibition.  In this circumstance it may be better to consider the use of a medication such as abatacept, which we would conceivably use instead of azathioprine.  Unfortunately there is not a lot of clinical experience with using it in this situation.    Given the overall complexity of her situation Dr. Bentley is raised the possibility of getting a second opinion and I agree that that could be worthwhile.  I have sent an email to Dr. Dre Mayorga at the [facility] for his thoughts.    I do think we should get a Covid antibody test with her next blood draw.  If she does not have a good level of antispike protein antibodies, she should probably get evusheld as soon as it is available for her risk group.    This video visit commenced at 3:07 PM is completed at 3:30 PM, with an additional 30 minutes spent  in literature review relevant to her situation, chart review, , email messaging as noted, and documentation completed on the date of service.      MARTÍN De La Torre MD, PhD    Rheumatology                      Isabelle Cameron, LINDA Genao VF

## 2022-03-22 NOTE — TELEPHONE ENCOUNTER
Called [facility] to verify what is needed for this patient to be able to complete iron replacement infusions. They stated she needs a medical necessity letter by the MD and send this to her insurance for payment. Updated patient via [employer] message      Brisa Galeana RN on 3/22/2022 at 11:24 AM

## 2022-03-23 ENCOUNTER — TELEPHONE (OUTPATIENT)
Dept: CARDIOLOGY | Facility: CLINIC | Age: 62
End: 2022-03-23
Payer: COMMERCIAL

## 2022-03-23 NOTE — TELEPHONE ENCOUNTER
Called [facility] to ask where the letter of medical necessity is suppose to be sent to and if they have a PA team for her iron infusions. [facility] is to call back and relay a message regarding a fax number to the finance department that we can send the letter to    Brisa Galeana RN on 3/23/2022 at 10:09 AM

## 2022-03-23 NOTE — TELEPHONE ENCOUNTER
[facility] Call Center    Phone Message    May a detailed message be left on voicemail: yes     Reason for Call: Other: Aurelia did not want to be transferred, she asked that I send a message. Fax # to send the information to is Attn: Dorita SCHWARZ# [phone]     Action Taken: Other: cardiology    Travel Screening: Not Applicable

## 2022-03-23 NOTE — TELEPHONE ENCOUNTER
Faxed letter of medical necessity to Attn: Dorita Jose at [facility] location      Brisa Galeana RN on 3/23/2022 at 1:12 PM

## 2022-03-23 NOTE — TELEPHONE ENCOUNTER
Letter of medical necessity was faxed to [facility] and Attn: Dorita Jose at [phone]    Brisa Galeana RN on 3/23/2022 at 2:08 PM

## 2022-03-31 ENCOUNTER — ANCILLARY PROCEDURE (OUTPATIENT)
Dept: PET IMAGING | Facility: CLINIC | Age: 62
End: 2022-03-31
Attending: INTERNAL MEDICINE
Payer: COMMERCIAL

## 2022-03-31 DIAGNOSIS — D86.85 CARDIAC SARCOIDOSIS: ICD-10-CM

## 2022-04-04 ENCOUNTER — APPOINTMENT (OUTPATIENT)
Dept: GENERAL RADIOLOGY | Facility: CLINIC | Age: 62
End: 2022-04-04
Attending: FAMILY MEDICINE
Payer: COMMERCIAL

## 2022-04-04 ENCOUNTER — APPOINTMENT (OUTPATIENT)
Dept: CT IMAGING | Facility: CLINIC | Age: 62
End: 2022-04-04
Attending: FAMILY MEDICINE
Payer: COMMERCIAL

## 2022-04-04 ENCOUNTER — TELEPHONE (OUTPATIENT)
Dept: CARDIOLOGY | Facility: CLINIC | Age: 62
End: 2022-04-04

## 2022-04-04 ENCOUNTER — HOSPITAL ENCOUNTER (EMERGENCY)
Facility: CLINIC | Age: 62
Discharge: HOME | End: 2022-04-04
Attending: FAMILY MEDICINE | Admitting: FAMILY MEDICINE
Payer: COMMERCIAL

## 2022-04-04 VITALS
RESPIRATION RATE: 14 BRPM | HEART RATE: 79 BPM | SYSTOLIC BLOOD PRESSURE: 110 MMHG | DIASTOLIC BLOOD PRESSURE: 72 MMHG | BODY MASS INDEX: 23.64 KG/M2 | WEIGHT: 154 LBS | OXYGEN SATURATION: 96 % | TEMPERATURE: 97.4 F

## 2022-04-04 DIAGNOSIS — F41.9 ANXIETY: ICD-10-CM

## 2022-04-04 DIAGNOSIS — I50.810 RIGHT-SIDED HEART FAILURE, UNSPECIFIED HF CHRONICITY (H): ICD-10-CM

## 2022-04-04 LAB
ANION GAP SERPL CALCULATED.3IONS-SCNC: 9 MMOL/L (ref 3–14)
BASE EXCESS BLDV CALC-SCNC: 1.8 MMOL/L (ref -7.7–1.9)
BASOPHILS # BLD AUTO: 0 10E3/UL (ref 0–0.2)
BASOPHILS NFR BLD AUTO: 0 %
BUN SERPL-MCNC: 14 MG/DL (ref 7–30)
CALCIUM SERPL-MCNC: 8.8 MG/DL (ref 8.5–10.1)
CHLORIDE BLD-SCNC: 98 MMOL/L (ref 94–109)
CO2 SERPL-SCNC: 25 MMOL/L (ref 20–32)
CREAT SERPL-MCNC: 0.75 MG/DL (ref 0.52–1.04)
D DIMER PPP FEU-MCNC: 1.41 UG/ML FEU (ref 0–0.5)
EOSINOPHIL # BLD AUTO: 0 10E3/UL (ref 0–0.7)
EOSINOPHIL NFR BLD AUTO: 0 %
ERYTHROCYTE [DISTWIDTH] IN BLOOD BY AUTOMATED COUNT: 14.6 % (ref 10–15)
GFR SERPL CREATININE-BSD FRML MDRD: >90 ML/MIN/1.73M2
GLUCOSE BLD-MCNC: 175 MG/DL (ref 70–99)
HCO3 BLDV-SCNC: 26 MMOL/L (ref 21–28)
HCT VFR BLD AUTO: 39.5 % (ref 35–47)
HGB BLD-MCNC: 13.1 G/DL (ref 11.7–15.7)
IMM GRANULOCYTES # BLD: 0.1 10E3/UL
IMM GRANULOCYTES NFR BLD: 1 %
LYMPHOCYTES # BLD AUTO: 1.1 10E3/UL (ref 0.8–5.3)
LYMPHOCYTES NFR BLD AUTO: 21 %
MCH RBC QN AUTO: 29.6 PG (ref 26.5–33)
MCHC RBC AUTO-ENTMCNC: 33.2 G/DL (ref 31.5–36.5)
MCV RBC AUTO: 89 FL (ref 78–100)
MONOCYTES # BLD AUTO: 0.5 10E3/UL (ref 0–1.3)
MONOCYTES NFR BLD AUTO: 9 %
NEUTROPHILS # BLD AUTO: 3.4 10E3/UL (ref 1.6–8.3)
NEUTROPHILS NFR BLD AUTO: 69 %
NRBC # BLD AUTO: 0 10E3/UL
NRBC BLD AUTO-RTO: 0 /100
NT-PROBNP SERPL-MCNC: ABNORMAL PG/ML (ref 0–900)
O2/TOTAL GAS SETTING VFR VENT: 28 %
PCO2 BLDV: 39 MM HG (ref 40–50)
PH BLDV: 7.44 [PH] (ref 7.32–7.43)
PLATELET # BLD AUTO: 302 10E3/UL (ref 150–450)
PO2 BLDV: 26 MM HG (ref 25–47)
POTASSIUM BLD-SCNC: 3.7 MMOL/L (ref 3.4–5.3)
RBC # BLD AUTO: 4.43 10E6/UL (ref 3.8–5.2)
SARS-COV-2 RNA RESP QL NAA+PROBE: NEGATIVE
SODIUM SERPL-SCNC: 132 MMOL/L (ref 133–144)
TROPONIN I SERPL HS-MCNC: 39 NG/L
TSH SERPL DL<=0.005 MIU/L-ACNC: 0.86 MU/L (ref 0.4–4)
WBC # BLD AUTO: 5 10E3/UL (ref 4–11)

## 2022-04-04 PROCEDURE — C9803 HOPD COVID-19 SPEC COLLECT: HCPCS | Performed by: FAMILY MEDICINE

## 2022-04-04 PROCEDURE — 83880 ASSAY OF NATRIURETIC PEPTIDE: CPT | Performed by: FAMILY MEDICINE

## 2022-04-04 PROCEDURE — 87635 SARS-COV-2 COVID-19 AMP PRB: CPT | Performed by: FAMILY MEDICINE

## 2022-04-04 PROCEDURE — 80048 BASIC METABOLIC PNL TOTAL CA: CPT | Performed by: FAMILY MEDICINE

## 2022-04-04 PROCEDURE — 71045 X-RAY EXAM CHEST 1 VIEW: CPT

## 2022-04-04 PROCEDURE — 84443 ASSAY THYROID STIM HORMONE: CPT | Performed by: FAMILY MEDICINE

## 2022-04-04 PROCEDURE — 250N000009 HC RX 250: Performed by: FAMILY MEDICINE

## 2022-04-04 PROCEDURE — 99285 EMERGENCY DEPT VISIT HI MDM: CPT | Mod: 25 | Performed by: FAMILY MEDICINE

## 2022-04-04 PROCEDURE — 82803 BLOOD GASES ANY COMBINATION: CPT | Performed by: FAMILY MEDICINE

## 2022-04-04 PROCEDURE — 250N000011 HC RX IP 250 OP 636: Performed by: FAMILY MEDICINE

## 2022-04-04 PROCEDURE — 85025 COMPLETE CBC W/AUTO DIFF WBC: CPT | Performed by: FAMILY MEDICINE

## 2022-04-04 PROCEDURE — 36415 COLL VENOUS BLD VENIPUNCTURE: CPT | Performed by: FAMILY MEDICINE

## 2022-04-04 PROCEDURE — 84484 ASSAY OF TROPONIN QUANT: CPT | Performed by: FAMILY MEDICINE

## 2022-04-04 PROCEDURE — 250N000013 HC RX MED GY IP 250 OP 250 PS 637: Performed by: FAMILY MEDICINE

## 2022-04-04 PROCEDURE — 93010 ELECTROCARDIOGRAM REPORT: CPT | Performed by: FAMILY MEDICINE

## 2022-04-04 PROCEDURE — 96375 TX/PRO/DX INJ NEW DRUG ADDON: CPT | Mod: 91 | Performed by: FAMILY MEDICINE

## 2022-04-04 PROCEDURE — 96374 THER/PROPH/DIAG INJ IV PUSH: CPT | Mod: 59 | Performed by: FAMILY MEDICINE

## 2022-04-04 PROCEDURE — 71275 CT ANGIOGRAPHY CHEST: CPT

## 2022-04-04 PROCEDURE — 85379 FIBRIN DEGRADATION QUANT: CPT | Performed by: FAMILY MEDICINE

## 2022-04-04 PROCEDURE — 93005 ELECTROCARDIOGRAM TRACING: CPT | Performed by: FAMILY MEDICINE

## 2022-04-04 RX ORDER — METOPROLOL SUCCINATE 50 MG/1
50 TABLET, EXTENDED RELEASE ORAL ONCE
Status: COMPLETED | OUTPATIENT
Start: 2022-04-04 | End: 2022-04-04

## 2022-04-04 RX ORDER — IOPAMIDOL 755 MG/ML
500 INJECTION, SOLUTION INTRAVASCULAR ONCE
Status: COMPLETED | OUTPATIENT
Start: 2022-04-04 | End: 2022-04-04

## 2022-04-04 RX ORDER — FUROSEMIDE 10 MG/ML
20 INJECTION INTRAMUSCULAR; INTRAVENOUS ONCE
Status: COMPLETED | OUTPATIENT
Start: 2022-04-04 | End: 2022-04-04

## 2022-04-04 RX ORDER — LORAZEPAM 2 MG/ML
0.5 INJECTION INTRAMUSCULAR ONCE
Status: COMPLETED | OUTPATIENT
Start: 2022-04-04 | End: 2022-04-04

## 2022-04-04 RX ORDER — ASPIRIN 81 MG/1
324 TABLET, CHEWABLE ORAL ONCE
Status: COMPLETED | OUTPATIENT
Start: 2022-04-04 | End: 2022-04-04

## 2022-04-04 RX ADMIN — LORAZEPAM 0.5 MG: 2 INJECTION INTRAMUSCULAR; INTRAVENOUS at 07:46

## 2022-04-04 RX ADMIN — FUROSEMIDE 20 MG: 10 INJECTION, SOLUTION INTRAVENOUS at 09:52

## 2022-04-04 RX ADMIN — METOPROLOL SUCCINATE 50 MG: 50 TABLET, EXTENDED RELEASE ORAL at 06:41

## 2022-04-04 RX ADMIN — SODIUM CHLORIDE 70 ML: 9 INJECTION, SOLUTION INTRAVENOUS at 07:12

## 2022-04-04 RX ADMIN — IOPAMIDOL 73 ML: 755 INJECTION, SOLUTION INTRAVENOUS at 07:12

## 2022-04-04 RX ADMIN — ASPIRIN 81 MG CHEWABLE TABLET 324 MG: 81 TABLET CHEWABLE at 06:37

## 2022-04-04 ASSESSMENT — ENCOUNTER SYMPTOMS
FEVER: 0
PSYCHIATRIC NEGATIVE: 1
CHILLS: 0
EYES NEGATIVE: 1
NEUROLOGICAL NEGATIVE: 1
ARTHRALGIAS: 1
ENDOCRINE NEGATIVE: 1
SHORTNESS OF BREATH: 1
GASTROINTESTINAL NEGATIVE: 1
FATIGUE: 1
PALPITATIONS: 1
APPETITE CHANGE: 0

## 2022-04-04 NOTE — ED NOTES
Patient having episodes of feeling short of breath. MD informed and anxiety meds to be ordered, heart rate in regular rhythm and oxygen above 90%

## 2022-04-04 NOTE — DISCHARGE INSTRUCTIONS
1.  Take your torsemide as you normally do this afternoon.  2.  You can take all of your other home medications, do not take your metoprolol as this was given already.

## 2022-04-04 NOTE — ED PROVIDER NOTES
History     Chief Complaint   Patient presents with     Tachycardia     HPI  Jeanine Schuler is a 61 year old female who presented to the emergency room today secondary to concerns of increasing shortness of breath symptoms starting last night before midnight.  Patient states that she recently underwent a PET scan of her heart and since that scan she just has not felt well.  She stated that she had a position her arms overhead during the scan for extended period time and with her arthritis condition wonders if it sets some inflammation off in her joints.  Patient states that she has a history for scleroderma as well as sarcoidosis with rheumatoid arthritis and Raynaud's phenomenon.  EMS reported that she appeared to be in a rapid atrial fibrillation on their first evaluation in her home.  Her rates are in the 120s to 170s but they seem to have slowed in route to the ER.  Patient denies any specific chest pain but just more shortness of breath symptoms since last night.  She states that she is not on any blood thinning medications.  She denies any history for atrial fib that she is aware of.    Patient states that she had a colonoscopy 3 days special diet before her PET scan and also had a taper off of her steroid medications fairly rapidly.  She states that she is still off the steroid as she has a cardiac cath scheduled for tomorrow and then she can potentially restart her steroid medications that she uses for her autoimmune diseases.  She states that she has felt ill ever since she started the taper and was on that 3-day diet.    I was able to retrieve the cardiac PET scan results and copied those results below:    Study Result    Narrative & Impression   Procedure: PET MYOCARDIAL PERFUSION SINGLE REST OR STRESS, PET CARDIAC  VIABILITY.  Examination Date: 3/31/2022     Indication: Sarcoidosis.     Clinical history: Cardiac sarcoidosis; eval for cardiac sarcoid     Protocol:    1. Rest myocardial perfusion  imaging and calculation of Myocardial  Blood Flow (MBF).   2. PET of the heart.  3. PET CT fusion images obtained for attenuation correction.     Technique:  1. Rest myocardial ammonia perfusion imaging and calculation of  Myocardial Blood Flow (MBF) was performed using 22.4 mCi of N-13  Ammonia intravenously.  2. The patient was prepped for a Cardiac FDG PET by following low  carbohydrate and high fat diet for three days, as well as three nights  of fasting for at least 12 hours per night diet prior to the PET  examination.   3. The patient received 14.83 mCi of F-18-FDG; the serum glucose was  105 prior to administration and weight was 74.4 kg. Images were  evaluated in the axial, sagittal, and coronal planes as well as the  rotational cardiac MIP. Images were acquired from the eyes down  through the proximal thighs. Images were obtained after 60 minutes of  infusion of the FDG.  4. CT: Volumetric acquisition was acquired at 3 mm sections.      BACKGROUND:  Liver SUV max = 3.38  Aorta SUV max = 2.56.     Comparison: CT chest 3/8/2022     Findings:      Findings on the rest N-13 Ammonia study:   Overall quality of the study: good.     PET images demonstrate no perfusion abnormality in the heart.      Left ventricular cavity is dilated on the rest ammonia study. The  gated PET imaging demonstrates diffuse circumferential hypokinesia of  the left ventricle wall. Left ventricular ejection fraction is 23%.  Left ventricular end-diastolic volume is 96 mL. End-systolic volume is  74 mL.     Myocardial Blood Flow   Global   Rest 0.85.    LAD      Rest 0.85.  LCX      Rest 0.90.  RCA      Rest 0.79.  Normal Values: (Myocardial Blood Flow) MBF (QMP (ml/g/min) >= 0.8 at  rest.     Findings on F-18 FDG study:   The heart was analyzed in traditional three-view analysis including  the short axis view, vertical long axis view, and the horizontal long  axis views. There is no abnormal FDG uptake to the left ventricle  walls.       Diffuse FDG uptake to bilateral atrial walls, literature reports that  isolated atrial FDG uptake is more commonly associated with  arrhythmias of the atria although there have been a few case reports  showing atrial uptake in sarcoidosis without ventricular uptake.      Cardiomegaly. Trace pericardial effusion. Mild coronary artery  calcifications. Left chest wall ICD. Unchanged mild thoracic aortic  ectasia measuring up to 4.2 cm in the proximal thoracic ascending  aorta.     PET-CT portion of the study demonstrates mild FDG uptake to the  numerous perilymphatic distribution nodules greatest in the left upper  lung lobes. No acute consolidation. Fine reticular opacities in the  subpleural lung bases with  immediate subpleural sparing. Focal FDG uptake to the gastric fundus,  likely inflammatory in etiology. No hypermetabolic lymph nodes in the  neck, chest, abdomen, or pelvis. Fluid-filled esophagus proximal to  the gastroesophageal anastomosis of patient's Eleazar-en-Y.  Cholecystectomy. Diffuse hepatic steatosis. Mild anasarca.  Nondisplaced right 10th and 11th rib fractures.                                                                      Impression:  1. No evidence of FDG uptake within the left ventricle although there  is intense uptake within the atrial and intra-atrial septum as well as  the atrial walls. No atrial fibrillation or atrial arrhythmia is  demonstrated. The intense FDG uptake within the atria still remains  suspicious for sarcoidosis.  2. Mild FDG uptake to the numerous perilymphatic distribution nodules  greatest in the left upper lung lobes, suggestive of active pulmonary  sarcoidosis.  3. Cardiomegaly.  4. No perfusion deficit demonstrated on N-13 Ammonia imaging.  5. Normal myocardial blood flow.  6. Diffuse hypokinesia of the left ventricular myocardium, consistent  with nonischemic cardiomyopathy given the normal perfusion.   7. Low LV ejection fraction of 23%.  8. Focal FDG uptake  to the afferent loop gastric fundus, likely  inflammatory in etiology. Attention on follow-up.      I have personally reviewed the examination and initial interpretation  and I agree with the findings.     RONAN RUFF MD          I reviewed her most recent cardiology consultation done 3/8/2022 in the epic EMR and copied excerpts from that note below:  Atul Bentley M.D.  Cardiovascular Medicine     I personally saw and examined this patient,      Problem List  1. Sarcoid?  2. Abnormal left ventricular function, normal coronary arteries  3. Raynauds  4. Scleroderma  5. Digital ulcerations  6. Raynauds  7. Iron deficiency  8. 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 ILD  10 Restriction of mouth opening  11. SBO  12. Reduced albumin with reduced pre-albumin consistent with malnutrition     1. Right heart catheterization with endomyocardial biopsy to look for evidence of myocardial sarcoid (see cardiac MRI)  2. Labs: iron, iron binding capacity, soluble transferrin receptor, pre-albumin  3. CT scan chest without contrast/done see below  4. Parenteral iron replacement  5. Augment feeding schedule to 5 meals day, BOOST milk shakes made with almond milk (lactose intolerance)     Discussion:  It would be very unusual to have sarcoid and scleroderma, but laureano-lymphatic nodules are suggestive of sarcoid phenotype, though CT does not show hilar adenopathy.  ZEO shows predominantly atrial arrhythmia.  MRI not inconsistent with sarcoid.  Patient feels terrible with rapid steroid taper to facilitate PET to clarify MRI findings/significance.  Striking elevation of BNP with normal coronary arteries and LVEF of 44l%.  While bx has low yield, positive bx findings would be helpful.  Iron deficiency requires parenteral replacement and evaluation as to loss, hemolysis, malabsorption in light of previous gastric surgery.          History     The patient returns for follow-up of PAH   There is no interim history of chest pain, tightness,  paroxysmal nocturnal dyspnea, orthopnea, peripheral edema, palpitation, pre-syncope, syncope,  Exercise tolerance is Patient has poor oral intake.  Patient has marked shortness of breath, without cough, hemoptysis..  Medications are reviewed and the patient is taking medications as prescribed.  The patient is generally sleeping well.         Allergies, family history, and 13 system review performed and confirmed in chart.  Objective     Alert, oriented, JVP within normal limits, murmur of TR and aortic outflow systolic murmur without evidence of aortic valve disease, no ascites, basilar rales do not clear with cough, no edema, Raynauds, sclerodema, no open ulcers         Wt Readings from Last 5 Encounters:   02/15/22 76.1 kg (167 lb 12.8 oz)   01/28/22 80.3 kg (177 lb)   01/26/22 79.4 kg (175 lb)   01/13/22 78.9 kg (174 lb)   01/06/22 78.9 kg (174 lb)         Meds      Current Outpatient Medications   Medication     albuterol (PROAIR HFA/PROVENTIL HFA/VENTOLIN HFA) 108 (90 Base) MCG/ACT inhaler     alcohol swab prep pads     aspirin (ASA) 81 MG chewable tablet     atorvastatin (LIPITOR) 40 MG tablet     azaTHIOprine (IMURAN) 50 MG tablet     blood glucose (NO BRAND SPECIFIED) test strip     blood glucose calibration (NO BRAND SPECIFIED) solution     blood glucose monitoring (NO BRAND SPECIFIED) meter device kit     clonazePAM (KLONOPIN) 0.5 MG tablet     folic acid (FOLVITE) 1 MG tablet     losartan (COZAAR) 50 MG tablet     magnesium oxide (MAG-OX) 400 MG tablet     metoprolol succinate ER (TOPROL XL) 50 MG 24 hr tablet     naproxen (NAPROSYN) 500 MG tablet     nitroGLYcerin (NITROSTAT) 0.3 MG sublingual tablet     omeprazole (PRILOSEC) 20 MG DR capsule     pantoprazole (PROTONIX) 40 MG EC tablet     potassium chloride ER (MICRO-K) 10 MEQ CR capsule     predniSONE (DELTASONE) 10 MG tablet     spironolactone (ALDACTONE) 25 MG tablet     sulfamethoxazole-trimethoprim (BACTRIM) 400-80 MG tablet     thin (NO BRAND  SPECIFIED) lancets     torsemide (DEMADEX) 20 MG tablet     vitamin D2 (ERGOCALCIFEROL) 17075 units (1250 mcg) capsule      No current facility-administered medications for this visit.         Labs     Results for OSCAR REYES (MRN [id number]) as of 3/8/2022 17:13    Ref. Range 2/15/2022 12:37 3/8/2022 11:30 3/8/2022 13:19   Sodium Latest Ref Range: 133 - 144 mmol/L   138     Potassium Latest Ref Range: 3.4 - 5.3 mmol/L   3.0 (L)     Chloride Latest Ref Range: 94 - 109 mmol/L   97     Carbon Dioxide Latest Ref Range: 20 - 32 mmol/L   30     Urea Nitrogen Latest Ref Range: 7 - 30 mg/dL   10     Creatinine Latest Ref Range: 0.52 - 1.04 mg/dL   1.04     GFR Estimate Latest Ref Range: >60 mL/min/1.73m2   62     Calcium Latest Ref Range: 8.5 - 10.1 mg/dL   8.5     Anion Gap Latest Ref Range: 3 - 14 mmol/L   11     Albumin Latest Ref Range: 3.4 - 5.0 g/dL   3.0 (L)     Prealbumin Latest Ref Range: 15 - 45 mg/dL   7 (L)     Protein Total Latest Ref Range: 6.8 - 8.8 g/dL   6.8     Bilirubin Total Latest Ref Range: 0.2 - 1.3 mg/dL   0.9     Alkaline Phosphatase Latest Ref Range: 40 - 150 U/L   102     ALT Latest Ref Range: 0 - 50 U/L   26     AST Latest Ref Range: 0 - 45 U/L   34     CRP Inflammation Latest Ref Range: 0.0 - 8.0 mg/L   7.4     Ferritin Latest Ref Range: 8 - 252 ng/mL   96     Iron Latest Ref Range: 35 - 180 ug/dL   32 (L)     Iron Binding Cap Latest Ref Range: 240 - 430 ug/dL   230 (L)     Iron Saturation Index Latest Ref Range: 15 - 46 %   14 (L)     N-Terminal Pro Bnp Latest Ref Range: 0 - 125 pg/mL   7,353 (H)     T4 Free Latest Ref Range: 0.76 - 1.46 ng/dL   1.76 (H)     TSH Latest Ref Range: 0.40 - 4.00 mU/L   0.30 (L)     Glucose Latest Ref Range: 70 - 99 mg/dL   94     WBC Latest Ref Range: 4.0 - 11.0 10e3/uL   5.0     Hemoglobin Latest Ref Range: 11.7 - 15.7 g/dL   14.4     Hematocrit Latest Ref Range: 35.0 - 47.0 %   45.0     Platelet Count Latest Ref Range: 150 - 450 10e3/uL   257     RBC  Count Latest Ref Range: 3.80 - 5.20 10e6/uL   5.04     MCV Latest Ref Range: 78 - 100 fL   89     MCH Latest Ref Range: 26.5 - 33.0 pg   28.6     MCHC Latest Ref Range: 31.5 - 36.5 g/dL   32.0     RDW Latest Ref Range: 10.0 - 15.0 %   14.1           Imaging   EXAMINATION: CT CHEST W/O CONTRAST, 3/8/2022 1:19 PM     CLINICAL HISTORY: Sarcoidosis; ; Sarcoidosis     COMPARISON: CT chest 12/20/2021.     TECHNIQUE: CT imaging obtained through the chest without contrast.  Coronal and axial MIP reformatted images obtained. Inspiratory and  expiratory imaging was included. The high resolution interstitial lung  disease protocol.     CONTRAST:  none.     FINDINGS:  Cardiac size within normal limits on the  topogram. No  pericardial effusion. Unchanged mild thoracic aortic ectasia measuring  up to 4.4 cm in the proximal thoracic descending aorta. Enlarged main  pulmonary trunk measuring up to 3.6 cm, unchanged. No suspicious  lymphadenopathy within the chest. Again seen dilated and fluid-filled  esophagus proximal to the gastroesophageal anastomosis of patient's  Eleazar-en-Y. This appears similar to 9/16/2021.     Central tracheobronchial tree is patent. No pneumothorax or pleural  effusions. Again seen and not relatively changed numerous  perilymphatic distribution nodules greatest in the mid to left upper  lung field. Fine reticular opacities in the subpleural lung bases with  immediate subpleural sparing. Air trapping demonstrated in the mid to  upper lung fields on expiratory imaging.     Imaged unenhanced upper abdomen demonstrates cholecystectomy and  partially imaged surgical changes of Eleazar-en-Y gastric bypass.  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 No  acute or suspicious osseous abnormalities. Soft tissues unremarkable.                                                                      IMPRESSION:  1. Relatively unchanged perilymphatic distribution nodules, mid to  upper lung fields,  indicative of sarcoidosis phenotype in conjunction  with basilar and peripheral predominant reticular changes suggestive  of NSIP given history of scleroderma.. Findings may represent a  combination of features of sarcoidosis and scleroderma associated ILD.  Lobular gas trapping on expiratory view or small airways disease  component. Again seen dilated and fluid-filled esophagus consistent  with scleroderma.     I have personally reviewed the examination and initial interpretation  and I agree with the findings.        Coronary angiogram:     Diagnostic  Dominance: Right     Left Main   Very short LM; essentially separate coronary ostia   Left Anterior Descending   Ost LAD to Prox LAD lesion is 20% stenosed. Mild CAD vs possible mild catheter induced vasospasm   Left Circumflex   The vessel is angiographically normal.   Right Coronary Artery   Prox RCA lesion is 20% stenosed.        Intervention           MRI  Clinical history: 61 year old woman with ILD, sarcoidosis, mixed connective tissue disorder, limited  cutaneous systemic sclerosis, and Raynauds. Recently admitted for troponin elevation and cardiomyopathy. No  significant obstructive CAD on coronary angiogram.       Comparison CMR: April 2013     1. The left ventricle is normal in size. There is mild inferoseptal thickening (14 mm). The global systolic  function is mildly reduced. The LVEF is 44%. There is mild diffuse hypokinesis.     2. The right ventricle is normal in cavity size. The global systolic function is normal. The RVEF is 54%.      3. Left atrium is mildly enlarged. Right atrium is normal in size.      4. There is no significant valvular disease.      5. There is mid-myocardial late gadolinium enhancement in the basal septum with extension into the mid  inferoseptal segment and inferior right ventricular insertion site. In addition, there is subendocardial  hyperenhancement in the basal inferolateral segment. This variable pattern of  hyperenhancement is  concerning for infiltrative cardiomyopathy such as sarcoidosis.       6. There is no pericardial effusion.     7. There is no intracardiac thrombus.     8. There is no myocardial edema.      CONCLUSIONS:   1. Non ischemic cardiomyopathy with mildly reduced left ventricular function and normal right ventricular  function.   2. Variable patterns of hyperenhancement in the septal mid-myocardium and inferolateral subendocardium are  concerning for cardiac sarcoidosis.      These findings are new compared to prior cMRI done on 2013.         Echo     Name: OSCAR REYES  MRN: [id number]  : 1960  Study Date: 2021 08:35 AM  Age: 61 yrs  Gender: Female  Patient [facility]: [facility]  Reason For Study: Chest Pain, Chest Tightness, Chest Pressure  Ordering Physician: CHARISSE ANN  Referring Physician: CHARISSE ANN  Performed By: Jose Danielle     BSA: 2.0 m2  Height: 67 in  Weight: 187 lb  HR: 90  BP: 145/100 mmHg  ______________________________________________________________________________  Procedure  Complete Echo Adult. Optison (NDC #1737-2282) given intravenously.  ______________________________________________________________________________  Interpretation Summary     1. The left ventricle is normal in size. The visual ejection fraction is  estimated at 35%. Global hypokinesis, distal LV segments (anteroseptal) appear  more hypokinetic than other areas.  2. The right ventricle is mildly dilated. The right ventricular systolic  function is normal.  3. There is mild to moderate (1-2+) mitral regurgitation.  4. There is moderately severe (3+) tricuspid regurgitation.  5. Moderate (46-55mmHg) pulmonary hypertension is present. The right  ventricular systolic pressure is approximated at 54mmHg plus the right atrial  pressure.  6. The ascending aorta is Mildly dilated. 4.2cm.     Echo from 2020 showed EF 55%, mild TR, aorta  3.8cm.  ______________________________________________________________________________  Left Ventricle  The left ventricle is normal in size. There is normal left ventricular wall  thickness. The visual ejection fraction is estimated at 35%. Left ventricular  diastolic function is indeterminate. Global hypokinesis, distal LV segments  (anteroseptal) appear more hypokinetic than other areas.     Right Ventricle  The right ventricle is mildly dilated. The right ventricular systolic function  is normal.     Atria  The left atrium is severely dilated. The right atrium is moderately dilated.  There is no atrial shunt seen.     Mitral Valve  There is mild mitral annular calcification. There is mild to moderate (1-2+)  mitral regurgitation.     Tricuspid Valve  There is moderately severe (3+) tricuspid regurgitation. Moderate (46-55mmHg)  pulmonary hypertension is present. The right ventricular systolic pressure is  approximated at 54mmHg plus the right atrial pressure.     Aortic Valve  There is trace to mild aortic regurgitation.     Pulmonic Valve  There is mild to moderate (1-2+) pulmonic valvular regurgitation.     Vessels  The ascending aorta is Mildly dilated. Normal IVC size, cannot assess collapse  due to limited image quality.     Pericardium  There is no pericardial effusion.     Rhythm  Sinus rhythm was noted.  ______________________________________________________________________________      Allergies:  Allergies   Allergen Reactions     Lovenox Other (See Comments)     Blood clot      Norvasc [Amlodipine Besylate] Swelling     Lip swelling that happened on 2 different occasions     Erythromycin Rash     Rash on arms       Problem List:    Patient Active Problem List    Diagnosis Date Noted     Encounter for long term current use of azathioprine 03/15/2022     Priority: Medium     On prednisone therapy 02/21/2022     Priority: Medium     Sarcoidosis 02/21/2022     Priority: Medium     Immunosuppressed status  (H) 02/21/2022     Priority: Medium     MCTD (mixed connective tissue disease) (H) 02/21/2022     Priority: Medium     ACS (acute coronary syndrome) (H) 12/21/2021     Priority: Medium     High risk medication use 04/19/2021     Priority: Medium       Plaquenil: Started 2016 for Sjogren's. 4/19/21: No uveitis from Sarcoid, but focal subretinal deposits each eye, Outer segments otherwise WNL. Optos FAF normal. 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 4/19/21: No uveitis from Sarcoid, but focal subretinal deposits each eye, Outer segments otherwise WNL. Optos FAF normal. Repeat OCT with 10-2 in Fall 2021       Post-menopausal 03/31/2021     Priority: Medium     Hypovitaminosis D 03/31/2021     Priority: Medium     High serum parathyroid hormone (PTH) 03/31/2021     Priority: Medium     On corticosteroid therapy 03/31/2021     Priority: Medium     ILD (interstitial lung disease) (H) 09/16/2020     Priority: Medium     Added automatically from request for surgery [id number]       Rheumatoid arthritis with negative rheumatoid factor, involving unspecified site (H) 12/15/2015     Priority: Medium     Pharyngeal dysphagia 11/17/2015     Priority: Medium     Gastroesophageal reflux disease, esophagitis presence not specified 11/17/2015     Priority: Medium     IMO Regulatory Load OCT 2020       Disturbed sleep rhythm 04/07/2015     Priority: Medium     S/P dilatation of esophageal stricture 02/10/2012     Priority: Medium     Sjogren's disease (H) 09/11/2011     Priority: Medium     Raynaud's disease 09/11/2011     Priority: Medium     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 Echo 10/25/2011 4.0 cm        Systemic sclerosis (H) 07/28/2011     Priority: Medium     Tumoral calcinosis 07/28/2011     Priority: Medium     Iron deficiency anemia 07/30/2009     Priority: Medium     Collagen vascular disease (H) 07/23/2009     Priority: Medium     Overview:   7/26/12 Rheumatology at the [facility] Dr. Nicolás Murdockitor:   1. Limited cutaneous systemic sclerosis with high titer anticentromere antibody positive, but also phospholipid antibodies.   2. Severe multiple digital ulcers, on fingers and toes. Overall symptoms of Raynaud's seems to be worse.   3. Marked keratoconjunctivitis sicca over the last several years.   4. Marked iron deficiency anemia responsive to IV iron.   5. Diffuse musculoskeletal pain for which she has been taking Naprosyn.   6. Development of 2+ bilateral lower extremity edema with the addition of diltiazem to her regimen and prior contraindication to dihydropyridines secondary to lip swelling and tingling with amlodipine.   7. Marked fatigue and diffuse clinical weakness.   8. Dyspnea on exertion with lower extremity edema and other features including the anticentromere positivity worrisome for the potential development of pulmonary hypertension.   9. Marked GERD with associated dysphagia.   10. Status post gastric bypass with a history of intermittent constipation and diarrhea potentially consistent with bacterial small bowel overgrowth versus other gut effects of the prior surgery.   11. History of positive rheumatoid factor consistent with MCTD, given the remainder of her clinical presentation.   12. Progressive Jaw tightening with inability to open mouth wider than 1 inch.        S/P gastric bypass 07/23/2009     Priority: Medium     Vitamin B 12 deficiency 07/23/2009     Priority: Medium     Low back pain - Suspect SI Joint dysfunction 09/27/2005     Priority: Medium     September 27, 2005: Started 2 weeks ago.  bilateral buttocks  - better with walking, worse with sitting and driving.  Tight/stiff - lossens up.  Painful to lay down.  Constant ache.  8-10/10 at its worse.  No injury.  Tender with massage.  No radiculopathy.  Relief with muscle relaxant.       Chronic cough 09/27/2005     Priority: Medium     September 27, 2005: Persistent cough occl productive of stringy mucus, wheezes with cough.  Started 2 weeks ago.  No rhinorrhea or post nasal drip        Prolonged QTc 460 ms,  at hr 67 06/24/2005     Priority: Medium     June 24, 2005: Will check calcium, magnesium, potassium.  Will compare to old EKGs.  Reviewed meds and none are on the list for prolongation.  Anesthesiology should be aware at surgery of prolonged QT.       HTN (hypertension) 03/02/2005     Priority: Medium     April 22, 2005: BP controlled on prinizide 40/25, norvasc 5 September 27, 2005: BP controlled/marginal off meds post-gastric bypass.  Problem list name updated by automated process. Provider to review    Overview:   Normal EF, no significant pulmonary hypertension.           Past Medical History:    Past Medical History:   Diagnosis Date     Anemia      Bariatric surgery status 06/27/2005     Cellulitis of leg 06/06/2013     Esophageal reflux      Hyperplastic colonic polyp      Hypovitaminosis D 2011     ILD (interstitial lung disease) (H)      Kidney stone 04/29/2007     Kidney stone 05/10/2007     Limb ischemia; ulcers of fingertips 04/22/2013     Other chronic pain      Raynaud's syndrome      Rheumatoid arthritis (H) \     Scleroderma (H)      Sjogren-Jake syndrome      Systemic sclerosis (H) 2009     Unspecified essential hypertension        Past Surgical History:    Past Surgical History:   Procedure Laterality Date     BIOPSY MUSCLE DIAGNOSTIC (LOCATION) Right 7/19/2021    Procedure: Right SURAL nerve BIOPSY;  Surgeon: Farooq Abel MD;  Location: [facility] OR     BRONCHOSCOPY FLEXIBLE,L CRYOBIOPSY N/A 10/06/2020    Procedure: BRONCHOSCOPY,  FLEXIBLE, WITH TRANSBRONCHIAL BIOPSY x4 USING CRYOPROBE, bronchial alveolar lavage;  Surgeon: Teddy Mnedez MD;  Location: [facility] OR     BYPASS GASTRIC, CHOLECYSTECTOMY, COMBINED  06/27/2005    [facility]     CHOLECYSTECTOMY       COLONOSCOPY       COLONOSCOPY N/A 11/17/2020    Procedure: COLONOSCOPY, WITH POLYPECTOMY AND BIOPSY;  Surgeon: Jamie Cárdenas MD;  [place] [facility] [place]     CV HEART CATHETERIZATION WITH POSSIBLE INTERVENTION Left 12/21/2021    Procedure: Heart Catheterization with Possible Intervention;  Surgeon: Wesley Mclean MD;  Location: [facility] CARDIAC CATH LAB     ESOPHAGOSCOPY, GASTROSCOPY, DUODENOSCOPY (EGD), COMBINED N/A 03/10/2016    Procedure: COMBINED ESOPHAGOSCOPY, GASTROSCOPY, DUODENOSCOPY (EGD), BIOPSY SINGLE OR MULTIPLE;  Surgeon: Tricia Zambrano MD;  [facility]     ESOPHAGOSCOPY, GASTROSCOPY, DUODENOSCOPY (EGD), COMBINED N/A 11/17/2020    Procedure: ESOPHAGOGASTRODUODENOSCOPY, WITH BIOPSY and Dilation;  Surgeon: Jamie Cárdenas MD;  Location: [facility] [place]     INNER EAR SURGERY       PICC INSERTION  06/05/2013    5fr DL Power PICC, 44cm, left basilic vein, tip in low SVC     SURGICAL HISTORY OF -       Left ear surgery - incus transition, tympanoplasty     SURGICAL HISTORY OF -   06/01/2005    Hiatal hernia repair     TONSILLECTOMY         Family History:    Family History   Problem Relation Age of Onset     Cerebrovascular Disease Father      Heart Disease Father      Hypertension Father      Heart Disease Mother      Hypertension Mother      Chronic Obstructive Pulmonary Disease Sister      Heart Disease Sister      Hypertension Sister      Cerebrovascular Disease Brother      Heart Disease Brother      Kidney Disease Brother         on dialysis     Diabetes Brother         borderline     No Known Problems Son      No Known Problems Son      No Known Problems Daughter      Cancer Brother         small cell     Heart Disease Brother      Heart Disease Brother       Heart Disease Brother      Heart Disease Brother         sunny A     Heart Disease Sister      Substance Abuse Sister         alcoholism     Crohn's Disease No family hx of      Ulcerative Colitis No family hx of      GERD No family hx of      Celiac Disease No family hx of      Nephrolithiasis No family hx of      Parathyroid Disorders No family hx of      Calcium Disorder No family hx of        Social History:  Marital Status:   [2]  Social History     Tobacco Use     Smoking status: Never Smoker     Smokeless tobacco: Never Used   Substance Use Topics     Alcohol use: Yes     Comment: occassionally     Drug use: No        Medications:    albuterol (PROAIR HFA/PROVENTIL HFA/VENTOLIN HFA) 108 (90 Base) MCG/ACT inhaler  alcohol swab prep pads  aspirin (ASA) 81 MG chewable tablet  atorvastatin (LIPITOR) 40 MG tablet  azaTHIOprine (IMURAN) 50 MG tablet  blood glucose (NO BRAND SPECIFIED) test strip  blood glucose calibration (NO BRAND SPECIFIED) solution  blood glucose monitoring (NO BRAND SPECIFIED) meter device kit  clonazePAM (KLONOPIN) 0.5 MG tablet  folic acid (FOLVITE) 1 MG tablet  losartan (COZAAR) 50 MG tablet  magnesium oxide (MAG-OX) 400 MG tablet  metoprolol succinate ER (TOPROL XL) 50 MG 24 hr tablet  naproxen (NAPROSYN) 500 MG tablet  nitroGLYcerin (NITROSTAT) 0.3 MG sublingual tablet  omeprazole (PRILOSEC) 20 MG DR capsule  pantoprazole (PROTONIX) 40 MG EC tablet  potassium chloride ER (MICRO-K) 10 MEQ CR capsule  predniSONE (DELTASONE) 10 MG tablet  spironolactone (ALDACTONE) 25 MG tablet  sulfamethoxazole-trimethoprim (BACTRIM) 400-80 MG tablet  thin (NO BRAND SPECIFIED) lancets  torsemide (DEMADEX) 20 MG tablet  vitamin D2 (ERGOCALCIFEROL) 63500 units (1250 mcg) capsule          Review of Systems   Constitutional: Positive for fatigue. Negative for appetite change, chills and fever.   HENT: Negative.    Eyes: Negative.    Respiratory: Positive for shortness of breath.    Cardiovascular:  Positive for palpitations.   Gastrointestinal: Negative.    Endocrine: Negative.    Genitourinary: Negative.    Musculoskeletal: Positive for arthralgias.   Skin: Negative.  Negative for rash.   Neurological: Negative.    Psychiatric/Behavioral: Negative.    All other systems reviewed and are negative.      Physical Exam   BP: 121/78  Pulse: 92  Temp: 97.4  F (36.3  C)  Resp: 27  Weight: 69.9 kg (154 lb)  SpO2: 100 %      Physical Exam  Vitals and nursing note reviewed.   Constitutional:       General: She is in acute distress.      Appearance: She is ill-appearing.      Comments: Cachectic appearing female who appears to be in some distress secondary to feeling short of breath.   HENT:      Head: Normocephalic and atraumatic.      Nose: Nose normal.      Mouth/Throat:      Mouth: Mucous membranes are dry.      Comments: Patient is edentulous.  Eyes:      Extraocular Movements: Extraocular movements intact.      Conjunctiva/sclera: Conjunctivae normal.      Pupils: Pupils are equal, round, and reactive to light.   Neck:      Vascular: No JVD.   Cardiovascular:      Rate and Rhythm: Tachycardia present. Rhythm irregular.      Pulses: Normal pulses.      Heart sounds:     No friction rub.   Pulmonary:      Effort: Respiratory distress (Patient is tachypneic but without wheeze, rales, or rhonchi noted on auscultation of the lungs.) present.   Abdominal:      General: Abdomen is flat.   Musculoskeletal:         General: No swelling.      Cervical back: No rigidity.      Right lower leg: No edema.      Left lower leg: No edema.   Skin:     Comments: Patient skin is cool to the touch no obvious erythema or rash noted.   Neurological:      Mental Status: She is alert and oriented to person, place, and time.      Sensory: Sensory deficit (Decreased touch sensation of the distal legs.  Patient states that she has peripheral neuropathy and her decreased sensation is not worse than usual today.) present.   Psychiatric:          Mood and Affect: Mood normal.         Behavior: Behavior normal.         Thought Content: Thought content normal.         ED Course                 Procedures              EKG Interpretation:      Interpreted by Bacilio Vera DO  Time reviewed: 06:25  Symptoms at time of EKG: Dyspnea  Rhythm: sinus tachycardia  Rate: 110-120  Axis: Normal  Ectopy: premature atrial contraction  Conduction: LVH  ST Segments/ T Waves: Non-specific ST-T wave changes  Q Waves: anterior leads  Comparison to prior: Compared to an EKG done 12/22/2021 this EKG actually looks improved with resolution of the multiple T wave inversions seen on that EKG.  She was in sinus tach at that time as well.    Clinical Impression: sinus tachycardia    Critical Care time:  none            Results for orders placed or performed during the hospital encounter of 04/04/22 (from the past 24 hour(s))   CBC with platelets differential    Narrative    The following orders were created for panel order CBC with platelets differential.  Procedure                               Abnormality         Status                     ---------                               -----------         ------                     CBC with platelets and d...[[id number]]                      Final result                 Please view results for these tests on the individual orders.   D dimer quantitative   Result Value Ref Range    D-Dimer Quantitative 1.41 (H) 0.00 - 0.50 ug/mL FEU    Narrative    This D-dimer assay is intended for use in conjunction with a clinical pretest probability assessment model to exclude pulmonary embolism (PE) and deep venous thrombosis (DVT) in outpatients suspected of PE or DVT. The cut-off value is 0.50 ug/mL FEU.   Troponin I   Result Value Ref Range    Troponin I High Sensitivity 39 <54 ng/L   Basic metabolic panel   Result Value Ref Range    Sodium 132 (L) 133 - 144 mmol/L    Potassium 3.7 3.4 - 5.3 mmol/L    Chloride 98 94 - 109 mmol/L    Carbon  Dioxide (CO2) 25 20 - 32 mmol/L    Anion Gap 9 3 - 14 mmol/L    Urea Nitrogen 14 7 - 30 mg/dL    Creatinine 0.75 0.52 - 1.04 mg/dL    Calcium 8.8 8.5 - 10.1 mg/dL    Glucose 175 (H) 70 - 99 mg/dL    GFR Estimate >90 >60 mL/min/1.73m2   Blood gas venous   Result Value Ref Range    pH Venous 7.44 (H) 7.32 - 7.43    pCO2 Venous 39 (L) 40 - 50 mm Hg    pO2 Venous 26 25 - 47 mm Hg    Bicarbonate Venous 26 21 - 28 mmol/L    Base Excess/Deficit (+/-) 1.8 -7.7 - 1.9 mmol/L    FIO2 28    TSH with free T4 reflex   Result Value Ref Range    TSH 0.86 0.40 - 4.00 mU/L   CBC with platelets and differential   Result Value Ref Range    WBC Count 5.0 4.0 - 11.0 10e3/uL    RBC Count 4.43 3.80 - 5.20 10e6/uL    Hemoglobin 13.1 11.7 - 15.7 g/dL    Hematocrit 39.5 35.0 - 47.0 %    MCV 89 78 - 100 fL    MCH 29.6 26.5 - 33.0 pg    MCHC 33.2 31.5 - 36.5 g/dL    RDW 14.6 10.0 - 15.0 %    Platelet Count 302 150 - 450 10e3/uL    % Neutrophils 69 %    % Lymphocytes 21 %    % Monocytes 9 %    % Eosinophils 0 %    % Basophils 0 %    % Immature Granulocytes 1 %    NRBCs per 100 WBC 0 <1 /100    Absolute Neutrophils 3.4 1.6 - 8.3 10e3/uL    Absolute Lymphocytes 1.1 0.8 - 5.3 10e3/uL    Absolute Monocytes 0.5 0.0 - 1.3 10e3/uL    Absolute Eosinophils 0.0 0.0 - 0.7 10e3/uL    Absolute Basophils 0.0 0.0 - 0.2 10e3/uL    Absolute Immature Granulocytes 0.1 <=0.4 10e3/uL    Absolute NRBCs 0.0 10e3/uL   XR Chest Port 1 View    Narrative    EXAM: XR CHEST PORT 1 VIEW  LOCATION: [facility]  DATE/TIME: 4/4/2022 6:38 AM    INDICATION: SOB, chest pain.  COMPARISON: 10/6/2020.      Impression    IMPRESSION: Mild cardiac enlargement. Pulmonary vascularity within normal limits. Question a mild interstitial infiltrate in the right lower lung. Clinical correlation. Mild linear scarring or atelectasis left midlung. Aortic calcification. No   significant bony abnormalities. Remainder unremarkable.                    Medications    iopamidol (ISOVUE-370) solution 500 mL (has no administration in time range)   sodium chloride 0.9 % bag 100mL for CT scan flush use (has no administration in time range)   metoprolol succinate ER (TOPROL-XL) 24 hr tablet 50 mg (50 mg Oral Given 4/4/22 0602)   aspirin (ASA) chewable tablet 324 mg (324 mg Oral Given 4/4/22 0637)       Assessments & Plan (with Medical Decision Making)  61-year-old female to the ER via ambulance from her home secondary to concerns of shortness of breath that started last night.  Patient found to have evidence for rapid heart rate per EMS which appeared to be possibly atrial fibrillation with RVR.  Patient's heart rate seem to slow in route to the hospital and arrival here she was in the 100 to 120/min range and appeared to be in a sinus tach.  Patient recently underwent a steroid wean to facilitate a cardiac PET scan and is scheduled for a cardiac catheterization procedure tomorrow.  She states that since she started that steroid wean and was on a special diet for 3 days before the PET scan she has felt not well.  Patient's initial blood test results came back with an elevated D-dimer.  The rest of her lab test results are still pending at shift change.  Given the tachycardia and the shortness of breath symptoms a CT PE study was ordered.      Patient was signed out to Dr. Domenico Lagunas at shift change.     I have reviewed the nursing notes.         Final diagnoses:   Shortness of breath   Tachycardia       4/4/2022   [facility] EMERGENCY DEPT     Bacilio Vera,   04/04/22 0708

## 2022-04-05 ENCOUNTER — HOSPITAL ENCOUNTER (INPATIENT)
Facility: CLINIC | Age: 62
LOS: 3 days | Discharge: HOME | End: 2022-04-08
Attending: INTERNAL MEDICINE | Admitting: INTERNAL MEDICINE
Payer: COMMERCIAL

## 2022-04-05 ENCOUNTER — PATIENT OUTREACH (OUTPATIENT)
Dept: CARE COORDINATION | Facility: CLINIC | Age: 62
End: 2022-04-05

## 2022-04-05 ENCOUNTER — APPOINTMENT (OUTPATIENT)
Dept: LAB | Facility: CLINIC | Age: 62
End: 2022-04-05
Attending: INTERNAL MEDICINE
Payer: COMMERCIAL

## 2022-04-05 ENCOUNTER — APPOINTMENT (OUTPATIENT)
Dept: MEDSURG UNIT | Facility: CLINIC | Age: 62
End: 2022-04-05
Attending: INTERNAL MEDICINE
Payer: COMMERCIAL

## 2022-04-05 DIAGNOSIS — D86.9 SARCOIDOSIS: ICD-10-CM

## 2022-04-05 DIAGNOSIS — Z71.89 OTHER SPECIFIED COUNSELING: ICD-10-CM

## 2022-04-05 DIAGNOSIS — R06.09 DOE (DYSPNEA ON EXERTION): ICD-10-CM

## 2022-04-05 DIAGNOSIS — I27.20 PULMONARY HYPERTENSION (H): ICD-10-CM

## 2022-04-05 DIAGNOSIS — I48.0 PAROXYSMAL ATRIAL FIBRILLATION (H): Primary | ICD-10-CM

## 2022-04-05 LAB
ALBUMIN SERPL-MCNC: 2.4 G/DL (ref 3.4–5)
ALP SERPL-CCNC: 98 U/L (ref 40–150)
ALT SERPL W P-5'-P-CCNC: 44 U/L (ref 0–50)
ANION GAP SERPL CALCULATED.3IONS-SCNC: 11 MMOL/L (ref 3–14)
AST SERPL W P-5'-P-CCNC: 60 U/L (ref 0–45)
BASOPHILS # BLD AUTO: 0 10E3/UL (ref 0–0.2)
BASOPHILS NFR BLD AUTO: 0 %
BILIRUB SERPL-MCNC: 0.6 MG/DL (ref 0.2–1.3)
BUN SERPL-MCNC: 15 MG/DL (ref 7–30)
CALCIUM SERPL-MCNC: 8.9 MG/DL (ref 8.5–10.1)
CHLORIDE BLD-SCNC: 102 MMOL/L (ref 94–109)
CO2 SERPL-SCNC: 25 MMOL/L (ref 20–32)
CREAT SERPL-MCNC: 0.66 MG/DL (ref 0.52–1.04)
EOSINOPHIL # BLD AUTO: 0 10E3/UL (ref 0–0.7)
EOSINOPHIL NFR BLD AUTO: 0 %
ERYTHROCYTE [DISTWIDTH] IN BLOOD BY AUTOMATED COUNT: 14.7 % (ref 10–15)
GFR SERPL CREATININE-BSD FRML MDRD: >90 ML/MIN/1.73M2
GLUCOSE BLD-MCNC: 72 MG/DL (ref 70–99)
HCT VFR BLD AUTO: 40.5 % (ref 35–47)
HGB BLD-MCNC: 13 G/DL (ref 11.7–15.7)
IMM GRANULOCYTES # BLD: 0 10E3/UL
IMM GRANULOCYTES NFR BLD: 1 %
LYMPHOCYTES # BLD AUTO: 1.2 10E3/UL (ref 0.8–5.3)
LYMPHOCYTES NFR BLD AUTO: 26 %
MAGNESIUM SERPL-MCNC: 1.5 MG/DL (ref 1.6–2.3)
MCH RBC QN AUTO: 29.5 PG (ref 26.5–33)
MCHC RBC AUTO-ENTMCNC: 32.1 G/DL (ref 31.5–36.5)
MCV RBC AUTO: 92 FL (ref 78–100)
MONOCYTES # BLD AUTO: 0.7 10E3/UL (ref 0–1.3)
MONOCYTES NFR BLD AUTO: 16 %
NEUTROPHILS # BLD AUTO: 2.6 10E3/UL (ref 1.6–8.3)
NEUTROPHILS NFR BLD AUTO: 57 %
NRBC # BLD AUTO: 0 10E3/UL
NRBC BLD AUTO-RTO: 0 /100
NT-PROBNP SERPL-MCNC: ABNORMAL PG/ML (ref 0–900)
PLATELET # BLD AUTO: 292 10E3/UL (ref 150–450)
POTASSIUM BLD-SCNC: 3.4 MMOL/L (ref 3.4–5.3)
PROT SERPL-MCNC: 6.2 G/DL (ref 6.8–8.8)
RBC # BLD AUTO: 4.4 10E6/UL (ref 3.8–5.2)
SODIUM SERPL-SCNC: 138 MMOL/L (ref 133–144)
WBC # BLD AUTO: 4.6 10E3/UL (ref 4–11)

## 2022-04-05 PROCEDURE — 250N000011 HC RX IP 250 OP 636: Performed by: INTERNAL MEDICINE

## 2022-04-05 PROCEDURE — 99222 1ST HOSP IP/OBS MODERATE 55: CPT | Mod: 25 | Performed by: INTERNAL MEDICINE

## 2022-04-05 PROCEDURE — 250N000009 HC RX 250: Performed by: INTERNAL MEDICINE

## 2022-04-05 PROCEDURE — 272N000001 HC OR GENERAL SUPPLY STERILE: Performed by: INTERNAL MEDICINE

## 2022-04-05 PROCEDURE — 250N000013 HC RX MED GY IP 250 OP 250 PS 637: Performed by: NURSE PRACTITIONER

## 2022-04-05 PROCEDURE — 999N000142 HC STATISTIC PROCEDURE PREP ONLY

## 2022-04-05 PROCEDURE — 83735 ASSAY OF MAGNESIUM: CPT | Performed by: PHYSICIAN ASSISTANT

## 2022-04-05 PROCEDURE — 80053 COMPREHEN METABOLIC PANEL: CPT | Performed by: INTERNAL MEDICINE

## 2022-04-05 PROCEDURE — 93005 ELECTROCARDIOGRAM TRACING: CPT

## 2022-04-05 PROCEDURE — 83880 ASSAY OF NATRIURETIC PEPTIDE: CPT | Performed by: INTERNAL MEDICINE

## 2022-04-05 PROCEDURE — 85025 COMPLETE CBC W/AUTO DIFF WBC: CPT | Performed by: INTERNAL MEDICINE

## 2022-04-05 PROCEDURE — 93010 ELECTROCARDIOGRAM REPORT: CPT | Mod: 76 | Performed by: INTERNAL MEDICINE

## 2022-04-05 PROCEDURE — 214N000001 HC R&B CCU UMMC

## 2022-04-05 PROCEDURE — 99207 PR SC NO CHARGE VISIT: CPT | Performed by: INTERNAL MEDICINE

## 2022-04-05 PROCEDURE — 36415 COLL VENOUS BLD VENIPUNCTURE: CPT | Performed by: INTERNAL MEDICINE

## 2022-04-05 RX ORDER — FOLIC ACID 1 MG/1
1 TABLET ORAL DAILY
Status: DISCONTINUED | OUTPATIENT
Start: 2022-04-06 | End: 2022-04-08 | Stop reason: HOSPADM

## 2022-04-05 RX ORDER — POTASSIUM CHLORIDE 750 MG/1
40 TABLET, EXTENDED RELEASE ORAL ONCE
Status: COMPLETED | OUTPATIENT
Start: 2022-04-05 | End: 2022-04-05

## 2022-04-05 RX ORDER — ALBUTEROL SULFATE 90 UG/1
2 AEROSOL, METERED RESPIRATORY (INHALATION) EVERY 6 HOURS PRN
Status: DISCONTINUED | OUTPATIENT
Start: 2022-04-05 | End: 2022-04-08 | Stop reason: HOSPADM

## 2022-04-05 RX ORDER — MAGNESIUM OXIDE 400 MG/1
400 TABLET ORAL 3 TIMES DAILY
Status: ACTIVE | OUTPATIENT
Start: 2022-04-05 | End: 2022-04-07

## 2022-04-05 RX ORDER — METOPROLOL TARTRATE 1 MG/ML
INJECTION, SOLUTION INTRAVENOUS
Status: DISCONTINUED | OUTPATIENT
Start: 2022-04-05 | End: 2022-04-05 | Stop reason: HOSPADM

## 2022-04-05 RX ORDER — MAGNESIUM OXIDE 400 MG/1
400 TABLET ORAL DAILY
Status: DISCONTINUED | OUTPATIENT
Start: 2022-04-05 | End: 2022-04-08 | Stop reason: HOSPADM

## 2022-04-05 RX ORDER — PREDNISONE 10 MG/1
10 TABLET ORAL DAILY
Status: DISCONTINUED | OUTPATIENT
Start: 2022-04-05 | End: 2022-04-05

## 2022-04-05 RX ORDER — SPIRONOLACTONE 25 MG/1
25 TABLET ORAL DAILY
Status: DISCONTINUED | OUTPATIENT
Start: 2022-04-05 | End: 2022-04-05

## 2022-04-05 RX ORDER — LOSARTAN POTASSIUM 25 MG/1
25 TABLET ORAL DAILY
Status: DISCONTINUED | OUTPATIENT
Start: 2022-04-06 | End: 2022-04-08 | Stop reason: HOSPADM

## 2022-04-05 RX ORDER — PANTOPRAZOLE SODIUM 40 MG/1
40 TABLET, DELAYED RELEASE ORAL DAILY
Status: DISCONTINUED | OUTPATIENT
Start: 2022-04-06 | End: 2022-04-08 | Stop reason: HOSPADM

## 2022-04-05 RX ORDER — POTASSIUM CHLORIDE 750 MG/1
30 CAPSULE, EXTENDED RELEASE ORAL DAILY
Status: DISCONTINUED | OUTPATIENT
Start: 2022-04-05 | End: 2022-04-05

## 2022-04-05 RX ORDER — SPIRONOLACTONE 25 MG/1
25 TABLET ORAL DAILY
Status: DISCONTINUED | OUTPATIENT
Start: 2022-04-06 | End: 2022-04-08 | Stop reason: HOSPADM

## 2022-04-05 RX ORDER — FOLIC ACID 1 MG/1
1 TABLET ORAL DAILY
Status: DISCONTINUED | OUTPATIENT
Start: 2022-04-05 | End: 2022-04-05

## 2022-04-05 RX ORDER — ATORVASTATIN CALCIUM 40 MG/1
40 TABLET, FILM COATED ORAL EVERY EVENING
Status: DISCONTINUED | OUTPATIENT
Start: 2022-04-05 | End: 2022-04-08 | Stop reason: HOSPADM

## 2022-04-05 RX ORDER — MAGNESIUM SULFATE HEPTAHYDRATE 40 MG/ML
4 INJECTION, SOLUTION INTRAVENOUS ONCE
Status: COMPLETED | OUTPATIENT
Start: 2022-04-05 | End: 2022-04-05

## 2022-04-05 RX ORDER — ASPIRIN 81 MG/1
81 TABLET, CHEWABLE ORAL DAILY
Status: DISCONTINUED | OUTPATIENT
Start: 2022-04-06 | End: 2022-04-08 | Stop reason: HOSPADM

## 2022-04-05 RX ORDER — MAGNESIUM HYDROXIDE/ALUMINUM HYDROXICE/SIMETHICONE 120; 1200; 1200 MG/30ML; MG/30ML; MG/30ML
30 SUSPENSION ORAL EVERY 4 HOURS PRN
Status: DISCONTINUED | OUTPATIENT
Start: 2022-04-05 | End: 2022-04-08 | Stop reason: HOSPADM

## 2022-04-05 RX ORDER — PREDNISONE 10 MG/1
10 TABLET ORAL DAILY
Status: DISCONTINUED | OUTPATIENT
Start: 2022-04-06 | End: 2022-04-08 | Stop reason: HOSPADM

## 2022-04-05 RX ORDER — FUROSEMIDE 10 MG/ML
40 INJECTION INTRAMUSCULAR; INTRAVENOUS ONCE
Status: DISCONTINUED | OUTPATIENT
Start: 2022-04-05 | End: 2022-04-05

## 2022-04-05 RX ORDER — TORSEMIDE 20 MG/1
20 TABLET ORAL
Status: DISCONTINUED | OUTPATIENT
Start: 2022-04-05 | End: 2022-04-08 | Stop reason: HOSPADM

## 2022-04-05 RX ORDER — PANTOPRAZOLE SODIUM 40 MG/1
40 TABLET, DELAYED RELEASE ORAL DAILY
Status: DISCONTINUED | OUTPATIENT
Start: 2022-04-05 | End: 2022-04-05

## 2022-04-05 RX ORDER — CLONAZEPAM 0.5 MG/1
0.5 TABLET ORAL
Status: DISCONTINUED | OUTPATIENT
Start: 2022-04-05 | End: 2022-04-08 | Stop reason: HOSPADM

## 2022-04-05 RX ORDER — ACETAMINOPHEN 325 MG/1
650 TABLET ORAL EVERY 4 HOURS PRN
Status: DISCONTINUED | OUTPATIENT
Start: 2022-04-05 | End: 2022-04-08 | Stop reason: HOSPADM

## 2022-04-05 RX ORDER — AZATHIOPRINE 50 MG/1
150 TABLET ORAL DAILY
Status: DISCONTINUED | OUTPATIENT
Start: 2022-04-05 | End: 2022-04-05

## 2022-04-05 RX ORDER — LIDOCAINE 40 MG/G
CREAM TOPICAL
Status: DISCONTINUED | OUTPATIENT
Start: 2022-04-05 | End: 2022-04-08 | Stop reason: HOSPADM

## 2022-04-05 RX ORDER — AZATHIOPRINE 50 MG/1
150 TABLET ORAL DAILY
Status: DISCONTINUED | OUTPATIENT
Start: 2022-04-06 | End: 2022-04-08 | Stop reason: HOSPADM

## 2022-04-05 RX ORDER — ACETAMINOPHEN 650 MG/1
650 SUPPOSITORY RECTAL EVERY 4 HOURS PRN
Status: DISCONTINUED | OUTPATIENT
Start: 2022-04-05 | End: 2022-04-08 | Stop reason: HOSPADM

## 2022-04-05 RX ORDER — NITROGLYCERIN 0.4 MG/1
0.4 TABLET SUBLINGUAL EVERY 5 MIN PRN
Status: DISCONTINUED | OUTPATIENT
Start: 2022-04-05 | End: 2022-04-08 | Stop reason: HOSPADM

## 2022-04-05 RX ORDER — METOPROLOL SUCCINATE 50 MG/1
50 TABLET, EXTENDED RELEASE ORAL DAILY
Status: DISCONTINUED | OUTPATIENT
Start: 2022-04-06 | End: 2022-04-08 | Stop reason: HOSPADM

## 2022-04-05 RX ORDER — ASPIRIN 81 MG/1
81 TABLET, CHEWABLE ORAL DAILY
Status: DISCONTINUED | OUTPATIENT
Start: 2022-04-05 | End: 2022-04-05

## 2022-04-05 RX ORDER — POTASSIUM CHLORIDE 750 MG/1
30 CAPSULE, EXTENDED RELEASE ORAL DAILY
Status: DISCONTINUED | OUTPATIENT
Start: 2022-04-06 | End: 2022-04-07

## 2022-04-05 RX ORDER — LOSARTAN POTASSIUM 25 MG/1
25 TABLET ORAL DAILY
Status: DISCONTINUED | OUTPATIENT
Start: 2022-04-05 | End: 2022-04-05

## 2022-04-05 RX ORDER — LIDOCAINE 40 MG/G
CREAM TOPICAL
Status: COMPLETED | OUTPATIENT
Start: 2022-04-05 | End: 2022-04-05

## 2022-04-05 RX ORDER — METOPROLOL SUCCINATE 50 MG/1
50 TABLET, EXTENDED RELEASE ORAL DAILY
Status: DISCONTINUED | OUTPATIENT
Start: 2022-04-05 | End: 2022-04-05

## 2022-04-05 RX ADMIN — POTASSIUM CHLORIDE 40 MEQ: 750 TABLET, EXTENDED RELEASE ORAL at 12:39

## 2022-04-05 RX ADMIN — Medication 400 MG: at 12:39

## 2022-04-05 RX ADMIN — MAGNESIUM SULFATE IN WATER 4 G: 40 INJECTION, SOLUTION INTRAVENOUS at 14:42

## 2022-04-05 RX ADMIN — APIXABAN 5 MG: 5 TABLET, FILM COATED ORAL at 19:32

## 2022-04-05 RX ADMIN — PANTOPRAZOLE SODIUM 40 MG: 40 TABLET, DELAYED RELEASE ORAL at 13:47

## 2022-04-05 RX ADMIN — ATORVASTATIN CALCIUM 40 MG: 40 TABLET, FILM COATED ORAL at 19:32

## 2022-04-05 RX ADMIN — LIDOCAINE: 40 CREAM TOPICAL at 10:50

## 2022-04-05 RX ADMIN — TORSEMIDE 20 MG: 20 TABLET ORAL at 17:11

## 2022-04-05 ASSESSMENT — ACTIVITIES OF DAILY LIVING (ADL)
ADLS_ACUITY_SCORE: 14
ADLS_ACUITY_SCORE: 10
ADLS_ACUITY_SCORE: 10
ADLS_ACUITY_SCORE: 14
ADLS_ACUITY_SCORE: 10
ADLS_ACUITY_SCORE: 14
ADLS_ACUITY_SCORE: 10
ADLS_ACUITY_SCORE: 14
ADLS_ACUITY_SCORE: 10

## 2022-04-05 NOTE — CONSULTS
Discharge Pharmacy Test Claim    Patient has commercial prescription coverage through [facility]. Eliquis and xarelto are covered with $60/mo copay.     Test Claim Copay Note   eliquis 60.00 eligible for $10 copay card   xarelto 60.00 eligible for $10 copay card     Resources  Patient is eligible to use copay savings cards that would reduce the copay of eliquis or xarelto to $10/mo. Patient will have to activate a savings card through the links below. [facility] also has one-time use 30-day free trial vouchers available for either DOAC.    Copay saving card links  Eliquis: https://Noxilizersetrial.Zinitix/6822/landingPage.html?src=[employer]#  Xarelto: https://www.xareltoHitMeUp.com/xarelto-cost#commercial-insurance    Rebeca Curry  [facility] Pharmacy Liaison  Ph: [phone] Pager: [phone]

## 2022-04-05 NOTE — Clinical Note
Patient education provided.    Patient is being referred per Savannah Booth for Spinal stenosis of lumbar region without neurogenic claudication, please contact patient for scheduling.

## 2022-04-05 NOTE — PROGRESS NOTES
Clinic Care Coordination Contact    Background: Care Coordination referral placed from [facility] discharge report for reason of patient meeting criteria for a TCM outreach call by [facility] team.    Assessment: Upon chart review, [facility] Team member will cancel/close the referral for TCM outreach due to reason below:    Patient has presented to Emergency Department or has been readmitted to hospital    Plan: Care Coordination referral for TCM outreach canceled.    Marielos Briones  [facility] Care Resource [facility]

## 2022-04-05 NOTE — PROGRESS NOTES
[facility]   Interventional Cardiology        Consenting/Education for Right Heart Catheterization/Endomyocardial Biopsy      Patient understands during the portion for right heart catheterization a fine tube (catheter) is put into the vein of the groin/neck.  It is carefully passed along until it reaches the heart and then goes up into the blood vessels of the lungs. This is done to measure a variety of pressures in your heart and can tell us how well the heart is filling and emptying, as well as monitor fluid status. While the catheter is in your neck/groin vein, a  Biopsy forceps  or pincers at the end of the catheter are used to take the tissue samples. This is may be repeated to get enough samples. The biopsy pieces are very small (one to two millimeters).     Patient also understands risks and complications of the procedure which include, but are not limited to bruising/swelling around the incision site, infection, bleeding, allergic reaction to local anesthetic, air embolism, arterial puncture, stroke, heart attack.     Patient verbalized understanding of risks and benefits of the right heart catheterization, and endomyocardial biopsy and has elected to proceed with the procedure.         Paco Butt PA-C  [facility] Cardiology Team

## 2022-04-05 NOTE — PROGRESS NOTES
Pt returned from cath lab accompanied by nurse at 1245.She was in afib with RVR she had just received metolprolol.Her first BP was 68/53 in afib but then she went back into SR and her BP has been stable since she has been in SR.Pt tolerating food and fluids.

## 2022-04-05 NOTE — PLAN OF CARE
Admission          4/5/2022 10:02 AM  -----------------------------------------------------------  Diagnosis:    Admitted from: 2A  Report given from: Gabrielle HOBSON  Via: phone  Accompanied by:   Family Aware of Admission: yes  Belongings: clothing, wallet, cash handed to , CC's.  Admission Profile: Complete  Teaching: Orientation to unit, call don't fall, use of call light, meal times, visiting hours,  when to call for the RN (angina/sob/dizzyness, etc.), and enforced importance of safety.  Access: R PIV  Telemetry: Placed on pt  Ht./Wt.: Complete  2 RN skin assessment: Done w/ Otilia SPIVEY Bruising on legs, Blue neck from surgical prep, a few tattoos.    Temp:  [97.1  F (36.2  C)-97.9  F (36.6  C)] 97.9  F (36.6  C)  Pulse:  [] 76  Resp:  [14-23] 18  BP: ()/(53-83) 105/69  SpO2:  [90 %-96 %] 93 %

## 2022-04-05 NOTE — PLAN OF CARE
Time of Care: 5801-4529    D: Admitted 4/5 for Afib w/ RVR.    I: Monitored vitals and assessed pt status.   Changed: Converted to SR.    A: A0x4. VSS, on RA. SR. Afebrile. SOB w/ activity. SBA. Pt able to make needs known.  at bedside. Pt was here for RHC but surgery was canceled, pt was found to be in AFib w/ RVR rates in the 140-160's and experiencing palpitations. Pt has been in SR since arriving to .     I/O this shift:  In: 240 [P.O.:240]  Out: 375 [Urine:375]    Temp:  [97.1  F (36.2  C)-97.9  F (36.6  C)] 97.9  F (36.6  C)  Pulse:  [] 76  Resp:  [14-23] 18  BP: ()/(53-83) 105/69  SpO2:  [90 %-99 %] 99 %      P: Continue to monitor Pt status and report changes to Cards 1. NPO at midnight for potential cardioversion 4/6.

## 2022-04-05 NOTE — DISCHARGE INSTRUCTIONS
[facility]                        Interventional Cardiology  Discharge Instructions   Post Right Heart Cath      AFTER YOU GO HOME:    DO drink plenty of fluids    DO resume your regular diet and medications unless otherwise instructed by your Primary Physician    Do Not scrub the procedure site vigorously    No lotion or powder to the puncture site for 3 days    CALL YOUR PRIMARY PHYSICIAN IF: You may resume all normal activity.  Monitor neck site for bleeding, swelling, or voice changes. If you notice bleeding or swelling immediately apply pressure to the site and call number below to speak with Cardiology Fellow.  If you experience any changes in your breathing you should call your doctor immediately or come to the closest Emergency Department.  Do not drive yourself.    ADDITIONAL INSTRUCTIONS: Medications: You are to resume all home medications including anticoagulation therapy unless otherwise advised by your primary cardiologist or nurse coordinator.    Follow Up: Per your primary cardiology team    If you have any questions or concerns regarding your procedure site please call [phone] at anytime and ask for Cardiology Fellow on call.  They are available 24 hours a day.  You may also contact the Cardiology Clinic after hours number at [phone].                                                       Telephone Numbers [phone] Monday-Friday 8:00 am to 4:30 pm    [phone] After 4:30 pm Monday-Friday, Weekends & Holidays  Ask for Interventional Cardiologist on call. Someone is on call 24 hours/day   [facility] toll free number [phone] Monday-Friday 8:00 am to 4:30 pm   [facility] Emergency Dept [phone]

## 2022-04-05 NOTE — H&P
[facility]   Cardiology Service  History and Physical      Jeanine Schuler MRN# [id number]   YOB: 1960 Age: 61 year old       Admission Date: 4/5/2022      Assessment and plan:   Jeanine Schuler is a 61 year old female with a history of, NICM LVEF 35% on echo, 44% on CMR, ILD, sarcoidosis, mixed connective tissue disorder, limited cutaneous systemic sclerosis followed by [facility] rheumatology, GERD and Raynauds who is being admitted post right heart cath for new atrial fibrillation.     1. New atrial fibrillation  2. Dyspnea  3. NICM LVEF 35% on echo 44% on CMR, NYHA II-III:  4. HTN  - 5 day history of intermittent worsening SOB. States starting 3/31 evening sudden bouts of worsening breathing. Of note she has had a recent taper of her chronic prednisone for PET scan 3/31/22. Presented to OSH ER on 4/4/22 for orthopnea and PND and found to be in a fib RVR en route. By the time she arrived to ER was in sinus tachycardia and dyspnea had improved. Endorses orthopnea, sleeping on 'multiple pillows and propped up' and ROUSE, denies chest pain, dizziness, syncope or near syncope or fluid retention. Denies recent URI symptoms but has had change in diet due to PET scan 3/31   - Presented to [facility] on 4/5/22 for RHC for cardiac sarcoidosis work up. Found to be in new a fib RVR, procedure aborted. Patient symptomatic with shortness of breath and hypotensive SBP 60's. IV Metoprolol given and quickly reverted to NSR shortly after with improved symptoms of shortness of breath.   - CHADSVASC: 3 for gender, HTN and heart failure. Start Eliquis 5 mg BID  - Goal Directed Medical Therapy:   ACEi/ARB: Continue Losartan.   BB: Continue  Toprol XL 50 mg daily.   Aldosterone antagonist: not currently on.   SCD prophylaxis: not currently indicated as LVEF last >35% and still optimizing GDMT.   Fluid status: nt BNP elevated at 11,273:  IV Lasix 40 mg once. Continue Torsemide.  - Telemetry  -  K > 4, Mg > 2  - Strict I&Os  - Daily weights    5. ILD, pulmonary sarcoid  6. Pulmonary HTN  7. Mixed connective tissue disease/limited systemic sclerosis  - Left upper lobe transbronchial cryobiopsy done 10/6/2020 showed nonnecrotizing granulomas with multi nucleated giant cells consistent with sarcoidosis. PFTs improved with Imuran and prednisone.  - Recent aggressive steroid taper for PET scan. Per patient, symptoms worsening rapidly with taper. Currently on Prednisone 10 mg daily (on 40 mg daily 3 weeks ago)  - Continue PTA Imuran 150 mg daily    8. Concern for cardiac sarcoidosis  - Presented at sarcoidosis conference July 2021 after lung biopsy confirmed sarcoidosis. Cardiac MRI 1/12/22; significant LGE, consistent with an infiltrative process such as sarcoidosis.   - PET myocardial perfusion 3/31:   - Plan for RHC and cardiac biopsy 4/5/22: aborted due to atrial fibrillation, will need to be rescheduled.     9. GERD  - Continue PTA PPI    10. Chronic iron deficiency anemia  - Hgb 13.0 on admission  - PTA IV Iron infusions    FEN: General diet, NPO @ 0000  Code status: Full  Prophylaxis:  Xarelto  Isolation: None  Disposition: 1-2 days pending medical management    Clinically Significant Risk Factors Present on Admission          # Hypomagnesemia: Mg = 1.5 mg/dL (Ref range: 1.6 - 2.3 mg/dL) on admission, will replace as needed   # Hypoalbuminemia: Albumin = 2.4 g/dL (Ref range: 3.4 - 5.0 g/dL) on admission, will monitor as appropriate    # Platelet Defect: home medication list includes an antiplatelet medication     Cardiovascular: Cardiac Arrhythmia: Atrial fibrillation: Paroxysmal    Gastroenterology: GERD    Pulmonary Heart Disease (Pulmonary hypertension or Cor pulmonale): Pulmonary hypertension, unspecified      Patient seen and discussed with Dr. Nikolay DAWN, CNP  [facility] Cardiology  [phone]    Time Spent on this Encounter   I spent 80 minutes on the unit/floor managing the care  of Jeanine Schuler. Over 50% of my time was spent on the following:   - Counseling the patient and/or family regarding: diagnostic results, prognosis, risks and benefits of treatment options, recommended follow-up, medical compliance and prevention of disease  - Coordination of care with the: consultant(s), care coordinator/ and nurse      LÓPEZ Rivera CNP      Chief Complaint: shortness of breath    HPI:   Jeanine Schuler is a 61 year old female with a history of ILD, raynaud's syndrome, mixed connective tissue disease/limited cutaneous systemic sclerosis, possible cardiac sarcoidosis, NICM LVEF 35% on echo, 44% on CMR, who is being admitted post right heart cath for new atrial fibrillation. Found to be in a fib rvr with quick resolution to NSR after IV metoprolol. Fluid overloaded with nt BNP 14416. Continue diuresis, telemetry and electrolyte replacement.     Past Medical History:   Diagnosis Date     Anemia      Bariatric surgery status 06/27/2005    [facility]di [facility];     Cellulitis of leg 06/06/2013     Esophageal reflux     Better post-hiatal hernia repair and weight loss     Hyperplastic colonic polyp      Hypovitaminosis D 2011     ILD (interstitial lung disease) (H)      Kidney stone 04/29/2007     Kidney stone 05/10/2007    surgically removed     Limb ischemia; ulcers of fingertips 04/22/2013     Other chronic pain     Left shoulder - rheumatoid arthritis     Raynaud's syndrome      Rheumatoid arthritis (H) \     Scleroderma (H)      Sjogren-Jake syndrome      Systemic sclerosis (H) 2009     Unspecified essential hypertension        Past Surgical History:   Procedure Laterality Date     BIOPSY MUSCLE DIAGNOSTIC (LOCATION) Right 7/19/2021    Procedure: Right SURAL nerve BIOPSY;  Surgeon: Farooq Abel MD;  Location: [facility] OR     BRONCHOSCOPY FLEXIBLE,L CRYOBIOPSY N/A 10/06/2020    Procedure: BRONCHOSCOPY, FLEXIBLE, WITH TRANSBRONCHIAL BIOPSY x4 USING  CRYOPROBE, bronchial alveolar lavage;  Surgeon: Teddy Mendez MD;  Location: [facility] OR     BYPASS GASTRIC, CHOLECYSTECTOMY, COMBINED  06/27/2005    [facility]     CHOLECYSTECTOMY       COLONOSCOPY       COLONOSCOPY N/A 11/17/2020    Procedure: COLONOSCOPY, WITH POLYPECTOMY AND BIOPSY;  Surgeon: Jamie Cárdenas MD;  Location: [facility] OR     CV HEART CATHETERIZATION WITH POSSIBLE INTERVENTION Left 12/21/2021    Procedure: Heart Catheterization with Possible Intervention;  Surgeon: Wesley Mclean MD;  Location:  HEART CARDIAC CATH LAB     ESOPHAGOSCOPY, GASTROSCOPY, DUODENOSCOPY (EGD), COMBINED N/A 03/10/2016    Procedure: COMBINED ESOPHAGOSCOPY, GASTROSCOPY, DUODENOSCOPY (EGD), BIOPSY SINGLE OR MULTIPLE;  Surgeon: Tricia Zambrano MD;  [facility]     ESOPHAGOSCOPY, GASTROSCOPY, DUODENOSCOPY (EGD), COMBINED N/A 11/17/2020    Procedure: ESOPHAGOGASTRODUODENOSCOPY, WITH BIOPSY and Dilation;  Surgeon: Jamie Cárdenas MD;  Location: [facility] OR     INNER EAR SURGERY       PICC INSERTION  06/05/2013    5fr DL Power PICC, 44cm, left basilic vein, tip in low SVC     SURGICAL HISTORY OF -       Left ear surgery - incus transition, tympanoplasty     SURGICAL HISTORY OF -   06/01/2005    Hiatal hernia repair     TONSILLECTOMY         No current facility-administered medications on file prior to encounter.  albuterol (PROAIR HFA/PROVENTIL HFA/VENTOLIN HFA) 108 (90 Base) MCG/ACT inhaler, Inhale 2 puffs into the lungs every 6 hours as needed for shortness of breath / dyspnea or wheezing  aspirin (ASA) 81 MG chewable tablet, Take 1 tablet (81 mg) by mouth daily  atorvastatin (LIPITOR) 40 MG tablet, Take 40 mg by mouth  azaTHIOprine (IMURAN) 50 MG tablet, Take 3 tablets (150 mg) by mouth daily And have labs drawn two weeks after starting medication.  clonazePAM (KLONOPIN) 0.5 MG tablet, Take 1 tablet (0.5 mg) by mouth nightly as needed for sleep (secondary to prednisone) MRx 1  folic acid (FOLVITE) 1 MG  tablet, Take 1 tablet (1 mg) by mouth daily  losartan (COZAAR) 50 MG tablet, Take 0.5 tablets (25 mg) by mouth daily  magnesium oxide (MAG-OX) 400 MG tablet, Take 1 tablet (400 mg) by mouth daily  metoprolol succinate ER (TOPROL XL) 50 MG 24 hr tablet, Take 1 tablet (50 mg) by mouth daily  nitroGLYcerin (NITROSTAT) 0.3 MG sublingual tablet, Place 1 tablet (0.3 mg) under the tongue every 5 minutes as needed for chest pain (esophageal spasm) For chest pain place 1 tablet under the tongue every 5 minutes for 3 doses. If symptoms persist 5 minutes after 3rd dose call 911.  omeprazole (PRILOSEC) 20 MG DR capsule, Take 1 capsule (20 mg) by mouth 2 times daily  pantoprazole (PROTONIX) 40 MG EC tablet, Take 1 tablet (40 mg) by mouth daily  potassium chloride ER (MICRO-K) 10 MEQ CR capsule, Take 30 mEq by mouth  predniSONE (DELTASONE) 10 MG tablet, Take 3 tablets (30 mg) by mouth daily  spironolactone (ALDACTONE) 25 MG tablet, Take 25 mg by mouth daily   sulfamethoxazole-trimethoprim (BACTRIM) 400-80 MG tablet, Take 1 tablet by mouth daily (Patient taking differently: Take 1 tablet by mouth daily )  torsemide (DEMADEX) 20 MG tablet, Take 1 tablet (20 mg) by mouth 2 times daily  vitamin D2 (ERGOCALCIFEROL) 69198 units (1250 mcg) capsule, Take 50,000 Units by mouth once a week  alcohol swab prep pads, Use to swab area of injection/alf as directed.  blood glucose (NO BRAND SPECIFIED) test strip, Use to test blood sugar 2 times daily or as directed.  blood glucose calibration (NO BRAND SPECIFIED) solution, Use to calibrate blood glucose monitor as needed as directed.  blood glucose monitoring (NO BRAND SPECIFIED) meter device kit, Use to test blood sugar 2 times daily or as directed.  thin (NO BRAND SPECIFIED) lancets, Use with lanceting device twice daily        Family History   Problem Relation Age of Onset     Cerebrovascular Disease Father      Heart Disease Father      Hypertension Father      Heart Disease Mother       Hypertension Mother      Chronic Obstructive Pulmonary Disease Sister      Heart Disease Sister      Hypertension Sister      Cerebrovascular Disease Brother      Heart Disease Brother      Kidney Disease Brother         on dialysis     Diabetes Brother         borderline     No Known Problems Son      No Known Problems Son      No Known Problems Daughter      Cancer Brother         small cell     Heart Disease Brother      Heart Disease Brother      Heart Disease Brother      Heart Disease Brother         tripple A     Heart Disease Sister      Substance Abuse Sister         alcoholism     Crohn's Disease No family hx of      Ulcerative Colitis No family hx of      GERD No family hx of      Celiac Disease No family hx of      Nephrolithiasis No family hx of      Parathyroid Disorders No family hx of      Calcium Disorder No family hx of        Social History     Tobacco Use     Smoking status: Never Smoker     Smokeless tobacco: Never Used   Substance Use Topics     Alcohol use: Yes     Comment: occassionally       Allergies   Allergen Reactions     Lovenox Other (See Comments)     Blood clot      Norvasc [Amlodipine Besylate] Swelling     Lip swelling that happened on 2 different occasions     Erythromycin Rash     Rash on arms         ROS:   CONSTITUTIONAL:No report of fevers or chills  RESPIRATORY: No cough, wheezing, SOB, or hemoptysis  CARDIOVASCULAR: see HPI  MUSCULO-SKELETAL: No joint pain/swelling, no muscle pain  NEURO: No paresthesias, syncope, pre-syncope, lightheadness, dizziness or vertigo  ENDOCRINE: No temperature intolerance, no skin/hair changes  PSYCHIATRIC: No change in mood, feeling down/anxious, no change in sleep or appetite  GI: no melena or hematochezia, no change in bowel habits  : no hematuria or dysuria, no hesitancy, dribbling or incontinence  HEME: no easy bruising or bleeding, no history of anemia, no history of blood clots  SKIN: no rashes or sores, no unusual itching      Physical  "Examination:  Vitals: /68   Pulse (!) 152   Temp 97.1  F (36.2  C)   Resp 18   Ht 1.719 m (5' 7.68\")   Wt 69.9 kg (154 lb 1.6 oz)   SpO2 90%   BMI 23.66 kg/m    BMI= Body mass index is 23.66 kg/m .    GENERAL APPEARANCE: healthy, alert and no distress  HEENT: Normocephalic, atraumatic. Sclera clear. No xanthelasmas. normal pupil size and reaction, normal palate, mucosa moist  NECK: No JVD  CHEST: Normal work of breathing. Lungs clear to auscultation without rales, rhonchi or wheezes, no use of accessory muscles, no retractions  CARDIOVASCULAR: regular rhythm, normal S1 and S2, no S3 or S4 and no murmur, click or rub.  ABDOMEN: soft, non tender, without hepatosplenomegaly, no masses palpable, bowel sounds normal  EXTREMITIES: warm, fingertips cool d/t raynauds, no edema, DP/PT pulses 2+ bilaterally, no clubbing. Scaling bilateral lower extremities  NEURO: alert and oriented to person/place/time, normal speech  PSYCH: Mood and affect are appropriate  SKIN: no ecchymoses, no rashes      Laboratory:  CMP  Recent Labs   Lab 04/05/22  1011 04/04/22  0625    132*   POTASSIUM 3.4 3.7   CHLORIDE 102 98   CO2 25 25   ANIONGAP 11 9   GLC 72 175*   BUN 15 14   CR 0.66 0.75   GFRESTIMATED >90 >90   DAVID 8.9 8.8   MAG 1.5*  --    PROTTOTAL 6.2*  --    ALBUMIN 2.4*  --    BILITOTAL 0.6  --    ALKPHOS 98  --    AST 60*  --    ALT 44  --      CBC  Recent Labs   Lab 04/05/22  1011 04/04/22  0625   WBC 4.6 5.0   RBC 4.40 4.43   HGB 13.0 13.1   HCT 40.5 39.5   MCV 92 89   MCH 29.5 29.6   MCHC 32.1 33.2   RDW 14.7 14.6    302       Lab Results   Component Value Date    TROPI 0.018 07/06/2021       EKG 4/5/22 1149: A fib RVR       EKG 4/5/22 1302: NSR       Cardiac MRI  1/12/22:  Comparison CMR: April 2013     1. The left ventricle is normal in size. There is mild inferoseptal thickening (14 mm). The global systolic  function is mildly reduced. The LVEF is 44%. There is mild diffuse hypokinesis.     2. The right " ventricle is normal in cavity size. The global systolic function is normal. The RVEF is 54%.      3. Left atrium is mildly enlarged. Right atrium is normal in size.      4. There is no significant valvular disease.      5. There is mid-myocardial late gadolinium enhancement in the basal septum with extension into the mid  inferoseptal segment and inferior right ventricular insertion site. In addition, there is subendocardial  hyperenhancement in the basal inferolateral segment. This variable pattern of hyperenhancement is  concerning for infiltrative cardiomyopathy such as sarcoidosis.       6. There is no pericardial effusion.     7. There is no intracardiac thrombus.     8. There is no myocardial edema.      CONCLUSIONS:   1. Non ischemic cardiomyopathy with mildly reduced left ventricular function and normal right ventricular  function.   2. Variable patterns of hyperenhancement in the septal mid-myocardium and inferolateral subendocardium are  concerning for cardiac sarcoidosis.      These findings are new compared to prior cMRI done on April 2013.     I very much appreciated the opportunity to see and assess Jeanine Schuler in the hospital on station 2 a after her cardiac procedure.  Patient developed A. fib during the procedure and has been in and out of A. fib subsequently.  She will the admitted overnight for observation and strategy developed for management of her A. fib recurrences.  Whether cardioversion should be undertaken prior to initiation of an antiarrhythmic drug will need to be discussed.    I agree with the note above which summarizes my findings and current recommendations.  Please do not hesitate to contact my office if you have any questions or concerns.      Bacilio Link MD  Cardiac Arrhythmia Service  [facility]  [phone]    CC Dr. Atul Bentley

## 2022-04-05 NOTE — PROGRESS NOTES
Pt. Brought to [facility] for elective RHC. Upon arrival pt. Was in A-Fib with RVR. This pt. has recently experienced this rhythm per her report. 10mg IV Metoprolol given, no change in rhythm, rate did slow from 130-`40'2 to 110's to 120's. Dr. Bentley notified, RHC canceled. Pt. To 2a monitored. Pending admission.

## 2022-04-05 NOTE — PROGRESS NOTES
Pt continued to be in and out of afib with RVR since she has been back to cath lab.Her BP gets into the low 90's when in afib.She received oral pot and po magnesium and Martinez WILEY wanted her to get the IV magnesium also so that is infusing now.She complained of heart burn after taking the electroyte so gave her a dose of  protonix.Pt is eating and drinking well.Her  gathered all of her belongings and took them upstairs with her.Report was given to Krystal on 6C and pt was transferred by millie on monitor to 6C.

## 2022-04-06 LAB
ANION GAP SERPL CALCULATED.3IONS-SCNC: 9 MMOL/L (ref 3–14)
ATRIAL RATE - MUSE: 159 BPM
ATRIAL RATE - MUSE: 73 BPM
BUN SERPL-MCNC: 12 MG/DL (ref 7–30)
CALCIUM SERPL-MCNC: 8.3 MG/DL (ref 8.5–10.1)
CHLORIDE BLD-SCNC: 102 MMOL/L (ref 94–109)
CO2 SERPL-SCNC: 28 MMOL/L (ref 20–32)
CREAT SERPL-MCNC: 0.78 MG/DL (ref 0.52–1.04)
DIASTOLIC BLOOD PRESSURE - MUSE: NORMAL MMHG
DIASTOLIC BLOOD PRESSURE - MUSE: NORMAL MMHG
GFR SERPL CREATININE-BSD FRML MDRD: 87 ML/MIN/1.73M2
GLUCOSE BLD-MCNC: 83 MG/DL (ref 70–99)
HOLD SPECIMEN: NORMAL
INTERPRETATION ECG - MUSE: NORMAL
INTERPRETATION ECG - MUSE: NORMAL
MAGNESIUM SERPL-MCNC: 1.9 MG/DL (ref 1.6–2.3)
P AXIS - MUSE: 31 DEGREES
P AXIS - MUSE: NORMAL DEGREES
POTASSIUM BLD-SCNC: 3.3 MMOL/L (ref 3.4–5.3)
POTASSIUM BLD-SCNC: 3.6 MMOL/L (ref 3.4–5.3)
PR INTERVAL - MUSE: 158 MS
PR INTERVAL - MUSE: NORMAL MS
QRS DURATION - MUSE: 126 MS
QRS DURATION - MUSE: 138 MS
QT - MUSE: 254 MS
QT - MUSE: 484 MS
QTC - MUSE: 415 MS
QTC - MUSE: 533 MS
R AXIS - MUSE: -39 DEGREES
R AXIS - MUSE: -40 DEGREES
SODIUM SERPL-SCNC: 139 MMOL/L (ref 133–144)
SYSTOLIC BLOOD PRESSURE - MUSE: NORMAL MMHG
SYSTOLIC BLOOD PRESSURE - MUSE: NORMAL MMHG
T AXIS - MUSE: -18 DEGREES
T AXIS - MUSE: 130 DEGREES
VENTRICULAR RATE- MUSE: 161 BPM
VENTRICULAR RATE- MUSE: 73 BPM

## 2022-04-06 PROCEDURE — 83735 ASSAY OF MAGNESIUM: CPT | Performed by: INTERNAL MEDICINE

## 2022-04-06 PROCEDURE — 36415 COLL VENOUS BLD VENIPUNCTURE: CPT | Performed by: INTERNAL MEDICINE

## 2022-04-06 PROCEDURE — 80048 BASIC METABOLIC PNL TOTAL CA: CPT | Performed by: NURSE PRACTITIONER

## 2022-04-06 PROCEDURE — 250N000013 HC RX MED GY IP 250 OP 250 PS 637: Performed by: NURSE PRACTITIONER

## 2022-04-06 PROCEDURE — 999N000248 HC STATISTIC IV INSERT WITH US BY RN

## 2022-04-06 PROCEDURE — 250N000013 HC RX MED GY IP 250 OP 250 PS 637: Performed by: INTERNAL MEDICINE

## 2022-04-06 PROCEDURE — 214N000001 HC R&B CCU UMMC

## 2022-04-06 PROCEDURE — 250N000011 HC RX IP 250 OP 636: Performed by: INTERNAL MEDICINE

## 2022-04-06 PROCEDURE — 250N000012 HC RX MED GY IP 250 OP 636 PS 637: Performed by: NURSE PRACTITIONER

## 2022-04-06 PROCEDURE — 36415 COLL VENOUS BLD VENIPUNCTURE: CPT | Performed by: NURSE PRACTITIONER

## 2022-04-06 PROCEDURE — 84132 ASSAY OF SERUM POTASSIUM: CPT | Performed by: INTERNAL MEDICINE

## 2022-04-06 PROCEDURE — 99233 SBSQ HOSP IP/OBS HIGH 50: CPT | Performed by: NURSE PRACTITIONER

## 2022-04-06 PROCEDURE — 999N000127 HC STATISTIC PERIPHERAL IV START W US GUIDANCE

## 2022-04-06 RX ORDER — MAGNESIUM SULFATE HEPTAHYDRATE 40 MG/ML
2 INJECTION, SOLUTION INTRAVENOUS ONCE
Status: COMPLETED | OUTPATIENT
Start: 2022-04-06 | End: 2022-04-06

## 2022-04-06 RX ORDER — POTASSIUM CHLORIDE 750 MG/1
40 TABLET, EXTENDED RELEASE ORAL ONCE
Status: COMPLETED | OUTPATIENT
Start: 2022-04-06 | End: 2022-04-06

## 2022-04-06 RX ADMIN — POTASSIUM CHLORIDE 40 MEQ: 750 TABLET, EXTENDED RELEASE ORAL at 11:37

## 2022-04-06 RX ADMIN — METOPROLOL SUCCINATE 50 MG: 50 TABLET, EXTENDED RELEASE ORAL at 07:52

## 2022-04-06 RX ADMIN — PANTOPRAZOLE SODIUM 40 MG: 40 TABLET, DELAYED RELEASE ORAL at 07:52

## 2022-04-06 RX ADMIN — MAGNESIUM SULFATE IN WATER 2 G: 40 INJECTION, SOLUTION INTRAVENOUS at 09:57

## 2022-04-06 RX ADMIN — TORSEMIDE 20 MG: 20 TABLET ORAL at 07:52

## 2022-04-06 RX ADMIN — SPIRONOLACTONE 25 MG: 25 TABLET, FILM COATED ORAL at 07:53

## 2022-04-06 RX ADMIN — APIXABAN 5 MG: 5 TABLET, FILM COATED ORAL at 07:52

## 2022-04-06 RX ADMIN — PREDNISONE 10 MG: 10 TABLET ORAL at 07:52

## 2022-04-06 RX ADMIN — AZATHIOPRINE 150 MG: 50 TABLET ORAL at 07:53

## 2022-04-06 RX ADMIN — ATORVASTATIN CALCIUM 40 MG: 40 TABLET, FILM COATED ORAL at 20:04

## 2022-04-06 RX ADMIN — TORSEMIDE 20 MG: 20 TABLET ORAL at 17:07

## 2022-04-06 RX ADMIN — FOLIC ACID 1 MG: 1 TABLET ORAL at 07:52

## 2022-04-06 RX ADMIN — POTASSIUM CHLORIDE 30 MEQ: 750 CAPSULE, EXTENDED RELEASE ORAL at 07:52

## 2022-04-06 ASSESSMENT — ACTIVITIES OF DAILY LIVING (ADL)
ADLS_ACUITY_SCORE: 10

## 2022-04-06 NOTE — UTILIZATION REVIEW
Admission Status; Secondary Review Determination       Under the authority of the Utilization Management Committee, the utilization review process indicated a secondary review on the above patient. The review outcome is based on review of the medical records, discussions with staff, and applying clinical experience noted on the date of the review.     (x) Inpatient Status Appropriate - This patient's medical care is consistent with medical management for inpatient care and reasonable inpatient medical practice.     RATIONALE FOR DETERMINATION   62 yo female with complex medical history including nonischemic cardiomyopathy with LVEF 35% on echo, 44% on CMR, hypertension, interstitial lung disease, sarcoidosis, mixed connective tissue disorder, limited cutaneous systemic sclerosis, Raynaud's, GERD who was admitted for planned for RHC and cardiac biopsy 4/5/22. However, this procedure was aborted due to new onset atrial fibrillation and need for further stabilization.  Symptomatic with atrial fibrillation with RVR, ongoing shortness of breath and hypotensive with systolic blood pressures into the 60 range.  Has been in and out of atrial fibrillation into normal sinus rhythm throughout the night and early hours of the morning on 5/6/2022.  Cardiology consultants still following with concern for cardiac sarcoidosis.  Unclear if the cardiac biopsy is still in the plan but she definitely still will benefit from a right heart catheterization.  She did get IV Lasix and requires close monitoring of intake and output for optimal cardiac output.  Anticipate reattempt at right heart catheterization on 4/7/2022, requiring a second night in the hospital possibly longer.    At the time of admission with the information available to the attending physician more than 2 nights hospital complex care was anticipated, based on patient risk of adverse outcome if treated as outpatient and complex care required. Inpatient admission is  appropriate based on the Medicare guidelines.     This document was produced using voice recognition software.    The information on this document is developed by the utilization review team in order for the business office to ensure compliance. This only denotes the appropriateness of proper admission status and does not reflect the quality of care rendered.   The definitions of Inpatient Status and Observation Status used in making the determination above are those provided in the CMS Coverage Manual, Chapter 1 and Chapter 6, section 70.4.     Sincerely,   Priscila Antoine MD  Utilization Review  Physician Advisor  [facility].

## 2022-04-06 NOTE — PROGRESS NOTES
[facility]   Cardiology II Service / Advanced Heart Failure  Daily Progress Note  Date of Service: 4/6/2022      Patient: Jeanine Schuler  MRN: [id number]  Admission Date: 4/5/2022  Hospital Day # 1    Assessment and Plan: Jeanine Schuler is a 61 year old female with a PMH of ILD, pulmonary sarcoidosis, scleroderma, GERD, Raynaud's and HFrEF secondary to NICM. She presented for Afib with RVR.     Afib with RVR. Returned to SR last HS. CHADSVASC-2. Given history likely history of PAF with unknown onset date.   - Hold Eliquis in prep for RHC.   - Rate controled on Toprol XL.     Chronic SCHF. Echo 12/21/21 with EF 35%, global hypokinesis, distal LV segments, RV mildly dilated, mild to moderate regurg, and moderate to severe tricuspid regurg. Cardiac MRI 1/12/22 with LVEF 44% without acute concern for cardiac sarcoidosis per sarcoid team. Myocardial PET 3/31/22 without acute concern for cardiac sarcoidosis.   - Case discussed with Dr. Francis and Dr. Bentley. Defer Cardiac biopsy given low suspicion for cardiac sarcoidosis. Will plan for RHC in AM given overall clinical decline.   Stage C, NYHA Class III  ACEi/ARB Losartan 25 mg po daily   BB Toprol XL 50 mg po daily   Aldosterone antagonist Aldactone 25 mg po daily   SCD prophylaxis EF 44%  Fluid status euvolemic. Torsemide 20 mg po BID. (IV Lasix in ED)    ILD, Pulmonary Sarcoidosis, and Scleroderma.   - Continue Prednisone and Imuran.     FEN: cardiac diet   PROPHY:  Eliquis held in prep for cath   LINES:  PIV  DISPO:  Anticipate discharge to home in AM   CODE STATUS:  Full Code   ================================================================    Interval History/ROS: She denies fever, chills, palpitations, cough, nausea, vomiting, diarrhea, and LE edema. She notes chest pain when palpitations occurs, which is resolved this AM. She is NPO and tolerating ambulation in her room.     Last 24 hr care team notes reviewed.   ROS:  4 point ROS  "including Respiratory, CV, GI and , other than that noted in the HPI, is negative.     Medications: Reviewed in EPIC.     Physical Exam:   /80 (BP Location: Right arm)   Pulse 81   Temp 97.8  F (36.6  C) (Oral)   Resp 16   Ht 1.753 m (5' 9\")   Wt 70.6 kg (155 lb 9.6 oz)   SpO2 99%   BMI 22.98 kg/m    GENERAL: Appears alert and oriented times three.   HEENT: Eye symmetrical and free of discharge bilaterally. Mucous membranes moist and without lesions.  NECK: Supple and without lymphadenopathy. JVD 8-10 cm.   CV: Irregular, controlled. S1S2 present without murmur, rub, or gallop.   RESPIRATORY: Respirations regular, even, and unlabored. Lungs CTA throughout.   GI: Soft and non distended with normoactive bowel sounds present in all quadrants. No tenderness, rebound, guarding. No organomegaly.   EXTREMITIES: Trace bilateral LE peripheral edema. 2+ bilateral pedal pulses.   NEUROLOGIC: Alert and orientated x 3. CN II-XII grossly intact. No focal deficits.   MUSCULOSKELETAL: No joint swelling or tenderness.   SKIN: No jaundice. No rashes or lesions.     Data:  CMPRecent Labs   Lab 04/06/22  0650 04/05/22  1011 04/04/22  0625    138 132*   POTASSIUM 3.3* 3.4 3.7   CHLORIDE 102 102 98   CO2 28 25 25   ANIONGAP 9 11 9   GLC 83 72 175*   BUN 12 15 14   CR 0.78 0.66 0.75   GFRESTIMATED 87 >90 >90   DAVID 8.3* 8.9 8.8   MAG 1.9 1.5*  --    PROTTOTAL  --  6.2*  --    ALBUMIN  --  2.4*  --    BILITOTAL  --  0.6  --    ALKPHOS  --  98  --    AST  --  60*  --    ALT  --  44  --      CBC  Recent Labs   Lab 04/05/22  1011 04/04/22  0625   WBC 4.6 5.0   RBC 4.40 4.43   HGB 13.0 13.1   HCT 40.5 39.5   MCV 92 89   MCH 29.5 29.6   MCHC 32.1 33.2   RDW 14.7 14.6    302       Time/Communication  I personally spent a total of 25 minutes. Of that 15 minutes was counseling/coordination of patient's care. Plan of care discussed with patient. See my note above for details. Patient discussed with Dr. Riley Gibson " Je ALEXANDER  4/6/2022

## 2022-04-06 NOTE — PLAN OF CARE
Time of Care: 0735-1869    D: Admitted 4/5 for Afib w/ RVR.    I: Monitored vitals and assessed pt status.   Changed: Eliquis held for [facility] tomorrow.    A: A0x4. VSS, on RA. SR. Afebrile. SOB w/ activity. SBA. Pt able to make needs known.  at bedside all day. K+ recheck ordered, no replacement needed - morning AM check.       I/O this shift:  In: 200 [P.O.:200]  Out: 400 [Urine:400]    Temp:  [97.4  F (36.3  C)-98.1  F (36.7  C)] 98.1  F (36.7  C)  Pulse:  [77-84] 77  Resp:  [16-18] 16  BP: ()/(67-84) 107/79  SpO2:  [96 %-99 %] 97 %      P: Continue to monitor Pt status and report changes to Cards 1. NPO @ midnight for [facility] 4/7.

## 2022-04-06 NOTE — PROGRESS NOTES
"NEURO: A&O x4  RESPIRATORY: SOB with activity  CARDIAC: SR, flips into Afib for short periods then back to SR.   GI/: Voids spontaneously; no BM this shift.    SKIN: Scattered bruising; no overt deficits noted   PAIN: Denies.    TESTS/PROCEDURES: Potential cardioversion later today.   MOBILITY: SBA   DIET: NPO for potential cardioversion.    LDAs: PIV x1     PLAN: Continue to monitor cardiac and respiratory status. Notify the primary team with any updates/concerns.      I/O this shift:  In: -   Out: 400 [Urine:400]     BP Readings from Last 1 Encounters:   04/06/22 115/82      Pulse Readings from Last 1 Encounters:   04/06/22 78      Resp Readings from Last 1 Encounters:   04/06/22 16      Temp Readings from Last 1 Encounters:   04/06/22 97.9  F (36.6  C) (Oral)      SpO2 Readings from Last 1 Encounters:   04/06/22 97%      Wt Readings from Last 1 Encounters:   04/06/22 70.6 kg (155 lb 9.6 oz)      Ht Readings from Last 1 Encounters:   04/05/22 1.753 m (5' 9\")        "

## 2022-04-07 LAB
ANION GAP SERPL CALCULATED.3IONS-SCNC: 7 MMOL/L (ref 3–14)
BUN SERPL-MCNC: 14 MG/DL (ref 7–30)
CALCIUM SERPL-MCNC: 8.4 MG/DL (ref 8.5–10.1)
CHLORIDE BLD-SCNC: 97 MMOL/L (ref 94–109)
CO2 SERPL-SCNC: 31 MMOL/L (ref 20–32)
CREAT SERPL-MCNC: 0.78 MG/DL (ref 0.52–1.04)
GFR SERPL CREATININE-BSD FRML MDRD: 87 ML/MIN/1.73M2
GLUCOSE BLD-MCNC: 84 MG/DL (ref 70–99)
HGB BLD-MCNC: 12.9 G/DL (ref 11.7–15.7)
HOLD SPECIMEN: NORMAL
LACTATE SERPL-SCNC: 0.8 MMOL/L (ref 0.7–2)
MAGNESIUM SERPL-MCNC: 1.6 MG/DL (ref 1.6–2.3)
OXYHGB MFR BLDA: 67 % (ref 92–100)
POTASSIUM BLD-SCNC: 3.4 MMOL/L (ref 3.4–5.3)
SODIUM SERPL-SCNC: 135 MMOL/L (ref 133–144)

## 2022-04-07 PROCEDURE — 80048 BASIC METABOLIC PNL TOTAL CA: CPT | Performed by: NURSE PRACTITIONER

## 2022-04-07 PROCEDURE — 250N000009 HC RX 250: Performed by: INTERNAL MEDICINE

## 2022-04-07 PROCEDURE — 272N000002 HC OR SUPPLY OTHER OPNP: Performed by: INTERNAL MEDICINE

## 2022-04-07 PROCEDURE — 4A023N6 MEASUREMENT OF CARDIAC SAMPLING AND PRESSURE, RIGHT HEART, PERCUTANEOUS APPROACH: ICD-10-PCS | Performed by: INTERNAL MEDICINE

## 2022-04-07 PROCEDURE — 93005 ELECTROCARDIOGRAM TRACING: CPT

## 2022-04-07 PROCEDURE — 250N000013 HC RX MED GY IP 250 OP 250 PS 637: Performed by: NURSE PRACTITIONER

## 2022-04-07 PROCEDURE — 83605 ASSAY OF LACTIC ACID: CPT | Performed by: INTERNAL MEDICINE

## 2022-04-07 PROCEDURE — 93010 ELECTROCARDIOGRAM REPORT: CPT | Mod: 76 | Performed by: INTERNAL MEDICINE

## 2022-04-07 PROCEDURE — 93451 RIGHT HEART CATH: CPT | Performed by: INTERNAL MEDICINE

## 2022-04-07 PROCEDURE — 250N000009 HC RX 250: Performed by: NURSE PRACTITIONER

## 2022-04-07 PROCEDURE — 82810 BLOOD GASES O2 SAT ONLY: CPT

## 2022-04-07 PROCEDURE — 36415 COLL VENOUS BLD VENIPUNCTURE: CPT | Performed by: INTERNAL MEDICINE

## 2022-04-07 PROCEDURE — 85018 HEMOGLOBIN: CPT

## 2022-04-07 PROCEDURE — 214N000001 HC R&B CCU UMMC

## 2022-04-07 PROCEDURE — 250N000012 HC RX MED GY IP 250 OP 636 PS 637: Performed by: NURSE PRACTITIONER

## 2022-04-07 PROCEDURE — 36415 COLL VENOUS BLD VENIPUNCTURE: CPT | Performed by: NURSE PRACTITIONER

## 2022-04-07 PROCEDURE — 83735 ASSAY OF MAGNESIUM: CPT | Performed by: INTERNAL MEDICINE

## 2022-04-07 PROCEDURE — 99233 SBSQ HOSP IP/OBS HIGH 50: CPT | Mod: 25 | Performed by: NURSE PRACTITIONER

## 2022-04-07 PROCEDURE — 250N000013 HC RX MED GY IP 250 OP 250 PS 637: Performed by: INTERNAL MEDICINE

## 2022-04-07 PROCEDURE — 272N000001 HC OR GENERAL SUPPLY STERILE: Performed by: INTERNAL MEDICINE

## 2022-04-07 RX ORDER — POTASSIUM CHLORIDE 750 MG/1
40 TABLET, EXTENDED RELEASE ORAL ONCE
Status: COMPLETED | OUTPATIENT
Start: 2022-04-07 | End: 2022-04-07

## 2022-04-07 RX ORDER — POTASSIUM CHLORIDE 750 MG/1
20 TABLET, EXTENDED RELEASE ORAL ONCE
Status: COMPLETED | OUTPATIENT
Start: 2022-04-07 | End: 2022-04-07

## 2022-04-07 RX ORDER — METOPROLOL TARTRATE 1 MG/ML
5 INJECTION, SOLUTION INTRAVENOUS ONCE
Status: COMPLETED | OUTPATIENT
Start: 2022-04-07 | End: 2022-04-07

## 2022-04-07 RX ORDER — POTASSIUM CHLORIDE 750 MG/1
30 CAPSULE, EXTENDED RELEASE ORAL 2 TIMES DAILY
Status: DISCONTINUED | OUTPATIENT
Start: 2022-04-07 | End: 2022-04-08 | Stop reason: HOSPADM

## 2022-04-07 RX ORDER — AMIODARONE HYDROCHLORIDE 200 MG/1
400 TABLET ORAL 2 TIMES DAILY
Status: DISCONTINUED | OUTPATIENT
Start: 2022-04-07 | End: 2022-04-08 | Stop reason: HOSPADM

## 2022-04-07 RX ADMIN — PREDNISONE 10 MG: 10 TABLET ORAL at 08:30

## 2022-04-07 RX ADMIN — SPIRONOLACTONE 25 MG: 25 TABLET, FILM COATED ORAL at 08:30

## 2022-04-07 RX ADMIN — TORSEMIDE 20 MG: 20 TABLET ORAL at 08:30

## 2022-04-07 RX ADMIN — AMIODARONE HYDROCHLORIDE 400 MG: 200 TABLET ORAL at 08:43

## 2022-04-07 RX ADMIN — METOPROLOL TARTRATE 5 MG: 5 INJECTION INTRAVENOUS at 08:27

## 2022-04-07 RX ADMIN — AZATHIOPRINE 150 MG: 50 TABLET ORAL at 08:29

## 2022-04-07 RX ADMIN — POTASSIUM CHLORIDE 30 MEQ: 750 CAPSULE, EXTENDED RELEASE ORAL at 08:29

## 2022-04-07 RX ADMIN — POTASSIUM CHLORIDE 20 MEQ: 750 TABLET, EXTENDED RELEASE ORAL at 11:26

## 2022-04-07 RX ADMIN — POTASSIUM CHLORIDE 30 MEQ: 750 CAPSULE, EXTENDED RELEASE ORAL at 19:26

## 2022-04-07 RX ADMIN — ATORVASTATIN CALCIUM 40 MG: 40 TABLET, FILM COATED ORAL at 19:26

## 2022-04-07 RX ADMIN — POTASSIUM CHLORIDE 40 MEQ: 750 TABLET, EXTENDED RELEASE ORAL at 11:25

## 2022-04-07 RX ADMIN — FOLIC ACID 1 MG: 1 TABLET ORAL at 08:30

## 2022-04-07 RX ADMIN — TORSEMIDE 20 MG: 20 TABLET ORAL at 16:39

## 2022-04-07 RX ADMIN — METOPROLOL SUCCINATE 50 MG: 50 TABLET, EXTENDED RELEASE ORAL at 07:06

## 2022-04-07 RX ADMIN — PANTOPRAZOLE SODIUM 40 MG: 40 TABLET, DELAYED RELEASE ORAL at 08:30

## 2022-04-07 RX ADMIN — AMIODARONE HYDROCHLORIDE 400 MG: 200 TABLET ORAL at 19:26

## 2022-04-07 ASSESSMENT — ACTIVITIES OF DAILY LIVING (ADL)
ADLS_ACUITY_SCORE: 10

## 2022-04-07 NOTE — PLAN OF CARE
Time of Care: [id number]    D: Admitted 4/5 for Afib w/ RVR.    I: Monitored vitals and assessed pt status.   Changed: Converted from Afib to SR.    A: A0x4. VSS, on RA. SR. Afebrile. SOB w/ activity. SBA. Pt able to make needs known. Pt was in Afib start of shift, PO metoprolol w/ no resolution, so IV push metoprolol and PO Amiodarone was given. RHC done today. VSS post RHC.     Temp:  [97.7  F (36.5  C)-100  F (37.8  C)] 98.4  F (36.9  C)  Pulse:  [] 67  Resp:  [15-18] 16  BP: ()/(35-79) 94/67  SpO2:  [89 %-98 %] 91 %      P: Continue to monitor Pt status and report changes to Cards 1. Discharge home 4/8.

## 2022-04-07 NOTE — PROGRESS NOTES
[facility]   Cardiology I Service / General Cardiology  Daily Progress Note  Date of Service: 4/7/2022      Patient: Jeanine Schuler  MRN: [id number]  Admission Date: 4/5/2022  Hospital Day # 2    Assessment and Plan: Jeanine Schuler is a 61 year old female with a PMH of ILD, pulmonary sarcoidosis, scleroderma, GERD, Raynaud's and HFrEF secondary to NICM. She presented for Afib with RVR.      Afib with RVR. Returned to SR last HS. CHADSVASC-2. Given history likely history of PAF with unknown onset date. TSH 0.86.  - Hold Eliquis in prep for RHC.   - Increased HS overnight. Toprol XL administered early. Lopressor 5 mg IV times one with conversion   - Amiodarone 400 mg po BID initiated.   - Optimize electrolytes. KCL 20 MEQ times one.      Chronic SCHF. Echo 12/21/21 with EF 35%, global hypokinesis, distal LV segments, RV mildly dilated, mild to moderate regurg, and moderate to severe tricuspid regurg. Cardiac MRI 1/12/22 with LVEF 44% without acute concern for cardiac sarcoidosis per sarcoid team. Myocardial PET 3/31/22 without acute concern for cardiac sarcoidosis.   - Case discussed with Dr. Francis and Dr. Bentley. Defer Cardiac biopsy given low suspicion for cardiac sarcoidosis. RHC today.   Stage C, NYHA Class III  ACEi/ARB Losartan 25 mg po daily   BB Toprol XL 50 mg po daily   Aldosterone antagonist Aldactone 25 mg po daily   SCD prophylaxis EF 44%  Fluid status euvolemic. Torsemide 20 mg po BID. (IV Lasix in ED)    Hypokalemia.   - K 3.4 this AM.   - 20 MEQ KCL now, increase scheduled to 30 MEQ BID.      ILD, Pulmonary Sarcoidosis, and Scleroderma.   - Continue Prednisone and Imuran.      FEN: cardiac diet   PROPHY:  Eliquis held in prep for cath   LINES:  PIV  DISPO:  Anticipate discharge to home in AM   CODE STATUS:  Full Code   ================================================================    Interval History/ROS: She complains of palpitations and SOB since last HS. She denies  "chest pain at this time. She denies fever, chills, nausea, vomiting, diarrhea, and LE edema. She is NPO for procedure this AM. She is resting comfortably this AM due to fast HR.     Last 24 hr care team notes reviewed.   ROS:  4 point ROS including Respiratory, CV, GI and , other than that noted in the HPI, is negative.     Medications: Reviewed in EPIC.     Physical Exam:   BP 91/66   Pulse (!) 133   Temp 98  F (36.7  C) (Oral)   Resp 18   Ht 1.753 m (5' 9\")   Wt 69.8 kg (153 lb 14.4 oz)   SpO2 93%   BMI 22.73 kg/m    GENERAL: Appears alert and oriented times three.   HEENT: Eye symmetrical and free of discharge bilaterally. Mucous membranes moist and without lesions.  NECK: Supple and without lymphadenopathy. JVD 8 cm.   CV: Irregular, uncontrolled. S1S2 present without murmur, rub, or gallop.   RESPIRATORY: Respirations regular, even, and unlabored. Lungs CTA throughout.   GI: Soft and non distended with normoactive bowel sounds present in all quadrants. No tenderness, rebound, guarding. No organomegaly.   EXTREMITIES: Trace bilateral LE peripheral edema. 2+ bilateral pedal pulses.   NEUROLOGIC: Alert and orientated x 3. CN II-XII grossly intact. No focal deficits.   MUSCULOSKELETAL: No joint swelling or tenderness.   SKIN: No jaundice. No rashes or lesions.     Data:  CMPRecent Labs   Lab 04/07/22  0557 04/06/22  1359 04/06/22  0650 04/05/22  1011 04/04/22  0625     --  139 138 132*   POTASSIUM 3.4 3.6 3.3* 3.4 3.7   CHLORIDE 97  --  102 102 98   CO2 31  --  28 25 25   ANIONGAP 7  --  9 11 9   GLC 84  --  83 72 175*   BUN 14  --  12 15 14   CR 0.78  --  0.78 0.66 0.75   GFRESTIMATED 87  --  87 >90 >90   DAVID 8.4*  --  8.3* 8.9 8.8   MAG 1.6  --  1.9 1.5*  --    PROTTOTAL  --   --   --  6.2*  --    ALBUMIN  --   --   --  2.4*  --    BILITOTAL  --   --   --  0.6  --    ALKPHOS  --   --   --  98  --    AST  --   --   --  60*  --    ALT  --   --   --  44  --      CBC  Recent Labs   Lab 04/05/22  1011 " 04/04/22  0625   WBC 4.6 5.0   RBC 4.40 4.43   HGB 13.0 13.1   HCT 40.5 39.5   MCV 92 89   MCH 29.5 29.6   MCHC 32.1 33.2   RDW 14.7 14.6    302     Time/Communication  I personally spent a total of 25 minutes. Of that 15 minutes was counseling/coordination of patient's care. Plan of care discussed with patient. See my note above for details. Patient discussed with Dr. Vee.      Paige Wooten FNRANDA  4/7/2022

## 2022-04-07 NOTE — PLAN OF CARE
61 year old female with a history of, NICM LVEF 35% on echo, 44% on CMR, ILD, sarcoidosis, mixed connective tissue disorder, limited cutaneous systemic sclerosis followed by [facility] rheumatology, GERD and Raynauds. Admitted 4/5 for Afib w/ RVR    Neuro: AOx4, denies headache, lightheadedness and dizziness. Reports of numbness d/t Reynauds.  Resp: RA sating >92%, denies SOB at rest. Endorses ROUSE.   Cardiac: SR on monitor, denies chest pain. VSS on RA, RHC 4/7 AM.   GI/: Denies N/V, NPO at midnight for RHC 4/7 AM. Voiding adequately, LBM 4/6.  Skin: No new deficits noted.  Pain: Denies  Activity: SBA, up to bathroom indepedent  LDAs: R PIV SL and WDL.    **0630 A fib RVR** Other than feeling heart race and mild SOB, pt otherwise asymptomatic. Pt also triggered sepsis protocol at this time. Team aware - plans pending with day team.     Pt NPO at midnight for RHC, will continue to monitor and report changes to team.

## 2022-04-08 VITALS
RESPIRATION RATE: 16 BRPM | SYSTOLIC BLOOD PRESSURE: 125 MMHG | BODY MASS INDEX: 22.44 KG/M2 | HEIGHT: 69 IN | TEMPERATURE: 97.7 F | HEART RATE: 78 BPM | OXYGEN SATURATION: 95 % | DIASTOLIC BLOOD PRESSURE: 96 MMHG | WEIGHT: 151.5 LBS

## 2022-04-08 LAB
ANION GAP SERPL CALCULATED.3IONS-SCNC: 6 MMOL/L (ref 3–14)
BUN SERPL-MCNC: 15 MG/DL (ref 7–30)
CALCIUM SERPL-MCNC: 8.8 MG/DL (ref 8.5–10.1)
CHLORIDE BLD-SCNC: 99 MMOL/L (ref 94–109)
CO2 SERPL-SCNC: 31 MMOL/L (ref 20–32)
CREAT SERPL-MCNC: 0.86 MG/DL (ref 0.52–1.04)
GFR SERPL CREATININE-BSD FRML MDRD: 77 ML/MIN/1.73M2
GLUCOSE BLD-MCNC: 80 MG/DL (ref 70–99)
POTASSIUM BLD-SCNC: 3.7 MMOL/L (ref 3.4–5.3)
SODIUM SERPL-SCNC: 136 MMOL/L (ref 133–144)

## 2022-04-08 PROCEDURE — 99239 HOSP IP/OBS DSCHRG MGMT >30: CPT | Mod: 25 | Performed by: NURSE PRACTITIONER

## 2022-04-08 PROCEDURE — 250N000012 HC RX MED GY IP 250 OP 636 PS 637: Performed by: NURSE PRACTITIONER

## 2022-04-08 PROCEDURE — 250N000013 HC RX MED GY IP 250 OP 250 PS 637: Performed by: NURSE PRACTITIONER

## 2022-04-08 PROCEDURE — 36415 COLL VENOUS BLD VENIPUNCTURE: CPT | Performed by: NURSE PRACTITIONER

## 2022-04-08 PROCEDURE — 99207 PR SC NO CHARGE VISIT: CPT | Performed by: NURSE PRACTITIONER

## 2022-04-08 PROCEDURE — 93010 ELECTROCARDIOGRAM REPORT: CPT | Performed by: INTERNAL MEDICINE

## 2022-04-08 PROCEDURE — 82310 ASSAY OF CALCIUM: CPT | Performed by: NURSE PRACTITIONER

## 2022-04-08 PROCEDURE — 93005 ELECTROCARDIOGRAM TRACING: CPT

## 2022-04-08 RX ORDER — AMIODARONE HYDROCHLORIDE 200 MG/1
TABLET ORAL
Qty: 54 TABLET | Refills: 4 | Status: SHIPPED | OUTPATIENT
Start: 2022-04-08 | End: 2022-04-20

## 2022-04-08 RX ADMIN — METOPROLOL SUCCINATE 50 MG: 50 TABLET, EXTENDED RELEASE ORAL at 08:19

## 2022-04-08 RX ADMIN — AZATHIOPRINE 150 MG: 50 TABLET ORAL at 08:19

## 2022-04-08 RX ADMIN — Medication 400 MG: at 08:22

## 2022-04-08 RX ADMIN — TORSEMIDE 20 MG: 20 TABLET ORAL at 08:19

## 2022-04-08 RX ADMIN — POTASSIUM CHLORIDE 30 MEQ: 750 CAPSULE, EXTENDED RELEASE ORAL at 08:19

## 2022-04-08 RX ADMIN — AMIODARONE HYDROCHLORIDE 400 MG: 200 TABLET ORAL at 08:19

## 2022-04-08 RX ADMIN — PREDNISONE 10 MG: 10 TABLET ORAL at 08:18

## 2022-04-08 RX ADMIN — PANTOPRAZOLE SODIUM 40 MG: 40 TABLET, DELAYED RELEASE ORAL at 08:19

## 2022-04-08 RX ADMIN — FOLIC ACID 1 MG: 1 TABLET ORAL at 08:19

## 2022-04-08 RX ADMIN — SPIRONOLACTONE 25 MG: 25 TABLET, FILM COATED ORAL at 08:20

## 2022-04-08 ASSESSMENT — ACTIVITIES OF DAILY LIVING (ADL)
ADLS_ACUITY_SCORE: 10

## 2022-04-08 NOTE — PLAN OF CARE
Goal Outcome Evaluation:    Plan of Care Reviewed With: patient, significant other     Overall Patient Progress: improving, ready for discharge to home.       DISCHARGE                      04/08/22  ----------------------------------------------------------------------------  Discharged to: Home  Via: Automobile  Accompanied by: Spouse    Discharge Instructions: Reviewed post-hospital discharge diet, activity, and medication instructions, follow up care needed, as well as reasons to contact medical team or emergency services. Also reviewed post-right heart cath home instructions with patient and provided phone number should pt have any potential concerns at right internal jugular site. Pt verbalized understanding.    Prescriptions: Amiodarone Rx was filled at [facility], per pt's request. Pt is aware to of the importance to request additional refills of amiodarone at her follow up appointment with Cardiology later this month in order to prevent lapses in dosing. Also reviewed the dosage amount and frequency changes that will occur upon day 6--when pt will lower her dose to 200 mg once daily. The specific dates with correlated dosages and frequency were written on discharge paperwork. Also discussed was the importance of continuing to hold Cozaar at this time and to wait to resume until medical team advises resuming. Pt verbalized understanding the changes to her medication regimen. An updated medication list was sent with patient as part of the discharge paperwork.     Follow Up Appointments: Patient aware to make a follow up with the EP Nurse Practitioner within 6-8 weeks to evaluate medication change.  Patient is aware to make an appointment with her PCP within the next seven days to assess BP control with being temporarily off Cozaar.   Patient also states awareness to continue to follow up with Dr. Bentley, Dr. Francis and the pulmonary team.        Belongings: All sent with pt  IV:  removed  Telemetry: taken off    Pt verbalized understanding of discharge instructions and all questions were answered. Patient is ready for discharge.    Discharge Paperwork: Signed, copied, and sent home with patient.     Kari Dejesus RN  Cardiology

## 2022-04-08 NOTE — PLAN OF CARE
61 year old female with a history of, NICM LVEF 35% on echo, 44% on CMR, ILD, sarcoidosis, mixed connective tissue disorder, limited cutaneous systemic sclerosis followed by [facility] rheumatology, GERD and Raynauds. Admitted 4/5 for Afib w/ RVR     Neuro: AOx4, denies headache, lightheadedness and dizziness. Reports of numbness d/t Reynauds.  Resp: RA sating >92%, denies SOB at rest. Endorses ROUSE.   Cardiac: SR on monitor, denies chest pain. VSS on RA  GI/: Denies N/V on 2g Na diet. Voiding adequately, LBM 4/7.  Skin: No new deficits noted.  Pain: Denies  Activity: SBA, up to bathroom independent  LDAs: R PIV SL and WDL.    Plans for patient to discharge this morning with her son providing transportation. Will continue to monitor and report changes to team.

## 2022-04-08 NOTE — PROGRESS NOTES
SPIRITUAL HEALTH SERVICES  SPIRITUAL ASSESSMENT Progress Note  [facility] ([place]) 6C     REFERRAL SOURCE: Length of Stay    Pt reported no needs at this time, saying she was going home today.  Pt said she would reach out to staff if she wanted a visit prior to discharge.    PLAN: SHS will remain available     Prudence Delvalle  Pager: [phone]

## 2022-04-08 NOTE — DISCHARGE SUMMARY
[address] [address]  p: [phone]    Discharge Summary: Cardiology Service    Jeanine Schuler MRN# [id number]   YOB: 1960 Age: 61 year old       Admission Date: 4/5/2022  Discharge Date: 04/08/22    Discharge Diagnoses:  Paroxysmal atrial fibrillation with RVR  Chronic systolic heart failure  Hypokalemia, resolved  ILD  Pulmonary sarcoidosis  Scleroderma    Brief HPI:  Jeanine Schuler is a 61 year old female with a history of ILD, pulmonary sarcoidosis, scleroderma, GERD, Raynaud's and HFrEF secondary to NICM. She presented for Afib with RVR.      Hospital Course by Diagnosis:    Afib with RVR. Returned to SR last HS. CHADSVASC-2. Given history likely history of PAF with unknown onset date. TSH 0.86.  - Hold Eliquis in prep for RHC.   - Increased HS overnight. Toprol XL administered early. Lopressor 5 mg IV times one with conversion   - Amiodarone 400 mg po BID initiated.   - Optimize electrolytes. KCL 20 MEQ times one.      Chronic SCHF. Echo 12/21/21 with EF 35%, global hypokinesis, distal LV segments, RV mildly dilated, mild to moderate regurg, and moderate to severe tricuspid regurg. Cardiac MRI 1/12/22 with LVEF 44% without acute concern for cardiac sarcoidosis per sarcoid team. Myocardial PET 3/31/22 without acute concern for cardiac sarcoidosis.   - Case discussed with Dr. Francis and Dr. Bentley. Defer Cardiac biopsy given low suspicion for cardiac sarcoidosis. RHC today.   Stage C, NYHA Class III  ACEi/ARB Losartan 25 mg po daily   BB Toprol XL 50 mg po daily   Aldosterone antagonist Aldactone 25 mg po daily   SCD prophylaxis EF 44%  Fluid status euvolemic. Torsemide 20 mg po BID. (IV Lasix in ED)     Hypokalemia, resolved.   - K 3.4 4/7/22  - K 3.7 on day of discharge.   - Continue PTA KCl 30 mg BID     ILD, Pulmonary Sarcoidosis, and Scleroderma.   - Continue Prednisone and Imuran.     Pertinent  Procedures:  1. Right Heart Catheterization    Consults:  None    Discharge medications:   Current Discharge Medication List      CONTINUE these medications which have NOT CHANGED    Details   albuterol (PROAIR HFA/PROVENTIL HFA/VENTOLIN HFA) 108 (90 Base) MCG/ACT inhaler Inhale 2 puffs into the lungs every 6 hours as needed for shortness of breath / dyspnea or wheezing      aspirin (ASA) 81 MG chewable tablet Take 1 tablet (81 mg) by mouth daily  Qty: 30 tablet, Refills: 0    Comments: Future refills by PCP Dr. Katia Carver Mai with phone number [phone].  Associated Diagnoses: Mild coronary artery disease      atorvastatin (LIPITOR) 40 MG tablet Take 40 mg by mouth      azaTHIOprine (IMURAN) 50 MG tablet Take 3 tablets (150 mg) by mouth daily And have labs drawn two weeks after starting medication.  Qty: 90 tablet, Refills: 3    Associated Diagnoses: ILD (interstitial lung disease) (H)      clonazePAM (KLONOPIN) 0.5 MG tablet Take 1 tablet (0.5 mg) by mouth nightly as needed for sleep (secondary to prednisone) MRx 1  Qty: 60 tablet, Refills: 1    Associated Diagnoses: Sarcoidosis; Encounter for long-term (current) use of high-risk medication; High risk medication use; Drug-induced insomnia (H)      folic acid (FOLVITE) 1 MG tablet Take 1 tablet (1 mg) by mouth daily  Qty: 90 tablet, Refills: 3    Associated Diagnoses: Rheumatoid arthritis with negative rheumatoid factor, involving unspecified site (H); MCTD (mixed connective tissue disease) (H); Systemic sclerosis (H)      losartan (COZAAR) 50 MG tablet Take 0.5 tablets (25 mg) by mouth daily  Qty: 45 tablet, Refills: 3    Associated Diagnoses: Rheumatoid arthritis with negative rheumatoid factor, involving unspecified site (H); Systemic sclerosis (H); Shortness of breath; Raynaud's disease without gangrene      magnesium oxide (MAG-OX) 400 MG tablet Take 1 tablet (400 mg) by mouth daily  Qty: 90 tablet, Refills: 0    Associated Diagnoses: Hypokalemia       metoprolol succinate ER (TOPROL XL) 50 MG 24 hr tablet Take 1 tablet (50 mg) by mouth daily  Qty: 90 tablet, Refills: 3    Associated Diagnoses: Cardiac sarcoidosis      nitroGLYcerin (NITROSTAT) 0.3 MG sublingual tablet Place 1 tablet (0.3 mg) under the tongue every 5 minutes as needed for chest pain (esophageal spasm) For chest pain place 1 tablet under the tongue every 5 minutes for 3 doses. If symptoms persist 5 minutes after 3rd dose call 911.  Qty: 12 tablet, Refills: 1    Associated Diagnoses: Esophageal spasm      omeprazole (PRILOSEC) 20 MG DR capsule Take 1 capsule (20 mg) by mouth 2 times daily  Qty: 180 capsule, Refills: 3    Associated Diagnoses: Esophageal dysphagia; Gastroesophageal reflux disease, unspecified whether esophagitis present      pantoprazole (PROTONIX) 40 MG EC tablet Take 1 tablet (40 mg) by mouth daily  Qty: 90 tablet, Refills: 3    Associated Diagnoses: Gastro-esophageal reflux disease without esophagitis; Other disorders of calcium metabolism      potassium chloride ER (MICRO-K) 10 MEQ CR capsule Take 30 mEq by mouth      predniSONE (DELTASONE) 10 MG tablet Take 3 tablets (30 mg) by mouth daily  Qty: 90 tablet, Refills: 3    Associated Diagnoses: ILD (interstitial lung disease) (H); MCTD (mixed connective tissue disease) (H); Sarcoidosis      spironolactone (ALDACTONE) 25 MG tablet Take 25 mg by mouth daily     Associated Diagnoses: Immunization due; Raynaud's disease without gangrene; Rheumatoid arthritis with negative rheumatoid factor, involving unspecified site (H); MCTD (mixed connective tissue disease) (H)      sulfamethoxazole-trimethoprim (BACTRIM) 400-80 MG tablet Take 1 tablet by mouth daily  Qty: 30 tablet, Refills: 3    Associated Diagnoses: ILD (interstitial lung disease) (H)      torsemide (DEMADEX) 20 MG tablet Take 1 tablet (20 mg) by mouth 2 times daily  Qty: 180 tablet, Refills: 1    Associated Diagnoses: Secondary cardiomyopathy (H)      vitamin D2  (ERGOCALCIFEROL) 98929 units (1250 mcg) capsule Take 50,000 Units by mouth once a week      blood glucose (NO BRAND SPECIFIED) test strip Use to test blood sugar 2 times daily or as directed.  Qty: 100 strip, Refills: 6    Comments: To accompany: glucometer per insurance.  Associated Diagnoses: Hypoglycemia      thin (NO BRAND SPECIFIED) lancets Use with lanceting device twice daily  Qty: 100 each, Refills: 0    Comments: To accompany: per insurance.  Associated Diagnoses: Hypoglycemia             Follow-up:  PCP 7 days  EP 6-8 weeks  Pulmonary, Dr. Francis and Dr. Bentley per previous appointments    Labs or imaging requiring follow-up after discharge:  None    Code status:  FULL    Condition on discharge  Temp:  [97.5  F (36.4  C)-98.4  F (36.9  C)] 98  F (36.7  C)  Pulse:  [] 68  Resp:  [16] 16  BP: ()/(54-79) 96/60  SpO2:  [91 %-98 %] 97 %  General: Alert, interactive, no acute distress  HEENT: Normocephalic, atraumatic. Sclera anicteric.   Neck: JVP not elevated  Cardiovascular: Regular rate and rhythm, normal S1 and S2, no murmurs, gallops, or rubs. Radial and pedal pulses palpable bilaterally.   Resp: Regular work of breathing on room air. Clear to auscultation bilaterally, no rales, wheezes, or rhonchi  GI: Soft, nontender, nondistended.   Extremities: No edema, no cyanosis or clubbing, warm and well perfused  Skin: Warm and dry, no jaundice or rash  Neuro: Alert and oriented x 3. CN 2-12 intact, moves all extremities equally, normal speech  Psych: Mood and affect are appropriate    Imaging with results:    EKG 4/7/22      EKG 4/8/22:      Right heart catheterization 4/7/22:  RA 5/4/3  RV 25/0/4  PA 26/10/17  PCWP 16/13/10  SANYA 4.44/2.40 Right sided filling pressures are normal. Left sided filling pressures are normal. Normal PA pressures. Normal cardiac output level. Hemodynamic data has been modified in Epic per physician review.      Recent Labs   Lab 04/08/22  0630 04/07/22  0557  04/06/22  1359 04/06/22  0650 04/05/22  1011 04/04/22  0625    135  --  139 138 132*   POTASSIUM 3.7 3.4 3.6 3.3* 3.4 3.7   CHLORIDE 99 97  --  102 102 98   CO2  --  31  --  28 25 25   ANIONGAP  --  7  --  9 11 9   GLC  --  84  --  83 72 175*   BUN  --  14  --  12 15 14   CR  --  0.78  --  0.78 0.66 0.75   GFRESTIMATED  --  87  --  87 >90 >90   DAVID  --  8.4*  --  8.3* 8.9 8.8   MAG  --  1.6  --  1.9 1.5*  --          Patient Care Team:  Katia Boss MD as PCP - General (Family Practice)  Katia Boss MD as Assigned PCP  Nicolás De La Torre MD as Assigned Rheumatology Provider  Beena Cuellar MD as Assigned Pulmonology Provider  José Luis De Leon MD as MD (Ophthalmology)  Beena Cuellar MD as Referring Physician (Pulmonary Disease)  Lilia Valenzuela MD as Assigned Endocrinology Provider  José Luis De Leon MD as Assigned Surgical Provider  Farooq Abel MD as Assigned Neuroscience Provider  Jamie Cárdenas MD as Assigned Gastroenterology Provider  Madhuri Wade, RN as Specialty Care Coordinator (Cardiology)  Abundio Ag MD as MD (Clinical Cardiac Electrophysiology)  Marsha uBtt, RN as Specialty Care Coordinator (Cardiology)  Atul Bentley MD as Assigned Heart and Vascular Provider    Time Spent on this Encounter   I, LÓPEZ Rivera CNP, personally saw the patient today and spent greater than 30 minutes discharging this patient.    >30 minutes spent in discharge, including >50% in counseling and coordination of care, medication review and plan of care recommended on follow up. Questions were answered.   It was our pleasure to care for Jeanine Schuler during this hospitalization. Please do not hesitate to contact me should there be questions regarding the hospital course or discharge plan.    Patient discussed with staff cardiologist, Dr. Vee, who agrees with the above documentation and plan. Documentation represents joint decision making.     Winnie  LÓPEZ Dejesus, CNP  [facility] Cardiology

## 2022-04-09 DIAGNOSIS — Z71.89 OTHER SPECIFIED COUNSELING: ICD-10-CM

## 2022-04-11 ENCOUNTER — PATIENT OUTREACH (OUTPATIENT)
Dept: CARE COORDINATION | Facility: CLINIC | Age: 62
End: 2022-04-11
Payer: COMMERCIAL

## 2022-04-11 LAB
ATRIAL RATE - MUSE: 131 BPM
ATRIAL RATE - MUSE: 141 BPM
ATRIAL RATE - MUSE: 77 BPM
DIASTOLIC BLOOD PRESSURE - MUSE: NORMAL MMHG
INTERPRETATION ECG - MUSE: NORMAL
P AXIS - MUSE: 36 DEGREES
P AXIS - MUSE: NORMAL DEGREES
P AXIS - MUSE: NORMAL DEGREES
PR INTERVAL - MUSE: 156 MS
PR INTERVAL - MUSE: NORMAL MS
PR INTERVAL - MUSE: NORMAL MS
QRS DURATION - MUSE: 120 MS
QRS DURATION - MUSE: 124 MS
QRS DURATION - MUSE: 126 MS
QT - MUSE: 286 MS
QT - MUSE: 332 MS
QT - MUSE: 414 MS
QTC - MUSE: 450 MS
QTC - MUSE: 468 MS
QTC - MUSE: 510 MS
R AXIS - MUSE: -39 DEGREES
R AXIS - MUSE: -40 DEGREES
R AXIS - MUSE: -43 DEGREES
SYSTOLIC BLOOD PRESSURE - MUSE: NORMAL MMHG
T AXIS - MUSE: -2 DEGREES
T AXIS - MUSE: 113 DEGREES
T AXIS - MUSE: 136 DEGREES
VENTRICULAR RATE- MUSE: 142 BPM
VENTRICULAR RATE- MUSE: 149 BPM
VENTRICULAR RATE- MUSE: 77 BPM

## 2022-04-11 NOTE — PROGRESS NOTES
"Clinic Care Coordination Contact  [facility]: Post-Discharge Note  SITUATION                                                      Admission:    Admission Date: 04/05/22   Reason for Admission: Afib with RVR  Discharge:   Discharge Date: 04/08/22  Discharge Diagnosis: Paroxysmal atrial fibrillation with RVR, Chronic systolic heart failure    BACKGROUND                                                      Per hospital discharge summary and inpatient provider notes:    Jeanine Schuler is a 61 year old female with a history of ILD, pulmonary sarcoidosis, scleroderma, GERD, Raynaud's and HFrEF secondary to NICM. She presented for Afib with RVR.      ASSESSMENT      Enrollment  Primary Care Care Coordination Status: Declined    Discharge Assessment  How are you doing now that you are home?: \"I'm good\"  How are your symptoms? (Red Flag symptoms escalate to triage hotline per guidelines): Improved  Do you feel your condition is stable enough to be safe at home until your provider visit?: Yes  Does the patient have their discharge instructions? : Yes  Does the patient have questions regarding their discharge instructions? : No  Were you started on any new medications or were there changes to any of your previous medications? : Yes  Does the patient have all of their medications?: Yes  Do you have questions regarding any of your medications? : No  Do you have all of your needed medical supplies or equipment (DME)?  (i.e. oxygen tank, CPAP, cane, etc.): Yes  Discharge follow-up appointment scheduled within 14 calendar days? : Yes  Discharge Follow Up Appointment Date: 04/19/22  Discharge Follow Up Appointment Scheduled with?: Primary Care Provider    PLAN                                                      Outpatient Plan:      Adult [facility] Follow-up and recommended labs and tests  Follow up with primary care provider, Ktaia Carver Mai, within 7 days to  evaluate medication change and for hospital follow- up. No " follow up  labs or test are needed.  Follow up with EP NP within 6-8 weeks to evaluate medication change.  No follow up labs or tests are needed.  Continue to follow up with Dr. Bentley, Dr. Francis and your  pulmonary team.    Future Appointments   Date Time Provider Department Center   4/19/2022  2:40 PM Katia Boss MD [facility]   4/28/2022 10:00 AM  PFL B [facility]   4/28/2022 11:00 AM Ludin Cook MD [facility]   6/21/2022  4:00 PM Nicolás De La Torre MD [facility]         For any urgent concerns, please contact our 24 hour nurse triage line: [phone] ([phone])         CHRIS Nice  [phone]  Connected Care Resource [facility]

## 2022-04-18 ENCOUNTER — TELEPHONE (OUTPATIENT)
Dept: CARDIOLOGY | Facility: CLINIC | Age: 62
End: 2022-04-18
Payer: COMMERCIAL

## 2022-04-18 DIAGNOSIS — I50.20 SYSTOLIC HEART FAILURE, UNSPECIFIED HF CHRONICITY (H): ICD-10-CM

## 2022-04-18 DIAGNOSIS — I48.0 PAROXYSMAL A-FIB (H): Primary | ICD-10-CM

## 2022-04-18 DIAGNOSIS — D50.9 IRON DEFICIENCY ANEMIA, UNSPECIFIED IRON DEFICIENCY ANEMIA TYPE: ICD-10-CM

## 2022-04-18 NOTE — TELEPHONE ENCOUNTER
[facility] Call Center    Phone Message    May a detailed message be left on voicemail: yes     Reason for Call: Appointment Intake    Referring Provider Name: Rajwinder DejesusLÓPEZ Ken CNP    Patient was in the hospital and this provider sent over a note for pt to get an appointment set up with Dr. Bentley. There are no openings at this time so forwarding message over to you.    Thank you!  -------------------------------------  Called patient and secured a follow up appt for 5/11/22. Bharti Perdue RN on 4/18/2022 at 11:02 AM      patient continues to c/o intermittent a-fib which causes SOB & anxiety, although episodes have become less often since starting Amiodarone.  She also continues to feel weak & deconditioned.  patient has an appt with her PMD tomorrow where I told her to ask for a therapy order to assist in gaining her strength back. Patient verbalized understanding, agreed with plan and denied any further questions. Bharti Perdue RN on 4/18/2022 at 11:04 AM  ---------------------------------  Called & discussed patient with Dr. Bentley who advises patient needs to resume Eliquis and she can come see him in clinic tomorrow or wed, she should not wait.  Verbalized understanding. Bharti Perdue RN on 4/18/2022 at 11:28 AM    ---------------------------------  Called patient back and reviewed recommendation for resuming Eliquis and having her come into clinic this week.  [facility], so Rx was sent their.  She agreed on Wed 4/20 @ noon for labs & 12:30pm for office visit (dbl booked) with Dr. Bentley. Patient verbalized understanding, agreed with plan and denied any further questions. Bharti Perdue RN on 4/18/2022 at 11:35 AM

## 2022-04-19 ENCOUNTER — OFFICE VISIT (OUTPATIENT)
Dept: FAMILY MEDICINE | Facility: CLINIC | Age: 62
End: 2022-04-19
Payer: COMMERCIAL

## 2022-04-19 VITALS
RESPIRATION RATE: 14 BRPM | TEMPERATURE: 98.4 F | SYSTOLIC BLOOD PRESSURE: 122 MMHG | BODY MASS INDEX: 20.38 KG/M2 | WEIGHT: 138 LBS | DIASTOLIC BLOOD PRESSURE: 80 MMHG

## 2022-04-19 DIAGNOSIS — J84.9 ILD (INTERSTITIAL LUNG DISEASE) (H): ICD-10-CM

## 2022-04-19 DIAGNOSIS — M62.81 GENERALIZED MUSCLE WEAKNESS: ICD-10-CM

## 2022-04-19 DIAGNOSIS — I48.20 CHRONIC ATRIAL FIBRILLATION (H): ICD-10-CM

## 2022-04-19 DIAGNOSIS — E87.6 HYPOKALEMIA: ICD-10-CM

## 2022-04-19 DIAGNOSIS — Z09 HOSPITAL DISCHARGE FOLLOW-UP: Primary | ICD-10-CM

## 2022-04-19 DIAGNOSIS — I50.22 CHRONIC SYSTOLIC CONGESTIVE HEART FAILURE (H): ICD-10-CM

## 2022-04-19 PROCEDURE — 99495 TRANSJ CARE MGMT MOD F2F 14D: CPT | Performed by: FAMILY MEDICINE

## 2022-04-19 ASSESSMENT — PAIN SCALES - GENERAL: PAINLEVEL: NO PAIN (0)

## 2022-04-20 ENCOUNTER — LAB (OUTPATIENT)
Dept: LAB | Facility: CLINIC | Age: 62
End: 2022-04-20
Payer: COMMERCIAL

## 2022-04-20 ENCOUNTER — OFFICE VISIT (OUTPATIENT)
Dept: CARDIOLOGY | Facility: CLINIC | Age: 62
End: 2022-04-20
Attending: INTERNAL MEDICINE
Payer: COMMERCIAL

## 2022-04-20 VITALS
HEIGHT: 68 IN | HEART RATE: 69 BPM | DIASTOLIC BLOOD PRESSURE: 61 MMHG | SYSTOLIC BLOOD PRESSURE: 88 MMHG | OXYGEN SATURATION: 95 % | WEIGHT: 150.1 LBS | BODY MASS INDEX: 22.75 KG/M2

## 2022-04-20 DIAGNOSIS — R06.09 DOE (DYSPNEA ON EXERTION): ICD-10-CM

## 2022-04-20 DIAGNOSIS — I27.20 PULMONARY HYPERTENSION (H): ICD-10-CM

## 2022-04-20 DIAGNOSIS — I50.20 SYSTOLIC HEART FAILURE, UNSPECIFIED HF CHRONICITY (H): ICD-10-CM

## 2022-04-20 DIAGNOSIS — J84.9 ILD (INTERSTITIAL LUNG DISEASE) (H): ICD-10-CM

## 2022-04-20 DIAGNOSIS — I48.0 PAROXYSMAL A-FIB (H): ICD-10-CM

## 2022-04-20 DIAGNOSIS — I48.0 PAROXYSMAL ATRIAL FIBRILLATION (H): ICD-10-CM

## 2022-04-20 DIAGNOSIS — M35.1 MCTD (MIXED CONNECTIVE TISSUE DISEASE) (H): ICD-10-CM

## 2022-04-20 DIAGNOSIS — D86.9 SARCOIDOSIS: ICD-10-CM

## 2022-04-20 DIAGNOSIS — D50.9 IRON DEFICIENCY ANEMIA, UNSPECIFIED IRON DEFICIENCY ANEMIA TYPE: ICD-10-CM

## 2022-04-20 LAB
ANION GAP SERPL CALCULATED.3IONS-SCNC: 8 MMOL/L (ref 3–14)
BUN SERPL-MCNC: 14 MG/DL (ref 7–30)
CALCIUM SERPL-MCNC: 9 MG/DL (ref 8.5–10.1)
CHLORIDE BLD-SCNC: 92 MMOL/L (ref 94–109)
CO2 SERPL-SCNC: 34 MMOL/L (ref 20–32)
CREAT SERPL-MCNC: 1.22 MG/DL (ref 0.52–1.04)
ERYTHROCYTE [DISTWIDTH] IN BLOOD BY AUTOMATED COUNT: 13.9 % (ref 10–15)
GFR SERPL CREATININE-BSD FRML MDRD: 51 ML/MIN/1.73M2
GLUCOSE BLD-MCNC: 88 MG/DL (ref 70–99)
HCT VFR BLD AUTO: 45.3 % (ref 35–47)
HGB BLD-MCNC: 14.6 G/DL (ref 11.7–15.7)
IRON SATN MFR SERPL: 25 % (ref 15–46)
IRON SERPL-MCNC: 62 UG/DL (ref 35–180)
MCH RBC QN AUTO: 28.5 PG (ref 26.5–33)
MCHC RBC AUTO-ENTMCNC: 32.2 G/DL (ref 31.5–36.5)
MCV RBC AUTO: 89 FL (ref 78–100)
NT-PROBNP SERPL-MCNC: 1534 PG/ML (ref 0–125)
PLATELET # BLD AUTO: 264 10E3/UL (ref 150–450)
POTASSIUM BLD-SCNC: 3.5 MMOL/L (ref 3.4–5.3)
RBC # BLD AUTO: 5.12 10E6/UL (ref 3.8–5.2)
SODIUM SERPL-SCNC: 134 MMOL/L (ref 133–144)
TIBC SERPL-MCNC: 249 UG/DL (ref 240–430)
WBC # BLD AUTO: 10.3 10E3/UL (ref 4–11)

## 2022-04-20 PROCEDURE — 99000 SPECIMEN HANDLING OFFICE-LAB: CPT | Performed by: PATHOLOGY

## 2022-04-20 PROCEDURE — 99215 OFFICE O/P EST HI 40 MIN: CPT | Performed by: INTERNAL MEDICINE

## 2022-04-20 PROCEDURE — 83550 IRON BINDING TEST: CPT | Performed by: PATHOLOGY

## 2022-04-20 PROCEDURE — 84238 ASSAY NONENDOCRINE RECEPTOR: CPT | Mod: 90 | Performed by: PATHOLOGY

## 2022-04-20 PROCEDURE — 80048 BASIC METABOLIC PNL TOTAL CA: CPT | Performed by: PATHOLOGY

## 2022-04-20 PROCEDURE — 99417 PROLNG OP E/M EACH 15 MIN: CPT | Performed by: INTERNAL MEDICINE

## 2022-04-20 PROCEDURE — 85027 COMPLETE CBC AUTOMATED: CPT | Performed by: PATHOLOGY

## 2022-04-20 PROCEDURE — 83880 ASSAY OF NATRIURETIC PEPTIDE: CPT | Performed by: PATHOLOGY

## 2022-04-20 PROCEDURE — G0463 HOSPITAL OUTPT CLINIC VISIT: HCPCS

## 2022-04-20 PROCEDURE — 36415 COLL VENOUS BLD VENIPUNCTURE: CPT | Performed by: PATHOLOGY

## 2022-04-20 RX ORDER — AMIODARONE HYDROCHLORIDE 200 MG/1
200 TABLET ORAL DAILY
Qty: 30 TABLET | Refills: 0 | Status: SHIPPED | OUTPATIENT
Start: 2022-04-20 | End: 2023-01-01

## 2022-04-20 RX ORDER — PREDNISONE 10 MG/1
15 TABLET ORAL DAILY
Qty: 90 TABLET | Refills: 3 | Status: ON HOLD | OUTPATIENT
Start: 2022-04-20 | End: 2023-01-01

## 2022-04-20 ASSESSMENT — PAIN SCALES - GENERAL: PAINLEVEL: NO PAIN (0)

## 2022-04-20 NOTE — PATIENT INSTRUCTIONS
Plan of Care:  -Increase Prednisone to 15 mg daily. Please call Dr. Bentley if you do not feel well on this dose.   -Amiodarone changed to 200 mg daily. Medication has been updated and called into preferred pharmacy.   -Follow-up with Dr. Bentley in 2 weeks with labs prior. We will call you to schedule this appt.         We are located on the third floor of the [facility] ([facility] on the [facility].  Our address is      [address] [place] [address] [place] [address] [place] [address]     Thank you for allowing us to be a part of your care here at the [facility]     If you have questions or concerns please contact us at:     Bharti Perdue RN, BSN, PHN           Prema Rodriguez (Scheduling,Prior Auth)  Nurse Coordinator                                 Clinic   Pulmonary Hypertension                        Pulmonary Hypertension  [facility]        [facility]  (Phone)[phone]                             (Phone) [phone]                                                              (Fax) [phone]     ** Please note that you will NOT receive a reminder call regarding your scheduled testing, reminder calls are for provider appointments only.  If you are scheduled for testing within the [employer] system you may receive a call regarding pre-registration for billing purposes only.**      Support Group:  Pulmonary Hypertension Association  Https://www.phassociation.org/  **Look at the Events Tab** They even have Support Groups that you can call into     [facility] PH Support Group  Second Saturday of the Month from 1-3 PM   Location: [address] [place], [place] [id number]  Leader: Silvana Long and Trinidad Quan  Phone: [phone] or [phone]  Email: [email or web address]

## 2022-04-20 NOTE — PROGRESS NOTES
Atul Bentley M.D.  Cardiovascular Medicine    I personally saw and examined this patient.  I spent 120 minutes reviewing her recent course in hospital, directly reviewing recent imaging studies including PET, MRI, Echocardiogram, laboratories, medication changes.  I personally reviewed case with Tiera Francis Molitor, Roukoz and radiology.      Problem List  1. Sarcoid?  2. Abnormal left ventricular function, normal coronary arteries  3. Raynauds  4. Scleroderma  5. Digital ulcerations  6. Raynauds  7. Iron deficiency  8. 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 ILD  10 Restriction of mouth opening  11. SBO  12. Reduced albumin with reduced pre-albumin consistent with malnutrition    1. Right heart catheterization with endomyocardial biopsy to look for evidence of myocardial sarcoid (see cardiac MRI)  2. Labs: iron, iron binding capacity, soluble transferrin receptor, pre-albumin  3. CT scan chest without contrast/done see below  4. Parenteral iron replacement  5. Augment feeding schedule to 5 meals day, BOOST milk shakes made with almond milk (lactose intolerance)    Plan:  1. Discuss with sarcoid group tomorrow  2. Increase prednisone to 15mgs/day  3. Call me with report on Saturday  4. If still doing poorly will admit to my service  5  Increase amiodarone to 400mgs/day x 1 week  6. The patient has a marginal nutritional and volume status and threshold for admission is low          Discussion:  It would be very unusual to have sarcoid and scleroderma, but laureano-lymphatic nodules are suggestive of sarcoid phenotype, though CT does not show hilar adenopathy.  ZEO shows predominantly atrial arrhythmia.  MRI not inconsistent with sarcoid.  Patient feels terrible with rapid steroid taper to facilitate PET to clarify MRI findings/significance.  Striking elevation of BNP with normal coronary arteries and LVEF of 44l%.  While bx has low yield, positive bx findings would be helpful.  Iron deficiency requires parenteral  replacement and evaluation as to loss, hemolysis, malabsorption in light of previous gastric surgery.        History    The patient returns for follow-up of PAH   There is no interim history of chest pain, tightness, paroxysmal nocturnal dyspnea, orthopnea, peripheral edema but increasing episodes of AF with RVR necessitating ER visits, palpitation, pre-syncope, but no syncope,  Exercise tolerance is poor Patient has poor oral intake.  Patient has marked shortness of breath, without cough, hemoptysis..  Medications are reviewed and the patient is taking medications as prescribed.  The patient is generally sleeping well.       Allergies, family history, and 13 system review performed and confirmed in chart.  Objective    Alert, oriented, JVP within normal limits, murmur of TR and aortic outflow systolic murmur without evidence of aortic valve disease, no ascites, basilar rales do not clear with cough, no edema, Raynauds, sclerodema, no open ulcers    Wt Readings from Last 5 Encounters:   04/19/22 62.6 kg (138 lb)   04/08/22 68.7 kg (151 lb 8 oz)   04/04/22 69.9 kg (154 lb)   03/08/22 74.4 kg (164 lb)   02/15/22 76.1 kg (167 lb 12.8 oz)       Meds  Current Outpatient Medications   Medication     albuterol (PROAIR HFA/PROVENTIL HFA/VENTOLIN HFA) 108 (90 Base) MCG/ACT inhaler     amiodarone (PACERONE) 200 MG tablet     apixaban ANTICOAGULANT (ELIQUIS) 5 MG tablet     atorvastatin (LIPITOR) 40 MG tablet     azaTHIOprine (IMURAN) 50 MG tablet     blood glucose (NO BRAND SPECIFIED) test strip     clonazePAM (KLONOPIN) 0.5 MG tablet     folic acid (FOLVITE) 1 MG tablet     losartan (COZAAR) 50 MG tablet     magnesium oxide (MAG-OX) 400 MG tablet     metoprolol succinate ER (TOPROL XL) 50 MG 24 hr tablet     nitroGLYcerin (NITROSTAT) 0.3 MG sublingual tablet     pantoprazole (PROTONIX) 40 MG EC tablet     potassium chloride ER (MICRO-K) 10 MEQ CR capsule     predniSONE (DELTASONE) 10 MG tablet     spironolactone (ALDACTONE)  25 MG tablet     sulfamethoxazole-trimethoprim (BACTRIM) 400-80 MG tablet     thin (NO BRAND SPECIFIED) lancets     torsemide (DEMADEX) 20 MG tablet     vitamin D2 (ERGOCALCIFEROL) 99672 units (1250 mcg) capsule     No current facility-administered medications for this visit.             Labs   Latest Reference Range & Units 04/20/22 12:27   Sodium 133 - 144 mmol/L 134   Potassium 3.4 - 5.3 mmol/L 3.5   Chloride 94 - 109 mmol/L 92 (L)   Carbon Dioxide 20 - 32 mmol/L 34 (H)   Urea Nitrogen 7 - 30 mg/dL 14   Creatinine 0.52 - 1.04 mg/dL 1.22 (H)   GFR Estimate >60 mL/min/1.73m2 51 (L) [1]   Calcium 8.5 - 10.1 mg/dL 9.0   Anion Gap 3 - 14 mmol/L 8   Iron 35 - 180 ug/dL 62   Iron Binding Cap 240 - 430 ug/dL 249   Iron Saturation Index 15 - 46 % 25   N-Terminal Pro Bnp 0 - 125 pg/mL 1,534 (H) [2]   Glucose 70 - 99 mg/dL 88   WBC 4.0 - 11.0 10e3/uL 10.3   Hemoglobin 11.7 - 15.7 g/dL 14.6   Hematocrit 35.0 - 47.0 % 45.3   Platelet Count 150 - 450 10e3/uL 264   RBC Count 3.80 - 5.20 10e6/uL 5.12   MCV 78 - 100 fL 89   MCH 26.5 - 33.0 pg 28.5   MCHC 31.5 - 36.5 g/dL 32.2   RDW 10.0 - 15.0 % 13.9     An inpatient or emergency department NT-proPBNP <300 pg/mL effectively rules out acute CHF, with 99% negative predictive value.         Latest Reference Range & Units 04/20/22 12:27   Sodium 133 - 144 mmol/L 134   Potassium 3.4 - 5.3 mmol/L 3.5   Chloride 94 - 109 mmol/L 92 (L)   Carbon Dioxide 20 - 32 mmol/L 34 (H)   Urea Nitrogen 7 - 30 mg/dL 14   Creatinine 0.52 - 1.04 mg/dL 1.22 (H)   GFR Estimate >60 mL/min/1.73m2 51 (L) [1]   Calcium 8.5 - 10.1 mg/dL 9.0   Anion Gap 3 - 14 mmol/L 8   Iron 35 - 180 ug/dL 62   Iron Binding Cap 240 - 430 ug/dL 249   Iron Saturation Index 15 - 46 % 25   N-Terminal Pro Bnp 0 - 125 pg/mL 1,534 (H) [2]   Glucose 70 - 99 mg/dL 88   WBC 4.0 - 11.0 10e3/uL 10.3   Hemoglobin 11.7 - 15.7 g/dL 14.6   Hematocrit 35.0 - 47.0 % 45.3   Platelet Count 150 - 450 10e3/uL 264   RBC Count 3.80 - 5.20 10e6/uL  5.12   MCV 78 - 100 fL 89   MCH 26.5 - 33.0 pg 28.5   MCHC 31.5 - 36.5 g/dL 32.2   RDW 10.0 - 15.0 % 13.9   (L): Data is abnormally low  (H): Data is abnormally high  [1] Effective December 21, 2021 eGFRcr in adults is calculated using the 2021 CKD-EPI creatinine equation which includes age and gender (Griselda et al., [place], DOI: 10.1056/IEJDxp2918836)  [2] Reference range shown and results flagged as abnormal are for the outpatient, non acute settings. Establishing a baseline value for each individual patient is useful for follow-up.      Suggested inpatient cut points for confirming diagnosis of CHF in an acute setting are:   >450 pg/mL (age 18 to less than 50)   >900 pg/mL (age 50 to less than 75)   >1800 pg/mL (75 yrs and older)      An inpatient or emergency department NT-proPBNP <300 pg/mL effectively rules out acute CHF, with 99% negative predictive value.        Results for OSCAR REYES (MRN [id number]) as of 3/8/2022 17:13   Ref. Range 2/15/2022 12:37 3/8/2022 11:30 3/8/2022 13:19   Sodium Latest Ref Range: 133 - 144 mmol/L  138    Potassium Latest Ref Range: 3.4 - 5.3 mmol/L  3.0 (L)    Chloride Latest Ref Range: 94 - 109 mmol/L  97    Carbon Dioxide Latest Ref Range: 20 - 32 mmol/L  30    Urea Nitrogen Latest Ref Range: 7 - 30 mg/dL  10    Creatinine Latest Ref Range: 0.52 - 1.04 mg/dL  1.04    GFR Estimate Latest Ref Range: >60 mL/min/1.73m2  62    Calcium Latest Ref Range: 8.5 - 10.1 mg/dL  8.5    Anion Gap Latest Ref Range: 3 - 14 mmol/L  11    Albumin Latest Ref Range: 3.4 - 5.0 g/dL  3.0 (L)    Prealbumin Latest Ref Range: 15 - 45 mg/dL  7 (L)    Protein Total Latest Ref Range: 6.8 - 8.8 g/dL  6.8    Bilirubin Total Latest Ref Range: 0.2 - 1.3 mg/dL  0.9    Alkaline Phosphatase Latest Ref Range: 40 - 150 U/L  102    ALT Latest Ref Range: 0 - 50 U/L  26    AST Latest Ref Range: 0 - 45 U/L  34    CRP Inflammation Latest Ref Range: 0.0 - 8.0 mg/L  7.4    Ferritin Latest Ref Range: 8 - 252 ng/mL  96     Iron Latest Ref Range: 35 - 180 ug/dL  32 (L)    Iron Binding Cap Latest Ref Range: 240 - 430 ug/dL  230 (L)    Iron Saturation Index Latest Ref Range: 15 - 46 %  14 (L)    N-Terminal Pro Bnp Latest Ref Range: 0 - 125 pg/mL  7,353 (H)    T4 Free Latest Ref Range: 0.76 - 1.46 ng/dL  1.76 (H)    TSH Latest Ref Range: 0.40 - 4.00 mU/L  0.30 (L)    Glucose Latest Ref Range: 70 - 99 mg/dL  94    WBC Latest Ref Range: 4.0 - 11.0 10e3/uL  5.0    Hemoglobin Latest Ref Range: 11.7 - 15.7 g/dL  14.4    Hematocrit Latest Ref Range: 35.0 - 47.0 %  45.0    Platelet Count Latest Ref Range: 150 - 450 10e3/uL  257    RBC Count Latest Ref Range: 3.80 - 5.20 10e6/uL  5.04    MCV Latest Ref Range: 78 - 100 fL  89    MCH Latest Ref Range: 26.5 - 33.0 pg  28.6    MCHC Latest Ref Range: 31.5 - 36.5 g/dL  32.0    RDW Latest Ref Range: 10.0 - 15.0 %  14.1        Imaging   EXAMINATION: CT CHEST W/O CONTRAST, 3/8/2022 1:19 PM     CLINICAL HISTORY: Sarcoidosis; ; Sarcoidosis     COMPARISON: CT chest 12/20/2021.     TECHNIQUE: CT imaging obtained through the chest without contrast.  Coronal and axial MIP reformatted images obtained. Inspiratory and  expiratory imaging was included. The high resolution interstitial lung  disease protocol.     CONTRAST:  none.     FINDINGS:  Cardiac size within normal limits on the  topogram. No  pericardial effusion. Unchanged mild thoracic aortic ectasia measuring  up to 4.4 cm in the proximal thoracic descending aorta. Enlarged main  pulmonary trunk measuring up to 3.6 cm, unchanged. No suspicious  lymphadenopathy within the chest. Again seen dilated and fluid-filled  esophagus proximal to the gastroesophageal anastomosis of patient's  Eleazar-en-Y. This appears similar to 9/16/2021.     Central tracheobronchial tree is patent. No pneumothorax or pleural  effusions. Again seen and not relatively changed numerous  perilymphatic distribution nodules greatest in the mid to left upper  lung field. Fine  reticular opacities in the subpleural lung bases with  immediate subpleural sparing. Air trapping demonstrated in the mid to  upper lung fields on expiratory imaging.     Imaged unenhanced upper abdomen demonstrates cholecystectomy and  partially imaged surgical changes of Eleazar-en-Y gastric bypass.  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 No  acute or suspicious osseous abnormalities. Soft tissues unremarkable.                                                                      IMPRESSION:  1. Relatively unchanged perilymphatic distribution nodules, mid to  upper lung fields, indicative of sarcoidosis phenotype in conjunction  with basilar and peripheral predominant reticular changes suggestive  of NSIP given history of scleroderma.. Findings may represent a  combination of features of sarcoidosis and scleroderma associated ILD.  Lobular gas trapping on expiratory view or small airways disease  component. Again seen dilated and fluid-filled esophagus consistent  with scleroderma.     I have personally reviewed the examination and initial interpretation  and I agree with the findings.       Coronary angiogram:    Diagnostic  Dominance: Right    Left Main   Very short LM; essentially separate coronary ostia   Left Anterior Descending   Ost LAD to Prox LAD lesion is 20% stenosed. Mild CAD vs possible mild catheter induced vasospasm   Left Circumflex   The vessel is angiographically normal.   Right Coronary Artery   Prox RCA lesion is 20% stenosed.       Intervention        MRI  Clinical history: 61 year old woman with ILD, sarcoidosis, mixed connective tissue disorder, limited  cutaneous systemic sclerosis, and Raynauds. Recently admitted for troponin elevation and cardiomyopathy. No  significant obstructive CAD on coronary angiogram.       Comparison CMR: April 2013     1. The left ventricle is normal in size. There is mild inferoseptal thickening (14 mm). The global systolic  function is  mildly reduced. The LVEF is 44%. There is mild diffuse hypokinesis.     2. The right ventricle is normal in cavity size. The global systolic function is normal. The RVEF is 54%.      3. Left atrium is mildly enlarged. Right atrium is normal in size.      4. There is no significant valvular disease.      5. There is mid-myocardial late gadolinium enhancement in the basal septum with extension into the mid  inferoseptal segment and inferior right ventricular insertion site. In addition, there is subendocardial  hyperenhancement in the basal inferolateral segment. This variable pattern of hyperenhancement is  concerning for infiltrative cardiomyopathy such as sarcoidosis.       6. There is no pericardial effusion.     7. There is no intracardiac thrombus.     8. There is no myocardial edema.      CONCLUSIONS:   1. Non ischemic cardiomyopathy with mildly reduced left ventricular function and normal right ventricular  function.   2. Variable patterns of hyperenhancement in the septal mid-myocardium and inferolateral subendocardium are  concerning for cardiac sarcoidosis.      These findings are new compared to prior cMRI done on April 2013.      CORE EXAM   ==========================================================================================================     MEASUREMENTS   ----------------------------------------------------------------------------------------      VOLUMETRIC ANALYSIS       ----------------------------------------------  .-------------------------------------------------------.                   LV    Reference  RV    Reference   +-----+-----------+------+-----------+------+-----------+   EDV  ml          165   ()   131   ()         ml/m^2       87   (56-90)     69   (53-90)     ESV  ml           92   (22-59)     60   (15-68)          ml/m^2       48   (14-33)     31   (11-37)     CO   L/min      5.04             4.91                     L/min/m^2  2.64             2.58               SV   ml           73   ()    71   ()         ml/m^2       38   (37-62)     37   (36-60)     EF   %            44   (59-77)     54   (55-79)    '-----+-----------+------+-----------+------+-----------'             CARDIAC OUTPUT HR:  69 BPM      LV DIMENSIONS       ----------------------------------------------          WALL THICKNESS - INFEROLATERAL:  0.5 cm          LV RODOLFO:  4.9 cm          LV ESD:  3.8 cm         LA DIMENSIONS (LV SYSTOLE)       ----------------------------------------------          DIAMETER:  4 cm         EXTRACELLULAR VOLUME MEASUREMENT       ----------------------------------------------          PRE-CONTRAST T1 MYOCARDIUM:  1101 msec          PRE-CONTRAST T1 LV CAVITY:  1486 msec          POST-CONTRAST T1 MYOCARDIUM:  530 msec          POST-CONTRAST T1 LV CAVITY:  428 msec          ECV:  39 %        ANATOMY   ----------------------------------------------------------------------------------------      LEFT VENTRICLE       ----------------------------------------------          CAVITY SIZE:  Normal         RIGHT VENTRICLE       ----------------------------------------------          WALL THICKNESS:  Normal         INTERVENTRICULAR SEPTUM       ----------------------------------------------               VENTRICULAR SEPTUM:  Normal          INTERATRIAL SEPTUM       ----------------------------------------------               ATRIAL SEPTUM:          Normal          LEFT ATRIUM       ----------------------------------------------          CAVITY SIZE:  MILDLY ENLARGED         RIGHT ATRIUM       ----------------------------------------------          CAVITY SIZE:  Normal         PERICARDIUM       ----------------------------------------------          Normal         PLEURAL EFFUSION       ----------------------------------------------               None         17 SEGMENT    ----------------------------------------------------------------------------------------  .-------------------------------------------------------------------------------------------------.   Segments            Wall Motion    Hyperenhancement  Stress Perfusion  Interpretation         +--------------------+---------------+------------------+------------------+----------------------+   Base Anterior       Mild/Mod Hypo  None                                Abnormal Wall Motion    Base Anteroseptal   Mild/Mod Hypo  1-25%                               Non-CAD Scar            Base Inferoseptal   Mild/Mod Hypo  1-25%                               Non-CAD Scar            Base Inferior       Mild/Mod Hypo  None                                Abnormal Wall Motion    Base Inferolateral  Mild/Mod Hypo  1-25%                               Non-CAD Scar            Base Anterolateral  Mild/Mod Hypo  None                                Abnormal Wall Motion    Mid Anterior        Mild/Mod Hypo  None                                Abnormal Wall Motion    Mid Anteroseptal    Mild/Mod Hypo  None                                Abnormal Wall Motion    Mid Inferoseptal    Mild/Mod Hypo  1-25%                               Non-CAD Scar            Mid Inferior        Mild/Mod Hypo  None                                Abnormal Wall Motion    Mid Inferolateral   Mild/Mod Hypo  None                                Abnormal Wall Motion    Mid Anterolateral   Mild/Mod Hypo  None                                Abnormal Wall Motion    Apical Anterior     Mild/Mod Hypo  None                                Abnormal Wall Motion    Apical Septal       Mild/Mod Hypo  None                                Abnormal Wall Motion    Apical Inferior     Mild/Mod Hypo  None                                Abnormal Wall Motion    Apical Lateral      Mild/Mod Hypo   None                                Abnormal Wall Motion    [place]                Mild/Mod Hypo  None                                Abnormal Wall Motion   +--------------------+---------------+------------------+------------------+----------------------+   RV Segments         Wall Motion    Hyperenhancement                    Interpretation         +--------------------+---------------+------------------+------------------+----------------------+   RV Basal Anterior   Normal/Hyper   None                                Normal                  RV Basal Inferior   Normal/Hyper   None                                Normal                  RV Mid              Normal/Hyper   None                                Normal                  RV Apical           Normal/Hyper   None                                Normal                 '--------------------+---------------+------------------+------------------+----------------------'     Echo    Name: OSCAR REYES  MRN: [id number]  : 1960  Study Date: 2021 08:35 AM  Age: 61 yrs  Gender: Female  Patient Location: [facility]  Reason For Study: Chest Pain, Chest Tightness, Chest Pressure  Ordering Physician: CHARISSE ANN  Referring Physician: CHARISSE ANN  Performed By: Jose Danielle     BSA: 2.0 m2  Height: 67 in  Weight: 187 lb  HR: 90  BP: 145/100 mmHg  ______________________________________________________________________________  Procedure  Complete Echo Adult. Optison ([facility]C #2493-2699) given intravenously.  ______________________________________________________________________________  Interpretation Summary     1. The left ventricle is normal in size. The visual ejection fraction is  estimated at 35%. Global hypokinesis, distal LV segments (anteroseptal) appear  more hypokinetic than other areas.  2. The right ventricle is mildly dilated. The right ventricular systolic  function is normal.  3. There is  mild to moderate (1-2+) mitral regurgitation.  4. There is moderately severe (3+) tricuspid regurgitation.  5. Moderate (46-55mmHg) pulmonary hypertension is present. The right  ventricular systolic pressure is approximated at 54mmHg plus the right atrial  pressure.  6. The ascending aorta is Mildly dilated. 4.2cm.     Echo from 8/2020 showed EF 55%, mild TR, aorta 3.8cm.  ______________________________________________________________________________  Left Ventricle  The left ventricle is normal in size. There is normal left ventricular wall  thickness. The visual ejection fraction is estimated at 35%. Left ventricular  diastolic function is indeterminate. Global hypokinesis, distal LV segments  (anteroseptal) appear more hypokinetic than other areas.     Right Ventricle  The right ventricle is mildly dilated. The right ventricular systolic function  is normal.     Atria  The left atrium is severely dilated. The right atrium is moderately dilated.  There is no atrial shunt seen.     Mitral Valve  There is mild mitral annular calcification. There is mild to moderate (1-2+)  mitral regurgitation.     Tricuspid Valve  There is moderately severe (3+) tricuspid regurgitation. Moderate (46-55mmHg)  pulmonary hypertension is present. The right ventricular systolic pressure is  approximated at 54mmHg plus the right atrial pressure.     Aortic Valve  There is trace to mild aortic regurgitation.     Pulmonic Valve  There is mild to moderate (1-2+) pulmonic valvular regurgitation.     Vessels  The ascending aorta is Mildly dilated. Normal IVC size, cannot assess collapse  due to limited image quality.     Pericardium  There is no pericardial effusion.     Rhythm  Sinus rhythm was noted.  ______________________________________________________________________________  MMode/2D Measurements & Calculations  IVSd: 1.1 cm     LVIDd: 5.4 cm  LVIDs: 4.0 cm  LVPWd: 0.88 cm  FS: 25.8 %  LV mass(C)d: 207.4 grams  LV mass(C)dI: 105.5  grams/m2  Ao root diam: 3.7 cm  asc Aorta Diam: 4.2 cm  LVOT diam: 2.1 cm  LVOT area: 3.4 cm2  LA Volume (BP): 130.0 ml  LA Volume Index (BP): 66.0 ml/m2  RWT: 0.32     Doppler Measurements & Calculations  MV E max ck: 87.8 cm/sec  MV A max ck: 88.8 cm/sec  MV E/A: 0.99  Ao V2 max: 156.2 cm/sec  Ao max PG: 10.0 mmHg  ALLISON(V,D): 2.0 cm2  LV V1 max PG: 3.3 mmHg  LV V1 max: 91.2 cm/sec  LV V1 VTI: 17.6 cm  SV(LVOT): 59.4 ml  SI(LVOT): 30.2 ml/m2  PA V2 max: 96.9 cm/sec  PA max PG: 3.8 mmHg  PA acc time: 0.06 sec  PI end-d ck: 168.7 cm/sec  TR max ck: 369.2 cm/sec  TR max P.5 mmHg  AV Ck Ratio (DI): 0.58  Lateral E/e': 11.1       Assessment/Plan         Answers for HPI/ROS submitted by the patient on 3/5/2022  General Symptoms: Yes  Skin Symptoms: Yes  HENT Symptoms: Yes  EYE SYMPTOMS: Yes  HEART SYMPTOMS: Yes  LUNG SYMPTOMS: Yes  INTESTINAL SYMPTOMS: Yes  URINARY SYMPTOMS: Yes  GYNECOLOGIC SYMPTOMS: No  BREAST SYMPTOMS: No  SKELETAL SYMPTOMS: Yes  BLOOD SYMPTOMS: No  NERVOUS SYSTEM SYMPTOMS: Yes  MENTAL HEALTH SYMPTOMS: Yes  Ear pain: No  Ear discharge: No  Hearing loss: Yes  Tinnitus: No  Nosebleeds: No  Congestion: No  Sinus pain: No  Trouble swallowing: Yes   Voice hoarseness: No  Mouth sores: No  Sore throat: No  Tooth pain: No  Gum tenderness: No  Bleeding gums: No  Change in taste: No  Change in sense of smell: No  Dry mouth: Yes  Hearing aid used: No  Neck lump: No  Fever: No  Loss of appetite: Yes  Weight loss: Yes  Weight gain: No  Fatigue: Yes  Night sweats: No  Chills: Yes  Increased stress: Yes  Excessive hunger: No  Excessive thirst: No  Feeling hot or cold when others believe the temperature is normal: Yes  Loss of height: No  Post-operative complications: No  Surgical site pain: No  Hallucinations: No  Change in or Loss of Energy: Yes  Hyperactivity: No  Confusion: No  Changes in hair: No  Changes in moles/birth marks: No  Itching: No  Rashes: No  Changes in nails: No  Acne: No  Hair in places  you don't want it: No  Change in facial hair: No  Warts: No  Non-healing sores: No  Scarring: No  Flaking of skin: No  Color changes of hands/feet in cold : Yes  Sun sensitivity: No  Skin thickening: No  Eye pain: No  Vision loss: Yes  Dry eyes: No  Watery eyes: Yes  Eye bulging: No  Double vision: No  Flashing of lights: No  Spots: No  Floaters: No  Redness: No  Crossed eyes: No  Tunnel Vision: No  Yellowing of eyes: No  Eye irritation: No  Chest pain or pressure: Yes  Fast or irregular heartbeat: Yes  Pain in legs with walking: No  Trouble breathing while lying down: Yes  Fingers or toes appear blue: Yes  High blood pressure: No  Low blood pressure: No  Fainting: No  Murmurs: No  Pacemaker: No  Varicose veins: No  Edema or swelling: Yes  Wake up at night with shortness of breath: Yes  Light-headedness: No  Exercise intolerance: Yes  Cough: Yes  Sputum or phlegm: No  Coughing up blood: No  Difficulty breating or shortness of breath: Yes  Snoring: No  Wheezing: No  Difficulty breathing on exertion: Yes  Nighttime Cough: Yes  Difficulty breathing when lying flat: Yes  Heart burn or indigestion: Yes  Nausea: Yes  Vomiting: No  Abdominal pain: No  Bloating: No  Constipation: No  Diarrhea: No  Blood in stool: No  Black stools: No  Rectal or Anal pain: No  Fecal incontinence: No  Yellowing of skin or eyes: No  Vomit with blood: No  Change in stools: No  Trouble holding urine or incontinence: No  Pain or burning: No  Trouble starting or stopping: No  Increased frequency of urination: No  Blood in urine: No  Decreased frequency of urination: No  Frequent nighttime urination: Yes  Flank pain: No  Difficulty emptying bladder: No  Back pain: Yes  Muscle aches: No  Neck pain: No  Swollen joints: No  Joint pain: Yes  Bone pain: No  Muscle cramps: No  Muscle weakness: Yes  Joint stiffness: Yes  Bone fracture: No  Trouble with coordination: No  Dizziness or trouble with balance: No  Fainting or black-out spells: No  Memory loss:  No  Headache: No  Seizures: No  Speech problems: No  Tingling: Yes  Tremor: No  Weakness: Yes  Difficulty walking: Yes  Paralysis: No  Numbness: Yes  Nervous or Anxious: No  Depression: No  Trouble sleeping: Yes  Trouble thinking or concentrating: No  Mood changes: Yes  Panic attacks: No

## 2022-04-20 NOTE — LETTER
4/20/2022      RE: Jeanine Schuler  [address]  [facility] [address]       Dear Colleague,    Thank you for the opportunity to participate in the care of your patient, Jeanine Schuler, at the [facility] [place] [facility]. Please see a copy of my visit note below.    Atul Bentley M.D.  Cardiovascular Medicine    I personally saw and examined this patient.  I spent 120 minutes reviewing her recent course in hospital, directly reviewing recent imaging studies including PET, MRI, Echocardiogram, laboratories, medication changes.  I personally reviewed case with Tiera Francis, Ancelmo De La Torre and radiology.      Problem List  1. Sarcoid?  2. Abnormal left ventricular function, normal coronary arteries  3. Raynauds  4. Scleroderma  5. Digital ulcerations  6. Raynauds  7. Iron deficiency  8. 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 ILD  10 Restriction of mouth opening  11. SBO  12. Reduced albumin with reduced pre-albumin consistent with malnutrition    1. Right heart catheterization with endomyocardial biopsy to look for evidence of myocardial sarcoid (see cardiac MRI)  2. Labs: iron, iron binding capacity, soluble transferrin receptor, pre-albumin  3. CT scan chest without contrast/done see below  4. Parenteral iron replacement  5. Augment feeding schedule to 5 meals day, BOOST milk shakes made with almond milk (lactose intolerance)    Plan:  1. Discuss with sarcoid group tomorrow  2. Increase prednisone to 15mgs/day  3. Call me with report on Saturday  4. If still doing poorly will admit to my service  5  Increase amiodarone to 400mgs/day x 1 week  6. The patient has a marginal nutritional and volume status and threshold for admission is low          Discussion:  It would be very unusual to have sarcoid and scleroderma, but laureano-lymphatic nodules are suggestive of sarcoid phenotype, though CT does not show hilar adenopathy.  ZEO shows  predominantly atrial arrhythmia.  MRI not inconsistent with sarcoid.  Patient feels terrible with rapid steroid taper to facilitate PET to clarify MRI findings/significance.  Striking elevation of BNP with normal coronary arteries and LVEF of 44l%.  While bx has low yield, positive bx findings would be helpful.  Iron deficiency requires parenteral replacement and evaluation as to loss, hemolysis, malabsorption in light of previous gastric surgery.        History    The patient returns for follow-up of PAH   There is no interim history of chest pain, tightness, paroxysmal nocturnal dyspnea, orthopnea, peripheral edema but increasing episodes of AF with RVR necessitating ER visits, palpitation, pre-syncope, but no syncope,  Exercise tolerance is poor Patient has poor oral intake.  Patient has marked shortness of breath, without cough, hemoptysis..  Medications are reviewed and the patient is taking medications as prescribed.  The patient is generally sleeping well.       Allergies, family history, and 13 system review performed and confirmed in chart.  Objective    Alert, oriented, JVP within normal limits, murmur of TR and aortic outflow systolic murmur without evidence of aortic valve disease, no ascites, basilar rales do not clear with cough, no edema, Raynauds, sclerodema, no open ulcers    Wt Readings from Last 5 Encounters:   04/19/22 62.6 kg (138 lb)   04/08/22 68.7 kg (151 lb 8 oz)   04/04/22 69.9 kg (154 lb)   03/08/22 74.4 kg (164 lb)   02/15/22 76.1 kg (167 lb 12.8 oz)       Meds  Current Outpatient Medications   Medication     albuterol (PROAIR HFA/PROVENTIL HFA/VENTOLIN HFA) 108 (90 Base) MCG/ACT inhaler     amiodarone (PACERONE) 200 MG tablet     apixaban ANTICOAGULANT (ELIQUIS) 5 MG tablet     atorvastatin (LIPITOR) 40 MG tablet     azaTHIOprine (IMURAN) 50 MG tablet     blood glucose (NO BRAND SPECIFIED) test strip     clonazePAM (KLONOPIN) 0.5 MG tablet     folic acid (FOLVITE) 1 MG tablet      losartan (COZAAR) 50 MG tablet     magnesium oxide (MAG-OX) 400 MG tablet     metoprolol succinate ER (TOPROL XL) 50 MG 24 hr tablet     nitroGLYcerin (NITROSTAT) 0.3 MG sublingual tablet     pantoprazole (PROTONIX) 40 MG EC tablet     potassium chloride ER (MICRO-K) 10 MEQ CR capsule     predniSONE (DELTASONE) 10 MG tablet     spironolactone (ALDACTONE) 25 MG tablet     sulfamethoxazole-trimethoprim (BACTRIM) 400-80 MG tablet     thin (NO BRAND SPECIFIED) lancets     torsemide (DEMADEX) 20 MG tablet     vitamin D2 (ERGOCALCIFEROL) 87589 units (1250 mcg) capsule     No current facility-administered medications for this visit.             Labs   Latest Reference Range & Units 04/20/22 12:27   Sodium 133 - 144 mmol/L 134   Potassium 3.4 - 5.3 mmol/L 3.5   Chloride 94 - 109 mmol/L 92 (L)   Carbon Dioxide 20 - 32 mmol/L 34 (H)   Urea Nitrogen 7 - 30 mg/dL 14   Creatinine 0.52 - 1.04 mg/dL 1.22 (H)   GFR Estimate >60 mL/min/1.73m2 51 (L) [1]   Calcium 8.5 - 10.1 mg/dL 9.0   Anion Gap 3 - 14 mmol/L 8   Iron 35 - 180 ug/dL 62   Iron Binding Cap 240 - 430 ug/dL 249   Iron Saturation Index 15 - 46 % 25   N-Terminal Pro Bnp 0 - 125 pg/mL 1,534 (H) [2]   Glucose 70 - 99 mg/dL 88   WBC 4.0 - 11.0 10e3/uL 10.3   Hemoglobin 11.7 - 15.7 g/dL 14.6   Hematocrit 35.0 - 47.0 % 45.3   Platelet Count 150 - 450 10e3/uL 264   RBC Count 3.80 - 5.20 10e6/uL 5.12   MCV 78 - 100 fL 89   MCH 26.5 - 33.0 pg 28.5   MCHC 31.5 - 36.5 g/dL 32.2   RDW 10.0 - 15.0 % 13.9     An inpatient or emergency department NT-proPBNP <300 pg/mL effectively rules out acute CHF, with 99% negative predictive value.         Latest Reference Range & Units 04/20/22 12:27   Sodium 133 - 144 mmol/L 134   Potassium 3.4 - 5.3 mmol/L 3.5   Chloride 94 - 109 mmol/L 92 (L)   Carbon Dioxide 20 - 32 mmol/L 34 (H)   Urea Nitrogen 7 - 30 mg/dL 14   Creatinine 0.52 - 1.04 mg/dL 1.22 (H)   GFR Estimate >60 mL/min/1.73m2 51 (L) [1]   Calcium 8.5 - 10.1 mg/dL 9.0   Anion Gap 3 - 14  mmol/L 8   Iron 35 - 180 ug/dL 62   Iron Binding Cap 240 - 430 ug/dL 249   Iron Saturation Index 15 - 46 % 25   N-Terminal Pro Bnp 0 - 125 pg/mL 1,534 (H) [2]   Glucose 70 - 99 mg/dL 88   WBC 4.0 - 11.0 10e3/uL 10.3   Hemoglobin 11.7 - 15.7 g/dL 14.6   Hematocrit 35.0 - 47.0 % 45.3   Platelet Count 150 - 450 10e3/uL 264   RBC Count 3.80 - 5.20 10e6/uL 5.12   MCV 78 - 100 fL 89   MCH 26.5 - 33.0 pg 28.5   MCHC 31.5 - 36.5 g/dL 32.2   RDW 10.0 - 15.0 % 13.9   (L): Data is abnormally low  (H): Data is abnormally high  [1] Effective December 21, 2021 eGFRcr in adults is calculated using the 2021 CKD-EPI creatinine equation which includes age and gender (Griselda et al., [place], DOI: 10.1056/EIODgg7717661)  [2] Reference range shown and results flagged as abnormal are for the outpatient, non acute settings. Establishing a baseline value for each individual patient is useful for follow-up.      Suggested inpatient cut points for confirming diagnosis of CHF in an acute setting are:   >450 pg/mL (age 18 to less than 50)   >900 pg/mL (age 50 to less than 75)   >1800 pg/mL (75 yrs and older)      An inpatient or emergency department NT-proPBNP <300 pg/mL effectively rules out acute CHF, with 99% negative predictive value.        Results for OSCAR REYES (MRN [id number]) as of 3/8/2022 17:13   Ref. Range 2/15/2022 12:37 3/8/2022 11:30 3/8/2022 13:19   Sodium Latest Ref Range: 133 - 144 mmol/L  138    Potassium Latest Ref Range: 3.4 - 5.3 mmol/L  3.0 (L)    Chloride Latest Ref Range: 94 - 109 mmol/L  97    Carbon Dioxide Latest Ref Range: 20 - 32 mmol/L  30    Urea Nitrogen Latest Ref Range: 7 - 30 mg/dL  10    Creatinine Latest Ref Range: 0.52 - 1.04 mg/dL  1.04    GFR Estimate Latest Ref Range: >60 mL/min/1.73m2  62    Calcium Latest Ref Range: 8.5 - 10.1 mg/dL  8.5    Anion Gap Latest Ref Range: 3 - 14 mmol/L  11    Albumin Latest Ref Range: 3.4 - 5.0 g/dL  3.0 (L)    Prealbumin Latest Ref Range: 15 - 45 mg/dL  7 (L)     Protein Total Latest Ref Range: 6.8 - 8.8 g/dL  6.8    Bilirubin Total Latest Ref Range: 0.2 - 1.3 mg/dL  0.9    Alkaline Phosphatase Latest Ref Range: 40 - 150 U/L  102    ALT Latest Ref Range: 0 - 50 U/L  26    AST Latest Ref Range: 0 - 45 U/L  34    CRP Inflammation Latest Ref Range: 0.0 - 8.0 mg/L  7.4    Ferritin Latest Ref Range: 8 - 252 ng/mL  96    Iron Latest Ref Range: 35 - 180 ug/dL  32 (L)    Iron Binding Cap Latest Ref Range: 240 - 430 ug/dL  230 (L)    Iron Saturation Index Latest Ref Range: 15 - 46 %  14 (L)    N-Terminal Pro Bnp Latest Ref Range: 0 - 125 pg/mL  7,353 (H)    T4 Free Latest Ref Range: 0.76 - 1.46 ng/dL  1.76 (H)    TSH Latest Ref Range: 0.40 - 4.00 mU/L  0.30 (L)    Glucose Latest Ref Range: 70 - 99 mg/dL  94    WBC Latest Ref Range: 4.0 - 11.0 10e3/uL  5.0    Hemoglobin Latest Ref Range: 11.7 - 15.7 g/dL  14.4    Hematocrit Latest Ref Range: 35.0 - 47.0 %  45.0    Platelet Count Latest Ref Range: 150 - 450 10e3/uL  257    RBC Count Latest Ref Range: 3.80 - 5.20 10e6/uL  5.04    MCV Latest Ref Range: 78 - 100 fL  89    MCH Latest Ref Range: 26.5 - 33.0 pg  28.6    MCHC Latest Ref Range: 31.5 - 36.5 g/dL  32.0    RDW Latest Ref Range: 10.0 - 15.0 %  14.1        Imaging   EXAMINATION: CT CHEST W/O CONTRAST, 3/8/2022 1:19 PM     CLINICAL HISTORY: Sarcoidosis; ; Sarcoidosis     COMPARISON: CT chest 12/20/2021.     TECHNIQUE: CT imaging obtained through the chest without contrast.  Coronal and axial MIP reformatted images obtained. Inspiratory and  expiratory imaging was included. The high resolution interstitial lung  disease protocol.     CONTRAST:  none.     FINDINGS:  Cardiac size within normal limits on the  topogram. No  pericardial effusion. Unchanged mild thoracic aortic ectasia measuring  up to 4.4 cm in the proximal thoracic descending aorta. Enlarged main  pulmonary trunk measuring up to 3.6 cm, unchanged. No suspicious  lymphadenopathy within the chest. Again seen dilated  and fluid-filled  esophagus proximal to the gastroesophageal anastomosis of patient's  Eleazar-en-Y. This appears similar to 9/16/2021.     Central tracheobronchial tree is patent. No pneumothorax or pleural  effusions. Again seen and not relatively changed numerous  perilymphatic distribution nodules greatest in the mid to left upper  lung field. Fine reticular opacities in the subpleural lung bases with  immediate subpleural sparing. Air trapping demonstrated in the mid to  upper lung fields on expiratory imaging.     Imaged unenhanced upper abdomen demonstrates cholecystectomy and  partially imaged surgical changes of Eleazar-en-Y gastric bypass.  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 No  acute or suspicious osseous abnormalities. Soft tissues unremarkable.                                                                      IMPRESSION:  1. Relatively unchanged perilymphatic distribution nodules, mid to  upper lung fields, indicative of sarcoidosis phenotype in conjunction  with basilar and peripheral predominant reticular changes suggestive  of NSIP given history of scleroderma.. Findings may represent a  combination of features of sarcoidosis and scleroderma associated ILD.  Lobular gas trapping on expiratory view or small airways disease  component. Again seen dilated and fluid-filled esophagus consistent  with scleroderma.     I have personally reviewed the examination and initial interpretation  and I agree with the findings.       Coronary angiogram:    Diagnostic  Dominance: Right    Left Main   Very short LM; essentially separate coronary ostia   Left Anterior Descending   Ost LAD to Prox LAD lesion is 20% stenosed. Mild CAD vs possible mild catheter induced vasospasm   Left Circumflex   The vessel is angiographically normal.   Right Coronary Artery   Prox RCA lesion is 20% stenosed.       Intervention        MRI  Clinical history: 61 year old woman with ILD, sarcoidosis, mixed  connective tissue disorder, limited  cutaneous systemic sclerosis, and Raynauds. Recently admitted for troponin elevation and cardiomyopathy. No  significant obstructive CAD on coronary angiogram.       Comparison CMR: April 2013     1. The left ventricle is normal in size. There is mild inferoseptal thickening (14 mm). The global systolic  function is mildly reduced. The LVEF is 44%. There is mild diffuse hypokinesis.     2. The right ventricle is normal in cavity size. The global systolic function is normal. The RVEF is 54%.      3. Left atrium is mildly enlarged. Right atrium is normal in size.      4. There is no significant valvular disease.      5. There is mid-myocardial late gadolinium enhancement in the basal septum with extension into the mid  inferoseptal segment and inferior right ventricular insertion site. In addition, there is subendocardial  hyperenhancement in the basal inferolateral segment. This variable pattern of hyperenhancement is  concerning for infiltrative cardiomyopathy such as sarcoidosis.       6. There is no pericardial effusion.     7. There is no intracardiac thrombus.     8. There is no myocardial edema.      CONCLUSIONS:   1. Non ischemic cardiomyopathy with mildly reduced left ventricular function and normal right ventricular  function.   2. Variable patterns of hyperenhancement in the septal mid-myocardium and inferolateral subendocardium are  concerning for cardiac sarcoidosis.      These findings are new compared to prior cMRI done on April 2013.      CORE EXAM   ==========================================================================================================     MEASUREMENTS   ----------------------------------------------------------------------------------------      VOLUMETRIC ANALYSIS       ----------------------------------------------  .-------------------------------------------------------.                   LV    Reference  RV    Reference    +-----+-----------+------+-----------+------+-----------+   EDV  ml          165   ()   131   ()         ml/m^2       87   (56-90)     69   (53-90)     ESV  ml           92   (22-59)     60   (15-68)          ml/m^2       48   (14-33)     31   (11-37)     CO   L/min      5.04             4.91                    L/min/m^2  2.64             2.58               SV   ml           73   ()    71   ()         ml/m^2       38   (37-62)     37   (36-60)     EF   %            44   (59-77)     54   (55-79)    '-----+-----------+------+-----------+------+-----------'             CARDIAC OUTPUT HR:  69 BPM      LV DIMENSIONS       ----------------------------------------------          WALL THICKNESS - INFEROLATERAL:  0.5 cm          LV RODOLFO:  4.9 cm          LV ESD:  3.8 cm         LA DIMENSIONS (LV SYSTOLE)       ----------------------------------------------          DIAMETER:  4 cm         EXTRACELLULAR VOLUME MEASUREMENT       ----------------------------------------------          PRE-CONTRAST T1 MYOCARDIUM:  1101 msec          PRE-CONTRAST T1 LV CAVITY:  1486 msec          POST-CONTRAST T1 MYOCARDIUM:  530 msec          POST-CONTRAST T1 LV CAVITY:  428 msec          ECV:  39 %        ANATOMY   ----------------------------------------------------------------------------------------      LEFT VENTRICLE       ----------------------------------------------          CAVITY SIZE:  Normal         RIGHT VENTRICLE       ----------------------------------------------          WALL THICKNESS:  Normal         INTERVENTRICULAR SEPTUM       ----------------------------------------------               VENTRICULAR SEPTUM:  Normal          INTERATRIAL SEPTUM       ----------------------------------------------               ATRIAL SEPTUM:          Normal          LEFT ATRIUM       ----------------------------------------------          CAVITY SIZE:   MILDLY ENLARGED         RIGHT ATRIUM       ----------------------------------------------          CAVITY SIZE:  Normal         PERICARDIUM       ----------------------------------------------          Normal         PLEURAL EFFUSION       ----------------------------------------------               None         17 SEGMENT   ----------------------------------------------------------------------------------------  .-------------------------------------------------------------------------------------------------.   Segments            Wall Motion    Hyperenhancement  Stress Perfusion  Interpretation         +--------------------+---------------+------------------+------------------+----------------------+   Base Anterior       Mild/Mod Hypo  None                                Abnormal Wall Motion    Base Anteroseptal   Mild/Mod Hypo  1-25%                               Non-CAD Scar            Base Inferoseptal   Mild/Mod Hypo  1-25%                               Non-CAD Scar            Base Inferior       Mild/Mod Hypo  None                                Abnormal Wall Motion    Base Inferolateral  Mild/Mod Hypo  1-25%                               Non-CAD Scar            Base Anterolateral  Mild/Mod Hypo  None                                Abnormal Wall Motion    Mid Anterior        Mild/Mod Hypo  None                                Abnormal Wall Motion    Mid Anteroseptal    Mild/Mod Hypo  None                                Abnormal Wall Motion    Mid Inferoseptal    Mild/Mod Hypo  1-25%                               Non-CAD Scar            Mid Inferior        Mild/Mod Hypo  None                                Abnormal Wall Motion    Mid Inferolateral   Mild/Mod Hypo  None                                Abnormal Wall Motion    Mid Anterolateral   Mild/Mod Hypo  None                                Abnormal Wall Motion    Apical Anterior      Mild/Mod Hypo  None                                Abnormal Wall Motion    Apical Septal       Mild/Mod Hypo  None                                Abnormal Wall Motion    Apical Inferior     Mild/Mod Hypo  None                                Abnormal Wall Motion    Apical Lateral      Mild/Mod Hypo  None                                Abnormal Wall Motion    [place]                Mild/Mod Hypo  None                                Abnormal Wall Motion   +--------------------+---------------+------------------+------------------+----------------------+   RV Segments         Wall Motion    Hyperenhancement                    Interpretation         +--------------------+---------------+------------------+------------------+----------------------+   RV Basal Anterior   Normal/Hyper   None                                Normal                  RV Basal Inferior   Normal/Hyper   None                                Normal                  RV Mid              Normal/Hyper   None                                Normal                  RV Apical           Normal/Hyper   None                                Normal                 '--------------------+---------------+------------------+------------------+----------------------'     Echo    Name: OSCAR REYES  MRN: [id number]  : 1960  Study Date: 2021 08:35 AM  Age: 61 yrs  Gender: Female  Patient Location: [facility]  Reason For Study: Chest Pain, Chest Tightness, Chest Pressure  Ordering Physician: CHARISSE ANN  Referring Physician: CHARISSE ANN  Performed By: Jose Danielle     BSA: 2.0 m2  Height: 67 in  Weight: 187 lb  HR: 90  BP: 145/100 mmHg  ______________________________________________________________________________  Procedure  Complete Echo Adult. [employer] (NDC #3296-1411) given  intravenously.  ______________________________________________________________________________  Interpretation Summary     1. The left ventricle is normal in size. The visual ejection fraction is  estimated at 35%. Global hypokinesis, distal LV segments (anteroseptal) appear  more hypokinetic than other areas.  2. The right ventricle is mildly dilated. The right ventricular systolic  function is normal.  3. There is mild to moderate (1-2+) mitral regurgitation.  4. There is moderately severe (3+) tricuspid regurgitation.  5. Moderate (46-55mmHg) pulmonary hypertension is present. The right  ventricular systolic pressure is approximated at 54mmHg plus the right atrial  pressure.  6. The ascending aorta is Mildly dilated. 4.2cm.     Echo from 8/2020 showed EF 55%, mild TR, aorta 3.8cm.  ______________________________________________________________________________  Left Ventricle  The left ventricle is normal in size. There is normal left ventricular wall  thickness. The visual ejection fraction is estimated at 35%. Left ventricular  diastolic function is indeterminate. Global hypokinesis, distal LV segments  (anteroseptal) appear more hypokinetic than other areas.     Right Ventricle  The right ventricle is mildly dilated. The right ventricular systolic function  is normal.     Atria  The left atrium is severely dilated. The right atrium is moderately dilated.  There is no atrial shunt seen.     Mitral Valve  There is mild mitral annular calcification. There is mild to moderate (1-2+)  mitral regurgitation.     Tricuspid Valve  There is moderately severe (3+) tricuspid regurgitation. Moderate (46-55mmHg)  pulmonary hypertension is present. The right ventricular systolic pressure is  approximated at 54mmHg plus the right atrial pressure.     Aortic Valve  There is trace to mild aortic regurgitation.     Pulmonic Valve  There is mild to moderate (1-2+) pulmonic valvular regurgitation.     Vessels  The ascending aorta  is Mildly dilated. Normal IVC size, cannot assess collapse  due to limited image quality.     Pericardium  There is no pericardial effusion.     Rhythm  Sinus rhythm was noted.  ______________________________________________________________________________  MMode/2D Measurements & Calculations  IVSd: 1.1 cm     LVIDd: 5.4 cm  LVIDs: 4.0 cm  LVPWd: 0.88 cm  FS: 25.8 %  LV mass(C)d: 207.4 grams  LV mass(C)dI: 105.5 grams/m2  Ao root diam: 3.7 cm  asc Aorta Diam: 4.2 cm  LVOT diam: 2.1 cm  LVOT area: 3.4 cm2  LA Volume (BP): 130.0 ml  LA Volume Index (BP): 66.0 ml/m2  RWT: 0.32     Doppler Measurements & Calculations  MV E max ck: 87.8 cm/sec  MV A max ck: 88.8 cm/sec  MV E/A: 0.99  Ao V2 max: 156.2 cm/sec  Ao max PG: 10.0 mmHg  ALLISON(V,D): 2.0 cm2  LV V1 max PG: 3.3 mmHg  LV V1 max: 91.2 cm/sec  LV V1 VTI: 17.6 cm  SV(LVOT): 59.4 ml  SI(LVOT): 30.2 ml/m2  PA V2 max: 96.9 cm/sec  PA max PG: 3.8 mmHg  PA acc time: 0.06 sec  PI end-d ck: 168.7 cm/sec  TR max ck: 369.2 cm/sec  TR max P.5 mmHg  AV Ck Ratio (DI): 0.58  Lateral E/e': 11.1       Assessment/Plan         Answers for HPI/ROS submitted by the patient on 3/5/2022  General Symptoms: Yes  Skin Symptoms: Yes  HENT Symptoms: Yes  EYE SYMPTOMS: Yes  HEART SYMPTOMS: Yes  LUNG SYMPTOMS: Yes  INTESTINAL SYMPTOMS: Yes  URINARY SYMPTOMS: Yes  GYNECOLOGIC SYMPTOMS: No  BREAST SYMPTOMS: No  SKELETAL SYMPTOMS: Yes  BLOOD SYMPTOMS: No  NERVOUS SYSTEM SYMPTOMS: Yes  MENTAL HEALTH SYMPTOMS: Yes  Ear pain: No  Ear discharge: No  Hearing loss: Yes  Tinnitus: No  Nosebleeds: No  Congestion: No  Sinus pain: No  Trouble swallowing: Yes   Voice hoarseness: No  Mouth sores: No  Sore throat: No  Tooth pain: No  Gum tenderness: No  Bleeding gums: No  Change in taste: No  Change in sense of smell: No  Dry mouth: Yes  Hearing aid used: No  Neck lump: No  Fever: No  Loss of appetite: Yes  Weight loss: Yes  Weight gain: No  Fatigue: Yes  Night sweats: No  Chills: Yes  Increased  stress: Yes  Excessive hunger: No  Excessive thirst: No  Feeling hot or cold when others believe the temperature is normal: Yes  Loss of height: No  Post-operative complications: No  Surgical site pain: No  Hallucinations: No  Change in or Loss of Energy: Yes  Hyperactivity: No  Confusion: No  Changes in hair: No  Changes in moles/birth marks: No  Itching: No  Rashes: No  Changes in nails: No  Acne: No  Hair in places you don't want it: No  Change in facial hair: No  Warts: No  Non-healing sores: No  Scarring: No  Flaking of skin: No  Color changes of hands/feet in cold : Yes  Sun sensitivity: No  Skin thickening: No  Eye pain: No  Vision loss: Yes  Dry eyes: No  Watery eyes: Yes  Eye bulging: No  Double vision: No  Flashing of lights: No  Spots: No  Floaters: No  Redness: No  Crossed eyes: No  Tunnel Vision: No  Yellowing of eyes: No  Eye irritation: No  Chest pain or pressure: Yes  Fast or irregular heartbeat: Yes  Pain in legs with walking: No  Trouble breathing while lying down: Yes  Fingers or toes appear blue: Yes  High blood pressure: No  Low blood pressure: No  Fainting: No  Murmurs: No  Pacemaker: No  Varicose veins: No  Edema or swelling: Yes  Wake up at night with shortness of breath: Yes  Light-headedness: No  Exercise intolerance: Yes  Cough: Yes  Sputum or phlegm: No  Coughing up blood: No  Difficulty breating or shortness of breath: Yes  Snoring: No  Wheezing: No  Difficulty breathing on exertion: Yes  Nighttime Cough: Yes  Difficulty breathing when lying flat: Yes  Heart burn or indigestion: Yes  Nausea: Yes  Vomiting: No  Abdominal pain: No  Bloating: No  Constipation: No  Diarrhea: No  Blood in stool: No  Black stools: No  Rectal or Anal pain: No  Fecal incontinence: No  Yellowing of skin or eyes: No  Vomit with blood: No  Change in stools: No  Trouble holding urine or incontinence: No  Pain or burning: No  Trouble starting or stopping: No  Increased frequency of urination: No  Blood in urine:  No  Decreased frequency of urination: No  Frequent nighttime urination: Yes  Flank pain: No  Difficulty emptying bladder: No  Back pain: Yes  Muscle aches: No  Neck pain: No  Swollen joints: No  Joint pain: Yes  Bone pain: No  Muscle cramps: No  Muscle weakness: Yes  Joint stiffness: Yes  Bone fracture: No  Trouble with coordination: No  Dizziness or trouble with balance: No  Fainting or black-out spells: No  Memory loss: No  Headache: No  Seizures: No  Speech problems: No  Tingling: Yes  Tremor: No  Weakness: Yes  Difficulty walking: Yes  Paralysis: No  Numbness: Yes  Nervous or Anxious: No  Depression: No  Trouble sleeping: Yes  Trouble thinking or concentrating: No  Mood changes: Yes  Panic attacks: No          Please do not hesitate to contact me if you have any questions/concerns.     Sincerely,     Atul Bentley MD

## 2022-04-20 NOTE — NURSING NOTE
New Medication: Patient was educated regarding newly prescribed medication, including discussion of  the indication, administration, side effects, and when to report to MD or RN. Patient demonstrated understanding of this information and agreed to call with further questions or concerns.  Return Appointment: Patient given instructions regarding scheduling next clinic visit. Patient demonstrated understanding of this information and agreed to call with further questions or concerns.  Patient stated she understood all health information given and agreed to call with further questions or concerns.    Plan of Care:  -Increase Prednisone to 15 mg daily. Please call Dr. Bentley if you do not feel well on this dose.   -Amiodarone changed to 200 mg daily. Medication has been updated and called into preferred pharmacy.   -Follow-up with Dr. Bentley in 2 weeks with labs prior. We will call you to schedule this appt.

## 2022-04-20 NOTE — NURSING NOTE
Chief Complaint   Patient presents with     Follow Up     Return PH appt 2 weeks post discharge     Vitals were taken and medications reconciled.    Yanick Richardson, ELIJAH  12:48 PM

## 2022-04-21 LAB — STFR SERPL-MCNC: 3.7 MG/L

## 2022-04-22 ENCOUNTER — TELEPHONE (OUTPATIENT)
Dept: CARDIOLOGY | Facility: CLINIC | Age: 62
End: 2022-04-22
Payer: COMMERCIAL

## 2022-04-22 ENCOUNTER — TEAM CONFERENCE (OUTPATIENT)
Dept: PULMONOLOGY | Facility: CLINIC | Age: 62
End: 2022-04-22
Payer: COMMERCIAL

## 2022-04-22 NOTE — NURSING NOTE
Sarcoid Multidisciplinary Conference      Patient name: Jeanine Schuler    Physician: Jacobo Francis MD (cards)    Question for multidisciplinary group:  Confirm diagnosis    Radiology interpretation: PET scan: 3/31/22: LV nearly normal; RA inflammed; LA inflammed. Not apparently cardiac sarcoid; unusual to have atrial inflammation in cardiac sarcoid.  Paco Hoskins MD    Other testing reviewed: none    Plan:   -No role for endocardial biopsy   -No change to current plan of care: Maintain recently increased prednisone (was increased by Cardiologist Dr. Bentley)  -Pt will be seeing Pulmonologist Dr. Cook (former patient of Dr. Cuellar) on 4/28; Dr. Cook will plan to connect with Rheumatologist Dr. De La Torre to discuss any possible plan changes.

## 2022-04-22 NOTE — TELEPHONE ENCOUNTER
----- Message from Dbebi Bailey RN sent at 4/22/2022  1:11 PM CDT -----  Hey,    Just getting to this.  Would you help me?    We needed to 'squeeze' Jeanine in somewhere as [facility] has no open spots in 2 weeks.    I'm looking at the New appts he has on his 5/2.  Would you mind calling her and offering a spot in there:  5/2/22 - 8:30, 9:30, 10:30 or 11:30 - with labs 30min prior.  These are all over-books, but that's ok.  He wants her seen.    Thanks!  Alfredo      ----- Message -----  From: Mariya Green RN  Sent: 4/20/2022   2:24 PM CDT  To: Debbi Bailey RN    Pt to Follow-up with Mc in 2 weeks+labs. Please call to schedule. Yay!      Mariya Green RN           Nsaids Pregnancy And Lactation Text: These medications are considered safe up to 30 weeks gestation. It is excreted in breast milk.

## 2022-04-22 NOTE — TELEPHONE ENCOUNTER
Called pt and scheduled her to see Dr. Bentley on 5-2-22 at 11:30 am with labs prior per Alfredo ASHLEY.     .Mariya Green RN

## 2022-04-22 NOTE — NURSING NOTE
4/22/22 at 1330: Spoke with patient to provide update from Cardiac Sarcoid MDD meeting today. She will plan to maintain medications as prescribed and will update cardiology clinic with any concerns surrounding symptoms. Meanwhile, she confirmed plan to meet with Dr. Cook next week as scheduled.     Edel Mcgovern, RN, BSN  ILD Nurse Care Coordinator  (P) [phone]

## 2022-04-26 ASSESSMENT — ENCOUNTER SYMPTOMS
SINUS CONGESTION: 1
INCREASED ENERGY: 1
NAUSEA: 1
HEADACHES: 0
MUSCLE WEAKNESS: 1
PALPITATIONS: 0
POSTURAL DYSPNEA: 1
TROUBLE SWALLOWING: 1
MUSCLE CRAMPS: 0
EYE REDNESS: 0
LIGHT-HEADEDNESS: 1
NECK MASS: 0
HEMOPTYSIS: 0
COUGH: 1
ORTHOPNEA: 1
NIGHT SWEATS: 0
ORTHOPNEA: 1
EXERCISE INTOLERANCE: 1
LIGHT-HEADEDNESS: 1
FATIGUE: 1
SHORTNESS OF BREATH: 1
MUSCLE WEAKNESS: 1
BLOOD IN STOOL: 0
SPUTUM PRODUCTION: 1
TINGLING: 1
POOR WOUND HEALING: 0
SYNCOPE: 0
SINUS CONGESTION: 1
SHORTNESS OF BREATH: 1
HEADACHES: 0
DECREASED CONCENTRATION: 1
SLEEP DISTURBANCES DUE TO BREATHING: 1
LOSS OF CONSCIOUSNESS: 0
EYE REDNESS: 0
CONSTIPATION: 0
COUGH DISTURBING SLEEP: 1
EYE PAIN: 0
BACK PAIN: 0
NERVOUS/ANXIOUS: 0
JAUNDICE: 0
DIZZINESS: 1
SKIN CHANGES: 1
NECK MASS: 0
COUGH: 1
NECK PAIN: 0
DISTURBANCES IN COORDINATION: 1
MYALGIAS: 1
DEPRESSION: 0
DOUBLE VISION: 0
HYPERTENSION: 0
CONSTIPATION: 0
BLOATING: 0
BOWEL INCONTINENCE: 0
RECTAL PAIN: 0
TASTE DISTURBANCE: 0
DYSPNEA ON EXERTION: 1
HALLUCINATIONS: 0
SMELL DISTURBANCE: 0
ABDOMINAL PAIN: 0
HYPOTENSION: 0
FATIGUE: 1
COUGH DISTURBING SLEEP: 1
NIGHT SWEATS: 0
SEIZURES: 0
MYALGIAS: 1
WEIGHT GAIN: 0
WEIGHT LOSS: 1
DIARRHEA: 0
EYE IRRITATION: 0
EXERCISE INTOLERANCE: 1
CHILLS: 1
INCREASED ENERGY: 1
JOINT SWELLING: 0
WHEEZING: 1
POLYDIPSIA: 0
STIFFNESS: 1
WEAKNESS: 1
INSOMNIA: 1
RECTAL PAIN: 0
NUMBNESS: 1
NERVOUS/ANXIOUS: 0
DOUBLE VISION: 0
HOARSE VOICE: 0
SORE THROAT: 0
FEVER: 0
LEG PAIN: 1
FEVER: 0
NECK PAIN: 0
PARALYSIS: 0
SPEECH CHANGE: 0
PANIC: 0
HEARTBURN: 0
ALTERED TEMPERATURE REGULATION: 1
PALPITATIONS: 0
DECREASED APPETITE: 1
EYE WATERING: 1
TREMORS: 0
NAUSEA: 1
ARTHRALGIAS: 0
DECREASED APPETITE: 1
WEIGHT LOSS: 1
PANIC: 0
POLYPHAGIA: 0
HEMOPTYSIS: 0
SEIZURES: 0
SYNCOPE: 0
DIZZINESS: 1
EYE IRRITATION: 0
WHEEZING: 1
DISTURBANCES IN COORDINATION: 1
WEAKNESS: 1
HALLUCINATIONS: 0
BLOATING: 0
SLEEP DISTURBANCES DUE TO BREATHING: 1
SPEECH CHANGE: 0
TINGLING: 1
SPUTUM PRODUCTION: 1
HOARSE VOICE: 0
JOINT SWELLING: 0
POLYDIPSIA: 0
BACK PAIN: 0
EYE WATERING: 1
MEMORY LOSS: 1
BOWEL INCONTINENCE: 0
SORE THROAT: 0
STIFFNESS: 1
WEIGHT GAIN: 0
DECREASED CONCENTRATION: 1
NUMBNESS: 1
POLYPHAGIA: 0
LEG PAIN: 1
BLOOD IN STOOL: 0
HYPERTENSION: 0
MEMORY LOSS: 1
SINUS PAIN: 0
DYSPNEA ON EXERTION: 1
DIARRHEA: 0
NAIL CHANGES: 0
SNORES LOUDLY: 0
POSTURAL DYSPNEA: 1
MUSCLE CRAMPS: 0
CHILLS: 1
SNORES LOUDLY: 0
EYE PAIN: 0
SMELL DISTURBANCE: 0
ARTHRALGIAS: 0
TROUBLE SWALLOWING: 1
VOMITING: 0
PARALYSIS: 0
VOMITING: 0
SINUS PAIN: 0
NAIL CHANGES: 0
TREMORS: 0
ABDOMINAL PAIN: 0
HYPOTENSION: 0
HEARTBURN: 0
ALTERED TEMPERATURE REGULATION: 1
LOSS OF CONSCIOUSNESS: 0
INSOMNIA: 1
JAUNDICE: 0
TASTE DISTURBANCE: 0
DEPRESSION: 0
POOR WOUND HEALING: 0
SKIN CHANGES: 1

## 2022-04-28 ENCOUNTER — OFFICE VISIT (OUTPATIENT)
Dept: PULMONOLOGY | Facility: CLINIC | Age: 62
End: 2022-04-28
Attending: INTERNAL MEDICINE
Payer: COMMERCIAL

## 2022-04-28 VITALS
HEART RATE: 91 BPM | BODY MASS INDEX: 23.49 KG/M2 | SYSTOLIC BLOOD PRESSURE: 124 MMHG | WEIGHT: 155 LBS | DIASTOLIC BLOOD PRESSURE: 82 MMHG | OXYGEN SATURATION: 98 % | HEIGHT: 68 IN

## 2022-04-28 DIAGNOSIS — D86.9 SARCOIDOSIS: ICD-10-CM

## 2022-04-28 DIAGNOSIS — D86.9 SARCOIDOSIS: Primary | ICD-10-CM

## 2022-04-28 PROCEDURE — 99214 OFFICE O/P EST MOD 30 MIN: CPT | Mod: 25 | Performed by: INTERNAL MEDICINE

## 2022-04-28 PROCEDURE — G0463 HOSPITAL OUTPT CLINIC VISIT: HCPCS | Mod: 25

## 2022-04-28 PROCEDURE — 94729 DIFFUSING CAPACITY: CPT | Performed by: INTERNAL MEDICINE

## 2022-04-28 PROCEDURE — 94375 RESPIRATORY FLOW VOLUME LOOP: CPT | Performed by: INTERNAL MEDICINE

## 2022-04-28 PROCEDURE — 94726 PLETHYSMOGRAPHY LUNG VOLUMES: CPT | Performed by: INTERNAL MEDICINE

## 2022-04-28 NOTE — PROGRESS NOTES
[facility]  ILD Clinic - Return Visit-         Assessment and Plan:   Jeanine Schuler is a 61 year old female who presents for initial evaluation of interstitial lung disease.    1) Pulmonary sarcoidosis vs. Chronic HP  - Definitive tissue diagnosis has not been obtained for sarcoidosis however her case was reviewed at sarcoid conference and imaging as well as pathology showing some noncaseating granulomas, sarcoidosis was thought to be a reasonable diagnosis..  Other differential diagnosis included chronic HP.  -Sarcoidosis labs obtained on 3/2021 showed elevated 1, 25 OH-vitamin D, with low normal 25-OH vitamin D, consistent with granulomatous inflammation.  Soluble IL-2 receptor (7/2021) was also high at 995 (reference range 137-838).  ACE was normal.  -Methotrexate subQ 20mg and CellCept 500 mg BID discontinued due to lack of response.   - Currently on Imuran 150mg (dose uptitrated 9/2021-) and Prednisone which resulted in marked improvement in chest CT and PFTs.  However patients dyspnea continued to worsen, culminating in an admission 12/21-22.  There, TTE showed new depressed EF w/ global hypokinesis.\  - Her Cardiac Mri has shown LA enhancement. Reviewed in conference but less likely to be Cardiac sarcoidosis.   -  Cardiac cath is normal. Evaluated by Dr. Prado and Dr. Bentley.     - She has stayed stable. Discussed her case with Rheumatology and decided to come down on prednisone to 12.5, I sent a message to Cardiology as they will be seeing her on Monday.     #? Pulmonary Edema  -On Ct chest I noted interstitial edema as well as pleural effusion.  -Sent a message to cardiology to increased diuretics if possible.   -    2) History of limited cutaneous systemic sclerosis  - On imuran and prednisone 15 mg.  Ok to go down to 12.5 if ok by Cardiology.     3) Neuropathy   -She has numbness in foot. Whenever she coems down on Prednisone she  has increased risk of falls.   -She was seen by Dr. Abel from neurology in 5/2021, and nerve conduction studies and EMG was abnormal. She is planned for a nerve biopsy of her right foot.    RTC: 3 months      I spent 36 minutes  total today, reviewing medical records including progress notes, multiple imaging studies from her previous available records, and documentation.      Ludin Cook MD  Pulmonary and Critical Care Medicine          Problem List:     1. Interstitial lung disease  a. Symptom: Cough, fatigue, sone subjective dyspnea  b. Treatment: (11/2020-) Prednisone, currently at mg, CellCept (5/2021-9/2021) Azathioprine 100mg daily (7/2021-9/2021), 150mg daily (9/2021-), MTX (4/2021-9/2021)  c. Diagnosis: Cryobiopsy Pathology  d. NSIP vs. HP, sarcoidosis can not be r/o. (ALI, what appears to be an acute exacerbation of underlying ILD with OP.  Few nonnecrotizing granulomas, cellular NSIP that can be seen in early sarcodosis)  Radiology: bronchocentric nodular infiltrates  e. Serology:TARA 1: 1280, anticentromere antibody greater than 8, anti-RNP-4.8 (less than 1), chromatin DNP-6.6 (less than 1.0)  f. Other w/u  i. Bronchoscopy with BAL ([facility]; 8/4/2020)  1. BAL (superior lingula) cell count (49% neutrophils, 45% lymphocytes, 3% monocytes, 3% eosinophils)  2. Negative for malignant cells, silver stain negative for pneumocystis and fungal organisms.  3. Pathology (anterior lingual segments of the left upper lobe): Benign bronchial epithelium and alveolar spaces.  There is no evidence of malignancy.  The findings are overall nondiagnostic.  ii. Bronchoscopy with BAL and cryobiopsy ([facility]; 10/6/2020)  1. BAL (, other cells 32, L 47, N 19, E 2; CD4: CD8 3.65)  2. Microbiology-all negative except for penicillium species from fungal culture only.  Aspergillus galactomannan was negative  iii. LUNG, LEFT UPPER LOBE, TRANSBRONCHIAL CRYOBIOPSY:   - Fragments of respiratory mucosa and alveolated lung tissue  "with nonnecrotizing granulomas with multinucleated giant cells, foci of organizing pneumonia and nonspecific chronic interstitial inflammation   with reactive pneumocytes type II hyperplasia.   - GMS and AFB special stains are negative for fungal and acid-fast organisms, respectively.   COMMENT:   In absence of infection (ie. Mycobacteria, please correlate with  laboratory cultures and serology), the histologic findings raise the differential diagnosis of hypersensitivity pneumonitis and sarcoidosis. Clinical   and radiologic correlation is necessary. The case will be discussed at the ILD interdepartmental conference.     2. Mixed connective tissue disease/limited cutaneous systemic sclerosis  a. Followed by Dr. Indio lopez. Treatment: (\"years ago\") Methotrexate PO -> SQ due to GI intolerance, unclear why stoped  Plaquinil    3. GERD    4. HFpEF    5. History of gastric bypass 2005        History of Present Illness:     Jeanine Schuler is a 61 year old female who presents for followup for interstitial lung disease. She was last seen in 9/2021.      Since her last visit, she sawDr. De La Torre 11/11/2021, and was instructed to decrease Prednisone by 5mg q month if symptoms allow. She was complaining of bilateral lower extremity weakness, CK was 28.     She was also briefly hospitalized at [facility] from 12/21-22. She presented with tachycardia and shortness of breath. CT PE protocol was negative. A subsequent echocardiogram revealed newly depressed LVEF of 35% with global hypokinesis with distal of the segments appear more hypokinetic. In addition, there was 3+ TR, moderate pulmonary hypertension. She also states that she gained weight in a short amount of time prior to her admission.  She underwent a LHC that showed minimal nonobstructive CAD.  She was started on IV diuresis, transition to p.o. torsemide.  She was already on spironolactone prior to her admission.    Since her admission, she states that her " shortness of breath has improved compared to right remission but not back to her baseline.  She states that she becomes dyspneic with minimal exertion and still feels palpitation with exertion. She has not had a 6MWT since 9/2021 (no O2 requirement at that time).      Interval history (9/2021):  Couldn't finish her 6MWT due to dyspnea.  Whole hands turning purple and felt like she was goint to pass out.  Can walk around in the living room and in the house but has to sit down and rest with short distances.     Had shingles on her left anterior thoracic wall.  Never really had a rash, but did get a sharp pain and a small 2 little blisters.  Pain is improved significantly but still feels pain / tight. Nonpainful touch is painful.     Still on 40mg of prednisone, Only got one dose of SHingrix     Hard to tell if SOB or fatigue, feels like she can't get enough air.  Whole hand turns purple.      A little bit of weight gain, troule sleeping, irritability.      Imuran - tolerating well. Has a little more looser stools but not bothersome.  Having a hard time eating due to esophageal dysmotility.  Solids are the worst. Trying to drink more water.     Interval history: 7/2021  Since her last visit, she was started on methotrexate, now uptitrated to 20 mg weekly.  She has been methotrexate for a total of about 2 months, and does not note any improvement in her breathing.  In fact, she feels as though her respiratory status has been worse, and she is overall fatigued.  She saw Dr. Ann in May, and was started on CellCept 500 mg twice daily.  Her prednisone dose has been down titrated to 20 mg daily.  Despite the above, she does not feel as though her symptoms are any better.  She does note that her coughing seems to have improved with initiation of methotrexate.    She is generally tolerating the methotrexate well, but does experience some nausea and dry heaving about 48 hours after her weekly dose.    She denies any fevers,  chills, night sweats, chest pain except she started noticing left lower anterior chest/upper abdominal pain along with left-sided shoulder pain which started last night.  She has had similar pains in the past, but was on the contralateral side earlier in the year.     She denies any changes in her appetite, she has gained about 10 pounds since 5/2021, which he attributes to increased lower extremity edema.  She continues to take the same dose of Lasix and spironolactone.    Interval history (3/2021)  Since her last visit, she is unsure if there has been any change in her respiratory status.  She currently complains of extreme fatigue and pain/numbness in her feet and legs.  She occasionally also experiences pain in her upper chest/throat area.  In addition, she has gained about 5 to 10 pounds on prednisone.    Since her last visit, her prednisone dose has been tapered slowly (5 mg every 3 weeks).  However, when she reached about 25 mg, she started noticing increased pain in her feet.  This has progressively worsened on lower doses of 20 mg, and she was evaluated by Dr. Loomis that week, who increase her prednisone to 40 mg daily, which she is currently on.    With initiation of higher doses of prednisone, she feels as though her pain has improved somewhat but has not resolved.  She denies any fever/chills, but states she still occasionally experiences night sweats, about once a week.  This has improved in terms of frequency with initiation of prednisone.    Interval History: 4/2022  -Patient has continued dyspnea. No significant worsening is noted. There was a concern for cardiac sarcoid on MRI but  Excluded post conference. She follows with Dr. Paniagua who intends to direct care.   - Will Follow.        Review of Systems:     Please see HPI, otherwise the complete 10 point ROS is negative.           Past Medical and Surgical History:     Past Medical History:   Diagnosis Date     Anemia      Bariatric surgery  status 06/27/2005    abdi en Y- [facility];     Cellulitis of leg 06/06/2013     Esophageal reflux     Better post-hiatal hernia repair and weight loss     Hyperplastic colonic polyp      Hypovitaminosis D 2011     ILD (interstitial lung disease) (H)      Kidney stone 04/29/2007     Kidney stone 05/10/2007    surgically removed     Limb ischemia; ulcers of fingertips 04/22/2013     Other chronic pain     Left shoulder - rheumatoid arthritis     Raynaud's syndrome      Rheumatoid arthritis (H) \     Scleroderma (H)      Sjogren-Jake syndrome      Systemic sclerosis (H) 2009     Unspecified essential hypertension      Past Surgical History:   Procedure Laterality Date     BIOPSY MUSCLE DIAGNOSTIC (LOCATION) Right 7/19/2021    Procedure: Right SURAL nerve BIOPSY;  Surgeon: Farooq Abel MD;  Location: [facility] OR     BRONCHOSCOPY FLEXIBLE,L CRYOBIOPSY N/A 10/06/2020    Procedure: BRONCHOSCOPY, FLEXIBLE, WITH TRANSBRONCHIAL BIOPSY x4 USING CRYOPROBE, bronchial alveolar lavage;  Surgeon: Teddy Mendez MD;  Location: [facility] OR     BYPASS GASTRIC, CHOLECYSTECTOMY, COMBINED  06/27/2005    [facility]     CHOLECYSTECTOMY       COLONOSCOPY       COLONOSCOPY N/A 11/17/2020    Procedure: COLONOSCOPY, WITH POLYPECTOMY AND BIOPSY;  Surgeon: Jamie Cárdenas MD;  [place] [facility] [place]     CV HEART CATHETERIZATION WITH POSSIBLE INTERVENTION Left 12/21/2021    Procedure: Heart Catheterization with Possible Intervention;  Surgeon: Wesley Mclean MD;  Location:  HEART CARDIAC CATH LAB     CV RIGHT HEART CATH MEASUREMENTS RECORDED N/A 4/7/2022    Procedure: Right Heart Cath;  Surgeon: Dieter Brock MD;  Location:  HEART CARDIAC CATH LAB     ESOPHAGOSCOPY, GASTROSCOPY, DUODENOSCOPY (EGD), COMBINED N/A 03/10/2016    Procedure: COMBINED ESOPHAGOSCOPY, GASTROSCOPY, DUODENOSCOPY (EGD), BIOPSY SINGLE OR MULTIPLE;  Surgeon: Tricia Zambrano MD;  Location:  GI     ESOPHAGOSCOPY, GASTROSCOPY,  DUODENOSCOPY (EGD), COMBINED N/A 11/17/2020    Procedure: ESOPHAGOGASTRODUODENOSCOPY, WITH BIOPSY and Dilation;  Surgeon: Jamie Cárdenas MD;  Location: [facility] OR     INNER EAR SURGERY       PICC INSERTION  06/05/2013    5fr DL Power PICC, 44cm, left basilic vein, tip in low SVC     SURGICAL HISTORY OF -       Left ear surgery - incus transition, tympanoplasty     SURGICAL HISTORY OF -   06/01/2005    Hiatal hernia repair     TONSILLECTOMY            Social History:     Social History     Tobacco Use     Smoking status: Never Smoker     Smokeless tobacco: Never Used   Vaping Use     Vaping Use: Never used   Substance Use Topics     Alcohol use: Yes     Comment: occassionally     Drug use: No     Social History     Social History Narrative    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  She has been on medical leave since 7/27/2020        Moved about 4 years ago to a new house. No known water damage or mold.        She lives in a normal area with forms around her house, but denies any exposure to grain dust, hay, other farm animals.        No unusual hobbies                  Medications:     Current Outpatient Medications   Medication     albuterol (PROAIR HFA/PROVENTIL HFA/VENTOLIN HFA) 108 (90 Base) MCG/ACT inhaler     amiodarone (PACERONE) 200 MG tablet     apixaban ANTICOAGULANT (ELIQUIS) 5 MG tablet     atorvastatin (LIPITOR) 40 MG tablet     azaTHIOprine (IMURAN) 50 MG tablet     blood glucose (NO BRAND SPECIFIED) test strip     clonazePAM (KLONOPIN) 0.5 MG tablet     folic acid (FOLVITE) 1 MG tablet     losartan (COZAAR) 50 MG tablet     magnesium oxide (MAG-OX) 400 MG tablet     metoprolol succinate ER (TOPROL XL) 50 MG 24 hr tablet     nitroGLYcerin (NITROSTAT) 0.3 MG sublingual tablet     pantoprazole (PROTONIX) 40 MG EC tablet     potassium chloride ER (MICRO-K) 10 MEQ CR capsule     predniSONE (DELTASONE) 10 MG tablet     spironolactone (ALDACTONE) 25 MG tablet      "sulfamethoxazole-trimethoprim (BACTRIM) 400-80 MG tablet     thin (NO BRAND SPECIFIED) lancets     torsemide (DEMADEX) 20 MG tablet     vitamin D2 (ERGOCALCIFEROL) 02354 units (1250 mcg) capsule     No current facility-administered medications for this visit.            Physical Exam:   /82   Pulse 91   Ht 1.727 m (5' 8\")   Wt 70.3 kg (155 lb)   SpO2 98%   BMI 23.57 kg/m    GENERAL: alert, NAD  HEENT: NCAT, EOMI, masked  Neck: no cervical or supraclavicular adenopathy  Lungs: good air flow, faint inspiratory squeak   CV: RRR, S1S2, no murmurs noted  Abdomen: normoactive BS, soft, non tender  Neuro: AAO X 3  Psychiatric: normal affect, good eye contact  Skin: no rash, jaundice or lesions on limited exam  Extremities: 1-2+ pitting edema b/l.            Imaging:     CT chest PE protocol ([facility]; 12/21/2021):                                  IMPRESSION:  1.  There is no pulmonary embolus.  2.  Borderline aneurysmal ascending thoracic aorta at 4.0 cm.  3.  Probable pulmonary edema and bilateral pleural effusions.  4.  Cardiomegaly.    CT chest w/o contrast ([facility]; 9/17/2021)  IMPRESSION: Multifocal centrilobular micronodularity with basilar predominant subpleural reticulation. Overall findings have not significantly progressed from comparison CT chest on 7/1/2021.  Differential remains broad including sarcoidosis as well as scleroderma given the fluid-filled distended esophagus. Mild air trapping during expiration.     CT Chest ([facility]; 7/20/2020)  1. Bilateral pulmonary consolidation is probably due to infection. Other inflammatory processes are also on the differential. Although radiographic pattern is somewhat typical of sarcoid, this is less likely given significant worsening since 10/23/2019. Correlate clinically.  2. Mediastinal lymphadenopathy, probably reactive.  3. No pulmonary embolism.  4. Postoperative changes of gastric bypass with debris in the esophagus. Although this is present, the " pulmonary consolidation is not typical of aspiration.           Pulmonary Function Tests:           PFT interpretation (January 6, 2022):  Maneuver: ATS criteria i not met.  Interpret with caution.  Spirometry: Suggestive of moderate restriction.  Lung volumes: TLC is 74% predicted, c/w mild restriction.   Diffusing capacity: There is mild impairment in diffusing capacity      Six-minute walk test  Date 6MWD RA SpO2 Resting O2 req Exercise O2 req Lowest SpO2 on amb   8/21/20 940 97 RA RA 90                    Laboratory:     Serology ([facility]; 7/27/2020)  Chromatin DNP antibody -6.6 (less than 1.0)  Ribosomal antibody-negative  SSA-2.0 (less than 1.0)  SSB-negative anti-SM RNP-greater than 80 (less than 1.0)  SCL 70-negative  Maddy 1-negative  Centromere antibody -greater than 8.0 (less than 1.0)  SM IgG-negative  Anti-RNP-4.8 (less than 1.0)  Anti-DS DNA- negative  TARA - positive    6/10/2009  TARA- 1: 1280  RF-20 (less than 9)    Recent Results (from the past 168 hour(s))   General PFT Lab (Please always keep checked)    Collection Time: 04/28/22 10:16 AM   Result Value Ref Range    FVC-Pred 3.38 L    FVC-Pre 2.96 L    FVC-%Pred-Pre 87 %    FEV1-Pre 2.55 L    FEV1-%Pred-Pre 95 %    FEV1FVC-Pred 80 %    FEV1FVC-Pre 86 %    FEFMax-Pred 6.98 L/sec    FEFMax-Pre 6.17 L/sec    FEFMax-%Pred-Pre 88 %    FEF2575-Pred 2.39 L/sec    FEF2575-Pre 3.16 L/sec    YKX6923-%Pred-Pre 132 %    ExpTime-Pre 5.32 sec    FIFMax-Pre 4.87 L/sec    VC-Pred 3.80 L    VC-Pre 2.97 L    VC-%Pred-Pre 78 %    IC-Pred 2.77 L    IC-Pre 1.84 L    IC-%Pred-Pre 66 %    ERV-Pred 1.03 L    ERV-Pre 1.13 L    ERV-%Pred-Pre 109 %    FEV1FEV6-Pred 81 %    FEV1FEV6-Pre 86 %    FRCPleth-Pred 2.93 L    FRCPleth-Pre 3.40 L    FRCPleth-%Pred-Pre 115 %    RVPleth-Pred 2.07 L    RVPleth-Pre 2.26 L    RVPleth-%Pred-Pre 109 %    TLCPleth-Pred 5.61 L    TLCPleth-Pre 5.23 L    TLCPleth-%Pred-Pre 93 %    DLCOunc-Pred 23.16 ml/min/mmHg    DLCOunc-Pre 17.43  ml/min/mmHg    DLCOunc-%Pred-Pre 75 %    DLCOcor-Pre 16.84 ml/min/mmHg    DLCOcor-%Pred-Pre 72 %    VA-Pre 4.56 L    VA-%Pred-Pre 80 %    FEV1SVC-Pred 70 %    FEV1SVC-Pre 86 %       BAL (8/4/2020)  Negative for Legionella, RVP, Bordetella, C pneumoniae, mycoplasma pneumonia    A. Lingula bronchial alveolar lavage, cytology  1. 49% neutrophils, 45% lymphocytes, 3% monocytes, and 3% eosinophils.  2. Negative for malignant cells.  3. Silver stain negative for pneumocystis and fungal organisms.  B. Lingula and anterior segments, left upper lobe transbronchial biopsy?-Negative for malignancy, see microscopic description.    Pathology:   A. The smear has the following differential 49% neutrophils, 45% lymphocytes, 3% monocytes, and 3% macrophages. There is no evidence of malignancy. A silver stain is applied, with an appropriate positive control, and is negative for pneumocystis and fungal organisms.  B. The biopsy shows benign bronchial epithelium and alveolar spaces. There is no evidence of malignancy. The findings are overall nondiagnostic.             Cardiac:     TTE ([facility]; 8/25/2020)  Interpretation Summary  Global and regional left ventricular function is normal with an EF of 55-60%.  Right ventricular function, chamber size, wall motion, and thickness are  normal.  Pulmonary artery systolic pressure cannot be assessed.  Ascending aorta 3.8 cm.  Aortic index 20mm/m2,mild dilation.  The inferior vena cava is normal.  No pericardial effusion is present.    Echocardiogram ([facility]; 10/16/2017)  Interpretation Summary    * Normal left ventricular size and systolic function, estimated LVEF 60-65%.    * Normal regional wall motion.    * Normal right ventricular size and systolic function.    * The left ventricular diastolic function is mildly abnormal (Grade I).    * There is no hemodynamically significant valve disease.    * Normal estimated right ventricular systolic pressure of 28 mmHg.    * The proximal  ascending aorta is mildly dilated measuring  3.9 cm.    * The IVC is normal in size (< 2.1 cm), > 50% respiratory variance, RA  pressure normal at 3 mmHg.    * Compared to prior study on 8/26/16, there has been no significant change  in left ventricular systolic function with side by side comparison.  There has  been no significant change in the size of the proximal ascending aorta  (previously measured 4.0 cm).         Other:

## 2022-04-28 NOTE — LETTER
4/28/2022           RE: Jeanine Schuler  [address]  [facility] [address]        Dear Colleague,    Thank you for referring your patient, Jeanine Schuler, to the [facility] [place]. Please see a copy of my visit note below.    [facility]  ILD Clinic - Return Visit-         Assessment and Plan:   Jeanine Schuler is a 61 year old female who presents for initial evaluation of interstitial lung disease.    1) Pulmonary sarcoidosis vs. Chronic HP  - Definitive tissue diagnosis has not been obtained for sarcoidosis however her case was reviewed at sarcoid conference and imaging as well as pathology showing some noncaseating granulomas, sarcoidosis was thought to be a reasonable diagnosis..  Other differential diagnosis included chronic HP.  -Sarcoidosis labs obtained on 3/2021 showed elevated 1, 25 OH-vitamin D, with low normal 25-OH vitamin D, consistent with granulomatous inflammation.  Soluble IL-2 receptor (7/2021) was also high at 995 (reference range 137-838).  ACE was normal.  -Methotrexate subQ 20mg and CellCept 500 mg BID discontinued due to lack of response.   - Currently on Imuran 150mg (dose uptitrated 9/2021-) and Prednisone which resulted in marked improvement in chest CT and PFTs.  However patients dyspnea continued to worsen, culminating in an admission 12/21-22.  There, TTE showed new depressed EF w/ global hypokinesis.\  - Her Cardiac Mri has shown LA enhancement. Reviewed in conference but less likely to be Cardiac sarcoidosis.   -  Cardiac cath is normal. Evaluated by Dr. Prado and Dr. Bentley.     - She has stayed stable. Discussed her case with Rheumatology and decided to come down on prednisone to 12.5, I sent a message to Cardiology as they will be seeing her on Monday.     #? Pulmonary Edema  -On Ct chest I noted interstitial edema as well as pleural  effusion.  -Sent a message to cardiology to increased diuretics if possible.   -    2) History of limited cutaneous systemic sclerosis  - On imuran and prednisone 15 mg.  Ok to go down to 12.5 if ok by Cardiology.     3) Neuropathy   -She has numbness in foot. Whenever she coems down on Prednisone she has increased risk of falls.   -She was seen by Dr. Abel from neurology in 5/2021, and nerve conduction studies and EMG was abnormal. She is planned for a nerve biopsy of her right foot.    RTC: 3 months      I spent 36 minutes  total today, reviewing medical records including progress notes, multiple imaging studies from her previous available records, and documentation.      Ludin Cook MD  Pulmonary and Critical Care Medicine          Problem List:     1. Interstitial lung disease  a. Symptom: Cough, fatigue, sone subjective dyspnea  b. Treatment: (11/2020-) Prednisone, currently at mg, CellCept (5/2021-9/2021) Azathioprine 100mg daily (7/2021-9/2021), 150mg daily (9/2021-), MTX (4/2021-9/2021)  c. Diagnosis: Cryobiopsy Pathology  d. NSIP vs. HP, sarcoidosis can not be r/o. (ALI, what appears to be an acute exacerbation of underlying ILD with OP.  Few nonnecrotizing granulomas, cellular NSIP that can be seen in early sarcodosis)  Radiology: bronchocentric nodular infiltrates  e. Serology:TARA 1: 1280, anticentromere antibody greater than 8, anti-RNP-4.8 (less than 1), chromatin DNP-6.6 (less than 1.0)  f. Other w/u  i. Bronchoscopy with BAL ([facility]; 8/4/2020)  1. BAL (superior lingula) cell count (49% neutrophils, 45% lymphocytes, 3% monocytes, 3% eosinophils)  2. Negative for malignant cells, silver stain negative for pneumocystis and fungal organisms.  3. Pathology (anterior lingual segments of the left upper lobe): Benign bronchial epithelium and alveolar spaces.  There is no evidence of malignancy.  The findings are overall nondiagnostic.  ii. Bronchoscopy with BAL and cryobiopsy ([facility]; 10/6/2020)  1. BAL  "(, other cells 32, L 47, N 19, E 2; CD4: CD8 3.65)  2. Microbiology-all negative except for penicillium species from fungal culture only.  Aspergillus galactomannan was negative  iii. LUNG, LEFT UPPER LOBE, TRANSBRONCHIAL CRYOBIOPSY:   - Fragments of respiratory mucosa and alveolated lung tissue with nonnecrotizing granulomas with multinucleated giant cells, foci of organizing pneumonia and nonspecific chronic interstitial inflammation   with reactive pneumocytes type II hyperplasia.   - GMS and AFB special stains are negative for fungal and acid-fast organisms, respectively.   COMMENT:   In absence of infection (ie. Mycobacteria, please correlate with  laboratory cultures and serology), the histologic findings raise the differential diagnosis of hypersensitivity pneumonitis and sarcoidosis. Clinical   and radiologic correlation is necessary. The case will be discussed at the ILD interdepartmental conference.     2. Mixed connective tissue disease/limited cutaneous systemic sclerosis  a. Followed by Dr. Indio lopez. Treatment: (\"years ago\") Methotrexate PO -> SQ due to GI intolerance, unclear why stoped  Plaquinil    3. GERD    4. HFpEF    5. History of gastric bypass 2005        History of Present Illness:     Jeanine Schuler is a 61 year old female who presents for followup for interstitial lung disease. She was last seen in 9/2021.      Since her last visit, she sawDr. De La Torre 11/11/2021, and was instructed to decrease Prednisone by 5mg q month if symptoms allow. She was complaining of bilateral lower extremity weakness, CK was 28.     She was also briefly hospitalized at [facility] from 12/21-22. She presented with tachycardia and shortness of breath. CT PE protocol was negative. A subsequent echocardiogram revealed newly depressed LVEF of 35% with global hypokinesis with distal of the segments appear more hypokinetic. In addition, there was 3+ TR, moderate pulmonary hypertension. She also " states that she gained weight in a short amount of time prior to her admission.  She underwent a LHC that showed minimal nonobstructive CAD.  She was started on IV diuresis, transition to p.o. torsemide.  She was already on spironolactone prior to her admission.    Since her admission, she states that her shortness of breath has improved compared to right remission but not back to her baseline.  She states that she becomes dyspneic with minimal exertion and still feels palpitation with exertion. She has not had a 6MWT since 9/2021 (no O2 requirement at that time).      Interval history (9/2021):  Couldn't finish her 6MWT due to dyspnea.  Whole hands turning purple and felt like she was goint to pass out.  Can walk around in the living room and in the house but has to sit down and rest with short distances.     Had shingles on her left anterior thoracic wall.  Never really had a rash, but did get a sharp pain and a small 2 little blisters.  Pain is improved significantly but still feels pain / tight. Nonpainful touch is painful.     Still on 40mg of prednisone, Only got one dose of SHingrix     Hard to tell if SOB or fatigue, feels like she can't get enough air.  Whole hand turns purple.      A little bit of weight gain, troule sleeping, irritability.      Imuran - tolerating well. Has a little more looser stools but not bothersome.  Having a hard time eating due to esophageal dysmotility.  Solids are the worst. Trying to drink more water.     Interval history: 7/2021  Since her last visit, she was started on methotrexate, now uptitrated to 20 mg weekly.  She has been methotrexate for a total of about 2 months, and does not note any improvement in her breathing.  In fact, she feels as though her respiratory status has been worse, and she is overall fatigued.  She saw Dr. Ann in May, and was started on CellCept 500 mg twice daily.  Her prednisone dose has been down titrated to 20 mg daily.  Despite the above, she  does not feel as though her symptoms are any better.  She does note that her coughing seems to have improved with initiation of methotrexate.    She is generally tolerating the methotrexate well, but does experience some nausea and dry heaving about 48 hours after her weekly dose.    She denies any fevers, chills, night sweats, chest pain except she started noticing left lower anterior chest/upper abdominal pain along with left-sided shoulder pain which started last night.  She has had similar pains in the past, but was on the contralateral side earlier in the year.     She denies any changes in her appetite, she has gained about 10 pounds since 5/2021, which he attributes to increased lower extremity edema.  She continues to take the same dose of Lasix and spironolactone.    Interval history (3/2021)  Since her last visit, she is unsure if there has been any change in her respiratory status.  She currently complains of extreme fatigue and pain/numbness in her feet and legs.  She occasionally also experiences pain in her upper chest/throat area.  In addition, she has gained about 5 to 10 pounds on prednisone.    Since her last visit, her prednisone dose has been tapered slowly (5 mg every 3 weeks).  However, when she reached about 25 mg, she started noticing increased pain in her feet.  This has progressively worsened on lower doses of 20 mg, and she was evaluated by Dr. Loomis that week, who increase her prednisone to 40 mg daily, which she is currently on.    With initiation of higher doses of prednisone, she feels as though her pain has improved somewhat but has not resolved.  She denies any fever/chills, but states she still occasionally experiences night sweats, about once a week.  This has improved in terms of frequency with initiation of prednisone.    Interval History: 4/2022  -Patient has continued dyspnea. No significant worsening is noted. There was a concern for cardiac sarcoid on MRI but  Excluded post  conference. She follows with Dr. Paniagua who intends to direct care.   - Will Follow.        Review of Systems:     Please see HPI, otherwise the complete 10 point ROS is negative.           Past Medical and Surgical History:     Past Medical History:   Diagnosis Date     Anemia      Bariatric surgery status 06/27/2005    abdi en Y- [facility];     Cellulitis of leg 06/06/2013     Esophageal reflux     Better post-hiatal hernia repair and weight loss     Hyperplastic colonic polyp      Hypovitaminosis D 2011     ILD (interstitial lung disease) (H)      Kidney stone 04/29/2007     Kidney stone 05/10/2007    surgically removed     Limb ischemia; ulcers of fingertips 04/22/2013     Other chronic pain     Left shoulder - rheumatoid arthritis     Raynaud's syndrome      Rheumatoid arthritis (H) \     Scleroderma (H)      Sjogren-Jake syndrome      Systemic sclerosis (H) 2009     Unspecified essential hypertension      Past Surgical History:   Procedure Laterality Date     BIOPSY MUSCLE DIAGNOSTIC (LOCATION) Right 7/19/2021    Procedure: Right SURAL nerve BIOPSY;  Surgeon: Farooq Abel MD;  Location: [facility] OR     BRONCHOSCOPY FLEXIBLE,L CRYOBIOPSY N/A 10/06/2020    Procedure: BRONCHOSCOPY, FLEXIBLE, WITH TRANSBRONCHIAL BIOPSY x4 USING CRYOPROBE, bronchial alveolar lavage;  Surgeon: Teddy Mendez MD;  Location: [facility] OR     BYPASS GASTRIC, CHOLECYSTECTOMY, COMBINED  06/27/2005    [facility]     CHOLECYSTECTOMY       COLONOSCOPY       COLONOSCOPY N/A 11/17/2020    Procedure: COLONOSCOPY, WITH POLYPECTOMY AND BIOPSY;  Surgeon: Jamie Cárdenas MD;  Location: [facility] OR     CV HEART CATHETERIZATION WITH POSSIBLE INTERVENTION Left 12/21/2021    Procedure: Heart Catheterization with Possible Intervention;  Surgeon: Wesley Mclean MD;  Location:  HEART CARDIAC CATH LAB     CV RIGHT HEART CATH MEASUREMENTS RECORDED N/A 4/7/2022    Procedure: Right Heart Cath;  Surgeon: Dieter Brock MD;   Location:  HEART CARDIAC CATH LAB     ESOPHAGOSCOPY, GASTROSCOPY, DUODENOSCOPY (EGD), COMBINED N/A 03/10/2016    Procedure: COMBINED ESOPHAGOSCOPY, GASTROSCOPY, DUODENOSCOPY (EGD), BIOPSY SINGLE OR MULTIPLE;  Surgeon: Tricia Zambrano MD;  Location:  GI     ESOPHAGOSCOPY, GASTROSCOPY, DUODENOSCOPY (EGD), COMBINED N/A 11/17/2020    Procedure: ESOPHAGOGASTRODUODENOSCOPY, WITH BIOPSY and Dilation;  Surgeon: Jamie Cárdenas MD;  Location: [facility] OR     INNER EAR SURGERY       PICC INSERTION  06/05/2013    5fr DL Power PICC, 44cm, left basilic vein, tip in low SVC     SURGICAL HISTORY OF -       Left ear surgery - incus transition, tympanoplasty     SURGICAL HISTORY OF -   06/01/2005    Hiatal hernia repair     TONSILLECTOMY            Social History:     Social History     Tobacco Use     Smoking status: Never Smoker     Smokeless tobacco: Never Used   Vaping Use     Vaping Use: Never used   Substance Use Topics     Alcohol use: Yes     Comment: occassionally     Drug use: No     Social History     Social History Narrative    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  She has been on medical leave since 7/27/2020        Moved about 4 years ago to a new house. No known water damage or mold.        She lives in a normal area with forms around her house, but denies any exposure to grain dust, hay, other farm animals.        No unusual hobbies                  Medications:     Current Outpatient Medications   Medication     albuterol (PROAIR HFA/PROVENTIL HFA/VENTOLIN HFA) 108 (90 Base) MCG/ACT inhaler     amiodarone (PACERONE) 200 MG tablet     apixaban ANTICOAGULANT (ELIQUIS) 5 MG tablet     atorvastatin (LIPITOR) 40 MG tablet     azaTHIOprine (IMURAN) 50 MG tablet     blood glucose (NO BRAND SPECIFIED) test strip     clonazePAM (KLONOPIN) 0.5 MG tablet     folic acid (FOLVITE) 1 MG tablet     losartan (COZAAR) 50 MG tablet     magnesium oxide (MAG-OX) 400 MG tablet     metoprolol  "succinate ER (TOPROL XL) 50 MG 24 hr tablet     nitroGLYcerin (NITROSTAT) 0.3 MG sublingual tablet     pantoprazole (PROTONIX) 40 MG EC tablet     potassium chloride ER (MICRO-K) 10 MEQ CR capsule     predniSONE (DELTASONE) 10 MG tablet     spironolactone (ALDACTONE) 25 MG tablet     sulfamethoxazole-trimethoprim (BACTRIM) 400-80 MG tablet     thin (NO BRAND SPECIFIED) lancets     torsemide (DEMADEX) 20 MG tablet     vitamin D2 (ERGOCALCIFEROL) 63255 units (1250 mcg) capsule     No current facility-administered medications for this visit.            Physical Exam:   /82   Pulse 91   Ht 1.727 m (5' 8\")   Wt 70.3 kg (155 lb)   SpO2 98%   BMI 23.57 kg/m    GENERAL: alert, NAD  HEENT: NCAT, EOMI, masked  Neck: no cervical or supraclavicular adenopathy  Lungs: good air flow, faint inspiratory squeak   CV: RRR, S1S2, no murmurs noted  Abdomen: normoactive BS, soft, non tender  Neuro: AAO X 3  Psychiatric: normal affect, good eye contact  Skin: no rash, jaundice or lesions on limited exam  Extremities: 1-2+ pitting edema b/l.            Imaging:     CT chest PE protocol ([facility]; 12/21/2021):                                  IMPRESSION:  1.  There is no pulmonary embolus.  2.  Borderline aneurysmal ascending thoracic aorta at 4.0 cm.  3.  Probable pulmonary edema and bilateral pleural effusions.  4.  Cardiomegaly.    CT chest w/o contrast ([facility]; 9/17/2021)  IMPRESSION: Multifocal centrilobular micronodularity with basilar predominant subpleural reticulation. Overall findings have not significantly progressed from comparison CT chest on 7/1/2021.  Differential remains broad including sarcoidosis as well as scleroderma given the fluid-filled distended esophagus. Mild air trapping during expiration.     CT Chest ([facility]; 7/20/2020)  1. Bilateral pulmonary consolidation is probably due to infection. Other inflammatory processes are also on the differential. Although radiographic pattern is somewhat typical of " sarcoid, this is less likely given significant worsening since 10/23/2019. Correlate clinically.  2. Mediastinal lymphadenopathy, probably reactive.  3. No pulmonary embolism.  4. Postoperative changes of gastric bypass with debris in the esophagus. Although this is present, the pulmonary consolidation is not typical of aspiration.           Pulmonary Function Tests:           PFT interpretation (January 6, 2022):  Maneuver: ATS criteria i not met.  Interpret with caution.  Spirometry: Suggestive of moderate restriction.  Lung volumes: TLC is 74% predicted, c/w mild restriction.   Diffusing capacity: There is mild impairment in diffusing capacity      Six-minute walk test  Date 6MWD RA SpO2 Resting O2 req Exercise O2 req Lowest SpO2 on amb   8/21/20 940 97 RA RA 90                    Laboratory:     Serology ([facility]; 7/27/2020)  Chromatin DNP antibody -6.6 (less than 1.0)  Ribosomal antibody-negative  SSA-2.0 (less than 1.0)  SSB-negative anti-SM RNP-greater than 80 (less than 1.0)  SCL 70-negative  Maddy 1-negative  Centromere antibody -greater than 8.0 (less than 1.0)  SM IgG-negative  Anti-RNP-4.8 (less than 1.0)  Anti-DS DNA- negative  TARA - positive    6/10/2009  TARA- 1: 1280  RF-20 (less than 9)    Recent Results (from the past 168 hour(s))   General PFT Lab (Please always keep checked)    Collection Time: 04/28/22 10:16 AM   Result Value Ref Range    FVC-Pred 3.38 L    FVC-Pre 2.96 L    FVC-%Pred-Pre 87 %    FEV1-Pre 2.55 L    FEV1-%Pred-Pre 95 %    FEV1FVC-Pred 80 %    FEV1FVC-Pre 86 %    FEFMax-Pred 6.98 L/sec    FEFMax-Pre 6.17 L/sec    FEFMax-%Pred-Pre 88 %    FEF2575-Pred 2.39 L/sec    FEF2575-Pre 3.16 L/sec    WPR1881-%Pred-Pre 132 %    ExpTime-Pre 5.32 sec    FIFMax-Pre 4.87 L/sec    VC-Pred 3.80 L    VC-Pre 2.97 L    VC-%Pred-Pre 78 %    IC-Pred 2.77 L    IC-Pre 1.84 L    IC-%Pred-Pre 66 %    ERV-Pred 1.03 L    ERV-Pre 1.13 L    ERV-%Pred-Pre 109 %    FEV1FEV6-Pred 81 %    FEV1FEV6-Pre 86 %     FRCPleth-Pred 2.93 L    FRCPleth-Pre 3.40 L    FRCPleth-%Pred-Pre 115 %    RVPleth-Pred 2.07 L    RVPleth-Pre 2.26 L    RVPleth-%Pred-Pre 109 %    TLCPleth-Pred 5.61 L    TLCPleth-Pre 5.23 L    TLCPleth-%Pred-Pre 93 %    DLCOunc-Pred 23.16 ml/min/mmHg    DLCOunc-Pre 17.43 ml/min/mmHg    DLCOunc-%Pred-Pre 75 %    DLCOcor-Pre 16.84 ml/min/mmHg    DLCOcor-%Pred-Pre 72 %    VA-Pre 4.56 L    VA-%Pred-Pre 80 %    FEV1SVC-Pred 70 %    FEV1SVC-Pre 86 %       BAL (8/4/2020)  Negative for Legionella, RVP, Bordetella, C pneumoniae, mycoplasma pneumonia    A. Lingula bronchial alveolar lavage, cytology  1. 49% neutrophils, 45% lymphocytes, 3% monocytes, and 3% eosinophils.  2. Negative for malignant cells.  3. Silver stain negative for pneumocystis and fungal organisms.  B. Lingula and anterior segments, left upper lobe transbronchial biopsy?-Negative for malignancy, see microscopic description.    Pathology:   A. The smear has the following differential 49% neutrophils, 45% lymphocytes, 3% monocytes, and 3% macrophages. There is no evidence of malignancy. A silver stain is applied, with an appropriate positive control, and is negative for pneumocystis and fungal organisms.  B. The biopsy shows benign bronchial epithelium and alveolar spaces. There is no evidence of malignancy. The findings are overall nondiagnostic.             Cardiac:     TTE ([facility]; 8/25/2020)  Interpretation Summary  Global and regional left ventricular function is normal with an EF of 55-60%.  Right ventricular function, chamber size, wall motion, and thickness are  normal.  Pulmonary artery systolic pressure cannot be assessed.  Ascending aorta 3.8 cm.  Aortic index 20mm/m2,mild dilation.  The inferior vena cava is normal.  No pericardial effusion is present.    Echocardiogram ([facility]; 10/16/2017)  Interpretation Summary    * Normal left ventricular size and systolic function, estimated LVEF 60-65%.    * Normal regional wall motion.    * Normal  right ventricular size and systolic function.    * The left ventricular diastolic function is mildly abnormal (Grade I).    * There is no hemodynamically significant valve disease.    * Normal estimated right ventricular systolic pressure of 28 mmHg.    * The proximal ascending aorta is mildly dilated measuring  3.9 cm.    * The IVC is normal in size (< 2.1 cm), > 50% respiratory variance, RA  pressure normal at 3 mmHg.    * Compared to prior study on 8/26/16, there has been no significant change  in left ventricular systolic function with side by side comparison.  There has  been no significant change in the size of the proximal ascending aorta  (previously measured 4.0 cm).         Other:           Again, thank you for allowing me to participate in the care of your patient.        Sincerely,        Ludin Cook MD

## 2022-04-30 NOTE — PROGRESS NOTES
Atul Bentley M.D.  Cardiovascular Medicine    I personally saw and examined this patient.  I spent 120 minutes reviewing her recent course in hospital, directly reviewing recent imaging studies including PET, MRI, Echocardiogram, laboratories, medication changes.  I personally reviewed case with Tiera Francis, Ancelmo De La Torre and radiology.      Problem List  1. Sarcoid?  2. Abnormal left ventricular function, normal coronary arteries  3. Raynauds  4. Scleroderma  5. Digital ulcerations  6. Raynauds  7. Iron deficiency  8. 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 ILD  10 Restriction of mouth opening  11. SBO  12. Reduced albumin with reduced pre-albumin consistent with malnutrition    1. Right heart catheterization with endomyocardial biopsy to look for evidence of myocardial sarcoid (see cardiac MRI)  2. Labs: iron, iron binding capacity, soluble transferrin receptor, pre-albumin  3. CT scan chest without contrast/done see below  4. Parenteral iron replacement  5. Augment feeding schedule to 5 meals day, BOOST milk shakes made with almond milk (lactose intolerance)    Plan:  1. The patient is feeling better with increase in prednisone (increased appetite, weight gain, and energy)  2. 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  Wil says he has three of these unusual hybrids  3. Wil and I discussed her situation and suggested second opinion at the [facility] would be appropriate.  We discussed this with patient and significant other and she agrees.  I called [facility] to schedule with Dr Varner.   4. While EF is down, she is doing adequately.  Will see in two months with echocardiogram, sooner should she not be doing well  5. Brassy cough today without fever, chills, blood: check CXR in view of immunocompromise          History    The patient returns for follow-up of PAH   There is no interim history of chest pain, tightness, paroxysmal nocturnal dyspnea, orthopnea,  peripheral edema but increasing episodes of AF with RVR necessitating ER visits, palpitation, pre-syncope, but no syncope,  Exercise tolerance is poor Patient has poor oral intake.  Patient has marked shortness of breath, without cough, hemoptysis..  Medications are reviewed and the patient is taking medications as prescribed.  The patient is generally sleeping well.       Allergies, family history, and 13 system review performed and confirmed in chart.  Objective    Alert, oriented, JVP within normal limits, murmur of TR and aortic outflow systolic murmur without evidence of aortic valve disease, no ascites, basilar rales do not clear with cough, no edema E to A changes at right base, Raynauds, sclerodema, no open ulcers    Wt Readings from Last 5 Encounters:   04/28/22 70.3 kg (155 lb)   04/20/22 68.1 kg (150 lb 1.6 oz)   04/19/22 62.6 kg (138 lb)   04/08/22 68.7 kg (151 lb 8 oz)   04/04/22 69.9 kg (154 lb)       Meds  Current Outpatient Medications   Medication     albuterol (PROAIR HFA/PROVENTIL HFA/VENTOLIN HFA) 108 (90 Base) MCG/ACT inhaler     amiodarone (PACERONE) 200 MG tablet     apixaban ANTICOAGULANT (ELIQUIS) 5 MG tablet     atorvastatin (LIPITOR) 40 MG tablet     azaTHIOprine (IMURAN) 50 MG tablet     blood glucose (NO BRAND SPECIFIED) test strip     clonazePAM (KLONOPIN) 0.5 MG tablet     folic acid (FOLVITE) 1 MG tablet     losartan (COZAAR) 50 MG tablet     magnesium oxide (MAG-OX) 400 MG tablet     metoprolol succinate ER (TOPROL XL) 50 MG 24 hr tablet     nitroGLYcerin (NITROSTAT) 0.3 MG sublingual tablet     pantoprazole (PROTONIX) 40 MG EC tablet     potassium chloride ER (MICRO-K) 10 MEQ CR capsule     predniSONE (DELTASONE) 10 MG tablet     spironolactone (ALDACTONE) 25 MG tablet     sulfamethoxazole-trimethoprim (BACTRIM) 400-80 MG tablet     thin (NO BRAND SPECIFIED) lancets     torsemide (DEMADEX) 20 MG tablet     vitamin D2 (ERGOCALCIFEROL) 39725 units (1250 mcg) capsule     No current  facility-administered medications for this visit.             Labs   Latest Reference Range & Units 04/20/22 12:27   Sodium 133 - 144 mmol/L 134   Potassium 3.4 - 5.3 mmol/L 3.5   Chloride 94 - 109 mmol/L 92 (L)   Carbon Dioxide 20 - 32 mmol/L 34 (H)   Urea Nitrogen 7 - 30 mg/dL 14   Creatinine 0.52 - 1.04 mg/dL 1.22 (H)   GFR Estimate >60 mL/min/1.73m2 51 (L) [1]   Calcium 8.5 - 10.1 mg/dL 9.0   Anion Gap 3 - 14 mmol/L 8   Iron 35 - 180 ug/dL 62   Iron Binding Cap 240 - 430 ug/dL 249   Iron Saturation Index 15 - 46 % 25   N-Terminal Pro Bnp 0 - 125 pg/mL 1,534 (H) [2]   Glucose 70 - 99 mg/dL 88   WBC 4.0 - 11.0 10e3/uL 10.3   Hemoglobin 11.7 - 15.7 g/dL 14.6   Hematocrit 35.0 - 47.0 % 45.3   Platelet Count 150 - 450 10e3/uL 264   RBC Count 3.80 - 5.20 10e6/uL 5.12   MCV 78 - 100 fL 89   MCH 26.5 - 33.0 pg 28.5   MCHC 31.5 - 36.5 g/dL 32.2   RDW 10.0 - 15.0 % 13.9     An inpatient or emergency department NT-proPBNP <300 pg/mL effectively rules out acute CHF, with 99% negative predictive value.         Latest Reference Range & Units 04/20/22 12:27   Sodium 133 - 144 mmol/L 134   Potassium 3.4 - 5.3 mmol/L 3.5   Chloride 94 - 109 mmol/L 92 (L)   Carbon Dioxide 20 - 32 mmol/L 34 (H)   Urea Nitrogen 7 - 30 mg/dL 14   Creatinine 0.52 - 1.04 mg/dL 1.22 (H)   GFR Estimate >60 mL/min/1.73m2 51 (L) [1]   Calcium 8.5 - 10.1 mg/dL 9.0   Anion Gap 3 - 14 mmol/L 8   Iron 35 - 180 ug/dL 62   Iron Binding Cap 240 - 430 ug/dL 249   Iron Saturation Index 15 - 46 % 25   N-Terminal Pro Bnp 0 - 125 pg/mL 1,534 (H) [2]   Glucose 70 - 99 mg/dL 88   WBC 4.0 - 11.0 10e3/uL 10.3   Hemoglobin 11.7 - 15.7 g/dL 14.6   Hematocrit 35.0 - 47.0 % 45.3   Platelet Count 150 - 450 10e3/uL 264   RBC Count 3.80 - 5.20 10e6/uL 5.12   MCV 78 - 100 fL 89   MCH 26.5 - 33.0 pg 28.5   MCHC 31.5 - 36.5 g/dL 32.2   RDW 10.0 - 15.0 % 13.9   (L): Data is abnormally low  (H): Data is abnormally high  [1] Effective December 21, 2021 eGFRcr in adults is calculated  using the 2021 CKD-EPI creatinine equation which includes age and gender (Griselda et al., [place], DOI: 10.1056/SAVXss3217972)  [2] Reference range shown and results flagged as abnormal are for the outpatient, non acute settings. Establishing a baseline value for each individual patient is useful for follow-up.      Suggested inpatient cut points for confirming diagnosis of CHF in an acute setting are:   >450 pg/mL (age 18 to less than 50)   >900 pg/mL (age 50 to less than 75)   >1800 pg/mL (75 yrs and older)      An inpatient or emergency department NT-proPBNP <300 pg/mL effectively rules out acute CHF, with 99% negative predictive value.        Results for REYESOSCAR (MRN [id number]) as of 3/8/2022 17:13   Ref. Range 2/15/2022 12:37 3/8/2022 11:30 3/8/2022 13:19   Sodium Latest Ref Range: 133 - 144 mmol/L  138    Potassium Latest Ref Range: 3.4 - 5.3 mmol/L  3.0 (L)    Chloride Latest Ref Range: 94 - 109 mmol/L  97    Carbon Dioxide Latest Ref Range: 20 - 32 mmol/L  30    Urea Nitrogen Latest Ref Range: 7 - 30 mg/dL  10    Creatinine Latest Ref Range: 0.52 - 1.04 mg/dL  1.04    GFR Estimate Latest Ref Range: >60 mL/min/1.73m2  62    Calcium Latest Ref Range: 8.5 - 10.1 mg/dL  8.5    Anion Gap Latest Ref Range: 3 - 14 mmol/L  11    Albumin Latest Ref Range: 3.4 - 5.0 g/dL  3.0 (L)    Prealbumin Latest Ref Range: 15 - 45 mg/dL  7 (L)    Protein Total Latest Ref Range: 6.8 - 8.8 g/dL  6.8    Bilirubin Total Latest Ref Range: 0.2 - 1.3 mg/dL  0.9    Alkaline Phosphatase Latest Ref Range: 40 - 150 U/L  102    ALT Latest Ref Range: 0 - 50 U/L  26    AST Latest Ref Range: 0 - 45 U/L  34    CRP Inflammation Latest Ref Range: 0.0 - 8.0 mg/L  7.4    Ferritin Latest Ref Range: 8 - 252 ng/mL  96    Iron Latest Ref Range: 35 - 180 ug/dL  32 (L)    Iron Binding Cap Latest Ref Range: 240 - 430 ug/dL  230 (L)    Iron Saturation Index Latest Ref Range: 15 - 46 %  14 (L)    N-Terminal Pro Bnp Latest Ref Range: 0 - 125 pg/mL   7,353 (H)    T4 Free Latest Ref Range: 0.76 - 1.46 ng/dL  1.76 (H)    TSH Latest Ref Range: 0.40 - 4.00 mU/L  0.30 (L)    Glucose Latest Ref Range: 70 - 99 mg/dL  94    WBC Latest Ref Range: 4.0 - 11.0 10e3/uL  5.0    Hemoglobin Latest Ref Range: 11.7 - 15.7 g/dL  14.4    Hematocrit Latest Ref Range: 35.0 - 47.0 %  45.0    Platelet Count Latest Ref Range: 150 - 450 10e3/uL  257    RBC Count Latest Ref Range: 3.80 - 5.20 10e6/uL  5.04    MCV Latest Ref Range: 78 - 100 fL  89    MCH Latest Ref Range: 26.5 - 33.0 pg  28.6    MCHC Latest Ref Range: 31.5 - 36.5 g/dL  32.0    RDW Latest Ref Range: 10.0 - 15.0 %  14.1       Latest Reference Range & Units 05/02/22 11:43 05/02/22 14:08   Sodium 133 - 144 mmol/L 132 (L)    Potassium 3.4 - 5.3 mmol/L 3.7    Chloride 94 - 109 mmol/L 96    Carbon Dioxide 20 - 32 mmol/L 25    Urea Nitrogen 7 - 30 mg/dL 10    Creatinine 0.52 - 1.04 mg/dL 0.77    GFR Estimate >60 mL/min/1.73m2 88 [1]    Calcium 8.5 - 10.1 mg/dL 8.5    Anion Gap 3 - 14 mmol/L 11    N-Terminal Pro Bnp 0 - 900 pg/mL 999 (H) [2]    Glucose 70 - 99 mg/dL 75    WBC 4.0 - 11.0 10e3/uL 4.0    Hemoglobin 11.7 - 15.7 g/dL 13.2    Hematocrit 35.0 - 47.0 % 39.9    Platelet Count 150 - 450 10e3/uL 267    RBC Count 3.80 - 5.20 10e6/uL 4.54    MCV 78 - 100 fL 88    MCH 26.5 - 33.0 pg 29.1    MCHC 31.5 - 36.5 g/dL 33.1    RDW 10.0 - 15.0 % 14.4    XR CHEST 2 VW   Rpt   (L): Data is abnormally low  (H): Data is abnormally high  Rpt: View report in Results Review for more information  [1] Effective December 21, 2021 eGFRcr in adults is calculated using the 2021 CKD-EPI creatinine equation which includes age and gender (Griselda et al., NEJM, DOI: 10.1056/PYGTud8431392)  [2] Reference range shown and results flagged as abnormal are for the outpatient, non acute settings. Establishing a baseline value for each individual patient is useful for follow-up.      Suggested inpatient cut points for confirming diagnosis of CHF in an acute  setting are:   >450 pg/mL (age 18 to less than 50)   >900 pg/mL (age 50 to less than 75)   >1800 pg/mL (75 yrs and older)      An inpatient or emergency department NT-proPBNP <300 pg/mL effectively rules out acute CHF, with 99% negative predictive value.      Imaging   EXAMINATION: CT CHEST W/O CONTRAST, 3/8/2022 1:19 PM     CLINICAL HISTORY: Sarcoidosis; ; Sarcoidosis     COMPARISON: CT chest 12/20/2021.     TECHNIQUE: CT imaging obtained through the chest without contrast.  Coronal and axial MIP reformatted images obtained. Inspiratory and  expiratory imaging was included. The high resolution interstitial lung  disease protocol.     CONTRAST:  none.     FINDINGS:  Cardiac size within normal limits on the  topogram. No  pericardial effusion. Unchanged mild thoracic aortic ectasia measuring  up to 4.4 cm in the proximal thoracic descending aorta. Enlarged main  pulmonary trunk measuring up to 3.6 cm, unchanged. No suspicious  lymphadenopathy within the chest. Again seen dilated and fluid-filled  esophagus proximal to the gastroesophageal anastomosis of patient's  Eleazar-en-Y. This appears similar to 9/16/2021.     Central tracheobronchial tree is patent. No pneumothorax or pleural  effusions. Again seen and not relatively changed numerous  perilymphatic distribution nodules greatest in the mid to left upper  lung field. Fine reticular opacities in the subpleural lung bases with  immediate subpleural sparing. Air trapping demonstrated in the mid to  upper lung fields on expiratory imaging.     Imaged unenhanced upper abdomen demonstrates cholecystectomy and  partially imaged surgical changes of Eleazar-en-Y gastric bypass.  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 No  acute or suspicious osseous abnormalities. Soft tissues unremarkable.                                                                      IMPRESSION:  1. Relatively unchanged perilymphatic distribution nodules, mid to  upper lung  fields, indicative of sarcoidosis phenotype in conjunction  with basilar and peripheral predominant reticular changes suggestive  of NSIP given history of scleroderma.. Findings may represent a  combination of features of sarcoidosis and scleroderma associated ILD.  Lobular gas trapping on expiratory view or small airways disease  component. Again seen dilated and fluid-filled esophagus consistent  with scleroderma.     I have personally reviewed the examination and initial interpretation  and I agree with the findings.       Coronary angiogram:    Diagnostic  Dominance: Right    Left Main   Very short LM; essentially separate coronary ostia   Left Anterior Descending   Ost LAD to Prox LAD lesion is 20% stenosed. Mild CAD vs possible mild catheter induced vasospasm   Left Circumflex   The vessel is angiographically normal.   Right Coronary Artery   Prox RCA lesion is 20% stenosed.       Intervention        MRI  Clinical history: 61 year old woman with ILD, sarcoidosis, mixed connective tissue disorder, limited  cutaneous systemic sclerosis, and Raynauds. Recently admitted for troponin elevation and cardiomyopathy. No  significant obstructive CAD on coronary angiogram.       Comparison CMR: April 2013     1. The left ventricle is normal in size. There is mild inferoseptal thickening (14 mm). The global systolic  function is mildly reduced. The LVEF is 44%. There is mild diffuse hypokinesis.     2. The right ventricle is normal in cavity size. The global systolic function is normal. The RVEF is 54%.      3. Left atrium is mildly enlarged. Right atrium is normal in size.      4. There is no significant valvular disease.      5. There is mid-myocardial late gadolinium enhancement in the basal septum with extension into the mid  inferoseptal segment and inferior right ventricular insertion site. In addition, there is subendocardial  hyperenhancement in the basal inferolateral segment. This variable pattern of  hyperenhancement is  concerning for infiltrative cardiomyopathy such as sarcoidosis.       6. There is no pericardial effusion.     7. There is no intracardiac thrombus.     8. There is no myocardial edema.      CONCLUSIONS:   1. Non ischemic cardiomyopathy with mildly reduced left ventricular function and normal right ventricular  function.   2. Variable patterns of hyperenhancement in the septal mid-myocardium and inferolateral subendocardium are  concerning for cardiac sarcoidosis.      These findings are new compared to prior cMRI done on April 2013.      CORE EXAM   ==========================================================================================================     MEASUREMENTS   ----------------------------------------------------------------------------------------      VOLUMETRIC ANALYSIS       ----------------------------------------------  .-------------------------------------------------------.                   LV    Reference  RV    Reference   +-----+-----------+------+-----------+------+-----------+   EDV  ml          165   ()   131   ()         ml/m^2       87   (56-90)     69   (53-90)     ESV  ml           92   (22-59)     60   (15-68)          ml/m^2       48   (14-33)     31   (11-37)     CO   L/min      5.04             4.91                    L/min/m^2  2.64             2.58               SV   ml           73   ()    71   ()         ml/m^2       38   (37-62)     37   (36-60)     EF   %            44   (59-77)     54   (55-79)    '-----+-----------+------+-----------+------+-----------'             CARDIAC OUTPUT HR:  69 BPM      LV DIMENSIONS       ----------------------------------------------          WALL THICKNESS - INFEROLATERAL:  0.5 cm          LV RODOLFO:  4.9 cm          LV ESD:  3.8 cm         LA DIMENSIONS (LV SYSTOLE)       ----------------------------------------------           DIAMETER:  4 cm         EXTRACELLULAR VOLUME MEASUREMENT       ----------------------------------------------          PRE-CONTRAST T1 MYOCARDIUM:  1101 msec          PRE-CONTRAST T1 LV CAVITY:  1486 msec          POST-CONTRAST T1 MYOCARDIUM:  530 msec          POST-CONTRAST T1 LV CAVITY:  428 msec          ECV:  39 %        ANATOMY   ----------------------------------------------------------------------------------------      LEFT VENTRICLE       ----------------------------------------------          CAVITY SIZE:  Normal         RIGHT VENTRICLE       ----------------------------------------------          WALL THICKNESS:  Normal         INTERVENTRICULAR SEPTUM       ----------------------------------------------               VENTRICULAR SEPTUM:  Normal          INTERATRIAL SEPTUM       ----------------------------------------------               ATRIAL SEPTUM:          Normal          LEFT ATRIUM       ----------------------------------------------          CAVITY SIZE:  MILDLY ENLARGED         RIGHT ATRIUM       ----------------------------------------------          CAVITY SIZE:  Normal         PERICARDIUM       ----------------------------------------------          Normal         PLEURAL EFFUSION       ----------------------------------------------               None         17 SEGMENT   ----------------------------------------------------------------------------------------  .-------------------------------------------------------------------------------------------------.   Segments            Wall Motion    Hyperenhancement  Stress Perfusion  Interpretation         +--------------------+---------------+------------------+------------------+----------------------+   Base Anterior       Mild/Mod Hypo  None                                Abnormal Wall Motion    Base Anteroseptal   Mild/Mod Hypo  1-25%                               Non-CAD Scar            Base Inferoseptal   Mild/Mod Hypo   1-25%                               Non-CAD Scar            Base Inferior       Mild/Mod Hypo  None                                Abnormal Wall Motion    Base Inferolateral  Mild/Mod Hypo  1-25%                               Non-CAD Scar            Base Anterolateral  Mild/Mod Hypo  None                                Abnormal Wall Motion    Mid Anterior        Mild/Mod Hypo  None                                Abnormal Wall Motion    Mid Anteroseptal    Mild/Mod Hypo  None                                Abnormal Wall Motion    Mid Inferoseptal    Mild/Mod Hypo  1-25%                               Non-CAD Scar            Mid Inferior        Mild/Mod Hypo  None                                Abnormal Wall Motion    Mid Inferolateral   Mild/Mod Hypo  None                                Abnormal Wall Motion    Mid Anterolateral   Mild/Mod Hypo  None                                Abnormal Wall Motion    Apical Anterior     Mild/Mod Hypo  None                                Abnormal Wall Motion    Apical Septal       Mild/Mod Hypo  None                                Abnormal Wall Motion    Apical Inferior     Mild/Mod Hypo  None                                Abnormal Wall Motion    Apical Lateral      Mild/Mod Hypo  None                                Abnormal Wall Motion    [place]                Mild/Mod Hypo  None                                Abnormal Wall Motion   +--------------------+---------------+------------------+------------------+----------------------+   RV Segments         Wall Motion    Hyperenhancement                    Interpretation         +--------------------+---------------+------------------+------------------+----------------------+   RV Basal Anterior   Normal/Hyper   None                                Normal                  RV Basal Inferior   Normal/Hyper   None                                 Normal                  RV Mid              Normal/Hyper   None                                Normal                  RV Apical           Normal/Hyper   None                                Normal                 '--------------------+---------------+------------------+------------------+----------------------'     Echo    Name: OSCAR REYES  MRN: [id number]  : 1960  Study Date: 2021 08:35 AM  Age: 61 yrs  Gender: Female  Patient Location: [facility]  Reason For Study: Chest Pain, Chest Tightness, Chest Pressure  Ordering Physician: CHARISSE ANN  Referring Physician: CHARISSE ANN  Performed By: Jose Danielle     BSA: 2.0 m2  Height: 67 in  Weight: 187 lb  HR: 90  BP: 145/100 mmHg  ______________________________________________________________________________  Procedure  Complete Echo Adult. Optison (NDC #2720-2601) given intravenously.  ______________________________________________________________________________  Interpretation Summary     1. The left ventricle is normal in size. The visual ejection fraction is  estimated at 35%. Global hypokinesis, distal LV segments (anteroseptal) appear  more hypokinetic than other areas.  2. The right ventricle is mildly dilated. The right ventricular systolic  function is normal.  3. There is mild to moderate (1-2+) mitral regurgitation.  4. There is moderately severe (3+) tricuspid regurgitation.  5. Moderate (46-55mmHg) pulmonary hypertension is present. The right  ventricular systolic pressure is approximated at 54mmHg plus the right atrial  pressure.  6. The ascending aorta is Mildly dilated. 4.2cm.     Echo from 2020 showed EF 55%, mild TR, aorta 3.8cm.  ______________________________________________________________________________  Left Ventricle  The left ventricle is normal in size. There is normal left ventricular wall  thickness. The visual ejection fraction is estimated at 35%. Left ventricular  diastolic  function is indeterminate. Global hypokinesis, distal LV segments  (anteroseptal) appear more hypokinetic than other areas.     Right Ventricle  The right ventricle is mildly dilated. The right ventricular systolic function  is normal.     Atria  The left atrium is severely dilated. The right atrium is moderately dilated.  There is no atrial shunt seen.     Mitral Valve  There is mild mitral annular calcification. There is mild to moderate (1-2+)  mitral regurgitation.     Tricuspid Valve  There is moderately severe (3+) tricuspid regurgitation. Moderate (46-55mmHg)  pulmonary hypertension is present. The right ventricular systolic pressure is  approximated at 54mmHg plus the right atrial pressure.     Aortic Valve  There is trace to mild aortic regurgitation.     Pulmonic Valve  There is mild to moderate (1-2+) pulmonic valvular regurgitation.     Vessels  The ascending aorta is Mildly dilated. Normal IVC size, cannot assess collapse  due to limited image quality.     Pericardium  There is no pericardial effusion.     Rhythm  Sinus rhythm was noted.  ______________________________________________________________________________  MMode/2D Measurements & Calculations  IVSd: 1.1 cm     LVIDd: 5.4 cm  LVIDs: 4.0 cm  LVPWd: 0.88 cm  FS: 25.8 %  LV mass(C)d: 207.4 grams  LV mass(C)dI: 105.5 grams/m2  Ao root diam: 3.7 cm  asc Aorta Diam: 4.2 cm  LVOT diam: 2.1 cm  LVOT area: 3.4 cm2  LA Volume (BP): 130.0 ml  LA Volume Index (BP): 66.0 ml/m2  RWT: 0.32     Doppler Measurements & Calculations  MV E max ck: 87.8 cm/sec  MV A max ck: 88.8 cm/sec  MV E/A: 0.99  Ao V2 max: 156.2 cm/sec  Ao max PG: 10.0 mmHg  ALLISON(V,D): 2.0 cm2  LV V1 max PG: 3.3 mmHg  LV V1 max: 91.2 cm/sec  LV V1 VTI: 17.6 cm  SV(LVOT): 59.4 ml  SI(LVOT): 30.2 ml/m2  PA V2 max: 96.9 cm/sec  PA max PG: 3.8 mmHg  PA acc time: 0.06 sec  PI end-d ck: 168.7 cm/sec  TR max ck: 369.2 cm/sec  TR max P.5 mmHg  AV Ck Ratio (DI): 0.58  Lateral E/e': 11.1      Catheterizaton      Right sided filling pressures are normal.    Normal PA pressures.    Left sided filling pressures are normal.    Normal cardiac output level.    Hemodynamic data has been modified in Epic per physician review      RA 5/4/3  RV 25/0/4  PA 26/10/17  PCWP 16/13/10  SANYA 4.44/2.40 Right sided filling pressures are normal. Left sided filling pressures are normal. Normal PA pressures. Normal cardiac output level. Hemodynamic data       Answers for HPI/ROS submitted by the patient on 4/26/2022  General Symptoms: Yes  Skin Symptoms: Yes  HENT Symptoms: Yes  EYE SYMPTOMS: Yes  HEART SYMPTOMS: Yes  LUNG SYMPTOMS: Yes  INTESTINAL SYMPTOMS: Yes  URINARY SYMPTOMS: No  GYNECOLOGIC SYMPTOMS: No  BREAST SYMPTOMS: No  SKELETAL SYMPTOMS: Yes  BLOOD SYMPTOMS: No  NERVOUS SYSTEM SYMPTOMS: Yes  MENTAL HEALTH SYMPTOMS: Yes  Ear pain: Yes  Ear discharge: No  Hearing loss: No  Tinnitus: No  Nosebleeds: No  Congestion: Yes  Sinus pain: No  Trouble swallowing: Yes   Voice hoarseness: No  Mouth sores: No  Sore throat: No  Tooth pain: No  Gum tenderness: No  Bleeding gums: No  Change in taste: No  Change in sense of smell: No  Dry mouth: Yes  Hearing aid used: No  Neck lump: No  Fever: No  Loss of appetite: Yes  Weight loss: Yes  Weight gain: No  Fatigue: Yes  Night sweats: No  Chills: Yes  Increased stress: No  Excessive hunger: No  Excessive thirst: No  Feeling hot or cold when others believe the temperature is normal: Yes  Loss of height: No  Post-operative complications: No  Surgical site pain: No  Hallucinations: No  Change in or Loss of Energy: Yes  Hyperactivity: No  Confusion: Yes  Changes in hair: No  Changes in moles/birth marks: Yes  Itching: No  Rashes: No  Changes in nails: No  Acne: No  Hair in places you don't want it: No  Change in facial hair: No  Warts: No  Non-healing sores: No  Scarring: No  Flaking of skin: Yes  Color changes of hands/feet in cold : Yes  Sun sensitivity: No  Skin thickening: No  Eye  pain: No  Vision loss: Yes  Dry eyes: No  Watery eyes: Yes  Eye bulging: No  Double vision: No  Flashing of lights: Yes  Spots: Yes  Floaters: Yes  Redness: No  Crossed eyes: No  Tunnel Vision: No  Yellowing of eyes: No  Eye irritation: No  Chest pain or pressure: No  Fast or irregular heartbeat: No  Pain in legs with walking: Yes  Trouble breathing while lying down: Yes  Fingers or toes appear blue: Yes  High blood pressure: No  Low blood pressure: No  Fainting: No  Murmurs: No  Pacemaker: No  Varicose veins: No  Edema or swelling: No  Wake up at night with shortness of breath: Yes  Light-headedness: Yes  Exercise intolerance: Yes  Cough: Yes  Sputum or phlegm: Yes  Coughing up blood: No  Difficulty breating or shortness of breath: Yes  Snoring: No  Wheezing: Yes  Difficulty breathing on exertion: Yes  Nighttime Cough: Yes  Difficulty breathing when lying flat: Yes  Heart burn or indigestion: No  Nausea: Yes  Vomiting: No  Abdominal pain: No  Bloating: No  Constipation: No  Diarrhea: No  Blood in stool: No  Black stools: No  Rectal or Anal pain: No  Fecal incontinence: No  Yellowing of skin or eyes: No  Vomit with blood: No  Change in stools: No  Back pain: No  Muscle aches: Yes  Neck pain: No  Swollen joints: No  Joint pain: No  Bone pain: Yes  Muscle cramps: No  Muscle weakness: Yes  Joint stiffness: Yes  Bone fracture: No  Trouble with coordination: Yes  Dizziness or trouble with balance: Yes  Fainting or black-out spells: No  Memory loss: Yes  Headache: No  Seizures: No  Speech problems: No  Tingling: Yes  Tremor: No  Weakness: Yes  Difficulty walking: Yes  Paralysis: No  Numbness: Yes  Nervous or Anxious: No  Depression: No  Trouble sleeping: Yes  Trouble thinking or concentrating: Yes  Mood changes: Yes  Panic attacks: No    CC  Medical Records [facility]  [place], Rheumatology [facility]

## 2022-05-01 PROBLEM — I48.20 CHRONIC ATRIAL FIBRILLATION (H): Status: ACTIVE | Noted: 2022-05-01

## 2022-05-01 PROBLEM — I50.22 CHRONIC SYSTOLIC CONGESTIVE HEART FAILURE (H): Status: ACTIVE | Noted: 2022-05-01

## 2022-05-02 ENCOUNTER — OFFICE VISIT (OUTPATIENT)
Dept: CARDIOLOGY | Facility: CLINIC | Age: 62
End: 2022-05-02
Attending: INTERNAL MEDICINE
Payer: COMMERCIAL

## 2022-05-02 ENCOUNTER — LAB (OUTPATIENT)
Dept: LAB | Facility: CLINIC | Age: 62
End: 2022-05-02
Payer: COMMERCIAL

## 2022-05-02 VITALS
SYSTOLIC BLOOD PRESSURE: 116 MMHG | BODY MASS INDEX: 24.62 KG/M2 | HEART RATE: 59 BPM | DIASTOLIC BLOOD PRESSURE: 77 MMHG | WEIGHT: 161.9 LBS | OXYGEN SATURATION: 87 %

## 2022-05-02 DIAGNOSIS — I27.20 PULMONARY HYPERTENSION (H): ICD-10-CM

## 2022-05-02 DIAGNOSIS — R06.09 DOE (DYSPNEA ON EXERTION): ICD-10-CM

## 2022-05-02 DIAGNOSIS — R06.09 DOE (DYSPNEA ON EXERTION): Primary | ICD-10-CM

## 2022-05-02 DIAGNOSIS — D50.9 IRON DEFICIENCY ANEMIA, UNSPECIFIED IRON DEFICIENCY ANEMIA TYPE: ICD-10-CM

## 2022-05-02 DIAGNOSIS — D50.9 IRON DEFICIENCY ANEMIA, UNSPECIFIED IRON DEFICIENCY ANEMIA TYPE: Primary | ICD-10-CM

## 2022-05-02 LAB
ANION GAP SERPL CALCULATED.3IONS-SCNC: 11 MMOL/L (ref 3–14)
BUN SERPL-MCNC: 10 MG/DL (ref 7–30)
CALCIUM SERPL-MCNC: 8.5 MG/DL (ref 8.5–10.1)
CHLORIDE BLD-SCNC: 96 MMOL/L (ref 94–109)
CO2 SERPL-SCNC: 25 MMOL/L (ref 20–32)
CREAT SERPL-MCNC: 0.77 MG/DL (ref 0.52–1.04)
ERYTHROCYTE [DISTWIDTH] IN BLOOD BY AUTOMATED COUNT: 14.4 % (ref 10–15)
GFR SERPL CREATININE-BSD FRML MDRD: 88 ML/MIN/1.73M2
GLUCOSE BLD-MCNC: 75 MG/DL (ref 70–99)
HCT VFR BLD AUTO: 39.9 % (ref 35–47)
HGB BLD-MCNC: 13.2 G/DL (ref 11.7–15.7)
MCH RBC QN AUTO: 29.1 PG (ref 26.5–33)
MCHC RBC AUTO-ENTMCNC: 33.1 G/DL (ref 31.5–36.5)
MCV RBC AUTO: 88 FL (ref 78–100)
NT-PROBNP SERPL-MCNC: 999 PG/ML (ref 0–900)
PLATELET # BLD AUTO: 267 10E3/UL (ref 150–450)
POTASSIUM BLD-SCNC: 3.7 MMOL/L (ref 3.4–5.3)
RBC # BLD AUTO: 4.54 10E6/UL (ref 3.8–5.2)
SODIUM SERPL-SCNC: 132 MMOL/L (ref 133–144)
WBC # BLD AUTO: 4 10E3/UL (ref 4–11)

## 2022-05-02 PROCEDURE — 36415 COLL VENOUS BLD VENIPUNCTURE: CPT | Performed by: PATHOLOGY

## 2022-05-02 PROCEDURE — 80048 BASIC METABOLIC PNL TOTAL CA: CPT | Performed by: PATHOLOGY

## 2022-05-02 PROCEDURE — 99215 OFFICE O/P EST HI 40 MIN: CPT | Performed by: INTERNAL MEDICINE

## 2022-05-02 PROCEDURE — 83880 ASSAY OF NATRIURETIC PEPTIDE: CPT | Performed by: PATHOLOGY

## 2022-05-02 PROCEDURE — 85027 COMPLETE CBC AUTOMATED: CPT | Performed by: PATHOLOGY

## 2022-05-02 PROCEDURE — 99417 PROLNG OP E/M EACH 15 MIN: CPT | Performed by: INTERNAL MEDICINE

## 2022-05-02 PROCEDURE — G0463 HOSPITAL OUTPT CLINIC VISIT: HCPCS

## 2022-05-02 ASSESSMENT — PAIN SCALES - GENERAL: PAINLEVEL: NO PAIN (0)

## 2022-05-02 NOTE — NURSING NOTE
Visit completed with Dr. Hart. Patient went down for Xray today and returned. Spoke with Dr. Bentley and left following visit without being scheduled. Plan is for a 2MO follow-up with [facility] with labs and echo prior.    Brisa Galeana RN on 5/2/2022 at 4:03 PM

## 2022-05-02 NOTE — PATIENT INSTRUCTIONS
Medication Changes:  No changes    Plan: Chest xray today. Plan for Echocardiogram and labs with follow-up with Katherin Ga in 2 Months    Patient Instructions:  1. Continue staying active and eat a heart healthy diet.    2. Please keep current list of medications with you at all times.    3. Remember to weigh yourself daily after voiding and before you consume any food or beverages and log the numbers.  If you have gained 2 pounds overnight or 5 pounds in a week contact us immediately for medication adjustments or further instructions.    4. **Please call us immediately if you have any syncope (fainting or passing out), chest pain, edema (swelling or weight gain), or decline in your functional status (general decline in how you are feeling).    5. Patients on Remodulin (treprostinil) or Veletri (epoprostenol): Please make sure that you have your backup pump and supplies with you at all times, your mixing instructions, and contact information for your specialty pharmacy.          Results:   Latest Reference Range & Units 05/02/22 11:43   Sodium 133 - 144 mmol/L 132 (L)   Potassium 3.4 - 5.3 mmol/L 3.7   Chloride 94 - 109 mmol/L 96   Carbon Dioxide 20 - 32 mmol/L 25   Urea Nitrogen 7 - 30 mg/dL 10   Creatinine 0.52 - 1.04 mg/dL 0.77   GFR Estimate >60 mL/min/1.73m2 88 [1]   Calcium 8.5 - 10.1 mg/dL 8.5   Anion Gap 3 - 14 mmol/L 11   N-Terminal Pro Bnp 0 - 900 pg/mL 999 (H) [2]   Glucose 70 - 99 mg/dL 75   WBC 4.0 - 11.0 10e3/uL 4.0   Hemoglobin 11.7 - 15.7 g/dL 13.2   Hematocrit 35.0 - 47.0 % 39.9   Platelet Count 150 - 450 10e3/uL 267   RBC Count 3.80 - 5.20 10e6/uL 4.54   MCV 78 - 100 fL 88   MCH 26.5 - 33.0 pg 29.1   MCHC 31.5 - 36.5 g/dL 33.1   RDW 10.0 - 15.0 % 14.4         We are located on the third floor of the [facility] ([facility] on the [facility].  Our address is     [address] [place] [address] [place] [address] [place] [address]    Thank you for allowing us to be a  Ordered oral surgeon. I dont know diagnosis for eye doc so I cant enter- blurry vision or vision check is not a covered referral   part of your care here at the [facility]    If you have questions or concerns please contact us at:    Bharti Perdue RN, BSN, PHN  Dorita Knowles (Scheduling,Prior Auth)  Nurse Coordinator     Clinic   Pulmonary Hypertension   Pulmonary Hypertension  [facility]  (Phone)[phone]   (Phone) [phone]        (Fax) [phone]    ** Please note that you will NOT receive a reminder call regarding your scheduled testing, reminder calls are for provider appointments only.  If you are scheduled for testing within the [facility] system you may receive a call regarding pre-registration for billing purposes only.**     Support Group:  Pulmonary Hypertension Association  Https://www.phassociation.org/  **Look at the Events Tab** They even have Support Groups that you can call into    [facility] Support Group  Second Saturday of the Month from 1-3 PM   Location: [address] [place] [address]  Leader: Silvana Long  Phone: [phone]  Email: [email or web address]

## 2022-05-02 NOTE — NURSING NOTE
Chief Complaint   Patient presents with     Follow Up     Return PH appt 2 week follow up after med changes w/labs prior   Vitals were taken and medications reconciled.    Jae Giraldo, EMT  12:04 PM

## 2022-05-02 NOTE — LETTER
5/2/2022      RE: Jeanine Schuler  [address]  [facility] [address]       Dear Colleague,    Thank you for the opportunity to participate in the care of your patient, Jeanine Schuler, at the [facility] [place] at [facility]. Please see a copy of my visit note below.    Atul Bentley M.D.  Cardiovascular Medicine    I personally saw and examined this patient.  I spent 120 minutes reviewing her recent course in hospital, directly reviewing recent imaging studies including PET, MRI, Echocardiogram, laboratories, medication changes.  I personally reviewed case with Tiera Francis, Wil, Ancelmo and radiology.      Problem List  1. Sarcoid?  2. Abnormal left ventricular function, normal coronary arteries  3. Raynauds  4. Scleroderma  5. Digital ulcerations  6. Raynauds  7. Iron deficiency  8. 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 ILD  10 Restriction of mouth opening  11. SBO  12. Reduced albumin with reduced pre-albumin consistent with malnutrition    1. Right heart catheterization with endomyocardial biopsy to look for evidence of myocardial sarcoid (see cardiac MRI)  2. Labs: iron, iron binding capacity, soluble transferrin receptor, pre-albumin  3. CT scan chest without contrast/done see below  4. Parenteral iron replacement  5. Augment feeding schedule to 5 meals day, BOOST milk shakes made with almond milk (lactose intolerance)    Plan:  1. The patient is feeling better with increase in prednisone (increased appetite, weight gain, and energy)  2. 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  Wil says he has three of these unusual hybrids  3. Wil and I discussed her situation and suggested second opinion at the [facility] would be appropriate.  We discussed this with patient and significant other and she agrees.  I called [facility] to schedule with Dr Varner.   4. While EF is down, she is doing  adequately.  Will see in two months with echocardiogram, sooner should she not be doing well  5. Brassy cough today without fever, chills, blood: check CXR in view of immunocompromise          History    The patient returns for follow-up of PAH   There is no interim history of chest pain, tightness, paroxysmal nocturnal dyspnea, orthopnea, peripheral edema but increasing episodes of AF with RVR necessitating ER visits, palpitation, pre-syncope, but no syncope,  Exercise tolerance is poor Patient has poor oral intake.  Patient has marked shortness of breath, without cough, hemoptysis..  Medications are reviewed and the patient is taking medications as prescribed.  The patient is generally sleeping well.       Allergies, family history, and 13 system review performed and confirmed in chart.  Objective    Alert, oriented, JVP within normal limits, murmur of TR and aortic outflow systolic murmur without evidence of aortic valve disease, no ascites, basilar rales do not clear with cough, no edema E to A changes at right base, Raynauds, sclerodema, no open ulcers    Wt Readings from Last 5 Encounters:   04/28/22 70.3 kg (155 lb)   04/20/22 68.1 kg (150 lb 1.6 oz)   04/19/22 62.6 kg (138 lb)   04/08/22 68.7 kg (151 lb 8 oz)   04/04/22 69.9 kg (154 lb)       Meds  Current Outpatient Medications   Medication     albuterol (PROAIR HFA/PROVENTIL HFA/VENTOLIN HFA) 108 (90 Base) MCG/ACT inhaler     amiodarone (PACERONE) 200 MG tablet     apixaban ANTICOAGULANT (ELIQUIS) 5 MG tablet     atorvastatin (LIPITOR) 40 MG tablet     azaTHIOprine (IMURAN) 50 MG tablet     blood glucose (NO BRAND SPECIFIED) test strip     clonazePAM (KLONOPIN) 0.5 MG tablet     folic acid (FOLVITE) 1 MG tablet     losartan (COZAAR) 50 MG tablet     magnesium oxide (MAG-OX) 400 MG tablet     metoprolol succinate ER (TOPROL XL) 50 MG 24 hr tablet     nitroGLYcerin (NITROSTAT) 0.3 MG sublingual tablet     pantoprazole (PROTONIX) 40 MG EC tablet     potassium  chloride ER (MICRO-K) 10 MEQ CR capsule     predniSONE (DELTASONE) 10 MG tablet     spironolactone (ALDACTONE) 25 MG tablet     sulfamethoxazole-trimethoprim (BACTRIM) 400-80 MG tablet     thin (NO BRAND SPECIFIED) lancets     torsemide (DEMADEX) 20 MG tablet     vitamin D2 (ERGOCALCIFEROL) 44877 units (1250 mcg) capsule     No current facility-administered medications for this visit.             Labs   Latest Reference Range & Units 04/20/22 12:27   Sodium 133 - 144 mmol/L 134   Potassium 3.4 - 5.3 mmol/L 3.5   Chloride 94 - 109 mmol/L 92 (L)   Carbon Dioxide 20 - 32 mmol/L 34 (H)   Urea Nitrogen 7 - 30 mg/dL 14   Creatinine 0.52 - 1.04 mg/dL 1.22 (H)   GFR Estimate >60 mL/min/1.73m2 51 (L) [1]   Calcium 8.5 - 10.1 mg/dL 9.0   Anion Gap 3 - 14 mmol/L 8   Iron 35 - 180 ug/dL 62   Iron Binding Cap 240 - 430 ug/dL 249   Iron Saturation Index 15 - 46 % 25   N-Terminal Pro Bnp 0 - 125 pg/mL 1,534 (H) [2]   Glucose 70 - 99 mg/dL 88   WBC 4.0 - 11.0 10e3/uL 10.3   Hemoglobin 11.7 - 15.7 g/dL 14.6   Hematocrit 35.0 - 47.0 % 45.3   Platelet Count 150 - 450 10e3/uL 264   RBC Count 3.80 - 5.20 10e6/uL 5.12   MCV 78 - 100 fL 89   MCH 26.5 - 33.0 pg 28.5   MCHC 31.5 - 36.5 g/dL 32.2   RDW 10.0 - 15.0 % 13.9     An inpatient or emergency department NT-proPBNP <300 pg/mL effectively rules out acute CHF, with 99% negative predictive value.         Latest Reference Range & Units 04/20/22 12:27   Sodium 133 - 144 mmol/L 134   Potassium 3.4 - 5.3 mmol/L 3.5   Chloride 94 - 109 mmol/L 92 (L)   Carbon Dioxide 20 - 32 mmol/L 34 (H)   Urea Nitrogen 7 - 30 mg/dL 14   Creatinine 0.52 - 1.04 mg/dL 1.22 (H)   GFR Estimate >60 mL/min/1.73m2 51 (L) [1]   Calcium 8.5 - 10.1 mg/dL 9.0   Anion Gap 3 - 14 mmol/L 8   Iron 35 - 180 ug/dL 62   Iron Binding Cap 240 - 430 ug/dL 249   Iron Saturation Index 15 - 46 % 25   N-Terminal Pro Bnp 0 - 125 pg/mL 1,534 (H) [2]   Glucose 70 - 99 mg/dL 88   WBC 4.0 - 11.0 10e3/uL 10.3   Hemoglobin 11.7 - 15.7 g/dL  14.6   Hematocrit 35.0 - 47.0 % 45.3   Platelet Count 150 - 450 10e3/uL 264   RBC Count 3.80 - 5.20 10e6/uL 5.12   MCV 78 - 100 fL 89   MCH 26.5 - 33.0 pg 28.5   MCHC 31.5 - 36.5 g/dL 32.2   RDW 10.0 - 15.0 % 13.9   (L): Data is abnormally low  (H): Data is abnormally high  [1] Effective December 21, 2021 eGFRcr in adults is calculated using the 2021 CKD-EPI creatinine equation which includes age and gender (Griselda et al., NEJ, DOI: 10.1056/JCTYpl3077178)  [2] Reference range shown and results flagged as abnormal are for the outpatient, non acute settings. Establishing a baseline value for each individual patient is useful for follow-up.      Suggested inpatient cut points for confirming diagnosis of CHF in an acute setting are:   >450 pg/mL (age 18 to less than 50)   >900 pg/mL (age 50 to less than 75)   >1800 pg/mL (75 yrs and older)      An inpatient or emergency department NT-proPBNP <300 pg/mL effectively rules out acute CHF, with 99% negative predictive value.        Results for OSCAR REYES (MRN [id number]) as of 3/8/2022 17:13   Ref. Range 2/15/2022 12:37 3/8/2022 11:30 3/8/2022 13:19   Sodium Latest Ref Range: 133 - 144 mmol/L  138    Potassium Latest Ref Range: 3.4 - 5.3 mmol/L  3.0 (L)    Chloride Latest Ref Range: 94 - 109 mmol/L  97    Carbon Dioxide Latest Ref Range: 20 - 32 mmol/L  30    Urea Nitrogen Latest Ref Range: 7 - 30 mg/dL  10    Creatinine Latest Ref Range: 0.52 - 1.04 mg/dL  1.04    GFR Estimate Latest Ref Range: >60 mL/min/1.73m2  62    Calcium Latest Ref Range: 8.5 - 10.1 mg/dL  8.5    Anion Gap Latest Ref Range: 3 - 14 mmol/L  11    Albumin Latest Ref Range: 3.4 - 5.0 g/dL  3.0 (L)    Prealbumin Latest Ref Range: 15 - 45 mg/dL  7 (L)    Protein Total Latest Ref Range: 6.8 - 8.8 g/dL  6.8    Bilirubin Total Latest Ref Range: 0.2 - 1.3 mg/dL  0.9    Alkaline Phosphatase Latest Ref Range: 40 - 150 U/L  102    ALT Latest Ref Range: 0 - 50 U/L  26    AST Latest Ref Range: 0 - 45 U/L   34    CRP Inflammation Latest Ref Range: 0.0 - 8.0 mg/L  7.4    Ferritin Latest Ref Range: 8 - 252 ng/mL  96    Iron Latest Ref Range: 35 - 180 ug/dL  32 (L)    Iron Binding Cap Latest Ref Range: 240 - 430 ug/dL  230 (L)    Iron Saturation Index Latest Ref Range: 15 - 46 %  14 (L)    N-Terminal Pro Bnp Latest Ref Range: 0 - 125 pg/mL  7,353 (H)    T4 Free Latest Ref Range: 0.76 - 1.46 ng/dL  1.76 (H)    TSH Latest Ref Range: 0.40 - 4.00 mU/L  0.30 (L)    Glucose Latest Ref Range: 70 - 99 mg/dL  94    WBC Latest Ref Range: 4.0 - 11.0 10e3/uL  5.0    Hemoglobin Latest Ref Range: 11.7 - 15.7 g/dL  14.4    Hematocrit Latest Ref Range: 35.0 - 47.0 %  45.0    Platelet Count Latest Ref Range: 150 - 450 10e3/uL  257    RBC Count Latest Ref Range: 3.80 - 5.20 10e6/uL  5.04    MCV Latest Ref Range: 78 - 100 fL  89    MCH Latest Ref Range: 26.5 - 33.0 pg  28.6    MCHC Latest Ref Range: 31.5 - 36.5 g/dL  32.0    RDW Latest Ref Range: 10.0 - 15.0 %  14.1       Latest Reference Range & Units 05/02/22 11:43 05/02/22 14:08   Sodium 133 - 144 mmol/L 132 (L)    Potassium 3.4 - 5.3 mmol/L 3.7    Chloride 94 - 109 mmol/L 96    Carbon Dioxide 20 - 32 mmol/L 25    Urea Nitrogen 7 - 30 mg/dL 10    Creatinine 0.52 - 1.04 mg/dL 0.77    GFR Estimate >60 mL/min/1.73m2 88 [1]    Calcium 8.5 - 10.1 mg/dL 8.5    Anion Gap 3 - 14 mmol/L 11    N-Terminal Pro Bnp 0 - 900 pg/mL 999 (H) [2]    Glucose 70 - 99 mg/dL 75    WBC 4.0 - 11.0 10e3/uL 4.0    Hemoglobin 11.7 - 15.7 g/dL 13.2    Hematocrit 35.0 - 47.0 % 39.9    Platelet Count 150 - 450 10e3/uL 267    RBC Count 3.80 - 5.20 10e6/uL 4.54    MCV 78 - 100 fL 88    MCH 26.5 - 33.0 pg 29.1    MCHC 31.5 - 36.5 g/dL 33.1    RDW 10.0 - 15.0 % 14.4    XR CHEST 2 VW   Rpt   (L): Data is abnormally low  (H): Data is abnormally high  Rpt: View report in Results Review for more information  [1] Effective December 21, 2021 eGFRcr in adults is calculated using the 2021 CKD-EPI creatinine equation which  includes age and gender (Griselda et al., NEJM, DOI: 10.1056/FDTKbw0662036)  [2] Reference range shown and results flagged as abnormal are for the outpatient, non acute settings. Establishing a baseline value for each individual patient is useful for follow-up.      Suggested inpatient cut points for confirming diagnosis of CHF in an acute setting are:   >450 pg/mL (age 18 to less than 50)   >900 pg/mL (age 50 to less than 75)   >1800 pg/mL (75 yrs and older)      An inpatient or emergency department NT-proPBNP <300 pg/mL effectively rules out acute CHF, with 99% negative predictive value.      Imaging   EXAMINATION: CT CHEST W/O CONTRAST, 3/8/2022 1:19 PM     CLINICAL HISTORY: Sarcoidosis; ; Sarcoidosis     COMPARISON: CT chest 12/20/2021.     TECHNIQUE: CT imaging obtained through the chest without contrast.  Coronal and axial MIP reformatted images obtained. Inspiratory and  expiratory imaging was included. The high resolution interstitial lung  disease protocol.     CONTRAST:  none.     FINDINGS:  Cardiac size within normal limits on the  topogram. No  pericardial effusion. Unchanged mild thoracic aortic ectasia measuring  up to 4.4 cm in the proximal thoracic descending aorta. Enlarged main  pulmonary trunk measuring up to 3.6 cm, unchanged. No suspicious  lymphadenopathy within the chest. Again seen dilated and fluid-filled  esophagus proximal to the gastroesophageal anastomosis of patient's  Eleazar-en-Y. This appears similar to 9/16/2021.     Central tracheobronchial tree is patent. No pneumothorax or pleural  effusions. Again seen and not relatively changed numerous  perilymphatic distribution nodules greatest in the mid to left upper  lung field. Fine reticular opacities in the subpleural lung bases with  immediate subpleural sparing. Air trapping demonstrated in the mid to  upper lung fields on expiratory imaging.     Imaged unenhanced upper abdomen demonstrates cholecystectomy and  partially imaged  surgical changes of Eleazar-en-Y gastric bypass.  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 No  acute or suspicious osseous abnormalities. Soft tissues unremarkable.                                                                      IMPRESSION:  1. Relatively unchanged perilymphatic distribution nodules, mid to  upper lung fields, indicative of sarcoidosis phenotype in conjunction  with basilar and peripheral predominant reticular changes suggestive  of NSIP given history of scleroderma.. Findings may represent a  combination of features of sarcoidosis and scleroderma associated ILD.  Lobular gas trapping on expiratory view or small airways disease  component. Again seen dilated and fluid-filled esophagus consistent  with scleroderma.     I have personally reviewed the examination and initial interpretation  and I agree with the findings.       Coronary angiogram:    Diagnostic  Dominance: Right    Left Main   Very short LM; essentially separate coronary ostia   Left Anterior Descending   Ost LAD to Prox LAD lesion is 20% stenosed. Mild CAD vs possible mild catheter induced vasospasm   Left Circumflex   The vessel is angiographically normal.   Right Coronary Artery   Prox RCA lesion is 20% stenosed.       Intervention        MRI  Clinical history: 61 year old woman with ILD, sarcoidosis, mixed connective tissue disorder, limited  cutaneous systemic sclerosis, and Raynauds. Recently admitted for troponin elevation and cardiomyopathy. No  significant obstructive CAD on coronary angiogram.       Comparison CMR: April 2013     1. The left ventricle is normal in size. There is mild inferoseptal thickening (14 mm). The global systolic  function is mildly reduced. The LVEF is 44%. There is mild diffuse hypokinesis.     2. The right ventricle is normal in cavity size. The global systolic function is normal. The RVEF is 54%.      3. Left atrium is mildly enlarged. Right atrium is normal in size.      4.  There is no significant valvular disease.      5. There is mid-myocardial late gadolinium enhancement in the basal septum with extension into the mid  inferoseptal segment and inferior right ventricular insertion site. In addition, there is subendocardial  hyperenhancement in the basal inferolateral segment. This variable pattern of hyperenhancement is  concerning for infiltrative cardiomyopathy such as sarcoidosis.       6. There is no pericardial effusion.     7. There is no intracardiac thrombus.     8. There is no myocardial edema.      CONCLUSIONS:   1. Non ischemic cardiomyopathy with mildly reduced left ventricular function and normal right ventricular  function.   2. Variable patterns of hyperenhancement in the septal mid-myocardium and inferolateral subendocardium are  concerning for cardiac sarcoidosis.      These findings are new compared to prior cMRI done on April 2013.      CORE EXAM   ==========================================================================================================     MEASUREMENTS   ----------------------------------------------------------------------------------------      VOLUMETRIC ANALYSIS       ----------------------------------------------  .-------------------------------------------------------.                   LV    Reference  RV    Reference   +-----+-----------+------+-----------+------+-----------+   EDV  ml          165   ()   131   ()         ml/m^2       87   (56-90)     69   (53-90)     ESV  ml           92   (22-59)     60   (15-68)          ml/m^2       48   (14-33)     31   (11-37)     CO   L/min      5.04             4.91                    L/min/m^2  2.64             2.58               SV   ml           73   ()    71   ()         ml/m^2       38   (37-62)     37   (36-60)     EF   %            44   (59-77)     54   (55-79)     '-----+-----------+------+-----------+------+-----------'             CARDIAC OUTPUT HR:  69 BPM      LV DIMENSIONS       ----------------------------------------------          WALL THICKNESS - INFEROLATERAL:  0.5 cm          LV RODOLFO:  4.9 cm          LV ESD:  3.8 cm         LA DIMENSIONS (LV SYSTOLE)       ----------------------------------------------          DIAMETER:  4 cm         EXTRACELLULAR VOLUME MEASUREMENT       ----------------------------------------------          PRE-CONTRAST T1 MYOCARDIUM:  1101 msec          PRE-CONTRAST T1 LV CAVITY:  1486 msec          POST-CONTRAST T1 MYOCARDIUM:  530 msec          POST-CONTRAST T1 LV CAVITY:  428 msec          ECV:  39 %        ANATOMY   ----------------------------------------------------------------------------------------      LEFT VENTRICLE       ----------------------------------------------          CAVITY SIZE:  Normal         RIGHT VENTRICLE       ----------------------------------------------          WALL THICKNESS:  Normal         INTERVENTRICULAR SEPTUM       ----------------------------------------------               VENTRICULAR SEPTUM:  Normal          INTERATRIAL SEPTUM       ----------------------------------------------               ATRIAL SEPTUM:          Normal          LEFT ATRIUM       ----------------------------------------------          CAVITY SIZE:  MILDLY ENLARGED         RIGHT ATRIUM       ----------------------------------------------          CAVITY SIZE:  Normal         PERICARDIUM       ----------------------------------------------          Normal         PLEURAL EFFUSION       ----------------------------------------------               None         17 SEGMENT   ----------------------------------------------------------------------------------------  .-------------------------------------------------------------------------------------------------.   Segments            Wall Motion    Hyperenhancement  Stress Perfusion   Interpretation         +--------------------+---------------+------------------+------------------+----------------------+   Base Anterior       Mild/Mod Hypo  None                                Abnormal Wall Motion    Base Anteroseptal   Mild/Mod Hypo  1-25%                               Non-CAD Scar            Base Inferoseptal   Mild/Mod Hypo  1-25%                               Non-CAD Scar            Base Inferior       Mild/Mod Hypo  None                                Abnormal Wall Motion    Base Inferolateral  Mild/Mod Hypo  1-25%                               Non-CAD Scar            Base Anterolateral  Mild/Mod Hypo  None                                Abnormal Wall Motion    Mid Anterior        Mild/Mod Hypo  None                                Abnormal Wall Motion    Mid Anteroseptal    Mild/Mod Hypo  None                                Abnormal Wall Motion    Mid Inferoseptal    Mild/Mod Hypo  1-25%                               Non-CAD Scar            Mid Inferior        Mild/Mod Hypo  None                                Abnormal Wall Motion    Mid Inferolateral   Mild/Mod Hypo  None                                Abnormal Wall Motion    Mid Anterolateral   Mild/Mod Hypo  None                                Abnormal Wall Motion    Apical Anterior     Mild/Mod Hypo  None                                Abnormal Wall Motion    Apical Septal       Mild/Mod Hypo  None                                Abnormal Wall Motion    Apical Inferior     Mild/Mod Hypo  None                                Abnormal Wall Motion    Apical Lateral      Mild/Mod Hypo  None                                Abnormal Wall Motion    [place]                Mild/Mod Hypo  None                                Abnormal Wall Motion   +--------------------+---------------+------------------+------------------+----------------------+   RV  Segments         Wall Motion    Hyperenhancement                    Interpretation         +--------------------+---------------+------------------+------------------+----------------------+   RV Basal Anterior   Normal/Hyper   None                                Normal                  RV Basal Inferior   Normal/Hyper   None                                Normal                  RV Mid              Normal/Hyper   None                                Normal                  RV Apical           Normal/Hyper   None                                Normal                 '--------------------+---------------+------------------+------------------+----------------------'     Echo    Name: OSCAR REYES  MRN: [id number]  : 1960  Study Date: 2021 08:35 AM  Age: 61 yrs  Gender: Female  Patient Location: [facility]  Reason For Study: Chest Pain, Chest Tightness, Chest Pressure  Ordering Physician: CHARISSE ANN  Referring Physician: CHARISSE ANN  Performed By: Jose Danielle     BSA: 2.0 m2  Height: 67 in  Weight: 187 lb  HR: 90  BP: 145/100 mmHg  ______________________________________________________________________________  Procedure  Complete Echo Adult. Optison (NDC #9410-3663) given intravenously.  ______________________________________________________________________________  Interpretation Summary     1. The left ventricle is normal in size. The visual ejection fraction is  estimated at 35%. Global hypokinesis, distal LV segments (anteroseptal) appear  more hypokinetic than other areas.  2. The right ventricle is mildly dilated. The right ventricular systolic  function is normal.  3. There is mild to moderate (1-2+) mitral regurgitation.  4. There is moderately severe (3+) tricuspid regurgitation.  5. Moderate (46-55mmHg) pulmonary hypertension is present. The right  ventricular systolic pressure is approximated at 54mmHg plus the right atrial  pressure.  6.  The ascending aorta is Mildly dilated. 4.2cm.     Echo from 8/2020 showed EF 55%, mild TR, aorta 3.8cm.  ______________________________________________________________________________  Left Ventricle  The left ventricle is normal in size. There is normal left ventricular wall  thickness. The visual ejection fraction is estimated at 35%. Left ventricular  diastolic function is indeterminate. Global hypokinesis, distal LV segments  (anteroseptal) appear more hypokinetic than other areas.     Right Ventricle  The right ventricle is mildly dilated. The right ventricular systolic function  is normal.     Atria  The left atrium is severely dilated. The right atrium is moderately dilated.  There is no atrial shunt seen.     Mitral Valve  There is mild mitral annular calcification. There is mild to moderate (1-2+)  mitral regurgitation.     Tricuspid Valve  There is moderately severe (3+) tricuspid regurgitation. Moderate (46-55mmHg)  pulmonary hypertension is present. The right ventricular systolic pressure is  approximated at 54mmHg plus the right atrial pressure.     Aortic Valve  There is trace to mild aortic regurgitation.     Pulmonic Valve  There is mild to moderate (1-2+) pulmonic valvular regurgitation.     Vessels  The ascending aorta is Mildly dilated. Normal IVC size, cannot assess collapse  due to limited image quality.     Pericardium  There is no pericardial effusion.     Rhythm  Sinus rhythm was noted.  ______________________________________________________________________________  MMode/2D Measurements & Calculations  IVSd: 1.1 cm     LVIDd: 5.4 cm  LVIDs: 4.0 cm  LVPWd: 0.88 cm  FS: 25.8 %  LV mass(C)d: 207.4 grams  LV mass(C)dI: 105.5 grams/m2  Ao root diam: 3.7 cm  asc Aorta Diam: 4.2 cm  LVOT diam: 2.1 cm  LVOT area: 3.4 cm2  LA Volume (BP): 130.0 ml  LA Volume Index (BP): 66.0 ml/m2  RWT: 0.32     Doppler Measurements & Calculations  MV E max emmett: 87.8 cm/sec  MV A max emmett: 88.8 cm/sec  MV E/A:  0.99  Ao V2 max: 156.2 cm/sec  Ao max PG: 10.0 mmHg  ALLISON(V,D): 2.0 cm2  LV V1 max PG: 3.3 mmHg  LV V1 max: 91.2 cm/sec  LV V1 VTI: 17.6 cm  SV(LVOT): 59.4 ml  SI(LVOT): 30.2 ml/m2  PA V2 max: 96.9 cm/sec  PA max PG: 3.8 mmHg  PA acc time: 0.06 sec  PI end-d ck: 168.7 cm/sec  TR max ck: 369.2 cm/sec  TR max P.5 mmHg  AV Ck Ratio (DI): 0.58  Lateral E/e': 11.1     Catheterizaton      Right sided filling pressures are normal.    Normal PA pressures.    Left sided filling pressures are normal.    Normal cardiac output level.    Hemodynamic data has been modified in Epic per physician review      RA 5/4/3  RV 25/0/4  PA 26/10/17  PCWP 16/13/10  SANYA 4.44/2.40 Right sided filling pressures are normal. Left sided filling pressures are normal. Normal PA pressures. Normal cardiac output level. Hemodynamic data       Answers for HPI/ROS submitted by the patient on 2022  General Symptoms: Yes  Skin Symptoms: Yes  HENT Symptoms: Yes  EYE SYMPTOMS: Yes  HEART SYMPTOMS: Yes  LUNG SYMPTOMS: Yes  INTESTINAL SYMPTOMS: Yes  URINARY SYMPTOMS: No  GYNECOLOGIC SYMPTOMS: No  BREAST SYMPTOMS: No  SKELETAL SYMPTOMS: Yes  BLOOD SYMPTOMS: No  NERVOUS SYSTEM SYMPTOMS: Yes  MENTAL HEALTH SYMPTOMS: Yes  Ear pain: Yes  Ear discharge: No  Hearing loss: No  Tinnitus: No  Nosebleeds: No  Congestion: Yes  Sinus pain: No  Trouble swallowing: Yes   Voice hoarseness: No  Mouth sores: No  Sore throat: No  Tooth pain: No  Gum tenderness: No  Bleeding gums: No  Change in taste: No  Change in sense of smell: No  Dry mouth: Yes  Hearing aid used: No  Neck lump: No  Fever: No  Loss of appetite: Yes  Weight loss: Yes  Weight gain: No  Fatigue: Yes  Night sweats: No  Chills: Yes  Increased stress: No  Excessive hunger: No  Excessive thirst: No  Feeling hot or cold when others believe the temperature is normal: Yes  Loss of height: No  Post-operative complications: No  Surgical site pain: No  Hallucinations: No  Change in or Loss of Energy:  Yes  Hyperactivity: No  Confusion: Yes  Changes in hair: No  Changes in moles/birth marks: Yes  Itching: No  Rashes: No  Changes in nails: No  Acne: No  Hair in places you don't want it: No  Change in facial hair: No  Warts: No  Non-healing sores: No  Scarring: No  Flaking of skin: Yes  Color changes of hands/feet in cold : Yes  Sun sensitivity: No  Skin thickening: No  Eye pain: No  Vision loss: Yes  Dry eyes: No  Watery eyes: Yes  Eye bulging: No  Double vision: No  Flashing of lights: Yes  Spots: Yes  Floaters: Yes  Redness: No  Crossed eyes: No  Tunnel Vision: No  Yellowing of eyes: No  Eye irritation: No  Chest pain or pressure: No  Fast or irregular heartbeat: No  Pain in legs with walking: Yes  Trouble breathing while lying down: Yes  Fingers or toes appear blue: Yes  High blood pressure: No  Low blood pressure: No  Fainting: No  Murmurs: No  Pacemaker: No  Varicose veins: No  Edema or swelling: No  Wake up at night with shortness of breath: Yes  Light-headedness: Yes  Exercise intolerance: Yes  Cough: Yes  Sputum or phlegm: Yes  Coughing up blood: No  Difficulty breating or shortness of breath: Yes  Snoring: No  Wheezing: Yes  Difficulty breathing on exertion: Yes  Nighttime Cough: Yes  Difficulty breathing when lying flat: Yes  Heart burn or indigestion: No  Nausea: Yes  Vomiting: No  Abdominal pain: No  Bloating: No  Constipation: No  Diarrhea: No  Blood in stool: No  Black stools: No  Rectal or Anal pain: No  Fecal incontinence: No  Yellowing of skin or eyes: No  Vomit with blood: No  Change in stools: No  Back pain: No  Muscle aches: Yes  Neck pain: No  Swollen joints: No  Joint pain: No  Bone pain: Yes  Muscle cramps: No  Muscle weakness: Yes  Joint stiffness: Yes  Bone fracture: No  Trouble with coordination: Yes  Dizziness or trouble with balance: Yes  Fainting or black-out spells: No  Memory loss: Yes  Headache: No  Seizures: No  Speech problems: No  Tingling: Yes  Tremor: No  Weakness:  Yes  Difficulty walking: Yes  Paralysis: No  Numbness: Yes  Nervous or Anxious: No  Depression: No  Trouble sleeping: Yes  Trouble thinking or concentrating: Yes  Mood changes: Yes  Panic attacks: No    CC  Medical Records [facility]  [place], Rheumatology [facility]          Please do not hesitate to contact me if you have any questions/concerns.     Sincerely,     Atul Bentley MD

## 2022-05-03 LAB
DLCOCOR-%PRED-PRE: 72 %
DLCOCOR-PRE: 16.84 ML/MIN/MMHG
DLCOUNC-%PRED-PRE: 75 %
DLCOUNC-PRE: 17.43 ML/MIN/MMHG
DLCOUNC-PRED: 23.16 ML/MIN/MMHG
ERV-%PRED-PRE: 109 %
ERV-PRE: 1.13 L
ERV-PRED: 1.03 L
EXPTIME-PRE: 5.32 SEC
FEF2575-%PRED-PRE: 132 %
FEF2575-PRE: 3.16 L/SEC
FEF2575-PRED: 2.39 L/SEC
FEFMAX-%PRED-PRE: 88 %
FEFMAX-PRE: 6.17 L/SEC
FEFMAX-PRED: 6.98 L/SEC
FEV1-%PRED-PRE: 95 %
FEV1-PRE: 2.55 L
FEV1FEV6-PRE: 86 %
FEV1FEV6-PRED: 81 %
FEV1FVC-PRE: 86 %
FEV1FVC-PRED: 80 %
FEV1SVC-PRE: 86 %
FEV1SVC-PRED: 70 %
FIFMAX-PRE: 4.87 L/SEC
FRCPLETH-%PRED-PRE: 115 %
FRCPLETH-PRE: 3.4 L
FRCPLETH-PRED: 2.93 L
FVC-%PRED-PRE: 87 %
FVC-PRE: 2.96 L
FVC-PRED: 3.38 L
IC-%PRED-PRE: 66 %
IC-PRE: 1.84 L
IC-PRED: 2.77 L
RVPLETH-%PRED-PRE: 109 %
RVPLETH-PRE: 2.26 L
RVPLETH-PRED: 2.07 L
TLCPLETH-%PRED-PRE: 93 %
TLCPLETH-PRE: 5.23 L
TLCPLETH-PRED: 5.61 L
VA-%PRED-PRE: 80 %
VA-PRE: 4.56 L
VC-%PRED-PRE: 78 %
VC-PRE: 2.97 L
VC-PRED: 3.8 L

## 2022-05-04 ENCOUNTER — APPOINTMENT (OUTPATIENT)
Dept: GENERAL RADIOLOGY | Facility: CLINIC | Age: 62
End: 2022-05-04
Attending: EMERGENCY MEDICINE
Payer: COMMERCIAL

## 2022-05-04 ENCOUNTER — HOSPITAL ENCOUNTER (INPATIENT)
Facility: CLINIC | Age: 62
LOS: 1 days | Discharge: HOME | End: 2022-05-06
Attending: EMERGENCY MEDICINE | Admitting: EMERGENCY MEDICINE
Payer: COMMERCIAL

## 2022-05-04 DIAGNOSIS — J18.9 PNEUMONIA OF BOTH LUNGS DUE TO INFECTIOUS ORGANISM, UNSPECIFIED PART OF LUNG: ICD-10-CM

## 2022-05-04 DIAGNOSIS — M35.02 SJOGREN'S SYNDROME WITH LUNG INVOLVEMENT (H): ICD-10-CM

## 2022-05-04 DIAGNOSIS — R50.9 FEVER, UNSPECIFIED FEVER CAUSE: ICD-10-CM

## 2022-05-04 DIAGNOSIS — Z20.822 LAB TEST NEGATIVE FOR COVID-19 VIRUS: ICD-10-CM

## 2022-05-04 LAB
ALBUMIN SERPL-MCNC: 2.6 G/DL (ref 3.4–5)
ALP SERPL-CCNC: 90 U/L (ref 40–150)
ALT SERPL W P-5'-P-CCNC: 19 U/L (ref 0–50)
ANION GAP SERPL CALCULATED.3IONS-SCNC: 8 MMOL/L (ref 3–14)
AST SERPL W P-5'-P-CCNC: 24 U/L (ref 0–45)
ATRIAL RATE - MUSE: 100 BPM
BASOPHILS # BLD AUTO: 0 10E3/UL (ref 0–0.2)
BASOPHILS NFR BLD AUTO: 0 %
BILIRUB SERPL-MCNC: 0.8 MG/DL (ref 0.2–1.3)
BUN SERPL-MCNC: 13 MG/DL (ref 7–30)
CALCIUM SERPL-MCNC: 8.2 MG/DL (ref 8.5–10.1)
CHLORIDE BLD-SCNC: 98 MMOL/L (ref 94–109)
CO2 SERPL-SCNC: 27 MMOL/L (ref 20–32)
CREAT SERPL-MCNC: 0.96 MG/DL (ref 0.52–1.04)
CRP SERPL-MCNC: 27 MG/L (ref 0–8)
DIASTOLIC BLOOD PRESSURE - MUSE: NORMAL MMHG
EOSINOPHIL # BLD AUTO: 0 10E3/UL (ref 0–0.7)
EOSINOPHIL NFR BLD AUTO: 0 %
ERYTHROCYTE [DISTWIDTH] IN BLOOD BY AUTOMATED COUNT: 14.7 % (ref 10–15)
ERYTHROCYTE [SEDIMENTATION RATE] IN BLOOD BY WESTERGREN METHOD: 26 MM/HR (ref 0–30)
GFR SERPL CREATININE-BSD FRML MDRD: 68 ML/MIN/1.73M2
GLUCOSE BLD-MCNC: 88 MG/DL (ref 70–99)
HCT VFR BLD AUTO: 39.6 % (ref 35–47)
HGB BLD-MCNC: 13 G/DL (ref 11.7–15.7)
IMM GRANULOCYTES # BLD: 0.1 10E3/UL
IMM GRANULOCYTES NFR BLD: 1 %
INR PPP: 1.06 (ref 0.85–1.15)
INTERPRETATION ECG - MUSE: NORMAL
LACTATE SERPL-SCNC: 1.1 MMOL/L (ref 0.7–2)
LYMPHOCYTES # BLD AUTO: 0.8 10E3/UL (ref 0.8–5.3)
LYMPHOCYTES NFR BLD AUTO: 7 %
MCH RBC QN AUTO: 29 PG (ref 26.5–33)
MCHC RBC AUTO-ENTMCNC: 32.8 G/DL (ref 31.5–36.5)
MCV RBC AUTO: 88 FL (ref 78–100)
MONOCYTES # BLD AUTO: 0.7 10E3/UL (ref 0–1.3)
MONOCYTES NFR BLD AUTO: 7 %
NEUTROPHILS # BLD AUTO: 8.8 10E3/UL (ref 1.6–8.3)
NEUTROPHILS NFR BLD AUTO: 85 %
NRBC # BLD AUTO: 0 10E3/UL
NRBC BLD AUTO-RTO: 0 /100
P AXIS - MUSE: 48 DEGREES
PLATELET # BLD AUTO: 253 10E3/UL (ref 150–450)
POTASSIUM BLD-SCNC: 3.6 MMOL/L (ref 3.4–5.3)
PR INTERVAL - MUSE: 154 MS
PROCALCITONIN SERPL-MCNC: 0.77 NG/ML
PROT SERPL-MCNC: 6 G/DL (ref 6.8–8.8)
QRS DURATION - MUSE: 128 MS
QT - MUSE: 390 MS
QTC - MUSE: 503 MS
R AXIS - MUSE: -37 DEGREES
RBC # BLD AUTO: 4.49 10E6/UL (ref 3.8–5.2)
SODIUM SERPL-SCNC: 133 MMOL/L (ref 133–144)
SYSTOLIC BLOOD PRESSURE - MUSE: NORMAL MMHG
T AXIS - MUSE: 116 DEGREES
VENTRICULAR RATE- MUSE: 100 BPM
WBC # BLD AUTO: 10.4 10E3/UL (ref 4–11)

## 2022-05-04 PROCEDURE — 96361 HYDRATE IV INFUSION ADD-ON: CPT | Performed by: EMERGENCY MEDICINE

## 2022-05-04 PROCEDURE — 87040 BLOOD CULTURE FOR BACTERIA: CPT | Performed by: EMERGENCY MEDICINE

## 2022-05-04 PROCEDURE — 96365 THER/PROPH/DIAG IV INF INIT: CPT | Performed by: EMERGENCY MEDICINE

## 2022-05-04 PROCEDURE — 85652 RBC SED RATE AUTOMATED: CPT | Performed by: EMERGENCY MEDICINE

## 2022-05-04 PROCEDURE — 83605 ASSAY OF LACTIC ACID: CPT | Performed by: EMERGENCY MEDICINE

## 2022-05-04 PROCEDURE — C9803 HOPD COVID-19 SPEC COLLECT: HCPCS | Performed by: EMERGENCY MEDICINE

## 2022-05-04 PROCEDURE — 258N000003 HC RX IP 258 OP 636: Performed by: EMERGENCY MEDICINE

## 2022-05-04 PROCEDURE — 71046 X-RAY EXAM CHEST 2 VIEWS: CPT

## 2022-05-04 PROCEDURE — 99285 EMERGENCY DEPT VISIT HI MDM: CPT | Mod: 25 | Performed by: EMERGENCY MEDICINE

## 2022-05-04 PROCEDURE — 93010 ELECTROCARDIOGRAM REPORT: CPT | Performed by: EMERGENCY MEDICINE

## 2022-05-04 PROCEDURE — 84484 ASSAY OF TROPONIN QUANT: CPT | Performed by: PHYSICIAN ASSISTANT

## 2022-05-04 PROCEDURE — 36415 COLL VENOUS BLD VENIPUNCTURE: CPT | Performed by: EMERGENCY MEDICINE

## 2022-05-04 PROCEDURE — 84145 PROCALCITONIN (PCT): CPT | Performed by: EMERGENCY MEDICINE

## 2022-05-04 PROCEDURE — 85025 COMPLETE CBC W/AUTO DIFF WBC: CPT | Performed by: EMERGENCY MEDICINE

## 2022-05-04 PROCEDURE — 83880 ASSAY OF NATRIURETIC PEPTIDE: CPT | Performed by: PHYSICIAN ASSISTANT

## 2022-05-04 PROCEDURE — 86140 C-REACTIVE PROTEIN: CPT | Performed by: EMERGENCY MEDICINE

## 2022-05-04 PROCEDURE — 80053 COMPREHEN METABOLIC PANEL: CPT | Performed by: EMERGENCY MEDICINE

## 2022-05-04 PROCEDURE — 93005 ELECTROCARDIOGRAM TRACING: CPT | Performed by: EMERGENCY MEDICINE

## 2022-05-04 PROCEDURE — 85610 PROTHROMBIN TIME: CPT | Performed by: EMERGENCY MEDICINE

## 2022-05-04 PROCEDURE — 71046 X-RAY EXAM CHEST 2 VIEWS: CPT | Mod: 26 | Performed by: RADIOLOGY

## 2022-05-04 RX ORDER — SODIUM CHLORIDE 9 MG/ML
INJECTION, SOLUTION INTRAVENOUS CONTINUOUS
Status: DISCONTINUED | OUTPATIENT
Start: 2022-05-04 | End: 2022-05-05

## 2022-05-04 RX ORDER — LEVOFLOXACIN 5 MG/ML
500 INJECTION, SOLUTION INTRAVENOUS ONCE
Status: COMPLETED | OUTPATIENT
Start: 2022-05-04 | End: 2022-05-05

## 2022-05-04 RX ADMIN — SODIUM CHLORIDE 1000 ML: 9 INJECTION, SOLUTION INTRAVENOUS at 21:14

## 2022-05-04 ASSESSMENT — ENCOUNTER SYMPTOMS
NAUSEA: 1
SHORTNESS OF BREATH: 1
VOMITING: 0
ABDOMINAL PAIN: 1
FEVER: 0
TREMORS: 1
COUGH: 1
CHILLS: 1

## 2022-05-05 ENCOUNTER — APPOINTMENT (OUTPATIENT)
Dept: CARDIOLOGY | Facility: CLINIC | Age: 62
End: 2022-05-05
Payer: COMMERCIAL

## 2022-05-05 PROBLEM — Z20.822 LAB TEST NEGATIVE FOR COVID-19 VIRUS: Status: ACTIVE | Noted: 2022-05-05

## 2022-05-05 PROBLEM — M35.02 SJOGREN'S SYNDROME WITH LUNG INVOLVEMENT (H): Status: ACTIVE | Noted: 2022-05-05

## 2022-05-05 LAB
ALBUMIN SERPL-MCNC: 2.2 G/DL (ref 3.4–5)
ALBUMIN UR-MCNC: NEGATIVE MG/DL
ANION GAP SERPL CALCULATED.3IONS-SCNC: 7 MMOL/L (ref 3–14)
APPEARANCE UR: CLEAR
BILIRUB UR QL STRIP: NEGATIVE
BUN SERPL-MCNC: 13 MG/DL (ref 7–30)
C PNEUM DNA SPEC QL NAA+PROBE: NOT DETECTED
CALCIUM SERPL-MCNC: 8.3 MG/DL (ref 8.5–10.1)
CHLORIDE BLD-SCNC: 99 MMOL/L (ref 94–109)
CO2 SERPL-SCNC: 30 MMOL/L (ref 20–32)
COLOR UR AUTO: YELLOW
CREAT SERPL-MCNC: 0.94 MG/DL (ref 0.52–1.04)
FLUAV H1 2009 PAND RNA SPEC QL NAA+PROBE: NOT DETECTED
FLUAV H1 RNA SPEC QL NAA+PROBE: NOT DETECTED
FLUAV H3 RNA SPEC QL NAA+PROBE: NOT DETECTED
FLUAV RNA SPEC QL NAA+PROBE: NOT DETECTED
FLUBV RNA SPEC QL NAA+PROBE: NOT DETECTED
GFR SERPL CREATININE-BSD FRML MDRD: 70 ML/MIN/1.73M2
GLUCOSE BLD-MCNC: 105 MG/DL (ref 70–99)
GLUCOSE UR STRIP-MCNC: NEGATIVE MG/DL
HADV DNA SPEC QL NAA+PROBE: NOT DETECTED
HCOV PNL SPEC NAA+PROBE: NOT DETECTED
HGB UR QL STRIP: NEGATIVE
HMPV RNA SPEC QL NAA+PROBE: NOT DETECTED
HOLD SPECIMEN: NORMAL
HPIV1 RNA SPEC QL NAA+PROBE: NOT DETECTED
HPIV2 RNA SPEC QL NAA+PROBE: NOT DETECTED
HPIV3 RNA SPEC QL NAA+PROBE: NOT DETECTED
HPIV4 RNA SPEC QL NAA+PROBE: DETECTED
KETONES UR STRIP-MCNC: NEGATIVE MG/DL
LEUKOCYTE ESTERASE UR QL STRIP: ABNORMAL
LVEF ECHO: NORMAL
M PNEUMO DNA SPEC QL NAA+PROBE: NOT DETECTED
MAGNESIUM SERPL-MCNC: 1.3 MG/DL (ref 1.6–2.3)
NITRATE UR QL: NEGATIVE
NT-PROBNP SERPL-MCNC: 5559 PG/ML (ref 0–900)
PH UR STRIP: 6 [PH] (ref 5–7)
PHOSPHATE SERPL-MCNC: 2.3 MG/DL (ref 2.5–4.5)
PHOSPHATE SERPL-MCNC: 3.3 MG/DL (ref 2.5–4.5)
POTASSIUM BLD-SCNC: 3.6 MMOL/L (ref 3.4–5.3)
POTASSIUM BLD-SCNC: 3.6 MMOL/L (ref 3.4–5.3)
PREALB SERPL IA-MCNC: 9 MG/DL (ref 15–45)
RBC URINE: 1 /HPF
RSV RNA SPEC QL NAA+PROBE: NOT DETECTED
RSV RNA SPEC QL NAA+PROBE: NOT DETECTED
RV+EV RNA SPEC QL NAA+PROBE: NOT DETECTED
SARS-COV-2 RNA RESP QL NAA+PROBE: NEGATIVE
SODIUM SERPL-SCNC: 136 MMOL/L (ref 133–144)
SP GR UR STRIP: 1.01 (ref 1–1.03)
SQUAMOUS EPITHELIAL: 4 /HPF
T4 FREE SERPL-MCNC: 1.74 NG/DL (ref 0.76–1.46)
TRANSITIONAL EPI: <1 /HPF
TROPONIN I SERPL HS-MCNC: 51 NG/L
TROPONIN I SERPL HS-MCNC: 80 NG/L
TSH SERPL DL<=0.005 MIU/L-ACNC: 4.26 MU/L (ref 0.4–4)
UROBILINOGEN UR STRIP-MCNC: 3 MG/DL
WBC URINE: 5 /HPF

## 2022-05-05 PROCEDURE — 36415 COLL VENOUS BLD VENIPUNCTURE: CPT | Performed by: PHYSICIAN ASSISTANT

## 2022-05-05 PROCEDURE — 255N000002 HC RX 255 OP 636: Performed by: INTERNAL MEDICINE

## 2022-05-05 PROCEDURE — 999N000208 ECHOCARDIOGRAM COMPLETE

## 2022-05-05 PROCEDURE — 87086 URINE CULTURE/COLONY COUNT: CPT | Performed by: EMERGENCY MEDICINE

## 2022-05-05 PROCEDURE — 84100 ASSAY OF PHOSPHORUS: CPT | Performed by: STUDENT IN AN ORGANIZED HEALTH CARE EDUCATION/TRAINING PROGRAM

## 2022-05-05 PROCEDURE — 84484 ASSAY OF TROPONIN QUANT: CPT | Performed by: PHYSICIAN ASSISTANT

## 2022-05-05 PROCEDURE — 84443 ASSAY THYROID STIM HORMONE: CPT | Performed by: PHYSICIAN ASSISTANT

## 2022-05-05 PROCEDURE — 120N000002 HC R&B MED SURG/OB UMMC

## 2022-05-05 PROCEDURE — G0378 HOSPITAL OBSERVATION PER HR: HCPCS

## 2022-05-05 PROCEDURE — 87633 RESP VIRUS 12-25 TARGETS: CPT | Performed by: PHYSICIAN ASSISTANT

## 2022-05-05 PROCEDURE — 999N000128 HC STATISTIC PERIPHERAL IV START W/O US GUIDANCE

## 2022-05-05 PROCEDURE — 250N000012 HC RX MED GY IP 250 OP 636 PS 637: Performed by: PHYSICIAN ASSISTANT

## 2022-05-05 PROCEDURE — 84132 ASSAY OF SERUM POTASSIUM: CPT | Performed by: STUDENT IN AN ORGANIZED HEALTH CARE EDUCATION/TRAINING PROGRAM

## 2022-05-05 PROCEDURE — 84100 ASSAY OF PHOSPHORUS: CPT | Performed by: PHYSICIAN ASSISTANT

## 2022-05-05 PROCEDURE — 99232 SBSQ HOSP IP/OBS MODERATE 35: CPT | Mod: GC | Performed by: INTERNAL MEDICINE

## 2022-05-05 PROCEDURE — 250N000013 HC RX MED GY IP 250 OP 250 PS 637

## 2022-05-05 PROCEDURE — 250N000011 HC RX IP 250 OP 636: Performed by: PHYSICIAN ASSISTANT

## 2022-05-05 PROCEDURE — 250N000013 HC RX MED GY IP 250 OP 250 PS 637: Performed by: PHYSICIAN ASSISTANT

## 2022-05-05 PROCEDURE — 999N000127 HC STATISTIC PERIPHERAL IV START W US GUIDANCE

## 2022-05-05 PROCEDURE — 250N000013 HC RX MED GY IP 250 OP 250 PS 637: Performed by: EMERGENCY MEDICINE

## 2022-05-05 PROCEDURE — U0005 INFEC AGEN DETEC AMPLI PROBE: HCPCS | Performed by: EMERGENCY MEDICINE

## 2022-05-05 PROCEDURE — 36415 COLL VENOUS BLD VENIPUNCTURE: CPT | Performed by: STUDENT IN AN ORGANIZED HEALTH CARE EDUCATION/TRAINING PROGRAM

## 2022-05-05 PROCEDURE — 250N000009 HC RX 250: Performed by: PHYSICIAN ASSISTANT

## 2022-05-05 PROCEDURE — 84132 ASSAY OF SERUM POTASSIUM: CPT | Performed by: PHYSICIAN ASSISTANT

## 2022-05-05 PROCEDURE — 93306 TTE W/DOPPLER COMPLETE: CPT | Mod: 26 | Performed by: INTERNAL MEDICINE

## 2022-05-05 PROCEDURE — 81001 URINALYSIS AUTO W/SCOPE: CPT | Performed by: EMERGENCY MEDICINE

## 2022-05-05 PROCEDURE — 258N000003 HC RX IP 258 OP 636: Performed by: PHYSICIAN ASSISTANT

## 2022-05-05 PROCEDURE — 99222 1ST HOSP IP/OBS MODERATE 55: CPT | Mod: GC | Performed by: INTERNAL MEDICINE

## 2022-05-05 PROCEDURE — 84134 ASSAY OF PREALBUMIN: CPT | Performed by: PHYSICIAN ASSISTANT

## 2022-05-05 PROCEDURE — 83735 ASSAY OF MAGNESIUM: CPT | Performed by: PHYSICIAN ASSISTANT

## 2022-05-05 PROCEDURE — 84439 ASSAY OF FREE THYROXINE: CPT | Performed by: PHYSICIAN ASSISTANT

## 2022-05-05 PROCEDURE — 96375 TX/PRO/DX INJ NEW DRUG ADDON: CPT

## 2022-05-05 PROCEDURE — 250N000011 HC RX IP 250 OP 636: Performed by: EMERGENCY MEDICINE

## 2022-05-05 RX ORDER — CARBOXYMETHYLCELLULOSE SODIUM 10 MG/ML
1 GEL OPHTHALMIC 3 TIMES DAILY PRN
Status: DISCONTINUED | OUTPATIENT
Start: 2022-05-05 | End: 2022-05-06 | Stop reason: HOSPADM

## 2022-05-05 RX ORDER — CLONAZEPAM 0.5 MG/1
0.5 TABLET ORAL
Status: DISCONTINUED | OUTPATIENT
Start: 2022-05-05 | End: 2022-05-06 | Stop reason: HOSPADM

## 2022-05-05 RX ORDER — ATORVASTATIN CALCIUM 40 MG/1
40 TABLET, FILM COATED ORAL EVERY EVENING
Status: DISCONTINUED | OUTPATIENT
Start: 2022-05-05 | End: 2022-05-06 | Stop reason: HOSPADM

## 2022-05-05 RX ORDER — NITROGLYCERIN 0.4 MG/1
0.4 TABLET SUBLINGUAL EVERY 5 MIN PRN
Status: DISCONTINUED | OUTPATIENT
Start: 2022-05-05 | End: 2022-05-06 | Stop reason: HOSPADM

## 2022-05-05 RX ORDER — ASPIRIN 81 MG/1
162 TABLET, CHEWABLE ORAL ONCE
Status: DISCONTINUED | OUTPATIENT
Start: 2022-05-05 | End: 2022-05-05

## 2022-05-05 RX ORDER — TORSEMIDE 20 MG/1
20 TABLET ORAL
Status: DISCONTINUED | OUTPATIENT
Start: 2022-05-05 | End: 2022-05-05

## 2022-05-05 RX ORDER — AMIODARONE HYDROCHLORIDE 200 MG/1
200 TABLET ORAL DAILY
Status: DISCONTINUED | OUTPATIENT
Start: 2022-05-06 | End: 2022-05-05

## 2022-05-05 RX ORDER — AMIODARONE HYDROCHLORIDE 200 MG/1
200 TABLET ORAL ONCE
Status: COMPLETED | OUTPATIENT
Start: 2022-05-05 | End: 2022-05-05

## 2022-05-05 RX ORDER — TORSEMIDE 20 MG/1
20 TABLET ORAL DAILY
Status: DISCONTINUED | OUTPATIENT
Start: 2022-05-05 | End: 2022-05-05

## 2022-05-05 RX ORDER — LOSARTAN POTASSIUM 25 MG/1
25 TABLET ORAL DAILY
Status: DISCONTINUED | OUTPATIENT
Start: 2022-05-05 | End: 2022-05-06 | Stop reason: HOSPADM

## 2022-05-05 RX ORDER — MAGNESIUM HYDROXIDE/ALUMINUM HYDROXICE/SIMETHICONE 120; 1200; 1200 MG/30ML; MG/30ML; MG/30ML
30 SUSPENSION ORAL EVERY 4 HOURS PRN
Status: DISCONTINUED | OUTPATIENT
Start: 2022-05-05 | End: 2022-05-06 | Stop reason: HOSPADM

## 2022-05-05 RX ORDER — POTASSIUM CHLORIDE 750 MG/1
30 CAPSULE, EXTENDED RELEASE ORAL DAILY
Status: DISCONTINUED | OUTPATIENT
Start: 2022-05-05 | End: 2022-05-06 | Stop reason: HOSPADM

## 2022-05-05 RX ORDER — LEVOFLOXACIN 5 MG/ML
500 INJECTION, SOLUTION INTRAVENOUS EVERY 24 HOURS
Status: DISCONTINUED | OUTPATIENT
Start: 2022-05-06 | End: 2022-05-06

## 2022-05-05 RX ORDER — SPIRONOLACTONE 25 MG/1
25 TABLET ORAL DAILY
Status: DISCONTINUED | OUTPATIENT
Start: 2022-05-05 | End: 2022-05-06 | Stop reason: HOSPADM

## 2022-05-05 RX ORDER — ASPIRIN 81 MG/1
81 TABLET ORAL DAILY
Status: DISCONTINUED | OUTPATIENT
Start: 2022-05-06 | End: 2022-05-05

## 2022-05-05 RX ORDER — AZATHIOPRINE 50 MG/1
150 TABLET ORAL DAILY
Status: DISCONTINUED | OUTPATIENT
Start: 2022-05-05 | End: 2022-05-06 | Stop reason: HOSPADM

## 2022-05-05 RX ORDER — TORSEMIDE 10 MG/1
10 TABLET ORAL DAILY
Status: DISCONTINUED | OUTPATIENT
Start: 2022-05-05 | End: 2022-05-05

## 2022-05-05 RX ORDER — MAGNESIUM OXIDE 400 MG/1
400 TABLET ORAL DAILY
Status: DISCONTINUED | OUTPATIENT
Start: 2022-05-05 | End: 2022-05-06 | Stop reason: HOSPADM

## 2022-05-05 RX ORDER — PANTOPRAZOLE SODIUM 40 MG/1
40 TABLET, DELAYED RELEASE ORAL DAILY
Status: DISCONTINUED | OUTPATIENT
Start: 2022-05-05 | End: 2022-05-06 | Stop reason: HOSPADM

## 2022-05-05 RX ORDER — TORSEMIDE 20 MG/1
20 TABLET ORAL
Status: DISCONTINUED | OUTPATIENT
Start: 2022-05-05 | End: 2022-05-06 | Stop reason: HOSPADM

## 2022-05-05 RX ORDER — MAGNESIUM SULFATE HEPTAHYDRATE 40 MG/ML
4 INJECTION, SOLUTION INTRAVENOUS ONCE
Status: COMPLETED | OUTPATIENT
Start: 2022-05-05 | End: 2022-05-05

## 2022-05-05 RX ORDER — AMIODARONE HYDROCHLORIDE 200 MG/1
200 TABLET ORAL DAILY
Status: DISCONTINUED | OUTPATIENT
Start: 2022-05-06 | End: 2022-05-06 | Stop reason: HOSPADM

## 2022-05-05 RX ORDER — FUROSEMIDE 10 MG/ML
40 INJECTION INTRAMUSCULAR; INTRAVENOUS ONCE
Status: COMPLETED | OUTPATIENT
Start: 2022-05-05 | End: 2022-05-05

## 2022-05-05 RX ORDER — PREDNISONE 5 MG/1
15 TABLET ORAL DAILY
Status: DISCONTINUED | OUTPATIENT
Start: 2022-05-05 | End: 2022-05-06 | Stop reason: HOSPADM

## 2022-05-05 RX ORDER — SULFAMETHOXAZOLE AND TRIMETHOPRIM 400; 80 MG/1; MG/1
1 TABLET ORAL DAILY
Status: DISCONTINUED | OUTPATIENT
Start: 2022-05-05 | End: 2022-05-06 | Stop reason: HOSPADM

## 2022-05-05 RX ORDER — METOPROLOL SUCCINATE 50 MG/1
50 TABLET, EXTENDED RELEASE ORAL DAILY
Status: DISCONTINUED | OUTPATIENT
Start: 2022-05-05 | End: 2022-05-06 | Stop reason: HOSPADM

## 2022-05-05 RX ADMIN — APIXABAN 5 MG: 5 TABLET, FILM COATED ORAL at 05:30

## 2022-05-05 RX ADMIN — APIXABAN 5 MG: 5 TABLET, FILM COATED ORAL at 17:33

## 2022-05-05 RX ADMIN — LEVOFLOXACIN 500 MG: 5 INJECTION, SOLUTION INTRAVENOUS at 00:43

## 2022-05-05 RX ADMIN — METOPROLOL SUCCINATE 50 MG: 50 TABLET, EXTENDED RELEASE ORAL at 07:37

## 2022-05-05 RX ADMIN — TORSEMIDE 20 MG: 20 TABLET ORAL at 09:20

## 2022-05-05 RX ADMIN — AZATHIOPRINE 150 MG: 50 TABLET ORAL at 07:36

## 2022-05-05 RX ADMIN — TORSEMIDE 20 MG: 20 TABLET ORAL at 15:24

## 2022-05-05 RX ADMIN — ATORVASTATIN CALCIUM 40 MG: 40 TABLET, FILM COATED ORAL at 20:17

## 2022-05-05 RX ADMIN — MAGNESIUM SULFATE IN WATER 4 G: 40 INJECTION, SOLUTION INTRAVENOUS at 05:31

## 2022-05-05 RX ADMIN — FUROSEMIDE 40 MG: 10 INJECTION, SOLUTION INTRAVENOUS at 03:37

## 2022-05-05 RX ADMIN — HUMAN ALBUMIN MICROSPHERES AND PERFLUTREN 6 ML: 10; .22 INJECTION, SOLUTION INTRAVENOUS at 09:56

## 2022-05-05 RX ADMIN — LOSARTAN POTASSIUM 25 MG: 25 TABLET, FILM COATED ORAL at 07:36

## 2022-05-05 RX ADMIN — PREDNISONE 15 MG: 5 TABLET ORAL at 07:38

## 2022-05-05 RX ADMIN — SULFAMETHOXAZOLE AND TRIMETHOPRIM 1 TABLET: 400; 80 TABLET ORAL at 07:39

## 2022-05-05 RX ADMIN — AMIODARONE HYDROCHLORIDE 200 MG: 200 TABLET ORAL at 02:58

## 2022-05-05 RX ADMIN — PANTOPRAZOLE SODIUM 40 MG: 40 TABLET, DELAYED RELEASE ORAL at 07:37

## 2022-05-05 RX ADMIN — POTASSIUM PHOSPHATE, MONOBASIC AND POTASSIUM PHOSPHATE, DIBASIC 9 MMOL: 224; 236 INJECTION, SOLUTION, CONCENTRATE INTRAVENOUS at 09:19

## 2022-05-05 RX ADMIN — SPIRONOLACTONE 25 MG: 25 TABLET, FILM COATED ORAL at 07:38

## 2022-05-05 RX ADMIN — POTASSIUM CHLORIDE 30 MEQ: 750 CAPSULE, EXTENDED RELEASE ORAL at 07:37

## 2022-05-05 RX ADMIN — Medication 400 MG: at 07:37

## 2022-05-05 ASSESSMENT — ACTIVITIES OF DAILY LIVING (ADL)
ADLS_ACUITY_SCORE: 14
DEPENDENT_IADLS:: CLEANING;COOKING;LAUNDRY;TRANSPORTATION;MEAL PREPARATION

## 2022-05-05 NOTE — CONSULTS
Care Management Initial Consult    General Information  Assessment completed with: Patient,    Type of CM/SW Visit: Initial Assessment    Primary Care Provider verified and updated as needed: No   Readmission within the last 30 days: current reason for admission unrelated to previous admission         Advance Care Planning:            Communication Assessment  Patient's communication style: spoken language (English or Bilingual)    Hearing Difficulty or Deaf: no        Cognitive  Cognitive/Neuro/Behavioral: WDL                      Living Environment:   People in home: spouse     Current living Arrangements: house      Able to return to prior arrangements: yes       Family/Social Support:  Care provided by: self, spouse/significant other  Provides care for: no one  Marital Status:   , Children          Description of Support System: Supportive, Involved    Support Assessment: Adequate family and caregiver support, Adequate social supports    Current Resources:   Patient receiving home care services: No     Community Resources: None  Equipment currently used at home: walker, greg, shower chair, grab bar, tub/shower  Supplies currently used at home: None    Employment/Financial:  Employment Status: disabled, retired        Financial Concerns: No concerns identified   Referral to Financial Worker: No       Lifestyle & Psychosocial Needs:  Social Determinants of Health     Tobacco Use: Low Risk      Smoking Tobacco Use: Never Smoker     Smokeless Tobacco Use: Never Used   Alcohol Use: Not on file   Financial Resource Strain: Not on file   Food Insecurity: Not on file   Transportation Needs: Not on file   Physical Activity: Not on file   Stress: Not on file   Social Connections: Not on file   Intimate Partner Violence: Not on file   Depression: Not at risk     PHQ-2 Score: 0   Housing Stability: Not on file       Functional Status:  Prior to admission patient needed assistance:   Dependent ADLs::  Ambulation-cane, Ambulation-walker  Dependent IADLs:: Cleaning, Cooking, Laundry, Transportation, Meal Preparation  Assesssment of Functional Status: At functional baseline    Mental Health Status:  Mental Health Status: No Current Concerns       Chemical Dependency Status:  Chemical Dependency Status: No Current Concerns             Values/Beliefs:  Spiritual, Cultural Beliefs, Rastafari Practices, Values that affect care: no               Additional Information:  Chart reviewed.    Writer met with pt. Writer introduced self, discussed role and went over writer's availability. Pt reports she is eager to return home, where she lives with her spouse and has children who live nearby. Pt denies any financial, safety, or abuse concerns at this time. Pt reports her son will transport her home at discharge.     Social work will continue to follow and provide assistance to ensure a safe and timely discharge.   ________________    YOKASTA Lozano, [facility]  ED/Observation   [facility]  Phone: [phone]  Pager: [phone]  Fax: [phone]    On-call pager, [phone], 4:00pm to midnight

## 2022-05-05 NOTE — ED NOTES
Per phone call from Dr. Bentley--Hx of scleraderma; covid recently; temperature of 106F yesterday; on steroid 15m-30mg daily; immunocompromised; likely has PAF; Needs admit and infectious work up.     Signed:  Leesa Bliss MD  May 4, 2022 at 7:31 PM       Leesa Bliss MD  05/04/22 1932

## 2022-05-05 NOTE — PLAN OF CARE
"Goal Outcome Evaluation:    Plan of Care Reviewed With: patient     Shift: 0700 to 1930  VS: /85 (BP Location: Left arm)   Pulse 66   Temp 97.9  F (36.6  C) (Oral)   Resp 18   Ht 1.753 m (5' 9\")   Wt 71.8 kg (158 lb 4.6 oz)   SpO2 98%   BMI 23.38 kg/m    Pain: Denies  Neuro: Intact  Cardiac: NSR  Respiratory: Diminished lung sounds  Diet/Appetite:  Regular  /GI: WNL  LDA's: SL  Skin: Intact  Activity: Ind  Tests/Procedures: ECHO today showed improved EF.  Pertinent Labs/Lab Collection: Positive for influenza     Plan:   IV levaquin           "

## 2022-05-05 NOTE — H&P
[facility]    History and Physical - ED Observation Service      Date of Admission:  5/4/2022    Assessment & Plan      Jeanine Schuler is a 61 year old female admitted on 5/4/2022. She has a history of gastric bypass, hypoalbuminemia, SBO, ILD, Raynaud's syndrome, MCTD/limited cutaneous systemic sclerosis (+RF), possible cardiac sarcoidosis, NICM (LVEF 35% on echo 12/21/21, 44% on CMR 1/12/22), HFrEF, s/p RHC (4/7/22), PAH, PAF (on Eliquis), COVID-19 (March 2022), neuropathy, GERD w/ dysphagia, iron deficiency anemia who presented to the ED for evaluation of shortness of breath, cough, fever and brief episode of nausea.      ##. SOB  ##. Cough  ##. Fever  ##. Pneumonia  Cough x 6 weeks, worst yesterday; non productive. Increased SOB. Onset of chills today, fever to 102 F, took Advil at 5pm. Prednisone increased to 15 mg from 10 mg, 1 week ago 2/2 respiratory symptoms and decreased weight, which helped appetite. Reports chronic orthopnea x 3, LE edema (worse this evening d/t not taking diuretics today). Reports sick contacts recently with GI/Resp bugs. Denies vomiting. In ED, HR 70's-90's, -126/67-82, RR 17-18, SaO2 % on RA, Temp 98.4  F . Labs show CMP with Ca 8.2, BUN/Cr 13/0.96, albumin 2.6, TP 6.0 otherwise unremarkable. Lactic acid 1.1. CRP 27. Procalcitonin 0.77. Normal CBC, INR, ESR. Blood culture sent and pending.  Covid 19 PCR negative. CXR reports mixed interstitial and patchy pulmonary opacities, most prominent in the lower lobes bilaterally, favored to represent infection; trace bilateral pleural effusions. In ED the patient was given NS bolus 250ml, Levaquin 500mg IV x 1.   - Continue Levaquin 500mg IV daily  - Check BNP  - Serial Troponins x 2  - Add on Respiratory viral panel previous swab  - Echocardiogram in AM  - Cardiology consult  - Strict I/O  - Daily weights  - Follow up on blood culture    Addendum: BNP returned 5559, Troponin 80.  Will give a dose of IV Lasix. Repeat Troponin; elevated likely d/t demand ischemia    ##. Right Flank Discomfort: Patient reports 1 day of right flank discomfort and foul smelling urine  - UA & UCx - non infectious appearing     ##. NICM LVEF 35% on echo 44% on CMR, NYHA II-III: Echo 12/21/21 with EF 35%, global hypokinesis, distal LV segments, RV mildly dilated, mild to moderate regurgitation, and moderate to severe tricuspid regurg. Cardiac MRI 1/12/22 with LVEF 44%. Myocardial PET 3/31/22 with EF 23%. Patient states she has not been able to take any of her home medications today including all her diuretics.   - Add on BNP  - Continue with PTA Losartan  - Continue with PTA Toprol XL  - Continue with PTA Aldactone  - Continue with PTA Torsemide     ##. H/o PAF: EKG SR with repolarization abnormality.    - Continue with PTA Amiodarone 200mg daily  - Continue with PTA Toprol XL 50mg daily  - Continue with PTA Eliquis (unclear if patient should be on Eliquis based on Cardiology notes. Will need to clarify; CHADVASC-2 for gender and CHF)   - Check Mag and Phos - Mag returned at 1.3, Phos 2.3  - Keep Mg> 2 and K>4 - Replace per protocol  - Telemetry     ##. ILD  ##. Pulmonary Sarcoidosis  ##. Scleroderma  ##. MCTD/ Limited Systemic Sclerosis   Left upper lobe transbronchial cryobiopsy done 10/6/2020 showed nonnecrotizing granulomas with multi nucleated giant cells consistent with sarcoidosis. PFTs improved with Imuran and prednisone. Presented at sarcoidosis conference July 2021 after lung biopsy confirmed sarcoidosis. Cardiac MRI 1/12/22; significant LGE, consistent with an infiltrative process such as sarcoidosis. PET Cardiac 3/31/22 reports intense FDG uptake within the atria still remains suspicious for sarcoidosis. Patient was admitted for RHC 4/5/22 w/ biopsy; per notes after discussion w/ Dr. Francis and Dr. Bentley, cardiac biopsy deferred given low suspicion for cardiac sarcoidosis; RHC was  completed. Patient is  RNP+ c/w MCTD however per Dr. Bentley note on 5/2/22, the sarcoid group thinks sarcoid is a reasonable possibility. Patient is to reach out to [facility] for another opinion.   - Continue PTA Prednisone   - Continue with PTA  Imuran.     ##. GERD: - Continue PTA PPI - IV while admitted     ##. Chronic GLENDA:  Hgb 13.0. Gets IV Iron Infusions  - PTA IV Iron infusions     ##. Neuropathy: She was seen by Dr. Abel from neurology in 5/2021, and nerve conduction studies and EMG was abnormal. She is planned for a nerve biopsy of her right foot.     Diet: Advance Diet as Tolerated: Regular Diet Adult; 2 gm NA Diet  DVT Prophylaxis: DOAC  Deleon Catheter: Not present  Central Lines: None  Cardiac Monitoring: ACTIVE order. Indication: Tachyarrhythmias, acute (48 hours)  Code Status: Full Code    Clinically Significant Risk Factors Present on Admission             # Hypoalbuminemia: Albumin = 2.6 g/dL (Ref range: 3.4 - 5.0 g/dL) on admission, will monitor as appropriate   # Coagulation Defect: home medication list includes an anticoagulant medication  # Platelet Defect: home medication list includes an antiplatelet medication       Disposition Plan   Expected Discharge:    Anticipated discharge location:  Awaiting care coordination huddle  Delays:            The patient's care was discussed with the Attending Physician, Dr. Arevalo.    INEZ Darden   ED Observation Service   [facility]  Securely message with the Vocera Web Console (learn more here)  Text page via [facility] Paging/Directory   ______________________________________________________________________    Chief Complaint   SOB, cough, fever    History is obtained from the patient    History of Present Illness   Jeanine Schuler is a 61 year old female with a history of gastric bypass, hypoalbuminemia, SBO, ILD, Raynaud's syndrome, MCTD/limited cutaneous systemic sclerosis (+RF), possible  cardiac sarcoidosis, NICM (LVEF 35% on echo 12/21/21, 44% on CMR 1/12/22), HFrEF, s/p RHC (4/7/22), PAH, PAF (on Eliquis), COVID-19 (March 2022), neuropathy, GERD w/ dysphagia, iron deficiency anemia who presented to the ED for evaluation of shortness of breath, cough, fever and brief episode of nausea.  Patient reports having increased shortness of breath and cough last night. Cough has been ongoing x 6 weeks, worst yesterday; non productive. Reports an onset of chills and tremors earlier today. Today had a fever of 102 at home for which she took some Advil at 5pm.  Patient's prednisone was increased to 15 mg about 1 week ago after being down to 10 mg due to her respiratory symptoms and decreased weight. States the prednisone did help her appetite. She acknowledges that she has had a bumpy ride recently with her symptoms as her steroids were being weaned and at some point drastically in order to obtain a PET scan in March 2022. Reports chronic orthopnea x 3, LE edema. Patient reports mild abdominal pain that she believes is from her chills and cough. Reports her son and his family had been staying with her and they had a bout of a GI bug as well as respiratory symptoms however they have been well the last few days. States she has not taken any of her home medications today. Denies vomiting. Patient is fully vaccinated with a boosted (as of 9/16/21).     Per chart review patient was seen by Dr. Bentley on 5/2/22 for PAH follow up. At appointment she reported marked shortness of breath, without cough or hemoptysis. She was also noted to have a Brassy cough without fever, chills. She reported improved appetite, weight gain and energy with increased steroid dose. Follow up in 2 months and if she is not doing well plan to get an echo sooner.     Patient was seen by Dr. Cook, Pulmonology, on 4/28/22. Per notes definitive tissue diagnosis has not been obtained for sarcoidosis however per sarcoid conference, imaging,  pathology showing some noncaseating granulomas, sarcoidosis was thought to be a reasonable diagnosis.  Other differential diagnosis included chronic hypersensitivity pneumonitis. MTX and Cellcept discontinued some time back d/t lack of response and started on Imuran and prednisone resulting in improvement in CT Chest and PFT however dyspnea worsened.     Per chart review was admitted 4/5/22 for RHC for cardiac sarcoidosis work up. Found to be in new a fib RVR, procedure aborted. Patient symptomatic with shortness of breath and hypotensive SBP 60's. IV Metoprolol given and quickly reverted to NSR shortly after with improved symptoms of shortness of breath. BNP at that time was 11,273. She was given IV lasix. Per Cardiology note during that admission, cardiac biopsy was deferred given low suspicion for cardiac sarcoidosis; RHC was completed.     In the ED, HR 70's-90's, -126/67-82, RR 17-18, SaO2 % on RA, Temp 98.4  F . Labs show CMP with Ca 8.2, BUN/Cr 13/0.96, albumin 2.6, TP 6.0 otherwise unremarkable. Lactic acid 1.1. CRP 27. Procalcitonin 0.77. Normal CBC, INR, ESR. Blood culture sent and pending. Covid 19 PCR negative. EKG SR with repolarization abnormality. CXR reports mixed interstitial and patchy pulmonary opacities, most prominent in the lower lobes bilaterally, favored to represent infection; trace bilateral pleural effusions. In the ED the patient was given NS bolus 250ml, Levaquin 500mg IV x 1.  Per ED MD, patient was discussed with cards staff Dr. Gregory who agreed with patient being admitted to the ED observation unit and consult the cardiology service.      PET Myocardial Perfusion (3/31/22):  1. No evidence of FDG uptake within the left ventricle although there is intense uptake within the atrial and intra-atrial septum as well as the atrial walls. No atrial fibrillation or atrial arrhythmia is demonstrated. The intense FDG uptake within the atria still remains suspicious for  sarcoidosis.  2. Mild FDG uptake to the numerous perilymphatic distribution nodules greatest in the left upper lung lobes, suggestive of active pulmonary sarcoidosis.  3. Cardiomegaly.  4. No perfusion deficit demonstrated on N-13 Ammonia imaging.  5. Normal myocardial blood flow.  6. Diffuse hypokinesia of the left ventricular myocardium, consistent with nonischemic cardiomyopathy given the normal perfusion.   7. Low LV ejection fraction of 23%.  8. Focal FDG uptake to the afferent loop gastric fundus, likely inflammatory in etiology. Attention on follow-up.     MR Cardiac (1/12/22):  1. The left ventricle is normal in size. There is mild inferoseptal thickening (14 mm). The global systolic  function is mildly reduced. The LVEF is 44%. There is mild diffuse hypokinesis.  2. The right ventricle is normal in cavity size. The global systolic function is normal. The RVEF is 54%.   3. Left atrium is mildly enlarged. Right atrium is normal in size.   4. There is no significant valvular disease.   5. There is mid-myocardial late gadolinium enhancement in the basal septum with extension into the mid inferoseptal segment and inferior right ventricular insertion site. In addition, there is subendocardial  hyperenhancement in the basal inferolateral segment. This variable pattern of hyperenhancement is  concerning for infiltrative cardiomyopathy such as sarcoidosis.    6. There is no pericardial effusion.  7. There is no intracardiac thrombus.  8. There is no myocardial edema.     Echocardiogram (12/21/21):  1. The left ventricle is normal in size. The visual ejection fraction is estimated at 35%. Global hypokinesis, distal LV segments (anteroseptal) appear more hypokinetic than other areas.  2. The right ventricle is mildly dilated. The right ventricular systolic function is normal.  3. There is mild to moderate (1-2+) mitral regurgitation.  4. There is moderately severe (3+) tricuspid regurgitation.  5. Moderate (46-55mmHg)  pulmonary hypertension is present. The right ventricular systolic pressure is approximated at 54mmHg plus the right atrial pressure.  6. The ascending aorta is Mildly dilated. 4.2cm.  Echo from 8/2020 showed EF 55%, mild TR, aorta 3.8cm.    [facility] 12/21/21:  Left Main   Very short LM; essentially separate coronary ostia  Left Anterior Descending   Ost LAD to Prox LAD lesion is 20% stenosed. Mild CAD vs possible mild catheter induced vasospasm  Left Circumflex  The vessel is angiographically normal.  Right Coronary Artery   Prox RCA lesion is 20% stenosed.    Echocardiogram (8/25/20):  Global and regional left ventricular function is normal with an EF of 55-60%.  Right ventricular function, chamber size, wall motion, and thickness are normal.  Pulmonary artery systolic pressure cannot be assessed.  Ascending aorta 3.8 cm.  Aortic index 20mm/m2,mild dilation.  The inferior vena cava is normal.  No pericardial effusion is present.    RHC 4/7/22:  RA 5/4/3  RV 25/0/4  PA 26/10/17  PCWP 16/13/10  SANYA 4.44/2.40 Right sided filling pressures are normal. Left sided filling pressures are normal. Normal PA pressures. Normal cardiac output level.       Review of Systems    All other ROS negative except those mentioned in above note.      Past Medical History    I have reviewed this patient's medical history and updated it with pertinent information if needed.   Past Medical History:   Diagnosis Date     Anemia      B[facility]ric surgery status 06/27/2005    [facility]     Cellulitis of leg 06/06/2013     Esophageal reflux     Better post-hiatal hernia repair and weight loss     Hyperplastic colonic polyp      Hypovitaminosis D 2011     ILD (interstitial lung disease) (H)      Kidney stone 04/29/2007     Kidney stone 05/10/2007    surgically removed     Limb ischemia; ulcers of fingertips 04/22/2013     Other chronic pain     Left shoulder - rheumatoid arthritis     Raynaud's syndrome      Rheumatoid arthritis (H) \      Scleroderma (H)      Sjogren-Jake syndrome      Systemic sclerosis (H) 2009     Unspecified essential hypertension        Past Surgical History   I have reviewed this patient's surgical history and updated it with pertinent information if needed.  Past Surgical History:   Procedure Laterality Date     BIOPSY MUSCLE DIAGNOSTIC (LOCATION) Right 7/19/2021    Procedure: Right SURAL nerve BIOPSY;  Surgeon: Farooq Abel MD;  Location: [facility] OR     BRONCHOSCOPY FLEXIBLE,L CRYOBIOPSY N/A 10/06/2020    Procedure: BRONCHOSCOPY, FLEXIBLE, WITH TRANSBRONCHIAL BIOPSY x4 USING CRYOPROBE, bronchial alveolar lavage;  Surgeon: Teddy Mendez MD;  Location: [facility] OR     BYPASS GASTRIC, CHOLECYSTECTOMY, COMBINED  06/27/2005    [facility]     CHOLECYSTECTOMY       COLONOSCOPY       COLONOSCOPY N/A 11/17/2020    Procedure: COLONOSCOPY, WITH POLYPECTOMY AND BIOPSY;  Surgeon: Jamie Cárdenas MD;  Location: [facility] OR     CV HEART CATHETERIZATION WITH POSSIBLE INTERVENTION Left 12/21/2021    Procedure: Heart Catheterization with Possible Intervention;  Surgeon: Wesley Mclean MD;  Location:  HEART CARDIAC CATH LAB     CV RIGHT HEART CATH MEASUREMENTS RECORDED N/A 4/7/2022    Procedure: Right Heart Cath;  Surgeon: Dieter Brock MD;  Location:  HEART CARDIAC CATH LAB     ESOPHAGOSCOPY, GASTROSCOPY, DUODENOSCOPY (EGD), COMBINED N/A 03/10/2016    Procedure: COMBINED ESOPHAGOSCOPY, GASTROSCOPY, DUODENOSCOPY (EGD), BIOPSY SINGLE OR MULTIPLE;  Surgeon: Tricia Zambrano MD;  Location:  GI     ESOPHAGOSCOPY, GASTROSCOPY, DUODENOSCOPY (EGD), COMBINED N/A 11/17/2020    Procedure: ESOPHAGOGASTRODUODENOSCOPY, WITH BIOPSY and Dilation;  Surgeon: Jamie Cárdenas MD;  Location: [facility] OR     INNER EAR SURGERY       PICC INSERTION  06/05/2013    5fr DL Power PICC, 44cm, left basilic vein, tip in low SVC     SURGICAL HISTORY OF -       Left ear surgery - incus transition, tympanoplasty     SURGICAL HISTORY  OF -   06/01/2005    Hiatal hernia repair     TONSILLECTOMY         Social History   I have reviewed this patient's social history and updated it with pertinent information if needed.  Social History     Tobacco Use     Smoking status: Never Smoker     Smokeless tobacco: Never Used   Vaping Use     Vaping Use: Never used   Substance Use Topics     Alcohol use: Yes     Comment: occassionally     Drug use: No       Family History   I have reviewed this patient's family history and updated it with pertinent information if needed.  Family History   Problem Relation Age of Onset     Cerebrovascular Disease Father      Heart Disease Father      Hypertension Father      Heart Disease Mother      Hypertension Mother      Chronic Obstructive Pulmonary Disease Sister      Heart Disease Sister      Hypertension Sister      Cerebrovascular Disease Brother      Heart Disease Brother      Kidney Disease Brother         on dialysis     Diabetes Brother         borderline     No Known Problems Son      No Known Problems Son      No Known Problems Daughter      Cancer Brother         small cell     Heart Disease Brother      Heart Disease Brother      Heart Disease Brother      Heart Disease Brother         tripple A     Heart Disease Sister      Substance Abuse Sister         alcoholism     Crohn's Disease No family hx of      Ulcerative Colitis No family hx of      GERD No family hx of      Celiac Disease No family hx of      Nephrolithiasis No family hx of      Parathyroid Disorders No family hx of      Calcium Disorder No family hx of        Prior to Admission Medications   Prior to Admission Medications   Prescriptions Last Dose Informant Patient Reported? Taking?   ASPIRIN NOT PRESCRIBED (INTENTIONAL)   No No   Sig: Please choose reason not prescribed from choices below.   Patient not taking: Reported on 5/2/2022   albuterol (PROAIR HFA/PROVENTIL HFA/VENTOLIN HFA) 108 (90 Base) MCG/ACT inhaler  Self Yes No   Sig: Inhale 2  puffs into the lungs every 6 hours as needed for shortness of breath / dyspnea or wheezing   amiodarone (PACERONE) 200 MG tablet   No No   Sig: Take 1 tablet (200 mg) by mouth daily   apixaban ANTICOAGULANT (ELIQUIS) 5 MG tablet   No No   Sig: Take 1 tablet (5 mg) by mouth 2 times daily   atorvastatin (LIPITOR) 40 MG tablet  Self Yes No   Sig: Take 40 mg by mouth   azaTHIOprine (IMURAN) 50 MG tablet  Self No No   Sig: Take 3 tablets (150 mg) by mouth daily And have labs drawn two weeks after starting medication.   blood glucose (NO BRAND SPECIFIED) test strip   No No   Sig: Use to test blood sugar 2 times daily or as directed.   Patient not taking: No sig reported   clonazePAM (KLONOPIN) 0.5 MG tablet  Self No No   Sig: Take 1 tablet (0.5 mg) by mouth nightly as needed for sleep (secondary to prednisone) MRx 1   folic acid (FOLVITE) 1 MG tablet  Self No No   Sig: Take 1 tablet (1 mg) by mouth daily   losartan (COZAAR) 50 MG tablet  Self No No   Sig: Take 0.5 tablets (25 mg) by mouth daily   magnesium oxide (MAG-OX) 400 MG tablet  Self No No   Sig: Take 1 tablet (400 mg) by mouth daily   metoprolol succinate ER (TOPROL XL) 50 MG 24 hr tablet  Self No No   Sig: Take 1 tablet (50 mg) by mouth daily   nitroGLYcerin (NITROSTAT) 0.3 MG sublingual tablet   No No   Sig: Place 1 tablet (0.3 mg) under the tongue every 5 minutes as needed for chest pain (esophageal spasm) For chest pain place 1 tablet under the tongue every 5 minutes for 3 doses. If symptoms persist 5 minutes after 3rd dose call 911.   pantoprazole (PROTONIX) 40 MG EC tablet  Self No No   Sig: Take 1 tablet (40 mg) by mouth daily   potassium chloride ER (MICRO-K) 10 MEQ CR capsule  Self Yes No   Sig: Take 30 mEq by mouth   predniSONE (DELTASONE) 10 MG tablet   No No   Sig: Take 1.5 tablets (15 mg) by mouth daily   spironolactone (ALDACTONE) 25 MG tablet  Self Yes No   Sig: Take 25 mg by mouth daily    sulfamethoxazole-trimethoprim (BACTRIM) 400-80 MG tablet    No No   Sig: Take 1 tablet by mouth daily   Patient taking differently: Take 1 tablet by mouth daily   thin (NO BRAND SPECIFIED) lancets   No No   Sig: Use with lanceting device twice daily   Patient not taking: No sig reported   torsemide (DEMADEX) 20 MG tablet  Self No No   Sig: Take 1 tablet (20 mg) by mouth 2 times daily   vitamin D2 (ERGOCALCIFEROL) 28284 units (1250 mcg) capsule  Self Yes No   Sig: Take 50,000 Units by mouth once a week      Facility-Administered Medications: None     Allergies   Allergies   Allergen Reactions     Lovenox Other (See Comments)     Blood clot      Norvasc [Amlodipine Besylate] Swelling     Lip swelling that happened on 2 different occasions     Erythromycin Rash     Rash on arms       Physical Exam   Vital Signs: Temp: 97.9  F (36.6  C) Temp src: Oral BP: 126/82 Pulse: 82   Resp: 17 SpO2: 100 % O2 Device: None (Room air)    Weight: 158 lbs 4.64 oz    Constitutional: sleepy but easily arousable, appears fatigues, cooperative, no apparent distress, and appears stated age  Eyes: Lids and lashes normal, pupils equal, round and reactive to light, extra ocular muscles intact, sclera clear, conjunctiva normal  ENT: Normocephalic, without obvious abnormality, atraumatic, sinuses nontender on palpation, external ears without lesions, oral pharynx with moist mucous membranes, tonsils without erythema or exudates, gums normal and good dentition.  Hematologic / Lymphatic: no cervical lymphadenopathy  Respiratory: No increased work of breathing, good air exchange, minimal basilar rales.   Cardiovascular: Normal apical impulse, regular rate and rhythm, normal S1 and S2, no S3 or S4, and no murmur noted  GI: No scars, normal bowel sounds, soft, non-distended, non-tender, no masses palpated, no hepatosplenomegally  Skin: no bruising or bleeding  Ext: BLE with pitting  Musculoskeletal: There is no redness, warmth, or swelling of the joints.  Full range of motion noted.  Motor strength is 5 out  of 5 all extremities bilaterally.  Tone is normal.  Neurologic: Awake, alert, oriented to name, place and time.  Cranial nerves II-XII are grossly intact.  Motor is 5 out of 5 bilaterally.  Cerebellar finger to nose, heel to shin intact.  Sensory is intact.  Babinski down going, Romberg negative, and gait is normal.  Neuropsychiatric: General: normal, calm and normal eye contact     Data   Data reviewed today: I reviewed all medications, new labs and imaging results over the last 24 hours. I personally reviewed  Recent Labs   Lab 05/04/22 2113 05/02/22  1143   WBC 10.4 4.0   HGB 13.0 13.2   MCV 88 88    267   INR 1.06  --     132*   POTASSIUM 3.6 3.7   CHLORIDE 98 96   CO2 27 25   BUN 13 10   CR 0.96 0.77   ANIONGAP 8 11   DAVID 8.2* 8.5   GLC 88 75   ALBUMIN 2.6*  --    PROTTOTAL 6.0*  --    BILITOTAL 0.8  --    ALKPHOS 90  --    ALT 19  --    AST 24  --      Most Recent 3 CBC's:  Recent Labs   Lab Test 05/04/22 2113 05/02/22  1143 04/20/22  1227   WBC 10.4 4.0 10.3   HGB 13.0 13.2 14.6   MCV 88 88 89    267 264     Most Recent 3 BMP's:  Recent Labs   Lab Test 05/04/22 2113 05/02/22  1143 04/20/22  1227    132* 134   POTASSIUM 3.6 3.7 3.5   CHLORIDE 98 96 92*   CO2 27 25 34*   BUN 13 10 14   CR 0.96 0.77 1.22*   ANIONGAP 8 11 8   DAVID 8.2* 8.5 9.0   GLC 88 75 88     Most Recent 2 LFT's:  Recent Labs   Lab Test 05/04/22 2113 04/05/22  1011   AST 24 60*   ALT 19 44   ALKPHOS 90 98   BILITOTAL 0.8 0.6     Most Recent 3 INR's:  Recent Labs   Lab Test 05/04/22 2113   INR 1.06     Most Recent 3 Creatinines:  Recent Labs   Lab Test 05/04/22 2113 05/02/22  1143 04/20/22  1227   CR 0.96 0.77 1.22*     Most Recent 3 Hemoglobins:  Recent Labs   Lab Test 05/04/22 2113 05/02/22  1143 04/20/22  1227   HGB 13.0 13.2 14.6     Most Recent 3 Troponin's:  Recent Labs   Lab Test 07/06/21  0619   TROPI 0.018     Most Recent 3 BNP's:  Recent Labs   Lab Test 05/02/22  1143 04/20/22  1227 04/05/22  1011  04/04/22  0625 03/08/22  1130 02/15/22  1049 07/06/21  0619   NTBNPI  --   --  11,273* 14,261*  --   --  1,158*   NTBNP 999* 1,534*  --   --  7,353*   < >  --     < > = values in this interval not displayed.     Most Recent D-dimer:  Recent Labs   Lab Test 04/04/22  0625   DD 1.41*     Most Recent Cholesterol Panel:  Recent Labs   Lab Test 12/22/21  0534   CHOL 116   LDL 55   HDL 47*   TRIG 71     Most Recent TSH and T4:  Recent Labs   Lab Test 04/04/22  0625 03/08/22  1130   TSH 0.86 0.30*   T4  --  1.76*     Most Recent 6 glucoses:  Recent Labs   Lab Test 05/04/22 2113 05/02/22  1143 04/20/22  1227 04/08/22  0630 04/07/22  0557 04/06/22  0650   GLC 88 75 88 80 84 83     Most Recent Urinalysis:  Recent Labs   Lab Test 02/15/22  1100   COLOR Yellow   APPEARANCE Clear   URINEGLC Negative   URINEBILI Negative   URINEKETONE Negative   SG 1.010   UBLD Negative   URINEPH 5.0   PROTEIN 30 *   NITRITE Negative   LEUKEST Negative   RBCU 1   WBCU 3     Most Recent ESR & CRP:  Recent Labs   Lab Test 05/04/22 2113   SED 26   CRP 27.0*     10.4    \    13.0    /    253   N 85    L N/A    133    98    13 /   ------------------------------------ 88   ALT 19   AST 24   AP 90   ALB 2.6 (L)   Ca 8.2 (L)  3.6    27    0.96 \    % RETIC N/A    LDH N/A  Troponin N/A    BNP N/A    CK N/A  INR 1.06   PTT N/A    D-dimer N/A    Fibrinogen N/A    Antithrombin N/A  Ferritin N/A  CRP 27.0 (H)    IL-6 N/A  Recent Results (from the past 24 hour(s))   XR Chest 2 Views    Narrative    EXAM: XR CHEST 2 VW  5/4/2022 8:16 PM     HISTORY:  fever       COMPARISON:  5/2/2022    FINDINGS:   PA and lateral upright views of the chest.. Trachea is midline.  Cardiomediastinal silhouette and pulmonary vasculature are within  normal limits. Bibasilar prominent mixed interstitial and patchy  pulmonary opacities No appreciable pneumothorax. Atherosclerotic  calcifications calcifications aortic knob. Osteopenia. No acute  osseous or normal. Surgical clips  in the right quadrant of the  abdomen. Visualized portions upper abdomen are unremarkable.      Impression    IMPRESSION:   1. Mixed interstitial and patchy pulmonary opacities, most prominent  in the lower lobes bilaterally, favored to represent infection.  2. Trace bilateral pleural effusions.    I have personally reviewed the examination and initial interpretation  and I agree with the findings.    ANNABELLA BARTH MD         SYSTEM ID:  [id number]

## 2022-05-05 NOTE — UTILIZATION REVIEW
Admission Status; Secondary Review Determination       Under the authority of the Utilization Management Committee, the utilization review process indicated a secondary review on the above patient. The review outcome is based on review of the medical records, discussions with staff, and applying clinical experience noted on the date of the review.     (x) Inpatient Status Appropriate - This patient's medical care is consistent with medical management for inpatient care and reasonable inpatient medical practice.     RATIONALE FOR DETERMINATION     Jeanine Schuler is a 61 year old female admitted on 5/4/2022. 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 She presented to the ED on 5/4/22 for evaluation of shortness of breath, cough, fever and nausea.  She reported fever as high as 106-day before presentation.  Emergency department evaluation showed BNP 5559, procalcitonin 0.77, and troponin 80. Chest x-ray showed bilateral infiltrates most prominent in the bases consistent with infection.  She was admitted to the hospital and treated for pneumonia with Levaquin. Respiratory panel was obtained and was positive for influenza A and parainfluenza 4.  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  She had quite high fever prior to presentation and work-up is found bilateral infiltrates, influenza A, parainfluenza 4, and possible secondary bacterial pneumonia (elevated procalcitonin).  Additionally, she has  elevated BNP and cardiology has been consulted.    At the time of admission with the information available to the attending physician more than 2 nights Hospital complex care was anticipated, based on patient risk of adverse outcome if treated as outpatient and complex care required. Inpatient admission is appropriate based on the Medicare guidelines.     This document was produced using voice recognition software       The information on this document is developed by the utilization review team in order for the business office to ensure compliance. This only denotes the appropriateness of proper admission status and does not reflect the quality of care rendered.   The definitions of Inpatient Status and Observation Status used in making the determination above are those provided in the CMS Coverage Manual, Chapter 1 and Chapter 6, section 70.4.   Sincerely,     Paco Hoyos MD    Utilization Review  Physician Advisor  [facility].

## 2022-05-05 NOTE — CONSULTS
[facility]    Cardiology Consult Note-Cardiology      Date of Admission:  5/4/2022  Consult Requested by: Medicine  Reason for Consult: Shortness of breath, complex cardiac history    Assessment & Plan: SL   Jeanine Schuler is a 61 year old female with a past medical history of ILD, raynauds, MCTD/limited cutaneous systemicsclerosis (+RF), NICM (LVEF 35% on echo 12/21/21, 44% on CMR 1/12/22), HFrEF, s/p RHC (4/7/22), PAF (on Eliquis), COVID -19 (03/2022) who was admitted on 5/4/22 for shortness of breath.     Due to her complex cardiac history, cardiology was consulted for further assessment. An echo was obtained, which showed adequate EF, making concern for acute worsening of systolic function or cardiomyopathy less likely. Her BNP was elevated to 5559 and given her report of feeling fluid overloaded, she was likely hypervolemic on presentation to the ED. She was given 40 mg IV Lasix with good urine output throughout the day and improvement in her lower extremity edema. While she did have a mild elevation in her troponin to 80, this did trend down on recheck, which likely represents demand ischemia. Her workup also returned positive for parainfluenza, which could be the cause of her worsening shortness of breath, cough, and fever.    Of note, she was discussed at the multi-disciplinary sarcoid conference and it was determined that she does not have cardiac sarcoid.     Recommendations:  - Continue PTA cardiac meds  - Further medical management and workup of lung pathology per primary medicine team and pulmonary team.    The patient's care was discussed with the Attending Physician, Dr. Francis.    Emmanuel Hartman, MS4  [facility]    Resident/Fellow Attestation   I, Demario Lima MD, was present with the medical/MIKKI student who participated in the service and in the documentation of the note.  I have verified  the history and personally performed the physical exam and medical decision making.  I agree with the assessment and plan of care as documented in the note.      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 She reports that over the past few days she has been experiencing worsening shortness of breath, cough, and fevers. Though she has had an ongoing cough and shortness of breath for a number of weeks, she states that her symptoms worsened yesterday. Per review of the ED notes, she does have known exposures to others who have been ill with GI/respiratory symptoms as she had family staying with her for the past few days. While she states that she does have intermitted palpitations and thought she may be in a-fib prior to arrival, she has not had any chest pain. Currently she is not having palpitations and does not feel like she is in an abnormal rhythm. She does feel like she is retaining some fluid, which has improved after receiving diuretics since admission. She denies any chest pain, abdominal pain, or other symptoms.    Review of Systems   The 10 point Review of Systems is negative other than noted in the HPI or here.     Past Medical History    I have reviewed this patient's medical history and updated it with pertinent information if needed.   Past Medical History:   Diagnosis Date     Anemia      Bariatric surgery status 06/27/2005    [facility];      Cellulitis of leg 06/06/2013     Esophageal reflux     Better post-hiatal hernia repair and weight loss     Hyperplastic colonic polyp      Hypovitaminosis D 2011     ILD (interstitial lung disease) (H)      Kidney stone 04/29/2007     Kidney stone 05/10/2007    surgically removed     Limb ischemia; ulcers of fingertips 04/22/2013     Other chronic pain     Left shoulder - rheumatoid arthritis     Raynaud's syndrome      Rheumatoid arthritis (H) \     Scleroderma (H)      Sjogren-Jake syndrome      Systemic sclerosis (H) 2009     Unspecified essential hypertension        Past Surgical History   I have reviewed this patient's surgical history and updated it with pertinent information if needed.  Past Surgical History:   Procedure Laterality Date     BIOPSY MUSCLE DIAGNOSTIC (LOCATION) Right 7/19/2021    Procedure: Right SURAL nerve BIOPSY;  Surgeon: Farooq Abel MD;  Location: [facility] OR     BRONCHOSCOPY FLEXIBLE,L CRYOBIOPSY N/A 10/06/2020    Procedure: BRONCHOSCOPY, FLEXIBLE, WITH TRANSBRONCHIAL BIOPSY x4 USING CRYOPROBE, bronchial alveolar lavage;  Surgeon: Teddy Mendez MD;  Location: [facility] OR     BYPASS GASTRIC, CHOLECYSTECTOMY, COMBINED  06/27/2005    [facility]     CHOLECYSTECTOMY       COLONOSCOPY       COLONOSCOPY N/A 11/17/2020    Procedure: COLONOSCOPY, WITH POLYPECTOMY AND BIOPSY;  Surgeon: Jamie Cárdenas MD;  [place] [facility] [place]     CV HEART CATHETERIZATION WITH POSSIBLE INTERVENTION Left 12/21/2021    Procedure: Heart Catheterization with Possible Intervention;  Surgeon: Wesley Mclean MD;  Location:  HEART CARDIAC CATH LAB     CV RIGHT HEART CATH MEASUREMENTS RECORDED N/A 4/7/2022    Procedure: Right Heart Cath;  Surgeon: Dieter Brock MD;  Location:  HEART CARDIAC CATH LAB     ESOPHAGOSCOPY, GASTROSCOPY, DUODENOSCOPY (EGD), COMBINED N/A 03/10/2016    Procedure: COMBINED ESOPHAGOSCOPY, GASTROSCOPY, DUODENOSCOPY (EGD), BIOPSY SINGLE OR MULTIPLE;  Surgeon: Juan Manuel  Tricia Moralez MD;  Location:  GI     ESOPHAGOSCOPY, GASTROSCOPY, DUODENOSCOPY (EGD), COMBINED N/A 11/17/2020    Procedure: ESOPHAGOGASTRODUODENOSCOPY, WITH BIOPSY and Dilation;  Surgeon: Jamie Cárdenas MD;  Location: [facility] OR     INNER EAR SURGERY       PICC INSERTION  06/05/2013    5fr DL Power PICC, 44cm, left basilic vein, tip in low SVC     SURGICAL HISTORY OF -       Left ear surgery - incus transition, tympanoplasty     SURGICAL HISTORY OF -   06/01/2005    Hiatal hernia repair     TONSILLECTOMY         Social History   I have reviewed this patient's social history and updated it with pertinent information if needed.  Social History     Tobacco Use     Smoking status: Never Smoker     Smokeless tobacco: Never Used   Vaping Use     Vaping Use: Never used   Substance Use Topics     Alcohol use: Yes     Comment: occassionally     Drug use: No     Family History   I have reviewed this patient's family history and updated it with pertinent information if needed.   I have reviewed this patient's family history and updated it with pertinent information if needed.  Family History   Problem Relation Age of Onset     Cerebrovascular Disease Father      Heart Disease Father      Hypertension Father      Heart Disease Mother      Hypertension Mother      Chronic Obstructive Pulmonary Disease Sister      Heart Disease Sister      Hypertension Sister      Cerebrovascular Disease Brother      Heart Disease Brother      Kidney Disease Brother         on dialysis     Diabetes Brother         borderline     No Known Problems Son      No Known Problems Son      No Known Problems Daughter      Cancer Brother         small cell     Heart Disease Brother      Heart Disease Brother      Heart Disease Brother      Heart Disease Brother         tripple A     Heart Disease Sister      Substance Abuse Sister         alcoholism     Crohn's Disease No family hx of      Ulcerative Colitis No family hx of      GERD No family hx of       Celiac Disease No family hx of      Nephrolithiasis No family hx of      Parathyroid Disorders No family hx of      Calcium Disorder No family hx of        Medications   I have reviewed this patient's current medications    Allergies   Allergies   Allergen Reactions     Lovenox Other (See Comments)     Blood clot      Norvasc [Amlodipine Besylate] Swelling     Lip swelling that happened on 2 different occasions     Erythromycin Rash     Rash on arms       Physical Exam   Vital Signs: Temp: 97.9  F (36.6  C) Temp src: Oral BP: 117/85 Pulse: 66   Resp: 18 SpO2: 98 % O2 Device: None (Room air)    Weight: 158 lbs 4.64 oz    Constitutional: awake, alert. Laying in bed comfortably.  HENT: No conjunctival icterus.  Respiratory: Breathing comfortably on room air.  Cardiovascular: Regular rate and rhythm. No murmurs  Musculoskeletal: No lower extremity edema bilaterally.  Neurologic: Moving all extremities spontaneously.        Data   Results for orders placed or performed during the hospital encounter of 05/04/22 (from the past 24 hour(s))   EKG 12-lead, tracing only   Result Value Ref Range    Systolic Blood Pressure  mmHg    Diastolic Blood Pressure  mmHg    Ventricular Rate 100 BPM    Atrial Rate 100 BPM    MT Interval 154 ms    QRS Duration 128 ms     ms    QTc 503 ms    P Axis 48 degrees    R AXIS -37 degrees    T Axis 116 degrees    Interpretation ECG       Sinus rhythm  Left axis deviation  Left ventricular hypertrophy with QRS widening and repolarization abnormality  Abnormal ECG  Unconfirmed report - interpretation of this ECG is computer generated - see medical record for final interpretation  Confirmed by - EMERGENCY ROOM, PHYSICIAN ([id number]),  NAHOMY COLIN ([id number]) on 5/4/2022 10:22:44 PM     XR Chest 2 Views    Narrative    EXAM: XR CHEST 2 VW  5/4/2022 8:16 PM     HISTORY:  fever       COMPARISON:  5/2/2022    FINDINGS:   PA and lateral upright views of the chest.. Trachea is  midline.  Cardiomediastinal silhouette and pulmonary vasculature are within  normal limits. Bibasilar prominent mixed interstitial and patchy  pulmonary opacities No appreciable pneumothorax. Atherosclerotic  calcifications calcifications aortic knob. Osteopenia. No acute  osseous or normal. Surgical clips in the right quadrant of the  abdomen. Visualized portions upper abdomen are unremarkable.      Impression    IMPRESSION:   1. Mixed interstitial and patchy pulmonary opacities, most prominent  in the lower lobes bilaterally, favored to represent infection.  2. Trace bilateral pleural effusions.    I have personally reviewed the examination and initial interpretation  and I agree with the findings.    ANNABELLA BARTH MD         SYSTEM ID:  [id number]   Comprehensive metabolic panel   Result Value Ref Range    Sodium 133 133 - 144 mmol/L    Potassium 3.6 3.4 - 5.3 mmol/L    Chloride 98 94 - 109 mmol/L    Carbon Dioxide (CO2) 27 20 - 32 mmol/L    Anion Gap 8 3 - 14 mmol/L    Urea Nitrogen 13 7 - 30 mg/dL    Creatinine 0.96 0.52 - 1.04 mg/dL    Calcium 8.2 (L) 8.5 - 10.1 mg/dL    Glucose 88 70 - 99 mg/dL    Alkaline Phosphatase 90 40 - 150 U/L    AST 24 0 - 45 U/L    ALT 19 0 - 50 U/L    Protein Total 6.0 (L) 6.8 - 8.8 g/dL    Albumin 2.6 (L) 3.4 - 5.0 g/dL    Bilirubin Total 0.8 0.2 - 1.3 mg/dL    GFR Estimate 68 >60 mL/min/1.73m2   CBC with platelets differential    Narrative    The following orders were created for panel order CBC with platelets differential.  Procedure                               Abnormality         Status                     ---------                               -----------         ------                     CBC with platelets and d...[[id number]]  Abnormal            Final result                 Please view results for these tests on the individual orders.   INR   Result Value Ref Range    INR 1.06 0.85 - 1.15   Lactic acid whole blood   Result Value Ref Range    Lactic Acid 1.1 0.7 - 2.0  mmol/L   CRP inflammation   Result Value Ref Range    CRP Inflammation 27.0 (H) 0.0 - 8.0 mg/L   Erythrocyte sedimentation rate auto   Result Value Ref Range    Erythrocyte Sedimentation Rate 26 0 - 30 mm/hr   Procalcitonin   Result Value Ref Range    Procalcitonin 0.77 (H) <0.05 ng/mL   Blood Culture Line, venous    Specimen: Line, venous; Blood   Result Value Ref Range    Culture No growth after 12 hours    Blood Culture Peripheral Blood    Specimen: Peripheral Blood   Result Value Ref Range    Culture No growth after 12 hours    CBC with platelets and differential   Result Value Ref Range    WBC Count 10.4 4.0 - 11.0 10e3/uL    RBC Count 4.49 3.80 - 5.20 10e6/uL    Hemoglobin 13.0 11.7 - 15.7 g/dL    Hematocrit 39.6 35.0 - 47.0 %    MCV 88 78 - 100 fL    MCH 29.0 26.5 - 33.0 pg    MCHC 32.8 31.5 - 36.5 g/dL    RDW 14.7 10.0 - 15.0 %    Platelet Count 253 150 - 450 10e3/uL    % Neutrophils 85 %    % Lymphocytes 7 %    % Monocytes 7 %    % Eosinophils 0 %    % Basophils 0 %    % Immature Granulocytes 1 %    NRBCs per 100 WBC 0 <1 /100    Absolute Neutrophils 8.8 (H) 1.6 - 8.3 10e3/uL    Absolute Lymphocytes 0.8 0.8 - 5.3 10e3/uL    Absolute Monocytes 0.7 0.0 - 1.3 10e3/uL    Absolute Eosinophils 0.0 0.0 - 0.7 10e3/uL    Absolute Basophils 0.0 0.0 - 0.2 10e3/uL    Absolute Immature Granulocytes 0.1 <=0.4 10e3/uL    Absolute NRBCs 0.0 10e3/uL   Nt probnp inpatient   Result Value Ref Range    N terminal Pro BNP Inpatient 5,559 (H) 0 - 900 pg/mL   Troponin I   Result Value Ref Range    Troponin I High Sensitivity 80 (H) <54 ng/L   Asymptomatic COVID-19 Virus (Coronavirus) by PCR Nose    Specimen: Nose; Swab   Result Value Ref Range    SARS CoV2 PCR Negative Negative, Testing sent to reference lab. Results will be returned via unsolicited result    Narrative    Testing was performed using the Viragen Xpress SARS-CoV-2 Assay on the  Cepheid Gene-Xpert Instrument Systems. Additional information about  this Emergency Use  Authorization (EUA) assay can be found via the Lab  Guide. This test should be ordered for the detection of SARS-CoV-2 in  individuals who meet SARS-CoV-2 clinical and/or epidemiological  criteria. Test performance is unknown in asymptomatic patients. This  test is for in vitro diagnostic use under the FDA EUA for  laboratories certified under CLIA to perform high complexity testing.  This test has not been FDA cleared or approved. A negative result  does not rule out the presence of PCR inhibitors in the specimen or  target RNA in concentration below the limit of detection for the  assay. The possibility of a false negative should be considered if  the patient's recent exposure or clinical presentation suggests  COVID-19. This test was validated by the [facility]  Dise[facility]. This laboratory is certified under  the Clinical Laboratory Improvement Amendments of 1988 (CLIA-88) as  qualified to perform high complexity laboratory testing.     Respiratory Panel PCR - NP Swab    Specimen: Nasopharyngeal; Swab   Result Value Ref Range    Adenovirus Not Detected Not Detected    Coronavirus Not Detected Not Detected    Human Metapneumovirus Not Detected Not Detected    Human Rhin/Enterovirus Not Detected Not Detected    Influenza A Not Detected Not Detected    Influenza A, H1 Not Detected Not Detected    Influenza A 2009 H1N1 Not Detected Not Detected    Influenza A, H3 Not Detected Not Detected    Influenza B Not Detected Not Detected    Parainfluenza Virus 1 Not Detected Not Detected    Parainfluenza Virus 2 Not Detected Not Detected    Parainfluenza Virus 3 Not Detected Not Detected    Parainfluenza Virus 4 Detected (A) Not Detected    Respiratory Syncytial Virus A Not Detected Not Detected    Respiratory Syncytial Virus B Not Detected Not Detected    Chlamydia Pneumoniae Not Detected Not Detected    Mycoplasma Pneumoniae Not Detected Not Detected    Narrative    The e[employer]lex  Respiratory Viral Panel is a qualitative nucleic acid, multiplex, in vitro diagnostic test for the simultaneous detection and identification of multiple respiratory viral and bacterial nucleic acids in nasopharyngeal swabs collected in viral transport media from individual exhibiting signs and symptoms of respiratory infection. The assay has received FDA approval for the testing of nasopharyngeal (NP) swabs only. This test has been verified and is performed by the Infectious Diseases Diagnostic Laboratory at [facility]. This test is used for clinical purposes and should not be regarded as investigational or for research. This laboratory is certified under the Clinical Laboratory Improvement Amendments of 1988 (CLIA-88) as qualified to perform high complexity clinical laboratory testing.   UA with Microscopic reflex to Culture    Specimen: Urine, Midstream   Result Value Ref Range    Color Urine Yellow Colorless, Straw, Light Yellow, Yellow    Appearance Urine Clear Clear    Glucose Urine Negative Negative mg/dL    Bilirubin Urine Negative Negative    Ketones Urine Negative Negative mg/dL    Specific Gravity Urine 1.012 1.003 - 1.035    Blood Urine Negative Negative    pH Urine 6.0 5.0 - 7.0    Protein Albumin Urine Negative Negative mg/dL    Urobilinogen Urine 3.0 (A) Normal, 2.0 mg/dL    Nitrite Urine Negative Negative    Leukocyte Esterase Urine Trace (A) Negative    RBC Urine 1 <=2 /HPF    WBC Urine 5 <=5 /HPF    Squamous Epithelials Urine 4 (H) <=1 /HPF    Transitional Epithelials Urine <1 <=1 /HPF    Narrative    Urine Culture not indicated   Troponin I   Result Value Ref Range    Troponin I High Sensitivity 51 <54 ng/L   Magnesium   Result Value Ref Range    Magnesium 1.3 (L) 1.6 - 2.3 mg/dL   Phosphorus   Result Value Ref Range    Phosphorus 2.3 (L) 2.5 - 4.5 mg/dL   TSH with free T4 reflex   Result Value Ref Range    TSH 4.26 (H) 0.40 - 4.00 mU/L   Prealbumin   Result Value Ref Range    Prealbumin 9  (L) 15 - 45 mg/dL   Extra Tube    Narrative    The following orders were created for panel order Extra Tube.  Procedure                               Abnormality         Status                     ---------                               -----------         ------                     Extra Purple Top Tube[[id number]]                            Final result                 Please view results for these tests on the individual orders.   Extra Purple Top Tube   Result Value Ref Range    Hold Specimen JIC    T4 free   Result Value Ref Range    Free T4 1.74 (H) 0.76 - 1.46 ng/dL   Potassium   Result Value Ref Range    Potassium 3.6 3.4 - 5.3 mmol/L   Echocardiogram Complete   Result Value Ref Range    LVEF  50-55%     Narrative    [id number]  [id number]  [id number]  [id number]^MY     [facility]  Echocardiography [facility]  [address] [place] [id number]     Name: OSCAR REYES  MRN: [id number]  : 1960  Study Date: 2022 09:35 AM  Age: 61 yrs  Gender: Female  [facility]  Reason For Study: SOB, Edema, Heart Failure, Cardiomyopathy  Ordering Physician: EMEKA DE LA TORRE  Performed By: Radha Butt     BSA: 1.9 m2  Height: 69 in  Weight: 158 lb  HR: 66  BP: 103/68 mmHg  ______________________________________________________________________________  Procedure  Complete Portable Echo Adult. Contrast Optison. Optison (NDC #3845-4374-45)  given intravenously. Patient was given 6 ml mixture of 3 ml Optison and 6 ml  saline. 3 ml wasted.  ______________________________________________________________________________  Interpretation Summary  Left ventricular wall thickness is normal. Left ventricular size is normal.  The visual ejection fraction is 50-55%. No regional wall motion abnormalities  are seen.  Right ventricular function, chamber size, wall motion, and thickness are  normal.  Mild mitral insufficiency is present. Mild to moderate  tricuspid insufficiency  is present.  Ascending aorta 4.2 cm indexed at 2.1cm/m2.  Dilation of the inferior vena cava is present with abnormal respiratory  variation in diameter. No pericardial effusion is present.     Compared to prior study LVEF appears to have improved  ______________________________________________________________________________  Left Ventricle  Left ventricular wall thickness is normal. Left ventricular size is normal.  The visual ejection fraction is 50-55%. Grade I or early diastolic  dysfunction. No regional wall motion abnormalities are seen.     Right Ventricle  Right ventricular function, chamber size, wall motion, and thickness are  normal.     Atria  Both atria appear normal.     Mitral Valve  Mild mitral annular calcification is present. Mild mitral insufficiency is  present.     Aortic Valve  The aortic valve is tricuspid. There is moderate aortic sclerosis of the right  coronary cusp. No aortic regurgitation is present.     Tricuspid Valve  The tricuspid valve is normal. Mild to moderate tricuspid insufficiency is  present. The right ventricular systolic pressure is approximated at 18.3 mmHg  plus the right atrial pressure.     Pulmonic Valve  The pulmonic valve is normal.     Vessels  Ascending aorta 4.2 cm. IVC diameter >2.1 cm collapsing <50% with sniff  suggests a high RA pressure estimated at 15 mmHg or greater. Dilation of the  inferior vena cava is present with abnormal respiratory variation in diameter.     Pericardium  No pericardial effusion is present.     Compared to Previous Study  Compared to prior study LVEF appears to have improved.  ______________________________________________________________________________  MMode/2D Measurements & Calculations     IVSd: 1.1 cm  LVIDd: 5.2 cm  LVIDs: 3.7 cm  LVPWd: 1.0 cm  FS: 30.2 %  LV mass(C)d: 215.9 grams  LV mass(C)dI: 115.5 grams/m2  asc Aorta Diam: 4.1 cm  LVOT diam: 2.2 cm  LVOT area: 3.9 cm2  LA Volume Index (BP): 35.3  ml/m2  RWT: 0.39     Doppler Measurements & Calculations  MV E max ck: 59.2 cm/sec  MV A max ck: 75.3 cm/sec  MV E/A: 0.79  MV dec slope: 197.2 cm/sec2  MV dec time: 0.30 sec  Ao V2 max: 154.5 cm/sec  Ao max P.6 mmHg  Ao V2 mean: 106.5 cm/sec  Ao mean P.1 mmHg  Ao V2 VTI: 27.0 cm  ALLISON(I,D): 3.8 cm2  ALLISON(V,D): 3.4 cm2  LV V1 max P.1 mmHg  LV V1 max: 133.0 cm/sec  LV V1 VTI: 25.9 cm  SV(LVOT): 101.6 ml  SI(LVOT): 54.3 ml/m2  PA acc time: 0.07 sec  TR max ck: 213.6 cm/sec  TR max P.3 mmHg  AV Ck Ratio (DI): 0.86  ALLISON Index (cm2/m2): 2.0  E/E' av.1  Lateral E/e': 5.9  Medial E/e': 12.3     ______________________________________________________________________________  Report approved by: Mirza HARPER 2022 11:48 AM

## 2022-05-05 NOTE — PLAN OF CARE
Observation Goals:    -Serial troponins and stress test complete: not met    - Seen and cleared by consultant if applicable: not met   - Adequate pain control on oral analgesia: met    - Vital signs normal or at patient baseline: met    - Safe disposition plan has been identified: not met

## 2022-05-05 NOTE — PROGRESS NOTES
Patient ID:  Jeanine Schuler  MRN: [id number]  61 year old  YOB: 1960    Observation Admit Date: 5/4/2022    ED Admitting Attending: INEZ Darden    Transfer Date and Time: May 5, 2022 at 8:26 AM     Transferring Observation Provider: LÓPEZ Rausch CNP    Admission Diagnoses:     1. Pneumonia of both lungs due to infectious organism, unspecified part of lung    2. Fever, unspecified fever cause    3. Sjogren's syndrome with lung involvement (H)        Transfer Diagnoses:    Pneumonia  Influenza 4  Ongoing cardiology history with elevated BNP (6495)    Emergency Department and Observation Course:        Jeanine Schuler is a 61 year old female admitted on 5/4/2022. She has a history of gastric bypass, hypoalbuminemia, SBO, ILD, Raynaud's syndrome, MCTD/limited cutaneous systemic sclerosis (+RF), possible cardiac sarcoidosis, NICM (LVEF 35% on echo 12/21/21, 44% on CMR 1/12/22), HFrEF, s/p RHC (4/7/22), PAH, PAF (on Eliquis), COVID-19 (March 2022), neuropathy, GERD w/ dysphagia, iron deficiency anemia who presented to the ED for evaluation of shortness of breath, cough, fever and brief episode of nausea.       ##. SOB  ##. Cough  ##. Fever  ##. Pneumonia  Cough x 6 weeks, worst yesterday; non productive. Increased SOB. Onset of chills today, fever to 102 F, took Advil at 5pm. Prednisone increased to 15 mg from 10 mg, 1 week ago 2/2 respiratory symptoms and decreased weight, which helped appetite. Reports chronic orthopnea x 3, LE edema (worse this evening d/t not taking diuretics today). Reports sick contacts recently with GI/Resp bugs. Denies vomiting. In ED, HR 70's-90's, -126/67-82, RR 17-18, SaO2 % on RA, Temp 98.4  F . Labs show CMP with Ca 8.2, BUN/Cr 13/0.96, albumin 2.6, TP 6.0 otherwise unremarkable. Lactic acid 1.1. CRP 27. Procalcitonin 0.77. Normal CBC, INR, ESR. Blood culture sent and pending.  Covid 19 PCR negative. CXR reports mixed interstitial and  patchy pulmonary opacities, most prominent in the lower lobes bilaterally, favored to represent infection; trace bilateral pleural effusions. In ED the patient was given NS bolus 250ml, Levaquin 500mg IV x 1. Respiratory panel positive for parainfluenza 4. BNP returned 5559, Troponin 80. Will give a dose of IV Lasix. Repeat Troponin; elevated likely d/t demand ischemia. Paged cardiology staff for admission to cardiology service. Cardiology recommended discussing with Medicine for a medicine admission. Cardiology and Medicine to discussed most appropriate disposition. Patient will be admitted to Medicine service with telemetry.  - Continue Levaquin 500mg IV daily  - Check BNP  - Serial Troponins x 2  - Echocardiogram in AM  - Cardiology consult  - Strict I/O  - Daily weights  - Follow up on blood culture     ##. Right Flank Discomfort: Patient reports 1 day of right flank discomfort and foul smelling urine  - UA & UCx - non infectious appearing     ##. NICM LVEF 35% on echo 44% on CMR, NYHA II-III: Echo 12/21/21 with EF 35%, global hypokinesis, distal LV segments, RV mildly dilated, mild to moderate regurgitation, and moderate to severe tricuspid regurg. Cardiac MRI 1/12/22 with LVEF 44%. Myocardial PET 3/31/22 with EF 23%. Patient states she has not been able to take any of her home medications today including all her diuretics.   - Add on BNP  - Continue with PTA Losartan  - Continue with PTA Toprol XL  - Continue with PTA Aldactone  - Continue with PTA Torsemide      ##. H/o PAF: EKG SR with repolarization abnormality.    - Continue with PTA Amiodarone 200mg daily  - Continue with PTA Toprol XL 50mg daily  - Continue with PTA Eliquis (unclear if patient should be on Eliquis based on Cardiology notes. Will need to clarify; CHADVASC-2 for gender and CHF)   - Check Mag and Phos - Mag returned at 1.3, Phos 2.3  - Keep Mg> 2 and K>4 - Replace per protocol  - Telemetry     ##. ILD  ##. Pulmonary Sarcoidosis  ##.  "Scleroderma  ##. MCTD/ Limited Systemic Sclerosis   Left upper lobe transbronchial cryobiopsy done 10/6/2020 showed nonnecrotizing granulomas with multi nucleated giant cells consistent with sarcoidosis. PFTs improved with Imuran and prednisone. Presented at sarcoidosis conference July 2021 after lung biopsy confirmed sarcoidosis. Cardiac MRI 1/12/22; significant LGE, consistent with an infiltrative process such as sarcoidosis. PET Cardiac 3/31/22 reports intense FDG uptake within the atria still remains suspicious for sarcoidosis. Patient was admitted for RHC 4/5/22 w/ biopsy; per notes after discussion w/ Dr. Francis and Dr. Bentley, cardiac biopsy deferred given low suspicion for cardiac sarcoidosis; RHC was completed. Patient is  RNP+ c/w MCTD however per Dr. Bentley note on 5/2/22, the sarcoid group thinks sarcoid is a reasonable possibility. Patient is to reach out to [facility] for another opinion.   - Continue PTA Prednisone   - Continue with PTA  Imuran.      ##. GERD: - Continue PTA PPI - IV while admitted     ##. Chronic GLENDA:  Hgb 13.0. Gets IV Iron Infusions  - PTA IV Iron infusions     ##. Neuropathy: She was seen by Dr. Abel from neurology in 5/2021, and nerve conduction studies and EMG was abnormal. She is planned for a nerve biopsy of her right foot.         At this time the patient has failed observation management due to pneumonia, respiratory infection and ongoing management of decreased cardiac function and elevated BMP and will be transferred to inpatient status.    Consults: cardiology    DATA:    Transfer Exam:    /68   Pulse 73   Temp 97.8  F (36.6  C) (Oral)   Resp 17   Ht 1.753 m (5' 9\")   Wt 71.8 kg (158 lb 4.6 oz)   SpO2 98%   BMI 23.38 kg/m      Constitutional: sleepy but easily arousable, appears fatigues, cooperative, no apparent distress, and appears stated age  Eyes: Lids and lashes normal, pupils equal, round and reactive to light, extra ocular muscles intact, " sclera clear, conjunctiva normal  ENT: Normocephalic, without obvious abnormality, atraumatic, sinuses nontender on palpation, external ears without lesions, oral pharynx with moist mucous membranes, tonsils without erythema or exudates, gums normal and good dentition.  Hematologic / Lymphatic: no cervical lymphadenopathy  Respiratory: No increased work of breathing, good air exchange, minimal basilar rales.   Cardiovascular: Normal apical impulse, regular rate and rhythm, normal S1 and S2, no S3 or S4, and no murmur noted  GI: No scars, normal bowel sounds, soft, non-distended, non-tender, no masses palpated, no hepatosplenomegally  Skin: no bruising or bleeding  Ext: BLE with pitting  Musculoskeletal: There is no redness, warmth, or swelling of the joints.  Full range of motion noted.  Motor strength is 5 out of 5 all extremities bilaterally.  Tone is normal.  Neurologic: Awake, alert, oriented to name, place and time.  Cranial nerves II-XII are grossly intact.  Motor is 5 out of 5 bilaterally.  Cerebellar finger to nose, heel to shin intact.  Sensory is intact.  Babinski down going, Romberg negative, and gait is normal.  Neuropsychiatric: General: normal, calm and normal eye contact     Current Medications:    No current outpatient medications on file.       Medications Prior to Admission:    Medications Prior to Admission   Medication Sig Dispense Refill Last Dose     albuterol (PROAIR HFA/PROVENTIL HFA/VENTOLIN HFA) 108 (90 Base) MCG/ACT inhaler Inhale 2 puffs into the lungs every 6 hours as needed for shortness of breath / dyspnea or wheezing        amiodarone (PACERONE) 200 MG tablet Take 1 tablet (200 mg) by mouth daily 30 tablet 0      apixaban ANTICOAGULANT (ELIQUIS) 5 MG tablet Take 1 tablet (5 mg) by mouth 2 times daily 180 tablet 3      ASPIRIN NOT PRESCRIBED (INTENTIONAL) Please choose reason not prescribed from choices below. (Patient not taking: Reported on 5/2/2022)        atorvastatin (LIPITOR) 40  MG tablet Take 40 mg by mouth        azaTHIOprine (IMURAN) 50 MG tablet Take 3 tablets (150 mg) by mouth daily And have labs drawn two weeks after starting medication. 90 tablet 3      blood glucose (NO BRAND SPECIFIED) test strip Use to test blood sugar 2 times daily or as directed. (Patient not taking: No sig reported) 100 strip 6      clonazePAM (KLONOPIN) 0.5 MG tablet Take 1 tablet (0.5 mg) by mouth nightly as needed for sleep (secondary to prednisone) MRx 1 60 tablet 1      folic acid (FOLVITE) 1 MG tablet Take 1 tablet (1 mg) by mouth daily 90 tablet 3      losartan (COZAAR) 50 MG tablet Take 0.5 tablets (25 mg) by mouth daily 45 tablet 3      magnesium oxide (MAG-OX) 400 MG tablet Take 1 tablet (400 mg) by mouth daily 90 tablet 0      metoprolol succinate ER (TOPROL XL) 50 MG 24 hr tablet Take 1 tablet (50 mg) by mouth daily 90 tablet 3      nitroGLYcerin (NITROSTAT) 0.3 MG sublingual tablet Place 1 tablet (0.3 mg) under the tongue every 5 minutes as needed for chest pain (esophageal spasm) For chest pain place 1 tablet under the tongue every 5 minutes for 3 doses. If symptoms persist 5 minutes after 3rd dose call 911. 12 tablet 1      pantoprazole (PROTONIX) 40 MG EC tablet Take 1 tablet (40 mg) by mouth daily 90 tablet 3      potassium chloride ER (MICRO-K) 10 MEQ CR capsule Take 30 mEq by mouth        predniSONE (DELTASONE) 10 MG tablet Take 1.5 tablets (15 mg) by mouth daily 90 tablet 3      spironolactone (ALDACTONE) 25 MG tablet Take 25 mg by mouth daily         sulfamethoxazole-trimethoprim (BACTRIM) 400-80 MG tablet Take 1 tablet by mouth daily (Patient taking differently: Take 1 tablet by mouth daily) 30 tablet 3      thin (NO BRAND SPECIFIED) lancets Use with lanceting device twice daily (Patient not taking: No sig reported) 100 each 0      torsemide (DEMADEX) 20 MG tablet Take 1 tablet (20 mg) by mouth 2 times daily 180 tablet 1      vitamin D2 (ERGOCALCIFEROL) 36612 units (1250 mcg) capsule Take  50,000 Units by mouth once a week          Significant Diagnostic Studies:     Results for orders placed or performed during the hospital encounter of 05/04/22   XR Chest 2 Views     Status: None    Narrative    EXAM: XR CHEST 2 VW  5/4/2022 8:16 PM     HISTORY:  fever       COMPARISON:  5/2/2022    FINDINGS:   PA and lateral upright views of the chest.. Trachea is midline.  Cardiomediastinal silhouette and pulmonary vasculature are within  normal limits. Bibasilar prominent mixed interstitial and patchy  pulmonary opacities No appreciable pneumothorax. Atherosclerotic  calcifications calcifications aortic knob. Osteopenia. No acute  osseous or normal. Surgical clips in the right quadrant of the  abdomen. Visualized portions upper abdomen are unremarkable.      Impression    IMPRESSION:   1. Mixed interstitial and patchy pulmonary opacities, most prominent  in the lower lobes bilaterally, favored to represent infection.  2. Trace bilateral pleural effusions.    I have personally reviewed the examination and initial interpretation  and I agree with the findings.    ANNABELLA BARTH MD         SYSTEM ID:  [id number]   Comprehensive metabolic panel     Status: Abnormal   Result Value Ref Range    Sodium 133 133 - 144 mmol/L    Potassium 3.6 3.4 - 5.3 mmol/L    Chloride 98 94 - 109 mmol/L    Carbon Dioxide (CO2) 27 20 - 32 mmol/L    Anion Gap 8 3 - 14 mmol/L    Urea Nitrogen 13 7 - 30 mg/dL    Creatinine 0.96 0.52 - 1.04 mg/dL    Calcium 8.2 (L) 8.5 - 10.1 mg/dL    Glucose 88 70 - 99 mg/dL    Alkaline Phosphatase 90 40 - 150 U/L    AST 24 0 - 45 U/L    ALT 19 0 - 50 U/L    Protein Total 6.0 (L) 6.8 - 8.8 g/dL    Albumin 2.6 (L) 3.4 - 5.0 g/dL    Bilirubin Total 0.8 0.2 - 1.3 mg/dL    GFR Estimate 68 >60 mL/min/1.73m2   INR     Status: Normal   Result Value Ref Range    INR 1.06 0.85 - 1.15   Lactic acid whole blood     Status: Normal   Result Value Ref Range    Lactic Acid 1.1 0.7 - 2.0 mmol/L   CRP inflammation     Status:  Abnormal   Result Value Ref Range    CRP Inflammation 27.0 (H) 0.0 - 8.0 mg/L   Erythrocyte sedimentation rate auto     Status: Normal   Result Value Ref Range    Erythrocyte Sedimentation Rate 26 0 - 30 mm/hr   Procalcitonin     Status: Abnormal   Result Value Ref Range    Procalcitonin 0.77 (H) <0.05 ng/mL   UA with Microscopic reflex to Culture     Status: Abnormal    Specimen: Urine, Midstream   Result Value Ref Range    Color Urine Yellow Colorless, Straw, Light Yellow, Yellow    Appearance Urine Clear Clear    Glucose Urine Negative Negative mg/dL    Bilirubin Urine Negative Negative    Ketones Urine Negative Negative mg/dL    Specific Gravity Urine 1.012 1.003 - 1.035    Blood Urine Negative Negative    pH Urine 6.0 5.0 - 7.0    Protein Albumin Urine Negative Negative mg/dL    Urobilinogen Urine 3.0 (A) Normal, 2.0 mg/dL    Nitrite Urine Negative Negative    Leukocyte Esterase Urine Trace (A) Negative    RBC Urine 1 <=2 /HPF    WBC Urine 5 <=5 /HPF    Squamous Epithelials Urine 4 (H) <=1 /HPF    Transitional Epithelials Urine <1 <=1 /HPF    Narrative    Urine Culture not indicated   CBC with platelets and differential     Status: Abnormal   Result Value Ref Range    WBC Count 10.4 4.0 - 11.0 10e3/uL    RBC Count 4.49 3.80 - 5.20 10e6/uL    Hemoglobin 13.0 11.7 - 15.7 g/dL    Hematocrit 39.6 35.0 - 47.0 %    MCV 88 78 - 100 fL    MCH 29.0 26.5 - 33.0 pg    MCHC 32.8 31.5 - 36.5 g/dL    RDW 14.7 10.0 - 15.0 %    Platelet Count 253 150 - 450 10e3/uL    % Neutrophils 85 %    % Lymphocytes 7 %    % Monocytes 7 %    % Eosinophils 0 %    % Basophils 0 %    % Immature Granulocytes 1 %    NRBCs per 100 WBC 0 <1 /100    Absolute Neutrophils 8.8 (H) 1.6 - 8.3 10e3/uL    Absolute Lymphocytes 0.8 0.8 - 5.3 10e3/uL    Absolute Monocytes 0.7 0.0 - 1.3 10e3/uL    Absolute Eosinophils 0.0 0.0 - 0.7 10e3/uL    Absolute Basophils 0.0 0.0 - 0.2 10e3/uL    Absolute Immature Granulocytes 0.1 <=0.4 10e3/uL    Absolute NRBCs 0.0  10e3/uL   Asymptomatic COVID-19 Virus (Coronavirus) by PCR Nose     Status: Normal    Specimen: Nose; Swab   Result Value Ref Range    SARS CoV2 PCR Negative Negative, Testing sent to reference lab. Results will be returned via unsolicited result    Narrative    Testing was performed using the Xpert Xpress SARS-CoV-2 Assay on the  Cepheid Gene-Xpert Instrument Systems. Additional information about  this Emergency Use Authorization (EUA) assay can be found via the Lab  Guide. This test should be ordered for the detection of SARS-CoV-2 in  individuals who meet SARS-CoV-2 clinical and/or epidemiological  criteria. Test performance is unknown in asymptomatic patients. This  test is for in vitro diagnostic use under the FDA EUA for  laboratories certified under CLIA to perform high complexity testing.  This test has not been FDA cleared or approved. A negative result  does not rule out the presence of PCR inhibitors in the specimen or  target RNA in concentration below the limit of detection for the  assay. The possibility of a false negative should be considered if  the patient's recent exposure or clinical presentation suggests  COVID-19. This test was validated by the [facility]. This laboratory is certified under  the Clinical Laboratory Improvement Amendments of 1988 (CLIA-88) as  qualified to perform high complexity laboratory testing.     Nt probnp inpatient     Status: Abnormal   Result Value Ref Range    N terminal Pro BNP Inpatient 5,559 (H) 0 - 900 pg/mL   Troponin I     Status: Abnormal   Result Value Ref Range    Troponin I High Sensitivity 80 (H) <54 ng/L   Troponin I     Status: Normal   Result Value Ref Range    Troponin I High Sensitivity 51 <54 ng/L   Magnesium     Status: Abnormal   Result Value Ref Range    Magnesium 1.3 (L) 1.6 - 2.3 mg/dL   Phosphorus     Status: Abnormal   Result Value Ref Range    Phosphorus 2.3 (L) 2.5 - 4.5 mg/dL   TSH with free  T4 reflex     Status: Abnormal   Result Value Ref Range    TSH 4.26 (H) 0.40 - 4.00 mU/L   Extra Tube     Status: None    Narrative    The following orders were created for panel order Extra Tube.  Procedure                               Abnormality         Status                     ---------                               -----------         ------                     Extra Purple Top Tube[[id number]]                            Final result                 Please view results for these tests on the individual orders.   Extra Purple Top Tube     Status: None   Result Value Ref Range    Hold Specimen JI    T4 free     Status: Abnormal   Result Value Ref Range    Free T4 1.74 (H) 0.76 - 1.46 ng/dL   Potassium     Status: Normal   Result Value Ref Range    Potassium 3.6 3.4 - 5.3 mmol/L   EKG 12-lead, tracing only     Status: None   Result Value Ref Range    Systolic Blood Pressure  mmHg    Diastolic Blood Pressure  mmHg    Ventricular Rate 100 BPM    Atrial Rate 100 BPM    WY Interval 154 ms    QRS Duration 128 ms     ms    QTc 503 ms    P Axis 48 degrees    R AXIS -37 degrees    T Axis 116 degrees    Interpretation ECG       Sinus rhythm  Left axis deviation  Left ventricular hypertrophy with QRS widening and repolarization abnormality  Abnormal ECG  Unconfirmed report - interpretation of this ECG is computer generated - see medical record for final interpretation  Confirmed by - EMERGENCY ROOM, PHYSICIAN ([id number]),  NAHOMY COLIN ([id number]) on 5/4/2022 10:22:44 PM     Respiratory Panel PCR - NP Swab     Status: Abnormal    Specimen: Nasopharyngeal; Swab   Result Value Ref Range    Adenovirus Not Detected Not Detected    Coronavirus Not Detected Not Detected    Human Metapneumovirus Not Detected Not Detected    Human Rhin/Enterovirus Not Detected Not Detected    Influenza A Not Detected Not Detected    Influenza A, H1 Not Detected Not Detected    Influenza A 2009 H1N1 Not Detected Not Detected    Influenza A,  H3 Not Detected Not Detected    Influenza B Not Detected Not Detected    Parainfluenza Virus 1 Not Detected Not Detected    Parainfluenza Virus 2 Not Detected Not Detected    Parainfluenza Virus 3 Not Detected Not Detected    Parainfluenza Virus 4 Detected (A) Not Detected    Respiratory Syncytial Virus A Not Detected Not Detected    Respiratory Syncytial Virus B Not Detected Not Detected    Chlamydia Pneumoniae Not Detected Not Detected    Mycoplasma Pneumoniae Not Detected Not Detected    Narrative    The e[employer] Respiratory Viral Panel is a qualitative nucleic acid, multiplex, in vitro diagnostic test for the simultaneous detection and identification of multiple respiratory viral and bacterial nucleic acids in nasopharyngeal swabs collected in viral transport media from individual exhibiting signs and symptoms of respiratory infection. The assay has received FDA approval for the testing of nasopharyngeal (NP) swabs only. This test has been verified and is performed by the Infect[facility] Diseases Diagnostic Laboratory at [facility]. This test is used for clinical purposes and should not be regarded as investigational or for research. This laboratory is certified under the Clinical Laboratory Improvement Amendments of 1988 (CLIA-88) as qualified to perform high complexity clinical laboratory testing.   CBC with platelets differential     Status: Abnormal    Narrative    The following orders were created for panel order CBC with platelets differential.  Procedure                               Abnormality         Status                     ---------                               -----------         ------                     CBC with platelets and d...[[id number]]  Abnormal            Final result                 Please view results for these tests on the individual orders.       Signed:  LÓPEZ Rausch CNP  May 5, 2022 at 8:26 AM

## 2022-05-05 NOTE — PROGRESS NOTES
"Resident/Fellow Attestation   I, Pancho Mckeon MD, was present with the medical student who participated in the service and in the documentation of the note.  I have verified the history and personally performed the physical exam and medical decision making.  I agree with the assessment and plan of care as documented in the note.      Parainfluenza infection  - Likely etiology which explains her presentation rather than any decompensation of her many chronic cardiac and pulmonary conditions. Symptomatic relief improving with conservative cares. Likely discharge tomorrow.       Pancho \"BJ\" MD Mike, PhD  PGY-2 Internal Medicine  [phone] [text page]    Date of Service (when I saw the patient): 05/05/22      [facility]    Progress Note - Medicine Service, [facility] TEAM 5       Date of Admission:  5/4/2022    Assessment & Plan            Jeanine Schuler is a 61 year old female admitted on 5/4/2022. She has a history of gastric bypass, hypoalbuminemia, SBO, ILD, Raynaud's syndrome, MCTD/limited cutaneous systemic sclerosis (+RF), possible cardiac sarcoidosis, NICM (LVEF 35% on echo 12/21/21, 44% on CMR 1/12/22), HFrEF, s/p RHC (4/7/22), PAH, PAF (on Eliquis), COVID-19 (March 2022), neuropathy, GERD w/ dysphagia, iron deficiency anemia who presented to the ED for evaluation of shortness of breath, cough, fever and brief episode of nausea likely 2/2 parainfluenza croup.    Today's changes:  - cardiology was consulted  - NT pro-BNP elevated, lasix given  - Echo completed, not concerning for acute process  - eye drops for dry eyes  - discontinue levaquin    # Cough likely 2/2 acute parainfluenza croup on chronic HFrEF, sarcoidosis  # SOB  # Fever  Cough x 6 weeks, worse starting Monday; non productive. Was coughing so much almost threw up. Does not notice change in pitch of cough. Increased SOB. Onset of chills 5/4, fever to 102 F, took Advil with relief. " Prednisone increased to 15 mg from 10 mg, 1 week ago 2/2 respiratory symptoms and decreased weight, which helped appetite. Reports chronic orthopnea x 3, LE edema (worse Monday, was holding diuretics). Reports sick contacts in grandchildren with recent with GI/Resp illness with cough. On admission, vitally stable on room air, CBC, BMP, INR, LA, ESR not concerning. CRP and procal moderately elevated. Troponin originally 80 but wnl on repeat. Covid negative, resp panel positive for parainfluenza 4. NT-BNP at 5.5K, was ~1K 5/2. Echo not concerning for acute change. CXR reports mixed interstitial and patchy pulmonary opacities, most prominent in the lower lobes bilaterally, favored to represent infection; trace bilateral pleural effusions. Also possible steeple sign. In ED the patient was given NS bolus 250ml, Levaquin 500mg IV x 1.   - discontinue Levaquin 500mg IV daily  - Cardiology consult  - Strict I/O  - Daily weights  - Follow up on blood culture  - Lasix given 5/5    # Pulmonary Sarcoidosis  # Scleroderma  # MCTD/ Limited Systemic Sclerosis   Left upper lobe transbronchial cryobiopsy done 10/6/2020 showed nonnecrotizing granulomas with multi nucleated giant cells consistent with sarcoidosis. PFTs improved with Imuran and prednisone. Presented at sarcoidosis conference July 2021 after lung biopsy confirmed sarcoidosis. Cardiac MRI 1/12/22; significant LGE, consistent with an infiltrative process such as sarcoidosis. PET Cardiac 3/31/22 reports intense FDG uptake within the atria still remains suspicious for sarcoidosis. Patient was admitted for RHC 4/5/22 w/ biopsy; per notes after discussion w/ Dr. Francis and Dr. Bentley, cardiac biopsy deferred given low suspicion for cardiac sarcoidosis; RHC was completed. Patient is RNP+ c/w MCTD however per Dr. Bentley note on 5/2/22, the sarcoid group thinks sarcoid is a reasonable possibility. Patient is to reach out to [facility] for another opinion.   - PTA  Prednisone 15 mg daily  - PTA  Imuran     # NICM LVEF 35% on echo 44% on CMR, NYHA II-III: Echo 12/21/21 with EF 35%, global hypokinesis, distal LV segments, RV mildly dilated, mild to moderate regurgitation, and moderate to severe tricuspid regurg. Cardiac MRI 1/12/22 with LVEF 44%. Myocardial PET 3/31/22 with EF 23%. Patient states she has not been able to take any of her home medications today including all her diuretics. Admission NT pro-BNP 5.5 K, from 999 on 5/2. Echo 5/5 shows improvement with EF 50-55%.  - PTA Losartan  - PTA Toprol XL  - PTA Aldactone  - PTA Torsemide     # R flank pain, resolving  Patient reports 1 day of right flank discomfort and foul smelling urine  - UA & UCx - non infectious appearing     # Hx PAF: EKG SR with repolarization abnormality.    - PTA Amiodarone 200mg daily  - PTA Toprol XL 50mg daily  - PTA Eliquis  - Keep Mg> 2 and K>4 - Replace per protocol     # GERD: - Continue PTA PPI - IV while admitted     # Chronic GLENDA:  Hgb 13.0. Gets IV Iron Infusions  - PTA IV Iron infusions     # Neuropathy: She was seen by Dr. Abel from neurology in 5/2021, and nerve conduction studies and EMG was abnormal. She had a nerve biopsy of her right foot. No sensation in feet bilaterally.    # Dry eyes  Has history of excessive lacrimation requiring plugging and laser shutting lacrimal ducts bilaterally. Noticed dry eyes recently with blurry vision.  - eye drops given     Diet: Advance Diet as Tolerated: Regular Diet Adult; 2 gm NA Diet  Snacks/Supplements Adult: Magic Cup; With Meals    DVT Prophylaxis: DOAC  Deleon Catheter: Not present  Fluids: None  Central Lines: None  Cardiac Monitoring: ACTIVE order. Indication: Tachyarrhythmias, acute (48 hours)  Code Status: Full Code      Disposition Plan   Expected Discharge: 1-2 midnights  Anticipated discharge location:  Awaiting care coordination huddle  Delays: None       The patient's care was discussed with the Attending Physician, Dr. Cristobal,  Patient and Patient's Family.    Harvey Glez, MS4  Medical Student  [facility], [facility]  [facility]  Securely message with the Vocera Web Console (learn more here)  Text page via [facility] Paging/Directory   Please see signed in provider for up to date coverage information      Clinically Significant Risk Factors Present on Admission           # Hypomagnesemia: Mg = 1.3 mg/dL (Ref range: 1.6 - 2.3 mg/dL) on admission, will replace as needed   # Hypoalbuminemia: Albumin = 2.2 g/dL (Ref range: 3.4 - 5.0 g/dL) on admission, will monitor as appropriate   # Coagulation Defect: home medication list includes an anticoagulant medication  # Platelet Defect: home medication list includes an antiplatelet medication       ______________________________________________________________________    Interval History   Cough is about the same, as is work of breathing. Otherwise sense of nausea has subsided. Noticed increase in ANTONIO. Otherwise denies pain.    4 point ROS negative unless otherwise specified.    Data reviewed today: I reviewed all medications, new labs and imaging results over the last 24 hours.    Physical Exam   Vital Signs: Temp: 97.9  F (36.6  C) Temp src: Oral BP: 117/85 Pulse: 66   Resp: 18 SpO2: 98 % O2 Device: None (Room air)    Weight: 158 lbs 4.64 oz  Constitutional: awake, alert, cooperative, no apparent distress, and appears stated age  Respiratory: No increased work of breathing, decreased breath sounds throughout, inspiratory wheezes  Cardiovascular: Normal apical impulse, regular rate, irregularly irregular rhythm, no murmur noted  GI: No scars, soft, non-distended, non-tender, no masses palpated, no hepatosplenomegally  Skin: no bruising or bleeding, no redness, warmth, or swelling, no rashes, no lesions and no jaundice  Musculoskeletal: 2+ ANTONIO bilaterally  Neurologic: does not have sensation in feet bilaterally on both plantar and dorsal surfaces,  has mild sensation at ankles bilaterally but better on left    Data   Recent Labs   Lab 05/05/22  1758 05/05/22  0349 05/04/22  2113 05/02/22  1143   WBC  --   --  10.4 4.0   HGB  --   --  13.0 13.2   MCV  --   --  88 88   PLT  --   --  253 267   INR  --   --  1.06  --      --  133 132*   POTASSIUM 3.6 3.6 3.6 3.7   CHLORIDE 99  --  98 96   CO2 30  --  27 25   BUN 13  --  13 10   CR 0.94  --  0.96 0.77   ANIONGAP 7  --  8 11   DAVID 8.3*  --  8.2* 8.5   *  --  88 75   ALBUMIN 2.2*  --  2.6*  --    PROTTOTAL  --   --  6.0*  --    BILITOTAL  --   --  0.8  --    ALKPHOS  --   --  90  --    ALT  --   --  19  --    AST  --   --  24  --      Recent Results (from the past 24 hour(s))   XR Chest 2 Views    Narrative    EXAM: XR CHEST 2 VW  5/4/2022 8:16 PM     HISTORY:  fever       COMPARISON:  5/2/2022    FINDINGS:   PA and lateral upright views of the chest.. Trachea is midline.  Cardiomediastinal silhouette and pulmonary vasculature are within  normal limits. Bibasilar prominent mixed interstitial and patchy  pulmonary opacities No appreciable pneumothorax. Atherosclerotic  calcifications calcifications aortic knob. Osteopenia. No acute  osseous or normal. Surgical clips in the right quadrant of the  abdomen. Visualized portions upper abdomen are unremarkable.      Impression    IMPRESSION:   1. Mixed interstitial and patchy pulmonary opacities, most prominent  in the lower lobes bilaterally, favored to represent infection.  2. Trace bilateral pleural effusions.    I have personally reviewed the examination and initial interpretation  and I agree with the findings.    ANNABELLA BARTH MD         SYSTEM ID:  [id number]   Echocardiogram Complete   Result Value    LVEF  50-55%    Narrative    [id number]  [id number]  [id number]  [id number]^WIL^EMEKA^A     [facility],[place]  Echocardiography Laboratory  [address] [place] [id number]     Name: OSCAR REYES  MRN:  [id number]  : 1960  Study Date: 2022 09:35 AM  Age: 61 yrs  Gender: Female  Patient Location: [facility]  Reason For Study: SOB, Edema, Heart Failure, Cardiomyopathy  Ordering Physician: EMEKA DE LA TORRE  Performed By: Radha Btut     BSA: 1.9 m2  Height: 69 in  Weight: 158 lb  HR: 66  BP: 103/68 mmHg  ______________________________________________________________________________  Procedure  Complete Portable Echo Adult. Contrast Optison. Optison (NDC #5622-8568-26)  given intravenously. Patient was given 6 ml mixture of 3 ml Optison and 6 ml  saline. 3 ml wasted.  ______________________________________________________________________________  Interpretation Summary  Left ventricular wall thickness is normal. Left ventricular size is normal.  The visual ejection fraction is 50-55%. No regional wall motion abnormalities  are seen.  Right ventricular function, chamber size, wall motion, and thickness are  normal.  Mild mitral insufficiency is present. Mild to moderate tricuspid insufficiency  is present.  Ascending aorta 4.2 cm indexed at 2.1cm/m2.  Dilation of the inferior vena cava is present with abnormal respiratory  variation in diameter. No pericardial effusion is present.     Compared to prior study LVEF appears to have improved  ______________________________________________________________________________  Left Ventricle  Left ventricular wall thickness is normal. Left ventricular size is normal.  The visual ejection fraction is 50-55%. Grade I or early diastolic  dysfunction. No regional wall motion abnormalities are seen.     Right Ventricle  Right ventricular function, chamber size, wall motion, and thickness are  normal.     Atria  Both atria appear normal.     Mitral Valve  Mild mitral annular calcification is present. Mild mitral insufficiency is  present.     Aortic Valve  The aortic valve is tricuspid. There is moderate aortic sclerosis of the right  coronary cusp. No aortic  regurgitation is present.     Tricuspid Valve  The tricuspid valve is normal. Mild to moderate tricuspid insufficiency is  present. The right ventricular systolic pressure is approximated at 18.3 mmHg  plus the right atrial pressure.     Pulmonic Valve  The pulmonic valve is normal.     Vessels  Ascending aorta 4.2 cm. IVC diameter >2.1 cm collapsing <50% with sniff  suggests a high RA pressure estimated at 15 mmHg or greater. Dilation of the  inferior vena cava is present with abnormal respiratory variation in diameter.     Pericardium  No pericardial effusion is present.     Compared to Previous Study  Compared to prior study LVEF appears to have improved.  ______________________________________________________________________________  MMode/2D Measurements & Calculations     IVSd: 1.1 cm  LVIDd: 5.2 cm  LVIDs: 3.7 cm  LVPWd: 1.0 cm  FS: 30.2 %  LV mass(C)d: 215.9 grams  LV mass(C)dI: 115.5 grams/m2  asc Aorta Diam: 4.1 cm  LVOT diam: 2.2 cm  LVOT area: 3.9 cm2  LA Volume Index (BP): 35.3 ml/m2  RWT: 0.39     Doppler Measurements & Calculations  MV E max ck: 59.2 cm/sec  MV A max ck: 75.3 cm/sec  MV E/A: 0.79  MV dec slope: 197.2 cm/sec2  MV dec time: 0.30 sec  Ao V2 max: 154.5 cm/sec  Ao max P.6 mmHg  Ao V2 mean: 106.5 cm/sec  Ao mean P.1 mmHg  Ao V2 VTI: 27.0 cm  ALLISON(I,D): 3.8 cm2  ALLISON(V,D): 3.4 cm2  LV V1 max P.1 mmHg  LV V1 max: 133.0 cm/sec  LV V1 VTI: 25.9 cm  SV(LVOT): 101.6 ml  SI(LVOT): 54.3 ml/m2  PA acc time: 0.07 sec  TR max ck: 213.6 cm/sec  TR max P.3 mmHg  AV Ck Ratio (DI): 0.86  ALLISON Index (cm2/m2): 2.0  E/E' av.1  Lateral E/e': 5.9  Medial E/e': 12.3     ______________________________________________________________________________  Report approved by: Mirza HARPER 2022 11:48 AM           Medications       [START ON 2022] amiodarone  200 mg Oral Daily     apixaban ANTICOAGULANT  5 mg Oral BID     atorvastatin  40 mg Oral QPM     azaTHIOprine  150 mg Oral  Daily     [START ON 5/6/2022] levofloxacin  500 mg Intravenous Q24H     losartan  25 mg Oral Daily     magnesium oxide  400 mg Oral Daily     metoprolol succinate ER  50 mg Oral Daily     pantoprazole  40 mg Oral Daily     potassium chloride ER  30 mEq Oral Daily     predniSONE  15 mg Oral Daily     spironolactone  25 mg Oral Daily     sulfamethoxazole-trimethoprim  1 tablet Oral Daily     torsemide  20 mg Oral BID

## 2022-05-05 NOTE — ED PROVIDER NOTES
ED Provider Note  [facility]      History     Chief Complaint   Patient presents with     Nausea     The history is provided by the patient.     Jeanine Schuler is a 61 year old female with a past medical history significant for pulmonary nocardiosis, interstitial lung disease, neuropathy, A. fib (on Eliquis) who presents to the ED for evaluation of nausea and shortness of breath.  Patient reports having increased shortness of breath and cough last night.  He reports a fever of 102 at home today.  She reports an onset of chills and tremors earlier today.  Patient's prednisone was increased to 15 mg 1 week ago after being down to 10 mg.  Patient reports she has been having intermittent A. fib since December 2021.  Patient is currently on Eliquis.  Patient reports that she does not lay flat at night and props herself up with pillows.  Patient was diagnosed with COVID-19 in March 2022, patient is fully vaccinated with a booster.  Patient reports mild abdominal pain that she believes is from her chills and cough.  Denies vomiting.     Past Medical History  Past Medical History:   Diagnosis Date     Anemia      Bariatric surgery status 06/27/2005    [facility];     Cellulitis of leg 06/06/2013     Esophageal reflux     Better post-hiatal hernia repair and weight loss     Hyperplastic colonic polyp      Hypovitaminosis D 2011     ILD (interstitial lung disease) (H)      Kidney stone 04/29/2007     Kidney stone 05/10/2007    surgically removed     Limb ischemia; ulcers of fingertips 04/22/2013     Other chronic pain     Left shoulder - rheumatoid arthritis     Raynaud's syndrome      Rheumatoid arthritis (H) \     Scleroderma (H)      Sjogren-Jake syndrome      Systemic sclerosis (H) 2009     Unspecified essential hypertension      Past Surgical History:   Procedure Laterality Date     BIOPSY MUSCLE DIAGNOSTIC (LOCATION) Right 7/19/2021    Procedure: Right SURAL nerve  BIOPSY;  Surgeon: Farooq Abel MD;  Location: [facility] OR     BRONCHOSCOPY FLEXIBLE,L CRYOBIOPSY N/A 10/06/2020    Procedure: BRONCHOSCOPY, FLEXIBLE, WITH TRANSBRONCHIAL BIOPSY x4 USING CRYOPROBE, bronchial alveolar lavage;  Surgeon: Teddy Mendez MD;  Location: [facility] OR     BYPASS GASTRIC, CHOLECYSTECTOMY, COMBINED  06/27/2005    [facility]     CHOLECYSTECTOMY       COLONOSCOPY       COLONOSCOPY N/A 11/17/2020    Procedure: COLONOSCOPY, WITH POLYPECTOMY AND BIOPSY;  Surgeon: Jamie Cárdenas MD;  [place] [facility] [place] HEART CATHETERIZATION WITH POSSIBLE INTERVENTION Left 12/21/2021    Procedure: Heart Catheterization with Possible Intervention;  Surgeon: Wesley Mclean MD;  Location:  HEART CARDIAC CATH LAB     CV RIGHT HEART CATH MEASUREMENTS RECORDED N/A 4/7/2022    Procedure: Right Heart Cath;  Surgeon: Dieter Brock MD;  Location:  HEART CARDIAC CATH LAB     ESOPHAGOSCOPY, GASTROSCOPY, DUODENOSCOPY (EGD), COMBINED N/A 03/10/2016    Procedure: COMBINED ESOPHAGOSCOPY, GASTROSCOPY, DUODENOSCOPY (EGD), BIOPSY SINGLE OR MULTIPLE;  Surgeon: Tricia Zambrano MD;  Location:  GI     ESOPHAGOSCOPY, GASTROSCOPY, DUODENOSCOPY (EGD), COMBINED N/A 11/17/2020    Procedure: ESOPHAGOGASTRODUODENOSCOPY, WITH BIOPSY and Dilation;  Surgeon: Jamie Cárdenas MD;  [place] [facility] [place]     [place]AR SURGERY       PICC INSERTION  06/05/2013    5fr DL Power PICC, 44cm, left basilic vein, tip in low SVC     SURGICAL HISTORY OF -       Left ear surgery - incus transition, tympanoplasty     SURGICAL HISTORY OF -   06/01/2005    Hiatal hernia repair     TONSILLECTOMY       albuterol (PROAIR HFA/PROVENTIL HFA/VENTOLIN HFA) 108 (90 Base) MCG/ACT inhaler  amiodarone (PACERONE) 200 MG tablet  apixaban ANTICOAGULANT (ELIQUIS) 5 MG tablet  ASPIRIN NOT PRESCRIBED (INTENTIONAL)  atorvastatin (LIPITOR) 40 MG tablet  azaTHIOprine (IMURAN) 50 MG tablet  blood glucose (NO BRAND SPECIFIED) test  strip  clonazePAM (KLONOPIN) 0.5 MG tablet  folic acid (FOLVITE) 1 MG tablet  losartan (COZAAR) 50 MG tablet  magnesium oxide (MAG-OX) 400 MG tablet  metoprolol succinate ER (TOPROL XL) 50 MG 24 hr tablet  nitroGLYcerin (NITROSTAT) 0.3 MG sublingual tablet  pantoprazole (PROTONIX) 40 MG EC tablet  potassium chloride ER (MICRO-K) 10 MEQ CR capsule  predniSONE (DELTASONE) 10 MG tablet  spironolactone (ALDACTONE) 25 MG tablet  sulfamethoxazole-trimethoprim (BACTRIM) 400-80 MG tablet  thin (NO BRAND SPECIFIED) lancets  torsemide (DEMADEX) 20 MG tablet  vitamin D2 (ERGOCALCIFEROL) 71480 units (1250 mcg) capsule      Allergies   Allergen Reactions     Lovenox Other (See Comments)     Blood clot      Norvasc [Amlodipine Besylate] Swelling     Lip swelling that happened on 2 different occasions     Erythromycin Rash     Rash on arms     Family History  Family History   Problem Relation Age of Onset     Cerebrovascular Disease Father      Heart Disease Father      Hypertension Father      Heart Disease Mother      Hypertension Mother      Chronic Obstructive Pulmonary Disease Sister      Heart Disease Sister      Hypertension Sister      Cerebrovascular Disease Brother      Heart Disease Brother      Kidney Disease Brother         on dialysis     Diabetes Brother         borderline     No Known Problems Son      No Known Problems Son      No Known Problems Daughter      Cancer Brother         small cell     Heart Disease Brother      Heart Disease Brother      Heart Disease Brother      Heart Disease Brother         tripple A     Heart Disease Sister      Substance Abuse Sister         alcoholism     Crohn's Disease No family hx of      Ulcerative Colitis No family hx of      GERD No family hx of      Celiac Disease No family hx of      Nephrolithiasis No family hx of      Parathyroid Disorders No family hx of      Calcium Disorder No family hx of      Social History   Social History     Tobacco Use     Smoking status: Never  Smoker     Smokeless tobacco: Never Used   Vaping Use     Vaping Use: Never used   Substance Use Topics     Alcohol use: Yes     Comment: occassionally     Drug use: No      Past medical history, past surgical history, medications, allergies, family history, and social history were reviewed with the patient. No additional pertinent items.       Review of Systems   Constitutional: Positive for chills. Negative for fever.   Respiratory: Positive for cough and shortness of breath.    Gastrointestinal: Positive for abdominal pain and nausea. Negative for vomiting.   Neurological: Positive for tremors.   All other systems reviewed and are negative.    A complete review of systems was performed with pertinent positives and negatives noted in the HPI, and all other systems negative.    Physical Exam   BP: 107/75  Pulse: 99  Temp: 98.4  F (36.9  C)  Resp: 18  SpO2: 94 %  Physical Exam  Constitutional:       General: She is not in acute distress.     Appearance: She is well-developed. She is ill-appearing. She is not toxic-appearing.   HENT:      Head: Normocephalic and atraumatic.   Cardiovascular:      Rate and Rhythm: Normal rate and regular rhythm.      Pulses: Normal pulses.      Heart sounds: Normal heart sounds.   Pulmonary:      Effort: Pulmonary effort is normal. No respiratory distress.      Breath sounds: Normal breath sounds. No wheezing or rales.   Abdominal:      General: There is no distension.      Palpations: Abdomen is soft.      Tenderness: There is no abdominal tenderness. There is no rebound.   Musculoskeletal:         General: No swelling or tenderness.      Cervical back: Normal range of motion.   Skin:     General: Skin is warm and dry.   Neurological:      General: No focal deficit present.      Mental Status: She is alert and oriented to person, place, and time.      Cranial Nerves: No cranial nerve deficit.      Sensory: No sensory deficit.      Motor: No weakness.      Coordination: Coordination  normal.   Psychiatric:         Behavior: Behavior normal.         Thought Content: Thought content normal.         ED Course     8:51 PM  The patient was seen and examined by Leesa Bliss MD in Room [facility].   Procedures            EKG Interpretation:      Interpreted by Leesa Bliss MD  Time reviewed: 1946  Symptoms at time of EKG: none   Rhythm: normal sinus   Rate: normal  Axis: left axis  Ectopy: none  Conduction: normal  ST Segments/ T Waves: T wave inversions lead 6  Q Waves: none  Comparison to prior: new v6 T wave inversions    Clinical Impression: new changes.             Results for orders placed or performed during the hospital encounter of 05/04/22   XR Chest 2 Views     Status: None    Narrative    EXAM: XR CHEST 2 VW  5/4/2022 8:16 PM     HISTORY:  fever       COMPARISON:  5/2/2022    FINDINGS:   PA and lateral upright views of the chest.. Trachea is midline.  Cardiomediastinal silhouette and pulmonary vasculature are within  normal limits. Bibasilar prominent mixed interstitial and patchy  pulmonary opacities No appreciable pneumothorax. Atherosclerotic  calcifications calcifications aortic knob. Osteopenia. No acute  osseous or normal. Surgical clips in the right quadrant of the  abdomen. Visualized portions upper abdomen are unremarkable.      Impression    IMPRESSION:   1. Mixed interstitial and patchy pulmonary opacities, most prominent  in the lower lobes bilaterally, favored to represent infection.  2. Trace bilateral pleural effusions.    I have personally reviewed the examination and initial interpretation  and I agree with the findings.    ANNABELLA BARTH MD         SYSTEM ID:  [id number]   Comprehensive metabolic panel     Status: Abnormal   Result Value Ref Range    Sodium 133 133 - 144 mmol/L    Potassium 3.6 3.4 - 5.3 mmol/L    Chloride 98 94 - 109 mmol/L    Carbon Dioxide (CO2) 27 20 - 32 mmol/L    Anion Gap 8 3 - 14 mmol/L    Urea Nitrogen 13 7 - 30 mg/dL    Creatinine 0.96 0.52 -  1.04 mg/dL    Calcium 8.2 (L) 8.5 - 10.1 mg/dL    Glucose 88 70 - 99 mg/dL    Alkaline Phosphatase 90 40 - 150 U/L    AST 24 0 - 45 U/L    ALT 19 0 - 50 U/L    Protein Total 6.0 (L) 6.8 - 8.8 g/dL    Albumin 2.6 (L) 3.4 - 5.0 g/dL    Bilirubin Total 0.8 0.2 - 1.3 mg/dL    GFR Estimate 68 >60 mL/min/1.73m2   INR     Status: Normal   Result Value Ref Range    INR 1.06 0.85 - 1.15   Lactic acid whole blood     Status: Normal   Result Value Ref Range    Lactic Acid 1.1 0.7 - 2.0 mmol/L   CRP inflammation     Status: Abnormal   Result Value Ref Range    CRP Inflammation 27.0 (H) 0.0 - 8.0 mg/L   Erythrocyte sedimentation rate auto     Status: Normal   Result Value Ref Range    Erythrocyte Sedimentation Rate 26 0 - 30 mm/hr   Procalcitonin     Status: Abnormal   Result Value Ref Range    Procalcitonin 0.77 (H) <0.05 ng/mL   CBC with platelets and differential     Status: Abnormal   Result Value Ref Range    WBC Count 10.4 4.0 - 11.0 10e3/uL    RBC Count 4.49 3.80 - 5.20 10e6/uL    Hemoglobin 13.0 11.7 - 15.7 g/dL    Hematocrit 39.6 35.0 - 47.0 %    MCV 88 78 - 100 fL    MCH 29.0 26.5 - 33.0 pg    MCHC 32.8 31.5 - 36.5 g/dL    RDW 14.7 10.0 - 15.0 %    Platelet Count 253 150 - 450 10e3/uL    % Neutrophils 85 %    % Lymphocytes 7 %    % Monocytes 7 %    % Eosinophils 0 %    % Basophils 0 %    % Immature Granulocytes 1 %    NRBCs per 100 WBC 0 <1 /100    Absolute Neutrophils 8.8 (H) 1.6 - 8.3 10e3/uL    Absolute Lymphocytes 0.8 0.8 - 5.3 10e3/uL    Absolute Monocytes 0.7 0.0 - 1.3 10e3/uL    Absolute Eosinophils 0.0 0.0 - 0.7 10e3/uL    Absolute Basophils 0.0 0.0 - 0.2 10e3/uL    Absolute Immature Granulocytes 0.1 <=0.4 10e3/uL    Absolute NRBCs 0.0 10e3/uL   EKG 12-lead, tracing only     Status: None   Result Value Ref Range    Systolic Blood Pressure  mmHg    Diastolic Blood Pressure  mmHg    Ventricular Rate 100 BPM    Atrial Rate 100 BPM    TN Interval 154 ms    QRS Duration 128 ms     ms    QTc 503 ms    P Axis 48  degrees    R AXIS -37 degrees    T Axis 116 degrees    Interpretation ECG       Sinus rhythm  Left axis deviation  Left ventricular hypertrophy with QRS widening and repolarization abnormality  Abnormal ECG  Unconfirmed report - interpretation of this ECG is computer generated - see medical record for final interpretation  Confirmed by - EMERGENCY ROOM, PHYSICIAN ([id number]),  NAHOMY COLIN ([id number]) on 5/4/2022 10:22:44 PM     CBC with platelets differential     Status: Abnormal    Narrative    The following orders were created for panel order CBC with platelets differential.  Procedure                               Abnormality         Status                     ---------                               -----------         ------                     CBC with platelets and d...[[id number]]  Abnormal            Final result                 Please view results for these tests on the individual orders.     Medications   0.9% sodium chloride BOLUS (0 mLs Intravenous Stopped 5/4/22 2214)     Followed by   sodium chloride 0.9% infusion (has no administration in time range)   levofloxacin (LEVAQUIN) infusion 500 mg (has no administration in time range)                Results for orders placed or performed during the hospital encounter of 05/04/22   XR Chest 2 Views     Status: None (Preliminary result)    Impression    RESIDENT PRELIMINARY INTERPRETATION  IMPRESSION:   1. Mixed interstitial and patchy pulmonary opacities, most prominent  in the lower lobes bilaterally, favored to represent infection and/or  atelectasis.  2. Trace bilateral pleural effusions.   EKG 12-lead, tracing only     Status: None (Preliminary result)   Result Value Ref Range    Systolic Blood Pressure  mmHg    Diastolic Blood Pressure  mmHg    Ventricular Rate 100 BPM    Atrial Rate 100 BPM    FL Interval 154 ms    QRS Duration 128 ms     ms    QTc 503 ms    P Axis 48 degrees    R AXIS -37 degrees    T Axis 116 degrees    Interpretation ECG        Sinus rhythm  Left axis deviation  Left ventricular hypertrophy with QRS widening and repolarization abnormality  Abnormal ECG     CBC with platelets differential     Status: None ()    Narrative    The following orders were created for panel order CBC with platelets differential.  Procedure                               Abnormality         Status                     ---------                               -----------         ------                     CBC with platelets and d...[[id number]]                                                   Please view results for these tests on the individual orders.     Medications   0.9% sodium chloride BOLUS (has no administration in time range)     Followed by   sodium chloride 0.9% infusion (has no administration in time range)        Assessments & Plan (with Medical Decision Making)   Patient is a 61-year-old female with a known history of scleroderma and pulmonary hypertension who follows with rheumatoid and with Dr. Bentley with cards to service who presents to the ER due to 2 days of fever.  Patient says that she had a fever yesterday and today.  Patient had that she had some shortness of breath yesterday night.  Patient here is afebrile.  Patient here has a normal WBC.  Patient has a moderately elevated procalcitonin.  Patient's chest x-ray shows early signs of pneumonia.  Patient CRP and ESR are stable.  Patient is not hypoxic.  Patient is on 15 mg of oral prednisone.  I did discuss the case with Dr. Rush Bentley who is the patient's pulmonary hypertension doctor before her arrival to the ER.  Plan will be to admit her to the hospital for further monitoring to make sure she does not spike a high fever.  Also to monitor her breathing.  Case was discussed with the cards to staff Dr. Gregory who agreed with patient being admitted to the ED observation unit and that the cardiology service.  I spoke to the ED observation unit MIKKI who excepted the patient.  This patient is  not hypoxic feel that any ED observation is appropriate.  Patient can be seen by the pulmonary hypertension team and rheumatology as needed.  Patient was given IV Levaquin.  Plan to admit to the ED observation unit for further observation and care.    I have reviewed the nursing notes. I have reviewed the findings, diagnosis, plan and need for follow up with the patient.    New Prescriptions    No medications on file       Final diagnoses:   Pneumonia of both lungs due to infectious organism, unspecified part of lung   Fever, unspecified fever cause   Sjogren's syndrome with lung involvement (H)       --  I, Miryam Alejandro, am serving as a trained medical scribe to document services personally performed by Leesa Bliss MD, based on the provider's statements to me.     I, Leesa Bliss MD, was physically present and have reviewed and verified the accuracy of this note documented by Miryam Alejandro.    Leesa Bliss MD  [facility] EMERGENCY DEPARTMENT  5/4/2022     Leesa Bliss MD  05/05/22 0012

## 2022-05-05 NOTE — ED TRIAGE NOTES
"     Triage Assessment     Row Name 05/04/22 1930       Triage Assessment (Adult)    Airway WDL WDL       Respiratory WDL    Respiratory WDL WDL            Pt BIBA with c/o nausea without emesis. Pt states she \"goes in and out of fib\". Pt just doesn't feel right. Pt states \"feels off\".  "

## 2022-05-05 NOTE — PROGRESS NOTES
"Textpage to Jackelyn 5--->Obs 8 B.S.  Note states \"serial trops\", do you want another trop ordered?  First trop elevated, second WNL.  "

## 2022-05-06 VITALS
DIASTOLIC BLOOD PRESSURE: 75 MMHG | SYSTOLIC BLOOD PRESSURE: 111 MMHG | BODY MASS INDEX: 23.44 KG/M2 | HEIGHT: 69 IN | WEIGHT: 158.29 LBS | OXYGEN SATURATION: 97 % | RESPIRATION RATE: 16 BRPM | TEMPERATURE: 97.9 F | HEART RATE: 54 BPM

## 2022-05-06 LAB
BACTERIA UR CULT: NORMAL
CRP SERPL-MCNC: 79 MG/L (ref 0–8)
ERYTHROCYTE [DISTWIDTH] IN BLOOD BY AUTOMATED COUNT: 14.9 % (ref 10–15)
HCT VFR BLD AUTO: 34.5 % (ref 35–47)
HGB BLD-MCNC: 11.2 G/DL (ref 11.7–15.7)
HOLD SPECIMEN: NORMAL
MAGNESIUM SERPL-MCNC: 1.8 MG/DL (ref 1.6–2.3)
MCH RBC QN AUTO: 28.9 PG (ref 26.5–33)
MCHC RBC AUTO-ENTMCNC: 32.5 G/DL (ref 31.5–36.5)
MCV RBC AUTO: 89 FL (ref 78–100)
PHOSPHATE SERPL-MCNC: 2.9 MG/DL (ref 2.5–4.5)
PLATELET # BLD AUTO: 240 10E3/UL (ref 150–450)
RBC # BLD AUTO: 3.88 10E6/UL (ref 3.8–5.2)
WBC # BLD AUTO: 5 10E3/UL (ref 4–11)

## 2022-05-06 PROCEDURE — 36415 COLL VENOUS BLD VENIPUNCTURE: CPT

## 2022-05-06 PROCEDURE — 83735 ASSAY OF MAGNESIUM: CPT

## 2022-05-06 PROCEDURE — 250N000013 HC RX MED GY IP 250 OP 250 PS 637

## 2022-05-06 PROCEDURE — 250N000011 HC RX IP 250 OP 636

## 2022-05-06 PROCEDURE — 250N000012 HC RX MED GY IP 250 OP 636 PS 637

## 2022-05-06 PROCEDURE — 99238 HOSP IP/OBS DSCHRG MGMT 30/<: CPT | Mod: GC | Performed by: INTERNAL MEDICINE

## 2022-05-06 PROCEDURE — 84100 ASSAY OF PHOSPHORUS: CPT

## 2022-05-06 PROCEDURE — 86140 C-REACTIVE PROTEIN: CPT | Performed by: STUDENT IN AN ORGANIZED HEALTH CARE EDUCATION/TRAINING PROGRAM

## 2022-05-06 PROCEDURE — 85027 COMPLETE CBC AUTOMATED: CPT | Performed by: STUDENT IN AN ORGANIZED HEALTH CARE EDUCATION/TRAINING PROGRAM

## 2022-05-06 RX ORDER — LEVOFLOXACIN 250 MG/1
500 TABLET, FILM COATED ORAL DAILY
Qty: 6 TABLET | Refills: 0 | Status: SHIPPED | OUTPATIENT
Start: 2022-05-06 | End: 2022-06-14

## 2022-05-06 RX ADMIN — TORSEMIDE 20 MG: 20 TABLET ORAL at 08:05

## 2022-05-06 RX ADMIN — APIXABAN 5 MG: 5 TABLET, FILM COATED ORAL at 08:06

## 2022-05-06 RX ADMIN — SPIRONOLACTONE 25 MG: 25 TABLET, FILM COATED ORAL at 08:06

## 2022-05-06 RX ADMIN — PREDNISONE 15 MG: 5 TABLET ORAL at 08:05

## 2022-05-06 RX ADMIN — Medication 400 MG: at 08:06

## 2022-05-06 RX ADMIN — SULFAMETHOXAZOLE AND TRIMETHOPRIM 1 TABLET: 400; 80 TABLET ORAL at 08:05

## 2022-05-06 RX ADMIN — AZATHIOPRINE 150 MG: 50 TABLET ORAL at 08:05

## 2022-05-06 RX ADMIN — LEVOFLOXACIN 500 MG: 5 INJECTION, SOLUTION INTRAVENOUS at 01:13

## 2022-05-06 RX ADMIN — LOSARTAN POTASSIUM 25 MG: 25 TABLET, FILM COATED ORAL at 08:05

## 2022-05-06 RX ADMIN — METOPROLOL SUCCINATE 50 MG: 50 TABLET, EXTENDED RELEASE ORAL at 08:05

## 2022-05-06 RX ADMIN — PANTOPRAZOLE SODIUM 40 MG: 40 TABLET, DELAYED RELEASE ORAL at 08:05

## 2022-05-06 RX ADMIN — POTASSIUM CHLORIDE 30 MEQ: 750 CAPSULE, EXTENDED RELEASE ORAL at 08:05

## 2022-05-06 ASSESSMENT — ACTIVITIES OF DAILY LIVING (ADL)
ADLS_ACUITY_SCORE: 14

## 2022-05-06 NOTE — PROGRESS NOTES
Discharge instructions reviewed.  Patient verbalized understanding. PIV removed, patient ambulated to the main lobby. Family awaiting at main lobby. Patient discharged

## 2022-05-06 NOTE — DISCHARGE SUMMARY
[facility]  Discharge Summary - Medicine & Pediatrics       Date of Admission:  5/4/2022  Date of Discharge:  5/6/2022  Discharging Provider: Dr. Renata Cristobal  Discharge Service: Medicine Service, SAKSHI TEAM 5    Discharge Diagnoses   Likely bacterial pneumonia complicated by parainfluenza bronchitis, all are POA  # Pulmonary Sarcoidosis, POA  # Scleroderma  # MCTD/ Limited Systemic Sclerosis   # Chronic NICM LVEF 35% on echo 44% on CMR, NYHA II-III:    # Urinary tract infection, ruled out  # Paroxysmal atrial fibrillation, POA  # GERD, POA  # Chronic GLENDA, POA  # Neuropathy, POA  # Dry eye syndrome, POA      Follow-ups Needed After Discharge   Follow-up Appointments     Follow Up and recommended labs and tests      Keep previous appointments with your physicians and speak with your PCP   to let them know of your hospitalization          Follow up with cardiology and pulmonology as previously arranged.    Unresulted Labs Ordered in the Past 30 Days of this Admission     Date and Time Order Name Status Description    5/4/2022  7:33 PM Blood Culture Peripheral Blood Preliminary     5/4/2022  7:33 PM Blood Culture Line, venous Preliminary       These results will be followed up by PCP.    Discharge Disposition   Discharged to home  Condition at discharge: Stable    Hospital Course   Jeanine Schuler is a 61 year old female admitted on 5/4/2022. She has a history of gastric bypass, hypoalbuminemia, SBO, ILD, Raynaud's syndrome, MCTD/limited cutaneous systemic sclerosis (+RF), possible cardiac sarcoidosis, NICM (LVEF 35% on echo 12/21/21, 44% on CMR 1/12/22), HFrEF, s/p RHC (4/7/22), PAH, PAF (on Eliquis), COVID-19 (March 2022), neuropathy, GERD w/ dysphagia, iron deficiency anemia who presented to the ED for evaluation of shortness of breath, cough, fever and brief episode of nausea likely 2/2 parainfluenza croup. The following problems were addressed during her  hospitalization:    # Likely bacterial pneumonia complicated by parainfluenza bronchitis, all are POA  The patient had non-productive cough x 6 weeks, worse starting 5/2, to the point where she almost vomited. Increased SOB. Onset of chills 5/4, fever to 102 F at home, took Advil with relief. Prednisone had been increased to 15 mg from 10 mg 1 week prior due to respiratory status and poor appetite. Has baseline chronic orthopnea, ANTONIO which had worsened slightly with holding home diuretics. Reports sick contacts in grandchildren with recent with GI/Resp illness with cough. On admission, vitally stable on room air, CBC, BMP, INR, LA, ESR not concerning. CRP and procal moderately elevated. Troponin originally elevated at 80 but wnl on repeat. NT-BNP at 5.5K, was ~1K 5/2. Echo not concerning for acute change. Covid negative, resp panel positive for parainfluenza 4. CXR reports mixed interstitial and patchy pulmonary opacities, most prominent in the lower lobes bilaterally, favored to represent infection; trace bilateral pleural effusions. Also possible steeple sign. In ED the patient was given NS bolus 250ml, Levaquin 500mg IV started. Lasix given on top of home torsemide 5/5. Cardiology consulted, had no new recs.  - continue Levaquin for 5 day total course in low chance this is CAP  - Blood cultures pending, NGTD    # Pulmonary Sarcoidosis, POA  # Scleroderma  # MCTD/ Limited Systemic Sclerosis   Left upper lobe transbronchial cryobiopsy done Oct 2020 showed nonnecrotizing granulomas with multi nucleated giant cells consistent with sarcoidosis. PFTs improved with Imuran and prednisone. Presented at sarcoidosis conference July 2021 after lung biopsy confirmed sarcoidosis. After extensive cardiac workup, cardiology does not feel there is cardiac sarcoidosis. Patient is RNP+ c/w MCTD. Patient is to reach out to [facility] for another opinion.   - PTA Prednisone 15 mg daily  - PTA Imuran     # Chronic NICM LVEF 35% on  echo 44% on CMR, NYHA II-III: Echo 12/21/21 with EF 35%, global hypokinesis, distal LV segments, RV mildly dilated, mild to moderate regurgitation, and moderate to severe tricuspid regurg. Cardiac MRI 1/12/22 with LVEF 44%. Myocardial PET 3/31/22 with EF 23%. Patient states she has not been able to take any of her home medications today including all her diuretics. Admission NT pro-BNP 5.5 K, from 999 on 5/2. Echo 5/5 shows improvement with EF 50-55%.The patient was deemed to be euvolemic during this admission.   - PTA Losartan  - PTA Toprol XL  - PTA Aldactone  - PTA Torsemide     # Urinary tract infection, ruled out  Patient reports 1 day of right flank discomfort and foul smelling urine on admission. UA/UC not concerning for infection.     # Paroxysmal atrial fibrillation, POA  EKG SR with repolarization abnormality. Potassium and magnesium replaced as needed.  - PTA Amiodarone 200mg daily  - PTA Toprol XL 50mg daily  - PTA Eliquis     # GERD, POA  - PTA PPI     # Chronic GLENDA, POA  Hgb 13.0 on admission. Gets IV Iron Infusions  - PTA IV Iron infusions    # Neuropathy, POA  She was seen by Dr. Abel from neurology in 5/2021, and nerve conduction studies and EMG was abnormal. She had a nerve biopsy of her right foot. No sensation in feet bilaterally.     # Dry eye syndrome, POA  Has history of excessive lacrimation requiring plugging and laser shutting lacrimal ducts bilaterally. Noticed dry eyes recently with blurry vision.  - continue eye drops as needed    Consultations This Hospital Stay   NURSING TO CONSULT FOR VASCULAR ACCESS CARE IP CONSULT  CARDIOLOGY HEART FAILURE (HF) IP CONSULT  CARE MANAGEMENT / SOCIAL WORK IP CONSULT  CARDIOLOGY GENERAL ADULT IP CONSULT  NURSING TO CONSULT FOR VASCULAR ACCESS CARE IP CONSULT    Code Status   Full Code       The patient was discussed with Dr. Cristobal.    Harvey Glez, MS4  [address] OBSERVATION [address]  [facility] [address]  Phone: [phone]  Fax: [phone]    Physician Attestation   I, Renata Cristobal MD, was present with the medical/MIKKI student who participated in the service and in the documentation of the note.  I have verified the history and personally performed the physical exam and medical decision making.  I agree with the assessment and plan of care as documented in the note.      I personally reviewed vital signs, medications, labs and imaging.    Renata Cristobal MD  Date of Service (when I saw the patient): 5/6/22  ______________________________________________________________________    Physical Exam   Vital Signs: Temp: 97.9  F (36.6  C) Temp src: Oral BP: 111/75 Pulse: 54   Resp: 16 SpO2: 97 % O2 Device: None (Room air)    Weight: 158 lbs 4.64 oz  Constitutional: awake, alert, cooperative, no apparent distress, and appears stated age  Respiratory: No increased work of breathing, decreased breath sounds throughout, no crackles or wheezes, notable cough  Cardiovascular: Normal apical impulse, regular rate, irregularly irregular rhythm, no murmur noted  GI: No scars, soft, non-distended, non-tender, no masses palpated, no hepatosplenomegally  Skin: no bruising or bleeding, no redness, warmth, or swelling, no rashes, no lesions and no jaundice  Musculoskeletal: 1+ ANTONIO bilaterally  Neurologic: does not have sensation in feet bilaterally on both plantar and dorsal surfaces, has mild sensation at ankles bilaterally but better on left      Primary Care Physician   Katia Carver Mai    Discharge Orders      Reason for your hospital stay    You were seen in the hospital for concerns related to shortness of breath, which we believe is related to infection with a virus called parainfluenza, which produces croup in children. There is a slight chance for a bacterial infection on top of that, so to be safe we will send you home with a short course of antibiotics. You should continue to take all of  your other medications as previously and keep your previous appointments with your physicians. We wish you all the best in your recovery.     Sincerely,     Pancho Mckeon MD, PhD     Activity    Your activity upon discharge: activity as tolerated     Follow Up and recommended labs and tests    Keep previous appointments with your physicians and speak with your PCP to let them know of your hospitalization     Diet    Follow this diet upon discharge: Orders Placed This Encounter      Snacks/Supplements Adult: Magic Cup; With Meals      Advance Diet as Tolerated: 2 gm NA Diet       Significant Results and Procedures   Most Recent 3 CBC's:Recent Labs   Lab Test 05/06/22  0703 05/04/22 2113 05/02/22  1143   WBC 5.0 10.4 4.0   HGB 11.2* 13.0 13.2   MCV 89 88 88    253 267     Most Recent 3 BMP's:Recent Labs   Lab Test 05/05/22  1758 05/05/22  0349 05/04/22 2113 05/02/22  1143     --  133 132*   POTASSIUM 3.6 3.6 3.6 3.7   CHLORIDE 99  --  98 96   CO2 30  --  27 25   BUN 13  --  13 10   CR 0.94  --  0.96 0.77   ANIONGAP 7  --  8 11   DAVID 8.3*  --  8.2* 8.5   *  --  88 75     Most Recent 3 Troponin's:Recent Labs   Lab Test 07/06/21  0619   TROPI 0.018     Most Recent Urinalysis:Recent Labs   Lab Test 05/05/22  0222   COLOR Yellow   APPEARANCE Clear   URINEGLC Negative   URINEBILI Negative   URINEKETONE Negative   SG 1.012   UBLD Negative   URINEPH 6.0   PROTEIN Negative   NITRITE Negative   LEUKEST Trace*   RBCU 1   WBCU 5     Most Recent ESR & CRP:Recent Labs   Lab Test 05/06/22  0703 05/04/22 2113   SED  --  26   CRP 79.0* 27.0*   ,   Results for orders placed or performed during the hospital encounter of 05/04/22   XR Chest 2 Views    Narrative    EXAM: XR CHEST 2 VW  5/4/2022 8:16 PM     HISTORY:  fever       COMPARISON:  5/2/2022    FINDINGS:   PA and lateral upright views of the chest.. Trachea is midline.  Cardiomediastinal silhouette and pulmonary vasculature are within  normal limits.  Bibasilar prominent mixed interstitial and patchy  pulmonary opacities No appreciable pneumothorax. Atherosclerotic  calcifications calcifications aortic knob. Osteopenia. No acute  osseous or normal. Surgical clips in the right quadrant of the  abdomen. Visualized portions upper abdomen are unremarkable.      Impression    IMPRESSION:   1. Mixed interstitial and patchy pulmonary opacities, most prominent  in the lower lobes bilaterally, favored to represent infection.  2. Trace bilateral pleural effusions.    I have personally reviewed the examination and initial interpretation  and I agree with the findings.    ANNABELLA BARTH MD         SYSTEM ID:  [id number]   Echocardiogram Complete     Value    LVEF  50-55%    Narrative    [id number]  [id number]  [id number]  [id number]^WIL^EMEKA^MY     [facility],[place]  Echocardiography Laboratory  [address] [place] [address]     Name: OSCAR REYES  MRN: [id number]  : 1960  Study Date: 2022 09:35 AM  Age: 61 yrs  Gender: Female  Patient Location: [facility]  Reason For Study: SOB, Edema, Heart Failure, Cardiomyopathy  Ordering Physician: EMEKA DE LA TORRE  Performed By: Radha Butt     BSA: 1.9 m2  Height: 69 in  Weight: 158 lb  HR: 66  BP: 103/68 mmHg  ______________________________________________________________________________  Procedure  Complete Portable Echo Adult. Contrast Optison. Optison (NDC #4198-1425-61)  given intravenously. Patient was given 6 ml mixture of 3 ml Optison and 6 ml  saline. 3 ml wasted.  ______________________________________________________________________________  Interpretation Summary  Left ventricular wall thickness is normal. Left ventricular size is normal.  The visual ejection fraction is 50-55%. No regional wall motion abnormalities  are seen.  Right ventricular function, chamber size, wall motion, and thickness are  normal.  Mild mitral insufficiency is present. Mild to moderate  tricuspid insufficiency  is present.  Ascending aorta 4.2 cm indexed at 2.1cm/m2.  Dilation of the inferior vena cava is present with abnormal respiratory  variation in diameter. No pericardial effusion is present.     Compared to prior study LVEF appears to have improved  ______________________________________________________________________________  Left Ventricle  Left ventricular wall thickness is normal. Left ventricular size is normal.  The visual ejection fraction is 50-55%. Grade I or early diastolic  dysfunction. No regional wall motion abnormalities are seen.     Right Ventricle  Right ventricular function, chamber size, wall motion, and thickness are  normal.     Atria  Both atria appear normal.     Mitral Valve  Mild mitral annular calcification is present. Mild mitral insufficiency is  present.     Aortic Valve  The aortic valve is tricuspid. There is moderate aortic sclerosis of the right  coronary cusp. No aortic regurgitation is present.     Tricuspid Valve  The tricuspid valve is normal. Mild to moderate tricuspid insufficiency is  present. The right ventricular systolic pressure is approximated at 18.3 mmHg  plus the right atrial pressure.     Pulmonic Valve  The pulmonic valve is normal.     Vessels  Ascending aorta 4.2 cm. IVC diameter >2.1 cm collapsing <50% with sniff  suggests a high RA pressure estimated at 15 mmHg or greater. Dilation of the  inferior vena cava is present with abnormal respiratory variation in diameter.     Pericardium  No pericardial effusion is present.     Compared to Previous Study  Compared to prior study LVEF appears to have improved.  ______________________________________________________________________________  MMode/2D Measurements & Calculations     IVSd: 1.1 cm  LVIDd: 5.2 cm  LVIDs: 3.7 cm  LVPWd: 1.0 cm  FS: 30.2 %  LV mass(C)d: 215.9 grams  LV mass(C)dI: 115.5 grams/m2  asc Aorta Diam: 4.1 cm  LVOT diam: 2.2 cm  LVOT area: 3.9 cm2  LA Volume Index (BP): 35.3  ml/m2  RWT: 0.39     Doppler Measurements & Calculations  MV E max ck: 59.2 cm/sec  MV A max ck: 75.3 cm/sec  MV E/A: 0.79  MV dec slope: 197.2 cm/sec2  MV dec time: 0.30 sec  Ao V2 max: 154.5 cm/sec  Ao max P.6 mmHg  Ao V2 mean: 106.5 cm/sec  Ao mean P.1 mmHg  Ao V2 VTI: 27.0 cm  ALLISON(I,D): 3.8 cm2  ALLISON(V,D): 3.4 cm2  LV V1 max P.1 mmHg  LV V1 max: 133.0 cm/sec  LV V1 VTI: 25.9 cm  SV(LVOT): 101.6 ml  SI(LVOT): 54.3 ml/m2  PA acc time: 0.07 sec  TR max ck: 213.6 cm/sec  TR max P.3 mmHg  AV Ck Ratio (DI): 0.86  ALLISON Index (cm2/m2): 2.0  E/E' av.1  Lateral E/e': 5.9  Medial E/e': 12.3     ______________________________________________________________________________  Report approved by: Mirza HARPER 2022 11:48 AM               Discharge Medications   Current Discharge Medication List      START taking these medications    Details   levofloxacin (LEVAQUIN) 250 MG tablet Take 2 tablets (500 mg) by mouth daily  Qty: 6 tablet, Refills: 0    Associated Diagnoses: Pneumonia of both lungs due to infectious organism, unspecified part of lung         CONTINUE these medications which have NOT CHANGED    Details   albuterol (PROAIR HFA/PROVENTIL HFA/VENTOLIN HFA) 108 (90 Base) MCG/ACT inhaler Inhale 2 puffs into the lungs every 6 hours as needed for shortness of breath / dyspnea or wheezing      amiodarone (PACERONE) 200 MG tablet Take 1 tablet (200 mg) by mouth daily  Qty: 30 tablet, Refills: 0    Associated Diagnoses: Paroxysmal atrial fibrillation (H)      apixaban ANTICOAGULANT (ELIQUIS) 5 MG tablet Take 1 tablet (5 mg) by mouth 2 times daily  Qty: 180 tablet, Refills: 3    Associated Diagnoses: Paroxysmal A-fib (H); Systolic heart failure, unspecified HF chronicity (H)      ASPIRIN NOT PRESCRIBED (INTENTIONAL) Please choose reason not prescribed from choices below.    Associated Diagnoses: Chronic atrial fibrillation (H); Chronic systolic congestive heart failure (H)      atorvastatin  (LIPITOR) 40 MG tablet Take 40 mg by mouth      azaTHIOprine (IMURAN) 50 MG tablet Take 3 tablets (150 mg) by mouth daily And have labs drawn two weeks after starting medication.  Qty: 90 tablet, Refills: 3    Associated Diagnoses: ILD (interstitial lung disease) (H)      blood glucose (NO BRAND SPECIFIED) test strip Use to test blood sugar 2 times daily or as directed.  Qty: 100 strip, Refills: 6    Comments: To accompany: glucometer per insurance.  Associated Diagnoses: Hypoglycemia      clonazePAM (KLONOPIN) 0.5 MG tablet Take 1 tablet (0.5 mg) by mouth nightly as needed for sleep (secondary to prednisone) MRx 1  Qty: 60 tablet, Refills: 1    Associated Diagnoses: Sarcoidosis; Encounter for long-term (current) use of high-risk medication; High risk medication use; Drug-induced insomnia (H)      folic acid (FOLVITE) 1 MG tablet Take 1 tablet (1 mg) by mouth daily  Qty: 90 tablet, Refills: 3    Associated Diagnoses: Rheumatoid arthritis with negative rheumatoid factor, involving unspecified site (H); MCTD (mixed connective tissue disease) (H); Systemic sclerosis (H)      losartan (COZAAR) 50 MG tablet Take 0.5 tablets (25 mg) by mouth daily  Qty: 45 tablet, Refills: 3    Associated Diagnoses: Rheumatoid arthritis with negative rheumatoid factor, involving unspecified site (H); Systemic sclerosis (H); Shortness of breath; Raynaud's disease without gangrene      magnesium oxide (MAG-OX) 400 MG tablet Take 1 tablet (400 mg) by mouth daily  Qty: 90 tablet, Refills: 0    Associated Diagnoses: Hypokalemia      metoprolol succinate ER (TOPROL XL) 50 MG 24 hr tablet Take 1 tablet (50 mg) by mouth daily  Qty: 90 tablet, Refills: 3    Associated Diagnoses: Cardiac sarcoidosis      nitroGLYcerin (NITROSTAT) 0.3 MG sublingual tablet Place 1 tablet (0.3 mg) under the tongue every 5 minutes as needed for chest pain (esophageal spasm) For chest pain place 1 tablet under the tongue every 5 minutes for 3 doses. If symptoms persist  5 minutes after 3rd dose call 911.  Qty: 12 tablet, Refills: 1    Associated Diagnoses: Esophageal spasm      pantoprazole (PROTONIX) 40 MG EC tablet Take 1 tablet (40 mg) by mouth daily  Qty: 90 tablet, Refills: 3    Associated Diagnoses: Gastro-esophageal reflux disease without esophagitis; Other disorders of calcium metabolism      potassium chloride ER (MICRO-K) 10 MEQ CR capsule Take 30 mEq by mouth      predniSONE (DELTASONE) 10 MG tablet Take 1.5 tablets (15 mg) by mouth daily  Qty: 90 tablet, Refills: 3    Associated Diagnoses: Sarcoidosis; ILD (interstitial lung disease) (H); MCTD (mixed connective tissue disease) (H)      spironolactone (ALDACTONE) 25 MG tablet Take 25 mg by mouth daily     Associated Diagnoses: Immunization due; Raynaud's disease without gangrene; Rheumatoid arthritis with negative rheumatoid factor, involving unspecified site (H); MCTD (mixed connective tissue disease) (H)      sulfamethoxazole-trimethoprim (BACTRIM) 400-80 MG tablet Take 1 tablet by mouth daily  Qty: 30 tablet, Refills: 3    Associated Diagnoses: ILD (interstitial lung disease) (H)      thin (NO BRAND SPECIFIED) lancets Use with lanceting device twice daily  Qty: 100 each, Refills: 0    Comments: To accompany: per insurance.  Associated Diagnoses: Hypoglycemia      torsemide (DEMADEX) 20 MG tablet Take 1 tablet (20 mg) by mouth 2 times daily  Qty: 180 tablet, Refills: 1    Associated Diagnoses: Secondary cardiomyopathy (H)      vitamin D2 (ERGOCALCIFEROL) 11937 units (1250 mcg) capsule Take 50,000 Units by mouth once a week           Allergies   Allergies   Allergen Reactions     Lovenox Other (See Comments)     Blood clot      Norvasc [Amlodipine Besylate] Swelling     Lip swelling that happened on 2 different occasions     Erythromycin Rash     Rash on arms

## 2022-05-06 NOTE — PLAN OF CARE
Goal Outcome Evaluation:    Plan of Care Reviewed With: patient    Overall Patient Progress: improving    Shift: 6355-1116  VS: /75 (BP Location: Left arm)   Pulse 54   Temp 97.9  F (36.6  C) (Oral)   Resp 16 SpO2 97% on RA  Pain: Pt denies any pain  Neuro: A&Ox4  Cardiac: Bradycardic during the night.   Respiratory: Diminished lungs sounds. Infrequent, dry, nonproductive cough.    Diet/Appetite: Regular diet.     /GI: Voiding regulary. Strict I's & O's.   LDA's: LPIV SL   Activity: SBA     Pertinent Labs/Lab Collection: Positive for influenza     Plan: IV levaquin q24h

## 2022-05-07 ENCOUNTER — PATIENT OUTREACH (OUTPATIENT)
Dept: CARE COORDINATION | Facility: CLINIC | Age: 62
End: 2022-05-07
Payer: COMMERCIAL

## 2022-05-07 DIAGNOSIS — Z71.89 OTHER SPECIFIED COUNSELING: ICD-10-CM

## 2022-05-07 NOTE — PROGRESS NOTES
Clinic Care Coordination Contact  [facility]       Clinical Data: Care Coordinator Outreach  Reason for referral: TCM outreach  Outreach attempted x 1.  Patient asked for a return call tomorrow.  Plan:  Care Coordinator will try to reach patient again in 1-2 business days.       Krystal Mccall  Community Health Worker  [facility] [place] [place]  Ph: [phone]

## 2022-05-08 NOTE — PROGRESS NOTES
Clinic Care Coordination Contact    Background: Care Coordination referral placed from [facility] discharge report for reason of patient meeting criteria for a TCM outreach call by [facility] [place] team.    Assessment: Upon chart review, [facility] Team member will cancel/close the referral for TCM outreach due to reason below:    Patient declined completing post discharge assessment. She said everything is going well.    Plan: Care Coordination referral for TCM outreach canceled.    Krystal Mccall  Community Health Worker  [facility] [place]  Ph: [phone]

## 2022-05-09 LAB
BACTERIA BLD CULT: NO GROWTH
BACTERIA BLD CULT: NO GROWTH

## 2022-06-14 ENCOUNTER — OFFICE VISIT (OUTPATIENT)
Dept: NEUROLOGY | Facility: CLINIC | Age: 62
End: 2022-06-14
Payer: COMMERCIAL

## 2022-06-14 VITALS
DIASTOLIC BLOOD PRESSURE: 78 MMHG | HEART RATE: 85 BPM | WEIGHT: 150 LBS | SYSTOLIC BLOOD PRESSURE: 114 MMHG | BODY MASS INDEX: 22.15 KG/M2

## 2022-06-14 DIAGNOSIS — G62.9 NEUROPATHY: Primary | ICD-10-CM

## 2022-06-14 RX ORDER — GABAPENTIN 300 MG/1
300 CAPSULE ORAL 3 TIMES DAILY
Qty: 90 CAPSULE | Refills: 3 | Status: SHIPPED | OUTPATIENT
Start: 2022-06-14 | End: 2023-01-01

## 2022-06-14 NOTE — PROGRESS NOTES
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     Interval History:  We last saw her 7/27/21. She has had a difficult year. Has been admitted 3 times since the winter, most notably with pneumonia last month. Other admissions for Afib with RVR on two occasions. Negative angiogram, and a cardiac MRI that is read as being consistent with cardiac sarcoidosis though her cardiologist is not sure about that diagnosis. She was symptomatic with SOB at the time. Neuropathy symptoms are worse- tingling, burning. Has not started gabapentin. Pain is worse at night when trying to sleep.   Fallen 3 times in 6 weeks. Has been using a cane to walk for the past 5-6 months for stability. Toes are dusky and purple. She has a rheumatology visit next week where she will discuss her toes, among other concerns.   Numbness in the feet may have ascended to the mid calves. Numbness in the fingertips is unchanged.   Continues on prednisone 15 mg daily for the pulmonary sarcoidosis.     Prior pertinent laboratory work-up:  5/11/21: Normal CK, aldolase  4/9/21: TARA 1:640. RNP 1.2. Negative/normal DS DNA, SSa, SSb, SM ab, ESR, CRP, RF  3/21: Normal ACE, Hep B, Hep C, HIV  2/21: Beta 2 glycoprotein 17.   10/20: Left transbronchial upper lobe biopsy showed nonnecrotizing granulomas with multinucleated giant cells, foci of organizing pneumonia and nonspecific chronic interstitial inflammation with reactive pneumocytes type II hyperplasia.     Prior pertinent radiology work-up:  7/21: CT chest shows interval increase in multifocal centrilobular groundglass and micronodular opacities with increased basilar predominant subpleural reticulations. Findings suggestive for an interstitial lung disease process including sarcoidosis or HP although superimposed  atypical infection are difficult to exclude  4/21: CT chest showed  micronodular infiltrative processes in the bilateral lungs with mid lung predominance are again noted and do not appear significantly worsened or improved since the prior study from 12/22/2020. This has a pattern which can be associated with sarcoidosis    Prior electrophysiologic work-up:  5/18/21: NCS showed an axonal polyneuropathy with asymmetric peroneal motor responses.     Other data:   3/21: PFT , Normal inspiratory flow rate and diffusion capacity    Past Medical History:   Past Medical History:   Diagnosis Date     Anemia      Bariatric surgery status 06/27/2005    abdi en Y- [facility];     Cellulitis of leg 06/06/2013     Esophageal reflux     Better post-hiatal hernia repair and weight loss     Hyperplastic colonic polyp      Hypovitaminosis D 2011     ILD (interstitial lung disease) (H)      Kidney stone 04/29/2007     Kidney stone 05/10/2007    surgically removed     Limb ischemia; ulcers of fingertips 04/22/2013     Other chronic pain     Left shoulder - rheumatoid arthritis     Raynaud's syndrome      Rheumatoid arthritis (H) \     Scleroderma (H)      Sjogren-Jake syndrome      Systemic sclerosis (H) 2009     Unspecified essential hypertension      Past Surgical History:  Past Surgical History:   Procedure Laterality Date     BIOPSY MUSCLE DIAGNOSTIC (LOCATION) Right 7/19/2021    Procedure: Right SURAL nerve BIOPSY;  Surgeon: Farooq Abel MD;  Location: [facility] OR     BRONCHOSCOPY FLEXIBLE,L CRYOBIOPSY N/A 10/06/2020    Procedure: BRONCHOSCOPY, FLEXIBLE, WITH TRANSBRONCHIAL BIOPSY x4 USING CRYOPROBE, bronchial alveolar lavage;  Surgeon: Teddy Mendez MD;  Location: [facility] OR     BYPASS GASTRIC, CHOLECYSTECTOMY, COMBINED  06/27/2005    [facility]     CHOLECYSTECTOMY       COLONOSCOPY       COLONOSCOPY N/A 11/17/2020    Procedure: COLONOSCOPY, WITH POLYPECTOMY AND BIOPSY;  Surgeon: Jamie Cárdenas MD;  [place] [facility]     CV HEART CATHETERIZATION WITH POSSIBLE INTERVENTION  Left 12/21/2021    Procedure: Heart Catheterization with Possible Intervention;  Surgeon: Wesley Mclean MD;  Location:  HEART CARDIAC CATH LAB     CV RIGHT HEART CATH MEASUREMENTS RECORDED N/A 4/7/2022    Procedure: Right Heart Cath;  Surgeon: Dieter Brock MD;  Location:  HEART CARDIAC CATH LAB     ESOPHAGOSCOPY, GASTROSCOPY, DUODENOSCOPY (EGD), COMBINED N/A 03/10/2016    Procedure: COMBINED ESOPHAGOSCOPY, GASTROSCOPY, DUODENOSCOPY (EGD), BIOPSY SINGLE OR MULTIPLE;  Surgeon: Tricia Zambrano MD;  Location:  GI     ESOPHAGOSCOPY, GASTROSCOPY, DUODENOSCOPY (EGD), COMBINED N/A 11/17/2020    Procedure: ESOPHAGOGASTRODUODENOSCOPY, WITH BIOPSY and Dilation;  Surgeon: Jamie Cárdenas MD;  Location: [facility] OR     INNER EAR SURGERY       PICC INSERTION  06/05/2013    5fr DL Power PICC, 44cm, left basilic vein, tip in low SVC     SURGICAL HISTORY OF -       Left ear surgery - incus transition, tympanoplasty     SURGICAL HISTORY OF -   06/01/2005    Hiatal hernia repair     TONSILLECTOMY       Family history:    There is no known family history of hereditary neuropathies or other neuromuscular disorders.    Social History:    Rare alcohol. She denies tobacco or illicit drug use.     Medical Allergies:    Allergies   Allergen Reactions     Lovenox Other (See Comments)     Blood clot      Norvasc [Amlodipine Besylate] Swelling     Lip swelling that happened on 2 different occasions     Erythromycin Rash     Rash on arms     Current Medications:    Current Outpatient Medications   Medication     albuterol (PROAIR HFA/PROVENTIL HFA/VENTOLIN HFA) 108 (90 Base) MCG/ACT inhaler     amiodarone (PACERONE) 200 MG tablet     apixaban ANTICOAGULANT (ELIQUIS) 5 MG tablet     ASPIRIN NOT PRESCRIBED (INTENTIONAL)     atorvastatin (LIPITOR) 40 MG tablet     azaTHIOprine (IMURAN) 50 MG tablet     blood glucose (NO BRAND SPECIFIED) test strip     clonazePAM (KLONOPIN) 0.5 MG tablet     folic acid (FOLVITE) 1  MG tablet     levofloxacin (LEVAQUIN) 250 MG tablet     losartan (COZAAR) 50 MG tablet     magnesium oxide (MAG-OX) 400 MG tablet     metoprolol succinate ER (TOPROL XL) 50 MG 24 hr tablet     nitroGLYcerin (NITROSTAT) 0.3 MG sublingual tablet     pantoprazole (PROTONIX) 40 MG EC tablet     potassium chloride ER (MICRO-K) 10 MEQ CR capsule     predniSONE (DELTASONE) 10 MG tablet     spironolactone (ALDACTONE) 25 MG tablet     sulfamethoxazole-trimethoprim (BACTRIM) 400-80 MG tablet     thin (NO BRAND SPECIFIED) lancets     torsemide (DEMADEX) 20 MG tablet     vitamin D2 (ERGOCALCIFEROL) 93375 units (1250 mcg) capsule     No current facility-administered medications for this visit.     Review of Systems: A complete review of systems was obtained and was negative except for what was noted above.    Physical examination:    /78   Pulse 85   Wt 68 kg (150 lb)   BMI 22.15 kg/m      General Appearance: NAD    Neurologic examination:    Mental status:  Patient is alert, attentive, and oriented x 3.  Language is coherent and fluent without aphasia.  Memory, comprehension and ability to follow commands were intact.       Cranial nerves:  Pupils were round and reacted to light.  Extraocular movements full. No face, jaw, palate or tongue weakness or atrophy.       Motor exam: No atrophy or fasciculations.  Manual muscle testing revealed the following MRC grade muscle power:   Right Left   Neck flexion 5    Neck extension 5 5   Shoulder ext rotation 4 4   Shoulder abduction:  4+ 4+   Elbow extension: 5 5   Elbow flexion:  5 5   Wrist flexion:  5 5   Wrist extension:  5 5   FDI 4+ 4   APB 5 5   Hip flexion* - -   Knee flexion* - -   Knee extension* - -   Dorsiflexion* - -   Plantar flexion* - -   * Exam of the lower extremities was limited by pain (pressure against her skin) as well as impersistent activation. She is at least antigravity strength in these muscle groups.     Complex motor skills: No tremor or ataxia      Sensory exam:  Vibration reduced in toes on right >  Left. 5 seconds at ankles b/l. Pin reduced in the left foot below the ankle and the right foot in the toe tips. Pin slightly reduced in the finger tips. Vibration 16 s in the fingertips.     Gait: Antalgic.       Deep tendon reflexes:   Right Left   Triceps 2 2   Biceps 2 2   Brachioradialis 2 2   Knee jerk 2 2   Ankle jerk 0 0      Assessment:    Jeanine Schuler is a 61 year old woman with an asymmetric axonal large fiber polyneuropathy or multifocal neuropathy within the context of a complex combination of  MCTD/limited cutaneous systemic sclerosis and sarcoidosis. Fortunately the nerve biopsy showed no active vasculitis or sarcoid granulomas. Will defer management of the very complex systemic sclerosis and sarcoidosis to the expertise of her rheumatology team.  Her neurologic examination is stable.     Plan:      1. Symptomatic management of pain and paresthesias: Start gabapentin 300 mg at night. If effective, can stop there. If there is continued pain during the day increase to 300 mg in the morning and afternoon (300 TID).   2. Foot care: Encouraged daily foot inspection, supportive/comfortable foot wear, good foot hygiene  3. PT for gait and stability training   4. Follow up in 6 months.     Preston Castaneda MD  Neurophysiology Fellow     Seen and discussed with Dr. Abel. His addendum follows.   ---  ADDENDUM:  I personally interviewed and examined the patient. I agree with the history, physical, assessment, and plan as documented above. Changes to the physical examination, assessment and plan have been incorporated into the note by myself, as to make it a single cohesive document.    Farooq Abel MD

## 2022-06-14 NOTE — LETTER
6/14/2022     RE: Jeanine Schuler  [address]  [facility] [address]     Dear Colleague,    Thank you for referring your patient, Jeanine Schuler, to the [facility] at [facility]. Please see a copy of my visit note below.    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     Interval History:  We last saw her 7/27/21. She has had a difficult year. Has been admitted 3 times since the winter, most notably with pneumonia last month. Other admissions for Afib with RVR on two occasions. Negative angiogram, and a cardiac MRI that is read as being consistent with cardiac sarcoidosis though her cardiologist is not sure about that diagnosis. She was symptomatic with SOB at the time. Neuropathy symptoms are worse- tingling, burning. Has not started gabapentin. Pain is worse at night when trying to sleep.   Fallen 3 times in 6 weeks. Has been using a cane to walk for the past 5-6 months for stability. Toes are dusky and purple. She has a rheumatology visit next week where she will discuss her toes, among other concerns.   Numbness in the feet may have ascended to the mid calves. Numbness in the fingertips is unchanged.   Continues on prednisone 15 mg daily for the pulmonary sarcoidosis.     Prior pertinent laboratory work-up:  5/11/21: Normal CK, aldolase  4/9/21: TARA 1:640. RNP 1.2. Negative/normal DS DNA, SSa, SSb, SM ab, ESR, CRP, RF  3/21: Normal ACE, Hep B, Hep C, HIV  2/21: Beta 2 glycoprotein 17.   10/20: Left transbronchial upper lobe biopsy showed nonnecrotizing granulomas with multinucleated giant cells, foci of organizing pneumonia and nonspecific chronic interstitial inflammation with reactive pneumocytes type II hyperplasia.     Prior pertinent radiology work-up:  7/21: CT chest shows interval increase in multifocal centrilobular  groundglass and micronodular opacities with increased basilar predominant subpleural reticulations. Findings suggestive for an interstitial lung disease process including sarcoidosis or HP although superimposed  atypical infection are difficult to exclude  4/21: CT chest showed micronodular infiltrative processes in the bilateral lungs with mid lung predominance are again noted and do not appear significantly worsened or improved since the prior study from 12/22/2020. This has a pattern which can be associated with sarcoidosis    Prior electrophysiologic work-up:  5/18/21: NCS showed an axonal polyneuropathy with asymmetric peroneal motor responses.     Other data:   3/21: PFT , Normal inspiratory flow rate and diffusion capacity    Past Medical History:   Past Medical History:   Diagnosis Date     Anemia      Bariatric surgery status 06/27/2005    [facility]di [facility] Y- [facility];     Cellulitis of leg 06/06/2013     Esophageal reflux     Better post-hiatal hernia repair and weight loss     Hyperplastic colonic polyp      Hypovitaminosis D 2011     ILD (interstitial lung disease) (H)      Kidney stone 04/29/2007     Kidney stone 05/10/2007    surgically removed     Limb ischemia; ulcers of fingertips 04/22/2013     Other chronic pain     Left shoulder - rheumatoid arthritis     Raynaud's syndrome      Rheumatoid arthritis (H) \     Scleroderma (H)      Sjogren-Jake syndrome      Systemic sclerosis (H) 2009     Unspecified essential hypertension      Past Surgical History:  Past Surgical History:   Procedure Laterality Date     BIOPSY MUSCLE DIAGNOSTIC (LOCATION) Right 7/19/2021    Procedure: Right SURAL nerve BIOPSY;  Surgeon: Farooq Abel MD;  Location: [facility] OR     BRONCHOSCOPY FLEXIBLE,L CRYOBIOPSY N/A 10/06/2020    Procedure: BRONCHOSCOPY, FLEXIBLE, WITH TRANSBRONCHIAL BIOPSY x4 USING CRYOPROBE, bronchial alveolar lavage;  Surgeon: Teddy Mendez MD;  Location: [facility] OR     BYPASS GASTRIC,  CHOLECYSTECTOMY, COMBINED  06/27/2005    [facility]     CHOLECYSTECTOMY       COLONOSCOPY       COLONOSCOPY N/A 11/17/2020    Procedure: COLONOSCOPY, WITH POLYPECTOMY AND BIOPSY;  Surgeon: Jamie Cárdenas MD;  [place] [facility] [place]     CV HEART CATHETERIZATION WITH POSSIBLE INTERVENTION Left 12/21/2021    Procedure: Heart Catheterization with Possible Intervention;  Surgeon: Wesley Mclean MD;  Location:  HEART CARDIAC CATH LAB     CV RIGHT HEART CATH MEASUREMENTS RECORDED N/A 4/7/2022    Procedure: Right Heart Cath;  Surgeon: Dieter Brock MD;  Location:  HEART CARDIAC CATH LAB     ESOPHAGOSCOPY, GASTROSCOPY, DUODENOSCOPY (EGD), COMBINED N/A 03/10/2016    Procedure: COMBINED ESOPHAGOSCOPY, GASTROSCOPY, DUODENOSCOPY (EGD), BIOPSY SINGLE OR MULTIPLE;  Surgeon: Tricia Zambrano MD;  Location:  GI     ESOPHAGOSCOPY, GASTROSCOPY, DUODENOSCOPY (EGD), COMBINED N/A 11/17/2020    Procedure: ESOPHAGOGASTRODUODENOSCOPY, WITH BIOPSY and Dilation;  Surgeon: Jamie Cárdenas MD;  [place]: [facility] [place]     INNER EAR SURGERY       PICC INSERTION  06/05/2013    5fr DL Power PICC, 44cm, left basilic vein, tip in low SVC     SURGICAL HISTORY OF -       Left ear surgery - incus transition, tympanoplasty     SURGICAL HISTORY OF -   06/01/2005    Hiatal hernia repair     TONSILLECTOMY       Family history:    There is no known family history of hereditary neuropathies or other neuromuscular disorders.    Social History:    Rare alcohol. She denies tobacco or illicit drug use.     Medical Allergies:    Allergies   Allergen Reactions     Lovenox Other (See Comments)     Blood clot      Norvasc [Amlodipine Besylate] Swelling     Lip swelling that happened on 2 different occasions     Erythromycin Rash     Rash on arms     Current Medications:    Current Outpatient Medications   Medication     albuterol (PROAIR HFA/PROVENTIL HFA/VENTOLIN HFA) 108 (90 Base) MCG/ACT inhaler     amiodarone (PACERONE) 200 MG tablet      apixaban ANTICOAGULANT (ELIQUIS) 5 MG tablet     ASPIRIN NOT PRESCRIBED (INTENTIONAL)     atorvastatin (LIPITOR) 40 MG tablet     azaTHIOprine (IMURAN) 50 MG tablet     blood glucose (NO BRAND SPECIFIED) test strip     clonazePAM (KLONOPIN) 0.5 MG tablet     folic acid (FOLVITE) 1 MG tablet     levofloxacin (LEVAQUIN) 250 MG tablet     losartan (COZAAR) 50 MG tablet     magnesium oxide (MAG-OX) 400 MG tablet     metoprolol succinate ER (TOPROL XL) 50 MG 24 hr tablet     nitroGLYcerin (NITROSTAT) 0.3 MG sublingual tablet     pantoprazole (PROTONIX) 40 MG EC tablet     potassium chloride ER (MICRO-K) 10 MEQ CR capsule     predniSONE (DELTASONE) 10 MG tablet     spironolactone (ALDACTONE) 25 MG tablet     sulfamethoxazole-trimethoprim (BACTRIM) 400-80 MG tablet     thin (NO BRAND SPECIFIED) lancets     torsemide (DEMADEX) 20 MG tablet     vitamin D2 (ERGOCALCIFEROL) 62093 units (1250 mcg) capsule     No current facility-administered medications for this visit.     Review of Systems: A complete review of systems was obtained and was negative except for what was noted above.    Physical examination:    /78   Pulse 85   Wt 68 kg (150 lb)   BMI 22.15 kg/m      General Appearance: NAD    Neurologic examination:    Mental status:  Patient is alert, attentive, and oriented x 3.  Language is coherent and fluent without aphasia.  Memory, comprehension and ability to follow commands were intact.       Cranial nerves:  Pupils were round and reacted to light.  Extraocular movements full. No face, jaw, palate or tongue weakness or atrophy.       Motor exam: No atrophy or fasciculations.  Manual muscle testing revealed the following MRC grade muscle power:   Right Left   Neck flexion 5    Neck extension 5 5   Shoulder ext rotation 4 4   Shoulder abduction:  4+ 4+   Elbow extension: 5 5   Elbow flexion:  5 5   Wrist flexion:  5 5   Wrist extension:  5 5   FDI 4+ 4   APB 5 5   Hip flexion* - -   Knee flexion* - -   Knee  extension* - -   Dorsiflexion* - -   Plantar flexion* - -   * Exam of the lower extremities was limited by pain (pressure against her skin) as well as impersistent activation. She is at least antigravity strength in these muscle groups.     Complex motor skills: No tremor or ataxia     Sensory exam:  Vibration reduced in toes on right >  Left. 5 seconds at ankles b/l. Pin reduced in the left foot below the ankle and the right foot in the toe tips. Pin slightly reduced in the finger tips. Vibration 16 s in the fingertips.     Gait: Antalgic.       Deep tendon reflexes:   Right Left   Triceps 2 2   Biceps 2 2   Brachioradialis 2 2   Knee jerk 2 2   Ankle jerk 0 0      Assessment:    Jeanine Schuler is a 61 year old woman with an asymmetric axonal large fiber polyneuropathy or multifocal neuropathy within the context of a complex combination of  MCTD/limited cutaneous systemic sclerosis and sarcoidosis. Fortunately the nerve biopsy showed no active vasculitis or sarcoid granulomas. Will defer management of the very complex systemic sclerosis and sarcoidosis to the expertise of her rheumatology team.  Her neurologic examination is stable.     Plan:      1. Symptomatic management of pain and paresthesias: Start gabapentin 300 mg at night. If effective, can stop there. If there is continued pain during the day increase to 300 mg in the morning and afternoon (300 TID).   2. Foot care: Encouraged daily foot inspection, supportive/comfortable foot wear, good foot hygiene  3. PT for gait and stability training   4. Follow up in 6 months.     Preston Castaneda MD  Neurophysiology Fellow     Seen and discussed with Dr. Abel. His addendum follows.   ---  ADDENDUM:  I personally interviewed and examined the patient. I agree with the history, physical, assessment, and plan as documented above. Changes to the physical examination, assessment and plan have been incorporated into the note by myself, as to make it a single  cohesive document.    Farooq Abel MD

## 2022-06-21 ENCOUNTER — VIRTUAL VISIT (OUTPATIENT)
Dept: RHEUMATOLOGY | Facility: CLINIC | Age: 62
End: 2022-06-21
Attending: INTERNAL MEDICINE
Payer: COMMERCIAL

## 2022-06-21 DIAGNOSIS — Z79.899 HIGH RISK MEDICATION USE: ICD-10-CM

## 2022-06-21 DIAGNOSIS — D86.9 SARCOIDOSIS: ICD-10-CM

## 2022-06-21 DIAGNOSIS — M35.1 MCTD (MIXED CONNECTIVE TISSUE DISEASE) (H): Primary | ICD-10-CM

## 2022-06-21 DIAGNOSIS — M35.00 SJOGREN'S SYNDROME, WITH UNSPECIFIED ORGAN INVOLVEMENT (H): ICD-10-CM

## 2022-06-21 DIAGNOSIS — Z79.52 ON PREDNISONE THERAPY: ICD-10-CM

## 2022-06-21 DIAGNOSIS — J84.9 ILD (INTERSTITIAL LUNG DISEASE) (H): ICD-10-CM

## 2022-06-21 PROCEDURE — G0463 HOSPITAL OUTPT CLINIC VISIT: HCPCS | Mod: PN,RTG | Performed by: INTERNAL MEDICINE

## 2022-06-21 PROCEDURE — 99215 OFFICE O/P EST HI 40 MIN: CPT | Mod: 95 | Performed by: INTERNAL MEDICINE

## 2022-06-21 NOTE — LETTER
6/21/2022       RE: Jeanine Schuler  [address]  [facility] [address]     Dear Colleague,    Thank you for referring your patient, Jeanine Schuler, to the [facility] [place] at [facility]. Please see a copy of my visit note below.      In Person-Visit       SUBJECTIVE:  The patient returns in f/u of her MCTD/SSC/RP/with h/o multiple DU, Sarcoidosis .      PROBLEM LIST:    1.  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    2.  History of severe multiple digital ulcers, on fingers and toes.   3.  Marked keratoconjunctivitis sicca over the last several years.    4.  Marked iron deficiency anemia responsive to IV iron.    5.  Diffuse musculoskeletal pain   6.  Lower extremity edema managed with lasix and spironolactone   7.  Marked fatigue and diffuse clinical weakness.    8.  Dyspnea on exertion with lower extremity edema and other features including the anticentromere positivity worrisome for the potential development of pulmonary hypertension.    9.  Marked GERD with associated dysphagia.    10.  Status post gastric bypass with a history of intermittent constipation and diarrhea potentially consistent with bacterial small bowel overgrowth versus other gut effects of the prior surgery.    11.  History of positive rheumatoid factor consistent with MCTD, given the remainder of her clinical presentation.     12. Progressive Jaw tightening with inability to open mouth wider than 1 inch.   13. Diastolic dysfunction (3/2013)  14. Hypoalbuminemia  15. ILD cryobiopsy Pathology was reviewed at ILD conference on 10/12/2020 which showed ALI, what appears to be an acute exacerbation of underlying ILD with OP.  Few nonnecrotizing granulomas  16. Development of intermittent moderate/severe RLE Neuropathy with steroid taper below 30 mg prednisone/d Jan 2021  17.  Sarcoidosis      Impression:    Per the problem list above with historical features primarily of mixed connective tissue disease with severe Raynauds, SICCA, lung disease, ANJU, SSA and  RNP positivity, for recently with clinical features of sarcoidosis including neurosarcoidosis which has been steroid responsive and is worsening with steroid taper, at doses below 15 mg daily which she is on currently..    Plan/recommendation:    I have spoken with Dr. Bentley about her case.  He would like to send her to the [facility] for series of second opinions and I am in agreement with that, given the lack of real clarity in her work-up for why she is having such severe cardiopulmonary disease.    Would also like her to see Dr. Wick or Dr. Connell for their opinions regarding the likelihood of sarcoidosis being the major player in her disease process at this point.    She will be seeing Dr. Bentley next week.  I urged her to speak with him about whether there is anything can be adjusted in her cardiac medicines to have her be less lightheaded at night given her fall risk.    I will plan on seeing her back in a little over a month to go through things again in case we have any thoughts on changing her immunomodulatory therapy.  For the time being we will leave it as is.    Today's visit commenced at 3:53 PM was completed at 4:26 PM, 33 minutes in face-to-face time, with an additional   10  minutes spent in chart review,  and documentation completed on the date of service.      MARTÍN De La Torre MD, PhD    Rheumatology              INTERVAL HISTORY:       AUGUST 22, 2017, INTERVAL HISTORY:  Since we have last seen the patient, she developed a right ankle wound about 2 months ago.  That opened for a while and it became hard to close, but at this point it has finally closed over.       She has had some stressors and she feels like that has contributed to her feeling like she needed more prednisone.  " She had made it down to 12 mg a day for almost 6 months but increased to 20 mg a day in  when she was moving to a new house.  This was primarily due to an increase in fatigue and in joint pains.      At this point, her morning stiffness is 30-60 minutes despite remaining on the 20 mg a day of prednisone.  She does think her strength, however, is stable.      She continues to have a lot of Raynaud's phenomena but has not developed any distal digital ulcerations.       Her GERD and keratoconjunctivitis sicca, however, have remained stable and she thinks she has stable exercise tolerance.       She has not done her labs for many, many months and has not seen me for over a year, and we discussed that in some detail.     2018 INTERVAL HISTORY:  Since we have last seen her, she made it down to 12.5 mg a day of prednisone but then put herself back up to 20 mg a day because of increasing general body aches and fatigue as well as some inflammatory joint stiffness in her wrists.  Other joints have actually been okay.      She had stopped her methotrexate some time ago.  She works in a hospital and knew of several people who were admitted with \"methotrexate toxicity\" who  while in hospital and although she does not know the details, that frightened her enough that she decided to stop the drug.       She is getting worsening Raynaud's.  She really notes that this happens not only with cold, but with a variety of kinds of stress.  She has previously, at least for a short length of time, been on losartan and does not remember whether that helped at all.  I am pretty sure she has also been on calcium channel blockers, but those are relatively contraindicated in her at this point because of persistent bilateral lower extremity edema.       She is getting enough lower extremity edema and has been found to have vascular insufficiency that she will be having some vascular surgery to laser some of these areas, " although an exact time for that has not been laid out.       In addition to getting Raynaud's in her hands, she also gets it in her feet, and when she does, potentially contributed to by the vascular insufficiency, she gets numbness in her feet.  She is not getting numbness in her hands by contrast.       The patient did have pulmonary function tests today.  Although she is not exercising regularly, in general she feels her exercise tolerance has been stable, and she is not having any dry cough.       She did have a decrease in her FVC to 3.68 from 4.30 and her DLCO to 21.77 from 24.74, but she has had some rib cage pain for about the last 3 weeks since she had a minor accident while dancing.  Notably then, her TLC is completely stable, going from 6.41 to 6.29.       She denies any other new problems.  Keratoconjunctivitis sicca, GERD and other features have been stable and she denies any dysphagia.     July 11, 2019 interval history:    Since we have last seen her she did have to miss appointment because of some family issues but she is actually been doing quite well.  Although she is continued to have some intermittent joint complaints she is been able to taper her prednisone down to 7.5 mg a day the lowest she has been on and the entire time I have been following her.    Although her joints have not worsened with that lower dose she does think she is getting some worsening of her Raynauds phenomena.  It can be pretty bad and the attacks can be hard to break even with rewarming.  She gets them in hands and feet.  Fortunately she has not had any digital ulcers.  She does recall that she was placed temporarily on tadalafil sometime ago by Dr. Bentley but she could not afford it long-term.  She thinks that may have been helpful however.    She denies any significant dyspnea on exertion or cough.  She has some muscle weakness but not marketed and she does not think that is any worse over time.  Fatigue has generally  been her worst symptom and that may be slightly worse.    She does have some ongoing keratoconjunctivitis sicca but does not feel like she wants to go on a medicine for that.  She already has a full plate of dentures in both upper and lower.      August 11, 2020 interval history:    Since we last seen the patient she feels she was doing relatively stably on till July 7.  At that point she had the abrupt onset of a bad dry cough.  She did not have significant fevers but did not feel well.  She was seen had a negative coronavirus test but a chest x-ray apparently showed evidence of some infiltrates and she was placed on doxycycline.  She says she did not improve with that therapy and a CT angios was performed.  That showed bilateral pulmonary consolidation thought consistent with infection.  There was note that the pattern could actually be somewhat typical for sarcoid but it was noted to have worsened since the prior study performed there at [facility] in October 2019.  She also was noted to have mediastinal lymphadenopathy and debris in the esophagus potentially consistent with her systemic sclerosis.  No pulmonary embolism was found.  Based on the results of that she was placed on Levaquin.  She continued to fail to improve and a third antibiotic was also tried.    Finally she was seen by pulmonologist and a bronchoscopy was performed.  I am able to review the results of that and there was a high percentage of neutrophils and lymphocytes and that BAL fluid.  Viral cultures however Gram stain and culture were all unremarkable and there were no malignant cells found.    She was placed on 40 mg a day of prednisone at the time of the bronchoscopy once it was clear that there was no acute infection.  She does not have a follow-up appointment yet.  Although her cough is mostly gone she does not feel good and feels very dyspneic just going from the bed to the bathroom or crossed the room.  She is not convinced she has  had any improvement with 40 mg daily prednisone and if anything think she is worse.    In reviewing her extensive record I see that a repeat TARA was performed and a reflexive was done when it was positive.  She continues to have an anticentromere antibody which she had previously, but now notably has a high titer Gonzalez and high titer RNP antibody.  She previously was borderline positive for RNP and Gonzalez negative.  SSA is also higher titer than it was previously.    With all these autoantibody positivity she however denies any specific features that would suggest systemic lupus.  She specifically denies sun sensitivity sun sensitive rashes including a malar rash, morning stiffness in any of her joints or any joint swelling and any pleurisy at the time of her  at CT angiogram or otherwise.  \  Her Raynaud's phenomena has been stable as have other features of her limited cutaneous systemic sclerosis.        February 25, 2021 interval history:    At the time that I saw the patient about 3 weeks ago we decided that because her neuropathy sounded rather rapidly progressive, and her HRCT scan was being read as potentially consistent with sarcoidosis, that neurosarcoid was on the differential.  I also wondered about an immune complex mediated small vessel vasculitis causing neuropathy.  We therefore did laboratory testing as well as started her on some steroid.    Laboratory testing has been largely unrevealing.  She did not have elevated calcium or elevated calcium in the urine.  I did also do a parathyroid hormone in case we did find that however her parathyroid hormone was significantly elevated.  Her vitamin D was very low and she has started supplementation for that.    Most notably however her prednisone at 40 mg a day virtually resolved her symptoms over 3 to 4 days.  She had at least an 80% decrease in her pain and numbness although she still has some residual tingling in the legs bilaterally.    She is having  some prednisone side effects.  Specifically she is having a hard time sleeping and is also feeling irritable which she thinks could be due partly due to the loss of sleep but partly directly due to the prednisone.  She has got a lot of prior experience with prednisone and has had some difficulties with it before.    She did have some right-sided chest pain.  That seem to occur with a deep breath.  She has a cardiologist and saw them will be getting a follow-up echo.  She is no longer having that.    In going through her history she does not believe she is ever been on Imuran and is quite sure and I am sure as well that we have never had her on TNF inhibition.  She was on methotrexate and tolerated oral methotrexate poorly due to nausea but was better able to tolerate subcutaneous methotrexate.      Impression:    Per the problem list, with known RNP positivity and more recently with this HRCT finding that is concerning for the possibility of overlapping sarcoidosis.    She had a very nice response to steroid quite quickly, and so we now have the conundrum of not knowing exactly why.  I certainly suspect that she has an immune mediated neuropathy given this rapid response and neurosarcoid is a concern.    I have sent a message to Dr. Cuellar in pulmonary for her thoughts.  I have also ordered a repeat HRCT that to be done in another week or so before she is seen back in pulmonary.    I am hoping these can be reviewed so that there could be a decision whether we can feel strongly enough that presumptively this could be sarcoidosis to put her on appropriate therapy for that or whether a biopsy might be justified.    We could simply presumptively try steroid sparing medications.  I am somewhat concerned about using TNF inhibitors however unless we are pretty confident this is sarcoid, as they have been associated with worsening of systemic lupus patients and autoimmune associated interstitial lung disease in some  settings.    We also have the  finding of the elevated PTH, and very low vitamin D levels, which can be seen in sarcoidosis, but is unclear here given normal Calcium levels. and will refer her to endocrinology for that.    I have given her prescription for temazepam to help her with the sleep and irritability.  I did warn her to be careful about driving in the morning until she knows how it affects her as it does have a long half-life.    I will plan on seeing her back in about 6 to 8 weeks sooner as needed.      April 8, 2021 interval history:    Since we have last seen the patient she has had initial evaluation done, and Dr. Cuellar has a recent note on the chart and an addendum placed a day after the patient's studies were reviewed in sarcoid conference.    The upshot of that is that there is evidence from her studies, in particular the biopsy studies that suggest that this could be sarcoidosis.  Because she has neurologic symptoms and inadequately explain dyspnea on exertion she will also be getting neurology evaluation and cardiac evaluation.    She remains at this time on 40 mg a day of prednisone.  She is not liking this dose.  Although she still has dyspnea on exertion she also is irritable and on edge on that dose and is also experiencing some nausea..    After consideration of the issues, Dr. Cuellar had recommended the patient go on track today.  She has in fact been on both this and MMF before and although she had some difficulties at time with tolerating these medication she think she would tolerate them again.  We have briefly discussed possibility of using a TNF inhibitor but that does give me pause, given that she has had features of mixed connective tissue disease and in TARA positive individuals TNF inhibitors have at times worsen their disease process, particularly in those with lupus associated autoantibodies.  She has had in the past of borderline positive RNP.    She believes most of her other  historical clinical features are stable.  She still gets pretty bad Raynaud's phenomena and some features of arthritis although the inflammatory features are currently pretty well controlled on this dose of prednisone.    July 15, 2021 interval history:    Since we have last seen the patient she has continued to have a lot of dyspnea on exertion despite being on the MMF 40 mg a day of prednisone and until just earlier this week methotrexate.    Because of her lung biopsy showing features consistent with sarcoidosis, she was presented at sarcoidosis conference last week.  I attended this discussion.  After much discussion it was decided that azathioprine may be the next drug to try in her.  This was influenced in part by my concerns of giving a TNF inhibitor to her given her underlying autoimmune connective tissue disease, and the possibility of that disease process worsening with TNF inhibition as has been described and as I have seen previously albeit rarely.    She did just get TPMT levels earlier today to help decide what kind of azathioprine dose to start with.  She continues on MMF.    She will be getting a nerve biopsy performed on Monday.    She also was recently in the emergency room because of very severe midsternal chest pain.  She says this developed during the night.  When it would not go away she went to the ER.  There she was given narcotics after first getting a single sublingual nitroglycerin that did not help.  Extensive cardiopulmonary work-up was negative for any evidence of ischemia or clot.  Eventually she got Toradol after having gotten narcotics and did improve and went home.    She has had some costochondral type pains before but nothing like this and this was not exacerbated or relieved by pressure over the area.    Notably, she is been having worsening problems with GERD and dysphagia as well.  She just spoke with Dr. Cárdenas earlier this morning.  She is getting a lot of pill dysphagia at  this point which is very frustrating for her and very uncomfortable.  She had dilatation in the fall and thought it helped for a while but she says this almost feels like it is just a dysmotility problem rather than stricturing to her.        November 11, 2021 interval history:    Patient had a right sural nerve biopsy on 7/19/2021.  Biopsy was notable for axonal neuropathy without a specific cause.  There is no overt evidence of inflammation, primary demyelination, or granulomatous findings.  Patient had return follow-up would pulmonology on 9/16/2021.  Her CT was reviewed, showed bronchocentric and predominantly central infiltrates.Overall findings have not significantly progressed from prior CT on 7/1/2021.  Patient's case was discussed in ILD conference.  Plan was to start Imuran 150 mg daily, as well as starting a prednisone taper (starting at 40 mg daily, then tapering 5 mg every month to be kept at 30 mg daily until follow-up with rheumatology).     Patient overall has no significant change in her health from prior.  She is currently taking hydroxychloroquine, prednisone 30 mg daily, and Imuran 150 daily.  Continues to have fatigue, which she feels has been gradually worsening.  She notes that she can only lift 2-3 grocery bags before feeling tired, and needs assistance when cooking.  Continues to have neuropathy symptoms in her bilateral lower extremities (more in the right side).  Continues to have shortness of breath with limited activity (e.g. moving from bed to kitchen).  Is currently treating her GERD with Protonix and omeprazole, which she feels controls her symptoms.  Has some mouth dryness, but notices that her eyes are watery which is new for her. She has received her COVID-19 booster immunization.     January 13, 2022 interval history:    Since we have last seen the patient she was admitted to the hospital because of worsening shortness of breath and palpitations.  Coronary angiogram showed minimal  areas of stenosis of less than 20%.  Echocardiogram however suggested very significant pulmonary hypertension and a markedly reduced ejection fraction.    She saw Dr. Cuellar back in pulmonary clinic last week.  Cardiac MRI was performed yesterday.  I have reviewed those results today.  There is significant LGE, consistent with an infiltrative process such as sarcoidosis.  She is quite symptomatic.  She had a 6-minute walk test today and had to stop because of how dyspneic she was and how many palpitation she was having but she had absolutely no desaturation.    She has been tapering her prednisone and is down to 20 mg daily but she believes all of the symptoms have worsened with that taper.      ROS as per HPI.  10-point ROS is otherwise negative.    HISTORY REVIEW:  Past Medical History:   Diagnosis Date     Anemia      Cellulitis of leg 6/6/2013     Esophageal reflux     Better post-hiatal hernia repair and weight loss     Limb ischemia; ulcers of fingertips 4/22/2013     Raynaud's syndrome      Scleroderma (H)      Systemic sclerosis (H) 2009     Unspecified essential hypertension        Past Surgical History:   Procedure Laterality Date     BYPASS GASTRIC, CHOLECYSTECTOMY, COMBINED       CHOLECYSTECTOMY       COLONOSCOPY       ESOPHAGOSCOPY, GASTROSCOPY, DUODENOSCOPY (EGD), COMBINED N/A 3/10/2016    Procedure: COMBINED ESOPHAGOSCOPY, GASTROSCOPY, DUODENOSCOPY (EGD), BIOPSY SINGLE OR MULTIPLE;  Surgeon: Tricia Zambrano MD;  Location:  GI     INNER EAR SURGERY       PICC INSERTION  6/5/2013    5fr DL Power PICC, 44cm, left basilic vein, tip in low SVC     SURGICAL HISTORY OF -       Left ear surgery - incus transition, tympanoplasty     SURGICAL HISTORY OF -   6/05    Gastric bypass     SURGICAL HISTORY OF -   6/05    Cholecystectomy     SURGICAL HISTORY OF -   6/05    Hiatal hernia repair     TONSILLECTOMY         Family History   Problem Relation Age of Onset     Cerebrovascular Disease Father       [place].A.D. Father      C.A.D. Mother      C.A.D. 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      Social History Main Topics     Smoking status: Never Smoker      Smokeless tobacco: Never Used     Alcohol Use: Yes      occassionally     Drug Use: No     Sexually Active: Yes -- Male partner(s)     Birth Control/ Protection: None     Social History Narrative     Was  in December 2015. Longtime partner from [place].       Patient Active Problem List   Diagnosis     Essential hypertension     Thyrotoxicosis     Obesity     Prolonged QTc 460 ms,  at hr 67     Low back pain - Suspect SI Joint dysfunction     Cough     Systemic sclerosis (H)     Tumoral calcinosis     Shortness of breath     Edema of both legs     Abnormal albumin     Myopathy     Hypoalbuminemia     Diastolic dysfunction     Limb ischemia; ulcers of fingertips     Cellulitis of leg     Other specified diffuse disease of connective tissue     Disturbed sleep rhythm     Pharyngeal dysphagia     Gastroesophageal reflux disease, esophagitis presence not specified     Rheumatoid arthritis with negative rheumatoid factor, involving unspecified site (H)     Raynaud's disease without gangrene       Allergies   Allergen Reactions     Lovenox Other (See Comments)     Blood clot      Norvasc [Amlodipine Besylate] Swelling     Lip swelling that happened on 2 different occasions     Erythromycin Rash     Rash on arms     OBJECTIVE ( January 13, 2022:  Vitals: Reviewed  Gen: A+Ox3, NAD  HEENT: NCAT, EOMI  Pulm: CTAB, minimal coarse sounds on basilar lobes  CVS: She has a regular rate and rhythm but this is punctuated by very frequent ectopy then restoration of  a regular rhythm.  No murmurs appreciated.  Skin: Her fingers are intermittently diffusely cyanotic.  Purpuric patches on fingertips no ulcerations/erosions. There is platelike xerosis of lower extremities  -mild sclerosis of distal fingers  Msk: No active synovitis.  4/5 upper extremity strength, 5/5 lower extremity strength bilaterally.    EKG (11/12/2021)  Sinus rhythm with marked sinus arrhythmia with premature supraventricular complexes  Possible left atrial enlargement  Left axis deviation  Left ventricular hypertrophy with QRS widening  T wave abnormality consider lateral ischemia  Prolonged QT  Abnormal ECG  Ventricular rate 81, , , QT/QTc 418/485, P/R/T 29/-32/132     Component      Latest Ref Rng 1/26/2016   WBC      4.0 - 11.0 10e9/L 9.2   RBC Count      3.8 - 5.2 10e12/L 4.59   Hemoglobin      11.7 - 15.7 g/dL 13.2   Hematocrit      35.0 - 47.0 % 41.8   MCV      78 - 100 fl 91   MCH      26.5 - 33.0 pg 28.8   MCHC      31.5 - 36.5 g/dL 31.6   RDW      10.0 - 15.0 % 16.7 (H)   Platelet Count      150 - 450 10e9/L 195   Diff Method       Automated Method   % Neutrophils       62.9   % Lymphocytes       27.0   % Monocytes       9.1   % Eosinophils       0.4   % Basophils       0.2   % Immature Granulocytes       0.4   Nucleated RBCs      0 /100 0   Absolute Neutrophil      1.6 - 8.3 10e9/L 5.8   Absolute Lymphocytes      0.8 - 5.3 10e9/L 2.5   Absolute Monocytes      0.0 - 1.3 10e9/L 0.8   Absolute Eosinophils      0.0 - 0.7 10e9/L 0.0   Absolute Basophils      0.0 - 0.2 10e9/L 0.0   Abs Immature Granulocytes      0 - 0.4 10e9/L 0.0   Absolute Nucleated RBC       0.0   Color Urine       Yellow   Appearance Urine       Clear   Glucose Urine      NEG mg/dL Negative   Bilirubin Urine      NEG Negative   Ketones Urine      NEG mg/dL Negative   Specific Gravity Urine      1.003 - 1.035 1.010   Blood Urine      NEG Negative   pH Urine      5.0 - 7.0 pH 6.0   Protein Albumin Urine      NEG mg/dL  Negative   Urobilinogen mg/dL      0.0 - 2.0 mg/dL Normal   Nitrite Urine      NEG Negative   Leukocyte Esterase Urine      NEG Negative   Source       Midstream Urine   WBC Urine      0 - 2 /HPF <1   RBC Urine      0 - 2 /HPF <1   Squamous Epithelial /HPF Urine      0 - 1 /HPF <1   Creatinine      0.52 - 1.04 mg/dL 1.11 (H)   GFR Estimate      >60 mL/min/1.7m2 51 (L)   GFR Estimate If Black      >60 mL/min/1.7m2 62   ALT      0 - 50 U/L 23   AST      0 - 45 U/L 16   Albumin      3.4 - 5.0 g/dL 3.4   CRP Inflammation      0.0 - 8.0 mg/L <2.9   Sed Rate      0 - 30 mm/h 9   CK Total      30 - 225 U/L 38   Aldolase       4.1     Biopsy from 3/10 EGD:  Esophagus, gastroesophageal junction, biopsies:   - Squamous mucosa and submucosa with marked chronic inflammation and   fibrosis   - No evidence of intestinal metaplasia or dysplasia               February 15, 2022 interval history:    Since we have last seen the patient she has felt about the same in terms of heart palpitations on the 30 mg daily prednisone.  She believes her neuropathy symptoms actually worsened a little.  Her Raynauds phenomena has also been worse but it has of course been rather cold.    Interestingly, she was actually in [place] last week and despite the warm weather there she still had a lot of Raynauds that was not much different.    She saw Dr. Francis earlier today.  I have read his note which is up.  He is starting her on a Zio patch for 1 week and hoping that we can significantly decrease her prednisone so that a PET scan can be performed and identify whether there is any cardiac inflammation at a time that she is on more minimal prednisone.    She does remain on azathioprine.  She did have labs earlier today which look quite good with perhaps some slight worsening of ESR but in the context of a lower CRP which I am more inclined to believe.  Her AST was up above the upper limit of normal very slightly but she denies any recent alcohol and  ALT was okay.    Physical examination by video shows her to be in no acute distress HEENT examination is normal.  She is speaking in full sentences with no shortness of breath.  Her hand examination does not show marked cyanosis and there is no apparent swelling or joint deformity.    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    Plan/recommendation:    I have asked her to go down to 25 mg a day for the next 3 days of prednisone.  If she does not feel markedly worse she can go further to 20 mg a day for the remainder of the time she is on the Zio patch and another week thereafter.  If at that point she still feels okay she can go down to 15 mg a day.    After she has been on that dose for a couple of weeks I want her to do labs again so we can reassess for inflammation.  I would also like to see her at about that time on 15 March when I do have another video visit that I can see her at and we can decide whether to go down further and if so how fast.    She will let us know if she is having difficulty in the meantime.    This video visit commenced at 3:36 PM was completed at 3:53 PM    March 15, 2022 interval history:    Since we have last seen her she has been able to taper prednisone down to 10 mg daily as of last week.  Notably, she is not had any worsening of joint symptoms or fatigue with that taper.  Her neuropathy however in her feet has definitely worsened.  She has not yet reached out to Dr. Abel but he had let her know before that if her neuropathy worsen she could go on gabapentin or pregabalin.  At this point she has been toughing it out but is considering trying this.    She unfortunately had right heart cath scheduled but it had to be delayed as her screen Covid test was positive.  Symptomatically  she felt perhaps a little bit more short of breath at the time but could barely notice a difference.  She did not have any fevers or chills.  She has been vaccinated, we have not checked her antispike antibody levels.  It is possible that they are not good, as she has been on a lot of immunosuppression including substantial doses of steroid.    She did have her booster shot back in September or October.    The right heart catheterization with planned cardiac biopsy has now been put off until April.  She was also to get IV iron as her iron levels were low but that had to be delayed as well.  Notably, although her iron levels were somewhat low, her hemoglobin and hematocrit have remained normal.    She continues on azathioprine 150 mg daily and is tolerating that well.  She is also on Bactrim prophylaxis.    Physical examination by video shows her to be in no acute distress.  HEENT examination is normal and she does not appear to be acutely short of breath.  She is speaking in full sentences with no difficulty.  Musculoskeletal examination of the hands shows normal range of motion and no convincing areas of joint swelling and no active cyanosis.    June 21, 2022 interval history:    Since have not seen the patient she has had several additional visits.  She is now on 15 mg daily prednisone after having been tapered down to 10 mg daily but did not do well at that dose.    She also developed a pneumonia requiring admission and felt quite poorly with that with some worsening of her baseline dyspnea on exertion.  Even now she continues to have drops in her oxygen saturation into the 80s just with sitting with virtually no exertion.  She describes having virtually no energy as well.    Her neuropathy also seems to be worsening for her extending about the mid calf right greater than left.  Her Raynauds phenomena is also worse but she does not think the 2 are directly related, as she has neuropathy far proximal to where her  Raynauds is.    Unfortunately, she is requiring quite a bit of cardiac medicine because of her atrial fibrillation and cardiomyopathy.  With all of this, she often feels lightheaded particularly at night when she wakes and needs to go to the bathroom.  She has had several falls which she attributes more to her neuropathy in her feet then to lightheadedness but making it questionable whether she could be on medicine such as sildenafil to help her Raynauds at this point.    Furthermore because of her atrial fibrillation she is also on Eliquis, so the use of Persantine which I am not sure she has had before or pentoxyphyline, which she has, are problematic.    Overall she is very frustrated with her situation.    Physical examination by video shows her to be in no acute distress.  HEENT examination is normal.  She is speaking in full sentences with no shortness of breath.  Her upper extremity examination shows no convincing areas of swelling or deformity.      Again, thank you for allowing me to participate in the care of your patient.      Sincerely,    Nicolás De La Torre MD

## 2022-06-21 NOTE — PROGRESS NOTES
Jeanine is a 61 year old who is being evaluated via a billable video visit.      How would you like to obtain your AVS? MyChart  If the video visit is dropped, the invitation should be resent by: Text to cell phone: [phone]  Will anyone else be joining your video visit? No      Video-Visit Details    Type of service:  Video Visit    Originating Location (pt. Location): Home    Distant Location (provider location):  [facility] [place]     Platform used for Video Visit: DoxKngine       In Person-Visit       SUBJECTIVE:  The patient returns in f/u of her MCTD/SSC/RP/with h/o multiple DU, Sarcoidosis .      PROBLEM LIST:    1.  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    2.  History of severe multiple digital ulcers, on fingers and toes.   3.  Marked keratoconjunctivitis sicca over the last several years.    4.  Marked iron deficiency anemia responsive to IV iron.    5.  Diffuse musculoskeletal pain   6.  Lower extremity edema managed with lasix and spironolactone   7.  Marked fatigue and diffuse clinical weakness.    8.  Dyspnea on exertion with lower extremity edema and other features including the anticentromere positivity worrisome for the potential development of pulmonary hypertension.    9.  Marked GERD with associated dysphagia.    10.  Status post gastric bypass with a history of intermittent constipation and diarrhea potentially consistent with bacterial small bowel overgrowth versus other gut effects of the prior surgery.    11.  History of positive rheumatoid factor consistent with MCTD, given the remainder of her clinical presentation.     12. Progressive Jaw tightening with inability to open mouth wider than 1 inch.   13. Diastolic dysfunction (3/2013)  14. Hypoalbuminemia  15. ILD cryobiopsy Pathology was reviewed at ILD conference on 10/12/2020 which showed ALI, what appears to be an  acute exacerbation of underlying ILD with OP.  Few nonnecrotizing granulomas  16. Development of intermittent moderate/severe RLE Neuropathy with steroid taper below 30 mg prednisone/d Jan 2021  17. Sarcoidosis      Impression:    Per the problem list above with historical features primarily of mixed connective tissue disease with severe Raynauds, SICCA, lung disease, ANJU, SSA and  RNP positivity, for recently with clinical features of sarcoidosis including neurosarcoidosis which has been steroid responsive and is worsening with steroid taper, at doses below 15 mg daily which she is on currently..    Plan/recommendation:    I have spoken with Dr. Bentley about her case.  He would like to send her to the [facility] for series of second opinions and I am in agreement with that, given the lack of real clarity in her work-up for why she is having such severe cardiopulmonary disease.    Would also like her to see Dr. Wick or Dr. Connell for their opinions regarding the likelihood of sarcoidosis being the major player in her disease process at this point.    She will be seeing Dr. Bentley next week.  I urged her to speak with him about whether there is anything can be adjusted in her cardiac medicines to have her be less lightheaded at night given her fall risk.    I will plan on seeing her back in a little over a month to go through things again in case we have any thoughts on changing her immunomodulatory therapy.  For the time being we will leave it as is.    Today's visit commenced at 3:53 PM was completed at 4:26 PM, 33 minutes in face-to-face time, with an additional   10  minutes spent in chart review,  and documentation completed on the date of service.      MARTÍN De La Torre MD, PhD    Rheumatology              INTERVAL HISTORY:       AUGUST 22, 2017, INTERVAL HISTORY:  Since we have last seen the patient, she developed a right ankle wound about 2 months ago.  That opened for a  "while and it became hard to close, but at this point it has finally closed over.       She has had some stressors and she feels like that has contributed to her feeling like she needed more prednisone.  She had made it down to 12 mg a day for almost 6 months but increased to 20 mg a day in  when she was moving to a new house.  This was primarily due to an increase in fatigue and in joint pains.      At this point, her morning stiffness is 30-60 minutes despite remaining on the 20 mg a day of prednisone.  She does think her strength, however, is stable.      She continues to have a lot of Raynaud's phenomena but has not developed any distal digital ulcerations.       Her GERD and keratoconjunctivitis sicca, however, have remained stable and she thinks she has stable exercise tolerance.       She has not done her labs for many, many months and has not seen me for over a year, and we discussed that in some detail.     2018 INTERVAL HISTORY:  Since we have last seen her, she made it down to 12.5 mg a day of prednisone but then put herself back up to 20 mg a day because of increasing general body aches and fatigue as well as some inflammatory joint stiffness in her wrists.  Other joints have actually been okay.      She had stopped her methotrexate some time ago.  She works in a hospital and knew of several people who were admitted with \"methotrexate toxicity\" who  while in hospital and although she does not know the details, that frightened her enough that she decided to stop the drug.       She is getting worsening Raynaud's.  She really notes that this happens not only with cold, but with a variety of kinds of stress.  She has previously, at least for a short length of time, been on losartan and does not remember whether that helped at all.  I am pretty sure she has also been on calcium channel blockers, but those are relatively contraindicated in her at this point because of persistent bilateral " lower extremity edema.       She is getting enough lower extremity edema and has been found to have vascular insufficiency that she will be having some vascular surgery to laser some of these areas, although an exact time for that has not been laid out.       In addition to getting Raynaud's in her hands, she also gets it in her feet, and when she does, potentially contributed to by the vascular insufficiency, she gets numbness in her feet.  She is not getting numbness in her hands by contrast.       The patient did have pulmonary function tests today.  Although she is not exercising regularly, in general she feels her exercise tolerance has been stable, and she is not having any dry cough.       She did have a decrease in her FVC to 3.68 from 4.30 and her DLCO to 21.77 from 24.74, but she has had some rib cage pain for about the last 3 weeks since she had a minor accident while dancing.  Notably then, her TLC is completely stable, going from 6.41 to 6.29.       She denies any other new problems.  Keratoconjunctivitis sicca, GERD and other features have been stable and she denies any dysphagia.     July 11, 2019 interval history:    Since we have last seen her she did have to miss appointment because of some family issues but she is actually been doing quite well.  Although she is continued to have some intermittent joint complaints she is been able to taper her prednisone down to 7.5 mg a day the lowest she has been on and the entire time I have been following her.    Although her joints have not worsened with that lower dose she does think she is getting some worsening of her Raynauds phenomena.  It can be pretty bad and the attacks can be hard to break even with rewarming.  She gets them in hands and feet.  Fortunately she has not had any digital ulcers.  She does recall that she was placed temporarily on tadalafil sometime ago by Dr. Bentley but she could not afford it long-term.  She thinks that may have been  helpful however.    She denies any significant dyspnea on exertion or cough.  She has some muscle weakness but not marketed and she does not think that is any worse over time.  Fatigue has generally been her worst symptom and that may be slightly worse.    She does have some ongoing keratoconjunctivitis sicca but does not feel like she wants to go on a medicine for that.  She already has a full plate of dentures in both upper and lower.      August 11, 2020 interval history:    Since we last seen the patient she feels she was doing relatively stably on till July 7.  At that point she had the abrupt onset of a bad dry cough.  She did not have significant fevers but did not feel well.  She was seen had a negative coronavirus test but a chest x-ray apparently showed evidence of some infiltrates and she was placed on doxycycline.  She says she did not improve with that therapy and a CT angios was performed.  That showed bilateral pulmonary consolidation thought consistent with infection.  There was note that the pattern could actually be somewhat typical for sarcoid but it was noted to have worsened since the prior study performed there at [facility] in October 2019.  She also was noted to have mediastinal lymphadenopathy and debris in the esophagus potentially consistent with her systemic sclerosis.  No pulmonary embolism was found.  Based on the results of that she was placed on Levaquin.  She continued to fail to improve and a third antibiotic was also tried.    Finally she was seen by pulmonologist and a bronchoscopy was performed.  I am able to review the results of that and there was a high percentage of neutrophils and lymphocytes and that BAL fluid.  Viral cultures however Gram stain and culture were all unremarkable and there were no malignant cells found.    She was placed on 40 mg a day of prednisone at the time of the bronchoscopy once it was clear that there was no acute infection.  She does not have a  follow-up appointment yet.  Although her cough is mostly gone she does not feel good and feels very dyspneic just going from the bed to the bathroom or crossed the room.  She is not convinced she has had any improvement with 40 mg daily prednisone and if anything think she is worse.    In reviewing her extensive record I see that a repeat TARA was performed and a reflexive was done when it was positive.  She continues to have an anticentromere antibody which she had previously, but now notably has a high titer Gonzalez and high titer RNP antibody.  She previously was borderline positive for RNP and Gonzalez negative.  SSA is also higher titer than it was previously.    With all these autoantibody positivity she however denies any specific features that would suggest systemic lupus.  She specifically denies sun sensitivity sun sensitive rashes including a malar rash, morning stiffness in any of her joints or any joint swelling and any pleurisy at the time of her  at CT angiogram or otherwise.  \  Her Raynaud's phenomena has been stable as have other features of her limited cutaneous systemic sclerosis.        February 25, 2021 interval history:    At the time that I saw the patient about 3 weeks ago we decided that because her neuropathy sounded rather rapidly progressive, and her HRCT scan was being read as potentially consistent with sarcoidosis, that neurosarcoid was on the differential.  I also wondered about an immune complex mediated small vessel vasculitis causing neuropathy.  We therefore did laboratory testing as well as started her on some steroid.    Laboratory testing has been largely unrevealing.  She did not have elevated calcium or elevated calcium in the urine.  I did also do a parathyroid hormone in case we did find that however her parathyroid hormone was significantly elevated.  Her vitamin D was very low and she has started supplementation for that.    Most notably however her prednisone at 40 mg a  day virtually resolved her symptoms over 3 to 4 days.  She had at least an 80% decrease in her pain and numbness although she still has some residual tingling in the legs bilaterally.    She is having some prednisone side effects.  Specifically she is having a hard time sleeping and is also feeling irritable which she thinks could be due partly due to the loss of sleep but partly directly due to the prednisone.  She has got a lot of prior experience with prednisone and has had some difficulties with it before.    She did have some right-sided chest pain.  That seem to occur with a deep breath.  She has a cardiologist and saw them will be getting a follow-up echo.  She is no longer having that.    In going through her history she does not believe she is ever been on Imuran and is quite sure and I am sure as well that we have never had her on TNF inhibition.  She was on methotrexate and tolerated oral methotrexate poorly due to nausea but was better able to tolerate subcutaneous methotrexate.      Impression:    Per the problem list, with known RNP positivity and more recently with this HRCT finding that is concerning for the possibility of overlapping sarcoidosis.    She had a very nice response to steroid quite quickly, and so we now have the conundrum of not knowing exactly why.  I certainly suspect that she has an immune mediated neuropathy given this rapid response and neurosarcoid is a concern.    I have sent a message to Dr. Cuellar in pulmonary for her thoughts.  I have also ordered a repeat HRCT that to be done in another week or so before she is seen back in pulmonary.    I am hoping these can be reviewed so that there could be a decision whether we can feel strongly enough that presumptively this could be sarcoidosis to put her on appropriate therapy for that or whether a biopsy might be justified.    We could simply presumptively try steroid sparing medications.  I am somewhat concerned about using TNF  inhibitors however unless we are pretty confident this is sarcoid, as they have been associated with worsening of systemic lupus patients and autoimmune associated interstitial lung disease in some settings.    We also have the  finding of the elevated PTH, and very low vitamin D levels, which can be seen in sarcoidosis, but is unclear here given normal Calcium levels. and will refer her to endocrinology for that.    I have given her prescription for temazepam to help her with the sleep and irritability.  I did warn her to be careful about driving in the morning until she knows how it affects her as it does have a long half-life.    I will plan on seeing her back in about 6 to 8 weeks sooner as needed.      April 8, 2021 interval history:    Since we have last seen the patient she has had initial evaluation done, and Dr. Cuellar has a recent note on the chart and an addendum placed a day after the patient's studies were reviewed in sarcoid conference.    The upshot of that is that there is evidence from her studies, in particular the biopsy studies that suggest that this could be sarcoidosis.  Because she has neurologic symptoms and inadequately explain dyspnea on exertion she will also be getting neurology evaluation and cardiac evaluation.    She remains at this time on 40 mg a day of prednisone.  She is not liking this dose.  Although she still has dyspnea on exertion she also is irritable and on edge on that dose and is also experiencing some nausea..    After consideration of the issues, Dr. Cuellar had recommended the patient go on track today.  She has in fact been on both this and MMF before and although she had some difficulties at time with tolerating these medication she think she would tolerate them again.  We have briefly discussed possibility of using a TNF inhibitor but that does give me pause, given that she has had features of mixed connective tissue disease and in TARA positive individuals TNF  inhibitors have at times worsen their disease process, particularly in those with lupus associated autoantibodies.  She has had in the past of borderline positive RNP.    She believes most of her other historical clinical features are stable.  She still gets pretty bad Raynaud's phenomena and some features of arthritis although the inflammatory features are currently pretty well controlled on this dose of prednisone.    July 15, 2021 interval history:    Since we have last seen the patient she has continued to have a lot of dyspnea on exertion despite being on the MMF 40 mg a day of prednisone and until just earlier this week methotrexate.    Because of her lung biopsy showing features consistent with sarcoidosis, she was presented at sarcoidosis conference last week.  I attended this discussion.  After much discussion it was decided that azathioprine may be the next drug to try in her.  This was influenced in part by my concerns of giving a TNF inhibitor to her given her underlying autoimmune connective tissue disease, and the possibility of that disease process worsening with TNF inhibition as has been described and as I have seen previously albeit rarely.    She did just get TPMT levels earlier today to help decide what kind of azathioprine dose to start with.  She continues on MMF.    She will be getting a nerve biopsy performed on Monday.    She also was recently in the emergency room because of very severe midsternal chest pain.  She says this developed during the night.  When it would not go away she went to the ER.  There she was given narcotics after first getting a single sublingual nitroglycerin that did not help.  Extensive cardiopulmonary work-up was negative for any evidence of ischemia or clot.  Eventually she got Toradol after having gotten narcotics and did improve and went home.    She has had some costochondral type pains before but nothing like this and this was not exacerbated or relieved by  pressure over the area.    Notably, she is been having worsening problems with GERD and dysphagia as well.  She just spoke with Dr. Cárdenas earlier this morning.  She is getting a lot of pill dysphagia at this point which is very frustrating for her and very uncomfortable.  She had dilatation in the fall and thought it helped for a while but she says this almost feels like it is just a dysmotility problem rather than stricturing to her.        November 11, 2021 interval history:    Patient had a right sural nerve biopsy on 7/19/2021.  Biopsy was notable for axonal neuropathy without a specific cause.  There is no overt evidence of inflammation, primary demyelination, or granulomatous findings.  Patient had return follow-up would pulmonology on 9/16/2021.  Her CT was reviewed, showed bronchocentric and predominantly central infiltrates.Overall findings have not significantly progressed from prior CT on 7/1/2021.  Patient's case was discussed in ILD conference.  Plan was to start Imuran 150 mg daily, as well as starting a prednisone taper (starting at 40 mg daily, then tapering 5 mg every month to be kept at 30 mg daily until follow-up with rheumatology).     Patient overall has no significant change in her health from prior.  She is currently taking hydroxychloroquine, prednisone 30 mg daily, and Imuran 150 daily.  Continues to have fatigue, which she feels has been gradually worsening.  She notes that she can only lift 2-3 grocery bags before feeling tired, and needs assistance when cooking.  Continues to have neuropathy symptoms in her bilateral lower extremities (more in the right side).  Continues to have shortness of breath with limited activity (e.g. moving from bed to kitchen).  Is currently treating her GERD with Protonix and omeprazole, which she feels controls her symptoms.  Has some mouth dryness, but notices that her eyes are watery which is new for her. She has received her COVID-19 booster immunization.      January 13, 2022 interval history:    Since we have last seen the patient she was admitted to the hospital because of worsening shortness of breath and palpitations.  Coronary angiogram showed minimal areas of stenosis of less than 20%.  Echocardiogram however suggested very significant pulmonary hypertension and a markedly reduced ejection fraction.    She saw Dr. Cuellar back in pulmonary clinic last week.  Cardiac MRI was performed yesterday.  I have reviewed those results today.  There is significant LGE, consistent with an infiltrative process such as sarcoidosis.  She is quite symptomatic.  She had a 6-minute walk test today and had to stop because of how dyspneic she was and how many palpitation she was having but she had absolutely no desaturation.    She has been tapering her prednisone and is down to 20 mg daily but she believes all of the symptoms have worsened with that taper.      ROS as per HPI.  10-point ROS is otherwise negative.    HISTORY REVIEW:  Past Medical History:   Diagnosis Date     Anemia      Cellulitis of leg 6/6/2013     Esophageal reflux     Better post-hiatal hernia repair and weight loss     Limb ischemia; ulcers of fingertips 4/22/2013     Raynaud's syndrome      Scleroderma (H)      Systemic sclerosis (H) 2009     Unspecified essential hypertension        Past Surgical History:   Procedure Laterality Date     BYPASS GASTRIC, CHOLECYSTECTOMY, COMBINED       CHOLECYSTECTOMY       COLONOSCOPY       ESOPHAGOSCOPY, GASTROSCOPY, DUODENOSCOPY (EGD), COMBINED N/A 3/10/2016    Procedure: COMBINED ESOPHAGOSCOPY, GASTROSCOPY, DUODENOSCOPY (EGD), BIOPSY SINGLE OR MULTIPLE;  Surgeon: Tricia Zambrano MD;  Location:  GI     INNER EAR SURGERY       PICC INSERTION  6/5/2013    5fr DL Power PICC, 44cm, left basilic vein, tip in low SVC     SURGICAL HISTORY OF -       Left ear surgery - incus transition, tympanoplasty     SURGICAL HISTORY OF -   6/05    Gastric bypass     SURGICAL  HISTORY OF -   6/05    Cholecystectomy     SURGICAL HISTORY OF -   6/05    Hiatal hernia repair     TONSILLECTOMY         Family History   Problem Relation Age of Onset     Cerebrovascular Disease Father      C.A.D. Father      C.A.D. Mother      C.A.D. 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      Social History Main Topics     Smoking status: Never Smoker      Smokeless tobacco: Never Used     Alcohol Use: Yes      occassionally     Drug Use: No     Sexually Active: Yes -- Male partner(s)     Birth Control/ Protection: None     Social History Narrative     Was  in December 2015. Longtime partner from [place].       Patient Active Problem List   Diagnosis     Essential hypertension     Thyrotoxicosis     Obesity     Prolonged QTc 460 ms,  at hr 67     Low back pain - Suspect SI Joint dysfunction     Cough     Systemic sclerosis (H)     Tumoral calcinosis     Shortness of breath     Edema of both legs     Abnormal albumin     Myopathy     Hypoalbuminemia     Diastolic dysfunction     Limb ischemia; ulcers of fingertips     Cellulitis of leg     Other specified diffuse disease of connective tissue     Disturbed sleep rhythm     Pharyngeal dysphagia     Gastroesophageal reflux disease, esophagitis presence not specified     Rheumatoid arthritis with negative rheumatoid factor, involving unspecified site (H)     Raynaud's disease without gangrene       Allergies   Allergen Reactions     Lovenox Other (See Comments)     Blood clot      Norvasc [Amlodipine Besylate] Swelling     Lip swelling that happened on 2 different occasions     Erythromycin Rash     Rash on arms     OBJECTIVE ( January 13, 2022:  Vitals:  Reviewed  Gen: A+Ox3, NAD  HEENT: NCAT, EOMI  Pulm: CTAB, minimal coarse sounds on basilar lobes  CVS: She has a regular rate and rhythm but this is punctuated by very frequent ectopy then restoration of a regular rhythm.  No murmurs appreciated.  Skin: Her fingers are intermittently diffusely cyanotic.  Purpuric patches on fingertips no ulcerations/erosions. There is platelike xerosis of lower extremities  -mild sclerosis of distal fingers  Msk: No active synovitis.  4/5 upper extremity strength, 5/5 lower extremity strength bilaterally.    EKG (11/12/2021)  Sinus rhythm with marked sinus arrhythmia with premature supraventricular complexes  Possible left atrial enlargement  Left axis deviation  Left ventricular hypertrophy with QRS widening  T wave abnormality consider lateral ischemia  Prolonged QT  Abnormal ECG  Ventricular rate 81, , , QT/QTc 418/485, P/R/T 29/-32/132     Component      Latest Ref Rng 1/26/2016   WBC      4.0 - 11.0 10e9/L 9.2   RBC Count      3.8 - 5.2 10e12/L 4.59   Hemoglobin      11.7 - 15.7 g/dL 13.2   Hematocrit      35.0 - 47.0 % 41.8   MCV      78 - 100 fl 91   MCH      26.5 - 33.0 pg 28.8   MCHC      31.5 - 36.5 g/dL 31.6   RDW      10.0 - 15.0 % 16.7 (H)   Platelet Count      150 - 450 10e9/L 195   Diff Method       Automated Method   % Neutrophils       62.9   % Lymphocytes       27.0   % Monocytes       9.1   % Eosinophils       0.4   % Basophils       0.2   % Immature Granulocytes       0.4   Nucleated RBCs      0 /100 0   Absolute Neutrophil      1.6 - 8.3 10e9/L 5.8   Absolute Lymphocytes      0.8 - 5.3 10e9/L 2.5   Absolute Monocytes      0.0 - 1.3 10e9/L 0.8   Absolute Eosinophils      0.0 - 0.7 10e9/L 0.0   Absolute Basophils      0.0 - 0.2 10e9/L 0.0   Abs Immature Granulocytes      0 - 0.4 10e9/L 0.0   Absolute Nucleated RBC       0.0   Color Urine       Yellow   Appearance Urine       Clear   Glucose Urine      NEG mg/dL Negative   Bilirubin Urine      NEG  Negative   Ketones Urine      NEG mg/dL Negative   Specific Gravity Urine      1.003 - 1.035 1.010   Blood Urine      NEG Negative   pH Urine      5.0 - 7.0 pH 6.0   Protein Albumin Urine      NEG mg/dL Negative   Urobilinogen mg/dL      0.0 - 2.0 mg/dL Normal   Nitrite Urine      NEG Negative   Leukocyte Esterase Urine      NEG Negative   Source       Midstream Urine   WBC Urine      0 - 2 /HPF <1   RBC Urine      0 - 2 /HPF <1   Squamous Epithelial /HPF Urine      0 - 1 /HPF <1   Creatinine      0.52 - 1.04 mg/dL 1.11 (H)   GFR Estimate      >60 mL/min/1.7m2 51 (L)   GFR Estimate If Black      >60 mL/min/1.7m2 62   ALT      0 - 50 U/L 23   AST      0 - 45 U/L 16   Albumin      3.4 - 5.0 g/dL 3.4   CRP Inflammation      0.0 - 8.0 mg/L <2.9   Sed Rate      0 - 30 mm/h 9   CK Total      30 - 225 U/L 38   Aldolase       4.1     Biopsy from 3/10 EGD:  Esophagus, gastroesophageal junction, biopsies:   - Squamous mucosa and submucosa with marked chronic inflammation and   fibrosis   - No evidence of intestinal metaplasia or dysplasia               February 15, 2022 interval history:    Since we have last seen the patient she has felt about the same in terms of heart palpitations on the 30 mg daily prednisone.  She believes her neuropathy symptoms actually worsened a little.  Her Raynauds phenomena has also been worse but it has of course been rather cold.    Interestingly, she was actually in [place] last week and despite the warm weather there she still had a lot of Raynauds that was not much different.    She saw Dr. Francis earlier today.  I have read his note which is up.  He is starting her on a Zio patch for 1 week and hoping that we can significantly decrease her prednisone so that a PET scan can be performed and identify whether there is any cardiac inflammation at a time that she is on more minimal prednisone.    She does remain on azathioprine.  She did have labs earlier today which look quite good with  perhaps some slight worsening of ESR but in the context of a lower CRP which I am more inclined to believe.  Her AST was up above the upper limit of normal very slightly but she denies any recent alcohol and ALT was okay.    Physical examination by video shows her to be in no acute distress HEENT examination is normal.  She is speaking in full sentences with no shortness of breath.  Her hand examination does not show marked cyanosis and there is no apparent swelling or joint deformity.    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    Plan/recommendation:    I have asked her to go down to 25 mg a day for the next 3 days of prednisone.  If she does not feel markedly worse she can go further to 20 mg a day for the remainder of the time she is on the Zio patch and another week thereafter.  If at that point she still feels okay she can go down to 15 mg a day.    After she has been on that dose for a couple of weeks I want her to do labs again so we can reassess for inflammation.  I would also like to see her at about that time on 15 March when I do have another video visit that I can see her at and we can decide whether to go down further and if so how fast.    She will let us know if she is having difficulty in the meantime.    This video visit commenced at 3:36 PM was completed at 3:53 PM    March 15, 2022 interval history:    Since we have last seen her she has been able to taper prednisone down to 10 mg daily as of last week.  Notably, she is not had any worsening of joint symptoms or fatigue with that taper.  Her neuropathy however in her feet has definitely worsened.  She has not yet reached out to Dr. Abel but he had let her know before that if her neuropathy worsen she could go on gabapentin or pregabalin.   At this point she has been toughing it out but is considering trying this.    She unfortunately had right heart cath scheduled but it had to be delayed as her screen Covid test was positive.  Symptomatically she felt perhaps a little bit more short of breath at the time but could barely notice a difference.  She did not have any fevers or chills.  She has been vaccinated, we have not checked her antispike antibody levels.  It is possible that they are not good, as she has been on a lot of immunosuppression including substantial doses of steroid.    She did have her booster shot back in September or October.    The right heart catheterization with planned cardiac biopsy has now been put off until April.  She was also to get IV iron as her iron levels were low but that had to be delayed as well.  Notably, although her iron levels were somewhat low, her hemoglobin and hematocrit have remained normal.    She continues on azathioprine 150 mg daily and is tolerating that well.  She is also on Bactrim prophylaxis.    Physical examination by video shows her to be in no acute distress.  HEENT examination is normal and she does not appear to be acutely short of breath.  She is speaking in full sentences with no difficulty.  Musculoskeletal examination of the hands shows normal range of motion and no convincing areas of joint swelling and no active cyanosis.    June 21, 2022 interval history:    Since have not seen the patient she has had several additional visits.  She is now on 15 mg daily prednisone after having been tapered down to 10 mg daily but did not do well at that dose.    She also developed a pneumonia requiring admission and felt quite poorly with that with some worsening of her baseline dyspnea on exertion.  Even now she continues to have drops in her oxygen saturation into the 80s just with sitting with virtually no exertion.  She describes having virtually no energy as well.    Her neuropathy also seems to be  worsening for her extending about the mid calf right greater than left.  Her Raynauds phenomena is also worse but she does not think the 2 are directly related, as she has neuropathy far proximal to where her Raynauds is.    Unfortunately, she is requiring quite a bit of cardiac medicine because of her atrial fibrillation and cardiomyopathy.  With all of this, she often feels lightheaded particularly at night when she wakes and needs to go to the bathroom.  She has had several falls which she attributes more to her neuropathy in her feet then to lightheadedness but making it questionable whether she could be on medicine such as sildenafil to help her Raynauds at this point.    Furthermore because of her atrial fibrillation she is also on Eliquis, so the use of Persantine which I am not sure she has had before or pentoxyphyline, which she has, are problematic.    Overall she is very frustrated with her situation.    Physical examination by video shows her to be in no acute distress.  HEENT examination is normal.  She is speaking in full sentences with no shortness of breath.  Her upper extremity examination shows no convincing areas of swelling or deformity.

## 2022-06-21 NOTE — NURSING NOTE
Chief Complaint   Patient presents with     Follow Up     Jeanine, is here for a follow up     Nikita BECKFORD

## 2022-06-22 ENCOUNTER — LAB (OUTPATIENT)
Dept: LAB | Facility: CLINIC | Age: 62
End: 2022-06-22
Payer: COMMERCIAL

## 2022-06-22 DIAGNOSIS — D84.9 IMMUNOSUPPRESSED STATUS (H): ICD-10-CM

## 2022-06-22 DIAGNOSIS — M35.1 MCTD (MIXED CONNECTIVE TISSUE DISEASE) (H): ICD-10-CM

## 2022-06-22 DIAGNOSIS — M06.00 RHEUMATOID ARTHRITIS WITH NEGATIVE RHEUMATOID FACTOR, INVOLVING UNSPECIFIED SITE (H): ICD-10-CM

## 2022-06-22 DIAGNOSIS — D86.9 SARCOIDOSIS: ICD-10-CM

## 2022-06-22 LAB
ALBUMIN SERPL-MCNC: 2.7 G/DL (ref 3.4–5)
ALT SERPL W P-5'-P-CCNC: 16 U/L (ref 0–50)
AST SERPL W P-5'-P-CCNC: 25 U/L (ref 0–45)
BASOPHILS # BLD AUTO: 0 10E3/UL (ref 0–0.2)
BASOPHILS NFR BLD AUTO: 1 %
CREAT SERPL-MCNC: 0.88 MG/DL (ref 0.52–1.04)
CRP SERPL-MCNC: <2.9 MG/L (ref 0–8)
EOSINOPHIL # BLD AUTO: 0 10E3/UL (ref 0–0.7)
EOSINOPHIL NFR BLD AUTO: 0 %
ERYTHROCYTE [DISTWIDTH] IN BLOOD BY AUTOMATED COUNT: 18.1 % (ref 10–15)
ERYTHROCYTE [SEDIMENTATION RATE] IN BLOOD BY WESTERGREN METHOD: 23 MM/HR (ref 0–30)
GFR SERPL CREATININE-BSD FRML MDRD: 75 ML/MIN/1.73M2
HCT VFR BLD AUTO: 36.2 % (ref 35–47)
HGB BLD-MCNC: 12.1 G/DL (ref 11.7–15.7)
IMM GRANULOCYTES # BLD: 0 10E3/UL
IMM GRANULOCYTES NFR BLD: 1 %
LYMPHOCYTES # BLD AUTO: 1.4 10E3/UL (ref 0.8–5.3)
LYMPHOCYTES NFR BLD AUTO: 42 %
MCH RBC QN AUTO: 31.3 PG (ref 26.5–33)
MCHC RBC AUTO-ENTMCNC: 33.4 G/DL (ref 31.5–36.5)
MCV RBC AUTO: 94 FL (ref 78–100)
MONOCYTES # BLD AUTO: 0.4 10E3/UL (ref 0–1.3)
MONOCYTES NFR BLD AUTO: 12 %
NEUTROPHILS # BLD AUTO: 1.5 10E3/UL (ref 1.6–8.3)
NEUTROPHILS NFR BLD AUTO: 44 %
NRBC # BLD AUTO: 0 10E3/UL
NRBC BLD AUTO-RTO: 0 /100
PLATELET # BLD AUTO: 253 10E3/UL (ref 150–450)
RBC # BLD AUTO: 3.87 10E6/UL (ref 3.8–5.2)
WBC # BLD AUTO: 3.4 10E3/UL (ref 4–11)

## 2022-06-22 PROCEDURE — 85025 COMPLETE CBC W/AUTO DIFF WBC: CPT

## 2022-06-22 PROCEDURE — 84450 TRANSFERASE (AST) (SGOT): CPT

## 2022-06-22 PROCEDURE — 84460 ALANINE AMINO (ALT) (SGPT): CPT

## 2022-06-22 PROCEDURE — 85652 RBC SED RATE AUTOMATED: CPT

## 2022-06-22 PROCEDURE — 86140 C-REACTIVE PROTEIN: CPT

## 2022-06-22 PROCEDURE — 82040 ASSAY OF SERUM ALBUMIN: CPT

## 2022-06-22 PROCEDURE — 82565 ASSAY OF CREATININE: CPT

## 2022-06-22 PROCEDURE — 36415 COLL VENOUS BLD VENIPUNCTURE: CPT

## 2022-06-28 ENCOUNTER — ANCILLARY PROCEDURE (OUTPATIENT)
Dept: CARDIOLOGY | Facility: CLINIC | Age: 62
End: 2022-06-28
Payer: COMMERCIAL

## 2022-06-28 ENCOUNTER — LAB (OUTPATIENT)
Dept: LAB | Facility: CLINIC | Age: 62
End: 2022-06-28

## 2022-06-28 ENCOUNTER — OFFICE VISIT (OUTPATIENT)
Dept: CARDIOLOGY | Facility: CLINIC | Age: 62
End: 2022-06-28
Attending: INTERNAL MEDICINE
Payer: COMMERCIAL

## 2022-06-28 VITALS
SYSTOLIC BLOOD PRESSURE: 94 MMHG | OXYGEN SATURATION: 99 % | HEART RATE: 78 BPM | WEIGHT: 158 LBS | DIASTOLIC BLOOD PRESSURE: 65 MMHG | BODY MASS INDEX: 23.33 KG/M2

## 2022-06-28 DIAGNOSIS — R06.09 DOE (DYSPNEA ON EXERTION): ICD-10-CM

## 2022-06-28 DIAGNOSIS — I73.00 RAYNAUD'S DISEASE WITHOUT GANGRENE: Primary | ICD-10-CM

## 2022-06-28 DIAGNOSIS — I27.20 PULMONARY HYPERTENSION (H): ICD-10-CM

## 2022-06-28 DIAGNOSIS — D84.9 IMMUNOSUPPRESSED STATUS (H): ICD-10-CM

## 2022-06-28 DIAGNOSIS — I73.00 RAYNAUD'S DISEASE WITHOUT GANGRENE: ICD-10-CM

## 2022-06-28 DIAGNOSIS — D86.9 SARCOIDOSIS: ICD-10-CM

## 2022-06-28 DIAGNOSIS — M35.1 MCTD (MIXED CONNECTIVE TISSUE DISEASE) (H): ICD-10-CM

## 2022-06-28 LAB
ALBUMIN SERPL-MCNC: 2.7 G/DL (ref 3.4–5)
ALP SERPL-CCNC: 78 U/L (ref 40–150)
ALT SERPL W P-5'-P-CCNC: 17 U/L (ref 0–50)
ANION GAP SERPL CALCULATED.3IONS-SCNC: 7 MMOL/L (ref 3–14)
AST SERPL W P-5'-P-CCNC: 22 U/L (ref 0–45)
BILIRUB SERPL-MCNC: 0.6 MG/DL (ref 0.2–1.3)
BUN SERPL-MCNC: 16 MG/DL (ref 7–30)
CALCIUM SERPL-MCNC: 8.8 MG/DL (ref 8.5–10.1)
CHLORIDE BLD-SCNC: 95 MMOL/L (ref 94–109)
CO2 SERPL-SCNC: 36 MMOL/L (ref 20–32)
CREAT SERPL-MCNC: 0.86 MG/DL (ref 0.52–1.04)
ERYTHROCYTE [DISTWIDTH] IN BLOOD BY AUTOMATED COUNT: 17.4 % (ref 10–15)
GFR SERPL CREATININE-BSD FRML MDRD: 77 ML/MIN/1.73M2
GLUCOSE BLD-MCNC: 105 MG/DL (ref 70–99)
HCT VFR BLD AUTO: 35.6 % (ref 35–47)
HGB BLD-MCNC: 11.5 G/DL (ref 11.7–15.7)
LVEF ECHO: NORMAL
MCH RBC QN AUTO: 30.9 PG (ref 26.5–33)
MCHC RBC AUTO-ENTMCNC: 32.3 G/DL (ref 31.5–36.5)
MCV RBC AUTO: 96 FL (ref 78–100)
NT-PROBNP SERPL-MCNC: 1581 PG/ML (ref 0–900)
PLATELET # BLD AUTO: 194 10E3/UL (ref 150–450)
POTASSIUM BLD-SCNC: 3.2 MMOL/L (ref 3.4–5.3)
PROT SERPL-MCNC: 5.7 G/DL (ref 6.8–8.8)
RBC # BLD AUTO: 3.72 10E6/UL (ref 3.8–5.2)
SODIUM SERPL-SCNC: 138 MMOL/L (ref 133–144)
WBC # BLD AUTO: 3.2 10E3/UL (ref 4–11)

## 2022-06-28 PROCEDURE — 99214 OFFICE O/P EST MOD 30 MIN: CPT | Mod: 25 | Performed by: INTERNAL MEDICINE

## 2022-06-28 PROCEDURE — 83880 ASSAY OF NATRIURETIC PEPTIDE: CPT | Performed by: PATHOLOGY

## 2022-06-28 PROCEDURE — 85027 COMPLETE CBC AUTOMATED: CPT | Performed by: PATHOLOGY

## 2022-06-28 PROCEDURE — 93308 TTE F-UP OR LMTD: CPT | Performed by: STUDENT IN AN ORGANIZED HEALTH CARE EDUCATION/TRAINING PROGRAM

## 2022-06-28 PROCEDURE — G0463 HOSPITAL OUTPT CLINIC VISIT: HCPCS | Mod: 25

## 2022-06-28 PROCEDURE — 99000 SPECIMEN HANDLING OFFICE-LAB: CPT | Performed by: PATHOLOGY

## 2022-06-28 PROCEDURE — 36415 COLL VENOUS BLD VENIPUNCTURE: CPT | Performed by: PATHOLOGY

## 2022-06-28 PROCEDURE — 86769 SARS-COV-2 COVID-19 ANTIBODY: CPT | Mod: 90 | Performed by: PATHOLOGY

## 2022-06-28 PROCEDURE — 93325 DOPPLER ECHO COLOR FLOW MAPG: CPT | Performed by: STUDENT IN AN ORGANIZED HEALTH CARE EDUCATION/TRAINING PROGRAM

## 2022-06-28 PROCEDURE — 80053 COMPREHEN METABOLIC PANEL: CPT | Performed by: PATHOLOGY

## 2022-06-28 PROCEDURE — 93321 DOPPLER ECHO F-UP/LMTD STD: CPT | Performed by: STUDENT IN AN ORGANIZED HEALTH CARE EDUCATION/TRAINING PROGRAM

## 2022-06-28 ASSESSMENT — PAIN SCALES - GENERAL: PAINLEVEL: NO PAIN (0)

## 2022-06-28 NOTE — NURSING NOTE
Chief Complaint   Patient presents with     Follow Up     Return PH appt 2 month follow up w/labs & Echo prior  Visit Dx: R06.09 Dyspnea on Exertion       Vitals were taken and medications reconciled.    Yanick Richardson, EMT  3:17 PM

## 2022-06-28 NOTE — PROGRESS NOTES
1. Sarcoid?  2. Abnormal left ventricular function, normal coronary arteries  3. Raynauds with history of digital ulceration  4. Scleroderma  5. Digital ulcerations  6. Raynauds  7. Iron deficiency/recurrebt  8. 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 ILD  10 Restriction of mouth opening  11. SBO  12. Reduced albumin with reduced pre-albumin consistent with malnutrition  13.Steroid dependence    I personally called the [facility] to facilitate referral.    Plan:  1. [place] second opinion  2. Steroid taper per Rheum   3. Sarcoid clinic pending  4. KCL replacement 30meq extra today and tomorrow recheck next week        From my point of view she is clinically stable.  Her need is for a second opinion for her mixed connective tissue disease, large fibre neuropathy and question of neuro sarcoid    When seen today, she is markedly improved from when I saw her 6 weeks ago.She is eating more and utilizing protein supplements.  She some increase in peripheral edema, but also has very marginal albumin and pre-albumin.  Her chronic cough (non-productive) and exertional shortness of breath are improved.  Her spirits have also improved. Newly taking 300 mgs of gabapentin at HS to reduce leg pains    Alert, oriented  Telangectasia, puckered oral cavity, no active ulcerations, basilar rales, regular rhythm, abdomen soft with bowel sounds 1+ edema    Wt Readings from Last 24 Encounters:   06/28/22 71.7 kg (158 lb)   06/14/22 68 kg (150 lb)   05/05/22 71.8 kg (158 lb 4.6 oz)   05/02/22 73.4 kg (161 lb 14.4 oz)   04/28/22 70.3 kg (155 lb)   04/20/22 68.1 kg (150 lb 1.6 oz)   04/19/22 62.6 kg (138 lb)   04/08/22 68.7 kg (151 lb 8 oz)   04/04/22 69.9 kg (154 lb)   03/08/22 74.4 kg (164 lb)   02/15/22 76.1 kg (167 lb 12.8 oz)   01/28/22 80.3 kg (177 lb)   01/26/22 79.4 kg (175 lb)   01/13/22 78.9 kg (174 lb)   01/06/22 78.9 kg (174 lb)   12/22/21 81.6 kg (180 lb)   12/20/21 78.9 kg (174 lb)   11/11/21 78 kg (172 lb)   07/27/21 81.6 kg (179  lb 12.8 oz)   07/19/21 77.1 kg (170 lb)   07/15/21 83.5 kg (184 lb)   07/06/21 83.5 kg (184 lb)   07/01/21 83.5 kg (184 lb)   05/11/21 78.9 kg (174 lb)           Current Outpatient Medications   Medication     albuterol (PROAIR HFA/PROVENTIL HFA/VENTOLIN HFA) 108 (90 Base) MCG/ACT inhaler     amiodarone (PACERONE) 200 MG tablet     apixaban ANTICOAGULANT (ELIQUIS) 5 MG tablet     ASPIRIN NOT PRESCRIBED (INTENTIONAL)     atorvastatin (LIPITOR) 40 MG tablet     azaTHIOprine (IMURAN) 50 MG tablet     blood glucose (NO BRAND SPECIFIED) test strip     clonazePAM (KLONOPIN) 0.5 MG tablet     folic acid (FOLVITE) 1 MG tablet     gabapentin (NEURONTIN) 300 MG capsule     losartan (COZAAR) 50 MG tablet     magnesium oxide (MAG-OX) 400 MG tablet     metoprolol succinate ER (TOPROL XL) 50 MG 24 hr tablet     nitroGLYcerin (NITROSTAT) 0.3 MG sublingual tablet     pantoprazole (PROTONIX) 40 MG EC tablet     potassium chloride ER (MICRO-K) 10 MEQ CR capsule     predniSONE (DELTASONE) 10 MG tablet     spironolactone (ALDACTONE) 25 MG tablet     sulfamethoxazole-trimethoprim (BACTRIM) 400-80 MG tablet     thin (NO BRAND SPECIFIED) lancets     torsemide (DEMADEX) 20 MG tablet     vitamin D2 (ERGOCALCIFEROL) 09630 units (1250 mcg) capsule     No current facility-administered medications for this visit.        Latest Reference Range & Units 06/22/22 13:39 06/28/22 13:42   Sodium 133 - 144 mmol/L  138   Potassium 3.4 - 5.3 mmol/L  3.2 (L)   Chloride 94 - 109 mmol/L  95   Carbon Dioxide 20 - 32 mmol/L  36 (H)   Urea Nitrogen 7 - 30 mg/dL  16   Creatinine 0.52 - 1.04 mg/dL 0.88 0.86   GFR Estimate >60 mL/min/1.73m2 75 77   Calcium 8.5 - 10.1 mg/dL  8.8   Anion Gap 3 - 14 mmol/L  7   Albumin 3.4 - 5.0 g/dL 2.7 (L) 2.7 (L)   Protein Total 6.8 - 8.8 g/dL  5.7 (L)   Alkaline Phosphatase 40 - 150 U/L  78   ALT 0 - 50 U/L 16 17   AST 0 - 45 U/L 25 22   Bilirubin Total 0.2 - 1.3 mg/dL  0.6   CRP Inflammation 0.0 - 8.0 mg/L <2.9    N-Terminal  Pro Bnp 0 - 900 pg/mL  1,581 (H)   Glucose 70 - 99 mg/dL  105 (H)   WBC 4.0 - 11.0 10e3/uL 3.4 (L) 3.2 (L)   Hemoglobin 11.7 - 15.7 g/dL 12.1 11.5 (L)   Hematocrit 35.0 - 47.0 % 36.2 35.6   Platelet Count 150 - 450 10e3/uL 253 194   RBC Count 3.80 - 5.20 10e6/uL 3.87 3.72 (L)   MCV 78 - 100 fL 94 96   MCH 26.5 - 33.0 pg 31.3 30.9   MCHC 31.5 - 36.5 g/dL 33.4 32.3   RDW 10.0 - 15.0 % 18.1 (H) 17.4 (H)   % Neutrophils % 44    % Lymphocytes % 42    % Monocytes % 12    % Eosinophils % 0    % Basophils % 1    Absolute Basophils 0.0 - 0.2 10e3/uL 0.0    Absolute Eosinophils 0.0 - 0.7 10e3/uL 0.0    Absolute Immature Granulocytes <=0.4 10e3/uL 0.0    Absolute Lymphocytes 0.8 - 5.3 10e3/uL 1.4        Name: OSCAR REYES  MRN: [id number]  : 1960  Study Date: 2022 01:51 PM  Age: 61 yrs  Gender: Female  Patient [facility]  Reason For Study: ROUSE (dyspnea on exertion), Pulmonary hypertension (H)  Ordering Physician: MARTINEZ STALEY  Referring Physician: MARTINEZ STALEY  Performed By: Herman Voss RDCS     BSA: 1.8 m2  Height: 69 in  Weight: 150 lb  HR: 76  ______________________________________________________________________________  Procedure  Limited Portable Echo Adult.  ______________________________________________________________________________  Interpretation Summary  Global and regional left ventricular function is normal with an EF of 55-60%.  Global right ventricular function is normal. The right ventricle is normal  size.  No significant valvular abnormalities. There is only mild tricuspid  regurgitation.  The estimated PA systolic pressure is 24 mmHg.  No pericardial effusion is present.  IVC diameter <2.1 cm collapsing >50% with sniff suggests a normal RA pressure  of 3 mmHg.  This study was compared with the study from 2022. No significant changes  noted in PASP. The RA pressure is  lower.  ______________________________________________________________________________  Left Ventricle  Global and regional left ventricular function is normal with an EF of 55-60%.  Left ventricular diastolic function is indeterminate. No regional wall motion  abnormalities are seen.     Right Ventricle  Global right ventricular function is normal. The right ventricle is normal  size.     Atria  Both atria appear normal.     Mitral Valve  Mild mitral insufficiency is present.     Aortic Valve  The aortic valve is tricuspid. Mild aortic valve calcification is present. On  Doppler interrogation, there is no significant stenosis or regurgitation.     Tricuspid Valve  Mild tricuspid insufficiency is present. Right ventricular systolic pressure  is 21mmHg above the right atrial pressure.     Pulmonic Valve  On Doppler interrogation, there is no significant stenosis or regurgitation.     Vessels  IVC diameter <2.1 cm collapsing >50% with sniff suggests a normal RA pressure  of 3 mmHg.     Pericardium  No pericardial effusion is present.     Compared to Previous Study  This study was compared with the study from 2022 . No significant changes  noted in PASP. The RA pressure is lower.  ______________________________________________________________________________  Doppler Measurements & Calculations  MV E max emmett: 49.5 cm/sec  MV A max emmett: 89.2 cm/sec  MV E/A: 0.55  MV dec slope: 299.0 cm/sec2  MV dec time: 0.17 sec  PA acc time: 0.10 sec  TR max emmett: 230.8 cm/sec  TR max P.3 mmHg  E/E' av.7  Lateral E/e': 7.1  Medial E/e': 12.3

## 2022-06-28 NOTE — PATIENT INSTRUCTIONS
Medication Changes:  -Take extra Potassium as directed by Dr. Bentley    Patient Instructions:  1. Continue staying active and eat a heart healthy diet.    2. Please keep current list of medications with you at all times.    3. Remember to weigh yourself daily after voiding and before you consume any food or beverages and log the numbers.  If you have gained 2 pounds overnight or 5 pounds in a week contact us immediately for medication adjustments or further instructions.    4. **Please call us immediately if you have any syncope (fainting or passing out), chest pain, edema (swelling or weight gain), or decline in your functional status (general decline in how you are feeling).    5. Patients on Remodulin (treprostinil) or Veletri (epoprostenol): Please make sure that you have your backup pump and supplies with you at all times, your mixing instructions, and contact information for your specialty pharmacy.    Follow up Appointment Information:  4 month follow up w/labs prior    Results:  Component      Latest Ref Rng & Units 6/28/2022   Sodium      133 - 144 mmol/L 138   Potassium      3.4 - 5.3 mmol/L 3.2 (L)   Chloride      94 - 109 mmol/L 95   Carbon Dioxide      20 - 32 mmol/L 36 (H)   Anion Gap      3 - 14 mmol/L 7   Urea Nitrogen      7 - 30 mg/dL 16   Creatinine      0.52 - 1.04 mg/dL 0.86   Calcium      8.5 - 10.1 mg/dL 8.8   Glucose      70 - 99 mg/dL 105 (H)   Alkaline Phosphatase      40 - 150 U/L 78   AST      0 - 45 U/L 22   ALT      0 - 50 U/L 17   Protein Total      6.8 - 8.8 g/dL 5.7 (L)   Albumin      3.4 - 5.0 g/dL 2.7 (L)   Bilirubin Total      0.2 - 1.3 mg/dL 0.6   GFR Estimate      >60 mL/min/1.73m2 77   WBC      4.0 - 11.0 10e3/uL 3.2 (L)   RBC Count      3.80 - 5.20 10e6/uL 3.72 (L)   Hemoglobin      11.7 - 15.7 g/dL 11.5 (L)   Hematocrit      35.0 - 47.0 % 35.6   MCV      78 - 100 fL 96   MCH      26.5 - 33.0 pg 30.9   MCHC      31.5 - 36.5 g/dL 32.3   RDW      10.0 - 15.0 % 17.4 (H)    Platelet Count      150 - 450 10e3/uL 194   N-Terminal Pro Bnp      0 - 900 pg/mL 1,581 (H)     We are located on the third floor of [facility] on the [facility].  Our address is     [address] [place] [address] [place] [address] [place] [address]    Thank you for allowing us to be a part of your care here at the [facility]    If you have questions or concerns please contact us at:    Bharti Perdue RN, BSN, PHN  Dorita Knowles (Scheduling,Prior Auth)  Nurse Coordinator     Clinic   Pulmonary Hypertension   Pulmonary Hypertension  [facility]  (Phone)[phone]   (Phone) [phone]        (Fax) [phone]    ** Please note that you will NOT receive a reminder call regarding your scheduled testing, reminder calls are for provider appointments only.  If you are scheduled for testing within the [employer] system you may receive a call regarding pre-registration for billing purposes only.**     Support Group:  Pulmonary Hypertension Association  [email or web address].phassociation.org/  **Look at the Events Tab** They even have Support Groups that you can call into    [facility] Support Group  Second Saturday of the Month from 1-3 PM   Location: [address] [place] [id number]  Leader: Silvana Long  Phone: [phone]  Email: [email or web address]

## 2022-06-28 NOTE — LETTER
6/28/2022      RE: Jeanine Schuler  [address]  [facility] [address]       Dear Colleague,    Thank you for the opportunity to participate in the care of your patient, Jeanine Schuler, at the [facility] [place] [facility]. Please see a copy of my visit note below.      1. Sarcoid?  2. Abnormal left ventricular function, normal coronary arteries  3. Raynauds with history of digital ulceration  4. Scleroderma  5. Digital ulcerations  6. Raynauds  7. Iron deficiency/recurrebt  8. 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 ILD  10 Restriction of mouth opening  11. SBO  12. Reduced albumin with reduced pre-albumin consistent with malnutrition  13.Steroid dependence    I personally called the [facility] to facilitate referral.    Plan:  1. [place] second opinion  2. Steroid taper per Rheum   3. Sarcoid clinic pending  4. KCL replacement 30meq extra today and tomorrow recheck next week        From my point of view she is clinically stable.  Her need is for a second opinion for her mixed connective tissue disease, large fibre neuropathy and question of neuro sarcoid    When seen today, she is markedly improved from when I saw her 6 weeks ago.She is eating more and utilizing protein supplements.  She some increase in peripheral edema, but also has very marginal albumin and pre-albumin.  Her chronic cough (non-productive) and exertional shortness of breath are improved.  Her spirits have also improved. Newly taking 300 mgs of gabapentin at  to reduce leg pains    Alert, oriented  Telangectasia, puckered oral cavity, no active ulcerations, basilar rales, regular rhythm, abdomen soft with bowel sounds 1+ edema    Wt Readings from Last 24 Encounters:   06/28/22 71.7 kg (158 lb)   06/14/22 68 kg (150 lb)   05/05/22 71.8 kg (158 lb 4.6 oz)   05/02/22 73.4 kg (161 lb 14.4 oz)   04/28/22 70.3 kg (155 lb)   04/20/22 68.1 kg (150 lb 1.6 oz)   04/19/22 62.6 kg (138 lb)    04/08/22 68.7 kg (151 lb 8 oz)   04/04/22 69.9 kg (154 lb)   03/08/22 74.4 kg (164 lb)   02/15/22 76.1 kg (167 lb 12.8 oz)   01/28/22 80.3 kg (177 lb)   01/26/22 79.4 kg (175 lb)   01/13/22 78.9 kg (174 lb)   01/06/22 78.9 kg (174 lb)   12/22/21 81.6 kg (180 lb)   12/20/21 78.9 kg (174 lb)   11/11/21 78 kg (172 lb)   07/27/21 81.6 kg (179 lb 12.8 oz)   07/19/21 77.1 kg (170 lb)   07/15/21 83.5 kg (184 lb)   07/06/21 83.5 kg (184 lb)   07/01/21 83.5 kg (184 lb)   05/11/21 78.9 kg (174 lb)           Current Outpatient Medications   Medication     albuterol (PROAIR HFA/PROVENTIL HFA/VENTOLIN HFA) 108 (90 Base) MCG/ACT inhaler     amiodarone (PACERONE) 200 MG tablet     apixaban ANTICOAGULANT (ELIQUIS) 5 MG tablet     ASPIRIN NOT PRESCRIBED (INTENTIONAL)     atorvastatin (LIPITOR) 40 MG tablet     azaTHIOprine (IMURAN) 50 MG tablet     blood glucose (NO BRAND SPECIFIED) test strip     clonazePAM (KLONOPIN) 0.5 MG tablet     folic acid (FOLVITE) 1 MG tablet     gabapentin (NEURONTIN) 300 MG capsule     losartan (COZAAR) 50 MG tablet     magnesium oxide (MAG-OX) 400 MG tablet     metoprolol succinate ER (TOPROL XL) 50 MG 24 hr tablet     nitroGLYcerin (NITROSTAT) 0.3 MG sublingual tablet     pantoprazole (PROTONIX) 40 MG EC tablet     potassium chloride ER (MICRO-K) 10 MEQ CR capsule     predniSONE (DELTASONE) 10 MG tablet     spironolactone (ALDACTONE) 25 MG tablet     sulfamethoxazole-trimethoprim (BACTRIM) 400-80 MG tablet     thin (NO BRAND SPECIFIED) lancets     torsemide (DEMADEX) 20 MG tablet     vitamin D2 (ERGOCALCIFEROL) 23464 units (1250 mcg) capsule     No current facility-administered medications for this visit.        Latest Reference Range & Units 06/22/22 13:39 06/28/22 13:42   Sodium 133 - 144 mmol/L  138   Potassium 3.4 - 5.3 mmol/L  3.2 (L)   Chloride 94 - 109 mmol/L  95   Carbon Dioxide 20 - 32 mmol/L  36 (H)   Urea Nitrogen 7 - 30 mg/dL  16   Creatinine 0.52 - 1.04 mg/dL 0.88 0.86   GFR Estimate >60  mL/min/1.73m2 75 77   Calcium 8.5 - 10.1 mg/dL  8.8   Anion Gap 3 - 14 mmol/L  7   Albumin 3.4 - 5.0 g/dL 2.7 (L) 2.7 (L)   Protein Total 6.8 - 8.8 g/dL  5.7 (L)   Alkaline Phosphatase 40 - 150 U/L  78   ALT 0 - 50 U/L 16 17   AST 0 - 45 U/L 25 22   Bilirubin Total 0.2 - 1.3 mg/dL  0.6   CRP Inflammation 0.0 - 8.0 mg/L <2.9    N-Terminal Pro Bnp 0 - 900 pg/mL  1,581 (H)   Glucose 70 - 99 mg/dL  105 (H)   WBC 4.0 - 11.0 10e3/uL 3.4 (L) 3.2 (L)   Hemoglobin 11.7 - 15.7 g/dL 12.1 11.5 (L)   Hematocrit 35.0 - 47.0 % 36.2 35.6   Platelet Count 150 - 450 10e3/uL 253 194   RBC Count 3.80 - 5.20 10e6/uL 3.87 3.72 (L)   MCV 78 - 100 fL 94 96   MCH 26.5 - 33.0 pg 31.3 30.9   MCHC 31.5 - 36.5 g/dL 33.4 32.3   RDW 10.0 - 15.0 % 18.1 (H) 17.4 (H)   % Neutrophils % 44    % Lymphocytes % 42    % Monocytes % 12    % Eosinophils % 0    % Basophils % 1    Absolute Basophils 0.0 - 0.2 10e3/uL 0.0    Absolute Eosinophils 0.0 - 0.7 10e3/uL 0.0    Absolute Immature Granulocytes <=0.4 10e3/uL 0.0    Absolute Lymphocytes 0.8 - 5.3 10e3/uL 1.4        Name: OSCAR REYES  MRN: [id number]  : 1960  Study Date: 2022 01:51 PM  Age: 61 yrs  Gender: Female  Patient Location: [facility]  Reason For Study: ROUSE (dyspnea on exertion), Pulmonary hypertension (H)  Ordering Physician: MARTINEZ STALEY  Referring Physician: MARTINEZ STALEY  Performed By: Herman Voss RDCS     BSA: 1.8 m2  Height: 69 in  Weight: 150 lb  HR: 76  ______________________________________________________________________________  Procedure  Limited Portable Echo Adult.  ______________________________________________________________________________  Interpretation Summary  Global and regional left ventricular function is normal with an EF of 55-60%.  Global right ventricular function is normal. The right ventricle is normal  size.  No significant valvular abnormalities. There is only mild tricuspid  regurgitation.  The estimated PA systolic  pressure is 24 mmHg.  No pericardial effusion is present.  IVC diameter <2.1 cm collapsing >50% with sniff suggests a normal RA pressure  of 3 mmHg.  This study was compared with the study from 2022. No significant changes  noted in PASP. The RA pressure is lower.  ______________________________________________________________________________  Left Ventricle  Global and regional left ventricular function is normal with an EF of 55-60%.  Left ventricular diastolic function is indeterminate. No regional wall motion  abnormalities are seen.     Right Ventricle  Global right ventricular function is normal. The right ventricle is normal  size.     Atria  Both atria appear normal.     Mitral Valve  Mild mitral insufficiency is present.     Aortic Valve  The aortic valve is tricuspid. Mild aortic valve calcification is present. On  Doppler interrogation, there is no significant stenosis or regurgitation.     Tricuspid Valve  Mild tricuspid insufficiency is present. Right ventricular systolic pressure  is 21mmHg above the right atrial pressure.     Pulmonic Valve  On Doppler interrogation, there is no significant stenosis or regurgitation.     Vessels  IVC diameter <2.1 cm collapsing >50% with sniff suggests a normal RA pressure  of 3 mmHg.     Pericardium  No pericardial effusion is present.     Compared to Previous Study  This study was compared with the study from 2022 . No significant changes  noted in PASP. The RA pressure is lower.  ______________________________________________________________________________  Doppler Measurements & Calculations  MV E max emmett: 49.5 cm/sec  MV A max emmett: 89.2 cm/sec  MV E/A: 0.55  MV dec slope: 299.0 cm/sec2  MV dec time: 0.17 sec  PA acc time: 0.10 sec  TR max emmett: 230.8 cm/sec  TR max P.3 mmHg  E/E' av.7  Lateral E/e': 7.1  Medial E/e': 12.3      Please do not hesitate to contact me if you have any questions/concerns.     Sincerely,     Atul Bentley MD

## 2022-06-29 LAB
SARS-COV-2 AB SERPL IA-ACNC: >250 U/ML
SARS-COV-2 AB SERPL-IMP: POSITIVE

## 2022-07-26 NOTE — PROGRESS NOTES
Jeanine is a 61 year old who is being evaluated via a billable video visit.        Video-Visit Details    Type of service:  Video Visit    Originating Location (pt. Location): Home    Distant Location (provider location):  [facility] [place]     Platform used for Video Visit: [employer]       In Person-Visit       SUBJECTIVE:  The patient returns in f/u of her MCTD/SSC/RP/with h/o multiple DU, Sarcoidosis .      PROBLEM LIST:    1.  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    2.  History of severe multiple digital ulcers, on fingers and toes.   3.  Marked keratoconjunctivitis sicca over the last several years.    4.  Marked iron deficiency anemia responsive to IV iron.    5.  Diffuse musculoskeletal pain   6.  Lower extremity edema managed with lasix and spironolactone   7.  Marked fatigue and diffuse clinical weakness.    8.  Dyspnea on exertion with lower extremity edema and other features including the anticentromere positivity worrisome for the potential development of pulmonary hypertension.    9.  Marked GERD with associated dysphagia.    10.  Status post gastric bypass with a history of intermittent constipation and diarrhea potentially consistent with bacterial small bowel overgrowth versus other gut effects of the prior surgery.    11.  History of positive rheumatoid factor consistent with MCTD, given the remainder of her clinical presentation.     12. Progressive Jaw tightening with inability to open mouth wider than 1 inch.   13. Diastolic dysfunction (3/2013)  14. Hypoalbuminemia  15. ILD cryobiopsy Pathology was reviewed at ILD conference on 10/12/2020 which showed ALI, what appears to be an acute exacerbation of underlying ILD with OP.  Few nonnecrotizing granulomas  16. Development of intermittent moderate/severe RLE Neuropathy with steroid taper below 30 mg prednisone/d Jan 2021  17.  Sarcoidosis      Impression:    Per the problem list above with historical features primarily of mixed connective tissue disease with severe Raynauds, SICCA, lung disease, ANJU, SSA and  RNP positivity, for recently with clinical features of sarcoidosis including neurosarcoidosis which has been steroid responsive and is worsening with steroid taper, at doses below 15 mg daily which she is on currently..    Plan/recommendation:    As noted in the history I offered her 100 mg tablets of Neurontin so she can better titrate her evening doses and she accepted that.  I have sent that in.    I do not want to change her prednisone at the moment.  She will be seeing Dr. Perlman in just 2 days.  Based on what is found with a pulmonary function test and any other testing he does I think we can decide whether to adjust her prednisone and/or try any additional medications.    Dr. Bentley has also initiated steps to get her a referral for second opinion to the [facility] which I agree with.    .  Today's video visit commenced at 3:05 PM was completed at 3:27 PM 22 minutes in face-to-face time, with an additional 8 minutes spent in chart review,  and documentation completed on the date of service.      MARTÍN De La Torre MD, PhD    Rheumatology              INTERVAL HISTORY:       AUGUST 22, 2017, INTERVAL HISTORY:  Since we have last seen the patient, she developed a right ankle wound about 2 months ago.  That opened for a while and it became hard to close, but at this point it has finally closed over.       She has had some stressors and she feels like that has contributed to her feeling like she needed more prednisone.  She had made it down to 12 mg a day for almost 6 months but increased to 20 mg a day in June when she was moving to a new house.  This was primarily due to an increase in fatigue and in joint pains.      At this point, her morning stiffness is 30-60 minutes despite remaining on the 20 mg  "a day of prednisone.  She does think her strength, however, is stable.      She continues to have a lot of Raynaud's phenomena but has not developed any distal digital ulcerations.       Her GERD and keratoconjunctivitis sicca, however, have remained stable and she thinks she has stable exercise tolerance.       She has not done her labs for many, many months and has not seen me for over a year, and we discussed that in some detail.     2018 INTERVAL HISTORY:  Since we have last seen her, she made it down to 12.5 mg a day of prednisone but then put herself back up to 20 mg a day because of increasing general body aches and fatigue as well as some inflammatory joint stiffness in her wrists.  Other joints have actually been okay.      She had stopped her methotrexate some time ago.  She works in a hospital and knew of several people who were admitted with \"methotrexate toxicity\" who  while in hospital and although she does not know the details, that frightened her enough that she decided to stop the drug.       She is getting worsening Raynaud's.  She really notes that this happens not only with cold, but with a variety of kinds of stress.  She has previously, at least for a short length of time, been on losartan and does not remember whether that helped at all.  I am pretty sure she has also been on calcium channel blockers, but those are relatively contraindicated in her at this point because of persistent bilateral lower extremity edema.       She is getting enough lower extremity edema and has been found to have vascular insufficiency that she will be having some vascular surgery to laser some of these areas, although an exact time for that has not been laid out.       In addition to getting Raynaud's in her hands, she also gets it in her feet, and when she does, potentially contributed to by the vascular insufficiency, she gets numbness in her feet.  She is not getting numbness in her hands by " contrast.       The patient did have pulmonary function tests today.  Although she is not exercising regularly, in general she feels her exercise tolerance has been stable, and she is not having any dry cough.       She did have a decrease in her FVC to 3.68 from 4.30 and her DLCO to 21.77 from 24.74, but she has had some rib cage pain for about the last 3 weeks since she had a minor accident while dancing.  Notably then, her TLC is completely stable, going from 6.41 to 6.29.       She denies any other new problems.  Keratoconjunctivitis sicca, GERD and other features have been stable and she denies any dysphagia.     July 11, 2019 interval history:    Since we have last seen her she did have to miss appointment because of some family issues but she is actually been doing quite well.  Although she is continued to have some intermittent joint complaints she is been able to taper her prednisone down to 7.5 mg a day the lowest she has been on and the entire time I have been following her.    Although her joints have not worsened with that lower dose she does think she is getting some worsening of her Raynauds phenomena.  It can be pretty bad and the attacks can be hard to break even with rewarming.  She gets them in hands and feet.  Fortunately she has not had any digital ulcers.  She does recall that she was placed temporarily on tadalafil sometime ago by Dr. Bentley but she could not afford it long-term.  She thinks that may have been helpful however.    She denies any significant dyspnea on exertion or cough.  She has some muscle weakness but not marketed and she does not think that is any worse over time.  Fatigue has generally been her worst symptom and that may be slightly worse.    She does have some ongoing keratoconjunctivitis sicca but does not feel like she wants to go on a medicine for that.  She already has a full plate of dentures in both upper and lower.      August 11, 2020 interval history:    Since  we last seen the patient she feels she was doing relatively stably on till July 7.  At that point she had the abrupt onset of a bad dry cough.  She did not have significant fevers but did not feel well.  She was seen had a negative coronavirus test but a chest x-ray apparently showed evidence of some infiltrates and she was placed on doxycycline.  She says she did not improve with that therapy and a CT angios was performed.  That showed bilateral pulmonary consolidation thought consistent with infection.  There was note that the pattern could actually be somewhat typical for sarcoid but it was noted to have worsened since the prior study performed there at [facility] in October 2019.  She also was noted to have mediastinal lymphadenopathy and debris in the esophagus potentially consistent with her systemic sclerosis.  No pulmonary embolism was found.  Based on the results of that she was placed on Levaquin.  She continued to fail to improve and a third antibiotic was also tried.    Finally she was seen by pulmonologist and a bronchoscopy was performed.  I am able to review the results of that and there was a high percentage of neutrophils and lymphocytes and that BAL fluid.  Viral cultures however Gram stain and culture were all unremarkable and there were no malignant cells found.    She was placed on 40 mg a day of prednisone at the time of the bronchoscopy once it was clear that there was no acute infection.  She does not have a follow-up appointment yet.  Although her cough is mostly gone she does not feel good and feels very dyspneic just going from the bed to the bathroom or crossed the room.  She is not convinced she has had any improvement with 40 mg daily prednisone and if anything think she is worse.    In reviewing her extensive record I see that a repeat TARA was performed and a reflexive was done when it was positive.  She continues to have an anticentromere antibody which she had previously, but now  notably has a high titer Gonzalez and high titer RNP antibody.  She previously was borderline positive for RNP and Gonzalez negative.  SSA is also higher titer than it was previously.    With all these autoantibody positivity she however denies any specific features that would suggest systemic lupus.  She specifically denies sun sensitivity sun sensitive rashes including a malar rash, morning stiffness in any of her joints or any joint swelling and any pleurisy at the time of her  at CT angiogram or otherwise.  \  Her Raynaud's phenomena has been stable as have other features of her limited cutaneous systemic sclerosis.        February 25, 2021 interval history:    At the time that I saw the patient about 3 weeks ago we decided that because her neuropathy sounded rather rapidly progressive, and her HRCT scan was being read as potentially consistent with sarcoidosis, that neurosarcoid was on the differential.  I also wondered about an immune complex mediated small vessel vasculitis causing neuropathy.  We therefore did laboratory testing as well as started her on some steroid.    Laboratory testing has been largely unrevealing.  She did not have elevated calcium or elevated calcium in the urine.  I did also do a parathyroid hormone in case we did find that however her parathyroid hormone was significantly elevated.  Her vitamin D was very low and she has started supplementation for that.    Most notably however her prednisone at 40 mg a day virtually resolved her symptoms over 3 to 4 days.  She had at least an 80% decrease in her pain and numbness although she still has some residual tingling in the legs bilaterally.    She is having some prednisone side effects.  Specifically she is having a hard time sleeping and is also feeling irritable which she thinks could be due partly due to the loss of sleep but partly directly due to the prednisone.  She has got a lot of prior experience with prednisone and has had some  difficulties with it before.    She did have some right-sided chest pain.  That seem to occur with a deep breath.  She has a cardiologist and saw them will be getting a follow-up echo.  She is no longer having that.    In going through her history she does not believe she is ever been on Imuran and is quite sure and I am sure as well that we have never had her on TNF inhibition.  She was on methotrexate and tolerated oral methotrexate poorly due to nausea but was better able to tolerate subcutaneous methotrexate.      Impression:    Per the problem list, with known RNP positivity and more recently with this HRCT finding that is concerning for the possibility of overlapping sarcoidosis.    She had a very nice response to steroid quite quickly, and so we now have the conundrum of not knowing exactly why.  I certainly suspect that she has an immune mediated neuropathy given this rapid response and neurosarcoid is a concern.    I have sent a message to Dr. Cuellar in pulmonary for her thoughts.  I have also ordered a repeat HRCT that to be done in another week or so before she is seen back in pulmonary.    I am hoping these can be reviewed so that there could be a decision whether we can feel strongly enough that presumptively this could be sarcoidosis to put her on appropriate therapy for that or whether a biopsy might be justified.    We could simply presumptively try steroid sparing medications.  I am somewhat concerned about using TNF inhibitors however unless we are pretty confident this is sarcoid, as they have been associated with worsening of systemic lupus patients and autoimmune associated interstitial lung disease in some settings.    We also have the  finding of the elevated PTH, and very low vitamin D levels, which can be seen in sarcoidosis, but is unclear here given normal Calcium levels. and will refer her to endocrinology for that.    I have given her prescription for temazepam to help her with the sleep  and irritability.  I did warn her to be careful about driving in the morning until she knows how it affects her as it does have a long half-life.    I will plan on seeing her back in about 6 to 8 weeks sooner as needed.      April 8, 2021 interval history:    Since we have last seen the patient she has had initial evaluation done, and Dr. Cuellar has a recent note on the chart and an addendum placed a day after the patient's studies were reviewed in sarcoid conference.    The upshot of that is that there is evidence from her studies, in particular the biopsy studies that suggest that this could be sarcoidosis.  Because she has neurologic symptoms and inadequately explain dyspnea on exertion she will also be getting neurology evaluation and cardiac evaluation.    She remains at this time on 40 mg a day of prednisone.  She is not liking this dose.  Although she still has dyspnea on exertion she also is irritable and on edge on that dose and is also experiencing some nausea..    After consideration of the issues, Dr. Cuellar had recommended the patient go on track today.  She has in fact been on both this and MMF before and although she had some difficulties at time with tolerating these medication she think she would tolerate them again.  We have briefly discussed possibility of using a TNF inhibitor but that does give me pause, given that she has had features of mixed connective tissue disease and in TARA positive individuals TNF inhibitors have at times worsen their disease process, particularly in those with lupus associated autoantibodies.  She has had in the past of borderline positive RNP.    She believes most of her other historical clinical features are stable.  She still gets pretty bad Raynaud's phenomena and some features of arthritis although the inflammatory features are currently pretty well controlled on this dose of prednisone.    July 15, 2021 interval history:    Since we have last seen the patient she  has continued to have a lot of dyspnea on exertion despite being on the MMF 40 mg a day of prednisone and until just earlier this week methotrexate.    Because of her lung biopsy showing features consistent with sarcoidosis, she was presented at sarcoidosis conference last week.  I attended this discussion.  After much discussion it was decided that azathioprine may be the next drug to try in her.  This was influenced in part by my concerns of giving a TNF inhibitor to her given her underlying autoimmune connective tissue disease, and the possibility of that disease process worsening with TNF inhibition as has been described and as I have seen previously albeit rarely.    She did just get TPMT levels earlier today to help decide what kind of azathioprine dose to start with.  She continues on MMF.    She will be getting a nerve biopsy performed on Monday.    She also was recently in the emergency room because of very severe midsternal chest pain.  She says this developed during the night.  When it would not go away she went to the ER.  There she was given narcotics after first getting a single sublingual nitroglycerin that did not help.  Extensive cardiopulmonary work-up was negative for any evidence of ischemia or clot.  Eventually she got Toradol after having gotten narcotics and did improve and went home.    She has had some costochondral type pains before but nothing like this and this was not exacerbated or relieved by pressure over the area.    Notably, she is been having worsening problems with GERD and dysphagia as well.  She just spoke with Dr. Cárdenas earlier this morning.  She is getting a lot of pill dysphagia at this point which is very frustrating for her and very uncomfortable.  She had dilatation in the fall and thought it helped for a while but she says this almost feels like it is just a dysmotility problem rather than stricturing to her.        November 11, 2021 interval history:    Patient had a  right sural nerve biopsy on 7/19/2021.  Biopsy was notable for axonal neuropathy without a specific cause.  There is no overt evidence of inflammation, primary demyelination, or granulomatous findings.  Patient had return follow-up would pulmonology on 9/16/2021.  Her CT was reviewed, showed bronchocentric and predominantly central infiltrates.Overall findings have not significantly progressed from prior CT on 7/1/2021.  Patient's case was discussed in ILD conference.  Plan was to start Imuran 150 mg daily, as well as starting a prednisone taper (starting at 40 mg daily, then tapering 5 mg every month to be kept at 30 mg daily until follow-up with rheumatology).     Patient overall has no significant change in her health from prior.  She is currently taking hydroxychloroquine, prednisone 30 mg daily, and Imuran 150 daily.  Continues to have fatigue, which she feels has been gradually worsening.  She notes that she can only lift 2-3 grocery bags before feeling tired, and needs assistance when cooking.  Continues to have neuropathy symptoms in her bilateral lower extremities (more in the right side).  Continues to have shortness of breath with limited activity (e.g. moving from bed to kitchen).  Is currently treating her GERD with Protonix and omeprazole, which she feels controls her symptoms.  Has some mouth dryness, but notices that her eyes are watery which is new for her. She has received her COVID-19 booster immunization.     January 13, 2022 interval history:    Since we have last seen the patient she was admitted to the hospital because of worsening shortness of breath and palpitations.  Coronary angiogram showed minimal areas of stenosis of less than 20%.  Echocardiogram however suggested very significant pulmonary hypertension and a markedly reduced ejection fraction.    She saw Dr. Cuellar back in pulmonary clinic last week.  Cardiac MRI was performed yesterday.  I have reviewed those results today.  There is  significant LGE, consistent with an infiltrative process such as sarcoidosis.  She is quite symptomatic.  She had a 6-minute walk test today and had to stop because of how dyspneic she was and how many palpitation she was having but she had absolutely no desaturation.    She has been tapering her prednisone and is down to 20 mg daily but she believes all of the symptoms have worsened with that taper.      ROS as per HPI.  10-point ROS is otherwise negative.    HISTORY REVIEW:  Past Medical History:   Diagnosis Date     Anemia      Cellulitis of leg 6/6/2013     Esophageal reflux     Better post-hiatal hernia repair and weight loss     Limb ischemia; ulcers of fingertips 4/22/2013     Raynaud's syndrome      Scleroderma (H)      Systemic sclerosis (H) 2009     Unspecified essential hypertension        Past Surgical History:   Procedure Laterality Date     BYPASS GASTRIC, CHOLECYSTECTOMY, COMBINED       CHOLECYSTECTOMY       COLONOSCOPY       ESOPHAGOSCOPY, GASTROSCOPY, DUODENOSCOPY (EGD), COMBINED N/A 3/10/2016    Procedure: COMBINED ESOPHAGOSCOPY, GASTROSCOPY, DUODENOSCOPY (EGD), BIOPSY SINGLE OR MULTIPLE;  Surgeon: Tricia Zambrano MD;  Location:  GI     INNER EAR SURGERY       PICC INSERTION  6/5/2013    5fr DL Power PICC, 44cm, left basilic vein, tip in low SVC     SURGICAL HISTORY OF -       Left ear surgery - incus transition, tympanoplasty     SURGICAL HISTORY OF -   6/05    Gastric bypass     SURGICAL HISTORY OF -   6/05    Cholecystectomy     SURGICAL HISTORY OF -   6/05    Hiatal hernia repair     TONSILLECTOMY         Family History   Problem Relation Age of Onset     Cerebrovascular Disease Father      C.A.D. Father      C.A.D. Mother      C.A.D. 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      Social History Main Topics     Smoking status: Never Smoker      Smokeless tobacco: Never Used     Alcohol Use: Yes      occassionally     Drug Use: No     Sexually Active: Yes -- Male partner(s)     Birth Control/ Protection: None     Social History Narrative     Was  in December 2015. Longtime partner from [place].       Patient Active Problem List   Diagnosis     Essential hypertension     Thyrotoxicosis     Obesity     Prolonged QTc 460 ms,  at hr 67     Low back pain - Suspect SI Joint dysfunction     Cough     Systemic sclerosis (H)     Tumoral calcinosis     Shortness of breath     Edema of both legs     Abnormal albumin     Myopathy     Hypoalbuminemia     Diastolic dysfunction     Limb ischemia; ulcers of fingertips     Cellulitis of leg     Other specified diffuse disease of connective tissue     Disturbed sleep rhythm     Pharyngeal dysphagia     Gastroesophageal reflux disease, esophagitis presence not specified     Rheumatoid arthritis with negative rheumatoid factor, involving unspecified site (H)     Raynaud's disease without gangrene       Allergies   Allergen Reactions     Lovenox Other (See Comments)     Blood clot      Norvasc [Amlodipine Besylate] Swelling     Lip swelling that happened on 2 different occasions     Erythromycin Rash     Rash on arms     OBJECTIVE ( January 13, 2022:  Vitals: Reviewed  Gen: A+Ox3, NAD  HEENT: NCAT, EOMI  Pulm: CTAB, minimal coarse sounds on basilar lobes  CVS: She has a regular rate and rhythm but this is punctuated by very frequent ectopy then restoration of a regular rhythm.  No murmurs appreciated.  Skin: Her fingers are intermittently diffusely cyanotic.  Purpuric patches on fingertips no ulcerations/erosions. There is platelike xerosis of lower extremities  -mild sclerosis of distal fingers  Msk: No active synovitis.  4/5 upper extremity strength,  5/5 lower extremity strength bilaterally.    EKG (11/12/2021)  Sinus rhythm with marked sinus arrhythmia with premature supraventricular complexes  Possible left atrial enlargement  Left axis deviation  Left ventricular hypertrophy with QRS widening  T wave abnormality consider lateral ischemia  Prolonged QT  Abnormal ECG  Ventricular rate 81, , , QT/QTc 418/485, P/R/T 29/-32/132     Component      Latest Ref Rng 1/26/2016   WBC      4.0 - 11.0 10e9/L 9.2   RBC Count      3.8 - 5.2 10e12/L 4.59   Hemoglobin      11.7 - 15.7 g/dL 13.2   Hematocrit      35.0 - 47.0 % 41.8   MCV      78 - 100 fl 91   MCH      26.5 - 33.0 pg 28.8   MCHC      31.5 - 36.5 g/dL 31.6   RDW      10.0 - 15.0 % 16.7 (H)   Platelet Count      150 - 450 10e9/L 195   Diff Method       Automated Method   % Neutrophils       62.9   % Lymphocytes       27.0   % Monocytes       9.1   % Eosinophils       0.4   % Basophils       0.2   % Immature Granulocytes       0.4   Nucleated RBCs      0 /100 0   Absolute Neutrophil      1.6 - 8.3 10e9/L 5.8   Absolute Lymphocytes      0.8 - 5.3 10e9/L 2.5   Absolute Monocytes      0.0 - 1.3 10e9/L 0.8   Absolute Eosinophils      0.0 - 0.7 10e9/L 0.0   Absolute Basophils      0.0 - 0.2 10e9/L 0.0   Abs Immature Granulocytes      0 - 0.4 10e9/L 0.0   Absolute Nucleated RBC       0.0   Color Urine       Yellow   Appearance Urine       Clear   Glucose Urine      NEG mg/dL Negative   Bilirubin Urine      NEG Negative   Ketones Urine      NEG mg/dL Negative   Specific Gravity Urine      1.003 - 1.035 1.010   Blood Urine      NEG Negative   pH Urine      5.0 - 7.0 pH 6.0   Protein Albumin Urine      NEG mg/dL Negative   Urobilinogen mg/dL      0.0 - 2.0 mg/dL Normal   Nitrite Urine      NEG Negative   Leukocyte Esterase Urine      NEG Negative   Source       Midstream Urine   WBC Urine      0 - 2 /HPF <1   RBC Urine      0 - 2 /HPF <1   Squamous Epithelial /HPF Urine      0 - 1 /HPF <1   Creatinine      0.52  - 1.04 mg/dL 1.11 (H)   GFR Estimate      >60 mL/min/1.7m2 51 (L)   GFR Estimate If Black      >60 mL/min/1.7m2 62   ALT      0 - 50 U/L 23   AST      0 - 45 U/L 16   Albumin      3.4 - 5.0 g/dL 3.4   CRP Inflammation      0.0 - 8.0 mg/L <2.9   Sed Rate      0 - 30 mm/h 9   CK Total      30 - 225 U/L 38   Aldolase       4.1     Biopsy from 3/10 EGD:  Esophagus, gastroesophageal junction, biopsies:   - Squamous mucosa and submucosa with marked chronic inflammation and   fibrosis   - No evidence of intestinal metaplasia or dysplasia               February 15, 2022 interval history:    Since we have last seen the patient she has felt about the same in terms of heart palpitations on the 30 mg daily prednisone.  She believes her neuropathy symptoms actually worsened a little.  Her Raynauds phenomena has also been worse but it has of course been rather cold.    Interestingly, she was actually in [place] last week and despite the warm weather there she still had a lot of Raynauds that was not much different.    She saw Dr. Francis earlier today.  I have read his note which is up.  He is starting her on a Zio patch for 1 week and hoping that we can significantly decrease her prednisone so that a PET scan can be performed and identify whether there is any cardiac inflammation at a time that she is on more minimal prednisone.    She does remain on azathioprine.  She did have labs earlier today which look quite good with perhaps some slight worsening of ESR but in the context of a lower CRP which I am more inclined to believe.  Her AST was up above the upper limit of normal very slightly but she denies any recent alcohol and ALT was okay.    Physical examination by video shows her to be in no acute distress HEENT examination is normal.  She is speaking in full sentences with no shortness of breath.  Her hand examination does not show marked cyanosis and there is no apparent swelling or joint deformity.    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    Plan/recommendation:    I have asked her to go down to 25 mg a day for the next 3 days of prednisone.  If she does not feel markedly worse she can go further to 20 mg a day for the remainder of the time she is on the Zio patch and another week thereafter.  If at that point she still feels okay she can go down to 15 mg a day.    After she has been on that dose for a couple of weeks I want her to do labs again so we can reassess for inflammation.  I would also like to see her at about that time on 15 March when I do have another video visit that I can see her at and we can decide whether to go down further and if so how fast.    She will let us know if she is having difficulty in the meantime.        March 15, 2022 interval history:    Since we have last seen her she has been able to taper prednisone down to 10 mg daily as of last week.  Notably, she is not had any worsening of joint symptoms or fatigue with that taper.  Her neuropathy however in her feet has definitely worsened.  She has not yet reached out to Dr. Abel but he had let her know before that if her neuropathy worsen she could go on gabapentin or pregabalin.  At this point she has been toughing it out but is considering trying this.    She unfortunately had right heart cath scheduled but it had to be delayed as her screen Covid test was positive.  Symptomatically she felt perhaps a little bit more short of breath at the time but could barely notice a difference.  She did not have any fevers or chills.  She has been vaccinated, we have not checked her antispike antibody levels.  It is possible that they are not good, as she has been on a lot of immunosuppression including substantial doses of steroid.    She did have her  booster shot back in September or October.    The right heart catheterization with planned cardiac biopsy has now been put off until April.  She was also to get IV iron as her iron levels were low but that had to be delayed as well.  Notably, although her iron levels were somewhat low, her hemoglobin and hematocrit have remained normal.    She continues on azathioprine 150 mg daily and is tolerating that well.  She is also on Bactrim prophylaxis.    Physical examination by video shows her to be in no acute distress.  HEENT examination is normal and she does not appear to be acutely short of breath.  She is speaking in full sentences with no difficulty.  Musculoskeletal examination of the hands shows normal range of motion and no convincing areas of joint swelling and no active cyanosis.    June 21, 2022 interval history:    Since have not seen the patient she has had several additional visits.  She is now on 15 mg daily prednisone after having been tapered down to 10 mg daily but did not do well at that dose.    She also developed a pneumonia requiring admission and felt quite poorly with that with some worsening of her baseline dyspnea on exertion.  Even now she continues to have drops in her oxygen saturation into the 80s just with sitting with virtually no exertion.  She describes having virtually no energy as well.    Her neuropathy also seems to be worsening for her extending about the mid calf right greater than left.  Her Raynauds phenomena is also worse but she does not think the 2 are directly related, as she has neuropathy far proximal to where her Raynauds is.    Unfortunately, she is requiring quite a bit of cardiac medicine because of her atrial fibrillation and cardiomyopathy.  With all of this, she often feels lightheaded particularly at night when she wakes and needs to go to the bathroom.  She has had several falls which she attributes more to her neuropathy in her feet then to lightheadedness but  making it questionable whether she could be on medicine such as sildenafil to help her Raynauds at this point.    Furthermore because of her atrial fibrillation she is also on Eliquis, so the use of Persantine which I am not sure she has had before or pentoxyphyline, which she has, are problematic.    Overall she is very frustrated with her situation.    Physical examination by video shows her to be in no acute distress.  HEENT examination is normal.  She is speaking in full sentences with no shortness of breath.  Her upper extremity examination shows no convincing areas of swelling or deformity.      July 26, 2022 interval history:    Since we have last seen the patient she feels that things have been getting worse.  She got her prednisone down to 10 mg daily for her cardiac PET scan, but on that dose had virtually no appetite lost weight got down to 137 pounds and was having significant worsening of her peripheral neuropathy.  Accordingly when Dr. Bentley recently saw her he raised her prednisone dose back up to 15 mg a day.  On that dose, she has been able to gain back 13 pounds and is back to 150 pounds.    Dr. Neil neurology has given her gabapentin.  A 300 mg dose of gabapentin definitely helps her sleep and improves her peripheral neuropathy pain, but makes her quite groggy in the morning so she is reluctant to take it if she has anything to do the next day.  We discussed that and I offered to prescribe some 100 mg tablets so that she can better titrate her doses when necessary and she like that possibility.    She does remain on azathioprine 150 mg daily.  She is uncertain how much that has allowed steroid to be tapered or how much is helped her neuropathy.  She does get occasional overt nausea that can come and go in a matter of minutes or an hour or less and she is uncertain whether that has anything to do with the azathioprine.  Her laboratory testing has been stable on it.    She does not believe  her breathing is stable however.  She has noticed dropping of her oxygen saturation monitoring and she does not believe this is related to worsening Raynauds which generally has been stable during the summer as long as she stays out of extreme air conditioning.  She also notices that it takes four to six  deep breaths to get her sats back up which was not the case just a month ago when 1 or 2 breaths seem to do the same thing.  She is seeing Dr. Perlman in 2 days and will have pulmonary function test then.    Physical examination by video shows her to be in no acute distress.  HEENT examination is essentially normal.  She is speaking in full sentences with no shortness of breath and no difficulties.  Her upper extremity examination shows no active cyanosis and no significant deformities.

## 2022-07-26 NOTE — NURSING NOTE
Chief Complaint   Patient presents with     Follow Up     Jeanine, is here for a follow up appt     Nikita Segovia [facility]

## 2022-07-26 NOTE — LETTER
7/26/2022       RE: Jeanine Schuler  [address]  [facility] [address]     Dear Colleague,    Thank you for referring your patient, Jeanine Schuler, to the [facility] [place] at [facility]. Please see a copy of my visit note below.      SUBJECTIVE:  The patient returns in f/u of her MCTD/SSC/RP/with h/o multiple DU, Sarcoidosis .      PROBLEM LIST:    1.  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    2.  History of severe multiple digital ulcers, on fingers and toes.   3.  Marked keratoconjunctivitis sicca over the last several years.    4.  Marked iron deficiency anemia responsive to IV iron.    5.  Diffuse musculoskeletal pain   6.  Lower extremity edema managed with lasix and spironolactone   7.  Marked fatigue and diffuse clinical weakness.    8.  Dyspnea on exertion with lower extremity edema and other features including the anticentromere positivity worrisome for the potential development of pulmonary hypertension.    9.  Marked GERD with associated dysphagia.    10.  Status post gastric bypass with a history of intermittent constipation and diarrhea potentially consistent with bacterial small bowel overgrowth versus other gut effects of the prior surgery.    11.  History of positive rheumatoid factor consistent with MCTD, given the remainder of her clinical presentation.     12. Progressive Jaw tightening with inability to open mouth wider than 1 inch.   13. Diastolic dysfunction (3/2013)  14. Hypoalbuminemia  15. ILD cryobiopsy Pathology was reviewed at ILD conference on 10/12/2020 which showed ALI, what appears to be an acute exacerbation of underlying ILD with OP.  Few nonnecrotizing granulomas  16. Development of intermittent moderate/severe RLE Neuropathy with steroid taper below 30 mg prednisone/d Jan 2021  17.  Sarcoidosis      Impression:    Per the problem list above with historical features primarily of mixed connective tissue disease with severe Raynauds, SICCA, lung disease, ANJU, SSA and  RNP positivity, for recently with clinical features of sarcoidosis including neurosarcoidosis which has been steroid responsive and is worsening with steroid taper, at doses below 15 mg daily which she is on currently..    Plan/recommendation:    As noted in the history I offered her 100 mg tablets of Neurontin so she can better titrate her evening doses and she accepted that.  I have sent that in.    I do not want to change her prednisone at the moment.  She will be seeing Dr. Perlman in just 2 days.  Based on what is found with a pulmonary function test and any other testing he does I think we can decide whether to adjust her prednisone and/or try any additional medications.    Dr. Bentley has also initiated steps to get her a referral for second opinion to the [facility] which I agree with.    .  Today's video visit commenced at 3:05 PM was completed at 3:27 PM 22 minutes in face-to-face time, with an additional 8 minutes spent in chart review,  and documentation completed on the date of service.      MARTÍN De La Torre MD, PhD    Rheumatology              INTERVAL HISTORY:       AUGUST 22, 2017, INTERVAL HISTORY:  Since we have last seen the patient, she developed a right ankle wound about 2 months ago.  That opened for a while and it became hard to close, but at this point it has finally closed over.       She has had some stressors and she feels like that has contributed to her feeling like she needed more prednisone.  She had made it down to 12 mg a day for almost 6 months but increased to 20 mg a day in June when she was moving to a new house.  This was primarily due to an increase in fatigue and in joint pains.      At this point, her morning stiffness is 30-60 minutes despite remaining on the 20 mg  "a day of prednisone.  She does think her strength, however, is stable.      She continues to have a lot of Raynaud's phenomena but has not developed any distal digital ulcerations.       Her GERD and keratoconjunctivitis sicca, however, have remained stable and she thinks she has stable exercise tolerance.       She has not done her labs for many, many months and has not seen me for over a year, and we discussed that in some detail.     2018 INTERVAL HISTORY:  Since we have last seen her, she made it down to 12.5 mg a day of prednisone but then put herself back up to 20 mg a day because of increasing general body aches and fatigue as well as some inflammatory joint stiffness in her wrists.  Other joints have actually been okay.      She had stopped her methotrexate some time ago.  She works in a hospital and knew of several people who were admitted with \"methotrexate toxicity\" who  while in hospital and although she does not know the details, that frightened her enough that she decided to stop the drug.       She is getting worsening Raynaud's.  She really notes that this happens not only with cold, but with a variety of kinds of stress.  She has previously, at least for a short length of time, been on losartan and does not remember whether that helped at all.  I am pretty sure she has also been on calcium channel blockers, but those are relatively contraindicated in her at this point because of persistent bilateral lower extremity edema.       She is getting enough lower extremity edema and has been found to have vascular insufficiency that she will be having some vascular surgery to laser some of these areas, although an exact time for that has not been laid out.       In addition to getting Raynaud's in her hands, she also gets it in her feet, and when she does, potentially contributed to by the vascular insufficiency, she gets numbness in her feet.  She is not getting numbness in her hands by " contrast.       The patient did have pulmonary function tests today.  Although she is not exercising regularly, in general she feels her exercise tolerance has been stable, and she is not having any dry cough.       She did have a decrease in her FVC to 3.68 from 4.30 and her DLCO to 21.77 from 24.74, but she has had some rib cage pain for about the last 3 weeks since she had a minor accident while dancing.  Notably then, her TLC is completely stable, going from 6.41 to 6.29.       She denies any other new problems.  Keratoconjunctivitis sicca, GERD and other features have been stable and she denies any dysphagia.     July 11, 2019 interval history:    Since we have last seen her she did have to miss appointment because of some family issues but she is actually been doing quite well.  Although she is continued to have some intermittent joint complaints she is been able to taper her prednisone down to 7.5 mg a day the lowest she has been on and the entire time I have been following her.    Although her joints have not worsened with that lower dose she does think she is getting some worsening of her Raynauds phenomena.  It can be pretty bad and the attacks can be hard to break even with rewarming.  She gets them in hands and feet.  Fortunately she has not had any digital ulcers.  She does recall that she was placed temporarily on tadalafil sometime ago by Dr. Bentley but she could not afford it long-term.  She thinks that may have been helpful however.    She denies any significant dyspnea on exertion or cough.  She has some muscle weakness but not marketed and she does not think that is any worse over time.  Fatigue has generally been her worst symptom and that may be slightly worse.    She does have some ongoing keratoconjunctivitis sicca but does not feel like she wants to go on a medicine for that.  She already has a full plate of dentures in both upper and lower.      August 11, 2020 interval history:    Since  we last seen the patient she feels she was doing relatively stably on till July 7.  At that point she had the abrupt onset of a bad dry cough.  She did not have significant fevers but did not feel well.  She was seen had a negative coronavirus test but a chest x-ray apparently showed evidence of some infiltrates and she was placed on doxycycline.  She says she did not improve with that therapy and a CT angios was performed.  That showed bilateral pulmonary consolidation thought consistent with infection.  There was note that the pattern could actually be somewhat typical for sarcoid but it was noted to have worsened since the prior study performed there at [facility] in October 2019.  She also was noted to have mediastinal lymphadenopathy and debris in the esophagus potentially consistent with her systemic sclerosis.  No pulmonary embolism was found.  Based on the results of that she was placed on Levaquin.  She continued to fail to improve and a third antibiotic was also tried.    Finally she was seen by pulmonologist and a bronchoscopy was performed.  I am able to review the results of that and there was a high percentage of neutrophils and lymphocytes and that BAL fluid.  Viral cultures however Gram stain and culture were all unremarkable and there were no malignant cells found.    She was placed on 40 mg a day of prednisone at the time of the bronchoscopy once it was clear that there was no acute infection.  She does not have a follow-up appointment yet.  Although her cough is mostly gone she does not feel good and feels very dyspneic just going from the bed to the bathroom or crossed the room.  She is not convinced she has had any improvement with 40 mg daily prednisone and if anything think she is worse.    In reviewing her extensive record I see that a repeat TARA was performed and a reflexive was done when it was positive.  She continues to have an anticentromere antibody which she had previously, but now  notably has a high titer Gonzalez and high titer RNP antibody.  She previously was borderline positive for RNP and Gonzalez negative.  SSA is also higher titer than it was previously.    With all these autoantibody positivity she however denies any specific features that would suggest systemic lupus.  She specifically denies sun sensitivity sun sensitive rashes including a malar rash, morning stiffness in any of her joints or any joint swelling and any pleurisy at the time of her  at CT angiogram or otherwise.  \  Her Raynaud's phenomena has been stable as have other features of her limited cutaneous systemic sclerosis.        February 25, 2021 interval history:    At the time that I saw the patient about 3 weeks ago we decided that because her neuropathy sounded rather rapidly progressive, and her HRCT scan was being read as potentially consistent with sarcoidosis, that neurosarcoid was on the differential.  I also wondered about an immune complex mediated small vessel vasculitis causing neuropathy.  We therefore did laboratory testing as well as started her on some steroid.    Laboratory testing has been largely unrevealing.  She did not have elevated calcium or elevated calcium in the urine.  I did also do a parathyroid hormone in case we did find that however her parathyroid hormone was significantly elevated.  Her vitamin D was very low and she has started supplementation for that.    Most notably however her prednisone at 40 mg a day virtually resolved her symptoms over 3 to 4 days.  She had at least an 80% decrease in her pain and numbness although she still has some residual tingling in the legs bilaterally.    She is having some prednisone side effects.  Specifically she is having a hard time sleeping and is also feeling irritable which she thinks could be due partly due to the loss of sleep but partly directly due to the prednisone.  She has got a lot of prior experience with prednisone and has had some  difficulties with it before.    She did have some right-sided chest pain.  That seem to occur with a deep breath.  She has a cardiologist and saw them will be getting a follow-up echo.  She is no longer having that.    In going through her history she does not believe she is ever been on Imuran and is quite sure and I am sure as well that we have never had her on TNF inhibition.  She was on methotrexate and tolerated oral methotrexate poorly due to nausea but was better able to tolerate subcutaneous methotrexate.      Impression:    Per the problem list, with known RNP positivity and more recently with this HRCT finding that is concerning for the possibility of overlapping sarcoidosis.    She had a very nice response to steroid quite quickly, and so we now have the conundrum of not knowing exactly why.  I certainly suspect that she has an immune mediated neuropathy given this rapid response and neurosarcoid is a concern.    I have sent a message to Dr. Cuellar in pulmonary for her thoughts.  I have also ordered a repeat HRCT that to be done in another week or so before she is seen back in pulmonary.    I am hoping these can be reviewed so that there could be a decision whether we can feel strongly enough that presumptively this could be sarcoidosis to put her on appropriate therapy for that or whether a biopsy might be justified.    We could simply presumptively try steroid sparing medications.  I am somewhat concerned about using TNF inhibitors however unless we are pretty confident this is sarcoid, as they have been associated with worsening of systemic lupus patients and autoimmune associated interstitial lung disease in some settings.    We also have the  finding of the elevated PTH, and very low vitamin D levels, which can be seen in sarcoidosis, but is unclear here given normal Calcium levels. and will refer her to endocrinology for that.    I have given her prescription for temazepam to help her with the sleep  and irritability.  I did warn her to be careful about driving in the morning until she knows how it affects her as it does have a long half-life.    I will plan on seeing her back in about 6 to 8 weeks sooner as needed.      April 8, 2021 interval history:    Since we have last seen the patient she has had initial evaluation done, and Dr. Cuellar has a recent note on the chart and an addendum placed a day after the patient's studies were reviewed in sarcoid conference.    The upshot of that is that there is evidence from her studies, in particular the biopsy studies that suggest that this could be sarcoidosis.  Because she has neurologic symptoms and inadequately explain dyspnea on exertion she will also be getting neurology evaluation and cardiac evaluation.    She remains at this time on 40 mg a day of prednisone.  She is not liking this dose.  Although she still has dyspnea on exertion she also is irritable and on edge on that dose and is also experiencing some nausea..    After consideration of the issues, Dr. Cuellar had recommended the patient go on track today.  She has in fact been on both this and MMF before and although she had some difficulties at time with tolerating these medication she think she would tolerate them again.  We have briefly discussed possibility of using a TNF inhibitor but that does give me pause, given that she has had features of mixed connective tissue disease and in TARA positive individuals TNF inhibitors have at times worsen their disease process, particularly in those with lupus associated autoantibodies.  She has had in the past of borderline positive RNP.    She believes most of her other historical clinical features are stable.  She still gets pretty bad Raynaud's phenomena and some features of arthritis although the inflammatory features are currently pretty well controlled on this dose of prednisone.    July 15, 2021 interval history:    Since we have last seen the patient she  has continued to have a lot of dyspnea on exertion despite being on the MMF 40 mg a day of prednisone and until just earlier this week methotrexate.    Because of her lung biopsy showing features consistent with sarcoidosis, she was presented at sarcoidosis conference last week.  I attended this discussion.  After much discussion it was decided that azathioprine may be the next drug to try in her.  This was influenced in part by my concerns of giving a TNF inhibitor to her given her underlying autoimmune connective tissue disease, and the possibility of that disease process worsening with TNF inhibition as has been described and as I have seen previously albeit rarely.    She did just get TPMT levels earlier today to help decide what kind of azathioprine dose to start with.  She continues on MMF.    She will be getting a nerve biopsy performed on Monday.    She also was recently in the emergency room because of very severe midsternal chest pain.  She says this developed during the night.  When it would not go away she went to the ER.  There she was given narcotics after first getting a single sublingual nitroglycerin that did not help.  Extensive cardiopulmonary work-up was negative for any evidence of ischemia or clot.  Eventually she got Toradol after having gotten narcotics and did improve and went home.    She has had some costochondral type pains before but nothing like this and this was not exacerbated or relieved by pressure over the area.    Notably, she is been having worsening problems with GERD and dysphagia as well.  She just spoke with Dr. Cárdenas earlier this morning.  She is getting a lot of pill dysphagia at this point which is very frustrating for her and very uncomfortable.  She had dilatation in the fall and thought it helped for a while but she says this almost feels like it is just a dysmotility problem rather than stricturing to her.        November 11, 2021 interval history:    Patient had a  right sural nerve biopsy on 7/19/2021.  Biopsy was notable for axonal neuropathy without a specific cause.  There is no overt evidence of inflammation, primary demyelination, or granulomatous findings.  Patient had return follow-up would pulmonology on 9/16/2021.  Her CT was reviewed, showed bronchocentric and predominantly central infiltrates.Overall findings have not significantly progressed from prior CT on 7/1/2021.  Patient's case was discussed in ILD conference.  Plan was to start Imuran 150 mg daily, as well as starting a prednisone taper (starting at 40 mg daily, then tapering 5 mg every month to be kept at 30 mg daily until follow-up with rheumatology).     Patient overall has no significant change in her health from prior.  She is currently taking hydroxychloroquine, prednisone 30 mg daily, and Imuran 150 daily.  Continues to have fatigue, which she feels has been gradually worsening.  She notes that she can only lift 2-3 grocery bags before feeling tired, and needs assistance when cooking.  Continues to have neuropathy symptoms in her bilateral lower extremities (more in the right side).  Continues to have shortness of breath with limited activity (e.g. moving from bed to kitchen).  Is currently treating her GERD with Protonix and omeprazole, which she feels controls her symptoms.  Has some mouth dryness, but notices that her eyes are watery which is new for her. She has received her COVID-19 booster immunization.     January 13, 2022 interval history:    Since we have last seen the patient she was admitted to the hospital because of worsening shortness of breath and palpitations.  Coronary angiogram showed minimal areas of stenosis of less than 20%.  Echocardiogram however suggested very significant pulmonary hypertension and a markedly reduced ejection fraction.    She saw Dr. Cuellar back in pulmonary clinic last week.  Cardiac MRI was performed yesterday.  I have reviewed those results today.  There is  significant LGE, consistent with an infiltrative process such as sarcoidosis.  She is quite symptomatic.  She had a 6-minute walk test today and had to stop because of how dyspneic she was and how many palpitation she was having but she had absolutely no desaturation.    She has been tapering her prednisone and is down to 20 mg daily but she believes all of the symptoms have worsened with that taper.      ROS as per HPI.  10-point ROS is otherwise negative.    HISTORY REVIEW:  Past Medical History:   Diagnosis Date     Anemia      Cellulitis of leg 6/6/2013     Esophageal reflux     Better post-hiatal hernia repair and weight loss     Limb ischemia; ulcers of fingertips 4/22/2013     Raynaud's syndrome      Scleroderma (H)      Systemic sclerosis (H) 2009     Unspecified essential hypertension        Past Surgical History:   Procedure Laterality Date     BYPASS GASTRIC, CHOLECYSTECTOMY, COMBINED       CHOLECYSTECTOMY       COLONOSCOPY       ESOPHAGOSCOPY, GASTROSCOPY, DUODENOSCOPY (EGD), COMBINED N/A 3/10/2016    Procedure: COMBINED ESOPHAGOSCOPY, GASTROSCOPY, DUODENOSCOPY (EGD), BIOPSY SINGLE OR MULTIPLE;  Surgeon: Tricia Zambrano MD;  Location:  GI     INNER EAR SURGERY       PICC INSERTION  6/5/2013    5fr DL Power PICC, 44cm, left basilic vein, tip in low SVC     SURGICAL HISTORY OF -       Left ear surgery - incus transition, tympanoplasty     SURGICAL HISTORY OF -   6/05    Gastric bypass     SURGICAL HISTORY OF -   6/05    Cholecystectomy     SURGICAL HISTORY OF -   6/05    Hiatal hernia repair     TONSILLECTOMY         Family History   Problem Relation Age of Onset     Cerebrovascular Disease Father      C.A.D. Father      C.A.D. Mother      C.A.D. 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      Social History Main Topics     Smoking status: Never Smoker      Smokeless tobacco: Never Used     Alcohol Use: Yes      occassionally     Drug Use: No     Sexually Active: Yes -- Male partner(s)     Birth Control/ Protection: None     Social History Narrative     Was  in December 2015. Longtime partner from [place].       Patient Active Problem List   Diagnosis     Essential hypertension     Thyrotoxicosis     Obesity     Prolonged QTc 460 ms,  at hr 67     Low back pain - Suspect SI Joint dysfunction     Cough     Systemic sclerosis (H)     Tumoral calcinosis     Shortness of breath     Edema of both legs     Abnormal albumin     Myopathy     Hypoalbuminemia     Diastolic dysfunction     Limb ischemia; ulcers of fingertips     Cellulitis of leg     Other specified diffuse disease of connective tissue     Disturbed sleep rhythm     Pharyngeal dysphagia     Gastroesophageal reflux disease, esophagitis presence not specified     Rheumatoid arthritis with negative rheumatoid factor, involving unspecified site (H)     Raynaud's disease without gangrene       Allergies   Allergen Reactions     Lovenox Other (See Comments)     Blood clot      Norvasc [Amlodipine Besylate] Swelling     Lip swelling that happened on 2 different occasions     Erythromycin Rash     Rash on arms     OBJECTIVE ( January 13, 2022:  Vitals: Reviewed  Gen: A+Ox3, NAD  HEENT: NCAT, EOMI  Pulm: CTAB, minimal coarse sounds on basilar lobes  CVS: She has a regular rate and rhythm but this is punctuated by very frequent ectopy then restoration of a regular rhythm.  No murmurs appreciated.  Skin: Her fingers are intermittently diffusely cyanotic.  Purpuric patches on fingertips no ulcerations/erosions. There is platelike xerosis of lower extremities  -mild sclerosis of distal fingers  Msk: No active synovitis.  4/5 upper extremity strength,  5/5 lower extremity strength bilaterally.    EKG (11/12/2021)  Sinus rhythm with marked sinus arrhythmia with premature supraventricular complexes  Possible left atrial enlargement  Left axis deviation  Left ventricular hypertrophy with QRS widening  T wave abnormality consider lateral ischemia  Prolonged QT  Abnormal ECG  Ventricular rate 81, , , QT/QTc 418/485, P/R/T 29/-32/132     Component      Latest Ref Rng 1/26/2016   WBC      4.0 - 11.0 10e9/L 9.2   RBC Count      3.8 - 5.2 10e12/L 4.59   Hemoglobin      11.7 - 15.7 g/dL 13.2   Hematocrit      35.0 - 47.0 % 41.8   MCV      78 - 100 fl 91   MCH      26.5 - 33.0 pg 28.8   MCHC      31.5 - 36.5 g/dL 31.6   RDW      10.0 - 15.0 % 16.7 (H)   Platelet Count      150 - 450 10e9/L 195   Diff Method       Automated Method   % Neutrophils       62.9   % Lymphocytes       27.0   % Monocytes       9.1   % Eosinophils       0.4   % Basophils       0.2   % Immature Granulocytes       0.4   Nucleated RBCs      0 /100 0   Absolute Neutrophil      1.6 - 8.3 10e9/L 5.8   Absolute Lymphocytes      0.8 - 5.3 10e9/L 2.5   Absolute Monocytes      0.0 - 1.3 10e9/L 0.8   Absolute Eosinophils      0.0 - 0.7 10e9/L 0.0   Absolute Basophils      0.0 - 0.2 10e9/L 0.0   Abs Immature Granulocytes      0 - 0.4 10e9/L 0.0   Absolute Nucleated RBC       0.0   Color Urine       Yellow   Appearance Urine       Clear   Glucose Urine      NEG mg/dL Negative   Bilirubin Urine      NEG Negative   Ketones Urine      NEG mg/dL Negative   Specific Gravity Urine      1.003 - 1.035 1.010   Blood Urine      NEG Negative   pH Urine      5.0 - 7.0 pH 6.0   Protein Albumin Urine      NEG mg/dL Negative   Urobilinogen mg/dL      0.0 - 2.0 mg/dL Normal   Nitrite Urine      NEG Negative   Leukocyte Esterase Urine      NEG Negative   Source       Midstream Urine   WBC Urine      0 - 2 /HPF <1   RBC Urine      0 - 2 /HPF <1   Squamous Epithelial /HPF Urine      0 - 1 /HPF <1   Creatinine      0.52  - 1.04 mg/dL 1.11 (H)   GFR Estimate      >60 mL/min/1.7m2 51 (L)   GFR Estimate If Black      >60 mL/min/1.7m2 62   ALT      0 - 50 U/L 23   AST      0 - 45 U/L 16   Albumin      3.4 - 5.0 g/dL 3.4   CRP Inflammation      0.0 - 8.0 mg/L <2.9   Sed Rate      0 - 30 mm/h 9   CK Total      30 - 225 U/L 38   Aldolase       4.1     Biopsy from 3/10 EGD:  Esophagus, gastroesophageal junction, biopsies:   - Squamous mucosa and submucosa with marked chronic inflammation and   fibrosis   - No evidence of intestinal metaplasia or dysplasia               February 15, 2022 interval history:    Since we have last seen the patient she has felt about the same in terms of heart palpitations on the 30 mg daily prednisone.  She believes her neuropathy symptoms actually worsened a little.  Her Raynauds phenomena has also been worse but it has of course been rather cold.    Interestingly, she was actually in [place] last week and despite the warm weather there she still had a lot of Raynauds that was not much different.    She saw Dr. Francis earlier today.  I have read his note which is up.  He is starting her on a Zio patch for 1 week and hoping that we can significantly decrease her prednisone so that a PET scan can be performed and identify whether there is any cardiac inflammation at a time that she is on more minimal prednisone.    She does remain on azathioprine.  She did have labs earlier today which look quite good with perhaps some slight worsening of ESR but in the context of a lower CRP which I am more inclined to believe.  Her AST was up above the upper limit of normal very slightly but she denies any recent alcohol and ALT was okay.    Physical examination by video shows her to be in no acute distress HEENT examination is normal.  She is speaking in full sentences with no shortness of breath.  Her hand examination does not show marked cyanosis and there is no apparent swelling or joint deformity.    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    Plan/recommendation:    I have asked her to go down to 25 mg a day for the next 3 days of prednisone.  If she does not feel markedly worse she can go further to 20 mg a day for the remainder of the time she is on the Zio patch and another week thereafter.  If at that point she still feels okay she can go down to 15 mg a day.    After she has been on that dose for a couple of weeks I want her to do labs again so we can reassess for inflammation.  I would also like to see her at about that time on 15 March when I do have another video visit that I can see her at and we can decide whether to go down further and if so how fast.    She will let us know if she is having difficulty in the meantime.        March 15, 2022 interval history:    Since we have last seen her she has been able to taper prednisone down to 10 mg daily as of last week.  Notably, she is not had any worsening of joint symptoms or fatigue with that taper.  Her neuropathy however in her feet has definitely worsened.  She has not yet reached out to Dr. Abel but he had let her know before that if her neuropathy worsen she could go on gabapentin or pregabalin.  At this point she has been toughing it out but is considering trying this.    She unfortunately had right heart cath scheduled but it had to be delayed as her screen Covid test was positive.  Symptomatically she felt perhaps a little bit more short of breath at the time but could barely notice a difference.  She did not have any fevers or chills.  She has been vaccinated, we have not checked her antispike antibody levels.  It is possible that they are not good, as she has been on a lot of immunosuppression including substantial doses of steroid.    She did have her  booster shot back in September or October.    The right heart catheterization with planned cardiac biopsy has now been put off until April.  She was also to get IV iron as her iron levels were low but that had to be delayed as well.  Notably, although her iron levels were somewhat low, her hemoglobin and hematocrit have remained normal.    She continues on azathioprine 150 mg daily and is tolerating that well.  She is also on Bactrim prophylaxis.    Physical examination by video shows her to be in no acute distress.  HEENT examination is normal and she does not appear to be acutely short of breath.  She is speaking in full sentences with no difficulty.  Musculoskeletal examination of the hands shows normal range of motion and no convincing areas of joint swelling and no active cyanosis.    June 21, 2022 interval history:    Since have not seen the patient she has had several additional visits.  She is now on 15 mg daily prednisone after having been tapered down to 10 mg daily but did not do well at that dose.    She also developed a pneumonia requiring admission and felt quite poorly with that with some worsening of her baseline dyspnea on exertion.  Even now she continues to have drops in her oxygen saturation into the 80s just with sitting with virtually no exertion.  She describes having virtually no energy as well.    Her neuropathy also seems to be worsening for her extending about the mid calf right greater than left.  Her Raynauds phenomena is also worse but she does not think the 2 are directly related, as she has neuropathy far proximal to where her Raynauds is.    Unfortunately, she is requiring quite a bit of cardiac medicine because of her atrial fibrillation and cardiomyopathy.  With all of this, she often feels lightheaded particularly at night when she wakes and needs to go to the bathroom.  She has had several falls which she attributes more to her neuropathy in her feet then to lightheadedness but  making it questionable whether she could be on medicine such as sildenafil to help her Raynauds at this point.    Furthermore because of her atrial fibrillation she is also on Eliquis, so the use of Persantine which I am not sure she has had before or pentoxyphyline, which she has, are problematic.    Overall she is very frustrated with her situation.    Physical examination by video shows her to be in no acute distress.  HEENT examination is normal.  She is speaking in full sentences with no shortness of breath.  Her upper extremity examination shows no convincing areas of swelling or deformity.      July 26, 2022 interval history:    Since we have last seen the patient she feels that things have been getting worse.  She got her prednisone down to 10 mg daily for her cardiac PET scan, but on that dose had virtually no appetite lost weight got down to 137 pounds and was having significant worsening of her peripheral neuropathy.  Accordingly when Dr. Bentley recently saw her he raised her prednisone dose back up to 15 mg a day.  On that dose, she has been able to gain back 13 pounds and is back to 150 pounds.    Dr. Neil neurology has given her gabapentin.  A 300 mg dose of gabapentin definitely helps her sleep and improves her peripheral neuropathy pain, but makes her quite groggy in the morning so she is reluctant to take it if she has anything to do the next day.  We discussed that and I offered to prescribe some 100 mg tablets so that she can better titrate her doses when necessary and she like that possibility.    She does remain on azathioprine 150 mg daily.  She is uncertain how much that has allowed steroid to be tapered or how much is helped her neuropathy.  She does get occasional overt nausea that can come and go in a matter of minutes or an hour or less and she is uncertain whether that has anything to do with the azathioprine.  Her laboratory testing has been stable on it.    She does not believe  her breathing is stable however.  She has noticed dropping of her oxygen saturation monitoring and she does not believe this is related to worsening Raynauds which generally has been stable during the summer as long as she stays out of extreme air conditioning.  She also notices that it takes four to six  deep breaths to get her sats back up which was not the case just a month ago when 1 or 2 breaths seem to do the same thing.  She is seeing Dr. Perlman in 2 days and will have pulmonary function test then.    Physical examination by video shows her to be in no acute distress.  HEENT examination is essentially normal.  She is speaking in full sentences with no shortness of breath and no difficulties.  Her upper extremity examination shows no active cyanosis and no significant deformities.      Again, thank you for allowing me to participate in the care of your patient.      Sincerely,    Nicolás De La Torre MD

## 2022-07-28 NOTE — NURSING NOTE
Chief Complaint   Patient presents with     Interstitial Lung Disease (ILD)     sarcoids     Vitals were taken and medications were reconciled.     JOHN Wilhelm

## 2022-07-28 NOTE — PROGRESS NOTES
Reason for Visit  Jeanine Schuler is a 61 year old year old female who is being seen for Interstitial Lung Disease (ILD) (sarcoids)    ILD HPI    Jeanine Schuler is a 61-year-old female who is seen today for follow-up of sarcoidosis with pulmonary involvement.  The patient has a complicated medical history including mixed connective tissue disease and severe pulmonary hypertension she is followed by Dr. Nicolás De La Torre and Rush Bentley.  She has been maintained for the past year or so on prednisone typically between 10 to 15 mg and Imuran 150 mg daily.  She has had her ups and downs recently.  She was hospitalized in May with what was thought to be a pneumonia testing positive for parainfluenza.  She was treated for this and was doing better however she feels her last 4 weeks or so she has had increased dyspnea on exertion.  Also she has a home oximeter device and she notes that it has been dropping as low as 87% if she is exerting herself and it has an alarm which apparently has been going off at night.  Unfortunate is not functioning right now.  She denies fevers chills or night sweats she does have a mild nonproductive cough.  Other than the new dyspnea on exertion she does not really have any new symptoms.  She has her neuropathy symptoms which are helped with gabapentin however it makes her groggy in the mornings so she is looking at potentially a lower dose.      Current Outpatient Medications   Medication     albuterol (PROAIR HFA/PROVENTIL HFA/VENTOLIN HFA) 108 (90 Base) MCG/ACT inhaler     apixaban ANTICOAGULANT (ELIQUIS) 5 MG tablet     atorvastatin (LIPITOR) 40 MG tablet     azaTHIOprine (IMURAN) 50 MG tablet     clonazePAM (KLONOPIN) 0.5 MG tablet     folic acid (FOLVITE) 1 MG tablet     gabapentin (NEURONTIN) 100 MG capsule     gabapentin (NEURONTIN) 300 MG capsule     losartan (COZAAR) 50 MG tablet     magnesium oxide (MAG-OX) 400 MG tablet     metoprolol succinate ER (TOPROL XL) 50 MG 24 hr  tablet     nitroGLYcerin (NITROSTAT) 0.3 MG sublingual tablet     pantoprazole (PROTONIX) 40 MG EC tablet     potassium chloride ER (MICRO-K) 10 MEQ CR capsule     predniSONE (DELTASONE) 10 MG tablet     spironolactone (ALDACTONE) 25 MG tablet     sulfamethoxazole-trimethoprim (BACTRIM) 400-80 MG tablet     thin (NO BRAND SPECIFIED) lancets     torsemide (DEMADEX) 20 MG tablet     vitamin D2 (ERGOCALCIFEROL) 06449 units (1250 mcg) capsule     amiodarone (PACERONE) 200 MG tablet     ASPIRIN NOT PRESCRIBED (INTENTIONAL)     blood glucose (NO BRAND SPECIFIED) test strip     No current facility-administered medications for this visit.     Allergies   Allergen Reactions     Lovenox Other (See Comments)     Blood clot      Norvasc [Amlodipine Besylate] Swelling     Lip swelling that happened on 2 different occasions     Erythromycin Rash     Rash on arms     Past Medical History:   Diagnosis Date     Anemia      Bariatric surgery status 06/27/2005    [facility]- [place] [facility];     Cellulitis of leg 06/06/2013     Esophageal reflux     Better post-hiatal hernia repair and weight loss     Hyperplastic colonic polyp      Hypovitaminosis D 2011     ILD (interstitial lung disease) (H)      Kidney stone 04/29/2007     Kidney stone 05/10/2007    surgically removed     Limb ischemia; ulcers of fingertips 04/22/2013     Other chronic pain     Left shoulder - rheumatoid arthritis     Raynaud's syndrome      Rheumatoid arthritis (H) \     Scleroderma (H)      Sjogren-Jake syndrome      Systemic sclerosis (H) 2009     Unspecified essential hypertension        Past Surgical History:   Procedure Laterality Date     BIOPSY MUSCLE DIAGNOSTIC (LOCATION) Right 7/19/2021    Procedure: Right SURAL nerve BIOPSY;  Surgeon: Farooq Abel MD;  Location: [facility] OR     BRONCHOSCOPY FLEXIBLE,L CRYOBIOPSY N/A 10/06/2020    Procedure: BRONCHOSCOPY, FLEXIBLE, WITH TRANSBRONCHIAL BIOPSY x4 USING CRYOPROBE, bronchial alveolar lavage;  Surgeon:  Teddy Mendez MD;  Location: [facility] OR     BYPASS GASTRIC, CHOLECYSTECTOMY, COMBINED  06/27/2005    [facility]     CHOLECYSTECTOMY       COLONOSCOPY       COLONOSCOPY N/A 11/17/2020    Procedure: COLONOSCOPY, WITH POLYPECTOMY AND BIOPSY;  Surgeon: Jamie Cárdenas MD;  Location: [facility] OR     CV HEART CATHETERIZATION WITH POSSIBLE INTERVENTION Left 12/21/2021    Procedure: Heart Catheterization with Possible Intervention;  Surgeon: Wesley Mclean MD;  Location:  HEART CARDIAC CATH LAB     CV RIGHT HEART CATH MEASUREMENTS RECORDED N/A 4/7/2022    Procedure: Right Heart Cath;  Surgeon: Dieter Brock MD;  Location:  HEART CARDIAC CATH LAB     ESOPHAGOSCOPY, GASTROSCOPY, DUODENOSCOPY (EGD), COMBINED N/A 03/10/2016    Procedure: COMBINED ESOPHAGOSCOPY, GASTROSCOPY, DUODENOSCOPY (EGD), BIOPSY SINGLE OR MULTIPLE;  Surgeon: Tricia Zambrano MD;  Location:  GI     ESOPHAGOSCOPY, GASTROSCOPY, DUODENOSCOPY (EGD), COMBINED N/A 11/17/2020    Procedure: ESOPHAGOGASTRODUODENOSCOPY, WITH BIOPSY and Dilation;  Surgeon: Jamie Cárdenas MD;  Location: [facility] [place]     INNER EAR SURGERY       PICC INSERTION  06/05/2013    5fr DL Power PICC, 44cm, left basilic vein, tip in low SVC     SURGICAL HISTORY OF -       Left ear surgery - incus transition, tympanoplasty     SURGICAL HISTORY OF -   06/01/2005    Hiatal hernia repair     TONSILLECTOMY         Social History     Socioeconomic History     Marital status:      Spouse name: Not on file     Number of children: Not on file     Years of education: Not on file     Highest education level: Not on file   Occupational History     Not on file   Tobacco Use     Smoking status: Never Smoker     Smokeless tobacco: Never Used   Vaping Use     Vaping Use: Never used   Substance and Sexual Activity     Alcohol use: Yes     Comment: occassionally     Drug use: No     Sexual activity: Yes     Partners: Male     Birth control/protection: None   Other Topics  Concern     Parent/sibling w/ CABG, MI or angioplasty before 65F 55M? 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  She has been on medical leave since 7/27/2020        Moved about 4 years ago to a new house. No known water damage or mold.        She lives in a normal area with forms around her house, but denies any exposure to grain dust, hay, other farm animals.        No unusual hobbies         Social Determinants of Health     Financial Resource Strain: Not on file   Food Insecurity: Not on file   Transportation Needs: Not on file   Physical Activity: Not on file   Stress: Not on file   Social Connections: Not on file   Intimate Partner Violence: Not on file   Housing Stability: Not on file       Family History   Problem Relation Age of Onset     Cerebrovascular Disease Father      Heart Disease Father      Hypertension Father      Heart Disease Mother      Hypertension Mother      Chronic Obstructive Pulmonary Disease Sister      Heart Disease Sister      Hypertension Sister      Cerebrovascular Disease Brother      Heart Disease Brother      Kidney Disease Brother         on dialysis     Diabetes Brother         borderline     No Known Problems Son      No Known Problems Son      No Known Problems Daughter      Cancer Brother         small cell     Heart Disease Brother      Heart Disease Brother      Heart Disease Brother      Heart Disease Brother         tripple A     Heart Disease Sister      Substance Abuse Sister         alcoholism     Crohn's Disease No family hx of      Ulcerative Colitis No family hx of      GERD No family hx of      Celiac Disease No family hx of      Nephrolithiasis No family hx of      Parathyroid Disorders No family hx of      Calcium Disorder No family hx of        ROS Pulmonary    A complete ROS was otherwise negative except as noted in the HPI.  Vitals:    07/28/22 0758   BP: 106/73   Pulse: 85   SpO2: 98%     Exam:    GENERAL APPEARANCE: Well developed, well nourished, alert, and in no apparent distress.  NECK: supple, no masses, no thyromegaly.  LYMPHATICS: No significant axillary, cervical, or supraclavicular nodes.  RESP: good air flow throughout, - no crackles, rhonchi or wheezes.  CV: Normal S1, S2, regular rhythm, normal rate, no rub, no murmur,  no gallop, no LE edema.   ABDOMEN:  Bowel sounds normal, soft, nontender, no HSM or masses.   MS: extremities normal- no clubbing, no cyanosis.  PSYCH: mentation appears normal. and affect normal/bright  Results: I have reviewed all imaging, PFTs and other relavent tests, please see below for details, PFT and imaging results were reviewed with the patient.  PFTs: Low normal spirometry with normal diffusion.  Her diffusing capacity has been quite stable.  There has been about a 300 cc drop in her FVC compared to 3 months ago.    Assessment and plan:    61-year-old female with complicated medical history including what is felt to likely be pulmonary sarcoidosis.    Problem #1: Pulmonary sarcoid: In looking at the patient's previous imaging she does not have extensive fibrotic changes or ILD however she has had some micronodular infiltrates.  She has been treated in the past with methotrexate however there was concern about toxicity so this was discontinued.  She has actually been fairly stable on the prednisone and Imuran regimen.  However I am concerned about her new possibility of hypoxia as her most recent 6-minute walk test did not show any evidence of desaturations although she did not walk very far.  Therefore I am going to get a repeat 6-minute walk test done as well as a repeat high-resolution CT scan of the chest to get a good look at her lung parenchyma particularly in light of the decreased FVC.  I am also going to obtain overnight oximetry on room air.  We will get these done in the next week and I will follow-up with the patient and her other providers.  From a  sarcoidosis standpoint I do not know that their whole lot of other options for therapy infliximab would be an option although this can be problematic in patients with mixed connective tissue disease.  It is not clear to me that she has extensive pulmonary involvement though and I think her symptoms are likely multifactorial    Problem #2: Pulmonary hypertension: Follows with Dr. Rush Bentley.    Kelinm #3.:  Mixed connective tissue disease: Appears to be stable following with Dr. Nicolás De La Torre.    Problem #4: Neuropathy possibly related to sarcoid some response to treatment with gabapentin.  Continue to follow with Dr. Abel.    I spent 40 minutes on the date of the encounter doing chart review, history and exam, interpretation of any new PFTs and imaging, documentation and further activities as noted above.        CBC   Recent Labs   Lab Test 06/28/22  1342 06/22/22  1339   RBC 3.72* 3.87   HGB 11.5* 12.1   HCT 35.6 36.2    253       Basic Metabolic Panel  Recent Labs   Lab Test 06/28/22  1342 05/05/22  1758    136   POTASSIUM 3.2* 3.6   CHLORIDE 95 99   CO2 36* 30   BUN 16 13   * 105*   DAVID 8.8 8.3*       INR  Recent Labs   Lab Test 05/04/22  2113   INR 1.06       PFT  PFT Latest Ref Rng & Units 7/28/2022   FVC L 2.61   FEV1 L 2.55   FVC% % 77   FEV1% % 95           CC:

## 2022-10-24 NOTE — TELEPHONE ENCOUNTER
Left Voicemail (2nd Attempt) and Sent Mychart (2nd Attempt) for the patient to call back and schedule the following:    Appointment type: Cardiology- Return Pulm. Hypertension  Provider: Mc  Return date: next available  Specialty phone number: [phone]  Additional appointment(s) needed: Labs  Additonal Notes: None    Lupe Gonzalez, Visit Facilitator/MA.

## 2023-01-01 ENCOUNTER — APPOINTMENT (OUTPATIENT)
Dept: ULTRASOUND IMAGING | Facility: CLINIC | Age: 63
DRG: 196 | End: 2023-01-01
Attending: EMERGENCY MEDICINE
Payer: MEDICARE

## 2023-01-01 ENCOUNTER — APPOINTMENT (OUTPATIENT)
Dept: OCCUPATIONAL THERAPY | Facility: CLINIC | Age: 63
DRG: 196 | End: 2023-01-01
Payer: MEDICARE

## 2023-01-01 ENCOUNTER — APPOINTMENT (OUTPATIENT)
Dept: GENERAL RADIOLOGY | Facility: CLINIC | Age: 63
DRG: 196 | End: 2023-01-01
Attending: INTERNAL MEDICINE
Payer: MEDICARE

## 2023-01-01 ENCOUNTER — APPOINTMENT (OUTPATIENT)
Dept: SPEECH THERAPY | Facility: CLINIC | Age: 63
DRG: 196 | End: 2023-01-01
Payer: MEDICARE

## 2023-01-01 ENCOUNTER — ANESTHESIA (OUTPATIENT)
Dept: SURGERY | Facility: CLINIC | Age: 63
DRG: 196 | End: 2023-01-01
Payer: MEDICARE

## 2023-01-01 ENCOUNTER — ALLIED HEALTH/NURSE VISIT (OUTPATIENT)
Dept: FAMILY MEDICINE | Facility: CLINIC | Age: 63
End: 2023-01-01
Payer: MEDICARE

## 2023-01-01 ENCOUNTER — TRANSFERRED RECORDS (OUTPATIENT)
Dept: HEALTH INFORMATION MANAGEMENT | Facility: CLINIC | Age: 63
End: 2023-01-01

## 2023-01-01 ENCOUNTER — OFFICE VISIT (OUTPATIENT)
Dept: FAMILY MEDICINE | Facility: CLINIC | Age: 63
End: 2023-01-01
Payer: MEDICARE

## 2023-01-01 ENCOUNTER — APPOINTMENT (OUTPATIENT)
Dept: CT IMAGING | Facility: CLINIC | Age: 63
DRG: 196 | End: 2023-01-01
Attending: EMERGENCY MEDICINE
Payer: MEDICARE

## 2023-01-01 ENCOUNTER — APPOINTMENT (OUTPATIENT)
Dept: GENERAL RADIOLOGY | Facility: CLINIC | Age: 63
End: 2023-01-01
Attending: FAMILY MEDICINE
Payer: MEDICARE

## 2023-01-01 ENCOUNTER — HEALTH MAINTENANCE LETTER (OUTPATIENT)
Age: 63
End: 2023-01-01

## 2023-01-01 ENCOUNTER — APPOINTMENT (OUTPATIENT)
Dept: SPEECH THERAPY | Facility: CLINIC | Age: 63
DRG: 196 | End: 2023-01-01
Attending: INTERNAL MEDICINE
Payer: MEDICARE

## 2023-01-01 ENCOUNTER — APPOINTMENT (OUTPATIENT)
Dept: OCCUPATIONAL THERAPY | Facility: CLINIC | Age: 63
DRG: 196 | End: 2023-01-01
Attending: INTERNAL MEDICINE
Payer: MEDICARE

## 2023-01-01 ENCOUNTER — APPOINTMENT (OUTPATIENT)
Dept: CARDIOLOGY | Facility: CLINIC | Age: 63
DRG: 196 | End: 2023-01-01
Attending: INTERNAL MEDICINE
Payer: MEDICARE

## 2023-01-01 ENCOUNTER — TELEPHONE (OUTPATIENT)
Dept: FAMILY MEDICINE | Facility: CLINIC | Age: 63
End: 2023-01-01
Payer: COMMERCIAL

## 2023-01-01 ENCOUNTER — APPOINTMENT (OUTPATIENT)
Dept: GENERAL RADIOLOGY | Facility: CLINIC | Age: 63
DRG: 196 | End: 2023-01-01
Attending: NURSE PRACTITIONER
Payer: MEDICARE

## 2023-01-01 ENCOUNTER — APPOINTMENT (OUTPATIENT)
Dept: INTERVENTIONAL RADIOLOGY/VASCULAR | Facility: CLINIC | Age: 63
DRG: 196 | End: 2023-01-01
Attending: NURSE PRACTITIONER
Payer: MEDICARE

## 2023-01-01 ENCOUNTER — HOSPITAL ENCOUNTER (EMERGENCY)
Facility: CLINIC | Age: 63
Discharge: HOME | End: 2023-04-17
Attending: EMERGENCY MEDICINE | Admitting: EMERGENCY MEDICINE
Payer: MEDICARE

## 2023-01-01 ENCOUNTER — APPOINTMENT (OUTPATIENT)
Dept: ULTRASOUND IMAGING | Facility: CLINIC | Age: 63
DRG: 196 | End: 2023-01-01
Attending: INTERNAL MEDICINE
Payer: MEDICARE

## 2023-01-01 ENCOUNTER — APPOINTMENT (OUTPATIENT)
Dept: INTERVENTIONAL RADIOLOGY/VASCULAR | Facility: CLINIC | Age: 63
DRG: 196 | End: 2023-01-01
Attending: PHYSICIAN ASSISTANT
Payer: MEDICARE

## 2023-01-01 ENCOUNTER — HOSPITAL ENCOUNTER (EMERGENCY)
Facility: CLINIC | Age: 63
Discharge: HOME | End: 2023-05-08
Attending: EMERGENCY MEDICINE | Admitting: EMERGENCY MEDICINE
Payer: MEDICARE

## 2023-01-01 ENCOUNTER — ANESTHESIA EVENT (OUTPATIENT)
Dept: SURGERY | Facility: CLINIC | Age: 63
DRG: 196 | End: 2023-01-01
Payer: MEDICARE

## 2023-01-01 ENCOUNTER — APPOINTMENT (OUTPATIENT)
Dept: CT IMAGING | Facility: CLINIC | Age: 63
DRG: 196 | End: 2023-01-01
Attending: INTERNAL MEDICINE
Payer: MEDICARE

## 2023-01-01 ENCOUNTER — MEDICAL CORRESPONDENCE (OUTPATIENT)
Dept: HEALTH INFORMATION MANAGEMENT | Facility: CLINIC | Age: 63
End: 2023-01-01

## 2023-01-01 ENCOUNTER — APPOINTMENT (OUTPATIENT)
Dept: GENERAL RADIOLOGY | Facility: CLINIC | Age: 63
DRG: 196 | End: 2023-01-01
Attending: STUDENT IN AN ORGANIZED HEALTH CARE EDUCATION/TRAINING PROGRAM
Payer: MEDICARE

## 2023-01-01 ENCOUNTER — HOSPITAL ENCOUNTER (INPATIENT)
Facility: CLINIC | Age: 63
LOS: 52 days | Discharge: HOSPICE | DRG: 196 | End: 2023-07-07
Attending: EMERGENCY MEDICINE | Admitting: INTERNAL MEDICINE
Payer: MEDICARE

## 2023-01-01 ENCOUNTER — HOSPITAL ENCOUNTER (EMERGENCY)
Facility: CLINIC | Age: 63
Discharge: HOME | End: 2023-04-04
Attending: FAMILY MEDICINE | Admitting: FAMILY MEDICINE
Payer: MEDICARE

## 2023-01-01 ENCOUNTER — DOCUMENTATION ONLY (OUTPATIENT)
Dept: MEDSURG UNIT | Facility: CLINIC | Age: 63
End: 2023-01-01
Payer: COMMERCIAL

## 2023-01-01 ENCOUNTER — APPOINTMENT (OUTPATIENT)
Dept: CT IMAGING | Facility: CLINIC | Age: 63
DRG: 196 | End: 2023-01-01
Attending: STUDENT IN AN ORGANIZED HEALTH CARE EDUCATION/TRAINING PROGRAM
Payer: MEDICARE

## 2023-01-01 ENCOUNTER — APPOINTMENT (OUTPATIENT)
Dept: ULTRASOUND IMAGING | Facility: CLINIC | Age: 63
DRG: 196 | End: 2023-01-01
Payer: MEDICARE

## 2023-01-01 VITALS
TEMPERATURE: 98.6 F | HEART RATE: 66 BPM | SYSTOLIC BLOOD PRESSURE: 118 MMHG | DIASTOLIC BLOOD PRESSURE: 66 MMHG | RESPIRATION RATE: 14 BRPM

## 2023-01-01 VITALS
HEIGHT: 69 IN | DIASTOLIC BLOOD PRESSURE: 72 MMHG | WEIGHT: 166.45 LBS | BODY MASS INDEX: 24.65 KG/M2 | SYSTOLIC BLOOD PRESSURE: 101 MMHG | OXYGEN SATURATION: 100 % | TEMPERATURE: 97.8 F | HEART RATE: 82 BPM | RESPIRATION RATE: 16 BRPM

## 2023-01-01 VITALS
RESPIRATION RATE: 18 BRPM | OXYGEN SATURATION: 94 % | HEART RATE: 82 BPM | SYSTOLIC BLOOD PRESSURE: 125 MMHG | TEMPERATURE: 98 F | DIASTOLIC BLOOD PRESSURE: 81 MMHG

## 2023-01-01 VITALS
TEMPERATURE: 98.2 F | WEIGHT: 150 LBS | RESPIRATION RATE: 18 BRPM | DIASTOLIC BLOOD PRESSURE: 89 MMHG | HEART RATE: 89 BPM | OXYGEN SATURATION: 98 % | BODY MASS INDEX: 22.15 KG/M2 | SYSTOLIC BLOOD PRESSURE: 141 MMHG

## 2023-01-01 VITALS
HEART RATE: 64 BPM | DIASTOLIC BLOOD PRESSURE: 81 MMHG | SYSTOLIC BLOOD PRESSURE: 109 MMHG | TEMPERATURE: 97.4 F | BODY MASS INDEX: 22.15 KG/M2 | WEIGHT: 150 LBS | RESPIRATION RATE: 16 BRPM

## 2023-01-01 DIAGNOSIS — I26.99 OTHER PULMONARY EMBOLISM WITHOUT ACUTE COR PULMONALE, UNSPECIFIED CHRONICITY (H): ICD-10-CM

## 2023-01-01 DIAGNOSIS — F41.9 ANXIETY: ICD-10-CM

## 2023-01-01 DIAGNOSIS — D86.9 SARCOIDOSIS: ICD-10-CM

## 2023-01-01 DIAGNOSIS — I50.22 CHRONIC SYSTOLIC CONGESTIVE HEART FAILURE (H): ICD-10-CM

## 2023-01-01 DIAGNOSIS — K20.90 ESOPHAGITIS DETERMINED BY ENDOSCOPY: ICD-10-CM

## 2023-01-01 DIAGNOSIS — L08.9 LOCAL INFECTION OF WOUND: ICD-10-CM

## 2023-01-01 DIAGNOSIS — S81.811A LACERATION OF RIGHT LOWER EXTREMITY, INITIAL ENCOUNTER: ICD-10-CM

## 2023-01-01 DIAGNOSIS — Z98.84 HISTORY OF GASTRIC BYPASS: ICD-10-CM

## 2023-01-01 DIAGNOSIS — M34.9 SCLERODERMA (H): ICD-10-CM

## 2023-01-01 DIAGNOSIS — E16.2 HYPOGLYCEMIA: ICD-10-CM

## 2023-01-01 DIAGNOSIS — K22.4 ESOPHAGEAL DYSMOTILITY: ICD-10-CM

## 2023-01-01 DIAGNOSIS — J90 PLEURAL EFFUSION: ICD-10-CM

## 2023-01-01 DIAGNOSIS — M35.02 SJOGREN'S SYNDROME WITH LUNG INVOLVEMENT (H): ICD-10-CM

## 2023-01-01 DIAGNOSIS — I27.20 PULMONARY HYPERTENSION (H): ICD-10-CM

## 2023-01-01 DIAGNOSIS — Z48.02 VISIT FOR SUTURE REMOVAL: Primary | ICD-10-CM

## 2023-01-01 DIAGNOSIS — Z98.84 HISTORY OF ROUX-EN-Y GASTRIC BYPASS: ICD-10-CM

## 2023-01-01 DIAGNOSIS — L03.119 CELLULITIS OF LOWER EXTREMITY, UNSPECIFIED LATERALITY: ICD-10-CM

## 2023-01-01 DIAGNOSIS — S81.812A LEG LACERATION, LEFT, INITIAL ENCOUNTER: ICD-10-CM

## 2023-01-01 DIAGNOSIS — J18.9 PNEUMONIA OF BOTH LUNGS DUE TO INFECTIOUS ORGANISM, UNSPECIFIED PART OF LUNG: ICD-10-CM

## 2023-01-01 DIAGNOSIS — E43 SEVERE MALNUTRITION (H): ICD-10-CM

## 2023-01-01 DIAGNOSIS — N39.0 URINARY TRACT INFECTION WITHOUT HEMATURIA, SITE UNSPECIFIED: ICD-10-CM

## 2023-01-01 DIAGNOSIS — Z51.5 END OF LIFE CARE: ICD-10-CM

## 2023-01-01 DIAGNOSIS — I87.2 VENOUS (PERIPHERAL) INSUFFICIENCY: ICD-10-CM

## 2023-01-01 DIAGNOSIS — T14.8XXA LOCAL INFECTION OF WOUND: ICD-10-CM

## 2023-01-01 DIAGNOSIS — R06.02 SHORTNESS OF BREATH: ICD-10-CM

## 2023-01-01 DIAGNOSIS — R06.09 DOE (DYSPNEA ON EXERTION): ICD-10-CM

## 2023-01-01 DIAGNOSIS — Z79.52 ON PREDNISONE THERAPY: ICD-10-CM

## 2023-01-01 DIAGNOSIS — K22.4 ESOPHAGEAL DYSMOTILITY DUE TO SYSTEMIC DISEASE: ICD-10-CM

## 2023-01-01 DIAGNOSIS — Z48.02 ENCOUNTER FOR REMOVAL OF SUTURES: Primary | ICD-10-CM

## 2023-01-01 DIAGNOSIS — E87.70 HYPERVOLEMIA, UNSPECIFIED HYPERVOLEMIA TYPE: ICD-10-CM

## 2023-01-01 DIAGNOSIS — R13.19 ESOPHAGEAL DYSPHAGIA: Primary | ICD-10-CM

## 2023-01-01 DIAGNOSIS — I82.90 DEEP VEIN THROMBOSIS (DVT) OF NON-EXTREMITY VEIN, UNSPECIFIED CHRONICITY: ICD-10-CM

## 2023-01-01 DIAGNOSIS — K52.9 GASTROENTERITIS: ICD-10-CM

## 2023-01-01 DIAGNOSIS — T14.8XXA OPEN WOUND OF SKIN: Primary | ICD-10-CM

## 2023-01-01 LAB
ABO/RH(D): NORMAL
ALBUMIN SERPL BCG-MCNC: 1.6 G/DL (ref 3.5–5.2)
ALBUMIN SERPL BCG-MCNC: 1.6 G/DL (ref 3.5–5.2)
ALBUMIN SERPL BCG-MCNC: 1.7 G/DL (ref 3.5–5.2)
ALBUMIN SERPL BCG-MCNC: 1.8 G/DL (ref 3.5–5.2)
ALBUMIN SERPL BCG-MCNC: 1.9 G/DL (ref 3.5–5.2)
ALBUMIN SERPL BCG-MCNC: 2 G/DL (ref 3.5–5.2)
ALBUMIN SERPL BCG-MCNC: 2.2 G/DL (ref 3.5–5.2)
ALBUMIN SERPL BCG-MCNC: 2.5 G/DL (ref 3.5–5.2)
ALBUMIN UR-MCNC: 30 MG/DL
ALBUMIN UR-MCNC: NEGATIVE MG/DL
ALP SERPL-CCNC: 111 U/L (ref 35–104)
ALP SERPL-CCNC: 119 U/L (ref 35–104)
ALP SERPL-CCNC: 55 U/L (ref 35–104)
ALP SERPL-CCNC: 60 U/L (ref 35–104)
ALP SERPL-CCNC: 62 U/L (ref 35–104)
ALP SERPL-CCNC: 67 U/L (ref 35–104)
ALP SERPL-CCNC: 67 U/L (ref 35–104)
ALP SERPL-CCNC: 69 U/L (ref 35–104)
ALP SERPL-CCNC: 70 U/L (ref 35–104)
ALP SERPL-CCNC: 71 U/L (ref 35–104)
ALP SERPL-CCNC: 75 U/L (ref 35–104)
ALP SERPL-CCNC: 78 U/L (ref 35–104)
ALT SERPL W P-5'-P-CCNC: 13 U/L (ref 0–50)
ALT SERPL W P-5'-P-CCNC: 14 U/L (ref 10–35)
ALT SERPL W P-5'-P-CCNC: 15 U/L (ref 10–35)
ALT SERPL W P-5'-P-CCNC: 16 U/L (ref 10–35)
ALT SERPL W P-5'-P-CCNC: 17 U/L (ref 0–50)
ALT SERPL W P-5'-P-CCNC: 39 U/L (ref 10–35)
AMYLASE BODY FLUID SOURCE: NORMAL
AMYLASE BODY FLUID SOURCE: NORMAL
AMYLASE FLD-CCNC: 23 U/L
AMYLASE FLD-CCNC: 30 U/L
ANION GAP SERPL CALCULATED.3IONS-SCNC: 10 MMOL/L (ref 7–15)
ANION GAP SERPL CALCULATED.3IONS-SCNC: 11 MMOL/L (ref 7–15)
ANION GAP SERPL CALCULATED.3IONS-SCNC: 12 MMOL/L (ref 7–15)
ANION GAP SERPL CALCULATED.3IONS-SCNC: 12 MMOL/L (ref 7–15)
ANION GAP SERPL CALCULATED.3IONS-SCNC: 13 MMOL/L (ref 7–15)
ANION GAP SERPL CALCULATED.3IONS-SCNC: 14 MMOL/L (ref 7–15)
ANION GAP SERPL CALCULATED.3IONS-SCNC: 14 MMOL/L (ref 7–15)
ANION GAP SERPL CALCULATED.3IONS-SCNC: 19 MMOL/L (ref 7–15)
ANION GAP SERPL CALCULATED.3IONS-SCNC: 4 MMOL/L (ref 7–15)
ANION GAP SERPL CALCULATED.3IONS-SCNC: 5 MMOL/L (ref 7–15)
ANION GAP SERPL CALCULATED.3IONS-SCNC: 6 MMOL/L (ref 7–15)
ANION GAP SERPL CALCULATED.3IONS-SCNC: 7 MMOL/L (ref 7–15)
ANION GAP SERPL CALCULATED.3IONS-SCNC: 8 MMOL/L (ref 7–15)
ANION GAP SERPL CALCULATED.3IONS-SCNC: 9 MMOL/L (ref 7–15)
ANNOTATION COMMENT IMP: NOT DETECTED
ANTIBODY SCREEN: NEGATIVE
APPEARANCE FLD: ABNORMAL
APPEARANCE FLD: ABNORMAL
APPEARANCE UR: ABNORMAL
APPEARANCE UR: CLEAR
AST SERPL W P-5'-P-CCNC: 17 U/L (ref 10–35)
AST SERPL W P-5'-P-CCNC: 18 U/L (ref 10–35)
AST SERPL W P-5'-P-CCNC: 18 U/L (ref 10–35)
AST SERPL W P-5'-P-CCNC: 19 U/L (ref 10–35)
AST SERPL W P-5'-P-CCNC: 19 U/L (ref 10–35)
AST SERPL W P-5'-P-CCNC: 20 U/L (ref 10–35)
AST SERPL W P-5'-P-CCNC: 20 U/L (ref 10–35)
AST SERPL W P-5'-P-CCNC: 21 U/L (ref 0–45)
AST SERPL W P-5'-P-CCNC: 21 U/L (ref 0–45)
AST SERPL W P-5'-P-CCNC: 21 U/L (ref 10–35)
AST SERPL W P-5'-P-CCNC: 84 U/L (ref 10–35)
AST SERPL W P-5'-P-CCNC: ABNORMAL U/L
ATRIAL RATE - MUSE: 110 BPM
ATRIAL RATE - MUSE: 124 BPM
ATRIAL RATE - MUSE: 124 BPM
ATRIAL RATE - MUSE: 84 BPM
ATRIAL RATE - MUSE: 86 BPM
BACTERIA #/AREA URNS HPF: ABNORMAL /HPF
BACTERIA #/AREA URNS HPF: ABNORMAL /HPF
BACTERIA BLD CULT: NO GROWTH
BACTERIA BLD CULT: NO GROWTH
BACTERIA PLR CULT: NO GROWTH
BACTERIA PLR CULT: NORMAL
BACTERIA PLR CULT: NORMAL
BACTERIA UR CULT: ABNORMAL
BACTERIA WND CULT: NO GROWTH
BASE EXCESS BLDV CALC-SCNC: 8.6 MMOL/L (ref -7.7–1.9)
BASE EXCESS BLDV CALC-SCNC: 8.8 MMOL/L (ref -7.7–1.9)
BASOPHILS # BLD AUTO: 0 10E3/UL (ref 0–0.2)
BASOPHILS NFR BLD AUTO: 0 %
BASOPHILS NFR BLD AUTO: 1 %
BILIRUB DIRECT SERPL-MCNC: <0.2 MG/DL (ref 0–0.3)
BILIRUB SERPL-MCNC: 0.2 MG/DL
BILIRUB SERPL-MCNC: 0.3 MG/DL
BILIRUB SERPL-MCNC: 0.8 MG/DL
BILIRUB SERPL-MCNC: 1.3 MG/DL
BILIRUB UR QL STRIP: NEGATIVE
BILIRUB UR QL STRIP: NEGATIVE
BLD PROD TYP BPU: NORMAL
BLOOD COMPONENT TYPE: NORMAL
BUN SERPL-MCNC: 10.1 MG/DL (ref 8–23)
BUN SERPL-MCNC: 10.5 MG/DL (ref 8–23)
BUN SERPL-MCNC: 11.7 MG/DL (ref 8–23)
BUN SERPL-MCNC: 13.7 MG/DL (ref 8–23)
BUN SERPL-MCNC: 17 MG/DL (ref 8–23)
BUN SERPL-MCNC: 17.5 MG/DL (ref 8–23)
BUN SERPL-MCNC: 18.8 MG/DL (ref 8–23)
BUN SERPL-MCNC: 19.6 MG/DL (ref 8–23)
BUN SERPL-MCNC: 19.8 MG/DL (ref 8–23)
BUN SERPL-MCNC: 20 MG/DL (ref 8–23)
BUN SERPL-MCNC: 20.2 MG/DL (ref 8–23)
BUN SERPL-MCNC: 20.5 MG/DL (ref 8–23)
BUN SERPL-MCNC: 20.6 MG/DL (ref 8–23)
BUN SERPL-MCNC: 21.7 MG/DL (ref 8–23)
BUN SERPL-MCNC: 22.1 MG/DL (ref 8–23)
BUN SERPL-MCNC: 22.3 MG/DL (ref 8–23)
BUN SERPL-MCNC: 23 MG/DL (ref 8–23)
BUN SERPL-MCNC: 23.4 MG/DL (ref 8–23)
BUN SERPL-MCNC: 24.2 MG/DL (ref 8–23)
BUN SERPL-MCNC: 24.2 MG/DL (ref 8–23)
BUN SERPL-MCNC: 24.5 MG/DL (ref 8–23)
BUN SERPL-MCNC: 24.9 MG/DL (ref 8–23)
BUN SERPL-MCNC: 25 MG/DL (ref 8–23)
BUN SERPL-MCNC: 25.4 MG/DL (ref 8–23)
BUN SERPL-MCNC: 25.4 MG/DL (ref 8–23)
BUN SERPL-MCNC: 25.5 MG/DL (ref 8–23)
BUN SERPL-MCNC: 25.6 MG/DL (ref 8–23)
BUN SERPL-MCNC: 25.7 MG/DL (ref 8–23)
BUN SERPL-MCNC: 25.8 MG/DL (ref 8–23)
BUN SERPL-MCNC: 25.8 MG/DL (ref 8–23)
BUN SERPL-MCNC: 25.9 MG/DL (ref 8–23)
BUN SERPL-MCNC: 26.2 MG/DL (ref 8–23)
BUN SERPL-MCNC: 26.3 MG/DL (ref 8–23)
BUN SERPL-MCNC: 26.3 MG/DL (ref 8–23)
BUN SERPL-MCNC: 26.4 MG/DL (ref 8–23)
BUN SERPL-MCNC: 26.5 MG/DL (ref 8–23)
BUN SERPL-MCNC: 27.1 MG/DL (ref 8–23)
BUN SERPL-MCNC: 27.4 MG/DL (ref 8–23)
BUN SERPL-MCNC: 28.2 MG/DL (ref 8–23)
BUN SERPL-MCNC: 28.8 MG/DL (ref 8–23)
BUN SERPL-MCNC: 29.1 MG/DL (ref 8–23)
BUN SERPL-MCNC: 29.3 MG/DL (ref 8–23)
BUN SERPL-MCNC: 29.3 MG/DL (ref 8–23)
BUN SERPL-MCNC: 38.6 MG/DL (ref 8–23)
BUN SERPL-MCNC: 44.4 MG/DL (ref 8–23)
BUN SERPL-MCNC: 6.8 MG/DL (ref 8–23)
BUN SERPL-MCNC: 7.1 MG/DL (ref 8–23)
BUN SERPL-MCNC: 8.5 MG/DL (ref 8–23)
BUN SERPL-MCNC: 9 MG/DL (ref 8–23)
BUN SERPL-MCNC: 9.1 MG/DL (ref 8–23)
BUN SERPL-MCNC: 9.2 MG/DL (ref 8–23)
BUN SERPL-MCNC: 9.3 MG/DL (ref 8–23)
BUN SERPL-MCNC: 9.5 MG/DL (ref 8–23)
BUN SERPL-MCNC: 9.5 MG/DL (ref 8–23)
BUN SERPL-MCNC: 9.6 MG/DL (ref 8–23)
C PNEUM DNA SPEC QL NAA+PROBE: NOT DETECTED
CA-I BLD-MCNC: 4 MG/DL (ref 4.4–5.2)
CA-I BLD-MCNC: 4.4 MG/DL (ref 4.4–5.2)
CA-I BLD-MCNC: 4.7 MG/DL (ref 4.4–5.2)
CALCIUM SERPL-MCNC: 7.3 MG/DL (ref 8.8–10.2)
CALCIUM SERPL-MCNC: 7.4 MG/DL (ref 8.8–10.2)
CALCIUM SERPL-MCNC: 7.5 MG/DL (ref 8.8–10.2)
CALCIUM SERPL-MCNC: 7.6 MG/DL (ref 8.8–10.2)
CALCIUM SERPL-MCNC: 7.7 MG/DL (ref 8.8–10.2)
CALCIUM SERPL-MCNC: 7.8 MG/DL (ref 8.8–10.2)
CALCIUM SERPL-MCNC: 7.9 MG/DL (ref 8.8–10.2)
CALCIUM SERPL-MCNC: 8 MG/DL (ref 8.8–10.2)
CALCIUM SERPL-MCNC: 8.1 MG/DL (ref 8.8–10.2)
CALCIUM SERPL-MCNC: 8.1 MG/DL (ref 8.8–10.2)
CALCIUM SERPL-MCNC: 8.2 MG/DL (ref 8.8–10.2)
CALCIUM SERPL-MCNC: 8.2 MG/DL (ref 8.8–10.2)
CALCIUM SERPL-MCNC: 8.3 MG/DL (ref 8.8–10.2)
CALCIUM SERPL-MCNC: 8.4 MG/DL (ref 8.8–10.2)
CELL COUNT BODY FLUID SOURCE: ABNORMAL
CELL COUNT BODY FLUID SOURCE: ABNORMAL
CHLORIDE SERPL-SCNC: 100 MMOL/L (ref 98–107)
CHLORIDE SERPL-SCNC: 101 MMOL/L (ref 98–107)
CHLORIDE SERPL-SCNC: 102 MMOL/L (ref 98–107)
CHLORIDE SERPL-SCNC: 87 MMOL/L (ref 98–107)
CHLORIDE SERPL-SCNC: 88 MMOL/L (ref 98–107)
CHLORIDE SERPL-SCNC: 89 MMOL/L (ref 98–107)
CHLORIDE SERPL-SCNC: 90 MMOL/L (ref 98–107)
CHLORIDE SERPL-SCNC: 91 MMOL/L (ref 98–107)
CHLORIDE SERPL-SCNC: 92 MMOL/L (ref 98–107)
CHLORIDE SERPL-SCNC: 92 MMOL/L (ref 98–107)
CHLORIDE SERPL-SCNC: 93 MMOL/L (ref 98–107)
CHLORIDE SERPL-SCNC: 94 MMOL/L (ref 98–107)
CHLORIDE SERPL-SCNC: 95 MMOL/L (ref 98–107)
CHLORIDE SERPL-SCNC: 96 MMOL/L (ref 98–107)
CHLORIDE SERPL-SCNC: 97 MMOL/L (ref 98–107)
CHLORIDE SERPL-SCNC: 97 MMOL/L (ref 98–107)
CHLORIDE SERPL-SCNC: 98 MMOL/L (ref 98–107)
CHLORIDE SERPL-SCNC: 99 MMOL/L (ref 98–107)
CHOLEST FLD-MCNC: 29 MG/DL
CHOLEST FLD-MCNC: 46 MG/DL
CHOLESTEROL BODY FLUID SOURCE: NORMAL
CHOLESTEROL BODY FLUID SOURCE: NORMAL
CODING SYSTEM: NORMAL
COLOR FLD: ABNORMAL
COLOR FLD: ABNORMAL
COLOR UR AUTO: ABNORMAL
COLOR UR AUTO: ABNORMAL
CORTIS SERPL-MCNC: 12.6 UG/DL
CORTIS SERPL-MCNC: 13 UG/DL
CORTIS SERPL-MCNC: 4.6 UG/DL
CPB POCT: NO
CREAT SERPL-MCNC: 0.5 MG/DL (ref 0.51–0.95)
CREAT SERPL-MCNC: 0.52 MG/DL (ref 0.51–0.95)
CREAT SERPL-MCNC: 0.54 MG/DL (ref 0.51–0.95)
CREAT SERPL-MCNC: 0.54 MG/DL (ref 0.51–0.95)
CREAT SERPL-MCNC: 0.57 MG/DL (ref 0.51–0.95)
CREAT SERPL-MCNC: 0.57 MG/DL (ref 0.51–0.95)
CREAT SERPL-MCNC: 0.58 MG/DL (ref 0.51–0.95)
CREAT SERPL-MCNC: 0.59 MG/DL (ref 0.51–0.95)
CREAT SERPL-MCNC: 0.6 MG/DL (ref 0.51–0.95)
CREAT SERPL-MCNC: 0.61 MG/DL (ref 0.51–0.95)
CREAT SERPL-MCNC: 0.62 MG/DL (ref 0.51–0.95)
CREAT SERPL-MCNC: 0.63 MG/DL (ref 0.51–0.95)
CREAT SERPL-MCNC: 0.64 MG/DL (ref 0.51–0.95)
CREAT SERPL-MCNC: 0.64 MG/DL (ref 0.51–0.95)
CREAT SERPL-MCNC: 0.65 MG/DL (ref 0.51–0.95)
CREAT SERPL-MCNC: 0.66 MG/DL (ref 0.51–0.95)
CREAT SERPL-MCNC: 0.66 MG/DL (ref 0.51–0.95)
CREAT SERPL-MCNC: 0.67 MG/DL (ref 0.51–0.95)
CREAT SERPL-MCNC: 0.67 MG/DL (ref 0.51–0.95)
CREAT SERPL-MCNC: 0.69 MG/DL (ref 0.51–0.95)
CREAT SERPL-MCNC: 0.7 MG/DL (ref 0.51–0.95)
CREAT SERPL-MCNC: 0.72 MG/DL (ref 0.51–0.95)
CREAT SERPL-MCNC: 0.73 MG/DL (ref 0.51–0.95)
CREAT SERPL-MCNC: 0.74 MG/DL (ref 0.51–0.95)
CREAT SERPL-MCNC: 0.74 MG/DL (ref 0.51–0.95)
CREAT SERPL-MCNC: 0.75 MG/DL (ref 0.51–0.95)
CREAT SERPL-MCNC: 0.76 MG/DL (ref 0.51–0.95)
CREAT SERPL-MCNC: 0.77 MG/DL (ref 0.51–0.95)
CREAT SERPL-MCNC: 0.8 MG/DL (ref 0.51–0.95)
CREAT SERPL-MCNC: 0.82 MG/DL (ref 0.51–0.95)
CREAT SERPL-MCNC: 0.82 MG/DL (ref 0.51–0.95)
CREAT SERPL-MCNC: 0.83 MG/DL (ref 0.51–0.95)
CREAT SERPL-MCNC: 0.84 MG/DL (ref 0.51–0.95)
CROSSMATCH: NORMAL
DEPRECATED CALCIDIOL+CALCIFEROL SERPL-MC: 12 UG/L (ref 20–75)
DEPRECATED HCO3 PLAS-SCNC: 17 MMOL/L (ref 22–29)
DEPRECATED HCO3 PLAS-SCNC: 21 MMOL/L (ref 22–29)
DEPRECATED HCO3 PLAS-SCNC: 22 MMOL/L (ref 22–29)
DEPRECATED HCO3 PLAS-SCNC: 23 MMOL/L (ref 22–29)
DEPRECATED HCO3 PLAS-SCNC: 23 MMOL/L (ref 22–29)
DEPRECATED HCO3 PLAS-SCNC: 24 MMOL/L (ref 22–29)
DEPRECATED HCO3 PLAS-SCNC: 25 MMOL/L (ref 22–29)
DEPRECATED HCO3 PLAS-SCNC: 26 MMOL/L (ref 22–29)
DEPRECATED HCO3 PLAS-SCNC: 26 MMOL/L (ref 22–29)
DEPRECATED HCO3 PLAS-SCNC: 27 MMOL/L (ref 22–29)
DEPRECATED HCO3 PLAS-SCNC: 28 MMOL/L (ref 22–29)
DEPRECATED HCO3 PLAS-SCNC: 28 MMOL/L (ref 22–29)
DEPRECATED HCO3 PLAS-SCNC: 29 MMOL/L (ref 22–29)
DEPRECATED HCO3 PLAS-SCNC: 30 MMOL/L (ref 22–29)
DEPRECATED HCO3 PLAS-SCNC: 31 MMOL/L (ref 22–29)
DEPRECATED HCO3 PLAS-SCNC: 33 MMOL/L (ref 22–29)
DEPRECATED HCO3 PLAS-SCNC: 34 MMOL/L (ref 22–29)
DEPRECATED HCO3 PLAS-SCNC: 34 MMOL/L (ref 22–29)
DEPRECATED HCO3 PLAS-SCNC: 35 MMOL/L (ref 22–29)
DEPRECATED HCO3 PLAS-SCNC: 36 MMOL/L (ref 22–29)
DEPRECATED HCO3 PLAS-SCNC: 37 MMOL/L (ref 22–29)
DEPRECATED HCO3 PLAS-SCNC: 38 MMOL/L (ref 22–29)
DEPRECATED M TB RPOB XXX QL NAA+PROBE: NORMAL
DIASTOLIC BLOOD PRESSURE - MUSE: NORMAL MMHG
EOSINOPHIL # BLD AUTO: 0 10E3/UL (ref 0–0.7)
EOSINOPHIL # BLD AUTO: 0.1 10E3/UL (ref 0–0.7)
EOSINOPHIL # BLD AUTO: 0.1 10E3/UL (ref 0–0.7)
EOSINOPHIL NFR BLD AUTO: 0 %
EOSINOPHIL NFR BLD AUTO: 1 %
EOSINOPHIL NFR BLD AUTO: 1 %
ERYTHROCYTE [DISTWIDTH] IN BLOOD BY AUTOMATED COUNT: 14.6 % (ref 10–15)
ERYTHROCYTE [DISTWIDTH] IN BLOOD BY AUTOMATED COUNT: 14.7 % (ref 10–15)
ERYTHROCYTE [DISTWIDTH] IN BLOOD BY AUTOMATED COUNT: 14.8 % (ref 10–15)
ERYTHROCYTE [DISTWIDTH] IN BLOOD BY AUTOMATED COUNT: 14.8 % (ref 10–15)
ERYTHROCYTE [DISTWIDTH] IN BLOOD BY AUTOMATED COUNT: 14.9 % (ref 10–15)
ERYTHROCYTE [DISTWIDTH] IN BLOOD BY AUTOMATED COUNT: 15 % (ref 10–15)
ERYTHROCYTE [DISTWIDTH] IN BLOOD BY AUTOMATED COUNT: 15 % (ref 10–15)
ERYTHROCYTE [DISTWIDTH] IN BLOOD BY AUTOMATED COUNT: 15.1 % (ref 10–15)
ERYTHROCYTE [DISTWIDTH] IN BLOOD BY AUTOMATED COUNT: 15.1 % (ref 10–15)
ERYTHROCYTE [DISTWIDTH] IN BLOOD BY AUTOMATED COUNT: 15.2 % (ref 10–15)
ERYTHROCYTE [DISTWIDTH] IN BLOOD BY AUTOMATED COUNT: 15.2 % (ref 10–15)
ERYTHROCYTE [DISTWIDTH] IN BLOOD BY AUTOMATED COUNT: 15.3 % (ref 10–15)
ERYTHROCYTE [DISTWIDTH] IN BLOOD BY AUTOMATED COUNT: 15.4 % (ref 10–15)
ERYTHROCYTE [DISTWIDTH] IN BLOOD BY AUTOMATED COUNT: 15.4 % (ref 10–15)
ERYTHROCYTE [DISTWIDTH] IN BLOOD BY AUTOMATED COUNT: 15.7 % (ref 10–15)
ERYTHROCYTE [DISTWIDTH] IN BLOOD BY AUTOMATED COUNT: 15.8 % (ref 10–15)
ERYTHROCYTE [DISTWIDTH] IN BLOOD BY AUTOMATED COUNT: 15.9 % (ref 10–15)
ERYTHROCYTE [DISTWIDTH] IN BLOOD BY AUTOMATED COUNT: 16 % (ref 10–15)
ERYTHROCYTE [DISTWIDTH] IN BLOOD BY AUTOMATED COUNT: 16.2 % (ref 10–15)
ETHANOL SERPL-MCNC: <0.01 G/DL
FLUAV H1 2009 PAND RNA SPEC QL NAA+PROBE: NOT DETECTED
FLUAV H1 RNA SPEC QL NAA+PROBE: NOT DETECTED
FLUAV H3 RNA SPEC QL NAA+PROBE: NOT DETECTED
FLUAV RNA SPEC QL NAA+PROBE: NOT DETECTED
FLUBV RNA SPEC QL NAA+PROBE: NOT DETECTED
FOLATE SERPL-MCNC: 2.9 NG/ML (ref 4.6–34.8)
GFR SERPL CREATININE-BSD FRML MDRD: 79 ML/MIN/1.73M2
GFR SERPL CREATININE-BSD FRML MDRD: 80 ML/MIN/1.73M2
GFR SERPL CREATININE-BSD FRML MDRD: 81 ML/MIN/1.73M2
GFR SERPL CREATININE-BSD FRML MDRD: 81 ML/MIN/1.73M2
GFR SERPL CREATININE-BSD FRML MDRD: 84 ML/MIN/1.73M2
GFR SERPL CREATININE-BSD FRML MDRD: 88 ML/MIN/1.73M2
GFR SERPL CREATININE-BSD FRML MDRD: 89 ML/MIN/1.73M2
GFR SERPL CREATININE-BSD FRML MDRD: >90 ML/MIN/1.73M2
GLUCOSE BLD-MCNC: 217 MG/DL (ref 70–99)
GLUCOSE BLD-MCNC: 71 MG/DL (ref 70–99)
GLUCOSE BLDC GLUCOMTR-MCNC: 100 MG/DL (ref 70–99)
GLUCOSE BLDC GLUCOMTR-MCNC: 101 MG/DL (ref 70–99)
GLUCOSE BLDC GLUCOMTR-MCNC: 102 MG/DL (ref 70–99)
GLUCOSE BLDC GLUCOMTR-MCNC: 103 MG/DL (ref 70–99)
GLUCOSE BLDC GLUCOMTR-MCNC: 103 MG/DL (ref 70–99)
GLUCOSE BLDC GLUCOMTR-MCNC: 107 MG/DL (ref 70–99)
GLUCOSE BLDC GLUCOMTR-MCNC: 108 MG/DL (ref 70–99)
GLUCOSE BLDC GLUCOMTR-MCNC: 110 MG/DL (ref 70–99)
GLUCOSE BLDC GLUCOMTR-MCNC: 110 MG/DL (ref 70–99)
GLUCOSE BLDC GLUCOMTR-MCNC: 112 MG/DL (ref 70–99)
GLUCOSE BLDC GLUCOMTR-MCNC: 114 MG/DL (ref 70–99)
GLUCOSE BLDC GLUCOMTR-MCNC: 114 MG/DL (ref 70–99)
GLUCOSE BLDC GLUCOMTR-MCNC: 115 MG/DL (ref 70–99)
GLUCOSE BLDC GLUCOMTR-MCNC: 116 MG/DL (ref 70–99)
GLUCOSE BLDC GLUCOMTR-MCNC: 117 MG/DL (ref 70–99)
GLUCOSE BLDC GLUCOMTR-MCNC: 121 MG/DL (ref 70–99)
GLUCOSE BLDC GLUCOMTR-MCNC: 122 MG/DL (ref 70–99)
GLUCOSE BLDC GLUCOMTR-MCNC: 122 MG/DL (ref 70–99)
GLUCOSE BLDC GLUCOMTR-MCNC: 123 MG/DL (ref 70–99)
GLUCOSE BLDC GLUCOMTR-MCNC: 123 MG/DL (ref 70–99)
GLUCOSE BLDC GLUCOMTR-MCNC: 127 MG/DL (ref 70–99)
GLUCOSE BLDC GLUCOMTR-MCNC: 13 MG/DL (ref 70–99)
GLUCOSE BLDC GLUCOMTR-MCNC: 133 MG/DL (ref 70–99)
GLUCOSE BLDC GLUCOMTR-MCNC: 136 MG/DL (ref 70–99)
GLUCOSE BLDC GLUCOMTR-MCNC: 140 MG/DL (ref 70–99)
GLUCOSE BLDC GLUCOMTR-MCNC: 142 MG/DL (ref 70–99)
GLUCOSE BLDC GLUCOMTR-MCNC: 142 MG/DL (ref 70–99)
GLUCOSE BLDC GLUCOMTR-MCNC: 148 MG/DL (ref 70–99)
GLUCOSE BLDC GLUCOMTR-MCNC: 152 MG/DL (ref 70–99)
GLUCOSE BLDC GLUCOMTR-MCNC: 160 MG/DL (ref 70–99)
GLUCOSE BLDC GLUCOMTR-MCNC: 171 MG/DL (ref 70–99)
GLUCOSE BLDC GLUCOMTR-MCNC: 173 MG/DL (ref 70–99)
GLUCOSE BLDC GLUCOMTR-MCNC: 178 MG/DL (ref 70–99)
GLUCOSE BLDC GLUCOMTR-MCNC: 18 MG/DL (ref 70–99)
GLUCOSE BLDC GLUCOMTR-MCNC: 20 MG/DL (ref 70–99)
GLUCOSE BLDC GLUCOMTR-MCNC: 215 MG/DL (ref 70–99)
GLUCOSE BLDC GLUCOMTR-MCNC: 26 MG/DL (ref 70–99)
GLUCOSE BLDC GLUCOMTR-MCNC: 30 MG/DL (ref 70–99)
GLUCOSE BLDC GLUCOMTR-MCNC: 31 MG/DL (ref 70–99)
GLUCOSE BLDC GLUCOMTR-MCNC: 32 MG/DL (ref 70–99)
GLUCOSE BLDC GLUCOMTR-MCNC: 35 MG/DL (ref 70–99)
GLUCOSE BLDC GLUCOMTR-MCNC: 37 MG/DL (ref 70–99)
GLUCOSE BLDC GLUCOMTR-MCNC: 38 MG/DL (ref 70–99)
GLUCOSE BLDC GLUCOMTR-MCNC: 38 MG/DL (ref 70–99)
GLUCOSE BLDC GLUCOMTR-MCNC: 39 MG/DL (ref 70–99)
GLUCOSE BLDC GLUCOMTR-MCNC: 40 MG/DL (ref 70–99)
GLUCOSE BLDC GLUCOMTR-MCNC: 41 MG/DL (ref 70–99)
GLUCOSE BLDC GLUCOMTR-MCNC: 41 MG/DL (ref 70–99)
GLUCOSE BLDC GLUCOMTR-MCNC: 42 MG/DL (ref 70–99)
GLUCOSE BLDC GLUCOMTR-MCNC: 42 MG/DL (ref 70–99)
GLUCOSE BLDC GLUCOMTR-MCNC: 44 MG/DL (ref 70–99)
GLUCOSE BLDC GLUCOMTR-MCNC: 45 MG/DL (ref 70–99)
GLUCOSE BLDC GLUCOMTR-MCNC: 46 MG/DL (ref 70–99)
GLUCOSE BLDC GLUCOMTR-MCNC: 46 MG/DL (ref 70–99)
GLUCOSE BLDC GLUCOMTR-MCNC: 47 MG/DL (ref 70–99)
GLUCOSE BLDC GLUCOMTR-MCNC: 47 MG/DL (ref 70–99)
GLUCOSE BLDC GLUCOMTR-MCNC: 48 MG/DL (ref 70–99)
GLUCOSE BLDC GLUCOMTR-MCNC: 49 MG/DL (ref 70–99)
GLUCOSE BLDC GLUCOMTR-MCNC: 51 MG/DL (ref 70–99)
GLUCOSE BLDC GLUCOMTR-MCNC: 52 MG/DL (ref 70–99)
GLUCOSE BLDC GLUCOMTR-MCNC: 52 MG/DL (ref 70–99)
GLUCOSE BLDC GLUCOMTR-MCNC: 53 MG/DL (ref 70–99)
GLUCOSE BLDC GLUCOMTR-MCNC: 53 MG/DL (ref 70–99)
GLUCOSE BLDC GLUCOMTR-MCNC: 54 MG/DL (ref 70–99)
GLUCOSE BLDC GLUCOMTR-MCNC: 54 MG/DL (ref 70–99)
GLUCOSE BLDC GLUCOMTR-MCNC: 56 MG/DL (ref 70–99)
GLUCOSE BLDC GLUCOMTR-MCNC: 57 MG/DL (ref 70–99)
GLUCOSE BLDC GLUCOMTR-MCNC: 58 MG/DL (ref 70–99)
GLUCOSE BLDC GLUCOMTR-MCNC: 58 MG/DL (ref 70–99)
GLUCOSE BLDC GLUCOMTR-MCNC: 59 MG/DL (ref 70–99)
GLUCOSE BLDC GLUCOMTR-MCNC: 60 MG/DL (ref 70–99)
GLUCOSE BLDC GLUCOMTR-MCNC: 61 MG/DL (ref 70–99)
GLUCOSE BLDC GLUCOMTR-MCNC: 62 MG/DL (ref 70–99)
GLUCOSE BLDC GLUCOMTR-MCNC: 63 MG/DL (ref 70–99)
GLUCOSE BLDC GLUCOMTR-MCNC: 64 MG/DL (ref 70–99)
GLUCOSE BLDC GLUCOMTR-MCNC: 64 MG/DL (ref 70–99)
GLUCOSE BLDC GLUCOMTR-MCNC: 65 MG/DL (ref 70–99)
GLUCOSE BLDC GLUCOMTR-MCNC: 65 MG/DL (ref 70–99)
GLUCOSE BLDC GLUCOMTR-MCNC: 66 MG/DL (ref 70–99)
GLUCOSE BLDC GLUCOMTR-MCNC: 68 MG/DL (ref 70–99)
GLUCOSE BLDC GLUCOMTR-MCNC: 68 MG/DL (ref 70–99)
GLUCOSE BLDC GLUCOMTR-MCNC: 69 MG/DL (ref 70–99)
GLUCOSE BLDC GLUCOMTR-MCNC: 69 MG/DL (ref 70–99)
GLUCOSE BLDC GLUCOMTR-MCNC: 70 MG/DL (ref 70–99)
GLUCOSE BLDC GLUCOMTR-MCNC: 71 MG/DL (ref 70–99)
GLUCOSE BLDC GLUCOMTR-MCNC: 72 MG/DL (ref 70–99)
GLUCOSE BLDC GLUCOMTR-MCNC: 73 MG/DL (ref 70–99)
GLUCOSE BLDC GLUCOMTR-MCNC: 73 MG/DL (ref 70–99)
GLUCOSE BLDC GLUCOMTR-MCNC: 74 MG/DL (ref 70–99)
GLUCOSE BLDC GLUCOMTR-MCNC: 74 MG/DL (ref 70–99)
GLUCOSE BLDC GLUCOMTR-MCNC: 75 MG/DL (ref 70–99)
GLUCOSE BLDC GLUCOMTR-MCNC: 76 MG/DL (ref 70–99)
GLUCOSE BLDC GLUCOMTR-MCNC: 77 MG/DL (ref 70–99)
GLUCOSE BLDC GLUCOMTR-MCNC: 78 MG/DL (ref 70–99)
GLUCOSE BLDC GLUCOMTR-MCNC: 79 MG/DL (ref 70–99)
GLUCOSE BLDC GLUCOMTR-MCNC: 80 MG/DL (ref 70–99)
GLUCOSE BLDC GLUCOMTR-MCNC: 81 MG/DL (ref 70–99)
GLUCOSE BLDC GLUCOMTR-MCNC: 82 MG/DL (ref 70–99)
GLUCOSE BLDC GLUCOMTR-MCNC: 83 MG/DL (ref 70–99)
GLUCOSE BLDC GLUCOMTR-MCNC: 83 MG/DL (ref 70–99)
GLUCOSE BLDC GLUCOMTR-MCNC: 84 MG/DL (ref 70–99)
GLUCOSE BLDC GLUCOMTR-MCNC: 84 MG/DL (ref 70–99)
GLUCOSE BLDC GLUCOMTR-MCNC: 85 MG/DL (ref 70–99)
GLUCOSE BLDC GLUCOMTR-MCNC: 86 MG/DL (ref 70–99)
GLUCOSE BLDC GLUCOMTR-MCNC: 87 MG/DL (ref 70–99)
GLUCOSE BLDC GLUCOMTR-MCNC: 88 MG/DL (ref 70–99)
GLUCOSE BLDC GLUCOMTR-MCNC: 88 MG/DL (ref 70–99)
GLUCOSE BLDC GLUCOMTR-MCNC: 89 MG/DL (ref 70–99)
GLUCOSE BLDC GLUCOMTR-MCNC: 90 MG/DL (ref 70–99)
GLUCOSE BLDC GLUCOMTR-MCNC: 91 MG/DL (ref 70–99)
GLUCOSE BLDC GLUCOMTR-MCNC: 92 MG/DL (ref 70–99)
GLUCOSE BLDC GLUCOMTR-MCNC: 93 MG/DL (ref 70–99)
GLUCOSE BLDC GLUCOMTR-MCNC: 95 MG/DL (ref 70–99)
GLUCOSE BLDC GLUCOMTR-MCNC: 96 MG/DL (ref 70–99)
GLUCOSE BLDC GLUCOMTR-MCNC: 96 MG/DL (ref 70–99)
GLUCOSE BLDC GLUCOMTR-MCNC: 98 MG/DL (ref 70–99)
GLUCOSE BLDC GLUCOMTR-MCNC: 99 MG/DL (ref 70–99)
GLUCOSE BODY FLUID SOURCE: NORMAL
GLUCOSE BODY FLUID SOURCE: NORMAL
GLUCOSE FLD-MCNC: 145 MG/DL
GLUCOSE FLD-MCNC: 2 MG/DL
GLUCOSE SERPL-MCNC: 100 MG/DL (ref 70–99)
GLUCOSE SERPL-MCNC: 102 MG/DL (ref 70–99)
GLUCOSE SERPL-MCNC: 103 MG/DL (ref 70–99)
GLUCOSE SERPL-MCNC: 105 MG/DL (ref 70–99)
GLUCOSE SERPL-MCNC: 106 MG/DL (ref 70–99)
GLUCOSE SERPL-MCNC: 106 MG/DL (ref 70–99)
GLUCOSE SERPL-MCNC: 107 MG/DL (ref 70–99)
GLUCOSE SERPL-MCNC: 109 MG/DL (ref 70–99)
GLUCOSE SERPL-MCNC: 110 MG/DL (ref 70–99)
GLUCOSE SERPL-MCNC: 110 MG/DL (ref 70–99)
GLUCOSE SERPL-MCNC: 113 MG/DL (ref 70–99)
GLUCOSE SERPL-MCNC: 117 MG/DL (ref 70–99)
GLUCOSE SERPL-MCNC: 121 MG/DL (ref 70–99)
GLUCOSE SERPL-MCNC: 123 MG/DL (ref 70–99)
GLUCOSE SERPL-MCNC: 128 MG/DL (ref 70–99)
GLUCOSE SERPL-MCNC: 151 MG/DL (ref 70–99)
GLUCOSE SERPL-MCNC: 230 MG/DL (ref 70–99)
GLUCOSE SERPL-MCNC: 60 MG/DL (ref 70–99)
GLUCOSE SERPL-MCNC: 61 MG/DL (ref 70–99)
GLUCOSE SERPL-MCNC: 68 MG/DL (ref 70–99)
GLUCOSE SERPL-MCNC: 74 MG/DL (ref 70–99)
GLUCOSE SERPL-MCNC: 76 MG/DL (ref 70–99)
GLUCOSE SERPL-MCNC: 77 MG/DL (ref 70–99)
GLUCOSE SERPL-MCNC: 79 MG/DL (ref 70–99)
GLUCOSE SERPL-MCNC: 80 MG/DL (ref 70–99)
GLUCOSE SERPL-MCNC: 82 MG/DL (ref 70–99)
GLUCOSE SERPL-MCNC: 83 MG/DL (ref 70–99)
GLUCOSE SERPL-MCNC: 83 MG/DL (ref 70–99)
GLUCOSE SERPL-MCNC: 84 MG/DL (ref 70–99)
GLUCOSE SERPL-MCNC: 85 MG/DL (ref 70–99)
GLUCOSE SERPL-MCNC: 86 MG/DL (ref 70–99)
GLUCOSE SERPL-MCNC: 87 MG/DL (ref 70–99)
GLUCOSE SERPL-MCNC: 87 MG/DL (ref 70–99)
GLUCOSE SERPL-MCNC: 88 MG/DL (ref 70–99)
GLUCOSE SERPL-MCNC: 89 MG/DL (ref 70–99)
GLUCOSE SERPL-MCNC: 90 MG/DL (ref 70–99)
GLUCOSE SERPL-MCNC: 91 MG/DL (ref 70–99)
GLUCOSE SERPL-MCNC: 92 MG/DL (ref 70–99)
GLUCOSE SERPL-MCNC: 92 MG/DL (ref 70–99)
GLUCOSE SERPL-MCNC: 93 MG/DL (ref 70–99)
GLUCOSE SERPL-MCNC: 93 MG/DL (ref 70–99)
GLUCOSE SERPL-MCNC: 94 MG/DL (ref 70–99)
GLUCOSE SERPL-MCNC: 95 MG/DL (ref 70–99)
GLUCOSE SERPL-MCNC: 95 MG/DL (ref 70–99)
GLUCOSE SERPL-MCNC: 97 MG/DL (ref 70–99)
GLUCOSE SERPL-MCNC: 98 MG/DL (ref 70–99)
GLUCOSE UR STRIP-MCNC: NEGATIVE MG/DL
GLUCOSE UR STRIP-MCNC: NEGATIVE MG/DL
GRAM STAIN RESULT: NORMAL
HADV DNA SPEC QL NAA+PROBE: NOT DETECTED
HCO3 BLDV-SCNC: 20 MMOL/L (ref 21–28)
HCO3 BLDV-SCNC: 34 MMOL/L (ref 21–28)
HCO3 BLDV-SCNC: 36 MMOL/L (ref 21–28)
HCOV PNL SPEC NAA+PROBE: NOT DETECTED
HCT VFR BLD AUTO: 20.6 % (ref 35–47)
HCT VFR BLD AUTO: 21.7 % (ref 35–47)
HCT VFR BLD AUTO: 24.1 % (ref 35–47)
HCT VFR BLD AUTO: 25.9 % (ref 35–47)
HCT VFR BLD AUTO: 26 % (ref 35–47)
HCT VFR BLD AUTO: 26.4 % (ref 35–47)
HCT VFR BLD AUTO: 26.5 % (ref 35–47)
HCT VFR BLD AUTO: 26.9 % (ref 35–47)
HCT VFR BLD AUTO: 27 % (ref 35–47)
HCT VFR BLD AUTO: 27.2 % (ref 35–47)
HCT VFR BLD AUTO: 27.8 % (ref 35–47)
HCT VFR BLD AUTO: 28.4 % (ref 35–47)
HCT VFR BLD AUTO: 28.9 % (ref 35–47)
HCT VFR BLD AUTO: 29.1 % (ref 35–47)
HCT VFR BLD AUTO: 29.5 % (ref 35–47)
HCT VFR BLD AUTO: 29.6 % (ref 35–47)
HCT VFR BLD AUTO: 29.9 % (ref 35–47)
HCT VFR BLD AUTO: 30.5 % (ref 35–47)
HCT VFR BLD AUTO: 31.3 % (ref 35–47)
HCT VFR BLD AUTO: 31.4 % (ref 35–47)
HCT VFR BLD AUTO: 31.5 % (ref 35–47)
HCT VFR BLD AUTO: 32.2 % (ref 35–47)
HCT VFR BLD AUTO: 32.8 % (ref 35–47)
HCT VFR BLD AUTO: 33 % (ref 35–47)
HCT VFR BLD AUTO: 33.2 % (ref 35–47)
HCT VFR BLD AUTO: 33.3 % (ref 35–47)
HCT VFR BLD AUTO: 33.6 % (ref 35–47)
HCT VFR BLD AUTO: 33.6 % (ref 35–47)
HCT VFR BLD AUTO: 34.3 % (ref 35–47)
HCT VFR BLD AUTO: 35 % (ref 35–47)
HCT VFR BLD AUTO: 35.1 % (ref 35–47)
HCT VFR BLD AUTO: 35.7 % (ref 35–47)
HCT VFR BLD AUTO: 36.3 % (ref 35–47)
HCT VFR BLD AUTO: 36.9 % (ref 35–47)
HCT VFR BLD AUTO: 38 % (ref 35–47)
HCT VFR BLD AUTO: 41.3 % (ref 35–47)
HCT VFR BLD AUTO: 47.3 % (ref 35–47)
HCT VFR BLD CALC: 37 % (ref 35–47)
HGB BLD-MCNC: 10.2 G/DL (ref 11.7–15.7)
HGB BLD-MCNC: 10.2 G/DL (ref 11.7–15.7)
HGB BLD-MCNC: 10.6 G/DL (ref 11.7–15.7)
HGB BLD-MCNC: 10.8 G/DL (ref 11.7–15.7)
HGB BLD-MCNC: 11 G/DL (ref 11.7–15.7)
HGB BLD-MCNC: 11 G/DL (ref 11.7–15.7)
HGB BLD-MCNC: 11.1 G/DL (ref 11.7–15.7)
HGB BLD-MCNC: 11.4 G/DL (ref 11.7–15.7)
HGB BLD-MCNC: 11.5 G/DL (ref 11.7–15.7)
HGB BLD-MCNC: 11.5 G/DL (ref 11.7–15.7)
HGB BLD-MCNC: 11.6 G/DL (ref 11.7–15.7)
HGB BLD-MCNC: 11.7 G/DL (ref 11.7–15.7)
HGB BLD-MCNC: 11.7 G/DL (ref 11.7–15.7)
HGB BLD-MCNC: 11.8 G/DL (ref 11.7–15.7)
HGB BLD-MCNC: 12 G/DL (ref 11.7–15.7)
HGB BLD-MCNC: 12.6 G/DL (ref 11.7–15.7)
HGB BLD-MCNC: 12.7 G/DL (ref 11.7–15.7)
HGB BLD-MCNC: 13.1 G/DL (ref 11.7–15.7)
HGB BLD-MCNC: 15.3 G/DL (ref 11.7–15.7)
HGB BLD-MCNC: 6.7 G/DL (ref 11.7–15.7)
HGB BLD-MCNC: 7.2 G/DL (ref 11.7–15.7)
HGB BLD-MCNC: 8.1 G/DL (ref 11.7–15.7)
HGB BLD-MCNC: 8.4 G/DL (ref 11.7–15.7)
HGB BLD-MCNC: 8.5 G/DL (ref 11.7–15.7)
HGB BLD-MCNC: 8.6 G/DL (ref 11.7–15.7)
HGB BLD-MCNC: 8.7 G/DL (ref 11.7–15.7)
HGB BLD-MCNC: 8.9 G/DL (ref 11.7–15.7)
HGB BLD-MCNC: 9.4 G/DL (ref 11.7–15.7)
HGB BLD-MCNC: 9.5 G/DL (ref 11.7–15.7)
HGB BLD-MCNC: 9.9 G/DL (ref 11.7–15.7)
HGB UR QL STRIP: ABNORMAL
HGB UR QL STRIP: NEGATIVE
HMPV RNA SPEC QL NAA+PROBE: NOT DETECTED
HOLD SPECIMEN: NORMAL
HPIV1 RNA SPEC QL NAA+PROBE: NOT DETECTED
HPIV2 RNA SPEC QL NAA+PROBE: NOT DETECTED
HPIV3 RNA SPEC QL NAA+PROBE: NOT DETECTED
HPIV4 RNA SPEC QL NAA+PROBE: NOT DETECTED
HYALINE CASTS: 3 /LPF
HYALINE CASTS: 8 /LPF
IMM GRANULOCYTES # BLD: 0 10E3/UL
IMM GRANULOCYTES # BLD: 0.1 10E3/UL
IMM GRANULOCYTES # BLD: 0.2 10E3/UL
IMM GRANULOCYTES NFR BLD: 1 %
INR PPP: 1.16 (ref 0.85–1.15)
INTERPRETATION ECG - MUSE: NORMAL
ISSUE DATE AND TIME: NORMAL
KETONES UR STRIP-MCNC: 5 MG/DL
KETONES UR STRIP-MCNC: NEGATIVE MG/DL
L PNEUMO1 AG UR QL IA: NEGATIVE
LACTATE SERPL-SCNC: 1.7 MMOL/L (ref 0.7–2)
LD BODY BODY FLUID SOURCE: NORMAL
LD BODY BODY FLUID SOURCE: NORMAL
LDH FLD L TO P-CCNC: 132 U/L
LDH FLD L TO P-CCNC: 2051 U/L
LDH SERPL L TO P-CCNC: 189 U/L (ref 0–250)
LDH SERPL L TO P-CCNC: 218 U/L (ref 0–250)
LEUKOCYTE ESTERASE UR QL STRIP: ABNORMAL
LEUKOCYTE ESTERASE UR QL STRIP: NEGATIVE
LIPASE SERPL-CCNC: 27 U/L (ref 13–60)
LVEF ECHO: NORMAL
LVEF ECHO: NORMAL
LYMPHOCYTES # BLD AUTO: 0.7 10E3/UL (ref 0.8–5.3)
LYMPHOCYTES # BLD AUTO: 0.9 10E3/UL (ref 0.8–5.3)
LYMPHOCYTES # BLD AUTO: 1 10E3/UL (ref 0.8–5.3)
LYMPHOCYTES # BLD AUTO: 1.1 10E3/UL (ref 0.8–5.3)
LYMPHOCYTES # BLD AUTO: 1.1 10E3/UL (ref 0.8–5.3)
LYMPHOCYTES # BLD AUTO: 1.2 10E3/UL (ref 0.8–5.3)
LYMPHOCYTES # BLD AUTO: 1.3 10E3/UL (ref 0.8–5.3)
LYMPHOCYTES # BLD AUTO: 1.4 10E3/UL (ref 0.8–5.3)
LYMPHOCYTES # BLD AUTO: 1.6 10E3/UL (ref 0.8–5.3)
LYMPHOCYTES # BLD AUTO: 1.6 10E3/UL (ref 0.8–5.3)
LYMPHOCYTES # BLD AUTO: 1.7 10E3/UL (ref 0.8–5.3)
LYMPHOCYTES # BLD AUTO: 1.9 10E3/UL (ref 0.8–5.3)
LYMPHOCYTES # BLD AUTO: 2 10E3/UL (ref 0.8–5.3)
LYMPHOCYTES NFR BLD AUTO: 11 %
LYMPHOCYTES NFR BLD AUTO: 13 %
LYMPHOCYTES NFR BLD AUTO: 13 %
LYMPHOCYTES NFR BLD AUTO: 15 %
LYMPHOCYTES NFR BLD AUTO: 16 %
LYMPHOCYTES NFR BLD AUTO: 17 %
LYMPHOCYTES NFR BLD AUTO: 17 %
LYMPHOCYTES NFR BLD AUTO: 22 %
LYMPHOCYTES NFR BLD AUTO: 24 %
LYMPHOCYTES NFR BLD AUTO: 25 %
LYMPHOCYTES NFR BLD AUTO: 27 %
LYMPHOCYTES NFR BLD AUTO: 6 %
LYMPHOCYTES NFR BLD AUTO: 6 %
LYMPHOCYTES NFR FLD MANUAL: 18 %
LYMPHOCYTES NFR FLD MANUAL: NORMAL %
M PNEUMO DNA SPEC QL NAA+PROBE: NOT DETECTED
M TB DNA SPEC QL NAA+PROBE: NOT DETECTED
MAGNESIUM SERPL-MCNC: 1.3 MG/DL (ref 1.7–2.3)
MAGNESIUM SERPL-MCNC: 1.4 MG/DL (ref 1.7–2.3)
MAGNESIUM SERPL-MCNC: 1.5 MG/DL (ref 1.7–2.3)
MAGNESIUM SERPL-MCNC: 1.6 MG/DL (ref 1.7–2.3)
MAGNESIUM SERPL-MCNC: 1.7 MG/DL (ref 1.7–2.3)
MAGNESIUM SERPL-MCNC: 1.8 MG/DL (ref 1.7–2.3)
MAGNESIUM SERPL-MCNC: 1.9 MG/DL (ref 1.7–2.3)
MAGNESIUM SERPL-MCNC: 2 MG/DL (ref 1.7–2.3)
MAGNESIUM SERPL-MCNC: 2.1 MG/DL (ref 1.7–2.3)
MAGNESIUM SERPL-MCNC: 2.2 MG/DL (ref 1.7–2.3)
MAGNESIUM SERPL-MCNC: 2.4 MG/DL (ref 1.7–2.3)
MAGNESIUM SERPL-MCNC: 2.5 MG/DL (ref 1.7–2.3)
MAGNESIUM SERPL-MCNC: 2.8 MG/DL (ref 1.7–2.3)
MAGNESIUM SERPL-MCNC: 3 MG/DL (ref 1.7–2.3)
MAGNESIUM SERPL-MCNC: 3.1 MG/DL (ref 1.7–2.3)
MCH RBC QN AUTO: 29.7 PG (ref 26.5–33)
MCH RBC QN AUTO: 29.8 PG (ref 26.5–33)
MCH RBC QN AUTO: 30 PG (ref 26.5–33)
MCH RBC QN AUTO: 30.1 PG (ref 26.5–33)
MCH RBC QN AUTO: 30.3 PG (ref 26.5–33)
MCH RBC QN AUTO: 30.3 PG (ref 26.5–33)
MCH RBC QN AUTO: 30.4 PG (ref 26.5–33)
MCH RBC QN AUTO: 30.4 PG (ref 26.5–33)
MCH RBC QN AUTO: 30.5 PG (ref 26.5–33)
MCH RBC QN AUTO: 30.5 PG (ref 26.5–33)
MCH RBC QN AUTO: 30.6 PG (ref 26.5–33)
MCH RBC QN AUTO: 30.7 PG (ref 26.5–33)
MCH RBC QN AUTO: 30.8 PG (ref 26.5–33)
MCH RBC QN AUTO: 30.9 PG (ref 26.5–33)
MCH RBC QN AUTO: 30.9 PG (ref 26.5–33)
MCH RBC QN AUTO: 31 PG (ref 26.5–33)
MCH RBC QN AUTO: 31.1 PG (ref 26.5–33)
MCH RBC QN AUTO: 31.2 PG (ref 26.5–33)
MCH RBC QN AUTO: 31.2 PG (ref 26.5–33)
MCH RBC QN AUTO: 31.3 PG (ref 26.5–33)
MCH RBC QN AUTO: 31.4 PG (ref 26.5–33)
MCH RBC QN AUTO: 31.5 PG (ref 26.5–33)
MCH RBC QN AUTO: 31.5 PG (ref 26.5–33)
MCHC RBC AUTO-ENTMCNC: 30.9 G/DL (ref 31.5–36.5)
MCHC RBC AUTO-ENTMCNC: 31.3 G/DL (ref 31.5–36.5)
MCHC RBC AUTO-ENTMCNC: 31.3 G/DL (ref 31.5–36.5)
MCHC RBC AUTO-ENTMCNC: 31.7 G/DL (ref 31.5–36.5)
MCHC RBC AUTO-ENTMCNC: 31.8 G/DL (ref 31.5–36.5)
MCHC RBC AUTO-ENTMCNC: 31.9 G/DL (ref 31.5–36.5)
MCHC RBC AUTO-ENTMCNC: 32 G/DL (ref 31.5–36.5)
MCHC RBC AUTO-ENTMCNC: 32 G/DL (ref 31.5–36.5)
MCHC RBC AUTO-ENTMCNC: 32.2 G/DL (ref 31.5–36.5)
MCHC RBC AUTO-ENTMCNC: 32.2 G/DL (ref 31.5–36.5)
MCHC RBC AUTO-ENTMCNC: 32.3 G/DL (ref 31.5–36.5)
MCHC RBC AUTO-ENTMCNC: 32.4 G/DL (ref 31.5–36.5)
MCHC RBC AUTO-ENTMCNC: 32.4 G/DL (ref 31.5–36.5)
MCHC RBC AUTO-ENTMCNC: 32.5 G/DL (ref 31.5–36.5)
MCHC RBC AUTO-ENTMCNC: 32.5 G/DL (ref 31.5–36.5)
MCHC RBC AUTO-ENTMCNC: 32.6 G/DL (ref 31.5–36.5)
MCHC RBC AUTO-ENTMCNC: 32.9 G/DL (ref 31.5–36.5)
MCHC RBC AUTO-ENTMCNC: 33 G/DL (ref 31.5–36.5)
MCHC RBC AUTO-ENTMCNC: 33 G/DL (ref 31.5–36.5)
MCHC RBC AUTO-ENTMCNC: 33.1 G/DL (ref 31.5–36.5)
MCHC RBC AUTO-ENTMCNC: 33.2 G/DL (ref 31.5–36.5)
MCHC RBC AUTO-ENTMCNC: 33.3 G/DL (ref 31.5–36.5)
MCHC RBC AUTO-ENTMCNC: 33.4 G/DL (ref 31.5–36.5)
MCHC RBC AUTO-ENTMCNC: 33.5 G/DL (ref 31.5–36.5)
MCHC RBC AUTO-ENTMCNC: 33.6 G/DL (ref 31.5–36.5)
MCHC RBC AUTO-ENTMCNC: 33.6 G/DL (ref 31.5–36.5)
MCHC RBC AUTO-ENTMCNC: 33.9 G/DL (ref 31.5–36.5)
MCHC RBC AUTO-ENTMCNC: 33.9 G/DL (ref 31.5–36.5)
MCHC RBC AUTO-ENTMCNC: 34.1 G/DL (ref 31.5–36.5)
MCHC RBC AUTO-ENTMCNC: 34.2 G/DL (ref 31.5–36.5)
MCHC RBC AUTO-ENTMCNC: 34.2 G/DL (ref 31.5–36.5)
MCHC RBC AUTO-ENTMCNC: 34.4 G/DL (ref 31.5–36.5)
MCV RBC AUTO: 91 FL (ref 78–100)
MCV RBC AUTO: 92 FL (ref 78–100)
MCV RBC AUTO: 93 FL (ref 78–100)
MCV RBC AUTO: 94 FL (ref 78–100)
MCV RBC AUTO: 95 FL (ref 78–100)
MCV RBC AUTO: 96 FL (ref 78–100)
MCV RBC AUTO: 98 FL (ref 78–100)
MONOCYTES # BLD AUTO: 0.3 10E3/UL (ref 0–1.3)
MONOCYTES # BLD AUTO: 0.4 10E3/UL (ref 0–1.3)
MONOCYTES # BLD AUTO: 0.5 10E3/UL (ref 0–1.3)
MONOCYTES # BLD AUTO: 0.6 10E3/UL (ref 0–1.3)
MONOCYTES # BLD AUTO: 0.6 10E3/UL (ref 0–1.3)
MONOCYTES # BLD AUTO: 0.7 10E3/UL (ref 0–1.3)
MONOCYTES # BLD AUTO: 0.7 10E3/UL (ref 0–1.3)
MONOCYTES # BLD AUTO: 0.8 10E3/UL (ref 0–1.3)
MONOCYTES # BLD AUTO: 0.9 10E3/UL (ref 0–1.3)
MONOCYTES # BLD AUTO: 1 10E3/UL (ref 0–1.3)
MONOCYTES # BLD AUTO: 1 10E3/UL (ref 0–1.3)
MONOCYTES NFR BLD AUTO: 10 %
MONOCYTES NFR BLD AUTO: 10 %
MONOCYTES NFR BLD AUTO: 11 %
MONOCYTES NFR BLD AUTO: 11 %
MONOCYTES NFR BLD AUTO: 5 %
MONOCYTES NFR BLD AUTO: 6 %
MONOCYTES NFR BLD AUTO: 6 %
MONOCYTES NFR BLD AUTO: 7 %
MONOCYTES NFR BLD AUTO: 8 %
MONOCYTES NFR BLD AUTO: 9 %
MONOS+MACROS NFR FLD MANUAL: 1 %
MONOS+MACROS NFR FLD MANUAL: 2 %
MRSA DNA SPEC QL NAA+PROBE: NEGATIVE
MUCOUS THREADS #/AREA URNS LPF: PRESENT /LPF
MUCOUS THREADS #/AREA URNS LPF: PRESENT /LPF
NEUTROPHILS # BLD AUTO: 1.8 10E3/UL (ref 1.6–8.3)
NEUTROPHILS # BLD AUTO: 13.2 10E3/UL (ref 1.6–8.3)
NEUTROPHILS # BLD AUTO: 17 10E3/UL (ref 1.6–8.3)
NEUTROPHILS # BLD AUTO: 3.8 10E3/UL (ref 1.6–8.3)
NEUTROPHILS # BLD AUTO: 4.3 10E3/UL (ref 1.6–8.3)
NEUTROPHILS # BLD AUTO: 5.3 10E3/UL (ref 1.6–8.3)
NEUTROPHILS # BLD AUTO: 5.4 10E3/UL (ref 1.6–8.3)
NEUTROPHILS # BLD AUTO: 6.2 10E3/UL (ref 1.6–8.3)
NEUTROPHILS # BLD AUTO: 6.7 10E3/UL (ref 1.6–8.3)
NEUTROPHILS # BLD AUTO: 7.8 10E3/UL (ref 1.6–8.3)
NEUTROPHILS # BLD AUTO: 8 10E3/UL (ref 1.6–8.3)
NEUTROPHILS # BLD AUTO: 8.1 10E3/UL (ref 1.6–8.3)
NEUTROPHILS # BLD AUTO: 8.4 10E3/UL (ref 1.6–8.3)
NEUTROPHILS NFR BLD AUTO: 62 %
NEUTROPHILS NFR BLD AUTO: 63 %
NEUTROPHILS NFR BLD AUTO: 64 %
NEUTROPHILS NFR BLD AUTO: 66 %
NEUTROPHILS NFR BLD AUTO: 73 %
NEUTROPHILS NFR BLD AUTO: 75 %
NEUTROPHILS NFR BLD AUTO: 76 %
NEUTROPHILS NFR BLD AUTO: 76 %
NEUTROPHILS NFR BLD AUTO: 78 %
NEUTROPHILS NFR BLD AUTO: 80 %
NEUTROPHILS NFR BLD AUTO: 80 %
NEUTROPHILS NFR BLD AUTO: 87 %
NEUTROPHILS NFR BLD AUTO: 88 %
NEUTS BAND NFR FLD MANUAL: 80 %
NEUTS BAND NFR FLD MANUAL: 99 %
NITRATE UR QL: NEGATIVE
NITRATE UR QL: NEGATIVE
NRBC # BLD AUTO: 0 10E3/UL
NRBC BLD AUTO-RTO: 0 /100
NT-PROBNP SERPL-MCNC: 7752 PG/ML (ref 0–900)
NT-PROBNP SERPL-MCNC: ABNORMAL PG/ML (ref 0–900)
O2/TOTAL GAS SETTING VFR VENT: 21 %
O2/TOTAL GAS SETTING VFR VENT: 33 %
OSMOLALITY SERPL: 275 MMOL/KG (ref 280–301)
OSMOLALITY UR: 535 MMOL/KG (ref 100–1200)
P AXIS - MUSE: 10 DEGREES
P AXIS - MUSE: 24 DEGREES
P AXIS - MUSE: 32 DEGREES
P AXIS - MUSE: 58 DEGREES
P AXIS - MUSE: NORMAL DEGREES
PATH REPORT.COMMENTS IMP SPEC: NORMAL
PATH REPORT.COMMENTS IMP SPEC: NORMAL
PATH REPORT.FINAL DX SPEC: NORMAL
PATH REPORT.FINAL DX SPEC: NORMAL
PATH REPORT.GROSS SPEC: NORMAL
PATH REPORT.GROSS SPEC: NORMAL
PATH REPORT.MICROSCOPIC SPEC OTHER STN: NORMAL
PATH REPORT.MICROSCOPIC SPEC OTHER STN: NORMAL
PATH REPORT.RELEVANT HX SPEC: NORMAL
PATH REPORT.RELEVANT HX SPEC: NORMAL
PCO2 BLDV: 34 MM HG (ref 40–50)
PCO2 BLDV: 50 MM HG (ref 40–50)
PCO2 BLDV: 58 MM HG (ref 40–50)
PH BLDV: 7.37 [PH] (ref 7.32–7.43)
PH BLDV: 7.4 [PH] (ref 7.32–7.43)
PH BLDV: 7.44 [PH] (ref 7.32–7.43)
PH BODY FLUID SOURCE: NORMAL
PH BODY FLUID SOURCE: NORMAL
PH FLD: 7.5 PH
PH FLD: NORMAL [PH]
PH UR STRIP: 5 [PH] (ref 5–7)
PH UR STRIP: 5 [PH] (ref 5–7)
PHOSPHATE SERPL-MCNC: 2.5 MG/DL (ref 2.5–4.5)
PHOSPHATE SERPL-MCNC: 2.5 MG/DL (ref 2.5–4.5)
PHOSPHATE SERPL-MCNC: 2.7 MG/DL (ref 2.5–4.5)
PHOSPHATE SERPL-MCNC: 2.8 MG/DL (ref 2.5–4.5)
PHOSPHATE SERPL-MCNC: 2.9 MG/DL (ref 2.5–4.5)
PHOSPHATE SERPL-MCNC: 3 MG/DL (ref 2.5–4.5)
PHOSPHATE SERPL-MCNC: 3 MG/DL (ref 2.5–4.5)
PHOSPHATE SERPL-MCNC: 3.1 MG/DL (ref 2.5–4.5)
PHOSPHATE SERPL-MCNC: 3.2 MG/DL (ref 2.5–4.5)
PHOSPHATE SERPL-MCNC: 3.3 MG/DL (ref 2.5–4.5)
PHOSPHATE SERPL-MCNC: 3.4 MG/DL (ref 2.5–4.5)
PHOSPHATE SERPL-MCNC: 3.4 MG/DL (ref 2.5–4.5)
PHOSPHATE SERPL-MCNC: 3.5 MG/DL (ref 2.5–4.5)
PHOSPHATE SERPL-MCNC: 3.5 MG/DL (ref 2.5–4.5)
PHOSPHATE SERPL-MCNC: 3.6 MG/DL (ref 2.5–4.5)
PLATELET # BLD AUTO: 121 10E3/UL (ref 150–450)
PLATELET # BLD AUTO: 147 10E3/UL (ref 150–450)
PLATELET # BLD AUTO: 164 10E3/UL (ref 150–450)
PLATELET # BLD AUTO: 182 10E3/UL (ref 150–450)
PLATELET # BLD AUTO: 187 10E3/UL (ref 150–450)
PLATELET # BLD AUTO: 193 10E3/UL (ref 150–450)
PLATELET # BLD AUTO: 195 10E3/UL (ref 150–450)
PLATELET # BLD AUTO: 198 10E3/UL (ref 150–450)
PLATELET # BLD AUTO: 198 10E3/UL (ref 150–450)
PLATELET # BLD AUTO: 199 10E3/UL (ref 150–450)
PLATELET # BLD AUTO: 200 10E3/UL (ref 150–450)
PLATELET # BLD AUTO: 201 10E3/UL (ref 150–450)
PLATELET # BLD AUTO: 207 10E3/UL (ref 150–450)
PLATELET # BLD AUTO: 213 10E3/UL (ref 150–450)
PLATELET # BLD AUTO: 225 10E3/UL (ref 150–450)
PLATELET # BLD AUTO: 228 10E3/UL (ref 150–450)
PLATELET # BLD AUTO: 236 10E3/UL (ref 150–450)
PLATELET # BLD AUTO: 239 10E3/UL (ref 150–450)
PLATELET # BLD AUTO: 242 10E3/UL (ref 150–450)
PLATELET # BLD AUTO: 257 10E3/UL (ref 150–450)
PLATELET # BLD AUTO: 260 10E3/UL (ref 150–450)
PLATELET # BLD AUTO: 337 10E3/UL (ref 150–450)
PLATELET # BLD AUTO: 358 10E3/UL (ref 150–450)
PLATELET # BLD AUTO: 384 10E3/UL (ref 150–450)
PLATELET # BLD AUTO: 395 10E3/UL (ref 150–450)
PLATELET # BLD AUTO: 399 10E3/UL (ref 150–450)
PLATELET # BLD AUTO: 399 10E3/UL (ref 150–450)
PLATELET # BLD AUTO: 413 10E3/UL (ref 150–450)
PLATELET # BLD AUTO: 436 10E3/UL (ref 150–450)
PLATELET # BLD AUTO: 452 10E3/UL (ref 150–450)
PLATELET # BLD AUTO: 457 10E3/UL (ref 150–450)
PLATELET # BLD AUTO: 459 10E3/UL (ref 150–450)
PLATELET # BLD AUTO: 472 10E3/UL (ref 150–450)
PLATELET # BLD AUTO: 507 10E3/UL (ref 150–450)
PLATELET # BLD AUTO: 554 10E3/UL (ref 150–450)
PLATELET # BLD AUTO: 614 10E3/UL (ref 150–450)
PLATELET # BLD AUTO: 625 10E3/UL (ref 150–450)
PO2 BLDV: 18 MM HG (ref 25–47)
PO2 BLDV: 22 MM HG (ref 25–47)
PO2 BLDV: 27 MM HG (ref 25–47)
POTASSIUM BLD-SCNC: 3.7 MMOL/L (ref 3.4–5.3)
POTASSIUM SERPL-SCNC: 2.7 MMOL/L (ref 3.4–5.3)
POTASSIUM SERPL-SCNC: 3.1 MMOL/L (ref 3.4–5.3)
POTASSIUM SERPL-SCNC: 3.2 MMOL/L (ref 3.4–5.3)
POTASSIUM SERPL-SCNC: 3.3 MMOL/L (ref 3.4–5.3)
POTASSIUM SERPL-SCNC: 3.4 MMOL/L (ref 3.4–5.3)
POTASSIUM SERPL-SCNC: 3.5 MMOL/L (ref 3.4–5.3)
POTASSIUM SERPL-SCNC: 3.6 MMOL/L (ref 3.4–5.3)
POTASSIUM SERPL-SCNC: 3.7 MMOL/L (ref 3.4–5.3)
POTASSIUM SERPL-SCNC: 3.8 MMOL/L (ref 3.4–5.3)
POTASSIUM SERPL-SCNC: 3.9 MMOL/L (ref 3.4–5.3)
POTASSIUM SERPL-SCNC: 4 MMOL/L (ref 3.4–5.3)
POTASSIUM SERPL-SCNC: 4.1 MMOL/L (ref 3.4–5.3)
POTASSIUM SERPL-SCNC: 4.2 MMOL/L (ref 3.4–5.3)
POTASSIUM SERPL-SCNC: 4.3 MMOL/L (ref 3.4–5.3)
POTASSIUM SERPL-SCNC: 4.3 MMOL/L (ref 3.4–5.3)
POTASSIUM SERPL-SCNC: 4.4 MMOL/L (ref 3.4–5.3)
POTASSIUM SERPL-SCNC: 4.4 MMOL/L (ref 3.4–5.3)
POTASSIUM SERPL-SCNC: 4.5 MMOL/L (ref 3.4–5.3)
POTASSIUM SERPL-SCNC: 4.5 MMOL/L (ref 3.4–5.3)
POTASSIUM SERPL-SCNC: 4.6 MMOL/L (ref 3.4–5.3)
POTASSIUM SERPL-SCNC: 4.6 MMOL/L (ref 3.4–5.3)
PR INTERVAL - MUSE: 134 MS
PR INTERVAL - MUSE: 140 MS
PR INTERVAL - MUSE: 150 MS
PR INTERVAL - MUSE: 154 MS
PR INTERVAL - MUSE: 184 MS
PROCALCITONIN SERPL IA-MCNC: 1.11 NG/ML
PROT FLD-MCNC: 1.6 G/DL
PROT FLD-MCNC: 2.3 G/DL
PROT SERPL-MCNC: 3.8 G/DL (ref 6.4–8.3)
PROT SERPL-MCNC: 4 G/DL (ref 6.4–8.3)
PROT SERPL-MCNC: 4 G/DL (ref 6.4–8.3)
PROT SERPL-MCNC: 4.1 G/DL (ref 6.4–8.3)
PROT SERPL-MCNC: 4.2 G/DL (ref 6.4–8.3)
PROT SERPL-MCNC: 4.3 G/DL (ref 6.4–8.3)
PROT SERPL-MCNC: 4.3 G/DL (ref 6.4–8.3)
PROT SERPL-MCNC: 4.4 G/DL (ref 6.4–8.3)
PROT SERPL-MCNC: 4.4 G/DL (ref 6.4–8.3)
PROT SERPL-MCNC: 4.5 G/DL (ref 6.4–8.3)
PROT SERPL-MCNC: 4.5 G/DL (ref 6.4–8.3)
PROT SERPL-MCNC: 4.7 G/DL (ref 6.4–8.3)
PROT SERPL-MCNC: 5.5 G/DL (ref 6.4–8.3)
PROT SERPL-MCNC: 6.1 G/DL (ref 6.4–8.3)
PROTEIN BODY FLUID SOURCE: NORMAL
PROTEIN BODY FLUID SOURCE: NORMAL
PYRIDOXAL PHOS SERPL-SCNC: 8.7 NMOL/L
QRS DURATION - MUSE: 112 MS
QRS DURATION - MUSE: 122 MS
QRS DURATION - MUSE: 122 MS
QRS DURATION - MUSE: 72 MS
QRS DURATION - MUSE: 98 MS
QT - MUSE: 336 MS
QT - MUSE: 360 MS
QT - MUSE: 366 MS
QT - MUSE: 390 MS
QT - MUSE: 394 MS
QTC - MUSE: 430 MS
QTC - MUSE: 432 MS
QTC - MUSE: 482 MS
QTC - MUSE: 527 MS
QTC - MUSE: 566 MS
R AXIS - MUSE: -22 DEGREES
R AXIS - MUSE: -28 DEGREES
R AXIS - MUSE: -3 DEGREES
R AXIS - MUSE: -36 DEGREES
R AXIS - MUSE: -39 DEGREES
RADIOLOGIST FLAGS: ABNORMAL
RADIOLOGIST FLAGS: ABNORMAL
RBC # BLD AUTO: 2.21 10E6/UL (ref 3.8–5.2)
RBC # BLD AUTO: 2.32 10E6/UL (ref 3.8–5.2)
RBC # BLD AUTO: 2.57 10E6/UL (ref 3.8–5.2)
RBC # BLD AUTO: 2.77 10E6/UL (ref 3.8–5.2)
RBC # BLD AUTO: 2.77 10E6/UL (ref 3.8–5.2)
RBC # BLD AUTO: 2.81 10E6/UL (ref 3.8–5.2)
RBC # BLD AUTO: 2.82 10E6/UL (ref 3.8–5.2)
RBC # BLD AUTO: 2.83 10E6/UL (ref 3.8–5.2)
RBC # BLD AUTO: 2.84 10E6/UL (ref 3.8–5.2)
RBC # BLD AUTO: 2.86 10E6/UL (ref 3.8–5.2)
RBC # BLD AUTO: 2.9 10E6/UL (ref 3.8–5.2)
RBC # BLD AUTO: 2.97 10E6/UL (ref 3.8–5.2)
RBC # BLD AUTO: 3.1 10E6/UL (ref 3.8–5.2)
RBC # BLD AUTO: 3.12 10E6/UL (ref 3.8–5.2)
RBC # BLD AUTO: 3.15 10E6/UL (ref 3.8–5.2)
RBC # BLD AUTO: 3.16 10E6/UL (ref 3.8–5.2)
RBC # BLD AUTO: 3.19 10E6/UL (ref 3.8–5.2)
RBC # BLD AUTO: 3.31 10E6/UL (ref 3.8–5.2)
RBC # BLD AUTO: 3.43 10E6/UL (ref 3.8–5.2)
RBC # BLD AUTO: 3.45 10E6/UL (ref 3.8–5.2)
RBC # BLD AUTO: 3.46 10E6/UL (ref 3.8–5.2)
RBC # BLD AUTO: 3.51 10E6/UL (ref 3.8–5.2)
RBC # BLD AUTO: 3.53 10E6/UL (ref 3.8–5.2)
RBC # BLD AUTO: 3.58 10E6/UL (ref 3.8–5.2)
RBC # BLD AUTO: 3.6 10E6/UL (ref 3.8–5.2)
RBC # BLD AUTO: 3.61 10E6/UL (ref 3.8–5.2)
RBC # BLD AUTO: 3.62 10E6/UL (ref 3.8–5.2)
RBC # BLD AUTO: 3.69 10E6/UL (ref 3.8–5.2)
RBC # BLD AUTO: 3.76 10E6/UL (ref 3.8–5.2)
RBC # BLD AUTO: 3.76 10E6/UL (ref 3.8–5.2)
RBC # BLD AUTO: 3.77 10E6/UL (ref 3.8–5.2)
RBC # BLD AUTO: 3.78 10E6/UL (ref 3.8–5.2)
RBC # BLD AUTO: 3.85 10E6/UL (ref 3.8–5.2)
RBC # BLD AUTO: 3.89 10E6/UL (ref 3.8–5.2)
RBC # BLD AUTO: 4.11 10E6/UL (ref 3.8–5.2)
RBC # BLD AUTO: 4.23 10E6/UL (ref 3.8–5.2)
RBC # BLD AUTO: 4.99 10E6/UL (ref 3.8–5.2)
RBC URINE: 1 /HPF
RBC URINE: 4 /HPF
RENAL TUB EPI: 3 /HPF
RSV RNA SPEC QL NAA+PROBE: NOT DETECTED
RSV RNA SPEC QL NAA+PROBE: NOT DETECTED
RV+EV RNA SPEC QL NAA+PROBE: NOT DETECTED
S PNEUM AG SPEC QL: NEGATIVE
SA TARGET DNA: NEGATIVE
SAO2 % BLDV: 37 % (ref 94–100)
SARS-COV-2 RNA RESP QL NAA+PROBE: NEGATIVE
SODIUM BLD-SCNC: 133 MMOL/L (ref 133–144)
SODIUM SERPL-SCNC: 127 MMOL/L (ref 136–145)
SODIUM SERPL-SCNC: 130 MMOL/L (ref 136–145)
SODIUM SERPL-SCNC: 131 MMOL/L (ref 136–145)
SODIUM SERPL-SCNC: 132 MMOL/L (ref 136–145)
SODIUM SERPL-SCNC: 133 MMOL/L (ref 136–145)
SODIUM SERPL-SCNC: 134 MMOL/L (ref 136–145)
SODIUM SERPL-SCNC: 135 MMOL/L (ref 136–145)
SODIUM SERPL-SCNC: 136 MMOL/L (ref 136–145)
SODIUM SERPL-SCNC: 137 MMOL/L (ref 136–145)
SODIUM SERPL-SCNC: 140 MMOL/L (ref 136–145)
SODIUM UR-SCNC: 20 MMOL/L
SP GR UR STRIP: 1.02 (ref 1–1.03)
SP GR UR STRIP: 1.02 (ref 1–1.03)
SPECIMEN EXPIRATION DATE: NORMAL
SQUAMOUS EPITHELIAL: 5 /HPF
SYSTOLIC BLOOD PRESSURE - MUSE: NORMAL MMHG
T AXIS - MUSE: -15 DEGREES
T AXIS - MUSE: 100 DEGREES
T AXIS - MUSE: 133 DEGREES
T AXIS - MUSE: 27 DEGREES
T AXIS - MUSE: 68 DEGREES
TRANSITIONAL EPI: <1 /HPF
TRIGL FLD-MCNC: 23 MG/DL
TRIGL FLD-MCNC: 41 MG/DL
TRIGLYCERIDE BODY FLUID SOURCE: NORMAL
TRIGLYCERIDE BODY FLUID SOURCE: NORMAL
TROPONIN T SERPL HS-MCNC: 108 NG/L
TROPONIN T SERPL HS-MCNC: 109 NG/L
TROPONIN T SERPL HS-MCNC: 56 NG/L
TROPONIN T SERPL HS-MCNC: 57 NG/L
TROPONIN T SERPL HS-MCNC: 65 NG/L
TROPONIN T SERPL HS-MCNC: 69 NG/L
TROPONIN T SERPL HS-MCNC: 72 NG/L
TROPONIN T SERPL HS-MCNC: 82 NG/L
UFH PPP CHRO-ACNC: 0.37 IU/ML
UFH PPP CHRO-ACNC: 0.44 IU/ML
UFH PPP CHRO-ACNC: 0.53 IU/ML
UFH PPP CHRO-ACNC: 0.76 IU/ML
UNIT ABO/RH: NORMAL
UNIT NUMBER: NORMAL
UNIT STATUS: NORMAL
UNIT TYPE ISBT: 5100
UPPER GI ENDOSCOPY: NORMAL
UROBILINOGEN UR STRIP-MCNC: 4 MG/DL
UROBILINOGEN UR STRIP-MCNC: NORMAL MG/DL
VENTRICULAR RATE- MUSE: 110 BPM
VENTRICULAR RATE- MUSE: 124 BPM
VENTRICULAR RATE- MUSE: 124 BPM
VENTRICULAR RATE- MUSE: 84 BPM
VENTRICULAR RATE- MUSE: 86 BPM
VIT B12 SERPL-MCNC: 278 PG/ML (ref 232–1245)
WBC # BLD AUTO: 10.1 10E3/UL (ref 4–11)
WBC # BLD AUTO: 10.4 10E3/UL (ref 4–11)
WBC # BLD AUTO: 10.6 10E3/UL (ref 4–11)
WBC # BLD AUTO: 11.2 10E3/UL (ref 4–11)
WBC # BLD AUTO: 11.4 10E3/UL (ref 4–11)
WBC # BLD AUTO: 13 10E3/UL (ref 4–11)
WBC # BLD AUTO: 13.5 10E3/UL (ref 4–11)
WBC # BLD AUTO: 15.2 10E3/UL (ref 4–11)
WBC # BLD AUTO: 19.3 10E3/UL (ref 4–11)
WBC # BLD AUTO: 2.9 10E3/UL (ref 4–11)
WBC # BLD AUTO: 3.7 10E3/UL (ref 4–11)
WBC # BLD AUTO: 4.2 10E3/UL (ref 4–11)
WBC # BLD AUTO: 4.4 10E3/UL (ref 4–11)
WBC # BLD AUTO: 4.5 10E3/UL (ref 4–11)
WBC # BLD AUTO: 4.7 10E3/UL (ref 4–11)
WBC # BLD AUTO: 4.8 10E3/UL (ref 4–11)
WBC # BLD AUTO: 5 10E3/UL (ref 4–11)
WBC # BLD AUTO: 5.2 10E3/UL (ref 4–11)
WBC # BLD AUTO: 5.6 10E3/UL (ref 4–11)
WBC # BLD AUTO: 6 10E3/UL (ref 4–11)
WBC # BLD AUTO: 6.1 10E3/UL (ref 4–11)
WBC # BLD AUTO: 6.2 10E3/UL (ref 4–11)
WBC # BLD AUTO: 6.7 10E3/UL (ref 4–11)
WBC # BLD AUTO: 6.8 10E3/UL (ref 4–11)
WBC # BLD AUTO: 7.1 10E3/UL (ref 4–11)
WBC # BLD AUTO: 7.1 10E3/UL (ref 4–11)
WBC # BLD AUTO: 7.2 10E3/UL (ref 4–11)
WBC # BLD AUTO: 7.7 10E3/UL (ref 4–11)
WBC # BLD AUTO: 7.8 10E3/UL (ref 4–11)
WBC # BLD AUTO: 7.9 10E3/UL (ref 4–11)
WBC # BLD AUTO: 8.3 10E3/UL (ref 4–11)
WBC # BLD AUTO: 8.9 10E3/UL (ref 4–11)
WBC # BLD AUTO: 9.7 10E3/UL (ref 4–11)
WBC # BLD AUTO: 9.9 10E3/UL (ref 4–11)
WBC # BLD AUTO: 9.9 10E3/UL (ref 4–11)
WBC # FLD AUTO: 1545 /UL
WBC # FLD AUTO: 2231 /UL
WBC CLUMPS #/AREA URNS HPF: PRESENT /HPF
WBC URINE: 1 /HPF
WBC URINE: 136 /HPF

## 2023-01-01 PROCEDURE — 258N000003 HC RX IP 258 OP 636: Performed by: NURSE PRACTITIONER

## 2023-01-01 PROCEDURE — 94640 AIRWAY INHALATION TREATMENT: CPT | Mod: 76

## 2023-01-01 PROCEDURE — 120N000002 HC R&B MED SURG/OB UMMC

## 2023-01-01 PROCEDURE — 84155 ASSAY OF PROTEIN SERUM: CPT

## 2023-01-01 PROCEDURE — 250N000011 HC RX IP 250 OP 636: Performed by: INTERNAL MEDICINE

## 2023-01-01 PROCEDURE — 250N000009 HC RX 250: Performed by: NURSE PRACTITIONER

## 2023-01-01 PROCEDURE — 250N000011 HC RX IP 250 OP 636: Performed by: NURSE PRACTITIONER

## 2023-01-01 PROCEDURE — 99233 SBSQ HOSP IP/OBS HIGH 50: CPT | Performed by: INTERNAL MEDICINE

## 2023-01-01 PROCEDURE — 36415 COLL VENOUS BLD VENIPUNCTURE: CPT | Performed by: INTERNAL MEDICINE

## 2023-01-01 PROCEDURE — 250N000013 HC RX MED GY IP 250 OP 250 PS 637: Performed by: PHYSICIAN ASSISTANT

## 2023-01-01 PROCEDURE — 250N000011 HC RX IP 250 OP 636: Mod: JZ | Performed by: PHYSICIAN ASSISTANT

## 2023-01-01 PROCEDURE — 83735 ASSAY OF MAGNESIUM: CPT | Performed by: NURSE PRACTITIONER

## 2023-01-01 PROCEDURE — 250N000013 HC RX MED GY IP 250 OP 250 PS 637: Performed by: STUDENT IN AN ORGANIZED HEALTH CARE EDUCATION/TRAINING PROGRAM

## 2023-01-01 PROCEDURE — 250N000012 HC RX MED GY IP 250 OP 636 PS 637: Performed by: PHYSICIAN ASSISTANT

## 2023-01-01 PROCEDURE — 250N000013 HC RX MED GY IP 250 OP 250 PS 637: Performed by: NURSE PRACTITIONER

## 2023-01-01 PROCEDURE — 999N000157 HC STATISTIC RCP TIME EA 10 MIN

## 2023-01-01 PROCEDURE — 85027 COMPLETE CBC AUTOMATED: CPT | Performed by: INTERNAL MEDICINE

## 2023-01-01 PROCEDURE — 80048 BASIC METABOLIC PNL TOTAL CA: CPT | Performed by: NURSE PRACTITIONER

## 2023-01-01 PROCEDURE — 258N000001 HC RX 258: Performed by: INTERNAL MEDICINE

## 2023-01-01 PROCEDURE — 99231 SBSQ HOSP IP/OBS SF/LOW 25: CPT | Performed by: PHYSICIAN ASSISTANT

## 2023-01-01 PROCEDURE — 250N000013 HC RX MED GY IP 250 OP 250 PS 637: Performed by: INTERNAL MEDICINE

## 2023-01-01 PROCEDURE — 94640 AIRWAY INHALATION TREATMENT: CPT

## 2023-01-01 PROCEDURE — 85018 HEMOGLOBIN: CPT | Performed by: INTERNAL MEDICINE

## 2023-01-01 PROCEDURE — 258N000001 HC RX 258

## 2023-01-01 PROCEDURE — 99231 SBSQ HOSP IP/OBS SF/LOW 25: CPT | Performed by: INTERNAL MEDICINE

## 2023-01-01 PROCEDURE — G0463 HOSPITAL OUTPT CLINIC VISIT: HCPCS

## 2023-01-01 PROCEDURE — 82947 ASSAY GLUCOSE BLOOD QUANT: CPT | Performed by: EMERGENCY MEDICINE

## 2023-01-01 PROCEDURE — 258N000003 HC RX IP 258 OP 636: Performed by: INTERNAL MEDICINE

## 2023-01-01 PROCEDURE — 82374 ASSAY BLOOD CARBON DIOXIDE: CPT | Performed by: NURSE PRACTITIONER

## 2023-01-01 PROCEDURE — 250N000013 HC RX MED GY IP 250 OP 250 PS 637

## 2023-01-01 PROCEDURE — 87205 SMEAR GRAM STAIN: CPT | Performed by: INTERNAL MEDICINE

## 2023-01-01 PROCEDURE — 82962 GLUCOSE BLOOD TEST: CPT

## 2023-01-01 PROCEDURE — 99223 1ST HOSP IP/OBS HIGH 75: CPT | Mod: GC | Performed by: INTERNAL MEDICINE

## 2023-01-01 PROCEDURE — 83735 ASSAY OF MAGNESIUM: CPT | Performed by: INTERNAL MEDICINE

## 2023-01-01 PROCEDURE — 250N000012 HC RX MED GY IP 250 OP 636 PS 637: Performed by: INTERNAL MEDICINE

## 2023-01-01 PROCEDURE — 84132 ASSAY OF SERUM POTASSIUM: CPT | Performed by: INTERNAL MEDICINE

## 2023-01-01 PROCEDURE — 250N000009 HC RX 250: Performed by: INTERNAL MEDICINE

## 2023-01-01 PROCEDURE — 83880 ASSAY OF NATRIURETIC PEPTIDE: CPT | Performed by: STUDENT IN AN ORGANIZED HEALTH CARE EDUCATION/TRAINING PROGRAM

## 2023-01-01 PROCEDURE — 99207 PR NO CHARGE NURSE ONLY: CPT

## 2023-01-01 PROCEDURE — 99232 SBSQ HOSP IP/OBS MODERATE 35: CPT | Performed by: INTERNAL MEDICINE

## 2023-01-01 PROCEDURE — 87798 DETECT AGENT NOS DNA AMP: CPT

## 2023-01-01 PROCEDURE — 99232 SBSQ HOSP IP/OBS MODERATE 35: CPT | Performed by: PHYSICIAN ASSISTANT

## 2023-01-01 PROCEDURE — 82306 VITAMIN D 25 HYDROXY: CPT

## 2023-01-01 PROCEDURE — 999N000007 HC SITE CHECK

## 2023-01-01 PROCEDURE — 87899 AGENT NOS ASSAY W/OPTIC: CPT | Performed by: NURSE PRACTITIONER

## 2023-01-01 PROCEDURE — 71250 CT THORAX DX C-: CPT | Mod: 26 | Performed by: RADIOLOGY

## 2023-01-01 PROCEDURE — 36415 COLL VENOUS BLD VENIPUNCTURE: CPT | Performed by: EMERGENCY MEDICINE

## 2023-01-01 PROCEDURE — 99284 EMERGENCY DEPT VISIT MOD MDM: CPT | Mod: CS | Performed by: FAMILY MEDICINE

## 2023-01-01 PROCEDURE — 85610 PROTHROMBIN TIME: CPT | Performed by: INTERNAL MEDICINE

## 2023-01-01 PROCEDURE — 32555 ASPIRATE PLEURA W/ IMAGING: CPT

## 2023-01-01 PROCEDURE — 36415 COLL VENOUS BLD VENIPUNCTURE: CPT | Performed by: PHYSICIAN ASSISTANT

## 2023-01-01 PROCEDURE — 83690 ASSAY OF LIPASE: CPT | Performed by: FAMILY MEDICINE

## 2023-01-01 PROCEDURE — 84100 ASSAY OF PHOSPHORUS: CPT | Performed by: INTERNAL MEDICINE

## 2023-01-01 PROCEDURE — 99223 1ST HOSP IP/OBS HIGH 75: CPT | Performed by: INTERNAL MEDICINE

## 2023-01-01 PROCEDURE — 99233 SBSQ HOSP IP/OBS HIGH 50: CPT | Performed by: PHYSICIAN ASSISTANT

## 2023-01-01 PROCEDURE — 71045 X-RAY EXAM CHEST 1 VIEW: CPT | Mod: 26 | Performed by: RADIOLOGY

## 2023-01-01 PROCEDURE — 87641 MR-STAPH DNA AMP PROBE: CPT | Performed by: NURSE PRACTITIONER

## 2023-01-01 PROCEDURE — 272N000202 HC AEROBIKA WITH MANOMETER

## 2023-01-01 PROCEDURE — 250N000011 HC RX IP 250 OP 636: Performed by: STUDENT IN AN ORGANIZED HEALTH CARE EDUCATION/TRAINING PROGRAM

## 2023-01-01 PROCEDURE — 80048 BASIC METABOLIC PNL TOTAL CA: CPT | Performed by: INTERNAL MEDICINE

## 2023-01-01 PROCEDURE — 99239 HOSP IP/OBS DSCHRG MGMT >30: CPT | Performed by: PHYSICIAN ASSISTANT

## 2023-01-01 PROCEDURE — 32557 INSERT CATH PLEURA W/ IMAGE: CPT

## 2023-01-01 PROCEDURE — 36415 COLL VENOUS BLD VENIPUNCTURE: CPT

## 2023-01-01 PROCEDURE — 250N000009 HC RX 250

## 2023-01-01 PROCEDURE — 84300 ASSAY OF URINE SODIUM: CPT | Performed by: INTERNAL MEDICINE

## 2023-01-01 PROCEDURE — 97530 THERAPEUTIC ACTIVITIES: CPT | Mod: GO

## 2023-01-01 PROCEDURE — 250N000011 HC RX IP 250 OP 636: Performed by: EMERGENCY MEDICINE

## 2023-01-01 PROCEDURE — G1010 CDSM STANSON: HCPCS

## 2023-01-01 PROCEDURE — 83735 ASSAY OF MAGNESIUM: CPT | Performed by: PEDIATRICS

## 2023-01-01 PROCEDURE — 250N000011 HC RX IP 250 OP 636: Mod: JZ | Performed by: INTERNAL MEDICINE

## 2023-01-01 PROCEDURE — 200N000002 HC R&B ICU UMMC

## 2023-01-01 PROCEDURE — 272N000001 HC OR GENERAL SUPPLY STERILE: Performed by: INTERNAL MEDICINE

## 2023-01-01 PROCEDURE — 74018 RADEX ABDOMEN 1 VIEW: CPT

## 2023-01-01 PROCEDURE — 82310 ASSAY OF CALCIUM: CPT | Performed by: INTERNAL MEDICINE

## 2023-01-01 PROCEDURE — 94799 UNLISTED PULMONARY SVC/PX: CPT

## 2023-01-01 PROCEDURE — 85025 COMPLETE CBC W/AUTO DIFF WBC: CPT | Performed by: INTERNAL MEDICINE

## 2023-01-01 PROCEDURE — 83930 ASSAY OF BLOOD OSMOLALITY: CPT | Performed by: INTERNAL MEDICINE

## 2023-01-01 PROCEDURE — 74177 CT ABD & PELVIS W/CONTRAST: CPT | Mod: MG

## 2023-01-01 PROCEDURE — 99222 1ST HOSP IP/OBS MODERATE 55: CPT | Mod: GC | Performed by: INTERNAL MEDICINE

## 2023-01-01 PROCEDURE — 85025 COMPLETE CBC W/AUTO DIFF WBC: CPT | Performed by: FAMILY MEDICINE

## 2023-01-01 PROCEDURE — 85041 AUTOMATED RBC COUNT: CPT | Performed by: INTERNAL MEDICINE

## 2023-01-01 PROCEDURE — 0W993ZZ DRAINAGE OF RIGHT PLEURAL CAVITY, PERCUTANEOUS APPROACH: ICD-10-PCS | Performed by: PHYSICIAN ASSISTANT

## 2023-01-01 PROCEDURE — 93970 EXTREMITY STUDY: CPT | Mod: 26 | Performed by: RADIOLOGY

## 2023-01-01 PROCEDURE — 80048 BASIC METABOLIC PNL TOTAL CA: CPT | Performed by: PHYSICIAN ASSISTANT

## 2023-01-01 PROCEDURE — 36415 COLL VENOUS BLD VENIPUNCTURE: CPT | Performed by: NURSE PRACTITIONER

## 2023-01-01 PROCEDURE — 84100 ASSAY OF PHOSPHORUS: CPT | Performed by: PEDIATRICS

## 2023-01-01 PROCEDURE — 93325 DOPPLER ECHO COLOR FLOW MAPG: CPT | Mod: 26 | Performed by: INTERNAL MEDICINE

## 2023-01-01 PROCEDURE — 92610 EVALUATE SWALLOWING FUNCTION: CPT | Mod: GN | Performed by: SPEECH-LANGUAGE PATHOLOGIST

## 2023-01-01 PROCEDURE — 85027 COMPLETE CBC AUTOMATED: CPT

## 2023-01-01 PROCEDURE — 999N000141 HC STATISTIC PRE-PROCEDURE NURSING ASSESSMENT: Performed by: INTERNAL MEDICINE

## 2023-01-01 PROCEDURE — 999N000179 XR SURGERY CARM FLUORO LESS THAN 5 MIN W STILLS: Mod: TC

## 2023-01-01 PROCEDURE — 93321 DOPPLER ECHO F-UP/LMTD STD: CPT | Mod: 26 | Performed by: INTERNAL MEDICINE

## 2023-01-01 PROCEDURE — 88305 TISSUE EXAM BY PATHOLOGIST: CPT | Mod: 26 | Performed by: PATHOLOGY

## 2023-01-01 PROCEDURE — 96361 HYDRATE IV INFUSION ADD-ON: CPT | Performed by: EMERGENCY MEDICINE

## 2023-01-01 PROCEDURE — 82803 BLOOD GASES ANY COMBINATION: CPT | Performed by: NURSE PRACTITIONER

## 2023-01-01 PROCEDURE — 250N000011 HC RX IP 250 OP 636

## 2023-01-01 PROCEDURE — 86850 RBC ANTIBODY SCREEN: CPT | Performed by: INTERNAL MEDICINE

## 2023-01-01 PROCEDURE — G1010 CDSM STANSON: HCPCS | Mod: GC | Performed by: RADIOLOGY

## 2023-01-01 PROCEDURE — 97110 THERAPEUTIC EXERCISES: CPT | Mod: GO | Performed by: OCCUPATIONAL THERAPIST

## 2023-01-01 PROCEDURE — 81001 URINALYSIS AUTO W/SCOPE: CPT | Performed by: INTERNAL MEDICINE

## 2023-01-01 PROCEDURE — 84484 ASSAY OF TROPONIN QUANT: CPT | Performed by: INTERNAL MEDICINE

## 2023-01-01 PROCEDURE — 82330 ASSAY OF CALCIUM: CPT | Performed by: NURSE PRACTITIONER

## 2023-01-01 PROCEDURE — 87102 FUNGUS ISOLATION CULTURE: CPT

## 2023-01-01 PROCEDURE — 999N000127 HC STATISTIC PERIPHERAL IV START W US GUIDANCE

## 2023-01-01 PROCEDURE — 83615 LACTATE (LD) (LDH) ENZYME: CPT

## 2023-01-01 PROCEDURE — 83735 ASSAY OF MAGNESIUM: CPT | Performed by: STUDENT IN AN ORGANIZED HEALTH CARE EDUCATION/TRAINING PROGRAM

## 2023-01-01 PROCEDURE — 370N000017 HC ANESTHESIA TECHNICAL FEE, PER MIN: Performed by: INTERNAL MEDICINE

## 2023-01-01 PROCEDURE — 74018 RADEX ABDOMEN 1 VIEW: CPT | Mod: 26 | Performed by: RADIOLOGY

## 2023-01-01 PROCEDURE — 85025 COMPLETE CBC W/AUTO DIFF WBC: CPT | Performed by: EMERGENCY MEDICINE

## 2023-01-01 PROCEDURE — 36415 COLL VENOUS BLD VENIPUNCTURE: CPT | Performed by: FAMILY MEDICINE

## 2023-01-01 PROCEDURE — 710N000010 HC RECOVERY PHASE 1, LEVEL 2, PER MIN: Performed by: INTERNAL MEDICINE

## 2023-01-01 PROCEDURE — 99291 CRITICAL CARE FIRST HOUR: CPT

## 2023-01-01 PROCEDURE — 71045 X-RAY EXAM CHEST 1 VIEW: CPT

## 2023-01-01 PROCEDURE — 99497 ADVNCD CARE PLAN 30 MIN: CPT | Performed by: INTERNAL MEDICINE

## 2023-01-01 PROCEDURE — 250N000025 HC SEVOFLURANE, PER MIN: Performed by: INTERNAL MEDICINE

## 2023-01-01 PROCEDURE — 88305 TISSUE EXAM BY PATHOLOGIST: CPT | Mod: TC

## 2023-01-01 PROCEDURE — 80053 COMPREHEN METABOLIC PANEL: CPT | Performed by: FAMILY MEDICINE

## 2023-01-01 PROCEDURE — 82310 ASSAY OF CALCIUM: CPT | Performed by: NURSE PRACTITIONER

## 2023-01-01 PROCEDURE — 99232 SBSQ HOSP IP/OBS MODERATE 35: CPT | Mod: 25 | Performed by: INTERNAL MEDICINE

## 2023-01-01 PROCEDURE — 32555 ASPIRATE PLEURA W/ IMAGING: CPT | Mod: LT | Performed by: PHYSICIAN ASSISTANT

## 2023-01-01 PROCEDURE — 85014 HEMATOCRIT: CPT

## 2023-01-01 PROCEDURE — 84132 ASSAY OF SERUM POTASSIUM: CPT | Performed by: PHYSICIAN ASSISTANT

## 2023-01-01 PROCEDURE — 82947 ASSAY GLUCOSE BLOOD QUANT: CPT | Performed by: INTERNAL MEDICINE

## 2023-01-01 PROCEDURE — 85520 HEPARIN ASSAY: CPT | Performed by: EMERGENCY MEDICINE

## 2023-01-01 PROCEDURE — 99285 EMERGENCY DEPT VISIT HI MDM: CPT | Performed by: EMERGENCY MEDICINE

## 2023-01-01 PROCEDURE — 99231 SBSQ HOSP IP/OBS SF/LOW 25: CPT | Mod: GC | Performed by: INTERNAL MEDICINE

## 2023-01-01 PROCEDURE — 83735 ASSAY OF MAGNESIUM: CPT | Performed by: PHYSICIAN ASSISTANT

## 2023-01-01 PROCEDURE — 250N000009 HC RX 250: Performed by: NURSE ANESTHETIST, CERTIFIED REGISTERED

## 2023-01-01 PROCEDURE — 80053 COMPREHEN METABOLIC PANEL: CPT | Performed by: INTERNAL MEDICINE

## 2023-01-01 PROCEDURE — 93306 TTE W/DOPPLER COMPLETE: CPT | Mod: 26 | Performed by: INTERNAL MEDICINE

## 2023-01-01 PROCEDURE — 99233 SBSQ HOSP IP/OBS HIGH 50: CPT | Mod: GC | Performed by: INTERNAL MEDICINE

## 2023-01-01 PROCEDURE — 83735 ASSAY OF MAGNESIUM: CPT | Performed by: EMERGENCY MEDICINE

## 2023-01-01 PROCEDURE — 87040 BLOOD CULTURE FOR BACTERIA: CPT | Performed by: INTERNAL MEDICINE

## 2023-01-01 PROCEDURE — 92526 ORAL FUNCTION THERAPY: CPT | Mod: GN

## 2023-01-01 PROCEDURE — 99232 SBSQ HOSP IP/OBS MODERATE 35: CPT

## 2023-01-01 PROCEDURE — 74220 X-RAY XM ESOPHAGUS 1CNTRST: CPT | Mod: 26 | Performed by: STUDENT IN AN ORGANIZED HEALTH CARE EDUCATION/TRAINING PROGRAM

## 2023-01-01 PROCEDURE — 85520 HEPARIN ASSAY: CPT | Performed by: INTERNAL MEDICINE

## 2023-01-01 PROCEDURE — 93010 ELECTROCARDIOGRAM REPORT: CPT | Mod: 76 | Performed by: INTERNAL MEDICINE

## 2023-01-01 PROCEDURE — 82746 ASSAY OF FOLIC ACID SERUM: CPT | Performed by: INTERNAL MEDICINE

## 2023-01-01 PROCEDURE — 99207 PR CDG-CUT & PASTE-POTENTIAL IMPACT ON LEVEL: CPT | Performed by: INTERNAL MEDICINE

## 2023-01-01 PROCEDURE — 12005 RPR S/N/A/GEN/TRK12.6-20.0CM: CPT | Performed by: EMERGENCY MEDICINE

## 2023-01-01 PROCEDURE — 84155 ASSAY OF PROTEIN SERUM: CPT | Performed by: EMERGENCY MEDICINE

## 2023-01-01 PROCEDURE — 74018 RADEX ABDOMEN 1 VIEW: CPT | Mod: 26 | Performed by: STUDENT IN AN ORGANIZED HEALTH CARE EDUCATION/TRAINING PROGRAM

## 2023-01-01 PROCEDURE — 71275 CT ANGIOGRAPHY CHEST: CPT | Mod: 26 | Performed by: RADIOLOGY

## 2023-01-01 PROCEDURE — 93325 DOPPLER ECHO COLOR FLOW MAPG: CPT

## 2023-01-01 PROCEDURE — 99283 EMERGENCY DEPT VISIT LOW MDM: CPT | Performed by: EMERGENCY MEDICINE

## 2023-01-01 PROCEDURE — 84145 PROCALCITONIN (PCT): CPT | Performed by: INTERNAL MEDICINE

## 2023-01-01 PROCEDURE — 84100 ASSAY OF PHOSPHORUS: CPT | Performed by: NURSE PRACTITIONER

## 2023-01-01 PROCEDURE — 84100 ASSAY OF PHOSPHORUS: CPT | Performed by: STUDENT IN AN ORGANIZED HEALTH CARE EDUCATION/TRAINING PROGRAM

## 2023-01-01 PROCEDURE — 96375 TX/PRO/DX INJ NEW DRUG ADDON: CPT | Performed by: FAMILY MEDICINE

## 2023-01-01 PROCEDURE — 87086 URINE CULTURE/COLONY COUNT: CPT | Performed by: FAMILY MEDICINE

## 2023-01-01 PROCEDURE — 82150 ASSAY OF AMYLASE: CPT

## 2023-01-01 PROCEDURE — 83605 ASSAY OF LACTIC ACID: CPT | Performed by: NURSE PRACTITIONER

## 2023-01-01 PROCEDURE — 0WP930Z REMOVAL OF DRAINAGE DEVICE FROM RIGHT PLEURAL CAVITY, PERCUTANEOUS APPROACH: ICD-10-PCS | Performed by: PHYSICIAN ASSISTANT

## 2023-01-01 PROCEDURE — 84132 ASSAY OF SERUM POTASSIUM: CPT | Performed by: NURSE PRACTITIONER

## 2023-01-01 PROCEDURE — 85041 AUTOMATED RBC COUNT: CPT | Performed by: STUDENT IN AN ORGANIZED HEALTH CARE EDUCATION/TRAINING PROGRAM

## 2023-01-01 PROCEDURE — 32557 INSERT CATH PLEURA W/ IMAGE: CPT | Mod: RT | Performed by: PHYSICIAN ASSISTANT

## 2023-01-01 PROCEDURE — 76705 ECHO EXAM OF ABDOMEN: CPT | Mod: 26 | Performed by: RADIOLOGY

## 2023-01-01 PROCEDURE — G0463 HOSPITAL OUTPT CLINIC VISIT: HCPCS | Mod: 27

## 2023-01-01 PROCEDURE — 255N000002 HC RX 255 OP 636: Performed by: INTERNAL MEDICINE

## 2023-01-01 PROCEDURE — 82533 TOTAL CORTISOL: CPT | Performed by: INTERNAL MEDICINE

## 2023-01-01 PROCEDURE — 96365 THER/PROPH/DIAG IV INF INIT: CPT | Mod: 59 | Performed by: EMERGENCY MEDICINE

## 2023-01-01 PROCEDURE — 73610 X-RAY EXAM OF ANKLE: CPT | Mod: 50

## 2023-01-01 PROCEDURE — 85014 HEMATOCRIT: CPT | Performed by: INTERNAL MEDICINE

## 2023-01-01 PROCEDURE — 97165 OT EVAL LOW COMPLEX 30 MIN: CPT | Mod: GO

## 2023-01-01 PROCEDURE — 250N000009 HC RX 250: Performed by: EMERGENCY MEDICINE

## 2023-01-01 PROCEDURE — 82947 ASSAY GLUCOSE BLOOD QUANT: CPT

## 2023-01-01 PROCEDURE — 999N000208 ECHOCARDIOGRAM COMPLETE

## 2023-01-01 PROCEDURE — 83735 ASSAY OF MAGNESIUM: CPT | Performed by: FAMILY MEDICINE

## 2023-01-01 PROCEDURE — 82330 ASSAY OF CALCIUM: CPT

## 2023-01-01 PROCEDURE — 250N000011 HC RX IP 250 OP 636: Performed by: PHYSICIAN ASSISTANT

## 2023-01-01 PROCEDURE — 82465 ASSAY BLD/SERUM CHOLESTEROL: CPT

## 2023-01-01 PROCEDURE — 99283 EMERGENCY DEPT VISIT LOW MDM: CPT | Mod: 25 | Performed by: EMERGENCY MEDICINE

## 2023-01-01 PROCEDURE — 87205 SMEAR GRAM STAIN: CPT

## 2023-01-01 PROCEDURE — 89050 BODY FLUID CELL COUNT: CPT

## 2023-01-01 PROCEDURE — 96361 HYDRATE IV INFUSION ADD-ON: CPT | Performed by: FAMILY MEDICINE

## 2023-01-01 PROCEDURE — 83735 ASSAY OF MAGNESIUM: CPT

## 2023-01-01 PROCEDURE — 999N000040 HC STATISTIC CONSULT NO CHARGE VASC ACCESS

## 2023-01-01 PROCEDURE — 258N000001 HC RX 258: Performed by: EMERGENCY MEDICINE

## 2023-01-01 PROCEDURE — 93010 ELECTROCARDIOGRAM REPORT: CPT | Performed by: INTERNAL MEDICINE

## 2023-01-01 PROCEDURE — 87486 CHLMYD PNEUM DNA AMP PROBE: CPT | Performed by: EMERGENCY MEDICINE

## 2023-01-01 PROCEDURE — 86901 BLOOD TYPING SEROLOGIC RH(D): CPT | Performed by: INTERNAL MEDICINE

## 2023-01-01 PROCEDURE — 96375 TX/PRO/DX INJ NEW DRUG ADDON: CPT | Performed by: EMERGENCY MEDICINE

## 2023-01-01 PROCEDURE — 94642 AEROSOL INHALATION TREATMENT: CPT

## 2023-01-01 PROCEDURE — 93308 TTE F-UP OR LMTD: CPT | Mod: 26 | Performed by: INTERNAL MEDICINE

## 2023-01-01 PROCEDURE — 97110 THERAPEUTIC EXERCISES: CPT | Mod: GO

## 2023-01-01 PROCEDURE — 84100 ASSAY OF PHOSPHORUS: CPT | Performed by: PHYSICIAN ASSISTANT

## 2023-01-01 PROCEDURE — P9047 ALBUMIN (HUMAN), 25%, 50ML: HCPCS | Performed by: NURSE PRACTITIONER

## 2023-01-01 PROCEDURE — 99291 CRITICAL CARE FIRST HOUR: CPT | Performed by: NURSE PRACTITIONER

## 2023-01-01 PROCEDURE — 0W9B3ZZ DRAINAGE OF LEFT PLEURAL CAVITY, PERCUTANEOUS APPROACH: ICD-10-PCS | Performed by: PHYSICIAN ASSISTANT

## 2023-01-01 PROCEDURE — 99232 SBSQ HOSP IP/OBS MODERATE 35: CPT | Mod: GC | Performed by: INTERNAL MEDICINE

## 2023-01-01 PROCEDURE — 82248 BILIRUBIN DIRECT: CPT

## 2023-01-01 PROCEDURE — 250N000011 HC RX IP 250 OP 636: Performed by: FAMILY MEDICINE

## 2023-01-01 PROCEDURE — 87556 M.TUBERCULO DNA AMP PROBE: CPT

## 2023-01-01 PROCEDURE — C1729 CATH, DRAINAGE: HCPCS

## 2023-01-01 PROCEDURE — 96365 THER/PROPH/DIAG IV INF INIT: CPT | Performed by: FAMILY MEDICINE

## 2023-01-01 PROCEDURE — 258N000003 HC RX IP 258 OP 636: Performed by: NURSE ANESTHETIST, CERTIFIED REGISTERED

## 2023-01-01 PROCEDURE — 85018 HEMOGLOBIN: CPT

## 2023-01-01 PROCEDURE — C9803 HOPD COVID-19 SPEC COLLECT: HCPCS | Performed by: FAMILY MEDICINE

## 2023-01-01 PROCEDURE — 272N000502 HC NEEDLE CR3

## 2023-01-01 PROCEDURE — 93005 ELECTROCARDIOGRAM TRACING: CPT

## 2023-01-01 PROCEDURE — 76000 FLUOROSCOPY <1 HR PHYS/QHP: CPT

## 2023-01-01 PROCEDURE — 258N000003 HC RX IP 258 OP 636: Performed by: STUDENT IN AN ORGANIZED HEALTH CARE EDUCATION/TRAINING PROGRAM

## 2023-01-01 PROCEDURE — 36415 COLL VENOUS BLD VENIPUNCTURE: CPT | Performed by: PEDIATRICS

## 2023-01-01 PROCEDURE — 250N000009 HC RX 250: Performed by: PHYSICIAN ASSISTANT

## 2023-01-01 PROCEDURE — 258N000003 HC RX IP 258 OP 636: Performed by: EMERGENCY MEDICINE

## 2023-01-01 PROCEDURE — 99285 EMERGENCY DEPT VISIT HI MDM: CPT | Mod: 25 | Performed by: EMERGENCY MEDICINE

## 2023-01-01 PROCEDURE — 99207 PR NO BILLABLE SERVICE THIS VISIT: CPT | Performed by: PEDIATRICS

## 2023-01-01 PROCEDURE — 84157 ASSAY OF PROTEIN OTHER: CPT

## 2023-01-01 PROCEDURE — 89051 BODY FLUID CELL COUNT: CPT

## 2023-01-01 PROCEDURE — 82803 BLOOD GASES ANY COMBINATION: CPT | Performed by: STUDENT IN AN ORGANIZED HEALTH CARE EDUCATION/TRAINING PROGRAM

## 2023-01-01 PROCEDURE — 88112 CYTOPATH CELL ENHANCE TECH: CPT | Mod: 26 | Performed by: PATHOLOGY

## 2023-01-01 PROCEDURE — 80053 COMPREHEN METABOLIC PANEL: CPT | Performed by: PHYSICIAN ASSISTANT

## 2023-01-01 PROCEDURE — 85014 HEMATOCRIT: CPT | Performed by: PHYSICIAN ASSISTANT

## 2023-01-01 PROCEDURE — P9016 RBC LEUKOCYTES REDUCED: HCPCS | Performed by: NURSE PRACTITIONER

## 2023-01-01 PROCEDURE — 93970 EXTREMITY STUDY: CPT

## 2023-01-01 PROCEDURE — 93321 DOPPLER ECHO F-UP/LMTD STD: CPT

## 2023-01-01 PROCEDURE — 84478 ASSAY OF TRIGLYCERIDES: CPT

## 2023-01-01 PROCEDURE — 81001 URINALYSIS AUTO W/SCOPE: CPT | Performed by: FAMILY MEDICINE

## 2023-01-01 PROCEDURE — 84207 ASSAY OF VITAMIN B-6: CPT

## 2023-01-01 PROCEDURE — 99207 PR SATISFY VISIT NUMBER: CPT | Performed by: PHYSICIAN ASSISTANT

## 2023-01-01 PROCEDURE — 76000 FLUOROSCOPY <1 HR PHYS/QHP: CPT | Mod: 26 | Performed by: STUDENT IN AN ORGANIZED HEALTH CARE EDUCATION/TRAINING PROGRAM

## 2023-01-01 PROCEDURE — 92526 ORAL FUNCTION THERAPY: CPT | Mod: GN | Performed by: SPEECH-LANGUAGE PATHOLOGIST

## 2023-01-01 PROCEDURE — 76705 ECHO EXAM OF ABDOMEN: CPT

## 2023-01-01 PROCEDURE — 83986 ASSAY PH BODY FLUID NOS: CPT

## 2023-01-01 PROCEDURE — 258N000003 HC RX IP 258 OP 636: Performed by: FAMILY MEDICINE

## 2023-01-01 PROCEDURE — 99233 SBSQ HOSP IP/OBS HIGH 50: CPT | Mod: 25 | Performed by: INTERNAL MEDICINE

## 2023-01-01 PROCEDURE — 88112 CYTOPATH CELL ENHANCE TECH: CPT | Mod: TC

## 2023-01-01 PROCEDURE — 250N000011 HC RX IP 250 OP 636: Mod: JZ | Performed by: NURSE PRACTITIONER

## 2023-01-01 PROCEDURE — 999N000147 HC STATISTIC PT IP EVAL DEFER

## 2023-01-01 PROCEDURE — 84484 ASSAY OF TROPONIN QUANT: CPT | Performed by: NURSE PRACTITIONER

## 2023-01-01 PROCEDURE — 87070 CULTURE OTHR SPECIMN AEROBIC: CPT | Performed by: FAMILY MEDICINE

## 2023-01-01 PROCEDURE — 99207 PR NO BILLABLE SERVICE THIS VISIT: CPT | Performed by: INTERNAL MEDICINE

## 2023-01-01 PROCEDURE — 82607 VITAMIN B-12: CPT | Performed by: INTERNAL MEDICINE

## 2023-01-01 PROCEDURE — 85027 COMPLETE CBC AUTOMATED: CPT | Performed by: PHYSICIAN ASSISTANT

## 2023-01-01 PROCEDURE — 99222 1ST HOSP IP/OBS MODERATE 55: CPT | Performed by: PHYSICIAN ASSISTANT

## 2023-01-01 PROCEDURE — 85018 HEMOGLOBIN: CPT | Performed by: STUDENT IN AN ORGANIZED HEALTH CARE EDUCATION/TRAINING PROGRAM

## 2023-01-01 PROCEDURE — 84484 ASSAY OF TROPONIN QUANT: CPT | Performed by: EMERGENCY MEDICINE

## 2023-01-01 PROCEDURE — 82533 TOTAL CORTISOL: CPT | Performed by: PEDIATRICS

## 2023-01-01 PROCEDURE — 96360 HYDRATION IV INFUSION INIT: CPT | Performed by: EMERGENCY MEDICINE

## 2023-01-01 PROCEDURE — 43761 REPOSITION GASTROSTOMY TUBE: CPT | Performed by: STUDENT IN AN ORGANIZED HEALTH CARE EDUCATION/TRAINING PROGRAM

## 2023-01-01 PROCEDURE — 84484 ASSAY OF TROPONIN QUANT: CPT | Performed by: STUDENT IN AN ORGANIZED HEALTH CARE EDUCATION/TRAINING PROGRAM

## 2023-01-01 PROCEDURE — 99284 EMERGENCY DEPT VISIT MOD MDM: CPT | Mod: CS,25 | Performed by: FAMILY MEDICINE

## 2023-01-01 PROCEDURE — 99214 OFFICE O/P EST MOD 30 MIN: CPT | Performed by: FAMILY MEDICINE

## 2023-01-01 PROCEDURE — 86923 COMPATIBILITY TEST ELECTRIC: CPT | Performed by: NURSE PRACTITIONER

## 2023-01-01 PROCEDURE — 250N000013 HC RX MED GY IP 250 OP 250 PS 637: Performed by: EMERGENCY MEDICINE

## 2023-01-01 PROCEDURE — 96376 TX/PRO/DX INJ SAME DRUG ADON: CPT | Performed by: EMERGENCY MEDICINE

## 2023-01-01 PROCEDURE — 74220 X-RAY XM ESOPHAGUS 1CNTRST: CPT

## 2023-01-01 PROCEDURE — 250N000009 HC RX 250: Performed by: STUDENT IN AN ORGANIZED HEALTH CARE EDUCATION/TRAINING PROGRAM

## 2023-01-01 PROCEDURE — 74177 CT ABD & PELVIS W/CONTRAST: CPT | Mod: 26 | Performed by: RADIOLOGY

## 2023-01-01 PROCEDURE — 360N000082 HC SURGERY LEVEL 2 W/ FLUORO, PER MIN: Performed by: INTERNAL MEDICINE

## 2023-01-01 PROCEDURE — 97530 THERAPEUTIC ACTIVITIES: CPT | Mod: GO | Performed by: OCCUPATIONAL THERAPIST

## 2023-01-01 PROCEDURE — 83880 ASSAY OF NATRIURETIC PEPTIDE: CPT | Performed by: EMERGENCY MEDICINE

## 2023-01-01 PROCEDURE — 250N000013 HC RX MED GY IP 250 OP 250 PS 637: Performed by: FAMILY MEDICINE

## 2023-01-01 PROCEDURE — 82945 GLUCOSE OTHER FLUID: CPT

## 2023-01-01 PROCEDURE — 87075 CULTR BACTERIA EXCEPT BLOOD: CPT

## 2023-01-01 PROCEDURE — 83935 ASSAY OF URINE OSMOLALITY: CPT | Performed by: INTERNAL MEDICINE

## 2023-01-01 PROCEDURE — 99207 PR NO BILLABLE SERVICE THIS VISIT: CPT | Performed by: STUDENT IN AN ORGANIZED HEALTH CARE EDUCATION/TRAINING PROGRAM

## 2023-01-01 PROCEDURE — 0DHA8UZ INSERTION OF FEEDING DEVICE INTO JEJUNUM, VIA NATURAL OR ARTIFICIAL OPENING ENDOSCOPIC: ICD-10-PCS | Performed by: INTERNAL MEDICINE

## 2023-01-01 PROCEDURE — 36415 COLL VENOUS BLD VENIPUNCTURE: CPT | Performed by: STUDENT IN AN ORGANIZED HEALTH CARE EDUCATION/TRAINING PROGRAM

## 2023-01-01 PROCEDURE — 999N000065 XR ABDOMEN PORT 1 VIEW

## 2023-01-01 PROCEDURE — 85041 AUTOMATED RBC COUNT: CPT

## 2023-01-01 PROCEDURE — 87070 CULTURE OTHR SPECIMN AEROBIC: CPT

## 2023-01-01 PROCEDURE — 82077 ASSAY SPEC XCP UR&BREATH IA: CPT | Performed by: FAMILY MEDICINE

## 2023-01-01 PROCEDURE — 80053 COMPREHEN METABOLIC PANEL: CPT | Performed by: NURSE PRACTITIONER

## 2023-01-01 PROCEDURE — U0003 INFECTIOUS AGENT DETECTION BY NUCLEIC ACID (DNA OR RNA); SEVERE ACUTE RESPIRATORY SYNDROME CORONAVIRUS 2 (SARS-COV-2) (CORONAVIRUS DISEASE [COVID-19]), AMPLIFIED PROBE TECHNIQUE, MAKING USE OF HIGH THROUGHPUT TECHNOLOGIES AS DESCRIBED BY CMS-2020-01-R: HCPCS | Performed by: FAMILY MEDICINE

## 2023-01-01 PROCEDURE — C1769 GUIDE WIRE: HCPCS

## 2023-01-01 PROCEDURE — 250N000011 HC RX IP 250 OP 636: Performed by: NURSE ANESTHETIST, CERTIFIED REGISTERED

## 2023-01-01 PROCEDURE — 80048 BASIC METABOLIC PNL TOTAL CA: CPT | Performed by: STUDENT IN AN ORGANIZED HEALTH CARE EDUCATION/TRAINING PROGRAM

## 2023-01-01 RX ORDER — HYDROXYZINE HYDROCHLORIDE 25 MG/1
25 TABLET, FILM COATED ORAL ONCE
Status: COMPLETED | OUTPATIENT
Start: 2023-01-01 | End: 2023-01-01

## 2023-01-01 RX ORDER — BISACODYL 5 MG
5 TABLET, DELAYED RELEASE (ENTERIC COATED) ORAL DAILY PRN
Status: DISCONTINUED | OUTPATIENT
Start: 2023-01-01 | End: 2023-01-01

## 2023-01-01 RX ORDER — ACETAMINOPHEN 325 MG/1
975 TABLET ORAL EVERY 8 HOURS PRN
Status: CANCELLED | OUTPATIENT
Start: 2023-01-01

## 2023-01-01 RX ORDER — AMIODARONE HYDROCHLORIDE 200 MG/1
200 TABLET ORAL DAILY
Status: CANCELLED | OUTPATIENT
Start: 2023-01-01

## 2023-01-01 RX ORDER — PANTOPRAZOLE SODIUM 40 MG/1
40 TABLET, DELAYED RELEASE ORAL DAILY
Qty: 3 TABLET | Refills: 0 | Status: SHIPPED | OUTPATIENT
Start: 2023-01-01

## 2023-01-01 RX ORDER — TIZANIDINE 2 MG/1
2 TABLET ORAL EVERY 6 HOURS PRN
Status: DISCONTINUED | OUTPATIENT
Start: 2023-01-01 | End: 2023-01-01 | Stop reason: HOSPADM

## 2023-01-01 RX ORDER — ASPIRIN 81 MG/1
324 TABLET, CHEWABLE ORAL ONCE
Status: COMPLETED | OUTPATIENT
Start: 2023-01-01 | End: 2023-01-01

## 2023-01-01 RX ORDER — POTASSIUM CHLORIDE 20MEQ/15ML
20 LIQUID (ML) ORAL ONCE
Status: DISCONTINUED | OUTPATIENT
Start: 2023-01-01 | End: 2023-01-01 | Stop reason: ALTCHOICE

## 2023-01-01 RX ORDER — NICOTINE POLACRILEX 4 MG
LOZENGE BUCCAL
Status: COMPLETED
Start: 2023-01-01 | End: 2023-01-01

## 2023-01-01 RX ORDER — FENTANYL CITRATE 50 UG/ML
INJECTION, SOLUTION INTRAMUSCULAR; INTRAVENOUS PRN
Status: DISCONTINUED | OUTPATIENT
Start: 2023-01-01 | End: 2023-01-01

## 2023-01-01 RX ORDER — MAGNESIUM OXIDE 400 MG/1
400 TABLET ORAL EVERY 4 HOURS
Status: COMPLETED | OUTPATIENT
Start: 2023-01-01 | End: 2023-01-01

## 2023-01-01 RX ORDER — NALOXONE HYDROCHLORIDE 0.4 MG/ML
0.2 INJECTION, SOLUTION INTRAMUSCULAR; INTRAVENOUS; SUBCUTANEOUS
Status: DISCONTINUED | OUTPATIENT
Start: 2023-01-01 | End: 2023-01-01 | Stop reason: HOSPADM

## 2023-01-01 RX ORDER — METOCLOPRAMIDE HYDROCHLORIDE 5 MG/ML
10 INJECTION INTRAMUSCULAR; INTRAVENOUS EVERY 6 HOURS
Status: DISCONTINUED | OUTPATIENT
Start: 2023-01-01 | End: 2023-01-01

## 2023-01-01 RX ORDER — HYDROMORPHONE HCL IN WATER/PF 6 MG/30 ML
0.2 PATIENT CONTROLLED ANALGESIA SYRINGE INTRAVENOUS
Status: DISCONTINUED | OUTPATIENT
Start: 2023-01-01 | End: 2023-01-01

## 2023-01-01 RX ORDER — MORPHINE SULFATE 2 MG/ML
2 INJECTION, SOLUTION INTRAMUSCULAR; INTRAVENOUS ONCE
Status: COMPLETED | OUTPATIENT
Start: 2023-01-01 | End: 2023-01-01

## 2023-01-01 RX ORDER — IPRATROPIUM BROMIDE AND ALBUTEROL SULFATE 2.5; .5 MG/3ML; MG/3ML
3 SOLUTION RESPIRATORY (INHALATION)
Status: CANCELLED | OUTPATIENT
Start: 2023-01-01

## 2023-01-01 RX ORDER — HYDROXYZINE HYDROCHLORIDE 25 MG/1
25 TABLET, FILM COATED ORAL EVERY 6 HOURS PRN
Status: DISCONTINUED | OUTPATIENT
Start: 2023-01-01 | End: 2023-01-01

## 2023-01-01 RX ORDER — MORPHINE SULFATE 20 MG/ML
5-10 SOLUTION ORAL
Status: DISCONTINUED | OUTPATIENT
Start: 2023-01-01 | End: 2023-01-01 | Stop reason: HOSPADM

## 2023-01-01 RX ORDER — LIDOCAINE 4 G/G
1 PATCH TOPICAL EVERY 24 HOURS
Qty: 3 PATCH | Refills: 0 | Status: SHIPPED | OUTPATIENT
Start: 2023-01-01

## 2023-01-01 RX ORDER — METOPROLOL TARTRATE 1 MG/ML
5 INJECTION, SOLUTION INTRAVENOUS ONCE
Status: DISCONTINUED | OUTPATIENT
Start: 2023-01-01 | End: 2023-01-01

## 2023-01-01 RX ORDER — POTASSIUM CHLORIDE 7.45 MG/ML
10 INJECTION INTRAVENOUS
Status: COMPLETED | OUTPATIENT
Start: 2023-01-01 | End: 2023-01-01

## 2023-01-01 RX ORDER — METOPROLOL TARTRATE 1 MG/ML
5 INJECTION, SOLUTION INTRAVENOUS EVERY 6 HOURS PRN
Status: DISCONTINUED | OUTPATIENT
Start: 2023-01-01 | End: 2023-01-01 | Stop reason: HOSPADM

## 2023-01-01 RX ORDER — MORPHINE SULFATE 2 MG/ML
2-4 INJECTION, SOLUTION INTRAMUSCULAR; INTRAVENOUS
Status: DISCONTINUED | OUTPATIENT
Start: 2023-01-01 | End: 2023-01-01

## 2023-01-01 RX ORDER — TIZANIDINE 2 MG/1
2 TABLET ORAL EVERY 6 HOURS PRN
Status: CANCELLED | OUTPATIENT
Start: 2023-01-01

## 2023-01-01 RX ORDER — IPRATROPIUM BROMIDE AND ALBUTEROL SULFATE 2.5; .5 MG/3ML; MG/3ML
3 SOLUTION RESPIRATORY (INHALATION)
Status: DISCONTINUED | OUTPATIENT
Start: 2023-01-01 | End: 2023-01-01 | Stop reason: HOSPADM

## 2023-01-01 RX ORDER — POTASSIUM CHLORIDE 1.5 G/1.58G
40 POWDER, FOR SOLUTION ORAL ONCE
Status: COMPLETED | OUTPATIENT
Start: 2023-01-01 | End: 2023-01-01

## 2023-01-01 RX ORDER — ONDANSETRON 2 MG/ML
4 INJECTION INTRAMUSCULAR; INTRAVENOUS
Status: DISCONTINUED | OUTPATIENT
Start: 2023-01-01 | End: 2023-01-01 | Stop reason: HOSPADM

## 2023-01-01 RX ORDER — METOPROLOL SUCCINATE 25 MG/1
50 TABLET, EXTENDED RELEASE ORAL DAILY
Status: DISCONTINUED | OUTPATIENT
Start: 2023-01-01 | End: 2023-01-01

## 2023-01-01 RX ORDER — GABAPENTIN 250 MG/5ML
100 SOLUTION ORAL AT BEDTIME
Status: CANCELLED | OUTPATIENT
Start: 2023-01-01

## 2023-01-01 RX ORDER — MORPHINE SULFATE 20 MG/ML
5 SOLUTION ORAL EVERY 4 HOURS
Qty: 30 ML | Refills: 0 | Status: SHIPPED | OUTPATIENT
Start: 2023-01-01

## 2023-01-01 RX ORDER — MORPHINE SULFATE 20 MG/ML
10-15 SOLUTION ORAL EVERY 4 HOURS PRN
Status: DISCONTINUED | OUTPATIENT
Start: 2023-01-01 | End: 2023-01-01

## 2023-01-01 RX ORDER — MAGNESIUM SULFATE HEPTAHYDRATE 40 MG/ML
4 INJECTION, SOLUTION INTRAVENOUS ONCE
Status: COMPLETED | OUTPATIENT
Start: 2023-01-01 | End: 2023-01-01

## 2023-01-01 RX ORDER — ALBUMIN (HUMAN) 12.5 G/50ML
12.5 SOLUTION INTRAVENOUS ONCE
Status: COMPLETED | OUTPATIENT
Start: 2023-01-01 | End: 2023-01-01

## 2023-01-01 RX ORDER — LIDOCAINE 40 MG/G
CREAM TOPICAL
Status: CANCELLED | OUTPATIENT
Start: 2023-01-01

## 2023-01-01 RX ORDER — ONDANSETRON 2 MG/ML
4 INJECTION INTRAMUSCULAR; INTRAVENOUS EVERY 30 MIN PRN
Status: DISCONTINUED | OUTPATIENT
Start: 2023-01-01 | End: 2023-01-01 | Stop reason: HOSPADM

## 2023-01-01 RX ORDER — ONDANSETRON 4 MG/1
4 TABLET, ORALLY DISINTEGRATING ORAL EVERY 30 MIN PRN
Status: DISCONTINUED | OUTPATIENT
Start: 2023-01-01 | End: 2023-01-01 | Stop reason: HOSPADM

## 2023-01-01 RX ORDER — BISACODYL 10 MG
10 SUPPOSITORY, RECTAL RECTAL DAILY PRN
COMMUNITY
Start: 2023-01-01

## 2023-01-01 RX ORDER — LIDOCAINE HYDROCHLORIDE 20 MG/ML
INJECTION, SOLUTION INFILTRATION; PERINEURAL PRN
Status: DISCONTINUED | OUTPATIENT
Start: 2023-01-01 | End: 2023-01-01

## 2023-01-01 RX ORDER — FUROSEMIDE 20 MG
40 TABLET ORAL DAILY
Status: DISCONTINUED | OUTPATIENT
Start: 2023-01-01 | End: 2023-01-01

## 2023-01-01 RX ORDER — ONDANSETRON 2 MG/ML
4 INJECTION INTRAMUSCULAR; INTRAVENOUS EVERY 6 HOURS PRN
Status: DISCONTINUED | OUTPATIENT
Start: 2023-01-01 | End: 2023-01-01

## 2023-01-01 RX ORDER — PREDNISONE 5 MG/1
15 TABLET ORAL DAILY
Qty: 9 TABLET | Refills: 0 | Status: SHIPPED | OUTPATIENT
Start: 2023-01-01 | End: 2023-01-01

## 2023-01-01 RX ORDER — HYDROXYZINE HYDROCHLORIDE 25 MG/1
25-50 TABLET, FILM COATED ORAL 4 TIMES DAILY PRN
Qty: 10 TABLET | Refills: 0 | Status: SHIPPED | OUTPATIENT
Start: 2023-01-01

## 2023-01-01 RX ORDER — SODIUM CHLORIDE, SODIUM LACTATE, POTASSIUM CHLORIDE, CALCIUM CHLORIDE 600; 310; 30; 20 MG/100ML; MG/100ML; MG/100ML; MG/100ML
INJECTION, SOLUTION INTRAVENOUS CONTINUOUS
Status: DISCONTINUED | OUTPATIENT
Start: 2023-01-01 | End: 2023-01-01 | Stop reason: HOSPADM

## 2023-01-01 RX ORDER — SULFAMETHOXAZOLE/TRIMETHOPRIM 800-160 MG
1 TABLET ORAL 2 TIMES DAILY
Qty: 20 TABLET | Refills: 0 | Status: SHIPPED | OUTPATIENT
Start: 2023-01-01 | End: 2023-01-01

## 2023-01-01 RX ORDER — DEXTROSE MONOHYDRATE 100 MG/ML
INJECTION, SOLUTION INTRAVENOUS CONTINUOUS PRN
Status: CANCELLED | OUTPATIENT
Start: 2023-01-01

## 2023-01-01 RX ORDER — LIDOCAINE 40 MG/G
CREAM TOPICAL
Status: DISCONTINUED | OUTPATIENT
Start: 2023-01-01 | End: 2023-01-01 | Stop reason: HOSPADM

## 2023-01-01 RX ORDER — MAGNESIUM SULFATE HEPTAHYDRATE 40 MG/ML
2 INJECTION, SOLUTION INTRAVENOUS ONCE
Status: COMPLETED | OUTPATIENT
Start: 2023-01-01 | End: 2023-01-01

## 2023-01-01 RX ORDER — HYDROXYZINE HYDROCHLORIDE 25 MG/1
50 TABLET, FILM COATED ORAL EVERY 6 HOURS PRN
Status: DISCONTINUED | OUTPATIENT
Start: 2023-01-01 | End: 2023-01-01

## 2023-01-01 RX ORDER — BUMETANIDE 0.25 MG/ML
2 INJECTION INTRAMUSCULAR; INTRAVENOUS EVERY 8 HOURS
Status: DISCONTINUED | OUTPATIENT
Start: 2023-01-01 | End: 2023-01-01

## 2023-01-01 RX ORDER — PIPERACILLIN SODIUM, TAZOBACTAM SODIUM 4; .5 G/20ML; G/20ML
4.5 INJECTION, POWDER, LYOPHILIZED, FOR SOLUTION INTRAVENOUS EVERY 6 HOURS
Status: DISCONTINUED | OUTPATIENT
Start: 2023-01-01 | End: 2023-01-01

## 2023-01-01 RX ORDER — POTASSIUM CHLORIDE 1.5 G/1.58G
20 POWDER, FOR SOLUTION ORAL ONCE
Status: DISCONTINUED | OUTPATIENT
Start: 2023-01-01 | End: 2023-01-01 | Stop reason: ALTCHOICE

## 2023-01-01 RX ORDER — LORAZEPAM 2 MG/ML
.5-1 CONCENTRATE ORAL EVERY 4 HOURS PRN
Qty: 30 ML | Refills: 0 | Status: SHIPPED | OUTPATIENT
Start: 2023-01-01

## 2023-01-01 RX ORDER — IOPAMIDOL 755 MG/ML
100 INJECTION, SOLUTION INTRAVASCULAR ONCE
Status: COMPLETED | OUTPATIENT
Start: 2023-01-01 | End: 2023-01-01

## 2023-01-01 RX ORDER — PREDNISONE 5 MG/1
15 TABLET ORAL DAILY
Status: DISCONTINUED | OUTPATIENT
Start: 2023-01-01 | End: 2023-01-01 | Stop reason: HOSPADM

## 2023-01-01 RX ORDER — NICOTINE POLACRILEX 4 MG
15-30 LOZENGE BUCCAL
Status: DISCONTINUED | OUTPATIENT
Start: 2023-01-01 | End: 2023-01-01

## 2023-01-01 RX ORDER — HYDROCODONE BITARTRATE AND ACETAMINOPHEN 5; 325 MG/1; MG/1
1 TABLET ORAL EVERY 6 HOURS PRN
Qty: 6 TABLET | Refills: 0 | Status: SHIPPED | OUTPATIENT
Start: 2023-01-01 | End: 2023-01-01

## 2023-01-01 RX ORDER — NALOXONE HYDROCHLORIDE 0.4 MG/ML
0.2 INJECTION, SOLUTION INTRAMUSCULAR; INTRAVENOUS; SUBCUTANEOUS
Status: CANCELLED | OUTPATIENT
Start: 2023-01-01

## 2023-01-01 RX ORDER — ALBUTEROL SULFATE 0.63 MG/3ML
1 SOLUTION RESPIRATORY (INHALATION) EVERY 6 HOURS PRN
Qty: 90 ML | Refills: 0 | Status: SHIPPED | OUTPATIENT
Start: 2023-01-01 | End: 2023-01-01

## 2023-01-01 RX ORDER — LIDOCAINE 4 G/G
1 PATCH TOPICAL EVERY 24 HOURS
Status: DISCONTINUED | OUTPATIENT
Start: 2023-01-01 | End: 2023-01-01 | Stop reason: HOSPADM

## 2023-01-01 RX ORDER — PROCHLORPERAZINE MALEATE 5 MG
5 TABLET ORAL EVERY 6 HOURS PRN
Status: DISCONTINUED | OUTPATIENT
Start: 2023-01-01 | End: 2023-01-01

## 2023-01-01 RX ORDER — HYDROXYZINE HYDROCHLORIDE 25 MG/1
25-50 TABLET, FILM COATED ORAL 4 TIMES DAILY PRN
Qty: 10 TABLET | Refills: 0 | Status: SHIPPED | OUTPATIENT
Start: 2023-01-01 | End: 2023-01-01

## 2023-01-01 RX ORDER — ONDANSETRON 4 MG/1
4 TABLET, ORALLY DISINTEGRATING ORAL EVERY 6 HOURS PRN
Status: DISCONTINUED | OUTPATIENT
Start: 2023-01-01 | End: 2023-01-01

## 2023-01-01 RX ORDER — SODIUM CHLORIDE FOR INHALATION 3 %
3 VIAL, NEBULIZER (ML) INHALATION
Status: CANCELLED | OUTPATIENT
Start: 2023-01-01

## 2023-01-01 RX ORDER — PANTOPRAZOLE SODIUM 20 MG/1
20 TABLET, DELAYED RELEASE ORAL
Status: DISCONTINUED | OUTPATIENT
Start: 2023-01-01 | End: 2023-01-01

## 2023-01-01 RX ORDER — FUROSEMIDE 10 MG/ML
40 INJECTION INTRAMUSCULAR; INTRAVENOUS DAILY
Status: COMPLETED | OUTPATIENT
Start: 2023-01-01 | End: 2023-01-01

## 2023-01-01 RX ORDER — MULTIVITAMIN,THERAPEUTIC
1 TABLET ORAL DAILY
Status: DISCONTINUED | OUTPATIENT
Start: 2023-01-01 | End: 2023-01-01

## 2023-01-01 RX ORDER — DEXTROSE MONOHYDRATE 100 MG/ML
INJECTION, SOLUTION INTRAVENOUS CONTINUOUS PRN
Status: DISCONTINUED | OUTPATIENT
Start: 2023-01-01 | End: 2023-01-01 | Stop reason: HOSPADM

## 2023-01-01 RX ORDER — PROCHLORPERAZINE 25 MG
25 SUPPOSITORY, RECTAL RECTAL EVERY 12 HOURS PRN
Status: DISCONTINUED | OUTPATIENT
Start: 2023-01-01 | End: 2023-01-01

## 2023-01-01 RX ORDER — LIDOCAINE 4 G/G
1 PATCH TOPICAL EVERY 24 HOURS
Qty: 4 PATCH | Refills: 0 | Status: SHIPPED | OUTPATIENT
Start: 2023-01-01 | End: 2023-01-01

## 2023-01-01 RX ORDER — IPRATROPIUM BROMIDE AND ALBUTEROL SULFATE 2.5; .5 MG/3ML; MG/3ML
1 SOLUTION RESPIRATORY (INHALATION) EVERY 6 HOURS PRN
Qty: 90 ML | Refills: 0 | Status: SHIPPED | OUTPATIENT
Start: 2023-01-01

## 2023-01-01 RX ORDER — MORPHINE SULFATE 2 MG/ML
4 INJECTION, SOLUTION INTRAMUSCULAR; INTRAVENOUS ONCE
Status: COMPLETED | OUTPATIENT
Start: 2023-01-01 | End: 2023-01-01

## 2023-01-01 RX ORDER — AMOXICILLIN 250 MG
2 CAPSULE ORAL 2 TIMES DAILY
Status: CANCELLED | OUTPATIENT
Start: 2023-01-01

## 2023-01-01 RX ORDER — PANTOPRAZOLE SODIUM 40 MG/1
40 TABLET, DELAYED RELEASE ORAL DAILY
Status: DISCONTINUED | OUTPATIENT
Start: 2023-01-01 | End: 2023-01-01

## 2023-01-01 RX ORDER — PREDNISONE 20 MG/1
20 TABLET ORAL ONCE
Status: COMPLETED | OUTPATIENT
Start: 2023-01-01 | End: 2023-01-01

## 2023-01-01 RX ORDER — MORPHINE SULFATE 20 MG/ML
5 SOLUTION ORAL EVERY 4 HOURS
Status: DISCONTINUED | OUTPATIENT
Start: 2023-01-01 | End: 2023-01-01 | Stop reason: HOSPADM

## 2023-01-01 RX ORDER — DEXTROSE MONOHYDRATE 25 G/50ML
50 INJECTION, SOLUTION INTRAVENOUS ONCE
Status: COMPLETED | OUTPATIENT
Start: 2023-01-01 | End: 2023-01-01

## 2023-01-01 RX ORDER — GABAPENTIN 250 MG/5ML
100 SOLUTION ORAL AT BEDTIME
Status: DISCONTINUED | OUTPATIENT
Start: 2023-01-01 | End: 2023-01-01 | Stop reason: HOSPADM

## 2023-01-01 RX ORDER — PREDNISONE 20 MG/1
40 TABLET ORAL DAILY
Status: DISCONTINUED | OUTPATIENT
Start: 2023-01-01 | End: 2023-01-01

## 2023-01-01 RX ORDER — FUROSEMIDE 20 MG
20 TABLET ORAL
Status: DISCONTINUED | OUTPATIENT
Start: 2023-01-01 | End: 2023-01-01

## 2023-01-01 RX ORDER — LANOLIN ALCOHOL/MO/W.PET/CERES
3 CREAM (GRAM) TOPICAL
Status: CANCELLED | OUTPATIENT
Start: 2023-01-01

## 2023-01-01 RX ORDER — MORPHINE SULFATE 20 MG/ML
5 SOLUTION ORAL EVERY 4 HOURS
Qty: 30 ML | Refills: 0 | Status: SHIPPED | OUTPATIENT
Start: 2023-01-01 | End: 2023-01-01

## 2023-01-01 RX ORDER — PENTAMIDINE ISETHIONATE 300 MG/300MG
300 INHALANT RESPIRATORY (INHALATION) ONCE
Status: COMPLETED | OUTPATIENT
Start: 2023-01-01 | End: 2023-01-01

## 2023-01-01 RX ORDER — FUROSEMIDE 10 MG/ML
40 INJECTION INTRAMUSCULAR; INTRAVENOUS EVERY 8 HOURS
Status: DISCONTINUED | OUTPATIENT
Start: 2023-01-01 | End: 2023-01-01

## 2023-01-01 RX ORDER — BUMETANIDE 2 MG/1
2 TABLET ORAL 3 TIMES DAILY
Status: DISCONTINUED | OUTPATIENT
Start: 2023-01-01 | End: 2023-01-01 | Stop reason: HOSPADM

## 2023-01-01 RX ORDER — ONDANSETRON 4 MG/1
4 TABLET, ORALLY DISINTEGRATING ORAL EVERY 8 HOURS PRN
Qty: 10 TABLET | Refills: 0 | Status: SHIPPED | OUTPATIENT
Start: 2023-01-01 | End: 2023-01-01

## 2023-01-01 RX ORDER — POTASSIUM CHLORIDE 20MEQ/15ML
20 LIQUID (ML) ORAL ONCE
Status: COMPLETED | OUTPATIENT
Start: 2023-01-01 | End: 2023-01-01

## 2023-01-01 RX ORDER — DEXAMETHASONE SODIUM PHOSPHATE 4 MG/ML
INJECTION, SOLUTION INTRA-ARTICULAR; INTRALESIONAL; INTRAMUSCULAR; INTRAVENOUS; SOFT TISSUE PRN
Status: DISCONTINUED | OUTPATIENT
Start: 2023-01-01 | End: 2023-01-01

## 2023-01-01 RX ORDER — MORPHINE SULFATE 20 MG/ML
5-10 SOLUTION ORAL
Refills: 0 | COMMUNITY
Start: 2023-01-01 | End: 2023-01-01

## 2023-01-01 RX ORDER — LORAZEPAM 0.5 MG/1
0.25 TABLET ORAL EVERY 4 HOURS PRN
Status: DISCONTINUED | OUTPATIENT
Start: 2023-01-01 | End: 2023-01-01

## 2023-01-01 RX ORDER — GABAPENTIN 250 MG/5ML
100 SOLUTION ORAL AT BEDTIME
Qty: 470 ML | Refills: 0 | Status: SHIPPED | OUTPATIENT
Start: 2023-01-01 | End: 2023-01-01

## 2023-01-01 RX ORDER — MAGNESIUM SULFATE 1 G/100ML
2 INJECTION INTRAVENOUS ONCE
Status: DISCONTINUED | OUTPATIENT
Start: 2023-01-01 | End: 2023-01-01

## 2023-01-01 RX ORDER — CEFAZOLIN SODIUM 1 G/3ML
INJECTION, POWDER, FOR SOLUTION INTRAMUSCULAR; INTRAVENOUS PRN
Status: DISCONTINUED | OUTPATIENT
Start: 2023-01-01 | End: 2023-01-01

## 2023-01-01 RX ORDER — LIDOCAINE 4 G/G
1 PATCH TOPICAL
Status: DISCONTINUED | OUTPATIENT
Start: 2023-01-01 | End: 2023-01-01

## 2023-01-01 RX ORDER — METOPROLOL TARTRATE 1 MG/ML
5 INJECTION, SOLUTION INTRAVENOUS EVERY 6 HOURS PRN
Status: CANCELLED | OUTPATIENT
Start: 2023-01-01

## 2023-01-01 RX ORDER — POLYETHYLENE GLYCOL 3350 17 G/17G
17 POWDER, FOR SOLUTION ORAL DAILY
Status: DISCONTINUED | OUTPATIENT
Start: 2023-01-01 | End: 2023-01-01 | Stop reason: HOSPADM

## 2023-01-01 RX ORDER — POTASSIUM CHLORIDE 1.5 G/1.58G
20 POWDER, FOR SOLUTION ORAL ONCE
Status: COMPLETED | OUTPATIENT
Start: 2023-01-01 | End: 2023-01-01

## 2023-01-01 RX ORDER — DEXTROSE MONOHYDRATE 25 G/50ML
INJECTION, SOLUTION INTRAVENOUS
Status: COMPLETED
Start: 2023-01-01 | End: 2023-01-01

## 2023-01-01 RX ORDER — OXYCODONE HYDROCHLORIDE 5 MG/1
5-10 TABLET ORAL EVERY 4 HOURS PRN
Status: DISCONTINUED | OUTPATIENT
Start: 2023-01-01 | End: 2023-01-01

## 2023-01-01 RX ORDER — METOPROLOL TARTRATE 1 MG/ML
5 INJECTION, SOLUTION INTRAVENOUS ONCE
Status: COMPLETED | OUTPATIENT
Start: 2023-01-01 | End: 2023-01-01

## 2023-01-01 RX ORDER — IPRATROPIUM BROMIDE AND ALBUTEROL SULFATE 2.5; .5 MG/3ML; MG/3ML
3 SOLUTION RESPIRATORY (INHALATION) 4 TIMES DAILY
Qty: 30 ML | Refills: 0 | Status: SHIPPED | OUTPATIENT
Start: 2023-01-01 | End: 2023-01-01

## 2023-01-01 RX ORDER — BUMETANIDE 2 MG/1
2 TABLET ORAL 3 TIMES DAILY
Qty: 9 TABLET | Refills: 0 | Status: SHIPPED | OUTPATIENT
Start: 2023-01-01

## 2023-01-01 RX ORDER — FUROSEMIDE 10 MG/ML
40 INJECTION INTRAMUSCULAR; INTRAVENOUS ONCE
Status: COMPLETED | OUTPATIENT
Start: 2023-01-01 | End: 2023-01-01

## 2023-01-01 RX ORDER — ACETAMINOPHEN 325 MG/1
975 TABLET ORAL EVERY 8 HOURS PRN
Status: DISCONTINUED | OUTPATIENT
Start: 2023-01-01 | End: 2023-01-01 | Stop reason: HOSPADM

## 2023-01-01 RX ORDER — NICOTINE POLACRILEX 4 MG
15-30 LOZENGE BUCCAL
Status: CANCELLED | OUTPATIENT
Start: 2023-01-01

## 2023-01-01 RX ORDER — FUROSEMIDE 10 MG/ML
40 INJECTION INTRAMUSCULAR; INTRAVENOUS DAILY
Status: DISCONTINUED | OUTPATIENT
Start: 2023-01-01 | End: 2023-01-01

## 2023-01-01 RX ORDER — SULFAMETHOXAZOLE/TRIMETHOPRIM 800-160 MG
1 TABLET ORAL 2 TIMES DAILY
Qty: 20 TABLET | Refills: 0 | Status: ON HOLD | OUTPATIENT
Start: 2023-01-01 | End: 2023-01-01

## 2023-01-01 RX ORDER — OXYCODONE HYDROCHLORIDE 5 MG/1
5 TABLET ORAL ONCE
Status: COMPLETED | OUTPATIENT
Start: 2023-01-01 | End: 2023-01-01

## 2023-01-01 RX ORDER — AMOXICILLIN 250 MG
2 CAPSULE ORAL 2 TIMES DAILY
Status: DISCONTINUED | OUTPATIENT
Start: 2023-01-01 | End: 2023-01-01 | Stop reason: HOSPADM

## 2023-01-01 RX ORDER — SODIUM CHLORIDE, SODIUM LACTATE, POTASSIUM CHLORIDE, CALCIUM CHLORIDE 600; 310; 30; 20 MG/100ML; MG/100ML; MG/100ML; MG/100ML
INJECTION, SOLUTION INTRAVENOUS CONTINUOUS
Status: CANCELLED | OUTPATIENT
Start: 2023-01-01

## 2023-01-01 RX ORDER — TIZANIDINE 2 MG/1
2 TABLET ORAL EVERY 6 HOURS PRN
Qty: 10 TABLET | Refills: 0 | Status: SHIPPED | OUTPATIENT
Start: 2023-01-01 | End: 2023-01-01

## 2023-01-01 RX ORDER — NALOXONE HYDROCHLORIDE 0.4 MG/ML
0.4 INJECTION, SOLUTION INTRAMUSCULAR; INTRAVENOUS; SUBCUTANEOUS
Status: DISCONTINUED | OUTPATIENT
Start: 2023-01-01 | End: 2023-01-01 | Stop reason: HOSPADM

## 2023-01-01 RX ORDER — PREDNISONE 20 MG/1
20 TABLET ORAL DAILY
Status: COMPLETED | OUTPATIENT
Start: 2023-01-01 | End: 2023-01-01

## 2023-01-01 RX ORDER — METOCLOPRAMIDE HYDROCHLORIDE 5 MG/ML
10 INJECTION INTRAMUSCULAR; INTRAVENOUS EVERY 6 HOURS PRN
Status: DISCONTINUED | OUTPATIENT
Start: 2023-01-01 | End: 2023-01-01 | Stop reason: HOSPADM

## 2023-01-01 RX ORDER — NITROFURANTOIN 25; 75 MG/1; MG/1
100 CAPSULE ORAL 2 TIMES DAILY
Qty: 14 CAPSULE | Refills: 0 | Status: SHIPPED | OUTPATIENT
Start: 2023-01-01 | End: 2023-01-01

## 2023-01-01 RX ORDER — LANOLIN ALCOHOL/MO/W.PET/CERES
3 CREAM (GRAM) TOPICAL
Status: DISCONTINUED | OUTPATIENT
Start: 2023-01-01 | End: 2023-01-01 | Stop reason: HOSPADM

## 2023-01-01 RX ORDER — SODIUM CHLORIDE, SODIUM LACTATE, POTASSIUM CHLORIDE, CALCIUM CHLORIDE 600; 310; 30; 20 MG/100ML; MG/100ML; MG/100ML; MG/100ML
INJECTION, SOLUTION INTRAVENOUS CONTINUOUS PRN
Status: DISCONTINUED | OUTPATIENT
Start: 2023-01-01 | End: 2023-01-01

## 2023-01-01 RX ORDER — GUAIFENESIN 600 MG/1
15 TABLET, EXTENDED RELEASE ORAL DAILY
Status: CANCELLED | OUTPATIENT
Start: 2023-01-01

## 2023-01-01 RX ORDER — ACETAMINOPHEN 500 MG
1000 TABLET ORAL ONCE
Status: COMPLETED | OUTPATIENT
Start: 2023-01-01 | End: 2023-01-01

## 2023-01-01 RX ORDER — MAGNESIUM OXIDE 400 MG/1
400 TABLET ORAL DAILY
Status: CANCELLED | OUTPATIENT
Start: 2023-01-01

## 2023-01-01 RX ORDER — POTASSIUM CHLORIDE 20MEQ/15ML
40 LIQUID (ML) ORAL ONCE
Status: COMPLETED | OUTPATIENT
Start: 2023-01-01 | End: 2023-01-01

## 2023-01-01 RX ORDER — LORAZEPAM 2 MG/ML
0.5 INJECTION INTRAMUSCULAR ONCE
Status: COMPLETED | OUTPATIENT
Start: 2023-01-01 | End: 2023-01-01

## 2023-01-01 RX ORDER — MORPHINE SULFATE 2 MG/ML
2-4 INJECTION, SOLUTION INTRAMUSCULAR; INTRAVENOUS EVERY 4 HOURS PRN
Status: DISCONTINUED | OUTPATIENT
Start: 2023-01-01 | End: 2023-01-01

## 2023-01-01 RX ORDER — AMOXICILLIN 250 MG
1 CAPSULE ORAL 2 TIMES DAILY PRN
Status: DISCONTINUED | OUTPATIENT
Start: 2023-01-01 | End: 2023-01-01

## 2023-01-01 RX ORDER — PANTOPRAZOLE SODIUM 40 MG/1
40 TABLET, DELAYED RELEASE ORAL
Status: DISCONTINUED | OUTPATIENT
Start: 2023-01-01 | End: 2023-01-01

## 2023-01-01 RX ORDER — SULFAMETHOXAZOLE/TRIMETHOPRIM 800-160 MG
1 TABLET ORAL 2 TIMES DAILY
Qty: 14 TABLET | Refills: 0 | Status: SHIPPED | OUTPATIENT
Start: 2023-01-01 | End: 2023-01-01

## 2023-01-01 RX ORDER — HYDROMORPHONE HCL IN WATER/PF 6 MG/30 ML
0.2 PATIENT CONTROLLED ANALGESIA SYRINGE INTRAVENOUS ONCE
Status: COMPLETED | OUTPATIENT
Start: 2023-01-01 | End: 2023-01-01

## 2023-01-01 RX ORDER — TIZANIDINE 2 MG/1
2 TABLET ORAL EVERY 6 HOURS PRN
Qty: 10 TABLET | Refills: 0 | Status: SHIPPED | OUTPATIENT
Start: 2023-01-01

## 2023-01-01 RX ORDER — CEPHALEXIN 500 MG/1
500 CAPSULE ORAL 3 TIMES DAILY
Qty: 30 CAPSULE | Refills: 0 | Status: SHIPPED | OUTPATIENT
Start: 2023-01-01 | End: 2023-01-01

## 2023-01-01 RX ORDER — PREDNISONE 5 MG/1
15 TABLET ORAL DAILY
Status: DISCONTINUED | OUTPATIENT
Start: 2023-01-01 | End: 2023-01-01

## 2023-01-01 RX ORDER — LIDOCAINE HYDROCHLORIDE 20 MG/ML
JELLY TOPICAL
Status: COMPLETED
Start: 2023-01-01 | End: 2023-01-01

## 2023-01-01 RX ORDER — MAGNESIUM OXIDE 400 MG/1
400 TABLET ORAL DAILY
Status: DISCONTINUED | OUTPATIENT
Start: 2023-01-01 | End: 2023-01-01 | Stop reason: HOSPADM

## 2023-01-01 RX ORDER — MORPHINE SULFATE 20 MG/ML
5-10 SOLUTION ORAL
Qty: 15 ML | Refills: 0 | Status: SHIPPED | OUTPATIENT
Start: 2023-01-01

## 2023-01-01 RX ORDER — ONDANSETRON 2 MG/ML
INJECTION INTRAMUSCULAR; INTRAVENOUS PRN
Status: DISCONTINUED | OUTPATIENT
Start: 2023-01-01 | End: 2023-01-01

## 2023-01-01 RX ORDER — POLYETHYLENE GLYCOL 3350 17 G/17G
17 POWDER, FOR SOLUTION ORAL 2 TIMES DAILY
Status: DISCONTINUED | OUTPATIENT
Start: 2023-01-01 | End: 2023-01-01

## 2023-01-01 RX ORDER — BUPIVACAINE HYDROCHLORIDE AND EPINEPHRINE 5; 5 MG/ML; UG/ML
24 INJECTION, SOLUTION PERINEURAL ONCE
Status: COMPLETED | OUTPATIENT
Start: 2023-01-01 | End: 2023-01-01

## 2023-01-01 RX ORDER — LIDOCAINE HYDROCHLORIDE 20 MG/ML
JELLY TOPICAL ONCE
Status: COMPLETED | OUTPATIENT
Start: 2023-01-01 | End: 2023-01-01

## 2023-01-01 RX ORDER — BISACODYL 10 MG
10 SUPPOSITORY, RECTAL RECTAL DAILY PRN
Status: CANCELLED | OUTPATIENT
Start: 2023-01-01

## 2023-01-01 RX ORDER — LORAZEPAM 2 MG/ML
.5-1 CONCENTRATE ORAL EVERY 4 HOURS PRN
Status: DISCONTINUED | OUTPATIENT
Start: 2023-01-01 | End: 2023-01-01 | Stop reason: HOSPADM

## 2023-01-01 RX ORDER — METOPROLOL TARTRATE 1 MG/ML
5 INJECTION, SOLUTION INTRAVENOUS EVERY 6 HOURS
Status: DISCONTINUED | OUTPATIENT
Start: 2023-01-01 | End: 2023-01-01

## 2023-01-01 RX ORDER — SODIUM CHLORIDE 9 MG/ML
INJECTION, SOLUTION INTRAVENOUS CONTINUOUS
Status: DISCONTINUED | OUTPATIENT
Start: 2023-01-01 | End: 2023-01-01 | Stop reason: HOSPADM

## 2023-01-01 RX ORDER — GUAIFENESIN 600 MG/1
15 TABLET, EXTENDED RELEASE ORAL DAILY
Status: DISCONTINUED | OUTPATIENT
Start: 2023-01-01 | End: 2023-01-01

## 2023-01-01 RX ORDER — BUMETANIDE 2 MG/1
2 TABLET ORAL 3 TIMES DAILY
Status: DISCONTINUED | OUTPATIENT
Start: 2023-01-01 | End: 2023-01-01

## 2023-01-01 RX ORDER — DEXTROSE MONOHYDRATE 25 G/50ML
25-50 INJECTION, SOLUTION INTRAVENOUS
Status: CANCELLED | OUTPATIENT
Start: 2023-01-01

## 2023-01-01 RX ORDER — ACETAMINOPHEN 325 MG/1
975 TABLET ORAL EVERY 8 HOURS PRN
COMMUNITY

## 2023-01-01 RX ORDER — AMIODARONE HYDROCHLORIDE 200 MG/1
200 TABLET ORAL DAILY
Status: DISCONTINUED | OUTPATIENT
Start: 2023-01-01 | End: 2023-01-01 | Stop reason: HOSPADM

## 2023-01-01 RX ORDER — DEXTROSE MONOHYDRATE 100 MG/ML
INJECTION, SOLUTION INTRAVENOUS CONTINUOUS
Status: DISCONTINUED | OUTPATIENT
Start: 2023-01-01 | End: 2023-01-01

## 2023-01-01 RX ORDER — IOPAMIDOL 510 MG/ML
INJECTION, SOLUTION INTRAVASCULAR PRN
Status: DISCONTINUED | OUTPATIENT
Start: 2023-01-01 | End: 2023-01-01 | Stop reason: HOSPADM

## 2023-01-01 RX ORDER — ONDANSETRON 2 MG/ML
4 INJECTION INTRAMUSCULAR; INTRAVENOUS
Status: CANCELLED | OUTPATIENT
Start: 2023-01-01

## 2023-01-01 RX ORDER — ONDANSETRON 4 MG/1
4 TABLET, ORALLY DISINTEGRATING ORAL EVERY 8 HOURS PRN
Qty: 20 TABLET | Refills: 0 | Status: ON HOLD | OUTPATIENT
Start: 2023-01-01 | End: 2023-01-01

## 2023-01-01 RX ORDER — BISACODYL 10 MG
10 SUPPOSITORY, RECTAL RECTAL DAILY PRN
Status: DISCONTINUED | OUTPATIENT
Start: 2023-01-01 | End: 2023-01-01 | Stop reason: HOSPADM

## 2023-01-01 RX ORDER — HYDROMORPHONE HCL IN WATER/PF 6 MG/30 ML
0.4 PATIENT CONTROLLED ANALGESIA SYRINGE INTRAVENOUS ONCE
Status: COMPLETED | OUTPATIENT
Start: 2023-01-01 | End: 2023-01-01

## 2023-01-01 RX ORDER — AMPICILLIN AND SULBACTAM 2; 1 G/1; G/1
3 INJECTION, POWDER, FOR SOLUTION INTRAMUSCULAR; INTRAVENOUS EVERY 6 HOURS
Status: COMPLETED | OUTPATIENT
Start: 2023-01-01 | End: 2023-01-01

## 2023-01-01 RX ORDER — LIDOCAINE 40 MG/G
CREAM TOPICAL
Status: DISCONTINUED | OUTPATIENT
Start: 2023-01-01 | End: 2023-01-01

## 2023-01-01 RX ORDER — GABAPENTIN 250 MG/5ML
100 SOLUTION ORAL AT BEDTIME
Qty: 470 ML | Refills: 0 | Status: SHIPPED | OUTPATIENT
Start: 2023-01-01

## 2023-01-01 RX ORDER — MAGNESIUM OXIDE 400 MG/1
400 TABLET ORAL DAILY PRN
Status: CANCELLED | OUTPATIENT
Start: 2023-01-01

## 2023-01-01 RX ORDER — LIDOCAINE 4 G/G
1 PATCH TOPICAL
Status: CANCELLED | OUTPATIENT
Start: 2023-01-01

## 2023-01-01 RX ORDER — SODIUM CHLORIDE FOR INHALATION 3 %
3 VIAL, NEBULIZER (ML) INHALATION
Status: DISCONTINUED | OUTPATIENT
Start: 2023-01-01 | End: 2023-01-01 | Stop reason: HOSPADM

## 2023-01-01 RX ORDER — GABAPENTIN 100 MG/1
100 CAPSULE ORAL AT BEDTIME
Status: DISCONTINUED | OUTPATIENT
Start: 2023-01-01 | End: 2023-01-01

## 2023-01-01 RX ORDER — ALBUTEROL SULFATE 90 UG/1
4 AEROSOL, METERED RESPIRATORY (INHALATION) ONCE
Status: DISCONTINUED | OUTPATIENT
Start: 2023-01-01 | End: 2023-01-01

## 2023-01-01 RX ORDER — OXYCODONE HYDROCHLORIDE 5 MG/1
5-10 TABLET ORAL EVERY 4 HOURS PRN
Status: CANCELLED | OUTPATIENT
Start: 2023-01-01

## 2023-01-01 RX ORDER — POLYETHYLENE GLYCOL 3350 17 G/17G
17 POWDER, FOR SOLUTION ORAL DAILY
Status: CANCELLED | OUTPATIENT
Start: 2023-01-01

## 2023-01-01 RX ORDER — FENTANYL CITRATE 50 UG/ML
50 INJECTION, SOLUTION INTRAMUSCULAR; INTRAVENOUS ONCE
Status: COMPLETED | OUTPATIENT
Start: 2023-01-01 | End: 2023-01-01

## 2023-01-01 RX ORDER — HYDROXYZINE HYDROCHLORIDE 25 MG/1
25-50 TABLET, FILM COATED ORAL 4 TIMES DAILY PRN
Status: DISCONTINUED | OUTPATIENT
Start: 2023-01-01 | End: 2023-01-01 | Stop reason: HOSPADM

## 2023-01-01 RX ORDER — BUMETANIDE 0.25 MG/ML
2 INJECTION INTRAMUSCULAR; INTRAVENOUS ONCE
Status: COMPLETED | OUTPATIENT
Start: 2023-01-01 | End: 2023-01-01

## 2023-01-01 RX ORDER — ONDANSETRON 4 MG/1
8 TABLET, ORALLY DISINTEGRATING ORAL EVERY 8 HOURS PRN
Qty: 10 TABLET | Refills: 0 | Status: SHIPPED | OUTPATIENT
Start: 2023-01-01

## 2023-01-01 RX ORDER — DOCUSATE SODIUM 100 MG/1
100 CAPSULE, LIQUID FILLED ORAL 2 TIMES DAILY
Status: DISCONTINUED | OUTPATIENT
Start: 2023-01-01 | End: 2023-01-01

## 2023-01-01 RX ORDER — LORAZEPAM 2 MG/ML
.5-1 CONCENTRATE ORAL EVERY 4 HOURS PRN
Qty: 30 ML | Refills: 0 | Status: SHIPPED | OUTPATIENT
Start: 2023-01-01 | End: 2023-01-01

## 2023-01-01 RX ORDER — ONDANSETRON 4 MG/1
4 TABLET, ORALLY DISINTEGRATING ORAL EVERY 6 HOURS PRN
Status: DISCONTINUED | OUTPATIENT
Start: 2023-01-01 | End: 2023-01-01 | Stop reason: HOSPADM

## 2023-01-01 RX ORDER — LORAZEPAM 2 MG/ML
0.5 INJECTION INTRAMUSCULAR EVERY 4 HOURS PRN
Status: DISCONTINUED | OUTPATIENT
Start: 2023-01-01 | End: 2023-01-01 | Stop reason: HOSPADM

## 2023-01-01 RX ORDER — NITROGLYCERIN 0.4 MG/1
0.4 TABLET SUBLINGUAL EVERY 5 MIN PRN
Status: DISCONTINUED | OUTPATIENT
Start: 2023-01-01 | End: 2023-01-01 | Stop reason: HOSPADM

## 2023-01-01 RX ORDER — DEXTROSE MONOHYDRATE 25 G/50ML
25-50 INJECTION, SOLUTION INTRAVENOUS
Status: DISCONTINUED | OUTPATIENT
Start: 2023-01-01 | End: 2023-01-01

## 2023-01-01 RX ORDER — FENTANYL CITRATE 50 UG/ML
25 INJECTION, SOLUTION INTRAMUSCULAR; INTRAVENOUS EVERY 5 MIN PRN
Status: DISCONTINUED | OUTPATIENT
Start: 2023-01-01 | End: 2023-01-01 | Stop reason: HOSPADM

## 2023-01-01 RX ORDER — MORPHINE SULFATE 20 MG/ML
10-15 SOLUTION ORAL
Status: DISCONTINUED | OUTPATIENT
Start: 2023-01-01 | End: 2023-01-01

## 2023-01-01 RX ORDER — HYDROMORPHONE HCL IN WATER/PF 6 MG/30 ML
0.2 PATIENT CONTROLLED ANALGESIA SYRINGE INTRAVENOUS EVERY 5 MIN PRN
Status: DISCONTINUED | OUTPATIENT
Start: 2023-01-01 | End: 2023-01-01 | Stop reason: HOSPADM

## 2023-01-01 RX ORDER — MORPHINE SULFATE 2 MG/ML
1-2 INJECTION, SOLUTION INTRAMUSCULAR; INTRAVENOUS EVERY 4 HOURS PRN
Status: DISCONTINUED | OUTPATIENT
Start: 2023-01-01 | End: 2023-01-01

## 2023-01-01 RX ORDER — AMPICILLIN AND SULBACTAM 2; 1 G/1; G/1
3 INJECTION, POWDER, FOR SOLUTION INTRAMUSCULAR; INTRAVENOUS EVERY 6 HOURS
Status: DISCONTINUED | OUTPATIENT
Start: 2023-01-01 | End: 2023-01-01

## 2023-01-01 RX ORDER — AMOXICILLIN 250 MG
2 CAPSULE ORAL 2 TIMES DAILY PRN
COMMUNITY
Start: 2023-01-01

## 2023-01-01 RX ORDER — HYDROXYZINE HYDROCHLORIDE 25 MG/1
25-50 TABLET, FILM COATED ORAL EVERY 6 HOURS PRN
Status: DISCONTINUED | OUTPATIENT
Start: 2023-01-01 | End: 2023-01-01

## 2023-01-01 RX ORDER — HYDROXYZINE HYDROCHLORIDE 25 MG/1
25 TABLET, FILM COATED ORAL EVERY 6 HOURS PRN
Status: CANCELLED | OUTPATIENT
Start: 2023-01-01

## 2023-01-01 RX ORDER — HYDROXYZINE HYDROCHLORIDE 10 MG/1
10-20 TABLET, FILM COATED ORAL EVERY 8 HOURS PRN
Qty: 30 TABLET | Refills: 0 | Status: SHIPPED | OUTPATIENT
Start: 2023-01-01 | End: 2023-01-01

## 2023-01-01 RX ORDER — METOCLOPRAMIDE HYDROCHLORIDE 5 MG/ML
10 INJECTION INTRAMUSCULAR; INTRAVENOUS EVERY 6 HOURS
Status: CANCELLED | OUTPATIENT
Start: 2023-01-01

## 2023-01-01 RX ORDER — AMOXICILLIN 250 MG
2 CAPSULE ORAL 2 TIMES DAILY PRN
Status: DISCONTINUED | OUTPATIENT
Start: 2023-01-01 | End: 2023-01-01

## 2023-01-01 RX ORDER — NITROGLYCERIN 0.4 MG/1
0.4 TABLET SUBLINGUAL EVERY 5 MIN PRN
Status: DISCONTINUED | OUTPATIENT
Start: 2023-01-01 | End: 2023-01-01

## 2023-01-01 RX ORDER — ETOMIDATE 2 MG/ML
INJECTION INTRAVENOUS PRN
Status: DISCONTINUED | OUTPATIENT
Start: 2023-01-01 | End: 2023-01-01

## 2023-01-01 RX ORDER — AMPICILLIN AND SULBACTAM 2; 1 G/1; G/1
3 INJECTION, POWDER, FOR SOLUTION INTRAMUSCULAR; INTRAVENOUS EVERY 6 HOURS
Status: CANCELLED | OUTPATIENT
Start: 2023-01-01 | End: 2023-01-01

## 2023-01-01 RX ORDER — NALOXONE HYDROCHLORIDE 0.4 MG/ML
0.4 INJECTION, SOLUTION INTRAMUSCULAR; INTRAVENOUS; SUBCUTANEOUS
Status: CANCELLED | OUTPATIENT
Start: 2023-01-01

## 2023-01-01 RX ORDER — HYDROMORPHONE HYDROCHLORIDE 1 MG/ML
0.2 INJECTION, SOLUTION INTRAMUSCULAR; INTRAVENOUS; SUBCUTANEOUS
Status: CANCELLED | OUTPATIENT
Start: 2023-01-01

## 2023-01-01 RX ORDER — HEPARIN SODIUM 10000 [USP'U]/100ML
0-5000 INJECTION, SOLUTION INTRAVENOUS CONTINUOUS
Status: DISCONTINUED | OUTPATIENT
Start: 2023-01-01 | End: 2023-01-01

## 2023-01-01 RX ORDER — ONDANSETRON 2 MG/ML
4 INJECTION INTRAMUSCULAR; INTRAVENOUS EVERY 6 HOURS PRN
Status: DISCONTINUED | OUTPATIENT
Start: 2023-01-01 | End: 2023-01-01 | Stop reason: HOSPADM

## 2023-01-01 RX ORDER — IOPAMIDOL 755 MG/ML
60 INJECTION, SOLUTION INTRAVASCULAR ONCE
Status: COMPLETED | OUTPATIENT
Start: 2023-01-01 | End: 2023-01-01

## 2023-01-01 RX ADMIN — DEXTROSE MONOHYDRATE 1000 ML: 100 INJECTION, SOLUTION INTRAVENOUS at 11:41

## 2023-01-01 RX ADMIN — METOCLOPRAMIDE HYDROCHLORIDE 10 MG: 5 INJECTION INTRAMUSCULAR; INTRAVENOUS at 05:27

## 2023-01-01 RX ADMIN — MAGNESIUM OXIDE TAB 400 MG (241.3 MG ELEMENTAL MG) 400 MG: 400 (241.3 MG) TAB at 07:41

## 2023-01-01 RX ADMIN — MORPHINE SULFATE 5 MG: 20 SOLUTION ORAL at 16:28

## 2023-01-01 RX ADMIN — PREDNISONE 15 MG: 5 TABLET ORAL at 07:39

## 2023-01-01 RX ADMIN — SENNOSIDES AND DOCUSATE SODIUM 2 TABLET: 50; 8.6 TABLET ORAL at 08:58

## 2023-01-01 RX ADMIN — SENNOSIDES AND DOCUSATE SODIUM 2 TABLET: 50; 8.6 TABLET ORAL at 08:54

## 2023-01-01 RX ADMIN — DEXTROSE MONOHYDRATE 50 ML: 25 INJECTION, SOLUTION INTRAVENOUS at 17:00

## 2023-01-01 RX ADMIN — IPRATROPIUM BROMIDE AND ALBUTEROL SULFATE 3 ML: .5; 3 SOLUTION RESPIRATORY (INHALATION) at 07:59

## 2023-01-01 RX ADMIN — MAGNESIUM OXIDE TAB 400 MG (241.3 MG ELEMENTAL MG) 400 MG: 400 (241.3 MG) TAB at 09:32

## 2023-01-01 RX ADMIN — METOCLOPRAMIDE HYDROCHLORIDE 10 MG: 5 INJECTION INTRAMUSCULAR; INTRAVENOUS at 10:07

## 2023-01-01 RX ADMIN — PREDNISONE 15 MG: 5 TABLET ORAL at 08:34

## 2023-01-01 RX ADMIN — Medication 5 MG: at 23:02

## 2023-01-01 RX ADMIN — APIXABAN 5 MG: 5 TABLET, FILM COATED ORAL at 08:04

## 2023-01-01 RX ADMIN — MAGNESIUM OXIDE TAB 400 MG (241.3 MG ELEMENTAL MG) 400 MG: 400 (241.3 MG) TAB at 08:19

## 2023-01-01 RX ADMIN — METOCLOPRAMIDE HYDROCHLORIDE 10 MG: 5 INJECTION INTRAMUSCULAR; INTRAVENOUS at 22:26

## 2023-01-01 RX ADMIN — POTASSIUM CHLORIDE 10 MEQ: 7.46 INJECTION, SOLUTION INTRAVENOUS at 04:52

## 2023-01-01 RX ADMIN — AMPICILLIN SODIUM AND SULBACTAM SODIUM 3 G: 2; 1 INJECTION, POWDER, FOR SOLUTION INTRAMUSCULAR; INTRAVENOUS at 01:55

## 2023-01-01 RX ADMIN — BUMETANIDE 2 MG: 2 TABLET ORAL at 19:17

## 2023-01-01 RX ADMIN — PROCHLORPERAZINE EDISYLATE 5 MG: 5 INJECTION INTRAMUSCULAR; INTRAVENOUS at 09:35

## 2023-01-01 RX ADMIN — LIDOCAINE PATCH 4% 1 PATCH: 40 PATCH TOPICAL at 09:25

## 2023-01-01 RX ADMIN — SODIUM CHLORIDE SOLN NEBU 3% 3 ML: 3 NEBU SOLN at 16:55

## 2023-01-01 RX ADMIN — GABAPENTIN 100 MG: 250 SOLUTION ORAL at 21:08

## 2023-01-01 RX ADMIN — HYDROXYZINE HYDROCHLORIDE 25 MG: 25 TABLET, FILM COATED ORAL at 20:57

## 2023-01-01 RX ADMIN — FUROSEMIDE 20 MG: 20 TABLET ORAL at 17:04

## 2023-01-01 RX ADMIN — SODIUM CHLORIDE SOLN NEBU 3% 3 ML: 3 NEBU SOLN at 12:23

## 2023-01-01 RX ADMIN — IPRATROPIUM BROMIDE AND ALBUTEROL SULFATE 3 ML: .5; 3 SOLUTION RESPIRATORY (INHALATION) at 08:16

## 2023-01-01 RX ADMIN — METOCLOPRAMIDE HYDROCHLORIDE 10 MG: 5 INJECTION INTRAMUSCULAR; INTRAVENOUS at 04:50

## 2023-01-01 RX ADMIN — MORPHINE SULFATE 5 MG: 20 SOLUTION ORAL at 20:25

## 2023-01-01 RX ADMIN — HYDROXYZINE HYDROCHLORIDE 25 MG: 25 TABLET, FILM COATED ORAL at 04:58

## 2023-01-01 RX ADMIN — LORAZEPAM 1 MG: 2 LIQUID ORAL at 04:20

## 2023-01-01 RX ADMIN — LIDOCAINE PATCH 4% 1 PATCH: 40 PATCH TOPICAL at 08:59

## 2023-01-01 RX ADMIN — SODIUM CHLORIDE SOLN NEBU 3% 3 ML: 3 NEBU SOLN at 08:38

## 2023-01-01 RX ADMIN — POTASSIUM CHLORIDE 10 MEQ: 7.46 INJECTION, SOLUTION INTRAVENOUS at 06:55

## 2023-01-01 RX ADMIN — BUMETANIDE 2 MG: 0.25 INJECTION INTRAMUSCULAR; INTRAVENOUS at 21:31

## 2023-01-01 RX ADMIN — Medication 50 ML: at 21:17

## 2023-01-01 RX ADMIN — MORPHINE SULFATE 5 MG: 20 SOLUTION ORAL at 20:58

## 2023-01-01 RX ADMIN — Medication 5 MG: at 12:43

## 2023-01-01 RX ADMIN — SODIUM CHLORIDE SOLN NEBU 3% 3 ML: 3 NEBU SOLN at 13:06

## 2023-01-01 RX ADMIN — HYDROMORPHONE HYDROCHLORIDE 0.2 MG: 0.2 INJECTION, SOLUTION INTRAMUSCULAR; INTRAVENOUS; SUBCUTANEOUS at 08:50

## 2023-01-01 RX ADMIN — APIXABAN 5 MG: 5 TABLET, FILM COATED ORAL at 07:41

## 2023-01-01 RX ADMIN — PROCHLORPERAZINE MALEATE 5 MG: 5 TABLET ORAL at 08:21

## 2023-01-01 RX ADMIN — TIZANIDINE 2 MG: 2 TABLET ORAL at 21:37

## 2023-01-01 RX ADMIN — DEXTROSE MONOHYDRATE 1000 ML: 100 INJECTION, SOLUTION INTRAVENOUS at 08:00

## 2023-01-01 RX ADMIN — ACETAMINOPHEN 975 MG: 325 TABLET, FILM COATED ORAL at 07:02

## 2023-01-01 RX ADMIN — DEXTROSE MONOHYDRATE 1000 ML: 100 INJECTION, SOLUTION INTRAVENOUS at 05:42

## 2023-01-01 RX ADMIN — MORPHINE SULFATE 10 MG: 20 SOLUTION ORAL at 04:44

## 2023-01-01 RX ADMIN — LIDOCAINE PATCH 4% 1 PATCH: 40 PATCH TOPICAL at 08:14

## 2023-01-01 RX ADMIN — FUROSEMIDE 20 MG: 20 TABLET ORAL at 17:00

## 2023-01-01 RX ADMIN — GABAPENTIN 100 MG: 250 SOLUTION ORAL at 21:20

## 2023-01-01 RX ADMIN — APIXABAN 5 MG: 5 TABLET, FILM COATED ORAL at 21:15

## 2023-01-01 RX ADMIN — AMIODARONE HYDROCHLORIDE 200 MG: 200 TABLET ORAL at 08:22

## 2023-01-01 RX ADMIN — PROCHLORPERAZINE EDISYLATE 5 MG: 5 INJECTION INTRAMUSCULAR; INTRAVENOUS at 13:44

## 2023-01-01 RX ADMIN — GABAPENTIN 100 MG: 250 SOLUTION ORAL at 21:31

## 2023-01-01 RX ADMIN — MORPHINE SULFATE 5 MG: 20 SOLUTION ORAL at 18:23

## 2023-01-01 RX ADMIN — MORPHINE SULFATE 2 MG: 2 INJECTION, SOLUTION INTRAMUSCULAR; INTRAVENOUS at 02:33

## 2023-01-01 RX ADMIN — PREDNISONE 15 MG: 5 TABLET ORAL at 08:47

## 2023-01-01 RX ADMIN — PHENYLEPHRINE HYDROCHLORIDE 100 MCG: 10 INJECTION INTRAVENOUS at 12:26

## 2023-01-01 RX ADMIN — TIZANIDINE 2 MG: 2 TABLET ORAL at 23:54

## 2023-01-01 RX ADMIN — APIXABAN 5 MG: 5 TABLET, FILM COATED ORAL at 20:50

## 2023-01-01 RX ADMIN — APIXABAN 5 MG: 5 TABLET, FILM COATED ORAL at 20:14

## 2023-01-01 RX ADMIN — AMIODARONE HYDROCHLORIDE 200 MG: 200 TABLET ORAL at 08:24

## 2023-01-01 RX ADMIN — HYDROXYZINE HYDROCHLORIDE 25 MG: 25 TABLET, FILM COATED ORAL at 17:32

## 2023-01-01 RX ADMIN — ACETAMINOPHEN 975 MG: 325 TABLET, FILM COATED ORAL at 14:59

## 2023-01-01 RX ADMIN — SENNOSIDES AND DOCUSATE SODIUM 2 TABLET: 50; 8.6 TABLET ORAL at 21:21

## 2023-01-01 RX ADMIN — Medication 15 ML: at 08:33

## 2023-01-01 RX ADMIN — PREDNISONE 15 MG: 5 TABLET ORAL at 08:21

## 2023-01-01 RX ADMIN — PIPERACILLIN AND TAZOBACTAM 4.5 G: 4; .5 INJECTION, POWDER, FOR SOLUTION INTRAVENOUS at 02:10

## 2023-01-01 RX ADMIN — IPRATROPIUM BROMIDE AND ALBUTEROL SULFATE 3 ML: .5; 3 SOLUTION RESPIRATORY (INHALATION) at 12:41

## 2023-01-01 RX ADMIN — APIXABAN 5 MG: 5 TABLET, FILM COATED ORAL at 08:34

## 2023-01-01 RX ADMIN — IPRATROPIUM BROMIDE AND ALBUTEROL SULFATE 3 ML: .5; 3 SOLUTION RESPIRATORY (INHALATION) at 13:26

## 2023-01-01 RX ADMIN — DEXTROSE MONOHYDRATE 50 ML: 25 INJECTION, SOLUTION INTRAVENOUS at 08:15

## 2023-01-01 RX ADMIN — SODIUM CHLORIDE SOLN NEBU 3% 3 ML: 3 NEBU SOLN at 19:39

## 2023-01-01 RX ADMIN — AMPICILLIN SODIUM AND SULBACTAM SODIUM 3 G: 2; 1 INJECTION, POWDER, FOR SOLUTION INTRAMUSCULAR; INTRAVENOUS at 02:06

## 2023-01-01 RX ADMIN — DOCUSATE SODIUM 100 MG: 100 CAPSULE, LIQUID FILLED ORAL at 20:14

## 2023-01-01 RX ADMIN — OXYCODONE HYDROCHLORIDE 10 MG: 5 TABLET ORAL at 08:07

## 2023-01-01 RX ADMIN — LIDOCAINE HYDROCHLORIDE 1 ML: 20 JELLY TOPICAL at 14:08

## 2023-01-01 RX ADMIN — SENNOSIDES AND DOCUSATE SODIUM 1 TABLET: 50; 8.6 TABLET ORAL at 08:14

## 2023-01-01 RX ADMIN — IPRATROPIUM BROMIDE AND ALBUTEROL SULFATE 3 ML: .5; 3 SOLUTION RESPIRATORY (INHALATION) at 19:54

## 2023-01-01 RX ADMIN — PROCHLORPERAZINE MALEATE 5 MG: 5 TABLET ORAL at 22:16

## 2023-01-01 RX ADMIN — SODIUM CHLORIDE SOLN NEBU 3% 3 ML: 3 NEBU SOLN at 16:20

## 2023-01-01 RX ADMIN — MAGNESIUM OXIDE TAB 400 MG (241.3 MG ELEMENTAL MG) 400 MG: 400 (241.3 MG) TAB at 08:34

## 2023-01-01 RX ADMIN — IPRATROPIUM BROMIDE AND ALBUTEROL SULFATE 3 ML: .5; 3 SOLUTION RESPIRATORY (INHALATION) at 20:31

## 2023-01-01 RX ADMIN — ACETAMINOPHEN 975 MG: 325 TABLET, FILM COATED ORAL at 23:02

## 2023-01-01 RX ADMIN — APIXABAN 5 MG: 5 TABLET, FILM COATED ORAL at 21:02

## 2023-01-01 RX ADMIN — Medication 5 MG: at 04:47

## 2023-01-01 RX ADMIN — APIXABAN 5 MG: 5 TABLET, FILM COATED ORAL at 07:37

## 2023-01-01 RX ADMIN — HYDROXYZINE HYDROCHLORIDE 25 MG: 25 TABLET, FILM COATED ORAL at 20:32

## 2023-01-01 RX ADMIN — TIZANIDINE 2 MG: 2 TABLET ORAL at 05:01

## 2023-01-01 RX ADMIN — DOCUSATE SODIUM 100 MG: 100 CAPSULE, LIQUID FILLED ORAL at 20:50

## 2023-01-01 RX ADMIN — BUMETANIDE 2 MG: 2 TABLET ORAL at 14:28

## 2023-01-01 RX ADMIN — Medication 2.5 MG: at 11:52

## 2023-01-01 RX ADMIN — MORPHINE SULFATE 5 MG: 20 SOLUTION ORAL at 12:29

## 2023-01-01 RX ADMIN — Medication 15 ML: at 07:44

## 2023-01-01 RX ADMIN — MORPHINE SULFATE 10 MG: 20 SOLUTION ORAL at 14:42

## 2023-01-01 RX ADMIN — ETOMIDATE 16 MG: 40 INJECTION, SOLUTION INTRAVENOUS at 11:55

## 2023-01-01 RX ADMIN — MAGNESIUM SULFATE HEPTAHYDRATE 2 G: 40 INJECTION, SOLUTION INTRAVENOUS at 06:25

## 2023-01-01 RX ADMIN — Medication 40 MG: at 07:44

## 2023-01-01 RX ADMIN — Medication 50 ML: at 10:38

## 2023-01-01 RX ADMIN — POLYETHYLENE GLYCOL 3350 17 G: 17 POWDER, FOR SOLUTION ORAL at 07:45

## 2023-01-01 RX ADMIN — HYDROMORPHONE HYDROCHLORIDE 0.2 MG: 0.2 INJECTION, SOLUTION INTRAMUSCULAR; INTRAVENOUS; SUBCUTANEOUS at 10:09

## 2023-01-01 RX ADMIN — HYDROXYZINE HYDROCHLORIDE 25 MG: 25 TABLET, FILM COATED ORAL at 21:37

## 2023-01-01 RX ADMIN — POTASSIUM CHLORIDE 20 MEQ: 1.5 POWDER, FOR SOLUTION ORAL at 05:59

## 2023-01-01 RX ADMIN — TIZANIDINE 2 MG: 2 TABLET ORAL at 09:10

## 2023-01-01 RX ADMIN — DEXTROSE MONOHYDRATE 1000 ML: 100 INJECTION, SOLUTION INTRAVENOUS at 11:53

## 2023-01-01 RX ADMIN — BUMETANIDE 2 MG: 2 TABLET ORAL at 08:52

## 2023-01-01 RX ADMIN — SODIUM CHLORIDE SOLN NEBU 3% 3 ML: 3 NEBU SOLN at 16:00

## 2023-01-01 RX ADMIN — SODIUM PHOSPHATE, MONOBASIC, MONOHYDRATE AND SODIUM PHOSPHATE, DIBASIC, ANHYDROUS 9 MMOL: 276; 142 INJECTION, SOLUTION INTRAVENOUS at 21:33

## 2023-01-01 RX ADMIN — POLYETHYLENE GLYCOL 3350 17 G: 17 POWDER, FOR SOLUTION ORAL at 08:51

## 2023-01-01 RX ADMIN — HYDROXYZINE HYDROCHLORIDE 25 MG: 25 TABLET, FILM COATED ORAL at 11:23

## 2023-01-01 RX ADMIN — Medication 15 ML: at 17:33

## 2023-01-01 RX ADMIN — MAGNESIUM SULFATE HEPTAHYDRATE 2 G: 40 INJECTION, SOLUTION INTRAVENOUS at 09:58

## 2023-01-01 RX ADMIN — AMIODARONE HYDROCHLORIDE 200 MG: 200 TABLET ORAL at 08:49

## 2023-01-01 RX ADMIN — HYDROXYZINE HYDROCHLORIDE 25 MG: 25 TABLET, FILM COATED ORAL at 12:11

## 2023-01-01 RX ADMIN — MAGNESIUM OXIDE TAB 400 MG (241.3 MG ELEMENTAL MG) 400 MG: 400 (241.3 MG) TAB at 08:22

## 2023-01-01 RX ADMIN — MAGNESIUM OXIDE TAB 400 MG (241.3 MG ELEMENTAL MG) 400 MG: 400 (241.3 MG) TAB at 08:01

## 2023-01-01 RX ADMIN — SODIUM CHLORIDE 1000 ML: 9 INJECTION, SOLUTION INTRAVENOUS at 12:12

## 2023-01-01 RX ADMIN — LIDOCAINE PATCH 4% 1 PATCH: 40 PATCH TOPICAL at 08:04

## 2023-01-01 RX ADMIN — GABAPENTIN 100 MG: 100 CAPSULE ORAL at 21:29

## 2023-01-01 RX ADMIN — BUMETANIDE 2 MG: 2 TABLET ORAL at 08:53

## 2023-01-01 RX ADMIN — DEXTROSE MONOHYDRATE 1000 ML: 100 INJECTION, SOLUTION INTRAVENOUS at 11:10

## 2023-01-01 RX ADMIN — PROCHLORPERAZINE EDISYLATE 5 MG: 5 INJECTION INTRAMUSCULAR; INTRAVENOUS at 05:44

## 2023-01-01 RX ADMIN — HYDROMORPHONE HYDROCHLORIDE 0.2 MG: 0.2 INJECTION, SOLUTION INTRAMUSCULAR; INTRAVENOUS; SUBCUTANEOUS at 02:32

## 2023-01-01 RX ADMIN — SODIUM CHLORIDE SOLN NEBU 3% 3 ML: 3 NEBU SOLN at 08:05

## 2023-01-01 RX ADMIN — IPRATROPIUM BROMIDE AND ALBUTEROL SULFATE 3 ML: .5; 3 SOLUTION RESPIRATORY (INHALATION) at 16:19

## 2023-01-01 RX ADMIN — DEXTROSE AND SODIUM CHLORIDE: 5; 900 INJECTION, SOLUTION INTRAVENOUS at 14:08

## 2023-01-01 RX ADMIN — SENNOSIDES AND DOCUSATE SODIUM 2 TABLET: 50; 8.6 TABLET ORAL at 21:32

## 2023-01-01 RX ADMIN — FOLIC ACID 1 MG: 1 TABLET ORAL at 08:42

## 2023-01-01 RX ADMIN — PIPERACILLIN AND TAZOBACTAM 4.5 G: 4; .5 INJECTION, POWDER, FOR SOLUTION INTRAVENOUS at 19:41

## 2023-01-01 RX ADMIN — PANTOPRAZOLE SODIUM 40 MG: 40 TABLET, DELAYED RELEASE ORAL at 08:28

## 2023-01-01 RX ADMIN — Medication 1 MG: at 19:30

## 2023-01-01 RX ADMIN — LORAZEPAM 0.5 MG: 2 INJECTION INTRAMUSCULAR; INTRAVENOUS at 16:55

## 2023-01-01 RX ADMIN — PREDNISONE 15 MG: 5 TABLET ORAL at 08:12

## 2023-01-01 RX ADMIN — ACETAMINOPHEN 975 MG: 325 TABLET, FILM COATED ORAL at 15:38

## 2023-01-01 RX ADMIN — PANTOPRAZOLE SODIUM 40 MG: 40 TABLET, DELAYED RELEASE ORAL at 09:06

## 2023-01-01 RX ADMIN — TIZANIDINE 2 MG: 2 TABLET ORAL at 09:09

## 2023-01-01 RX ADMIN — Medication 0.25 MG: at 13:48

## 2023-01-01 RX ADMIN — AMIODARONE HYDROCHLORIDE 200 MG: 200 TABLET ORAL at 08:34

## 2023-01-01 RX ADMIN — Medication 15 ML: at 08:49

## 2023-01-01 RX ADMIN — MAGNESIUM OXIDE TAB 400 MG (241.3 MG ELEMENTAL MG) 400 MG: 400 (241.3 MG) TAB at 07:37

## 2023-01-01 RX ADMIN — APIXABAN 5 MG: 5 TABLET, FILM COATED ORAL at 21:31

## 2023-01-01 RX ADMIN — POTASSIUM CHLORIDE 10 MEQ: 7.46 INJECTION, SOLUTION INTRAVENOUS at 15:01

## 2023-01-01 RX ADMIN — DEXTROSE 15 G: 15 GEL ORAL at 07:40

## 2023-01-01 RX ADMIN — PREDNISONE 40 MG: 20 TABLET ORAL at 08:58

## 2023-01-01 RX ADMIN — LIDOCAINE PATCH 4% 1 PATCH: 40 PATCH TOPICAL at 19:40

## 2023-01-01 RX ADMIN — MORPHINE SULFATE 5 MG: 20 SOLUTION ORAL at 08:50

## 2023-01-01 RX ADMIN — PREDNISONE 15 MG: 5 TABLET ORAL at 07:59

## 2023-01-01 RX ADMIN — Medication 40 MG: at 07:43

## 2023-01-01 RX ADMIN — APIXABAN 5 MG: 5 TABLET, FILM COATED ORAL at 19:54

## 2023-01-01 RX ADMIN — SODIUM CHLORIDE SOLN NEBU 3% 3 ML: 3 NEBU SOLN at 08:43

## 2023-01-01 RX ADMIN — PANTOPRAZOLE SODIUM 20 MG: 20 TABLET, DELAYED RELEASE ORAL at 16:07

## 2023-01-01 RX ADMIN — Medication 20 MCG: at 12:01

## 2023-01-01 RX ADMIN — Medication 15 ML: at 08:58

## 2023-01-01 RX ADMIN — LIDOCAINE PATCH 4% 1 PATCH: 40 PATCH TOPICAL at 09:51

## 2023-01-01 RX ADMIN — SODIUM CHLORIDE SOLN NEBU 3% 3 ML: 3 NEBU SOLN at 17:27

## 2023-01-01 RX ADMIN — Medication 50 ML: at 10:43

## 2023-01-01 RX ADMIN — TIZANIDINE 2 MG: 2 TABLET ORAL at 14:26

## 2023-01-01 RX ADMIN — MORPHINE SULFATE 5 MG: 20 SOLUTION ORAL at 13:05

## 2023-01-01 RX ADMIN — FUROSEMIDE 10 MG/HR: 10 INJECTION, SOLUTION INTRAVENOUS at 16:16

## 2023-01-01 RX ADMIN — SENNOSIDES AND DOCUSATE SODIUM 2 TABLET: 50; 8.6 TABLET ORAL at 08:51

## 2023-01-01 RX ADMIN — POLYETHYLENE GLYCOL 3350 17 G: 17 POWDER, FOR SOLUTION ORAL at 09:26

## 2023-01-01 RX ADMIN — METOCLOPRAMIDE HYDROCHLORIDE 10 MG: 5 INJECTION INTRAMUSCULAR; INTRAVENOUS at 16:30

## 2023-01-01 RX ADMIN — OXYCODONE HYDROCHLORIDE 10 MG: 5 TABLET ORAL at 19:00

## 2023-01-01 RX ADMIN — IPRATROPIUM BROMIDE AND ALBUTEROL SULFATE 3 ML: .5; 3 SOLUTION RESPIRATORY (INHALATION) at 16:42

## 2023-01-01 RX ADMIN — OXYCODONE HYDROCHLORIDE 5 MG: 5 TABLET ORAL at 10:16

## 2023-01-01 RX ADMIN — PREDNISONE 20 MG: 20 TABLET ORAL at 08:37

## 2023-01-01 RX ADMIN — BUMETANIDE 2 MG: 2 TABLET ORAL at 20:55

## 2023-01-01 RX ADMIN — METOCLOPRAMIDE HYDROCHLORIDE 10 MG: 5 INJECTION INTRAMUSCULAR; INTRAVENOUS at 12:00

## 2023-01-01 RX ADMIN — PROCHLORPERAZINE MALEATE 5 MG: 5 TABLET ORAL at 19:56

## 2023-01-01 RX ADMIN — DEXTROSE 15 G: 15 GEL ORAL at 22:22

## 2023-01-01 RX ADMIN — NITROGLYCERIN 0.4 MG: 0.4 TABLET SUBLINGUAL at 17:36

## 2023-01-01 RX ADMIN — Medication 1 MG: at 19:56

## 2023-01-01 RX ADMIN — AMIODARONE HYDROCHLORIDE 200 MG: 200 TABLET ORAL at 08:15

## 2023-01-01 RX ADMIN — Medication 5 MG: at 20:13

## 2023-01-01 RX ADMIN — FOLIC ACID 1 MG: 1 TABLET ORAL at 08:48

## 2023-01-01 RX ADMIN — DEXTROSE: 15 GEL ORAL at 09:42

## 2023-01-01 RX ADMIN — Medication 1 MG: at 21:58

## 2023-01-01 RX ADMIN — METOCLOPRAMIDE HYDROCHLORIDE 10 MG: 5 INJECTION INTRAMUSCULAR; INTRAVENOUS at 22:16

## 2023-01-01 RX ADMIN — TIZANIDINE 2 MG: 2 TABLET ORAL at 06:31

## 2023-01-01 RX ADMIN — MELATONIN TAB 3 MG 3 MG: 3 TAB at 21:58

## 2023-01-01 RX ADMIN — MORPHINE SULFATE 5 MG: 20 SOLUTION ORAL at 16:48

## 2023-01-01 RX ADMIN — Medication 1 MG: at 20:15

## 2023-01-01 RX ADMIN — BUMETANIDE 2 MG: 2 TABLET ORAL at 09:06

## 2023-01-01 RX ADMIN — APIXABAN 5 MG: 5 TABLET, FILM COATED ORAL at 19:56

## 2023-01-01 RX ADMIN — MAGNESIUM OXIDE TAB 400 MG (241.3 MG ELEMENTAL MG) 400 MG: 400 (241.3 MG) TAB at 08:36

## 2023-01-01 RX ADMIN — ONDANSETRON 4 MG: 4 TABLET, ORALLY DISINTEGRATING ORAL at 12:07

## 2023-01-01 RX ADMIN — APIXABAN 5 MG: 5 TABLET, FILM COATED ORAL at 10:10

## 2023-01-01 RX ADMIN — APIXABAN 5 MG: 5 TABLET, FILM COATED ORAL at 19:25

## 2023-01-01 RX ADMIN — GABAPENTIN 100 MG: 250 SOLUTION ORAL at 22:11

## 2023-01-01 RX ADMIN — Medication 5 MG: at 16:37

## 2023-01-01 RX ADMIN — POLYETHYLENE GLYCOL 3350 17 G: 17 POWDER, FOR SOLUTION ORAL at 21:30

## 2023-01-01 RX ADMIN — BUMETANIDE 2 MG: 2 TABLET ORAL at 08:47

## 2023-01-01 RX ADMIN — Medication 15 ML: at 08:34

## 2023-01-01 RX ADMIN — ONDANSETRON 4 MG: 2 INJECTION INTRAMUSCULAR; INTRAVENOUS at 08:10

## 2023-01-01 RX ADMIN — LORAZEPAM 1 MG: 2 LIQUID ORAL at 12:16

## 2023-01-01 RX ADMIN — DEXTROSE MONOHYDRATE 25 ML: 25 INJECTION, SOLUTION INTRAVENOUS at 00:35

## 2023-01-01 RX ADMIN — Medication 15 ML: at 09:31

## 2023-01-01 RX ADMIN — ONDANSETRON 4 MG: 4 TABLET, ORALLY DISINTEGRATING ORAL at 00:47

## 2023-01-01 RX ADMIN — MAGNESIUM OXIDE TAB 400 MG (241.3 MG ELEMENTAL MG) 400 MG: 400 (241.3 MG) TAB at 08:16

## 2023-01-01 RX ADMIN — SODIUM CHLORIDE SOLN NEBU 3% 3 ML: 3 NEBU SOLN at 16:22

## 2023-01-01 RX ADMIN — HYDROMORPHONE HYDROCHLORIDE 0.2 MG: 0.2 INJECTION, SOLUTION INTRAMUSCULAR; INTRAVENOUS; SUBCUTANEOUS at 09:58

## 2023-01-01 RX ADMIN — IPRATROPIUM BROMIDE AND ALBUTEROL SULFATE 3 ML: .5; 3 SOLUTION RESPIRATORY (INHALATION) at 16:16

## 2023-01-01 RX ADMIN — HUMAN ALBUMIN MICROSPHERES AND PERFLUTREN 5 ML: 10; .22 INJECTION, SOLUTION INTRAVENOUS at 12:24

## 2023-01-01 RX ADMIN — SODIUM CHLORIDE SOLN NEBU 3% 3 ML: 3 NEBU SOLN at 21:11

## 2023-01-01 RX ADMIN — AMIODARONE HYDROCHLORIDE 200 MG: 200 TABLET ORAL at 09:53

## 2023-01-01 RX ADMIN — SODIUM CHLORIDE SOLN NEBU 3% 3 ML: 3 NEBU SOLN at 11:52

## 2023-01-01 RX ADMIN — METOCLOPRAMIDE HYDROCHLORIDE 10 MG: 5 INJECTION INTRAMUSCULAR; INTRAVENOUS at 05:23

## 2023-01-01 RX ADMIN — MORPHINE SULFATE 10 MG: 20 SOLUTION ORAL at 10:03

## 2023-01-01 RX ADMIN — HYDROMORPHONE HYDROCHLORIDE 0.2 MG: 0.2 INJECTION, SOLUTION INTRAMUSCULAR; INTRAVENOUS; SUBCUTANEOUS at 15:31

## 2023-01-01 RX ADMIN — PANTOPRAZOLE SODIUM 40 MG: 40 TABLET, DELAYED RELEASE ORAL at 07:01

## 2023-01-01 RX ADMIN — Medication 40 MG: at 08:33

## 2023-01-01 RX ADMIN — IPRATROPIUM BROMIDE AND ALBUTEROL SULFATE 3 ML: .5; 3 SOLUTION RESPIRATORY (INHALATION) at 20:14

## 2023-01-01 RX ADMIN — APIXABAN 5 MG: 5 TABLET, FILM COATED ORAL at 20:41

## 2023-01-01 RX ADMIN — AMIODARONE HYDROCHLORIDE 200 MG: 200 TABLET ORAL at 09:06

## 2023-01-01 RX ADMIN — APIXABAN 5 MG: 5 TABLET, FILM COATED ORAL at 08:13

## 2023-01-01 RX ADMIN — Medication 5 MG: at 12:21

## 2023-01-01 RX ADMIN — MORPHINE SULFATE 10 MG: 20 SOLUTION ORAL at 08:48

## 2023-01-01 RX ADMIN — IOPAMIDOL 90 ML: 755 INJECTION, SOLUTION INTRAVENOUS at 20:11

## 2023-01-01 RX ADMIN — BUMETANIDE 2 MG: 2 TABLET ORAL at 08:15

## 2023-01-01 RX ADMIN — GABAPENTIN 100 MG: 100 CAPSULE ORAL at 21:58

## 2023-01-01 RX ADMIN — SENNOSIDES AND DOCUSATE SODIUM 2 TABLET: 50; 8.6 TABLET ORAL at 20:14

## 2023-01-01 RX ADMIN — APIXABAN 5 MG: 5 TABLET, FILM COATED ORAL at 20:49

## 2023-01-01 RX ADMIN — SENNOSIDES AND DOCUSATE SODIUM 2 TABLET: 50; 8.6 TABLET ORAL at 21:14

## 2023-01-01 RX ADMIN — PIPERACILLIN AND TAZOBACTAM 4.5 G: 4; .5 INJECTION, POWDER, FOR SOLUTION INTRAVENOUS at 01:59

## 2023-01-01 RX ADMIN — Medication 5 MG: at 14:59

## 2023-01-01 RX ADMIN — METOCLOPRAMIDE HYDROCHLORIDE 10 MG: 5 INJECTION INTRAMUSCULAR; INTRAVENOUS at 23:06

## 2023-01-01 RX ADMIN — APIXABAN 5 MG: 5 TABLET, FILM COATED ORAL at 08:15

## 2023-01-01 RX ADMIN — WATER: 1 IRRIGANT IRRIGATION at 01:00

## 2023-01-01 RX ADMIN — POTASSIUM CHLORIDE 40 MEQ: 1.5 POWDER, FOR SOLUTION ORAL at 12:10

## 2023-01-01 RX ADMIN — SODIUM CHLORIDE SOLN NEBU 3% 3 ML: 3 NEBU SOLN at 07:41

## 2023-01-01 RX ADMIN — IPRATROPIUM BROMIDE AND ALBUTEROL SULFATE 3 ML: .5; 3 SOLUTION RESPIRATORY (INHALATION) at 16:48

## 2023-01-01 RX ADMIN — FUROSEMIDE 40 MG: 20 TABLET ORAL at 07:37

## 2023-01-01 RX ADMIN — MAGNESIUM OXIDE TAB 400 MG (241.3 MG ELEMENTAL MG) 400 MG: 400 (241.3 MG) TAB at 08:04

## 2023-01-01 RX ADMIN — TIZANIDINE 2 MG: 2 TABLET ORAL at 20:32

## 2023-01-01 RX ADMIN — SODIUM CHLORIDE SOLN NEBU 3% 3 ML: 3 NEBU SOLN at 09:12

## 2023-01-01 RX ADMIN — BUMETANIDE 2 MG: 2 TABLET ORAL at 08:51

## 2023-01-01 RX ADMIN — AMIODARONE HYDROCHLORIDE 200 MG: 200 TABLET ORAL at 18:05

## 2023-01-01 RX ADMIN — Medication 40 MG: at 11:55

## 2023-01-01 RX ADMIN — APIXABAN 5 MG: 5 TABLET, FILM COATED ORAL at 08:05

## 2023-01-01 RX ADMIN — GABAPENTIN 100 MG: 250 SOLUTION ORAL at 21:30

## 2023-01-01 RX ADMIN — SODIUM CHLORIDE SOLN NEBU 3% 3 ML: 3 NEBU SOLN at 20:31

## 2023-01-01 RX ADMIN — SODIUM CHLORIDE SOLN NEBU 3% 3 ML: 3 NEBU SOLN at 15:46

## 2023-01-01 RX ADMIN — MAGNESIUM SULFATE HEPTAHYDRATE 2 G: 40 INJECTION, SOLUTION INTRAVENOUS at 17:28

## 2023-01-01 RX ADMIN — IPRATROPIUM BROMIDE AND ALBUTEROL SULFATE 3 ML: .5; 3 SOLUTION RESPIRATORY (INHALATION) at 09:22

## 2023-01-01 RX ADMIN — DEXTROSE MONOHYDRATE AND SODIUM CHLORIDE: 5; .45 INJECTION, SOLUTION INTRAVENOUS at 06:38

## 2023-01-01 RX ADMIN — MELATONIN TAB 3 MG 3 MG: 3 TAB at 21:51

## 2023-01-01 RX ADMIN — MORPHINE SULFATE 5 MG: 20 SOLUTION ORAL at 21:15

## 2023-01-01 RX ADMIN — TIZANIDINE 2 MG: 2 TABLET ORAL at 15:18

## 2023-01-01 RX ADMIN — PANTOPRAZOLE SODIUM 40 MG: 40 TABLET, DELAYED RELEASE ORAL at 08:33

## 2023-01-01 RX ADMIN — Medication 5 MG: at 21:49

## 2023-01-01 RX ADMIN — FUROSEMIDE 40 MG: 20 TABLET ORAL at 18:06

## 2023-01-01 RX ADMIN — MORPHINE SULFATE 5 MG: 20 SOLUTION ORAL at 22:26

## 2023-01-01 RX ADMIN — OXYCODONE HYDROCHLORIDE 2.5 MG: 5 TABLET ORAL at 00:09

## 2023-01-01 RX ADMIN — VANCOMYCIN HYDROCHLORIDE 1250 MG: 10 INJECTION, POWDER, LYOPHILIZED, FOR SOLUTION INTRAVENOUS at 14:25

## 2023-01-01 RX ADMIN — Medication 15 ML: at 07:43

## 2023-01-01 RX ADMIN — PREDNISONE 15 MG: 5 TABLET ORAL at 07:44

## 2023-01-01 RX ADMIN — APIXABAN 5 MG: 5 TABLET, FILM COATED ORAL at 20:48

## 2023-01-01 RX ADMIN — POTASSIUM CHLORIDE 40 MEQ: 1.5 POWDER, FOR SOLUTION ORAL at 08:26

## 2023-01-01 RX ADMIN — TIZANIDINE 2 MG: 2 TABLET ORAL at 18:10

## 2023-01-01 RX ADMIN — SENNOSIDES AND DOCUSATE SODIUM 2 TABLET: 50; 8.6 TABLET ORAL at 08:01

## 2023-01-01 RX ADMIN — POTASSIUM CHLORIDE 10 MEQ: 7.46 INJECTION, SOLUTION INTRAVENOUS at 00:55

## 2023-01-01 RX ADMIN — PANTOPRAZOLE SODIUM 40 MG: 40 TABLET, DELAYED RELEASE ORAL at 08:36

## 2023-01-01 RX ADMIN — BUMETANIDE 2 MG: 2 TABLET ORAL at 19:25

## 2023-01-01 RX ADMIN — IPRATROPIUM BROMIDE AND ALBUTEROL SULFATE 3 ML: .5; 3 SOLUTION RESPIRATORY (INHALATION) at 08:37

## 2023-01-01 RX ADMIN — MORPHINE SULFATE 5 MG: 20 SOLUTION ORAL at 00:07

## 2023-01-01 RX ADMIN — PREDNISONE 15 MG: 5 TABLET ORAL at 07:50

## 2023-01-01 RX ADMIN — VANCOMYCIN HYDROCHLORIDE 1250 MG: 10 INJECTION, POWDER, LYOPHILIZED, FOR SOLUTION INTRAVENOUS at 00:41

## 2023-01-01 RX ADMIN — AMIODARONE HYDROCHLORIDE 200 MG: 200 TABLET ORAL at 07:37

## 2023-01-01 RX ADMIN — APIXABAN 5 MG: 5 TABLET, FILM COATED ORAL at 20:00

## 2023-01-01 RX ADMIN — HYDROXYZINE HYDROCHLORIDE 25 MG: 25 TABLET, FILM COATED ORAL at 16:25

## 2023-01-01 RX ADMIN — Medication 5 MG: at 20:12

## 2023-01-01 RX ADMIN — MORPHINE SULFATE 5 MG: 20 SOLUTION ORAL at 09:58

## 2023-01-01 RX ADMIN — TIZANIDINE 2 MG: 2 TABLET ORAL at 20:17

## 2023-01-01 RX ADMIN — METOCLOPRAMIDE HYDROCHLORIDE 10 MG: 5 INJECTION INTRAMUSCULAR; INTRAVENOUS at 04:58

## 2023-01-01 RX ADMIN — IPRATROPIUM BROMIDE AND ALBUTEROL SULFATE 3 ML: .5; 3 SOLUTION RESPIRATORY (INHALATION) at 12:50

## 2023-01-01 RX ADMIN — METOCLOPRAMIDE HYDROCHLORIDE 10 MG: 5 INJECTION INTRAMUSCULAR; INTRAVENOUS at 22:14

## 2023-01-01 RX ADMIN — PREDNISONE 15 MG: 5 TABLET ORAL at 07:45

## 2023-01-01 RX ADMIN — LIDOCAINE PATCH 4% 1 PATCH: 40 PATCH TOPICAL at 20:09

## 2023-01-01 RX ADMIN — MORPHINE SULFATE 5 MG: 20 SOLUTION ORAL at 16:42

## 2023-01-01 RX ADMIN — GABAPENTIN 100 MG: 250 SOLUTION ORAL at 22:27

## 2023-01-01 RX ADMIN — MORPHINE SULFATE 5 MG: 20 SOLUTION ORAL at 14:13

## 2023-01-01 RX ADMIN — POTASSIUM CHLORIDE 10 MEQ: 7.46 INJECTION, SOLUTION INTRAVENOUS at 14:17

## 2023-01-01 RX ADMIN — SODIUM CHLORIDE SOLN NEBU 3% 3 ML: 3 NEBU SOLN at 20:26

## 2023-01-01 RX ADMIN — TIZANIDINE 2 MG: 2 TABLET ORAL at 10:58

## 2023-01-01 RX ADMIN — MORPHINE SULFATE 10 MG: 20 SOLUTION ORAL at 02:36

## 2023-01-01 RX ADMIN — PANTOPRAZOLE SODIUM 40 MG: 40 TABLET, DELAYED RELEASE ORAL at 09:56

## 2023-01-01 RX ADMIN — METOCLOPRAMIDE HYDROCHLORIDE 10 MG: 5 INJECTION INTRAMUSCULAR; INTRAVENOUS at 11:50

## 2023-01-01 RX ADMIN — IPRATROPIUM BROMIDE AND ALBUTEROL SULFATE 3 ML: .5; 3 SOLUTION RESPIRATORY (INHALATION) at 20:37

## 2023-01-01 RX ADMIN — POLYETHYLENE GLYCOL 3350 17 G: 17 POWDER, FOR SOLUTION ORAL at 08:02

## 2023-01-01 RX ADMIN — SENNOSIDES AND DOCUSATE SODIUM 2 TABLET: 50; 8.6 TABLET ORAL at 08:34

## 2023-01-01 RX ADMIN — MAGNESIUM OXIDE TAB 400 MG (241.3 MG ELEMENTAL MG) 400 MG: 400 (241.3 MG) TAB at 08:52

## 2023-01-01 RX ADMIN — Medication 40 MG: at 08:36

## 2023-01-01 RX ADMIN — APIXABAN 5 MG: 5 TABLET, FILM COATED ORAL at 08:37

## 2023-01-01 RX ADMIN — POLYETHYLENE GLYCOL 3350 17 G: 17 POWDER, FOR SOLUTION ORAL at 08:22

## 2023-01-01 RX ADMIN — Medication 5 MG: at 14:53

## 2023-01-01 RX ADMIN — SENNOSIDES AND DOCUSATE SODIUM 2 TABLET: 50; 8.6 TABLET ORAL at 19:25

## 2023-01-01 RX ADMIN — APIXABAN 5 MG: 5 TABLET, FILM COATED ORAL at 20:31

## 2023-01-01 RX ADMIN — TIZANIDINE 2 MG: 2 TABLET ORAL at 06:54

## 2023-01-01 RX ADMIN — IPRATROPIUM BROMIDE AND ALBUTEROL SULFATE 3 ML: .5; 3 SOLUTION RESPIRATORY (INHALATION) at 17:27

## 2023-01-01 RX ADMIN — IPRATROPIUM BROMIDE AND ALBUTEROL SULFATE 3 ML: .5; 3 SOLUTION RESPIRATORY (INHALATION) at 08:28

## 2023-01-01 RX ADMIN — SENNOSIDES AND DOCUSATE SODIUM 2 TABLET: 50; 8.6 TABLET ORAL at 19:40

## 2023-01-01 RX ADMIN — MORPHINE SULFATE 5 MG: 20 SOLUTION ORAL at 05:26

## 2023-01-01 RX ADMIN — IPRATROPIUM BROMIDE AND ALBUTEROL SULFATE 3 ML: .5; 3 SOLUTION RESPIRATORY (INHALATION) at 20:13

## 2023-01-01 RX ADMIN — AMIODARONE HYDROCHLORIDE 200 MG: 200 TABLET ORAL at 08:51

## 2023-01-01 RX ADMIN — BUMETANIDE 2 MG: 2 TABLET ORAL at 16:52

## 2023-01-01 RX ADMIN — DEXTROSE MONOHYDRATE 1000 ML: 100 INJECTION, SOLUTION INTRAVENOUS at 09:44

## 2023-01-01 RX ADMIN — METOCLOPRAMIDE HYDROCHLORIDE 10 MG: 5 INJECTION INTRAMUSCULAR; INTRAVENOUS at 12:10

## 2023-01-01 RX ADMIN — SODIUM CHLORIDE SOLN NEBU 3% 3 ML: 3 NEBU SOLN at 08:50

## 2023-01-01 RX ADMIN — METOCLOPRAMIDE HYDROCHLORIDE 10 MG: 5 INJECTION INTRAMUSCULAR; INTRAVENOUS at 12:21

## 2023-01-01 RX ADMIN — APIXABAN 5 MG: 5 TABLET, FILM COATED ORAL at 21:14

## 2023-01-01 RX ADMIN — Medication 1 MG: at 20:09

## 2023-01-01 RX ADMIN — SENNOSIDES AND DOCUSATE SODIUM 2 TABLET: 50; 8.6 TABLET ORAL at 20:32

## 2023-01-01 RX ADMIN — IPRATROPIUM BROMIDE AND ALBUTEROL SULFATE 3 ML: .5; 3 SOLUTION RESPIRATORY (INHALATION) at 13:15

## 2023-01-01 RX ADMIN — APIXABAN 5 MG: 5 TABLET, FILM COATED ORAL at 09:23

## 2023-01-01 RX ADMIN — Medication 15 ML: at 08:05

## 2023-01-01 RX ADMIN — ONDANSETRON 4 MG: 2 INJECTION INTRAMUSCULAR; INTRAVENOUS at 12:31

## 2023-01-01 RX ADMIN — PANTOPRAZOLE SODIUM 40 MG: 40 TABLET, DELAYED RELEASE ORAL at 08:00

## 2023-01-01 RX ADMIN — METOCLOPRAMIDE HYDROCHLORIDE 10 MG: 5 INJECTION INTRAMUSCULAR; INTRAVENOUS at 11:10

## 2023-01-01 RX ADMIN — HYDROMORPHONE HYDROCHLORIDE 0.2 MG: 0.2 INJECTION, SOLUTION INTRAMUSCULAR; INTRAVENOUS; SUBCUTANEOUS at 23:52

## 2023-01-01 RX ADMIN — FENTANYL CITRATE 25 MCG: 50 INJECTION, SOLUTION INTRAMUSCULAR; INTRAVENOUS at 11:55

## 2023-01-01 RX ADMIN — LORAZEPAM 1 MG: 2 LIQUID ORAL at 09:09

## 2023-01-01 RX ADMIN — TIZANIDINE 2 MG: 2 TABLET ORAL at 05:42

## 2023-01-01 RX ADMIN — MORPHINE SULFATE 5 MG: 20 SOLUTION ORAL at 00:33

## 2023-01-01 RX ADMIN — ACETAMINOPHEN 1000 MG: 500 TABLET ORAL at 15:08

## 2023-01-01 RX ADMIN — BUMETANIDE 2 MG: 2 TABLET ORAL at 14:04

## 2023-01-01 RX ADMIN — LIDOCAINE PATCH 4% 1 PATCH: 40 PATCH TOPICAL at 08:22

## 2023-01-01 RX ADMIN — BUMETANIDE 2 MG: 0.25 INJECTION INTRAMUSCULAR; INTRAVENOUS at 05:35

## 2023-01-01 RX ADMIN — PROCHLORPERAZINE MALEATE 5 MG: 5 TABLET ORAL at 06:41

## 2023-01-01 RX ADMIN — Medication 1 MG: at 19:42

## 2023-01-01 RX ADMIN — MORPHINE SULFATE 5 MG: 20 SOLUTION ORAL at 02:39

## 2023-01-01 RX ADMIN — SODIUM CHLORIDE SOLN NEBU 3% 3 ML: 3 NEBU SOLN at 08:29

## 2023-01-01 RX ADMIN — APIXABAN 5 MG: 5 TABLET, FILM COATED ORAL at 21:50

## 2023-01-01 RX ADMIN — IPRATROPIUM BROMIDE AND ALBUTEROL SULFATE 3 ML: .5; 3 SOLUTION RESPIRATORY (INHALATION) at 12:32

## 2023-01-01 RX ADMIN — MAGNESIUM OXIDE TAB 400 MG (241.3 MG ELEMENTAL MG) 400 MG: 400 (241.3 MG) TAB at 08:53

## 2023-01-01 RX ADMIN — GABAPENTIN 100 MG: 250 SOLUTION ORAL at 19:25

## 2023-01-01 RX ADMIN — HYDROXYZINE HYDROCHLORIDE 50 MG: 25 TABLET, FILM COATED ORAL at 18:23

## 2023-01-01 RX ADMIN — ONDANSETRON 4 MG: 2 INJECTION INTRAMUSCULAR; INTRAVENOUS at 19:26

## 2023-01-01 RX ADMIN — AMIODARONE HYDROCHLORIDE 200 MG: 200 TABLET ORAL at 08:19

## 2023-01-01 RX ADMIN — LIDOCAINE HYDROCHLORIDE 40 MG: 20 INJECTION, SOLUTION INFILTRATION; PERINEURAL at 11:55

## 2023-01-01 RX ADMIN — FUROSEMIDE 20 MG: 20 TABLET ORAL at 15:48

## 2023-01-01 RX ADMIN — HYDROXYZINE HYDROCHLORIDE 25 MG: 25 TABLET, FILM COATED ORAL at 23:12

## 2023-01-01 RX ADMIN — PANTOPRAZOLE SODIUM 20 MG: 20 TABLET, DELAYED RELEASE ORAL at 07:39

## 2023-01-01 RX ADMIN — POLYETHYLENE GLYCOL 3350 17 G: 17 POWDER, FOR SOLUTION ORAL at 08:59

## 2023-01-01 RX ADMIN — MELATONIN TAB 3 MG 3 MG: 3 TAB at 21:07

## 2023-01-01 RX ADMIN — MORPHINE SULFATE 5 MG: 20 SOLUTION ORAL at 06:50

## 2023-01-01 RX ADMIN — CEFAZOLIN 2 G: 1 INJECTION, POWDER, FOR SOLUTION INTRAMUSCULAR; INTRAVENOUS at 11:50

## 2023-01-01 RX ADMIN — PANTOPRAZOLE SODIUM 40 MG: 40 TABLET, DELAYED RELEASE ORAL at 07:37

## 2023-01-01 RX ADMIN — PREDNISONE 20 MG: 20 TABLET ORAL at 16:21

## 2023-01-01 RX ADMIN — OXYCODONE HYDROCHLORIDE 10 MG: 5 TABLET ORAL at 00:21

## 2023-01-01 RX ADMIN — MAGNESIUM OXIDE TAB 400 MG (241.3 MG ELEMENTAL MG) 400 MG: 400 (241.3 MG) TAB at 09:53

## 2023-01-01 RX ADMIN — MORPHINE SULFATE 10 MG: 20 SOLUTION ORAL at 06:55

## 2023-01-01 RX ADMIN — DEXTROSE 30 G: 15 GEL ORAL at 06:31

## 2023-01-01 RX ADMIN — ONDANSETRON 4 MG: 2 INJECTION INTRAMUSCULAR; INTRAVENOUS at 05:11

## 2023-01-01 RX ADMIN — MAGNESIUM OXIDE TAB 400 MG (241.3 MG ELEMENTAL MG) 400 MG: 400 (241.3 MG) TAB at 09:27

## 2023-01-01 RX ADMIN — SODIUM CHLORIDE SOLN NEBU 3% 3 ML: 3 NEBU SOLN at 12:41

## 2023-01-01 RX ADMIN — MORPHINE SULFATE 5 MG: 20 SOLUTION ORAL at 17:11

## 2023-01-01 RX ADMIN — FUROSEMIDE 40 MG: 10 INJECTION, SOLUTION INTRAVENOUS at 10:31

## 2023-01-01 RX ADMIN — MORPHINE SULFATE 2 MG: 2 INJECTION, SOLUTION INTRAMUSCULAR; INTRAVENOUS at 08:13

## 2023-01-01 RX ADMIN — MORPHINE SULFATE 5 MG: 20 SOLUTION ORAL at 19:32

## 2023-01-01 RX ADMIN — MORPHINE SULFATE 10 MG: 20 SOLUTION ORAL at 21:36

## 2023-01-01 RX ADMIN — PANTOPRAZOLE SODIUM 20 MG: 20 TABLET, DELAYED RELEASE ORAL at 09:57

## 2023-01-01 RX ADMIN — HYDROXYZINE HYDROCHLORIDE 50 MG: 50 TABLET, FILM COATED ORAL at 15:33

## 2023-01-01 RX ADMIN — AMIODARONE HYDROCHLORIDE 200 MG: 200 TABLET ORAL at 09:22

## 2023-01-01 RX ADMIN — DEXTROSE 15 G: 15 GEL ORAL at 02:16

## 2023-01-01 RX ADMIN — POTASSIUM CHLORIDE 10 MEQ: 7.46 INJECTION, SOLUTION INTRAVENOUS at 22:58

## 2023-01-01 RX ADMIN — SODIUM CHLORIDE SOLN NEBU 3% 3 ML: 3 NEBU SOLN at 19:45

## 2023-01-01 RX ADMIN — IPRATROPIUM BROMIDE AND ALBUTEROL SULFATE 3 ML: .5; 3 SOLUTION RESPIRATORY (INHALATION) at 19:45

## 2023-01-01 RX ADMIN — GABAPENTIN 100 MG: 250 SOLUTION ORAL at 21:01

## 2023-01-01 RX ADMIN — ONDANSETRON 4 MG: 4 TABLET, ORALLY DISINTEGRATING ORAL at 10:21

## 2023-01-01 RX ADMIN — APIXABAN 5 MG: 5 TABLET, FILM COATED ORAL at 08:54

## 2023-01-01 RX ADMIN — LORAZEPAM 0.5 MG: 2 INJECTION INTRAMUSCULAR; INTRAVENOUS at 11:26

## 2023-01-01 RX ADMIN — Medication 40 MG: at 08:14

## 2023-01-01 RX ADMIN — MELATONIN TAB 3 MG 3 MG: 3 TAB at 21:49

## 2023-01-01 RX ADMIN — IPRATROPIUM BROMIDE AND ALBUTEROL SULFATE 3 ML: .5; 3 SOLUTION RESPIRATORY (INHALATION) at 12:36

## 2023-01-01 RX ADMIN — METOCLOPRAMIDE HYDROCHLORIDE 10 MG: 5 INJECTION INTRAMUSCULAR; INTRAVENOUS at 05:18

## 2023-01-01 RX ADMIN — MORPHINE SULFATE 5 MG: 20 SOLUTION ORAL at 08:58

## 2023-01-01 RX ADMIN — SODIUM CHLORIDE SOLN NEBU 3% 3 ML: 3 NEBU SOLN at 19:54

## 2023-01-01 RX ADMIN — APIXABAN 5 MG: 5 TABLET, FILM COATED ORAL at 10:02

## 2023-01-01 RX ADMIN — TIZANIDINE 2 MG: 2 TABLET ORAL at 11:23

## 2023-01-01 RX ADMIN — LORAZEPAM 0.5 MG: 2 INJECTION INTRAMUSCULAR; INTRAVENOUS at 08:47

## 2023-01-01 RX ADMIN — MORPHINE SULFATE 5 MG: 20 SOLUTION ORAL at 12:10

## 2023-01-01 RX ADMIN — ACETAMINOPHEN 975 MG: 325 TABLET, FILM COATED ORAL at 05:23

## 2023-01-01 RX ADMIN — FUROSEMIDE 40 MG: 10 INJECTION, SOLUTION INTRAMUSCULAR; INTRAVENOUS at 05:27

## 2023-01-01 RX ADMIN — MORPHINE SULFATE 5 MG: 20 SOLUTION ORAL at 14:06

## 2023-01-01 RX ADMIN — SODIUM CHLORIDE SOLN NEBU 3% 3 ML: 3 NEBU SOLN at 16:25

## 2023-01-01 RX ADMIN — MORPHINE SULFATE 5 MG: 20 SOLUTION ORAL at 12:52

## 2023-01-01 RX ADMIN — LIDOCAINE PATCH 4% 1 PATCH: 40 PATCH TOPICAL at 08:02

## 2023-01-01 RX ADMIN — MORPHINE SULFATE 5 MG: 20 SOLUTION ORAL at 02:23

## 2023-01-01 RX ADMIN — BUMETANIDE 2 MG: 2 TABLET ORAL at 14:50

## 2023-01-01 RX ADMIN — Medication 40 MG: at 08:19

## 2023-01-01 RX ADMIN — METOCLOPRAMIDE HYDROCHLORIDE 10 MG: 5 INJECTION INTRAMUSCULAR; INTRAVENOUS at 06:04

## 2023-01-01 RX ADMIN — IPRATROPIUM BROMIDE AND ALBUTEROL SULFATE 3 ML: .5; 3 SOLUTION RESPIRATORY (INHALATION) at 13:01

## 2023-01-01 RX ADMIN — APIXABAN 5 MG: 5 TABLET, FILM COATED ORAL at 08:02

## 2023-01-01 RX ADMIN — Medication 40 MG: at 09:24

## 2023-01-01 RX ADMIN — PIPERACILLIN AND TAZOBACTAM 4.5 G: 4; .5 INJECTION, POWDER, FOR SOLUTION INTRAVENOUS at 14:55

## 2023-01-01 RX ADMIN — LIDOCAINE PATCH 4% 1 PATCH: 40 PATCH TOPICAL at 07:41

## 2023-01-01 RX ADMIN — APIXABAN 5 MG: 5 TABLET, FILM COATED ORAL at 20:55

## 2023-01-01 RX ADMIN — SODIUM CHLORIDE SOLN NEBU 3% 3 ML: 3 NEBU SOLN at 08:57

## 2023-01-01 RX ADMIN — APIXABAN 5 MG: 5 TABLET, FILM COATED ORAL at 08:52

## 2023-01-01 RX ADMIN — MORPHINE SULFATE 5 MG: 20 SOLUTION ORAL at 13:09

## 2023-01-01 RX ADMIN — METOCLOPRAMIDE HYDROCHLORIDE 10 MG: 5 INJECTION INTRAMUSCULAR; INTRAVENOUS at 00:38

## 2023-01-01 RX ADMIN — BUMETANIDE 2 MG: 0.25 INJECTION INTRAMUSCULAR; INTRAVENOUS at 14:18

## 2023-01-01 RX ADMIN — BUMETANIDE 2 MG: 2 TABLET ORAL at 14:12

## 2023-01-01 RX ADMIN — APIXABAN 5 MG: 5 TABLET, FILM COATED ORAL at 20:09

## 2023-01-01 RX ADMIN — Medication 40 MG: at 08:21

## 2023-01-01 RX ADMIN — METOCLOPRAMIDE HYDROCHLORIDE 10 MG: 5 INJECTION INTRAMUSCULAR; INTRAVENOUS at 22:15

## 2023-01-01 RX ADMIN — MORPHINE SULFATE 4 MG: 2 INJECTION, SOLUTION INTRAMUSCULAR; INTRAVENOUS at 14:18

## 2023-01-01 RX ADMIN — FOLIC ACID 1 MG: 1 TABLET ORAL at 07:31

## 2023-01-01 RX ADMIN — HYDROMORPHONE HYDROCHLORIDE 0.2 MG: 0.2 INJECTION, SOLUTION INTRAMUSCULAR; INTRAVENOUS; SUBCUTANEOUS at 07:32

## 2023-01-01 RX ADMIN — SODIUM CHLORIDE SOLN NEBU 3% 3 ML: 3 NEBU SOLN at 12:12

## 2023-01-01 RX ADMIN — MORPHINE SULFATE 5 MG: 20 SOLUTION ORAL at 04:55

## 2023-01-01 RX ADMIN — Medication 5 MG: at 22:53

## 2023-01-01 RX ADMIN — AMIODARONE HYDROCHLORIDE 200 MG: 200 TABLET ORAL at 08:56

## 2023-01-01 RX ADMIN — THERA TABS 1 TABLET: TAB at 08:09

## 2023-01-01 RX ADMIN — IPRATROPIUM BROMIDE AND ALBUTEROL SULFATE 3 ML: .5; 3 SOLUTION RESPIRATORY (INHALATION) at 16:21

## 2023-01-01 RX ADMIN — BUMETANIDE 1 MG/HR: 0.25 INJECTION INTRAMUSCULAR; INTRAVENOUS at 08:36

## 2023-01-01 RX ADMIN — PREDNISONE 15 MG: 5 TABLET ORAL at 08:22

## 2023-01-01 RX ADMIN — SODIUM CHLORIDE SOLN NEBU 3% 3 ML: 3 NEBU SOLN at 08:16

## 2023-01-01 RX ADMIN — APIXABAN 5 MG: 5 TABLET, FILM COATED ORAL at 20:12

## 2023-01-01 RX ADMIN — APIXABAN 5 MG: 5 TABLET, FILM COATED ORAL at 07:44

## 2023-01-01 RX ADMIN — PREDNISONE 15 MG: 5 TABLET ORAL at 08:02

## 2023-01-01 RX ADMIN — SODIUM CHLORIDE SOLN NEBU 3% 3 ML: 3 NEBU SOLN at 17:13

## 2023-01-01 RX ADMIN — LIDOCAINE PATCH 4% 1 PATCH: 40 PATCH TOPICAL at 08:55

## 2023-01-01 RX ADMIN — HYDROXYZINE HYDROCHLORIDE 25 MG: 25 TABLET, FILM COATED ORAL at 21:20

## 2023-01-01 RX ADMIN — Medication 40 MG: at 06:51

## 2023-01-01 RX ADMIN — POTASSIUM CHLORIDE 10 MEQ: 7.46 INJECTION, SOLUTION INTRAVENOUS at 12:29

## 2023-01-01 RX ADMIN — LORAZEPAM 1 MG: 2 LIQUID ORAL at 10:47

## 2023-01-01 RX ADMIN — GABAPENTIN 100 MG: 250 SOLUTION ORAL at 22:13

## 2023-01-01 RX ADMIN — METOCLOPRAMIDE HYDROCHLORIDE 10 MG: 5 INJECTION INTRAMUSCULAR; INTRAVENOUS at 16:27

## 2023-01-01 RX ADMIN — Medication 5 MG: at 10:58

## 2023-01-01 RX ADMIN — Medication 2.5 MG: at 21:34

## 2023-01-01 RX ADMIN — TIZANIDINE 2 MG: 2 TABLET ORAL at 21:58

## 2023-01-01 RX ADMIN — SODIUM CHLORIDE SOLN NEBU 3% 3 ML: 3 NEBU SOLN at 12:51

## 2023-01-01 RX ADMIN — MELATONIN TAB 3 MG 3 MG: 3 TAB at 21:50

## 2023-01-01 RX ADMIN — MELATONIN TAB 3 MG 3 MG: 3 TAB at 20:31

## 2023-01-01 RX ADMIN — LIDOCAINE PATCH 4% 1 PATCH: 40 PATCH TOPICAL at 08:12

## 2023-01-01 RX ADMIN — METOCLOPRAMIDE HYDROCHLORIDE 10 MG: 5 INJECTION INTRAMUSCULAR; INTRAVENOUS at 17:36

## 2023-01-01 RX ADMIN — Medication 5 MG: at 16:29

## 2023-01-01 RX ADMIN — MORPHINE SULFATE 5 MG: 20 SOLUTION ORAL at 14:30

## 2023-01-01 RX ADMIN — SODIUM CHLORIDE SOLN NEBU 3% 3 ML: 3 NEBU SOLN at 16:18

## 2023-01-01 RX ADMIN — MORPHINE SULFATE 5 MG: 20 SOLUTION ORAL at 16:55

## 2023-01-01 RX ADMIN — IPRATROPIUM BROMIDE AND ALBUTEROL SULFATE 3 ML: .5; 3 SOLUTION RESPIRATORY (INHALATION) at 13:18

## 2023-01-01 RX ADMIN — SODIUM CHLORIDE SOLN NEBU 3% 3 ML: 3 NEBU SOLN at 13:15

## 2023-01-01 RX ADMIN — SODIUM CHLORIDE SOLN NEBU 3% 3 ML: 3 NEBU SOLN at 20:14

## 2023-01-01 RX ADMIN — MORPHINE SULFATE 5 MG: 20 SOLUTION ORAL at 23:51

## 2023-01-01 RX ADMIN — AMIODARONE HYDROCHLORIDE 200 MG: 200 TABLET ORAL at 08:07

## 2023-01-01 RX ADMIN — MAGNESIUM SULFATE HEPTAHYDRATE 2 G: 40 INJECTION, SOLUTION INTRAVENOUS at 05:57

## 2023-01-01 RX ADMIN — PREDNISONE 15 MG: 5 TABLET ORAL at 09:31

## 2023-01-01 RX ADMIN — IPRATROPIUM BROMIDE AND ALBUTEROL SULFATE 3 ML: .5; 3 SOLUTION RESPIRATORY (INHALATION) at 12:13

## 2023-01-01 RX ADMIN — GABAPENTIN 100 MG: 250 SOLUTION ORAL at 21:49

## 2023-01-01 RX ADMIN — AMIODARONE HYDROCHLORIDE 200 MG: 200 TABLET ORAL at 09:58

## 2023-01-01 RX ADMIN — SENNOSIDES AND DOCUSATE SODIUM 2 TABLET: 50; 8.6 TABLET ORAL at 21:25

## 2023-01-01 RX ADMIN — APIXABAN 5 MG: 5 TABLET, FILM COATED ORAL at 07:39

## 2023-01-01 RX ADMIN — LIDOCAINE PATCH 4% 1 PATCH: 40 PATCH TOPICAL at 08:49

## 2023-01-01 RX ADMIN — PREDNISONE 15 MG: 5 TABLET ORAL at 07:41

## 2023-01-01 RX ADMIN — TIZANIDINE 2 MG: 2 TABLET ORAL at 00:25

## 2023-01-01 RX ADMIN — GABAPENTIN 100 MG: 250 SOLUTION ORAL at 21:38

## 2023-01-01 RX ADMIN — Medication 1 MG: at 22:16

## 2023-01-01 RX ADMIN — LIDOCAINE PATCH 4% 1 PATCH: 40 PATCH TOPICAL at 08:36

## 2023-01-01 RX ADMIN — ONDANSETRON 4 MG: 4 TABLET, ORALLY DISINTEGRATING ORAL at 08:08

## 2023-01-01 RX ADMIN — AMIODARONE HYDROCHLORIDE 200 MG: 200 TABLET ORAL at 06:33

## 2023-01-01 RX ADMIN — PREDNISONE 15 MG: 5 TABLET ORAL at 09:21

## 2023-01-01 RX ADMIN — ACETAMINOPHEN 975 MG: 325 TABLET, FILM COATED ORAL at 23:53

## 2023-01-01 RX ADMIN — POTASSIUM CHLORIDE 20 MEQ: 40 SOLUTION ORAL at 03:00

## 2023-01-01 RX ADMIN — BUMETANIDE 2 MG: 2 TABLET ORAL at 14:52

## 2023-01-01 RX ADMIN — SODIUM CHLORIDE SOLN NEBU 3% 3 ML: 3 NEBU SOLN at 09:25

## 2023-01-01 RX ADMIN — APIXABAN 5 MG: 5 TABLET, FILM COATED ORAL at 21:00

## 2023-01-01 RX ADMIN — IPRATROPIUM BROMIDE AND ALBUTEROL SULFATE 3 ML: .5; 3 SOLUTION RESPIRATORY (INHALATION) at 20:26

## 2023-01-01 RX ADMIN — MORPHINE SULFATE 5 MG: 20 SOLUTION ORAL at 12:16

## 2023-01-01 RX ADMIN — HYDROXYZINE HYDROCHLORIDE 25 MG: 25 TABLET, FILM COATED ORAL at 18:05

## 2023-01-01 RX ADMIN — MORPHINE SULFATE 5 MG: 20 SOLUTION ORAL at 10:36

## 2023-01-01 RX ADMIN — MAGNESIUM OXIDE TAB 400 MG (241.3 MG ELEMENTAL MG) 400 MG: 400 (241.3 MG) TAB at 07:44

## 2023-01-01 RX ADMIN — METOCLOPRAMIDE HYDROCHLORIDE 10 MG: 5 INJECTION INTRAMUSCULAR; INTRAVENOUS at 22:48

## 2023-01-01 RX ADMIN — ONDANSETRON 4 MG: 2 INJECTION INTRAMUSCULAR; INTRAVENOUS at 09:20

## 2023-01-01 RX ADMIN — DEXTROSE AND SODIUM CHLORIDE: 5; 900 INJECTION, SOLUTION INTRAVENOUS at 21:22

## 2023-01-01 RX ADMIN — SENNOSIDES AND DOCUSATE SODIUM 2 TABLET: 50; 8.6 TABLET ORAL at 08:49

## 2023-01-01 RX ADMIN — DEXTROSE MONOHYDRATE: 100 INJECTION, SOLUTION INTRAVENOUS at 12:18

## 2023-01-01 RX ADMIN — METOCLOPRAMIDE HYDROCHLORIDE 10 MG: 5 INJECTION INTRAMUSCULAR; INTRAVENOUS at 16:54

## 2023-01-01 RX ADMIN — BUMETANIDE 2 MG: 2 TABLET ORAL at 19:40

## 2023-01-01 RX ADMIN — PIPERACILLIN AND TAZOBACTAM 4.5 G: 4; .5 INJECTION, POWDER, FOR SOLUTION INTRAVENOUS at 02:09

## 2023-01-01 RX ADMIN — FUROSEMIDE 40 MG: 20 TABLET ORAL at 09:53

## 2023-01-01 RX ADMIN — OXYCODONE HYDROCHLORIDE 10 MG: 5 TABLET ORAL at 09:53

## 2023-01-01 RX ADMIN — IPRATROPIUM BROMIDE AND ALBUTEROL SULFATE 3 ML: .5; 3 SOLUTION RESPIRATORY (INHALATION) at 16:00

## 2023-01-01 RX ADMIN — MORPHINE SULFATE 10 MG: 20 SOLUTION ORAL at 16:41

## 2023-01-01 RX ADMIN — MORPHINE SULFATE 10 MG: 20 SOLUTION ORAL at 08:19

## 2023-01-01 RX ADMIN — APIXABAN 5 MG: 5 TABLET, FILM COATED ORAL at 09:32

## 2023-01-01 RX ADMIN — DEXTROSE MONOHYDRATE 1000 ML: 100 INJECTION, SOLUTION INTRAVENOUS at 06:36

## 2023-01-01 RX ADMIN — DEXTROSE MONOHYDRATE 1000 ML: 100 INJECTION, SOLUTION INTRAVENOUS at 01:55

## 2023-01-01 RX ADMIN — MORPHINE SULFATE 5 MG: 20 SOLUTION ORAL at 21:53

## 2023-01-01 RX ADMIN — MORPHINE SULFATE 10 MG: 20 SOLUTION ORAL at 20:52

## 2023-01-01 RX ADMIN — DEXTROSE 15 G: 15 GEL ORAL at 11:28

## 2023-01-01 RX ADMIN — MAGNESIUM OXIDE TAB 400 MG (241.3 MG ELEMENTAL MG) 400 MG: 400 (241.3 MG) TAB at 08:37

## 2023-01-01 RX ADMIN — PREDNISONE 15 MG: 5 TABLET ORAL at 08:09

## 2023-01-01 RX ADMIN — METOCLOPRAMIDE HYDROCHLORIDE 10 MG: 5 INJECTION INTRAMUSCULAR; INTRAVENOUS at 22:10

## 2023-01-01 RX ADMIN — METOCLOPRAMIDE HYDROCHLORIDE 10 MG: 5 INJECTION INTRAMUSCULAR; INTRAVENOUS at 22:31

## 2023-01-01 RX ADMIN — DEXTROSE AND SODIUM CHLORIDE: 5; 900 INJECTION, SOLUTION INTRAVENOUS at 07:59

## 2023-01-01 RX ADMIN — SODIUM CHLORIDE SOLN NEBU 3% 3 ML: 3 NEBU SOLN at 08:21

## 2023-01-01 RX ADMIN — MORPHINE SULFATE 10 MG: 20 SOLUTION ORAL at 08:03

## 2023-01-01 RX ADMIN — Medication 5 MG: at 06:02

## 2023-01-01 RX ADMIN — HYDROMORPHONE HYDROCHLORIDE 0.2 MG: 0.2 INJECTION, SOLUTION INTRAMUSCULAR; INTRAVENOUS; SUBCUTANEOUS at 17:51

## 2023-01-01 RX ADMIN — HYDROMORPHONE HYDROCHLORIDE 0.2 MG: 0.2 INJECTION, SOLUTION INTRAMUSCULAR; INTRAVENOUS; SUBCUTANEOUS at 22:52

## 2023-01-01 RX ADMIN — BUMETANIDE 1 MG/HR: 0.25 INJECTION INTRAMUSCULAR; INTRAVENOUS at 14:28

## 2023-01-01 RX ADMIN — MORPHINE SULFATE 5 MG: 20 SOLUTION ORAL at 21:14

## 2023-01-01 RX ADMIN — BUMETANIDE 2 MG: 0.25 INJECTION INTRAMUSCULAR; INTRAVENOUS at 16:37

## 2023-01-01 RX ADMIN — BUMETANIDE 2 MG: 0.25 INJECTION INTRAMUSCULAR; INTRAVENOUS at 05:47

## 2023-01-01 RX ADMIN — Medication 5 MG: at 02:04

## 2023-01-01 RX ADMIN — FOLIC ACID 1 MG: 1 TABLET ORAL at 09:33

## 2023-01-01 RX ADMIN — BUMETANIDE 2 MG: 2 TABLET ORAL at 14:07

## 2023-01-01 RX ADMIN — MORPHINE SULFATE 1 MG: 2 INJECTION, SOLUTION INTRAMUSCULAR; INTRAVENOUS at 23:55

## 2023-01-01 RX ADMIN — SODIUM CHLORIDE SOLN NEBU 3% 3 ML: 3 NEBU SOLN at 12:39

## 2023-01-01 RX ADMIN — APIXABAN 5 MG: 5 TABLET, FILM COATED ORAL at 08:56

## 2023-01-01 RX ADMIN — IPRATROPIUM BROMIDE AND ALBUTEROL SULFATE 3 ML: .5; 3 SOLUTION RESPIRATORY (INHALATION) at 12:46

## 2023-01-01 RX ADMIN — Medication 50 ML: at 13:44

## 2023-01-01 RX ADMIN — IPRATROPIUM BROMIDE AND ALBUTEROL SULFATE 3 ML: .5; 3 SOLUTION RESPIRATORY (INHALATION) at 16:18

## 2023-01-01 RX ADMIN — TIZANIDINE 2 MG: 2 TABLET ORAL at 00:44

## 2023-01-01 RX ADMIN — HYDROMORPHONE HYDROCHLORIDE 0.2 MG: 0.2 INJECTION, SOLUTION INTRAMUSCULAR; INTRAVENOUS; SUBCUTANEOUS at 18:03

## 2023-01-01 RX ADMIN — SODIUM CHLORIDE SOLN NEBU 3% 3 ML: 3 NEBU SOLN at 20:49

## 2023-01-01 RX ADMIN — SENNOSIDES AND DOCUSATE SODIUM 2 TABLET: 50; 8.6 TABLET ORAL at 08:37

## 2023-01-01 RX ADMIN — MAGNESIUM OXIDE TAB 400 MG (241.3 MG ELEMENTAL MG) 400 MG: 400 (241.3 MG) TAB at 09:06

## 2023-01-01 RX ADMIN — BUMETANIDE 2 MG: 2 TABLET ORAL at 14:57

## 2023-01-01 RX ADMIN — PANTOPRAZOLE SODIUM 20 MG: 20 TABLET, DELAYED RELEASE ORAL at 08:34

## 2023-01-01 RX ADMIN — MELATONIN TAB 3 MG 3 MG: 3 TAB at 21:16

## 2023-01-01 RX ADMIN — DEXTROSE AND SODIUM CHLORIDE: 5; 900 INJECTION, SOLUTION INTRAVENOUS at 03:52

## 2023-01-01 RX ADMIN — POTASSIUM CHLORIDE 40 MEQ: 1.5 POWDER, FOR SOLUTION ORAL at 08:25

## 2023-01-01 RX ADMIN — FOLIC ACID 1 MG: 1 TABLET ORAL at 08:03

## 2023-01-01 RX ADMIN — AMIODARONE HYDROCHLORIDE 200 MG: 200 TABLET ORAL at 08:37

## 2023-01-01 RX ADMIN — Medication 5 MG: at 21:02

## 2023-01-01 RX ADMIN — MORPHINE SULFATE 5 MG: 20 SOLUTION ORAL at 08:46

## 2023-01-01 RX ADMIN — DEXTROSE 30 G: 15 GEL ORAL at 07:28

## 2023-01-01 RX ADMIN — PIPERACILLIN AND TAZOBACTAM 4.5 G: 4; .5 INJECTION, POWDER, FOR SOLUTION INTRAVENOUS at 08:25

## 2023-01-01 RX ADMIN — Medication 5 MG: at 15:35

## 2023-01-01 RX ADMIN — POTASSIUM CHLORIDE 10 MEQ: 7.46 INJECTION, SOLUTION INTRAVENOUS at 09:12

## 2023-01-01 RX ADMIN — MORPHINE SULFATE 10 MG: 20 SOLUTION ORAL at 15:18

## 2023-01-01 RX ADMIN — LORAZEPAM 1 MG: 2 LIQUID ORAL at 04:51

## 2023-01-01 RX ADMIN — METOCLOPRAMIDE HYDROCHLORIDE 10 MG: 5 INJECTION INTRAMUSCULAR; INTRAVENOUS at 12:16

## 2023-01-01 RX ADMIN — IPRATROPIUM BROMIDE AND ALBUTEROL SULFATE 3 ML: .5; 3 SOLUTION RESPIRATORY (INHALATION) at 20:08

## 2023-01-01 RX ADMIN — APIXABAN 5 MG: 5 TABLET, FILM COATED ORAL at 07:45

## 2023-01-01 RX ADMIN — IPRATROPIUM BROMIDE AND ALBUTEROL SULFATE 3 ML: .5; 3 SOLUTION RESPIRATORY (INHALATION) at 17:19

## 2023-01-01 RX ADMIN — MORPHINE SULFATE 5 MG: 20 SOLUTION ORAL at 21:01

## 2023-01-01 RX ADMIN — BUMETANIDE 2 MG: 2 TABLET ORAL at 20:09

## 2023-01-01 RX ADMIN — METOCLOPRAMIDE HYDROCHLORIDE 10 MG: 5 INJECTION INTRAMUSCULAR; INTRAVENOUS at 06:34

## 2023-01-01 RX ADMIN — BUPIVACAINE HYDROCHLORIDE AND EPINEPHRINE BITARTRATE 24 ML: 5; .005 INJECTION, SOLUTION PERINEURAL at 21:33

## 2023-01-01 RX ADMIN — PROCHLORPERAZINE EDISYLATE 5 MG: 5 INJECTION INTRAMUSCULAR; INTRAVENOUS at 21:45

## 2023-01-01 RX ADMIN — APIXABAN 5 MG: 5 TABLET, FILM COATED ORAL at 18:04

## 2023-01-01 RX ADMIN — MORPHINE SULFATE 5 MG: 20 SOLUTION ORAL at 08:00

## 2023-01-01 RX ADMIN — PREDNISONE 40 MG: 20 TABLET ORAL at 09:26

## 2023-01-01 RX ADMIN — Medication 15 ML: at 09:26

## 2023-01-01 RX ADMIN — FUROSEMIDE 40 MG: 20 TABLET ORAL at 08:56

## 2023-01-01 RX ADMIN — IPRATROPIUM BROMIDE AND ALBUTEROL SULFATE 3 ML: .5; 3 SOLUTION RESPIRATORY (INHALATION) at 20:25

## 2023-01-01 RX ADMIN — POTASSIUM CHLORIDE 10 MEQ: 7.46 INJECTION, SOLUTION INTRAVENOUS at 08:19

## 2023-01-01 RX ADMIN — BUMETANIDE 2 MG: 2 TABLET ORAL at 19:51

## 2023-01-01 RX ADMIN — PREDNISONE 15 MG: 5 TABLET ORAL at 08:14

## 2023-01-01 RX ADMIN — IPRATROPIUM BROMIDE AND ALBUTEROL SULFATE 3 ML: .5; 3 SOLUTION RESPIRATORY (INHALATION) at 16:20

## 2023-01-01 RX ADMIN — HYDROMORPHONE HYDROCHLORIDE 0.2 MG: 0.2 INJECTION, SOLUTION INTRAMUSCULAR; INTRAVENOUS; SUBCUTANEOUS at 23:53

## 2023-01-01 RX ADMIN — IPRATROPIUM BROMIDE AND ALBUTEROL SULFATE 3 ML: .5; 3 SOLUTION RESPIRATORY (INHALATION) at 20:54

## 2023-01-01 RX ADMIN — FUROSEMIDE 40 MG: 20 TABLET ORAL at 08:13

## 2023-01-01 RX ADMIN — AMIODARONE HYDROCHLORIDE 200 MG: 200 TABLET ORAL at 07:45

## 2023-01-01 RX ADMIN — Medication 5 MG: at 09:10

## 2023-01-01 RX ADMIN — SENNOSIDES AND DOCUSATE SODIUM 2 TABLET: 50; 8.6 TABLET ORAL at 23:15

## 2023-01-01 RX ADMIN — Medication 40 MG: at 08:47

## 2023-01-01 RX ADMIN — DOCUSATE SODIUM 100 MG: 100 CAPSULE, LIQUID FILLED ORAL at 08:24

## 2023-01-01 RX ADMIN — POTASSIUM CHLORIDE 10 MEQ: 7.46 INJECTION, SOLUTION INTRAVENOUS at 13:40

## 2023-01-01 RX ADMIN — ACETAMINOPHEN 975 MG: 325 TABLET, FILM COATED ORAL at 16:29

## 2023-01-01 RX ADMIN — AMIODARONE HYDROCHLORIDE 200 MG: 200 TABLET ORAL at 09:23

## 2023-01-01 RX ADMIN — SENNOSIDES AND DOCUSATE SODIUM 2 TABLET: 50; 8.6 TABLET ORAL at 08:04

## 2023-01-01 RX ADMIN — SENNOSIDES AND DOCUSATE SODIUM 2 TABLET: 50; 8.6 TABLET ORAL at 21:50

## 2023-01-01 RX ADMIN — PREDNISONE 15 MG: 5 TABLET ORAL at 09:05

## 2023-01-01 RX ADMIN — MORPHINE SULFATE 5 MG: 20 SOLUTION ORAL at 02:29

## 2023-01-01 RX ADMIN — SUGAMMADEX 200 MG: 100 INJECTION, SOLUTION INTRAVENOUS at 12:34

## 2023-01-01 RX ADMIN — AMIODARONE HYDROCHLORIDE 200 MG: 200 TABLET ORAL at 08:52

## 2023-01-01 RX ADMIN — IPRATROPIUM BROMIDE AND ALBUTEROL SULFATE 3 ML: .5; 3 SOLUTION RESPIRATORY (INHALATION) at 08:38

## 2023-01-01 RX ADMIN — METOPROLOL TARTRATE 5 MG: 1 INJECTION, SOLUTION INTRAVENOUS at 01:47

## 2023-01-01 RX ADMIN — OXYCODONE HYDROCHLORIDE 10 MG: 5 TABLET ORAL at 06:34

## 2023-01-01 RX ADMIN — PANTOPRAZOLE SODIUM 40 MG: 40 TABLET, DELAYED RELEASE ORAL at 08:02

## 2023-01-01 RX ADMIN — MORPHINE SULFATE 5 MG: 20 SOLUTION ORAL at 04:28

## 2023-01-01 RX ADMIN — PREDNISONE 15 MG: 5 TABLET ORAL at 09:09

## 2023-01-01 RX ADMIN — POTASSIUM CHLORIDE 20 MEQ: 1.5 POWDER, FOR SOLUTION ORAL at 17:34

## 2023-01-01 RX ADMIN — ACETAMINOPHEN 975 MG: 325 TABLET, FILM COATED ORAL at 14:46

## 2023-01-01 RX ADMIN — SODIUM CHLORIDE SOLN NEBU 3% 3 ML: 3 NEBU SOLN at 11:48

## 2023-01-01 RX ADMIN — APIXABAN 5 MG: 5 TABLET, FILM COATED ORAL at 19:41

## 2023-01-01 RX ADMIN — POTASSIUM & SODIUM PHOSPHATES POWDER PACK 280-160-250 MG 1 PACKET: 280-160-250 PACK at 19:43

## 2023-01-01 RX ADMIN — FUROSEMIDE 40 MG: 10 INJECTION, SOLUTION INTRAVENOUS at 10:52

## 2023-01-01 RX ADMIN — BUMETANIDE 2 MG: 2 TABLET ORAL at 19:26

## 2023-01-01 RX ADMIN — IPRATROPIUM BROMIDE AND ALBUTEROL SULFATE 3 ML: .5; 3 SOLUTION RESPIRATORY (INHALATION) at 09:25

## 2023-01-01 RX ADMIN — AMIODARONE HYDROCHLORIDE 200 MG: 200 TABLET ORAL at 07:41

## 2023-01-01 RX ADMIN — SODIUM CHLORIDE SOLN NEBU 3% 3 ML: 3 NEBU SOLN at 08:39

## 2023-01-01 RX ADMIN — BUMETANIDE 2 MG: 2 TABLET ORAL at 15:43

## 2023-01-01 RX ADMIN — HYDROXYZINE HYDROCHLORIDE 50 MG: 50 TABLET, FILM COATED ORAL at 08:47

## 2023-01-01 RX ADMIN — APIXABAN 5 MG: 5 TABLET, FILM COATED ORAL at 08:00

## 2023-01-01 RX ADMIN — SENNOSIDES AND DOCUSATE SODIUM 2 TABLET: 50; 8.6 TABLET ORAL at 20:45

## 2023-01-01 RX ADMIN — POTASSIUM CHLORIDE 10 MEQ: 7.46 INJECTION, SOLUTION INTRAVENOUS at 00:00

## 2023-01-01 RX ADMIN — Medication 5 MG: at 06:05

## 2023-01-01 RX ADMIN — LIDOCAINE HYDROCHLORIDE 8 ML: 10 INJECTION, SOLUTION EPIDURAL; INFILTRATION; INTRACAUDAL; PERINEURAL at 14:07

## 2023-01-01 RX ADMIN — Medication 1 MG: at 21:02

## 2023-01-01 RX ADMIN — MORPHINE SULFATE 5 MG: 20 SOLUTION ORAL at 16:57

## 2023-01-01 RX ADMIN — FOLIC ACID 1 MG: 1 TABLET ORAL at 07:45

## 2023-01-01 RX ADMIN — AMPICILLIN SODIUM AND SULBACTAM SODIUM 3 G: 2; 1 INJECTION, POWDER, FOR SOLUTION INTRAMUSCULAR; INTRAVENOUS at 14:03

## 2023-01-01 RX ADMIN — FOLIC ACID 1 MG: 1 TABLET ORAL at 08:11

## 2023-01-01 RX ADMIN — Medication 40 MG: at 08:22

## 2023-01-01 RX ADMIN — BUMETANIDE 2 MG: 2 TABLET ORAL at 08:01

## 2023-01-01 RX ADMIN — MORPHINE SULFATE 5 MG: 20 SOLUTION ORAL at 00:14

## 2023-01-01 RX ADMIN — LORAZEPAM 0.5 MG: 2 INJECTION INTRAMUSCULAR; INTRAVENOUS at 22:15

## 2023-01-01 RX ADMIN — ONDANSETRON 4 MG: 4 TABLET, ORALLY DISINTEGRATING ORAL at 08:18

## 2023-01-01 RX ADMIN — ACETAMINOPHEN 975 MG: 325 TABLET, FILM COATED ORAL at 13:32

## 2023-01-01 RX ADMIN — TIZANIDINE 2 MG: 2 TABLET ORAL at 17:33

## 2023-01-01 RX ADMIN — TIZANIDINE 2 MG: 2 TABLET ORAL at 13:01

## 2023-01-01 RX ADMIN — LORAZEPAM 1 MG: 2 LIQUID ORAL at 05:46

## 2023-01-01 RX ADMIN — AMIODARONE HYDROCHLORIDE 200 MG: 200 TABLET ORAL at 10:10

## 2023-01-01 RX ADMIN — ACETAMINOPHEN 975 MG: 325 TABLET, FILM COATED ORAL at 22:16

## 2023-01-01 RX ADMIN — APIXABAN 5 MG: 5 TABLET, FILM COATED ORAL at 20:28

## 2023-01-01 RX ADMIN — HYDROMORPHONE HYDROCHLORIDE 0.2 MG: 0.2 INJECTION, SOLUTION INTRAMUSCULAR; INTRAVENOUS; SUBCUTANEOUS at 21:17

## 2023-01-01 RX ADMIN — BUMETANIDE 2 MG: 0.25 INJECTION INTRAMUSCULAR; INTRAVENOUS at 22:03

## 2023-01-01 RX ADMIN — METOCLOPRAMIDE HYDROCHLORIDE 10 MG: 5 INJECTION INTRAMUSCULAR; INTRAVENOUS at 05:34

## 2023-01-01 RX ADMIN — IPRATROPIUM BROMIDE AND ALBUTEROL SULFATE 3 ML: .5; 3 SOLUTION RESPIRATORY (INHALATION) at 12:23

## 2023-01-01 RX ADMIN — METOCLOPRAMIDE HYDROCHLORIDE 10 MG: 5 INJECTION INTRAMUSCULAR; INTRAVENOUS at 17:38

## 2023-01-01 RX ADMIN — MORPHINE SULFATE 5 MG: 20 SOLUTION ORAL at 10:54

## 2023-01-01 RX ADMIN — DEXTROSE 30 G: 15 GEL ORAL at 20:18

## 2023-01-01 RX ADMIN — SODIUM CHLORIDE SOLN NEBU 3% 3 ML: 3 NEBU SOLN at 16:19

## 2023-01-01 RX ADMIN — HYDROXYZINE HYDROCHLORIDE 25 MG: 25 TABLET, FILM COATED ORAL at 10:08

## 2023-01-01 RX ADMIN — FOLIC ACID 1 MG: 1 TABLET ORAL at 08:17

## 2023-01-01 RX ADMIN — MORPHINE SULFATE 5 MG: 20 SOLUTION ORAL at 01:44

## 2023-01-01 RX ADMIN — POLYETHYLENE GLYCOL 3350 17 G: 17 POWDER, FOR SOLUTION ORAL at 08:47

## 2023-01-01 RX ADMIN — Medication 40 MG: at 06:34

## 2023-01-01 RX ADMIN — BUMETANIDE 1 MG/HR: 0.25 INJECTION INTRAMUSCULAR; INTRAVENOUS at 17:08

## 2023-01-01 RX ADMIN — IPRATROPIUM BROMIDE AND ALBUTEROL SULFATE 3 ML: .5; 3 SOLUTION RESPIRATORY (INHALATION) at 16:51

## 2023-01-01 RX ADMIN — Medication 20 MCG: at 07:43

## 2023-01-01 RX ADMIN — IPRATROPIUM BROMIDE AND ALBUTEROL SULFATE 3 ML: .5; 3 SOLUTION RESPIRATORY (INHALATION) at 08:50

## 2023-01-01 RX ADMIN — MAGNESIUM OXIDE TAB 400 MG (241.3 MG ELEMENTAL MG) 400 MG: 400 (241.3 MG) TAB at 06:33

## 2023-01-01 RX ADMIN — HYDROMORPHONE HYDROCHLORIDE 0.2 MG: 0.2 INJECTION, SOLUTION INTRAMUSCULAR; INTRAVENOUS; SUBCUTANEOUS at 16:44

## 2023-01-01 RX ADMIN — APIXABAN 5 MG: 5 TABLET, FILM COATED ORAL at 20:25

## 2023-01-01 RX ADMIN — FUROSEMIDE 40 MG: 10 INJECTION, SOLUTION INTRAVENOUS at 11:46

## 2023-01-01 RX ADMIN — PANTOPRAZOLE SODIUM 40 MG: 40 TABLET, DELAYED RELEASE ORAL at 08:24

## 2023-01-01 RX ADMIN — MORPHINE SULFATE 10 MG: 20 SOLUTION ORAL at 10:50

## 2023-01-01 RX ADMIN — METOCLOPRAMIDE HYDROCHLORIDE 10 MG: 5 INJECTION INTRAMUSCULAR; INTRAVENOUS at 17:04

## 2023-01-01 RX ADMIN — SODIUM CHLORIDE SOLN NEBU 3% 3 ML: 3 NEBU SOLN at 13:17

## 2023-01-01 RX ADMIN — HYDROMORPHONE HYDROCHLORIDE 0.2 MG: 0.2 INJECTION, SOLUTION INTRAMUSCULAR; INTRAVENOUS; SUBCUTANEOUS at 20:54

## 2023-01-01 RX ADMIN — THERA TABS 1 TABLET: TAB at 11:57

## 2023-01-01 RX ADMIN — BUMETANIDE 2 MG: 2 TABLET ORAL at 21:50

## 2023-01-01 RX ADMIN — IPRATROPIUM BROMIDE AND ALBUTEROL SULFATE 3 ML: .5; 3 SOLUTION RESPIRATORY (INHALATION) at 15:46

## 2023-01-01 RX ADMIN — METOCLOPRAMIDE HYDROCHLORIDE 10 MG: 5 INJECTION INTRAMUSCULAR; INTRAVENOUS at 10:56

## 2023-01-01 RX ADMIN — MORPHINE SULFATE 4 MG: 2 INJECTION, SOLUTION INTRAMUSCULAR; INTRAVENOUS at 16:26

## 2023-01-01 RX ADMIN — GABAPENTIN 100 MG: 100 CAPSULE ORAL at 21:53

## 2023-01-01 RX ADMIN — PANTOPRAZOLE SODIUM 20 MG: 20 TABLET, DELAYED RELEASE ORAL at 16:37

## 2023-01-01 RX ADMIN — HYDROXYZINE HYDROCHLORIDE 25 MG: 25 TABLET, FILM COATED ORAL at 22:47

## 2023-01-01 RX ADMIN — ONDANSETRON 4 MG: 4 TABLET, ORALLY DISINTEGRATING ORAL at 08:06

## 2023-01-01 RX ADMIN — LIDOCAINE PATCH 4% 1 PATCH: 40 PATCH TOPICAL at 08:51

## 2023-01-01 RX ADMIN — LIDOCAINE PATCH 4% 1 PATCH: 40 PATCH TOPICAL at 07:32

## 2023-01-01 RX ADMIN — METOCLOPRAMIDE HYDROCHLORIDE 10 MG: 5 INJECTION INTRAMUSCULAR; INTRAVENOUS at 05:29

## 2023-01-01 RX ADMIN — METOCLOPRAMIDE HYDROCHLORIDE 10 MG: 5 INJECTION INTRAMUSCULAR; INTRAVENOUS at 17:12

## 2023-01-01 RX ADMIN — HYDROMORPHONE HYDROCHLORIDE 0.2 MG: 0.2 INJECTION, SOLUTION INTRAMUSCULAR; INTRAVENOUS; SUBCUTANEOUS at 11:50

## 2023-01-01 RX ADMIN — SODIUM CHLORIDE SOLN NEBU 3% 3 ML: 3 NEBU SOLN at 08:53

## 2023-01-01 RX ADMIN — LIDOCAINE PATCH 4% 1 PATCH: 40 PATCH TOPICAL at 08:26

## 2023-01-01 RX ADMIN — BUMETANIDE 2 MG: 0.25 INJECTION INTRAMUSCULAR; INTRAVENOUS at 21:09

## 2023-01-01 RX ADMIN — SENNOSIDES AND DOCUSATE SODIUM 2 TABLET: 50; 8.6 TABLET ORAL at 08:35

## 2023-01-01 RX ADMIN — ONDANSETRON 4 MG: 2 INJECTION INTRAMUSCULAR; INTRAVENOUS at 13:22

## 2023-01-01 RX ADMIN — ACETAMINOPHEN 975 MG: 325 TABLET, FILM COATED ORAL at 12:16

## 2023-01-01 RX ADMIN — Medication 15 ML: at 07:32

## 2023-01-01 RX ADMIN — LORAZEPAM 0.5 MG: 2 INJECTION INTRAMUSCULAR; INTRAVENOUS at 21:08

## 2023-01-01 RX ADMIN — BUMETANIDE 2 MG: 0.25 INJECTION INTRAMUSCULAR; INTRAVENOUS at 05:51

## 2023-01-01 RX ADMIN — Medication 1 MG: at 19:54

## 2023-01-01 RX ADMIN — AMIODARONE HYDROCHLORIDE 200 MG: 200 TABLET ORAL at 07:44

## 2023-01-01 RX ADMIN — MORPHINE SULFATE 5 MG: 20 SOLUTION ORAL at 21:31

## 2023-01-01 RX ADMIN — MAGNESIUM SULFATE IN WATER 2 G: 40 INJECTION, SOLUTION INTRAVENOUS at 20:31

## 2023-01-01 RX ADMIN — METOCLOPRAMIDE HYDROCHLORIDE 10 MG: 5 INJECTION INTRAMUSCULAR; INTRAVENOUS at 22:33

## 2023-01-01 RX ADMIN — LORAZEPAM 0.5 MG: 2 INJECTION INTRAMUSCULAR; INTRAVENOUS at 02:29

## 2023-01-01 RX ADMIN — TIZANIDINE 2 MG: 2 TABLET ORAL at 11:51

## 2023-01-01 RX ADMIN — POTASSIUM CHLORIDE 10 MEQ: 7.46 INJECTION, SOLUTION INTRAVENOUS at 02:34

## 2023-01-01 RX ADMIN — LIDOCAINE PATCH 4% 1 PATCH: 40 PATCH TOPICAL at 08:47

## 2023-01-01 RX ADMIN — MAGNESIUM OXIDE TAB 400 MG (241.3 MG ELEMENTAL MG) 400 MG: 400 (241.3 MG) TAB at 08:07

## 2023-01-01 RX ADMIN — Medication 1 MG: at 21:30

## 2023-01-01 RX ADMIN — PROCHLORPERAZINE EDISYLATE 5 MG: 5 INJECTION INTRAMUSCULAR; INTRAVENOUS at 16:49

## 2023-01-01 RX ADMIN — IPRATROPIUM BROMIDE AND ALBUTEROL SULFATE 3 ML: .5; 3 SOLUTION RESPIRATORY (INHALATION) at 19:39

## 2023-01-01 RX ADMIN — GABAPENTIN 100 MG: 250 SOLUTION ORAL at 22:16

## 2023-01-01 RX ADMIN — MAGNESIUM SULFATE HEPTAHYDRATE 4 G: 40 INJECTION, SOLUTION INTRAVENOUS at 12:10

## 2023-01-01 RX ADMIN — TIZANIDINE 2 MG: 2 TABLET ORAL at 15:04

## 2023-01-01 RX ADMIN — ONDANSETRON 4 MG: 4 TABLET, ORALLY DISINTEGRATING ORAL at 08:28

## 2023-01-01 RX ADMIN — MORPHINE SULFATE 2 MG: 2 INJECTION, SOLUTION INTRAMUSCULAR; INTRAVENOUS at 20:50

## 2023-01-01 RX ADMIN — PROCHLORPERAZINE EDISYLATE 5 MG: 5 INJECTION INTRAMUSCULAR; INTRAVENOUS at 02:33

## 2023-01-01 RX ADMIN — MAGNESIUM OXIDE TAB 400 MG (241.3 MG ELEMENTAL MG) 400 MG: 400 (241.3 MG) TAB at 08:58

## 2023-01-01 RX ADMIN — Medication 5 MG: at 05:18

## 2023-01-01 RX ADMIN — LORAZEPAM 0.5 MG: 2 INJECTION INTRAMUSCULAR; INTRAVENOUS at 01:01

## 2023-01-01 RX ADMIN — BUMETANIDE 2 MG: 2 TABLET ORAL at 13:09

## 2023-01-01 RX ADMIN — Medication 5 MG: at 08:17

## 2023-01-01 RX ADMIN — FOLIC ACID 1 MG: 1 TABLET ORAL at 08:49

## 2023-01-01 RX ADMIN — METOCLOPRAMIDE HYDROCHLORIDE 10 MG: 5 INJECTION INTRAMUSCULAR; INTRAVENOUS at 11:23

## 2023-01-01 RX ADMIN — PREDNISONE 15 MG: 5 TABLET ORAL at 08:15

## 2023-01-01 RX ADMIN — Medication 15 ML: at 08:37

## 2023-01-01 RX ADMIN — PREDNISONE 15 MG: 5 TABLET ORAL at 07:32

## 2023-01-01 RX ADMIN — SODIUM CHLORIDE SOLN NEBU 3% 3 ML: 3 NEBU SOLN at 09:22

## 2023-01-01 RX ADMIN — MORPHINE SULFATE 2 MG: 2 INJECTION, SOLUTION INTRAMUSCULAR; INTRAVENOUS at 14:57

## 2023-01-01 RX ADMIN — Medication 50 ML: at 20:45

## 2023-01-01 RX ADMIN — FOLIC ACID 1 MG: 1 TABLET ORAL at 08:34

## 2023-01-01 RX ADMIN — Medication 40 MG: at 08:03

## 2023-01-01 RX ADMIN — PREDNISONE 15 MG: 5 TABLET ORAL at 10:51

## 2023-01-01 RX ADMIN — POLYETHYLENE GLYCOL 3350 17 G: 17 POWDER, FOR SOLUTION ORAL at 08:54

## 2023-01-01 RX ADMIN — GABAPENTIN 100 MG: 100 CAPSULE ORAL at 21:04

## 2023-01-01 RX ADMIN — PREDNISONE 15 MG: 5 TABLET ORAL at 08:28

## 2023-01-01 RX ADMIN — AMIODARONE HYDROCHLORIDE 200 MG: 200 TABLET ORAL at 08:54

## 2023-01-01 RX ADMIN — BUMETANIDE 2 MG: 0.25 INJECTION INTRAMUSCULAR; INTRAVENOUS at 15:10

## 2023-01-01 RX ADMIN — PROCHLORPERAZINE MALEATE 5 MG: 5 TABLET ORAL at 21:02

## 2023-01-01 RX ADMIN — MORPHINE SULFATE 5 MG: 20 SOLUTION ORAL at 04:01

## 2023-01-01 RX ADMIN — AMIODARONE HYDROCHLORIDE 200 MG: 200 TABLET ORAL at 08:09

## 2023-01-01 RX ADMIN — IPRATROPIUM BROMIDE AND ALBUTEROL SULFATE 3 ML: .5; 3 SOLUTION RESPIRATORY (INHALATION) at 11:52

## 2023-01-01 RX ADMIN — BUMETANIDE 2 MG: 2 TABLET ORAL at 07:44

## 2023-01-01 RX ADMIN — BUMETANIDE 2 MG: 2 TABLET ORAL at 20:17

## 2023-01-01 RX ADMIN — SODIUM CHLORIDE SOLN NEBU 3% 3 ML: 3 NEBU SOLN at 12:01

## 2023-01-01 RX ADMIN — PREDNISONE 30 MG: 5 TABLET ORAL at 08:53

## 2023-01-01 RX ADMIN — HYDROXYZINE HYDROCHLORIDE 25 MG: 25 TABLET, FILM COATED ORAL at 18:10

## 2023-01-01 RX ADMIN — MORPHINE SULFATE 15 MG: 20 SOLUTION ORAL at 19:54

## 2023-01-01 RX ADMIN — AMIODARONE HYDROCHLORIDE 200 MG: 200 TABLET ORAL at 07:59

## 2023-01-01 RX ADMIN — HEPARIN SODIUM AND DEXTROSE 1100 UNITS/HR: 10000; 5 INJECTION INTRAVENOUS at 17:05

## 2023-01-01 RX ADMIN — METOCLOPRAMIDE HYDROCHLORIDE 10 MG: 5 INJECTION INTRAMUSCULAR; INTRAVENOUS at 10:19

## 2023-01-01 RX ADMIN — DEXTROSE AND SODIUM CHLORIDE: 5; 900 INJECTION, SOLUTION INTRAVENOUS at 00:32

## 2023-01-01 RX ADMIN — POTASSIUM & SODIUM PHOSPHATES POWDER PACK 280-160-250 MG 1 PACKET: 280-160-250 PACK at 02:49

## 2023-01-01 RX ADMIN — PREDNISONE 15 MG: 5 TABLET ORAL at 09:23

## 2023-01-01 RX ADMIN — PREDNISONE 15 MG: 5 TABLET ORAL at 08:16

## 2023-01-01 RX ADMIN — APIXABAN 5 MG: 5 TABLET, FILM COATED ORAL at 19:17

## 2023-01-01 RX ADMIN — Medication 5 MG: at 19:40

## 2023-01-01 RX ADMIN — TIZANIDINE 2 MG: 2 TABLET ORAL at 18:44

## 2023-01-01 RX ADMIN — AMPICILLIN SODIUM AND SULBACTAM SODIUM 3 G: 2; 1 INJECTION, POWDER, FOR SOLUTION INTRAMUSCULAR; INTRAVENOUS at 08:17

## 2023-01-01 RX ADMIN — HYDROXYZINE HYDROCHLORIDE 25 MG: 25 TABLET, FILM COATED ORAL at 23:18

## 2023-01-01 RX ADMIN — METOCLOPRAMIDE HYDROCHLORIDE 10 MG: 5 INJECTION INTRAMUSCULAR; INTRAVENOUS at 10:59

## 2023-01-01 RX ADMIN — BUMETANIDE 2 MG: 0.25 INJECTION INTRAMUSCULAR; INTRAVENOUS at 05:41

## 2023-01-01 RX ADMIN — BISACODYL 5 MG: 5 TABLET, COATED ORAL at 06:22

## 2023-01-01 RX ADMIN — MORPHINE SULFATE 10 MG: 20 SOLUTION ORAL at 05:19

## 2023-01-01 RX ADMIN — IPRATROPIUM BROMIDE AND ALBUTEROL SULFATE 3 ML: .5; 3 SOLUTION RESPIRATORY (INHALATION) at 08:05

## 2023-01-01 RX ADMIN — SENNOSIDES AND DOCUSATE SODIUM 2 TABLET: 50; 8.6 TABLET ORAL at 09:26

## 2023-01-01 RX ADMIN — BUMETANIDE 2 MG: 2 TABLET ORAL at 09:31

## 2023-01-01 RX ADMIN — ONDANSETRON 4 MG: 4 TABLET, ORALLY DISINTEGRATING ORAL at 14:35

## 2023-01-01 RX ADMIN — BUMETANIDE 2 MG: 2 TABLET ORAL at 08:04

## 2023-01-01 RX ADMIN — MORPHINE SULFATE 10 MG: 20 SOLUTION ORAL at 10:39

## 2023-01-01 RX ADMIN — TIZANIDINE 2 MG: 2 TABLET ORAL at 22:15

## 2023-01-01 RX ADMIN — LIDOCAINE PATCH 4% 1 PATCH: 40 PATCH TOPICAL at 08:25

## 2023-01-01 RX ADMIN — PREDNISONE 15 MG: 5 TABLET ORAL at 08:37

## 2023-01-01 RX ADMIN — PANTOPRAZOLE SODIUM 40 MG: 40 TABLET, DELAYED RELEASE ORAL at 08:13

## 2023-01-01 RX ADMIN — GABAPENTIN 100 MG: 250 SOLUTION ORAL at 21:53

## 2023-01-01 RX ADMIN — BUMETANIDE 2 MG: 2 TABLET ORAL at 21:14

## 2023-01-01 RX ADMIN — SENNOSIDES AND DOCUSATE SODIUM 2 TABLET: 50; 8.6 TABLET ORAL at 20:50

## 2023-01-01 RX ADMIN — MAGNESIUM SULFATE HEPTAHYDRATE 2 G: 40 INJECTION, SOLUTION INTRAVENOUS at 14:03

## 2023-01-01 RX ADMIN — METOCLOPRAMIDE HYDROCHLORIDE 10 MG: 5 INJECTION INTRAMUSCULAR; INTRAVENOUS at 22:01

## 2023-01-01 RX ADMIN — IPRATROPIUM BROMIDE AND ALBUTEROL SULFATE 3 ML: .5; 3 SOLUTION RESPIRATORY (INHALATION) at 19:48

## 2023-01-01 RX ADMIN — MELATONIN TAB 3 MG 3 MG: 3 TAB at 21:37

## 2023-01-01 RX ADMIN — PANTOPRAZOLE SODIUM 20 MG: 20 TABLET, DELAYED RELEASE ORAL at 09:22

## 2023-01-01 RX ADMIN — FENTANYL CITRATE 25 MCG: 50 INJECTION, SOLUTION INTRAMUSCULAR; INTRAVENOUS at 12:36

## 2023-01-01 RX ADMIN — SODIUM CHLORIDE SOLN NEBU 3% 3 ML: 3 NEBU SOLN at 16:33

## 2023-01-01 RX ADMIN — PREDNISONE 25 MG: 5 TABLET ORAL at 08:52

## 2023-01-01 RX ADMIN — APIXABAN 5 MG: 5 TABLET, FILM COATED ORAL at 21:35

## 2023-01-01 RX ADMIN — Medication 1 MG: at 21:45

## 2023-01-01 RX ADMIN — DEXTROSE MONOHYDRATE 50 ML: 25 INJECTION, SOLUTION INTRAVENOUS at 08:04

## 2023-01-01 RX ADMIN — SODIUM CHLORIDE SOLN NEBU 3% 3 ML: 3 NEBU SOLN at 17:19

## 2023-01-01 RX ADMIN — BISACODYL 10 MG: 10 SUPPOSITORY RECTAL at 19:26

## 2023-01-01 RX ADMIN — GABAPENTIN 100 MG: 250 SOLUTION ORAL at 21:14

## 2023-01-01 RX ADMIN — Medication 5 MG: at 17:04

## 2023-01-01 RX ADMIN — MAGNESIUM OXIDE TAB 400 MG (241.3 MG ELEMENTAL MG) 400 MG: 400 (241.3 MG) TAB at 08:49

## 2023-01-01 RX ADMIN — LIDOCAINE PATCH 4% 1 PATCH: 40 PATCH TOPICAL at 08:48

## 2023-01-01 RX ADMIN — SENNOSIDES AND DOCUSATE SODIUM 2 TABLET: 50; 8.6 TABLET ORAL at 21:33

## 2023-01-01 RX ADMIN — FUROSEMIDE 40 MG: 20 TABLET ORAL at 08:36

## 2023-01-01 RX ADMIN — Medication 5 MG: at 07:02

## 2023-01-01 RX ADMIN — HYDROXYZINE HYDROCHLORIDE 25 MG: 25 TABLET, FILM COATED ORAL at 21:49

## 2023-01-01 RX ADMIN — HYDROXYZINE HYDROCHLORIDE 50 MG: 50 TABLET, FILM COATED ORAL at 03:05

## 2023-01-01 RX ADMIN — LORAZEPAM 0.5 MG: 2 INJECTION INTRAMUSCULAR; INTRAVENOUS at 10:19

## 2023-01-01 RX ADMIN — APIXABAN 5 MG: 5 TABLET, FILM COATED ORAL at 19:40

## 2023-01-01 RX ADMIN — MORPHINE SULFATE 2 MG: 2 INJECTION, SOLUTION INTRAMUSCULAR; INTRAVENOUS at 12:29

## 2023-01-01 RX ADMIN — APIXABAN 5 MG: 5 TABLET, FILM COATED ORAL at 08:47

## 2023-01-01 RX ADMIN — FUROSEMIDE 40 MG: 10 INJECTION, SOLUTION INTRAVENOUS at 13:30

## 2023-01-01 RX ADMIN — ACETAMINOPHEN 975 MG: 325 TABLET, FILM COATED ORAL at 08:19

## 2023-01-01 RX ADMIN — LORAZEPAM 1 MG: 2 LIQUID ORAL at 10:36

## 2023-01-01 RX ADMIN — GABAPENTIN 100 MG: 250 SOLUTION ORAL at 23:51

## 2023-01-01 RX ADMIN — SODIUM CHLORIDE SOLN NEBU 3% 3 ML: 3 NEBU SOLN at 16:12

## 2023-01-01 RX ADMIN — PANTOPRAZOLE SODIUM 40 MG: 40 TABLET, DELAYED RELEASE ORAL at 08:26

## 2023-01-01 RX ADMIN — MORPHINE SULFATE 5 MG: 20 SOLUTION ORAL at 15:43

## 2023-01-01 RX ADMIN — SENNOSIDES AND DOCUSATE SODIUM 2 TABLET: 50; 8.6 TABLET ORAL at 09:05

## 2023-01-01 RX ADMIN — Medication 20 MCG: at 07:46

## 2023-01-01 RX ADMIN — FUROSEMIDE 40 MG: 20 TABLET ORAL at 06:34

## 2023-01-01 RX ADMIN — HYDROMORPHONE HYDROCHLORIDE 0.4 MG: 0.2 INJECTION, SOLUTION INTRAMUSCULAR; INTRAVENOUS; SUBCUTANEOUS at 20:33

## 2023-01-01 RX ADMIN — GABAPENTIN 100 MG: 100 CAPSULE ORAL at 21:48

## 2023-01-01 RX ADMIN — MAGNESIUM SULFATE HEPTAHYDRATE 4 G: 40 INJECTION, SOLUTION INTRAVENOUS at 11:28

## 2023-01-01 RX ADMIN — Medication 5 MG: at 00:24

## 2023-01-01 RX ADMIN — PROCHLORPERAZINE EDISYLATE 5 MG: 5 INJECTION INTRAMUSCULAR; INTRAVENOUS at 02:04

## 2023-01-01 RX ADMIN — AMIODARONE HYDROCHLORIDE 200 MG: 200 TABLET ORAL at 08:04

## 2023-01-01 RX ADMIN — METOCLOPRAMIDE HYDROCHLORIDE 10 MG: 5 INJECTION INTRAMUSCULAR; INTRAVENOUS at 22:18

## 2023-01-01 RX ADMIN — HEPARIN SODIUM AND DEXTROSE 1200 UNITS/HR: 10000; 5 INJECTION INTRAVENOUS at 21:48

## 2023-01-01 RX ADMIN — GABAPENTIN 100 MG: 250 SOLUTION ORAL at 22:15

## 2023-01-01 RX ADMIN — LIDOCAINE PATCH 4% 1 PATCH: 40 PATCH TOPICAL at 08:05

## 2023-01-01 RX ADMIN — PANTOPRAZOLE SODIUM 40 MG: 40 TABLET, DELAYED RELEASE ORAL at 07:50

## 2023-01-01 RX ADMIN — METOCLOPRAMIDE HYDROCHLORIDE 10 MG: 5 INJECTION INTRAMUSCULAR; INTRAVENOUS at 16:44

## 2023-01-01 RX ADMIN — SENNOSIDES AND DOCUSATE SODIUM 2 TABLET: 50; 8.6 TABLET ORAL at 21:30

## 2023-01-01 RX ADMIN — LORAZEPAM 1 MG: 2 LIQUID ORAL at 02:09

## 2023-01-01 RX ADMIN — SODIUM CHLORIDE SOLN NEBU 3% 3 ML: 3 NEBU SOLN at 13:26

## 2023-01-01 RX ADMIN — PIPERACILLIN AND TAZOBACTAM 4.5 G: 4; .5 INJECTION, POWDER, FOR SOLUTION INTRAVENOUS at 19:39

## 2023-01-01 RX ADMIN — SODIUM CHLORIDE 1000 ML: 9 INJECTION, SOLUTION INTRAVENOUS at 17:35

## 2023-01-01 RX ADMIN — PIPERACILLIN AND TAZOBACTAM 4.5 G: 4; .5 INJECTION, POWDER, FOR SOLUTION INTRAVENOUS at 13:29

## 2023-01-01 RX ADMIN — PREDNISONE 15 MG: 5 TABLET ORAL at 08:26

## 2023-01-01 RX ADMIN — PREDNISONE 15 MG: 5 TABLET ORAL at 08:19

## 2023-01-01 RX ADMIN — APIXABAN 5 MG: 5 TABLET, FILM COATED ORAL at 08:14

## 2023-01-01 RX ADMIN — AMPICILLIN SODIUM AND SULBACTAM SODIUM 3 G: 2; 1 INJECTION, POWDER, FOR SOLUTION INTRAMUSCULAR; INTRAVENOUS at 14:09

## 2023-01-01 RX ADMIN — HYDROMORPHONE HYDROCHLORIDE 0.2 MG: 0.2 INJECTION, SOLUTION INTRAMUSCULAR; INTRAVENOUS; SUBCUTANEOUS at 22:39

## 2023-01-01 RX ADMIN — ASPIRIN 81 MG CHEWABLE TABLET 324 MG: 81 TABLET CHEWABLE at 19:56

## 2023-01-01 RX ADMIN — SODIUM CHLORIDE SOLN NEBU 3% 3 ML: 3 NEBU SOLN at 16:51

## 2023-01-01 RX ADMIN — IPRATROPIUM BROMIDE AND ALBUTEROL SULFATE 3 ML: .5; 3 SOLUTION RESPIRATORY (INHALATION) at 09:18

## 2023-01-01 RX ADMIN — IPRATROPIUM BROMIDE AND ALBUTEROL SULFATE 3 ML: .5; 3 SOLUTION RESPIRATORY (INHALATION) at 08:43

## 2023-01-01 RX ADMIN — METOCLOPRAMIDE HYDROCHLORIDE 10 MG: 5 INJECTION INTRAMUSCULAR; INTRAVENOUS at 11:14

## 2023-01-01 RX ADMIN — Medication 1 MG: at 20:32

## 2023-01-01 RX ADMIN — AMIODARONE HYDROCHLORIDE 200 MG: 200 TABLET ORAL at 07:39

## 2023-01-01 RX ADMIN — AMPICILLIN SODIUM AND SULBACTAM SODIUM 3 G: 2; 1 INJECTION, POWDER, FOR SOLUTION INTRAMUSCULAR; INTRAVENOUS at 19:59

## 2023-01-01 RX ADMIN — APIXABAN 5 MG: 5 TABLET, FILM COATED ORAL at 20:23

## 2023-01-01 RX ADMIN — IPRATROPIUM BROMIDE AND ALBUTEROL SULFATE 3 ML: .5; 3 SOLUTION RESPIRATORY (INHALATION) at 08:57

## 2023-01-01 RX ADMIN — Medication 5 MG: at 03:17

## 2023-01-01 RX ADMIN — BUMETANIDE 2 MG: 2 TABLET ORAL at 08:37

## 2023-01-01 RX ADMIN — SODIUM CHLORIDE SOLN NEBU 3% 3 ML: 3 NEBU SOLN at 12:33

## 2023-01-01 RX ADMIN — IPRATROPIUM BROMIDE AND ALBUTEROL SULFATE 3 ML: .5; 3 SOLUTION RESPIRATORY (INHALATION) at 15:40

## 2023-01-01 RX ADMIN — SODIUM CHLORIDE SOLN NEBU 3% 3 ML: 3 NEBU SOLN at 13:01

## 2023-01-01 RX ADMIN — POTASSIUM CHLORIDE 10 MEQ: 7.46 INJECTION, SOLUTION INTRAVENOUS at 10:29

## 2023-01-01 RX ADMIN — AMIODARONE HYDROCHLORIDE 200 MG: 200 TABLET ORAL at 08:58

## 2023-01-01 RX ADMIN — PROCHLORPERAZINE EDISYLATE 5 MG: 5 INJECTION INTRAMUSCULAR; INTRAVENOUS at 05:56

## 2023-01-01 RX ADMIN — IPRATROPIUM BROMIDE AND ALBUTEROL SULFATE 3 ML: .5; 3 SOLUTION RESPIRATORY (INHALATION) at 16:55

## 2023-01-01 RX ADMIN — APIXABAN 5 MG: 5 TABLET, FILM COATED ORAL at 08:24

## 2023-01-01 RX ADMIN — Medication 5 MG: at 01:23

## 2023-01-01 RX ADMIN — MORPHINE SULFATE 2 MG: 2 INJECTION, SOLUTION INTRAMUSCULAR; INTRAVENOUS at 11:31

## 2023-01-01 RX ADMIN — MORPHINE SULFATE 5 MG: 20 SOLUTION ORAL at 06:34

## 2023-01-01 RX ADMIN — SODIUM CHLORIDE SOLN NEBU 3% 3 ML: 3 NEBU SOLN at 20:13

## 2023-01-01 RX ADMIN — APIXABAN 5 MG: 5 TABLET, FILM COATED ORAL at 20:36

## 2023-01-01 RX ADMIN — LORAZEPAM 1 MG: 2 LIQUID ORAL at 14:23

## 2023-01-01 RX ADMIN — MORPHINE SULFATE 5 MG: 20 SOLUTION ORAL at 08:35

## 2023-01-01 RX ADMIN — TIZANIDINE 2 MG: 2 TABLET ORAL at 07:41

## 2023-01-01 RX ADMIN — POTASSIUM & SODIUM PHOSPHATES POWDER PACK 280-160-250 MG 1 PACKET: 280-160-250 PACK at 11:14

## 2023-01-01 RX ADMIN — IPRATROPIUM BROMIDE AND ALBUTEROL SULFATE 3 ML: .5; 3 SOLUTION RESPIRATORY (INHALATION) at 16:12

## 2023-01-01 RX ADMIN — POTASSIUM & SODIUM PHOSPHATES POWDER PACK 280-160-250 MG 1 PACKET: 280-160-250 PACK at 22:56

## 2023-01-01 RX ADMIN — PREDNISONE 40 MG: 20 TABLET ORAL at 08:48

## 2023-01-01 RX ADMIN — TIZANIDINE 2 MG: 2 TABLET ORAL at 14:46

## 2023-01-01 RX ADMIN — APIXABAN 5 MG: 5 TABLET, FILM COATED ORAL at 19:59

## 2023-01-01 RX ADMIN — POLYETHYLENE GLYCOL 3350 17 G: 17 POWDER, FOR SOLUTION ORAL at 09:04

## 2023-01-01 RX ADMIN — OXYCODONE HYDROCHLORIDE 10 MG: 5 TABLET ORAL at 12:16

## 2023-01-01 RX ADMIN — METOCLOPRAMIDE HYDROCHLORIDE 10 MG: 5 INJECTION INTRAMUSCULAR; INTRAVENOUS at 17:33

## 2023-01-01 RX ADMIN — Medication 1 MG: at 21:17

## 2023-01-01 RX ADMIN — SENNOSIDES AND DOCUSATE SODIUM 2 TABLET: 50; 8.6 TABLET ORAL at 19:59

## 2023-01-01 RX ADMIN — SENNOSIDES AND DOCUSATE SODIUM 2 TABLET: 50; 8.6 TABLET ORAL at 20:17

## 2023-01-01 RX ADMIN — Medication 5 MG: at 17:16

## 2023-01-01 RX ADMIN — POTASSIUM CHLORIDE 10 MEQ: 7.46 INJECTION, SOLUTION INTRAVENOUS at 13:49

## 2023-01-01 RX ADMIN — MORPHINE SULFATE 5 MG: 20 SOLUTION ORAL at 04:37

## 2023-01-01 RX ADMIN — IPRATROPIUM BROMIDE AND ALBUTEROL SULFATE 3 ML: .5; 3 SOLUTION RESPIRATORY (INHALATION) at 20:49

## 2023-01-01 RX ADMIN — LORAZEPAM 1 MG: 2 LIQUID ORAL at 16:51

## 2023-01-01 RX ADMIN — BUMETANIDE 2 MG: 2 TABLET ORAL at 20:00

## 2023-01-01 RX ADMIN — HYDROMORPHONE HYDROCHLORIDE 0.2 MG: 0.2 INJECTION, SOLUTION INTRAMUSCULAR; INTRAVENOUS; SUBCUTANEOUS at 20:08

## 2023-01-01 RX ADMIN — SODIUM CHLORIDE SOLN NEBU 3% 3 ML: 3 NEBU SOLN at 20:08

## 2023-01-01 RX ADMIN — MELATONIN TAB 3 MG 3 MG: 3 TAB at 23:54

## 2023-01-01 RX ADMIN — DEXTROSE MONOHYDRATE 1000 ML: 100 INJECTION, SOLUTION INTRAVENOUS at 13:00

## 2023-01-01 RX ADMIN — FUROSEMIDE 40 MG: 20 TABLET ORAL at 08:24

## 2023-01-01 RX ADMIN — SODIUM CHLORIDE SOLN NEBU 3% 3 ML: 3 NEBU SOLN at 16:06

## 2023-01-01 RX ADMIN — MORPHINE SULFATE 10 MG: 20 SOLUTION ORAL at 04:50

## 2023-01-01 RX ADMIN — PROCHLORPERAZINE MALEATE 5 MG: 5 TABLET ORAL at 21:17

## 2023-01-01 RX ADMIN — IPRATROPIUM BROMIDE AND ALBUTEROL SULFATE 3 ML: .5; 3 SOLUTION RESPIRATORY (INHALATION) at 08:11

## 2023-01-01 RX ADMIN — APIXABAN 5 MG: 5 TABLET, FILM COATED ORAL at 08:07

## 2023-01-01 RX ADMIN — IPRATROPIUM BROMIDE AND ALBUTEROL SULFATE 3 ML: .5; 3 SOLUTION RESPIRATORY (INHALATION) at 16:50

## 2023-01-01 RX ADMIN — METOCLOPRAMIDE HYDROCHLORIDE 10 MG: 5 INJECTION INTRAMUSCULAR; INTRAVENOUS at 17:16

## 2023-01-01 RX ADMIN — MORPHINE SULFATE 2 MG: 2 INJECTION, SOLUTION INTRAMUSCULAR; INTRAVENOUS at 13:27

## 2023-01-01 RX ADMIN — Medication 5 MG: at 11:51

## 2023-01-01 RX ADMIN — FUROSEMIDE 40 MG: 20 TABLET ORAL at 09:22

## 2023-01-01 RX ADMIN — PANTOPRAZOLE SODIUM 40 MG: 40 TABLET, DELAYED RELEASE ORAL at 09:52

## 2023-01-01 RX ADMIN — TIZANIDINE 2 MG: 2 TABLET ORAL at 12:11

## 2023-01-01 RX ADMIN — METOCLOPRAMIDE HYDROCHLORIDE 10 MG: 5 INJECTION INTRAMUSCULAR; INTRAVENOUS at 22:56

## 2023-01-01 RX ADMIN — Medication 5 MG: at 17:00

## 2023-01-01 RX ADMIN — METOCLOPRAMIDE HYDROCHLORIDE 10 MG: 5 INJECTION INTRAMUSCULAR; INTRAVENOUS at 17:00

## 2023-01-01 RX ADMIN — DEXTROSE MONOHYDRATE 50 ML: 25 INJECTION, SOLUTION INTRAVENOUS at 10:05

## 2023-01-01 RX ADMIN — Medication 40 MG: at 08:49

## 2023-01-01 RX ADMIN — HYDROXYZINE HYDROCHLORIDE 25 MG: 25 TABLET, FILM COATED ORAL at 17:12

## 2023-01-01 RX ADMIN — METOCLOPRAMIDE HYDROCHLORIDE 10 MG: 5 INJECTION INTRAMUSCULAR; INTRAVENOUS at 04:47

## 2023-01-01 RX ADMIN — BUMETANIDE 2 MG: 2 TABLET ORAL at 07:41

## 2023-01-01 RX ADMIN — IPRATROPIUM BROMIDE AND ALBUTEROL SULFATE 3 ML: .5; 3 SOLUTION RESPIRATORY (INHALATION) at 08:53

## 2023-01-01 RX ADMIN — BUMETANIDE 1 MG/HR: 0.25 INJECTION INTRAMUSCULAR; INTRAVENOUS at 22:11

## 2023-01-01 RX ADMIN — LORAZEPAM 0.5 MG: 2 INJECTION INTRAMUSCULAR; INTRAVENOUS at 02:56

## 2023-01-01 RX ADMIN — HYDROMORPHONE HYDROCHLORIDE 0.2 MG: 0.2 INJECTION, SOLUTION INTRAMUSCULAR; INTRAVENOUS; SUBCUTANEOUS at 04:36

## 2023-01-01 RX ADMIN — TIZANIDINE 2 MG: 2 TABLET ORAL at 22:55

## 2023-01-01 RX ADMIN — METOPROLOL TARTRATE 5 MG: 1 INJECTION, SOLUTION INTRAVENOUS at 15:53

## 2023-01-01 RX ADMIN — MAGNESIUM OXIDE TAB 400 MG (241.3 MG ELEMENTAL MG) 400 MG: 400 (241.3 MG) TAB at 07:32

## 2023-01-01 RX ADMIN — SODIUM CHLORIDE SOLN NEBU 3% 3 ML: 3 NEBU SOLN at 08:34

## 2023-01-01 RX ADMIN — TIZANIDINE 2 MG: 2 TABLET ORAL at 18:05

## 2023-01-01 RX ADMIN — SENNOSIDES AND DOCUSATE SODIUM 2 TABLET: 50; 8.6 TABLET ORAL at 20:41

## 2023-01-01 RX ADMIN — APIXABAN 5 MG: 5 TABLET, FILM COATED ORAL at 08:22

## 2023-01-01 RX ADMIN — PANTOPRAZOLE SODIUM 40 MG: 40 TABLET, DELAYED RELEASE ORAL at 09:22

## 2023-01-01 RX ADMIN — PROCHLORPERAZINE MALEATE 5 MG: 5 TABLET ORAL at 19:49

## 2023-01-01 RX ADMIN — MORPHINE SULFATE 10 MG: 20 SOLUTION ORAL at 06:51

## 2023-01-01 RX ADMIN — MELATONIN TAB 3 MG 3 MG: 3 TAB at 22:15

## 2023-01-01 RX ADMIN — APIXABAN 5 MG: 5 TABLET, FILM COATED ORAL at 09:43

## 2023-01-01 RX ADMIN — ALBUMIN HUMAN 12.5 G: 0.25 SOLUTION INTRAVENOUS at 18:12

## 2023-01-01 RX ADMIN — Medication 5 MG: at 20:35

## 2023-01-01 RX ADMIN — DEXTROSE MONOHYDRATE 50 ML: 25 INJECTION, SOLUTION INTRAVENOUS at 11:50

## 2023-01-01 RX ADMIN — POLYETHYLENE GLYCOL 3350 17 G: 17 POWDER, FOR SOLUTION ORAL at 08:18

## 2023-01-01 RX ADMIN — Medication 5 MG: at 08:18

## 2023-01-01 RX ADMIN — IPRATROPIUM BROMIDE AND ALBUTEROL SULFATE 3 ML: .5; 3 SOLUTION RESPIRATORY (INHALATION) at 12:39

## 2023-01-01 RX ADMIN — MORPHINE SULFATE 10 MG: 20 SOLUTION ORAL at 11:52

## 2023-01-01 RX ADMIN — MELATONIN TAB 3 MG 3 MG: 3 TAB at 20:54

## 2023-01-01 RX ADMIN — Medication 15 ML: at 08:22

## 2023-01-01 RX ADMIN — APIXABAN 5 MG: 5 TABLET, FILM COATED ORAL at 19:51

## 2023-01-01 RX ADMIN — APIXABAN 5 MG: 5 TABLET, FILM COATED ORAL at 08:36

## 2023-01-01 RX ADMIN — IPRATROPIUM BROMIDE AND ALBUTEROL SULFATE 3 ML: .5; 3 SOLUTION RESPIRATORY (INHALATION) at 19:58

## 2023-01-01 RX ADMIN — Medication 15 ML: at 08:25

## 2023-01-01 RX ADMIN — LORAZEPAM 1 MG: 2 LIQUID ORAL at 06:10

## 2023-01-01 RX ADMIN — APIXABAN 5 MG: 5 TABLET, FILM COATED ORAL at 22:16

## 2023-01-01 RX ADMIN — FUROSEMIDE 40 MG: 10 INJECTION, SOLUTION INTRAVENOUS at 09:35

## 2023-01-01 RX ADMIN — LIDOCAINE PATCH 4% 1 PATCH: 40 PATCH TOPICAL at 09:28

## 2023-01-01 RX ADMIN — SENNOSIDES AND DOCUSATE SODIUM 2 TABLET: 50; 8.6 TABLET ORAL at 07:44

## 2023-01-01 RX ADMIN — DEXTROSE AND SODIUM CHLORIDE: 5; 900 INJECTION, SOLUTION INTRAVENOUS at 10:08

## 2023-01-01 RX ADMIN — METOCLOPRAMIDE HYDROCHLORIDE 10 MG: 5 INJECTION INTRAMUSCULAR; INTRAVENOUS at 17:21

## 2023-01-01 RX ADMIN — HYDROXYZINE HYDROCHLORIDE 25 MG: 25 TABLET, FILM COATED ORAL at 00:22

## 2023-01-01 RX ADMIN — MAGNESIUM SULFATE HEPTAHYDRATE 2 G: 40 INJECTION, SOLUTION INTRAVENOUS at 20:21

## 2023-01-01 RX ADMIN — HYDROMORPHONE HYDROCHLORIDE 0.2 MG: 0.2 INJECTION, SOLUTION INTRAMUSCULAR; INTRAVENOUS; SUBCUTANEOUS at 08:21

## 2023-01-01 RX ADMIN — HYDROXYZINE HYDROCHLORIDE 25 MG: 25 TABLET, FILM COATED ORAL at 20:45

## 2023-01-01 RX ADMIN — METOCLOPRAMIDE HYDROCHLORIDE 10 MG: 5 INJECTION INTRAMUSCULAR; INTRAVENOUS at 04:38

## 2023-01-01 RX ADMIN — MORPHINE SULFATE 2 MG: 2 INJECTION, SOLUTION INTRAMUSCULAR; INTRAVENOUS at 11:18

## 2023-01-01 RX ADMIN — Medication 5 MG: at 18:09

## 2023-01-01 RX ADMIN — PREDNISONE 15 MG: 5 TABLET ORAL at 08:01

## 2023-01-01 RX ADMIN — PROCHLORPERAZINE EDISYLATE 5 MG: 5 INJECTION INTRAMUSCULAR; INTRAVENOUS at 20:12

## 2023-01-01 RX ADMIN — Medication 20 MCG: at 09:33

## 2023-01-01 RX ADMIN — HYDROMORPHONE HYDROCHLORIDE 0.2 MG: 0.2 INJECTION, SOLUTION INTRAMUSCULAR; INTRAVENOUS; SUBCUTANEOUS at 12:48

## 2023-01-01 RX ADMIN — IPRATROPIUM BROMIDE AND ALBUTEROL SULFATE 3 ML: .5; 3 SOLUTION RESPIRATORY (INHALATION) at 07:41

## 2023-01-01 RX ADMIN — DEXTROSE MONOHYDRATE: 100 INJECTION, SOLUTION INTRAVENOUS at 13:19

## 2023-01-01 RX ADMIN — PREDNISONE 15 MG: 5 TABLET ORAL at 07:37

## 2023-01-01 RX ADMIN — LORAZEPAM 1 MG: 2 LIQUID ORAL at 07:41

## 2023-01-01 RX ADMIN — HYDROXYZINE HYDROCHLORIDE 25 MG: 25 TABLET, FILM COATED ORAL at 17:34

## 2023-01-01 RX ADMIN — BUMETANIDE 2 MG: 2 TABLET ORAL at 20:48

## 2023-01-01 RX ADMIN — BUMETANIDE 2 MG: 2 TABLET ORAL at 13:05

## 2023-01-01 RX ADMIN — APIXABAN 5 MG: 5 TABLET, FILM COATED ORAL at 09:06

## 2023-01-01 RX ADMIN — Medication 5 MG: at 23:30

## 2023-01-01 RX ADMIN — PANTOPRAZOLE SODIUM 20 MG: 20 TABLET, DELAYED RELEASE ORAL at 08:14

## 2023-01-01 RX ADMIN — MAGNESIUM OXIDE TAB 400 MG (241.3 MG ELEMENTAL MG) 400 MG: 400 (241.3 MG) TAB at 08:47

## 2023-01-01 RX ADMIN — MORPHINE SULFATE 10 MG: 20 SOLUTION ORAL at 15:10

## 2023-01-01 RX ADMIN — MORPHINE SULFATE 1 MG: 2 INJECTION, SOLUTION INTRAMUSCULAR; INTRAVENOUS at 00:02

## 2023-01-01 RX ADMIN — NITROGLYCERIN 0.4 MG: 0.4 TABLET SUBLINGUAL at 23:50

## 2023-01-01 RX ADMIN — HYDROXYZINE HYDROCHLORIDE 25 MG: 25 TABLET, FILM COATED ORAL at 08:19

## 2023-01-01 RX ADMIN — ONDANSETRON 4 MG: 2 INJECTION INTRAMUSCULAR; INTRAVENOUS at 13:34

## 2023-01-01 RX ADMIN — Medication 40 MG: at 07:06

## 2023-01-01 RX ADMIN — MORPHINE SULFATE 5 MG: 20 SOLUTION ORAL at 17:46

## 2023-01-01 RX ADMIN — SODIUM CHLORIDE SOLN NEBU 3% 3 ML: 3 NEBU SOLN at 16:48

## 2023-01-01 RX ADMIN — ACETAMINOPHEN 975 MG: 325 TABLET, FILM COATED ORAL at 08:37

## 2023-01-01 RX ADMIN — IPRATROPIUM BROMIDE AND ALBUTEROL SULFATE 3 ML: .5; 3 SOLUTION RESPIRATORY (INHALATION) at 08:21

## 2023-01-01 RX ADMIN — PIPERACILLIN AND TAZOBACTAM 4.5 G: 4; .5 INJECTION, POWDER, FOR SOLUTION INTRAVENOUS at 15:16

## 2023-01-01 RX ADMIN — FOLIC ACID 1 MG: 1 TABLET ORAL at 07:44

## 2023-01-01 RX ADMIN — POTASSIUM & SODIUM PHOSPHATES POWDER PACK 280-160-250 MG 1 PACKET: 280-160-250 PACK at 06:25

## 2023-01-01 RX ADMIN — IPRATROPIUM BROMIDE AND ALBUTEROL SULFATE 3 ML: .5; 3 SOLUTION RESPIRATORY (INHALATION) at 12:26

## 2023-01-01 RX ADMIN — LIDOCAINE PATCH 4% 1 PATCH: 40 PATCH TOPICAL at 10:10

## 2023-01-01 RX ADMIN — IPRATROPIUM BROMIDE AND ALBUTEROL SULFATE 3 ML: .5; 3 SOLUTION RESPIRATORY (INHALATION) at 17:13

## 2023-01-01 RX ADMIN — BUMETANIDE 2 MG: 2 TABLET ORAL at 13:00

## 2023-01-01 RX ADMIN — MORPHINE SULFATE 10 MG: 20 SOLUTION ORAL at 04:28

## 2023-01-01 RX ADMIN — MORPHINE SULFATE 2 MG: 2 INJECTION, SOLUTION INTRAMUSCULAR; INTRAVENOUS at 20:33

## 2023-01-01 RX ADMIN — PANTOPRAZOLE SODIUM 20 MG: 20 TABLET, DELAYED RELEASE ORAL at 17:00

## 2023-01-01 RX ADMIN — LIDOCAINE PATCH 4% 1 PATCH: 40 PATCH TOPICAL at 07:46

## 2023-01-01 RX ADMIN — MORPHINE SULFATE 5 MG: 20 SOLUTION ORAL at 06:18

## 2023-01-01 RX ADMIN — GABAPENTIN 100 MG: 100 CAPSULE ORAL at 21:25

## 2023-01-01 RX ADMIN — APIXABAN 5 MG: 5 TABLET, FILM COATED ORAL at 08:51

## 2023-01-01 RX ADMIN — POTASSIUM CHLORIDE 10 MEQ: 7.46 INJECTION, SOLUTION INTRAVENOUS at 03:46

## 2023-01-01 RX ADMIN — TIZANIDINE 2 MG: 2 TABLET ORAL at 04:07

## 2023-01-01 RX ADMIN — IPRATROPIUM BROMIDE AND ALBUTEROL SULFATE 3 ML: .5; 3 SOLUTION RESPIRATORY (INHALATION) at 16:25

## 2023-01-01 RX ADMIN — BUMETANIDE 2 MG: 2 TABLET ORAL at 08:22

## 2023-01-01 RX ADMIN — HYDROMORPHONE HYDROCHLORIDE 0.2 MG: 0.2 INJECTION, SOLUTION INTRAMUSCULAR; INTRAVENOUS; SUBCUTANEOUS at 04:58

## 2023-01-01 RX ADMIN — AMIODARONE HYDROCHLORIDE 200 MG: 200 TABLET ORAL at 09:26

## 2023-01-01 RX ADMIN — ONDANSETRON 4 MG: 4 TABLET, ORALLY DISINTEGRATING ORAL at 12:57

## 2023-01-01 RX ADMIN — METOCLOPRAMIDE HYDROCHLORIDE 10 MG: 5 INJECTION INTRAMUSCULAR; INTRAVENOUS at 11:26

## 2023-01-01 RX ADMIN — METOCLOPRAMIDE HYDROCHLORIDE 10 MG: 5 INJECTION INTRAMUSCULAR; INTRAVENOUS at 04:57

## 2023-01-01 RX ADMIN — Medication 5 MG: at 14:05

## 2023-01-01 RX ADMIN — POTASSIUM CHLORIDE 20 MEQ: 1.5 POWDER, FOR SOLUTION ORAL at 19:44

## 2023-01-01 RX ADMIN — APIXABAN 5 MG: 5 TABLET, FILM COATED ORAL at 08:09

## 2023-01-01 RX ADMIN — SODIUM CHLORIDE SOLN NEBU 3% 3 ML: 3 NEBU SOLN at 12:14

## 2023-01-01 RX ADMIN — SODIUM CHLORIDE SOLN NEBU 3% 3 ML: 3 NEBU SOLN at 19:58

## 2023-01-01 RX ADMIN — BUMETANIDE 2 MG: 2 TABLET ORAL at 20:36

## 2023-01-01 RX ADMIN — HYDROMORPHONE HYDROCHLORIDE 0.2 MG: 0.2 INJECTION, SOLUTION INTRAMUSCULAR; INTRAVENOUS; SUBCUTANEOUS at 21:16

## 2023-01-01 RX ADMIN — METOCLOPRAMIDE HYDROCHLORIDE 10 MG: 5 INJECTION INTRAMUSCULAR; INTRAVENOUS at 17:08

## 2023-01-01 RX ADMIN — Medication 20 MCG: at 07:31

## 2023-01-01 RX ADMIN — APIXABAN 5 MG: 5 TABLET, FILM COATED ORAL at 08:01

## 2023-01-01 RX ADMIN — DEXTROSE MONOHYDRATE: 100 INJECTION, SOLUTION INTRAVENOUS at 22:28

## 2023-01-01 RX ADMIN — SODIUM CHLORIDE SOLN NEBU 3% 3 ML: 3 NEBU SOLN at 19:48

## 2023-01-01 RX ADMIN — APIXABAN 5 MG: 5 TABLET, FILM COATED ORAL at 20:11

## 2023-01-01 RX ADMIN — AMIODARONE HYDROCHLORIDE 200 MG: 200 TABLET ORAL at 10:50

## 2023-01-01 RX ADMIN — AMIODARONE HYDROCHLORIDE 200 MG: 200 TABLET ORAL at 08:01

## 2023-01-01 RX ADMIN — METOCLOPRAMIDE HYDROCHLORIDE 10 MG: 5 INJECTION INTRAMUSCULAR; INTRAVENOUS at 16:25

## 2023-01-01 RX ADMIN — SODIUM CHLORIDE SOLN NEBU 3% 3 ML: 3 NEBU SOLN at 12:46

## 2023-01-01 RX ADMIN — METOCLOPRAMIDE HYDROCHLORIDE 10 MG: 5 INJECTION INTRAMUSCULAR; INTRAVENOUS at 04:48

## 2023-01-01 RX ADMIN — DEXTROSE MONOHYDRATE 50 ML: 25 INJECTION, SOLUTION INTRAVENOUS at 08:06

## 2023-01-01 RX ADMIN — HYDROXYZINE HYDROCHLORIDE 25 MG: 25 TABLET, FILM COATED ORAL at 22:16

## 2023-01-01 RX ADMIN — Medication 1 MG: at 22:53

## 2023-01-01 RX ADMIN — APIXABAN 5 MG: 5 TABLET, FILM COATED ORAL at 08:48

## 2023-01-01 RX ADMIN — DEXTROSE AND SODIUM CHLORIDE: 5; 900 INJECTION, SOLUTION INTRAVENOUS at 04:26

## 2023-01-01 RX ADMIN — FOLIC ACID 1 MG: 1 TABLET ORAL at 17:00

## 2023-01-01 RX ADMIN — HYDROXYZINE HYDROCHLORIDE 50 MG: 25 TABLET, FILM COATED ORAL at 08:05

## 2023-01-01 RX ADMIN — TIZANIDINE 2 MG: 2 TABLET ORAL at 15:43

## 2023-01-01 RX ADMIN — APIXABAN 5 MG: 5 TABLET, FILM COATED ORAL at 06:34

## 2023-01-01 RX ADMIN — BUMETANIDE 2 MG: 0.25 INJECTION INTRAMUSCULAR; INTRAVENOUS at 06:10

## 2023-01-01 RX ADMIN — PANTOPRAZOLE SODIUM 40 MG: 40 TABLET, DELAYED RELEASE ORAL at 08:15

## 2023-01-01 RX ADMIN — DOCUSATE SODIUM 100 MG: 100 CAPSULE, LIQUID FILLED ORAL at 20:28

## 2023-01-01 RX ADMIN — PIPERACILLIN AND TAZOBACTAM 4.5 G: 4; .5 INJECTION, POWDER, FOR SOLUTION INTRAVENOUS at 20:06

## 2023-01-01 RX ADMIN — TIZANIDINE 2 MG: 2 TABLET ORAL at 17:32

## 2023-01-01 RX ADMIN — DEXTROSE 30 G: 15 GEL ORAL at 01:23

## 2023-01-01 RX ADMIN — MORPHINE SULFATE 5 MG: 20 SOLUTION ORAL at 05:35

## 2023-01-01 RX ADMIN — OXYCODONE HYDROCHLORIDE 10 MG: 5 TABLET ORAL at 01:29

## 2023-01-01 RX ADMIN — FOLIC ACID 1 MG: 1 TABLET ORAL at 08:15

## 2023-01-01 RX ADMIN — PANTOPRAZOLE SODIUM 20 MG: 20 TABLET, DELAYED RELEASE ORAL at 16:33

## 2023-01-01 RX ADMIN — BUMETANIDE 2 MG: 2 TABLET ORAL at 14:10

## 2023-01-01 RX ADMIN — MORPHINE SULFATE 5 MG: 20 SOLUTION ORAL at 18:13

## 2023-01-01 RX ADMIN — MORPHINE SULFATE 2 MG: 2 INJECTION, SOLUTION INTRAMUSCULAR; INTRAVENOUS at 06:40

## 2023-01-01 RX ADMIN — MORPHINE SULFATE 5 MG: 20 SOLUTION ORAL at 08:51

## 2023-01-01 RX ADMIN — LORAZEPAM 0.5 MG: 2 INJECTION INTRAMUSCULAR; INTRAVENOUS at 21:59

## 2023-01-01 RX ADMIN — Medication 5 MG: at 09:33

## 2023-01-01 RX ADMIN — HYDROXYZINE HYDROCHLORIDE 25 MG: 25 TABLET, FILM COATED ORAL at 09:53

## 2023-01-01 RX ADMIN — Medication 5 MG: at 09:24

## 2023-01-01 RX ADMIN — SENNOSIDES AND DOCUSATE SODIUM 2 TABLET: 50; 8.6 TABLET ORAL at 19:17

## 2023-01-01 RX ADMIN — Medication 40 MG: at 08:02

## 2023-01-01 RX ADMIN — APIXABAN 5 MG: 5 TABLET, FILM COATED ORAL at 09:33

## 2023-01-01 RX ADMIN — GABAPENTIN 100 MG: 250 SOLUTION ORAL at 22:35

## 2023-01-01 RX ADMIN — PREDNISONE 15 MG: 5 TABLET ORAL at 08:49

## 2023-01-01 RX ADMIN — MAGNESIUM OXIDE TAB 400 MG (241.3 MG ELEMENTAL MG) 400 MG: 400 (241.3 MG) TAB at 08:26

## 2023-01-01 RX ADMIN — ACETAMINOPHEN 975 MG: 325 TABLET, FILM COATED ORAL at 14:04

## 2023-01-01 RX ADMIN — Medication 40 MG: at 08:52

## 2023-01-01 RX ADMIN — LORAZEPAM 1 MG: 2 LIQUID ORAL at 23:53

## 2023-01-01 RX ADMIN — POTASSIUM CHLORIDE 40 MEQ: 40 SOLUTION ORAL at 01:57

## 2023-01-01 RX ADMIN — PREDNISONE 15 MG: 5 TABLET ORAL at 08:04

## 2023-01-01 RX ADMIN — AMIODARONE HYDROCHLORIDE 200 MG: 200 TABLET ORAL at 08:47

## 2023-01-01 RX ADMIN — IPRATROPIUM BROMIDE AND ALBUTEROL SULFATE 3 ML: .5; 3 SOLUTION RESPIRATORY (INHALATION) at 20:38

## 2023-01-01 RX ADMIN — AMIODARONE HYDROCHLORIDE 200 MG: 200 TABLET ORAL at 08:26

## 2023-01-01 RX ADMIN — LORAZEPAM 0.5 MG: 2 INJECTION INTRAMUSCULAR; INTRAVENOUS at 12:06

## 2023-01-01 RX ADMIN — AMIODARONE HYDROCHLORIDE 200 MG: 200 TABLET ORAL at 08:14

## 2023-01-01 RX ADMIN — IPRATROPIUM BROMIDE AND ALBUTEROL SULFATE 3 ML: .5; 3 SOLUTION RESPIRATORY (INHALATION) at 12:14

## 2023-01-01 RX ADMIN — BUMETANIDE 2 MG: 0.25 INJECTION INTRAMUSCULAR; INTRAVENOUS at 22:50

## 2023-01-01 RX ADMIN — MAGNESIUM SULFATE HEPTAHYDRATE 4 G: 40 INJECTION, SOLUTION INTRAVENOUS at 07:39

## 2023-01-01 RX ADMIN — HYDROMORPHONE HYDROCHLORIDE 0.2 MG: 0.2 INJECTION, SOLUTION INTRAMUSCULAR; INTRAVENOUS; SUBCUTANEOUS at 12:39

## 2023-01-01 RX ADMIN — MORPHINE SULFATE 5 MG: 20 SOLUTION ORAL at 14:52

## 2023-01-01 RX ADMIN — BUMETANIDE 2 MG: 0.25 INJECTION INTRAMUSCULAR; INTRAVENOUS at 22:37

## 2023-01-01 RX ADMIN — SODIUM CHLORIDE SOLN NEBU 3% 3 ML: 3 NEBU SOLN at 20:04

## 2023-01-01 RX ADMIN — AMIODARONE HYDROCHLORIDE 200 MG: 200 TABLET ORAL at 07:32

## 2023-01-01 RX ADMIN — TIZANIDINE 2 MG: 2 TABLET ORAL at 09:34

## 2023-01-01 RX ADMIN — Medication 5 MG: at 03:16

## 2023-01-01 RX ADMIN — APIXABAN 5 MG: 5 TABLET, FILM COATED ORAL at 07:32

## 2023-01-01 RX ADMIN — Medication 40 MG: at 07:41

## 2023-01-01 RX ADMIN — Medication 20 MCG: at 08:10

## 2023-01-01 RX ADMIN — POTASSIUM CHLORIDE 10 MEQ: 7.46 INJECTION, SOLUTION INTRAVENOUS at 12:33

## 2023-01-01 RX ADMIN — SENNOSIDES AND DOCUSATE SODIUM 2 TABLET: 50; 8.6 TABLET ORAL at 20:25

## 2023-01-01 RX ADMIN — PANTOPRAZOLE SODIUM 40 MG: 40 TABLET, DELAYED RELEASE ORAL at 07:02

## 2023-01-01 RX ADMIN — POTASSIUM CHLORIDE 10 MEQ: 7.46 INJECTION, SOLUTION INTRAVENOUS at 11:26

## 2023-01-01 RX ADMIN — Medication 1 MG: at 20:49

## 2023-01-01 RX ADMIN — SENNOSIDES AND DOCUSATE SODIUM 2 TABLET: 50; 8.6 TABLET ORAL at 07:41

## 2023-01-01 RX ADMIN — Medication 40 MG: at 09:30

## 2023-01-01 RX ADMIN — APIXABAN 5 MG: 5 TABLET, FILM COATED ORAL at 21:32

## 2023-01-01 RX ADMIN — AMIODARONE HYDROCHLORIDE 200 MG: 200 TABLET ORAL at 09:35

## 2023-01-01 RX ADMIN — LIDOCAINE PATCH 4% 1 PATCH: 40 PATCH TOPICAL at 08:24

## 2023-01-01 RX ADMIN — SODIUM CHLORIDE, POTASSIUM CHLORIDE, SODIUM LACTATE AND CALCIUM CHLORIDE: 600; 310; 30; 20 INJECTION, SOLUTION INTRAVENOUS at 11:40

## 2023-01-01 RX ADMIN — DEXTROSE MONOHYDRATE 50 ML: 25 INJECTION, SOLUTION INTRAVENOUS at 18:15

## 2023-01-01 RX ADMIN — MORPHINE SULFATE 2 MG: 2 INJECTION, SOLUTION INTRAMUSCULAR; INTRAVENOUS at 17:40

## 2023-01-01 RX ADMIN — IPRATROPIUM BROMIDE AND ALBUTEROL SULFATE 3 ML: .5; 3 SOLUTION RESPIRATORY (INHALATION) at 13:05

## 2023-01-01 RX ADMIN — POTASSIUM CHLORIDE 10 MEQ: 7.46 INJECTION, SOLUTION INTRAVENOUS at 02:47

## 2023-01-01 RX ADMIN — Medication 5 MG: at 12:15

## 2023-01-01 RX ADMIN — PIPERACILLIN AND TAZOBACTAM 4.5 G: 4; .5 INJECTION, POWDER, FOR SOLUTION INTRAVENOUS at 08:24

## 2023-01-01 RX ADMIN — TIZANIDINE 2 MG: 2 TABLET ORAL at 05:36

## 2023-01-01 RX ADMIN — TIZANIDINE 2 MG: 2 TABLET ORAL at 23:12

## 2023-01-01 RX ADMIN — MAGNESIUM OXIDE TAB 400 MG (241.3 MG ELEMENTAL MG) 400 MG: 400 (241.3 MG) TAB at 08:25

## 2023-01-01 RX ADMIN — Medication 5 MG: at 13:32

## 2023-01-01 RX ADMIN — APIXABAN 5 MG: 5 TABLET, FILM COATED ORAL at 08:58

## 2023-01-01 RX ADMIN — IPRATROPIUM BROMIDE AND ALBUTEROL SULFATE 3 ML: .5; 3 SOLUTION RESPIRATORY (INHALATION) at 09:12

## 2023-01-01 RX ADMIN — HYDROXYZINE HYDROCHLORIDE 25 MG: 25 TABLET, FILM COATED ORAL at 15:04

## 2023-01-01 RX ADMIN — DOCUSATE SODIUM 100 MG: 100 CAPSULE, LIQUID FILLED ORAL at 09:24

## 2023-01-01 RX ADMIN — FOLIC ACID 1 MG: 1 TABLET ORAL at 08:25

## 2023-01-01 RX ADMIN — HEPARIN SODIUM AND DEXTROSE 1100 UNITS/HR: 10000; 5 INJECTION INTRAVENOUS at 06:18

## 2023-01-01 RX ADMIN — DEXTROSE 30 G: 15 GEL ORAL at 05:42

## 2023-01-01 RX ADMIN — Medication 1 MG: at 19:41

## 2023-01-01 RX ADMIN — SENNOSIDES AND DOCUSATE SODIUM 2 TABLET: 50; 8.6 TABLET ORAL at 08:05

## 2023-01-01 RX ADMIN — Medication 40 MG: at 08:54

## 2023-01-01 RX ADMIN — IPRATROPIUM BROMIDE AND ALBUTEROL SULFATE 3 ML: .5; 3 SOLUTION RESPIRATORY (INHALATION) at 20:15

## 2023-01-01 RX ADMIN — SODIUM CHLORIDE SOLN NEBU 3% 3 ML: 3 NEBU SOLN at 08:23

## 2023-01-01 RX ADMIN — IPRATROPIUM BROMIDE AND ALBUTEROL SULFATE 3 ML: .5; 3 SOLUTION RESPIRATORY (INHALATION) at 08:34

## 2023-01-01 RX ADMIN — PREDNISONE 15 MG: 5 TABLET ORAL at 10:10

## 2023-01-01 RX ADMIN — IPRATROPIUM BROMIDE AND ALBUTEROL SULFATE 3 ML: .5; 3 SOLUTION RESPIRATORY (INHALATION) at 21:39

## 2023-01-01 RX ADMIN — FUROSEMIDE 40 MG: 20 TABLET ORAL at 08:37

## 2023-01-01 RX ADMIN — MINERAL OIL 226 ML: 1 OIL ORAL at 21:10

## 2023-01-01 RX ADMIN — SODIUM CHLORIDE SOLN NEBU 3% 3 ML: 3 NEBU SOLN at 16:16

## 2023-01-01 RX ADMIN — SENNOSIDES AND DOCUSATE SODIUM 2 TABLET: 50; 8.6 TABLET ORAL at 08:52

## 2023-01-01 RX ADMIN — HYDROXYZINE HYDROCHLORIDE 25 MG: 25 TABLET, FILM COATED ORAL at 21:38

## 2023-01-01 RX ADMIN — Medication 5 MG: at 15:39

## 2023-01-01 RX ADMIN — DEXTROSE MONOHYDRATE 25 ML: 25 INJECTION, SOLUTION INTRAVENOUS at 22:29

## 2023-01-01 RX ADMIN — METOCLOPRAMIDE HYDROCHLORIDE 10 MG: 5 INJECTION INTRAMUSCULAR; INTRAVENOUS at 00:21

## 2023-01-01 RX ADMIN — MELATONIN TAB 3 MG 3 MG: 3 TAB at 20:17

## 2023-01-01 RX ADMIN — ONDANSETRON 4 MG: 4 TABLET, ORALLY DISINTEGRATING ORAL at 20:12

## 2023-01-01 RX ADMIN — POTASSIUM CHLORIDE 10 MEQ: 7.46 INJECTION, SOLUTION INTRAVENOUS at 07:45

## 2023-01-01 RX ADMIN — APIXABAN 5 MG: 5 TABLET, FILM COATED ORAL at 19:30

## 2023-01-01 RX ADMIN — THERA TABS 1 TABLET: TAB at 08:34

## 2023-01-01 RX ADMIN — IPRATROPIUM BROMIDE AND ALBUTEROL SULFATE 3 ML: .5; 3 SOLUTION RESPIRATORY (INHALATION) at 16:33

## 2023-01-01 RX ADMIN — TIZANIDINE 2 MG: 2 TABLET ORAL at 16:35

## 2023-01-01 RX ADMIN — ACETAMINOPHEN 975 MG: 325 TABLET, FILM COATED ORAL at 13:00

## 2023-01-01 RX ADMIN — ONDANSETRON 4 MG: 2 INJECTION INTRAMUSCULAR; INTRAVENOUS at 14:05

## 2023-01-01 RX ADMIN — MORPHINE SULFATE 5 MG: 20 SOLUTION ORAL at 20:55

## 2023-01-01 RX ADMIN — PROCHLORPERAZINE EDISYLATE 5 MG: 5 INJECTION INTRAMUSCULAR; INTRAVENOUS at 15:03

## 2023-01-01 RX ADMIN — SODIUM CHLORIDE SOLN NEBU 3% 3 ML: 3 NEBU SOLN at 20:54

## 2023-01-01 RX ADMIN — Medication 5 MG: at 05:01

## 2023-01-01 RX ADMIN — MORPHINE SULFATE 5 MG: 20 SOLUTION ORAL at 10:15

## 2023-01-01 RX ADMIN — DEXTROSE MONOHYDRATE AND SODIUM CHLORIDE: 5; .45 INJECTION, SOLUTION INTRAVENOUS at 15:54

## 2023-01-01 RX ADMIN — Medication 5 MG: at 08:35

## 2023-01-01 RX ADMIN — LIDOCAINE PATCH 4% 1 PATCH: 40 PATCH TOPICAL at 09:06

## 2023-01-01 RX ADMIN — PANTOPRAZOLE SODIUM 20 MG: 20 TABLET, DELAYED RELEASE ORAL at 16:54

## 2023-01-01 RX ADMIN — Medication 5 MG: at 00:38

## 2023-01-01 RX ADMIN — MORPHINE SULFATE 4 MG: 2 INJECTION, SOLUTION INTRAMUSCULAR; INTRAVENOUS at 11:17

## 2023-01-01 RX ADMIN — MAGNESIUM OXIDE TAB 400 MG (241.3 MG ELEMENTAL MG) 400 MG: 400 (241.3 MG) TAB at 12:11

## 2023-01-01 RX ADMIN — METOPROLOL TARTRATE 5 MG: 1 INJECTION, SOLUTION INTRAVENOUS at 15:44

## 2023-01-01 RX ADMIN — MORPHINE SULFATE 5 MG: 20 SOLUTION ORAL at 12:51

## 2023-01-01 RX ADMIN — APIXABAN 5 MG: 5 TABLET, FILM COATED ORAL at 19:49

## 2023-01-01 RX ADMIN — SODIUM CHLORIDE SOLN NEBU 3% 3 ML: 3 NEBU SOLN at 08:10

## 2023-01-01 RX ADMIN — LORAZEPAM 1 MG: 2 LIQUID ORAL at 12:56

## 2023-01-01 RX ADMIN — PREDNISONE 15 MG: 5 TABLET ORAL at 09:06

## 2023-01-01 RX ADMIN — PIPERACILLIN AND TAZOBACTAM 4.5 G: 4; .5 INJECTION, POWDER, FOR SOLUTION INTRAVENOUS at 08:19

## 2023-01-01 RX ADMIN — POTASSIUM CHLORIDE 10 MEQ: 7.46 INJECTION, SOLUTION INTRAVENOUS at 11:23

## 2023-01-01 RX ADMIN — APIXABAN 5 MG: 5 TABLET, FILM COATED ORAL at 20:17

## 2023-01-01 RX ADMIN — IPRATROPIUM BROMIDE AND ALBUTEROL SULFATE 3 ML: .5; 3 SOLUTION RESPIRATORY (INHALATION) at 21:10

## 2023-01-01 RX ADMIN — POTASSIUM CHLORIDE 10 MEQ: 7.46 INJECTION, SOLUTION INTRAVENOUS at 19:08

## 2023-01-01 RX ADMIN — SENNOSIDES AND DOCUSATE SODIUM 2 TABLET: 50; 8.6 TABLET ORAL at 20:55

## 2023-01-01 RX ADMIN — PHENYLEPHRINE HYDROCHLORIDE 100 MCG: 10 INJECTION INTRAVENOUS at 12:13

## 2023-01-01 RX ADMIN — MORPHINE SULFATE 10 MG: 20 SOLUTION ORAL at 16:52

## 2023-01-01 RX ADMIN — MORPHINE SULFATE 5 MG: 20 SOLUTION ORAL at 17:52

## 2023-01-01 RX ADMIN — MORPHINE SULFATE 5 MG: 20 SOLUTION ORAL at 17:15

## 2023-01-01 RX ADMIN — Medication 1 MG: at 20:48

## 2023-01-01 RX ADMIN — FENTANYL CITRATE 50 MCG: 50 INJECTION, SOLUTION INTRAMUSCULAR; INTRAVENOUS at 19:56

## 2023-01-01 RX ADMIN — TIZANIDINE 2 MG: 2 TABLET ORAL at 02:33

## 2023-01-01 RX ADMIN — POLYETHYLENE GLYCOL 3350 17 G: 17 POWDER, FOR SOLUTION ORAL at 08:04

## 2023-01-01 RX ADMIN — TIZANIDINE 2 MG: 2 TABLET ORAL at 17:56

## 2023-01-01 RX ADMIN — PREDNISONE 15 MG: 5 TABLET ORAL at 09:57

## 2023-01-01 RX ADMIN — PREDNISONE 40 MG: 20 TABLET ORAL at 09:54

## 2023-01-01 RX ADMIN — SODIUM CHLORIDE SOLN NEBU 3% 3 ML: 3 NEBU SOLN at 09:18

## 2023-01-01 RX ADMIN — GABAPENTIN 100 MG: 250 SOLUTION ORAL at 22:30

## 2023-01-01 RX ADMIN — METOCLOPRAMIDE HYDROCHLORIDE 10 MG: 5 INJECTION INTRAMUSCULAR; INTRAVENOUS at 12:12

## 2023-01-01 RX ADMIN — LORAZEPAM 1 MG: 2 LIQUID ORAL at 21:49

## 2023-01-01 RX ADMIN — MELATONIN TAB 3 MG 3 MG: 3 TAB at 22:11

## 2023-01-01 RX ADMIN — LORAZEPAM 0.5 MG: 2 INJECTION INTRAMUSCULAR; INTRAVENOUS at 23:33

## 2023-01-01 RX ADMIN — MORPHINE SULFATE 2 MG: 2 INJECTION, SOLUTION INTRAMUSCULAR; INTRAVENOUS at 22:16

## 2023-01-01 RX ADMIN — SENNOSIDES AND DOCUSATE SODIUM 2 TABLET: 50; 8.6 TABLET ORAL at 08:16

## 2023-01-01 RX ADMIN — POTASSIUM CHLORIDE 10 MEQ: 7.46 INJECTION, SOLUTION INTRAVENOUS at 16:00

## 2023-01-01 RX ADMIN — FUROSEMIDE 20 MG: 20 TABLET ORAL at 15:28

## 2023-01-01 RX ADMIN — POTASSIUM CHLORIDE 20 MEQ: 40 SOLUTION ORAL at 06:33

## 2023-01-01 RX ADMIN — SENNOSIDES AND DOCUSATE SODIUM 2 TABLET: 50; 8.6 TABLET ORAL at 20:09

## 2023-01-01 RX ADMIN — AMIODARONE HYDROCHLORIDE 200 MG: 200 TABLET ORAL at 08:36

## 2023-01-01 RX ADMIN — BUMETANIDE 2 MG: 2 TABLET ORAL at 20:50

## 2023-01-01 RX ADMIN — TIZANIDINE 2 MG: 2 TABLET ORAL at 23:52

## 2023-01-01 RX ADMIN — LIDOCAINE PATCH 4% 1 PATCH: 40 PATCH TOPICAL at 08:17

## 2023-01-01 RX ADMIN — MORPHINE SULFATE 10 MG: 20 SOLUTION ORAL at 23:53

## 2023-01-01 RX ADMIN — MORPHINE SULFATE 10 MG: 20 SOLUTION ORAL at 05:56

## 2023-01-01 RX ADMIN — HYDROXYZINE HYDROCHLORIDE 50 MG: 25 TABLET, FILM COATED ORAL at 08:57

## 2023-01-01 RX ADMIN — HYDROMORPHONE HYDROCHLORIDE 0.2 MG: 0.2 INJECTION, SOLUTION INTRAMUSCULAR; INTRAVENOUS; SUBCUTANEOUS at 16:54

## 2023-01-01 RX ADMIN — MORPHINE SULFATE 5 MG: 20 SOLUTION ORAL at 18:30

## 2023-01-01 RX ADMIN — OXYCODONE HYDROCHLORIDE 10 MG: 5 TABLET ORAL at 14:57

## 2023-01-01 RX ADMIN — FUROSEMIDE 10 MG/HR: 10 INJECTION, SOLUTION INTRAVENOUS at 03:51

## 2023-01-01 RX ADMIN — MORPHINE SULFATE 5 MG: 20 SOLUTION ORAL at 08:34

## 2023-01-01 RX ADMIN — PANTOPRAZOLE SODIUM 20 MG: 20 TABLET, DELAYED RELEASE ORAL at 10:50

## 2023-01-01 RX ADMIN — METOCLOPRAMIDE HYDROCHLORIDE 10 MG: 5 INJECTION INTRAMUSCULAR; INTRAVENOUS at 22:35

## 2023-01-01 RX ADMIN — AMIODARONE HYDROCHLORIDE 200 MG: 200 TABLET ORAL at 08:13

## 2023-01-01 RX ADMIN — AMIODARONE HYDROCHLORIDE 200 MG: 200 TABLET ORAL at 08:02

## 2023-01-01 RX ADMIN — PROCHLORPERAZINE MALEATE 5 MG: 5 TABLET ORAL at 06:40

## 2023-01-01 RX ADMIN — SENNOSIDES AND DOCUSATE SODIUM 2 TABLET: 50; 8.6 TABLET ORAL at 19:32

## 2023-01-01 RX ADMIN — Medication 1 MG: at 20:35

## 2023-01-01 RX ADMIN — POTASSIUM CHLORIDE 10 MEQ: 7.46 INJECTION, SOLUTION INTRAVENOUS at 17:31

## 2023-01-01 RX ADMIN — Medication 5 MG: at 19:01

## 2023-01-01 RX ADMIN — POTASSIUM CHLORIDE 10 MEQ: 7.46 INJECTION, SOLUTION INTRAVENOUS at 10:08

## 2023-01-01 RX ADMIN — APIXABAN 5 MG: 5 TABLET, FILM COATED ORAL at 09:53

## 2023-01-01 RX ADMIN — OXYCODONE HYDROCHLORIDE 10 MG: 5 TABLET ORAL at 16:27

## 2023-01-01 RX ADMIN — PANTOPRAZOLE SODIUM 20 MG: 20 TABLET, DELAYED RELEASE ORAL at 16:05

## 2023-01-01 RX ADMIN — MORPHINE SULFATE 2 MG: 2 INJECTION, SOLUTION INTRAMUSCULAR; INTRAVENOUS at 18:12

## 2023-01-01 RX ADMIN — Medication 40 MG: at 07:31

## 2023-01-01 RX ADMIN — LORAZEPAM 0.5 MG: 2 INJECTION INTRAMUSCULAR; INTRAVENOUS at 21:34

## 2023-01-01 RX ADMIN — MORPHINE SULFATE 5 MG: 20 SOLUTION ORAL at 18:29

## 2023-01-01 RX ADMIN — DEXTROSE MONOHYDRATE: 100 INJECTION, SOLUTION INTRAVENOUS at 02:45

## 2023-01-01 RX ADMIN — AMIODARONE HYDROCHLORIDE 200 MG: 200 TABLET ORAL at 10:02

## 2023-01-01 RX ADMIN — LIDOCAINE PATCH 4% 1 PATCH: 40 PATCH TOPICAL at 07:45

## 2023-01-01 RX ADMIN — ONDANSETRON 4 MG: 4 TABLET, ORALLY DISINTEGRATING ORAL at 17:38

## 2023-01-01 RX ADMIN — LIDOCAINE PATCH 4% 1 PATCH: 40 PATCH TOPICAL at 09:07

## 2023-01-01 RX ADMIN — MORPHINE SULFATE 10 MG: 20 SOLUTION ORAL at 10:48

## 2023-01-01 RX ADMIN — PROCHLORPERAZINE EDISYLATE 5 MG: 5 INJECTION INTRAMUSCULAR; INTRAVENOUS at 09:38

## 2023-01-01 RX ADMIN — HYDROMORPHONE HYDROCHLORIDE 0.2 MG: 0.2 INJECTION, SOLUTION INTRAMUSCULAR; INTRAVENOUS; SUBCUTANEOUS at 17:13

## 2023-01-01 RX ADMIN — MORPHINE SULFATE 5 MG: 20 SOLUTION ORAL at 01:51

## 2023-01-01 RX ADMIN — IOPAMIDOL 65 ML: 755 INJECTION, SOLUTION INTRAVENOUS at 06:59

## 2023-01-01 RX ADMIN — PANTOPRAZOLE SODIUM 40 MG: 40 TABLET, DELAYED RELEASE ORAL at 07:59

## 2023-01-01 RX ADMIN — HYDROXYZINE HYDROCHLORIDE 50 MG: 25 TABLET, FILM COATED ORAL at 07:41

## 2023-01-01 RX ADMIN — SENNOSIDES AND DOCUSATE SODIUM 2 TABLET: 50; 8.6 TABLET ORAL at 09:52

## 2023-01-01 RX ADMIN — BUMETANIDE 2 MG: 2 TABLET ORAL at 14:31

## 2023-01-01 RX ADMIN — SODIUM CHLORIDE 1000 ML: 9 INJECTION, SOLUTION INTRAVENOUS at 15:07

## 2023-01-01 RX ADMIN — IPRATROPIUM BROMIDE AND ALBUTEROL SULFATE 3 ML: .5; 3 SOLUTION RESPIRATORY (INHALATION) at 16:06

## 2023-01-01 RX ADMIN — BUMETANIDE 2 MG: 0.25 INJECTION INTRAMUSCULAR; INTRAVENOUS at 08:35

## 2023-01-01 RX ADMIN — SENNOSIDES AND DOCUSATE SODIUM 2 TABLET: 50; 8.6 TABLET ORAL at 20:48

## 2023-01-01 RX ADMIN — Medication 15 ML: at 08:15

## 2023-01-01 RX ADMIN — ACETAMINOPHEN 975 MG: 325 TABLET, FILM COATED ORAL at 19:59

## 2023-01-01 RX ADMIN — GABAPENTIN 100 MG: 250 SOLUTION ORAL at 23:05

## 2023-01-01 RX ADMIN — PANTOPRAZOLE SODIUM 20 MG: 20 TABLET, DELAYED RELEASE ORAL at 08:09

## 2023-01-01 RX ADMIN — Medication 1 MG: at 19:49

## 2023-01-01 RX ADMIN — METOCLOPRAMIDE HYDROCHLORIDE 10 MG: 5 INJECTION INTRAMUSCULAR; INTRAVENOUS at 18:35

## 2023-01-01 RX ADMIN — POLYETHYLENE GLYCOL 3350 17 G: 17 POWDER, FOR SOLUTION ORAL at 07:41

## 2023-01-01 RX ADMIN — SODIUM CHLORIDE SOLN NEBU 3% 3 ML: 3 NEBU SOLN at 16:42

## 2023-01-01 RX ADMIN — MORPHINE SULFATE 10 MG: 20 SOLUTION ORAL at 07:06

## 2023-01-01 RX ADMIN — LORAZEPAM 0.5 MG: 2 INJECTION INTRAMUSCULAR; INTRAVENOUS at 04:43

## 2023-01-01 RX ADMIN — IPRATROPIUM BROMIDE AND ALBUTEROL SULFATE 3 ML: .5; 3 SOLUTION RESPIRATORY (INHALATION) at 11:48

## 2023-01-01 RX ADMIN — AMIODARONE HYDROCHLORIDE 200 MG: 200 TABLET ORAL at 08:48

## 2023-01-01 RX ADMIN — SODIUM CHLORIDE SOLN NEBU 3% 3 ML: 3 NEBU SOLN at 15:40

## 2023-01-01 RX ADMIN — BUMETANIDE 2 MG: 0.25 INJECTION INTRAMUSCULAR; INTRAVENOUS at 14:04

## 2023-01-01 RX ADMIN — Medication 5 MG: at 06:37

## 2023-01-01 RX ADMIN — MAGNESIUM SULFATE HEPTAHYDRATE 4 G: 40 INJECTION, SOLUTION INTRAVENOUS at 16:07

## 2023-01-01 RX ADMIN — ONDANSETRON 4 MG: 2 INJECTION INTRAMUSCULAR; INTRAVENOUS at 06:40

## 2023-01-01 RX ADMIN — BUMETANIDE 1 MG/HR: 0.25 INJECTION INTRAMUSCULAR; INTRAVENOUS at 13:31

## 2023-01-01 RX ADMIN — IPRATROPIUM BROMIDE AND ALBUTEROL SULFATE 3 ML: .5; 3 SOLUTION RESPIRATORY (INHALATION) at 12:33

## 2023-01-01 RX ADMIN — PREDNISONE 15 MG: 5 TABLET ORAL at 08:07

## 2023-01-01 RX ADMIN — SODIUM CHLORIDE SOLN NEBU 3% 3 ML: 3 NEBU SOLN at 12:35

## 2023-01-01 RX ADMIN — Medication 5 MG: at 19:56

## 2023-01-01 RX ADMIN — HYDROMORPHONE HYDROCHLORIDE 0.2 MG: 0.2 INJECTION, SOLUTION INTRAMUSCULAR; INTRAVENOUS; SUBCUTANEOUS at 16:25

## 2023-01-01 RX ADMIN — LORAZEPAM 1 MG: 2 LIQUID ORAL at 21:36

## 2023-01-01 RX ADMIN — SODIUM CHLORIDE 80 ML: 9 INJECTION, SOLUTION INTRAVENOUS at 07:02

## 2023-01-01 RX ADMIN — Medication 5 MG: at 06:41

## 2023-01-01 RX ADMIN — SODIUM CHLORIDE SOLN NEBU 3% 3 ML: 3 NEBU SOLN at 19:59

## 2023-01-01 RX ADMIN — OXYCODONE HYDROCHLORIDE 10 MG: 5 TABLET ORAL at 05:00

## 2023-01-01 RX ADMIN — MORPHINE SULFATE 5 MG: 20 SOLUTION ORAL at 17:34

## 2023-01-01 RX ADMIN — LIDOCAINE PATCH 4% 1 PATCH: 40 PATCH TOPICAL at 09:04

## 2023-01-01 RX ADMIN — HYDROXYZINE HYDROCHLORIDE 50 MG: 50 TABLET, FILM COATED ORAL at 16:01

## 2023-01-01 RX ADMIN — MORPHINE SULFATE 10 MG: 20 SOLUTION ORAL at 14:50

## 2023-01-01 RX ADMIN — Medication 15 ML: at 08:16

## 2023-01-01 RX ADMIN — Medication 40 MG: at 08:10

## 2023-01-01 RX ADMIN — GABAPENTIN 100 MG: 250 SOLUTION ORAL at 21:43

## 2023-01-01 RX ADMIN — GABAPENTIN 100 MG: 250 SOLUTION ORAL at 22:47

## 2023-01-01 RX ADMIN — METOCLOPRAMIDE HYDROCHLORIDE 10 MG: 5 INJECTION INTRAMUSCULAR; INTRAVENOUS at 05:00

## 2023-01-01 RX ADMIN — MORPHINE SULFATE 5 MG: 20 SOLUTION ORAL at 19:25

## 2023-01-01 RX ADMIN — PREDNISONE 15 MG: 5 TABLET ORAL at 08:36

## 2023-01-01 RX ADMIN — IPRATROPIUM BROMIDE AND ALBUTEROL SULFATE 3 ML: .5; 3 SOLUTION RESPIRATORY (INHALATION) at 08:23

## 2023-01-01 RX ADMIN — APIXABAN 5 MG: 5 TABLET, FILM COATED ORAL at 21:25

## 2023-01-01 RX ADMIN — HYDROMORPHONE HYDROCHLORIDE 0.2 MG: 0.2 INJECTION, SOLUTION INTRAMUSCULAR; INTRAVENOUS; SUBCUTANEOUS at 12:45

## 2023-01-01 RX ADMIN — AMPICILLIN SODIUM AND SULBACTAM SODIUM 3 G: 2; 1 INJECTION, POWDER, FOR SOLUTION INTRAMUSCULAR; INTRAVENOUS at 19:40

## 2023-01-01 RX ADMIN — TIZANIDINE 2 MG: 2 TABLET ORAL at 21:49

## 2023-01-01 RX ADMIN — AMPICILLIN SODIUM AND SULBACTAM SODIUM 3 G: 2; 1 INJECTION, POWDER, FOR SOLUTION INTRAMUSCULAR; INTRAVENOUS at 14:07

## 2023-01-01 RX ADMIN — MORPHINE SULFATE 5 MG: 20 SOLUTION ORAL at 14:28

## 2023-01-01 RX ADMIN — LORAZEPAM 1 MG: 2 LIQUID ORAL at 06:50

## 2023-01-01 RX ADMIN — SODIUM CHLORIDE SOLN NEBU 3% 3 ML: 3 NEBU SOLN at 20:37

## 2023-01-01 RX ADMIN — DEXTROSE MONOHYDRATE AND SODIUM CHLORIDE: 5; .45 INJECTION, SOLUTION INTRAVENOUS at 18:11

## 2023-01-01 RX ADMIN — Medication 1 MG: at 20:50

## 2023-01-01 RX ADMIN — MORPHINE SULFATE 10 MG: 20 SOLUTION ORAL at 20:16

## 2023-01-01 RX ADMIN — BUMETANIDE 2 MG: 0.25 INJECTION INTRAMUSCULAR; INTRAVENOUS at 14:41

## 2023-01-01 RX ADMIN — SENNOSIDES AND DOCUSATE SODIUM 2 TABLET: 50; 8.6 TABLET ORAL at 07:32

## 2023-01-01 RX ADMIN — MORPHINE SULFATE 5 MG: 20 SOLUTION ORAL at 20:37

## 2023-01-01 RX ADMIN — FUROSEMIDE 40 MG: 20 TABLET ORAL at 08:32

## 2023-01-01 RX ADMIN — MORPHINE SULFATE 4 MG: 2 INJECTION, SOLUTION INTRAMUSCULAR; INTRAVENOUS at 07:06

## 2023-01-01 RX ADMIN — GABAPENTIN 100 MG: 250 SOLUTION ORAL at 23:18

## 2023-01-01 RX ADMIN — Medication 20 MCG: at 08:49

## 2023-01-01 RX ADMIN — MORPHINE SULFATE 4 MG: 2 INJECTION, SOLUTION INTRAMUSCULAR; INTRAVENOUS at 22:52

## 2023-01-01 RX ADMIN — MORPHINE SULFATE 5 MG: 20 SOLUTION ORAL at 22:11

## 2023-01-01 RX ADMIN — HYDROXYZINE HYDROCHLORIDE 25 MG: 25 TABLET, FILM COATED ORAL at 01:59

## 2023-01-01 RX ADMIN — SODIUM CHLORIDE SOLN NEBU 3% 3 ML: 3 NEBU SOLN at 20:15

## 2023-01-01 RX ADMIN — APIXABAN 5 MG: 5 TABLET, FILM COATED ORAL at 10:50

## 2023-01-01 RX ADMIN — APIXABAN 5 MG: 5 TABLET, FILM COATED ORAL at 19:32

## 2023-01-01 RX ADMIN — PENTAMIDINE ISETHIONATE 300 MG: 300 INHALANT RESPIRATORY (INHALATION) at 16:44

## 2023-01-01 RX ADMIN — SENNOSIDES AND DOCUSATE SODIUM 2 TABLET: 50; 8.6 TABLET ORAL at 19:52

## 2023-01-01 RX ADMIN — BUMETANIDE 2 MG: 2 TABLET ORAL at 20:30

## 2023-01-01 RX ADMIN — HYDROXYZINE HYDROCHLORIDE 25 MG: 25 TABLET, FILM COATED ORAL at 09:34

## 2023-01-01 RX ADMIN — DEXTROSE 50 % IN WATER (D50W) INTRAVENOUS SYRINGE 50 ML: at 18:15

## 2023-01-01 RX ADMIN — PREDNISONE 15 MG: 5 TABLET ORAL at 09:43

## 2023-01-01 RX ADMIN — POTASSIUM CHLORIDE 10 MEQ: 7.46 INJECTION, SOLUTION INTRAVENOUS at 06:33

## 2023-01-01 RX ADMIN — METOCLOPRAMIDE HYDROCHLORIDE 10 MG: 5 INJECTION INTRAMUSCULAR; INTRAVENOUS at 12:09

## 2023-01-01 RX ADMIN — MORPHINE SULFATE 10 MG: 20 SOLUTION ORAL at 00:54

## 2023-01-01 RX ADMIN — Medication 40 MG: at 08:59

## 2023-01-01 RX ADMIN — PROCHLORPERAZINE EDISYLATE 5 MG: 5 INJECTION INTRAMUSCULAR; INTRAVENOUS at 02:59

## 2023-01-01 RX ADMIN — APIXABAN 5 MG: 5 TABLET, FILM COATED ORAL at 09:56

## 2023-01-01 RX ADMIN — HYDROMORPHONE HYDROCHLORIDE 0.2 MG: 0.2 INJECTION, SOLUTION INTRAMUSCULAR; INTRAVENOUS; SUBCUTANEOUS at 02:31

## 2023-01-01 RX ADMIN — LIDOCAINE PATCH 4% 1 PATCH: 40 PATCH TOPICAL at 08:15

## 2023-01-01 RX ADMIN — DEXTROSE MONOHYDRATE AND SODIUM CHLORIDE: 5; .45 INJECTION, SOLUTION INTRAVENOUS at 04:48

## 2023-01-01 RX ADMIN — PROCHLORPERAZINE EDISYLATE 5 MG: 5 INJECTION INTRAMUSCULAR; INTRAVENOUS at 20:50

## 2023-01-01 RX ADMIN — Medication 5 MG: at 20:08

## 2023-01-01 RX ADMIN — APIXABAN 5 MG: 5 TABLET, FILM COATED ORAL at 07:59

## 2023-01-01 RX ADMIN — PREDNISONE 15 MG: 5 TABLET ORAL at 09:51

## 2023-01-01 RX ADMIN — MORPHINE SULFATE 5 MG: 20 SOLUTION ORAL at 09:22

## 2023-01-01 RX ADMIN — HYDROMORPHONE HYDROCHLORIDE 0.2 MG: 0.2 INJECTION, SOLUTION INTRAMUSCULAR; INTRAVENOUS; SUBCUTANEOUS at 13:11

## 2023-01-01 RX ADMIN — PREDNISONE 15 MG: 5 TABLET ORAL at 18:04

## 2023-01-01 RX ADMIN — MELATONIN TAB 3 MG 3 MG: 3 TAB at 22:47

## 2023-01-01 RX ADMIN — Medication 1 MG: at 20:12

## 2023-01-01 RX ADMIN — IPRATROPIUM BROMIDE AND ALBUTEROL SULFATE 3 ML: .5; 3 SOLUTION RESPIRATORY (INHALATION) at 12:01

## 2023-01-01 RX ADMIN — Medication 5 MG: at 13:14

## 2023-01-01 RX ADMIN — AMIODARONE HYDROCHLORIDE 200 MG: 200 TABLET ORAL at 09:43

## 2023-01-01 RX ADMIN — SENNOSIDES AND DOCUSATE SODIUM 2 TABLET: 50; 8.6 TABLET ORAL at 08:47

## 2023-01-01 RX ADMIN — DEXTROSE 50 % IN WATER (D50W) INTRAVENOUS SYRINGE 50 ML: at 18:18

## 2023-01-01 RX ADMIN — PANTOPRAZOLE SODIUM 40 MG: 40 TABLET, DELAYED RELEASE ORAL at 08:56

## 2023-01-01 RX ADMIN — DEXTROSE AND SODIUM CHLORIDE: 5; 450 INJECTION, SOLUTION INTRAVENOUS at 18:51

## 2023-01-01 RX ADMIN — LIDOCAINE PATCH 4% 1 PATCH: 40 PATCH TOPICAL at 08:34

## 2023-01-01 RX ADMIN — SODIUM CHLORIDE SOLN NEBU 3% 3 ML: 3 NEBU SOLN at 16:21

## 2023-01-01 RX ADMIN — IPRATROPIUM BROMIDE AND ALBUTEROL SULFATE 3 ML: .5; 3 SOLUTION RESPIRATORY (INHALATION) at 20:04

## 2023-01-01 RX ADMIN — MORPHINE SULFATE 10 MG: 20 SOLUTION ORAL at 02:30

## 2023-01-01 RX ADMIN — DEXTROSE MONOHYDRATE 25 ML: 25 INJECTION, SOLUTION INTRAVENOUS at 08:20

## 2023-01-01 RX ADMIN — Medication 1 MG: at 20:14

## 2023-01-01 RX ADMIN — Medication 40 MG: at 08:04

## 2023-01-01 RX ADMIN — APIXABAN 5 MG: 5 TABLET, FILM COATED ORAL at 21:17

## 2023-01-01 RX ADMIN — PROCHLORPERAZINE EDISYLATE 5 MG: 5 INJECTION INTRAMUSCULAR; INTRAVENOUS at 20:48

## 2023-01-01 RX ADMIN — POTASSIUM CHLORIDE 10 MEQ: 7.46 INJECTION, SOLUTION INTRAVENOUS at 01:05

## 2023-01-01 RX ADMIN — ASPIRIN 81 MG CHEWABLE TABLET 324 MG: 81 TABLET CHEWABLE at 17:53

## 2023-01-01 RX ADMIN — DEXTROSE MONOHYDRATE 25 ML: 25 INJECTION, SOLUTION INTRAVENOUS at 09:46

## 2023-01-01 RX ADMIN — DEXAMETHASONE SODIUM PHOSPHATE 4 MG: 4 INJECTION, SOLUTION INTRA-ARTICULAR; INTRALESIONAL; INTRAMUSCULAR; INTRAVENOUS; SOFT TISSUE at 12:13

## 2023-01-01 RX ADMIN — SODIUM CHLORIDE SOLN NEBU 3% 3 ML: 3 NEBU SOLN at 21:39

## 2023-01-01 RX ADMIN — MELATONIN TAB 3 MG 3 MG: 3 TAB at 23:18

## 2023-01-01 RX ADMIN — SODIUM CHLORIDE SOLN NEBU 3% 3 ML: 3 NEBU SOLN at 12:26

## 2023-01-01 RX ADMIN — BUMETANIDE 2 MG: 0.25 INJECTION INTRAMUSCULAR; INTRAVENOUS at 13:49

## 2023-01-01 RX ADMIN — PREDNISONE 35 MG: 5 TABLET ORAL at 08:52

## 2023-01-01 RX ADMIN — MORPHINE SULFATE 10 MG: 20 SOLUTION ORAL at 00:05

## 2023-01-01 RX ADMIN — PROCHLORPERAZINE EDISYLATE 5 MG: 5 INJECTION INTRAMUSCULAR; INTRAVENOUS at 19:37

## 2023-01-01 RX ADMIN — HYDROXYZINE HYDROCHLORIDE 25 MG: 25 TABLET, FILM COATED ORAL at 21:52

## 2023-01-01 RX ADMIN — HYDROXYZINE HYDROCHLORIDE 50 MG: 25 TABLET, FILM COATED ORAL at 08:01

## 2023-01-01 RX ADMIN — ONDANSETRON 4 MG: 2 INJECTION INTRAMUSCULAR; INTRAVENOUS at 21:42

## 2023-01-01 RX ADMIN — SODIUM CHLORIDE SOLN NEBU 3% 3 ML: 3 NEBU SOLN at 09:17

## 2023-01-01 RX ADMIN — AMPICILLIN SODIUM AND SULBACTAM SODIUM 3 G: 2; 1 INJECTION, POWDER, FOR SOLUTION INTRAMUSCULAR; INTRAVENOUS at 08:06

## 2023-01-01 RX ADMIN — SENNOSIDES AND DOCUSATE SODIUM 2 TABLET: 50; 8.6 TABLET ORAL at 08:22

## 2023-01-01 RX ADMIN — METOCLOPRAMIDE HYDROCHLORIDE 10 MG: 5 INJECTION INTRAMUSCULAR; INTRAVENOUS at 10:15

## 2023-01-01 RX ADMIN — MORPHINE SULFATE 5 MG: 20 SOLUTION ORAL at 06:29

## 2023-01-01 RX ADMIN — Medication 5 MG: at 03:23

## 2023-01-01 RX ADMIN — Medication 5 MG: at 21:04

## 2023-01-01 RX ADMIN — Medication 20 MCG: at 08:15

## 2023-01-01 RX ADMIN — METOCLOPRAMIDE HYDROCHLORIDE 10 MG: 5 INJECTION INTRAMUSCULAR; INTRAVENOUS at 01:16

## 2023-01-01 RX ADMIN — SODIUM CHLORIDE SOLN NEBU 3% 3 ML: 3 NEBU SOLN at 20:25

## 2023-01-01 RX ADMIN — IPRATROPIUM BROMIDE AND ALBUTEROL SULFATE 3 ML: .5; 3 SOLUTION RESPIRATORY (INHALATION) at 09:17

## 2023-01-01 RX ADMIN — DEXTROSE MONOHYDRATE 25 ML: 25 INJECTION, SOLUTION INTRAVENOUS at 08:48

## 2023-01-01 RX ADMIN — METOCLOPRAMIDE HYDROCHLORIDE 10 MG: 5 INJECTION INTRAMUSCULAR; INTRAVENOUS at 10:45

## 2023-01-01 RX ADMIN — POLYETHYLENE GLYCOL 3350 17 G: 17 POWDER, FOR SOLUTION ORAL at 08:36

## 2023-01-01 RX ADMIN — LORAZEPAM 1 MG: 2 LIQUID ORAL at 18:44

## 2023-01-01 RX ADMIN — HYDROXYZINE HYDROCHLORIDE 25 MG: 25 TABLET, FILM COATED ORAL at 05:46

## 2023-01-01 RX ADMIN — METOCLOPRAMIDE HYDROCHLORIDE 10 MG: 5 INJECTION INTRAMUSCULAR; INTRAVENOUS at 18:13

## 2023-01-01 RX ADMIN — MORPHINE SULFATE 5 MG: 20 SOLUTION ORAL at 10:35

## 2023-01-01 RX ADMIN — METOCLOPRAMIDE HYDROCHLORIDE 10 MG: 5 INJECTION INTRAMUSCULAR; INTRAVENOUS at 12:25

## 2023-01-01 RX ADMIN — MORPHINE SULFATE 5 MG: 20 SOLUTION ORAL at 12:04

## 2023-01-01 RX ADMIN — MORPHINE SULFATE 4 MG: 2 INJECTION, SOLUTION INTRAMUSCULAR; INTRAVENOUS at 03:07

## 2023-01-01 ASSESSMENT — ACTIVITIES OF DAILY LIVING (ADL)
ADLS_ACUITY_SCORE: 56
ADLS_ACUITY_SCORE: 45
ADLS_ACUITY_SCORE: 50
ADLS_ACUITY_SCORE: 57
ADLS_ACUITY_SCORE: 49
ADLS_ACUITY_SCORE: 42
ADLS_ACUITY_SCORE: 42
ADLS_ACUITY_SCORE: 45
ADLS_ACUITY_SCORE: 48
ADLS_ACUITY_SCORE: 45
ADLS_ACUITY_SCORE: 57
ADLS_ACUITY_SCORE: 56
WALKING_OR_CLIMBING_STAIRS: AMBULATION DIFFICULTY, REQUIRES EQUIPMENT;STAIR CLIMBING DIFFICULTY, REQUIRES EQUIPMENT;AMBULATION DIFFICULTY, ASSISTANCE 1 PERSON;STAIR CLIMBING DIFFICULTY, ASSISTANCE 1 PERSON
ADLS_ACUITY_SCORE: 43
ADLS_ACUITY_SCORE: 45
ADLS_ACUITY_SCORE: 42
ADLS_ACUITY_SCORE: 48
ADLS_ACUITY_SCORE: 49
ADLS_ACUITY_SCORE: 45
ADLS_ACUITY_SCORE: 42
ADLS_ACUITY_SCORE: 57
ADLS_ACUITY_SCORE: 50
ADLS_ACUITY_SCORE: 56
ADLS_ACUITY_SCORE: 42
ADLS_ACUITY_SCORE: 56
ADLS_ACUITY_SCORE: 45
ADLS_ACUITY_SCORE: 56
ADLS_ACUITY_SCORE: 57
ADLS_ACUITY_SCORE: 56
ADLS_ACUITY_SCORE: 57
ADLS_ACUITY_SCORE: 44
ADLS_ACUITY_SCORE: 56
ADLS_ACUITY_SCORE: 56
ADLS_ACUITY_SCORE: 37
ADLS_ACUITY_SCORE: 42
ADLS_ACUITY_SCORE: 42
ADLS_ACUITY_SCORE: 50
ADLS_ACUITY_SCORE: 56
ADLS_ACUITY_SCORE: 49
ADLS_ACUITY_SCORE: 49
ADLS_ACUITY_SCORE: 42
ADLS_ACUITY_SCORE: 49
ADLS_ACUITY_SCORE: 49
ADLS_ACUITY_SCORE: 43
ADLS_ACUITY_SCORE: 56
TOILETING_ISSUES: YES
ADLS_ACUITY_SCORE: 57
ADLS_ACUITY_SCORE: 51
ADLS_ACUITY_SCORE: 50
ADLS_ACUITY_SCORE: 57
ADLS_ACUITY_SCORE: 57
ADLS_ACUITY_SCORE: 45
ADLS_ACUITY_SCORE: 49
ADLS_ACUITY_SCORE: 43
ADLS_ACUITY_SCORE: 57
ADLS_ACUITY_SCORE: 52
ADLS_ACUITY_SCORE: 45
ADLS_ACUITY_SCORE: 45
ADLS_ACUITY_SCORE: 42
ADLS_ACUITY_SCORE: 50
ADLS_ACUITY_SCORE: 48
ADLS_ACUITY_SCORE: 44
ADLS_ACUITY_SCORE: 56
ADLS_ACUITY_SCORE: 43
ADLS_ACUITY_SCORE: 57
ADLS_ACUITY_SCORE: 42
ADLS_ACUITY_SCORE: 50
ADLS_ACUITY_SCORE: 56
ADLS_ACUITY_SCORE: 45
ADLS_ACUITY_SCORE: 52
ADLS_ACUITY_SCORE: 51
ADLS_ACUITY_SCORE: 49
ADLS_ACUITY_SCORE: 37
ADLS_ACUITY_SCORE: 42
ADLS_ACUITY_SCORE: 56
ADLS_ACUITY_SCORE: 45
ADLS_ACUITY_SCORE: 53
ADLS_ACUITY_SCORE: 51
ADLS_ACUITY_SCORE: 50
ADLS_ACUITY_SCORE: 42
ADLS_ACUITY_SCORE: 52
ADLS_ACUITY_SCORE: 44
ADLS_ACUITY_SCORE: 42
ADLS_ACUITY_SCORE: 50
ADLS_ACUITY_SCORE: 42
ADLS_ACUITY_SCORE: 50
ADLS_ACUITY_SCORE: 49
ADLS_ACUITY_SCORE: 42
ADLS_ACUITY_SCORE: 56
ADLS_ACUITY_SCORE: 57
ADLS_ACUITY_SCORE: 50
ADLS_ACUITY_SCORE: 57
ADLS_ACUITY_SCORE: 56
ADLS_ACUITY_SCORE: 57
ADLS_ACUITY_SCORE: 57
ADLS_ACUITY_SCORE: 43
ADLS_ACUITY_SCORE: 45
ADLS_ACUITY_SCORE: 43
ADLS_ACUITY_SCORE: 57
DRESSING/BATHING: BATHING DIFFICULTY, ASSISTANCE 1 PERSON
ADLS_ACUITY_SCORE: 50
ADLS_ACUITY_SCORE: 42
ADLS_ACUITY_SCORE: 45
ADLS_ACUITY_SCORE: 49
ADLS_ACUITY_SCORE: 48
ADLS_ACUITY_SCORE: 57
ADLS_ACUITY_SCORE: 57
ADLS_ACUITY_SCORE: 43
ADLS_ACUITY_SCORE: 57
ADLS_ACUITY_SCORE: 42
ADLS_ACUITY_SCORE: 44
ADLS_ACUITY_SCORE: 42
ADLS_ACUITY_SCORE: 56
ADLS_ACUITY_SCORE: 49
ADLS_ACUITY_SCORE: 42
ADLS_ACUITY_SCORE: 42
ADLS_ACUITY_SCORE: 39
ADLS_ACUITY_SCORE: 46
ADLS_ACUITY_SCORE: 42
ADLS_ACUITY_SCORE: 57
ADLS_ACUITY_SCORE: 42
ADLS_ACUITY_SCORE: 42
ADLS_ACUITY_SCORE: 57
ADLS_ACUITY_SCORE: 45
ADLS_ACUITY_SCORE: 48
ADLS_ACUITY_SCORE: 57
ADLS_ACUITY_SCORE: 56
ADLS_ACUITY_SCORE: 43
CONCENTRATING,_REMEMBERING_OR_MAKING_DECISIONS_DIFFICULTY: NO
ADLS_ACUITY_SCORE: 51
ADLS_ACUITY_SCORE: 57
ADLS_ACUITY_SCORE: 49
ADLS_ACUITY_SCORE: 37
ADLS_ACUITY_SCORE: 39
ADLS_ACUITY_SCORE: 57
ADLS_ACUITY_SCORE: 57
ADLS_ACUITY_SCORE: 49
ADLS_ACUITY_SCORE: 42
ADLS_ACUITY_SCORE: 56
ADLS_ACUITY_SCORE: 45
ADLS_ACUITY_SCORE: 57
ADLS_ACUITY_SCORE: 57
ADLS_ACUITY_SCORE: 45
ADLS_ACUITY_SCORE: 57
ADLS_ACUITY_SCORE: 51
ADLS_ACUITY_SCORE: 42
ADLS_ACUITY_SCORE: 49
ADLS_ACUITY_SCORE: 50
ADLS_ACUITY_SCORE: 57
ADLS_ACUITY_SCORE: 50
ADLS_ACUITY_SCORE: 57
ADLS_ACUITY_SCORE: 43
ADLS_ACUITY_SCORE: 44
ADLS_ACUITY_SCORE: 56
FALL_HISTORY_WITHIN_LAST_SIX_MONTHS: NO
ADLS_ACUITY_SCORE: 56
ADLS_ACUITY_SCORE: 42
ADLS_ACUITY_SCORE: 51
ADLS_ACUITY_SCORE: 49
ADLS_ACUITY_SCORE: 37
ADLS_ACUITY_SCORE: 57
ADLS_ACUITY_SCORE: 51
ADLS_ACUITY_SCORE: 44
ADLS_ACUITY_SCORE: 48
ADLS_ACUITY_SCORE: 43
ADLS_ACUITY_SCORE: 56
ADLS_ACUITY_SCORE: 57
ADLS_ACUITY_SCORE: 45
ADLS_ACUITY_SCORE: 57
ADLS_ACUITY_SCORE: 49
ADLS_ACUITY_SCORE: 57
ADLS_ACUITY_SCORE: 57
ADLS_ACUITY_SCORE: 43
ADLS_ACUITY_SCORE: 49
ADLS_ACUITY_SCORE: 57
ADLS_ACUITY_SCORE: 37
ADLS_ACUITY_SCORE: 42
ADLS_ACUITY_SCORE: 49
ADLS_ACUITY_SCORE: 45
ADLS_ACUITY_SCORE: 57
ADLS_ACUITY_SCORE: 56
ADLS_ACUITY_SCORE: 43
ADLS_ACUITY_SCORE: 37
ADLS_ACUITY_SCORE: 42
ADLS_ACUITY_SCORE: 57
CHANGE_IN_FUNCTIONAL_STATUS_SINCE_ONSET_OF_CURRENT_ILLNESS/INJURY: YES
ADLS_ACUITY_SCORE: 57
ADLS_ACUITY_SCORE: 42
ADLS_ACUITY_SCORE: 57
ADLS_ACUITY_SCORE: 42
ADLS_ACUITY_SCORE: 57
ADLS_ACUITY_SCORE: 42
ADLS_ACUITY_SCORE: 45
ADLS_ACUITY_SCORE: 42
ADLS_ACUITY_SCORE: 56
ADLS_ACUITY_SCORE: 49
ADLS_ACUITY_SCORE: 57
ADLS_ACUITY_SCORE: 57
ADLS_ACUITY_SCORE: 42
ADLS_ACUITY_SCORE: 57
ADLS_ACUITY_SCORE: 49
ADLS_ACUITY_SCORE: 42
ADLS_ACUITY_SCORE: 42
ADLS_ACUITY_SCORE: 44
ADLS_ACUITY_SCORE: 43
ADLS_ACUITY_SCORE: 45
ADLS_ACUITY_SCORE: 39
ADLS_ACUITY_SCORE: 45
ADLS_ACUITY_SCORE: 56
ADLS_ACUITY_SCORE: 57
ADLS_ACUITY_SCORE: 49
ADLS_ACUITY_SCORE: 43
ADLS_ACUITY_SCORE: 57
ADLS_ACUITY_SCORE: 42
ADLS_ACUITY_SCORE: 45
ADLS_ACUITY_SCORE: 49
ADLS_ACUITY_SCORE: 45
ADLS_ACUITY_SCORE: 44
ADLS_ACUITY_SCORE: 43
ADLS_ACUITY_SCORE: 57
ADLS_ACUITY_SCORE: 45
ADLS_ACUITY_SCORE: 56
ADLS_ACUITY_SCORE: 48
ADLS_ACUITY_SCORE: 45
ADLS_ACUITY_SCORE: 56
ADLS_ACUITY_SCORE: 51
ADLS_ACUITY_SCORE: 45
ADLS_ACUITY_SCORE: 42
ADLS_ACUITY_SCORE: 39
ADLS_ACUITY_SCORE: 57
ADLS_ACUITY_SCORE: 50
ADLS_ACUITY_SCORE: 39
ADLS_ACUITY_SCORE: 50
ADLS_ACUITY_SCORE: 45
ADLS_ACUITY_SCORE: 42
ADLS_ACUITY_SCORE: 56
ADLS_ACUITY_SCORE: 54
ADLS_ACUITY_SCORE: 42
ADLS_ACUITY_SCORE: 51
ADLS_ACUITY_SCORE: 57
ADLS_ACUITY_SCORE: 56
ADLS_ACUITY_SCORE: 56
ADLS_ACUITY_SCORE: 50
ADLS_ACUITY_SCORE: 49
ADLS_ACUITY_SCORE: 56
ADLS_ACUITY_SCORE: 42
ADLS_ACUITY_SCORE: 50
ADLS_ACUITY_SCORE: 37
ADLS_ACUITY_SCORE: 49
ADLS_ACUITY_SCORE: 42
ADLS_ACUITY_SCORE: 57
ADLS_ACUITY_SCORE: 50
ADLS_ACUITY_SCORE: 57
ADLS_ACUITY_SCORE: 39
ADLS_ACUITY_SCORE: 35
ADLS_ACUITY_SCORE: 49
ADLS_ACUITY_SCORE: 45
ADLS_ACUITY_SCORE: 51
ADLS_ACUITY_SCORE: 42
ADLS_ACUITY_SCORE: 43
ADLS_ACUITY_SCORE: 57
ADLS_ACUITY_SCORE: 49
ADLS_ACUITY_SCORE: 42
ADLS_ACUITY_SCORE: 51
ADLS_ACUITY_SCORE: 57
ADLS_ACUITY_SCORE: 49
ADLS_ACUITY_SCORE: 37
ADLS_ACUITY_SCORE: 45
ADLS_ACUITY_SCORE: 57
ADLS_ACUITY_SCORE: 39
ADLS_ACUITY_SCORE: 45
ADLS_ACUITY_SCORE: 42
ADLS_ACUITY_SCORE: 45
ADLS_ACUITY_SCORE: 49
ADLS_ACUITY_SCORE: 57
ADLS_ACUITY_SCORE: 39
ADLS_ACUITY_SCORE: 43
ADLS_ACUITY_SCORE: 57
ADLS_ACUITY_SCORE: 42
ADLS_ACUITY_SCORE: 57
ADLS_ACUITY_SCORE: 57
ADLS_ACUITY_SCORE: 50
ADLS_ACUITY_SCORE: 57
ADLS_ACUITY_SCORE: 45
ADLS_ACUITY_SCORE: 57
ADLS_ACUITY_SCORE: 57
ADLS_ACUITY_SCORE: 51
ADLS_ACUITY_SCORE: 44
ADLS_ACUITY_SCORE: 45
ADLS_ACUITY_SCORE: 57
ADLS_ACUITY_SCORE: 45
ADLS_ACUITY_SCORE: 50
ADLS_ACUITY_SCORE: 57
ADLS_ACUITY_SCORE: 39
ADLS_ACUITY_SCORE: 56
ADLS_ACUITY_SCORE: 53
ADLS_ACUITY_SCORE: 49
ADLS_ACUITY_SCORE: 45
ADLS_ACUITY_SCORE: 45
ADLS_ACUITY_SCORE: 57
ADLS_ACUITY_SCORE: 56
ADLS_ACUITY_SCORE: 42
ADLS_ACUITY_SCORE: 45
ADLS_ACUITY_SCORE: 52
ADLS_ACUITY_SCORE: 57
ADLS_ACUITY_SCORE: 43
ADLS_ACUITY_SCORE: 45
ADLS_ACUITY_SCORE: 52
ADLS_ACUITY_SCORE: 56
ADLS_ACUITY_SCORE: 50
ADLS_ACUITY_SCORE: 53
ADLS_ACUITY_SCORE: 43
ADLS_ACUITY_SCORE: 57
ADLS_ACUITY_SCORE: 50
ADLS_ACUITY_SCORE: 50
ADLS_ACUITY_SCORE: 51
ADLS_ACUITY_SCORE: 44
ADLS_ACUITY_SCORE: 45
ADLS_ACUITY_SCORE: 57
ADLS_ACUITY_SCORE: 50
ADLS_ACUITY_SCORE: 49
ADLS_ACUITY_SCORE: 42
ADLS_ACUITY_SCORE: 44
ADLS_ACUITY_SCORE: 53
ADLS_ACUITY_SCORE: 56
ADLS_ACUITY_SCORE: 45
ADLS_ACUITY_SCORE: 49
ADLS_ACUITY_SCORE: 57
ADLS_ACUITY_SCORE: 42
ADLS_ACUITY_SCORE: 46
ADLS_ACUITY_SCORE: 57
ADLS_ACUITY_SCORE: 37
TOILETING_ASSISTANCE: TOILETING DIFFICULTY, REQUIRES EQUIPMENT;TOILETING DIFFICULTY, ASSISTANCE 1 PERSON
ADLS_ACUITY_SCORE: 56
DEPENDENT_IADLS:: CLEANING;COOKING;LAUNDRY;MEAL PREPARATION;TRANSPORTATION
ADLS_ACUITY_SCORE: 48
ADLS_ACUITY_SCORE: 57
ADLS_ACUITY_SCORE: 49
ADLS_ACUITY_SCORE: 57
ADLS_ACUITY_SCORE: 57
ADLS_ACUITY_SCORE: 43
ADLS_ACUITY_SCORE: 49
ADLS_ACUITY_SCORE: 45
WALKING_OR_CLIMBING_STAIRS_DIFFICULTY: YES
ADLS_ACUITY_SCORE: 57
ADLS_ACUITY_SCORE: 43
ADLS_ACUITY_SCORE: 50
ADLS_ACUITY_SCORE: 45
ADLS_ACUITY_SCORE: 57
ADLS_ACUITY_SCORE: 39
ADLS_ACUITY_SCORE: 49
ADLS_ACUITY_SCORE: 37
ADLS_ACUITY_SCORE: 51
ADLS_ACUITY_SCORE: 44
ADLS_ACUITY_SCORE: 50
ADLS_ACUITY_SCORE: 57
ADLS_ACUITY_SCORE: 49
ADLS_ACUITY_SCORE: 57
ADLS_ACUITY_SCORE: 51
ADLS_ACUITY_SCORE: 53
ADLS_ACUITY_SCORE: 42
ADLS_ACUITY_SCORE: 56
ADLS_ACUITY_SCORE: 56
ADLS_ACUITY_SCORE: 50
ADLS_ACUITY_SCORE: 56
ADLS_ACUITY_SCORE: 53
ADLS_ACUITY_SCORE: 57
ADLS_ACUITY_SCORE: 57
ADLS_ACUITY_SCORE: 45
ADLS_ACUITY_SCORE: 57
ADLS_ACUITY_SCORE: 57
ADLS_ACUITY_SCORE: 39
ADLS_ACUITY_SCORE: 45
ADLS_ACUITY_SCORE: 57
ADLS_ACUITY_SCORE: 42
ADLS_ACUITY_SCORE: 43
ADLS_ACUITY_SCORE: 57
ADLS_ACUITY_SCORE: 46
ADLS_ACUITY_SCORE: 57
ADLS_ACUITY_SCORE: 48
ADLS_ACUITY_SCORE: 56
ADLS_ACUITY_SCORE: 44
ADLS_ACUITY_SCORE: 56
ADLS_ACUITY_SCORE: 56
ADLS_ACUITY_SCORE: 42
EQUIPMENT_CURRENTLY_USED_AT_HOME: WHEELCHAIR, MANUAL
ADLS_ACUITY_SCORE: 57
ADLS_ACUITY_SCORE: 57
ADLS_ACUITY_SCORE: 42
ADLS_ACUITY_SCORE: 42
ADLS_ACUITY_SCORE: 56
ADLS_ACUITY_SCORE: 42
ADLS_ACUITY_SCORE: 56
ADLS_ACUITY_SCORE: 50
ADLS_ACUITY_SCORE: 42
ADLS_ACUITY_SCORE: 42
ADLS_ACUITY_SCORE: 37
ADLS_ACUITY_SCORE: 57
ADLS_ACUITY_SCORE: 49
ADLS_ACUITY_SCORE: 43
ADLS_ACUITY_SCORE: 45
ADLS_ACUITY_SCORE: 42
ADLS_ACUITY_SCORE: 56
ADLS_ACUITY_SCORE: 57
ADLS_ACUITY_SCORE: 49
ADLS_ACUITY_SCORE: 49
ADLS_ACUITY_SCORE: 57
ADLS_ACUITY_SCORE: 49
ADLS_ACUITY_SCORE: 43
ADLS_ACUITY_SCORE: 43
ADLS_ACUITY_SCORE: 57
DIFFICULTY_EATING/SWALLOWING: NO
ADLS_ACUITY_SCORE: 56
ADLS_ACUITY_SCORE: 57
ADLS_ACUITY_SCORE: 57
ADLS_ACUITY_SCORE: 56
ADLS_ACUITY_SCORE: 43
ADLS_ACUITY_SCORE: 44
ADLS_ACUITY_SCORE: 42
ADLS_ACUITY_SCORE: 57
ADLS_ACUITY_SCORE: 56
DRESSING/BATHING_DIFFICULTY: YES
ADLS_ACUITY_SCORE: 53
ADLS_ACUITY_SCORE: 42
ADLS_ACUITY_SCORE: 56
ADLS_ACUITY_SCORE: 57
ADLS_ACUITY_SCORE: 56
ADLS_ACUITY_SCORE: 45
ADLS_ACUITY_SCORE: 42
ADLS_ACUITY_SCORE: 52
ADLS_ACUITY_SCORE: 42
ADLS_ACUITY_SCORE: 44
ADLS_ACUITY_SCORE: 49
ADLS_ACUITY_SCORE: 45
ADLS_ACUITY_SCORE: 43
ADLS_ACUITY_SCORE: 45
ADLS_ACUITY_SCORE: 57
ADLS_ACUITY_SCORE: 48
ADLS_ACUITY_SCORE: 57
ADLS_ACUITY_SCORE: 57
ADLS_ACUITY_SCORE: 42
ADLS_ACUITY_SCORE: 37
ADLS_ACUITY_SCORE: 57
ADLS_ACUITY_SCORE: 50
ADLS_ACUITY_SCORE: 45
ADLS_ACUITY_SCORE: 50
ADLS_ACUITY_SCORE: 57
ADLS_ACUITY_SCORE: 44
ADLS_ACUITY_SCORE: 45
ADLS_ACUITY_SCORE: 49
ADLS_ACUITY_SCORE: 57
ADLS_ACUITY_SCORE: 42
ADLS_ACUITY_SCORE: 42
ADLS_ACUITY_SCORE: 52
ADLS_ACUITY_SCORE: 37
ADLS_ACUITY_SCORE: 52
ADLS_ACUITY_SCORE: 57
ADLS_ACUITY_SCORE: 45
ADLS_ACUITY_SCORE: 57
ADLS_ACUITY_SCORE: 42
ADLS_ACUITY_SCORE: 57
ADLS_ACUITY_SCORE: 43
ADLS_ACUITY_SCORE: 42
ADLS_ACUITY_SCORE: 56
ADLS_ACUITY_SCORE: 56
ADLS_ACUITY_SCORE: 37
ADLS_ACUITY_SCORE: 50
ADLS_ACUITY_SCORE: 42
ADLS_ACUITY_SCORE: 57
ADLS_ACUITY_SCORE: 45
ADLS_ACUITY_SCORE: 56
ADLS_ACUITY_SCORE: 42
ADLS_ACUITY_SCORE: 56
ADLS_ACUITY_SCORE: 42
ADLS_ACUITY_SCORE: 57
ADLS_ACUITY_SCORE: 35
ADLS_ACUITY_SCORE: 54
ADLS_ACUITY_SCORE: 49
ADLS_ACUITY_SCORE: 56
ADLS_ACUITY_SCORE: 39
ADLS_ACUITY_SCORE: 39
ADLS_ACUITY_SCORE: 57
ADLS_ACUITY_SCORE: 48
WEAR_GLASSES_OR_BLIND: YES
ADLS_ACUITY_SCORE: 57
ADLS_ACUITY_SCORE: 42
ADLS_ACUITY_SCORE: 44
ADLS_ACUITY_SCORE: 44
ADLS_ACUITY_SCORE: 42
ADLS_ACUITY_SCORE: 57
ADLS_ACUITY_SCORE: 57
ADLS_ACUITY_SCORE: 48
ADLS_ACUITY_SCORE: 45
ADLS_ACUITY_SCORE: 56
ADLS_ACUITY_SCORE: 45
ADLS_ACUITY_SCORE: 53
ADLS_ACUITY_SCORE: 56
ADLS_ACUITY_SCORE: 57
ADLS_ACUITY_SCORE: 45
ADLS_ACUITY_SCORE: 57
ADLS_ACUITY_SCORE: 50
ADLS_ACUITY_SCORE: 50
ADLS_ACUITY_SCORE: 43
ADLS_ACUITY_SCORE: 44
ADLS_ACUITY_SCORE: 50
ADLS_ACUITY_SCORE: 44
ADLS_ACUITY_SCORE: 57
ADLS_ACUITY_SCORE: 50
ADLS_ACUITY_SCORE: 49
ADLS_ACUITY_SCORE: 44
ADLS_ACUITY_SCORE: 57
ADLS_ACUITY_SCORE: 49
ADLS_ACUITY_SCORE: 51
ADLS_ACUITY_SCORE: 56
ADLS_ACUITY_SCORE: 44
ADLS_ACUITY_SCORE: 49
ADLS_ACUITY_SCORE: 56
ADLS_ACUITY_SCORE: 57
ADLS_ACUITY_SCORE: 39
ADLS_ACUITY_SCORE: 57
ADLS_ACUITY_SCORE: 50
ADLS_ACUITY_SCORE: 57
ADLS_ACUITY_SCORE: 43
ADLS_ACUITY_SCORE: 49
ADLS_ACUITY_SCORE: 57
ADLS_ACUITY_SCORE: 56
ADLS_ACUITY_SCORE: 49
ADLS_ACUITY_SCORE: 56
ADLS_ACUITY_SCORE: 50
ADLS_ACUITY_SCORE: 45
ADLS_ACUITY_SCORE: 50
ADLS_ACUITY_SCORE: 43
ADLS_ACUITY_SCORE: 48
ADLS_ACUITY_SCORE: 56
ADLS_ACUITY_SCORE: 42
ADLS_ACUITY_SCORE: 50
ADLS_ACUITY_SCORE: 49
ADLS_ACUITY_SCORE: 49
ADLS_ACUITY_SCORE: 37
ADLS_ACUITY_SCORE: 49
ADLS_ACUITY_SCORE: 57
ADLS_ACUITY_SCORE: 37
ADLS_ACUITY_SCORE: 50
ADLS_ACUITY_SCORE: 45
ADLS_ACUITY_SCORE: 42
ADLS_ACUITY_SCORE: 50
ADLS_ACUITY_SCORE: 49
ADLS_ACUITY_SCORE: 57
ADLS_ACUITY_SCORE: 39
ADLS_ACUITY_SCORE: 57
ADLS_ACUITY_SCORE: 42
ADLS_ACUITY_SCORE: 42
ADLS_ACUITY_SCORE: 51
ADLS_ACUITY_SCORE: 56
ADLS_ACUITY_SCORE: 53
ADLS_ACUITY_SCORE: 39
ADLS_ACUITY_SCORE: 53
ADLS_ACUITY_SCORE: 57
ADLS_ACUITY_SCORE: 42
ADLS_ACUITY_SCORE: 51
ADLS_ACUITY_SCORE: 42
ADLS_ACUITY_SCORE: 44
ADLS_ACUITY_SCORE: 45
ADLS_ACUITY_SCORE: 57
IADL_COMMENTS: HAS ASSIST WITH IADLS
ADLS_ACUITY_SCORE: 57
ADLS_ACUITY_SCORE: 56
ADLS_ACUITY_SCORE: 49
ADLS_ACUITY_SCORE: 49
ADLS_ACUITY_SCORE: 57
ADLS_ACUITY_SCORE: 56
ADLS_ACUITY_SCORE: 56
ADLS_ACUITY_SCORE: 43
ADLS_ACUITY_SCORE: 42
ADLS_ACUITY_SCORE: 45
ADLS_ACUITY_SCORE: 57
ADLS_ACUITY_SCORE: 42
ADLS_ACUITY_SCORE: 52
ADLS_ACUITY_SCORE: 57
ADLS_ACUITY_SCORE: 57
ADLS_ACUITY_SCORE: 45
ADLS_ACUITY_SCORE: 39
ADLS_ACUITY_SCORE: 50
ADLS_ACUITY_SCORE: 57
ADLS_ACUITY_SCORE: 57
ADLS_ACUITY_SCORE: 49
ADLS_ACUITY_SCORE: 50
ADLS_ACUITY_SCORE: 42
ADLS_ACUITY_SCORE: 56
ADLS_ACUITY_SCORE: 50
ADLS_ACUITY_SCORE: 56
ADLS_ACUITY_SCORE: 37
ADLS_ACUITY_SCORE: 51
ADLS_ACUITY_SCORE: 56
ADLS_ACUITY_SCORE: 50
ADLS_ACUITY_SCORE: 48
ADLS_ACUITY_SCORE: 49
ADLS_ACUITY_SCORE: 55
ADLS_ACUITY_SCORE: 57
ADLS_ACUITY_SCORE: 50
ADLS_ACUITY_SCORE: 42
ADLS_ACUITY_SCORE: 43
ADLS_ACUITY_SCORE: 56
ADLS_ACUITY_SCORE: 57
ADLS_ACUITY_SCORE: 57
ADLS_ACUITY_SCORE: 45
ADLS_ACUITY_SCORE: 42
ADLS_ACUITY_SCORE: 57
ADLS_ACUITY_SCORE: 49
ADLS_ACUITY_SCORE: 57
ADLS_ACUITY_SCORE: 57
ADLS_ACUITY_SCORE: 51
ADLS_ACUITY_SCORE: 48
ADLS_ACUITY_SCORE: 44
ADLS_ACUITY_SCORE: 42
ADLS_ACUITY_SCORE: 51
ADLS_ACUITY_SCORE: 52
ADLS_ACUITY_SCORE: 45
ADLS_ACUITY_SCORE: 39
ADLS_ACUITY_SCORE: 57
ADLS_ACUITY_SCORE: 39
ADLS_ACUITY_SCORE: 44
DOING_ERRANDS_INDEPENDENTLY_DIFFICULTY: YES

## 2023-01-01 ASSESSMENT — ENCOUNTER SYMPTOMS
SPUTUM PRODUCTION: 0
COUGH: 1
CHILLS: 0
DYSRHYTHMIAS: 1
WHEEZING: 0
SHORTNESS OF BREATH: 1
HEMOPTYSIS: 0
FEVER: 0
FALLS: 1
PSYCHIATRIC NEGATIVE: 1
WEIGHT LOSS: 1
DIAPHORESIS: 1
GASTROINTESTINAL NEGATIVE: 1
NEUROLOGICAL NEGATIVE: 1
EYES NEGATIVE: 1

## 2023-03-13 NOTE — NURSING NOTE
Chief Complaint   Patient presents with     Follow Up     scleroderma 2 month      Vitals were taken and medications were reconciled.     Nancy Melgar RMA  4:31 PM     Patient is scheduled for 04-26-23.  Asking for a call back to report in on the new medication Suboxone, # [phone]

## 2023-04-04 NOTE — ED TRIAGE NOTES
"Pt presents with concerns of increasing weakness.  Pt started having diarrhea last Wednesday.  Pt states that she is not having it at this time as she has not been eating.  Pt has decreased appetite and nausea. Pt states that her \"legs gave out\" this am and she feel to the floor.  Complaining of bilateral lower leg pain.     Triage Assessment     Row Name 04/04/23 3744       Triage Assessment (Adult)    Airway WDL WDL       Respiratory WDL    Respiratory WDL WDL       Skin Circulation/Temperature WDL    Skin Circulation/Temperature WDL WDL       Cardiac WDL    Cardiac WDL WDL       Peripheral/Neurovascular WDL    Peripheral Neurovascular WDL WDL       Cognitive/Neuro/Behavioral WDL    Cognitive/Neuro/Behavioral WDL WDL              "

## 2023-04-04 NOTE — ED PROVIDER NOTES
History     Chief Complaint   Patient presents with     Generalized Weakness     HPI  Jeanine Schuler is a 62 year old female who presents via EMS with complaints of diarrhea and weakness.  Patient has been having diarrhea for the past 6 days.  Was having a lot initially but now it has been a little bit more sporadic but still having very loose stools.  Patient's had a lot of nausea but no actual vomiting.  Patient got concerned because today she was feeling very lightheaded whenever she tried to get up and go to the bathroom.  She pretty much has not done much out of bed last few days.  When walking to go get her eyelash appointment she fell in the garage and felt a snap in her ankles and feet.  She has neuropathy but this pain feels different.  Denies any recent fevers or chills.  Denies any URI-like symptoms.  She states that she has been having some mild lower abdominal discomfort.  Denies any blood in her stools.  Her daughter called before the patient got here and stated that they did see a water bottle next to her nightstand that was filled with alcohol.    Allergies:  Allergies   Allergen Reactions     Lovenox Other (See Comments)     Blood clot      Norvasc [Amlodipine Besylate] Swelling     Lip swelling that happened on 2 different occasions     Erythromycin Rash     Rash on arms       Problem List:    Patient Active Problem List    Diagnosis Date Noted     Sjogren's syndrome with lung involvement (H) 05/05/2022     Priority: Medium     Lab test negative for COVID-19 virus 05/05/2022     Priority: Medium     Fever, unspecified fever cause 05/04/2022     Priority: Medium     Pneumonia of both lungs due to infectious organism, unspecified part of lung 05/04/2022     Priority: Medium     Chronic atrial fibrillation (H) 05/01/2022     Priority: Medium     Chronic systolic congestive heart failure (H) 05/01/2022     Priority: Medium     ROUSE (dyspnea on exertion) 04/05/2022     Priority: Medium      Pulmonary hypertension (H) 04/05/2022     Priority: Medium     Encounter for long term current use of azathioprine 03/15/2022     Priority: Medium     On prednisone therapy 02/21/2022     Priority: Medium     Sarcoidosis 02/21/2022     Priority: Medium     Immunosuppressed status (H) 02/21/2022     Priority: Medium     MCTD (mixed connective tissue disease) (H) 02/21/2022     Priority: Medium     ACS (acute coronary syndrome) (H) 12/21/2021     Priority: Medium     High risk medication use 04/19/2021     Priority: Medium       Plaquenil: Started 2016 for Sjogren's. 4/19/21: No uveitis from Sarcoid, but focal subretinal deposits each eye, Outer segments otherwise WNL. Optos FAF normal. 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 4/19/21: No uveitis from Sarcoid, but focal subretinal deposits each eye, Outer segments otherwise WNL. Optos FAF normal. Repeat OCT with 10-2 in Fall 2021       Post-menopausal 03/31/2021     Priority: Medium     Hypovitaminosis D 03/31/2021     Priority: Medium     High serum parathyroid hormone (PTH) 03/31/2021     Priority: Medium     On corticosteroid therapy 03/31/2021     Priority: Medium     ILD (interstitial lung disease) (H) 09/16/2020     Priority: Medium     Added automatically from request for surgery [id number]       Rheumatoid arthritis with negative rheumatoid factor, involving unspecified site (H) 12/15/2015     Priority: Medium     Pharyngeal dysphagia 11/17/2015     Priority: Medium     Gastroesophageal reflux disease, esophagitis presence not specified 11/17/2015     Priority: Medium     IMO Regulatory Load OCT 2020       Disturbed sleep rhythm 04/07/2015     Priority: Medium     S/P dilatation of esophageal stricture 02/10/2012     Priority: Medium      Sjogren's disease (H) 09/11/2011     Priority: Medium     Raynaud's disease 09/11/2011     Priority: Medium     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 Echo 10/25/2011 4.0 cm        Systemic sclerosis (H) 07/28/2011     Priority: Medium     Tumoral calcinosis 07/28/2011     Priority: Medium     Iron deficiency anemia 07/30/2009     Priority: Medium     Collagen vascular disease (H) 07/23/2009     Priority: Medium     Overview:   7/26/12 Rheumatology at the [facility] Dr. Nicolás De La Torre:   1. Limited cutaneous systemic sclerosis with high titer anticentromere antibody positive, but also phospholipid antibodies.   2. Severe multiple digital ulcers, on fingers and toes. Overall symptoms of Raynaud's seems to be worse.   3. Marked keratoconjunctivitis sicca over the last several years.   4. Marked iron deficiency anemia responsive to IV iron.   5. Diffuse musculoskeletal pain for which she has been taking Naprosyn.   6. Development of 2+ bilateral lower extremity edema with the addition of diltiazem to her regimen and prior contraindication to dihydropyridines secondary to lip swelling and tingling with amlodipine.   7. Marked fatigue and diffuse clinical weakness.   8. Dyspnea on exertion with lower extremity edema and other features including the anticentromere positivity worrisome for the potential development of pulmonary hypertension.   9. Marked GERD with associated dysphagia.   10. Status post gastric bypass with a history of intermittent constipation and diarrhea potentially consistent with bacterial small bowel overgrowth versus other gut effects of the prior surgery.   11. History of positive rheumatoid factor consistent with MCTD, given the remainder of her clinical presentation.   12. Progressive Jaw tightening with inability to open mouth wider than  1 inch.        S/P gastric bypass 07/23/2009     Priority: Medium     Vitamin B 12 deficiency 07/23/2009     Priority: Medium     Low back pain - Suspect SI Joint dysfunction 09/27/2005     Priority: Medium     September 27, 2005: Started 2 weeks ago.  bilateral buttocks - better with walking, worse with sitting and driving.  Tight/stiff - lossens up.  Painful to lay down.  Constant ache.  8-10/10 at its worse.  No injury.  Tender with massage.  No radiculopathy.  Relief with muscle relaxant.       Chronic cough 09/27/2005     Priority: Medium     September 27, 2005: Persistent cough occl productive of stringy mucus, wheezes with cough.  Started 2 weeks ago.  No rhinorrhea or post nasal drip        Prolonged QTc 460 ms,  at hr 67 06/24/2005     Priority: Medium     June 24, 2005: Will check calcium, magnesium, potassium.  Will compare to old EKGs.  Reviewed meds and none are on the list for prolongation.  Anesthesiology should be aware at surgery of prolonged QT.       HTN (hypertension) 03/02/2005     Priority: Medium     April 22, 2005: BP controlled on prinizide 40/25, norvasc 5 September 27, 2005: BP controlled/marginal off meds post-gastric bypass.  Problem list name updated by automated process. Provider to review    Overview:   Normal EF, no significant pulmonary hypertension.           Past Medical History:    Past Medical History:   Diagnosis Date     Anemia      Bariatric surgery status 06/27/2005     Cellulitis of leg 06/06/2013     Esophageal reflux      Hyperplastic colonic polyp      Hypovitaminosis D 2011     ILD (interstitial lung disease) (H)      Kidney stone 04/29/2007     Kidney stone 05/10/2007     Limb ischemia; ulcers of fingertips 04/22/2013     Other chronic pain      Raynaud's syndrome      Rheumatoid arthritis (H) \     Scleroderma (H)      Sjogren-Jake syndrome      Systemic sclerosis (H) 2009     Unspecified essential hypertension        Past Surgical History:    Past Surgical  History:   Procedure Laterality Date     BIOPSY MUSCLE DIAGNOSTIC (LOCATION) Right 7/19/2021    Procedure: Right SURAL nerve BIOPSY;  Surgeon: Farooq Abel MD;  Location: [facility] OR     BRONCHOSCOPY FLEXIBLE,L CRYOBIOPSY N/A 10/06/2020    Procedure: BRONCHOSCOPY, FLEXIBLE, WITH TRANSBRONCHIAL BIOPSY x4 USING CRYOPROBE, bronchial alveolar lavage;  Surgeon: Teddy Mendez MD;  Location: [facility] OR     BYPASS GASTRIC, CHOLECYSTECTOMY, COMBINED  06/27/2005    [facility]     CHOLECYSTECTOMY       COLONOSCOPY       COLONOSCOPY N/A 11/17/2020    Procedure: COLONOSCOPY, WITH POLYPECTOMY AND BIOPSY;  Surgeon: Jamie Cárdenas MD;  Location: [facility] OR     CV HEART CATHETERIZATION WITH POSSIBLE INTERVENTION Left 12/21/2021    Procedure: Heart Catheterization with Possible Intervention;  Surgeon: Wesley Mclean MD;  Location:  HEART CARDIAC CATH LAB     CV RIGHT HEART CATH MEASUREMENTS RECORDED N/A 4/7/2022    Procedure: Right Heart Cath;  Surgeon: Dieter Brock MD;  Location:  HEART CARDIAC CATH LAB     ESOPHAGOSCOPY, GASTROSCOPY, DUODENOSCOPY (EGD), COMBINED N/A 03/10/2016    Procedure: COMBINED ESOPHAGOSCOPY, GASTROSCOPY, DUODENOSCOPY (EGD), BIOPSY SINGLE OR MULTIPLE;  Surgeon: Tricia Zambrano MD;  Location:  GI     ESOPHAGOSCOPY, GASTROSCOPY, DUODENOSCOPY (EGD), COMBINED N/A 11/17/2020    Procedure: ESOPHAGOGASTRODUODENOSCOPY, WITH BIOPSY and Dilation;  Surgeon: Jamie Cárdenas MD;  Location: [facility] OR     INNER EAR SURGERY       PICC INSERTION  06/05/2013    5fr DL Power PICC, 44cm, left basilic vein, tip in low SVC     SURGICAL HISTORY OF -       Left ear surgery - incus transition, tympanoplasty     SURGICAL HISTORY OF -   06/01/2005    Hiatal hernia repair     TONSILLECTOMY         Family History:    Family History   Problem Relation Age of Onset     Cerebrovascular Disease Father      Heart Disease Father      Hypertension Father      Heart Disease Mother      Hypertension Mother       Chronic Obstructive Pulmonary Disease Sister      Heart Disease Sister      Hypertension Sister      Cerebrovascular Disease Brother      Heart Disease Brother      Kidney Disease Brother         on dialysis     Diabetes Brother         borderline     No Known Problems Son      No Known Problems Son      No Known Problems Daughter      Cancer Brother         small cell     Heart Disease Brother      Heart Disease Brother      Heart Disease Brother      Heart Disease Brother         tripple A     Heart Disease Sister      Substance Abuse Sister         alcoholism     Crohn's Disease No family hx of      Ulcerative Colitis No family hx of      GERD No family hx of      Celiac Disease No family hx of      Nephrolithiasis No family hx of      Parathyroid Disorders No family hx of      Calcium Disorder No family hx of        Social History:  Marital Status:   [2]  Social History     Tobacco Use     Smoking status: Never     Smokeless tobacco: Never   Vaping Use     Vaping status: Never Used   Substance Use Topics     Alcohol use: Yes     Comment: occassionally     Drug use: No        Medications:    ondansetron (ZOFRAN ODT) 4 MG ODT tab  sulfamethoxazole-trimethoprim (BACTRIM DS) 800-160 MG tablet  albuterol (PROAIR HFA/PROVENTIL HFA/VENTOLIN HFA) 108 (90 Base) MCG/ACT inhaler  amiodarone (PACERONE) 200 MG tablet  apixaban ANTICOAGULANT (ELIQUIS) 5 MG tablet  ASPIRIN NOT PRESCRIBED (INTENTIONAL)  atorvastatin (LIPITOR) 40 MG tablet  azaTHIOprine (IMURAN) 50 MG tablet  blood glucose (NO BRAND SPECIFIED) test strip  clonazePAM (KLONOPIN) 0.5 MG tablet  folic acid (FOLVITE) 1 MG tablet  gabapentin (NEURONTIN) 100 MG capsule  gabapentin (NEURONTIN) 300 MG capsule  losartan (COZAAR) 50 MG tablet  magnesium oxide (MAG-OX) 400 MG tablet  metoprolol succinate ER (TOPROL XL) 50 MG 24 hr tablet  nitroGLYcerin (NITROSTAT) 0.3 MG sublingual tablet  pantoprazole (PROTONIX) 40 MG EC tablet  potassium chloride ER (MICRO-K)  10 MEQ CR capsule  predniSONE (DELTASONE) 10 MG tablet  spironolactone (ALDACTONE) 25 MG tablet  thin (NO BRAND SPECIFIED) lancets  torsemide (DEMADEX) 20 MG tablet  vitamin D2 (ERGOCALCIFEROL) 42752 units (1250 mcg) capsule          Review of Systems   All other systems reviewed and are negative.      Physical Exam   BP: (!) 136/92  Pulse: 95  Temp: 98.4  F (36.9  C)  Resp: 18  SpO2: 94 %      Physical Exam  Vitals and nursing note reviewed.   Constitutional:       General: She is not in acute distress.     Appearance: She is well-developed. She is not diaphoretic.   Eyes:      Conjunctiva/sclera: Conjunctivae normal.   Cardiovascular:      Rate and Rhythm: Normal rate and regular rhythm.      Heart sounds: Normal heart sounds. No murmur heard.     No friction rub. No gallop.   Pulmonary:      Effort: Pulmonary effort is normal. No respiratory distress.      Breath sounds: Normal breath sounds. No wheezing or rales.   Chest:      Chest wall: No tenderness.   Abdominal:      General: Bowel sounds are normal. There is no distension.      Palpations: Abdomen is soft. There is no mass.      Tenderness: There is no abdominal tenderness. There is no guarding.   Musculoskeletal:         General: Swelling present. Normal range of motion.      Cervical back: Normal range of motion and neck supple.      Right ankle: Swelling present. Tenderness present.      Left ankle: Swelling present. Tenderness present.        Feet:    Skin:     General: Skin is warm and dry.      Findings: No rash.   Neurological:      Mental Status: She is alert and oriented to person, place, and time.   Psychiatric:         Judgment: Judgment normal.         ED Course                 Procedures      Results for orders placed or performed during the hospital encounter of 04/04/23 (from the past 24 hour(s))   CBC with platelets differential    Narrative    The following orders were created for panel order CBC with platelets differential.  Procedure                                Abnormality         Status                     ---------                               -----------         ------                     CBC with platelets and d...[[id number]]  Abnormal            Final result                 Please view results for these tests on the individual orders.   Comprehensive metabolic panel   Result Value Ref Range    Sodium 136 136 - 145 mmol/L    Potassium 3.7 3.4 - 5.3 mmol/L    Chloride 102 98 - 107 mmol/L    Carbon Dioxide (CO2) 22 22 - 29 mmol/L    Anion Gap 12 7 - 15 mmol/L    Urea Nitrogen 9.1 8.0 - 23.0 mg/dL    Creatinine 0.65 0.51 - 0.95 mg/dL    Calcium 8.1 (L) 8.8 - 10.2 mg/dL    Glucose 79 70 - 99 mg/dL    Alkaline Phosphatase 111 (H) 35 - 104 U/L    AST 84 (H) 10 - 35 U/L    ALT 39 (H) 10 - 35 U/L    Protein Total 5.5 (L) 6.4 - 8.3 g/dL    Albumin 2.2 (L) 3.5 - 5.2 g/dL    Bilirubin Total 0.8 <=1.2 mg/dL    GFR Estimate >90 >60 mL/min/1.73m2   Magnesium   Result Value Ref Range    Magnesium 1.3 (L) 1.7 - 2.3 mg/dL   Lipase   Result Value Ref Range    Lipase 27 13 - 60 U/L   Ethyl Alcohol Level   Result Value Ref Range    Alcohol ethyl <0.01 <=0.01 g/dL   CBC with platelets and differential   Result Value Ref Range    WBC Count 2.9 (L) 4.0 - 11.0 10e3/uL    RBC Count 4.23 3.80 - 5.20 10e6/uL    Hemoglobin 13.1 11.7 - 15.7 g/dL    Hematocrit 41.3 35.0 - 47.0 %    MCV 98 78 - 100 fL    MCH 31.0 26.5 - 33.0 pg    MCHC 31.7 31.5 - 36.5 g/dL    RDW 15.4 (H) 10.0 - 15.0 %    Platelet Count 121 (L) 150 - 450 10e3/uL    % Neutrophils 63 %    % Lymphocytes 25 %    % Monocytes 10 %    % Eosinophils 0 %    % Basophils 1 %    % Immature Granulocytes 1 %    NRBCs per 100 WBC 0 <1 /100    Absolute Neutrophils 1.8 1.6 - 8.3 10e3/uL    Absolute Lymphocytes 0.7 (L) 0.8 - 5.3 10e3/uL    Absolute Monocytes 0.3 0.0 - 1.3 10e3/uL    Absolute Eosinophils 0.0 0.0 - 0.7 10e3/uL    Absolute Basophils 0.0 0.0 - 0.2 10e3/uL    Absolute Immature Granulocytes 0.0 <=0.4 10e3/uL     Absolute NRBCs 0.0 10e3/uL   XR Ankle Bilateral G/E 3 Views    Narrative    XR ANKLE BILATERAL G/E 3 VIEWS 4/4/2023 1:36 PM     HISTORY: fall, bilateral ankle/foot pain, fall    COMPARISON: None.      Impression    IMPRESSION: Osteopenia. No fractures are evident. Mild soft tissue  swelling about the ankles. The ankle mortise is intact and the talar  dome is unremarkable bilaterally. Atheromatous calcification.    TATA ODOM MD         SYSTEM ID:  [id number]   Symptomatic COVID-19 Virus (Coronavirus) by PCR Nose    Specimen: Nose; Swab   Result Value Ref Range    SARS CoV2 PCR Negative Negative    Narrative    Testing was performed using the Xpert Xpress SARS-CoV-2 Assay on the Cepheid Gene-Xpert Instrument Systems. Additional information about this Emergency Use Authorization (EUA) assay can be found via the Lab Guide. This test should be ordered for the detection of SARS-CoV-2 in individuals who meet SARS-CoV-2 clinical and/or epidemiological criteria as well as from individuals without symptoms or other reasons to suspect COVID-19. Test performance for asymptomatic patients has only been established in anterior nasal swab specimens. This test is for in vitro diagnostic use under the FDA EUA for laboratories certified under CLIA to perform high complexity testing. This test has not been FDA cleared or approved. A negative result does not rule out the presence of PCR inhibitors in the specimen or target RNA concentration below the limit of detection for the assay. The possibility of a false negative should be considered if the patient's recent exposure or clinical presentation suggests COVID-19. This test was validated by the [facility] and [facility]. This laboratory is certified under the Clinical Laboratory Improvement Amendments (CLIA) as qualified to perform high complexity laboratory testing.     UA with Microscopic reflex to Culture    Specimen: Urine, Deleon Catheter    Result Value Ref Range    Color Urine Michelle (A) Colorless, Straw, Light Yellow, Yellow    Appearance Urine Cloudy (A) Clear    Glucose Urine Negative Negative mg/dL    Bilirubin Urine Negative Negative    Ketones Urine 5 (A) Negative mg/dL    Specific Gravity Urine 1.021 1.003 - 1.035    Blood Urine Small (A) Negative    pH Urine 5.0 5.0 - 7.0    Protein Albumin Urine 30 (A) Negative mg/dL    Urobilinogen Urine 4.0 (A) Normal, 2.0 mg/dL    Nitrite Urine Negative Negative    Leukocyte Esterase Urine Large (A) Negative    Bacteria Urine Many (A) None Seen /HPF    WBC Clumps Urine Present (A) None Seen /HPF    Mucus Urine Present (A) None Seen /LPF    RBC Urine 4 (H) <=2 /HPF    WBC Urine 136 (H) <=5 /HPF    Squamous Epithelials Urine 5 (H) <=1 /HPF    Renal Tubular Epithelials Urine 3 (H) None Seen /HPF    Hyaline Casts Urine 8 (H) <=2 /LPF    Narrative    Urine Culture ordered based on laboratory criteria       Medications   0.9% sodium chloride BOLUS (0 mLs Intravenous Stopped 4/4/23 1355)     Followed by   sodium chloride 0.9% infusion (has no administration in time range)   ondansetron (ZOFRAN) injection 4 mg (4 mg Intravenous $Given 4/4/23 1322)   magnesium sulfate 2 g in 50 mL sterile water intermittent infusion (0 g Intravenous Stopped 4/4/23 1508)   acetaminophen (TYLENOL) tablet 1,000 mg (1,000 mg Oral $Given 4/4/23 1508)   0.9% sodium chloride BOLUS (0 mLs Intravenous Stopped 4/4/23 1540)     Labs have come back and patient's white count is actually low, liver function tests were also mildly elevated which I think is nonspecific.  Patient's magnesium was low which I think is from her chronic diarrhea.  Patient was given 2 g of magnesium to replace this.  Her urine came back and does show an overt infection.  This was sent off for culture.  I think this plus the profound diarrhea was explaining her weakness and some of her symptoms.  Certainly the gastroenteritis could have preceded the urine infection.  We  will go ahead and treat with a course of Bactrim for the UTI.  Patient was given some Zofran here and IV fluids and is feeling better.  We will send her home with more Zofran.  Recommend a phone follow-up visit with her doctor in the next couple days.  Patient will be discharged at this time.  Right when patient was Guru to leave she states that she was feeling a little shaky and was going to have her blood sugar checked.  When she came in her blood sugar was 79.  We rechecked it now it was down to 49.  Patient does have a history of being a borderline diabetic.  Patient was given some oral juice and was feeling better.  With her nausea under control she can go home and continue to eat and take more stuff orally.    Assessments & Plan (with Medical Decision Making)  Gastroenteritis, UTI     I have reviewed the nursing notes.    I have reviewed the findings, diagnosis, plan and need for follow up with the patient.      New Prescriptions    ONDANSETRON (ZOFRAN ODT) 4 MG ODT TAB    Take 1 tablet (4 mg) by mouth every 8 hours as needed for nausea    SULFAMETHOXAZOLE-TRIMETHOPRIM (BACTRIM DS) 800-160 MG TABLET    Take 1 tablet by mouth 2 times daily for 10 days       Final diagnoses:   Urinary tract infection without hematuria, site unspecified   Gastroenteritis       4/4/2023   [facility] EMERGENCY DEPT     Domenico Lagunas MD  04/04/23 [id number]

## 2023-04-17 NOTE — ED TRIAGE NOTES
Pt caught the back of both legs on her foot petals and now has skin tears to both, is on blood thinner, bleeding controlled at this time

## 2023-04-18 NOTE — ED PROVIDER NOTES
History     Chief Complaint   Patient presents with     Laceration     HPI  Jeanine Schuler is a 62 year old female who presents with bilateral lower extremity lacerations.  This occurred with her significant other was trying to push in her wheelchair and the back of her legs accidentally got caught up causing injury.  This occurred just prior to arrival    Allergies:  Allergies   Allergen Reactions     Lovenox Other (See Comments)     Blood clot      Norvasc [Amlodipine Besylate] Swelling     Lip swelling that happened on 2 different occasions     Erythromycin Rash     Rash on arms       Problem List:    Patient Active Problem List    Diagnosis Date Noted     Sjogren's syndrome with lung involvement (H) 05/05/2022     Priority: Medium     Lab test negative for COVID-19 virus 05/05/2022     Priority: Medium     Fever, unspecified fever cause 05/04/2022     Priority: Medium     Pneumonia of both lungs due to infectious organism, unspecified part of lung 05/04/2022     Priority: Medium     Chronic atrial fibrillation (H) 05/01/2022     Priority: Medium     Chronic systolic congestive heart failure (H) 05/01/2022     Priority: Medium     ROUSE (dyspnea on exertion) 04/05/2022     Priority: Medium     Pulmonary hypertension (H) 04/05/2022     Priority: Medium     Encounter for long term current use of azathioprine 03/15/2022     Priority: Medium     On prednisone therapy 02/21/2022     Priority: Medium     Sarcoidosis 02/21/2022     Priority: Medium     Immunosuppressed status (H) 02/21/2022     Priority: Medium     MCTD (mixed connective tissue disease) (H) 02/21/2022     Priority: Medium     ACS (acute coronary syndrome) (H) 12/21/2021     Priority: Medium     High risk medication use 04/19/2021     Priority: Medium       Plaquenil: Started 2016 for Sjogren's. 4/19/21: No uveitis from Sarcoid, but focal subretinal deposits each eye, Outer segments otherwise WNL. Optos FAF normal. 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 4/19/21: No uveitis from Sarcoid, but focal subretinal deposits each eye, Outer segments otherwise WNL. Optos FAF normal. Repeat OCT with 10-2 in Fall 2021       Post-menopausal 03/31/2021     Priority: Medium     Hypovitaminosis D 03/31/2021     Priority: Medium     High serum parathyroid hormone (PTH) 03/31/2021     Priority: Medium     On corticosteroid therapy 03/31/2021     Priority: Medium     ILD (interstitial lung disease) (H) 09/16/2020     Priority: Medium     Added automatically from request for surgery [id number]       Rheumatoid arthritis with negative rheumatoid factor, involving unspecified site (H) 12/15/2015     Priority: Medium     Pharyngeal dysphagia 11/17/2015     Priority: Medium     Gastroesophageal reflux disease, esophagitis presence not specified 11/17/2015     Priority: Medium     IMO Regulatory Load OCT 2020       Disturbed sleep rhythm 04/07/2015     Priority: Medium     S/P dilatation of esophageal stricture 02/10/2012     Priority: Medium     Sjogren's disease (H) 09/11/2011     Priority: Medium     Raynaud's disease 09/11/2011     Priority: Medium     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 Echo 10/25/2011 4.0 cm        Systemic sclerosis (H) 07/28/2011     Priority: Medium     Tumoral calcinosis 07/28/2011     Priority: Medium     Iron deficiency anemia 07/30/2009     Priority: Medium     Collagen vascular disease (H) 07/23/2009     Priority: Medium     Overview:   7/26/12 Rheumatology at the [facility] Dr. Nicolás De La Torre:   1. Limited cutaneous systemic sclerosis with high titer  anticentromere antibody positive, but also phospholipid antibodies.   2. Severe multiple digital ulcers, on fingers and toes. Overall symptoms of Raynaud's seems to be worse.   3. Marked keratoconjunctivitis sicca over the last several years.   4. Marked iron deficiency anemia responsive to IV iron.   5. Diffuse musculoskeletal pain for which she has been taking Naprosyn.   6. Development of 2+ bilateral lower extremity edema with the addition of diltiazem to her regimen and prior contraindication to dihydropyridines secondary to lip swelling and tingling with amlodipine.   7. Marked fatigue and diffuse clinical weakness.   8. Dyspnea on exertion with lower extremity edema and other features including the anticentromere positivity worrisome for the potential development of pulmonary hypertension.   9. Marked GERD with associated dysphagia.   10. Status post gastric bypass with a history of intermittent constipation and diarrhea potentially consistent with bacterial small bowel overgrowth versus other gut effects of the prior surgery.   11. History of positive rheumatoid factor consistent with MCTD, given the remainder of her clinical presentation.   12. Progressive Jaw tightening with inability to open mouth wider than 1 inch.        S/P gastric bypass 07/23/2009     Priority: Medium     Vitamin B 12 deficiency 07/23/2009     Priority: Medium     Low back pain - Suspect SI Joint dysfunction 09/27/2005     Priority: Medium     September 27, 2005: Started 2 weeks ago.  bilateral buttocks - better with walking, worse with sitting and driving.  Tight/stiff - lossens up.  Painful to lay down.  Constant ache.  8-10/10 at its worse.  No injury.  Tender with massage.  No radiculopathy.  Relief with muscle relaxant.       Chronic cough 09/27/2005     Priority: Medium     September 27, 2005: Persistent cough occl productive of stringy mucus, wheezes with cough.  Started 2 weeks ago.  No rhinorrhea or post nasal drip         Prolonged QTc 460 ms,  at hr 67 06/24/2005     Priority: Medium     June 24, 2005: Will check calcium, magnesium, potassium.  Will compare to old EKGs.  Reviewed meds and none are on the list for prolongation.  Anesthesiology should be aware at surgery of prolonged QT.       HTN (hypertension) 03/02/2005     Priority: Medium     April 22, 2005: BP controlled on prinizide 40/25, norvasc 5 September 27, 2005: BP controlled/marginal off meds post-gastric bypass.  Problem list name updated by automated process. Provider to review    Overview:   Normal EF, no significant pulmonary hypertension.           Past Medical History:    Past Medical History:   Diagnosis Date     Anemia      Bariatric surgery status 06/27/2005     Cellulitis of leg 06/06/2013     Esophageal reflux      Hyperplastic colonic polyp      Hypovitaminosis D 2011     ILD (interstitial lung disease) (H)      Kidney stone 04/29/2007     Kidney stone 05/10/2007     Limb ischemia; ulcers of fingertips 04/22/2013     Other chronic pain      Raynaud's syndrome      Rheumatoid arthritis (H) \     Scleroderma (H)      Sjogren-Jake syndrome      Systemic sclerosis (H) 2009     Unspecified essential hypertension        Past Surgical History:    Past Surgical History:   Procedure Laterality Date     BIOPSY MUSCLE DIAGNOSTIC (LOCATION) Right 7/19/2021    Procedure: Right SURAL nerve BIOPSY;  Surgeon: Farooq Abel MD;  Location: [facility] OR     BRONCHOSCOPY FLEXIBLE,L CRYOBIOPSY N/A 10/06/2020    Procedure: BRONCHOSCOPY, FLEXIBLE, WITH TRANSBRONCHIAL BIOPSY x4 USING CRYOPROBE, bronchial alveolar lavage;  Surgeon: Teddy Mendez MD;  Location: [facility] OR     BYPASS GASTRIC, CHOLECYSTECTOMY, COMBINED  06/27/2005    [facility]     CHOLECYSTECTOMY       COLONOSCOPY       COLONOSCOPY N/A 11/17/2020    Procedure: COLONOSCOPY, WITH POLYPECTOMY AND BIOPSY;  Surgeon: Jamie Cárdenas MD;  Location: [facility] OR     CV HEART CATHETERIZATION WITH POSSIBLE  INTERVENTION Left 12/21/2021    Procedure: Heart Catheterization with Possible Intervention;  Surgeon: Wesley Mclean MD;  Location:  HEART CARDIAC CATH LAB     CV RIGHT HEART CATH MEASUREMENTS RECORDED N/A 4/7/2022    Procedure: Right Heart Cath;  Surgeon: Dieter Brock MD;  Location:  HEART CARDIAC CATH LAB     ESOPHAGOSCOPY, GASTROSCOPY, DUODENOSCOPY (EGD), COMBINED N/A 03/10/2016    Procedure: COMBINED ESOPHAGOSCOPY, GASTROSCOPY, DUODENOSCOPY (EGD), BIOPSY SINGLE OR MULTIPLE;  Surgeon: Tricia Zambrano MD;  Location:  GI     ESOPHAGOSCOPY, GASTROSCOPY, DUODENOSCOPY (EGD), COMBINED N/A 11/17/2020    Procedure: ESOPHAGOGASTRODUODENOSCOPY, WITH BIOPSY and Dilation;  Surgeon: Jamie Cárdenas MD;  Location: [facility] OR     INNER EAR SURGERY       PICC INSERTION  06/05/2013    5fr DL Power PICC, 44cm, left basilic vein, tip in low SVC     SURGICAL HISTORY OF -       Left ear surgery - incus transition, tympanoplasty     SURGICAL HISTORY OF -   06/01/2005    Hiatal hernia repair     TONSILLECTOMY         Family History:    Family History   Problem Relation Age of Onset     Cerebrovascular Disease Father      Heart Disease Father      Hypertension Father      Heart Disease Mother      Hypertension Mother      Chronic Obstructive Pulmonary Disease Sister      Heart Disease Sister      Hypertension Sister      Cerebrovascular Disease Brother      Heart Disease Brother      Kidney Disease Brother         on dialysis     Diabetes Brother         borderline     No Known Problems Son      No Known Problems Son      No Known Problems Daughter      Cancer Brother         small cell     Heart Disease Brother      Heart Disease Brother      Heart Disease Brother      Heart Disease Brother         tripple A     Heart Disease Sister      Substance Abuse Sister         alcoholism     Crohn's Disease No family hx of      Ulcerative Colitis No family hx of      GERD No family hx of      Celiac Disease  No family hx of      Nephrolithiasis No family hx of      Parathyroid Disorders No family hx of      Calcium Disorder No family hx of        Social History:  Marital Status:   [2]  Social History     Tobacco Use     Smoking status: Never     Smokeless tobacco: Never   Vaping Use     Vaping status: Never Used   Substance Use Topics     Alcohol use: Yes     Comment: occassionally     Drug use: No        Medications:    cephALEXin (KEFLEX) 500 MG capsule  HYDROcodone-acetaminophen (NORCO) 5-325 MG tablet  ondansetron (ZOFRAN ODT) 4 MG ODT tab  albuterol (PROAIR HFA/PROVENTIL HFA/VENTOLIN HFA) 108 (90 Base) MCG/ACT inhaler  amiodarone (PACERONE) 200 MG tablet  apixaban ANTICOAGULANT (ELIQUIS) 5 MG tablet  ASPIRIN NOT PRESCRIBED (INTENTIONAL)  atorvastatin (LIPITOR) 40 MG tablet  azaTHIOprine (IMURAN) 50 MG tablet  blood glucose (NO BRAND SPECIFIED) test strip  clonazePAM (KLONOPIN) 0.5 MG tablet  folic acid (FOLVITE) 1 MG tablet  gabapentin (NEURONTIN) 100 MG capsule  gabapentin (NEURONTIN) 300 MG capsule  losartan (COZAAR) 50 MG tablet  magnesium oxide (MAG-OX) 400 MG tablet  metoprolol succinate ER (TOPROL XL) 50 MG 24 hr tablet  nitroFURantoin macrocrystal-monohydrate (MACROBID) 100 MG capsule  nitroGLYcerin (NITROSTAT) 0.3 MG sublingual tablet  pantoprazole (PROTONIX) 40 MG EC tablet  potassium chloride ER (MICRO-K) 10 MEQ CR capsule  predniSONE (DELTASONE) 10 MG tablet  spironolactone (ALDACTONE) 25 MG tablet  thin (NO BRAND SPECIFIED) lancets  torsemide (DEMADEX) 20 MG tablet  vitamin D2 (ERGOCALCIFEROL) 95872 units (1250 mcg) capsule          Review of Systems   Significant recent debilitation secondary to illness and dehydration by report.  All other systems are reviewed and are negative    Physical Exam   BP: (!) 141/89  Pulse: 89  Temp: 98.2  F (36.8  C)  Resp: 18  Weight: 68 kg (150 lb)  SpO2: 98 %      Physical Exam  Vitals and nursing note reviewed.   Constitutional:       General: She is not in  acute distress.     Appearance: She is well-developed. She is not diaphoretic.      Comments: Frail     HENT:      Head: Normocephalic and atraumatic.   Eyes:      General: No scleral icterus.     Pupils: Pupils are equal, round, and reactive to light.   Cardiovascular:      Rate and Rhythm: Normal rate and regular rhythm.      Heart sounds: Normal heart sounds. No murmur heard.  Pulmonary:      Effort: No respiratory distress.      Breath sounds: No stridor. No wheezing or rales.   Abdominal:      Palpations: Abdomen is soft.      Tenderness: There is no abdominal tenderness.   Musculoskeletal:      Cervical back: Normal range of motion and neck supple.      Comments: 2 lacerations, 1 to each of the back of the lower legs above the Achilles region.  Both V-shaped and orientation.  No foreign body seen.  Achilles intact and not visible.  Distal CMS to the legs intact.   Skin:     General: Skin is warm and dry.      Coloration: Skin is not pale.      Findings: No erythema or rash.   Neurological:      Mental Status: She is alert.         ED Course                 [facility]    -Laceration Repair    Date/Time: 4/18/2023 5:34 PM    Performed by: Erlin Jones MD  Authorized by: Erlin Jones MD    Risks, benefits and alternatives discussed.      ANESTHESIA (see MAR for exact dosages):     Anesthesia method:  Local infiltration    Local anesthetic:  Bupivacaine 0.5% WITH epi  LACERATION DETAILS     Location:  Leg    Leg location:  R lower leg    Length (cm):  7    REPAIR TYPE:     Repair type:  Intermediate      EXPLORATION:     Wound extent: no foreign body      Contaminated: no      TREATMENT:     Area cleansed with:  Saline    Amount of cleaning:  Standard    SKIN REPAIR     Repair method:  Sutures    Suture size:  4-0    Suture material:  Nylon    Number of sutures:  10    APPROXIMATION     Approximation:  Close    PROCEDURE    Patient Tolerance:  Patient tolerated the  procedure well with [facility]    -Laceration Repair    Date/Time: 4/18/2023 5:35 PM    Performed by: Erlin Jones MD  Authorized by: Erlin Jones MD    Risks, benefits and alternatives discussed.    LACERATION DETAILS     Location:  Leg    Leg location:  L lower leg    Length (cm):  8    REPAIR TYPE:     Repair type:  Intermediate      EXPLORATION:     Wound extent: no tendon damage and no underlying fracture      Contaminated: yes      TREATMENT:     Area cleansed with:  Saline    SKIN REPAIR     Repair method:  Sutures    Suture size:  4-0    Suture material:  Nylon    Number of sutures:  12    APPROXIMATION     Approximation:  Close    PROCEDURE    Patient Tolerance:  Patient tolerated the procedure well with no immediate complications                  No results found for this or any previous visit (from the past 24 hour(s)).    Medications   bupivacaine 0.5 % -  EPINEPHrine 1:200,000 injection 24 mL (24 mLs Intradermal $Given 4/17/23 [id number])       Assessments & Plan (with Medical Decision Making)  62-year-old female with 2 lacerations, 1 to each of the lower legs.  Irrigated and sutured as above.  Placed on Keflex for prophylaxis due to some legs that do not appear to have the best hygiene right now due to some debilitation.  Have recommended sutures out in ~10 days.  Follow-up if any signs of infection in the meantime     I have reviewed the nursing notes.    I have reviewed the findings, diagnosis, plan and need for follow up with the patient.          Discharge Medication List as of 4/17/2023  9:11 PM      START taking these medications    Details   cephALEXin (KEFLEX) 500 MG capsule Take 1 capsule (500 mg) by mouth 3 times daily for 5 days, Disp-30 capsule, R-0, [facility]      HYDROcodone-acetaminophen (NORCO) 5-325 MG tablet Take 1 tablet by mouth every 6 hours as needed for severe pain, Disp-6 tablet, R-0, [facility]      ondansetron  (ZOFRAN ODT) 4 MG ODT tab Take 1 tablet (4 mg) by mouth every 8 hours as needed for nausea, Disp-10 tablet, R-0, [facility]             Final diagnoses:   Leg laceration, left, initial encounter   Laceration of right lower extremity, initial encounter       4/17/2023   [facility] EMERGENCY DEPT     Erlin Jones MD  04/18/23 [id number]

## 2023-04-28 NOTE — PROGRESS NOTES
patient presented for suture removal.  Upon inspections suture areas appeared red, and there was drainage.    Provider Dr. Boss consulted and patient was seen by provider.  Sutures were not removed today per orders from provider.  Suture areas were cleaned and redressed with telfa and gauze bandage.    Patient will return after her vacation to remove sutures.    Nedra Monsivais RN on 4/28/2023 at 5:05 PM

## 2023-04-28 NOTE — PROGRESS NOTES
Assessment & Plan       ICD-10-CM    1. Open wound of skin  T14.8XXA sulfamethoxazole-trimethoprim (BACTRIM DS) 800-160 MG tablet     Wound Aerobic Bacterial Culture Routine      2. Cellulitis of lower extremity, unspecified laterality  L03.119 sulfamethoxazole-trimethoprim (BACTRIM DS) 800-160 MG tablet     Wound Aerobic Bacterial Culture Routine      3. Anxiety  F41.9 ondansetron (ZOFRAN ODT) 4 MG ODT tab     hydrOXYzine (ATARAX) 10 MG tablet         Reviewed the medical record from the ER.  She was seen in the ER on 4/17/23 for lacerations on her calves bilaterally as her legs were accidentally got caught on the wheelchair.  It was repaired appropriate with multiple sutures in place.  She was also treated with Keflex for prophylaxis.  She was here today for suture removal.  I was asked to take a look due to concern of infection.  From my exam, I feel her wounds especially on the right one are in deed infected and therefore the sutures are not ready to be removed.  Recommended to come back in 3-4 days for its removal.  Unfortunately, she will be on vacation for next 10 days.  Will treat her with Bactrim and encouraged to keep the wound clean and dry.  The sutures will need to be removed once again back from a vacation.  Should there any concern or become infected, she we will need to be seen in the ER.  Tylenol or Motrin as needed for pain.  She and her significant other felt comfortable with the plan and all of their questions were answered.    Hydroxyzine and Zofran were also refilled today.        Katia Carver Mai, MD  [facility]    Tete Solorzano is a 62 year old, presenting for the following health issues:  No chief complaint on file.         View : No data to display.              JESSICA Solorzano was seen in the ER on 4/17/23 for lacerations on her calves bilaterally as her legs were accidentally got caught on the wheelchair.  It was repaired appropriate with multiple sutures in  place.  She was also treated with Keflex for prophylaxis.  She was here today for suture removal.  I was asked to take a look due to concern of infection.  No fever or chills but noted drainage on the wound, especially on the right side.  No pain.  Completed the Keflex.  Up-to-date for tetanus shot.      Review of Systems   Constitutional, HEENT, cardiovascular, pulmonary, gi and gu systems are negative, except as otherwise noted.      Objective    /66   Pulse 66   Temp 98.6  F (37  C) (Temporal)   Resp 14   There is no height or weight on file to calculate BMI.  Physical Exam   GENERAL: alert and no distress, comfortable sitting on wheelchair  RESP: lungs clear to auscultation - no rales, rhonchi or wheezes  CV: regular rate and rhythm, no murmur  MS: No peripheral edema  SKIN: The laceration on the back of the calves with intact sutures.  Red and slightly swollen.  Positive purulent drainage noted on the right one.  No warmth to the touch.  No abscess appreciated.    Results for orders placed or performed in visit on 04/28/23   Wound Aerobic Bacterial Culture Routine     Status: None    Specimen: Leg, left; Wound   Result Value Ref Range    Culture No Growth                  patient would like some zofran and something for anxiety

## 2023-05-08 NOTE — PROGRESS NOTES
Jeanine Schuler presents to the clinic today for removal of sutures.  The patient has had the sutures in place for 21 days.    10 sutures are seen located on the R Lower Back Leg.   All sutures were removed today.      12 sutures are seen located on the L Lower Back Leg.  All sutures remain and none removed today due to infection and splitting of wound.    Patient walked to ED today due to infection after being seen by Angelique Antoine NP.
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
## 2023-05-08 NOTE — DISCHARGE INSTRUCTIONS
Referral to wound care nurse/specially clinic has been made electronically.  They should be reaching out to you for an appointment.    Elevate extremities    Change dressings once or twice daily    Continue antibiotic with Bactrim DS twice daily for 7 additional days    Sutures removed because they were festering and increasing risk for infection.  They also were not holding the wound together.

## 2023-05-08 NOTE — MEDICATION SCRIBE - ADMISSION MEDICATION HISTORY
Medication Scribe Admission Medication History    Admission medication history is complete. The information provided in this note is only as accurate as the sources available at the time of the update.    Medication reconciliation/reorder completed by provider prior to medication history? No    Information Source(s): Patient via in-person    Pertinent Information: n/a    Changes made to PTA medication list:    Added: Apap 325mg     Deleted: ventolin inh, lipitor, imuran, D2 50K, Folic acid, gabapentin, Atarax, Cozaar, Mag-ox, potassium, Spironolactone, torsemide    Changed: None    Medication Affordability:  Not including over the counter (OTC) medications, was there a time in the past 12 months when you did not take your medications as prescribed because of cost?: No    Allergies reviewed with patient and updates made in EHR: unable to assess    Medication History Completed By: NILDA RODRIGUEZ 5/8/2023 6:16 PM    Prior to Admission medications    Medication Sig Last Dose Taking? Auth Provider Long Term End Date   amiodarone (PACERONE) 200 MG tablet Take 1 tablet (200 mg) by mouth daily Past Week at am Yes Atul Bentley MD Yes 5/8/23   apixaban ANTICOAGULANT (ELIQUIS) 5 MG tablet Take 1 tablet (5 mg) by mouth 2 times daily Past Week at unk Yes Atul Bentley MD     blood glucose (NO BRAND SPECIFIED) test strip Use to test blood sugar 2 times daily or as directed. 5/5/2023 at k Yes Flora Dia,      metoprolol succinate ER (TOPROL XL) 50 MG 24 hr tablet Take 1 tablet (50 mg) by mouth daily Past Week at k Yes Abundio Ag MD Yes    nitroGLYcerin (NITROSTAT) 0.3 MG sublingual tablet Place 1 tablet (0.3 mg) under the tongue every 5 minutes as needed for chest pain (esophageal spasm) For chest pain place 1 tablet under the tongue every 5 minutes for 3 doses. If symptoms persist 5 minutes after 3rd dose call 911.  at n/a Yes Nicolás De La Torre MD Yes    ondansetron (ZOFRAN ODT) 4 MG  ODT tab Take 1 tablet (4 mg) by mouth every 8 hours as needed for nausea 5/8/2023 at 1030 Yes Katia Boss MD     pantoprazole (PROTONIX) 40 MG EC tablet Take 1 tablet (40 mg) by mouth daily 5/8/2023 at 1030 Yes Nicolás De La Torre MD     predniSONE (DELTASONE) 10 MG tablet Take 1.5 tablets (15 mg) by mouth daily Past Month at [facility] Yes Atul Bentley MD No    sulfamethoxazole-trimethoprim (BACTRIM DS) 800-160 MG tablet Take 1 tablet by mouth 2 times daily 5/8/2023 at am Yes Katia Boss MD No    thin (NO BRAND SPECIFIED) lancets Use with lanceting device twice daily Past Week at [facility] Yes Flora Dia, DO     ASPIRIN NOT PRESCRIBED (INTENTIONAL) Please choose reason not prescribed from choices below.  Patient not taking: Reported on 5/2/2022  at n/a  Katia Boss MD Yes

## 2023-05-08 NOTE — ED TRIAGE NOTES
Pt here with possible infection to lower extremities, stitches in for 21 days.         Triage Assessment     Row Name 05/08/23 4554       Triage Assessment (Adult)    Airway WDL WDL       Respiratory WDL    Respiratory WDL WDL

## 2023-05-09 NOTE — TELEPHONE ENCOUNTER
Message left for patient to return call when able to get an update on how she is doing after her ED visit yesterday.    Check with patient on:  - dehydration/fluids  - infection/antibiotic  - cognitive status  - appetite  - wounds/sutures

## 2023-05-11 NOTE — TELEPHONE ENCOUNTER
RN Triage    Patient Contact    Attempt # 2    Was call answered?  No.  Left message on voicemail with information to call me back.    Nedra Monsivais RN on 5/11/2023 at 9:38 AM

## 2023-05-12 NOTE — TELEPHONE ENCOUNTER
RN Triage    Patient Contact    Attempt # 3    Was call answered?  No.  Left message on voicemail with information to call me back.    Nedra Monsivais RN on 5/12/2023 at 3:03 PM

## 2023-05-16 PROBLEM — R06.02 SHORTNESS OF BREATH: Status: ACTIVE | Noted: 2023-01-01

## 2023-05-16 PROBLEM — I82.90 DEEP VEIN THROMBOSIS (DVT) OF NON-EXTREMITY VEIN, UNSPECIFIED CHRONICITY: Status: ACTIVE | Noted: 2023-01-01

## 2023-05-16 PROBLEM — E87.70 HYPERVOLEMIA, UNSPECIFIED HYPERVOLEMIA TYPE: Status: ACTIVE | Noted: 2023-01-01

## 2023-05-16 PROBLEM — I26.99 OTHER PULMONARY EMBOLISM WITHOUT ACUTE COR PULMONALE, UNSPECIFIED CHRONICITY (H): Status: ACTIVE | Noted: 2023-01-01
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
## 2023-05-16 NOTE — ED PROVIDER NOTES
ED Provider Note  [facility]      History     Chief Complaint   Patient presents with     Chest Pain     Shortness of Breath     HPI  Jeanine Schuler is a 62 year old female who has a history significant for hypertension, prolonged QTc, chronic cough, sarcoidosis with chronic immunosuppression, pulmonary hypertension, chronic atrial fibrillation on chronic anticoagulation, chronic congestive systolic heart failure and prior pneumonia.  Today she arrives to the emergency department for evaluation of shortness of breath.  Patient reports that over the past months she has generally felt unwell.  She has been to an outside ER multiple times including for evaluation of weakness and a fall resulting in lower extremity lacerations.  Today she reports some discomfort behind her left knee she expresses concern for developing blood clots.  Patient previously on anticoagulation but states she is not on any at this time.  She otherwise reports that she has had shortness of breath that is worsened over the past 1 week significantly worsened today.  She feels short of breath at rest or with exertion.  She has minor chest discomfort in the left anterior chest with some radiation towards her back.  No recent fall, trauma.  She reports chronic cough that is unchanged.  She denies development of new fever or chills.  Approximately 1 year ago the patient did have a cardiac catheterization with no interventions.  She did also have stress testing, cardiac MRI secondary to persistent elevation in troponin and BNP.  Patient had multiple CTs including CT with contrast demonstrate no sign of pulmonary embolus.    Past Medical History  Past Medical History:   Diagnosis Date     Anemia      Bariatric surgery status 06/27/2005    [facility];     Cellulitis of leg 06/06/2013     Esophageal reflux     Better post-hiatal hernia repair and weight loss     Hyperplastic colonic polyp      Hypovitaminosis  D 2011     ILD (interstitial lung disease) (H)      Kidney stone 04/29/2007     Kidney stone 05/10/2007    surgically removed     Limb ischemia; ulcers of fingertips 04/22/2013     Other chronic pain     Left shoulder - rheumatoid arthritis     Raynaud's syndrome      Rheumatoid arthritis (H) \     Scleroderma (H)      Sjogren-Jake syndrome      Systemic sclerosis (H) 2009     Unspecified essential hypertension      Past Surgical History:   Procedure Laterality Date     BIOPSY MUSCLE DIAGNOSTIC (LOCATION) Right 7/19/2021    Procedure: Right SURAL nerve BIOPSY;  Surgeon: Farooq Abel MD;  Location: [facility] OR     BRONCHOSCOPY FLEXIBLE,L CRYOBIOPSY N/A 10/06/2020    Procedure: BRONCHOSCOPY, FLEXIBLE, WITH TRANSBRONCHIAL BIOPSY x4 USING CRYOPROBE, bronchial alveolar lavage;  Surgeon: Teddy Mendez MD;  Location:  OR     BYPASS GASTRIC, CHOLECYSTECTOMY, COMBINED  06/27/2005    [facility]     CHOLECYSTECTOMY       COLONOSCOPY       COLONOSCOPY N/A 11/17/2020    Procedure: COLONOSCOPY, WITH POLYPECTOMY AND BIOPSY;  Surgeon: Jamie Cárdenas MD;  [place] [facility] [place]     CV HEART CATHETERIZATION WITH POSSIBLE INTERVENTION Left 12/21/2021    Procedure: Heart Catheterization with Possible Intervention;  Surgeon: Wesley Mclean MD;  Location:  HEART CARDIAC CATH LAB     CV RIGHT HEART CATH MEASUREMENTS RECORDED N/A 4/7/2022    Procedure: Right Heart Cath;  Surgeon: Dieter Brock MD;  Location:  HEART CARDIAC CATH LAB     ESOPHAGOSCOPY, GASTROSCOPY, DUODENOSCOPY (EGD), COMBINED N/A 03/10/2016    Procedure: COMBINED ESOPHAGOSCOPY, GASTROSCOPY, DUODENOSCOPY (EGD), BIOPSY SINGLE OR MULTIPLE;  Surgeon: Tricia Zambrano MD;  Location:  GI     ESOPHAGOSCOPY, GASTROSCOPY, DUODENOSCOPY (EGD), COMBINED N/A 11/17/2020    Procedure: ESOPHAGOGASTRODUODENOSCOPY, WITH BIOPSY and Dilation;  Surgeon: Jamie Cárdenas MD;  [place]: [facility] [place]     INNER EAR SURGERY       PICC INSERTION  06/05/2013     5fr DL Power PICC, 44cm, left basilic vein, tip in low SVC     SURGICAL HISTORY OF -       Left ear surgery - incus transition, tympanoplasty     SURGICAL HISTORY OF -   06/01/2005    Hiatal hernia repair     TONSILLECTOMY       acetaminophen (TYLENOL) 325 MG tablet  amiodarone (PACERONE) 200 MG tablet  apixaban ANTICOAGULANT (ELIQUIS) 5 MG tablet  ASPIRIN NOT PRESCRIBED (INTENTIONAL)  blood glucose (NO BRAND SPECIFIED) test strip  metoprolol succinate ER (TOPROL XL) 50 MG 24 hr tablet  nitroGLYcerin (NITROSTAT) 0.3 MG sublingual tablet  ondansetron (ZOFRAN ODT) 4 MG ODT tab  pantoprazole (PROTONIX) 40 MG EC tablet  predniSONE (DELTASONE) 10 MG tablet  sulfamethoxazole-trimethoprim (BACTRIM DS) 800-160 MG tablet  thin (NO BRAND SPECIFIED) lancets      Allergies   Allergen Reactions     Enoxaparin Sodium Other (See Comments)     Blood clot      Norvasc [Amlodipine Besylate] Swelling     Lip swelling that happened on 2 different occasions     Erythromycin Rash     Rash on arms     Family History  Family History   Problem Relation Age of Onset     Cerebrovascular Disease Father      Heart Disease Father      Hypertension Father      Heart Disease Mother      Hypertension Mother      Chronic Obstructive Pulmonary Disease Sister      Heart Disease Sister      Hypertension Sister      Cerebrovascular Disease Brother      Heart Disease Brother      Kidney Disease Brother         on dialysis     Diabetes Brother         borderline     No Known Problems Son      No Known Problems Son      No Known Problems Daughter      Cancer Brother         small cell     Heart Disease Brother      Heart Disease Brother      Heart Disease Brother      Heart Disease Brother         tripple A     Heart Disease Sister      Substance Abuse Sister         alcoholism     Crohn's Disease No family hx of      Ulcerative Colitis No family hx of      GERD No family hx of      Celiac Disease No family hx of      Nephrolithiasis No family hx of   "    Parathyroid Disorders No family hx of      Calcium Disorder No family hx of      Social History   Social History     Tobacco Use     Smoking status: Never     Smokeless tobacco: Never   Vaping Use     Vaping status: Never Used   Substance Use Topics     Alcohol use: Yes     Comment: occassionally     Drug use: No         A medically appropriate review of systems was performed with pertinent positives and negatives noted in the HPI, and all other systems negative.    Physical Exam   BP: 102/72  Pulse: 54  Temp: 97.6  F (36.4  C)  Resp: 22  Height: 175.3 cm (5' 9\")  Weight: 68 kg (150 lb)  SpO2: (!) 87 %  Physical Exam  Vitals and nursing note reviewed.   Constitutional:       General: She is not in acute distress.     Appearance: Normal appearance. She is not ill-appearing, toxic-appearing or diaphoretic.   HENT:      Head: Normocephalic and atraumatic.      Right Ear: External ear normal.      Left Ear: External ear normal.      Nose: Nose normal. No congestion.      Mouth/Throat:      Mouth: Mucous membranes are moist.      Pharynx: Oropharynx is clear. No oropharyngeal exudate.   Eyes:      Extraocular Movements: Extraocular movements intact.      Conjunctiva/sclera: Conjunctivae normal.      Pupils: Pupils are equal, round, and reactive to light.   Cardiovascular:      Rate and Rhythm: Normal rate.      Pulses: Normal pulses.      Heart sounds: Normal heart sounds. No murmur heard.     No friction rub.   Pulmonary:      Effort: Pulmonary effort is normal. No respiratory distress.      Breath sounds: No stridor. No wheezing, rhonchi or rales.   Abdominal:      General: Abdomen is flat. There is no distension.      Tenderness: There is no abdominal tenderness. There is no guarding or rebound.   Musculoskeletal:         General: No deformity or signs of injury. Normal range of motion.      Cervical back: Normal range of motion.      Right lower leg: No edema.      Left lower leg: No edema.   Skin:     General: " Skin is warm.      Capillary Refill: Capillary refill takes less than 2 seconds.      Coloration: Skin is not pale.      Findings: No bruising or erythema.   Neurological:      General: No focal deficit present.      Mental Status: She is alert.      Cranial Nerves: No cranial nerve deficit.      Motor: No weakness.   Psychiatric:         Mood and Affect: Mood normal.         Behavior: Behavior normal.       ED Course, Procedures, & Data      Procedures            Medications   heparin infusion 25,000 units in D5W 250 mL ANTICOAGULANT (1,200 Units/hr Intravenous $New Bag 5/16/23 2148)   sodium chloride (PF) 0.9% PF flush 90 mL (90 mLs Intravenous $Given 5/16/23 2012)   iopamidol (ISOVUE-370) solution 60 mL (90 mLs Intravenous $Given 5/16/23 2011)   aspirin (ASA) chewable tablet 324 mg (324 mg Oral $Given 5/16/23 1956)   dextrose 50 % injection 50 mL (50 mLs Intravenous $Given 5/16/23 1815)   fentaNYL (PF) (SUBLIMAZE) injection 50 mcg (50 mcg Intravenous $Given 5/16/23 1956)   dextrose 5% and 0.45% NaCl infusion (0 mLs Intravenous Stopped 5/16/23 2024)   dextrose 50 % injection 50 mL (50 mLs Intravenous $Given 5/16/23 1818)   magnesium sulfate 2 g in 50 mL sterile water intermittent infusion (0 g Intravenous Stopped 5/16/23 2130)   heparin ANTICOAGULANT Loading dose for HIGH INTENSITY TREATMENT * Give BEFORE starting heparin infusion (5,450 Units Intravenous $Given 5/16/23 2146)     Labs Ordered and Resulted from Time of ED Arrival to Time of ED Departure   COMPREHENSIVE METABOLIC PANEL - Abnormal       Result Value    Sodium 134 (*)     Potassium 4.1      Chloride 98      Carbon Dioxide (CO2) 17 (*)     Anion Gap 19 (*)     Urea Nitrogen 9.6      Creatinine 0.82      Calcium 8.4 (*)     Glucose 61 (*)     Alkaline Phosphatase 119 (*)     AST        ALT 16      Protein Total 6.1 (*)     Albumin 2.5 (*)     Bilirubin Total 1.3 (*)     GFR Estimate 81     TROPONIN T, HIGH SENSITIVITY - Abnormal    Troponin T, High  Sensitivity 72 (*)    MAGNESIUM - Abnormal    Magnesium 1.3 (*)    NT PROBNP INPATIENT - Abnormal    N terminal Pro BNP Inpatient 7,752 (*)    CBC WITH PLATELETS AND DIFFERENTIAL - Abnormal    WBC Count 7.1      RBC Count 4.99      Hemoglobin 15.3      Hematocrit 47.3 (*)     MCV 95      MCH 30.7      MCHC 32.3      RDW 16.2 (*)     Platelet Count 358      % Neutrophils 75      % Lymphocytes 17      % Monocytes 7      % Eosinophils 0      % Basophils 0      % Immature Granulocytes 1      NRBCs per 100 WBC 0      Absolute Neutrophils 5.3      Absolute Lymphocytes 1.2      Absolute Monocytes 0.5      Absolute Eosinophils 0.0      Absolute Basophils 0.0      Absolute Immature Granulocytes 0.1      Absolute NRBCs 0.0     GLUCOSE BY METER - Abnormal    GLUCOSE BY METER POCT 26 (*)    GLUCOSE BY METER - Abnormal    GLUCOSE BY METER POCT 49 (*)    GLUCOSE - Abnormal    Glucose 230 (*)    GLUCOSE BY METER - Abnormal    GLUCOSE BY METER POCT 13 (*)    GLUCOSE BY METER - Abnormal    GLUCOSE BY METER POCT 41 (*)    ISTAT GASES ELECTROLYTES ICA GLUCOSE VENOUS POCT - Abnormal    CPB Applied No      Hematocrit POCT 37      Calcium, Ionized Whole Blood POCT 4.7      Glucose Whole Blood POCT 217 (*)     Bicarbonate Venous POCT 20 (*)     Hemoglobin POCT 12.6      Potassium POCT 3.7      Sodium POCT 133      pCO2 Venous POCT 34 (*)     pO2 Venous POCT 22 (*)     pH Venous POCT 7.37      O2 Sat, Venous POCT 37 (*)    GLUCOSE - Abnormal    Glucose 121 (*)    RESPIRATORY PANEL PCR - Normal    Adenovirus Not Detected      Coronavirus Not Detected      Human Metapneumovirus Not Detected      Human Rhin/Enterovirus Not Detected      Influenza A Not Detected      Influenza A, H1 Not Detected      Influenza A 2009 H1N1 Not Detected      Influenza A, H3 Not Detected      Influenza B Not Detected      Parainfluenza Virus 1 Not Detected      Parainfluenza Virus 2 Not Detected      Parainfluenza Virus 3 Not Detected      Parainfluenza Virus 4  Not Detected      Respiratory Syncytial Virus A Not Detected      Respiratory Syncytial Virus B Not Detected      Chlamydia Pneumoniae Not Detected      Mycoplasma Pneumoniae Not Detected            Critical care was not performed.     Medical Decision Making  The patient's presentation was of moderate complexity (an acute complicated injury).    The patient's evaluation involved:  review of external note(s) from 3+ sources (see separate area of note for details)  ordering and/or review of 3+ test(s) in this encounter (see separate area of note for details)  review of 3+ test result(s) ordered prior to this encounter (see separate area of note for details)    The patient's management necessitated high risk (a decision regarding hospitalization).      Assessment & Plan      Jeanine Schuler is a 62 year old female who has a history significant for hypertension, prolonged QTc, chronic cough, sarcoidosis with chronic immunosuppression, pulmonary hypertension, chronic atrial fibrillation on chronic anticoagulation, chronic congestive systolic heart failure and prior pneumonia.  Today she arrives to the emergency department for evaluation of shortness of breath.  Upon arrival to the emergency department she is currently noted alert.  She is presently afebrile and hemodynamically stable.  SPO2 noted to be 87% on triage placed on oxygen however notable history significant for crest syndrome and Raynaud's with poor pulse oximetry and fingers.  The patient appears almost cachectic compared to picture in our system.  Unclear etiology.  Regarding shortness of breath broad differential including worsening of pulmonary sarcoidosis, development of effusion, pneumonia, pulmonary embolus, pericardial effusion, dysrhythmia.  Secondary to extensive cardiac work-up within the past 1 year including catheterization low suspicion for ACS.  However she has had a history of heart failure this could be contributing to shortness of breath  and peripheral edema.  I would plan for repeat troponin and BNP to compare to baseline.  With complex past medical history and concern for possible clot I do believe she requires CT imaging of the chest.  EKG in my interpretation demonstrating normal sinus rhythm and rate.  No sign of acute ST changes, PVCs or interval abnormalities.  EKG normal on my interpretation.    As noted per above and concern of the patient's clinical appearance at bedside versus her picture in the chart.  She has the appearance of significant malnutrition and possible malignancy.  Of note the patient was found to have fairly significant hypoglycemia.  She states at home she was given a glucometer and she is ranged in the 40-60 range.  Today she was checked at bedside and 41.  She was given D50 x2 with persistent hypoglycemia placed on D5 half NS.  I did order CT imaging to be continued to the abdomen pelvis specifically to evaluate the pancreas.    Ultimately glucose has improved we will continue to monitor closely.  CT imaging of the chest abdomen pelvis obtained and discussed with radiology.  First the patient is noted to have bilateral lower extremity DVTs.  As noted above the patient reports she has stopped taking her anticoagulation.  Further patient noted to have age-indeterminate pulmonary emboli no sign of acute right heart strain.  Additionally, patient noted to have by CT some thickening over the right breast with nipple retraction with recommendation for outpatient follow-up for mammogram.  Patient continues to be ill in appearance.  Her BNP is significantly elevated.  She has a number of medical conditions with moderate exacerbation requiring acute hospitalization for management.    I have reviewed the nursing notes. I have reviewed the findings, diagnosis, plan and need for follow up with the patient.    New Prescriptions    No medications on file       Final diagnoses:   Shortness of breath   Hypervolemia, unspecified  hypervolemia type   Other pulmonary embolism without acute cor pulmonale, unspecified chronicity (H)   Deep vein thrombosis (DVT) of non-extremity vein, unspecified chronicity   Hypoglycemia       Daniel Bryant  [facility] EMERGENCY DEPARTMENT  5/16/2023     Daniel Bryant MD  05/16/23 1268

## 2023-05-17 NOTE — H&P
Physician Attestation   I, Kaylynn Vasquez MD, was present with the medical/MIKKI student who participated in the service and in the documentation of the note.  I have verified the history and personally performed the physical exam and medical decision making.  I agree with the assessment and plan of care as documented in the note.      Key findings: please see below assessment and plan that I completed.    Please see A&P for additional details of medical decision making.      Kaylynn Vasquez MD  Date of Service (when I saw the patient): 05/17/23    [facility]    History and Physical - Hospitalist Service, GOLD TEAM        Date of Admission:  5/16/2023    Assessment & Plan      Jeanine Schuler is a 62 year old female that presented to our service with shortness of breath, chest pain and worsening generalized weakness who was found to have PE and bilateral DVTs in the setting of recent discontinue of oral anticoagulation and inmobility due to bilateral leg injury. Also stopped many of her rheumatologic medications and experiencing significant physical decline.      Pulmonary Embolism  Bilateral DVT  Presented to ED with 24 hours of worsening SOB. Lower extremity venous doppler US revealed partially occlusive thrombus in the common femoral vein, proximal femoral vein, and popliteal vein and partially occlusive thrombus in the left distal femoral vein and popliteal vein. Chest CTA revealed multiple right sided pulmonary emboli, some of which appear to be chronic. Patient given suplemental Oxygen 2LPM CN and saturating 99%. Of note, she was taking eliquis for A.fib, but she discontinued this recently after became anxious of side effects that she saw on TV add. She was started on heparin gtt in ED. This event is likely provoked in the setting of immobility due to bilateral LE injury  - continue IV heparin for now, but plan to resume apixiban, this time for DVT/PE  -  titrate suplemmental oxygen    Bilateral lower extremity lacerations with hx of wound infection  About 1 month PTA, she fell from her wheel chair and sustained severe laceration of bilateral lower calf. She was seen in ED and the wounds were sutured. Subsequently, she developed wound infection and was treated with 2 rounds of bactrim for cellulitis. She continues to endorse significant pain not being able to ambulate. The wound does not appear to be acutely infected on admission.  - wound care consult    Episodes of hypoglycemia  The patient has glucometer at home and has been noting hypoglycemia 50s and 60s at home. In the ED, finger stick glucose had repeatedly low values in to 10s and serum glucose was in 60s. Of note, she was on prednisone 15mg daily for her rheumatologic conditions, but has not been taking them putting her at risk for AI. She is also not taking much po.  - glucose check q4h  - receiving D5W with heparin gtt at this time  - check cortisol level    Sarcoidosis with pulmonary involvement  pulmonary hypertension  Was seen by Pulmonary Dr Perlman in 87/2022, she follows with Dr Bentley for pulmonary hypertension. Was on prednisone and imuran in the past, but she stopped taking all of her rheumatologic medications due to not feeling well.  - monitor O2 saturation  - clarify current medication regimen    Mixed connective tissue disease  Scleroderma  Raynauds  Follows with Rheum Dr Nicolás De La Torre, not taking home meds including prednisone. Last seen in 7/2022  - need rheumatology follow up.  - resumed prednisone    Deconditioning  Malnutrition  Patient with poor apetite for the last 2 months with poor PO intake, last meal 5 days ago due to nausea. Albumin: 2.5. She states at home she was given a glucometer and she is ranged in the 40-60 range. On admission Glucose 61, and at bedside 41.  - monitor po intake  - consider nutrition consult  - PT/OT consultation    CHF  Hx of atrial fibrillation  Pulmonary  Edema  Small bilateral Pleural effusions  Chest CT on admission reveals small bilateral pleural effusions L>R, Interlobular septal thickening and areas of peribronchovascular nodular and GGO in the lungs bilaterally, cardiomegaly and ascitis. BNP on admission to the ER was elevated, 7752. Last echo in 5/2022 showed EF 50-55%.  - resume apixaban in am (as above)  - hold PTA metoprolol and amiodarone as she was not taking them and VS stable.  - hold off IV diuresis at this time.    CT evidence of Cirrhosis   Portal Hypertension  Patient with no previous history of cirrhosis or liver disease. A Abdomen CT was ordered in ER revealed a cirrhotic liver with portal hypertension, ascites, diffuse anasarca and recanalized unbilical vein.   - need outpatient work up and close follow up    right breast skin thickening and nipple retraction on CT.   Incidental finding on CT done in the ED.   - outpatient mammography    Diet:   regular  DVT Prophylaxis: on anticoagulation  Deleon Catheter: Not present  Fluids: D5W with heparin infusion  Lines: None     Cardiac Monitoring: None  Code Status:  full    Clinically Significant Risk Factors Present on Admission            # Hypomagnesemia: Lowest Mg = 1.3 mg/dL in last 2 days, will replace as needed  # Anion Gap Metabolic Acidosis: Highest Anion Gap = 19 mmol/L in last 2 days, will monitor and treat as appropriate  # Hypoalbuminemia: Lowest albumin = 2.5 g/dL at 5/16/2023  4:53 PM, will monitor as appropriate  # Drug Induced Coagulation Defect: home medication list includes an anticoagulant medication    # Hypertension: Noted on problem list               Disposition Plan      Expected Discharge Date: 05/18/2023                The patient's care was discussed with the Patient and Patient's Family.    MD Priscila Jensen  Medical Student  Hospitalist Service, [facility]  Securely message with Vocera (more  info)  Text page via [facility] Paging/Directory   See signed in provider for up to date coverage information  ______________________________________________________________________    Chief Complaint   Shortness of breath on rest and severe weakness    History obtained from patient.    History of Present Illness   Jeanine Schuler is a 62 year old female with a past medical history of HTN, chronic atrial fibrillation on anticouagulation until recently, multiple autoimmune diseases, sarcoidosis, pulmonary hypertension and CHF, who presented to our service with shortness of breath in rest, anterior pressure like chest pain that irradiated to back and severe weakness that worsen today. For the last couple of months she has generally felt unwell with nausea, poor apetite (last meal 5 days ago) and progressive debilitating weakness to the point of difficulty deambulating. 1 month ago she fell from her wheel chair and injured both legs, needing stitches and 2 rounds of bactrim due to infection. This wounds have been very painful and have contributed for her bedridden status.        Patient refers pain, discomfort and swelling in both legs.  No difficulty swallowing, vomit, diarrhea or dysuria. No other symptoms.     Past Medical History    Past Medical History:   Diagnosis Date     Anemia      Bariatric surgery status 06/27/2005    abdi [facility] Y- [facility];     Cellulitis of leg 06/06/2013     Esophageal reflux     Better post-hiatal hernia repair and weight loss     Hyperplastic colonic polyp      Hypovitaminosis D 2011     ILD (interstitial lung disease) (H)      Kidney stone 04/29/2007     Kidney stone 05/10/2007    surgically removed     Limb ischemia; ulcers of fingertips 04/22/2013     Other chronic pain     Left shoulder - rheumatoid arthritis     Raynaud's syndrome      Rheumatoid arthritis (H) \     Scleroderma (H)      Sjogren-Jake syndrome      Systemic sclerosis (H) 2009     Unspecified essential  hypertension        Past Surgical History   Past Surgical History:   Procedure Laterality Date     BIOPSY MUSCLE DIAGNOSTIC (LOCATION) Right 7/19/2021    Procedure: Right SURAL nerve BIOPSY;  Surgeon: Farooq Abel MD;  Location: [facility] OR     BRONCHOSCOPY FLEXIBLE,L CRYOBIOPSY N/A 10/06/2020    Procedure: BRONCHOSCOPY, FLEXIBLE, WITH TRANSBRONCHIAL BIOPSY x4 USING CRYOPROBE, bronchial alveolar lavage;  Surgeon: Teddy Mendez MD;  Location: [facility] OR     BYPASS GASTRIC, CHOLECYSTECTOMY, COMBINED  06/27/2005    [facility]     CHOLECYSTECTOMY       COLONOSCOPY       COLONOSCOPY N/A 11/17/2020    Procedure: COLONOSCOPY, WITH POLYPECTOMY AND BIOPSY;  Surgeon: Jamie Cárdenas MD;  [place] [facility] [place]     [place] HEART CATHETERIZATION WITH POSSIBLE INTERVENTION Left 12/21/2021    Procedure: Heart Catheterization with Possible Intervention;  Surgeon: Wesley Mclean MD;  Location:  HEART CARDIAC CATH LAB     CV RIGHT HEART CATH MEASUREMENTS RECORDED N/A 4/7/2022    Procedure: Right Heart Cath;  Surgeon: Dieter Brock MD;  Location:  HEART CARDIAC CATH LAB     ESOPHAGOSCOPY, GASTROSCOPY, DUODENOSCOPY (EGD), COMBINED N/A 03/10/2016    Procedure: COMBINED ESOPHAGOSCOPY, GASTROSCOPY, DUODENOSCOPY (EGD), BIOPSY SINGLE OR MULTIPLE;  Surgeon: Tricia Zambrano MD;  Location:  GI     ESOPHAGOSCOPY, GASTROSCOPY, DUODENOSCOPY (EGD), COMBINED N/A 11/17/2020    Procedure: ESOPHAGOGASTRODUODENOSCOPY, WITH BIOPSY and Dilation;  Surgeon: Jamie Cárdenas MD;  [place] [facility] [place]     INNER EAR SURGERY       PICC INSERTION  06/05/2013    5fr DL Power PICC, 44cm, left basilic vein, tip in low SVC     SURGICAL HISTORY OF -       Left ear surgery - incus transition, tympanoplasty     SURGICAL HISTORY OF -   06/01/2005    Hiatal hernia repair     TONSILLECTOMY         Prior to Admission Medications   Prior to Admission Medications   Prescriptions Last Dose Informant Patient Reported? Taking?   ASPIRIN NOT  PRESCRIBED (INTENTIONAL)  Self No No   Sig: Please choose reason not prescribed from choices below.   Patient not taking: Reported on 5/2/2022   acetaminophen (TYLENOL) 325 MG tablet 4/3/2023 at unknown Self Yes Yes   Sig: Take 975 mg by mouth every 8 hours as needed for mild pain   amiodarone (PACERONE) 200 MG tablet   No No   Sig: Take 1 tablet (200 mg) by mouth daily   apixaban ANTICOAGULANT (ELIQUIS) 5 MG tablet Not Taking Self No No   Sig: Take 1 tablet (5 mg) by mouth 2 times daily   Patient not taking: Reported on 5/16/2023   blood glucose (NO BRAND SPECIFIED) test strip  Self No No   Sig: Use to test blood sugar 2 times daily or as directed.   metoprolol succinate ER (TOPROL XL) 50 MG 24 hr tablet 4/3/2023 at unknown Self No No   Sig: Take 1 tablet (50 mg) by mouth daily   nitroGLYcerin (NITROSTAT) 0.3 MG sublingual tablet Unknown at unknown Self No Yes   Sig: Place 1 tablet (0.3 mg) under the tongue every 5 minutes as needed for chest pain (esophageal spasm) For chest pain place 1 tablet under the tongue every 5 minutes for 3 doses. If symptoms persist 5 minutes after 3rd dose call 911.   ondansetron (ZOFRAN ODT) 4 MG ODT tab 5/16/2023 at unknown Self No Yes   Sig: Take 1 tablet (4 mg) by mouth every 8 hours as needed for nausea   pantoprazole (PROTONIX) 40 MG EC tablet 5/16/2023 at unknown Self No Yes   Sig: Take 1 tablet (40 mg) by mouth daily   predniSONE (DELTASONE) 10 MG tablet 4/3/2023 at unknown Self No Yes   Sig: Take 1.5 tablets (15 mg) by mouth daily   sulfamethoxazole-trimethoprim (BACTRIM DS) 800-160 MG tablet 4/3/2023 at unknown Self No Yes   Sig: Take 1 tablet by mouth 2 times daily   sulfamethoxazole-trimethoprim (BACTRIM DS) 800-160 MG tablet   No No   Sig: Take 1 tablet by mouth 2 times daily for 7 days   thin (NO BRAND SPECIFIED) lancets Unknown at unknown Self No Yes   Sig: Use with lanceting device twice daily      Facility-Administered Medications: None        Review of Systems    The  10 point Review of Systems is negative other than noted in the HPI or here.     Social History   I have reviewed this patient's social history and updated it with pertinent information if needed.  Social History     Tobacco Use     Smoking status: Never     Smokeless tobacco: Never   Vaping Use     Vaping status: Never Used   Substance Use Topics     Alcohol use: Yes     Comment: occassionally     Drug use: No       Family History   I have reviewed this patient's family history and updated it with pertinent information if needed.  Family History   Problem Relation Age of Onset     Cerebrovascular Disease Father      Heart Disease Father      Hypertension Father      Heart Disease Mother      Hypertension Mother      Chronic Obstructive Pulmonary Disease Sister      Heart Disease Sister      Hypertension Sister      Cerebrovascular Disease Brother      Heart Disease Brother      Kidney Disease Brother         on dialysis     Diabetes Brother         borderline     No Known Problems Son      No Known Problems Son      No Known Problems Daughter      Cancer Brother         small cell     Heart Disease Brother      Heart Disease Brother      Heart Disease Brother      Heart Disease Brother         tripple A     Heart Disease Sister      Substance Abuse Sister         alcoholism     Crohn's Disease No family hx of      Ulcerative Colitis No family hx of      GERD No family hx of      Celiac Disease No family hx of      Nephrolithiasis No family hx of      Parathyroid Disorders No family hx of      Calcium Disorder No family hx of        Allergies   Allergies   Allergen Reactions     Enoxaparin Sodium Other (See Comments)     Blood clot      Norvasc [Amlodipine Besylate] Swelling     Lip swelling that happened on 2 different occasions     Erythromycin Rash     Rash on arms        Physical Exam   Vital Signs: Temp: 97.9  F (36.6  C) Temp src: Axillary BP: 119/89 Pulse: 89   Resp: 20 SpO2: 99 % O2 Device: Nasal cannula  Oxygen Delivery: 2 LPM  Weight: 150 lbs 0 oz    General Appearance: Alert and oriented x3. Cachectic.   Respiratory: crackels at left lung bases.   Cardiovascular: irregular rhythm, no murmurs, no gallops.   GI: bowel sounds present, soft and nontender to palpation. No rebound tenderness.  Skin: open wounds in bilateral lower extremities. No erythema.  Extremities: bilateral pitting edema +1 in lower extremities.     Medical Decision Making       Please see A&P for additional details of medical decision making.      Data     I have personally reviewed the following data over the past 24 hrs:    7.1  \   12.6   / 358     133 98 9.6 /  100 (H)   3.7 17 (L) 0.82 \       ALT: 16 AST: N/A AP: 119 (H) TBILI: 1.3 (H)   ALB: 2.5 (L) TOT PROTEIN: 6.1 (L) LIPASE: N/A       Trop: 72 (H) BNP: 7,752 (H)       Imaging results reviewed over the past 24 hrs:

## 2023-05-17 NOTE — PROGRESS NOTES
Arrived on the floor from ED [facility] at 0415 with primary dx of DVT on BLE. Pt A/O X 4. Afebrile. VS WNL. Lung sound with fine crackles on auscultation, diminished and equal bilaterally both anterior and lateral. IS encouraged. Bowel sound hypoactive in all quadrants, passing gas,  last BM was yesterday. Voids spontaneously without difficulty but she did not void until the end of shift since she arrived on the floor.. Denies nausea and vomiting. 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 Has constant pain in BLE and given with PRN Oxycodone 5mg and is well tolerated. Appetite is poor, she has not eaten well for more than two days due to nausea/vomiting. She has wound on bilateral calves, covered with kerlix and dressing was changed in ED. Pt has generalized weakness and edema on BLE +2- +3 She had not able to get up when at home due to pain in BLE, SOB on exertion, dizziness, chestpain and light-headedness. PIV patent in the LUE with Heparin infusing at 11ml/hr. CTA showed multiple PE's and stated that she felt better and SOB was relived after heparin drip was started. Next draw of Hep Anti-XA is due at 1215. Pillows placed under BLE for support. For WOC and Nutrition consult.

## 2023-05-17 NOTE — PROGRESS NOTES
[facility] Service - Internal Medicine Daily Note   Date of Service: 5/17/2023  Patient: Jeanine Schuler  MRN: [id number]  Admission Date: 5/16/2023  Hospital Day # 1    Assessment & Plan    This is a 62-year-old female patient was admitted with bilateral lower extremity DVT.      Acute bilateral lower extremity DVT and pulmonary embolism.  In the setting of medication noncompliance.  Jeanine Schuler is a 62 year old female that presented to our service with shortness of breath, chest pain and worsening generalized weakness who was found to have PE and bilateral DVTs in the setting of recent discontinue of oral anticoagulation and inmobility due to bilateral leg injury. Also stopped many of her rheumatologic medications and experiencing significant physical decline.        Pulmonary Embolism  Bilateral DVT  Presented to ED with 24 hours of worsening SOB. Lower extremity venous doppler US revealed partially occlusive thrombus in the common femoral vein, proximal femoral vein, and popliteal vein and partially occlusive thrombus in the left distal femoral vein and popliteal vein. Chest CTA revealed multiple right sided pulmonary emboli, some of which appear to be chronic. Patient given suplemental Oxygen 2LPM CN and saturating 99%. Of note, she was taking eliquis for A.fib, but she discontinued this recently after became anxious of side effects that she saw on TV add. She was started on heparin gtt in ED. This event is likely provoked in the setting of immobility due to bilateral LE injury  - continue IV heparin for now, but plan to resume apixiban, this time for DVT/PE  - titrate suplemmental oxygen     Bilateral lower extremity lacerations with hx of wound infection  About 1 month PTA, she fell from her wheel chair and sustained severe laceration of bilateral lower calf. She was seen in ED and the wounds were sutured. Subsequently, she developed wound infection and was treated with 2 rounds of bactrim for  cellulitis. She continues to endorse significant pain not being able to ambulate. The wound does not appear to be acutely infected on admission.  - wound care consult     Episodes of hypoglycemia  The patient has glucometer at home and has been noting hypoglycemia 50s and 60s at home. In the ED, finger stick glucose had repeatedly low values in to 10s and serum glucose was in 60s. Of note, she was on prednisone 15mg daily for her rheumatologic conditions, but has not been taking them putting her at risk for AI. She is also not taking much po.  - glucose check q4h  - receiving D5W with heparin gtt at this time  - check cortisol level     Sarcoidosis with pulmonary involvement  pulmonary hypertension  Was seen by Pulmonary Dr Perlman in 87/2022, she follows with Dr Bentley for pulmonary hypertension. Was on prednisone and imuran in the past, but she stopped taking all of her rheumatologic medications due to not feeling well.  - monitor O2 saturation  - clarify current medication regimen     Mixed connective tissue disease  Scleroderma  Raynauds  Follows with Rheum Dr Nicolás De La Torre, not taking home meds including prednisone. Last seen in 7/2022  - need rheumatology follow up.  - resumed prednisone     Deconditioning  Malnutrition  Patient with poor apetite for the last 2 months with poor PO intake, last meal 5 days ago due to nausea. Albumin: 2.5. She states at home she was given a glucometer and she is ranged in the 40-60 range. On admission Glucose 61, and at bedside 41.  - monitor po intake  - consider nutrition consult  - PT/OT consultation     CHF  Hx of atrial fibrillation  Pulmonary Edema  Small bilateral Pleural effusions  Chest CT on admission reveals small bilateral pleural effusions L>R, Interlobular septal thickening and areas of peribronchovascular nodular and GGO in the lungs bilaterally, cardiomegaly and ascitis. BNP on admission to the ER was elevated, 7752. Last echo in 5/2022 showed EF 50-55%.  -  resume apixaban in am (as above)  - hold PTA metoprolol and amiodarone as she was not taking them and VS stable.  - hold off IV diuresis at this time.     CT evidence of Cirrhosis   Portal Hypertension  Patient with no previous history of cirrhosis or liver disease. A Abdomen CT was ordered in ER revealed a cirrhotic liver with portal hypertension, ascites, diffuse anasarca and recanalized unbilical vein.   - need outpatient work up and close follow up     right breast skin thickening and nipple retraction on CT.   Incidental finding on CT done in the ED.   - outpatient mammography     Diet:   regular  DVT Prophylaxis: on anticoagulation  Deleon Catheter: Not present  Fluids: D5W with heparin infusion  Lines: None     Cardiac Monitoring: None  Code Status:  full        Clinically Significant Risk Factors Present on Admission            # Hypomagnesemia: Lowest Mg = 1.3 mg/dL in last 2 days, will replace as needed  # Anion Gap Metabolic Acidosis: Highest Anion Gap = 19 mmol/L in last 2 days, will monitor and treat as appropriate  # Hypoalbuminemia: Lowest albumin = 2.5 g/dL at 5/16/2023  4:53 PM, will monitor as appropriate  # Drug Induced Coagulation Defect: home medication list includes an anticoagulant medication    # Hypertension: Noted on problem list                      Disposition Plan: Home     Expected Discharge Date: 05/18/2023                 Ian Fontenot MD  Internal Medicine Staff Hospitalist   [facility]   Pager: [phone]    Team: Shanique Arcinie[address]  Page Cross Cover after 5 pm: pager [phone]   ___________________________________________________________________    Subjective & Interval Hx:   Patient awake alert oriented x3.  On heparin drip.  Admits still not taking Eliquis due to fear of adverse effects that she saw on television.  On 2 L of oxygen via nasal cannula.  Satting okay.  No dizziness no palpitations or chest pain.    Last 24 hr care team notes reviewed.   ROS:  4  "point ROS including Respiratory, CV, GI and , other than that noted in the HPI, is negative.    Medications: Reviewed in EPIC. List below for reference    Physical Exam:    /82 (BP Location: Right arm, Patient Position: Supine, Cuff Size: Adult Small)   Pulse 79   Temp (!) 95.6  F (35.3  C) (Oral)   Resp 16   Ht 1.752 m (5' 8.98\")   Wt 65 kg (143 lb 4.8 oz)   SpO2 95%   BMI 21.18 kg/m        General Appearance:  Alert and oriented x3. Cachectic.   Respiratory: crackels at left lung bases.   Cardiovascular: irregular rhythm, no murmurs, no gallops.   GI: bowel sounds present, soft and nontender to palpation. No rebound tenderness.  Skin: open wounds in bilateral lower extremities. No erythema.  Extremities: bilateral pitting edema +1 in lower extremities.        Labs & Studies of Note: I personally reviewed the following studies:  CBC RESULTS: Recent Labs   Lab Test 05/17/23  0738   WBC 4.8   RBC 3.69*   HGB 11.5*   HCT 33.6*   MCV 91   MCH 31.2   MCHC 34.2   RDW 15.7*        Last Comprehensive Metabolic Panel:  Lab Results   Component Value Date     (L) 05/17/2023    POTASSIUM 4.3 05/17/2023    CHLORIDE 100 05/17/2023    CO2 21 (L) 05/17/2023    ANIONGAP 10 05/17/2023     (H) 05/17/2023    BUN 9.5 05/17/2023    CR 0.82 05/17/2023    GFRESTIMATED 81 05/17/2023    DAVID 7.6 (L) 05/17/2023         "

## 2023-05-17 NOTE — DISCHARGE INSTRUCTIONS
Bilateral LE wound(s): Every other day if requested and PRN when soiled change cover dressing  Gently remove old dressing- please spray with wound cleanser or NS to loosen if sticking to wound bed  Cleanse wound bed with wound cleanser and pat dry.  Cut a piece of Hydrofera blue (#122910) the size of open area, moisten with NS, wring the excess amount and place it on open area.  Cover with 4x4 mepilex for RLE and sacral mepilex for LLE- if having to change sacral mepilex more than once daily please change to exudry pad (845310) secued with kerlix wrap and tape instead.    Gastroenterology Discharge Instructions:  - If needed for Outpatient GI scheduling team should reach out to you to arrange. If you do not receive a call to schedule your recommended procedures/clinic follow up, please call [facility] phone: [phone], option 1 for clinic scheduling, option 2 for endoscopy procedure scheduling.

## 2023-05-17 NOTE — CONSULTS
Care Management Initial Consult    General Information  Assessment completed with: Patient,    Type of CM/SW Visit: Initial Assessment    Primary Care Provider verified and updated as needed: Yes   Readmission within the last 30 days: no previous admission in last 30 days      Reason for Consult: other (see comments)  Advance Care Planning: Advance Care Planning Reviewed: no concerns identified        Communication Assessment  Patient's communication style: spoken language (English or Bilingual)    Hearing Difficulty or Deaf: no   Wear Glasses or Blind: yes    Cognitive  Cognitive/Neuro/Behavioral: WDL                      Living Environment:   People in home: spouse, child(anurag), adult, grandchild(anurag)     Current living Arrangements: house      Able to return to prior arrangements: yes     Family/Social Support:  Care provided by: spouse/significant other  Provides care for: no one, unable/limited ability to care for self  Marital Status:   , Children          Description of Support System: Supportive, Involved    Support Assessment: Adequate family and caregiver support    Current Resources:   Patient receiving home care services: No     Community Resources: None  Equipment currently used at home: cane, straight, commode chair, grab bar, toilet, grab bar, tub/shower, raised toilet seat, shower chair, walker, standard, wheelchair, manual  Supplies currently used at home: None    Employment/Financial:  Employment Status: disabled        Financial Concerns: No concerns identified   Referral to Financial Worker: No     Does the patient's insurance plan have a 3 day qualifying hospital stay waiver?  No    Lifestyle & Psychosocial Needs:  Social Determinants of Health     Tobacco Use: Low Risk  (5/16/2023)    Patient History      Smoking Tobacco Use: Never      Smokeless Tobacco Use: Never      Passive Exposure: Not on file   Alcohol Use: Not on file   Financial Resource Strain: Not on file   Food  Insecurity: Not on file   Transportation Needs: Not on file   Physical Activity: Not on file   Stress: Not on file   Social Connections: Not on file   Intimate Partner Violence: Not on file   Depression: Not at risk (7/26/2022)    PHQ-2      PHQ-2 Score: 1   Housing Stability: Not on file     Functional Status:  Prior to admission patient needed assistance:   Dependent ADLs:: Ambulation-walker  Dependent IADLs:: Cleaning, Cooking, Laundry, Meal Preparation, Transportation  Assesssment of Functional Status: At functional baseline    Mental Health Status:  Mental Health Status: No Current Concerns       Chemical Dependency Status:  Chemical Dependency Status: No Current Concerns           Values/Beliefs:  Spiritual, Cultural Beliefs, Religion Practices, Values that affect care: no          Values/Beliefs Comment: Mao    Additional Information:  Per H&P, patient is a 62 year old female that presented to our service with shortness of breath, chest pain and worsening generalized weakness who was found to have PE and bilateral DVTs in the setting of recent discontinue of oral anticoagulation and inmobility due to bilateral leg injury. Also stopped many of her rheumatologic medications and experiencing significant physical decline.    Writer completed initial assessment due to elevated risk score. Writer introduced self, role and duties to patient. Patient resides in independent home with spouse, son, daughter-in-law and 3 grandchildren. Patient receives assistance from .    spouse with meals, cleaning, laundry, transportation and dressing changes. Patient does not receive any outside assistance. Patient is disabled and receives SSDI. She was previously a nurse. Patient's primary care physician on file was confirmed.     Patient has walker, wheelchair, raised toilet seat, cane, grab bars and shower chair at home. Patient denies any chemical dependency or financial concerns. She states that she feels hopeless at times  due to her health, but she has a strong support system. Patient does not have a healthcare directive and is not interested in completing at this time. Patient's goal is to return to home with spouse and family. SW/RNCC to continue to follow for support and discharge planning needs that arise.      BEBA Kim, MSW  Adult Acute Care Float   Pager [phone]

## 2023-05-17 NOTE — MEDICATION SCRIBE - ADMISSION MEDICATION HISTORY
.Medication Scribe Admission Medication History    Admission medication history is complete. The information provided in this note is only as accurate as the sources available at the time of the update.    Medication reconciliation/reorder completed by provider prior to medication history? Yes    Information Source(s): Patient via in-person    Pertinent Information:   Patient stated that she has not taken her medication for at least a month. She has currently taking only 2 medications ondansetron 4 mg, protonix 40 mg, and reported just finishing Bactrim -160 MG. She is not taking aspirin ,apixaban 5 mg, amiodarone 200 MG tablet prescription has .    Changes made to PTA medication list:    Added: None    Deleted: None    Changed: None    Medication Affordability:  Not including over the counter (OTC) medications, was there a time in the past 3 months when you did not take your medications as prescribed because of cost?: No    Allergies reviewed with patient and updates made in EHR: yes    Medication History Completed By: Susan Martinez 2023 10:48 PM    Prior to Admission medications    Medication Sig Last Dose Taking? Auth Provider Long Term End Date   acetaminophen (TYLENOL) 325 MG tablet Take 975 mg by mouth every 8 hours as needed for mild pain 4/3/2023 at unknown Yes Reported, Patient     nitroGLYcerin (NITROSTAT) 0.3 MG sublingual tablet Place 1 tablet (0.3 mg) under the tongue every 5 minutes as needed for chest pain (esophageal spasm) For chest pain place 1 tablet under the tongue every 5 minutes for 3 doses. If symptoms persist 5 minutes after 3rd dose call 911. Unknown at unknown Yes Nicolás De La Torre MD Yes    ondansetron (ZOFRAN ODT) 4 MG ODT tab Take 1 tablet (4 mg) by mouth every 8 hours as needed for nausea 2023 at unknown Yes Katia Boss MD     pantoprazole (PROTONIX) 40 MG EC tablet Take 1 tablet (40 mg) by mouth daily 2023 at unknown Yes Nicolás De La Torre MD      predniSONE (DELTASONE) 10 MG tablet Take 1.5 tablets (15 mg) by mouth daily 4/3/2023 at unknown Yes Atul Bentley MD No    sulfamethoxazole-trimethoprim (BACTRIM DS) 800-160 MG tablet Take 1 tablet by mouth 2 times daily 4/3/2023 at unknown Yes Katia Boss MD No    thin (NO BRAND SPECIFIED) lancets Use with lanceting device twice daily Unknown at unknown Yes Flora Dia DO     amiodarone (PACERONE) 200 MG tablet Take 1 tablet (200 mg) by mouth daily   Atul Bentley MD Yes 5/8/23   apixaban ANTICOAGULANT (ELIQUIS) 5 MG tablet Take 1 tablet (5 mg) by mouth 2 times daily  Patient not taking: Reported on 5/16/2023 Not Taking  Atul Bentley MD     ASPIRIN NOT PRESCRIBED (INTENTIONAL) Please choose reason not prescribed from choices below.  Patient not taking: Reported on 5/2/2022   Katia Boss MD Yes    blood glucose (NO BRAND SPECIFIED) test strip Use to test blood sugar 2 times daily or as directed.   Flora Dia DO     metoprolol succinate ER (TOPROL XL) 50 MG 24 hr tablet Take 1 tablet (50 mg) by mouth daily 4/3/2023 at unknown  Abundio Ag MD Yes

## 2023-05-17 NOTE — PROGRESS NOTES
"CLINICAL NUTRITION SERVICES - ASSESSMENT NOTE     Nutrition Prescription    RECOMMENDATIONS FOR MDs/PROVIDERS TO ORDER:  May consider endocrinology consult    Malnutrition Status:    Severe malnutrition in the context of chronic illness    Recommendations already ordered by Registered Dietitian (RD):  PRN snacks/supplements  Please allow to order off the mechanical soft diet    Future/Additional Recommendations:  Monitor labs, intakes, and weight trends.     REASON FOR ASSESSMENT  Jeanine Schuler is a/an 62 year old female assessed by the dietitian for Provider Order - malnutrition assessment, calorie counts    NUTRITION/MEDICAL HISTORY  Pt with hx of  hypertension, prolonged QTc, chronic cough, sarcoidosis with chronic immunosuppression, pulmonary hypertension, chronic atrial fibrillation on chronic anticoagulation, chronic congestive systolic heart failure and prior pneumonia. Pt with history gastric bypass and multiple immune diseases. Pt presented with shortness of breath, chest pain and worsening generalized weakness who was found to have PE and bilateral DVTs in the setting of recent discontinue of oral anticoagulation and inmobility due to bilateral leg injury. Also stopped many of her rheumatologic medications and experiencing significant physical decline.    Per RN note: \"appetite is poor, she has not eaten well for more than two days due to nausea/vomiting.\"    FINDINGS  Patient reports having nausea/vomiting leading to lack of appetite/intakes for multiple days. Reports having a GI bug that hasn't resolved over the last three months, resulting in 15 lb weight loss during that time. Reports not tolerating much food but doing a protein shake in the morning and eating as much as that as possible. Agreeable to trialing protein shakes here. Also has dentures that do not work so tries to eat soft foods.     CURRENT NUTRITION ORDERS  Diet: Regular    LABS   05/16/23 16:53 05/16/23 18:15 05/16/23 18:29 " "05/16/23 18:38 05/16/23 18:41 05/16/23 18:49 05/16/23 18:53 05/16/23 21:18 05/17/23 02:10 05/17/23 07:38   Sodium 134 (L)         131 (L)   Potassium 4.1         4.3   Urea Nitrogen 9.6         9.5   Creatinine 0.82         0.82   Magnesium 1.3 (L)         2.4 (H)   Phosphorus          3.0   Albumin 2.5 (L)            Glucose 61 (L) 26 (LL) 49 (LL) 13 (LL) 41 (LL) 230 (H) 217 (H) 121 (H) 100 (H) 83     MEDICATIONS  Medications reviewed: Dextrose, mag sulfate, protonix, prednisone, D5 gtt, zofran PRN    ANTHROPOMETRICS  Height: 175.2 cm (5' 8.976\")  Most Recent Weight: 65 kg (143 lb 4.8 oz)    IBW: 65.9 kg (99% IBW)  BMI: Normal BMI  Weight History: Pt with 15 lb (9%) weight loss over 1 year. Pt reports having lost 15 lb in the last three months.   Wt Readings from Last Encounters:   05/17/23 65 kg (143 lb 4.8 oz)   05/08/23 68 kg (150 lb)   04/17/23 68 kg (150 lb)   06/28/22 71.7 kg (158 lb)   06/14/22 68 kg (150 lb)   05/05/22 71.8 kg (158 lb 4.6 oz)   05/02/22 73.4 kg (161 lb 14.4 oz)   04/28/22 70.3 kg (155 lb)   04/20/22 68.1 kg (150 lb 1.6 oz)   04/19/22 62.6 kg (138 lb)   04/08/22 68.7 kg (151 lb 8 oz)   04/04/22 69.9 kg (154 lb)   03/08/22 74.4 kg (164 lb)   02/15/22 76.1 kg (167 lb 12.8 oz)   01/28/22 80.3 kg (177 lb)   01/26/22 79.4 kg (175 lb)   01/13/22 78.9 kg (174 lb)   01/06/22 78.9 kg (174 lb)   12/22/21 81.6 kg (180 lb)   12/20/21 78.9 kg (174 lb)     Dosing Weight: 65 kg - most recent weight    ASSESSED NUTRITION NEEDS  Estimated Energy Needs: 3839-3520 kcals/day (25 - 30 kcals/kg)  Justification: Maintenance vs repletion   Estimated Protein Needs: 65- 78+ grams protein/day (1 - 1.2+ grams of pro/kg)  Justification: Increased needs and Repletion  Estimated Fluid Needs: 1 ml/kcal or per provider pending fluid status    PHYSICAL FINDINGS  See malnutrition section below.  Cachectic looking.     MALNUTRITION  % Intake: </=75% for >/= 1 month (severe)  % Weight Loss: Difficult to assess, pt reports 9% " weight loss in 3 months.   Subcutaneous Fat Loss: Global Severe  Muscle Loss: Global severe  Fluid Accumulation/Edema: None noted  Malnutrition Diagnosis: Severe malnutrition in the context of chronic illness    NUTRITION DIAGNOSIS  Inadequate oral intake related to nausea/vomiting, poor appetite as evidenced by patient report.       INTERVENTIONS  Implementation  Nutrition education for nutrition relationship to health/disease   Medical food supplement therapy     Goals  Patient to consume % of nutritionally adequate meal trays TID, or the equivalent with supplements/snacks.     Monitoring/Evaluation  Progress toward goals will be monitored and evaluated per protocol.    Krystal So MS, RDN, LDN  RD pager: [phone]  [facility] Weekend/Holiday Pager: [phone]

## 2023-05-17 NOTE — PROGRESS NOTES
BG at  0800 was 60 . Denied nausea, dizziness, light-headedness, no tremors or sweating observed. Two cups of apple juice offered with joseph crackers. To recheck BG in 30 mins.Provider notified.

## 2023-05-17 NOTE — CONSULTS
[facility] Nurse Inpatient Assessment     Consulted for: bilateral lower calf wounds      Patient History (according to H&P provider note(s) 5/17/23:      Jeanine Schuler is a 62 year old female with a past medical history of HTN, chronic atrial fibrillation on anticouagulation until recently, multiple autoimmune diseases, sarcoidosis, pulmonary hypertension and CHF, who presented to our service with shortness of breath in rest, anterior pressure like chest pain that irradiated to back and severe weakness that worsen today. For the last couple of months she has generally felt unwell with nausea, poor apetite (last meal 5 days ago) and progressive debilitating weakness to the point of difficulty deambulating. 1 month ago she fell from her wheel chair and injured both legs, needing stitches and 2 rounds of bactrim due to infection. This wounds have been very painful and have contributed for her bedridden status.        Assessment:      Areas visualized during today's visit: Lower extremities     Wound location: bilateral LE    5/17: Left medial/posterior LE    5/17: Right lateral/posterior LE    Last photo: 5/17/23  Wound due to: Trauma  Wound history/plan of care: resulted from a fall- see HPI  Wound base: LLE 70/30 % eschar and slough/non-granular tissue,  % superficial scab and slough     Palpation of the wound bed: normal      Drainage: moderate from RLE, copious from LLE     Description of drainage: serosanguinous     Measurements (length x width x depth, in cm): LLE 7  x 7  x  0.4 cm, RLE4  x 2.8  x  0.2 cm      Tunneling: N/A     Undermining: N/A  Periwound skin: Intact, Superficial erosion and Scar tissue      Color: normal and consistent with surrounding tissue and red      Temperature: normal   Odor: none  Pain: severe and during dressing change, sharp and tender  Pain interventions prior to dressing change: soaking and slow and gentle cares    Treatment goal: Heal , Infection control/prevention, Maintain (prevention of deterioration), Pain control and Protection  STATUS: initial assessment  Supplies ordered: gathered, discussed with RN and discussed with patient        Treatment Plan:     Bilateral LE wound(s): Every other day  And PRN when soiled change cover dressing  Gently remove old dressing- please spray with wound cleanser or NS to loosen if sticking to wound bed  Cleanse wound bed with wound cleanser and pat dry.  Cut a piece of Hydrofera blue (#[id number]) the size of open area, moisten with NS, wring the excess amount and place it on open area.  Cover with 4x4 mepilex for RLE and sacral mepilex for LLE- if having to change sacral mepilex more than once daily please change to exudry pad ([id number]) secued with kerlix wrap and tape instead.    Orders: Written    RECOMMEND PRIMARY TEAM ORDER: wound clinic and home care when discharged for follow-up on wounds  Education provided: plan of care, wound progress and Infection prevention   Discussed plan of care with: Patient and Nurse  WOC nurse follow-up plan: weekly  Notify WOC if wound(s) deteriorate.  Nursing to notify the Provider(s) and re-consult the WOC Nurse if new skin concern.    DATA:     Current support surface: Standard  Standard gel/foam mattress (IsoFlex, Atmos air, etc)  Containment of urine/stool: Incontinent pad in bed  BMI: Body mass index is 21.18 kg/m .   Active diet order: Orders Placed This Encounter      Combination Diet Regular Diet Adult     Output: I/O last 3 completed shifts:  In: 120 [P.O.:120]  Out: -      Labs: Recent Labs   Lab 05/17/23  0738 05/16/23  1853 05/16/23  1653   ALBUMIN  --   --  2.5*   HGB 11.5*   < > 15.3   INR 1.16*  --   --    WBC 4.8  --  7.1    < > = values in this interval not displayed.     Pressure injury risk assessment:   Sensory Perception: 3-->slightly limited  Moisture: 4-->rarely moist  Activity: 1-->bedfast  Mobility: 2-->very limited  Nutrition:  1-->very poor  Friction and Shear: 2-->potential problem  Froy Score: 13    Vanessa Tomlin RN CWOCN  Pager no longer is use, please contact through [employer] group: [facility] Nurse  Dept. Office Number: [phone]

## 2023-05-17 NOTE — PROGRESS NOTES
"  Status: DVT of non-extremity vein  Neuro: A&O x4  GI: Voiding spont  : +BS+flatus no bm.  Resp: 2L nc sating above 95%  Mobility: Assistx2   Cardiac: WDL  Skin: BLE  Wounds WOC CONSULT.  Lines/Drains: PIV SL  Pain: Prn oxycodone 5mg managed pain.  Behavior: calm  VS: Blood pressure 119/89, pulse 89, temperature 97.9  F (36.6  C), temperature source Axillary, resp. rate 20, height 1.753 m (5' 9\"), weight 68 kg (150 lb), SpO2 99 %.      Plan of Care: Hep gtt @1200 units  "

## 2023-05-17 NOTE — PLAN OF CARE
"Goal Outcome Evaluation:    Status: DVT of non-extremity vein  Neuro: A&O x4  GI: Voiding spont VIA bedpan  : +BS+flatus no bm.  Resp: 2L nc sating above 95%  Mobility: Assistx2   Cardiac: WDL  Skin: BLE  Wounds was seeing by WOC. Every other day wound dressing and care.  Lines/Drains: L PIV infusing heparin. R PIV infusing dextrose 5%  Pain: Prn oxycodone 5mg managed pain.  Behavior: calm  VS: /82 (BP Location: Right arm, Patient Position: Supine, Cuff Size: Adult Small)   Pulse 79   Temp (!) 95.6  F (35.3  C) (Oral)   Resp 16   Ht 1.752 m (5' 8.98\")   Wt 65 kg (143 lb 4.8 oz)   SpO2 95%   BMI 21.18 kg/m      BG was 60 during shift changed. Pt was given carb and apple juice to elevate sugar. At 9:15 am pt BG was recheck it was 46. Provider ( Corie ) was notified of this change. Pt did not eat the carb \" I do not like gram cracker and I'm tired and feeling like you guys are forcing me to drink all this and that is too much\". Pt BG was checked 15 mins later it was 30. Glucose gel was given but the patient only took little sip and refused to finish the meds. Her BG was check it was 62. Pt begin to vomit, writer called flyer for iv line. Provider was notified about pt BG. 15 mins later BG was recheck it was 32, dextrose was started. Pt Bg went up to 100 then 123. Continue POC.   "

## 2023-05-18 NOTE — PLAN OF CARE
"Goal Outcome Evaluation: Heparin drip discontinued and eliquis restarted. Continue to encourage PO intake. Working with therapies.      VS: VSS  Temp: (!) 96.4  F (35.8  C) Temp src: Oral BP: 115/70 Pulse: 68   Resp: 16 SpO2: 98 % O2 Device: Nasal cannula Oxygen Delivery: 2 LPM   O2: >90% on 2-3LPM, wean as able. Denies CP or SOB at rest, reports SOB with activity. LSCTA but diminished.    Output: Voids via bedpan without difficulty. Urine is dark in color.   Last BM: LBM 5/17 per pt, +flatus, BS active   Activity: Bed/chair fast. Refused OOB activity. Assist of 1-2 in bed. Wheelchair based at home. Generalized weakness noted and moderately impaired movement of BLE. Sat at EOB for 5 minutes with OT, did  Become nauseated after. Per OT, able to get to EOB with assist of 1. Not able to stand at EOB today.   Skin: Skin intact ex for bilateral calf wounds, skin is dry and flaky to BLE. Sherwin discoloration to fingertips and toes due to Raynaud's disease. Dusky discoloration to lower half of BLE.    Pain: Pain to BLE managed with PRN oxycodone and tylenol, pt declined this AM stating pain was well managed. Pt then got 10/10 pain, which resolved to 7/10 after PRN oxycodone and tylenol.    CMS: Baseline N/T to extremities. +2-3 edema to BLE. DP +1 bilaterally, able to wiggle toes.    Dressing: Dressings to bilateral calf wounds remain CDI, dressing changes every other day. Dressing changes due 5/19.    Diet: Regular diet, poor appetite. Encourage PO intake. BG BID and at bedtime. BG 71 this AM.    LDA: PIV R SL, IVF discontinued due to low sodium. PIV SL to LUE, heparin drip discontinued.   Equipment: IV pole, personal belongings at bedside    Plan: Discharge TBD pending medical stability, per pt she is hoping for TCU placement and SW notified.   Additional Info: Heparin drip stopped at 1000.   Pt was cooperative with taking medications today.   Pt feels like her legs have increased in \"heaviness\" and \"pain\" since heparin " drip was stopped- MD notified.

## 2023-05-18 NOTE — PROGRESS NOTES
[facility] - Internal Medicine Daily Note   Date of Service: 5/17/2023  Patient: Jeanine Schuler  MRN: [id number]  Admission Date: 5/16/2023  Hospital Day # 2    Assessment & Plan    This is a 62-year-old female patient was admitted with bilateral lower extremity DVT.      Acute bilateral lower extremity DVT and pulmonary embolism.  In the setting of medication noncompliance.  Jeanine Schuler is a 62 year old female that presented to our service with shortness of breath, chest pain and worsening generalized weakness who was found to have PE and bilateral DVTs in the setting of recent discontinue of oral anticoagulation and inmobility due to bilateral leg injury. Also stopped many of her rheumatologic medications and experiencing significant physical decline.        Pulmonary Embolism,  Bilateral DVT.  Due to medication noncompliance.  Started on heparin drip, discontinued 5/18/2023 and started on apixaban 5 mg p.o. twice daily  Presented to ED with 24 hours of worsening SOB. Lower extremity venous doppler US revealed partially occlusive thrombus in the common femoral vein, proximal femoral vein, and popliteal vein and partially occlusive thrombus in the left distal femoral vein and popliteal vein. Chest CTA revealed multiple right sided pulmonary emboli, some of which appear to be chronic. Patient given suplemental Oxygen 2LPM CN and saturating 99%. Of note, she was taking eliquis for A.fib, but she discontinued this recently after became anxious of side effects that she saw on TV add. She was started on heparin gtt in ED. This event is likely provoked in the setting of immobility due to bilateral LE injury  - continue IV heparin for now, but plan to resume apixiban, this time for DVT/PE  - titrate suplemmental oxygen  Hypoxia due to PE.  Was on 2 L of oxygen via nasal cannula.  Oxygen saturation would dip down to the 80s when on room air.  Would continue supplemental oxygen to maintain oxygen  saturation greater than 94%.    Bilateral lower extremity lacerations with hx of wound infection  About 1 month PTA, she fell from her wheel chair and sustained severe laceration of bilateral lower calf. She was seen in ED and the wounds were sutured. Subsequently, she developed wound infection and was treated with 2 rounds of bactrim for cellulitis. She continues to endorse significant pain not being able to ambulate. The wound does not appear to be acutely infected on admission.  - wound care consult     Episodes of hypoglycemia  The patient has glucometer at home and has been noting hypoglycemia 50s and 60s at home. In the ED, finger stick glucose had repeatedly low values in to 10s and serum glucose was in 60s. Of note, she was on prednisone 15mg daily for her rheumatologic conditions, but has not been taking them putting her at risk for AI. She is also not taking much po.  - glucose check q4h  - receiving D5W with heparin gtt at this time  - check cortisol level     Sarcoidosis with pulmonary involvement  pulmonary hypertension  Was seen by Pulmonary Dr Perlman in 87/2022, she follows with Dr Bentley for pulmonary hypertension. Was on prednisone and imuran in the past, but she stopped taking all of her rheumatologic medications due to not feeling well.  - monitor O2 saturation  - clarify current medication regimen     Mixed connective tissue disease  Scleroderma  Raynauds  Follows with Rheum Dr Nicolás De La Torre, not taking home meds including prednisone. Last seen in 7/2022  - need rheumatology follow up.  - resumed prednisone     Deconditioning  Malnutrition, low blood sugar episodes.  Suspect due to poor p.o. intake.  Was given IV fluids with dextrose as she was having low blood sugar measurements on 5/17.  Discontinued due to concerns for hypervolemia and persistent need for supplemental oxygen  Patient with poor apetite for the last 2 months with poor PO intake, last meal 5 days ago due to nausea. Albumin:  2.5. She states at home she was given a glucometer and she is ranged in the 40-60 range. On admission Glucose 61, and at bedside 41.  - monitor po intake  - consider nutrition consult  - PT/OT consultation     CHF, stable, no acute exacerbation.  Hx of atrial fibrillation  Pulmonary Edema  Small bilateral Pleural effusions  Chest CT on admission reveals small bilateral pleural effusions L>R, Interlobular septal thickening and areas of peribronchovascular nodular and GGO in the lungs bilaterally, cardiomegaly and ascitis. BNP on admission to the ER was elevated, 7752. Last echo in 5/2022 showed EF 50-55%.  - resume apixaban in am (as above)  - hold PTA metoprolol and amiodarone as she was not taking them and VS stable.  - hold off IV diuresis at this time.     CT evidence of Cirrhosis   Portal Hypertension  Patient with no previous history of cirrhosis or liver disease. A Abdomen CT was ordered in ER revealed a cirrhotic liver with portal hypertension, ascites, diffuse anasarca and recanalized unbilical vein.   - need outpatient work up and close follow up     right breast skin thickening and nipple retraction on CT.   Incidental finding on CT done in the ED.   - outpatient mammography  Hyponatremia, likely due to IV fluids and diuretic use.  Patient was alert oriented x3 .  Discontinue IV fluids         Diet:   regular  DVT Prophylaxis: on anticoagulation  Deleon Catheter: Not present  Fluids: D5W with heparin infusion  Lines: None     Cardiac Monitoring: None  Code Status:  full        Clinically Significant Risk Factors Present on Admission            # Hypomagnesemia: Lowest Mg = 1.3 mg/dL in last 2 days, will replace as needed  # Anion Gap Metabolic Acidosis: Highest Anion Gap = 19 mmol/L in last 2 days, will monitor and treat as appropriate  # Hypoalbuminemia: Lowest albumin = 2.5 g/dL at 5/16/2023  4:53 PM, will monitor as appropriate  # Drug Induced Coagulation Defect: home medication list includes an  "anticoagulant medication    # Hypertension: Noted on problem list                      Disposition Plan: Home     Expected Discharge Date: 05/18/2023                 Ian Fontenot MD  Internal Medicine Staff Hospitalist   [facility]   Pager: [phone]    Team: Shanique [address]  Page Cross Cover after 5 pm: pager [phone]   ___________________________________________________________________    Subjective & Interval Hx:   Patient awake alert oriented x3.  On heparin drip.  Admits still not taking Eliquis due to fear of adverse effects that she saw on television.  On 2 L of oxygen via nasal cannula.  Satting okay.  No dizziness no palpitations or chest pain.    Last 24 hr care team notes reviewed.   ROS:  4 point ROS including Respiratory, CV, GI and , other than that noted in the HPI, is negative.    Medications: Reviewed in EPIC. List below for reference    Physical Exam:    /70   Pulse 68   Temp (!) 96.4  F (35.8  C)   Resp 16   Ht 1.752 m (5' 8.98\")   Wt 65 kg (143 lb 4.8 oz)   SpO2 98%   BMI 21.18 kg/m        General Appearance:  Alert and oriented x3. Cachectic.   Respiratory: crackels at left lung bases.   Cardiovascular: irregular rhythm, no murmurs, no gallops.   GI: bowel sounds present, soft and nontender to palpation. No rebound tenderness.  Skin: open wounds in bilateral lower extremities. No erythema.  Extremities: bilateral pitting edema +1 in lower extremities.        Labs & Studies of Note: I personally reviewed the following studies:  CBC RESULTS: Recent Labs   Lab Test 05/17/23  0738   WBC 4.8   RBC 3.69*   HGB 11.5*   HCT 33.6*   MCV 91   MCH 31.2   MCHC 34.2   RDW 15.7*        Last Comprehensive Metabolic Panel:  Lab Results   Component Value Date     (L) 05/18/2023    POTASSIUM 3.8 05/18/2023    CHLORIDE 102 05/18/2023    CO2 21 (L) 05/18/2023    ANIONGAP 9 05/18/2023    GLC 91 05/18/2023    BUN 7.1 (L) 05/18/2023    CR 0.74 05/18/2023    " GFRESTIMATED >90 05/18/2023    DAVID 7.7 (L) 05/18/2023

## 2023-05-18 NOTE — PLAN OF CARE
Goal Outcome Evaluation:    Pt is AO4. VSS. Pt is on 2LPM with O2 > 90%.    Pt denied pain overnight. Pt complained of nausea, and was administered zofran.     Pt has Edema BLE. Wounds on calves BLE. Dressings every other day. Last done 5/17/23.     Pt has generalized weakness, not moving out of bed. Pt uses bed pan. Had loose stool on 5/17/23.     Pt is on regular diet, but has poor appetite. Low BGs. Has continuous Dextrose.     Pt has Heparin drip currently at 1100units/hr. Labs this morning.     RN Managed Potassium. Labs done this morning. Check is needing replacement.     Plan is for oral Apixaban - oral anticoagulant.     Discharge plan is TBD

## 2023-05-18 NOTE — PLAN OF CARE
"VS: /77   Pulse 78   Temp (!) 95.6  F (35.3  C) (Oral)   Resp 14   Ht 1.752 m (5' 8.98\")   Wt 65 kg (143 lb 4.8 oz)   SpO2 97%   BMI 21.18 kg/m      O2: O2 SATS >90% denies chest pain,  or SBO on O2 2L Via  n/c    Output: Voids adequately using bedpan loose stool x1   Last BM: 5/17/23 BS present all x4 quadrants    Activity: Not OOB due to pain    Up for meals? Sist upright on bed    Skin: + 2  and +3  edema to BLE  wounds to bilateral calfs  seen by [facility] nurse today wound cares every other day    Pain: Managed with prn oxycodone    CMS: C/O numbness and tingling to  BLE at baseline    Dressing: Foam dressing to bilateral  calfs CDI    Diet: Regular diet with poor appetite    LDA: L PIV infusing with Heparin, R PIV infusing with 5% dextrose    Equipment: IV Pole, personal belongings     Plan: TBD  continue with plan of care .   Additional Info: Pt on heparin drip, lab redraw results 0.37 @ 1904  dose verified no rate change next redraw scheduled @ 0600                              "

## 2023-05-18 NOTE — PROGRESS NOTES
"   05/18/23 1400   Appointment Info   Signing Clinician's Name / Credentials (OT) Susan Ma MA OTR/L   Living Environment   People in Home spouse;child(anurag), adult;grandchild(anurag)   Current Living Arrangements house   Home Accessibility stairs to enter home   Transportation Anticipated family or friend will provide   Living Environment Comments Patient lives with , son, daughter in law and grandchildren.  bumps pt up stairs in wc to get into home. High bed. Uses commode. Tub/shower with shower chair but hasn't been able to shower due to wounds.   Self-Care   Usual Activity Tolerance fair   Current Activity Tolerance poor   Regular Exercise No   Equipment Currently Used at Home cane, straight;commode chair;grab bar, toilet;grab bar, tub/shower;raised toilet seat;wheelchair, manual   Fall history within last six months no   Activity/Exercise/Self-Care Comment Pt has been unable to ambulate for approx the past 3 months due to progressive weakness and not feeling well. Pt completes stand pivot transfers to  and commode. Prior to 3 months ago, pt was able to ambulate short distances with cane.  assists with ADLs.   Instrumental Activities of Daily Living (IADL)   IADL Comments has assist with IADLs   General Information   Onset of Illness/Injury or Date of Surgery 05/17/23   Referring Physician Kaylynn Vasquez MD   Patient/Family Therapy Goal Statement (OT) \"To get stronger\"   Additional Occupational Profile Info/Pertinent History of Current Problem Jeanine Schuler is a 62 year old female that presented to our service with shortness of breath, chest pain and worsening generalized weakness who was found to have PE and bilateral DVTs in the setting of recent discontinue of oral anticoagulation and inmobility due to bilateral leg injury. Also stopped many of her rheumatologic medications and experiencing significant physical decline.   Existing Precautions/Restrictions fall;oxygen " therapy device and L/min  (2-3L)   General Observations and Info wounds on B calves, DVT's, PE, pt has CHF and has edema in LE's but does not interfere w/pt's function   Cognitive Status Examination   Orientation Status orientation to person, place and time   Visual Perception   Visual Impairment/Limitations corrective lenses for reading   Sensory   Sensory Comments has neuropathy in B feet   Pain Assessment   Patient Currently in Pain Yes, see Vital Sign flowsheet   Range of Motion Comprehensive   Comment, General Range of Motion B UE's WFL   Strength Comprehensive (MMT)   Comment, General Manual Muscle Testing (MMT) Assessment overall deconditioned   Bed Mobility   Comment (Bed Mobility) Min A   Transfers   Transfer Comments Pt declined this date   Bathing Assessment/Intervention   [place] Level (Bathing) moderate assist (50% patient effort)   Comment, (Bathing) sponge bathing   Lower Body Dressing Assessment/Training   [place] Level (Lower Body Dressing) maximum assist (25% patient effort)   Grooming Assessment/Training   [place] Level (Grooming) set up;supervision  (sitting EOB)   Toileting   [place] Level (Toileting) dependent (less than 25% patient effort)  (currently using bedpan)   Clinical Impression   Criteria for Skilled Therapeutic Interventions Met (OT) Yes, treatment indicated   OT Diagnosis Decreased functional mobility and ADLs   OT Problem List-Impairments impacting ADL problems related to;activity tolerance impaired;strength;pain   Assessment of Occupational Performance 3-5 Performance Deficits   Identified Performance Deficits dressing, transfers, bathing, home mgmt   Planned Therapy Interventions (OT) ADL retraining;transfer training   Clinical Decision Making Complexity (OT) low complexity   Risk & Benefits of therapy have been explained evaluation/treatment results reviewed;care plan/treatment goals reviewed;patient   OT Total Evaluation Time   OT Eval, Low Complexity  Minutes (71571) 8   OT Goals   Therapy Frequency (OT) 5 times/wk   OT Predicted Duration/Target Date for Goal Attainment 05/25/23   OT Goals Hygiene/Grooming;Lower Body Dressing;Toilet Transfer/Toileting;OT Goal 1   OT: Hygiene/Grooming supervision/stand-by assist;from wheelchair   OT: Lower Body Dressing Minimal assist   OT: Toilet Transfer/Toileting Minimal assist;toilet transfer;cleaning and garment management;using adaptive equipment   OT: Goal 1 Pt will be IND with UE HEP to increase strength for ADLs and transfers   OT Discharge Planning   OT Plan OT: try standing with Ax2, commode transfer when able   OT Discharge Recommendation (DC Rec) Transitional Care Facility;home with assist;home with home care occupational therapy   OT Rationale for DC Rec Dual recs of TCU vs home with assist and home OT/PT. Anticipate pt would benefit from TCU as Pt is below baseline and reports inconsistency with family A with transfers.  may benefit from caregiver training whether at TCU or with home care. Pt is hopeful to regain strength and be able to ambulate short distances again with her cane. Will continue to update dc recs pending progress.   OT Brief overview of current status min A supine<>sit, unable to stand this date due to weakness and anxiety   Total Session Time   Total Session Time (sum of timed and untimed services) 8

## 2023-05-18 NOTE — PROGRESS NOTES
Patient on D5 normal saline at 75 cc/h for low blood sugar measurements yesterday.  On supplemental oxygen 2 L  Sodium level is trending down likely due to IV fluids    I would encourage her to eat and drink adequately by mouth, discontinue IV fluids

## 2023-05-19 NOTE — PROGRESS NOTES
[facility] - Internal Medicine Daily Note   Date of Service: 5/17/2023  Patient: Jeanine Schuler  MRN: [id number]  Admission Date: 5/16/2023  Hospital Day # 3    Assessment & Plan    This is a 62-year-old female patient was admitted with bilateral lower extremity DVT.      Acute bilateral lower extremity DVT and pulmonary embolism.  In the setting of medication noncompliance.  Jeanine Schuler is a 62 year old female that presented to our service with shortness of breath, chest pain and worsening generalized weakness who was found to have PE and bilateral DVTs in the setting of recent discontinue of oral anticoagulation and inmobility due to bilateral leg injury. Also stopped many of her rheumatologic medications and experiencing significant physical decline.     Severe Protein Calorie Malnutrition     Pulmonary Embolism,  Bilateral DVT.  Due to medication noncompliance.  Started on heparin drip, discontinued 5/18/2023 and started on apixaban 5 mg p.o. twice daily  Presented to ED with 24 hours of worsening SOB. Lower extremity venous doppler US revealed partially occlusive thrombus in the common femoral vein, proximal femoral vein, and popliteal vein and partially occlusive thrombus in the left distal femoral vein and popliteal vein. Chest CTA revealed multiple right sided pulmonary emboli, some of which appear to be chronic. Patient given suplemental Oxygen 2LPM CN and saturating 99%. Of note, she was taking eliquis for A.fib, but she discontinued this recently after became anxious of side effects that she saw on TV add. She was started on heparin gtt in ED. This event is likely provoked in the setting of immobility due to bilateral LE injury  - continue IV heparin for now, but plan to resume apixiban, this time for DVT/PE  - titrate suplemmental oxygen  Hypoxia due to PE.  Was on 2 L of oxygen via nasal cannula.  Oxygen saturation would dip down to the 80s when on room air.  Would continue  supplemental oxygen to maintain oxygen saturation greater than 94%.    Bilateral lower extremity lacerations with hx of wound infection  About 1 month PTA, she fell from her wheel chair and sustained severe laceration of bilateral lower calf. She was seen in ED and the wounds were sutured. Subsequently, she developed wound infection and was treated with 2 rounds of bactrim for cellulitis. She continues to endorse significant pain not being able to ambulate. The wound does not appear to be acutely infected on admission.  - wound care consult     Episodes of hypoglycemia  The patient has glucometer at home and has been noting hypoglycemia 50s and 60s at home. In the ED, finger stick glucose had repeatedly low values in to 10s and serum glucose was in 60s. Of note, she was on prednisone 15mg daily for her rheumatologic conditions, but has not been taking them putting her at risk for AI. She is also not taking much po.  - glucose check q4h  - receiving D5W with heparin gtt at this time  - check cortisol level     Sarcoidosis with pulmonary involvement  pulmonary hypertension  Was seen by Pulmonary Dr Perlman in 87/2022, she follows with Dr Bentley for pulmonary hypertension. Was on prednisone and imuran in the past, but she stopped taking all of her rheumatologic medications due to not feeling well.  - monitor O2 saturation  - clarify current medication regimen     Mixed connective tissue disease  Scleroderma  Raynauds  Follows with Rheum Dr Nicolás De La Torre, not taking home meds including prednisone. Last seen in 7/2022  - need rheumatology follow up.  - resumed prednisone     Deconditioning  Malnutrition, low blood sugar episodes.  Suspect due to poor p.o. intake.  Was given IV fluids with dextrose as she was having low blood sugar measurements on 5/17.  Discontinued due to concerns for hypervolemia and persistent need for supplemental oxygen  Patient with poor apetite for the last 2 months with poor PO intake, last  meal 5 days ago due to nausea. Albumin: 2.5. She states at home she was given a glucometer and she is ranged in the 40-60 range. On admission Glucose 61, and at bedside 41.  - monitor po intake  - consider nutrition consult  - PT/OT consultation     CHF, stable, no acute exacerbation.  Hx of atrial fibrillation  Pulmonary Edema  Small bilateral Pleural effusions  Chest CT on admission reveals small bilateral pleural effusions L>R, Interlobular septal thickening and areas of peribronchovascular nodular and GGO in the lungs bilaterally, cardiomegaly and ascitis. BNP on admission to the ER was elevated, 7752. Last echo in 5/2022 showed EF 50-55%.  - resume apixaban in am (as above)  - hold PTA metoprolol and amiodarone as she was not taking them and VS stable.  - hold off IV diuresis at this time.     CT evidence of Cirrhosis   Portal Hypertension  Patient with no previous history of cirrhosis or liver disease. A Abdomen CT was ordered in ER revealed a cirrhotic liver with portal hypertension, ascites, diffuse anasarca and recanalized unbilical vein.   - need outpatient work up and close follow up     right breast skin thickening and nipple retraction on CT.   Incidental finding on CT done in the ED.   - outpatient mammography  Hyponatremia, likely due to IV fluids and diuretic use.  Patient was alert oriented x3 .  Discontinue IV fluids         Diet:   regular  DVT Prophylaxis: on anticoagulation  Deleon Catheter: Not present  Fluids: D5W with heparin infusion  Lines: None     Cardiac Monitoring: None  Code Status:  full        Clinically Significant Risk Factors Present on Admission            # Hypomagnesemia: Lowest Mg = 1.3 mg/dL in last 2 days, will replace as needed  # Anion Gap Metabolic Acidosis: Highest Anion Gap = 19 mmol/L in last 2 days, will monitor and treat as appropriate  # Hypoalbuminemia: Lowest albumin = 2.5 g/dL at 5/16/2023  4:53 PM, will monitor as appropriate  # Drug Induced Coagulation Defect:  "home medication list includes an anticoagulant medication    # Hypertension: Noted on problem list                      Disposition Plan: PT recommending TCU.     Expected Discharge Date: 05/18/2023                 Ian Fontenot MD  Internal Medicine Staff Hospitalist   [facility]   Pager: [phone]    Team: Shanique Arcin[address] 16  Page Cross Cover after 5 pm: pager [phone]   ___________________________________________________________________    Subjective & Interval Hx:   Patient awake alert oriented x3.  On heparin drip.  Admits still not taking Eliquis due to fear of adverse effects that she saw on television.  On 2 L of oxygen via nasal cannula.  Satting okay.  No dizziness no palpitations or chest pain.    Last 24 hr care team notes reviewed.   ROS:  4 point ROS including Respiratory, CV, GI and , other than that noted in the HPI, is negative.    Medications: Reviewed in EPIC. List below for reference    Physical Exam:    /64 (BP Location: Left arm)   Pulse 77   Temp (!) 96  F (35.6  C) (Oral)   Resp 14   Ht 1.752 m (5' 8.98\")   Wt 65 kg (143 lb 4.8 oz)   SpO2 96%   BMI 21.18 kg/m        General Appearance:  Alert and oriented x3. Cachectic.   Respiratory: crackels at left lung bases.   Cardiovascular: irregular rhythm, no murmurs, no gallops.   GI: bowel sounds present, soft and nontender to palpation. No rebound tenderness.  Skin: open wounds in bilateral lower extremities. No erythema.  Extremities: bilateral pitting edema +1 in lower extremities.        Labs & Studies of Note: I personally reviewed the following studies:  CBC RESULTS: Recent Labs   Lab Test 05/17/23  0738   WBC 4.8   RBC 3.69*   HGB 11.5*   HCT 33.6*   MCV 91   MCH 31.2   MCHC 34.2   RDW 15.7*        Last Comprehensive Metabolic Panel:  Lab Results   Component Value Date     (L) 05/19/2023    POTASSIUM 3.3 (L) 05/19/2023    CHLORIDE 102 05/19/2023    CO2 24 05/19/2023    ANIONGAP 7 05/19/2023 "    GLC 60 (L) 05/19/2023    BUN 6.8 (L) 05/19/2023    CR 0.72 05/19/2023    GFRESTIMATED >90 05/19/2023    DAVID 7.5 (L) 05/19/2023

## 2023-05-19 NOTE — PLAN OF CARE
"Goal Outcome Evaluation:          VS: /69 (BP Location: Left arm)   Pulse 66   Temp (!) 95.9  F (35.5  C) (Oral)   Resp 16   Ht 1.752 m (5' 8.98\")   Wt 65 kg (143 lb 4.8 oz)   SpO2 98%   BMI 21.18 kg/m     O2: 2L NC, SOB on exertion   Output: bedpan   Last BM: 5/17   Activity: Not OOB this shift   Skin: Bilateral wounds LE, cals, edema in feet and ankles. Neuropathy   CMS: Intact, AOx4.    Diet: Regular diet. Zofran for nausea   LDA: PIV R and L arm   Equipment: IV pole, personal belongings   Plan: Continue POC   Additional Info:                      "

## 2023-05-19 NOTE — PLAN OF CARE
"VS: /75 (BP Location: Left arm)   Pulse 68   Temp 96.9  F (36.1  C) (Oral)   Resp 16   Ht 1.752 m (5' 8.98\")   Wt 65 kg (143 lb 4.8 oz)   SpO2 98%   BMI 21.18 kg/m      O2: O2 SATS >90% denies chest pain,  or SBO on O2 2L Via  n/c    Output: Voids adequately using bedpan    Last BM: 5/17/23 BS present all x4 quadrants    Activity: Not OOB due to pain    Up for meals? N/A   Skin: + 2  and +3  edema to BLE  wounds to bilateral calfs  dressing change every other day   Pain: Managed with prn oxycodone    CMS: Baseline numbness and tingling to  BLE    Dressing: Foam dressing to bilateral  calfs CDI    Diet: Regular diet with poor appetite    LDA: L PIV and  R PIV saline locked    Equipment: IV Pole, personal belongings     Plan: TBD  continue with plan of care .   Additional Info: Pt on oral apixaban anticoagulant                              "

## 2023-05-20 NOTE — PROGRESS NOTES
"    VS:     /75 (BP Location: Left arm)   Pulse 68   Temp 97.4  F (36.3  C) (Oral)   Resp 16   Ht 1.752 m (5' 8.98\")   Wt 65 kg (143 lb 4.8 oz)   SpO2 98%   BMI 21.18 kg/m          Output:     Voids spontaneously without difficulty in the bedpan. LBM 5/ 18 per patient       Activity:     Ax1t with cares. Not out of bed this shift.      Skin: BLE wounds.      Pain:     Reported BLE pain 5 mg oxycodone was administered, and is tolerating adequately.       Neuro/CMS:      Baseline numbness and tingling in BLE.      Dressing:     CDI.       Diet:     Regular diet with 50% intake this shift.        LDA:     PIV's in R and L forearms are patent and SL.       Equipment:     An IV pole and wound care supplies in room.      Plan:     IM following. Continue to monitor.       Additional Info:                "

## 2023-05-20 NOTE — PLAN OF CARE
VS:     Pt A/O X 4. Afebrile. VSS on 1 LPM NC. Lungs- clear but diminished bilaterally with both anterior and lateral. Denies shortness of breath and chest pain. Intermittent nausea relieved by PO Zofran.     Output:     Bowels- active in all four quadrants. Last BM 5/18 per pt report. Voids spontaneously without difficulty in the bedpan.      Activity:     Not OOB this shift, 1x assist with cares.     Skin: Wounds to BLE.     Pain:     Has pain in the BLE and given tylenol and 5 mg oxycodone, and is tolerating adequately.      Neuro/CMS:     Neuro's are intact. Baseline numbness and tingling in BLE.      Dressing:     Wound dressings changed this shift and are CDI.      Diet:     Pt is on a regular diet and appetite was poor this shift.       LDA:     PIV's in R and L forearms are patent and SL.      Equipment:     IV poles and wound care supplies in room.     Plan:     IM following. Pt is able to make needs known and the call light is within the pt's reach. Continue to monitor.       Additional Info:

## 2023-05-20 NOTE — PROGRESS NOTES
Paged about hypoglycemia. Patient's last BG by fingerstick was 66. She is eating/drinking poorly. I'll order an AM serum cortisol to aid in ruling out adrenal insufficiency. Instructed nurse to draw blood for a whole blood glucose if fingerstick <70 so we can confirm. Patient does not endorse symptoms of hypoglycemia per nursing.    Bobby Grajeda, DO

## 2023-05-20 NOTE — PROGRESS NOTES
[facility] - Internal Medicine Daily Note   Date of Service: 5/17/2023  Patient: Jeanine Schuler  MRN: [id number]  Admission Date: 5/16/2023  Hospital Day # 4    Assessment & Plan    This is a 62-year-old female patient was admitted with bilateral lower extremity DVT.      Acute bilateral lower extremity DVT and pulmonary embolism.  In the setting of medication noncompliance.  Jeanine Schuler is a 62 year old female that presented to our service with shortness of breath, chest pain and worsening generalized weakness who was found to have PE and bilateral DVTs in the setting of recent discontinue of oral anticoagulation and inmobility due to bilateral leg injury. Also stopped many of her rheumatologic medications and experiencing significant physical decline.     Severe Protein Calorie Malnutrition     Pulmonary Embolism,  Bilateral DVT.  Due to medication noncompliance.  Started on heparin drip, discontinued 5/18/2023 and started on apixaban 5 mg p.o. twice daily  Presented to ED with 24 hours of worsening SOB. Lower extremity venous doppler US revealed partially occlusive thrombus in the common femoral vein, proximal femoral vein, and popliteal vein and partially occlusive thrombus in the left distal femoral vein and popliteal vein. Chest CTA revealed multiple right sided pulmonary emboli, some of which appear to be chronic. Patient given suplemental Oxygen 2LPM CN and saturating 99%. Of note, she was taking eliquis for A.fib, but she discontinued this recently after became anxious of side effects that she saw on TV add. She was started on heparin gtt in ED. This event is likely provoked in the setting of immobility due to bilateral LE injury  - continue IV heparin for now, but plan to resume apixiban, this time for DVT/PE  - titrate suplemmental oxygen  Hypoxia due to PE.  Was on 2 L of oxygen via nasal cannula.  Oxygen saturation would dip down to the 80s when on room air.  Would continue  supplemental oxygen to maintain oxygen saturation greater than 94%.    Bilateral lower extremity lacerations with hx of wound infection  About 1 month PTA, she fell from her wheel chair and sustained severe laceration of bilateral lower calf. She was seen in ED and the wounds were sutured. Subsequently, she developed wound infection and was treated with 2 rounds of bactrim for cellulitis. She continues to endorse significant pain not being able to ambulate. The wound does not appear to be acutely infected on admission.  - wound care consult     Episodes of hypoglycemia, recurrent.  Due to poor p.o. intake.  Unable cortisol level within normal limits.    The patient has glucometer at home and has been noting hypoglycemia 50s and 60s at home. In the ED, finger stick glucose had repeatedly low values in to 10s and serum glucose was in 60s. Of note, she was on prednisone 15mg daily for her rheumatologic conditions, but has not been taking them putting her at risk for AI. She is also not taking much po.  - glucose check q4h  We will maintain patient on D5 normal saline-and encourage her to eat.     Sarcoidosis with pulmonary involvement  pulmonary hypertension  Was seen by Pulmonary Dr Perlman in 87/2022, she follows with Dr Bentley for pulmonary hypertension. Was on prednisone and imuran in the past, but she stopped taking all of her rheumatologic medications due to not feeling well.  - monitor O2 saturation  - clarify current medication regimen     Mixed connective tissue disease  Scleroderma  Raynauds  Follows with Rheum Dr Nicolás De La Torre, not taking home meds including prednisone. Last seen in 7/2022  - need rheumatology follow up.  - resumed prednisone     Deconditioning  Malnutrition, low blood sugar episodes.  Suspect due to poor p.o. intake.  Was given IV fluids with dextrose as she was having low blood sugar measurements on 5/17.  Discontinued due to concerns for hypervolemia and persistent need for supplemental  oxygen  Patient with poor apetite for the last 2 months with poor PO intake, last meal 5 days ago due to nausea. Albumin: 2.5. She states at home she was given a glucometer and she is ranged in the 40-60 range. On admission Glucose 61, and at bedside 41.  - monitor po intake  - consider nutrition consult  - PT/OT consultation     CHF, stable, no acute exacerbation.  Hx of atrial fibrillation  Pulmonary Edema  Small bilateral Pleural effusions  Chest CT on admission reveals small bilateral pleural effusions L>R, Interlobular septal thickening and areas of peribronchovascular nodular and GGO in the lungs bilaterally, cardiomegaly and ascitis. BNP on admission to the ER was elevated, 7752. Last echo in 5/2022 showed EF 50-55%.  - resume apixaban in am (as above)  - hold PTA metoprolol and amiodarone as she was not taking them and VS stable.  - hold off IV diuresis at this time.     CT evidence of Cirrhosis   Portal Hypertension  Patient with no previous history of cirrhosis or liver disease. A Abdomen CT was ordered in ER revealed a cirrhotic liver with portal hypertension, ascites, diffuse anasarca and recanalized unbilical vein.   - need outpatient work up and close follow up     right breast skin thickening and nipple retraction on CT.   Incidental finding on CT done in the ED.   - outpatient mammography  Hyponatremia, likely due to IV fluids and diuretic use.  Patient was alert oriented x3 .  Discontinue IV fluids         Diet:   regular  DVT Prophylaxis: on anticoagulation  Deleon Catheter: Not present  Fluids: D5W with heparin infusion  Lines: None     Cardiac Monitoring: None  Code Status:  full        Clinically Significant Risk Factors Present on Admission            # Hypomagnesemia: Lowest Mg = 1.3 mg/dL in last 2 days, will replace as needed  # Anion Gap Metabolic Acidosis: Highest Anion Gap = 19 mmol/L in last 2 days, will monitor and treat as appropriate  # Hypoalbuminemia: Lowest albumin = 2.5 g/dL at  5/16/2023  4:53 PM, will monitor as appropriate  # Drug Induced Coagulation Defect: home medication list includes an anticoagulant medication    # Hypertension: Noted on problem list                   Disposition Plan: PT recommending TCU.     Expected Discharge Date: 05/18/2023                 Ian Fontenot MD  Internal Medicine Staff Hospitalist   [facility]   Pager: [phone]    Team: Shanique Arciniega   Page Cross Cover after 5 pm: pager [phone]   ___________________________________________________________________    Subjective & Interval Hx:   Patient awake alert oriented x3.  On heparin drip.  Admits still not taking Eliquis due to fear of adverse effects that she saw on television.  On 2 L of oxygen via nasal cannula.  Satting okay.  No dizziness no palpitations or chest pain.  Recurrent low blood sugar episodes.  Due to poor p.o. intake.  Encourage patient to eat.  In the interim, will continue D5 normal saline  Last 24 hr care team notes reviewed.   ROS:  4 point ROS including Respiratory, CV, GI and , other than that noted in the HPI, is negative.    Medications: Reviewed in EPIC. List below for reference    Current Facility-Administered Medications:      acetaminophen (TYLENOL) tablet 975 mg, 975 mg, Oral, Q8H PRN, Kaylynn Vasquez MD, 975 mg at 05/19/23 1332     amiodarone (PACERONE) tablet 200 mg, 200 mg, Oral, Daily, Ian Fontenot MD, 200 mg at 05/20/23 0759     apixaban ANTICOAGULANT (ELIQUIS) tablet 5 mg, 5 mg, Oral, BID, Ian Fontenot MD, 5 mg at 05/20/23 0759     lidocaine (LMX4) cream, , Topical, Q1H PRN, Kaylynn Vasquez MD     lidocaine 1 % 0.1-1 mL, 0.1-1 mL, Other, Q1H PRN, Kaylynn Vasquez MD     melatonin tablet 1 mg, 1 mg, Oral, At Bedtime PRN, Kaylynn Vasquez MD, 1 mg at 05/19/23 2009     [Held by provider] metoprolol succinate ER (TOPROL XL) 24 hr tablet 50 mg, 50 mg, Oral, Daily, Kaylynn Vasquez MD     naloxone  "(NARCAN) injection 0.2 mg, 0.2 mg, Intravenous, Q2 Min PRN **OR** naloxone (NARCAN) injection 0.4 mg, 0.4 mg, Intravenous, Q2 Min PRN **OR** naloxone (NARCAN) injection 0.2 mg, 0.2 mg, Intramuscular, Q2 Min PRN **OR** naloxone (NARCAN) injection 0.4 mg, 0.4 mg, Intramuscular, Q2 Min PRN, Kaylynn Vasquez MD     ondansetron (ZOFRAN ODT) ODT tab 4 mg, 4 mg, Oral, Q6H PRN, 4 mg at 05/19/23 1207 **OR** ondansetron (ZOFRAN) injection 4 mg, 4 mg, Intravenous, Q6H PRN, Kaylynn Vasquez MD     oxyCODONE IR (ROXICODONE) half-tab 2.5-5 mg, 2.5-5 mg, Oral, Q4H PRN, Kaylynn Vasquez MD, 5 mg at 05/19/23 2008     pantoprazole (PROTONIX) EC tablet 40 mg, 40 mg, Oral, Daily, Kaylynn Vasquez MD, 40 mg at 05/20/23 0759     predniSONE (DELTASONE) tablet 15 mg, 15 mg, Oral, Daily, Kaylynn Vasquez MD, 15 mg at 05/20/23 0759     senna-docusate (SENOKOT-S/PERICOLACE) 8.6-50 MG per tablet 1 tablet, 1 tablet, Oral, BID PRN **OR** senna-docusate (SENOKOT-S/PERICOLACE) 8.6-50 MG per tablet 2 tablet, 2 tablet, Oral, BID PRN, Kaylynn Vasquez MD     sodium chloride (PF) 0.9% PF flush 3 mL, 3 mL, Intracatheter, Q8H, Kaylynn Vasquez MD, 3 mL at 05/20/23 1116     sodium chloride (PF) 0.9% PF flush 3 mL, 3 mL, Intracatheter, q1 min prn, Kaylynn Vasquez MD    Physical Exam:    /81 (BP Location: Left arm)   Pulse 101   Temp (!) 96.5  F (35.8  C) (Oral)   Resp 16   Ht 1.752 m (5' 8.98\")   Wt 65 kg (143 lb 4.8 oz)   SpO2 94%   BMI 21.18 kg/m        General Appearance:  Alert and oriented x3. Cachectic.   Respiratory: crackels at left lung bases.   Cardiovascular: irregular rhythm, no murmurs, no gallops.   GI: bowel sounds present, soft and nontender to palpation. No rebound tenderness.  Skin: open wounds in bilateral lower extremities. No erythema.  Extremities: bilateral pitting edema +1 in lower extremities.        Labs & Studies of Note: I personally reviewed the " following studies:  CBC RESULTS: Recent Labs   Lab Test 05/17/23  0738   WBC 4.8   RBC 3.69*   HGB 11.5*   HCT 33.6*   MCV 91   MCH 31.2   MCHC 34.2   RDW 15.7*        Last Comprehensive Metabolic Panel:  Lab Results   Component Value Date     (L) 05/20/2023    POTASSIUM 3.9 05/20/2023    CHLORIDE 100 05/20/2023    CO2 25 05/20/2023    ANIONGAP 8 05/20/2023    GLC 87 05/20/2023    BUN 9.2 05/20/2023    CR 0.77 05/20/2023    GFRESTIMATED 88 05/20/2023    DAVID 8.1 (L) 05/20/2023

## 2023-05-20 NOTE — PLAN OF CARE
VS: Temp: 98.4  F (36.9  C) Temp src: Oral BP: 116/75 Pulse: 64   Resp: 16 SpO2: 99 % O2 Device: Nasal cannula Oxygen Delivery: 1 LPM     O2: On room air, SOB present, on supplemental O2   Output: Voids spontaneously via bedpan   Last BM: 5/18   Activity: Wheelchair bound   Skin: Edema to bilateral legs and feet, healed bruises through out the legs,bilatral would to posterior LE   Pain: Denies, managed by oxycodone   CMS: Alert and oriented X4, able to make needs known   Dressing: Dressings to bilateral calves. Scant drainage to bilateral posterior LE   Diet: REG   LDA: PIV's to bilateral arms, SL   Equipment: Personal belongings   Plan: TBD   Additional Info: Patient's BG drops to 60's, declines food, loss of appetite. Provider notified

## 2023-05-21 NOTE — PROGRESS NOTES
[facility] - Internal Medicine Daily Note   Date of Service: 5/17/2023  Patient: Jeanine Schuler  MRN: [id number]  Admission Date: 5/16/2023  Hospital Day # 5    Assessment & Plan    This is a 62-year-old female patient was admitted with bilateral lower extremity DVT.      Acute bilateral lower extremity DVT and pulmonary embolism.  In the setting of medication noncompliance.  Jeanine Schuler is a 62 year old female that presented to our service with shortness of breath, chest pain and worsening generalized weakness who was found to have PE and bilateral DVTs in the setting of recent discontinue of oral anticoagulation and inmobility due to bilateral leg injury. Also stopped many of her rheumatologic medications and experiencing significant physical decline.          Pulmonary Embolism,  Bilateral DVT.  Due to medication noncompliance.  Started on heparin drip, discontinued 5/18/2023 and started on apixaban 5 mg p.o. twice daily  Presented to ED with 24 hours of worsening SOB. Lower extremity venous doppler US revealed partially occlusive thrombus in the common femoral vein, proximal femoral vein, and popliteal vein and partially occlusive thrombus in the left distal femoral vein and popliteal vein. Chest CTA revealed multiple right sided pulmonary emboli, some of which appear to be chronic. Patient given suplemental Oxygen 2LPM CN and saturating 99%. Of note, she was taking eliquis for A.fib, but she discontinued this recently after became anxious of side effects that she saw on TV add. She was started on heparin gtt in ED. This event is likely provoked in the setting of immobility due to bilateral LE injury  - continue IV heparin for now, but plan to resume apixiban, this time for DVT/PE  - titrate suplemmental oxygen  Hypoxia due to PE.  Was on 2 L of oxygen via nasal cannula.  Oxygen saturation would dip down to the 80s when on room air.  Would continue supplemental oxygen to maintain oxygen  saturation greater than 94%.    Bilateral lower extremity lacerations with hx of wound infection  About 1 month PTA, she fell from her wheel chair and sustained severe laceration of bilateral lower calf. She was seen in ED and the wounds were sutured. Subsequently, she developed wound infection and was treated with 2 rounds of bactrim for cellulitis. She continues to endorse significant pain not being able to ambulate. The wound does not appear to be acutely infected on admission.  - wound care consult     Episodes of hypoglycemia, recurrent.  Due to poor p.o. intake.  Unable cortisol level within normal limits.    The patient has glucometer at home and has been noting hypoglycemia 50s and 60s at home. In the ED, finger stick glucose had repeatedly low values in to 10s and serum glucose was in 60s. Of note, she was on prednisone 15mg daily for her rheumatologic conditions, but has not been taking them putting her at risk for AI. She is also not taking much po.  - glucose check q6h  We will maintain patient on D5 normal saline at 74 ml/hr-and encourage her to eat.     Sarcoidosis with pulmonary involvement  pulmonary hypertension  Was seen by Pulmonary Dr Perlman in 87/2022, she follows with Dr Bentley for pulmonary hypertension. Was on prednisone and imuran in the past, but she stopped taking all of her rheumatologic medications due to not feeling well.  - monitor O2 saturation  - clarify current medication regimen     Mixed connective tissue disease  Scleroderma  Raynauds  Follows with Rheum Dr Nicolás De La Torre, not taking home meds including prednisone. Last seen in 7/2022  - need rheumatology follow up.  - resumed prednisone     Deconditioning  Malnutrition, low blood sugar episodes.  Suspect due to poor p.o. intake.  Was given IV fluids with dextrose as she was having low blood sugar measurements on 5/17.  Discontinued due to concerns for hypervolemia and persistent need for supplemental oxygen  Patient with poor  apetite for the last 2 months with poor PO intake, last meal 5 days ago due to nausea. Albumin: 2.5. She states at home she was given a glucometer and she is ranged in the 40-60 range. On admission Glucose 61, and at bedside 41.  - monitor po intake  - consider nutrition consult  - PT/OT consultation     CHF, stable, no acute exacerbation.  Hx of atrial fibrillation  Pulmonary Edema  Small bilateral Pleural effusions  Chest CT on admission reveals small bilateral pleural effusions L>R, Interlobular septal thickening and areas of peribronchovascular nodular and GGO in the lungs bilaterally, cardiomegaly and ascitis. BNP on admission to the ER was elevated, 7752. Last echo in 5/2022 showed EF 50-55%.  - resume apixaban in am (as above)  - hold PTA metoprolol and amiodarone as she was not taking them and VS stable.  - hold off IV diuresis at this time.     CT evidence of Cirrhosis   Portal Hypertension  Patient with no previous history of cirrhosis or liver disease. A Abdomen CT was ordered in ER revealed a cirrhotic liver with portal hypertension, ascites, diffuse anasarca and recanalized unbilical vein.   - need outpatient work up and close follow up     right breast skin thickening and nipple retraction on CT.   Incidental finding on CT done in the ED.   - outpatient mammography  Hyponatremia, likely due to IV fluids and diuretic use.  Patient was alert oriented x3 .  Discontinue IV fluids    Severe Protein Calorie Malnutrition. Poor appetite and poor oral intake. Frequent episodes of hypoglycemia      Diet:   regular diet  DVT Prophylaxis: on anticoagulation  Deleon Catheter: Not present  Fluids: D5W with heparin infusion  Lines: None     Cardiac Monitoring: None  Code Status:  full        Clinically Significant Risk Factors Present on Admission            # Hypomagnesemia: Lowest Mg = 1.3 mg/dL in last 2 days, will replace as needed  # Anion Gap Metabolic Acidosis: Highest Anion Gap = 19 mmol/L in last 2 days, will  monitor and treat as appropriate  # Hypoalbuminemia: Lowest albumin = 2.5 g/dL at 5/16/2023  4:53 PM, will monitor as appropriate  # Drug Induced Coagulation Defect: home medication list includes an anticoagulant medication    # Hypertension: Noted on problem list                   Disposition Plan: PT recommending TCU.     Expected Discharge Date: 05/18/2023                 Ian Fontenot MD  Internal Medicine Staff Hospitalist   [facility]   Pager: [phone]    Team: Shanique Arciniega   Page Cross Cover after 5 pm: pager [phone]   ___________________________________________________________________    Subjective & Interval Hx:   Patient awake alert oriented x3.  On heparin drip.  Admits still not taking Eliquis due to fear of adverse effects that she saw on television.  On 2 L of oxygen via nasal cannula.  Satting okay.  No dizziness no palpitations or chest pain.  Recurrent low blood sugar episodes.  Due to poor p.o. intake.  Encourage patient to eat.  In the interim, will continue D5 normal saline  Last 24 hr care team notes reviewed.   ROS:  4 point ROS including Respiratory, CV, GI and , other than that noted in the HPI, is negative.    Medications: Reviewed in EPIC. List below for reference    Current Facility-Administered Medications:      acetaminophen (TYLENOL) tablet 975 mg, 975 mg, Oral, Q8H PRN, Kaylynn Vasquez MD, 975 mg at 05/19/23 1332     amiodarone (PACERONE) tablet 200 mg, 200 mg, Oral, Daily, Ian Fontenot MD, 200 mg at 05/21/23 0814     apixaban ANTICOAGULANT (ELIQUIS) tablet 5 mg, 5 mg, Oral, BID, Ian Fontenot MD, 5 mg at 05/21/23 0813     dextrose 5% and 0.45% NaCl infusion, , Intravenous, Continuous, Ian Fontenot MD, Last Rate: 75 mL/hr at 05/21/23 0448, New Bag at 05/21/23 0448     lidocaine (LMX4) cream, , Topical, Q1H PRN, Kaylynn Vasquez MD     lidocaine 1 % 0.1-1 mL, 0.1-1 mL, Other, Q1H PRN, Kaylynn Vasquez MD     melatonin  "tablet 1 mg, 1 mg, Oral, At Bedtime PRN, Kaylynn Vasquez MD, 1 mg at 05/20/23 1930     [Held by provider] metoprolol succinate ER (TOPROL XL) 24 hr tablet 50 mg, 50 mg, Oral, Daily, Kaylynn Vasquez MD     naloxone (NARCAN) injection 0.2 mg, 0.2 mg, Intravenous, Q2 Min PRN **OR** naloxone (NARCAN) injection 0.4 mg, 0.4 mg, Intravenous, Q2 Min PRN **OR** naloxone (NARCAN) injection 0.2 mg, 0.2 mg, Intramuscular, Q2 Min PRN **OR** naloxone (NARCAN) injection 0.4 mg, 0.4 mg, Intramuscular, Q2 Min PRN, Kaylynn Vasquez MD     ondansetron (ZOFRAN ODT) ODT tab 4 mg, 4 mg, Oral, Q6H PRN, 4 mg at 05/19/23 1207 **OR** ondansetron (ZOFRAN) injection 4 mg, 4 mg, Intravenous, Q6H PRN, Kaylynn Vasquez MD     oxyCODONE IR (ROXICODONE) half-tab 2.5-5 mg, 2.5-5 mg, Oral, Q4H PRN, Kaylynn Vasquez MD, 5 mg at 05/19/23 2008     pantoprazole (PROTONIX) EC tablet 40 mg, 40 mg, Oral, Daily, Kaylynn Vasquez MD, 40 mg at 05/21/23 0813     predniSONE (DELTASONE) tablet 15 mg, 15 mg, Oral, Daily, Kaylynn Vasquez MD, 15 mg at 05/21/23 0814     senna-docusate (SENOKOT-S/PERICOLACE) 8.6-50 MG per tablet 1 tablet, 1 tablet, Oral, BID PRN **OR** senna-docusate (SENOKOT-S/PERICOLACE) 8.6-50 MG per tablet 2 tablet, 2 tablet, Oral, BID PRN, Kaylynn Vasquez MD     sodium chloride (PF) 0.9% PF flush 3 mL, 3 mL, Intracatheter, Q8H, Kaylynn Vasquez MD, 3 mL at 05/21/23 1230     sodium chloride (PF) 0.9% PF flush 3 mL, 3 mL, Intracatheter, q1 min prn, Kaylynn Vasquez MD    Physical Exam:    /84 (BP Location: Left arm)   Pulse 66   Temp 96.8  F (36  C) (Oral)   Resp 16   Ht 1.752 m (5' 8.98\")   Wt 65 kg (143 lb 4.8 oz)   SpO2 99%   BMI 21.18 kg/m        General Appearance:  Alert and oriented x3. Cachectic.   Respiratory: crackels at left lung bases.   Cardiovascular: irregular rhythm, no murmurs, no gallops.   GI: bowel sounds present, soft and nontender " to palpation. No rebound tenderness.  Skin: open wounds in bilateral lower extremities. No erythema.  Extremities: bilateral pitting edema +1 in lower extremities.        Labs & Studies of Note: I personally reviewed the following studies:  CBC RESULTS: Recent Labs   Lab Test 05/17/23  0738   WBC 4.8   RBC 3.69*   HGB 11.5*   HCT 33.6*   MCV 91   MCH 31.2   MCHC 34.2   RDW 15.7*        Last Comprehensive Metabolic Panel:  Lab Results   Component Value Date     (L) 05/21/2023    POTASSIUM 3.7 05/21/2023    CHLORIDE 102 05/21/2023    CO2 22 05/21/2023    ANIONGAP 8 05/21/2023    GLC 63 (L) 05/21/2023    BUN 9.5 05/21/2023    CR 0.72 05/21/2023    GFRESTIMATED >90 05/21/2023    DAVID 7.9 (L) 05/21/2023

## 2023-05-21 NOTE — PLAN OF CARE
Pt A&O x4. On 1 LPM of O2. Not OOB, WC based. Cont. BLE dressings changed yesterday, currently CDI. Pt had low BG in AM, cranberry juice given. Pt states she does not typically enjoy carb or sugar type foods. Cortisol level collected, results in chart. Poor appetite. Pt started on D5 running at 75 ml/hr. Continue w/ plan of care.

## 2023-05-21 NOTE — PLAN OF CARE
"  VS: Blood pressure 130/82, pulse 74, temperature 96.8  F (36  C), temperature source Oral, resp. rate 16, height 1.752 m (5' 8.98\"), weight 65 kg (143 lb 4.8 oz), SpO2 95 %.     O2: 95% on RA.   Output: Voids using bed pan during day.    Last BM: 05/18/23   Activity: Bed rest. Pt can turn form side to side. A1 to turn.   Skin: BLE calf wounds. Wound care done today and dressing changed.    Pain: Pain managed w/ PRN oxycodone.    CMS: AOX4, denies CP, SOB, dizziness. Pt has numbness and tingling at baseline.    Dressing: Changed per WOC orders. Next dressing change due 05/23/23.   Diet: Regular diet. Pt has poor appetite.   LDA: L PIV infusing Dex 5%/NaCL 0.45% @ 75 mL/hr. R PIV SL. R PIV flushed today and is patent.    Plan: Continue POC.    Additional Info: Pt is a BG check 2X daily and at bedtime. Pt's BG runs low. Pt has D5% NaCL 0.45%.                          "

## 2023-05-21 NOTE — PLAN OF CARE
"         VS: Blood pressure 128/83, pulse 65, temperature 97.5  F (36.4  C), temperature source Oral, resp. rate 16, height 1.752 m (5' 8.98\"), weight 65 kg (143 lb 4.8 oz), SpO2 100 %.      O2: On room air, SOB present, on supplemental O2, denies chest pain   Output: Voids spontaneously via bedpan   Last BM: 5/18   Activity: Wheelchair bound, Not OOB   Skin: Edema to bilateral legs and feet, healed bruises throughout the legs,bilatral wounds to posterior LE   Pain: Denies, managed by oxycodone   CMS: Alert and oriented X4, pleasant, able to make needs known, non compliant with meals   Dressing: Mepilex dressings to bilateral calfs. CDI, dressing change expected on 5/21 am   Diet: REG   LDA: PIV's to bilateral arms, SL   Equipment: Personal belongings   Plan: TBD   Additional Info: Patient's BG drops to 60's, declines food, loss of appetite. FTT, states she will not                    "

## 2023-05-22 NOTE — PROGRESS NOTES
"  VS: /87   Pulse 62   Temp 96.8  F (36  C)   Resp 16   Ht 1.752 m (5' 8.98\")   Wt 65 kg (143 lb 4.8 oz)   SpO2 96%   BMI 21.18 kg/m     O2: Room air saturations 96%. Encouraged patient to use IS. Using to level of 1100. Pt needs encouragement to use IS and try to increase deep breathing.    Output: Pt has been using bedpan to void.    Last BM: Pt states\" I  Had a BM 4 days ago! If I don't eat I don't poop!\" Last date of BM was 5/18/2023   Activity: Pt prefers to lay in bed and asks for help moving side to side and repositioning.    Skin: Both lower legs have a meplix to cover wounds on back of both legs. Dressing is due to be changed on 5/23/2023   Pain: Pt denies pain.   CMS: Pt has baseline numbness and tingling in both arms and legs. Spoke with Dr Fontenot. He does not want patient having PCD's due to patients DVT in both lower legs. Pt has baseline edema in both lower legs.    Dressing: Both lower leg wounds on back of both legs. Covered with Meplix   Diet: Regular. Pt taking very poor PO intake. Needs encouragement with PO intake.    LDA: IV fluid is running.    Equipment: IS, oximeter to ear for patients comfort, and blood sugar checks before meals. Last blood sugar check was 80   Plan: Will continue to monitor patients status.    Additional Info: Her  is at the bedside. Status of patient will continue to be monitored. Blood sugars went as low as 30 today. Reached out to Dr Fontenot and obtained new orders. See MAR to try and correct patients low BG. Pt PO is poor!. Reached out to Dietician to make recommendations. Status of patient will continue to be monitored.        "

## 2023-05-22 NOTE — PROGRESS NOTES
"CLINICAL NUTRITION SERVICES - BRIEF NOTE  (See RD note on 5/17 for full assessment)     Nutrition Prescription    RECOMMENDATIONS FOR MDs/PROVIDERS TO ORDER:  None at this time    Malnutrition Status:    Severe malnutrition in the context of chronic illness    Recommendations already ordered by Registered Dietitian (RD):  --If EN becomes POC:  --Enteral access: NGT  --Pending AXR confirmation of feeding tube position  --GOAL: Osmolite 1.5 Rommel (or equivalent) @ goal of  50ml/hr  (1200ml/day) provides: 1800 kcals, 75 g PRO, 914 ml free H20, 244 g CHO, and 0 g fiber daily.  --Start TF @ 10 ml/hr and advance by 10 ml q 12 hrs until goal rate.  --Do not start or advance TF rate unless K+ >3.0, Mg++ > 1.5,  and Phos > 1.9.  --Minimum 30 ml q 4 hrs water flushes for tube patency.  --If gastric enteral access: HOB > 30 degrees or Reverse Trendelenburg >10-25 degrees  --MVI/minerals supplement if not already ordered (Thera-Vit-M - orally)    Future/Additional Recommendations:  Monitor PO intake, weight trends, labs, GI symptoms  Monitor for need for EN, EN tolerance and advancement     REASON FOR NOTE  Jeanine Schuler is a/an 62 year old female assessed by the dietitian for Provider Order - Registered Dietitian to Assess and Order TF per Medical Nutrition Therapy Protocol    Findings    Pt with minimal PO intake over last 5 day however poorly documented in flowsheets.      LBM 5/18. Per RN note, pt states\" I  Had a BM 4 days ago! If I don't eat I don't poop!\".         05/20/23 02:11 05/20/23 07:38 05/20/23 22:39 05/21/23 01:57 05/21/23 02:12 05/21/23 12:17 05/21/23 21:53 05/22/23 01:54 05/22/23 07:28 05/22/23 11:11 05/22/23 11:36 05/22/23 11:53   Glucose 66 (L) 59 (L) 101 (H) 44 (LL) 77 63 (L) 114 (H) 100 (H) 80 31 (LL) 52 (L) 215 (H)     RD met with pt and  at bedside. Discussed concern for pt lack of appetite/intake and low blood sugars. Patient reports nausea and vomiting when attempting oral intakes, with " emesis bag on lap. Pt attempting to eat some food from the food trucks today but the smell was nauseating her. Discussed options for nutrition support. Pt reports never being on nutrition support before. Pt and  agreeable to trying tube feeding/TPN. Pt wanting to walk and be independent again.     INTERVENTIONS   Implementation  Nutrition education for nutrition relationship to health/disease   Enteral Nutrition - Initiate     Follow up/Monitoring  RD will continue to follow per nutrition protocol.    Krystal So MS, RDN, LDN  RD pager: [phone]  [facility] Weekend/Holiday Pager: [phone]

## 2023-05-22 NOTE — PLAN OF CARE
VS: Temp: (!) 96.3  F (35.7  C) Temp src: Oral BP: 130/86 Pulse: 66   Resp: 16 SpO2: 94 % O2 Device: Nasal cannula Oxygen Delivery: 1 LPM   O2: On room air, SOB present, on supplemental O2, denies chest pain   Output: Voids spontaneously via bedpan   Last BM: 5/18   Activity: Wheelchair bound, Not OOB- reluctant to be involved in activities   Skin: Edema to bilateral legs and feet, bilatral wounds to posterior LE   Pain: Denies, managed by oxycodone   CMS: Alert and oriented X4, pleasant, able to make needs known, non compliant with meals   Dressing: Mepilex dressings to bilateral calfs. CDI, dressing change expected on 5/21 am   Diet: REG   LDA: PIV's to bilateral arms, patent, SL   Equipment: Personal belongings   Plan: TBD   Additional Info: Patient's BG drops often. Check often as the patient appetite is decreased. FTT,

## 2023-05-22 NOTE — PLAN OF CARE
Goal Outcome Evaluation:  Prn melatonin given.    Orientation: Aox4  Bowel: Cont  Bladder: Cont  Ambulation/Transfers: Ax1 with positioning in bed  Diet/ Liquids: Regular, poor appetite   Tubes/ Lines/ Drains: R PIV D5/0.45% NS, L PIV SL  Oxygen: 1L of oxygen  Skin: Bilateral calf wounds

## 2023-05-22 NOTE — PROGRESS NOTES
[facility] - Internal Medicine Daily Note   Date of Service: 5/17/2023  Patient: Jeanine Schuler  MRN: [id number]  Admission Date: 5/16/2023  Hospital Day # 6    Assessment & Plan    This is a 62-year-old female patient was admitted with bilateral lower extremity DVT.      Acute bilateral lower extremity DVT and pulmonary embolism.  In the setting of medication noncompliance.  Jeanine Schuler is a 62 year old female that presented to our service with shortness of breath, chest pain and worsening generalized weakness who was found to have PE and bilateral DVTs in the setting of recent discontinue of oral anticoagulation and inmobility due to bilateral leg injury. Also stopped many of her rheumatologic medications and experiencing significant physical decline.          Pulmonary Embolism,  Bilateral DVT.  Due to medication noncompliance.  Started on heparin drip, discontinued 5/18/2023 and started on apixaban 5 mg p.o. twice daily  Presented to ED with 24 hours of worsening SOB. Lower extremity venous doppler US revealed partially occlusive thrombus in the common femoral vein, proximal femoral vein, and popliteal vein and partially occlusive thrombus in the left distal femoral vein and popliteal vein. Chest CTA revealed multiple right sided pulmonary emboli, some of which appear to be chronic.      Hypoxia due to PE.  Was on 2 L of oxygen via nasal cannula.  Oxygen saturation would dip down to the 80s when on room air.  Would continue supplemental oxygen to maintain oxygen saturation greater than 94%.  Weaned off supplemental oxygen on 5/22.    Bilateral lower extremity lacerations with hx of wound infection  About 1 month PTA, she fell from her wheel chair and sustained severe laceration of bilateral lower calf. She was seen in ED and the wounds were sutured. Subsequently, she developed wound infection and was treated with 2 rounds of bactrim for cellulitis. She continues to endorse significant pain  not being able to ambulate. The wound does not appear to be acutely infected on admission.  - wound care consult     Episodes of hypoglycemia, recurrent.  Due to poor p.o. intake.  Unable cortisol level within normal limits.    The patient has glucometer at home and has been noting hypoglycemia 50s and 60s at home. In the ED, finger stick glucose had repeatedly low values in to 10s and serum glucose was in 60s. Of note, she was on prednisone 15mg daily for her rheumatologic conditions, but has not been taking them putting her at risk for AI. She is also not taking much po.  - glucose check q6h  We will maintain patient on D5 normal saline at 74 ml/hr-and encourage her to eat.     Sarcoidosis with pulmonary involvement  pulmonary hypertension  Was seen by Pulmonary Dr Perlman in 87/2022, she follows with Dr Bentley for pulmonary hypertension. Was on prednisone and imuran in the past, but she stopped taking all of her rheumatologic medications due to not feeling well.  - monitor O2 saturation  - clarify current medication regimen     Mixed connective tissue disease  Scleroderma  Raynauds  Follows with Rheum Dr Nicolás De La Torre, not taking home meds including prednisone. Last seen in 7/2022  - need rheumatology follow up.  - resumed prednisone     Deconditioning  Malnutrition, low blood sugar episodes.  Suspect due to poor p.o. intake.  Was given IV fluids with dextrose as she was having low blood sugar measurements on 5/17.  Discontinued due to concerns for hypervolemia and persistent need for supplemental oxygen  Patient with poor apetite for the last 2 months with poor PO intake, last meal 5 days ago due to nausea. Albumin: 2.5. She states at home she was given a glucometer and she is ranged in the 40-60 range. On admission Glucose 61, and at bedside 41.  - monitor po intake  - consider nutrition consult  - PT/OT consultation     CHF, stable, no acute exacerbation.  Hx of atrial fibrillation  Pulmonary Edema  Small  bilateral Pleural effusions  Chest CT on admission reveals small bilateral pleural effusions L>R, Interlobular septal thickening and areas of peribronchovascular nodular and GGO in the lungs bilaterally, cardiomegaly and ascitis. BNP on admission to the ER was elevated, 7752. Last echo in 5/2022 showed EF 50-55%.  - resume apixaban in am (as above)  - hold PTA metoprolol and amiodarone as she was not taking them and VS stable.  - hold off IV diuresis at this time.     CT evidence of Cirrhosis   Portal Hypertension  Patient with no previous history of cirrhosis or liver disease. A Abdomen CT was ordered in ER revealed a cirrhotic liver with portal hypertension, ascites, diffuse anasarca and recanalized unbilical vein.   - need outpatient work up and close follow up     right breast skin thickening and nipple retraction on CT.   Incidental finding on CT done in the ED.   - outpatient mammography  Hyponatremia, likely due to IV fluids and diuretic use.  Patient was alert oriented x3 .  Discontinue IV fluids    Severe Protein Calorie Malnutrition in the setting of chronic illness.. Poor appetite and poor oral intake. Frequent episodes of hypoglycemia.  Nutrition consulted.  Patient to be started on tube feeding after NG tube placed today, 5/22.  Appreciate dietitian assistance in this regard.     Diet:    Nasogastric tube feeding  DVT Prophylaxis: on anticoagulation  Deleon Catheter: Not present  Fluids: D5W with heparin infusion  Lines: None     Cardiac Monitoring: None  Code Status:  full        Clinically Significant Risk Factors Present on Admission            # Hypomagnesemia: Lowest Mg = 1.3 mg/dL in last 2 days, will replace as needed  # Anion Gap Metabolic Acidosis: Highest Anion Gap = 19 mmol/L in last 2 days, will monitor and treat as appropriate  # Hypoalbuminemia: Lowest albumin = 2.5 g/dL at 5/16/2023  4:53 PM, will monitor as appropriate  # Drug Induced Coagulation Defect: home medication list includes an  anticoagulant medication    # Hypertension: Noted on problem list                   Disposition Plan: PT recommending TCU.     Expected Discharge Date: 05/18/2023                 Ian Fontenot MD  Internal Medicine Staff Hospitalist   [facility]   Pager: [phone]    Team: Shan[address]  Page Cross Cover after 5 pm: pager [phone]   ___________________________________________________________________    Subjective & Interval Hx:   Patient awake alert oriented x3.  On heparin drip.  Admits still not taking Eliquis due to fear of adverse effects that she saw on television.  On 2 L of oxygen via nasal cannula.  Satting okay.  No dizziness no palpitations or chest pain.  Recurrent low blood sugar episodes.  Due to poor p.o. intake.  Encourage patient to eat.  In the interim, will continue D5 normal saline  Last 24 hr care team notes reviewed.   ROS:  4 point ROS including Respiratory, CV, GI and , other than that noted in the HPI, is negative.    Medications: Reviewed in EPIC. List below for reference    Current Facility-Administered Medications:      acetaminophen (TYLENOL) tablet 975 mg, 975 mg, Oral, Q8H PRN, Kaylynn Vasquez MD, 975 mg at 05/19/23 1332     amiodarone (PACERONE) tablet 200 mg, 200 mg, Oral, Daily, Ian Fontenot MD, 200 mg at 05/22/23 0802     apixaban ANTICOAGULANT (ELIQUIS) tablet 5 mg, 5 mg, Oral, BID, Ian Fontenot MD, 5 mg at 05/22/23 0802     dextrose 10% infusion, , Intravenous, Continuous, Ian Fontenot MD, Last Rate: 75 mL/hr at 05/22/23 1319, New Bag at 05/22/23 1319     lidocaine (LMX4) cream, , Topical, Q1H PRN, Kaylynn Vasquez MD     lidocaine 1 % 0.1-1 mL, 0.1-1 mL, Other, Q1H PRN, Kaylynn Vasquez MD     melatonin tablet 1 mg, 1 mg, Oral, At Bedtime PRN, Kaylynn Vasquez MD, 1 mg at 05/21/23 2048     [Held by provider] metoprolol succinate ER (TOPROL XL) 24 hr tablet 50 mg, 50 mg, Oral, Daily, Kaylynn Vasquez  "MD Juanita     naloxone (NARCAN) injection 0.2 mg, 0.2 mg, Intravenous, Q2 Min PRN **OR** naloxone (NARCAN) injection 0.4 mg, 0.4 mg, Intravenous, Q2 Min PRN **OR** naloxone (NARCAN) injection 0.2 mg, 0.2 mg, Intramuscular, Q2 Min PRN **OR** naloxone (NARCAN) injection 0.4 mg, 0.4 mg, Intramuscular, Q2 Min PRN, Kaylynn Vasquez MD     ondansetron (ZOFRAN ODT) ODT tab 4 mg, 4 mg, Oral, Q6H PRN, 4 mg at 05/22/23 0806 **OR** ondansetron (ZOFRAN) injection 4 mg, 4 mg, Intravenous, Q6H PRN, Kaylynn Vasquez MD     oxyCODONE IR (ROXICODONE) half-tab 2.5-5 mg, 2.5-5 mg, Oral, Q4H PRN, Kaylynn Vasquez MD, 5 mg at 05/21/23 1539     pantoprazole (PROTONIX) EC tablet 40 mg, 40 mg, Oral, Daily, Kaylynn Vasquez MD, 40 mg at 05/22/23 0802     predniSONE (DELTASONE) tablet 15 mg, 15 mg, Oral, Daily, Kaylynn Vasquez MD, 15 mg at 05/22/23 0802     senna-docusate (SENOKOT-S/PERICOLACE) 8.6-50 MG per tablet 1 tablet, 1 tablet, Oral, BID PRN **OR** senna-docusate (SENOKOT-S/PERICOLACE) 8.6-50 MG per tablet 2 tablet, 2 tablet, Oral, BID PRN, Kaylynn Vasquez MD     sodium chloride (PF) 0.9% PF flush 3 mL, 3 mL, Intracatheter, Q8H, Kaylynn Vasquez MD, 3 mL at 05/21/23 1230     sodium chloride (PF) 0.9% PF flush 3 mL, 3 mL, Intracatheter, q1 min prn, Kaylynn Vsaquez MD    Physical Exam:    /87   Pulse 62   Temp 96.8  F (36  C)   Resp 16   Ht 1.752 m (5' 8.98\")   Wt 65 kg (143 lb 4.8 oz)   SpO2 96%   BMI 21.18 kg/m        General Appearance:  Alert and oriented x3. Cachectic and chronically sick looking.   Respiratory: crackels at left lung bases.   Cardiovascular: irregular rhythm, no murmurs, no gallops.   GI: bowel sounds present, soft and nontender to palpation. No rebound tenderness.  Skin: open wounds in bilateral lower extremities. No erythema.  Extremities: bilateral pitting edema +1 in lower extremities.        Labs & Studies of Note: I personally " reviewed the following studies:  CBC RESULTS: Recent Labs   Lab Test 05/17/23  0738   WBC 4.8   RBC 3.69*   HGB 11.5*   HCT 33.6*   MCV 91   MCH 31.2   MCHC 34.2   RDW 15.7*        Last Comprehensive Metabolic Panel:  Lab Results   Component Value Date     (L) 05/22/2023    POTASSIUM 3.9 05/22/2023    CHLORIDE 102 05/22/2023    CO2 25 05/22/2023    ANIONGAP 6 (L) 05/22/2023     (H) 05/22/2023    BUN 9.0 05/22/2023    CR 0.72 05/22/2023    GFRESTIMATED >90 05/22/2023    DAVID 8.0 (L) 05/22/2023

## 2023-05-23 NOTE — PROVIDER NOTIFICATION
[address] FYI xray results posted on NG, recommends advancing.  ICU RN said she had lots of resistance and not comfortable advancing it.  She also said IR does do the NGs.  thanks  Vanessa  [phone]

## 2023-05-23 NOTE — PROGRESS NOTES
[facility] Nurse Inpatient Assessment     Consulted for: bilateral lower calf wounds    Patient History (according to H&P provider note(s) 5/17/23:      Jeanine Schuler is a 62 year old female with a past medical history of HTN, chronic atrial fibrillation on anticouagulation until recently, multiple autoimmune diseases, sarcoidosis, pulmonary hypertension and CHF, who presented to our service with shortness of breath in rest, anterior pressure like chest pain that irradiated to back and severe weakness that worsen today. For the last couple of months she has generally felt unwell with nausea, poor apetite (last meal 5 days ago) and progressive debilitating weakness to the point of difficulty deambulating. 1 month ago she fell from her wheel chair and injured both legs, needing stitches and 2 rounds of bactrim due to infection. This wounds have been very painful and have contributed for her bedridden status.        Assessment:      Areas visualized during today's visit: Lower extremities     Wound location: bilateral LE    5/17: Left medial/posterior LE    5/17: Right lateral/posterior LE    Last photo: 5/23/23  Wound due to: Trauma  Wound history/plan of care: resulted from a fall- see HPI  Wound base: LLE 70/30 % eschar and slough/non-granular tissue- softening, RLE 80 % superficial scab and slough 20% dermis      Palpation of the wound bed: normal      Drainage: moderate from RLE, copious from LLE     Description of drainage: serosanguinous     Measurements (length x width x depth, in cm): LLE 7  x 7  x  0.5 cm, RLE 4  x 2.8  x  0.3 cm      Tunneling: N/A     Undermining: N/A  Periwound skin: Intact, Superficial erosion and Scar tissue      Color: normal and consistent with surrounding tissue and red      Temperature: normal   Odor: none  Pain: severe and during dressing change, sharp and tender  Pain interventions prior to dressing change: soaking and slow  and gentle cares   Treatment goal: Heal , Infection control/prevention, Maintain (prevention of deterioration), Pain control and Protection  STATUS: evolving  Supplies ordered: gathered, discussed with RN and discussed with patient      Treatment Plan:     Bilateral LE wound(s): Every other day  And PRN when soiled change cover dressing  Gently remove old dressing- please spray with wound cleanser or NS to loosen if sticking to wound bed  Cleanse wound bed with wound cleanser and pat dry.  Cut a piece of Hydrofera blue (#[id number]) the size of open area, moisten with NS, wring the excess amount and place it on open area.  Cover with 4x4 mepilex for RLE and sacral mepilex for LLE- if having to change sacral mepilex more than once daily please change to exudry pad ([id number]) secued with kerlix wrap and tape instead.    Orders: Reviewed    RECOMMEND PRIMARY TEAM ORDER: wound clinic and home care when discharged for follow-up on wounds  Education provided: plan of care, wound progress and Infection prevention   Discussed plan of care with: Patient and Nurse  WOC nurse follow-up plan: weekly  Notify WOC if wound(s) deteriorate.  Nursing to notify the Provider(s) and re-consult the WOC Nurse if new skin concern.    DATA:     Current support surface: Standard  Standard gel/foam mattress (IsoFlex, Atmos air, etc)  Containment of urine/stool: Incontinent pad in bed  BMI: Body mass index is 21.18 kg/m .   Active diet order: Orders Placed This Encounter      Combination Diet Regular Diet Adult     Output: No intake/output data recorded.     Labs:   Recent Labs   Lab 05/23/23  0545 05/18/23  1109 05/17/23  0738 05/16/23  1853 05/16/23  1653   ALBUMIN  --   --   --   --  2.5*   HGB 12.7   < > 11.5*   < > 15.3   INR  --   --  1.16*  --   --    WBC 6.2   < > 4.8  --  7.1    < > = values in this interval not displayed.     Pressure injury risk assessment:   Sensory Perception: 3-->slightly limited  Moisture: 3-->occasionally  moist  Activity: 1-->bedfast  Mobility: 2-->very limited  Nutrition: 2-->probably inadequate  Friction and Shear: 2-->potential problem  Froy Score: 13    Delmy Sigala RN  CWOCN  Pager no longer is use, please contact through [employer]   [place] group: [facility] Nurse  Dept. Office Number: *[phone]

## 2023-05-23 NOTE — PROGRESS NOTES
[facility]    Medicine Progress Note - Hospitalist Service, GOLD TEAM 16    Date of Admission:  5/16/2023    Assessment & Plan     62F with history of sarcoidosis, CHF, deconditioning, history of PE, mixed connective tissue disease; presents with shortness of breath, found to have acute LE DVTs bilaterally.         Pulmonary Embolism,  Bilateral DVT.  Due to medication noncompliance.    -Started on heparin drip, discontinued 5/18/2023 and started on apixaban 5 mg p.o. twice daily  -Lower extremity venous doppler US revealed partially occlusive thrombus in the common femoral vein, proximal femoral vein, and popliteal vein and partially occlusive thrombus in the left distal femoral vein and popliteal vein.   -Chest CTA revealed multiple right sided pulmonary emboli, some of which appear to be chronic.        Hypoxia due to PE.  Was on 2 L of oxygen via nasal cannula.  Oxygen saturation would dip down to the 80s when on room air.  Would continue supplemental oxygen to maintain oxygen saturation greater than 94%.  Weaned off supplemental oxygen on 5/22.     Bilateral lower extremity lacerations with hx of wound infection  About 1 month PTA, she fell from her wheel chair and sustained severe laceration of bilateral lower calf. She was seen in ED and the wounds were sutured. Subsequently, she developed wound infection and was treated with 2 rounds of bactrim for cellulitis. She continues to endorse significant pain not being able to ambulate. The wound does not appear to be acutely infected on admission.  - wound care consult     Episodes of hypoglycemia, recurrent.  Due to poor p.o. intake.  Unable cortisol level within normal limits.    The patient has glucometer at home and has been noting hypoglycemia 50s and 60s at home. In the ED, finger stick glucose had repeatedly low values in to 10s and serum glucose was in 60s. Of note, she was on prednisone 15mg daily for her  rheumatologic conditions, but has not been taking them putting her at risk for AI. She is also not taking much po.  - glucose check q6h  We will maintain patient on D5 normal saline at 74 ml/hr-and encourage her to eat.     Sarcoidosis with pulmonary involvement  pulmonary hypertension  Was seen by Pulmonary Dr Perlman in 87/2022, she follows with Dr Bentley for pulmonary hypertension. Was on prednisone and imuran in the past, but she stopped taking all of her rheumatologic medications due to not feeling well.  - monitor O2 saturation  - clarify current medication regimen     Mixed connective tissue disease  Scleroderma  Raynauds  Follows with Rheum Dr Nicolás De La Torre, not taking home meds including prednisone. Last seen in 7/2022  - need rheumatology follow up.  - resumed prednisone     Deconditioning  Malnutrition, low blood sugar episodes.  Suspect due to poor p.o. intake.  Was given IV fluids with dextrose as she was having low blood sugar measurements on 5/17.  Discontinued due to concerns for hypervolemia and persistent need for supplemental oxygen  Patient with poor apetite for the last 2 months with poor PO intake, last meal 5 days ago due to nausea. Albumin: 2.5. She states at home she was given a glucometer and she is ranged in the 40-60 range. On admission Glucose 61, and at bedside 41.  - monitor po intake  - consider nutrition consult  - PT/OT consultation     CHF, stable, no acute exacerbation.  Hx of atrial fibrillation  Pulmonary Edema  Small bilateral Pleural effusions  Chest CT on admission reveals small bilateral pleural effusions L>R, Interlobular septal thickening and areas of peribronchovascular nodular and GGO in the lungs bilaterally, cardiomegaly and ascitis. BNP on admission to the ER was elevated, 7752. Last echo in 5/2022 showed EF 50-55%.  - resume apixaban in am (as above)  - hold PTA metoprolol and amiodarone as she was not taking them and VS stable.  - hold off IV diuresis at this  time.     CT evidence of Cirrhosis   Portal Hypertension  Patient with no previous history of cirrhosis or liver disease. A Abdomen CT was ordered in ER revealed a cirrhotic liver with portal hypertension, ascites, diffuse anasarca and recanalized unbilical vein.   - need outpatient work up and close follow up     right breast skin thickening and nipple retraction on CT.   Incidental finding on CT done in the ED.   - outpatient mammography  Hyponatremia, likely due to IV fluids and diuretic use.  Patient was alert oriented x3 .  Discontinue IV fluids     Severe Protein Calorie Malnutrition in the setting of chronic illness.. Poor appetite and poor oral intake. Frequent episodes of hypoglycemia.  Nutrition consulted.  Patient to be started on tube feeding after NG tube placed today, 5/22.  Appreciate dietitian assistance in this regard.       Diet: Combination Diet Regular Diet Adult  Snacks/Supplements Adult: Other; Please allow pt/RN to order snacks/supplements PRN; Between Meals    DVT Prophylaxis: DOAC  Deleon Catheter: Not present  Lines: None     Cardiac Monitoring: None  Code Status: Full Code      Clinically Significant Risk Factors            # Hypomagnesemia: Lowest Mg = 1.3 mg/dL in last 2 days, will replace as needed   # Hypoalbuminemia: Lowest albumin = 2.5 g/dL at 5/16/2023  4:53 PM, will monitor as appropriate     # Hypertension: Noted on problem list         # Severe Malnutrition: based on nutrition assessment         Disposition Plan     Expected Discharge Date: 05/23/2023      Destination: home            Lizeth Trotter MD  Hospitalist Service, GOLD TEAM 16  [facility]  Securely message with [facility] (more info)  Text page via [email or web address] Paging/Directory   See signed in provider for up to date coverage information  ______________________________________________________________________    Interval History   Doing ok  Discussed medication importance  NG tube  for additional nutritional support      Physical Exam   Vital Signs: Temp: (!) 95.7  F (35.4  C) Temp src: Oral BP: 130/88 Pulse: 69   Resp: 18 SpO2: 96 % O2 Device: None (Room air)    Weight: 143 lbs 4.78 oz    General: No acute distress, breathing comfortably on room air  Neuro: EOMI, PERRLA. Facial muscles symmetric, strength/sensation grossly intact.  HEENT: Anicteric sclera. Oropharynx is clear. No lymphadenopathy.  Chest/Lungs: No accessory respiratory muscle use. Adequate air movement throughout. CTAB.  CV: Normal rate, regular rhythm. nl S1/S2. No m/r/g. Cap refill &lt;2s.  Abd: BS+. Soft, NTND.  Ext: Warm, well-perfused. Strong distal pulses.     Medical Decision Making       40 MINUTES SPENT BY ME on the date of service doing chart review, history, exam, documentation & further activities per the note.      Data     I have personally reviewed the following data over the past 24 hrs:    6.2  \   12.7   / 257     130 (L) 98 8.5 /  98   4.0 22 0.64 \       Imaging results reviewed over the past 24 hrs:   No results found for this or any previous visit (from the past 24 hour(s)).  Recent Labs   Lab 05/23/23  0832 05/23/23  0545 05/23/23  0119 05/22/23  0728 05/22/23  0644 05/21/23  1217 05/21/23  0701 05/17/23  0805 05/17/23  0738 05/16/23  1815 05/16/23  1653   WBC  --  6.2  --   --  4.4  --  4.2   < > 4.8  --  7.1   HGB  --  12.7  --   --  12.0  --  11.5*   < > 11.5*   < > 15.3   MCV  --  93  --   --  93  --  95   < > 91  --  95   PLT  --  257  --   --  228  --  195   < > 198  --  358   INR  --   --   --   --   --   --   --   --  1.16*  --   --    NA  --  130*  --   --  133*  --  132*   < > 131*   < > 134*   POTASSIUM  --  4.0  --   --  3.9  --  3.7   < > 4.3   < > 4.1   CHLORIDE  --  98  --   --  102  --  102   < > 100  --  98   CO2  --  22  --   --  25  --  22   < > 21*  --  17*   BUN  --  8.5  --   --  9.0  --  9.5   < > 9.5  --  9.6   CR  --  0.64  --   --  0.72  --  0.72   < > 0.82  --  0.82   ANIONGAP   --  10  --   --  6*  --  8   < > 10  --  19*   DAVID  --  7.8*  --   --  8.0*  --  7.9*   < > 7.6*  --  8.4*   GLC 98 92 102*   < > 86   < > 79   < > 83   < > 61*   ALBUMIN  --   --   --   --   --   --   --   --   --   --  2.5*   PROTTOTAL  --   --   --   --   --   --   --   --   --   --  6.1*   BILITOTAL  --   --   --   --   --   --   --   --   --   --  1.3*   ALKPHOS  --   --   --   --   --   --   --   --   --   --  119*   ALT  --   --   --   --   --   --   --   --   --   --  16    < > = values in this interval not displayed.

## 2023-05-23 NOTE — PROVIDER NOTIFICATION
[address] [facility] ICU RN will come back to try the NG, she asks if you can put in a PT care order that says OK to place NG with prior gastric surgery.  thanks  Vanessa  [phone]

## 2023-05-23 NOTE — PROGRESS NOTES
[address] NG is in, waiting for xray, just took her CBG & It says she is 22, has raynauds, asymptomatic.  Could you put in a lab order to check her CB?  thanks  Vanessa  [phone]

## 2023-05-23 NOTE — PROGRESS NOTES
"  VS: BP (!) 140/93 (BP Location: Left arm)   Pulse 82   Temp 96.9  F (36.1  C) (Oral)   Resp 16   Ht 1.752 m (5' 8.98\")   Wt 65 kg (143 lb 4.8 oz)   SpO2 98%   BMI 21.18 kg/m     O2: RA   Output: Voids in bedpan   Last BM: 5/18   Activity: Not OOB   Skin: Wounds to BLE, WOC RN changed dressings today   Pain: Mostly denies, but can get oxy or tylenol prn   CMS: Numbness in BLE/feet   Dressing: Dressings to BLE wound sites   Diet: reg   LDA: PIV in RFA & LFA   Equipment: Assist X1 with gait & walker   Plan: ?   Additional Info:      NG placed today but according to XRAY results needs to be advanced.  MD was notified and did not receive a call back on what to do next.  She is a difficult placement & there was lots of resistance when NG was going in. Patient has a HX of gastric bypass in 2006.    TF supplies are at the bedside.  She takes very little PO intake.          "

## 2023-05-23 NOTE — PLAN OF CARE
"VS: /89 (BP Location: Left arm)   Pulse 62   Temp (!) 96.7  F (35.9  C) (Oral)   Resp 18   Ht 1.752 m (5' 8.98\")   Wt 65 kg (143 lb 4.8 oz)   SpO2 98%   BMI 21.18 kg/m      O2: O2 SATS >90% denies chest pain,  or SBO on O2 2L Via  n/c    Output: Voids adequately using bedpan    Last BM: 5//23 BS present all x4 quadrants    Activity: Not OOB this shift   Up for meals?    Skin: + 2  and +3  edema to BLE  wounds to bilateral calfs  dressing change every other day   Pain: Denies pain    CMS: Baseline numbness and tingling to  BLE    Dressing: Foam dressing to bilateral  calfs CDI ,L calf dressing changed    Diet: Regular diet with poor appetite    LDA: L PIV infusing with dextrose   Equipment: IV Pole, personal belongings     Plan: TBD  continue with plan of care .   Additional Info:                            "

## 2023-05-23 NOTE — PROVIDER NOTIFICATION
[facility] 52Maryann pulled the NG tube, pt has hx of gastric bypass in 2006, they recommend she goes to IR for placement.    Thanks  Vanessa  [phone]

## 2023-05-23 NOTE — PLAN OF CARE
Goal Outcome Evaluation:                                   VS: VSS   O2: O2 SATS >92% on RA  denies CP,SBO.    Output: Voids adequately using bedpan    Last BM: Last BM 5/18/23   BS present in all x4 quadrants    Activity: Not OOB this shift       Skin: Edema to BLE    Wounds to bilateral calves     Pain: Denies pain    CMS: AO4  Baseline numbness and tingling to BLE    Dressing: Dressing to bilateral calves CDI, due every other day.   Tegaderm on heels.    Diet: Regular diet with poor appetite   Denies N/V overnight   LDA: L PIV infusing with dextrose 10% CONTN.    Equipment: IV Pole, personal belongings     Plan: TBD  continue with POC   Additional Info:  RN Managed K+    See notes on g-tube placement in regards to nutrition needs.

## 2023-05-24 NOTE — PROGRESS NOTES
[facility]    Medicine Progress Note - Hospitalist Service, GOLD TEAM 16    Date of Admission:  5/16/2023    Assessment & Plan     62F with history of sarcoidosis, CHF, deconditioning, history of PE, mixed connective tissue disease; presents with shortness of breath, found to have acute LE DVTs bilaterally.      Pulmonary Embolism,  Bilateral DVT.  Due to medication noncompliance.    -Started on heparin drip, discontinued 5/18/2023 and started on apixaban 5 mg p.o. twice daily  -Lower extremity venous doppler US revealed partially occlusive thrombus in the common femoral vein, proximal femoral vein, and popliteal vein and partially occlusive thrombus in the left distal femoral vein and popliteal vein.   -Chest CTA revealed multiple right sided pulmonary emboli, some of which appear to be chronic.     Hypoxia due to PE.  Was on 2 L of oxygen via nasal cannula.  Oxygen saturation would dip down to the 80s when on room air.  Would continue supplemental oxygen to maintain oxygen saturation greater than 94%.  Weaned off supplemental oxygen on 5/22.     Bilateral lower extremity lacerations with hx of wound infection  About 1 month PTA, she fell from her wheel chair and sustained severe laceration of bilateral lower calf. She was seen in ED and the wounds were sutured. Subsequently, she developed wound infection and was treated with 2 rounds of bactrim for cellulitis. She continues to endorse significant pain not being able to ambulate. The wound does not appear to be acutely infected on admission.  - wound care consult     Episodes of hypoglycemia, recurrent.  Due to poor p.o. intake.  Unable cortisol level within normal limits.    The patient has glucometer at home and has been noting hypoglycemia 50s and 60s at home. In the ED, finger stick glucose had repeatedly low values in to 10s and serum glucose was in 60s. Of note, she was on prednisone 15mg daily for her  rheumatologic conditions, but has not been taking them putting her at risk for AI. She is also not taking much po.  -glucose check q6h  We will maintain patient on D5 normal saline at 74 ml/hr-and encourage her to eat.     Sarcoidosis with pulmonary involvement  pulmonary hypertension  Was seen by Pulmonary Dr Perlman in 87/2022, she follows with Dr Bentley for pulmonary hypertension. Was on prednisone and imuran in the past, but she stopped taking all of her rheumatologic medications due to not feeling well.  - monitor O2 saturation  - clarify current medication regimen     Mixed connective tissue disease  Scleroderma  Raynauds  Follows with Rheum Dr Nicolás De La Torre, not taking home meds including prednisone. Last seen in 7/2022  - need rheumatology follow up.  - resumed prednisone     Deconditioning  Malnutrition, low blood sugar episodes.  Suspect due to poor p.o. intake.  Was given IV fluids with dextrose as she was having low blood sugar measurements on 5/17.  Discontinued due to concerns for hypervolemia and persistent need for supplemental oxygen  Patient with poor apetite for the last 2 months with poor PO intake, last meal 5 days ago due to nausea. Albumin: 2.5. She states at home she was given a glucometer and she is ranged in the 40-60 range. On admission Glucose 61, and at bedside 41.  - monitor po intake  - nutrition consult  - NGT placement 5/24  - PT/OT consultation     CHF, stable, no acute exacerbation.  Hx of atrial fibrillation  Pulmonary Edema  Small bilateral Pleural effusions  Chest CT on admission reveals small bilateral pleural effusions L>R, Interlobular septal thickening and areas of peribronchovascular nodular and GGO in the lungs bilaterally, cardiomegaly and ascitis. BNP on admission to the ER was elevated, 7752. Last echo in 5/2022 showed EF 50-55%.  - resume apixaban in am (as above)  - hold PTA metoprolol and amiodarone as she was not taking them and VS stable.  - hold off IV diuresis  at this time.     CT evidence of Cirrhosis   Portal Hypertension  Patient with no previous history of cirrhosis or liver disease. A Abdomen CT was ordered in ER revealed a cirrhotic liver with portal hypertension, ascites, diffuse anasarca and recanalized unbilical vein.   - need outpatient work up and close follow up     Right breast skin thickening and nipple retraction on CT.   Incidental finding on CT done in the ED.   - outpatient mammography    Hyponatremia, likely due to IV fluids and diuretic use.  Patient was alert oriented x3 .  Discontinue IV fluids     Severe Protein Calorie Malnutrition in the setting of chronic illness.. Poor appetite and poor oral intake. Frequent episodes of hypoglycemia.  Nutrition consulted.  Patient to be started on tube feeding after NG tube placed today, 5/24.  Appreciate dietitian assistance in this regard.       Diet: Combination Diet Regular Diet Adult  Snacks/Supplements Adult: Other; Please allow pt/RN to order snacks/supplements PRN; Between Meals  Adult Formula Drip Feeding: Continuous Osmolite 1.5; Other - Specify in Comment; Goal Rate: Pending AXR Confirmation of tube placement, initiate at 10 ml/hr, advance as tolerated by 10 ml/hr q 12 hr to goal 50 ml/hr; mL/hr; Do not advance tube fee...    DVT Prophylaxis: DOAC  Deleon Catheter: Not present  Lines: None     Cardiac Monitoring: None  Code Status: Full Code      Clinically Significant Risk Factors         # Hyponatremia: Lowest Na = 127 mmol/L in last 2 days, will monitor as appropriate      # Hypoalbuminemia: Lowest albumin = 2.5 g/dL at 5/16/2023  4:53 PM, will monitor as appropriate     # Hypertension: Noted on problem list         # Severe Malnutrition: based on nutrition assessment         Disposition Plan      Expected Discharge Date: 05/26/2023      Destination: home            Lizeth Trotter MD  Hospitalist Service, GOLD TEAM 16  [facility]  Securely message  with [employer] (more info)  Text page via [facility] Paging/Directory   See signed in provider for up to date coverage information  ______________________________________________________________________    Interval History   Doing ok  Discussed medication importance and therapy importance   NG tube for additional nutritional support      Physical Exam   Vital Signs: Temp: 96.9  F (36.1  C) Temp src: Oral BP: 112/75 Pulse: 68   Resp: 18 SpO2: 97 % O2 Device: None (Room air)    Weight: 143 lbs 4.78 oz    General: No acute distress, breathing comfortably on room air  Neuro: EOMI, PERRLA. Facial muscles symmetric, strength/sensation grossly intact.  HEENT: Anicteric sclera. Oropharynx is clear. No lymphadenopathy.  Chest/Lungs: No accessory respiratory muscle use. Adequate air movement throughout. CTAB.  CV: Normal rate, regular rhythm. nl S1/S2. No m/r/g. Cap refill &lt;2s.  Abd: BS+. Soft, NTND.  Ext: Warm, well-perfused. Strong distal pulses.     Medical Decision Making       40 MINUTES SPENT BY ME on the date of service doing chart review, history, exam, documentation & further activities per the note.      Data     I have personally reviewed the following data over the past 24 hrs:    5.0  \   11.1 (L)   / 187     127 (L) 97 (L) 9.3 /  76   3.6 23 0.73 \       Imaging results reviewed over the past 24 hrs:   Recent Results (from the past 24 hour(s))   XR Abdomen Port 1 View    Narrative    Exam: XR ABDOMEN PORT 1 VIEW, 5/23/2023 3:13 PM    Indication: ng tube    Comparison: None    Findings:   Portable supine radiograph abdomen. NG tube.    Enteric tube tip projects over the expected location of distal  esophagus/hiatus of diaphragm . Nonobstructive bowel gas pattern.  Portal venous gas or pneumatosis. Moderate colonic stool burden.  Right upper quadrant cholecystectomy clips. Status post Eleazar-en-Y  gastric bypass.  Bilateral pleural effusion. Streaky opacities within the left lower  lobe along with bilateral lower lobe  atelectasis. Partial  visualization of left hemidiaphragm.      Impression    Impression:  1. Enteric tube tip projects over the expected location of distal  esophagus/hiatus region. Recommend repositioning/advancement.  2. Bilateral pleural effusion with overlying atelectasis.    I have personally reviewed the examination and initial interpretation  and I agree with the findings.    SARAH LEMUS DO         SYSTEM ID:  [id number]     Recent Labs   Lab 05/24/23  1125 05/24/23  1053 05/24/23  1034 05/24/23  0557 05/23/23  0832 05/23/23  0545 05/22/23  0728 05/22/23  0644   WBC  --   --   --  5.0  --  6.2  --  4.4   HGB  --   --   --  11.1*  --  12.7  --  12.0   MCV  --   --   --  94  --  93  --  93   PLT  --   --   --  187  --  257  --  228   NA  --   --   --  127*  --  130*  --  133*   POTASSIUM  --   --   --  3.6  --  4.0  --  3.9   CHLORIDE  --   --   --  97*  --  98  --  102   CO2  --   --   --  23  --  22  --  25   BUN  --   --   --  9.3  --  8.5  --  9.0   CR  --   --   --  0.73  --  0.64  --  0.72   ANIONGAP  --   --   --  7  --  10  --  6*   DAVID  --   --   --  7.4*  --  7.8*  --  8.0*   GLC 76 39* 54* 100*   < > 92   < > 86    < > = values in this interval not displayed.

## 2023-05-24 NOTE — PHARMACY-CONSULT NOTE
Pharmacy Tube Feeding Consult    Medication reviewed for administration by feeding tube and for potential food/drug interactions.    Recommendation:     1. Metoprolol succinate ER 50 mg once daily -- this is a PTA medication which is being held during this admission as progess notes indicate that patient was not taking this medication PTA. In case metoprolol is to be resumed, metoprolol ER should be converted to metoprolol tartrate (the immediate-release formulation) 25 mg two times daily to make up for the TDD of 50 mg.       Pharmacy will continue to follow as new medications are ordered.    Joel Altman, ClaudiaD

## 2023-05-24 NOTE — PROGRESS NOTES
"  Shift [id number] -Paged MD HOBSONP-pt S.B 526 ortho NG tube got placed today. X-ray results indicate the need to advance the NG. Any updates on what the plan is?   Response-IR will follow up  Tomorrow morning.    8:40PM-526 ortho S.B complaining of R foot pain. pain med given. but I noticed  R dorsal pulse is absent, and posterior pulse is 2+.   KATEY RN [id number]   Response-doc at bedside was able to palpate normal pulse. \"Keep monitoring doppler pulse  every 3 hours for now\".         VS: /77 (BP Location: Right arm, Patient Position: Semi-Love's)   Pulse 64   Temp (!) 96.2  F (35.7  C) (Oral)   Resp 16   Ht 1.752 m (5' 8.98\")   Wt 65 kg (143 lb 4.8 oz)   SpO2 99%   BMI 21.18 kg/m     O2: Stable on room air>95%. Denies SOB and chest pain    Output: Bedpan    Last BM: 5/18/23 per PT  report   Activity: Not OOB   Skin: Edema to Bilat extremities. bilat calf wounds   Pain: Right foot pain managed with PRN oxycodone and Tylenol   CMS: AXO x4  Bilat foot Doppler dorsal and posterior pulse normal   Dressing: Bilat calf mepilex-CDI   Diet: Regular diet. Poor appetite  NG tube placed .    LDA: 5/18/23 per PT report. Denies Abdominal discomfort. Passing flatus   Equipment: Pt belongings, iv pole and pump   Plan:    Additional Info:        "

## 2023-05-24 NOTE — PLAN OF CARE
Goal Outcome Evaluation:      Plan of Care Reviewed With: patient    Overall Patient Progress: improving    Outcome Evaluation: Patient got tube feed repositioned, starting feeds today. Will continue to monitor for EN tolerance/advancement.

## 2023-05-24 NOTE — PROGRESS NOTES
"  VS: /75   Pulse 68   Temp 96.9  F (36.1  C)   Resp 18   Ht 1.752 m (5' 8.98\")   Wt 65 kg (143 lb 4.8 oz)   SpO2 97%   BMI 21.18 kg/m     O2: RA   Output: Voids in bedpan   Last BM: 5/18   Activity: Not OOB   Skin: BLE lower wounds, has raynauds   Pain: PRN oxy, c/o pain in R foot   CMS: Numbness in BLE, feet   Dressing: mepilex to BLE back calves   Diet: reg   LDA: PIV in LFA   Equipment: Not getting OOB   Plan: ?   Additional Info:      Had NG tube adjusted today in IR  TF started  Checking pulses Q3hrs in bilateral feet  Checking blood sugars, patient has dropped low the last few days.  "

## 2023-05-24 NOTE — PROGRESS NOTES
Cross cover:  I was paged at 7:45 PM for evaluation of x-ray abdomen.  NG tube in the distal esophagus.  Discussed with RN.  RN hesitant to advance.  Plan to touch base with IR tomorrow morning.  Primary team to follow in AM.  Briefly met with patient and discussed the plan of care.      Addendum 9 PM:  I was again notified that RN was not able to locate right dorsalis pedis pulse  Examined the patient  Patient reports pain is better with oxycodone  Patient has b/l feet swelling. Both feets are slightly on the cooler side  Both dorsalis pedis pulse are difficult to palpate with hand  Both dorsalis pedis pulse are normal with doppler. May be slightly lower on the right, but becomes strong with slightly mobility  Advise to continue to doppler pulse every 3 hours for now.         Samuel Shah MD  [facility]  Contact information available via [facility] Paging/Directory

## 2023-05-24 NOTE — PROGRESS NOTES
CLINICAL NUTRITION SERVICES - REASSESSMENT NOTE     Nutrition Prescription    RECOMMENDATIONS FOR MDs/PROVIDERS TO ORDER:  Patient at high risk of refeeding, monitor and replete electrolytes aggressively.    Malnutrition Status:     Severe malnutrition in the context of acute on chronic illness    Recommendations already ordered by Registered Dietitian (RD):  Discussed TF plan with pt  See prior RD note for TF order    Future/Additional Recommendations:  Monitor labs, intakes, EN, and weight trends.     EVALUATION OF THE PROGRESS TOWARD GOALS   Diet: Regular  Intake/[place]: Poorly documented  Snacks/supplements: PRN     NEW FINDINGS/REVIEW OF SYSTEMS    Nutrition/GI: RD met with pt at bedside. Pt states the NG placement was painful and traumatic yesterday but getting it repositioned today was not painful and was much easier. Bridle placement was uncomfortable per pt but now with no pain/discomfort from the tube. Pt hopeful that this tube feeding works to stabilize blood sugars.    Weights: Pt reports 15 lb weight loss in last three months.   Wt Readings from Last Encounters:   05/17/23 65 kg (143 lb 4.8 oz)   04/17/23 68 kg (150 lb)   06/28/22 71.7 kg (158 lb)   06/14/22 68 kg (150 lb)   05/05/22 71.8 kg (158 lb 4.6 oz)   05/02/22 73.4 kg (161 lb 14.4 oz)   04/28/22 70.3 kg (155 lb)   04/20/22 68.1 kg (150 lb 1.6 oz)   04/19/22 62.6 kg (138 lb)   04/08/22 68.7 kg (151 lb 8 oz)   04/04/22 69.9 kg (154 lb)   03/08/22 74.4 kg (164 lb)   02/15/22 76.1 kg (167 lb 12.8 oz)   01/28/22 80.3 kg (177 lb)   01/26/22 79.4 kg (175 lb)   01/13/22 78.9 kg (174 lb)     Skin: See WOC nurse 5/23     Labs reviewed   05/22/23 06:44 05/23/23 05:45 05/24/23 05:57   Sodium 133 (L) 130 (L) 127 (L)   Potassium 3.9 4.0 3.6   Magnesium 1.3 (L)     Phosphorus 2.7         05/23/23 01:19 05/23/23 08:32 05/23/23 13:05 05/23/23 13:11 05/23/23 13:38 05/23/23 18:13 05/23/23 22:34 05/24/23 01:27   Glucose 102 (H) 98 20 (LL) 18 (LL) 89 87 108 (H)  100 (H)     Medications reviewed: Dextrose pushes, protonix, prednisone, D10 gtt, zofran PRN    MALNUTRITION  % Intake: </= 50% for >/= 5 days (severe)  % Weight Loss: > 7.5% in 3 months (severe) - per pt   Subcutaneous Fat Loss: Global severe  Muscle Loss: Global severe  Fluid Accumulation/Edema: Mild-moderate  Malnutrition Diagnosis: Severe malnutrition in the context of chronic illness.    Previous Goals   Patient to consume % of nutritionally adequate meal trays TID, or the equivalent with supplements/snacks.  Evaluation: Not met    Previous Nutrition Diagnosis  Inadequate oral intake related to nausea/vomiting, poor appetite as evidenced by patient report.     Evaluation: No change    CURRENT NUTRITION DIAGNOSIS  Inadequate oral intake related to nausea/vomiting, poor appetite as evidenced by patient report, now requiring EN to meet nutrition needs.     INTERVENTIONS  Implementation  Collaboration with other providers - RN  Enteral Nutrition - Initiate as ordered    Goals  Total avg nutritional intake to meet a minimum of 25 kcal/kg and 1 g PRO/kg daily (per dosing wt 65 kg).    Monitoring/Evaluation  Progress toward goals will be monitored and evaluated per protocol.    Krystal So MS, RDN, LDN  RD pager: [phone]  [facility] Weekend/Holiday Pager: [phone]

## 2023-05-25 NOTE — PLAN OF CARE
"Patient is alert and oriented x4, denies any pain this shift. Left PIV removed. Patient continent of bowel and bladder, last bowel movement 5/24/23. NG tube in place, tube feed running at 10ml/hr. Advance tube feed as tolerated. Lower extremity edema. Baseline numbness and tingling in extremities Patient is able to make needs known and uses the call light appropriately. Continue with the plan of care.    /69 (BP Location: Right arm)   Pulse 74   Temp (!) 96  F (35.6  C) (Oral)   Resp 18   Ht 1.727 m (5' 8\")   Wt 64 kg (141 lb 1.5 oz)   SpO2 96%   BMI 21.45 kg/m      "

## 2023-05-25 NOTE — PLAN OF CARE
Goal Outcome Evaluation:      Plan of Care Reviewed With: patient    Overall Patient Progress: no changeOverall Patient Progress: no change    Outcome Evaluation: Tube feed in place, running at 10mL/hr. Low BG this AM, up to 93. Continue IVF dextrose, PRN dextrose if needed. Working with therapies. Dressings to BLE changed today.      VS: VSS ex BP elevated this AM, improved in afternoon.  Temp: 97  F (36.1  C) Temp src: Oral BP: 119/74 Pulse: 96   Resp: 16 SpO2: 97 % O2 Device: None (Room air)     O2: >90% on RA, denies SOB or CP. LSCTA.    Output: Voiding without difficulty using bedpan.   Last BM: LBM 5/23 per pt report, +flatus, BS hypoactive   Activity: Not OOB, wheelchair based at home. Working with therapies to gain strength. Goal is to stand at EOB today and pt stood for about 30 seconds with assist of 2, gait belt and walker. Pt very anxious with activity, provided reassurance and encouragement.   Skin: Skin intact ex for BLE wounds and gary skin color. Discoloration to fingertips and toes due to Raynaud's disease.   Pain: Pain to toes and headache today managed with PRN oxycodone 5 mg Q4H. Declined tylenol PRN. L>R pain to BLE.   CMS: Baseline neuropathy to all extremities, worse in toes per pt. Doppler pulse for dorsalis pedis +. Radial pulse +2. Edema to BLE +2-3.    Dressing: Dressing to BLEs changed today ,dressing due 5/27.   Diet: Regular diet, poor appetite. Intermittent nausea managed with PRN zofran and PRN compazine. 1x emesis after AM meds.  BG checks BID and at bedtime, hypoglycemic this AM up to 93 with rechecks. BG 78 this evening. PRN dextrose added for IVP if pt hypoglycemic.     LDA: PIV LUE infusing D5 and NS 75mL/hr. NGT running at 10mL/hr.    Equipment: IV pole, walker, personal belongings at bedside   Plan: Discharge TBD pending medical stability, OT recommending TCU and SW following.   Additional Info: Portable Xray done of abdomen today due to discomfort after NGT placement-  negative.   Magnesium replaced today, K+ and phos remain WDL, labs placed for AM. Do not advance tube feeds until electrolyte goals ae met per orders.   Daily weights ordered with TF- attempted today but inaccurate due to pt not standing long enough to zero. Will attempt tomorrow with therapy.     Patient care order placed by MD that if BG <70 then it is okay to leave the room and recheck hourly until >70.

## 2023-05-25 NOTE — PLAN OF CARE
VS: Temp: (!) 96.6  F (35.9  C) Temp src: Oral BP: 138/84 Pulse: 74   Resp: 18 SpO2: 97 % O2 Device: None (Room air)      O2: SpO2 > 97% and stable on RA. LS clear and equal bilaterally. Denies chest pain and SOB.    Output: Voids spontaneously without difficulty to Bedpan.   Last BM: Couple days ago per pt, denies abdominal discomfort. BS active / passing flatus.    Activity: Not OOB this shift. Activity encouraged, pt said she's going to work PT & OT in the morning.   Skin: WDL except, BLE above heels wounds    Pain: Pt denied pain this shift.   CMS: Intact, AOx4. Baseline numbness and tingling in all extremities. BLE edema.   Dressing: Mepilex On BLE to calves are CDI. Dressing Changed on 05/24.   Diet: Regular diet. Denies nausea/vomiting.    LDA: R PIV infusing Dextrose 5% & NS @ 75mL/hr. NG tube feeding infusing 10mL/hr.   Equipment: IV pole, personal belongings,    Plan: Continue with plan of care. Call light within reach, pt able to make needs known.    Additional Info: Pt BG was low & continues Dextrose 5% & NS was ordered. BG went up to 96.  Pt is on K, Mg, Ph, RN managed Lab draw this morning.        3:16 AM  Pt (B,S) 5 ortho, Room#526, BG is 61 & 59. Pt is not eating. Pt is hard stick & have no IV. Please Call me back.  GABI Levi #[id number].  Thanks     Medicine called: ordered Dextrose 5% & NS     4:39 AM  Pt (B,S) Room# 526, BG still reading low: 52. Would you please order STAT Lab Glucose draw to confirm.  Thanks,  GABI Levi, #[id number]    Reply: Ask lab to collect earlier.  -Called Lab & they said that they are short staff.

## 2023-05-25 NOTE — PROGRESS NOTES
Care Management Follow Up    Length of Stay (days): 9    Expected Discharge Date: 05/27/2023     Concerns to be Addressed: discharge planning     Patient plan of care discussed at interdisciplinary rounds: Yes    Anticipated Discharge Disposition:  TCU     Anticipated Discharge Services:  Post acute therapies  Anticipated Discharge DME: None     Patient/family educated on Medicare website which has current facility and service quality ratings: Not at this time.   Education Provided on the Discharge Plan:  Discharge plan still pending  Patient/Family in Agreement with the Plan:  See above    Referrals Placed by CM/SW:  None at this time.  Private pay costs discussed:  Not at this time.    Additional Information:  DAJUAN discussed pt's status with OT. TCU is still recommended; however, pt needs consistent participation before TCU referrals can be sent. PT will also be able to work with pt once pt progresses with OT. TCU recs from both PT and OT would also increase the chance of pt getting into TCU. OT reported pt having high anxiety, which has also been a contributing factor to pt's limited therapy participation; suggested Health Psychology Consult to address this issue (pt would also benefit d/t other chronic health issues that she's been adapting to).    1520: DAJUAN paged Dr. Trotter, requested Health Psychology Consultation be added for pt.    1525: DAJUAN met with pt and spouse at bedside, provided education on skilled requirement for TCU; CM can address more information re: TCU once pt progresses with therapy and remains a TCU rec. DAJUAN also provided information re: Health Psychologist and their role. Pt was agreeable to meeting/speaking to Health Psychology.        TASIA OlivierW, LSW  [facility]   PHONE: [phone]  Pager: [phone]

## 2023-05-25 NOTE — PROGRESS NOTES
[facility]    Medicine Progress Note - Hospitalist Service, GOLD TEAM 16    Date of Admission:  5/16/2023    Assessment & Plan     62F with history of sarcoidosis, CHF, deconditioning, history of PE, mixed connective tissue disease; presents with shortness of breath, found to have acute LE DVTs bilaterally.      Pulmonary Embolism,  Bilateral DVT.  Due to medication noncompliance.    -Started on heparin drip, discontinued 5/18/2023 and started on apixaban 5 mg p.o. twice daily  -Lower extremity venous doppler US revealed partially occlusive thrombus in the common femoral vein, proximal femoral vein, and popliteal vein and partially occlusive thrombus in the left distal femoral vein and popliteal vein.   -Chest CTA revealed multiple right sided pulmonary emboli, some of which appear to be chronic.     Hypoxia due to PE.  Was on 2 L of oxygen via nasal cannula.  Oxygen saturation would dip down to the 80s when on room air.  Would continue supplemental oxygen to maintain oxygen saturation greater than 94%.  Weaned off supplemental oxygen on 5/22.     Bilateral lower extremity lacerations with hx of wound infection  About 1 month PTA, she fell from her wheel chair and sustained severe laceration of bilateral lower calf. She was seen in ED and the wounds were sutured. Subsequently, she developed wound infection and was treated with 2 rounds of bactrim for cellulitis. She continues to endorse significant pain not being able to ambulate. The wound does not appear to be acutely infected on admission.  - wound care consult     Episodes of hypoglycemia, recurrent.  Due to poor p.o. intake.  Unable cortisol level within normal limits.    The patient has glucometer at home and has been noting hypoglycemia 50s and 60s at home. In the ED, finger stick glucose had repeatedly low values in to 10s and serum glucose was in 60s. Of note, she was on prednisone 15mg daily for her  rheumatologic conditions, but has not been taking them putting her at risk for AI. She is also not taking much po.  -glucose check q6h  We will maintain patient on D5 normal saline at 74 ml/hr-and encourage her to eat.     Sarcoidosis with pulmonary involvement  pulmonary hypertension  Was seen by Pulmonary Dr Perlman in 87/2022, she follows with Dr Bentley for pulmonary hypertension. Was on prednisone and imuran in the past, but she stopped taking all of her rheumatologic medications due to not feeling well.  - monitor O2 saturation  - clarify current medication regimen     Mixed connective tissue disease  Scleroderma  Raynauds  Follows with Rheum Dr Nicolás De La Torre, not taking home meds including prednisone. Last seen in 7/2022  - need rheumatology follow up.  - resumed prednisone     Deconditioning  Malnutrition, low blood sugar episodes.  Suspect due to poor p.o. intake.  Was given IV fluids with dextrose as she was having low blood sugar measurements on 5/17.  Discontinued due to concerns for hypervolemia and persistent need for supplemental oxygen  Patient with poor apetite for the last 2 months with poor PO intake, last meal 5 days ago due to nausea. Albumin: 2.5. She states at home she was given a glucometer and she is ranged in the 40-60 range. On admission Glucose 61, and at bedside 41.  - monitor po intake  - nutrition consult  - NGT placement 5/24  - PT/OT consultation     CHF, stable, no acute exacerbation.  Hx of atrial fibrillation  Pulmonary Edema  Small bilateral Pleural effusions  Chest CT on admission reveals small bilateral pleural effusions L>R, Interlobular septal thickening and areas of peribronchovascular nodular and GGO in the lungs bilaterally, cardiomegaly and ascitis. BNP on admission to the ER was elevated, 7752. Last echo in 5/2022 showed EF 50-55%.  - resume apixaban in am (as above)  - hold PTA metoprolol and amiodarone as she was not taking them and VS stable.  - hold off IV diuresis  at this time.     CT evidence of Cirrhosis   Portal Hypertension  Patient with no previous history of cirrhosis or liver disease. A Abdomen CT was ordered in ER revealed a cirrhotic liver with portal hypertension, ascites, diffuse anasarca and recanalized unbilical vein.   - need outpatient work up and close follow up     Right breast skin thickening and nipple retraction on CT.   Incidental finding on CT done in the ED.   - outpatient mammography    Hyponatremia, likely due to IV fluids and diuretic use.  Patient was alert oriented x3 .  Discontinue IV fluids     Severe Protein Calorie Malnutrition in the setting of chronic illness.. Poor appetite and poor oral intake. Frequent episodes of hypoglycemia.  Nutrition consulted.  Patient to be started on tube feeding after NG tube placed today, 5/24.  Appreciate dietitian assistance in this regard.       Diet: Combination Diet Regular Diet Adult  Snacks/Supplements Adult: Other; Please allow pt/RN to order snacks/supplements PRN; Between Meals  Adult Formula Drip Feeding: Continuous Osmolite 1.5; Other - Specify in Comment; Goal Rate: Pending AXR Confirmation of tube placement, initiate at 10 ml/hr, advance as tolerated by 10 ml/hr q 12 hr to goal 50 ml/hr; mL/hr; Do not advance tube fee...    DVT Prophylaxis: DOAC  Deleon Catheter: Not present  Lines: None     Cardiac Monitoring: None  Code Status: Full Code      Clinically Significant Risk Factors         # Hyponatremia: Lowest Na = 127 mmol/L in last 2 days, will monitor as appropriate      # Hypoalbuminemia: Lowest albumin = 2.5 g/dL at 5/16/2023  4:53 PM, will monitor as appropriate     # Hypertension: Noted on problem list         # Severe Malnutrition: based on nutrition assessment         Disposition Plan      Expected Discharge Date: 05/27/2023      Destination: home            Lizeth Trotter MD  Hospitalist Service, GOLD TEAM 16  [facility]  Securely message  with [employer] (more info)  Text page via [facility] Paging/Directory   See signed in provider for up to date coverage information  ______________________________________________________________________    Interval History   Doing ok  Discussed medication importance and therapy importance   NG tube for additional nutritional support      Physical Exam   Vital Signs: Temp: 97  F (36.1  C) Temp src: Oral BP: (!) 155/95 Pulse: 67   Resp: 16 SpO2: 98 % O2 Device: None (Room air)    Weight: 141 lbs 1.51 oz    General: No acute distress, breathing comfortably on room air  Neuro: EOMI, PERRLA. Facial muscles symmetric, strength/sensation grossly intact.  HEENT: Anicteric sclera. Oropharynx is clear. No lymphadenopathy.  Chest/Lungs: No accessory respiratory muscle use. Adequate air movement throughout. CTAB.  CV: Normal rate, regular rhythm. nl S1/S2. No m/r/g. Cap refill &lt;2s.  Abd: BS+. Soft, NTND.  Ext: Warm, well-perfused. Strong distal pulses.     Medical Decision Making       40 MINUTES SPENT BY ME on the date of service doing chart review, history, exam, documentation & further activities per the note.      Data         Imaging results reviewed over the past 24 hrs:   Recent Results (from the past 24 hour(s))   XR Feeding Tube Reposition    Narrative    EXAMINATION: Feeding tube advancement on 5/24/2023 2:22 PM.    INDICATION: Advancement of feeding tube past GE junction    COMPARISON: Abdominal radiograph 5/23/2023    TECHNIQUE:  feeding tube was advanced under fluoroscopic guidance.    Fluoroscopy time: 0.7 minutes    FINDINGS:   A new wire was placed within the existing feeding tube. This wire  protruded approximately 1.5 cm past the tubing on initial placement.  Subsequent radiographs and Omnipaque was injected at this site near  the GE junction of the initial tube placement. No evidence for  contrast extravasation was appreciated. The tube was then advanced  into the alimentary limb of the Eleazar-en-Y gastrectomy. A  small amount  of Omnipaque was injected to demonstrate placement within the  alimentary limb. The feeding tube was then flushed with normal saline  and secured with existing nasal bridle.      Impression    IMPRESSION:   Advancement of feeding tube into the alimentary limb of the Eleazar-en-Y  gastrectomy.    Findings were discussed with Dr. Trotter at 3:10 PM on 5/24/2023,  verbalizes understanding of above findings.    AMAURY GUERIN MD         SYSTEM ID:  [id number]   XR Abdomen Port 1 View    Narrative    Exam: XR ABDOMEN PORT 1 VIEW, 5/25/2023 10:10 AM    Indication: abd pain post NGT placement    Comparison: X-ray abdomen 5/23/2023 and CT abdomen 5/16/2023    Findings:     Supine frontal projection abdominal radiograph. Enteric tube projects  over the left upper quadrant. Contrast opacification of large bowel  loops in the right mid and lower abdomen. Post cholecystectomy clips  in the right upper quadrant. No pneumatosis or portal venous gas.  Limited evaluation for free air on supine radiograph. Bibasilar  opacities favor atelectasis versus consolidation.      Impression    Impression:   1. Enteric tube tip projects over the left upper quadrant.  2. Contrast opacification of large bowel loops overlying the right mid  and lower abdomen. No obstructive bowel gas pattern.  3. Limited evaluation for free air on supine radiograph.   4. Bibasilar opacities, left more than right representing atelectasis  versus consolidation. Possible small pleural effusions.    I have personally reviewed the examination and initial interpretation  and I agree with the findings.    TORIBIO ANGUIANO MD         SYSTEM ID:  [id number]     Recent Labs   Lab 05/25/23  1121 05/25/23  0957 05/25/23  0845 05/24/23  1034 05/24/23  0557 05/23/23  0832 05/23/23  0545 05/22/23  0728 05/22/23  0644   WBC  --   --   --   --  5.0  --  6.2  --  4.4   HGB  --   --   --   --  11.1*  --  12.7  --  12.0   MCV  --   --   --   --  94  --  93  --  93    PLT  --   --   --   --  187  --  257  --  228   NA  --   --   --   --  127*  --  130*  --  133*   POTASSIUM  --   --   --   --  3.6  --  4.0  --  3.9   CHLORIDE  --   --   --   --  97*  --  98  --  102   CO2  --   --   --   --  23  --  22  --  25   BUN  --   --   --   --  9.3  --  8.5  --  9.0   CR  --   --   --   --  0.73  --  0.64  --  0.72   ANIONGAP  --   --   --   --  7  --  10  --  6*   DAVID  --   --   --   --  7.4*  --  7.8*  --  8.0*   GLC 93 62* 66*   < > 100*   < > 92   < > 86    < > = values in this interval not displayed.

## 2023-05-26 NOTE — PLAN OF CARE
"  VS: /73 (BP Location: Right arm)   Pulse 71   Temp 96.9  F (36.1  C) (Oral)   Resp 16   Ht 1.727 m (5' 8\")   Wt 64 kg (141 lb 1.5 oz)   SpO2 96%   BMI 21.45 kg/m       O2: Stable on room air   Output: Voids without difficulty using bedpan   Last BM: 5/23- active bowel sounds, passing flatus   Activity: Ax1 with bedpan and repositioning  - Ax2 with judah steady   Skin: Wounds BLE. Edema to BLE. Discoloration to fingertips and L middle toes   Pain: C/o BLE pain managed with PRN medications   CMS: Baseline numbness and tingling to all extremities   Dressing: R leg dressing CDI- next Dressing change on 5/27  L leg dressing changed- next dressing change on 5/28 or PRN   Diet: Tube feeding running at 20 mL/hr- goal rate of 50 mL/hr  Regular diet, thin liquids, takes pills whole   LDA: L PIV infusing 5% dextrose   NG tube   Equipment: IV pole, kangaroo pump, personal belongings   Plan: Possible discharge to TCU   Additional Info: A&Ox4. Denies headache, chest pain, SOB. Intermittent nausea managed with PRN's. Up to recliner with PT today. Need a bed weight. Uses call light appropriately and makes needs known.        "

## 2023-05-26 NOTE — PROGRESS NOTES
[facility]    Medicine Progress Note - Hospitalist Service, GOLD TEAM 16    Date of Admission:  5/16/2023    Assessment & Plan     62F with history of sarcoidosis, CHF, deconditioning, history of PE, mixed connective tissue disease; presents with shortness of breath, found to have acute LE DVTs bilaterally.    Today's changes: 5/26/2023  Doing well.  No new concern. BLE wounds- stable. On Nasal feeding- advancing slowly. Physical deconditioning: encourage to work up with PT/ OT.   A.w safe dispo planning.   No other medical concern/changes.        Pulmonary Embolism,  Bilateral DVT.  Due to medication noncompliance.    -Started on heparin drip, discontinued 5/18/2023 and started on apixaban 5 mg p.o. twice daily  -Lower extremity venous doppler US revealed partially occlusive thrombus in the common femoral vein, proximal femoral vein, and popliteal vein and partially occlusive thrombus in the left distal femoral vein and popliteal vein.   -Chest CTA revealed multiple right sided pulmonary emboli, some of which appear to be chronic.     Hypoxia due to PE.  Was on 2 L of oxygen via nasal cannula.  Oxygen saturation would dip down to the 80s when on room air.  Would continue supplemental oxygen to maintain oxygen saturation greater than 94%.  Weaned off supplemental oxygen on 5/22.     Bilateral lower extremity lacerations with hx of wound infection  About 1 month PTA, she fell from her wheel chair and sustained severe laceration of bilateral lower calf. She was seen in ED and the wounds were sutured. Subsequently, she developed wound infection and was treated with 2 rounds of bactrim for cellulitis. She continues to endorse significant pain not being able to ambulate. The wound does not appear to be acutely infected on admission.  - wound care consult     Episodes of hypoglycemia, recurrent.  Due to poor p.o. intake.  Unable cortisol level within normal limits.    The  patient has glucometer at home and has been noting hypoglycemia 50s and 60s at home. In the ED, finger stick glucose had repeatedly low values in to 10s and serum glucose was in 60s. Of note, she was on prednisone 15mg daily for her rheumatologic conditions, but has not been taking them putting her at risk for AI. She is also not taking much po.  -glucose check q6h  We will maintain patient on D5 normal saline at 74 ml/hr-and encourage her to eat.     Sarcoidosis with pulmonary involvement  pulmonary hypertension  Was seen by Pulmonary Dr Perlman in 87/2022, she follows with Dr Bentley for pulmonary hypertension. Was on prednisone and imuran in the past, but she stopped taking all of her rheumatologic medications due to not feeling well.  - monitor O2 saturation  - clarify current medication regimen     Mixed connective tissue disease  Scleroderma  Raynauds  Follows with Rheum Dr Nicolás De La Torre, not taking home meds including prednisone. Last seen in 7/2022  - need rheumatology follow up.  - resumed prednisone     Deconditioning  Malnutrition, low blood sugar episodes.  Suspect due to poor p.o. intake.  Was given IV fluids with dextrose as she was having low blood sugar measurements on 5/17.  Discontinued due to concerns for hypervolemia and persistent need for supplemental oxygen  Patient with poor apetite for the last 2 months with poor PO intake, last meal 5 days ago due to nausea. Albumin: 2.5. She states at home she was given a glucometer and she is ranged in the 40-60 range. On admission Glucose 61, and at bedside 41.  - monitor po intake  - nutrition consult  - NGT placement 5/24  - PT/OT consultation     CHF, stable, no acute exacerbation.  Hx of atrial fibrillation  Pulmonary Edema  Small bilateral Pleural effusions  Chest CT on admission reveals small bilateral pleural effusions L>R, Interlobular septal thickening and areas of peribronchovascular nodular and GGO in the lungs bilaterally, cardiomegaly and  ascitis. BNP on admission to the ER was elevated, 7752. Last echo in 5/2022 showed EF 50-55%.  - resume apixaban in am (as above)  - hold PTA metoprolol as ? she was not taking them and VS stable.  - Continue pta amiodarone.   - hold off IV diuresis at this time.     CT evidence of Cirrhosis   Portal Hypertension  Patient with no previous history of cirrhosis or liver disease. A Abdomen CT was ordered in ER revealed a cirrhotic liver with portal hypertension, ascites, diffuse anasarca and recanalized unbilical vein.   - need outpatient work up and close follow up     Right breast skin thickening and nipple retraction on CT.   Incidental finding on CT done in the ED.   - outpatient mammography    Hyponatremia, likely due to IV fluids and diuretic use.  Patient was alert oriented x 3 .  Discontinue IV fluids  Monitor.      Severe Protein Calorie Malnutrition in the setting of chronic illness.. Poor appetite and poor oral intake. Frequent episodes of hypoglycemia.  Nutrition consulted.  Patient to be started on tube feeding after NG tube placed today, 5/24.  Appreciate dietitian assistance in this regard.       Diet: Combination Diet Regular Diet Adult  Snacks/Supplements Adult: Other; Please allow pt/RN to order snacks/supplements PRN; Between Meals  Adult Formula Drip Feeding: Continuous Osmolite 1.5; Other - Specify in Comment; Goal Rate: Pending AXR Confirmation of tube placement, initiate at 10 ml/hr, advance as tolerated by 10 ml/hr q 12 hr to goal 50 ml/hr; mL/hr; Do not advance tube fee...    DVT Prophylaxis: DOAC  Deleon Catheter: Not present  Lines: None     Cardiac Monitoring: None  Code Status: Full Code      Clinically Significant Risk Factors            # Hypomagnesemia: Lowest Mg = 1.3 mg/dL in last 2 days, will replace as needed   # Hypoalbuminemia: Lowest albumin = 2.5 g/dL at 5/16/2023  4:53 PM, will monitor as appropriate     # Hypertension: Noted on problem list         # Severe Malnutrition: based  on nutrition assessment         Disposition Plan      Expected Discharge Date: 05/28/2023      Destination: home            Nahum Parks MD  Hospitalist Service, GOLD TEAM 16  [facility]  Securely message with [employer] (more info)  Text page via [employer] Paging/Directory   See signed in provider for up to date coverage information  ______________________________________________________________________    Interval History   Doing ok  Discussed medication importance and therapy importance   NG tube for additional nutritional support  No fever or chills or cough or cp or sob.   No new focal deficit.       Physical Exam   Vital Signs: Temp: 96.9  F (36.1  C) Temp src: Oral BP: 125/73 Pulse: 71   Resp: 16 SpO2: 96 % O2 Device: None (Room air)    Weight: 141 lbs 1.51 oz    General: No acute distress, breathing comfortably on room air. Cachectic, NG tube.   Neuro: EOMI, PERRLA.  grossly intact.  HEENT: Anicteric sclera. Oropharynx is clear.   Chest/Lungs: No accessory respiratory muscle use. Adequate air movement throughout. CTAB.  CV: Normal rate, regular rhythm. nl S1/S2.   Abd: Soft, NTND.  Ext: Warm, well-perfused.     Medical Decision Making       40 MINUTES SPENT BY ME on the date of service doing chart review, history, exam, documentation & further activities per the note.      Data     I have personally reviewed the following data over the past 24 hrs:    5.2  \   11.1 (L)   / 199     130 (L) 101 10.1 /  91   3.8 22 0.83 \       Imaging results reviewed over the past 24 hrs:   No results found for this or any previous visit (from the past 24 hour(s)).  Recent Labs   Lab 05/26/23  0947 05/26/23  0644 05/26/23  0148 05/25/23  1742 05/25/23  1416 05/24/23  1034 05/24/23  0557   WBC  --  5.2  --   --  7.1  --  5.0   HGB  --  11.1*  --   --  11.8  --  11.1*   MCV  --  93  --   --  96  --  94   PLT  --  199  --   --  236  --  187   NA  --  130*  --   --  131*  --  127*    POTASSIUM  --  3.8  --   --  4.0  --  3.6   CHLORIDE  --  101  --   --  98  --  97*   CO2  --  22  --   --  22  --  23   BUN  --  10.1  --   --  10.5  --  9.3   CR  --  0.83  --   --  0.80  --  0.73   ANIONGAP  --  7  --   --  11  --  7   DAVID  --  8.0*  --   --  8.2*  --  7.4*   GLC 91 85 102*   < > 95   < > 100*    < > = values in this interval not displayed.

## 2023-05-26 NOTE — PLAN OF CARE
"VS: /70 (BP Location: Right arm)   Pulse 68   Temp 97.2  F (36.2  C) (Oral)   Resp 16   Ht 1.727 m (5' 8\")   Wt 64 kg (141 lb 1.5 oz)   SpO2 96%   BMI 21.45 kg/m       O2: O2 SATS >90% denies chest pain,  or SOB    Output: Voids adequately using bedpan    Last BM: 5//23 BS present all x4 quadrants  passing flatus   Activity: Not OOB this shift, wheelchair bound at home    Up for meals?  N/A    Skin: + 2  and +3  edema to BLE  wounds to bilateral calfs  dressing change every other day Discoloration to fingertips and toes due to Raynaud's disease.   Pain: Denies pain    CMS: Baseline numbness and tingling to  BLE    Dressing: Foam dressing to bilateral  calfs CDI    Diet: Regular diet with poor appetite on continuous NG tube  feeding    LDA: L PIV  infusing D5 and NS 75mL/hr. NGT running at 10mL/hr.   Equipment: IV Pole,, Kangaroo Pump  personal belongings     Plan: TBD  continue with plan of care . Possible discharge to TCU SW following    Additional Info:                             "

## 2023-05-27 NOTE — PLAN OF CARE
"Goal Outcome Evaluation:      VS: /79 (BP Location: Right arm)   Pulse 84   Temp 97.3  F (36.3  C) (Oral)   Resp 16   Ht 1.727 m (5' 8\")   Wt 64 kg (141 lb 1.5 oz)   SpO2 97%   BMI 21.45 kg/m     O2: RA   Output: Voids without difficulty using bedpan   Last BM: 5/23/2023 passing flatus, active bowel sounds   Activity: Ax2 with judah steady   Skin: Edema to BLE and bilateral upper extremity.   Pain:  BLE pain managed with PRN Tylenol and Oxycodone.   CMS: Baseline numbness and tingling to all extremities   Dressing: CDI   Diet: Tube feeding running at 30 mL/hr.   LDA:  NG tube running.   Equipment: IV pole, kangaroo pump, personal belongings   Plan: Possible discharge to TCU   Additional Info: Patient was intermittently nauseated and PRN Compazine is given.      "

## 2023-05-27 NOTE — PROGRESS NOTES
[facility]    Medicine Progress Note - Hospitalist Service, GOLD TEAM 16    Date of Admission:  5/16/2023    Assessment & Plan     62F with history of sarcoidosis, CHF, deconditioning, history of PE, mixed connective tissue disease; presents with shortness of breath, found to have acute LE DVTs bilaterally.     Changes today on 5/27/2020    Patient reported some nausea.  No vomiting, abdominal pain.  No chest pain, shortness of breath.  Discussed with RN to advance tube feed if patient tolerates  Discontinue dextrose infusion.     Pulmonary Embolism,  Bilateral DVT.  Due to medication noncompliance.    -Started on heparin drip, discontinued 5/18/2023 and started on apixaban 5 mg p.o. twice daily  -Lower extremity venous doppler US revealed partially occlusive thrombus in the common femoral vein, proximal femoral vein, and popliteal vein and partially occlusive thrombus in the left distal femoral vein and popliteal vein.   -Chest CTA revealed multiple right sided pulmonary emboli, some of which appear to be chronic.     Hypoxia due to PE.  Was on 2 L of oxygen via nasal cannula.  Oxygen saturation would dip down to the 80s when on room air.  Would continue supplemental oxygen to maintain oxygen saturation greater than 94%.  Weaned off supplemental oxygen on 5/22.     Bilateral lower extremity lacerations with hx of wound infection  About 1 month PTA, she fell from her wheel chair and sustained severe laceration of bilateral lower calf. She was seen in ED and the wounds were sutured. Subsequently, she developed wound infection and was treated with 2 rounds of bactrim for cellulitis. She continues to endorse significant pain not being able to ambulate. The wound does not appear to be acutely infected on admission.  - wound care consult     Episodes of hypoglycemia, recurrent.  Due to poor p.o. intake.  Unable cortisol level within normal limits.    The patient has  glucometer at home and has been noting hypoglycemia 50s and 60s at home. In the ED, finger stick glucose had repeatedly low values in to 10s and serum glucose was in 60s. Of note, she was on prednisone 15mg daily for her rheumatologic conditions, but has not been taking them putting her at risk for AI. She is also not taking much po.  >Blood glucose 100 this morning.  Plan:  - Discontinue dextrose infusion.  - Continue to check glucose every 6 hours       Sarcoidosis with pulmonary involvement  pulmonary hypertension  was seen by Pulmonary Dr Perlman in 87/2022, she follows with Dr Bentley for pulmonary hypertension. Was on prednisone and imuran in the past, but she stopped taking all of her rheumatologic medications due to not feeling well.  - monitor O2 saturation  - clarify current medication regimen     Mixed connective tissue disease  Scleroderma  Raynauds  Follows with Rheum Dr Nicolás De La Torre, not taking home meds including prednisone. Last seen in 7/2022  - need rheumatology follow up.  - resumed prednisone     Deconditioning  Malnutrition, low blood sugar episodes.  Suspect due to poor p.o. intake.  Was given IV fluids with dextrose as she was having low blood sugar measurements on 5/17.  Discontinued due to concerns for hypervolemia and persistent need for supplemental oxygen  Patient with poor apetite for the last 2 months with poor PO intake, last meal 5 days ago due to nausea. Albumin: 2.5. She states at home she was given a glucometer and she is ranged in the 40-60 range. On admission Glucose 61, and at bedside 41.  - monitor po intake  - nutrition consult  - NGT placement 5/24  - PT/OT consultation     CHF, stable, no acute exacerbation.  Hx of atrial fibrillation  Pulmonary Edema  Small bilateral Pleural effusions  Chest CT on admission reveals small bilateral pleural effusions L>R, Interlobular septal thickening and areas of peribronchovascular nodular and GGO in the lungs bilaterally, cardiomegaly  and ascitis. BNP on admission to the ER was elevated, 7752. Last echo in 5/2022 showed EF 50-55%.  - resume apixaban in am (as above)  - hold PTA metoprolol as ? she was not taking them and VS stable.  - Continue pta amiodarone.   - hold off IV diuresis at this time.     CT evidence of Cirrhosis   Portal Hypertension  Patient with no previous history of cirrhosis or liver disease. A Abdomen CT was ordered in ER revealed a cirrhotic liver with portal hypertension, ascites, diffuse anasarca and recanalized unbilical vein.   - need outpatient work up and close follow up     Right breast skin thickening and nipple retraction on CT.   Incidental finding on CT done in the ED.   - outpatient mammography     Hyponatremia, likely due to IV fluids and diuretic use.  Patient was alert oriented x 3 .  Discontinue IV fluids  Monitor.      Severe Protein Calorie Malnutrition in the setting of chronic illness.. Poor appetite and poor oral intake. Frequent episodes of hypoglycemia.  Nutrition consulted.  Patient to be started on tube feeding after NG tube placed today, 5/24.  Appreciate dietitian assistance in this regard.        Diet: Combination Diet Regular Diet Adult  Snacks/Supplements Adult: Other; Please allow pt/RN to order snacks/supplements PRN; Between Meals  Adult Formula Drip Feeding: Continuous Osmolite 1.5; Other - Specify in Comment; Goal Rate: Pending AXR Confirmation of tube placement, initiate at 10 ml/hr, advance as tolerated by 10 ml/hr q 12 hr to goal 50 ml/hr; mL/hr; Do not advance tube fee...    DVT Prophylaxis: DOAC  Deleon Catheter: Not present  Lines: None     Cardiac Monitoring: None  Code Status: Full Code      Clinically Significant Risk Factors            # Hypomagnesemia: Lowest Mg = 1.3 mg/dL in last 2 days, will replace as needed   # Hypoalbuminemia: Lowest albumin = 2.5 g/dL at 5/16/2023  4:53 PM, will monitor as appropriate     # Hypertension: Noted on problem list         # Severe Malnutrition:  based on nutrition assessment         Disposition Plan     Expected Discharge Date: 05/28/2023      Destination: home            Samuel Shah MD  Hospitalist Service, GOLD TEAM 16  [facility]  Securely message with [employer] (more info)  Text page via [employer] ([employer]) Paging/Directory   See signed in provider for up to date coverage information  ______________________________________________________________________    Interval History     Overnight events reviewed.  Handoff taken from Dr. Praks.   Patient reports feeling nauseous this morning.  Patient denies any vomiting, abdominal pain.  Patient denies any chest pain or shortness of breath.    Physical Exam   Vital Signs: Temp: 97.3  F (36.3  C) Temp src: Oral BP: 134/77 Pulse: 84   Resp: 16 SpO2: 97 % O2 Device: None (Room air)    Weight: 141 lbs 1.51 oz    General Appearance: Laying in bed in no acute distress  Respiratory: S1, S2 normal  Cardiovascular: Laterally clear to auscultate  GI: Soft, nontender, bowel sounds positive  Skin: No obvious rashes  Other: Bilateral extensive pretibial edema.    Medical Decision Making             Data

## 2023-05-27 NOTE — PROGRESS NOTES
"  VS: /82 (BP Location: Right arm)   Pulse 74   Temp 97.8  F (36.6  C) (Oral)   Resp 16   Ht 1.727 m (5' 8\")   Wt 64 kg (141 lb 1.5 oz)   SpO2 99%   BMI 21.45 kg/m     O2: On room air.   Output: non   Last BM: 05/23/23   Activity: Sleep,watch her phone.   Skin: Edema on both upper and lower extrimities   Pain: Mgt with tylenol and oxycodone   CMS: Tingling and numbness on lower extrimities as baseline.   Dressing: Transparent.   Diet: Tube feeding running at 20ml/hr   LDA: NG tube running,PIV on Lf arm   Equipment: IV pole,Feeding pump,Personal belonging.   Plan: To be discharge to TCU.   Additional Info:        "

## 2023-05-27 NOTE — PROGRESS NOTES
"VS: /83 (BP Location: Right arm)   Pulse 90   Temp (!) 96.2  F (35.7  C) (Oral)   Resp 16   Ht 1.727 m (5' 8\")   Wt 64 kg (141 lb 1.5 oz)   SpO2 99%   BMI 21.45 kg/m       O2: RA   Output: Voids without difficulty using bedpan   Last BM: 5/23/2023 passing flatus, active bowel sounds   Activity: Ax2 with judah steady   Skin: Edema to BLE and bilateral upper extremity.   Pain:  BLE pain managed with PRN Tylenol and Oxycodone.   CMS: Baseline numbness and tingling to all extremities   Dressing: CDI   Diet: Tube feeding running at 20 mL/hr.   LDA:  NG tube running.   Equipment: IV pole, kangaroo pump, personal belongings   Plan: Possible discharge to TCU   Additional Info: Patient was intermittently nauseated and PRN nausea medication administered. Took a bed weight (87.7kg) but there is a 20 kg difference from the weight taken two days ago. Please do weight in the morning.        "

## 2023-05-28 NOTE — PLAN OF CARE
Goal Outcome Evaluation:               VS: Temp: 97.5  F (36.4  C) Temp src: Oral BP: 127/80 Pulse: 79   Resp: 20 SpO2: 99 % O2 Device: None (Room air)      O2: stable on RA. LS clear and equal bilaterally. Denies chest pain and SOB.    Output: Voids spontaneously without difficulty to bathroom / using    Last BM: 5/23/23 BM Stool care given denies abdominal discomfort. BS active / passing flatus.    Activity: Pt aid has not walked up for 3 months, pt did not get up.   Skin: WDL except, swollen feet from thigh down and arms   Pain: Pain : denies   CMS: Intact, AOx4. Bilat feet numbness and tingling.   Dressing:    Diet: Regular diet. Compazine for  nausea given x1  Feeding tube Isosource 1.5 at 40 ml/hr   LDA: LFA PIV SL     Equipment: IV pole, personal belongings,    Plan: FALL precautions maintained / Continue with plan of care. Call light within reach, pt able to make needs known.    Additional Info:

## 2023-05-28 NOTE — PROGRESS NOTES
"  VS: /81 (BP Location: Right arm)   Pulse 81   Temp 97.7  F (36.5  C) (Oral)   Resp 18   Ht 1.727 m (5' 8\")   Wt 64 kg (141 lb 1.5 oz)   SpO2 94%   BMI 21.45 kg/m     O2: RA, denies SOB and CP   Output: Voiding without difficulty in bedpan and pure wick in place   Last BM: 5/28 per pt   Activity: A2 with sera steady, not OOB this shift   Skin: Edema BLE and BUE, wounds to bilateral lower legs   Pain: Managed with prn tylenol and oxy   Neuro: A/Ox4, baseline neuropathy to all extremities   Dressing: Wound dressings changed today, CDI   Diet: Regular diet, intermittent nausea, prn compazine given  Tube feeding running at 20ml/hr; advance 10mL q12h until running at 50mL/hr    LDA: NG tube, pure wick, L PIV SL   Equipment: IV pole, kangaroo pump, personal belongings, call light within reach   Plan: SW following for TCU placement   Additional Info:      "

## 2023-05-28 NOTE — PROGRESS NOTES
[facility]    Medicine Progress Note - Hospitalist Service, GOLD TEAM 16    Date of Admission:  5/16/2023    Assessment & Plan     62F with history of sarcoidosis, CHF, deconditioning, history of PE, mixed connective tissue disease; presents with shortness of breath, found to have acute LE DVTs bilaterally.       Pulmonary Embolism,  Bilateral DVT.  Due to medication noncompliance.    -Started on heparin drip, discontinued 5/18/2023 and started on apixaban 5 mg p.o. twice daily  -Lower extremity venous doppler US revealed partially occlusive thrombus in the common femoral vein, proximal femoral vein, and popliteal vein and partially occlusive thrombus in the left distal femoral vein and popliteal vein.   -Chest CTA revealed multiple right sided pulmonary emboli, some of which appear to be chronic.    Hypervolemia  Bilateral lower extremity edema  Patient has history of heart failure.  Patient has also malnutrition.  Patient also noted to have questionable cirrhosis.  Likely all factors are contributing.  Patient reports she was on furosemide and then on torsemide at home.  She reports at 1 point she was on 80 mg of oral furosemide a day.  Plan:  - Furosemide 40 mg IV  -Elevate leg.  - Monitor DONNA's.  - Low-salt diet.       Hypoxia due to PE.  Was on 2 L of oxygen via nasal cannula.  Oxygen saturation would dip down to the 80s when on room air.  Would continue supplemental oxygen to maintain oxygen saturation greater than 94%.  Weaned off supplemental oxygen on 5/22.     Bilateral lower extremity lacerations with hx of wound infection  About 1 month PTA, she fell from her wheel chair and sustained severe laceration of bilateral lower calf. She was seen in ED and the wounds were sutured. Subsequently, she developed wound infection and was treated with 2 rounds of bactrim for cellulitis. She continues to endorse significant pain not being able to ambulate. The wound does  not appear to be acutely infected on admission.  - wound care consult     Episodes of hypoglycemia, recurrent.  Due to poor p.o. intake.  Unable cortisol level within normal limits.    The patient has glucometer at home and has been noting hypoglycemia 50s and 60s at home. In the ED, finger stick glucose had repeatedly low values in to 10s and serum glucose was in 60s. Of note, she was on prednisone 15mg daily for her rheumatologic conditions, but has not been taking them putting her at risk for AI. She is also not taking much po.  >Blood glucose 100 this morning.  Plan:  - Discontinue dextrose infusion.  - Continue to check glucose every 6 hours       Sarcoidosis with pulmonary involvement  pulmonary hypertension  was seen by Pulmonary Dr Perlman in 87/2022, she follows with Dr Bentley for pulmonary hypertension. Was on prednisone and imuran in the past, but she stopped taking all of her rheumatologic medications due to not feeling well.  - monitor O2 saturation  - clarify current medication regimen     Mixed connective tissue disease  Scleroderma  Raynauds  Follows with Rheum Dr Nicolás De La Torre, not taking home meds including prednisone. Last seen in 7/2022  - need rheumatology follow up.  - resumed prednisone     Deconditioning  Malnutrition, low blood sugar episodes.  Suspect due to poor p.o. intake.  Was given IV fluids with dextrose as she was having low blood sugar measurements on 5/17.  Discontinued due to concerns for hypervolemia and persistent need for supplemental oxygen  Patient with poor apetite for the last 2 months with poor PO intake, last meal 5 days ago due to nausea. Albumin: 2.5. She states at home she was given a glucometer and she is ranged in the 40-60 range. On admission Glucose 61, and at bedside 41.  - monitor po intake  - nutrition consult  - NGT placement 5/24  - PT/OT consultation     CHF, stable, no acute exacerbation.  Hx of atrial fibrillation  Pulmonary Edema  Small bilateral Pleural  effusions  Chest CT on admission reveals small bilateral pleural effusions L>R, Interlobular septal thickening and areas of peribronchovascular nodular and GGO in the lungs bilaterally, cardiomegaly and ascitis. BNP on admission to the ER was elevated, 7752. Last echo in 5/2022 showed EF 50-55%.  - resume apixaban in am (as above)  - hold PTA metoprolol as ? she was not taking them and VS stable.  - Continue pta amiodarone.   -Patient started on diuresis as above.    CT evidence of Cirrhosis   Portal Hypertension  Patient with no previous history of cirrhosis or liver disease. A Abdomen CT was ordered in ER revealed a cirrhotic liver with portal hypertension, ascites, diffuse anasarca and recanalized unbilical vein.   - need outpatient work up and close follow up     Right breast skin thickening and nipple retraction on CT.   Incidental finding on CT done in the ED.   - outpatient mammography     Hyponatremia, likely due to IV fluids and diuretic use.  Patient was alert oriented x 3 .  Discontinue IV fluids  Monitor.   Plan:  - Urine sodium, osmolality and serum osmolality.     Severe Protein Calorie Malnutrition in the setting of chronic illness.. Poor appetite and poor oral intake. Frequent episodes of hypoglycemia.  Nutrition consulted.  Patient to be started on tube feeding after NG tube placed today, 5/24.  Appreciate dietitian assistance in this regard.        Diet: Combination Diet Regular Diet Adult  Snacks/Supplements Adult: Other; Please allow pt/RN to order snacks/supplements PRN; Between Meals  Adult Formula Drip Feeding: Continuous Osmolite 1.5; Other - Specify in Comment; Goal Rate: Pending AXR Confirmation of tube placement, initiate at 10 ml/hr, advance as tolerated by 10 ml/hr q 12 hr to goal 50 ml/hr; mL/hr; Do not advance tube fee...    DVT Prophylaxis: DOAC  Deleon Catheter: Not present  Lines: None     Cardiac Monitoring: None  Code Status: Full Code      Clinically Significant Risk Factors               # Hypoalbuminemia: Lowest albumin = 2.5 g/dL at 5/16/2023  4:53 PM, will monitor as appropriate     # Hypertension: Noted on problem list         # Severe Malnutrition: based on nutrition assessment         Disposition Plan     Expected Discharge Date: 05/28/2023      Destination: home            Samuel Shah MD  Hospitalist Service, GOLD TEAM 16  [facility]  Securely message with [employer] (more info)  Text page via [employer] Paging/Directory   See signed in provider for up to date coverage information  ______________________________________________________________________    Interval History   Overnight events reviewed.  Patient reports no nausea or vomiting or abdominal pain this morning.  Patient reports that her legs are swollen.  Reports ongoing since hospitalization.  Reports she was on furosemide initially and then on torsemide.  She would like to be started on diuresis, reports she is not able to move well currently.  She would prefer a catheter.    Physical Exam   Vital Signs: Temp: (!) 94.7  F (34.8  C) Temp src: Oral BP: 118/71 Pulse: 79   Resp: 18 SpO2: 96 % O2 Device: None (Room air)    Weight: 141 lbs 1.51 oz    General Appearance: Laying in bed in no acute distress  Respiratory: S1, S2 normal  Cardiovascular: Laterally clear to auscultate  GI: Soft, nontender, bowel sounds positive  Skin: No obvious rashes  Other: Bilateral extensive pretibial edema.    Medical Decision Making             Data

## 2023-05-28 NOTE — PLAN OF CARE
"Goal Outcome Evaluation:    VS: /71 (BP Location: Right arm)   Pulse 79   Temp (!) 94.7  F (34.8  C) (Oral)   Resp 18   Ht 1.727 m (5' 8\")   Wt 64 kg (141 lb 1.5 oz)   SpO2 96%   BMI 21.45 kg/m     O2: RA   Output: Voids without difficulty using bedpan and pure wick in place.    Last BM: 5/23/2023 passing flatus, active bowel sounds   Activity: Ax2 with judah steady   Skin: Edema to BLE and bilateral upper extremity.   Pain:  BLE pain managed with PRN Tylenol and Oxycodone.   CMS: Baseline numbness and tingling to all extremities   Dressing: CDI   Diet: Tube feeding running at 40 mL/hr. Advanced to 50ml/hr this morning, pt c/o abdominal pain and cramping, decreased rate to 40ml/hr. Patient tolerated well.    LDA:  NG tube running. External catheter in place.    Equipment: IV pole, kangaroo pump, personal belongings   Plan: Possible discharge to TCU   Additional Info: Patient was intermittently nauseated and PRN Compazine is given. UA sent to the lab, Lasix ordered.     "

## 2023-05-29 NOTE — PROGRESS NOTES
[facility]    Medicine Progress Note - Hospitalist Service, GOLD TEAM 16    Date of Admission:  5/16/2023    Assessment & Plan     62F with history of sarcoidosis, CHF, deconditioning, history of PE, mixed connective tissue disease; presents with shortness of breath, found to have acute LE DVTs bilaterally.       Pulmonary Embolism,  Bilateral DVT.  Due to medication noncompliance.    -Started on heparin drip, discontinued 5/18/2023 and started on apixaban 5 mg p.o. twice daily  -Lower extremity venous doppler US revealed partially occlusive thrombus in the common femoral vein, proximal femoral vein, and popliteal vein and partially occlusive thrombus in the left distal femoral vein and popliteal vein.   -Chest CTA revealed multiple right sided pulmonary emboli, some of which appear to be chronic.    Hypervolemia  Bilateral lower extremity edema  Patient has history of heart failure.  Patient has also malnutrition.  Patient also noted to have questionable cirrhosis.  Likely all factors are contributing.  Patient reports she was on furosemide and then on torsemide at home.  She reports at 1 point she was on 80 mg of oral furosemide a day.  Per report, patient had more than a liter of urine output.  Unfortunately not documented.  Plan:  - Furosemide 40 mg IV today.  Reassess again for IV diuresis 6 tomorrow.  -Elevate leg.  - Monitor DONNA's.  - Low-salt diet.  - Daily weight       Hypoxia due to PE.  Was on 2 L of oxygen via nasal cannula.  Oxygen saturation would dip down to the 80s when on room air.  Would continue supplemental oxygen to maintain oxygen saturation greater than 94%.  Weaned off supplemental oxygen on 5/22.     Bilateral lower extremity lacerations with hx of wound infection  About 1 month PTA, she fell from her wheel chair and sustained severe laceration of bilateral lower calf. She was seen in ED and the wounds were sutured. Subsequently, she developed  wound infection and was treated with 2 rounds of bactrim for cellulitis. She continues to endorse significant pain not being able to ambulate. The wound does not appear to be acutely infected on admission.  - wound care consult     Episodes of hypoglycemia, recurrent.  Due to poor p.o. intake.  Unable cortisol level within normal limits.    The patient has glucometer at home and has been noting hypoglycemia 50s and 60s at home. In the ED, finger stick glucose had repeatedly low values in to 10s and serum glucose was in 60s. Of note, she was on prednisone 15mg daily for her rheumatologic conditions, but has not been taking them putting her at risk for AI. She is also not taking much po.  > Blood glucose improved with less frequent hypoglycemic episodes  Plan:  - Continue to check glucose every 6 hours       Sarcoidosis with pulmonary involvement  pulmonary hypertension  was seen by Pulmonary Dr Perlman in 87/2022, she follows with Dr Bentley for pulmonary hypertension. Was on prednisone and imuran in the past, but she stopped taking all of her rheumatologic medications due to not feeling well.  - monitor O2 saturation  - clarify current medication regimen     Mixed connective tissue disease  Scleroderma  Raynauds  Follows with Rheum Dr Nicolás De La Torre, not taking home meds including prednisone. Last seen in 7/2022  - need rheumatology follow up.  - resumed prednisone     Deconditioning  Malnutrition, low blood sugar episodes.  Suspect due to poor p.o. intake.  Was given IV fluids with dextrose as she was having low blood sugar measurements on 5/17.  Discontinued due to concerns for hypervolemia and persistent need for supplemental oxygen  Patient with poor apetite for the last 2 months with poor PO intake, last meal 5 days ago due to nausea. Albumin: 2.5. She states at home she was given a glucometer and she is ranged in the 40-60 range. On admission Glucose 61, and at bedside 41.  - monitor po intake  - nutrition  consult  - NGT placement 5/24, started on feeding  - PT/OT consultation  - X-ray abdomen     CHF, stable, no acute exacerbation.  Hx of atrial fibrillation  Pulmonary Edema  Small bilateral Pleural effusions  Chest CT on admission reveals small bilateral pleural effusions L>R, Interlobular septal thickening and areas of peribronchovascular nodular and GGO in the lungs bilaterally, cardiomegaly and ascitis. BNP on admission to the ER was elevated, 7752. Last echo in 5/2022 showed EF 50-55%.  - resume apixaban in am (as above)  - hold PTA metoprolol as ? she was not taking them and VS stable.  - Continue pta amiodarone.   -Patient started on diuresis as above.    CT evidence of Cirrhosis   Portal Hypertension  Patient with no previous history of cirrhosis or liver disease. A Abdomen CT was ordered in ER revealed a cirrhotic liver with portal hypertension, ascites, diffuse anasarca and recanalized unbilical vein.   - need outpatient work up and close follow up     Right breast skin thickening and nipple retraction on CT.   Incidental finding on CT done in the ED.   - outpatient mammography     Hyponatremia, likely due to IV fluids and diuretic use.  Patient was alert oriented x 3 .  Discontinue IV fluids  Monitor.   Urine sodium is on the lower side.  More suggestive of hypovolemic hyponatremia.  Urine sodium 133 on 5/29/2023.  Plan:  - Diuresis as above.  - Continue to monitor     Severe Protein Calorie Malnutrition in the setting of chronic illness.. Poor appetite and poor oral intake. Frequent episodes of hypoglycemia.  Nutrition consulted.  Patient to be started on tube feeding after NG tube placed today, 5/24.  Appreciate dietitian assistance in this regard.        Diet: Combination Diet Regular Diet Adult  Snacks/Supplements Adult: Other; Please allow pt/RN to order snacks/supplements PRN; Between Meals  Adult Formula Drip Feeding: Continuous Osmolite 1.5; Other - Specify in Comment; Goal Rate: Pending AXR  Confirmation of tube placement, initiate at 10 ml/hr, advance as tolerated by 10 ml/hr q 12 hr to goal 50 ml/hr; mL/hr; Do not advance tube fee...    DVT Prophylaxis: DOAC  Deleon Catheter: Not present  Lines: None     Cardiac Monitoring: None  Code Status: Full Code      Clinically Significant Risk Factors            # Hypomagnesemia: Lowest Mg = 1.6 mg/dL in last 2 days, will replace as needed   # Hypoalbuminemia: Lowest albumin = 2.5 g/dL at 5/16/2023  4:53 PM, will monitor as appropriate     # Hypertension: Noted on problem list         # Severe Malnutrition: based on nutrition assessment         Disposition Plan           Samuel Shah MD  Hospitalist Service, GOLD TEAM 16  [facility]  Securely message with [employer] (more info)  Text page via [employer] Paging/Directory   See signed in provider for up to date coverage information  ______________________________________________________________________    Interval History     Overnight events reviewed.  Patient reported that she was quite nauseous and had some abdominal pain last night, and had to stop feeding.  Feeding started at 20 mL/h this morning.  Patient denies any abdominal pain this morning.    Physical Exam   Vital Signs: Temp: 96.8  F (36  C) Temp src: Oral BP: 135/86 Pulse: 90   Resp: 16 SpO2: 98 % O2 Device: None (Room air)    Weight: 141 lbs 1.51 oz    General Appearance: Laying in bed in no acute distress  Respiratory: S1, S2 normal  Cardiovascular: Laterally clear to auscultate  GI: Soft, nontender, bowel sounds positive  Skin: No obvious rashes  Other: Bilateral extensive pretibial edema.    Medical Decision Making             Data

## 2023-05-29 NOTE — PLAN OF CARE
Pt. A&Ox4. VSS. Afebrile. Lung sounds clear, diminished. Maintaining sats on RA. Bowel sounds active, LBM 5/28. CMS and neuro's are intact. Reports numbness and tingling to BLE. Reports intermittent nausea, improved w/ compazine. Takes prn Oxycodone at times for pain, did not need overnight. Purewick in place for output. NG in place, TF infusing 20mL/hr, tubing changed at 0145. Rate to be increased at 0800. Regular diet, mechanical/soft ordered. BLE wound dressings are CDI, to be changed again on 5/30.. Pt not OOB overnight. PIV is patent and SL. Bilateral heels are elevated off the bed. Call light is within reach, pt able to make needs known and is resting comfortably between cares. Will continue to monitor.

## 2023-05-29 NOTE — PLAN OF CARE
"      VS: Blood pressure (!) 142/85, pulse 61, temperature (!) 96.6  F (35.9  C), temperature source Oral, resp. rate 16, height 1.727 m (5' 8\"), weight 77.8 kg (171 lb 8.3 oz), SpO2 97 %.   O2: 97% on RA.    Output: Voiding without difficulty in bedpan and pure wick in place   Last BM: 5/28 per pt   Activity: A2 with sera steady. A1 to turn in bed.   Skin: Edema BLE and BUE, wounds to bilateral lower legs   Pain: Pt denied pain this shift.    Neuro: AOX4.  Baseline neuropathy to all extremities   Dressing: Wound dressings changed today, CDI   Diet: Regular diet, intermittent nausea,  Tube feeding running at 20ml/hr. Pt strict I&O's.    LDA: NG tube, pure wick, L PIV SL   Equipment: IV pole, kangaroo pump, personal belongings, call light within reach   Plan: SW following for TCU placement   Additional Info:  Tube feeding was not advanced part 20 mL/hr.  stated ok to not advance. Pt had abdominal X-Ray done to check feeding tube placement this shift. Tube in place.  Pt q 6 BG check. BG 42 @1630. Rechecked after IV dextrose, BG 79. Pt only took small bites of food here and there this shift. Pt ordered dinner at 1730. Pt strict I&O's.              "

## 2023-05-30 NOTE — PLAN OF CARE
VS: VSS, pt denied CP or SOB.   O2: Room air sat. > 90%.   Output: Incontinent of urine, PureWick in place.    Last BM: 05/29/23 passing gas.    Activity: Not out of bed.    Skin: Visible skin intact, except bilateral lower leg wound, pt declined full skin assessment.    Pain: Denied pain at rest, PRN medication given before dressing change.    Neuro: CMS and neuro intact to baseline.    Dressing: Some drainage noted, dressing changed and CDI at this time.    Diet: On regular diet and tube feeding up to 50 ml/h this afternoon.   LDA: PIV SL between antibiotic's.    Equipment: IV pole, and personal belongings.    Plan: TBD.    Additional Info:

## 2023-05-30 NOTE — PROGRESS NOTES
[facility]    Medicine Progress Note - Hospitalist Service, GOLD TEAM 16    Date of Admission:  5/16/2023    Assessment & Plan   62F with history of sarcoidosis, CHF, deconditioning, history of PE, mixed connective tissue disease; presents with shortness of breath, found to have acute LE DVTs bilaterally.     #Pulmonary embolism, bilateral DVT.  - Due to medication noncompliance.    - Started on heparin drip, discontinued 5/18/2023 and started on apixaban 5 mg p.o. twice daily  - Lower extremity venous doppler US revealed partially occlusive thrombus in the common femoral vein, proximal femoral vein, and popliteal vein and partially occlusive thrombus in the left distal femoral vein and popliteal vein.   - Chest CTA revealed multiple right sided pulmonary emboli, some of which appear to be chronic.    #Hypervolemia  #Bilateral lower extremity edema  - Patient has history of heart failure.  Patient has also malnutrition.  Patient also noted to have questionable cirrhosis.  Likely all factors are contributing.  - Patient reports she was on furosemide and then on torsemide at home.  She reports at 1 point she was on 80 mg of oral furosemide a day.  - Per report, patient had more than a liter of urine output.  Unfortunately not documented.  - Plan:  > Furosemide 40 mg IV today.  Reassess again for IV diuresis 6 tomorrow.  > Elevate leg.  > Monitor DONNA's.  > Low-salt diet.  > Daily weight    #Hypoxia due to PE.  Was on 2 L of oxygen via nasal cannula.  Oxygen saturation would dip down to the 80s when on room air.  Would continue supplemental oxygen to maintain oxygen saturation greater than 94%.  Weaned off supplemental oxygen on 5/22.     #Bilateral lower extremity lacerations with hx of wound infection  About 1 month PTA, she fell from her wheel chair and sustained severe laceration of bilateral lower calf. She was seen in ED and the wounds were sutured. Subsequently, she  developed wound infection and was treated with 2 rounds of bactrim for cellulitis. She continues to endorse significant pain not being able to ambulate. The wound does not appear to be acutely infected on admission.  - Wound care consult.     #Episodes of hypoglycemia, recurrent.  Due to poor p.o. intake.  Unable cortisol level within normal limits.    The patient has glucometer at home and has been noting hypoglycemia 50s and 60s at home. In the ED, finger stick glucose had repeatedly low values in to 10s and serum glucose was in 60s. Of note, she was on prednisone 15mg daily for her rheumatologic conditions, but has not been taking them putting her at risk for AI. She is also not taking much PO.  - Blood glucose improved with less frequent hypoglycemic episodes  Plan:  - Continue to check glucose every 6 hours    #Sarcoidosis with pulmonary involvement  #Pulmonary hypertension was seen by Pulmonary Dr Perlman in 87/2022, she follows with Dr Bentley for pulmonary hypertension. Was on prednisone and imuran in the past, but she stopped taking all of her rheumatologic medications due to not feeling well.  - Monitor O2 saturation  - Clarify current medication regimen     #Mixed connective tissue disease  #Scleroderma  #Raynaud's  Follows with Rheum Dr Nicolás De La Torre, not taking home meds including prednisone. Last seen in 7/2022  - Need rheumatology follow up  - Resumed prednisone     #Deconditioning  #Malnutrition, low blood sugar episodes.  #Suspect due to poor p.o. intake.  Was given IV fluids with dextrose as she was having low blood sugar measurements on 5/17.  Discontinued due to concerns for hypervolemia and persistent need for supplemental oxygen.  - Patient with poor apetite for the last 2 months with poor PO intake, last meal 5 days ago due to nausea. Albumin: 2.5. She states at home she was given a glucometer and she is ranged in the 40-60 range. On admission Glucose 61, and at bedside 41.  - Monitor PO  intake  - Nutrition consult  - NGT placement 5/24, started on feeding  - PT/OT consultation  - XR abdomen     #CHF, stable, no acute exacerbation.  #Hx atrial fibrillation  #Pulmonary Edema  #Small bilateral Pleural effusions  Chest CT on admission reveals small bilateral pleural effusions L>R, Interlobular septal thickening and areas of peribronchovascular nodular and GGO in the lungs bilaterally, cardiomegaly and ascitis. BNP on admission to the ER was elevated, 7752. Last echo in 5/2022 showed EF 50-55%.  - Resume apixaban in am (as above)  - Hold PTA metoprolol as ? she was not taking them and VS stable.  - Continue pta amiodarone.   - Patient started on diuresis as above.    #CT evidence of cirrhosis   #Portal Hypertension  Patient with no previous history of cirrhosis or liver disease. An abdomen CT was ordered in ER revealed a cirrhotic liver with portal hypertension, ascites, diffuse anasarca and recanalized unbilical vein.   - Need outpatient work up and close follow up     #Right breast skin thickening and nipple retraction on CT.   Incidental finding on CT done in the ED.   - Outpatient mammography     #Hyponatremia, likely due to IV fluids and diuretic use.  Patient was alert oriented x 3 . - - Discontinue IV fluids  - Monitor.   - Urine sodium is on the lower side.  More suggestive of hypovolemic hyponatremia.  - Urine sodium 133 on 5/29/2023.  - Plan:   > Diuresis as above.   > Continue to monitor     #Severe Protein Calorie Malnutrition in the setting of chronic illness.. Poor appetite and poor oral intake. Frequent episodes of hypoglycemia.  Nutrition consulted.  Patient to be started on tube feeding after NG tube placed today, 5/24.  Appreciate dietitian assistance in this regard.        Diet: Combination Diet Regular Diet Adult  Snacks/Supplements Adult: Other; Please allow pt/RN to order snacks/supplements PRN; Between Meals  Adult Formula Drip Feeding: Continuous Osmolite 1.5; Other - Specify in  "Comment; Goal Rate: Pending AXR Confirmation of tube placement, initiate at 10 ml/hr, advance as tolerated by 10 ml/hr q 12 hr to goal 50 ml/hr; mL/hr; Do not advance tube fee...    DVT Prophylaxis: DOAC  Deleon Catheter: Not present  Lines: None     Cardiac Monitoring: None  Code Status: Full Code      Clinically Significant Risk Factors            # Hypomagnesemia: Lowest Mg = 1.5 mg/dL in last 2 days, will replace as needed   # Hypoalbuminemia: Lowest albumin = 2.5 g/dL at 5/16/2023  4:53 PM, will monitor as appropriate     # Hypertension: Noted on problem list        # Overweight: Estimated body mass index is 27.19 kg/m  as calculated from the following:    Height as of this encounter: 1.727 m (5' 8\").    Weight as of this encounter: 81.1 kg (178 lb 12.7 oz).   # Severe Malnutrition: based on nutrition assessment         Disposition Plan      Expected Discharge Date: 06/01/2023      Destination: home  Discharge Comments: EARNESTINE Antunez DO, [facility]  Hospitalist Service, [address]  Securely message with [employer] (more info)  Text page via [facility] Paging/Directory   See signed in provider for up to date coverage information  ______________________________________________________________________    Interval History   Patient appears in good spirits today.  Patient denies any specific symptomatology.  Patient appears to be tolerating NJ feedings.  Patient will likely require care conference with family and interdisciplinary team.  Case management coordinating care conference.    Physical Exam   Vital Signs: Temp: 97.2  F (36.2  C) Temp src: Oral BP: 133/82 Pulse: 82   Resp: 16 SpO2: 96 % O2 Device: None (Room air)    Weight: 178 lbs 12.69 oz    GENERAL: Alert and oriented x 3; no acute distress; well-nourished.  HEENT: Normocephalic; atraumatic; PERRLA; MMM; NJ tube.  CV: RRR; normal S1, S2; no rubs, murmurs, or gallops.  RESP: Lung fields clear to " aucultation B/L; no wheezing or crepitations.  GI: Abdomen is soft, nontender, nondistended; no organomegaly; normal bowel sounds.  : Deferred genital examination.   MSK: Bilateral pitting edema of the lower extremities.  DERM: Skin is intact; no rash, lesions, or skin breakdown.  NEURO: No focal deficits appreciated; strength & sensorium are grossly intact.  PSYCH: No active hallucinations; flat affect.    Medical Decision Making       45 MINUTES SPENT BY ME on the date of service doing chart review, history, exam, documentation & further activities per the note.      Data     I have personally reviewed the following data over the past 24 hrs:    6.8  \   11.0 (L)   / 193     134 (L) 100 18.8 /  85   3.9 26 0.75 \       Imaging results reviewed over the past 24 hrs:   No results found for this or any previous visit (from the past 24 hour(s)).

## 2023-05-30 NOTE — PROGRESS NOTES
"  VS: /53 (BP Location: Right arm)   Pulse 79   Temp 97.5  F (36.4  C) (Oral)   Resp 17   Ht 1.727 m (5' 8\")   Wt 77.8 kg (171 lb 8.3 oz)   SpO2 97%   BMI 26.08 kg/m     O2: On room air   Output: Purwick on,zero output,Bladder scan 244ml,one wet brief.   Last BM: 05/28/2023   Activity: sleeping   Skin: edematous   Pain: Denies pain   CMS: No numbness and tingling   Dressing: transparent   Diet: NG tube feeding flowing at 20ml/hr   LDA: PIV on the Lt arm, saline locked   Equipment: Feeding pump,call light,patient,s personal belongings.   Plan: TBD.Continue with plan of care   Additional Info:        "

## 2023-05-30 NOTE — PLAN OF CARE
"/53 (BP Location: Right arm)   Pulse 79   Temp 97.5  F (36.4  C) (Oral)   Resp 17   Ht 1.727 m (5' 8\")   Wt 77.8 kg (171 lb 8.3 oz)   SpO2 97%   BMI 26.08 kg/m      A& O X 3, Sating 97% on RA  C/O BLE pain, PRN oxycodone given  Edema on BLE, legs elevated.  Incontinent, pure wick in place.  Continuous NG feeding infusing at 20 ml/hr  PRN compazine given for nausea  R PIV SL. Drinking sips of fluid.  No acute changes during this shift  Will continue with POC  "

## 2023-05-31 NOTE — PLAN OF CARE
From 07 am to 11:30 pm.         VS: VSS, pt denied CP or SOB.   O2: Room air sat. > 90%.   Output: Incontinent of urine, PureWick in place.    Last BM: 05/31/23 passing gas. pt was requesting dulcolax suppository after medium soft BM, Dr was on the unit able to get order for suppository and administered, pt call back again after few mints and had small BM pt stated she need enema, provider was paged and order received for enema and was given, pt passed medium size BM x two after.    Activity: Not out of bed, pt refused to get out of bed.   Skin: Visible skin intact, except bilateral lower leg wound.   Pain: Denied pain or discomfort.   Neuro: CMS and neuro intact to baseline.    Dressing: CDI   Diet: On regular diet and tube feeding down to 40 ml/h this afternoon pt request and back up to 50 ml/h  late this evening.    LDA: PIV SL between antibiotic's.    Equipment: IV pole, and personal belongings.    Plan: TBD.

## 2023-05-31 NOTE — PROGRESS NOTES
CLINICAL NUTRITION SERVICES - REASSESSMENT NOTE     Nutrition Prescription    RECOMMENDATIONS FOR MDs/PROVIDERS TO ORDER:  Please encourage pt to ask for PRN compazine in the morning.     Malnutrition Status:    Severe malnutrition in the context of acute on chronic illness    Recommendations already ordered by Registered Dietitian (RD):  Continue current TF  Encouraged oral intakes, increasing head of bed at night, use of compazine in the morning.    Future/Additional Recommendations:  Monitor labs, intakes, and weight trends.  Monitor EN tolerance, GI symptoms     EVALUATION OF THE PROGRESS TOWARD GOALS   Diet: Regular  Snacks/supplements: PRN  Nutrition Support: Osmolite 1.5 Rommel (or equivalent) @ goal of  50ml/hr  (1200ml/day) provides: 1800 kcals, 75 g PRO, 914 ml free H20, 244 g CHO, and 0 g fiber daily.    Enteral Intake: Tolerates goal rate until the morning when she feels full, nauseous and crampy. Then, she has been asking for her TF to be turned down.     Intake/Tolerance: poor, 0-50% of documented meals.     NEW FINDINGS/REVIEW OF SYSTEMS    Nutrition/GI: TF started 5/25. Able to increase to goal on 5/278, however decreased to 20 ml/hr later that day until yesterday. Yesterday able to advance back to goal. Now at 50 ml/hr. Tolerating per RN notes, nausea improved this morning per RN.     RD met with pt at bedside. Pt reports tolerating TF until morning if she does not get her antiemetics on time. Pt reports laying down at night, discussed keeping bed at 30+ degrees to aid in reflux. No other questions or concerns from pt.     Weights: Pt with 15 lb (9%) weight loss over 1 year. Pt reports having lost 15 lb in the last three months.  Unsure of accuracy of last two weights however could be fluid related.   Wt Readings from Last Encounters:   05/30/23 81.1 kg (178 lb 12.7 oz)   05/29/23 77.8 kg (171 lb 8.3 oz)   05/24/23 64 kg (141 lb 1.5 oz)   05/17/23 65 kg (143 lb 4.8 oz)   04/17/23 68 kg (150 lb)    06/28/22 71.7 kg (158 lb)   06/14/22 68 kg (150 lb)   05/05/22 71.8 kg (158 lb 4.6 oz)   05/02/22 73.4 kg (161 lb 14.4 oz)   04/20/22 68.1 kg (150 lb 1.6 oz)     Skin Bilateral lower extremity lacerations with hx of wound infection, See C nurse note today     Labs reviewed   05/29/23 05:21 05/30/23 06:34 05/30/23 16:33 05/31/23 06:45   Sodium 133 (L) 134 (L)  134 (L)   Potassium 4.3 3.9  4.1   Urea Nitrogen 17.0 18.8  17.5   Creatinine 0.76 0.75  0.67   Magnesium 1.6 (L) 1.5 (L) 3.1 (H) 1.9   Phosphorus 3.6 3.6  3.2   Glucose 86 77  83        05/30/23 01:54 05/30/23 07:00 05/30/23 07:41 05/30/23 08:02 05/30/23 08:21 05/30/23 12:36 05/30/23 16:08 05/31/23 00:16 05/31/23 05:40   Glucose 77 39 (LL) 37 (LL) 39 (LL) 160 (H) 85 102 (H) 112 (H) 86     Medications reviewed: lasix, mag sulfate, protonix, prednisone, dextrose injection, glucose gel, zofran PRN, oxycodone PRN, compazine PRN    MALNUTRITION  % Intake: </= 50% for >/= 5 days (severe) - prior to start of EN   % Weight Loss: > 7.5% in 3 months (severe) - per pt   Subcutaneous Fat Loss: Global severe  Muscle Loss: Global severe  Fluid Accumulation/Edema: Mild-moderate  Malnutrition Diagnosis: Severe malnutrition in the context of chronic illness.    Previous Goals   Total avg nutritional intake to meet a minimum of 25 kcal/kg and 1 g PRO/kg daily (per dosing wt 65 kg).  Evaluation: Unable to evaluate    Previous Nutrition Diagnosis  Inadequate oral intake related to nausea/vomiting, poor appetite as evidenced by patient report, now requiring EN to meet nutrition needs.   Evaluation: Updated    CURRENT NUTRITION DIAGNOSIS  Inadequate oral intake related to nausea/vomiting, poor appetite as evidenced by patient report, requiring EN to meet nutrition needs.     INTERVENTIONS  Implementation  Nutrition education for nutrition relationship to health/disease   Collaboration with other providers RN, provider  Enteral Nutrition - Continue as ordered    Goals  Patient  to consume % of nutritionally adequate meal trays TID, or the equivalent with supplements/snacks.    Total avg nutritional intake to meet a minimum of 25 kcal/kg and 1 g PRO/kg daily (per dosing wt 65 kg).    Monitoring/Evaluation  Progress toward goals will be monitored and evaluated per protocol.    Krystal So MS, RDN, LDN  RD pager: [phone]  WB Weekend/Holiday Pager: [phone]

## 2023-05-31 NOTE — PROGRESS NOTES
[facility]    Medicine Progress Note - Hospitalist Service, GOLD TEAM 16    Date of Admission:  5/16/2023    Assessment & Plan   62F with history of sarcoidosis, CHF, deconditioning, history of PE, mixed connective tissue disease; presents with shortness of breath, found to have acute LE DVTs bilaterally.     #Pulmonary embolism, bilateral DVT.  - Due to medication noncompliance.    - Started on heparin drip, discontinued 5/18/2023 and started on apixaban 5 mg p.o. twice daily  - Lower extremity venous doppler US revealed partially occlusive thrombus in the common femoral vein, proximal femoral vein, and popliteal vein and partially occlusive thrombus in the left distal femoral vein and popliteal vein.   - Chest CTA revealed multiple right sided pulmonary emboli, some of which appear to be chronic.    #Hypervolemia  #Bilateral lower extremity edema  - Patient has history of heart failure.  Patient has also malnutrition.  Patient also noted to have questionable cirrhosis.  Likely all factors are contributing.  - Patient reports she was on furosemide and then on torsemide at home.  She reports at 1 point she was on 80 mg of oral furosemide a day.  - Per report, patient had more than a liter of urine output.  Unfortunately not documented.  - Plan:  > Furosemide 40 mg IV today.  Reassess again for IV diuresis 6 tomorrow.  > Elevate leg.  > Monitor DONNA's.  > Low-salt diet.  > Daily weight    #Hypoxia due to PE.  Was on 2 L of oxygen via nasal cannula.  Oxygen saturation would dip down to the 80s when on room air.  Would continue supplemental oxygen to maintain oxygen saturation greater than 94%.  Weaned off supplemental oxygen on 5/22.     #Bilateral lower extremity lacerations with hx of wound infection  About 1 month PTA, she fell from her wheel chair and sustained severe laceration of bilateral lower calf. She was seen in ED and the wounds were sutured. Subsequently, she  developed wound infection and was treated with 2 rounds of bactrim for cellulitis. She continues to endorse significant pain not being able to ambulate. The wound does not appear to be acutely infected on admission.  - Wound care consult.     #Episodes of hypoglycemia, recurrent.  Due to poor p.o. intake.  Unable cortisol level within normal limits.    The patient has glucometer at home and has been noting hypoglycemia 50s and 60s at home. In the ED, finger stick glucose had repeatedly low values in to 10s and serum glucose was in 60s. Of note, she was on prednisone 15mg daily for her rheumatologic conditions, but has not been taking them putting her at risk for AI. She is also not taking much PO.  - Blood glucose improved with less frequent hypoglycemic episodes  Plan:  - Continue to check glucose every 6 hours    #Sarcoidosis with pulmonary involvement  #Pulmonary hypertension was seen by Pulmonary Dr Perlman in 87/2022, she follows with Dr Bentley for pulmonary hypertension. Was on prednisone and imuran in the past, but she stopped taking all of her rheumatologic medications due to not feeling well.  - Monitor O2 saturation  - Clarify current medication regimen     #Mixed connective tissue disease  #Scleroderma  #Raynaud's  Follows with Rheum Dr Nicolás De La Torre, not taking home meds including prednisone. Last seen in 7/2022  - Need rheumatology follow up  - Resumed prednisone     #Deconditioning  #Malnutrition, low blood sugar episodes.  #Suspect due to poor p.o. intake.  Was given IV fluids with dextrose as she was having low blood sugar measurements on 5/17.  Discontinued due to concerns for hypervolemia and persistent need for supplemental oxygen.  - Patient with poor apetite for the last 2 months with poor PO intake, last meal 5 days ago due to nausea. Albumin: 2.5. She states at home she was given a glucometer and she is ranged in the 40-60 range. On admission Glucose 61, and at bedside 41.  - Monitor PO  intake  - Nutrition consult  - NGT placement 5/24, started on feeding  - PT/OT consultation  - XR abdomen     #CHF, stable, no acute exacerbation.  #Hx atrial fibrillation  #Pulmonary Edema  #Small bilateral Pleural effusions  Chest CT on admission reveals small bilateral pleural effusions L>R, Interlobular septal thickening and areas of peribronchovascular nodular and GGO in the lungs bilaterally, cardiomegaly and ascitis. BNP on admission to the ER was elevated, 7752. Last echo in 5/2022 showed EF 50-55%.  - Resume apixaban in am (as above)  - Hold PTA metoprolol as ? she was not taking them and VS stable.  - Continue pta amiodarone.   - Patient started on diuresis as above.    #CT evidence of cirrhosis   #Portal Hypertension  Patient with no previous history of cirrhosis or liver disease. An abdomen CT was ordered in ER revealed a cirrhotic liver with portal hypertension, ascites, diffuse anasarca and recanalized unbilical vein.   - Need outpatient work up and close follow up     #Right breast skin thickening and nipple retraction on CT.   Incidental finding on CT done in the ED.   - Outpatient mammography     #Hyponatremia, likely due to IV fluids and diuretic use.  Patient was alert oriented x 3 . - - Discontinue IV fluids  - Monitor.   - Urine sodium is on the lower side.  More suggestive of hypovolemic hyponatremia.  - Urine sodium 133 on 5/29/2023.  - Plan:   > Diuresis as above.   > Continue to monitor     #Severe Protein Calorie Malnutrition in the setting of chronic illness.. Poor appetite and poor oral intake. Frequent episodes of hypoglycemia.  Nutrition consulted.  Patient to be started on tube feeding after NG tube placed today, 5/24.  Appreciate dietitian assistance in this regard.    #GERD  - Protonix transitioned from 20 mg twice daily to 40 mg every morning  - Continue to monitor symptoms        Diet: Combination Diet Regular Diet Adult  Snacks/Supplements Adult: Other; Please allow pt/RN to order  "snacks/supplements PRN; Between Meals  Adult Formula Drip Feeding: Continuous Osmolite 1.5; Nasojejunal; Goal Rate: Initiate at 10 ml/hr, advance as tolerated by 10 ml/hr q 12 hr to goal 50 ml/hr; mL/hr; Do not advance tube feeding rate unless K+ is = or > 3.0, Mg++ is = or > 1.5, and ...    DVT Prophylaxis: DOAC  Deleon Catheter: Not present  Lines: None     Cardiac Monitoring: None  Code Status: Full Code      Clinically Significant Risk Factors            # Hypomagnesemia: Lowest Mg = 1.5 mg/dL in last 2 days, will replace as needed   # Hypoalbuminemia: Lowest albumin = 1.8 g/dL at 5/31/2023  6:45 AM, will monitor as appropriate     # Hypertension: Noted on problem list        # Overweight: Estimated body mass index is 27.19 kg/m  as calculated from the following:    Height as of this encounter: 1.727 m (5' 8\").    Weight as of this encounter: 81.1 kg (178 lb 12.7 oz).   # Severe Malnutrition: based on nutrition assessment         Disposition Plan     Expected Discharge Date: 06/01/2023      Destination: home  Discharge Comments: EARNESTINE Antunez DO, MHS  Hospitalist Service, GOLD TEAM [address] [place] [address]  Securely message with [employer] (more info)  Text page via [employer] Paging/Directory   See signed in provider for up to date coverage information  ______________________________________________________________________    Interval History   Patient is in excellent spirits today.  Patient reports feeling much better than yesterday.  Patient reports bilateral lower extremity edema improved.  Discussed case with registered dietitian.  RD is advocating for scheduled Compazine in the morning.  Politely declined this request, instead favoring as needed medication be administered.    Physical Exam   Vital Signs: Temp: 96.8  F (36  C) Temp src: Oral BP: 126/74 Pulse: 68   Resp: 16 SpO2: 94 % O2 Device: None (Room air)    Weight: 178 lbs 12.69 oz    GENERAL: Alert and oriented x " 3; no acute distress; pleasant with exam.  HEENT: Normocephalic; atraumatic; PERRLA; MMM; NG tube.  CV: RRR; normal S1, S2; no rubs, murmurs, or gallops.  RESP: Lung fields clear to aucultation B/L; no wheezing or crepitations.  GI: Abdomen is soft, nontender, nondistended; no organomegaly; normal bowel sounds.  : Deferred genital examination.   MSK: Bilateral lower extremity edema improved.  DERM: Skin is intact; no rash, lesions, or skin breakdown.  NEURO: No focal deficits appreciated; strength & sensorium are grossly intact.  PSYCH: No active hallucinations; affect, insight appear within normal limits.    Medical Decision Making       55 MINUTES SPENT BY ME on the date of service doing chart review, history, exam, documentation & further activities per the note.      Data     I have personally reviewed the following data over the past 24 hrs:    6.1  \   9.9 (L)   / 182     134 (L) 100 17.5 /  83   4.1 26 0.67 \       ALT: 14 AST: 20 AP: 67 TBILI: 0.2   ALB: 1.8 (L) TOT PROTEIN: 3.8 (L) LIPASE: N/A       Imaging results reviewed over the past 24 hrs:   No results found for this or any previous visit (from the past 24 hour(s)).

## 2023-05-31 NOTE — PROGRESS NOTES
"Care Management Follow Up    Length of Stay (days): 15    Expected Discharge Date: 06/02/23   Concerns to be Addressed: Care Conference; discharge planning     Patient plan of care discussed at interdisciplinary rounds: Yes    Anticipated Discharge Disposition:  TCU vs LTC     Anticipated Discharge Services:  Post acute therapies vs all I/ADL assistance from facility staff or spouse  Anticipated Discharge DME:  TBD    Patient/family educated on Medicare website which has current facility and service quality ratings:  Not at this time.  Education Provided on the Discharge Plan:  SW provided education on current dual recs, and the need to further discuss POC for discharge planning.  Patient/Family in Agreement with the Plan:  Yes    Referrals Placed by CM/SW:  None at this time.  Private pay costs discussed: Not at this time.    Additional Information:    Pt's progress in therapies has been limited; \"TCU vs LTC pending progress and rehab potential.\" Care Conference (CC) needed to discuss POC with IDT, pt, and spouse.    1025: DAJUAN met with pt at bedside to discuss the need for a CC; informed pt of current dual recs. Pt stated she would want her , Siva, here for CC, but declined having any other family members present. Pt reported that tomorrow will probably work best, but won't know until she knows what time their son will be bringing Siva down to the hospital, as he [place] not drive in the cities. DAJUAN informed pt that SW will be reaching out to spouse to get CC scheduled and confirmed so that all providers can plan to attend.    1030: SW left VM for Siva, requested a call back to confirm time preference for CC tomorrow.    1620: DAJUAN left another VM for Siva, requested a call back asa, so that CC can be scheduled for pt; reiterated that providers will need advanced notice of CC time in order to arrange their schedules. DAJUAN met with pt at bedside, provided update on VM's left for spouse. Pt stated that " Siva is probably golfing, won't be off the course until 6:00 p.m. SW asked pt to speak to spouse this evening to get time preference for CC. DAJUAN reiterated that a thorough discussion is needed to provide education on Medicare benefit eligibility and LTC services, so that pt can make in informed decision about POC; pt is approaching medical readiness to discharge and disposition needs to be determined. Pt appeared frustrated at information provided, including not being a TCU at this moment; expressed understanding of SW's request.        TASIA OlivierW, LSW  [facility]   PHONE: [phone]  Pager: [phone]

## 2023-05-31 NOTE — PROGRESS NOTES
[facility] Nurse Inpatient Assessment     Consulted for: Bilateral lower calf wounds    Adding EdemaWear stockings to plan of care. Patient with moderate lower leg edema which may be impeding wound healing.     Patient History (according to H&P provider note(s) 5/17/23:      Jeanine Schuler is a 62 year old female with a past medical history of HTN, chronic atrial fibrillation on anticouagulation until recently, multiple autoimmune diseases, sarcoidosis, pulmonary hypertension and CHF, who presented to our service with shortness of breath in rest, anterior pressure like chest pain that irradiated to back and severe weakness that worsen today. For the last couple of months she has generally felt unwell with nausea, poor apetite (last meal 5 days ago) and progressive debilitating weakness to the point of difficulty deambulating. 1 month ago she fell from her wheel chair and injured both legs, needing stitches and 2 rounds of bactrim due to infection. This wounds have been very painful and have contributed for her bedridden status.        Assessment:      Areas visualized during today's visit: Lower extremities     Wound location: Bilateral LE     5/23: Left medial/posterior LE                         5/31 5/23: Right lateral/posterior LE                        5/31    Last photo: 5/31/23  Wound due to: Trauma  Wound history/plan of care: resulted from a fall- see HPI  Wound base: LLE 70/30 % eschar and slough/non-granular tissue- softening, RLE 80 % superficial scab and slough 20% dermis      Palpation of the wound bed: normal      Drainage: moderate from RLE, copious from LLE     Description of drainage: serosanguinous     Measurements (length x width x depth, in cm): LLE 6  x 6  x  0.5 cm, RLE 4  x 2.8  x  0.3 cm      Tunneling: N/A     Undermining: N/A  Periwound skin: Intact, Superficial erosion and Scar tissue      Color: normal and consistent with  surrounding tissue and red      Temperature: normal   Odor: none  Pain: severe and during dressing change, sharp and tender  Pain interventions prior to dressing change: soaking and slow and gentle cares   Treatment goal: Heal , Infection control/prevention, Maintain (prevention of deterioration), Pain control and Protection  STATUS: improving  Supplies ordered: gathered, discussed with RN and discussed with patient      Treatment Plan:     Bilateral LE wound(s): Every other day  And PRN when soiled change cover dressing  Gently remove old dressing- please spray with wound cleanser or NS to loosen if sticking to wound bed  Cleanse wound bed with wound cleanser and pat dry.  Cut a piece of Hydrofera blue (#056283) the size of open area, moisten with NS, wring the excess amount and place it on open area.  Cover with 4x4 mepilex for RLE and sacral mepilex for LLE- if having to change sacral mepilex more than once daily please change to exudry pad (685813) secued with kerlix wrap and tape instead.  Don bilateral EdemaWear Stockings (stockings can be reused up to 6 months, wash with mild soap and water and air dry).    EdemaWear stockings: Use and Care    Rationale for use:     Decreases edema by improving lymphatic and venous function      Safe and gentle compression    Enhances wound healing    Protects skin    General:     EdemaWear can be worn 24/7, but should be removed at least daily for skin inspection and cares      In order to be effective, EdemaWear should DIRECTLY contact the skin as much as possible -- the mesh weave promotes lymphatic drainage when it is pressed into the skin    Choose appropriate size EdemaWear based on leg (or arm) circumference - see table for guidelines    EdemaWear LITE is only for especially fragile or painful skin    Cleanse and moisturize any intact or scaly skin before applying EdemaWear    Ok to apply additional compression over the EdemaWear (ie Lymph wraps)    Application:  "    Apply the EdemaWear from base of toes to knee, or above knee if tolerated and it is a long stocking    Create a wide 3\" cuff at the top if needed to prevent rolling down    Only trim the stocking if it is excessively long; do NOT cut in half; can often fold over excess length onto foot    When wounds are present:    Wound dressings that directly treat the wound bed can be applied under the EdemaWear    Any additional cover dressings (dry gauze, ABD pads, Kerlix, etc) should be applied ON TOP of the stockings whenever feasible     Stockings may get soiled with drainage and will need to be washed; ensure an extra clean, dry pair always available    May need two people to apply the stocking - bunch up stocking and pull against each other, lifting over wounds    Care:    When stockings are soiled, DO NOT THROW AWAY, hand wash with mild soap, rinse, hang dry    Replace approximately every 4 to 6 months        EdemaWear size Max circumference Stripe color PS # # stockings per pack   Small 18\"  (45cm) navy [id number] 2   Medium 30\"  (75cm) yellow [id number] 1   Large 46\"  (115cm) red [id number] 1   X Large 60\"  (150cm) aqua [id number] 1   Small LITE 24\"  (60cm) purple [id number] 2   Medium LITE 36\"  (90cm) orange [id number] 1       Orders: Updated    RECOMMEND PRIMARY TEAM ORDER: wound clinic and home care when discharged for follow-up on wounds  Education provided: plan of care, wound progress and Infection prevention   Discussed plan of care with: Patient and Nurse  WOC nurse follow-up plan: weekly  Notify WOC if wound(s) deteriorate.  Nursing to notify the Provider(s) and re-consult the WOC Nurse if new skin concern.    DATA:     Current support surface: Standard  Standard gel/foam mattress (IsoFlex, Atmos air, etc)  Containment of urine/stool: Incontinent pad in bed  BMI: Body mass index is 27.19 kg/m .   Active diet order: Orders Placed This Encounter      Combination Diet Regular Diet Adult     Output: I/O last 3 completed shifts:  In: " 1570 [P.O.:340; NG/GT:1230]  Out: -      Labs:   Recent Labs   Lab 05/31/23  0645   ALBUMIN 1.8*   HGB 9.9*   WBC 6.1     Pressure injury risk assessment:   Sensory Perception: 3-->slightly limited  Moisture: 3-->occasionally moist  Activity: 2-->chairfast  Mobility: 2-->very limited  Nutrition: 3-->adequate  Friction and Shear: 2-->potential problem  Froy Score: 15    Lisbeht Dougherty RN  CWOCN  Pager no longer is use, please contact through [email or web address] group: [facility] Nurse  Dept. Office Number: *[phone]

## 2023-05-31 NOTE — PROGRESS NOTES
A/Ox's 4. Pt denied pain. Dressing CDI. CMS to baseline. Pt on tube feeding at 50nl/hr to NG. Nausea improved per patient. Denied any nausea, CP, SOB, lightheadedness or dizziness. Pure wick overnight. Resting in bed at this time with call light in reach. Able to make needs known. Continue to monitor.

## 2023-06-01 NOTE — PROGRESS NOTES
"  VS: BP (!) 150/90 (BP Location: Right arm)   Pulse 93   Temp (!) 96.1  F (35.6  C) (Oral)   Resp 16   Ht 1.727 m (5' 8\")   Wt 81.1 kg (178 lb 12.7 oz)   SpO2 97%   BMI 27.19 kg/m     O2: On room air   Output: purewick   Last BM: 06/01/2023   Activity: Sleeping,awake   Skin: Intact except bilateral wound on lf and Rt leg   Pain: Complained of epigastric pain this morning by 0600 and aided with protonix,Pain mgt with prn oxycodone   CMS: Tingling on the left foot and no numbness   Dressing: transparent   Diet: NG tube feeding on and regular feeding   LDA: Saline locked   Equipment: Iv pole,call light,pt,s belongings.   Plan: TBD.continue with POC   Additional Info: Fluctuating blood sugars this shift.Pt was non compliant as she refused her blood sugar checks to be done by 0400am,Last  Stable BS checked done at 01:29am was 123mg/dl.Patient refused the rechecked by 0400am.Charge Nurse was informed and she refused the charge nurse to reassessed he BS and said that she is tired of too much pucking.  Patient accepted the BS CHECKS BY 06:13 and the value was 69mg/dl,patient was give glycogel per her NG tube feeding as she refused anything orally saying that it,s makes her nauseated.Pt asked the writer to stopped the NG Tube feeding.Pt declines health education on the importance of feeding in the mgt of Hypogycemia.Patient declines and said the feeding makes her nauseated.The writer offered the patient zofran,she declines and requested for compromazin prn which was administered.Patient,s BS reassessment was done by 06:28am and the value was 64mg.Doctor Kaylynn Spencer MD) was paged and he ordered for D5S  To be administered.  Patient denies apple juice and apple sauce with crackers offered.       "

## 2023-06-01 NOTE — PROVIDER NOTIFICATION
MD paged and updated about pt's refusal for additional interventions and BG checks. Pt's BG was 46 at 2214. She has received the glucose Gel, x2 apple juices, x1 Chocolate chip cookie and 1/2 an carton of milk. Pt has the tube feeding at 40ml/hr as well. When pt was checked at 0025 BG was 80. Pt stated she did not want additional checks and wanted the feeding to stop because her Stomach was getting upset. MD aware and said she will order some IVF with Dextrose to help keep her sugars up with out her taking anything PO. We are to check BG and resume the Tube feeding as we are able.

## 2023-06-01 NOTE — PLAN OF CARE
VS: VSS   O2: Stable on RA.   Output: Purewick in place.   Last BM: Today.   Activity: Bedrest    Up for meals? No, bedrest per pt choice.   Skin: Intact except wounds on feet.   Pain: Denied pain this shift.   CMS: Bilat LE baseline tingling.   Dressing: Foot dressings changed by PARVEZ Moss RN this shift.   Diet: Regular plus TF. TF running at 50cc/hr. Continuous.   LDA: PIV patent.   Equipment: TF pump, IV pole, I.S.       Additional Info: BGs low at start of shift. See results page.  Charge RN obtained numerous BGs in the 40's then she used a different finger and got 112. Right pointer finger runs higher than other fingers. BGs then 81.

## 2023-06-01 NOTE — PROGRESS NOTES
Care Management Follow Up    Length of Stay (days): 16    Expected Discharge Date: 06/03/2023     Concerns to be Addressed: discharge planning; care conference     Patient plan of care discussed at interdisciplinary rounds: Yes    Anticipated Discharge Disposition:  TCU vs LTC     Anticipated Discharge Services:  Post acute therapies vs all I/ADL assistance from facility staff or spouse  Anticipated Discharge DME:  None    Patient/family educated on Medicare website which has current facility and service quality ratings:  Not at this time  Education Provided on the Discharge Plan: Further discussion needed.  Patient/Family in Agreement with the Plan:  TBD at Care Conference    Referrals Placed by CM/SW:  None at this time.  Private pay costs discussed: Not at this time.    Additional Information:  SW did not receive call back from pt's spouse, Siva, re: CC time preference.    SW met with pt at bedside to inquire if she spoke to Siva about CC. Pt reported that spouse will be here at around 10:00 a.m. tomorrow; stated that 1:00 p.m. CC would work for spouse. SW asked again if there were any other family members that could participate; reiterated that discharge planning will be complex, and all support system should be on same page. Pt provided SW with her son's contact info (Yusuf Henderson: [phone]); stated that SW should call tomorrow, as he is transporting Siva at the moment, and will then be teaching a class at 5:00 p.m. Pt expressed understanding that CC for tomorrow (06/02/23) at 1:00 p.m. will be tentative until SW can confirm with Yusuf that he will be able to participate in CC, along with IDT.          Angelique Montana, TASIAW, LSW  [facility]   PHONE: [phone]  Pager: [phone]

## 2023-06-01 NOTE — PROGRESS NOTES
BRIEF CROSSCOVER NOTE:    Notified ~0030 that patient is persistently hypoglycemic despite glucose gel, chocolate chip cookie, juice in the past hour. Her stomach is now upset and she wants a break, wants her TFs off. Her last BG was 80.    - Advise patient that we keep on TFs; we can hold off any further oral glucose replacement for the next hour to give her a break  - Please recheck her sugar in an hour    Lona Chase MD  Internal Medicine-Pediatrics  [facility]  Pager: [phone]

## 2023-06-01 NOTE — PROGRESS NOTES
[facility]    Medicine Progress Note - Hospitalist Service, GOLD TEAM 16    Date of Admission:  5/16/2023    Assessment & Plan   62F with history of sarcoidosis, CHF, deconditioning, history of PE, mixed connective tissue disease; presents with shortness of breath, found to have acute LE DVTs bilaterally.     #Pulmonary embolism, bilateral DVT.  - Due to medication noncompliance.    - Started on heparin drip, discontinued 5/18/2023 and started on apixaban 5 mg p.o. twice daily  - Lower extremity venous doppler US revealed partially occlusive thrombus in the common femoral vein, proximal femoral vein, and popliteal vein and partially occlusive thrombus in the left distal femoral vein and popliteal vein.   - Chest CTA revealed multiple right sided pulmonary emboli, some of which appear to be chronic.    #Hypervolemia  #Bilateral lower extremity edema  - Patient has history of heart failure.  Patient has also malnutrition.  Patient also noted to have questionable cirrhosis.  Likely all factors are contributing.  - Patient reports she was on furosemide and then on torsemide at home.  She reports at 1 point she was on 80 mg of oral furosemide a day.  - Per report, patient had more than a liter of urine output.  Unfortunately not documented.  - Plan:  > Furosemide 40 mg IV today.  Reassess again for IV diuresis 6 tomorrow.  > Elevate leg.  > Monitor DONNA's.  > Low-salt diet.  > Daily weight    #Hypoxia due to PE.  Was on 2 L of oxygen via nasal cannula.  Oxygen saturation would dip down to the 80s when on room air.  Would continue supplemental oxygen to maintain oxygen saturation greater than 94%.  Weaned off supplemental oxygen on 5/22.     #Bilateral lower extremity lacerations with hx of wound infection  About 1 month PTA, she fell from her wheel chair and sustained severe laceration of bilateral lower calf. She was seen in ED and the wounds were sutured. Subsequently, she  developed wound infection and was treated with 2 rounds of bactrim for cellulitis. She continues to endorse significant pain not being able to ambulate. The wound does not appear to be acutely infected on admission.  - Wound care consult.     #Episodes of hypoglycemia, recurrent.  Due to poor p.o. intake.  Unable cortisol level within normal limits.    The patient has glucometer at home and has been noting hypoglycemia 50s and 60s at home. In the ED, finger stick glucose had repeatedly low values in to 10s and serum glucose was in 60s. Of note, she was on prednisone 15mg daily for her rheumatologic conditions, but has not been taking them putting her at risk for AI. She is also not taking much PO.  - Blood glucose improved with less frequent hypoglycemic episodes  Plan:  - Continue to check glucose every 6 hours    #Sarcoidosis with pulmonary involvement  #Pulmonary hypertension was seen by Pulmonary Dr Perlman in 87/2022, she follows with Dr Bentley for pulmonary hypertension. Was on prednisone and imuran in the past, but she stopped taking all of her rheumatologic medications due to not feeling well.  - Monitor O2 saturation  - Clarify current medication regimen     #Mixed connective tissue disease  #Scleroderma  #Raynaud's  Follows with Rheum Dr Nicolás De La Torre, not taking home meds including prednisone. Last seen in 7/2022  - Need rheumatology follow up  - Resumed prednisone     #Deconditioning  #Malnutrition, low blood sugar episodes.  #Suspect due to poor p.o. intake.  Was given IV fluids with dextrose as she was having low blood sugar measurements on 5/17.  Discontinued due to concerns for hypervolemia and persistent need for supplemental oxygen.  - Patient with poor apetite for the last 2 months with poor PO intake, last meal 5 days ago due to nausea. Albumin: 2.5. She states at home she was given a glucometer and she is ranged in the 40-60 range. On admission Glucose 61, and at bedside 41.  - Monitor PO  intake  - Nutrition consult  - NGT placement 5/24, started on feeding  - PT/OT consultation  - XR abdomen     #CHF, stable, no acute exacerbation.  #Hx atrial fibrillation  #Pulmonary Edema  #Small bilateral Pleural effusions  Chest CT on admission reveals small bilateral pleural effusions L>R, Interlobular septal thickening and areas of peribronchovascular nodular and GGO in the lungs bilaterally, cardiomegaly and ascitis. BNP on admission to the ER was elevated, 7752. Last echo in 5/2022 showed EF 50-55%.  - Resume apixaban in am (as above)  - Hold PTA metoprolol as ? she was not taking them and VS stable.  - Continue pta amiodarone.   - Patient started on diuresis as above.    #CT evidence of cirrhosis   #Portal Hypertension  Patient with no previous history of cirrhosis or liver disease. An abdomen CT was ordered in ER revealed a cirrhotic liver with portal hypertension, ascites, diffuse anasarca and recanalized unbilical vein.   - Need outpatient work up and close follow up     #Right breast skin thickening and nipple retraction on CT.   Incidental finding on CT done in the ED.   - Outpatient mammography     #Hyponatremia, likely due to IV fluids and diuretic use.  Patient was alert oriented x 3 . - - Discontinue IV fluids  - Monitor.   - Urine sodium is on the lower side.  More suggestive of hypovolemic hyponatremia.  - Urine sodium 133 on 5/29/2023.  - Plan:   > Diuresis as above.   > Continue to monitor     #Severe Protein Calorie Malnutrition in the setting of chronic illness.. Poor appetite and poor oral intake. Frequent episodes of hypoglycemia.  Nutrition consulted.  Patient to be started on tube feeding after NG tube placed today, 5/24.  Appreciate dietitian assistance in this regard.    #GERD  - Protonix transitioned from 20 mg twice daily to 40 mg every morning  - Continue to monitor symptoms          Diet: Combination Diet Regular Diet Adult  Snacks/Supplements Adult: Other; Please allow pt/RN to  "order snacks/supplements PRN; Between Meals  Adult Formula Drip Feeding: Continuous Osmolite 1.5; Nasojejunal; Goal Rate: Initiate at 10 ml/hr, advance as tolerated by 10 ml/hr q 12 hr to goal 50 ml/hr; mL/hr; Do not advance tube feeding rate unless K+ is = or > 3.0, Mg++ is = or > 1.5, and ...    DVT Prophylaxis: DOAC  Deleon Catheter: Not present  Lines: None     Cardiac Monitoring: None  Code Status: Full Code      Clinically Significant Risk Factors              # Hypoalbuminemia: Lowest albumin = 1.8 g/dL at 5/31/2023  6:45 AM, will monitor as appropriate     # Hypertension: Noted on problem list        # Overweight: Estimated body mass index is 27.19 kg/m  as calculated from the following:    Height as of this encounter: 1.727 m (5' 8\").    Weight as of this encounter: 81.1 kg (178 lb 12.7 oz).   # Severe Malnutrition: based on nutrition assessment         Disposition Plan     Expected Discharge Date: 06/01/2023      Destination: home  Discharge Comments: EARNESTINE Antunez DO, MHS  Hospitalist Service, GOLD TEAM 16  [facility]  Securely message with [employer] (more info)  Text page via [facility] Paging/Directory   See signed in provider for up to date coverage information  ______________________________________________________________________    Interval History   Persistent episodes of hypoglycemia overnight.  Hypoglycemia protocols initiated by nursing staff.  Cross cover nocturnist's documentation reviewed.  We will administer additional D50 bolus and glucose gel.  Patient is requesting her tube feeds to be stopped?  Patient is reporting right upper quadrant abdominal pain.  Patient is requesting additional opiates for pain management.    Physical Exam   Vital Signs: Temp: (!) 96.1  F (35.6  C) Temp src: Oral BP: (!) 150/90 Pulse: 93   Resp: 16 SpO2: 97 % O2 Device: None (Room air)    Weight: 178 lbs 12.69 oz    GENERAL: Alert and oriented x 3; no acute distress; " well-nourished.  HEENT: Normocephalic; atraumatic; PERRLA; MMM; NG tube.  CV: RRR; normal S1, S2; no rubs, murmurs, or gallops.  RESP: Lung fields clear to aucultation B/L; no wheezing or crepitations.  GI: Abdomen is soft; right upper quadrant tenderness to palpation.  : Deferred genital examination.   MSK: No clubbing, cyanosis, or edema.  DERM: Skin is intact; no rash, lesions, or skin breakdown.  NEURO: No focal deficits appreciated; strength & sensorium are grossly intact.  PSYCH: No active hallucinations; affect, insight appear within normal limits.    Medical Decision Making       45 MINUTES SPENT BY ME on the date of service doing chart review, history, exam, documentation & further activities per the note.      Data         Imaging results reviewed over the past 24 hrs:   No results found for this or any previous visit (from the past 24 hour(s)).

## 2023-06-02 NOTE — PROGRESS NOTES
Focus: patient is complaining of chest pain  DB: pt complaining of chest pain that is radiating down to left arm.    I : Obtained VS placed patient on Oxygen and called Dr Antunez to notify of patients complaint  E: Patients  at the bedside ( Lexx) . Pt called her son on the phone. Stat EKG and Troponin levels ordered per Dr Antunez. Will continue to monitor patients status.

## 2023-06-02 NOTE — PROGRESS NOTES
[facility]    Medicine Progress Note - Hospitalist Service, GOLD TEAM 16    Date of Admission:  5/16/2023    Assessment & Plan   62F with history of sarcoidosis, CHF, deconditioning, history of PE, mixed connective tissue disease; presents with shortness of breath, found to have acute LE DVTs bilaterally.     #Pulmonary embolism, bilateral DVT.  - Due to medication noncompliance.    - Started on heparin drip, discontinued 5/18/2023 and started on apixaban 5 mg p.o. twice daily  - Lower extremity venous doppler US revealed partially occlusive thrombus in the common femoral vein, proximal femoral vein, and popliteal vein and partially occlusive thrombus in the left distal femoral vein and popliteal vein.   - Chest CTA revealed multiple right sided pulmonary emboli, some of which appear to be chronic.    #Hypervolemia  #Bilateral lower extremity edema  - Patient has history of heart failure.  Patient has also malnutrition.  Patient also noted to have questionable cirrhosis.  Likely all factors are contributing.  - Patient reports she was on furosemide and then on torsemide at home.  She reports at 1 point she was on 80 mg of oral furosemide a day.  - Per report, patient had more than a liter of urine output.  Unfortunately not documented.  - Plan:  > Furosemide 40 mg IV today.  Reassess again for IV diuresis 6 tomorrow.  > Elevate leg.  > Monitor DONNA's.  > Low-salt diet.  > Daily weight    #Hypoxia due to PE.  Was on 2 L of oxygen via nasal cannula.  Oxygen saturation would dip down to the 80s when on room air.  Would continue supplemental oxygen to maintain oxygen saturation greater than 94%.  Weaned off supplemental oxygen on 5/22.     #Bilateral lower extremity lacerations with hx of wound infection  About 1 month PTA, she fell from her wheel chair and sustained severe laceration of bilateral lower calf. She was seen in ED and the wounds were sutured. Subsequently, she  developed wound infection and was treated with 2 rounds of bactrim for cellulitis. She continues to endorse significant pain not being able to ambulate. The wound does not appear to be acutely infected on admission.  - Wound care consult.     #Episodes of hypoglycemia, recurrent.  Due to poor p.o. intake.  Unable cortisol level within normal limits.    The patient has glucometer at home and has been noting hypoglycemia 50s and 60s at home. In the ED, finger stick glucose had repeatedly low values in to 10s and serum glucose was in 60s. Of note, she was on prednisone 15mg daily for her rheumatologic conditions, but has not been taking them putting her at risk for AI. She is also not taking much PO.  - Blood glucose improved with less frequent hypoglycemic episodes  Plan:  - Continue to check glucose every 6 hours    #Sarcoidosis with pulmonary involvement  #Pulmonary hypertension was seen by Pulmonary Dr Perlman in 87/2022, she follows with Dr Bentley for pulmonary hypertension. Was on prednisone and imuran in the past, but she stopped taking all of her rheumatologic medications due to not feeling well.  - Monitor O2 saturation  - Clarify current medication regimen     #Mixed connective tissue disease  #Scleroderma  #Raynaud's  Follows with Rheum Dr Nicolás De La Torre, not taking home meds including prednisone. Last seen in 7/2022  - Need rheumatology follow up  - Resumed prednisone     #Deconditioning  #Malnutrition, low blood sugar episodes.  #Suspect due to poor p.o. intake.  #Failure to thrive  Was given IV fluids with dextrose as she was having low blood sugar measurements on 5/17.  Discontinued due to concerns for hypervolemia and persistent need for supplemental oxygen.  - Patient with poor apetite for the last 2 months with poor PO intake, last meal 5 days ago due to nausea. Albumin: 2.5. She states at home she was given a glucometer and she is ranged in the 40-60 range. On admission Glucose 61, and at bedside  41.  - Monitor PO intake  - Nutrition consult  - NGT placement 5/24, started on feeding  - PT/OT consultation  - XR abdomen  - Patient is feeling frustrated and overwhelmed with overall medical course  - Extensive reassurance provided and motivation  - We will order medical psychology consultation     #CHF, stable, no acute exacerbation.  #Hx atrial fibrillation  #Pulmonary Edema  #Small bilateral Pleural effusions  Chest CT on admission reveals small bilateral pleural effusions L>R, Interlobular septal thickening and areas of peribronchovascular nodular and GGO in the lungs bilaterally, cardiomegaly and ascitis. BNP on admission to the ER was elevated, 7752. Last echo in 5/2022 showed EF 50-55%.  - Resume apixaban in am (as above)  - Hold PTA metoprolol as ? she was not taking them and VS stable.  - Continue pta amiodarone.   - Patient started on diuresis as above.    #CT evidence of cirrhosis   #Portal Hypertension  Patient with no previous history of cirrhosis or liver disease. An abdomen CT was ordered in ER revealed a cirrhotic liver with portal hypertension, ascites, diffuse anasarca and recanalized unbilical vein.   - Need outpatient work up and close follow up     #Right breast skin thickening and nipple retraction on CT.   Incidental finding on CT done in the ED.   - Outpatient mammography     #Hyponatremia, likely due to IV fluids and diuretic use.  Patient was alert oriented x 3 . - - Discontinue IV fluids  - Monitor.   - Urine sodium is on the lower side.  More suggestive of hypovolemic hyponatremia.  - Urine sodium 133 on 5/29/2023.  - Plan:   > Diuresis as above.   > Continue to monitor     #Severe Protein Calorie Malnutrition in the setting of chronic illness.. Poor appetite and poor oral intake. Frequent episodes of hypoglycemia.  Nutrition consulted.  Patient to be started on tube feeding after NG tube placed today, 5/24.  Appreciate dietitian assistance in this regard.    #GERD  - Protonix  "transitioned from 20 mg twice daily to 40 mg every morning  - Continue to monitor symptoms     #Muscle spasms  - Add tizanidine as needed for muscle spasms       Diet: Combination Diet Regular Diet Adult  Snacks/Supplements Adult: Other; Please allow pt/RN to order snacks/supplements PRN; Between Meals  Adult Formula Drip Feeding: Continuous Osmolite 1.5; Nasojejunal; Goal Rate: Initiate at 10 ml/hr, advance as tolerated by 10 ml/hr q 12 hr to goal 50 ml/hr; mL/hr; Do not advance tube feeding rate unless K+ is = or > 3.0, Mg++ is = or > 1.5, and ...    DVT Prophylaxis: DOAC  Deleon Catheter: Not present  Lines: None     Cardiac Monitoring: None  Code Status: Full Code      Clinically Significant Risk Factors            # Hypomagnesemia: Lowest Mg = 1.6 mg/dL in last 2 days, will replace as needed   # Hypoalbuminemia: Lowest albumin = 1.8 g/dL at 6/2/2023  5:47 AM, will monitor as appropriate     # Hypertension: Noted on problem list        # Overweight: Estimated body mass index is 28.22 kg/m  as calculated from the following:    Height as of this encounter: 1.727 m (5' 8\").    Weight as of this encounter: 84.2 kg (185 lb 10 oz).   # Severe Malnutrition: based on nutrition assessment         Disposition Plan      Expected Discharge Date: 06/09/2023,  3:00 PM    Destination: home  Discharge Comments: CC, needs to be off of tube feeds.          Bobby Antunez DO, S  Hospitalist Service, GOLD TEAM 16  [facility]  Securely message with [employer] (more info)  Text page via [facility] Paging/Directory   See signed in provider for up to date coverage information  ______________________________________________________________________    Interval History   Nursing staff has had a very difficult time with patient today.  Patient is refusing virtually all of her medications.  Patient is refusing to work with therapies.  Overall, patien appears frustrated and overwhelmed with overall " treatment course.  Discussed case with patient, significant other, and nursing staff at bedside.  Will order medical psychology consultation.  Encouraged patient to take medications and interact with therapies.  Recommend premedicating patient with Compazine IV prior to eating and/or medication administration.  We will add tizanidine as needed for reported muscle spasms.    Physical Exam   Vital Signs: Temp: (!) 96  F (35.6  C) Temp src: Oral BP: (!) 149/83 Pulse: 98   Resp: 16 SpO2: 93 % O2 Device: None (Room air) Oxygen Delivery: 1/2 LPM  Weight: 185 lbs 10.04 oz    GENERAL: Alert and oriented x 3; no acute distress; well-nourished.  HEENT: Normocephalic; atraumatic; PERRLA; MMM; NJ tube.  CV: RRR; normal S1, S2; no rubs, murmurs, or gallops.  RESP: Lung fields clear to aucultation B/L; no wheezing or crepitations.  GI: Abdomen is soft, nontender, nondistended; no organomegaly; normal bowel sounds.  : Deferred genital examination.   MSK: Bilateral lower extremity edema markedly improved.  DERM: Skin is intact; no rash, lesions, or skin breakdown.  NEURO: No focal deficits appreciated; strength & sensorium are grossly intact.  PSYCH: No active hallucinations; affect, insight appear within normal limits.    Medical Decision Making       55 MINUTES SPENT BY ME on the date of service doing chart review, history, exam, documentation & further activities per the note.      Data     I have personally reviewed the following data over the past 24 hrs:    11.4 (H)  \   11.1 (L)   / 207     134 (L) 98 20.5 /  79   4.2 27 0.62 \       ALT: 16 AST: 19 AP: 69 TBILI: 0.3   ALB: 1.8 (L) TOT PROTEIN: 4.4 (L) LIPASE: N/A       Imaging results reviewed over the past 24 hrs:   No results found for this or any previous visit (from the past 24 hour(s)).

## 2023-06-02 NOTE — PLAN OF CARE
Pt arrived to unit at 650 pm from 5 ortho  Nitroglycerin in her med bin  Instructions to give compazine overnight before morning med pass (7 am?)  Did not take morning medications today - lead to her feeling worse  CP down shoulder   BG every 6 hours hx hypoglycemia  Elevated trop/abn EKG     Wound note - didn't get a chance to asses in my 5 min spent with her before shift change     TF 50 ml an hour

## 2023-06-02 NOTE — PROGRESS NOTES
"  VS: BP (!) 149/83   Pulse 98   Temp (!) 96  F (35.6  C) (Oral)   Resp 16   Ht 1.727 m (5' 8\")   Wt 84.2 kg (185 lb 10 oz)   SpO2 93%   BMI 28.22 kg/m     O2: Room air saturations 93%.    Output:    Last BM: 6/1/2023 reports patient.    Activity: Pt is able to raise head of bed on her own. Pt prefers to have both her legs on pillows.    Skin: Pt has purple discoloration to all her finger tip and all her toes. Pt states\" that is how my fingers an my toes look\"    Pain: Patient is complaining of left shoulder pain this am. Rating pain a 10 on scale of 1-10. Pt requesting \" Please get something to help me!\" Called Dr Antunez and obtained orders for Lidocaine patch. When patient laye her bed flat it seemed to relieve some of pressure on abdomen. Now rating pain a 9 on scale of 1-10 after starting Lidocaine patch. Pt appears to be more comfortable   CMS: Pt has baseline numbness and tingling in lower legs and feet. Pt also has a purplish color to all toes and fingers. Pt states\" that is normally how they look\" Pt is also    Dressing: Pt has x 2 dressing on back of both legs and has a wrap on both lower legs for edema.    Diet: Tube feeding continues to run at 50 ml an hour. Blood sugar this am was 73. Pt is not taking po intake at all! Pt did not take any of her oral medications this am! Pt refused to take oral meds.  Pt also did not take any oral pain meds.Dr Antunez was notified of patient not taking PO meds this am.    LDA: IV SL   Equipment: Specialty wraps for both lower legs, blood sugars checks, Tube feedings continue at 50 ml an hour.    Plan: Awaiting for care conference.    Additional Info: Dr Antunez is on today for medical provider.        "

## 2023-06-02 NOTE — PROGRESS NOTES
Care Management Follow Up    Length of Stay (days): 17    Expected Discharge Date: 06/09/2023     Concerns to be Addressed: discharge planning; Care Conference     Patient plan of care discussed at interdisciplinary rounds: Yes    Anticipated Discharge Disposition:  TBD     Anticipated Discharge Services:  TBD  Anticipated Discharge DME:  TBD    Patient/family educated on Medicare website which has current facility and service quality ratings:  Not at this time.  Education Provided on the Discharge Plan:  Further discussion needed  Patient/Family in Agreement with the Plan:  Further discussion needed    Referrals Placed by CM/SW:  None at this time.  Private pay costs discussed: Not at this time.    Additional Information:  DAJUAN was in communication with pt's son, Yusuf, this morning and he was agreeable to a 1:00 p.m. Care Conference (CC) today; however, d/t workload, DAJUAN did not call back Yusuf to confirm CC worked for providers, and CC did not occur.    1640: DAJUAN called Yusuf (PH: [phone]) to discuss possibility of CC occurring on Monday. Yusuf stated he is flexible with his schedule; reported he is an  and not available between 11:00 a.m. and 1:00 p.m. Yusuf agreeable to a 2:00 p.m. CC on Monday 06/05/23. DAJUAN provided Yusuf with teleconference information in case he cannot be here in person.    1650: DAJUAN paged Dr. Antunez, provided update of CC, requested Palliative Consult for their presence at CC (and enough time to chart review).    1700: DAJUAN met with pt and spouse at bedside, provided update of CC date and time.          TASIA OlivierW, LSW  [facility]   PHONE: [phone]  Pager: [phone]

## 2023-06-02 NOTE — PLAN OF CARE
"Goal Outcome Evaluation:    Omar not [id number]  VS: /80 (BP Location: Right arm)   Pulse 85   Temp 98.8  F (37.1  C) (Oral)   Resp 16   Ht 1.727 m (5' 8\")   Wt 81.1 kg (178 lb 12.7 oz)   SpO2 93%   BMI 27.19 kg/m       O2: SpO2 > 92%  and stable on 1/2L O2. LS clear and equal bilaterally. C/o right side chest pain    Output: Purewick in place with adequate output.   Last BM: LBM 6/1. denies abdominal discomfort. BS active.   Activity: Not OOB. Repositioned pt 2-3 hours per pt request.    Skin: WDL except, edema, wound bilateral calf and bruises right and left forearm.   Pain: Pain managed with prn oxycodone and tylenol.   CMS: Intact, AOx4. Baseline numbness in BLE's.   Dressing: Bilateral lower calf dressing CDI.   Diet: Regular diet.  TF running at 50 ml/hr. Complain of intermittent nausea; relived with PO compazine.  BG checks Q6 hrs. Last .   LDA: PIV SL into left forearm.    Equipment: IV pole, personal belongings,    Plan: Awaiting placement to TCU vs LTC.  Continue with plan of care. Call light within reach, pt able to make needs known.    Additional Info: Pt is on strict I&O.                             "

## 2023-06-02 NOTE — PROGRESS NOTES
Cross cover:    Patient was seen and evaluated for chest pain.  Patient endorses retrosternal, left chest pain, mostly pressure-like, comes in waves, since this morning, radiating to left shoulder.  States her left shoulder started hurting 3 AM.  Patient very anxious.  Nauseated.  No other concern.  Patient admitted with bilateral pulm embolism, on apixaban.  Also history of sarcoidosis with possible cardiac sarcoid, heart failure, lower extremity edema, mixed connective tissue disease, failure to thrive.     EKG: Sinus tach with left axis deviation.  No acute significant ST-T changes.   Troponin, High sensitive: 57, repeat 2 hours after 56.  It was 72 on 5/16.  Discussed with cardiology fellow, unlikely ACS. ?related to cardiac sarcoid vs gerd vs PE vs MSK.   We will continue to monitor patient closely on cardiac telemetry, transfer to floor with telemetry monitor.  Aspirin 325 mg p.o. x1 given  IV morphine 2 mg x 1  Nitroglycerin sublingual as needed  Continue current apixaban, amiodarone.  Lasix.  Per RN: Patient declined her medication this morning.  We will get echocardiogram, TTE  Pulmonary consult cardiology tomorrow PRN.   Patient, spouse at bedside-updated, reassured.  israel RN.     Vitals:    06/02/23 2027 06/02/23 2037 06/02/23 2045 06/02/23 2120   BP:   101/79    BP Location:   Right arm    Pulse: 116 100 102    Resp:   16    Temp:   98.2  F (36.8  C)    TempSrc:   Oral    SpO2:  92% 94% 93%   Weight:       Height:             Nahum Parks MD  [facility]  Contact information available via [facility] Paging/Directory

## 2023-06-02 NOTE — PROGRESS NOTES
"Focus: Follow up with chest pain.   DB: Nitrogen, Asprin and IV morphine was given to patient per orders. EKG and Troponin's were obtained.   E: Dr Parks came to unit and assessed patient and notified of conversation with cardiologist. Pt was transferred to [facility] unit 532.Pt was placed on telemetry. Pt now rating pain a 8 on scale of 1-10. Pt states\" that morphine is starting to work. I am feeling better.\" Pt seemed more comfortable after being transferred to new unit. Moving forward I asked staff to medicate with IV compazine and make sure patient takes her morning cardiac medications. Status of patient will continue to be monitored.  "

## 2023-06-02 NOTE — PLAN OF CARE
Physical Therapy: Orders received. Chart reviewed and discussed with care team.? Physical Therapy not indicated due to pt w/ limited particpation and tolerance for therapies.?Pt continues to be followed by OT to progress functional mobility. No IP PT needs identified. Defer discharge recommendations to OT and medical team.? Will complete orders.

## 2023-06-03 NOTE — PROGRESS NOTES
[facility]    Medicine Progress Note - Hospitalist Service, GOLD TEAM 16    Date of Admission:  5/16/2023    Assessment & Plan   62F with history of sarcoidosis, CHF, deconditioning, history of PE, mixed connective tissue disease; presents with shortness of breath, found to have acute LE DVTs bilaterally.     #Pulmonary embolism, bilateral DVT.  - Due to medication noncompliance.    - Started on heparin drip, discontinued 5/18/2023 and started on apixaban 5 mg p.o. twice daily  - Lower extremity venous doppler US revealed partially occlusive thrombus in the common femoral vein, proximal femoral vein, and popliteal vein and partially occlusive thrombus in the left distal femoral vein and popliteal vein.   - Chest CTA revealed multiple right sided pulmonary emboli, some of which appear to be chronic.  -- Continue Eliquis 5 mg po bid    #Recurrent pleuritic CP  -- Ruled out for ACS w/ EKG and HS troponin  --  TTE 6/3 EF 45-50%, no RWMA.  EF was 50-55% was on 5/5/22  --  CP may be due to underlying PE    #Hypervolemia  #Bilateral lower extremity edema  - Patient has history of heart failure.  Patient has also malnutrition.  Patient also noted to have questionable cirrhosis.  Likely all factors are contributing.  - Patient reports she was on furosemide and then on torsemide at home.  She reports at 1 point she was on 80 mg of oral furosemide a day.  - Per report, patient had more than a liter of urine output.  Unfortunately not documented.  - Plan:  > Furosemide 40 mg podaily  > Elevate leg.  > Monitor DONNA's.  > Low-salt diet.  > Daily weight    #Hypoxia due to PE.  Was on 2 L of oxygen via nasal cannula.  Oxygen saturation would dip down to the 80s when on room air.  Would continue supplemental oxygen to maintain oxygen saturation greater than 94%.  Weaned off supplemental oxygen on 5/22.     #Bilateral lower extremity lacerations with hx of wound infection  About 1  month PTA, she fell from her wheel chair and sustained severe laceration of bilateral lower calf. She was seen in ED and the wounds were sutured. Subsequently, she developed wound infection and was treated with 2 rounds of bactrim for cellulitis. She continues to endorse significant pain not being able to ambulate. The wound does not appear to be acutely infected on admission.  - Wound care consult.     #Episodes of hypoglycemia, recurrent.  Due to poor p.o. intake.  Unable cortisol level within normal limits.    The patient has glucometer at home and has been noting hypoglycemia 50s and 60s at home. In the ED, finger stick glucose had repeatedly low values in to 10s and serum glucose was in 60s. Of note, she was on prednisone 15mg daily for her rheumatologic conditions, but has not been taking them putting her at risk for AI. She is also not taking much PO.  - Blood glucose improved with less frequent hypoglycemic episodes  Plan:  - Continue to check glucose every 6 hours    #Sarcoidosis with pulmonary involvement  #Pulmonary hypertension was seen by Pulmonary Dr Perlman in 87/2022, she follows with Dr Bentley for pulmonary hypertension. Was on prednisone and imuran in the past, but she stopped taking all of her rheumatologic medications due to not feeling well.  - Monitor O2 saturation  - Clarify current medication regimen     #Mixed connective tissue disease  #Scleroderma  #Raynaud's  Follows with Rheum Dr Nicolás De La Torre, not taking home meds including prednisone. Last seen in 7/2022  - Need rheumatology follow up  - Resumed prednisone     #Deconditioning  #Malnutrition, low blood sugar episodes.  #Suspect due to poor p.o. intake.  #Failure to thrive  Was given IV fluids with dextrose as she was having low blood sugar measurements on 5/17.  Discontinued due to concerns for hypervolemia and persistent need for supplemental oxygen.  - Patient with poor apetite for the last 2 months with poor PO intake, last meal 5  days ago due to nausea. Albumin: 2.5. She states at home she was given a glucometer and she is ranged in the 40-60 range. On admission Glucose 61, and at bedside 41.  - Monitor PO intake  - Nutrition consult  - NGT placement 5/24, started on feeding  - PT/OT consultation  - XR abdomen  - Patient is feeling frustrated and overwhelmed with overall medical course  - Extensive reassurance provided and motivation  - We will order medical psychology consultation     #CHF, stable, no acute exacerbation.  #Hx atrial fibrillation  #Pulmonary Edema  #Small bilateral Pleural effusions  Chest CT on admission reveals small bilateral pleural effusions L>R, Interlobular septal thickening and areas of peribronchovascular nodular and GGO in the lungs bilaterally, cardiomegaly and ascitis. BNP on admission to the ER was elevated, 7752. Last echo in 5/2022 showed EF 50-55%.  - Resume apixaban in am (as above)  - Hold PTA metoprolol as ? she was not taking them and VS stable.  - Continue pta amiodarone.   - Patient started on diuresis as above.    #CT evidence of cirrhosis   #Portal Hypertension  Patient with no previous history of cirrhosis or liver disease. An abdomen CT was ordered in ER revealed a cirrhotic liver with portal hypertension, ascites, diffuse anasarca and recanalized unbilical vein.   - Need outpatient work up and close follow up     #Right breast skin thickening and nipple retraction on CT.   Incidental finding on CT done in the ED.   - Outpatient mammography     #Hyponatremia, likely due to IV fluids and diuretic use.  Patient was alert oriented x 3 . - - Discontinue IV fluids  - Monitor.   - Urine sodium is on the lower side.  More suggestive of hypovolemic hyponatremia.  - Urine sodium 133 on 5/29/2023.  - Plan:   > Diuresis as above.   > Continue to monitor     #Severe Protein Calorie Malnutrition in the setting of chronic illness.. Poor appetite and poor oral intake. Frequent episodes of hypoglycemia.  Nutrition  consulted.  Patient to be started on tube feeding after NG tube placed today, 5/24.  Appreciate dietitian assistance in this regard.    #GERD  - Protonix transitioned from 20 mg twice daily to 40 mg every morning  - Continue to monitor symptoms     #Muscle spasms  - Add tizanidine as needed for muscle spasms           Diet: Combination Diet Regular Diet Adult  Snacks/Supplements Adult: Other; Please allow pt/RN to order snacks/supplements PRN; Between Meals  Adult Formula Drip Feeding: Continuous Osmolite 1.5; Nasojejunal; Goal Rate: Initiate at 10 ml/hr, advance as tolerated by 10 ml/hr q 12 hr to goal 50 ml/hr; mL/hr; Do not advance tube feeding rate unless K+ is = or > 3.0, Mg++ is = or > 1.5, and ...    DVT Prophylaxis: DOAC  Deleon Catheter: Not present  Lines: None     Cardiac Monitoring: ACTIVE order. Indication: Chest pain/ ACS rule out (24 hours)  Code Status: Full Code    Disposition Plan     Expected Discharge Date: 06/09/2023,  3:00 PM    Destination: home  Discharge Comments: CC, needs to be off of tube feeds.          Althea Tesfaye MD, S  Hospitalist Service, GOLD TEAM 16  [facility]  Securely message with e[employer] (more info)  Text page via [facility] Paging/Directory   See signed in provider for up to date coverage information  ______________________________________________________________________    Interval History   Seen twice over night for CP  CP better today  Pt says she feels much better today c/w yesterday  TTE done today. See result above      Physical Exam   Vital Signs: Temp: 97.7  F (36.5  C) Temp src: Oral BP: 106/80 Pulse: 90   Resp: 18 SpO2: 96 % O2 Device: None (Room air) Oxygen Delivery: 2 LPM  Weight: 179 lbs 3.74 oz    GENERAL: Alert and oriented x 3; no acute distress; well-nourished.  HEENT: Normocephalic; atraumatic; PERRLA; MMM; NJ tube.  CV: RRR; normal S1, S2; no rubs, murmurs, or gallops.  RESP: Lung fields clear to aucultation B/L;  no wheezing or crepitations.  GI: Abdomen is soft, nontender, nondistended; no organomegaly; normal bowel sounds.  : Deferred genital examination.   MSK: Bilateral lower extremity edema markedly improved.  DERM: Skin is intact; no rash, lesions, or skin breakdown.  NEURO: No focal deficits appreciated; strength & sensorium are grossly intact.  PSYCH: No active hallucinations; affect, insight appear within normal limits.    Medical Decision Making       55 MINUTES SPENT BY ME on the date of service doing chart review, history, exam, documentation & further activities per the note.      Data     I have personally reviewed the following data over the past 24 hrs:    19.3 (H)  \   11.7   / 200     135 (L) 98 24.2 (H) /  98   4.6 23 0.62 \       ALT: 15 AST: 18 AP: 70 TBILI: 0.3   ALB: 2.0 (L) TOT PROTEIN: 4.7 (L) LIPASE: N/A       Trop: 56 (H) BNP: N/A       Imaging results reviewed over the past 24 hrs:   Recent Results (from the past 24 hour(s))   Echo Complete   Result Value    LVEF  45-50% (mildly reduced)    Narrative    [id number]  [id number]  [id number]  [id number]^LENY^NORA     [facility]  Echocardiography Laboratory  [address] [place] [address]     Name: OSCAR REYES  MRN: [id number]  : 1960  Study Date: 2023 11:19 AM  Age: 62 yrs  Gender: Female  Patient Location: [facility]  Reason For Study: Chest Pain  Ordering Physician: NORA MICHELE  Performed By: Geremias Paul     BSA: 2.0 m2  Height: 68 in  Weight: 185 lb  BP: 110/64 mmHg  ______________________________________________________________________________  Procedure  Complete Portable Echo Adult. Contrast Optison. 4 ml wasted. Patient was given  5 ml mixture of 3 ml Optison and 6 ml saline.  ______________________________________________________________________________  Interpretation Summary  Left ventricular function is decreased. The ejection fraction is 45-50%  (mildly reduced).  No  regional wall motion abnormalities are seen.  Global right ventricular function is normal.  The right ventricle is normal size.  Pulmonary artery systolic pressure is normal.  No significant valvular abnormalities present.  No pericardial effusion is present.     This study was compared with the study from 5/5/2022 .  LVEF slightly lower in today`s study (previous study 50-55%) by visual  comparison otherwise no change. Recommend cardiac MRI which can better  estimate LVEF.  ______________________________________________________________________________  Left Ventricle  Left ventricular size is normal. Mild concentric wall thickening consistent  with left ventricular hypertrophy is present. Left ventricular diastolic  function is indeterminate. Left ventricular function is decreased. The  ejection fraction is 45-50% (mildly reduced). No regional wall motion  abnormalities are seen.     Right Ventricle  The right ventricle is normal size. Global right ventricular function is  normal.     Atria  Both atria appear normal.     Mitral Valve  Mild mitral annular calcification is present. Mild mitral insufficiency is  present.     Aortic Valve  Mild aortic valve calcification is present. Trace aortic insufficiency is  present.     Tricuspid Valve  The tricuspid valve is normal. Trace tricuspid insufficiency is present. The  right ventricular systolic pressure is approximated at 27.2 mmHg plus the  right atrial pressure. Pulmonary artery systolic pressure is normal.     Pulmonic Valve  On Doppler interrogation, there is no significant stenosis or regurgitation.  The valve leaflets are not well visualized.     Vessels  The aorta root is normal. The inferior vena cava cannot be assessed. The  thoracic aorta is normal.     Pericardium  No pericardial effusion is present.     Miscellaneous  No significant valvular abnormalities present.     Compared to Previous Study  This study was compared with the study from 5/5/2022 . LVEF  slightly lower in  today`s study Z(previous study 50-55%) by visual comparison otherwise no  change. Recommend cardiac MRI.  ______________________________________________________________________________  MMode/2D Measurements & Calculations  IVSd: 1.2 cm  LVIDd: 4.8 cm  LVIDs: 3.8 cm  LVPWd: 1.1 cm  FS: 20.6 %  LV mass(C)d: 208.7 grams  LV mass(C)dI: 105.5 grams/m2  Ao root diam: 3.4 cm  LVOT diam: 2.3 cm  LVOT area: 4.2 cm2  LA Volume (BP): 41.8 ml     LA Volume Index (BP): 21.1 ml/m2  RV Base: 3.2 cm  RWT: 0.46  TAPSE: 1.2 cm     Doppler Measurements & Calculations  MV E max ck: 107.0 cm/sec  MV dec slope: 743.0 cm/sec2  MV dec time: 0.14 sec  PA acc time: 0.07 sec  TR max ck: 261.0 cm/sec  TR max P.2 mmHg  E/E' av.7  Lateral E/e': 23.4  Medial E/e': 24.0  RV S Ck: 13.4 cm/sec     ______________________________________________________________________________  Report approved by: Raeann FELICIANO 2023 12:50 PM

## 2023-06-03 NOTE — PLAN OF CARE
Pt was felt nausea before the start of the shift and asked for tube feeding to be stopped. Tube feeding resumed around 0900. Pt was able to tolerate her medication, but sis refuse multivitamin and colace. Pt stated the vitamin made her feel nausea. Pt states she had an accident the other day and did not want to the colace; pt denies constipation.    Pt states she did not sleep well and wanted to rest. Writer clustered cares and let pt rest for the majority of the shift.    Pt had echo been completed this shift.    Dressing changed was complete this shift.    At shift change pt had BG 45, provider was notified. Pt has chronic low capillary BG. Hands should be warmed with heat pack prior to BG checks.

## 2023-06-03 NOTE — PLAN OF CARE
"Goal Outcome Evaluation:  /82 (BP Location: Right arm, Patient Position: Semi-Love's, Cuff Size: Adult Regular)   Pulse 89   Temp 98.2  F (36.8  C) (Oral)   Resp 18   Ht 1.727 m (5' 8\")   Wt 84.2 kg (185 lb 10 oz)   SpO2 99%   BMI 28.22 kg/m     On 2 lpm via NC keeping satts above 90's.  NG via L nostril with TF running at 50ml/hr with 30ml water flushes q 4hrs.q 6hrs BG, 99 midnight,90 at 6AM.  BLE wounds with drsgs,CDI.Due to change today.  Baseline numbness/tingling on all extremities,worse on BLE.  Toes are purplish from Raynauds,her baseline.  L sided chest pain increasing at midnight.Nitrostat SL and IV morphine given with relief.Warm compress also helps. [facility] came in  and physcically assessed pt.  Purewick on.A total of 75ml at 9pm,have not voided since so did bladder scan showing 425ml of retention.Pt able to void 200ml.MD notified.PVR 223ml.  Some nausea this morning.IV zofran given then IV compazine due to persistent nausea.Pt also insisted TF be turned off from gastric fullness.TF was turned off at 6AM and tolerated oral morning meds:amiodarone 200mg,lasix 40mg, and eliquis 5mg including oxycodone.Pt wanting her other morning meds later.   was approved to stay overnight per charge.  .                        "

## 2023-06-03 NOTE — PROGRESS NOTES
Paged about chest pain - left sided and very sharp/stabbing, radiating to scapula, sudden in onset, dissipates quickly. Resolved this time after morphine, patient estimates 5 total episodes this evening. ECG and trop earlier reassuring, cross cover discussed with cardiology, echo pending.    Doesn't sound cardiac in nature - suspect subcostal nerve irritation with referred pain, possibly pleuritic in origin from known PE.   Pain resolved at this time, may consider addition of neuropathic pain agent like gabapentin or pregabalin during the day.    Bobby Grajeda DO  Hospitalist    Medicine and Pediatrics  [email or web address]  Pager: [phone]  Available on Language Systems

## 2023-06-04 NOTE — PROGRESS NOTES
"VS: BP (!) 149/93 (BP Location: Right arm)   Pulse 98   Temp 98.2  F (36.8  C) (Oral)   Resp 18   Ht 1.727 m (5' 8\")   Wt 81.3 kg (179 lb 3.7 oz)   SpO2 98%   BMI 27.25 kg/m       O2: Sating >90% on 2 L NC . Lung sounds clear. Denies chest pain and SOB.   Output: Voids spontaneously and adequately. Pure wick in place and  changed this shift.   Last BM: LBM 6/3  Small .    Activity: NOOB this shift    Skin: Edema in upper extremities and Lower extremities,  BLE wounds dressing change done on previous shift, next change due on 6/5. Raynaud's in the fingers hands/ toes both feet. Redness to bottom.    Pain: Pain was managed with  Morphine, and Oxy   CMS: A&Ox4. Numbness/ Tingling  in Feet    Dressing: Dressing to both legs C/D/I.   Diet: Regular diet thin liquids. NG in Left nostril TF at 50 ml/hr W/ 30 ml water flushes Q4  BG checks Q6  During the shift Pt.  Around 7pm BG dropped down to to 44 and recheck was 38 MD notified (ital).  Through out the shift, Pt. BG continued to fluctuate  Protocol  Followed. Glucose gel, apple juice and peanut butter. Pt. Asymptomatic.     LDA: PIV to Left forearm  is S/L.    Equipment: IV pole, FWW, gait belt, and personal belongings. Call light within reach and uses appropriately.   Plan:   Possible discharge 6/9 to home     Additional Info: Pt. Slept intermittenly this shift.  Pt. On Tele monitoring Continue POC.       "

## 2023-06-04 NOTE — PLAN OF CARE
Pt received PRN oxycodone, zanaflex, tylenol and morphine this shift. While getting magnesium replacement, pt's IV infiltrated; pt felt stinging and noticed swelling. A red area at the IV site was marked, the red area improved by the end of the shift. [facility] Vas was able to place a new IV and magnesium replacement was resumed. Continue POC

## 2023-06-05 NOTE — PROGRESS NOTES
"Called by nursing due to patient report of \"feeling full\" asking to hold tube feeds until 1000 AM. Ordered feeds to resume at 1000 AM.     Ellis Duran MD  "

## 2023-06-05 NOTE — PLAN OF CARE
Neuro/CMS: A&Ox4, pt not OOB this encounter,moderate strength in BUE, weaker BLE, states she has neuropathy in BLE, can feel pressure. Intermittently nauseous-  PRN compazine utalized. Denies SOB and states she only has chest pain related to deep breathing.  Respiratory: Bilateral upper lobes clear, bilateral lower lobes diminished, encouraged pt to deep breath as much as she can. No cough noted this shift.  Gastro/Genito: Bowels hypoactive, although audible  Activity: Not OOB this encounter, repositioned throughout shift in bed. HOB >30 degrees.  Skin/Dressings: Reddened perineal area noted. Esau-wick changed and perineum cleansed w/ wipes. Generalized moderate edema noted in all extremities, pitting edema noted in feet- compressing stockings and legs elevated. BLE wound care provided this afternoon due to saturation.    Lines/Drains/Airways: R PIV SL  Pain: Pain in bilateral legs and chest. Managed w/ PRN oxycodone and morphine.  Diet: TF running 50 mL hr, Regular diet. Strict I &O : 990 mL input, 650 mL output  Plan: Continue POC

## 2023-06-05 NOTE — PLAN OF CARE
Jeanine Schuler is a 62 year old female who is here for PE and BL DVT tx.    NURSING ASSESSMENT:  Neuro: Aox4, CMS at baseline with numbness and tingling to BL lower extremeties. Fingers and toes intermittently cool to touch d/t hx Reynaud's. PP +2 dorsal/pedal/radial.  strength moderate. Edema +2 to bilateral upper/lower extremities.   Respiratory: Uses IS ind. Pt states she is able to breathe better every day. Maintaining Spo2% >94% on 1LPM, weaned off O2 this afternoon, tolerated well, sats maintained >94%.   Cardiac: Tele maintained. Electrodes changed. No abnormalities this shift.   GI/: Reg diet thin liquids. TF started before 10am d/t hypoglycemia this AM, running at goal rate of 50mL/hr. 30mL FWF q4. See results for BG checks; pt difficult POCT check d/t circulation/Reynaud's. Glucose maintained >70 this shift.   Activity/Musculoskeletal: Pt reluctant to get OOB, did sit EOB with therapies today. Plantar and dorsiflexion weak.  Skin: Reynauds discoloration to hands and feet. Blanchable redness to sacrum, turned q2 with pillows to offload pressure. Heels floated. BLE wound cares due tomorrow.   Psychosocial:  at bedside this afternoon, friend visiting later.     Changes this shift: No acute changes. Pt states her breathing is improving and she is hoping to be able to tolerate moving more tomorrow.     PAIN MANAGEMENT:   Constant aching abdominal pain that radiates to L shoulder, rated at 7. Aches and spasms managed with prn oxycodone and zanaflex.     NURSING PLAN:  Care plan updated. Follow POC.    DISCHARGE DISPOSITION:  Estimated discharge date: TBD, care conference rescheduled for tomorrow at 2pm.

## 2023-06-05 NOTE — PROGRESS NOTES
[facility]    Medicine Progress Note - Hospitalist Service, GOLD TEAM 16    Date of Admission:  5/16/2023    Assessment & Plan   62F with history of sarcoidosis, CHF, deconditioning, history of PE, mixed connective tissue disease; presents with shortness of breath, found to have acute LE DVTs bilaterally.     #Pulmonary embolism, bilateral DVT.  - Due to medication noncompliance.    - Started on heparin drip, discontinued 5/18/2023 and started on apixaban 5 mg p.o. twice daily  - Lower extremity venous doppler US revealed partially occlusive thrombus in the common femoral vein, proximal femoral vein, and popliteal vein and partially occlusive thrombus in the left distal femoral vein and popliteal vein.   - Chest CTA revealed multiple right sided pulmonary emboli, some of which appear to be chronic.    #Hypervolemia  #Bilateral lower extremity edema  - Patient has history of heart failure.  Patient has also malnutrition.  Patient also noted to have questionable cirrhosis.  Likely all factors are contributing.  - Patient reports she was on furosemide and then on torsemide at home.  She reports at 1 point she was on 80 mg of oral furosemide a day.  - Per report, patient had more than a liter of urine output.  Unfortunately not documented.  - Plan:  > Furosemide 40 mg IV today.  Reassess again for IV diuresis 6 tomorrow.  > Elevate leg.  > Monitor DONNA's.  > Low-salt diet.  > Daily weight    #Hypoxia due to PE.  Was on 2 L of oxygen via nasal cannula.  Oxygen saturation would dip down to the 80s when on room air.  Would continue supplemental oxygen to maintain oxygen saturation greater than 94%.  Weaned off supplemental oxygen on 5/22.     #Bilateral lower extremity lacerations with hx of wound infection  About 1 month PTA, she fell from her wheel chair and sustained severe laceration of bilateral lower calf. She was seen in ED and the wounds were sutured. Subsequently, she  developed wound infection and was treated with 2 rounds of bactrim for cellulitis. She continues to endorse significant pain not being able to ambulate. The wound does not appear to be acutely infected on admission.  - Wound care consult.     #Episodes of hypoglycemia, recurrent.  Due to poor p.o. intake.  Unable cortisol level within normal limits.    The patient has glucometer at home and has been noting hypoglycemia 50s and 60s at home. In the ED, finger stick glucose had repeatedly low values in to 10s and serum glucose was in 60s. Of note, she was on prednisone 15mg daily for her rheumatologic conditions, but has not been taking them putting her at risk for AI. She is also not taking much PO.  - Blood glucose improved with less frequent hypoglycemic episodes  Plan:  - Continue to check glucose every 6 hours    #Sarcoidosis with pulmonary involvement  #Pulmonary hypertension was seen by Pulmonary Dr Perlman in 87/2022, she follows with Dr Bentley for pulmonary hypertension. Was on prednisone and imuran in the past, but she stopped taking all of her rheumatologic medications due to not feeling well.  - Monitor O2 saturation  - Clarify current medication regimen     #Mixed connective tissue disease  #Scleroderma  #Raynaud's  Follows with Rheum Dr Nicolás De La Torre, not taking home meds including prednisone. Last seen in 7/2022  - Need rheumatology follow up  - Resumed prednisone     #Deconditioning  #Malnutrition, low blood sugar episodes.  #Suspect due to poor p.o. intake.  #Failure to thrive  Was given IV fluids with dextrose as she was having low blood sugar measurements on 5/17.  Discontinued due to concerns for hypervolemia and persistent need for supplemental oxygen.  - Patient with poor apetite for the last 2 months with poor PO intake, last meal 5 days ago due to nausea. Albumin: 2.5. She states at home she was given a glucometer and she is ranged in the 40-60 range. On admission Glucose 61, and at bedside  41.  - Monitor PO intake  - Nutrition consult  - NGT placement 5/24, started on feeding  - PT/OT consultation  - XR abdomen  - Patient is feeling frustrated and overwhelmed with overall medical course  - Extensive reassurance provided and motivation  - We will order medical psychology consultation     #CHF, stable, no acute exacerbation.  #Hx atrial fibrillation  #Pulmonary Edema  #Small bilateral Pleural effusions  Chest CT on admission reveals small bilateral pleural effusions L>R, Interlobular septal thickening and areas of peribronchovascular nodular and GGO in the lungs bilaterally, cardiomegaly and ascitis. BNP on admission to the ER was elevated, 7752. Last echo in 5/2022 showed EF 50-55%.  - Resume apixaban in am (as above)  - Hold PTA metoprolol as ? she was not taking them and VS stable.  - Continue pta amiodarone.   - Patient started on diuresis as above.    #CT evidence of cirrhosis   #Portal Hypertension  Patient with no previous history of cirrhosis or liver disease. An abdomen CT was ordered in ER revealed a cirrhotic liver with portal hypertension, ascites, diffuse anasarca and recanalized unbilical vein.   - Need outpatient work up and close follow up     #Right breast skin thickening and nipple retraction on CT.   Incidental finding on CT done in the ED.   - Outpatient mammography     #Hyponatremia, likely due to IV fluids and diuretic use.  Patient was alert oriented x 3 . - - Discontinue IV fluids  - Monitor.   - Urine sodium is on the lower side.  More suggestive of hypovolemic hyponatremia.  - Urine sodium 133 on 5/29/2023.  - Plan:   > Diuresis as above.   > Continue to monitor     #Severe Protein Calorie Malnutrition in the setting of chronic illness.. Poor appetite and poor oral intake. Frequent episodes of hypoglycemia.  Nutrition consulted.  Patient to be started on tube feeding after NG tube placed today, 5/24.  Appreciate dietitian assistance in this regard.    #GERD  - Protonix  "transitioned from 20 mg twice daily to 40 mg every morning  - Continue to monitor symptoms     #Muscle spasms  - Add tizanidine as needed for muscle spasms       Diet: Combination Diet Regular Diet Adult  Snacks/Supplements Adult: Other; Please allow pt/RN to order snacks/supplements PRN; Between Meals  Adult Formula Drip Feeding: Continuous Osmolite 1.5; Nasojejunal; Goal Rate: Initiate at 10 ml/hr, advance as tolerated by 10 ml/hr q 12 hr to goal 50 ml/hr; mL/hr; Do not advance tube feeding rate unless K+ is = or > 3.0, Mg++ is = or > 1.5, and ...    DVT Prophylaxis: DOAC  Deleon Catheter: Not present  Lines: None     Cardiac Monitoring: ACTIVE order. Indication: Bradycardias (48 hours)  Code Status: Full Code      Clinically Significant Risk Factors            # Hypomagnesemia: Lowest Mg = 1.4 mg/dL in last 2 days, will replace as needed   # Hypoalbuminemia: Lowest albumin = 1.8 g/dL at 6/5/2023  7:58 AM, will monitor as appropriate     # Hypertension: Noted on problem list        # Overweight: Estimated body mass index is 27.92 kg/m  as calculated from the following:    Height as of this encounter: 1.727 m (5' 8\").    Weight as of this encounter: 83.3 kg (183 lb 10.3 oz).   # Severe Malnutrition: based on nutrition assessment         Disposition Plan        Care conference tomorrow; will be coordinated with Dr. Gatica.    Bobby Antunez DO, S  Hospitalist Service, GOLD TEAM 16  [facility]  Securely message with TEAM INTERVAL (more info)  Text page via [facility] Paging/Directory   See signed in provider for up to date coverage information  ______________________________________________________________________    Interval History   Patient working diligently with therapies upon entering room.  Patient endorses no specific symptomatology.  Per nursing staff, no acute issues or clinical concerns.  There will be a care conference tomorrow to discuss future goals of care.    Physical " Exam   Vital Signs: Temp: 98.3  F (36.8  C) Temp src: Oral BP: 112/75 Pulse: 83   Resp: 18 SpO2: 92 % O2 Device: Nasal cannula Oxygen Delivery: 2 LPM  Weight: 183 lbs 10.29 oz    GENERAL: Alert and oriented x 3; no acute distress; well-nourished.  HEENT: Normocephalic; atraumatic; PERRLA; MMM; NJ tube.  CV: RRR; normal S1, S2; no rubs, murmurs, or gallops.  RESP: Lung fields clear to aucultation B/L; no wheezing or crepitations.  GI: Abdomen is soft, nontender, nondistended; no organomegaly; normal bowel sounds.  : Deferred genital examination.   MSK: Overall, edema improved.  DERM: Skin is intact; no rash, lesions, or skin breakdown.  NEURO: No focal deficits appreciated; strength & sensorium are grossly intact.  PSYCH: No active hallucinations; affect, insight appear within normal limits.    Medical Decision Making       45 MINUTES SPENT BY ME on the date of service doing chart review, history, exam, documentation & further activities per the note.      Data     I have personally reviewed the following data over the past 24 hrs:    7.7  \   9.5 (L)   / 242     132 (L) 97 (L) 24.5 (H) /  148 (H)   4.4 28 0.52 \       ALT: 15 AST: 18 AP: 67 TBILI: 0.3   ALB: 1.8 (L) TOT PROTEIN: 4.3 (L) LIPASE: N/A       Imaging results reviewed over the past 24 hrs:   No results found for this or any previous visit (from the past 24 hour(s)).

## 2023-06-05 NOTE — PROGRESS NOTES
Care Management Follow Up    Length of Stay (days): 20    Expected Discharge Date: 06/09/2023     Concerns to be Addressed: discharge planning     Patient plan of care discussed at interdisciplinary rounds: Yes    Anticipated Discharge Disposition:  TBD     Anticipated Discharge Services:  Rehab  Anticipated Discharge DME:      Patient/family educated on Medicare website which has current facility and service quality ratings:  No  Education Provided on the Discharge Plan: CC on 6/6  Patient/Family in Agreement with the Plan:  yes    Referrals Placed by CM/SW:  No  Private pay costs discussed: Not applicable    Additional Information:  Conflict with scheduled CC today, rescheduled for 2 PM on 6/6  Chirag (son) notified of change  Patient and spouse at Bedside notified of change      Yasmine DOZIERN RN CCM  RN Care Coordinator 8A and 10 ICU  [address] [address]  [email or web address]   Office: (10 ICU) [phone]   Pager: [phone]    For Weekend & Holiday on call RN Care Coordinator:  (Tasks: Home care, home infusion, medical equipment/oxygen, transportation, IMM & PERRY forms, etc.)   [facility] (0800-1630) Saturday & Sunday; (0800-1630) [facility] Recognized Holidays  Pager #1: [phone] Units: 4A, 4C, 4E, 5A & 5B   Pager #2: [phone] Units: 6A, 6B, 6C, 6D  Pager #3: [phone] Units: 7A, 7B, 7C, 7D & 5C   Pager #4: [phone] Units: 5 Ortho, 8A, 10 ICU, & [facility]      For Weekend & Holiday on call Social Work:  (Tasks: TCU, transportation, Hospice, adjustment to illness counseling, Health Care Directives, Child Protection and Domestic Violence concerns, Vulnerable Adult, IMM forms, etc.)   [facility] (0800 - 1630) Saturday and Sunday  Pager: [phone] Units: 4A, 4C, 4E, 5A and 5B   Pager: [phone] Units: 6A, 6B, 6C, 6D   Pager: [phone]: 7A, 7B, 7C, 7D, and 5C      [facility] (9781-2835) Saturday and Sunday  Units: 5 Ortho, 8A, and  10 ICU   Pager: [phone]

## 2023-06-05 NOTE — PLAN OF CARE
"Goal Outcome Evaluation:  Pt. Vitals WDL, legs elevated and compression socks on during shift.  Pain managed during shift, With PRN pain medication goal ongoing.   Problem: Skin Injury Risk Increased  Goal: Skin Health and Integrity  Outcome: Met  Intervention: Optimize Skin Protection  Recent Flowsheet Documentation  Taken 6/5/2023 0345 by Cornelius Cifuentes RN  Activity Management: activity adjusted per tolerance  Head of Bed (HOB) Positioning: HOB at 30-45 degrees  Taken 6/4/2023 2345 by Cornelius Cifuentes RN  Activity Management: activity adjusted per tolerance  Head of Bed (HOB) Positioning: HOB at 30-45 degrees     Problem: Heart Failure Comorbidity  Goal: Maintenance of Heart Failure Symptom Control  Outcome: Met  Intervention: Maintain Heart Failure Management  Recent Flowsheet Documentation  Taken 6/5/2023 0345 by Cornelius Cifuentes RN  Medication Review/Management: medications reviewed  Taken 6/4/2023 2345 by Cornelius Cifuentes RN  Medication Review/Management: medications reviewed     Problem: Hypertension Comorbidity  Goal: Blood Pressure in Desired Range  Outcome: Met  Intervention: Maintain Blood Pressure Management  Recent Flowsheet Documentation  Taken 6/5/2023 0345 by Cornelius Cifuentes RN  Medication Review/Management: medications reviewed  Taken 6/4/2023 2345 by Cornelius Cifuentes RN  Medication Review/Management: medications reviewed        [id number]  VS: /79 (BP Location: Right arm)   Pulse 72   Temp 97.4  F (36.3  C) (Oral)   Resp 16   Ht 1.727 m (5' 8\")   Wt 83.3 kg (183 lb 10.3 oz)   SpO2 97%   BMI 27.92 kg/m          O2: Sating >90% on 2 L NC . Lung sounds clear. Denies chest pain and SOB.   Output: Voids spontaneously and adequately. Pure wick in place and     Last BM: LBM 6/3     Activity: NOOB this shift    Skin: Edema in upper extremities and Lower extremities,  BLE wounds dressing change done on previous shift, next change due on 6/6. Raynaud's in the fingers hands/ " toes both feet. Redness to bottom.    Pain: Pain was managed with  Morphine, and Oxy   CMS: A&Ox4. Numbness/ Tingling  in Feet    Dressing: Dressing to both legs C/D/I.   Diet: Regular diet thin liquids. NG in Left nostril TF at 50 ml/hr W/ 30 ml water flushes Q4  BG checks Q6  Through out the shift, Pt. BG continued to fluctuate. Pt. Asymptomatic. Pt. Complained about feeling full during the night and requested the tube feeding be held. MD paged. Tube feeding held. Due to Low  BG  Tube feeding started before 10am.     LDA: PIV to Left forearm  is S/L.    Equipment: IV pole, FWW, gait belt, and personal belongings. Call light within reach and uses appropriately.   Plan:   Possible discharge 6/9 to home     Additional Info: Pt. Slept intermittenly this shift. Pt. BG check this morning 52. Glucose and apple juice administered to pt. Lab draw for BG W/ lab ordered Pt. Asymptomatic. Pt. On Tele monitoring Continue POC.

## 2023-06-06 NOTE — PLAN OF CARE
Goal Outcome Evaluation:      Plan of Care Reviewed With: patient               [id number]  Pt alert and oriented x4, rates leg pain 5/10, has edema left greater than right, lungs clear. Turned and repositioned q 2 hr. Tele NSR. C/o distention, last BM very small, pt not taking in much, TF at 50 ml/hr. Last  next due 0000. Pt declining bedpan wants to attempt BM on chux,prefer she would use commode but declines to get out of bed. Call light in reach, able to make needs known.  allowed to stay overnight per ANS. Senokot given tonight for constipation.

## 2023-06-06 NOTE — PLAN OF CARE
Temp: 97.8  F (36.6  C) Temp src: Oral BP: 137/88 Pulse: 86   Resp: 16 SpO2: 93 % O2 Device: Nasal cannula Oxygen Delivery: 2 LPM     [id number]   AOx4. N/T to BLE at baseline. Denies CP, SOB  Purewick in place. LBM 6/4/23  Pt c/o sternal pain; managed with PRN oxy. Compazine given for nausea  R PIV SL. NG with cont TF. Pt has been c/o abdominal fullness/discomfort and declined senna at bedtime. PO dulcolax given this AM  Dressings to bilateral calves CDI  Call light within reach at all times. Pt able to make needs known. Continue with POC.

## 2023-06-06 NOTE — CONSULTS
"  Rheumatology Consult Note-Fellow    Jeanine Schuler MRN# [id number]   Age: 62 year old YOB: 1960     Date of Admission: 5/16/2023    Reason for consult:Sclerodema management      Assessment & Plan:     ASSESSMENT:  Jeanine Schuler is a 62 year old female  with a hx of  Sarcoidosis,pulmonary HTN,  CHF, mixed connective tissue disease, atrial fibrillation, scleroderma,raynauds admitted with pulmonary embolism, bilateral LE DVT and failure to thrive.    Rheumatology Consulted for Scleroderma management/ medication changes.    She does have a history of chronic dysphagia with EGD (2020') notable for esophageal dysmotility consistent with scleroderma. Given worsening dysmotility \"feeling of food stuck in chest\" in the last 4 months, recommend G.I Consult. Seems like she has recurrent episodes of worsening raynaud's phenomena and at a point required iv eposprostenol; no concern for digital ulcers at this time. Calcium channel blockers contraindicated given hx of persistent LE edema with CCB and she currently has LE Edema. Was on tadalafil in the past but discontinued due to cost. Was on gabapentin for associated neuropathy but she stopped this because it makes her groggy and she actively taking care of her grandchildren at that time but notes that it was helpful.  Overall, her presentation fits a failure to thrive picture and needs increased calorie intake with dysmotility likely contributing to her malnutrition. The esophagram should provide more answers and we will continue to follow patients and adjust plan/recommendations accordingly.    DIAGNOSIS:  1. Interstitial Lung Disease  2. Hx of limited cutaneous systemic sclerosis  3. Overlap of sarcoidosis with mixed connective tissue disease.  4. Raynaud's phenomenon   5. Neuropathy      PLAN:  1. Agree with Esophagram  2. Continue Prednisone 15mg daily  3. Start Gabapentin 100mg daily HS  4. Will investigate other options for Raynaud's and update " "plan tomorrow  5. Recommend Pain management Consult given patient's concern for intermittent uncontrolled pain in several areas  6. Will schedule outpatient follow up with Dr. De La Torre      I discussed the findings and recommendations with the patient.  I communicated the assessment and plan to the consulting team.    Case seen and discussed with Dr. Cedeno  who agrees with above assessment and plan.     Flor Malave MD  Internal Medicine PGY-2    \"This note was generated using dragon software and reviewed by myself.  Please excuse any grammatical or spelling errors above\".     Staff addendum  I performed the history and physical examination of the patient and discussed the management with the resident. I reviewed the available lab and imaging studies. I reviewed the resident s note and agree with the documented findings and plan of care.    Alexandre Cedeno MD  Rheumatology      HPI     Jeanine Schuler is a 62 year old female  with a hx of  Sarcoidosis,ILD, pulmonary HTN,  CHF, mixed connective tissue disease, atrial fibrillation, scleroderma,raynauds admitted with pulmonary embolism, bilateral LE DVT and failure to thrive.    Rheumatology consulted for scleroderma management.    Upon assessment today, Jeanine notes that in the last 4 months she has had a decline in her overall health. Her sister passed away, taking care of her grad children and injury to her leg  Contributed to the decline. Endorses loss of appetite, nausea and feeling of food stuck in her chest whenever she tries to eat. Notes that all of these had led to her debilitating health and weakness.    She endorses worsening raynaud's in the last few months that improves with warmth application. Notes that she has been on medications in the past however none helped.       Patient denies any fevers, chills, vision changes, headaches, rash, vitiligo, photosensitivity, nasal or oral ulcers, ear fullness or drainage.   No cough or dyspnea, no " hematuria.     I have reviewed the patients past medical history, past surgical history, current medications, current allergies, family history and social history.       HISTORY REVIEW:  Past Medical History:   Diagnosis Date    Anemia     [facility] status [facility]/27/[facility]    Cellulitis of leg 06/06/2013    Esophageal reflux     Better post-hiatal hernia repair and weight loss    Hyperplastic colonic polyp     Hypovitaminosis D 2011    ILD (interstitial lung disease) (H)     Kidney stone 04/29/2007    Kidney stone 05/10/2007    surgically removed    Limb ischemia; ulcers of fingertips 04/22/2013    Other chronic pain     Left shoulder - rheumatoid arthritis    Raynaud's syndrome     Rheumatoid arthritis (H) \    Scleroderma (H)     Sjogren-Jake syndrome     Systemic sclerosis (H) 2009    Unspecified essential hypertension      Past Surgical History:   Procedure Laterality Date    BIOPSY MUSCLE DIAGNOSTIC (LOCATION) Right 7/19/2021    Procedure: Right SURAL nerve BIOPSY;  Surgeon: Farooq Abel MD;  [place] [facility] [place]    BRONCHOSCOPY FLEXIBLE,L CRYOBIOPSY N/A 10/06/2020    Procedure: BRONCHOSCOPY, FLEXIBLE, WITH TRANSBRONCHIAL BIOPSY x4 USING CRYOPROBE, bronchial alveolar lavage;  Surgeon: Teddy Mendez MD;  Location:  OR    BYPASS GASTRIC, CHOLECYSTECTOMY, COMBINED  06/27/2005    [facility]    CHOLECYSTECTOMY      COLONOSCOPY      COLONOSCOPY N/A 11/17/2020    Procedure: COLONOSCOPY, WITH POLYPECTOMY AND BIOPSY;  Surgeon: Jamie Cárdenas MD;  Location: [facility] [place]    CV HEART CATHETERIZATION WITH POSSIBLE INTERVENTION Left 12/21/2021    Procedure: Heart Catheterization with Possible Intervention;  Surgeon: Wesley Mclean MD;  Location:  HEART CARDIAC CATH LAB    CV RIGHT HEART CATH MEASUREMENTS RECORDED N/A 4/7/2022    Procedure: Right Heart Cath;  Surgeon: Dieter Brock MD;  Location:  HEART CARDIAC CATH LAB    ESOPHAGOSCOPY, GASTROSCOPY,  DUODENOSCOPY (EGD), COMBINED N/A 03/10/2016    Procedure: COMBINED ESOPHAGOSCOPY, GASTROSCOPY, DUODENOSCOPY (EGD), BIOPSY SINGLE OR MULTIPLE;  Surgeon: Tricia Zambrano MD;  Location:  GI    ESOPHAGOSCOPY, GASTROSCOPY, DUODENOSCOPY (EGD), COMBINED N/A 11/17/2020    Procedure: ESOPHAGOGASTRODUODENOSCOPY, WITH BIOPSY and Dilation;  Surgeon: Jamie Cárdenas MD;  Location: [facility] OR    INNER EAR SURGERY      PICC INSERTION  06/05/2013    5fr DL Power PICC, 44cm, left basilic vein, tip in low SVC    SURGICAL HISTORY OF -       Left ear surgery - incus transition, tympanoplasty    SURGICAL HISTORY OF -   06/01/2005    Hiatal hernia repair    TONSILLECTOMY       Family History   Problem Relation Age of Onset    Cerebrovascular Disease Father     Heart Disease Father     Hypertension Father     Heart Disease Mother     Hypertension Mother     Chronic Obstructive Pulmonary Disease Sister     Heart Disease Sister     Hypertension Sister     Cerebrovascular Disease Brother     Heart Disease Brother     Kidney Disease Brother         on dialysis    Diabetes Brother         borderline    No Known Problems Son     No Known Problems Son     No Known Problems Daughter     Cancer Brother         small cell    Heart Disease Brother     Heart Disease Brother     Heart Disease Brother     Heart Disease Brother         tripple A    Heart Disease Sister     Substance Abuse Sister         alcoholism    Crohn's Disease No family hx of     Ulcerative Colitis No family hx of     GERD No family hx of     Celiac Disease No family hx of     Nephrolithiasis No family hx of     Parathyroid Disorders No family hx of     Calcium Disorder No family hx of      Social History     Socioeconomic History    Marital status:      Spouse name: Not on file    Number of children: Not on file    Years of education: Not on file    Highest education level: Not on file   Occupational History    Not on file   Tobacco Use    Smoking status: Never     Smokeless tobacco: Never   Vaping Use    Vaping status: Never Used   Substance and Sexual Activity    Alcohol use: Yes     Comment: occassionally    Drug use: No    Sexual activity: Yes     Partners: Male     Birth control/protection: None   Other Topics Concern    Parent/sibling w/ CABG, MI or angioplasty before 65F 55M? 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  She has been on medical leave since 7/27/2020        Moved about 4 years ago to a new house. No known water damage or mold.        She lives in a normal area with forms around her house, but denies any exposure to grain dust, hay, other farm animals.        No unusual hobbies         Social Determinants of Health     Financial Resource Strain: Not on file   Food Insecurity: Not on file   Transportation Needs: Not on file   Physical Activity: Not on file   Stress: Not on file   Social Connections: Not on file   Intimate Partner Violence: Not on file   Housing Stability: Not on file     Patient Active Problem List   Diagnosis    HTN (hypertension)    Prolonged QTc 460 ms,  at hr 67    Low back pain - Suspect SI Joint dysfunction    Chronic cough    Systemic sclerosis (H)    Tumoral calcinosis    Collagen vascular disease (H)    Disturbed sleep rhythm    Pharyngeal dysphagia    Gastroesophageal reflux disease, esophagitis presence not specified    Rheumatoid arthritis with negative rheumatoid factor, involving unspecified site (H)    ILD (interstitial lung disease) (H)    Iron deficiency anemia    Ascending aortic aneurysm (H)    S/P dilatation of esophageal stricture    S/P gastric bypass    Vitamin B 12 deficiency    Sjogren's disease (H)    Raynaud's disease    CREST syndrome (H)    Post-menopausal    Hypovitaminosis D    High serum parathyroid hormone (PTH)    On corticosteroid therapy    High risk medication use    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    ACS (acute  "coronary syndrome) (H)    On prednisone therapy    Sarcoidosis    Immunosuppressed status (H)    MCTD (mixed connective tissue disease) (H)    Encounter for long term current use of azathioprine    ROUSE (dyspnea on exertion)    Pulmonary hypertension (H)    Chronic atrial fibrillation (H)    Chronic systolic congestive heart failure (H)    Fever, unspecified fever cause    Pneumonia of both lungs due to infectious organism, unspecified part of lung    Sjogren's syndrome with lung involvement (H)    Lab test negative for COVID-19 virus    Shortness of breath    Hypervolemia, unspecified hypervolemia type    Other pulmonary embolism without acute cor pulmonale, unspecified chronicity (H)    Deep vein thrombosis (DVT) of non-extremity vein, unspecified chronicity     Allergies   Allergen Reactions    Enoxaparin Sodium Other (See Comments)     Blood clot     Norvasc [Amlodipine Besylate] Swelling     Lip swelling that happened on 2 different occasions    Erythromycin Rash     Rash on arms         ROS     A 14 point ROS was performed with pertinent findings listed above.      Objective     PHYSICAL EXAM  /81 (BP Location: Right arm)   Pulse 100   Temp 98  F (36.7  C) (Oral)   Resp 22   Ht 1.727 m (5' 8\")   Wt 83.3 kg (183 lb 10.3 oz)   SpO2 93%   BMI 27.92 kg/m    Wt Readings from Last 4 Encounters:   06/05/23 83.3 kg (183 lb 10.3 oz)   05/08/23 68 kg (150 lb)   04/17/23 68 kg (150 lb)   06/28/22 71.7 kg (158 lb)     Constitutional: Cachetic, conversational  Eyes: nl EOM, PERRLA, vision, conjunctiva, sclera  ENT: nl external ears, nose, hearing, lips, teeth, gums, throat  No mucous membrane lesions, normal saliva pool  Neck: no mass or thyroid enlargement  Resp: lungs clear to auscultation, nl to palpation  CV: RRR, no murmurs, rubs or gallops, no edema  GI: no ABD mass or tenderness, no HSM  : not tested  Lymph: no cervical, supraclavicular, inguinal or epitrochlear nodes  LE: 3+ pitting edema (L>R), "   Skin: no nail pitting, alopecia, rash, nodules or lesions  Neuro: nl cranial nerves, strength, sensation, DTRs.   Psych: nl judgement, orientation, memory, affect.    CBC:  Recent Labs   Lab Test 06/05/23  0758 06/03/23  0823 06/02/23  0547   WBC 7.7 19.3* 11.4*   RBC 3.10* 3.76* 3.61*   HGB 9.5* 11.7 11.1*   HCT 29.1* 35.0 33.2*   MCV 94 93 92   MCH 30.6 31.1 30.7   MCHC 32.6 33.4 33.4   RDW 15.8* 15.9* 15.9*    200 207       BMP:  Recent Labs   Lab Test 06/06/23  1237 06/06/23  0601 06/06/23  0321 06/06/23  0126 06/05/23  1414 06/05/23  0758 06/04/23  1115 06/04/23  0714 06/03/23  1546 06/03/23  0823 06/02/23  0606 06/02/23  0547   NA  --   --   --   --   --  132*  --   --   --  135*  --  134*   POTASSIUM  --  4.6  --   --   --  4.4  --  4.5  --  4.6  --  4.2   CHLORIDE  --   --   --   --   --  97*  --   --   --  98  --  98   CO2  --   --   --   --   --  28  --   --   --  23  --  27   ANIONGAP  --   --   --   --   --  7  --   --   --  14  --  9   GLC 98  --  100* 74   < > 71  79   < >  --    < > 98   < > 85   BUN  --   --   --   --   --  24.5*  --   --   --  24.2*  --  20.5   CR  --   --   --   --   --  0.52  --   --   --  0.62  --  0.62   GFRESTIMATED  --   --   --   --   --  >90  --   --   --  >90  --  >90   DAVID  --   --   --   --   --  8.2*  --   --   --  8.3*  --  8.0*    < > = values in this interval not displayed.       LFT:  Recent Labs   Lab Test 06/05/23  0758 06/03/23  0823 06/02/23  0547   PROTTOTAL 4.3* 4.7* 4.4*   ALBUMIN 1.8* 2.0* 1.8*   BILITOTAL 0.3 0.3 0.3   ALKPHOS 67 70 69   AST 18 18 19   ALT 15 15 16       No results found for: CKTOTAL  TSH   Date Value Ref Range Status   05/05/2022 4.26 (H) 0.40 - 4.00 mU/L Final   09/18/2020 1.69 0.40 - 4.00 mU/L Final     Lab Results   Component Value Date    URIC 6.2 02/14/2013       Inflammatory markers  Lab Results   Component Value Date    CRP <2.9 06/22/2022    CRP 79.0 05/06/2022    CRP 27.0 05/04/2022    CRP 14.3 07/06/2021    CRP <2.9  07/01/2021    CRP <2.9 04/09/2021    CRP 21.2 06/05/2013     Lab Results   Component Value Date    SED 23 06/22/2022    SED 26 05/04/2022    SED 21 02/15/2022    SED 12 07/01/2021    SED 9 04/09/2021    SED 9 03/31/2021     Ferritin   Date Value Ref Range Status   03/08/2022 96 8 - 252 ng/mL Final   10/05/2020 151 8 - 252 ng/mL Final   12/01/2011 18 10 - 300 ng/mL Final   06/24/2005 25 10 - 120 ng/mL Final       UA RESULTS:  Recent Labs   Lab Test 04/04/23  1639 05/05/22  0222 02/15/22  1100   COLOR Michelle* Yellow Yellow   APPEARANCE Cloudy* Clear Clear   URINEGLC Negative Negative Negative   URINEBILI Negative Negative Negative   URINEKETONE 5* Negative Negative   SG 1.021 1.012 1.010   UBLD Small* Negative Negative   URINEPH 5.0 6.0 5.0   PROTEIN 30* Negative 30*   NITRITE Negative Negative Negative   LEUKEST Large* Trace* Negative   RBCU 4* 1 1   WBCU 136* 5 3         Autoimmunity labs:     Lab Results   Component Value Date    RHF 8 04/09/2021     Lab Results   Component Value Date    CCPIGG <1 04/09/2021     No results found for: ANCA  Lab Results   Component Value Date    Q3ADHPK 100 04/09/2021    C7LXQAK 108 02/05/2021    F8RHNQO 54 (L) 02/14/2013     Lab Results   Component Value Date    N8ECKRG 14 04/09/2021    T3GSSXF 14 02/05/2021    E1ZCXOA 16 02/14/2013     No results found for: RUTHY  Lab Results   Component Value Date    DNA <1 04/09/2021    DNA 16 05/19/2011     Lab Results   Component Value Date    RNPIGG 1.2 (H) 04/09/2021    SMIGG <0.2 04/09/2021    SSAIGG 0.7 04/09/2021    SSBIGG <0.2 04/09/2021    SCLIGG <0.2 08/21/2020       IMAGING: reviewed

## 2023-06-06 NOTE — PLAN OF CARE
"Aox4. CMS unchanged, baseline pain and n/t in BLE rated at 5-6/10. +2 Edema in all extremeties. PP+2 dorsal pedal. LS clear, diminished in BL lower bases.   Refused to get OOB with nursing today. Pt educated on benefits of mobility but pt stated she wanted to have a BM first. RN reiterated that movement is a reliable precursor for moving the bowels, pt said \"no\" and that she would \"maybe think about\" getting up. Pt did not get OOB this shift but did allow repositioning in bed. Pt needs movements to be slow with feedforward about anticipated movements.  at bedside.   Pain in abd/legs managed with oxycodone and zanaflex. Lidocaine patch on L side of chest.   Abd soft. BS audible, hypoactive, stool softeners encouraged to help with constipation. Pt very reluctant to take stool softeners.   Purewick in use, good UOP today.   BG WNL, still difficult to obtain, next due at 1800.   Appetite poor today, enc PO intake. Pt reporting intermittent nausea managed with scheduled reglan; pt does not think zofran \"does anything\" for her.   BLE calf wound cares done, see flowsheets for assessment details.   Care conference today, pt would like to pursue treatment. Palliative consult discontinued, GI, Rheumatology, and Pulmonary consults placed.   Handoff given to GABI Sneed.     "

## 2023-06-06 NOTE — PROGRESS NOTES
Care Conference    Care conference was held on June 6th, 2023, at 2:00 p.m. in pt's room. Purpose of the conference was to discuss POC and provide education on LTC services.    Attending the conference were:   Patient  Siva Schuler - spouse  Dr. Deal, Hospitalist  RNCC   DAJUAN      Summary:  Dr. Deal and pt discussed further work-up to assess pt's current status. Pt was agreeable to pursue treatment, stated she does not want comfort care.  SW provided education on Medicare benefit, cost of LTC, and MA for LTC services. Pt briefly reported her financial status and confirmed that she would not qualify for MA for LTC services. Pt verbalized that her goal is to be able to walk again. SW and RNCC discussed pt currently needing 24 hr care, which can be provided at SNF or in pt's home, if it is confirmed that family can provide 24 hr care. Siva works outside of the home. Pt stated that her son and his girlfriend would also provide care at home. Important to note that pt's son, Yusuf Henderson, did not attend the Care Conference (secured teleconference was on IPad for the duration of CC), and so 24 hr care from family was not confirmed.      Discussion/Outcomes/Follow-Up:   GI, Rheumatology, and Pulmonary teams will be consulted before Palliative gets re-involved. Discharge plan TBD.          PUSHPA Olivier, LSW  [facility]   PHONE: [phone]  Pager: [phone]

## 2023-06-06 NOTE — PROGRESS NOTES
"Brief GI Luminal Service Curbside Note:    The patient was not seen or examined today. This note is a product of chart review.  Of note, today is hospital day 21.     The GI Luminal Service was consulted by Dr. Gill Gatica on 6/6/2023 at 1402 regarding their patient Jeanine Schuler regarding: \"dysphagia, feels like food getting stuck, weight loss, history of scleroderma.\" I called Dr. Gatica and was provided with the following information: worsening dysphagia, feels like food is getting stuck; patient is also on apixaban.    Per Chart Review: Jeanine Schuler is a 62 year old female with history of sarcoidosis, CHF, deconditioning, history of PE, mixed connective tissue disease, who presented to the ED 5/16/2023 with shortness of breath, found to have acute LE DVTs bilaterally.     Patient was seen previously by GI in 2020 and 2021 for chronic dysphagia.   - S/p EGD 11/17/2020 with dilation in the entire esophagus, abnormal esophageal motility consistent with scleroderma, Eleazar-en-Y anastomosis characterized by healthy appearing mucosa, normal examined duodenum with recommendation to continue Omeprazole 20 mg PO BID indefinitely.   - Seen for Video Visit with Dr. Jamie Cárdenas 7/15/2021 for cough, dysphagia, GERD, weight loss and was recommended to proceed with outpatient elective EGD and colonoscopy to evaluate for GI malignancy in the setting of weight loss but also noted it is possible that symptoms are due to underlying scleroderma and altered esophageal motility; swallow recommendations: take small bites and drink sips of water in between, look forward when you swallow and tuck your chin, and do not talk and eat at the same time; GERD Lifestyle Modifications; Pantoprazole 40 mg daily and follow-up in 4-6 weeks with esophageal specialist.     Based on the limited information provided on the phone by Dr. Gatica and chart review, I recommended the following:   -- XR Esophagram for evaluation of chronic " dysphagia, ?stricture, ?esophageal dysmotility in the setting of scleroderma.  - Further recommendations pending XR Esophagram results.   - If stricture present, will consider EGD pending clinical status (and ability to hold anticoagulation from a hematologic standpoint).   - If esophageal dysmotility (negative for stricture), recommend outpatient follow-up in GI Esophageal Clinic.   -- Continue Pantoprazole 40 mg PO daily.   -- Discontinue opioids and anticholinergic mediations as able, as these slow GI motility (including esophageal motility).   -- Please consider following an antireflux regimen. This includes:  - Do not lie down for at least 3 to 4 hours after meals.  - Raise the head of the bed 6 to 8 inches.  - Decrease excess weight.  - Avoid foods and liquids that cause symptoms.  - Avoid cigarettes and other tobacco products.  -- If the patient experiences overt GI bleeding with hemodynamic instability, please page the GI Luminal Service (listed on [facility]).     I did not personally see or examine the patient at this time as they are located at [facility].    My recommendations are not intended to take the place of Dr. Gatica's clinical judgement, which should always be utilized to provide the most appropriate care to meet the unique needs of the patient.     Thank you for allowing us to participate in the care of this patient. Please do not hesitate to page the GI service with any questions or concerns.     Plan discussed with Dr. Saroj Cook, GI Luminal Staff Physician.    Prudence Doll PA-C  Inpatient Gastroenterology Service  [facility]  Pager: *[phone]  Text Page

## 2023-06-06 NOTE — PROGRESS NOTES
[facility]    Medicine Progress Note - Hospitalist Service, GOLD TEAM 16    Date of Admission:  5/16/2023    Assessment & Plan      62F with history of sarcoidosis, CHF, deconditioning, history of PE, mixed connective tissue disease; presents with shortness of breath, found to have acute LE DVTs bilaterally.     #Pulmonary embolism, bilateral DVT.  - initially on  heparin drip and transitioned to apixaban 5 mg bid  5/18/2023  - Lower extremity venous doppler US revealed partially occlusive thrombus in the common femoral vein, proximal femoral vein, and popliteal vein and partially occlusive thrombus in the left distal femoral vein and popliteal vein.   - Chest CTA revealed multiple right sided pulmonary emboli, some of which appear to be chronic.      #Hypoxia due to PE.   - Was on 2 L of oxygen via nasal cannula.  Oxygen saturation would dip down to the 80s when on room air.    - continue supplemental oxygen to maintain oxygen saturation greater than 94%.  Weaned off supplemental oxygen on 5/22.     #Recurrent pleuritic CP  -- Ruled out for ACS w/ EKG and HS troponin  --  TTE 6/3 EF 45-50%, no RWMA.  EF was 50-55% was on 5/5/22  --  CP may be due to underlying pulmonary embolism   - coronary angio 12/2021 with minimal non-obstructing CAD     # ? History Sarcoidosis with pulmonary involvement  #Pulmonary hypertension  # ILD   -seen by Pulmonary Dr Perlman in 87/2022, she follows with Dr Bentley for pulmonary hypertension. Was on prednisone and imuran in the past, but she stopped taking all of her rheumatologic medications due to not feeling well.  - sating well on room air   - last echo 6/3   - prednisone should also help sarcoidosis if an issue   - was to go to [place] for second opinion and to [facility]     #HFrEF, nonischemic    - history of  pulmonary hypertension , howevere last echo 6/3 showed normal  RV size function and normal pulmonary artery systolic  function   From sarcoidosis + pulmonary embolism ,  - echo 6/3 with EF 45-50%   - does have history of abnormal LV function with normal coronary arteries   - on lasix 40 mg daily , adjust as needed     #Hypervolemia  #Bilateral lower extremity edema, anasarca   - multifactorial,  has history of heart failure also suspect  Malnutrition playing a role , no pulmonary hypertension  Noted but could have right sided heart failure  As well   - Patient reports she was on furosemide and then on torsemide at home.  She reports at 1 point she was on 80 mg of oral furosemide a day.  -has received IV lasix, currently on Furosemide 40 mg po daily, monitor for need to increase     #Bilateral lower extremity lacerations with hx of wound infection  About 1 month PTA, she fell from her wheel chair and sustained severe laceration of bilateral lower calf. She was seen in ED and the wounds were sutured. Subsequently, she developed wound infection and was treated with 2 rounds of bactrim for cellulitis. She continues to endorse significant pain not being able to ambulate. The wound does not appear to be acutely infected on admission.  - Wound care      Dysphasia  - feels that food is getting stuck  - asked for DENISSE to see, could have strictures from scleroderma also possible esophageal dysmotility  - GI recomended esophagogram to look for strictures. Dysmotility  - if has strictures and needs EGD then apixaban needs to be held and bridged with IV heparin   - continue ppi   - stop opioids and anticholinergic medications as able   - antireflux measures     Abdominal fullness  - complains of  6/5 . TF stopped, monitor and restart as able   constipation  - worsened by  Opioids, minimize as able . Image as needed    - had CT of abdomen 5/16 that showed edematous and diffuse thickening of colonic wall [place] due to volume overload, no evidence of  malignancy       # history of abdominal aortic aneurysm  - defer back to cardiology  , monitor       #Mixed connective tissue disease  #Scleroderma  # severe Raynaud's  # SICCA   # history of restricted mouth opening   -Follows with Rheum Dr Nicolás De La Torre, not taking home meds including prednisone. Last seen in 7/2022  - did ask  Rheumatology to see and make recs for medications/treatment  - patient  Had been started back on prednisone         #Hx atrial fibrillation  - currently in sinus  - on AC and on amiodarone, bb held since admission but restart as blood pressure  Allows       #Pulmonary Edema  #Small bilateral Pleural effusions  - suspect due to heart failure  And malnutrition   -Chest CT on admission reveals small bilateral pleural effusions L>R, Interlobular septal thickening and areas of peribronchovascular nodular and GGO in the lungs bilaterally, cardiomegaly and ascitis. BNP on admission to the ER was elevated, 7752. Last echo in 5/2022 showed EF 50-55%.  - Patient started on diuresis as above.     #CT evidence of cirrhosis   #Portal Hypertension  Patient with no previous history of cirrhosis or liver disease. An abdomen CT was ordered in ER revealed a cirrhotic liver with portal hypertension, ascites, diffuse anasarca and recanalized unbilical vein.   - Need outpatient work up and close follow up     #Deconditioning  #Malnutrition, low blood sugar episodes.  #Suspect due to poor p.o. intake.  #Failure to thrive  Was given IV fluids with dextrose as she was having low blood sugar measurements on 5/17.  Discontinued due to concerns for hypervolemia and persistent need for supplemental oxygen.  - Patient with poor apetite for the last 3-4  months with poor PO intake  - states that has decreased appetite as well as feeling of food getting stuck ( see above) also cannot open mouth wide with scleroderma ands has no teeth   - CT of abdomen corona snot shoe evidence of malignacy   - seen by Nutrition   - NGT placement 5/24, started on feeding       #Episodes of hypoglycemia, recurrent.  Due to poor p.o.  intake.  Unable cortisol level within normal limits.    - has glucometer at home and has been noting hypoglycemia 50s and 60s at home. In the ED, finger stick glucose had repeatedly low values in to 10s and serum glucose was in 60s. Of note, she was on prednisone 15mg daily for her rheumatologic conditions, but has not been taking them putting her at risk for AI. She is also not taking much PO.  - Blood glucose improved with less frequent hypoglycemic episodes  - Continue to check glucose every 6 hours        #Right breast skin thickening and nipple retraction on CT.   Incidental finding on CT done in the ED.   - Outpatient mammography ,  -6/6 exam I did not see any skin changes in right breast, did have some fullness in left breast      #Hyponatremia  - lowest Na was 127, now in low 130s   - likely due to IV fluids and diuretic use.   - - Urine sodium is on the lower side.  More suggestive of hypovolemic h     #Severe Protein Calorie Malnutrition in the setting of chronic illness.  - Poor appetite and poor oral intake. Frequent episodes of hypoglycemia.  Nutrition consulted. Now on  tube feeding and continue till can meet her nutritional requirement   - abdominal CT no evidence of malignancy, last colonoscopy  Was in 2020 had 6 mm hyperplastic  polyp in prox sigmpid,  CT chest did not mention mass      #GERD  - Protonix transitioned from 20 mg twice daily to 40 mg every morning  - Continue to monitor symptoms    History gastric bypass  -check B12 and folate, start  vitamins        #Muscle spasms  - Add tizanidine as needed for muscle spasms     Weakness deconditioning  - continue physical therapy  Occupational therapy         Diet: Combination Diet Regular Diet Adult  Snacks/Supplements Adult: Other; Please allow pt/RN to order snacks/supplements PRN; Between Meals  Adult Formula Drip Feeding: Continuous Osmolite 1.5; Nasojejunal; Goal Rate: Initiate at 10 ml/hr, advance as tolerated by 10 ml/hr q 12 hr to goal 50  "ml/hr; mL/hr; Do not advance tube feeding rate unless K+ is = or > 3.0, Mg++ is = or > 1.5, and ...    DVT Prophylaxis: DOAC  Deleon Catheter: Not present  Lines: None     Cardiac Monitoring: ACTIVE order. Indication: Bradycardias (48 hours)  Code Status: Full Code      Clinically Significant Risk Factors              # Hypoalbuminemia: Lowest albumin = 1.8 g/dL at 6/5/2023  7:58 AM, will monitor as appropriate     # Hypertension: Noted on problem list        # Overweight: Estimated body mass index is 27.92 kg/m  as calculated from the following:    Height as of this encounter: 1.727 m (5' 8\").    Weight as of this encounter: 83.3 kg (183 lb 10.3 oz).   # Severe Malnutrition: based on nutrition assessment         Disposition Plan    TBD        Gill Gatica MD  Hospitalist Service, GOLD TEAM 16  [facility]  Securely message with [employer] (more info)  Text page via [employer] Paging/Directory   See signed in provider for up to date coverage information  ______________________________________________________________________    Interval History      Does co shortness of breath  No chest pain , in no distress, no BM on few  Days  and is not unusual, still some nausea no emesis, no abdominal pain only if I press over abdomen    Physical Exam   Vital Signs: Temp: 97.8  F (36.6  C) Temp src: Oral BP: 137/88 Pulse: 86   Resp: 16 SpO2: 93 % O2 Device: Nasal cannula Oxygen Delivery: 2 LPM  Weight: 183 lbs 10.29 oz  General appearance: awake alert in  no apparent distress       NECK: supple  Has TF   RESPIRATORY: lungs are clear     CARDIOVASCULAR: normal S1S2 regular rate and rhythm, no rubs gallops or murmurs appreciated,   GASTROINTESTINAL: abdomen is , soft,  non-distended, has some generalized tenderness no rebound or guarding + bowel sounds   SKIN: warm and dry, no rashes, no mottling noted , wounds in lower calf bilat   NEUROLOGIC: awake alert and oriented,  EXTREMITIES: no " clubbing cyanosis  +  edema noted      Data     I have personally reviewed the following data over the past 24 hrs:    N/A  \   N/A   / N/A     N/A N/A N/A /  98   4.6 N/A N/A \       Imaging results reviewed over the past 24 hrs:   No results found for this or any previous visit (from the past 24 hour(s)).  Recent Labs   Lab 06/06/23  1237 06/06/23  0601 06/06/23  0321 06/06/23  0126 06/05/23  1414 06/05/23  0758 06/04/23  1115 06/04/23  0714 06/03/23  1546 06/03/23  0823 06/02/23  0606 06/02/23  0547   WBC  --   --   --   --   --  7.7  --   --   --  19.3*  --  11.4*   HGB  --   --   --   --   --  9.5*  --   --   --  11.7  --  11.1*   MCV  --   --   --   --   --  94  --   --   --  93  --  92   PLT  --   --   --   --   --  242  --   --   --  200  --  207   NA  --   --   --   --   --  132*  --   --   --  135*  --  134*   POTASSIUM  --  4.6  --   --   --  4.4  --  4.5  --  4.6  --  4.2   CHLORIDE  --   --   --   --   --  97*  --   --   --  98  --  98   CO2  --   --   --   --   --  28  --   --   --  23  --  27   BUN  --   --   --   --   --  24.5*  --   --   --  24.2*  --  20.5   CR  --   --   --   --   --  0.52  --   --   --  0.62  --  0.62   ANIONGAP  --   --   --   --   --  7  --   --   --  14  --  9   DAVID  --   --   --   --   --  8.2*  --   --   --  8.3*  --  8.0*   GLC 98  --  100* 74   < > 71  79   < >  --    < > 98   < > 85   ALBUMIN  --   --   --   --   --  1.8*  --   --   --  2.0*  --  1.8*   PROTTOTAL  --   --   --   --   --  4.3*  --   --   --  4.7*  --  4.4*   BILITOTAL  --   --   --   --   --  0.3  --   --   --  0.3  --  0.3   ALKPHOS  --   --   --   --   --  67  --   --   --  70  --  69   ALT  --   --   --   --   --  15  --   --   --  15  --  16   AST  --   --   --   --   --  18  --   --   --  18  --  19    < > = values in this interval not displayed.

## 2023-06-06 NOTE — CONSULTS
[facility]   Pulmonary Consult Note - Initial    Jeanine Schuler MRN: [id number]  Date of Admission:5/16/2023  Date of Service: 06/06/2023    Reason for consult: FTT, pulmonary sarcoidosis   Primary service: [facility] 16      ASSESSMENT/RECOMMENDATIONS:     # Acute on Chronic Provoked DVT & PE - eliquis   # Pulmonary Sarcoidosis - asymptomatic   # Anasarca I Hypoalbuminemia   # Severe Malnturition s/p NGT 5/24  # MCTD I Scleroderma I Raynaud's   # ?Cirrhosis on CT   # BLE wounds     Follows with Dr. Connell for sarcoidosis (had lung bx), last seen 7/2022. Had 6 minute walk test done, did not require O2. Subjective RUOSE also noted then. PFT's with slowly declining FVC. CT chest stable. Given she was relative asx and also on prednisone for her autoimmune diseased (managed by rheum) at the time, pursued expectant management/surveillence. Patient on RA this admission, SOB and CP likely due to acute PE, atelectasis, and b/l pleural effusion iso poor nutrition and relative immobility rather than progression of sarcoidosis.     RECOMMENDATIONS:      - Agree with prednisone (for her autoimmune disease) which would also be beneficial for sarcoidosis   - Defer to primary regarding diuresis for anasarca. No indication for thoracentesis for pleural effusion given she is asx    Pulmonary will sign off    Patient seen and discussed with Dr. Edwardo Monzon MD   Pulmonary/Critical Care Fellow  Pager: [phone]    HPI:  Jeanine Schuler is a 63 y/o with notable PMHx listed above who presented 5/16 for CP and SOB found to have acute provoked non occlusive DVT and R PE. Prior to admission several months of FTT with weakness, malnutrition, and fall. Self stopped all medications including prednisone for her MCTD and scleroderma and eliquis for her Afib. She's had a prolonged hospitalization limited by her multiple issues of poor po intake, dysphagia, and recurrent CP (with negative ischemia w/u). Also with  subjective SOB despite being on RA and stable CT. Pulmonary consulted to evaluated for additional interventions (if any) for her pulmonary sarcoidosis.     ROS: As stated in HPI     PMHx/PSHx:  Past Medical History:   Diagnosis Date     Anemia      Bariatric surgery status 06/27/2005    abdi en Y- [facility];     Cellulitis of leg 06/06/2013     Esophageal reflux     Better post-hiatal hernia repair and weight loss     Hyperplastic colonic polyp      Hypovitaminosis D 2011     ILD (interstitial lung disease) (H)      Kidney stone 04/29/2007     Kidney stone 05/10/2007    surgically removed     Limb ischemia; ulcers of fingertips 04/22/2013     Other chronic pain     Left shoulder - rheumatoid arthritis     Raynaud's syndrome      Rheumatoid arthritis (H) \     Scleroderma (H)      Sjogren-Jake syndrome      Systemic sclerosis (H) 2009     Unspecified essential hypertension      Past Surgical History:   Procedure Laterality Date     BIOPSY MUSCLE DIAGNOSTIC (LOCATION) Right 7/19/2021    Procedure: Right SURAL nerve BIOPSY;  Surgeon: Farooq Abel MD;  Location: [facility] OR     BRONCHOSCOPY FLEXIBLE,L CRYOBIOPSY N/A 10/06/2020    Procedure: BRONCHOSCOPY, FLEXIBLE, WITH TRANSBRONCHIAL BIOPSY x4 USING CRYOPROBE, bronchial alveolar lavage;  Surgeon: Teddy Mendez MD;  Location: [facility] OR     BYPASS GASTRIC, CHOLECYSTECTOMY, COMBINED  06/27/2005    [facility]     CHOLECYSTECTOMY       COLONOSCOPY       COLONOSCOPY N/A 11/17/2020    Procedure: COLONOSCOPY, WITH POLYPECTOMY AND BIOPSY;  Surgeon: Jamie Cárdenas MD;  Location: [facility] OR     CV HEART CATHETERIZATION WITH POSSIBLE INTERVENTION Left 12/21/2021    Procedure: Heart Catheterization with Possible Intervention;  Surgeon: Wesley Mclean MD;  Location:  HEART CARDIAC CATH LAB     CV RIGHT HEART CATH MEASUREMENTS RECORDED N/A 4/7/2022    Procedure: Right Heart Cath;  Surgeon: Dieter Brock MD;  Location:  HEART CARDIAC CATH LAB      ESOPHAGOSCOPY, GASTROSCOPY, DUODENOSCOPY (EGD), COMBINED N/A 03/10/2016    Procedure: COMBINED ESOPHAGOSCOPY, GASTROSCOPY, DUODENOSCOPY (EGD), BIOPSY SINGLE OR MULTIPLE;  Surgeon: Tricia Zambrano MD;  Location:  GI     ESOPHAGOSCOPY, GASTROSCOPY, DUODENOSCOPY (EGD), COMBINED N/A 11/17/2020    Procedure: ESOPHAGOGASTRODUODENOSCOPY, WITH BIOPSY and Dilation;  Surgeon: Jamie Cárdenas MD;  Location: [facility] OR     INNER EAR SURGERY       PICC INSERTION  06/05/2013    5fr DL Power PICC, 44cm, left basilic vein, tip in low SVC     SURGICAL HISTORY OF -       Left ear surgery - incus transition, tympanoplasty     SURGICAL HISTORY OF -   06/01/2005    Hiatal hernia repair     TONSILLECTOMY         Social Hx:   Social History     Tobacco Use     Smoking status: Never     Smokeless tobacco: Never   Vaping Use     Vaping status: Never Used   Substance Use Topics     Alcohol use: Yes     Comment: occassionally     Drug use: No       Outpatient Medications:   No current facility-administered medications on file prior to encounter.  acetaminophen (TYLENOL) 325 MG tablet, Take 975 mg by mouth every 8 hours as needed for mild pain  nitroGLYcerin (NITROSTAT) 0.3 MG sublingual tablet, Place 1 tablet (0.3 mg) under the tongue every 5 minutes as needed for chest pain (esophageal spasm) For chest pain place 1 tablet under the tongue every 5 minutes for 3 doses. If symptoms persist 5 minutes after 3rd dose call 911.  ondansetron (ZOFRAN ODT) 4 MG ODT tab, Take 1 tablet (4 mg) by mouth every 8 hours as needed for nausea  pantoprazole (PROTONIX) 40 MG EC tablet, Take 1 tablet (40 mg) by mouth daily  predniSONE (DELTASONE) 10 MG tablet, Take 1.5 tablets (15 mg) by mouth daily  sulfamethoxazole-trimethoprim (BACTRIM DS) 800-160 MG tablet, Take 1 tablet by mouth 2 times daily  thin (NO BRAND SPECIFIED) lancets, Use with lanceting device twice daily  amiodarone (PACERONE) 200 MG tablet, Take 1 tablet (200 mg) by mouth  "daily  apixaban ANTICOAGULANT (ELIQUIS) 5 MG tablet, Take 1 tablet (5 mg) by mouth 2 times daily (Patient not taking: Reported on 5/16/2023)  ASPIRIN NOT PRESCRIBED (INTENTIONAL), Please choose reason not prescribed from choices below. (Patient not taking: Reported on 5/2/2022)  blood glucose (NO BRAND SPECIFIED) test strip, Use to test blood sugar 2 times daily or as directed.  metoprolol succinate ER (TOPROL XL) 50 MG 24 hr tablet, Take 1 tablet (50 mg) by mouth daily        Allergies:   Allergies   Allergen Reactions     Enoxaparin Sodium Other (See Comments)     Blood clot      Norvasc [Amlodipine Besylate] Swelling     Lip swelling that happened on 2 different occasions     Erythromycin Rash     Rash on arms       Family Hx:   Family History   Problem Relation Age of Onset     Cerebrovascular Disease Father      Heart Disease Father      Hypertension Father      Heart Disease Mother      Hypertension Mother      Chronic Obstructive Pulmonary Disease Sister      Heart Disease Sister      Hypertension Sister      Cerebrovascular Disease Brother      Heart Disease Brother      Kidney Disease Brother         on dialysis     Diabetes Brother         borderline     No Known Problems Son      No Known Problems Son      No Known Problems Daughter      Cancer Brother         small cell     Heart Disease Brother      Heart Disease Brother      Heart Disease Brother      Heart Disease Brother         tripple A     Heart Disease Sister      Substance Abuse Sister         alcoholism     Crohn's Disease No family hx of      Ulcerative Colitis No family hx of      GERD No family hx of      Celiac Disease No family hx of      Nephrolithiasis No family hx of      Parathyroid Disorders No family hx of      Calcium Disorder No family hx of          Physical Exam:   /81 (BP Location: Right arm)   Pulse 100   Temp 98  F (36.7  C) (Oral)   Resp 22   Ht 1.727 m (5' 8\")   Wt 83.3 kg (183 lb 10.3 oz)   SpO2 93%   BMI 27.92 " kg/m    - Gen: Aox3, extremely cachetic, older than stated age  - CV: RRR, no m/r/g  - Lung: Decreased breath sounds  - Abd: NTND, +BS  - Ext: Diffuse anasarca of upper and lower extremities   - Skin: No major rashes or lesions  - Neuro: CN grossly intact     Labs:   CBC  Recent Labs   Lab 06/05/23 0758 06/03/23  0823 06/02/23  0547 06/01/23  0955   WBC 7.7 19.3* 11.4* 10.6   HGB 9.5* 11.7 11.1* 11.4*    200 207 239     BMP  Recent Labs   Lab 06/06/23  1237 06/06/23  0601 06/06/23  0321 06/06/23  0126 06/05/23  1813 06/05/23  1414 06/05/23  0758 06/04/23  1115 06/04/23  0714 06/03/23  1546 06/03/23  0823 06/02/23  0606 06/02/23  0547 06/01/23  1218 06/01/23  0955   NA  --   --   --   --   --   --  132*  --   --   --  135*  --  134*  --  134*   POTASSIUM  --  4.6  --   --   --   --  4.4  --  4.5  --  4.6  --  4.2  --  3.6   CHLORIDE  --   --   --   --   --   --  97*  --   --   --  98  --  98  --  98   CO2  --   --   --   --   --   --  28  --   --   --  23  --  27  --  22   BUN  --   --   --   --   --   --  24.5*  --   --   --  24.2*  --  20.5  --  19.6   CR  --   --   --   --   --   --  0.52  --   --   --  0.62  --  0.62  --  0.58   GLC 98  --  100* 74 71   < > 71  79   < >  --    < > 98   < > 85   < > 100*    < > = values in this interval not displayed.     LFT  Recent Labs   Lab 06/05/23 0758 06/03/23  0823 06/02/23  0547 06/01/23  0955   AST 18 18 19 21   ALT 15 15 16 14   ALKPHOS 67 70 69 71   BILITOTAL 0.3 0.3 0.3 0.3   ALBUMIN 1.8* 2.0* 1.8* 1.9*       Images/Studies:   5/16/23 CTPE  1. Exam is positive for acute pulmonary embolism. Multiple right-sided  pulmonary emboli as described above, some of which appear chronic.  2. Small bilateral pleural effusions, left greater than right.  3. Interlobular septal thickening and areas of peribronchovascular  nodular and groundglass opacities in the lungs bilaterally that likely  represent pulmonary edema.  4. Ascending aortic aneurysm measuring up to 4.2  cm.  5. Ectatic main pulmonary artery measuring up to 3.3 cm which can be  seen in pulmonary arterial hypertension.  6. Cardiomegaly.  7. Ascites.  8. Shrunken right breast relative to the left with skin thickening and  nipple retraction. No history of lumpectomy or conservative changes in  the right breast in the chart. Recommend mammography, as last  mammogram was in 2020.

## 2023-06-07 NOTE — CARE PLAN
"HEENT: A&O x4  Neuro: PERRLA. Baseline N/T, poor perfusion to BUE & BLE. Pt complaining of pain in rib cage and legs, prn Oxy 5mg, Zanaflex given, Lidocaine patch applied to L side above hip.   Cardiac: WDL. Telemetry dc'd.   Respiratory: 1L O2 NC. Denies SOB  Skin: 2cm skin tear on right upper thigh/gluteal fold, baby powder applied, open to air. Blanchable redness bilateral heels, prevlon boots ordered by [facility] nurse and applied 1700. New preventative sacral mepilex applied . Bilateral LE calf wounds, dressings C/D/I, dressing changes due 6/8. EdemaWear stockings applied to BLE.  GI/: Incontinence episode this AM, brief removed, pt resting on chux. Purewick removed 1400 for transfer to imaging, pt in brief.   LDA: R PIV SL. NG tube with cont TF   Diet: Regular diet, poor appetite. TF @ 30 ml/hr, pt tolerating well. BG 75 @ 1800, cont TF running, pt dinner eaten    Activity: bedfast, assist of 2 for turns.     Pt tube feeds stopped at 1000 d/t NPO orders for planned esophogram at 1400.     Pt refused OT at 1330 stating it was \"too close to transport time.\" Pt educated about importance of therapy participation for meeting patient goals.     Pt transported at 1400, returned to 5MS at 1500. TF restarted at 1600 with new goal rate of 30 ml/hr.     "

## 2023-06-07 NOTE — PROGRESS NOTES
[facility]    Medicine Progress Note - Hospitalist Service, GOLD TEAM 16    Date of Admission:  5/16/2023    Assessment & Plan      62F with history of sarcoidosis, CHF, deconditioning, history of PE, mixed connective tissue disease; presents with shortness of breath, found to have acute LE DVTs bilaterally.     #Pulmonary embolism, bilateral DVT.  - initially on  heparin drip and transitioned to apixaban 5 mg bid  5/18/2023 ( states she is allergic to lovenox)   - Lower extremity venous doppler US revealed partially occlusive thrombus in the common femoral vein, proximal femoral vein, and popliteal vein and partially occlusive thrombus in the left distal femoral vein and popliteal vein.   - Chest CTA revealed multiple right sided pulmonary emboli, some of which appear to be chronic.      #Hypoxia due to PE.   - Was on 2 L of oxygen via nasal cannula, oxygen saturation  Good on room air now      #Recurrent pleuritic CP  -- Ruled out for ACS w/ EKG and HS troponin  --  TTE 6/3 EF 45-50%, no RWMA.  EF was 50-55% was on 5/5/22  --  CP may be due to underlying pulmonary embolism   - coronary angio 12/2021 with minimal non-obstructing CAD     # ? History Sarcoidosis with pulmonary involvement  #Pulmonary hypertension  # ILD   -seen by Pulmonary Dr Perlman in 87/2022, she follows with Dr Bentley for pulmonary hypertension. Was on prednisone and imuran in the past, but she stopped taking all of her rheumatologic medications due to not feeling well.  - sating well on room air   - last echo 6/3   - prednisone should also help sarcoidosis if an issue   - was to go to [place] for second opinion and to [facility]   -appretiate pulmonary input     #HFrEF, nonischemic    - history of  pulmonary hypertension , howevere last echo 6/3 showed normal  RV size function and normal pulmonary artery systolic function   From sarcoidosis + pulmonary embolism ,  - echo 6/3 with EF 45-50%   -  does have history of abnormal LV function with normal coronary arteries   - on lasix 40 mg daily , adjust as needed     #Hypervolemia  #Bilateral lower extremity edema, anasarca   - multifactorial,  has history of heart failure also suspect  Malnutrition playing a role , no pulmonary hypertension  Noted but could have right sided heart failure  As well   - Patient reports she was on furosemide and then on torsemide at home.  She reports at 1 point she was on 80 mg of oral furosemide a day.  -has received IV lasix, currently on Furosemide 40 mg po daily added 20 mg daily in PM 6/7 , monitor for need to increase     #Bilateral lower extremity lacerations with hx of wound infection  About 1 month PTA, she fell from her wheel chair and sustained severe laceration of bilateral lower calf. She was seen in ED and the wounds were sutured. Subsequently, she developed wound infection and was treated with 2 rounds of bactrim for cellulitis. She continues to endorse significant pain not being able to ambulate. The wound does not appear to be acutely infected   - Wound care      Dysphasia  - feels that food is getting stuck  - asked for DENISSE to see, could have strictures from scleroderma also possible esophageal dysmotility  - GI recomended esophagogram to look for strictures. Dysmotility  - if has strictures and needs EGD then apixaban needs to be held and bridged with IV heparin   - continue ppi   - stop opioids and anticholinergic medications as able   - antireflux measures     Abdominal fullness  - complains of  6/5 . TF stopped, now doing better, restart  TF after esophagogram      constipation  - worsened by  Opioids, minimize as able .   - had large BM 6/6 night     - had CT of abdomen 5/16 that showed edematous and diffuse thickening of colonic wall L[place] due to volume overload, no evidence of  malignancy       # history of abdominal aortic aneurysm  - defer back to cardiology  , monitor      #Hx atrial fibrillation  -  currently in sinus  - on AC and on amiodarone, bb held since admission but restart as blood pressure  Allows       #Pulmonary Edema  #Small bilateral Pleural effusions  - suspect due to heart failure  And malnutrition   -Chest CT on admission reveals small bilateral pleural effusions L>R, Interlobular septal thickening and areas of peribronchovascular nodular and GGO in the lungs bilaterally, cardiomegaly and ascitis. BNP on admission to the ER was elevated, 7752. Last echo in 5/2022 showed EF 50-55%.  - Patient started on diuresis as above.     #Mixed connective tissue disease  #Scleroderma  # severe Raynaud's  # SICCA   # history of restricted mouth opening   -Follows with Rheum Dr Nicolás De La Torre, not taking home meds including prednisone. Last seen in 7/2022  -appreciate  Rheumatology's input , continue with prednisone 15 mg daily, started gabapentin 100 mg at bedtime   - rheumatology recommended pain consult , I spoke with pain services and recommended out patient  Follow up    - further  options reg Raynauds  Per rheum          #CT evidence of cirrhosis   #Portal Hypertension  Patient with no previous history of cirrhosis or liver disease. An abdomen CT was ordered in ER revealed a cirrhotic liver with portal hypertension, ascites, diffuse anasarca and recanalized unbilical vein.   - Need outpatient work up and close follow up     #Deconditioning  #Malnutrition, low blood sugar episodes.  #Suspect due to poor p.o. intake.  #Failure to thrive  Was given IV fluids with dextrose as she was having low blood sugar measurements on 5/17.  Discontinued due to concerns for hypervolemia and persistent need for supplemental oxygen.  - Patient with poor apetite for the last 3-4  months with poor PO intake  - states that has decreased appetite as well as feeling of food getting stuck ( see above) also cannot open mouth wide with scleroderma ands has no teeth   - CT of abdomen corona snot shoe evidence of malignacy   - seen by  Nutrition   - NGT placement 5/24, started on feeding       #Episodes of hypoglycemia, recurrent.  Due to poor p.o. intake.  Unable cortisol level within normal limits.    - has glucometer at home and has been noting hypoglycemia 50s and 60s at home. In the ED, finger stick glucose had repeatedly low values in to 10s and serum glucose was in 60s. Of note, she was on prednisone 15mg daily for her rheumatologic conditions, but has not been taking them putting her at risk for AI. She is also not taking much PO.  - Blood glucose improved with less frequent hypoglycemic episodes  - Continue to check glucose every 6 hours        #Right breast skin thickening and nipple retraction on CT.   Incidental finding on CT done in the ED.   - Outpatient mammography ,  -6/6 exam I did not see any skin changes in right breast, did have some fullness in left breast      #Hyponatremia  - lowest Na was 127, now in low 130s   - likely due to IV fluids and diuretic use.   - - Urine sodium is on the lower side.  More suggestive of hypovolemic h     #Severe Protein Calorie Malnutrition in the setting of chronic illness.  - Poor appetite and poor oral intake. Frequent episodes of hypoglycemia.  Nutrition consulted. Now on  tube feeding and continue till can meet her nutritional requirement   - abdominal CT no evidence of malignancy, last colonoscopy  Was in 2020 had 6 mm hyperplastic  polyp in prox sigmpid,  CT chest did not mention mass      #GERD  - Protonix transitioned from 20 mg twice daily to 40 mg every morning  - Continue to monitor symptoms    History gastric bypass  - B12 at low end of normal,  Folate low, stared supplements and started daily  vitamins        #Muscle spasms  - Add tizanidine as needed for muscle spasms     Weakness deconditioning  - continue physical therapy  Occupational therapy         Diet: Combination Diet Regular Diet Adult  Snacks/Supplements Adult: Other; Please allow pt/RN to order snacks/supplements PRN;  "Between Meals  Adult Formula Drip Feeding: Continuous Osmolite 1.5; Nasojejunal; Goal Rate: Initiate at 10 ml/hr, advance as tolerated by 10 ml/hr q 12 hr to goal 50 ml/hr; mL/hr; Do not advance tube feeding rate unless K+ is = or > 3.0, Mg++ is = or > 1.5, and ...    DVT Prophylaxis: DOAC  Deleon Catheter: Not present  Lines: None     Cardiac Monitoring: ACTIVE order. Indication: Bradycardias (48 hours)  Code Status: Full Code      Clinically Significant Risk Factors            # Hypomagnesemia: Lowest Mg = 1.6 mg/dL in last 2 days, will replace as needed   # Hypoalbuminemia: Lowest albumin = 1.6 g/dL at 6/7/2023  5:59 AM, will monitor as appropriate     # Hypertension: Noted on problem list        # Overweight: Estimated body mass index is 27.92 kg/m  as calculated from the following:    Height as of this encounter: 1.727 m (5' 8\").    Weight as of this encounter: 83.3 kg (183 lb 10.3 oz).   # Severe Malnutrition: based on nutrition assessment         Disposition Plan    TBD        Gill Gatica MD  Hospitalist Service, GOLD TEAM 16  [facility]  Securely message with [employer] (more info)  Text page via [employer] Paging/Directory   See signed in provider for up to date coverage information  ______________________________________________________________________    Interval History      Breathing ok, did have large BM last night and abdomen feels better   Physical Exam   Vital Signs: Temp: 98.3  F (36.8  C) Temp src: Oral BP: 116/74 Pulse: 96   Resp: 22 SpO2: 96 % O2 Device: None (Room air) Oxygen Delivery: 2 LPM  Weight: 183 lbs 10.29 oz  General appearance: awake alert in  no apparent distress       NECK: supple  Has TF   RESPIRATORY: lungs are clear     CARDIOVASCULAR: normal S1S2 regular rate and rhythm, no rubs gallops or murmurs appreciated,   GASTROINTESTINAL: abdomen is , soft,  non-distended, has some generalized tenderness no rebound or guarding + bowel sounds "   SKIN: warm and dry, no rashes, no mottling noted , wounds in lower calf bilat   NEUROLOGIC: awake alert and oriented,  EXTREMITIES: no clubbing cyanosis  +  edema noted  , bilateral  Lower calf wounds no evidence of infection     Data     I have personally reviewed the following data over the past 24 hrs:    8.9  \   8.6 (L)   / 260     135 (L) 99 28.2 (H) /  107 (H)   4.5 29 0.50 (L) \       ALT: 16 AST: 17 AP: 60 TBILI: 0.2   ALB: 1.6 (L) TOT PROTEIN: 4.1 (L) LIPASE: N/A       Imaging results reviewed over the past 24 hrs:   No results found for this or any previous visit (from the past 24 hour(s)).  Recent Labs   Lab 06/07/23  0640 06/07/23  0559 06/07/23  0057 06/06/23  1237 06/06/23  0601 06/05/23  1414 06/05/23  0758 06/03/23  1546 06/03/23  0823   WBC  --  8.9  --   --   --   --  7.7  --  19.3*   HGB  --  8.6*  --   --   --   --  9.5*  --  11.7   MCV  --  94  --   --   --   --  94  --  93   PLT  --  260  --   --   --   --  242  --  200   NA  --  135*  --   --   --   --  132*  --  135*   POTASSIUM  --  4.5  --   --  4.6  --  4.4   < > 4.6   CHLORIDE  --  99  --   --   --   --  97*  --  98   CO2  --  29  --   --   --   --  28  --  23   BUN  --  28.2*  --   --   --   --  24.5*  --  24.2*   CR  --  0.50*  --   --   --   --  0.52  --  0.62   ANIONGAP  --  7  --   --   --   --  7  --  14   DAVID  --  7.7*  --   --   --   --  8.2*  --  8.3*   * 90 102*   < >  --    < > 71  79   < > 98   ALBUMIN  --  1.6*  --   --   --   --  1.8*  --  2.0*   PROTTOTAL  --  4.1*  --   --   --   --  4.3*  --  4.7*   BILITOTAL  --  0.2  --   --   --   --  0.3  --  0.3   ALKPHOS  --  60  --   --   --   --  67  --  70   ALT  --  16  --   --   --   --  15  --  15   AST  --  17  --   --   --   --  18  --  18    < > = values in this interval not displayed.

## 2023-06-07 NOTE — CONSULTS
"Pain Service Consultation Note  [facility]      Patient Name: Jeanine Schuler  MRN: [id number]   Age: 62 year old  Sex: female  Date: June 7, 2023                                      Reviewed: Yes    Referring Provider:  JOSTIN Gatica MD  Referring Service:  Medicine  Reason for Consultation: Chronic pain. Consult requested based on recommendation from Rheumatology.    Assessment/Recommendations:  Jeanine Schuler is a 62 year old female  with a hx of  Sarcoidosis,pulmonary HTN,  CHF, mixed connective tissue disease, atrial fibrillation, scleroderma,raynauds admitted with pulmonary embolism, bilateral LE DVT and failure to thrive. Rheumatology following for scleroderma management/medication.     Plan:   Discussion today between Pain Service staff VIC Skaggs MD with Medicine Service staff JOSTIN Gatica MD.   - Patient can be referred to outpatient pain management after discharge, once she is recovered from current acute conditions. She lives in [place], [place] and may want to pursue pain management closer to home. If patient prefers, referral to [facility] pain management, a referral can be made by calling [phone]    Thank you for the opportunity to participate in the care of Jeanine Schuler  Pain Service will sign off.    Discussed with attending anesthesiologist  Primary Service Contacted with Recommendations? Yes    LÓPEZ Linton CNP  6/7/2023    Acute Inpatient Pain Service [facility]  Hours of pain coverage 24/7   Page via [employer]- Please Page the Pain Team Via [employer]: \"PAIN MANAGEMENT ACUTE INPATIENT/ [facility]\"            "

## 2023-06-07 NOTE — PLAN OF CARE
Goal Outcome Evaluation:      Plan of Care Reviewed With: patient    Overall Patient Progress: no changeOverall Patient Progress: no change    Outcome Evaluation: Tube feeding orders adjusted to keep rate lower at present, RD will follow back up on this tomorrow, see RD progress note for full reassessment details and recommendations

## 2023-06-07 NOTE — PROGRESS NOTES
[facility] Nurse Inpatient Assessment     Consulted for: Bilateral lower calf wounds    Adding EdemaWear stockings to plan of care. Patient with moderate lower leg edema which may be impeding wound healing.     Patient History (according to H&P provider note(s) 5/17/23:      Jeanine Schuler is a 62 year old female with a past medical history of HTN, chronic atrial fibrillation on anticouagulation until recently, multiple autoimmune diseases, sarcoidosis, pulmonary hypertension and CHF, who presented to our service with shortness of breath in rest, anterior pressure like chest pain that irradiated to back and severe weakness that worsen today. For the last couple of months she has generally felt unwell with nausea, poor apetite (last meal 5 days ago) and progressive debilitating weakness to the point of difficulty deambulating. 1 month ago she fell from her wheel chair and injured both legs, needing stitches and 2 rounds of bactrim due to infection. This wounds have been very painful and have contributed for her bedridden status.        Assessment:      Areas visualized during today's visit: Lower extremities     Wound location: Bilateral LE     5/23: Left medial/posterior LE                         6/7     5/23: Right lateral/posterior LE                        6/7    Last photo: 6/7/23  Wound due to: Trauma  Wound history/plan of care: resulted from a fall- see HPI  Wound base: LLE 70/30 % eschar and slough/non-granular tissue- softening, RLE 80 % superficial scab and slough 20% dermis      Palpation of the wound bed: normal      Drainage: moderate from RLE, copious from LLE     Description of drainage: serosanguinous     Measurements (length x width x depth, in cm): LLE 6  x 6  x  0.5 cm, RLE 4  x 2.8  x  0.3 cm      Tunneling: N/A     Undermining: N/A  Periwound skin: Intact, Superficial erosion and Scar tissue      Color: normal and consistent with  surrounding tissue and red      Temperature: normal   Odor: none  Pain: severe and during dressing change, sharp and tender  Pain interventions prior to dressing change: soaking and slow and gentle cares   Treatment goal: Heal , Infection control/prevention, Maintain (prevention of deterioration), Pain control and Protection  STATUS: improving  Supplies ordered: gathered, discussed with RN and discussed with patient      Treatment Plan:     Bilateral LE wound(s): Every other day  And PRN when soiled change cover dressing  Gently remove old dressing- please spray with wound cleanser or NS to loosen if sticking to wound bed  Cleanse wound bed with wound cleanser and pat dry.  Cut a piece of Hydrofera blue (#791323) the size of open area, moisten with NS, wring the excess amount and place it on open area.  Cover with 4x4 mepilex for RLE and sacral mepilex for LLE- if having to change sacral mepilex more than once daily please change to exudry pad (049030) secued with kerlix wrap and tape instead.  Don bilateral EdemaWear Stockings (stockings can be reused up to 6 months, wash with mild soap and water and air dry).  Use Prevalon boots while in bed. Continue to elevate legs as needed to float heels even when boots in use. May need 1-3 pillows to fully offload heels.     EdemaWear stockings: Use and Care    Rationale for use:     Decreases edema by improving lymphatic and venous function      Safe and gentle compression    Enhances wound healing    Protects skin    General:     EdemaWear can be worn 24/7, but should be removed at least daily for skin inspection and cares      In order to be effective, EdemaWear should DIRECTLY contact the skin as much as possible -- the mesh weave promotes lymphatic drainage when it is pressed into the skin    Choose appropriate size EdemaWear based on leg (or arm) circumference - see table for guidelines    EdemaWear LITE is only for especially fragile or painful skin    Cleanse and  "moisturize any intact or scaly skin before applying EdemaWear    Ok to apply additional compression over the EdemaWear (ie Lymph wraps)    Application:     Apply the EdemaWear from base of toes to knee, or above knee if tolerated and it is a long stocking    Create a wide 3\" cuff at the top if needed to prevent rolling down    Only trim the stocking if it is excessively long; do NOT cut in half; can often fold over excess length onto foot    When wounds are present:    Wound dressings that directly treat the wound bed can be applied under the EdemaWear    Any additional cover dressings (dry gauze, ABD pads, Kerlix, etc) should be applied ON TOP of the stockings whenever feasible     Stockings may get soiled with drainage and will need to be washed; ensure an extra clean, dry pair always available    May need two people to apply the stocking - bunch up stocking and pull against each other, lifting over wounds    Care:    When stockings are soiled, DO NOT THROW AWAY, hand wash with mild soap, rinse, hang dry    Replace approximately every 4 to 6 months        EdemaWear size Max circumference Stripe color PS # # stockings per pack   Small 18\"  (45cm) navy 192152 2   Medium 30\"  (75cm) yellow 898517 1   Large 46\"  (115cm) red 198288 1   X Large 60\"  (150cm) aqua 185724 1   Small LITE 24\"  (60cm) purple 817104 2   Medium LITE 36\"  (90cm) orange 864160 1       Orders: Updated    RECOMMEND PRIMARY TEAM ORDER: wound clinic and home care when discharged for follow-up on wounds  Education provided: plan of care, wound progress and Infection prevention   Discussed plan of care with: Patient and Nurse  WOC nurse follow-up plan: weekly  Notify WOC if wound(s) deteriorate.  Nursing to notify the Provider(s) and re-consult the WOC Nurse if new skin concern.    DATA:     Current support surface: Standard  Standard gel/foam mattress (IsoFlex, Atmos air, etc)  Containment of urine/stool: Incontinent pad in bed  BMI: Body mass index " is 27.92 kg/m .   Active diet order: Orders Placed This Encounter      Combination Diet Regular Diet Adult     Output: I/O last 3 completed shifts:  In: 1320 [P.O.:460; NG/GT:60]  Out: 1150 [Urine:1150]     Labs:   Recent Labs   Lab 06/07/23  0559   ALBUMIN 1.6*   HGB 8.6*   WBC 8.9     Pressure injury risk assessment:   Sensory Perception: 3-->slightly limited  Moisture: 3-->occasionally moist  Activity: 2-->chairfast  Mobility: 2-->very limited  Nutrition: 3-->adequate  Friction and Shear: 2-->potential problem  Froy Score: 15    Alexandra Saldana RN BSN CWOCN  Pager no longer is use, please contact through [employer]   [place] group: [facility] Nurse  Dept. Office Number: [phone]

## 2023-06-07 NOTE — PROGRESS NOTES
CLINICAL NUTRITION SERVICES - REASSESSMENT NOTE     Nutrition Prescription    RECOMMENDATIONS FOR MDs/PROVIDERS TO ORDER:  Consider next steps with tube access, NG has been in for 14 days as of 6/8, may need to consider abdominally placed feeding tube, pt may be best suited for a G/J tube (pt may better tolerate small bowel feedings as pt has continued GI intolerance of gastric feedings)    Please note significant fluid status changes (pt up possibly 33 lbs from admission)     Malnutrition Status:    Severe malnutrition in the context of acute on chronic illness or injury    Recommendations already ordered by Registered Dietitian (RD):  At present will adjust/update TF orders to NG-TF w/Osmolite 1.5 at 30 ml/hr, continue water flushes of 30 ml q 4 hrs - this is not pt's goal and is not meeting pt's needs, but lower rates seem more tolerable at present - will consider formula adjustment in the coming days pending medical plan of care - maybe consider transition to TwoCal formula to keep volume low and also may consider protein flushes with noted fluid status     Future/Additional Recommendations:  Continue to monitor tolerance to TF, make adjustments as appropriate  Continue to monitor medical plan of care  Continue to monitor weight/lab trends      EVALUATION OF THE PROGRESS TOWARD GOALS   Diet: Regular - Please allow pt to order of mechanical soft (IDDSI level 7) diet   Supplement: Supplements PRN   Po intake: Appears overall poor, varying nursing documentation, pt notes po intake is poor     Nutrition Support: Orders read NJ-TF w/Osmolite 1.5 - Initiate at 10 ml/hr, advance as tolerated by 10 ml/hr q 12 hr to goal 50 ml/hr - when RD was in the room, pump was set at 20 ml/hr, last abdominal X-ray notes that tube is in the stomach     TF tolerance: Questionable, pt having intermittent GI symptoms and has intermittently requested TF be stopped, has had hypoglycemic events as well. Soft stool noted x1-2 over last  couple of days. Due to starts/stops of TF and rate adjustments per nursing, pt note likely meeting nutritional goals w/TF, noted significant edema and very low albumin as well. NG tube will be in for 14 days as of 6/8.     NEW FINDINGS   MAR reviewed. Labs reviewed, noted Albumin of 1.6 today with significant weight gains during hospital admission. RD visited pt at bedside this later afternoon as pt was gone to procedure earlier, reviewed TF plan of care and continued need for further workup, but noted length of time NG has been in and possible need for abdominally placed tube, pt notes this has been brought up, but no significant discussion yet, RD notes once all GI workup complete, can likely move forward with continued decision making, pt agreeing. Reviewed intermittent TF intolerances, pt notes some improvement in abdominal fullness, notes some personal difficulty with having to have bowel movement in brief, RD encouraged the importance of adequate bowel movements and how constipation can affect symptoms/tolerance, pt understanding, RD discussed will plan to consider adjustments to TF in the coming days once workup results available, pt agreeing with plan of care.     06/05/23 0554 83.3 kg (183 lb 10.3 oz) Bed scale   06/04/23 0714 81.6 kg (179 lb 14.3 oz) Bed scale   06/03/23 0900 81.3 kg (179 lb 3.7 oz) Bed scale   06/02/23 0900 84.2 kg (185 lb 10 oz) Bed scale   05/30/23 0712 81.1 kg (178 lb 12.7 oz) --   05/29/23 1300 77.8 kg (171 lb 8.3 oz) Bed scale   05/24/23 1523 64 kg (141 lb 1.5 oz) Bed scale   05/17/23 0429 65 kg (143 lb 4.8 oz) Bed scale   05/16/23 1610 68 kg (150 lb) --     05/08/23 68 kg (150 lb)   04/17/23 68 kg (150 lb)   06/28/22 71.7 kg (158 lb)   06/14/22 68 kg (150 lb)   05/05/22 71.8 kg (158 lb 4.6 oz)     Weight assessment: 33 lb wt gain from admission, difficult to further assess trend in the setting of fluid status.     MALNUTRITION  % Intake: At least < 75% for > 7 days (moderate) - in  the setting of poor enteral tolerance   % Weight Loss: Difficult to assess due to fluid status   Subcutaneous Fat Loss: Globally moderate vs severe   Muscle Loss: Globally moderate vs severe (lower extremities difficult to assess due to fluid status)   Fluid Accumulation/Edema: Charted as mild, RD would consider severe   Malnutrition Diagnosis: Severe malnutrition in the context of acute on chronic illness or injury     Previous Goals   Patient to consume % of nutritionally adequate meal trays TID, or the equivalent with supplements/snacks.     Total avg nutritional intake to meet a minimum of 25 kcal/kg and 1 g PRO/kg daily (per dosing wt 65 kg)  Evaluation: Not met    Previous Nutrition Diagnosis  Inadequate oral intake related to nausea/vomiting, poor appetite as evidenced by patient report, requiring EN to meet nutrition needs  Evaluation: Diagnosis updated to below     CURRENT NUTRITION DIAGNOSIS  Inadequate oral intake related to multifactorial (nausea, poor appetite, altered GI function) as evidenced by pt continues to require enteral nutrition support to meet estimated nutritional needs       INTERVENTIONS  Implementation  Enteral Nutrition - orders updated as above  Water flushes - continue as ordered     Goals  Total avg nutritional intake to meet a minimum of 25 kcal/kg and 1.0 g PRO/kg daily (per dosing wt 65 kg).    Monitoring/Evaluation  Progress toward goals will be monitored and evaluated per protocol.    Tiffany Laird RD, CNSC, LD  [place] [address] Med/Surg RD pager: [phone]  Weekend/holiday pager: [phone]

## 2023-06-07 NOTE — PROGRESS NOTES
"  Rheumatology Follow Up Note     Jeanine Schuler MRN# [id number]   YOB: 1960 Age: 62 year old       Assessment :   Jeanine Schuler is a 62 year old female  with a hx of  Sarcoidosis,pulmonary HTN,  CHF, mixed connective tissue disease, atrial fibrillation, scleroderma,raynauds admitted with pulmonary embolism, bilateral LE DVT and failure to thrive.     Rheumatology Consulted for Scleroderma management/ medication changes.     She does have a history of chronic dysphagia with EGD (2020') notable for esophageal dysmotility consistent with scleroderma. Given worsening dysmotility \"feeling of food stuck in chest\" in the last 4 months, recommend G.I Consult. Seems like she has recurrent episodes of worsening raynaud's phenomena and at a point required iv eposprostenol; no concern for digital ulcers at this time. Calcium channel blockers contraindicated given hx of persistent LE edema with CCB and she currently has LE Edema. Was on tadalafil in the past but discontinued due to cost. Was on gabapentin for associated neuropathy but she stopped this because it makes her groggy and she actively taking care of her grandchildren at that time but notes that it was helpful.  Overall, her presentation fits a failure to thrive picture and needs increased calorie intake with dysmotility likely contributing to her malnutrition. The esophagram should provide more answers and we will continue to follow patients and adjust plan/recommendations accordingly.     DIAGNOSIS:  1. Interstitial Lung Disease  2. Hx of limited cutaneous systemic sclerosis  3. Overlap of sarcoidosis with mixed connective tissue disease.  4. Raynaud's phenomenon   5. Neuropathy  6. Pulmonary sarcoidosis    Patient was not seen today; chart reviewed.    Recommendation:     PLAN:  1. Agree with Esophagram  2. Continue Prednisone 15mg daily  3. Continue Gabapentin 100mg daily HS  4. If patient develops symptoms secondary to Raynaud's " "phenomenon, can initiate sildenafil  5. Recommend Pain management Consult given patient's concern for intermittent uncontrolled pain in several areas  6. Will schedule outpatient follow up with Dr. De La Torre      Discussed with attending, Dr. Fortune who agrees with assessment and plan.    Flor Malave MD  Internal Medicine PGY-2    \"This note was generated using dragon software and reviewed by myself.  Please excuse any grammatical or spelling errors above\".   -----------------------------------------------------------------------------------------------------------------    "

## 2023-06-07 NOTE — PLAN OF CARE
Temp: 98.3  F (36.8  C) Temp src: Oral BP: 108/76 Pulse: 96   Resp: 24 SpO2: 95 % On 2L NC  [id number]   AOx4. N/T to BLE at baseline. Denies CP, SOB  Purewick in place with adequate output. Incontinent of bowel. LBM 6/7/23  Pt c/o R heel pain; managed with PRN oxy  R PIV SL. NG with cont TF  Dressings to bilateral calves CDI. L calf wound with drainage- dressing changed  Call light within reach at all times. Pt able to make needs known. Continue with POC.

## 2023-06-07 NOTE — PLAN OF CARE
Goal Outcome Evaluation:      Plan of Care Reviewed With: patient    Overall Patient Progress: improvingOverall Patient Progress: improving         [id number]    Pt alert and oriented x4, had 3 loose sticky BMs this shift. Purewick changed each time as well, extensive laureano care done, mepilex applied to left gluteal thigh and sacrum couple open spots. Right heel has round red spot on it, not noted last night, applied mepilex, heels offloaded on towels. Pt on tele, sinus tach, 105 max. Tube feed continuous at 50 ml/hr. Pt reports bloating and abdominal discomfort has improved after BMs. Does not get out of bed or use bed pan, stools on chux/brief. Pt has chronic neuropathy had oxy x1. States she was on gabapentin at home but dosages sound like lyrica (75 or 150 mg) and cant find record of this, advised to talk to MD in AM. Pt on 2 L 02 by NC. Able to make needs known. Continue POC

## 2023-06-08 NOTE — PLAN OF CARE
Goal Outcome Evaluation:  Alert,easily awakens with verbal stimuli.  On room air but desats occasionally,so placed on 1-2lpm via NC.  TF via NG at 30ml/hr with water flushes at 30ml/4hrs.  BGs checked for 79 and 85.Small freq feeding reinforced.  Minimal nausea,on scheduled reglan IV.  PIV line F forearm,patent,saline locked.  Oxycodone and tizanidine given for generalized pain,worse on BLE and back.  Baseline numbness/tingling on all extremities fr neuropathy.  Purplish toes,her baseline from Raynauds.  No BM this shift.Purewick on,300ml this shift.  BLE wounds,UTV,covered with mepilex.  +2 edema on both lower legs/feet/ankle area.Edema wear on and kept elevated with pillows.  Heel lift boots on.  Call light is near reach.

## 2023-06-08 NOTE — PROGRESS NOTES
CLINICAL NUTRITION SERVICES - BRIEF NOTE     Nutrition Prescription      Recommendations already ordered by Registered Dietitian (RD):  Start calorie count     Change PRN supplement order to strawberry Ensure BID at breakfast and dinner meal    Per pt request as pt having good tolerance, will continue with NG-TF of Osmolite 1.5 at 30 ml/hr x24 hrs, continue water flush of 30 ml q 4 hrs to provide 1080 kcal, ~46 gm protein, ~146 gm CHO, no fiber, 728 ml water/day - this provides about 66% lower end kcal needs, 70% of lower end protein needs - pt declined to cycle TF and wants continuous and reports feels can take po with TF running as ordered     Future/Additional Recommendations:  Monitor daily calorie counts to assess TF needs  Continue to monitor wt/lab trends      NEW FINDINGS   MAR reviewed. Labs reviewed. Nursing notes reviewed over last 24 hrs, no noted concerns for TF intolerance.     RD visited with rounding MD, reviewed length of time NG has been in, possible need for abdominal tube, and reviewed fluid status, MD has adjusted diuretic therapy, has connected with Providers for possible tube placement needs, RD did note pt has a history of gastric bypass so MD made aware if this were to affect which service would place tube. At present, MD would like NG-TF adjusted to give pt a chance to eat and monitor po w/calorie count, if pt cannot meet needs orally in the coming days, will then consider abdominal feeding tube placement, RD agreeing and noted TF running at reduced rate at present in anticipation of  this, MD fine with whatever pt feels is comfortable for TF adjustments.    RD then visited pt at bedside, reviewed MD discussion, pt agreeing, notes tolerating current reduced TF rate well now so fine with continuing as ordered (did offer cycle plan, but pt fine with continuous infusion at reduced amount of nutrition as ordered, also noted this may be more appropriate with pt having intermittent hypoglycemic  episodes), pt agreeing to scheduled supplements BID, agreeing with possible need for abdominal tube if po remains inadequate per calorie count over the next couple of days.     Reviewed plan of care as above with charge RN as well.     INTERVENTIONS  Implementation  Collaboration with staff and  Providers - as noted above   Enteral Nutrition/water flushes - continue as ordered per pt preference (continuous w/reduced rate to encourage po intakes)   Calorie count - ordered as above  Supplements - order updated to schedule supplements BID    Monitoring/Evaluation  Will continue to monitor and evaluate per protocol.    Tiffany Laird RD, CNSC, LD  [place] [address] Med/Surg RD pager: [phone]  Weekend/holiday pager: [phone]

## 2023-06-08 NOTE — PROGRESS NOTES
[facility], [place]    Medicine Progress Note - Hospitalist Service, GOLD TEAM 16    Date of Admission:  5/16/2023    Assessment & Plan      62F with history of sarcoidosis, CHF, deconditioning, history of PE, mixed connective tissue disease; presents with shortness of breath, found to have acute LE DVTs bilaterally.     #Pulmonary embolism, bilateral DVT.  - initially on  heparin drip and transitioned to apixaban 5 mg bid  5/18/2023 ( states she is allergic to lovenox)   - Lower extremity venous doppler US revealed partially occlusive thrombus in the common femoral vein, proximal femoral vein, and popliteal vein and partially occlusive thrombus in the left distal femoral vein and popliteal vein.   - Chest CTA revealed multiple right sided pulmonary emboli, some of which appear to be chronic.      #Hypoxia due to PE.   - Was on 2 L of oxygen via nasal cannula, oxygen saturation  Good on room air now      #Recurrent pleuritic CP  -- Ruled out for ACS w/ EKG and HS troponin  --  TTE 6/3 EF 45-50%, no RWMA.  EF was 50-55% was on 5/5/22  --  CP may be due to underlying pulmonary embolism   - coronary angio 12/2021 with minimal non-obstructing CAD     # ? History Sarcoidosis with pulmonary involvement  #Pulmonary hypertension  # ILD   -seen by Pulmonary Dr Perlman in 87/2022, she follows with Dr Bentley for pulmonary hypertension. Was on prednisone and imuran in the past, but she stopped taking all of her rheumatologic medications due to not feeling well.  - sating well on room air   - last echo 6/3   - prednisone should also help sarcoidosis if an issue   - was to go to [place] for second opinion and to [facility], [place]   -appretiate pulmonary input     #HFrEF, nonischemic    - history of  pulmonary hypertension , howevere last echo 6/3 showed normal  RV size function and normal pulmonary artery systolic function   From sarcoidosis + pulmonary embolism ,  - echo 6/3 with EF 45-50%   -  does have history of abnormal LV function with normal coronary arteries   - on lasix 40 mg daily , adjust as needed     #Hypervolemia  #Bilateral lower extremity edema, anasarca   - multifactorial,  has history of heart failure also suspect  Malnutrition playing a role , no pulmonary hypertension  Noted but could have right sided heart failure  As well   - Patient reports she was on furosemide and then on torsemide at home.  She reports at 1 point she was on 80 mg of oral furosemide a day.  -has received IV lasix, currently on Furosemide 40 mg po daily added 20 mg daily in PM 6/7 , monitor for need to increase     #Bilateral lower extremity lacerations with hx of wound infection  About 1 month PTA, she fell from her wheel chair and sustained severe laceration of bilateral lower calf. She was seen in ED and the wounds were sutured. Subsequently, she developed wound infection and was treated with 2 rounds of bactrim for cellulitis. She continues to endorse significant pain not being able to ambulate. The wound does not appear to be acutely infected   - Wound care      Dysphasia  - feels that food is getting stuck  - asked for GI to see, could have strictures from scleroderma also possible esophageal dysmotility  - GI recomended esophagogram to look for strictures. Dysmotility  esophogram done  6/7 but was limited study due to patient 's mobility limitation of positioning patient but no major strictures seen , has signs of esophageal dysmotility,    - continue ppi   - stop opioids and anticholinergic medications as able   - antireflux measures  - ok for mechanical soft diet , speech to see and make further rec reg swallowing      Abdominal fullness  - complains of  6/5 . TF stopped, now doing better, restart  TF after esophagogram      constipation  - worsened by  Opioids, minimize as able .   - had large BM 6/6 night     - had CT of abdomen 5/16 that showed edematous and diffuse thickening of colonic wall [place] due to  volume overload, no evidence of  malignancy       # history of abdominal aortic aneurysm  - defer back to cardiology  , monitor      #Hx atrial fibrillation  - currently in sinus  - on AC and on amiodarone, bb held since admission but restart as blood pressure  Allows       #Pulmonary Edema  #Small bilateral Pleural effusions  - suspect due to heart failure  And malnutrition   -Chest CT on admission reveals small bilateral pleural effusions L>R, Interlobular septal thickening and areas of peribronchovascular nodular and GGO in the lungs bilaterally, cardiomegaly and ascitis. BNP on admission to the ER was elevated, 7752. Last echo in 5/2022 showed EF 50-55%.  - Patient started on diuresis as above.     #Mixed connective tissue disease  #Scleroderma  # severe Raynaud's  # SICCA   # history of restricted mouth opening   -Follows with Rheum Dr Nicolás De La Torre, not taking home meds including prednisone. Last seen in 7/2022  -appreciate  Rheumatology's input , continue with prednisone 15 mg daily, started gabapentin 100 mg at bedtime   - rheumatology recommended pain consult , I spoke with pain services and recommended out patient  Follow up    - further  options reg Raynauds  Per rheum          #CT evidence of cirrhosis   #Portal Hypertension  Patient with no previous history of cirrhosis or liver disease. An abdomen CT was ordered in ER revealed a cirrhotic liver with portal hypertension, ascites, diffuse anasarca and recanalized unbilical vein.   - Need outpatient work up and close follow up     #Deconditioning  #Malnutrition, low blood sugar episodes.  #Suspect due to poor p.o. intake.  #Failure to thrive  Was given IV fluids with dextrose as she was having low blood sugar measurements on 5/17.  Discontinued due to concerns for hypervolemia and persistent need for supplemental oxygen.  - Patient with poor apetite for the last 3-4  months with poor PO intake  - states that has decreased appetite as well as feeling of  food getting stuck ( see above) also cannot open mouth wide with scleroderma ands has no teeth   - CT of abdomen corona snot shoe evidence of malignacy   - seen by Nutrition   - NGT placement 5/24, started on feeding       #Episodes of hypoglycemia, recurrent.  Due to poor p.o. intake.  Unable cortisol level within normal limits.    - has glucometer at home and has been noting hypoglycemia 50s and 60s at home. In the ED, finger stick glucose had repeatedly low values in to 10s and serum glucose was in 60s. Of note, she was on prednisone 15mg daily for her rheumatologic conditions, but has not been taking them putting her at risk for AI. She is also not taking much PO.  - Blood glucose improved with less frequent hypoglycemic episodes  - Continue to check glucose every 6 hours        #Right breast skin thickening and nipple retraction on CT.   Incidental finding on CT done in the ED.   - Outpatient mammography ,  -6/6 exam I did not see any skin changes in right breast, did have some fullness in left breast      #Hyponatremia  - lowest Na was 127, now in low 130s   - likely due to IV fluids and diuretic use.   - - Urine sodium is on the lower side.  More suggestive of hypovolemic h     #Severe Protein Calorie Malnutrition in the setting of chronic illness.  - Poor appetite and poor oral intake. Frequent episodes of hypoglycemia.  Nutrition consulted. Now on  tube feeding and continue till can meet her nutritional requirement   - abdominal CT no evidence of malignancy, last colonoscopy  Was in 2020 had 6 mm hyperplastic  polyp in prox sigmpid,  CT chest did not mention mass   - calorie count , possible need for PEG, discussed with  GI  And get OR or gen surgery in setting of gastric bypass      #GERD  - Protonix transitioned from 20 mg twice daily to 40 mg every morning  - Continue to monitor symptoms    History gastric bypass  - B12 at low end of normal,  Folate low, stared supplements and started daily  vitamins  "       #Muscle spasms  - Add tizanidine as needed for muscle spasms     Weakness deconditioning  - continue physical therapy  Occupational therapy    - need to encourage to participate in therapy     6/8 we did talk about likely need for PEG and ongoing goals of cares , she will let us know if wants  to talk with palliative cares        Diet: Combination Diet Regular Diet Adult  Snacks/Supplements Adult: Other; Please allow pt/RN to order snacks/supplements PRN; Between Meals  Adult Formula Drip Feeding: Continuous Osmolite 1.5; Nasogastric tube; Goal Rate: 30 ml/hr; mL/hr    DVT Prophylaxis: DOAC  Deleon Catheter: Not present  Lines: None     Cardiac Monitoring: None  Code Status: Full Code      Clinically Significant Risk Factors            # Hypomagnesemia: Lowest Mg = 1.6 mg/dL in last 2 days, will replace as needed   # Hypoalbuminemia: Lowest albumin = 1.6 g/dL at 6/7/2023  5:59 AM, will monitor as appropriate     # Hypertension: Noted on problem list        # Obesity: Estimated body mass index is 30.1 kg/m  as calculated from the following:    Height as of this encounter: 1.727 m (5' 8\").    Weight as of this encounter: 89.8 kg (197 lb 15.6 oz).   # Severe Malnutrition: based on nutrition assessment         Disposition Plan    TBD        Gill Gatica MD  Hospitalist Service, GOLD TEAM [address] [place] [address]  Securely message with [employer] (more info)  Text page via [facility] Paging/Directory   See signed in provider for up to date coverage information  ______________________________________________________________________    Interval History      Doing ok,  Trying to eat  Not interested in speaking with palliative cares today. We did discuss that palliative cares can help with pain /symptom management and is not equivalent to hospice as patient  Thought. Also need to discuss goals of cares, we did talk about likely need for PEG and ongoing goals of cares     Thinks slept " better with Neurontin on board    Had BM, no nausea vomiting       Physical Exam   Vital Signs:     BP: 96/74 Pulse: 93   Resp: 18 SpO2: 96 % O2 Device: Nasal cannula Oxygen Delivery: 2 LPM  Weight: 197 lbs 15.57 oz  General appearance: awake alert in  no apparent distress       NECK: supple  Has TF   RESPIRATORY: lungs are clear     CARDIOVASCULAR: normal S1S2 regular rate and rhythm, no rubs gallops or murmurs appreciated,   GASTROINTESTINAL: abdomen is , soft,  non-distended, has some generalized tenderness no rebound or guarding + bowel sounds   SKIN: warm and dry, no rashes, no mottling noted , wounds in lower calf bilat   NEUROLOGIC: awake alert and oriented,  EXTREMITIES: no clubbing cyanosis  +  edema noted  , bilateral  Lower calf wounds no evidence of infection     Data       Imaging results reviewed over the past 24 hrs:   Recent Results (from the past 24 hour(s))   XR Esophagram    Narrative    Esophagram dated 6/7/2023    COMPARISON:    CT 5/16/2023    HISTORY:    History of scleroderma with dysphagia. Known esophageal  dysmotility. Evaluate for stricture.    TECHNIQUE: Patient was given effervescent granules and thin and thick  barium. Patient was evaluated with fluoroscopy, and multiple spot  films were obtained.     Fluoroscopy time was 0.7 minutes.    FINDINGS:     The patient received effervescent granules with a small sip of water.  Due to patient's mobility limitations, the examination was performed  with the patient in LPO position while reclined on the fluoroscopy  table with a wedge. The fluoroscopy table was then tilted to  approximately 12 degrees, as patient is unable to stand. Examination  of the esophagus with patient in a reclined, tilted position  demonstrates normal opacification of the proximal and mid esophagus.  However there was dilation and stasis of the thick barium in the  distal esophagus with irregular esophageal contractions. With  subsequent sips of liquid, contrast slowly  progressed beyond the  distal esophagus into the gastroesophageal junction. There is  opacification of the gastroesophageal junction without evidence of a  severe stricture. Barium stasis in the distal esophagus was felt to be  secondary to esophageal dysmotility as patient was slightly tilted and  gravity did not entirely propel the barium through the distal  esophagus with patient in this position. Subsequently, patient was  transferred back to hospital bed and the head of bed was raised so  patient was in an upright position. Due to technology limitations of  the fluoroscopy machine, a collimated AP radiograph of the esophagus  with the patient in upright position was obtained. In the upright  position, there is opacification of the distal esophagus, GE junction  and gastric pouch without evidence of severe narrowing. Therefore, the  barium stasis demonstrated earlier in the examination is likely  secondary to esophageal dysmotility although small stricture cannot be  entirely excluded.    Subsequent evaluation with thin barium in the NAGLE position was not  performed as patient did not feel comfortable proceeding with  examination in that position.      Impression    IMPRESSION:  Technically limited examination secondary to patient mobility. No  evidence of severe esophageal stricture. Esophageal dysmotility, as  detailed above, is incompletely evaluated.    TEODORO JOSE MD         SYSTEM ID:  [id number]     Recent Labs   Lab 06/08/23  0454 06/07/23  2327 06/07/23  1830 06/07/23  0640 06/07/23  0559 06/06/23  1237 06/06/23  0601 06/05/23  1414 06/05/23  0758 06/03/23  1546 06/03/23  0823   WBC  --   --   --   --  8.9  --   --   --  7.7  --  19.3*   HGB  --   --   --   --  8.6*  --   --   --  9.5*  --  11.7   MCV  --   --   --   --  94  --   --   --  94  --  93   PLT  --   --   --   --  260  --   --   --  242  --  200   NA  --   --   --   --  135*  --   --   --  132*  --  135*   POTASSIUM  --   --   --   --  4.5   --  4.6  --  4.4   < > 4.6   CHLORIDE  --   --   --   --  99  --   --   --  97*  --  98   CO2  --   --   --   --  29  --   --   --  28  --  23   BUN  --   --   --   --  28.2*  --   --   --  24.5*  --  24.2*   CR  --   --   --   --  0.50*  --   --   --  0.52  --  0.62   ANIONGAP  --   --   --   --  7  --   --   --  7  --  14   DAVID  --   --   --   --  7.7*  --   --   --  8.2*  --  8.3*   GLC 85 79 75   < > 90   < >  --    < > 71  79   < > 98   ALBUMIN  --   --   --   --  1.6*  --   --   --  1.8*  --  2.0*   PROTTOTAL  --   --   --   --  4.1*  --   --   --  4.3*  --  4.7*   BILITOTAL  --   --   --   --  0.2  --   --   --  0.3  --  0.3   ALKPHOS  --   --   --   --  60  --   --   --  67  --  70   ALT  --   --   --   --  16  --   --   --  15  --  15   AST  --   --   --   --  17  --   --   --  18  --  18    < > = values in this interval not displayed.

## 2023-06-08 NOTE — PLAN OF CARE
"VS:     /83 (BP Location: Left arm)   Pulse 68   Temp 98.7  F (37.1  C) (Oral)   Resp 18   Ht 1.727 m (5' 8\")   Wt 89.8 kg (197 lb 15.6 oz)   SpO2 94%   BMI 30.10 kg/m    A/O X 4. Afebrile. VSS. Lungs- clear bilaterally with both       anterior and posterior. IS encouraged. Denies nausea, shortness of breath at rest,  and chest pain.     Output:     Incontinent. Brief in place, Purewick in place  N0 BM this shift   Activity:     Pt up w/ assist of 2-3 and judah steady to chair.   Skin: Redness on sacrum- Mepilex applied  BLE wounds on posterior thighs- Mepilix applied and wound cares done per orders     Pain:     Pain in the Right and Left sternal area and back     CMS:     Pt has neuropathy at baseline in feet and fingers   Diet:     TF continuous w/ water flushes  Reg diet w/ poor appetite   LDA:     R PIV SL   Equipment:     IV pole w/ julio cesar pump  Belongings at  bedside     Plan:       Pt is able to make needs known and the call light is within the pt's reach. Continue to monitor.               "

## 2023-06-08 NOTE — PROGRESS NOTES
"Brief GI Luminal Service Curbside Note:     The patient was not seen or examined today. This note is a product of chart review.  Of note, today is hospital day 23.      The GI Luminal Service was consulted by Dr. Gill Gatica on 6/6/2023 at 1402 regarding their patient Jeanine Schuler regarding: \"dysphagia, feels like food getting stuck, weight loss, history of scleroderma.\" I called Dr. Gatica and was provided with the following information: worsening dysphagia, feels like food is getting stuck; patient is also on apixaban.     Per Chart Review: Jeanine Schuler is a 62 year old female with history of sarcoidosis, CHF, deconditioning, history of PE, mixed connective tissue disease, who presented to the ED 5/16/2023 with shortness of breath, found to have acute LE DVTs bilaterally.      Patient was seen previously by GI in 2020 and 2021 for chronic dysphagia:   - S/p EGD 11/17/2020 with dilation in the entire esophagus, abnormal esophageal motility consistent with scleroderma, Eleazar-en-Y anastomosis characterized by healthy appearing mucosa, normal examined duodenum with recommendation to continue Omeprazole 20 mg PO BID indefinitely.   - Seen for Video Visit with Dr. Jamie Cárdenas 7/15/2021 for cough, dysphagia, GERD, weight loss and was recommended to proceed with outpatient elective EGD and colonoscopy to evaluate for GI malignancy in the setting of weight loss but also noted it is possible that symptoms are due to underlying scleroderma and altered esophageal motility; swallow recommendations: take small bites and drink sips of water in between, look forward when you swallow and tuck your chin, and do not talk and eat at the same time; GERD Lifestyle Modifications; Pantoprazole 40 mg daily and follow-up in 4-6 weeks with esophageal specialist.      S/p Esophagram 6/7/2023 with no evidence of severe esophageal stricture; esophageal dysmotility incompletely evaluated; technically limited examination secondary to " patient mobility.  - No indication for acute GI endoscopic intervention.  - No further esophageal motility studies are available inpatient.     Based on the limited information provided on the phone by Dr. Gatica and chart review, I recommended the following:   -- Continue Pantoprazole 40 mg PO daily.   -- Discontinue opioids and anticholinergic mediations as able, as these slow GI motility (including esophageal motility).   -- Please consider following an antireflux regimen. This includes:  - Do not lie down for at least 3 to 4 hours after meals.  - Raise the head of the bed 6 to 8 inches.  - Decrease excess weight.  - Avoid foods and liquids that cause symptoms.  - Avoid cigarettes and other tobacco products.  -- Mechanical soft diet or diet as tolerated by patient.   -- Appreciate RD nutrition involvement and recommendations.  -- No further esophageal motility studies are available inpatient.   -- No indication for acute GI Endoscopic intervention.   -- If the patient experiences overt GI bleeding with hemodynamic instability, please page the GI Luminal Service (listed on [facility]).     Outpatient:  -- GI will order: outpatient follow-up in [facility] for chronic dysphagia, esophageal dysmotility in the setting of scleroderma.   - [facility] phone: [phone], option 1 for clinic scheduling.     I did not personally see or examine the patient at this time as they are located at [facility].     My recommendations are not intended to take the place of Dr. Gatica's clinical judgement, which should always be utilized to provide the most appropriate care to meet the unique needs of the patient.      The inpatient gastroenterology service will sign off at this time. Thank you for allowing us to participate in the care of this patient. Please do not hesitate to page the GI service with any questions or concerns.      Plan discussed with Dr. Saroj Cook, GI Luminal Staff  Physician.     Prudence Doll PA-C  Inpatient Gastroenterology Service  [facility]  Pager: *[phone]  Text Page

## 2023-06-08 NOTE — PLAN OF CARE
End of shift Summary: See flowsheet for VS and detail assessments.     Changes this Shift: No significant changes this shift. A&Ox4, VSS, sats maintained on 1 L O2 NC. Call light remains within reach, makes needs known.     Pulmonary: Lungs CTA. Endorses chest pain related to PE. No SOB or cough.     Output: 1 episode bowel incontinence this shift. Large. Creamy, soft, sticky. Brown. Incontinence & perineal cares done. Purewick placed for overnight. Brief placed per pt request.     Activity: Not oob this shift. Turning L to R, pillows to support     Skin: BLE wounds, edematous. R gluteal skin tear, LAUREN. Mepilex to sacrum. Prevlon boots in place.     Neuro/CMS: BL N/T.     Dressings/Drains: Edem[place]ear stockings to BLE, CDI. Mepilex CDI.     IV: R PIV, SL, tolerating reglan.    Additional info: TF running goal rate of 30 mL/hr. [place] unchanged.     Plan: Continue POC. Pending discharge disposition.

## 2023-06-09 NOTE — PROGRESS NOTES
Calorie Count:  6/8: 335 kcal and 13 g protein (2 meals - breakfast and dinner, pt did not order lunch, 50% of 1 supplement)    RD visited pt at bedside, reviewed po intakes as missing dinner meal information, pt provided intake history noted above. Reviewed plan to continue calorie counts over weekend and RD will follow back up on Monday, pt agreeing, pt noted discussion with rounding Provider and that pt will likely have abdominal tube early next week, pt seems fairly open and agreeable to this, agreeing to continue current TF plan of care as ordered.     Plan/Recommendations:  RD will follow up on weekend calorie counts on Monday. Continue TF/water flushes as ordered.    Tiffany Laird RD, CNSC, LD  [place] [address] Med/Surg RD pager: [phone]  Weekend/holiday pager: [phone]

## 2023-06-09 NOTE — PROGRESS NOTES
[facility]    Medicine Progress Note - Hospitalist Service, GOLD TEAM 16    Date of Admission:  5/16/2023    Assessment & Plan      62F with history of sarcoidosis, CHF, deconditioning, history of PE, mixed connective tissue disease; presents with shortness of breath, found to have acute LE DVTs bilaterally.     #Pulmonary embolism, bilateral DVT.  - initially on  heparin drip and transitioned to apixaban 5 mg bid  5/18/2023 ( states she is allergic to lovenox)   - Lower extremity venous doppler US revealed partially occlusive thrombus in the common femoral vein, proximal femoral vein, and popliteal vein and partially occlusive thrombus in the left distal femoral vein and popliteal vein.   - Chest CTA revealed multiple right sided pulmonary emboli, some of which appear to be chronic.      #Hypoxia due to PE.   - Was on 2 L of oxygen via nasal cannula, oxygen saturation  Good on room air now but she still requests PRN      #Recurrent pleuritic CP  -- Ruled out for ACS w/ EKG and HS troponin  --  TTE 6/3 EF 45-50%, no RWMA.  EF was 50-55% was on 5/5/22  --  CP may be due to underlying pulmonary embolism   - coronary angio 12/2021 with minimal non-obstructing CAD     # ? History Sarcoidosis with pulmonary involvement  #Pulmonary hypertension  # ILD   -seen by Pulmonary Dr Perlman in 87/2022, she follows with Dr Bentley for pulmonary hypertension. Was on prednisone and imuran in the past, but she stopped taking all of her rheumatologic medications due to not feeling well.  - sating well on room air   - last echo 6/3   - prednisone should also help sarcoidosis if an issue   - was to go to [place] for second opinion and to [facility]   -appretiate pulmonary input     #HFrEF, nonischemic    - history of  pulmonary hypertension , howevere last echo 6/3 showed normal  RV size function and normal pulmonary artery systolic function   From sarcoidosis + pulmonary embolism ,  -  "echo 6/3 with EF 45-50%   - does have history of abnormal LV function with normal coronary arteries   - on lasix 40 mg daily , adjust as needed     #Hypervolemia  #Bilateral lower extremity edema, anasarca   - multifactorial,  has history of heart failure also suspect  Malnutrition playing a role , no pulmonary hypertension  Noted but could have right sided heart failure  As well   - Patient reports she was on furosemide and then on torsemide at home.  She reports at 1 point she was on 80 mg of oral furosemide a day.  -has received IV lasix, currently on Furosemide 40 mg po daily added 20 mg daily in PM 6/7 , monitor for need to increase     #Bilateral lower extremity lacerations with hx of wound infection  About 1 month PTA, she fell from her wheel chair and sustained severe laceration of bilateral lower calf. She was seen in ED and the wounds were sutured. Subsequently, she developed wound infection and was treated with 2 rounds of bactrim for cellulitis. She continues to endorse significant pain not being able to ambulate. The wound does not appear to be acutely infected   - Wound care      Dysphasia  - feels that food is getting stuck  - asked for GI to see, could have strictures from scleroderma also possible esophageal dysmotility  - GI recomended esophagogram to look for strictures. Dysmotility  esophogram done  6/7 but was limited study due to patient 's mobility limitation of positioning patient but no major strictures seen , has signs of esophageal dysmotility,    - continue ppi   - stop opioids and anticholinergic medications as able   - antireflux measures  - ok for mechanical soft diet , speech to see and make further rec reg swallowing    - \"-- GI will order: outpatient follow-up in [facility] for chronic dysphagia, esophageal dysmotility in the setting of scleroderma.   - [facility] phone: [phone], option 1 for clinic scheduling\"      Abdominal fullness  - " complains of  6/5 . TF stopped, now doing better, restart  TF after esophagogram      constipation  - worsened by  Opioids, minimize as able .   - had large BM 6/6 night     - had CT of abdomen 5/16 that showed edematous and diffuse thickening of colonic wall [place] due to volume overload, no evidence of  malignancy       # history of abdominal aortic aneurysm  - defer back to cardiology  , monitor      #Hx atrial fibrillation  - currently in sinus  - on AC and on amiodarone, bb held since admission but restart as blood pressure  Allows       #Pulmonary Edema  #Small bilateral Pleural effusions  - suspect due to heart failure  And malnutrition   -Chest CT on admission reveals small bilateral pleural effusions L>R, Interlobular septal thickening and areas of peribronchovascular nodular and GGO in the lungs bilaterally, cardiomegaly and ascitis. BNP on admission to the ER was elevated, 7752. Last echo in 5/2022 showed EF 50-55%.  - Patient started on diuresis as above.     #Mixed connective tissue disease  #Scleroderma  # severe Raynaud's  # SICCA   # history of restricted mouth opening   -Follows with Rheum Dr Nicolás De La Torre, not taking home meds including prednisone. Last seen in 7/2022  -appreciate  Rheumatology's input , continue with prednisone 15 mg daily, started gabapentin 100 mg at bedtime   - rheumatology recommended pain consult , I spoke with pain services and recommended out patient  Follow up    - further  options reg Raynauds  Per rheum        #CT evidence of cirrhosis   #Portal Hypertension  Patient with no previous history of cirrhosis or liver disease. An abdomen CT was ordered in ER revealed a cirrhotic liver with portal hypertension, ascites, diffuse anasarca and recanalized unbilical vein.   - Need outpatient work up and close follow up     #Deconditioning  #Malnutrition, low blood sugar episodes.  #Suspect due to poor p.o. intake.  #Failure to thrive  Was given IV fluids with dextrose as she was  having low blood sugar measurements on 5/17.  Discontinued due to concerns for hypervolemia and persistent need for supplemental oxygen.  - Patient with poor apetite for the last 3-4  months with poor PO intake  - states that has decreased appetite as well as feeling of food getting stuck ( see above) also cannot open mouth wide with scleroderma ands has no teeth   - CT of abdomen corona snot shoe evidence of malignacy   - seen by Nutrition   - NGT placement 5/24, started on feeding   - she os now working more with physical therapy  And continue to encourage her       #Episodes of hypoglycemia, recurrent.  Due to poor p.o. intake.  Unable cortisol level within normal limits.    - has glucometer at home and has been noting hypoglycemia 50s and 60s at home. In the ED, finger stick glucose had repeatedly low values in to 10s and serum glucose was in 60s. Of note, she was on prednisone 15mg daily for her rheumatologic conditions, but has not been taking them putting her at risk for AI. She is also not taking much PO.  - Blood glucose improved with less frequent hypoglycemic episodes  - Continue to check glucose every 6 hours        #Right breast skin thickening and nipple retraction on CT.   Incidental finding on CT done in the ED.   - Outpatient mammography ,  -6/6 exam I did not see any skin changes in right breast, did have some fullness in left breast      #Hyponatremia  - lowest Na was 127, now in low 130s   - likely due to IV fluids and diuretic use.   - - Urine sodium is on the lower side.  More suggestive of hypovolemic h     #Severe Protein Calorie Malnutrition in the setting of chronic illness.  - Poor appetite and poor oral intake. Frequent episodes of hypoglycemia.  Nutrition consulted. Now on  tube feeding and continue till can meet her nutritional requirement   - abdominal CT no evidence of malignancy, last colonoscopy  Was in 2020 had 6 mm hyperplastic  polyp in prox sigmpid,  CT chest did not mention mass  "  - calorie count , possible need for PEG, discussed with  GI  And get OR or gen surgery in setting of gastric bypass   - so plan is to monitor her oral intake over the weekend and if corona snot meet nutritional requirement then proceed with PEG, discussed with  Patient  And her       #GERD  - Protonix transitioned from 20 mg twice daily to 40 mg every morning  - Continue to monitor symptoms    History gastric bypass  - B12 at low end of normal,  Folate low, stared supplements and started daily  vitamins        #Muscle spasms  - Add tizanidine as needed for muscle spasms     Weakness deconditioning  - continue physical therapy  Occupational therapy    - need to encourage to participate in therapy     6/8 we did talk about likely need for PEG and ongoing goals of cares , she will let us know if wants  to talk with palliative cares        Diet: Combination Diet Regular Diet Adult  Adult Formula Drip Feeding: Continuous Osmolite 1.5; Nasogastric tube; Goal Rate: 30 ml/hr; mL/hr  Snacks/Supplements Adult: Ensure Enlive; With Meals  Calorie Counts    DVT Prophylaxis: DOAC  Deleon Catheter: Not present  Lines: None     Cardiac Monitoring: None  Code Status: Full Code      Clinically Significant Risk Factors            # Hypomagnesemia: Lowest Mg = 1.6 mg/dL in last 2 days, will replace as needed   # Hypoalbuminemia: Lowest albumin = 1.6 g/dL at 6/7/2023  5:59 AM, will monitor as appropriate     # Hypertension: Noted on problem list        # Obesity: Estimated body mass index is 30.5 kg/m  as calculated from the following:    Height as of this encounter: 1.727 m (5' 8\").    Weight as of this encounter: 91 kg (200 lb 9.9 oz).   # Severe Malnutrition: based on nutrition assessment         Disposition Plan    TBD        Gill Gatica MD  Hospitalist Service, GOLD TEAM [address] [place] [address]  Securely message with [employer] (more info)  Text page via [employer] Paging/Directory   See signed " in provider for up to date coverage information  ______________________________________________________________________    Interval History      In bed, did sit up in chair yesterday, no BM agai, states she still needs O2 PRN, trying to eat     Physical Exam   Vital Signs: Temp: 98.7  F (37.1  C) Temp src: Oral BP: 137/86 Pulse: 101   Resp: 16 SpO2: 92 % O2 Device: None (Room air) Oxygen Delivery: 1 LPM  Weight: 200 lbs 9.9 oz  General appearance: awake alert in  no apparent distress       NECK: supple  Has TF   RESPIRATORY: lungs are clear     CARDIOVASCULAR: normal S1S2 regular rate and rhythm, no rubs gallops or murmurs appreciated,   GASTROINTESTINAL: abdomen is , soft,  non-distended, has some generalized tenderness no rebound or guarding + bowel sounds   SKIN: warm and dry, no rashes, no mottling noted , wounds in lower calf bilat   NEUROLOGIC: awake alert and oriented,  EXTREMITIES: no clubbing cyanosis  +  edema noted  , bilateral  Lower calf wounds no evidence of infection     Data     9.9  \   8.1 (L)   / 337     132 (L) 96 (L) 25.7 (H) /  106 (H)   4.1 29 0.57 \       ALT: 14 AST: 19 AP: 55 TBILI: 0.2   ALB: 1.7 (L) TOT PROTEIN: 4.2 (L) LIPASE: N/A       Imaging results reviewed over the past 24 hrs:   No results found for this or any previous visit (from the past 24 hour(s)).  Recent Labs   Lab 06/09/23  0615 06/09/23  0305 06/09/23  0240 06/08/23  1148 06/08/23  0657 06/07/23  0640 06/07/23  0559 06/05/23  1414 06/05/23  0758   WBC 9.9  --   --   --   --   --  8.9  --  7.7   HGB 8.1*  --   --   --   --   --  8.6*  --  9.5*   MCV 94  --   --   --   --   --  94  --  94     --   --   --   --   --  260  --  242   *  --   --   --   --   --  135*  --  132*   POTASSIUM 4.1  --   --   --  4.2  --  4.5   < > 4.4   CHLORIDE 96*  --   --   --   --   --  99  --  97*   CO2 29  --   --   --   --   --  29  --  28   BUN 25.7*  --   --   --   --   --  28.2*  --  24.5*   CR 0.57  --   --   --   --   --   0.50*  --  0.52   ANIONGAP 7  --   --   --   --   --  7  --  7   DAVID 7.7*  --   --   --   --   --  7.7*  --  8.2*   * 142* 84   < >  --    < > 90   < > 71  79   ALBUMIN 1.7*  --   --   --   --   --  1.6*  --  1.8*   PROTTOTAL 4.2*  --   --   --   --   --  4.1*  --  4.3*   BILITOTAL 0.2  --   --   --   --   --  0.2  --  0.3   ALKPHOS 55  --   --   --   --   --  60  --  67   ALT 14  --   --   --   --   --  16  --  15   AST 19  --   --   --   --   --  17  --  18    < > = values in this interval not displayed.

## 2023-06-09 NOTE — PROGRESS NOTES
"   06/09/23 6434   Appointment Info   Signing Clinician's Name / Credentials (SLP) Demario Zhao MA [facility] SLP   General Information   Onset of Illness/Injury or Date of Surgery 05/08/23   Referring Physician Gill Gatica MD   Pertinent History of Current Problem Per provider notes: \"62F with history of sarcoidosis, CHF, deconditioning, history of PE, mixed connective tissue disease; presents with shortness of breath, found to have acute LE DVTs bilaterally.\" Per 6/7/23 esophagram note: \"barium stasis demonstrated earlier in the examination is likely  secondary to esophageal dysmotility although small stricture cannot be  entirely excluded.\" Patient has been complaining of food getting \"stuck\" when she is eating/drinking. CSE completed today per provider orders.   General Observations Patient upright in bed for dysphagia treatment session. She was questioning rationale for SLP swallow evaluation and slightly agitated, but she did participate well.   Type of Evaluation   Type of Evaluation Swallow Evaluation   Oral Motor   Oral Musculature generally intact   Structural Abnormalities other (see comments)  (Patient with scleroderma and she reports this limits ROM for oral musculature.)   Mucosal Quality dry   Dentition (Oral Motor)   Dentition (Oral Motor) edentulous;other (see comments)  (has dentures, but not wearing for evaluation)   Facial Symmetry (Oral Motor)   Facial Symmetry (Oral Motor) WNL   Lip Function (Oral Motor)   Lip Range of Motion (Oral Motor) retraction impairment   Lip Strength (Oral Motor) WNL   Retraction, Lip Range of Motion bilateral;minimal impairment   Tongue Function (Oral Motor)   Tongue Coordination/Speed (Oral Motor) not tested   Tongue ROM (Oral Motor) not tested   Comment, Tongue Function (Oral Motor) Patient limited participation   Jaw Function (Oral Motor)   Jaw Function (Oral Motor) range of motion impairment   Jaw Range of Motion Impairment bilateral;minimal impairment;moderate " impairment   Cough/Swallow/Gag Reflex (Oral Motor)   Volitional Throat Clear/Cough (Oral Motor) reduced strength   Volitional Swallow (Oral Motor) mildly delayed;effortful   Vocal Quality/Secretion Management (Oral Motor)   Vocal Quality (Oral Motor) WNL   Secretion Management (Oral Motor) WNL   General Swallowing Observations   Past History of Dysphagia Patient with gradual increasing oroesophageal dysphagia 2/2 scleroderma and esophageal dysmotility, but has not worked with SLP prior to this hospitalization.   Respiratory Support (General Swallowing Observations) nasal cannula   Current Diet/Method of Nutritional Intake (General Swallowing Observations, NIS) regular diet;thin liquids (level 0);nasogastric tube (NG)   Swallowing Evaluation Clinical swallow evaluation   Clinical Swallow Evaluation   Feeding Assistance minimal assistance required   Clinical Swallow Evaluation Textures Trialed thin liquids;pureed  (Patient declined solid textures)   Clinical Swallow Eval: Thin Liquid Texture Trial   Mode of Presentation, Thin Liquids straw;self-fed   Volume of Liquid or Food Presented 4 single straw sips   Oral Phase of Swallow WFL   Pharyngeal Phase of Swallow intact   Diagnostic Statement No overt s/s of aspiration observed.   Clinical Swallow Evaluation: Puree Solid Texture Trial   Mode of Presentation, Puree spoon;self-fed   Volume of Puree Presented 2 tsps   Oral Phase, Puree WFL   Pharyngeal Phase, Puree intact   Diagnostic Statement No overt s/s of aspiration observed. Patient reports difficulty opening her mouth for solids and discomfort with bolus prep.   Esophageal Phase of Swallow   Patient reports or presents with symptoms of esophageal dysphagia Yes   Esophageal comments See Esophagram report from 6/7/23.   Swallowing Recommendations   Diet Consistency Recommendations thin liquids (level 0);regular diet   Supervision Level for Intake distant supervision needed   Mode of Delivery Recommendations bolus  size, small;slow rate of intake   Swallowing Maneuver Recommendations alternate food and liquid intake   Monitoring/Assistance Required (Eating/Swallowing) stop eating activities when fatigue is present   Recommended Feeding/Eating Techniques (Swallow Eval) maintain upright sitting position for eating;maintain upright posture during/after eating for 30 minutes;set-up and prepare tray   Medication Administration Recommendations, Swallowing (SLP) As tolerated   Instrumental Assessment Recommendations instrumental evaluation not recommended at this time   General Therapy Interventions   Planned Therapy Interventions Dysphagia Treatment   Dysphagia treatment Modified diet education;Instruction of safe swallow strategies   Clinical Impression   Criteria for Skilled Therapeutic Interventions Met (SLP Eval) Yes, treatment indicated   SLP Diagnosis Mild-Moderate oroesophageal dysphagia   Risks & Benefits of therapy have been explained evaluation/treatment results reviewed;care plan/treatment goals reviewed;risks/benefits reviewed;current/potential barriers reviewed;participants voiced agreement with care plan;participants included;patient;spouse/significant other   Clinical Impression Comments   Clinical swallow evaluation completed per provider orders. Patient presents with mild-moderate oroesophageal dysphagia in setting of scleroderma and esophageal dismotility. NG in place and patient on 2L O2 via NC. Patient oral motor evaluation limited 2/2 willingness to participate with oral movement, noted ROM impairment especially with opening her mouth which she reports is d/t sleroderma. She continues to report poor appetite. Patient also reported that it feels like foods gets stuck, pointing to her chest around the sternal notch. Recent evidence of esophageal dismotility per 6/7 esophagram. Trials of thin liquids (straw) and puree textures completed at the bedside. Did not attempt further trials d/t patient's discomfort and  declining d/t lack of appetite. No overt s/s of aspiration observed. Patient oral phase c/b mildly extended bolus manipulation, but good oral clearance. Suspect patient's limited range of motion results in increased fatigue when eating resulting in less intake. Patient declined attempting other diet consistencies, preferring to select her own intake off the regular textures menu. Potential for PEG tube placement given patient's lack of appetite and willingness to get adequate nutrition from PO.     Recommend continue current regular textures and thin liquids diet. She will likely continue to require supplemental means of nutrition, defer to to nutrition team. Patient should be upright and alert for all intake, take small bites/sips, use a slow rate of intake, alternate liquids and solids, and follow reflux precautions - remain upright after PO intake for at least 30-60 minutes, smaller, more frequent meals. SLP will follow for short-course of dysphagia management.     SLP Total Evaluation Time   Eval: oral/pharyngeal swallow function, clinical swallow Minutes (47116) 14   SLP Discharge Planning   SLP Discharge Recommendation home with outpatient speech therapy   SLP Rationale for DC Rec Likely chronic oral dysphagia 2/2 to scleroderma and esophageal dysphagia 2/2 dismotility.

## 2023-06-09 NOTE — PLAN OF CARE
Goal Outcome Evaluation:         Pt A&Ox4 this shift. Denies chest pain, has some SOB, on 3L O2 nasal cannula. Pt has N/T BL feet. Pt states pain 9/10, managed with Oxy, Morphine, Atarax, pt states some relief. Pt up in chair with Assist 3/4 with judah man.  in room, helpful with cares. Pt edema stockings on. Pt tube feed running 30 mL/hr. Pt did not tolerate food, had some water with medications. Continue POC.

## 2023-06-09 NOTE — PROGRESS NOTES
Care Management Follow Up    Length of Stay (days): 24    Expected Discharge Date: TBD pending discharge plan      Concerns to be Addressed: Discharge planning     Patient plan of care discussed at interdisciplinary rounds: Yes    Anticipated Discharge Disposition: Home vs Long Term Care     Anticipated Discharge Services: None  Anticipated Discharge DME: Other (see comment) (TBD)    Patient/family educated on Medicare website which has current facility and service quality ratings: No  Education Provided on the Discharge Plan: Yes reached out to  [facility]   Patient/Family in Agreement with the Plan: Unable to assess    Referrals Placed by CM/SW: Internal Clinic Care Coordination  Private pay costs discussed: Not applicable    Additional Information:  Social work asked to set up a care conference for Monday 6/12 @ 1pm. Reached out to OT to see if they would be able to attend. Evonne from PT stated she would pass the message along. Paged palliative to see if this time would work for them. Updated hospitalist and called  and left a voicemail. SW will request that weekend SW follow up with palliative and  to ensure they are able to attend. Purpose of the care conference would be to help determine whether patient is appropriate to discharge home vs LTC. Care conference requested by hospitalist. Care management will continue to follow.     Addendum 4:24 PM:  Per hospitalist, patient does not want to speak with palliative care at this time. Therefore, those we would like to see at care conference are , hospitalist, and OT if possible.     YOKASTA Belcher, LGSW  5 Med Surg and 10 ICU   [facility]  Phone: [phone]  Pager: [phone]

## 2023-06-09 NOTE — PLAN OF CARE
"/86 (BP Location: Right arm)   Pulse 101   Temp 98.7  F (37.1  C) (Oral)   Resp 16   Ht 1.727 m (5' 8\")   Wt 89.8 kg (197 lb 15.6 oz)   SpO2 92%   BMI 30.10 kg/m         Put on 2Lpm via NC due to desats at night.   C/O sternal pain severe, managed with oxycodone x 3 this shift, morphine x 1 and tylenol  On calorie counts.  TF via NG at 30ml/hr with water flushes at 30ml/4hrs.  BGs checks Q6hrs .Values were: 35,61,84, and 142.   Verbalized feeling nauseated ,on scheduled reglan IV given.  PIV line F forearm,patent,saline locked.  Assist of 2 with judah man  Wound cares every other day and PRN.  Redness on sacrum- Mepilex applied  BLE wounds on posterior thighs- Mepilix applied and wound cares done 6/8 on day shift .  Baseline numbness/tingling on all extremities d/t neuropathy.  Purplish toes,her baseline from Raynauds.  No BM this shift.Purewick on.  +2 edema on both lower legs/feet/ankle area. Feet kept elevated with pillows.  Heel lift boots on.    "

## 2023-06-10 NOTE — PLAN OF CARE
"/65 (BP Location: Right arm, Patient Position: Fowlers, Cuff Size: Adult Regular)   Pulse 110   Temp 98.1  F (36.7  C) (Oral)   Resp 14   Ht 1.727 m (5' 8\")   Wt 91 kg (200 lb 9.9 oz)   SpO2 95%   BMI 30.50 kg/m      A&Ox4.     Not OOB overnight. Heavy assist x3-4 w/ judah steady.     NG TF running at 30 w/ Q4 flushes. New tubing needed.     Q6 BG checks - 10pm BG at 51. 25ml of dextrose given IV - BG back up to 178. Recheck at 0530 and BG 88. Check BG prior to noon as last check showed 88.     Strict I and Os.     Pt c/o R side R pain when lying on her R side - denied medications for pain overnight.     BLE wounds - wound care due 6/10.     New sacral mepilex applied. Encourage repositioning.     R PIV SL.     Purewick in place - some leakage overnight. LBM 6/8.     RN managed mag, phos, and pot - mag and phos not checked 6/9. Mag low when added to morning labs - writer informed by charge to not replace mag until recheck at 6.   "

## 2023-06-10 NOTE — PROGRESS NOTES
[facility]    Medicine Progress Note - Hospitalist Service, GOLD TEAM 16    Date of Admission:  5/16/2023    Assessment & Plan      62F with history of sarcoidosis, CHF, deconditioning, history of PE, mixed connective tissue disease; presents with shortness of breath, found to have acute LE DVTs bilaterally.     #Pulmonary embolism, bilateral DVT.  - initially on  heparin drip and transitioned to apixaban 5 mg bid  5/18/2023 ( states she is allergic to lovenox)   - Lower extremity venous doppler US revealed partially occlusive thrombus in the common femoral vein, proximal femoral vein, and popliteal vein and partially occlusive thrombus in the left distal femoral vein and popliteal vein.   - Chest CTA revealed multiple right sided pulmonary emboli, some of which appear to be chronic.      #Hypoxia due to PE.   - Was on 2 L of oxygen via nasal cannula, oxygen saturation  Good on room air now but she still requests PRN      #Recurrent pleuritic CP  -- Ruled out for ACS w/ EKG and HS troponin  --  TTE 6/3 EF 45-50%, no RWMA.  EF was 50-55% was on 5/5/22  --  CP may be due to underlying pulmonary embolism   - coronary angio 12/2021 with minimal non-obstructing CAD     # ? History Sarcoidosis with pulmonary involvement  #Pulmonary hypertension  # ILD   -seen by Pulmonary Dr Perlman in 87/2022, she follows with Dr Bentley for pulmonary hypertension. Was on prednisone and imuran in the past, but she stopped taking all of her rheumatologic medications due to not feeling well.  - sating well on room air   - last echo 6/3   - prednisone should also help sarcoidosis if an issue   - was to go to [place] for second opinion and to [facility]   -appretiate pulmonary input     #HFrEF, nonischemic    - history of  pulmonary hypertension , howevere last echo 6/3 showed normal  RV size function and normal pulmonary artery systolic function   From sarcoidosis + pulmonary embolism ,  -  "echo 6/3 with EF 45-50%   - does have history of abnormal LV function with normal coronary arteries   - on lasix 40 mg daily , adjust as needed     #Hypervolemia  #Bilateral lower extremity edema, anasarca   - multifactorial,  has history of heart failure also suspect  Malnutrition playing a role , no pulmonary hypertension  Noted but could have right sided heart failure  As well   - Patient reports she was on furosemide and then on torsemide at home.  She reports at 1 point she was on 80 mg of oral furosemide a day.  -has received IV lasix, currently on Furosemide 40 mg po daily added 20 mg daily in PM 6/7 , monitor for need to increase     #Bilateral lower extremity lacerations with hx of wound infection  About 1 month PTA, she fell from her wheel chair and sustained severe laceration of bilateral lower calf. She was seen in ED and the wounds were sutured. Subsequently, she developed wound infection and was treated with 2 rounds of bactrim for cellulitis. She continues to endorse significant pain not being able to ambulate. The wound does not appear to be acutely infected   - Wound care      Dysphasia  - feels that food is getting stuck  - asked for GI to see, could have strictures from scleroderma also possible esophageal dysmotility  - GI recomended esophagogram to look for strictures. Dysmotility  esophogram done  6/7 but was limited study due to patient 's mobility limitation of positioning patient but no major strictures seen , has signs of esophageal dysmotility,    - continue ppi   - stop opioids and anticholinergic medications as able   - antireflux measures  - ok for mechanical soft diet , speech to see and make further rec reg swallowing    - \"-- GI will order: outpatient follow-up in [facility] for chronic dysphagia, esophageal dysmotility in the setting of scleroderma.   - [facility] phone: [phone], option 1 for clinic scheduling\"      Abdominal fullness  - " complains of  6/5 . TF stopped, now doing better, restart  TF after esophagogram      constipation  - worsened by  Opioids, minimize as able .   - had large BM 6/6 night     - had CT of abdomen 5/16 that showed edematous and diffuse thickening of colonic wall [place] due to volume overload, no evidence of  malignancy   - stepped up bowel regime       # history of abdominal aortic aneurysm  - defer back to cardiology  , monitor      #Hx atrial fibrillation  - currently in sinus  - on AC and on amiodarone, bb held since admission but restart as blood pressure  Allows       #Pulmonary Edema  #Small bilateral Pleural effusions  - suspect due to heart failure  And malnutrition   -Chest CT on admission reveals small bilateral pleural effusions L>R, Interlobular septal thickening and areas of peribronchovascular nodular and GGO in the lungs bilaterally, cardiomegaly and ascitis. BNP on admission to the ER was elevated, 7752. Last echo in 5/2022 showed EF 50-55%.  - Patient started on diuresis as above.     #Mixed connective tissue disease  #Scleroderma  # severe Raynaud's  # SICCA   # history of restricted mouth opening   -Follows with Rheum Dr Nicolás De La Torre, not taking home meds including prednisone. Last seen in 7/2022  -appreciate  Rheumatology's input , continue with prednisone 15 mg daily, started gabapentin 100 mg at bedtime   - rheumatology recommended pain consult , I spoke with pain services and recommended out patient  Follow up    - further  options reg Raynauds  Per rheum        #CT evidence of cirrhosis   #Portal Hypertension  Patient with no previous history of cirrhosis or liver disease. An abdomen CT was ordered in ER revealed a cirrhotic liver with portal hypertension, ascites, diffuse anasarca and recanalized unbilical vein.   - Need outpatient work up and close follow up     #Deconditioning  #Malnutrition, low blood sugar episodes.  #Suspect due to poor p.o. intake.  #Failure to thrive  Was given IV fluids  with dextrose as she was having low blood sugar measurements on 5/17.  Discontinued due to concerns for hypervolemia and persistent need for supplemental oxygen.  - Patient with poor apetite for the last 3-4  months with poor PO intake  - states that has decreased appetite as well as feeling of food getting stuck ( see above) also cannot open mouth wide with scleroderma ands has no teeth   - CT of abdomen corona snot shoe evidence of malignacy   - seen by Nutrition   - NGT placement 5/24, started on feeding   - she os now working more with physical therapy  And continue to encourage her       #Episodes of hypoglycemia, recurrent.  Due to poor p.o. intake.  Unable cortisol level within normal limits.    - has glucometer at home and has been noting hypoglycemia 50s and 60s at home. In the ED, finger stick glucose had repeatedly low values in to 10s and serum glucose was in 60s. Of note, she was on prednisone 15mg daily for her rheumatologic conditions, but has not been taking them putting her at risk for AI. She is also not taking much PO.  - Blood glucose improved with less frequent hypoglycemic episodes  - Continue to check glucose every 6 hours        #Right breast skin thickening and nipple retraction on CT.   Incidental finding on CT done in the ED.   - Outpatient mammography ,  -6/6 exam I did not see any skin changes in right breast, did have some fullness in left breast      #Hyponatremia  - lowest Na was 127, now in low 130s   - likely due to IV fluids and diuretic use.   - - Urine sodium is on the lower side.  More suggestive of hypovolemic h     #Severe Protein Calorie Malnutrition in the setting of chronic illness.  - Poor appetite and poor oral intake. Frequent episodes of hypoglycemia.  Nutrition consulted. Now on  tube feeding and continue till can meet her nutritional requirement   - abdominal CT no evidence of malignancy, last colonoscopy  Was in 2020 had 6 mm hyperplastic  polyp in prox sigmpid,  CT  "chest did not mention mass   - calorie count , possible need for PEG, discussed with  GI  And get OR or gen surgery in setting of gastric bypass   - so plan is to monitor her oral intake over the weekend and if corona snot meet nutritional requirement then proceed with PEG, discussed with  Patient  And her       #GERD  - Protonix transitioned from 20 mg twice daily to 40 mg every morning  - Continue to monitor symptoms    History gastric bypass  - B12 at low end of normal,  Folate low, stared supplements and started daily  vitamins        #Muscle spasms  - Add tizanidine as needed for muscle spasms     Weakness deconditioning  - continue physical therapy  Occupational therapy    - need to encourage to participate in therapy     6/8 we did talk about likely need for PEG and ongoing goals of cares , she will let us know if wants  to talk with palliative cares        Diet: Combination Diet Regular Diet Adult  Adult Formula Drip Feeding: Continuous Osmolite 1.5; Nasogastric tube; Goal Rate: 30 ml/hr; mL/hr  Snacks/Supplements Adult: Ensure Enlive; With Meals  Calorie Counts    DVT Prophylaxis: DOAC  Deleon Catheter: Not present  Lines: None     Cardiac Monitoring: None  Code Status: Full Code      Clinically Significant Risk Factors            # Hypomagnesemia: Lowest Mg = 1.5 mg/dL in last 2 days, will replace as needed   # Hypoalbuminemia: Lowest albumin = 1.6 g/dL at 6/7/2023  5:59 AM, will monitor as appropriate     # Hypertension: Noted on problem list        # Obesity: Estimated body mass index is 30.5 kg/m  as calculated from the following:    Height as of this encounter: 1.727 m (5' 8\").    Weight as of this encounter: 91 kg (200 lb 9.9 oz).   # Severe Malnutrition: based on nutrition assessment         Disposition Plan    TBD        Gill Gatica MD  Hospitalist Service, GOLD TEAM [address] [place] [address]  Securely message with [employer] (more info)  Text page via [employer] " Paging/Directory   See signed in provider for up to date coverage information  ______________________________________________________________________    Interval History      Not eating well at all so seems like heading towards PEG tube, gets shortness of breath  With exertion     Physical Exam   Vital Signs: Temp: 98.1  F (36.7  C) Temp src: Oral BP: 105/64 Pulse: 77   Resp: 15 SpO2: 97 % O2 Device: Nasal cannula    Weight: 200 lbs 9.9 oz  General appearance: awake alert in  no apparent distress       NECK: supple  Has TF   RESPIRATORY: lungs are clear     CARDIOVASCULAR: normal S1S2 regular rate and rhythm, no rubs gallops or murmurs appreciated,   GASTROINTESTINAL: abdomen is , soft,  non-distended, has some generalized tenderness no rebound or guarding + bowel sounds   SKIN: warm and dry, no rashes, no mottling noted , wounds in lower calf bilat   NEUROLOGIC: awake alert and oriented,  EXTREMITIES: no clubbing cyanosis  +  edema noted  , bilateral  Lower calf wounds no evidence of infection     Data       Imaging results reviewed over the past 24 hrs:   No results found for this or any previous visit (from the past 24 hour(s)).  Recent Labs   Lab 06/10/23  0527 06/09/23  2254 06/09/23  2200 06/09/23  1446 06/09/23  0615 06/08/23  1148 06/08/23  0657 06/07/23  0640 06/07/23  0559 06/05/23  1414 06/05/23  0758   WBC  --   --   --   --  9.9  --   --   --  8.9  --  7.7   HGB  --   --   --   --  8.1*  --   --   --  8.6*  --  9.5*   MCV  --   --   --   --  94  --   --   --  94  --  94   PLT  --   --   --   --  337  --   --   --  260  --  242   NA  --   --   --   --  132*  --   --   --  135*  --  132*   POTASSIUM  --   --   --   --  4.1  --  4.2  --  4.5   < > 4.4   CHLORIDE  --   --   --   --  96*  --   --   --  99  --  97*   CO2  --   --   --   --  29  --   --   --  29  --  28   BUN  --   --   --   --  25.7*  --   --   --  28.2*  --  24.5*   CR  --   --   --   --  0.57  --   --   --  0.50*  --  0.52   ANIONGAP  --    --   --   --  7  --   --   --  7  --  7   DAVID  --   --   --   --  7.7*  --   --   --  7.7*  --  8.2*   GLC 88 178* 51*   < > 106*   < >  --    < > 90   < > 71  79   ALBUMIN  --   --   --   --  1.7*  --   --   --  1.6*  --  1.8*   PROTTOTAL  --   --   --   --  4.2*  --   --   --  4.1*  --  4.3*   BILITOTAL  --   --   --   --  0.2  --   --   --  0.2  --  0.3   ALKPHOS  --   --   --   --  55  --   --   --  60  --  67   ALT  --   --   --   --  14  --   --   --  16  --  15   AST  --   --   --   --  19  --   --   --  17  --  18    < > = values in this interval not displayed.

## 2023-06-11 NOTE — PLAN OF CARE
Goal Outcome Evaluation:         Pt A&Ox4 this shift.VSS. Pt denies chest pain, SoB, N/V, BL N/T. Pt states no pain, no medications given for pain. Pt has Purewick in place. Changed dressings this shift per WOC orders, CDI. Replaced tube feedings, running continuous 30 mL. Pt drank half ensure this shift, no food. Continue POC.

## 2023-06-11 NOTE — PLAN OF CARE
Hospitalist, Mendez Barrera, paged d/t pt developing increased SOB and increased oxygen needs from 2L NC to 7L oxymask.  Provider ordered stat chest x-ray. Pt placed on tele and HR showing afib - provider paged and stat EKG ordered and provider came to assess pt. Provider ordered stat chest CT w/ contrast, several labs, lasix, and tele.  Pt sent down to CT and came back and was in V TACH. This happened at shift exchange and writer was going to call RRT but provider informed writer to call code blue - code blue canceled after provider assessed pt.     A&Ox4.     Oxymask 7L. Labored and increased RR.      Not OOB overnight. Heavy assist x3-4 w/ judah steady.      NG TF running at 30 w/ Q4 flushes. New tubing needed.      Q6 BG checks.      Strict I and Os.      BLE wounds - wound care due 6/12.      New sacral mepilex applied. Encourage repositioning.      R PIV SL.      Purewick in place. LBM 6/10 x2.      RN managed mag, phos, and pot.

## 2023-06-11 NOTE — PLAN OF CARE
Goal Outcome Evaluation:         Pt A&Ox4 this shift. BP low, O2 stats unable to asses, not reading. Pt states SOB, on 7L O2 via OxyMask, pt states feels SOB. Lung sounds wet. Denies chest pain. Pt states not feeling well. Pt was given Lasix, pt states no relief. Pt transferred up to ICU for monitoring. Gave report to Missy on 10 ICU.

## 2023-06-11 NOTE — PLAN OF CARE
Shift: 06/11/2023 1245 - 1900   Neuro: A/O x4, can be irritated at times with cares. Neuropathy baseline LE. PERRL. Afebrile. Denies pain.   CV: tele A-fib RVR. BP stable. +2 edema LE. Weak pulse RLE.   PU: on 6L O2 oxymask, satting high 90s, did not tolerate CPAP. LS coarse, SOB, weak congestive cough.  GI: incontinent of bowel, BM x1. Mechanical soft diet, poor appetite, c/o dysphagia. NG, at 62cm bridled L nare, to TF at 30ml/hr w/30ml flush q4h.  : rolle patent, adequate output, 40mg lasix given, started on lasix gtt at 10mg/hr.  Skin: Wounds LE. Wounds on buttocks, mepilex applied. Lidocaine patch R chest.    Lines: R PIV x2. L PIV x1.  Other info: arrived to unit at 1245. Assist x2, mechanical lift. Trop 82, trending down. Hgb 7.2, 1 unit RBC given, albumin given. Started on zosyn/vanco. Cardiology consult. Plan for echo and possible thoracentesis tomorrow.  at bedside.       Goal Outcome Evaluation: continue with plan of care, update provider with changes.      Plan of Care Reviewed With: patient, spouse

## 2023-06-11 NOTE — PROGRESS NOTES
Responded to Code Blue call. Code team was called due to change in cardiorespiratory status. Patient was on oxymask @9 LPM, RR 18/min, SpO2 100%. Respiratory interventions included Oxyplus Mask @ 9 LPM, not required. Patient had VT episodes  Patient was stabilized and remained on unit.    Perry Santizo RT, RT  6/11/2023 7:20 AM

## 2023-06-11 NOTE — PHARMACY-VANCOMYCIN DOSING SERVICE
"Pharmacy Vancomycin Initial Note  Date of Service 2023  Patient's  1960  62 year old, female    Indication: [facility]ated Pneumonia    Current estimated CrCl = Estimated Creatinine Clearance: 106.9 mL/min (based on SCr of 0.66 mg/dL).    Creatinine for last 3 days  2023:  6:15 AM Creatinine 0.57 mg/dL  6/10/2023:  7:07 AM Creatinine 0.61 mg/dL  2023:  5:38 AM Creatinine 0.65 mg/dL;  7:27 AM Creatinine 0.66 mg/dL    Recent Vancomycin Level(s) for last 3 days  No results found for requested labs within last 3 days.      Vancomycin IV Administrations (past 72 hours)      No vancomycin orders with administrations in past 72 hours.                Nephrotoxins and other renal medications (From now, onward)    Start     Dose/Rate Route Frequency Ordered Stop    23 1300  furosemide (LASIX) injection 40 mg         40 mg  over 1-3 Minutes Intravenous EVERY 8 HOURS 23 0806      23 1200  piperacillin-tazobactam (ZOSYN) 4.5 g vial to attach to  mL bag        Note to Pharmacy: For [facility], [facility] and [facility]: For Zosyn-naive patients, use the \"Zosyn initial dose + extended infusion\" order panel.    4.5 g  over 30 Minutes Intravenous EVERY 6 HOURS 23 1133      23 1200  vancomycin (VANCOCIN) 1,250 mg in 0.9% NaCl 250 mL intermittent infusion         1,250 mg  over 90 Minutes Intravenous EVERY 12 HOURS 23 1150            Contrast Orders - past 72 hours (72h ago, onward)    Start     Dose/Rate Route Frequency Stop    23 0700  iopamidol (ISOVUE-370) solution 100 mL         100 mL Intravenous ONCE 23 0659          [employer] Prediction of Planned Initial Vancomycin Regimen  Loading dose: N/A  Regimen: 1250 mg IV every 12 hours.  Start time: 11:47 on 2023  Exposure target: AUC24 (range)400-600 mg/L.hr   AUC24,ss: 512 mg/L.hr  Probability of AUC24 > 400: 75 %  Ctrough,ss: 15.8 mg/L  Probability of Ctrough,ss > 20: 31 %  Probability of nephrotoxicity " (Pariise JUAN 2009): 11 %        Plan:  1. Start vancomycin  1250 mg IV q12h.   2. Vancomycin monitoring method: AUC  3. Vancomycin therapeutic monitoring goal: 400-600 mg*h/L  4. Pharmacy will check vancomycin levels as appropriate in 1-3 Days.    5. Serum creatinine levels will be ordered daily for the first week of therapy and at least twice weekly for subsequent weeks.      India Robison RPH

## 2023-06-11 NOTE — H&P
[facility] Intensive Care Unit Service  H&P Note    Date of Service (when I saw the patient): 06/11/2023     Assessment & Plan   Jeanine Schuler is a 62 year old female with a PMH of HTN, HFpEF (LV EF 45-50%), pulmonary HTN, paroxysmal afib on chronic anticoagulation, interstitial lung disease, sarcoidosis, mixed connective tissue disease, Raynaud's, and chronic dysphagia who was admitted on 5/16/2023 with failure to thrive and found to have multiple R sided pulmonary emboli and bilateral lower extremity DVT's.  She has been convalescing on the general care floor.  She was transferred to the ICU today for worsening shortness of breath and increased O2 needs.  CT PE protocol done 6/11 morning shows large bilateral pleural effusion, volume overload, and concern for pneumonia.    PLAN BY SYSTEMS:    HEMODYNAMICS:  # paroxysmal atrial fibrillation on chronic anticoagulation  # HTN  # HFpEF  # pulmonary HTN  # R heart stain based on CT chest   # elevated troponin     - patient with bouts of afib with RVR  - EKG done this am with no ST elevation  - troponin elevated but trending down: 109, 108, 82  - lactate 1.7  - continue home amiodarone 200 mg PO daily  - holding home metoprolol XL 50 mg PO daily, restart at lower dose when clinically appropriate   - will hold anticoagulation given plan for thoracentesis planned for 6/12  - ECHO (6/3/2023): LV EF 45-50%, no RWMA, normal RV size/fucntion, pulmonary artery systolic pressure normal, no significant valvular heart disease, no effusion     PLAN: cardiology consult, repeat ECHO given elevated troponin (likely demand ischemia)    NEUROLOGIC:  # pain    - GCS: 15; RASS: 0; CAM ICU: negative  - pain medications: Tylenol PRN, oxycodone PRN, lidoderm, gabapentin 100 mg PO QHS  - delirium management: none  - sleep management: none      PULMONARY:  # interstitial lung disease  # sarcoidosis  # pulmonary emboli   #  hospital acquired pneumonia  # large bilateral pleural effusions  # pulmonary edema  # acute hypoxic respiratory failure     - will schedule duoneb + 3% saline nebs QID, aerobika QID, patient may need NT suctioning if unable to clear secretions  - attempted to place on CPAP to help with lung expansion and decrease work of breathing, patient was intolerant, may utilize optiflow if needed  - significant bilateral pleural effusions noted on CT chest this am, will hold anticoagulation and place IR order for thoracentesis to hopefully occur tomorrow  - VBG shows compensated respiratory acidosis: 7.40/58/18/+8/36  - continue aggressive pulmonary hygiene, mobilize, aerobika   - pulmonary consulted 6/6, recommended steroids, outpatient follow up in ILD clinic     RENAL:  # volume overload  # anasarca   # hypomagnesemia  # hypokalemia    - daily weights, last documented weight > 10 kg above admission weight  - will plan for aggressive diuresis as BP allows, given Lasix 40 mg IV x 2 so far today, will initiate lasix infusion at 10 mg/hr  - Q8H BMP, Mg, Phos with aggressive replacement   - rolle for strict I/O  - Mg Ox 400 daily for chronic hypomagnesemia   - continue to follow electrolytes, renal function, and UOP closely    ID:  # hospital acquired pneumonia    - Tmax: afebrile  - WBC: 15.2 (13.5, 9.9)  - procalcitonin: 1.11  - cultures:     - 6/11 blood: pending    - 6/11 MRSA swab: pending  - ABX: vancomycin, zosyn    PLAN: initiate antibiotics, obtain sputum culture if patient able, follow MRSA swab and stop vancomycin if negative, follow fever and WBC curve     HEME:  # anemia  # chronic anticoagulation   # bilateral lower extremity DVT    - Hgb: 7.2  - Plts: 472  - Hgb drifting down over the past several days, no obvious source of acute blood loss  - will hold apixaban 5 mg PO BID for planned thoracentesis tomorrow, start ASAP    PLAN: transfuse 1 unit PRBC today, restart apixaban follow thoracentesis     VASCULAR  "ACCESS:  - PIV    GI/NUTRITION:  # dysphagia  # severe protein calorie malnutrition   # constipation   # history of gastric bypass surgery     - continue bowel regimen, last BM 6/11  - GI previous consulted, dysmotility esophagogram done 6/7 but was limited due to patients mobility but no major strictures noted, continue PPI, limit opioids and anticholinergic meds as able,   asked for GI to see, could have strictures from scleroderma also possible esophageal dysmotility  - ok for mechanical soft diet , speech to see and make further rec reg swallowing    - \"-- GI will order: outpatient follow-up in [facility] for chronic dysphagia, esophageal dysmotility in the setting of scleroderma.   - [facility] phone: [phone], option 1 for clinic scheduling\"  - plan was to monitor oral intake over the weekend and then make decision about PEG this week    ENDOCRINE/RHEUM:  # mixed connective tissue disease  # scleroderma  # severe Raynaud's   # chronic steroid use  # hypoglycemia     - glucose: stable   - no insulin needs   - steroids: continue home prednisone 15 mg PO daily  - Rheum consulted, recommended steroids, gabapentin, pain consult, outpatient follow up  PLAN: continue steroids at home dose, if patient clinically deteriorates, low threshold to place on stress dose steroids    MSK:  # weakness and deconditioning   # failure to thrive     - PT/OT consulted  - activity: mobilize, out of bed minimum of 3 times/day    SKIN:  # right breast skin thickening and nipple retraction on CT (incidental CT finding)  - outpatient mammography     PROPHYLAXIS:   - DVT: apixaban 5 mg PO BID, will hold for planned procedure tomorrow  - GI: pantoprazole 40 mg PO daily (home PPI)    CODE STATUS:   - FULL CODE    DISPOSITION:  - ICU    GENERAL CARES  DVT Prophylaxis: DOAC  GI Prophylaxis: PPI  Restraints: Restraints for medical healing needed: NO  Family update by me today: Yes   Current lines are " required for patient management  Access:      LÓPEZ Christiansen CNP    Time Spent on this Encounter   Billing:  I spent 45 minutes bedside and on the inpatient unit today managing the critical care of Jeanine Schuler in relation to the issues listed in this note.    HPI:  - see above    PMH:   Past Medical History:   Diagnosis Date     Anemia      Bariatric surgery status 06/27/2005    abdi en [facility];     Cellulitis of leg 06/06/2013     Esophageal reflux     Better post-hiatal hernia repair and weight loss     Hyperplastic colonic polyp      Hypovitaminosis D 2011     ILD (interstitial lung disease) (H)      Kidney stone 04/29/2007     Kidney stone 05/10/2007    surgically removed     Limb ischemia; ulcers of fingertips 04/22/2013     Other chronic pain     Left shoulder - rheumatoid arthritis     Raynaud's syndrome      Rheumatoid arthritis (H) \     Scleroderma (H)      Sjogren-Jake syndrome      Systemic sclerosis (H) 2009     Unspecified essential hypertension      Past Surgical History:   Procedure Laterality Date     BIOPSY MUSCLE DIAGNOSTIC (LOCATION) Right 7/19/2021    Procedure: Right SURAL nerve BIOPSY;  Surgeon: Farooq Abel MD;  Location: [facility] OR     BRONCHOSCOPY FLEXIBLE,L CRYOBIOPSY N/A 10/06/2020    Procedure: BRONCHOSCOPY, FLEXIBLE, WITH TRANSBRONCHIAL BIOPSY x4 USING CRYOPROBE, bronchial alveolar lavage;  Surgeon: Teddy Mendez MD;  Location: [facility] OR     BYPASS GASTRIC, CHOLECYSTECTOMY, COMBINED  06/27/2005    [facility]     CHOLECYSTECTOMY       COLONOSCOPY       COLONOSCOPY N/A 11/17/2020    Procedure: COLONOSCOPY, WITH POLYPECTOMY AND BIOPSY;  Surgeon: Jamie Cárdenas MD;  Location: [facility] OR     CV HEART CATHETERIZATION WITH POSSIBLE INTERVENTION Left 12/21/2021    Procedure: Heart Catheterization with Possible Intervention;  Surgeon: Wesley Mclean MD;  Location:  HEART CARDIAC CATH LAB     CV RIGHT HEART CATH MEASUREMENTS RECORDED N/A 4/7/2022     Procedure: Right Heart Cath;  Surgeon: Dieter Brock MD;  Location:  HEART CARDIAC CATH LAB     ESOPHAGOSCOPY, GASTROSCOPY, DUODENOSCOPY (EGD), COMBINED N/A 03/10/2016    Procedure: COMBINED ESOPHAGOSCOPY, GASTROSCOPY, DUODENOSCOPY (EGD), BIOPSY SINGLE OR MULTIPLE;  Surgeon: Tricia Zambrano MD;  Location:  GI     ESOPHAGOSCOPY, GASTROSCOPY, DUODENOSCOPY (EGD), COMBINED N/A 11/17/2020    Procedure: ESOPHAGOGASTRODUODENOSCOPY, WITH BIOPSY and Dilation;  Surgeon: Jamie Cárdenas MD;  Location: [facility] OR     INNER EAR SURGERY       PICC INSERTION  06/05/2013    5fr DL Power PICC, 44cm, left basilic vein, tip in low SVC     SURGICAL HISTORY OF -       Left ear surgery - incus transition, tympanoplasty     SURGICAL HISTORY OF -   06/01/2005    Hiatal hernia repair     TONSILLECTOMY       Social History     Socioeconomic History     Marital status:      Spouse name: Not on file     Number of children: Not on file     Years of education: Not on file     Highest education level: Not on file   Occupational History     Not on file   Tobacco Use     Smoking status: Never     Smokeless tobacco: Never   Vaping Use     Vaping status: Never Used   Substance and Sexual Activity     Alcohol use: Yes     Comment: occassionally     Drug use: No     Sexual activity: Yes     Partners: Male     Birth control/protection: None   Other Topics Concern     Parent/sibling w/ CABG, MI or angioplasty before 65F 55M? 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  She has been on medical leave since 7/27/2020        Moved about 4 years ago to a new house. No known water damage or mold.        She lives in a normal area with forms around her house, but denies any exposure to grain dust, hay, other farm animals.        No unusual hobbies         Social Determinants of Health     Financial Resource Strain: Not on file   Food Insecurity: Not on file   Transportation  Needs: Not on file   Physical Activity: Not on file   Stress: Not on file   Social Connections: Not on file   Intimate Partner Violence: Not on file   Housing Stability: Not on file     Family History   Problem Relation Age of Onset     Cerebrovascular Disease Father      Heart Disease Father      Hypertension Father      Heart Disease Mother      Hypertension Mother      Chronic Obstructive Pulmonary Disease Sister      Heart Disease Sister      Hypertension Sister      Cerebrovascular Disease Brother      Heart Disease Brother      Kidney Disease Brother         on dialysis     Diabetes Brother         borderline     No Known Problems Son      No Known Problems Son      No Known Problems Daughter      Cancer Brother         small cell     Heart Disease Brother      Heart Disease Brother      Heart Disease Brother      Heart Disease Brother         tripple A     Heart Disease Sister      Substance Abuse Sister         alcoholism     Crohn's Disease No family hx of      Ulcerative Colitis No family hx of      GERD No family hx of      Celiac Disease No family hx of      Nephrolithiasis No family hx of      Parathyroid Disorders No family hx of      Calcium Disorder No family hx of      Review of Systems   Constitutional: Positive for diaphoresis, malaise/fatigue and weight loss. Negative for chills and fever.   HENT: Negative.    Eyes: Negative.    Respiratory: Positive for cough and shortness of breath. Negative for hemoptysis, sputum production and wheezing.    Cardiovascular: Positive for leg swelling.   Gastrointestinal: Negative.    Genitourinary: Negative.    Musculoskeletal: Positive for falls.   Skin: Negative.    Neurological: Negative.    Endo/Heme/Allergies: Negative.    Psychiatric/Behavioral: Negative.        Physical Exam       No data recorded BP: 99/69 Pulse: 92   Resp: 30 SpO2: 96 % O2 Device: Oxymask Oxygen Delivery: 9 LPM  Vitals:    06/05/23 0554 06/08/23 0617 06/09/23 0640   Weight: 83.3 kg (183  lb 10.3 oz) 89.8 kg (197 lb 15.6 oz) 91 kg (200 lb 9.9 oz)     I/O last 3 completed shifts:  In: 257 [I.V.:37; NG/GT:220]  Out: 800 [Urine:800]    GEN: no acute distress, cachectic female  EYES: PERRL, Anicteric sclera.    HEENT:  Normocephalic, atraumatic, trachea midline  CV: irregularly irregular heart rate, no gallops, rubs, or murmurs  PULM/CHEST: rhonchi, course, wheeze, crackles noted throughout lung fields, some use of accessory muscles to breath, weak cough, symmetric chest rise  GI: normal bowel sounds, soft, non-tender, no rebound tenderness or guarding, no masses  : rolle catheter in place, urine yellow and clear  EXTREMITIES: generalized pitting pedal and dependant peripheral edema, moving all extremities, peripheral pulses intact  NEURO: Cranial nerves II-XII grossly intact, no motor-sensory deficits noted  SKIN: No rashes, sores or ulcerations  PSYCH:  Affect: appropriate not anxious, mentation at baseline, not altered, no hallucinations  Imaging personally reviewed:  ECG    Medications     dextrose         amiodarone  200 mg Oral Daily     apixaban ANTICOAGULANT  5 mg Oral BID     folic acid  1 mg Oral Daily     furosemide  40 mg Intravenous Q8H     gabapentin  100 mg Oral At Bedtime     lidocaine  1 patch Transdermal Q24H     lidocaine   Transdermal Q8H INOCENCIA     magnesium oxide  400 mg Oral Daily     metoclopramide  10 mg Intravenous Q6H     multivitamins w/minerals  15 mL Oral Daily     pantoprazole  40 mg Oral QAM AC     piperacillin-tazobactam  4.5 g Intravenous Q6H     [START ON 6/12/2023] polyethylene glycol  17 g Oral Daily     predniSONE  15 mg Oral Daily     senna-docusate  2 tablet Oral BID     sodium chloride (PF)  3 mL Intracatheter Q8H     sodium chloride (PF)  3 mL Intracatheter Q8H     vancomycin  1,250 mg Intravenous Q12H       Data   Recent Labs   Lab 06/11/23  1241 06/11/23  1121 06/11/23  0727 06/11/23  0638 06/11/23  0538 06/10/23  1119 06/10/23  0707 06/09/23  1446  06/09/23  0615 06/07/23  0640 06/07/23  0559   WBC  --   --  15.2*  --  13.5*  --   --   --  9.9  --  8.9   HGB  --   --  7.2*  --  6.7*  --   --   --  8.1*  --  8.6*   MCV  --   --  94  --  93  --   --   --  94  --  94   PLT  --   --  472*  --  413  --   --   --  337  --  260   NA  --   --  133*  --  132*  --  134*  --  132*  --  135*   POTASSIUM  --   --  3.9  --  3.8  --  3.8  --  4.1   < > 4.5   CHLORIDE  --   --  95*  --  95*  --  96*  --  96*  --  99   CO2  --   --  28  --  30*  --  30*  --  29  --  29   BUN  --   --  26.3*  --  26.3*  --  25.8*  --  25.7*  --  28.2*   CR  --   --  0.66  --  0.65  --  0.61  --  0.57  --  0.50*   ANIONGAP  --   --  10  --  7  --  8  --  7  --  7   DAVID  --   --  7.7*  --  7.6*  --  7.5*  --  7.7*  --  7.7*   GLC 98 116* 113*   < > 117*   < > 97   < > 106*   < > 90   ALBUMIN  --   --   --   --   --   --   --   --  1.7*  --  1.6*   PROTTOTAL  --   --   --   --   --   --   --   --  4.2*  --  4.1*   BILITOTAL  --   --   --   --   --   --   --   --  0.2  --  0.2   ALKPHOS  --   --   --   --   --   --   --   --  55  --  60   ALT  --   --   --   --   --   --   --   --  14  --  16   AST  --   --   --   --   --   --   --   --  19  --  17    < > = values in this interval not displayed.     Recent Results (from the past 24 hour(s))   XR Chest Port 1 View    Narrative    XR CHEST PORT 1 VIEW  6/11/2023 4:28 AM      HISTORY: Hypoxia    COMPARISON: Chest CT 5/16/2023, radiograph 5/4/2022    FINDINGS: AP view. Feeding tube tip projects over the stomach with  small amount of adjacent contrast. Cardiomegaly. There are moderate  partially loculated right and small left pleural effusions. The left  appears similar to prior CT, the right is increased. Increased diffuse  mixed interstitial and airspace opacities. No pneumothorax.      Impression    IMPRESSION: Compared to CT 5/16/2023:     1. Increased, moderate-sized partially loculated right pleural  effusion.  2. No significant change in small  left pleural effusion.  3. Increased diffuse mixed interstitial and airspace opacities.  4. Feeding tube tip projects over the stomach.    I have personally reviewed the examination and initial interpretation  and I agree with the findings.    ANNABELLA BARTH MD         SYSTEM ID:  [id number]   CT Chest Pulmonary Embolism w Contrast    Narrative    EXAMINATION: CTA pulmonary angiogram, 6/11/2023 6:57 AM     COMPARISON: CTA of 5/16/2023    HISTORY: acute worsening SOB in setting of recent acute on chronic PE  and ongoing burden DVT in LE    TECHNIQUE: Volumetric helical acquisition of CT images of the chest  from the lung apices to the kidneys were acquired after the  administration of 80 mL of Isovue-370 IV contrast. Flash technique  with free breathing acquisition.  Post-processed multiplanar and/or  MIP reformations were obtained, archived to PACS and used in  interpretation of this study.     FINDINGS:  Contrast bolus is: adequate.  Exam is negative for acute  pulmonary embolism. Resolution of previously seen right-sided  pulmonary emboli.      Main pulmonary artery is enlarged measuring up to 3.3 cm. Ectasia of  the ascending aorta. Cardiomegaly. Substantially increased  moderate  to large partially loculated right pleural effusion. No significant  change in small-to-moderate partially loculated left pleural effusion.  There are new groundglass opacities in the left upper lobe and right  upper lobe. There is interlobular septal thickening. Patchy  consolidative opacities in the right middle lobe as well. Partial  right lower lobe atelectasis and left lower lobe atelectasis. Reflux  of contrast into the IVC.    Surgical changes of gastric bypass. Enteric tube terminates in the  stomach. Small sliding-type hiatal hernia. Trace ascites.  Cholecystectomy.    Stable shrunken right breast compared to the left. No suspicious  osseous lesion.       Impression    IMPRESSION:   1. Exam is negative for acute pulmonary  embolism. Resolution of  previously seen right sided pulmonary emboli.      Evidence for right heart strain or increased right heart pressures?   is present. Reflux of contrast into the IVC suggesting right heart  dysfunction    2. Significantly increased moderate to large right partially loculated  pleural effusion. Stable partially loculated left pleural effusion.    3. Multifocal airspace opacities and interlobular septal thickening is  most consistent with pulmonary edema.  4. Cardiomegaly and enlargement of the main pulmonary artery, which  can be seen, hypertension.  5. Stable shrunken right breast compared to the left, continued  recommendation for diagnostic mammogram.    In the event of a positive result for acute pulmonary embolism  resulting in right heart strain, consider calling the   [facility] patient placement ([phone]) for PERT (Pulmonary Embolism  Response Team) Activation?    PERT -- Pulmonary Embolism Response Team (Multidisciplinary team  including cardiology, interventional radiology, critical care,  hematology)    I have personally reviewed the examination and initial interpretation  and I agree with the findings.    ANNABELLA BARTH MD         SYSTEM ID:  [id number]

## 2023-06-11 NOTE — PROGRESS NOTES
[facility]    Medicine Progress Note - Hospitalist Service, GOLD TEAM 16    Date of Admission:  5/16/2023    Assessment & Plan      62F with history of sarcoidosis, CHF, deconditioning, history of PE, mixed connective tissue disease; presents with shortness of breath, found to have acute LE DVTs bilaterally.     Addendum:  Procalcitonin came back at 1.11 , breathing still has not improved, stared IV vanco and zosyn to cover hospital aquired pneumonia   blood pressure  In 80s-90s so limits  Diuresis, also difficult to get  sats on her   Lungs sound very course now on right side  Plans is to transfer to ICU and I did come up and discussed with  ICU team  Family in agreement  (   here  )   I did confirm earlier and patient  Is ok with short term intubation abdominal CPR             Acute hypoxic respiratory failure  - oxygen saturation  Dropped over night 6/11, denies chest pain   - CXR with increased pulmonary vasculature  - repeat CTA shows resolution of previously seen right sided pulmonary embolism    - suspect pulmonary edema, received IV lasix x 2, continue with iv lasix  - BNP 74457     HFrEF, nonischemic    - - acutely decompesated 6/11, see above   History of pulmonary edema   - history of  pulmonary hypertension , howevere last echo 6/3 showed normal  RV size function and normal pulmonary artery systolic function   From sarcoidosis + pulmonary embolism ,  - echo 6/3 with EF 45-50%   - does have history of abnormal LV function with normal coronary arteries   - was on lasix 40 mg in AM and had 20 mg in PM, now  Switched to IV lasix , diurese as blood pressure  Allows    - evidence of right heart strain on CTA     Pulmonary Edema  Small bilateral Pleural effusions  - suspect due to heart failure  And malnutrition   -Chest CT on admission reveals small bilateral pleural effusions L>R, Interlobular septal thickening and areas of peribronchovascular nodular  and GGO in the lungs bilaterally, cardiomegaly and ascitis. BNP on admission to the ER was elevated, 7752. Last echo in 5/2022 showed EF 50-55%.  - diuresis as above.    Hx atrial fibrillation  -  Had been in sinus , 6/11 PAC runs of atrial fibrillation  With aberrant conduction, has PAC  Runs SVT   -treat pulmonary edema   - on AC and on amiodarone, bb held since admission but restart as blood pressure  Allows   - replace electrolytes   -76/11 telemetry red out VT but it seems to be atrial fibrillation pac with aberrancy , cont close monitoring  - SBP in low 100s, so for now will not add back beta blocker to leave room for diuresis but restart as soon as  blood pressure  Allow      Elevated troponin  - has had elevated trops but now at 109, repeat 2 hr later  came back at 108  , doubt ACS, suspect cardiac strain form pulmonary edema, tachyrhythmia   -will get cardiology  Involved as well       Hypomagnesemia  - replace , also monitor K and replace as needed     Anemia  - no evidence of acute bleed  - chronic but progressively  Getting worse  - Hg was 6.7 6/11 AM repeat was 7.2  - monitor and transfuse if < 7   - continue AC for now     Pulmonary embolism, bilateral DVT.  - initially on  heparin drip and transitioned to apixaban 5 mg bid  5/18/2023 ( states she is allergic to lovenox)   - Lower extremity venous doppler US revealed partially occlusive thrombus in the common femoral vein, proximal femoral vein, and popliteal vein and partially occlusive thrombus in the left distal femoral vein and popliteal vein.   - Chest CTA  5/16 revealed multiple right sided pulmonary emboli, some of which appear to be chroni  Repeat CTA 6/11 shows resolution of pulmonary embolism  .       Recurrent pleuritic CP  -- Ruled out for ACS w/ EKG and HS troponin  --  TTE 6/3 EF 45-50%, no RWMA.  EF was 50-55% was on 5/5/22  --  CP may be due to underlying pulmonary embolism   - coronary angio 12/2021 with minimal non-obstructing CAD       ? History Sarcoidosis with pulmonary involvement  Pulmonary hypertension   ILD   -seen by Pulmonary Dr Perlman in 87/2022, she follows with Dr Bentley for pulmonary hypertension. Was on prednisone and imuran in the past, but she stopped taking all of her rheumatologic medications due to not feeling well.  - sating well on room air   - last echo 6/3   - prednisone should also help sarcoidosis if an issue   - was to go to [place] for second opinion and to [facility]   -appretiate pulmonary input       Hypervolemia  Bilateral lower extremity edema, anasarca   - multifactorial,  has history of heart failure also suspect  Malnutrition playing a role , no pulmonary hypertension  Noted but could have right sided heart failure  As well   - Patient reports she was on furosemide and then on torsemide at home.  She reports at 1 point she was on 80 mg of oral furosemide a day.  -has received IV lasix, currently on Furosemide 40 mg po daily added 20 mg daily in PM 6/7 ,, switched to IV 6/11 , see above      Bilateral lower extremity lacerations with hx of wound infection  About 1 month PTA, she fell from her wheel chair and sustained severe laceration of bilateral lower calf. She was seen in ED and the wounds were sutured. Subsequently, she developed wound infection and was treated with 2 rounds of bactrim for cellulitis. She continues to endorse significant pain not being able to ambulate. The wound does not appear to be acutely infected   - Wound care      Dysphasia  - feels that food is getting stuck  - asked for GI to see, could have strictures from scleroderma also possible esophageal dysmotility  - GI recomended esophagogram to look for strictures. Dysmotility  esophogram done  6/7 but was limited study due to patient 's mobility limitation of positioning patient but no major strictures seen , has signs of esophageal dysmotility,    - continue ppi   - stop opioids and anticholinergic medications as able   -  "antireflux measures  - ok for mechanical soft diet , speech to see and make further rec reg swallowing    - \"-- GI will order: outpatient follow-up in [facility] for chronic dysphagia, esophageal dysmotility in the setting of scleroderma.   - [facility] phone: [phone], option 1 for clinic scheduling\"      Abdominal fullness  - complains of  6/5 . TF stopped, now doing better, restart  TF after esophagogram      constipation  - worsened by  Opioids, minimize as able .   - had large BM 6/6 night     - had CT of abdomen 5/16 that showed edematous and diffuse thickening of colonic wall [place] due to volume overload, no evidence of  malignancy   - stepped up bowel regime     Leukocytosis  - no fever  - is on steroids so could be form that  - procalcitonin pending   - did get set of blood culture 6/11        history of abdominal aortic aneurysm  - defer back to cardiology  , monitor         Mixed connective tissue disease  Scleroderma   severe Raynaud's   SICCA    history of restricted mouth opening   -Follows with Rheum Dr Nicolás De La Torre, not taking home meds including prednisone. Last seen in 7/2022  -appreciate  Rheumatology's input , continue with prednisone 15 mg daily, started gabapentin 100 mg at bedtime   - rheumatology recommended pain consult , I spoke with pain services and recommended out patient  Follow up    - further  options reg Raynauds  Per rheum        CT evidence of cirrhosis   Portal Hypertension  Patient with no previous history of cirrhosis or liver disease. An abdomen CT was ordered in ER revealed a cirrhotic liver with portal hypertension, ascites, diffuse anasarca and recanalized unbilical vein.   - Need outpatient work up and close follow up     Deconditioning  Malnutrition, low blood sugar episodes.  Suspect due to poor p.o. intake.  Failure to thrive  Was given IV fluids with dextrose as she was having low blood sugar measurements on 5/17.  Discontinued " due to concerns for hypervolemia and persistent need for supplemental oxygen.  - Patient with poor apetite for the last 3-4  months with poor PO intake  - states that has decreased appetite as well as feeling of food getting stuck ( see above) also cannot open mouth wide with scleroderma ands has no teeth   - CT of abdomen corona snot shoe evidence of malignacy   - seen by Nutrition   - NGT placement 5/24, started on feeding   - she os now working more with physical therapy  And continue to encourage her       Episodes of hypoglycemia, recurrent.  Due to poor p.o. intake.  Unable cortisol level within normal limits.    - has glucometer at home and has been noting hypoglycemia 50s and 60s at home. In the ED, finger stick glucose had repeatedly low values in to 10s and serum glucose was in 60s. Of note, she was on prednisone 15mg daily for her rheumatologic conditions, but has not been taking them putting her at risk for AI. She is also not taking much PO.  - Blood glucose improved with less frequent hypoglycemic episodes  - Continue to check glucose every 6 hours        Right breast skin thickening and nipple retraction on CT.   Incidental finding on CT done in the ED.   - Outpatient mammography ,  -6/6 exam I did not see any skin changes in right breast, did have some fullness in left breast      Hyponatremia  - lowest Na was 127, now in low 130s   - likely due to IV fluids and diuretic use.   - - Urine sodium is on the lower side.  More suggestive of hypovolemic      Severe Protein Calorie Malnutrition in the setting of chronic illness.  - Poor appetite and poor oral intake. Frequent episodes of hypoglycemia.  Nutrition consulted. Now on  tube feeding and continue till can meet her nutritional requirement   - abdominal CT no evidence of malignancy, last colonoscopy  Was in 2020 had 6 mm hyperplastic  polyp in prox sigmpid,  CT chest did not mention mass   - calorie count , possible need for PEG, discussed with  GI   "And get OR or gen surgery in setting of gastric bypass   - so plan is to monitor her oral intake over the weekend and if corona snot meet nutritional requirement then proceed with PEG, discussed with  Patient  And her       GERD  - Protonix transitioned from 20 mg twice daily to 40 mg every morning  - Continue to monitor symptoms    History gastric bypass  - B12 at low end of normal,  Folate low, stared supplements and started daily  vitamins        Muscle spasms  - Add tizanidine as needed for muscle spasms     Weakness deconditioning  - continue physical therapy  Occupational therapy    - need to encourage to participate in therapy     6/8 we did talk about likely need for PEG and ongoing goals of cares , she will let us know if wants  to talk with palliative cares        Diet: Combination Diet Regular Diet Adult  Adult Formula Drip Feeding: Continuous Osmolite 1.5; Nasogastric tube; Goal Rate: 30 ml/hr; mL/hr  Snacks/Supplements Adult: Ensure Enlive; With Meals  Calorie Counts    DVT Prophylaxis: DOAC  Deleon Catheter: Not present  Lines: None     Cardiac Monitoring: ACTIVE order. Indication: Tachyarrhythmias, acute (48 hours)  Code Status: Full Code      Clinically Significant Risk Factors            # Hypomagnesemia: Lowest Mg = 1.5 mg/dL in last 2 days, will replace as needed   # Hypoalbuminemia: Lowest albumin = 1.6 g/dL at 6/7/2023  5:59 AM, will monitor as appropriate     # Hypertension: Noted on problem list        # Obesity: Estimated body mass index is 30.5 kg/m  as calculated from the following:    Height as of this encounter: 1.727 m (5' 8\").    Weight as of this encounter: 91 kg (200 lb 9.9 oz).   # Severe Malnutrition: based on nutrition assessment         Disposition Plan    TBD        Gill Gatica MD  Hospitalist Service, GOLD TEAM [address] [place] [address]  Securely message with [employer] (more info)  Text page via [employer] Paging/Directory   See signed in " provider for up to date coverage information  ______________________________________________________________________    Interval History      Events of over night noted, had increased shortness of breath , did receive iv lasix and  Will need close I/is,  Her breathing feels a bit better post iv lasix and denies any chest pain . Has some lower abdominal /suprapubic wanda  But benign exam     Physical Exam   Vital Signs:     BP: 112/71 Pulse: 108   Resp: 30 SpO2: 96 % O2 Device: Oxymask Oxygen Delivery: 9 LPM  Weight: 200 lbs 9.9 oz  General appearance: awake alert in  no apparent distress       NECK: supple  Has TF   RESPIRATORY:  Fine crepitations      CARDIOVASCULAR: normal S1S2 regular rate and rhythm, no rubs gallops or murmurs appreciated,   GASTROINTESTINAL: abdomen is , soft,  non-distended, some lower abdominal tenderness /suprapubic tenderness but no rebound or guarding, decrease bowel sounds    SKIN: warm and dry, no rashes, no mottling noted , wounds in lower calf bilat   NEUROLOGIC: awake alert and oriented,  EXTREMITIES: no clubbing cyanosis  +  edema noted  , bilateral  Lower calf wounds no evidence of infection , edema is actually bit down     Data     13.5 (H)  \   6.7 (LL)   / 413     132 (L) 95 (L) 26.3 (H) /  103 (H)   3.8 30 (H) 0.65 \       Trop: 109 (HH) BNP: 24,093 (H)       Imaging results reviewed over the past 24 hrs:   Recent Results (from the past 24 hour(s))   XR Chest Port 1 View    Impression    RESIDENT PRELIMINARY INTERPRETATION  IMPRESSION: Compared to CT 5/16/2023:     1. Increased, moderate-sized partially loculated right pleural  effusion.  2. No significant change in small left pleural effusion.  3. Increased diffuse mixed interstitial and airspace opacities.  4. Feeding tube tip projects over the stomach.     Recent Labs   Lab 06/11/23  0638 06/11/23  0538 06/11/23  0023 06/10/23  1119 06/10/23  0707 06/09/23  1446 06/09/23  0615 06/07/23  0640 06/07/23  0559   WBC  --  13.5*  --    --   --   --  9.9  --  8.9   HGB  --  6.7*  --   --   --   --  8.1*  --  8.6*   MCV  --  93  --   --   --   --  94  --  94   PLT  --  413  --   --   --   --  337  --  260   NA  --  132*  --   --  134*  --  132*  --  135*   POTASSIUM  --  3.8  --   --  3.8  --  4.1   < > 4.5   CHLORIDE  --  95*  --   --  96*  --  96*  --  99   CO2  --  30*  --   --  30*  --  29  --  29   BUN  --  26.3*  --   --  25.8*  --  25.7*  --  28.2*   CR  --  0.65  --   --  0.61  --  0.57  --  0.50*   ANIONGAP  --  7  --   --  8  --  7  --  7   DAVID  --  7.6*  --   --  7.5*  --  7.7*  --  7.7*   * 117* 98   < > 97   < > 106*   < > 90   ALBUMIN  --   --   --   --   --   --  1.7*  --  1.6*   PROTTOTAL  --   --   --   --   --   --  4.2*  --  4.1*   BILITOTAL  --   --   --   --   --   --  0.2  --  0.2   ALKPHOS  --   --   --   --   --   --  55  --  60   ALT  --   --   --   --   --   --  14  --  16   AST  --   --   --   --   --   --  19  --  17    < > = values in this interval not displayed.

## 2023-06-11 NOTE — CONSULTS
Cardiology Consult                                                               2023  Jeanine Schuler MRN: [id number]  Age: 62 year old, : 1960        Reason for consult:      HF        Assessment and Recommendation:     #Acute hypoxic respiratory failure  #Volume overload by report  #HFimpEF to 50% on recent TTE  #PH, normal filling pressures on prior Cath   #Scleraderma  #Raynauds   #Pulmonary sarcoid    Overall, appears patient has become progressively volume overloaded, would continue with IV diuresis as currently ordered.  Obtain echocardiogram tomorrow.  Pending this we will decide on further investigations from a cardiac standpoint pending her ejection fraction.  In regards to her atrial fibrillation and RVR, allow would allow for some permissive tachycardia into the 120s, would avoid aggressive IV beta-blockade, and continue oral therapies if possible.    We will continue to follow      Patient to be staffed tomorrow with Dr. Link  and the note reflects our joint plan. Thank you for consulting the cardiovascular services at the [facility]. Please do not hesitate to call with questions or concerns.     Yusuf Armstrong MD  Cardiology Fellow PGY 6        History of Present Illness:     Jeanine is a 61-year-old female with a history of ILD, pulmonary sarcoidosis, scleroderma, Raynaud's phenomenon, nonischemic cardiomyopathy with a mildly reduced ejection fraction 40%, she has history of A-fib with RVR, and chronic systolic heart failure prior nadirs down to 35%.  She recently was evaluated and discharged from the cardiac service for A-fib with RVR, she was discharged on guideline directed medical therapy she underwent right heart catheterization which overall showed normal filling pressures and no normal output.  Cardiac biopsy at the time was deferred due to low suspicion for cardiac sarcoidosis.    She has had a prolonged admission on the [facility]  in the setting of pulmonary embolism was and bilateral DVTs, for which she received Eliquis for this.  She also had issues with hypoxia thought secondary to her PE.  She unfortunately over the last few days has deteriorated requiring more oxygen and becoming progressively hypotensive.  On discussion with the patient she reports shortness of breath is worsened over the last week, now she is feeling dizzy and fears that she may be passed out earlier today.      A 13-point ROS is negative except as mentioned above      Past Medical History:     Patient Active Problem List   Diagnosis     HTN (hypertension)     Prolonged QTc 460 ms,  at hr 67     Low back pain - Suspect SI Joint dysfunction     Chronic cough     Systemic sclerosis (H)     Tumoral calcinosis     Collagen vascular disease (H)     Disturbed sleep rhythm     Pharyngeal dysphagia     Gastroesophageal reflux disease, esophagitis presence not specified     Rheumatoid arthritis with negative rheumatoid factor, involving unspecified site (H)     ILD (interstitial lung disease) (H)     Iron deficiency anemia     Ascending aortic aneurysm (H)     S/P dilatation of esophageal stricture     S/P gastric bypass     Vitamin B 12 deficiency     Sjogren's disease (H)     Raynaud's disease     CREST syndrome (H)     Post-menopausal     Hypovitaminosis D     High serum parathyroid hormone (PTH)     On corticosteroid therapy     High risk medication use     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     ACS (acute coronary syndrome) (H)     On prednisone therapy     Sarcoidosis     Immunosuppressed status (H)     MCTD (mixed connective tissue disease) (H)     Encounter for long term current use of azathioprine     ROUSE (dyspnea on exertion)     Pulmonary hypertension (H)     Chronic atrial fibrillation (H)     Chronic systolic congestive heart failure (H)     Fever, unspecified fever cause     Pneumonia of both lungs due to infectious organism,  unspecified part of lung     Sjogren's syndrome with lung involvement (H)     Lab test negative for COVID-19 virus     Shortness of breath     Hypervolemia, unspecified hypervolemia type     Other pulmonary embolism without acute cor pulmonale, unspecified chronicity (H)     Deep vein thrombosis (DVT) of non-extremity vein, unspecified chronicity         Past Surgical History:      Past Surgical History:   Procedure Laterality Date     BIOPSY MUSCLE DIAGNOSTIC (LOCATION) Right 7/19/2021    Procedure: Right SURAL nerve BIOPSY;  Surgeon: Farooq Abel MD;  Location: [facility] OR     BRONCHOSCOPY FLEXIBLE,L CRYOBIOPSY N/A 10/06/2020    Procedure: BRONCHOSCOPY, FLEXIBLE, WITH TRANSBRONCHIAL BIOPSY x4 USING CRYOPROBE, bronchial alveolar lavage;  Surgeon: Teddy Mendez MD;  Location:  OR     BYPASS GASTRIC, CHOLECYSTECTOMY, COMBINED  06/27/2005    [facility]     CHOLECYSTECTOMY       COLONOSCOPY       COLONOSCOPY N/A 11/17/2020    Procedure: COLONOSCOPY, WITH POLYPECTOMY AND BIOPSY;  Surgeon: Jamie Cárdenas MD;  [place] [facility] [place]     CV HEART CATHETERIZATION WITH POSSIBLE INTERVENTION Left 12/21/2021    Procedure: Heart Catheterization with Possible Intervention;  Surgeon: Wesley Mclean MD;  Location:  HEART CARDIAC CATH LAB     CV RIGHT HEART CATH MEASUREMENTS RECORDED N/A 4/7/2022    Procedure: Right Heart Cath;  Surgeon: Dieter Brock MD;  Location:  HEART CARDIAC CATH LAB     ESOPHAGOSCOPY, GASTROSCOPY, DUODENOSCOPY (EGD), COMBINED N/A 03/10/2016    Procedure: COMBINED ESOPHAGOSCOPY, GASTROSCOPY, DUODENOSCOPY (EGD), BIOPSY SINGLE OR MULTIPLE;  Surgeon: Tricia Zambrano MD;  Location:  GI     ESOPHAGOSCOPY, GASTROSCOPY, DUODENOSCOPY (EGD), COMBINED N/A 11/17/2020    Procedure: ESOPHAGOGASTRODUODENOSCOPY, WITH BIOPSY and Dilation;  Surgeon: Jamie Cárdenas MD;  [place] [facility] [place]     INNER EAR SURGERY       PICC INSERTION  06/05/2013    5fr DL Power PICC, 44cm, left  basilic vein, tip in low SVC     SURGICAL HISTORY OF -       Left ear surgery - incus transition, tympanoplasty     SURGICAL HISTORY OF -   06/01/2005    Hiatal hernia repair     TONSILLECTOMY           Social History:     Social History     Socioeconomic History     Marital status:      Spouse name: Not on file     Number of children: Not on file     Years of education: Not on file     Highest education level: Not on file   Occupational History     Not on file   Tobacco Use     Smoking status: Never     Smokeless tobacco: Never   Vaping Use     Vaping status: Never Used   Substance and Sexual Activity     Alcohol use: Yes     Comment: occassionally     Drug use: No     Sexual activity: Yes     Partners: Male     Birth control/protection: None   Other Topics Concern     Parent/sibling w/ CABG, MI or angioplasty before 65F 55M? 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  She has been on medical leave since 7/27/2020        Moved about 4 years ago to a new house. No known water damage or mold.        She lives in a normal area with forms around her house, but denies any exposure to grain dust, hay, other farm animals.        No unusual hobbies         Social Determinants of Health     Financial Resource Strain: Not on file   Food Insecurity: Not on file   Transportation Needs: Not on file   Physical Activity: Not on file   Stress: Not on file   Social Connections: Not on file   Intimate Partner Violence: Not on file   Housing Stability: Not on file         Family History:     Family History   Problem Relation Age of Onset     Cerebrovascular Disease Father      Heart Disease Father      Hypertension Father      Heart Disease Mother      Hypertension Mother      Chronic Obstructive Pulmonary Disease Sister      Heart Disease Sister      Hypertension Sister      Cerebrovascular Disease Brother      Heart Disease Brother      Kidney Disease Brother          on dialysis     Diabetes Brother         borderline     No Known Problems Son      No Known Problems Son      No Known Problems Daughter      Cancer Brother         small cell     Heart Disease Brother      Heart Disease Brother      Heart Disease Brother      Heart Disease Brother         tripple A     Heart Disease Sister      Substance Abuse Sister         alcoholism     Crohn's Disease No family hx of      Ulcerative Colitis No family hx of      GERD No family hx of      Celiac Disease No family hx of      Nephrolithiasis No family hx of      Parathyroid Disorders No family hx of      Calcium Disorder No family hx of          Allergies:     Allergies   Allergen Reactions     Enoxaparin Sodium Other (See Comments)     Blood clot      Norvasc [Amlodipine Besylate] Swelling     Lip swelling that happened on 2 different occasions     Erythromycin Rash     Rash on arms         Medications:     No current facility-administered medications on file prior to encounter.  acetaminophen (TYLENOL) 325 MG tablet, Take 975 mg by mouth every 8 hours as needed for mild pain  nitroGLYcerin (NITROSTAT) 0.3 MG sublingual tablet, Place 1 tablet (0.3 mg) under the tongue every 5 minutes as needed for chest pain (esophageal spasm) For chest pain place 1 tablet under the tongue every 5 minutes for 3 doses. If symptoms persist 5 minutes after 3rd dose call 911.  ondansetron (ZOFRAN ODT) 4 MG ODT tab, Take 1 tablet (4 mg) by mouth every 8 hours as needed for nausea  pantoprazole (PROTONIX) 40 MG EC tablet, Take 1 tablet (40 mg) by mouth daily  predniSONE (DELTASONE) 10 MG tablet, Take 1.5 tablets (15 mg) by mouth daily  sulfamethoxazole-trimethoprim (BACTRIM DS) 800-160 MG tablet, Take 1 tablet by mouth 2 times daily  thin (NO BRAND SPECIFIED) lancets, Use with lanceting device twice daily  amiodarone (PACERONE) 200 MG tablet, Take 1 tablet (200 mg) by mouth daily  apixaban ANTICOAGULANT (ELIQUIS) 5 MG tablet, Take 1 tablet (5 mg) by  mouth 2 times daily (Patient not taking: Reported on 5/16/2023)  ASPIRIN NOT PRESCRIBED (INTENTIONAL), Please choose reason not prescribed from choices below. (Patient not taking: Reported on 5/2/2022)  blood glucose (NO BRAND SPECIFIED) test strip, Use to test blood sugar 2 times daily or as directed.  metoprolol succinate ER (TOPROL XL) 50 MG 24 hr tablet, Take 1 tablet (50 mg) by mouth daily            Physical Exam:     B/P: 106/61, T: 98.6, P: 84, R: 27    Wt Readings from Last 4 Encounters:   06/11/23 79.8 kg (175 lb 14.8 oz)   05/08/23 68 kg (150 lb)   04/17/23 68 kg (150 lb)   06/28/22 71.7 kg (158 lb)         Intake/Output Summary (Last 24 hours) at 6/11/2023 1551  Last data filed at 6/11/2023 1400  Gross per 24 hour   Intake 697 ml   Output 1200 ml   Net -503 ml       Not examined due to patient being on 3D Hubs      Data:     Labs Reviewed on Admission    Troponin   Lab Results   Component Value Date    TROPI 0.018 07/06/2021     BMP  Recent Labs   Lab 06/11/23  1241 06/11/23  1121 06/11/23  0727 06/11/23  0638 06/11/23  0538 06/10/23  1119 06/10/23  0707 06/09/23  1446 06/09/23  0615   NA  --   --  133*  --  132*  --  134*  --  132*   POTASSIUM  --   --  3.9  --  3.8  --  3.8  --  4.1   CHLORIDE  --   --  95*  --  95*  --  96*  --  96*   DAVID  --   --  7.7*  --  7.6*  --  7.5*  --  7.7*   CO2  --   --  28  --  30*  --  30*  --  29   BUN  --   --  26.3*  --  26.3*  --  25.8*  --  25.7*   CR  --   --  0.66  --  0.65  --  0.61  --  0.57   GLC 98 116* 113* 103* 117*   < > 97   < > 106*    < > = values in this interval not displayed.     CBC  Recent Labs   Lab 06/11/23  0727 06/11/23  0538 06/09/23  0615 06/07/23  0559   WBC 15.2* 13.5* 9.9 8.9   RBC 2.32* 2.21* 2.57* 2.77*   HGB 7.2* 6.7* 8.1* 8.6*   HCT 21.7* 20.6* 24.1* 26.0*   MCV 94 93 94 94   MCH 31.0 30.3 31.5 31.0   MCHC 33.2 32.5 33.6 33.1   RDW 15.2* 15.3* 15.8* 15.3*   * 413 337 260     INRNo lab results found in last 7 days.   Hepatic  Panel   Lab Results   Component Value Date    AST 19 06/09/2023    AST 42 07/06/2021     Lab Results   Component Value Date    ALT 14 06/09/2023    ALT 29 07/06/2021     Lab Results   Component Value Date    BILICONJ 0.0 06/05/2013      Lab Results   Component Value Date    BILITOTAL 0.2 06/09/2023    BILITOTAL 0.7 07/06/2021     Lab Results   Component Value Date    ALBUMIN 1.7 06/09/2023    ALBUMIN 2.7 06/28/2022    ALBUMIN 3.1 07/06/2021     Lab Results   Component Value Date    PROTTOTAL 4.2 06/09/2023    PROTTOTAL 6.2 07/06/2021      Lab Results   Component Value Date    ALKPHOS 55 06/09/2023    ALKPHOS 123 07/06/2021           Most Recent Imaging:     EKG:   Sinus tachycardia with Premature supraventricular complexes and with occasional Premature ventricular complexes   Non-specific intra-ventricular conduction delay   T wave abnormality, consider anterolateral ischemia   Abnormal ECG   When compared with ECG of 11-JUN-2023 04:44,     Echo:   Interpretation Summary  Left ventricular function is decreased. The ejection fraction is 45-50%  (mildly reduced).  No regional wall motion abnormalities are seen.  Global right ventricular function is normal.  The right ventricle is normal size.  Pulmonary artery systolic pressure is normal.  No significant valvular abnormalities present.  No pericardial effusion is present.        Cath:  RA 5/4/3  RV 25/0/4  PA 26/10/17  PCWP 16/13/10  SANYA 4.44/2.40 Right sided filling pressures are normal. Left sided filling pressures are normal. Normal PA pressures. Normal cardiac output level. Hemodynamic data has been modified in Epic per physician review.     I very much appreciated the opportunity to assess Jeanine Schuler in the hospital with CV Fellow Dr Armstrong and resident team The note above summarizes my findings and current recommendations.  Please do not hesitate to contact my office if you have any questions or concerns.      Bacilio Link MD  Cardiac Arrhythmia  Service  [facility]  [phone]

## 2023-06-11 NOTE — PROGRESS NOTES
Brief medicine cross cover note  Notified by nursing that patient was having worsening dyspnea and hypoxia requiring escalation of respiratory support from room air to 2 L, then 5 L, up to 9 L.  Patient is known to be medically complex with history of sarcoidosis, CHF, acute on chronic PE, mixed connective tissue disease, and bilateral effusions.  Upon interview patient reports that she is feeling more short of breath for the last 4 to 5 hours.  It was sudden onset.  She denies chest pain, pressure, or tightness.  She has had similar acute onset shortness of breath in the past, when she had her most recent PE.  She endorses bilateral calf pain (has known history of wounds) and has been using compression stockings daily.  She has not had fevers or chills.    Review of the chart revealed she is followed with Dr. Bentley of cardiology, Dr. Ann of rheumatology, Dr. Perlman of pulmonology.  She has not been on any heart failure directed therapy other than intermittent Lasix.  Most recent echocardiogram with EF 45 to 50%.  She has been on amiodarone due to history of atrial fibrillation, but has been sinus recently.  CT PE at the time of admission demonstrated multiple pulmonary emboli bilaterally.  Ultrasound of lower extremity demonstrated proximal DVT burden persisting.  She has been on anticoagulation since that time.    Vitals reviewed.  Heart rate ranging from 110s to 130s.  Weight up 10 kg in the last 7 days.  On 9 L OxyMask.  General: Fatigued woman in mild distress laying in bed with OxyMask  Respiratory: Mild tachypnea, no obvious wheezes.  Intermittent crackles and rhonchi with patchy areas of absent aeration right greater than left.  Cardiovascular: No murmurs, tachycardic.  JVD difficult to appreciate  Extremities: Compression stockings on bilateral lower extremities.  Pitting edema persists 2-3+.  Sacral and thigh edema also noted.    Assessment and plan   62-year-old female with complicated past  medical history including sarcoidosis, CHF, acute on chronic PE, mixed connective tissue disease, paroxysmal atrial fibrillation, and bilateral loculated pleural effusions seen for acute worsening shortness of breath.  - EKG without recurrence of Atrial fibrillation, but some PVCs/PACs   - start cardiac monitoring   - k/mg check   - troponin, BNP, vbg pending  - concern for recurrent PE, despite being on adequate AC - repeat CTA stat  - XR Chest with increased loculated effusions R>L  - 10kg weight gain in last 7 days    - lasix 40 mg IV once    Further recs pending clinical course or results of above.  Low threshold for RRT given underlying reserve and risk for severe decompensation.    Mendez Barrera MD

## 2023-06-12 NOTE — PROGRESS NOTES
[facility]    ICU Progress Note       Date of Admission:  5/16/2023    Assessment: Critical Care   Jeanine Schuler is a 62 year old female admitted on 5/16/2023. She is a 62 year old female with a PMH of HTN, HFpEF (LV EF 45-50%), pulmonary HTN, paroxysmal afib on chronic anticoagulation, interstitial lung disease, sarcoidosis, mixed connective tissue disease, Raynaud's, and chronic dysphagia who was admitted on 5/16/2023 with failure to thrive and found to have multiple R sided pulmonary emboli and bilateral lower extremity DVT's.  She has been convalescing on the general care floor.  She was transferred to the ICU 6/11/2023 for worsening shortness of breath and increased O2 needs.  CT PE protocol done 6/11 morning shows large bilateral pleural effusion, volume overload, and concern for pneumonia.    Major events and changes today    - IR to see for chest tube and also possible g/j tube placement  - Hypoglycemia, requiring mx D50 pushes and D10 gtt  - Check vitamin D, B12  - ECHO  - Stop lasix, start bumex with fluid goal of net negative 1L to 2 L      Plan: Critical Care            Neurologic  # pain  - GCS: 15; RASS: 0; CAM ICU: negative  - pain medications: Tylenol PRN, oxycodone PRN, lidoderm, gabapentin 100 mg PO QHS  - delirium management: none  - sleep management: none    Cardiac  # paroxysmal atrial fibrillation on chronic anticoagulation  # HTN  # HFpEF  # pulmonary HTN  # R heart stain based on CT chest   # elevated troponin    - patient with bouts of afib with RVR  - EKG 6/11 with no ST elevation  - troponin elevated but trending down: 109, 108, 82  - lactate 1.7  - continue home amiodarone 200 mg PO daily  - holding home metoprolol XL 50 mg PO daily, restart at lower dose when clinically appropriate   - will hold anticoagulation given plan for thoracentesis planned for 6/12  - ECHO (6/3/2023): LV EF 45-50%, no RWMA, normal RV size/fucntion, pulmonary  "artery systolic pressure normal, no significant valvular heart disease, no effusion, ECHO (6/12/2023)  - Cardiology consult  - Per cardiology consult 6/11: \"allow for some permissive tachycardia into the 120s, would avoid aggressive IV beta-blockade, and continue oral therapies if possible\"  - Changed diuretic to bumex 6/12 with goal of start bumex with fluid goal of net negative 1L to 2 L      PULMONARY:  # interstitial lung disease  # sarcoidosis  # pulmonary emboli   # hospital acquired pneumonia  # large bilateral pleural effusions  # pulmonary edema  # acute hypoxic respiratory failure    - scheduled duoneb + 3% saline nebs QID, aerobika QID, patient may need NT suctioning if unable to clear secretions  - attempted to place on CPAP to help with lung expansion and decrease work of breathing, patient was intolerant, may utilize high flow nasal canula  - significant bilateral pleural effusions noted on CT chest this am, will hold anticoagulation and place IR order for thoracentesis to hopefully occur tomorrow  - continue aggressive pulmonary hygiene, mobilize, aerobika   - pulmonary consulted 6/6, recommended steroids, outpatient follow up in ILD clinic     GI/NUTRITION:  # dysphagia  # severe protein calorie malnutrition   # constipation   # history of gastric bypass surgery    - continue bowel regimen, last BM 6/12  - GI previous consulted, dysmotility esophagogram done 6/7 but was limited due to patients mobility but no major strictures noted, continue PPI, limit opioids and anticholinergic meds as able,   - asked for GI to see, could have strictures from scleroderma also possible esophageal dysmotility  - ok for mechanical soft diet , speech to see and make further rec reg swallowing    - \"-- GI will order: outpatient follow-up in [facility] for chronic dysphagia, esophageal dysmotility in the setting of scleroderma.   - [facility] phone: [phone], option 1 for clinic " "scheduling\"  - plan was to monitor oral intake over the weekend and then make decision about PEG this week     RENAL:  # volume overload  # anasarca   # hypomagnesemia  # hypokalemia   - daily weights, last documented weight > 10 kg above admission weight  - will plan for aggressive diuresis as BP allows, changing diuresis from lasix to bumex  - Q8H BMP, Mg, Phos with aggressive replacement   - rolle for strict I/O  - Mg Ox 400 daily for chronic hypomagnesemia   - continue to follow electrolytes, renal function, and UOP closely     ID:  # hospital acquired pneumonia   - Tmax: afebrile  - WBC: 10.1 (15.2, 13.5, 9.9)  - cultures:                      - 6/11 blood: pending                - 6/11 MRSA swab: negative   - 6/11 Legionella and streptococcus negative   - ABX: vancomycin stopped, zosyn x 5 days    HEME:  # anemia  # chronic anticoagulation   # bilateral lower extremity DVT  - Trend CBC  - Hgb drifting down over the past several days, no obvious source of acute blood loss  - will hold apixaban 5 mg PO BID for planned thoracentesis tomorrow, start ASAP     ENDOCRINE/RHEUM:  # mixed connective tissue disease  # scleroderma  # severe Raynaud's   # chronic steroid use  # hypoglycemia    - glucose: stable   - no insulin needs   - steroids: continue home prednisone 15 mg PO daily  - Requiring mx doses of D50 and D10 gtt   - Rheum consulted, recommended steroids, gabapentin, pain consult, outpatient follow up  PLAN: continue steroids at home dose, if patient clinically deteriorates, low threshold to place on stress dose steroids      MSK:  # weakness and deconditioning   # failure to thrive    - PT/OT consulted  - activity: mobilize, out of bed minimum of 3 times/day     SKIN:  # right breast skin thickening and nipple retraction on CT (incidental CT finding)  - outpatient mammography      PROPHYLAXIS:   - DVT: apixaban 5 mg PO BID, will hold for planned procedure tomorrow  - GI: pantoprazole 40 mg PO daily (home " "PPI)      Family update by me today: Yes   Current lines are required for patient management      Lines/ tubes/ drains:  Deleon Catheter: PRESENT, indication: (P) Strict 1-2 Hour I&O  Lines: None     PIV, nasal feeding tube    Code Status: Full Code      Disposition:  - ICU     The patient's care was discussed with the Attending Physician, Dr. Lyons .  Critical care time exclusive of procedures: 55 minutes    LÓPEZ Avendaño [facility]  Securely message with [employer] (more info)  Text page via [employer] Paging/Directory     Clinically Significant Risk Factors            # Hypomagnesemia: Lowest Mg = 1.5 mg/dL in last 2 days, will replace as needed   # Hypoalbuminemia: Lowest albumin = 1.6 g/dL at 6/7/2023  5:59 AM, will monitor as appropriate     # Hypertension: Noted on problem list        # Overweight: Estimated body mass index is 26.75 kg/m  as calculated from the following:    Height as of this encounter: 1.727 m (5' 8\").    Weight as of this encounter: 79.8 kg (175 lb 14.8 oz).   # Severe Malnutrition: based on nutrition assessment              ______________________________________________________________________    Interval History   No acute events overnight.    Physical Exam   Vital Signs: Temp: 98  F (36.7  C) Temp src: Axillary BP: 101/69 Pulse: 90   Resp: 28 SpO2: 94 % O2 Device: Oxymask Oxygen Delivery: 4 LPM  Weight: 175 lbs 14.83 oz      Physical Exam  Vitals and nursing note reviewed.   Constitutional:       Appearance: She is cachectic. She is ill-appearing.   HENT:      Mouth/Throat:      Mouth: Mucous membranes are dry.   Eyes:      Pupils: Pupils are equal, round, and reactive to light.   Cardiovascular:      Rate and Rhythm: Tachycardia present.      Pulses:           Radial pulses are 1+ on the right side and 1+ on the left side.        Dorsalis pedis pulses are 1+ on the right side and 1+ on the left side.      Heart sounds: Normal heart " sounds.   Pulmonary:      Effort: Tachypnea and accessory muscle usage present.      Breath sounds: Examination of the right-lower field reveals rhonchi. Examination of the left-lower field reveals rhonchi. Rhonchi present.   Abdominal:      General: Bowel sounds are normal.      Palpations: Abdomen is soft.   Musculoskeletal:         General: Normal range of motion.      Right lower le+ Edema present.      Left lower le+ Edema present.   Skin:     General: Skin is warm and dry.      Capillary Refill: Capillary refill takes less than 2 seconds.      Coloration: Skin is pale.   Neurological:      General: No focal deficit present.      Mental Status: She is alert and oriented to person, place, and time.      Sensory: Sensation is intact.      Motor: Motor function is intact.      Coordination: Coordination is intact.      Comments: Alert and orientated x 4, stern.    Psychiatric:         Mood and Affect: Mood normal.         Speech: Speech normal.         Behavior: Behavior is cooperative.         Data   I reviewed all medications, new labs and imaging results over the last 24 hours.  Arterial Blood Gases   No lab results found in last 7 days.    Complete Blood Count   Recent Labs   Lab 23  0513 23  0727 23  0538 23  0615   WBC 10.1 15.2* 13.5* 9.9   HGB 8.7* 7.2* 6.7* 8.1*   * 472* 413 337       Basic Metabolic Panel  Recent Labs   Lab 23  1009 23  0934 23  0917 23  0908 23  0517 23  0513 23  0024 23  2241 23  2203 23  1316 23  1121 23  0727   NA  --   --   --   --   --  136  --  136  --  133*  --  133*   POTASSIUM  --   --   --   --   --  3.4  --  3.7  --  4.2  --  3.9   CHLORIDE  --   --   --   --   --  95*  --  96*  --  95*  --  95*   CO2  --   --   --   --   --  31*  --  29  --  29  --  28   BUN  --   --   --   --   --  25.4*  --  25.6*  --  25.9*  --  26.3*   CR  --   --   --   --   --  0.65  --  0.64   --  0.66  --  0.66   * 58* 60* 54*   < > 92   < > 105*   < > 121*   < > 113*    < > = values in this interval not displayed.       Liver Function Tests  Recent Labs   Lab 06/12/23  0513 06/09/23  0615 06/07/23  0559   AST 20 19 17   ALT 15 14 16   ALKPHOS 62 55 60   BILITOTAL 0.3 0.2 0.2   ALBUMIN 2.0* 1.7* 1.6*       Pancreatic Enzymes  No lab results found in last 7 days.    Coagulation Profile  No lab results found in last 7 days.    IMAGING:  Recent Results (from the past 24 hour(s))   XR Chest Port 1 View    Narrative    XR CHEST PORT 1 VIEW  6/12/2023 8:53 AM     HISTORY:  follow pleural effusions       COMPARISON:  Chest CT 6/11/2023    FINDINGS:   Frontal view of the chest. Patient is mildly rotated. Partially  visualized enteric tube and residual contrast in the oral stomach. The  cardiac silhouette is partially obscured. Aortic arch atherosclerosis.  Continued bilateral mixed airspace and interstitial opacities.  Moderate blunting of bilateral costophrenic angles. No pneumothorax.      Impression    IMPRESSION: Continued moderate bilateral pleural effusions and  pulmonary edema, better evaluated on CT dated 6/11/2023.    I have personally reviewed the examination and initial interpretation  and I agree with the findings.    IDA ESPINOZA MD         SYSTEM ID:  [id number]   Physician Attestation   I have reviewed and discussed with the advanced practice provider their history, physical and plan for Jeanine Schuler. I did not participate in a shared visit by interviewing or examining the patient and this should be billed as an advanced practice provider only visit.    Misael Lyons MD  Date of Service (when I saw the patient): I did not personally see this patient today.

## 2023-06-12 NOTE — PROGRESS NOTES
ICU End of Shift Summary     Changes this shift: Hypoglcemic this AM, 100 total of D50% given and finally D10% started. Going at a rate of 30-50 ml/hr. ECHO done at bedside, thoracentesis completed in IR. R chest tube in place. To suction. 460 out since placed. Patient reports much less SOB and work of breathing after thoracentesis complete. Lasix gtt off. Bumex gtt not restarted at the moment due to hypotension. Weaned to 3L oxymask, could possibly wean more however very difficult to get oxygen reading due to raynauds. Oxy/tylenol given for pain post thoracentesis, not effective per patient. One time dose of dilaudid given and was effective per patient. GI consulted for G/J placement.       For complete assessments and vital signs, please see flowsheets.

## 2023-06-12 NOTE — CONSULTS
IR will defer percutaneous GJ tube placement due to previous gastric bypass, small percutaneous window, and presence of ascites.    Recommend surgical or GI GJ tube placement for nutrition.    62 year old with heart failure, pulmonary HTN, a fib, interstitial lung disease, chronic dysphagia admitted with multiple right PE and DVT and failure to thrive. Transferred to ICU due to increasing oxygen needs. Dysphagia, severe protein calorie malnutrition, likely esophageal dysmotility    Imaging and plan reviewed with Dr. Sargent     Discussed with Sugey Leiva NP ICU

## 2023-06-12 NOTE — PLAN OF CARE
Major Shift Events:  Patient went into A-fib RVR in 100s to 150s overnight. BP noted to be low with SBP in the 80s with MAP within normal limits. MIKKI requested to turn off lasix drip and monitor patient's HR. Shortly after, patient's HR normalized. Lasix drip turned on after 2 hours. Patient's HR currently between 78 and 130s, MIKKI requested to monitor and treat if HR greater than 140s. BP occasionally low, but MAP remained above 65. Afebrile overnight. X2 Large BM. Patient's Blood sugar also noted to be low overnight. D50 given and apple juice given via NG per MIKKI request. Patient's blood sugar stabilized. During 0400 blood sugar check, patient's blood sugar result noted to be vary between each hand. On the left hand blood sugar 30, while on the right hand, BS 75. Concern that this may related to Raynaud's syndrome. Blood sugar rechecked with venous check and noted to be 86. Oxygen titrated down to 4L on oxymask overnight.  Plan: Monitor Vitals, wound care, monitor electrolytes.   For vital signs and complete assessments, please see documentation flowsheets.      Goal Outcome Evaluation:      Plan of Care Reviewed With: patient, spouse        Problem: Plan of Care - These are the overarching goals to be used throughout the patient stay.    Goal: Absence of Hospital-Acquired Illness or Injury  Intervention: Prevent and Manage VTE (Venous Thromboembolism) Risk  Recent Flowsheet Documentation  Taken 6/12/2023 0400 by Chante Tinoco RN  VTE Prevention/Management: compression stockings on  Taken 6/12/2023 0000 by Chante Tinoco RN  VTE Prevention/Management: compression stockings on  Taken 6/11/2023 2000 by Chante Tinoco RN  VTE Prevention/Management: compression stockings on

## 2023-06-12 NOTE — PROGRESS NOTES
[facility]   Cardiology Progress Note    Assessment & Plan   Jeanine is a 61-year-old female with a history of ILD, pulmonary sarcoidosis, scleroderma, Raynaud's phenomenon, nonischemic cardiomyopathy with a mildly reduced ejection fraction 40%, she has history of A-fib with RVR, and chronic systolic heart failure prior nadirs down to 35%.  She recently was evaluated and discharged from the cardiac service for A-fib with RVR, she was discharged on guideline directed medical therapy she underwent right heart catheterization which overall showed normal filling pressures and no normal output.  Cardiac biopsy at the time was deferred due to low suspicion for cardiac sarcoidosis.     She has had a prolonged admission on the [facility] in the setting of pulmonary embolism was and bilateral DVTs, for which she received Eliquis for this.  She also had issues with hypoxia thought secondary to her PE.  She unfortunately over the last few days has deteriorated requiring more oxygen and becoming progressively hypotensive.  Overall, appears patient has become progressively volume overloaded. Recommended IV diuresis and TTE. Pending this we will decide on further investigations from a cardiac standpoint pending her ejection fraction.  In regards to her atrial fibrillation and RVR, allow would allow for some permissive tachycardia into the 120s, would avoid aggressive IV beta-blockade, and continue oral therapies if possible.    Recommendations:  - TTE ordered for today  - Please ensure accurate standing weights and Is/Os. Unclear if patient actually lost 25 lbs between 6/9 and 6/11  - Consider IVP lopressor 5 mg if HR consistently >120 bpm.   - Continue amiodarone 200 mg daily  - Continue lasix 10 mg/hr. If further diuresis is required, recommend Bumex 2 mg IVP x1 followed by bumex 1mg/hr. Goal net negative 1-2 L/daily.   - Considered further work up of cardiac sarcoid, previously felt to be lower on the differential. Could consider in  "vs. Outpatient CMR pending clinical improvement.     Pt was discussed Dr. Link, who agrees with the assessment and plan.    Roberto Salvador, PGY-4  Cardiovascular Disease Fellow    Interval History   Occasional high HR >130 overnight. Remained HDS. Lasix was held. Net positive this AM, lasix restarted. O2 requirements coming down. Off all pressors.     ROS: A 4-point ROS was otherwise negative except as above.    Data reviewed today: I reviewed all new labs and imaging results over the last 24 hours. I personally reviewed:    Physical Exam   Temp: 98  F (36.7  C) Temp src: Axillary BP: 110/89 Pulse: 113   Resp: 16 SpO2: 93 % O2 Device: Oxymask Oxygen Delivery: 4 LPM  Vitals:    06/08/23 0617 06/09/23 0640 06/11/23 1245   Weight: 89.8 kg (197 lb 15.6 oz) 91 kg (200 lb 9.9 oz) 79.8 kg (175 lb 14.8 oz)     Vital Signs with Ranges  Temp:  [97.5  F (36.4  C)-98.6  F (37  C)] 98  F (36.7  C)  Pulse:  [] 113  Resp:  [9-37] 16  BP: ()/(56-98) 110/89  SpO2:  [85 %-100 %] 93 %  I/O last 3 completed shifts:  In: 2315.67 [P.O.:120; I.V.:1010.67; NG/GT:375]  Out: 2200 [Urine:2200]     , Blood pressure 110/89, pulse 113, temperature 98  F (36.7  C), temperature source Axillary, resp. rate 16, height 1.727 m (5' 8\"), weight 79.8 kg (175 lb 14.8 oz), SpO2 93 %.  175 lbs 14.83 oz  Virtual consult, no exam    Medications     dextrose       furosemide (LASIX) 100 mg in sodium chloride 0.9 % 100 mL infusion 10 mg/hr (06/12/23 0600)       amiodarone  200 mg Oral Daily     [Held by provider] apixaban ANTICOAGULANT  5 mg Oral BID     folic acid  1 mg Oral Daily     gabapentin  100 mg Oral At Bedtime     ipratropium - albuterol 0.5 mg/2.5 mg/3 mL  3 mL Nebulization 4x daily     lidocaine  1 patch Transdermal Q24H     lidocaine   Transdermal Q8H INOCENCIA     magnesium oxide  400 mg Oral Daily     metoclopramide  10 mg Intravenous Q6H     multivitamins w/minerals  15 mL Oral Daily     pantoprazole  40 mg Oral QAM AC     " piperacillin-tazobactam  4.5 g Intravenous Q6H     polyethylene glycol  17 g Oral Daily     predniSONE  15 mg Oral Daily     senna-docusate  2 tablet Oral BID     sodium chloride  3 mL Nebulization 4x daily     sodium chloride (PF)  3 mL Intracatheter Q8H     sodium chloride (PF)  3 mL Intracatheter Q8H       Data   Recent Labs   Lab 06/12/23  0813 06/12/23  0517 06/12/23  0513 06/12/23  0024 06/11/23  2241 06/11/23  2203 06/11/23  1316 06/11/23  1121 06/11/23  0727 06/11/23  0638 06/11/23  0538 06/09/23  1446 06/09/23  0615 06/07/23  0640 06/07/23  0559   WBC  --   --  10.1  --   --   --   --   --  15.2*  --  13.5*  --  9.9  --  8.9   HGB  --   --  8.7*  --   --   --   --   --  7.2*  --  6.7*  --  8.1*  --  8.6*   MCV  --   --  93  --   --   --   --   --  94  --  93  --  94  --  94   PLT  --   --  457*  --   --   --   --   --  472*  --  413  --  337  --  260   NA  --   --  136  --  136  --  133*  --  133*  --  132*   < > 132*  --  135*   POTASSIUM  --   --  3.4  --  3.7  --  4.2  --  3.9  --  3.8   < > 4.1   < > 4.5   CHLORIDE  --   --  95*  --  96*  --  95*  --  95*  --  95*   < > 96*  --  99   CO2  --   --  31*  --  29  --  29  --  28  --  30*   < > 29  --  29   BUN  --   --  25.4*  --  25.6*  --  25.9*  --  26.3*  --  26.3*   < > 25.7*  --  28.2*   CR  --   --  0.65  --  0.64  --  0.66  --  0.66  --  0.65   < > 0.57  --  0.50*   ANIONGAP  --   --  10  --  11  --  9  --  10  --  7   < > 7  --  7   DAVID  --   --  7.9*  --  7.8*  --  7.7*  --  7.7*  --  7.6*   < > 7.7*  --  7.7*   GLC 39* 76 92   < > 105*   < > 121*   < > 113*   < > 117*   < > 106*   < > 90   ALBUMIN  --   --   --   --   --   --   --   --   --   --   --   --  1.7*  --  1.6*   PROTTOTAL  --   --   --   --   --   --   --   --   --   --   --   --  4.2*  --  4.1*   BILITOTAL  --   --   --   --   --   --   --   --   --   --   --   --  0.2  --  0.2   ALKPHOS  --   --   --   --   --   --   --   --   --   --   --   --  55  --  60   ALT  --   --   --   --    --   --   --   --   --   --   --   --  14  --  16   AST  --   --   --   --   --   --   --   --   --   --   --   --  19  --  17    < > = values in this interval not displayed.       No results found for this or any previous visit (from the past 24 hour(s)).      I very much appreciated the opportunity to see and assess Jeanine Schuler in the hospital with CV Fellow Dr Salvador. The note above summarizes my findings and current recommendations.  Please do not hesitate to contact my office if you have any questions or concerns.      Bacilio Link MD  Cardiac Arrhythmia Service  [facility]  [phone]

## 2023-06-12 NOTE — PROGRESS NOTES
4-Day Calorie Count Assessment:  6/8: 335 kcal and 13 g protein (2 meals - breakfast and dinner, pt did not order lunch, 50% of 1 supplement)  6/9: Unknown (only breakfast meal ordered, no documentation for this meal, 0 supplements recorded)  6/10: Unknown (No meals ordered, 0 supplements recorded)   6/11: Unknown (No meals ordered, 0 supplements recorded)  4 day average PO intake = Will assume <500 kcal and <5 g protein - unsure if pt is taking supplements at bedside, these are the only items sent to pt from 6/9 to 6/11    Pt was reviewed during ICU team rounds, reviewed prior nutrition plan of care (lower more tolerated TF rate to attempt to encourage po intakes over weekend, but if they remain inadequate, then plan was for consult for abdominally placed tube), RN notes pt is accepting and agreeing to abdominally placed feeding tube and really doesn't want to keep trying to eat foods orally, reviewed pt's history w/team, they will order IR first and then if they cannot place, then will consult GI, reviewed recommendations for G/J tube w/pt's medical history, Team agreeing. Reviewed overall nutritional status and weight trends from when this RD took over pt's care, Providers plan to add additional labs and continue diuresis, and pt will have chest tube placed as well for large bilateral pleural effusions.    RD visited w/pt and  at bedside, noted plan for G/J tube placement tentatively tomorrow AM per charge, reviewed once pt returns from tube placement and can resume TF, plan to adjust formula to better suit pt's needs and RD recommendations, pt agreeing with plan of care, noted adjusted Ensure order PRN only (pt has excess stock in the room) and that pt can continues to take po liquids/foods as tolerated, but will plan that G/J tube will be providing majority of nutrition, pt agreeing and accepting this plan.     Plan/Recommendations:   RD will discontinue calorie count, not needed w/plan to proceed  w/feeding tube placement, will change Ensure order to PRN only, pt can request vs auto sending in light of above. Will monitor for plan for G/J tube placement and provide additional TF recommendations as appropriate.    Tiffany Laird RD, CNSC, LD  [address] pager: [phone]  Weekend/holiday pager: [phone]

## 2023-06-12 NOTE — SEDATION DOCUMENTATION
90mL of light blood tinged fluid sent to lab from right pleural space, 700mL of dark red fluid drained from left pleural space, labs sent

## 2023-06-13 NOTE — PROGRESS NOTES
CLINICAL NUTRITION SERVICES - BRIEF NOTE     Nutrition Prescription      Recommendations already ordered by Registered Dietitian (RD):  RD adjusted NG-TF to TwoCal HN at 40 ml/hr with 1 packet of Prosource TF20 BID, continue 30 ml water flush q 4 hrs to provide 2080 kcal, ~121 gm protein, 206 gm CHO, 5 gm fiber, 1090 ml water/day     Future/Additional Recommendations:  Monitor for G/J tube placement - likely planned for tomorrow now as GI services needs to be scheduled to place at      NEW FINDINGS   MAR reviewed. Labs reviewed. Pt reviewed during ICU team rounds, GI services will have to place feeding tube so this will be planned for tomorrow now at , reviewed low vitamin D and supplementation added, reviewed TF plan of care as above, Providers agreeing with RD recommendations.    RD visited pt and family at bedside and also reviewed plan as above, both agreeing and aware tube placement will be planned for tomorrow.     INTERVENTIONS  Implementation  Collaboration with staff and Providers - as noted above   Enteral Nutrition - formula and rate adjusted as above to better meet needs and prepare for home plan of care   Modulars - Protein flushes ordered as above   Feeding tube flush - continue as ordered   Po diet - as tolerated in pt with esophageal dysmotility - po intakes is considered minimal and not likely to even meet 25% of estimated needs and is mostly for comfort/pleasure, TF will be providing majority of pt's nutritional needs     Monitoring/Evaluation  Will continue to monitor and evaluate per protocol.    Tiffany Laird RD, CNSC, LD  [address] ICU RD pager: [phone]  Weekend/holiday pager: [phone]

## 2023-06-13 NOTE — PLAN OF CARE
Major Shift Events:  Patient remained hypotensive overnight. Bumex drip not started overnight, MIKKI aware. Patient went into A-fib RVR overnight and required PRN metoprolol. Patient HR currently rate controlled. Decent urine output overnight. 220 total output from chest tube drain overnight. Wound dressing change completed this AM. Patient tolerated sips of water and apple juice overnight. Blood sugar within normal limits overnight. Patient requiring PRN dilaudid for pain, states that oxycodone does not help with her pain at this time. Electrolytes replaced per protocol  Plan: Consult for GJ tube placement. Monitor I&O and vital signs.   For vital signs and complete assessments, please see documentation flowsheets.      Goal Outcome Evaluation:      Plan of Care Reviewed With: patient, spouse       Problem: Plan of Care - These are the overarching goals to be used throughout the patient stay.    Goal: Absence of Hospital-Acquired Illness or Injury  Intervention: Prevent and Manage VTE (Venous Thromboembolism) Risk  Recent Flowsheet Documentation  Taken 6/13/2023 0400 by Chante Tinoco RN  VTE Prevention/Management: (per patient request) compression stockings off  Taken 6/13/2023 0000 by Chante Tinoco RN  VTE Prevention/Management: compression stockings on  Taken 6/12/2023 2000 by Chante Tinoco RN  VTE Prevention/Management: compression stockings on

## 2023-06-13 NOTE — PROGRESS NOTES
[facility]   Cardiology Progress Note    Assessment & Plan   Jeanine is a 61-year-old female with a history of ILD, pulmonary sarcoidosis, scleroderma, Raynaud's phenomenon, nonischemic cardiomyopathy with a mildly reduced ejection fraction 40%, she has history of A-fib with RVR, and chronic systolic heart failure prior nadirs down to 35%.  She recently was evaluated and discharged from the cardiac service for A-fib with RVR, she was discharged on guideline directed medical therapy she underwent right heart catheterization which overall showed normal filling pressures and no normal output.  Cardiac biopsy at the time was deferred due to low suspicion for cardiac sarcoidosis.     She has had a prolonged admission on the [facility] in the setting of pulmonary embolism was and bilateral DVTs, for which she received Eliquis for this.  She also had issues with hypoxia thought secondary to her PE.  She unfortunately over the last few days has deteriorated requiring more oxygen and becoming progressively hypotensive.  Overall, appears patient has become progressively volume overloaded. Recommended IV diuresis and TTE. Pending this we will decide on further investigations from a cardiac standpoint pending her ejection fraction.  In regards to her atrial fibrillation and RVR, allow would allow for some permissive tachycardia into the 120s, would avoid aggressive IV beta-blockade, and continue oral therapies if possible.    Recommendations:  - Please ensure accurate standing weights when possible and Is/Os. By her current record weights, she is still above pre-admission weight which rangedfrom 140-150 lbs.   - Agree with bumex gtt.   - Consider IVP lopressor 5 mg if HR consistently >120 bpm.   - Continue amiodarone 200 mg daily  - Restart eliquis when able   - Considered further work up of cardiac sarcoid, previously felt to be lower on the differential. Could consider in vs. Outpatient CMR pending clinical improvement.     Pt was  "discussed Dr. Link, who agrees with the assessment and plan.    Roberto Salvador, PGY-4  Cardiovascular Disease Fellow    Interval History   No acute events overnight. S/p IR right chest tube placement and L thora (700 ml out). Diuresis was not started due to relative hypotension. Received x1 lopressor overnight for Afib with RVR.     ROS: A 4-point ROS was otherwise negative except as above.    Data reviewed today: I reviewed all new labs and imaging results over the last 24 hours. I personally reviewed:    Physical Exam   Temp: 97.5  F (36.4  C) Temp src: Axillary BP: 94/59 Pulse: 80   Resp: 30 SpO2: 95 % O2 Device: None (Room air) Oxygen Delivery: 2 LPM  Vitals:    06/09/23 0640 06/11/23 1245 06/13/23 0415   Weight: 91 kg (200 lb 9.9 oz) 79.8 kg (175 lb 14.8 oz) 79.7 kg (175 lb 11.3 oz)     Vital Signs with Ranges  Temp:  [97.5  F (36.4  C)-98  F (36.7  C)] 97.5  F (36.4  C)  Pulse:  [] 80  Resp:  [6-41] 30  BP: ()/(46-98) 94/59  SpO2:  [90 %-100 %] 95 %  I/O last 3 completed shifts:  In: 2616 [P.O.:240; I.V.:1536; NG/GT:360]  Out: 3355 [Urine:1975; Chest Tube:1380]     , Blood pressure 94/59, pulse 80, temperature 97.5  F (36.4  C), temperature source Axillary, resp. rate 30, height 1.727 m (5' 8\"), weight 79.7 kg (175 lb 11.3 oz), SpO2 95 %.  175 lbs 11.31 oz  Virtual consult, no exam    Medications     bumetanide 1 mg/hr (06/13/23 1000)     dextrose 1,000 mL (06/13/23 1141)       amiodarone  200 mg Oral Daily     [Held by provider] apixaban ANTICOAGULANT  5 mg Oral BID     cholecalciferol  20 mcg Oral Daily     folic acid  1 mg Oral Daily     gabapentin  100 mg Oral or Feeding Tube At Bedtime     ipratropium - albuterol 0.5 mg/2.5 mg/3 mL  3 mL Nebulization 4x daily     lidocaine  1 patch Transdermal Q24h    And     lidocaine   Transdermal Q8H INOCENCIA     lidocaine  1 patch Transdermal Q24H     lidocaine   Transdermal Q8H INOCENCIA     magnesium oxide  400 mg Oral Daily     metoclopramide  10 mg Intravenous " Q6H     multivitamins w/minerals  15 mL Oral Daily     pantoprazole  40 mg Oral or Feeding Tube QAM AC     piperacillin-tazobactam  4.5 g Intravenous Q6H     polyethylene glycol  17 g Oral Daily     predniSONE  15 mg Oral Daily     protein modular  1 packet Per Feeding Tube BID     senna-docusate  2 tablet Oral BID     sodium chloride  3 mL Nebulization 4x daily     sodium chloride (PF)  3 mL Intracatheter Q8H     sodium chloride (PF)  3 mL Intracatheter Q8H       Data   Recent Labs   Lab 06/13/23  1117 06/13/23  0944 06/13/23  0817 06/13/23  0446 06/12/23  2358 06/12/23  2348 06/12/23  1823 06/12/23  1800 06/12/23  1157 06/12/23  1142 06/12/23  0517 06/12/23  0513 06/11/23  1121 06/11/23  0727 06/09/23  1446 06/09/23  0615   WBC  --   --   --  8.3  --   --   --   --   --   --   --  10.1  --  15.2*   < > 9.9   HGB  --   --   --  8.4*  --   --   --   --   --   --   --  8.7*  --  7.2*   < > 8.1*   MCV  --   --   --  94  --   --   --   --   --   --   --  93  --  94   < > 94   PLT  --   --   --  507*  --   --   --   --   --   --   --  457*  --  472*   < > 337   NA  --   --   --  136  --  136  --   --   --  140  --  136   < > 133*   < > 132*   POTASSIUM  --   --   --  3.5  3.5  --  3.2*  --   --   --  3.9  --  3.4   < > 3.9   < > 4.1   CHLORIDE  --   --   --  96*  --  96*  --   --   --  98  --  95*   < > 95*   < > 96*   CO2  --   --   --  29  --  30*  --   --   --  31*  --  31*   < > 28   < > 29   BUN  --   --   --  21.7  --  22.1  --   --   --  23.0  --  25.4*   < > 26.3*   < > 25.7*   CR  --   --   --  0.63  --  0.61  --   --   --  0.67  --  0.65   < > 0.66   < > 0.57   ANIONGAP  --   --   --  11  --  10  --   --   --  11  --  10   < > 10   < > 7   DAVID  --   --   --  7.6*  --  7.4*  --   --   --  7.6*  --  7.9*   < > 7.7*   < > 7.7*   GLC 61* 65* 53* 110*   < > 123*   < >  --    < > 151*   < > 92   < > 113*   < > 106*   ALBUMIN  --   --   --   --   --   --   --   --   --   --   --  2.0*  --   --   --  1.7*   PROTTOTAL   --   --   --   --   --   --   --  4.0*  --  4.5*  --  4.4*  --   --   --  4.2*   BILITOTAL  --   --   --   --   --   --   --   --   --   --   --  0.3  --   --   --  0.2   ALKPHOS  --   --   --   --   --   --   --   --   --   --   --  62  --   --   --  55   ALT  --   --   --   --   --   --   --   --   --   --   --  15  --   --   --  14   AST  --   --   --   --   --   --   --   --   --   --   --  20  --   --   --  19    < > = values in this interval not displayed.       Recent Results (from the past 24 hour(s))   Echo Limited   Result Value    LVEF  40-45%    Narrative    [id number]  [id number]  [id number]  [id number]^NANCI^AMAURY     [facility]  Echocardiography Laboratory  [address] [place] [id number]     Name: OSCAR REYES  MRN: [id number]  : 1960  Study Date: 2023 12:08 PM  Age: 62 yrs  Gender: Female  Patient Location: [facility]  Reason For Study: Heart Failure  Ordering Physician: AMAURY CHRISTINE  Performed By: Neeru Zimmerman     BSA: 1.9 m2  Height: 68 in  Weight: 175 lb  BP: 108/84 mmHg  ______________________________________________________________________________  Procedure  Limited Portable Echo Adult.  ______________________________________________________________________________  Interpretation Summary  Left ventricular size is normal.  The visual ejection fraction is 40-45%.  Right ventricular function, chamber size, wall motion, and thickness are  normal.  The inferior vena cava is normal.  No pericardial effusion is present.  ______________________________________________________________________________  Left Ventricle  Left ventricular size is normal. The visual ejection fraction is 40-45%.  Diastolic function not assessed due to arrhythmia.     Right Ventricle  Right ventricular function, chamber size, wall motion, and thickness are  normal.     Mitral Valve  Mild mitral insufficiency is present.     Tricuspid Valve  Mild to moderate  tricuspid insufficiency is present.     Vessels  The inferior vena cava is normal.     Pericardium  No pericardial effusion is present.  ______________________________________________________________________________  MMode/2D Measurements & Calculations  TAPSE: 1.4 cm     Doppler Measurements & Calculations  TR max ck: 244.5 cm/sec  TR max P.0 mmHg  RV S Ck: 11.6 cm/sec     ______________________________________________________________________________  Report approved by: Mirza Lyon 2023 01:02 PM         IR Thoracentesis    Narrative    PROCEDURE: Right chest tube placement, left diagnostic and therapeutic  thoracentesis    Procedural Personnel  Advanced practice provider(s): Mehul Heath PA-C    Pre-procedure diagnosis: Shortness of breath  Post-procedure diagnosis: Same  Indication: Compromised respiration associated with pleural fluid  collection  Additional clinical history: Heart failure, pulmonary HTN, a-fib,  interstitial lung disease, admitted with VTE, transferred to ICU with  increasing oxygen needs, CT shows large loculated right effusion and  moderate left effusion. IR consulted for bilateral diagnostic and  therapeutic thoracenteses    Complications: No immediate complications.      Impression    IMPRESSION:    1. Right 14 Ukrainian chest tube placement, yielding 400 mL of serous  fluid.  2. Diagnostic and therapeutic left thoracentesis. 700 mL clear pink  serous fluid aspirated, a portion sent to lab for analysis, trace  effusion post procedure.    Plan:     Per ICU, IR recommends 2 mg alteplase in 20 mL saline instilled via  chest tube for 45 minutes then opened to drainage BID x 3 days  ______________________________________________________________________    PROCEDURE SUMMARY:  - Percutaneous Unilateral pleural drainage with insertion of  indwelling catheter under ultrasound guidance  - US guided left thoracentesis  - Additional procedure(s): None    PROCEDURE  DETAILS:    Pre-procedure  Consent: Informed consent for the procedure including risks, benefits  and alternatives was obtained and time-out was performed prior to the  procedure.  Preparation: The site was prepared and draped using maximal sterile  barrier technique including cutaneous antisepsis.    Anesthesia/sedation  Level of anesthesia/sedation: No sedation  Anesthesia/sedation administered by: Not applicable  Total intra-service sedation time (minutes): N/a    Limited thoracic ultrasound  Limited thoracic ultrasound was performed.   Left hemithorax findings: Moderate pleural effusion  Right hemithorax findings: Large pleural effusion    Right chest tube placement  The patient was positioned supine. Initial imaging was performed  showing loculated right effusion. Local anesthesia was administered.  The pleural space was accessed with Yueh catheter. Only 30 mL was  aspirated so the Yueh was removed. Discussed with ICU and decision to  place chest tube. Consent updated and the patient was positioned left  lateral decubitus oblique. Imaging again showed large loculated right  effusion. Local anesthesia was administered. The pleural space was  accessed using an access needle followed by wire insertion and serial  dilation and a 14 Samoan non locking drainage catheter was placed.  Position of the drainage catheter within the pleural space was  confirmed.  Initial imaging findings: Large loculated right pleural effusion  Drainage catheter placed: [facility], non locking  Samoan size: 14  External catheter securement:  Non-absorbable suture and adhesive  anchoring device  Post-drainage imaging findings: Partial drainage of the pleural fluid  Additional findings: None    Left thoracentesis  Local anesthesia was administered. A safe window for thoracentesis was  identified with ultrasound. The pleural space was accessed and fluid  return confirmed position. The fluid was drained.  The catheter was  removed  and a sterile dressing was applied.  Catheter size (Fr):5  Catheter valve: No  Fluid appearance: serous  Volume drained (mL): 700  Post-drainage ultrasound: Other-trace effusion    Additional Details  Additional description of procedure: None  Device used: None  Equipment details: None  Aspirated fluid was sent for analysis from both sides.  Estimated blood loss (mL): Less than 10  Standardized report: [email or web address]_v3.1    Attestation  Signer name: ANU HEATH PA-C  I attest that I was present for the entire procedure. I reviewed the  stored images and agree with the report as written.      ANU HEATH PA-C         SYSTEM ID:  [id number]   IR Chest Tube Place Non Tunneled Right    Narrative    PROCEDURE: Right chest tube placement, left diagnostic and therapeutic  thoracentesis    Procedural Personnel  Advanced practice provider(s): Mehul Heath PA-C    Pre-procedure diagnosis: Shortness of breath  Post-procedure diagnosis: Same  Indication: Compromised respiration associated with pleural fluid  collection  Additional clinical history: Heart failure, pulmonary HTN, a-fib,  interstitial lung disease, admitted with VTE, transferred to ICU with  increasing oxygen needs, CT shows large loculated right effusion and  moderate left effusion. IR consulted for bilateral diagnostic and  therapeutic thoracenteses    Complications: No immediate complications.      Impression    IMPRESSION:    1. Right 14 Hebrew chest tube placement, yielding 400 mL of serous  fluid.  2. Diagnostic and therapeutic left thoracentesis. 700 mL clear pink  serous fluid aspirated, a portion sent to lab for analysis, trace  effusion post procedure.    Plan:     Per ICU, IR recommends 2 mg alteplase in 20 mL saline instilled via  chest tube for 45 minutes then opened to drainage BID x 3 days  ______________________________________________________________________    PROCEDURE SUMMARY:  - Percutaneous Unilateral pleural drainage with insertion  of  indwelling catheter under ultrasound guidance  - US guided left thoracentesis  - Additional procedure(s): None    PROCEDURE DETAILS:    Pre-procedure  Consent: Informed consent for the procedure including risks, benefits  and alternatives was obtained and time-out was performed prior to the  procedure.  Preparation: The site was prepared and draped using maximal sterile  barrier technique including cutaneous antisepsis.    Anesthesia/sedation  Level of anesthesia/sedation: No sedation  Anesthesia/sedation administered by: Not applicable  Total intra-service sedation time (minutes): N/a    Limited thoracic ultrasound  Limited thoracic ultrasound was performed.   Left hemithorax findings: Moderate pleural effusion  Right hemithorax findings: Large pleural effusion    Right chest tube placement  The patient was positioned supine. Initial imaging was performed  showing loculated right effusion. Local anesthesia was administered.  The pleural space was accessed with Yueh catheter. Only 30 mL was  aspirated so the Yueh was removed. Discussed with ICU and decision to  place chest tube. Consent updated and the patient was positioned left  lateral decubitus oblique. Imaging again showed large loculated right  effusion. Local anesthesia was administered. The pleural space was  accessed using an access needle followed by wire insertion and serial  dilation and a 14 Israeli non locking drainage catheter was placed.  Position of the drainage catheter within the pleural space was  confirmed.  Initial imaging findings: Large loculated right pleural effusion  Drainage catheter placed: [facility], non locking  Israeli size: 14  External catheter securement:  Non-absorbable suture and adhesive  anchoring device  Post-drainage imaging findings: Partial drainage of the pleural fluid  Additional findings: None    Left thoracentesis  Local anesthesia was administered. A safe window for thoracentesis was  identified with  ultrasound. The pleural space was accessed and fluid  return confirmed position. The fluid was drained.  The catheter was  removed and a sterile dressing was applied.  Catheter size (Fr):5  Catheter valve: No  Fluid appearance: serous  Volume drained (mL): 700  Post-drainage ultrasound: Other-trace effusion    Additional Details  Additional description of procedure: None  Device used: None  Equipment details: None  Aspirated fluid was sent for analysis from both sides.  Estimated blood loss (mL): Less than 10  Standardized report: SIR_ChestTube_v3.1    Attestation  Signer name: ANU PHILIPPE PA-C  I attest that I was present for the entire procedure. I reviewed the  stored images and agree with the report as written.      ANU PHILIPPE PA-C         SYSTEM ID:  [id number]   US Abdomen Limited    Narrative    Exam: US ABDOMEN LIMITED  6/13/2023 11:36 AM      History: follow up on ascites    Comparison: 6/1/2023. Chest radiograph from 6/12/2023    Findings/    Impression    impression: Bilateral pleural effusions noted, correlating with recent  chest radiograph. There is a trace amount of ascites throughout the  abdomen and pelvis.    AMAURY LAKE MD         SYSTEM ID:  [id number]   I very much appreciated the opportunity to  assess Jeanine MARTÍN Schuler in the hospital with CV Fellow  Dr Brown.  I agree with the note above which summarizes my findings and current recommendations.  Please do not hesitate to contact my office if you have any questions or concerns.      Bacilio Link MD  Cardiac Arrhythmia Service  [facility]  [phone]

## 2023-06-13 NOTE — PROGRESS NOTES
Patients blood glucose by meter at 0915 was 65. Treated by increasing D10% as well as giving oral applejuice. Recheck was 61. Due to patients history of Raynaud's, was advised to draw a venous glucose before treating. Conditional order released and RN waiting for lab to draw venous blood. D10% infusion running at 70 ml/hr and TF are running at 30 ml/hr.

## 2023-06-13 NOTE — PROGRESS NOTES
[facility]    ICU Progress Note       Date of Admission:  5/16/2023    Assessment: Critical Care   Jeanine Schuler is a 62 year old female admitted on 5/16/2023. She is a 62 year old female with a PMH of HTN, HFpEF (LV EF 45-50%), pulmonary HTN, paroxysmal afib on chronic anticoagulation, interstitial lung disease, sarcoidosis, mixed connective tissue disease, Raynaud's, and chronic dysphagia who was admitted on 5/16/2023 with failure to thrive and found to have multiple R sided pulmonary emboli and bilateral lower extremity DVT's.  She has been convalescing on the general care floor.  She was transferred to the ICU 6/11/2023 for worsening shortness of breath and increased O2 needs.  CT PE protocol done 6/11 morning shows large bilateral pleural effusion, volume overload, and concern for pneumonia.    Major events and changes today    - Bumex held overnight to mild hypotension, start this am  - GI to see for peg tube, repeat abd ultrasound to follow up on ascites  - Due to pts raynaud blood glucose check with finger pokes inaccurate, stop checking, bmp are being checked q 8 hours, titrate D10 gtt off these.     Plan: Critical Care        Neurologic  # pain  - GCS: 15; RASS: 0; CAM ICU: negative  - pain medications: Tylenol PRN, oxycodone PRN, dilaudid PRN, lidoderm patch prn, gabapentin 100 mg PO QHS  - delirium management: none  - sleep management: none    Cardiac  # paroxysmal atrial fibrillation on chronic anticoagulation  # HTN  # HFpEF  # pulmonary HTN  # R heart stain based on CT chest   # elevated troponin    - continue home amiodarone 200 mg PO daily  - holding home metoprolol XL 50 mg PO daily, restart at lower dose when clinically appropriate   - will hold anticoagulation given plan for peg planned for 6/14  - ECHO (6/3/2023): LV EF 45-50%, no RWMA, normal RV size/fucntion, pulmonary artery systolic pressure normal, no significant valvular heart disease, no  "effusion, repeat ECHO (6/12/2023) w minimal change  - Cardiology consult  - IV metoprolol 5 mg prn for sustained hr greater than 120  - Bumex held overnight to mild hypotension, start this am    PULMONARY:  # interstitial lung disease  # sarcoidosis  # pulmonary emboli   # hospital acquired pneumonia  # large bilateral pleural effusions  # pulmonary edema  # acute hypoxic respiratory failure    - scheduled duoneb + 3% saline nebs QID, aerobika QID, patient may need NT suctioning if unable to clear secretions  - continue aggressive pulmonary hygiene, mobilize, aerobika   - pulmonary consulted 6/6, recommended steroids, outpatient follow up in [facility] clinic   - Bilateral thoracentesis 6/12: Right large loculated effusion with initially only 30 ml out so chest tube inserted, once to floor began draining therefore didn't insert TPA, Left side with 700 ml out.     GI/NUTRITION:  # dysphagia  # severe protein calorie malnutrition   # constipation   # history of gastric bypass surgery    - continue bowel regimen, last BM 6/13  - GI previous consulted, dysmotility esophagogram done 6/7 but was limited due to patients mobility but no major strictures noted, continue PPI, limit opioids and anticholinergic meds as able,   - asked for GI to see, could have strictures from scleroderma also possible esophageal dysmotility  - ok for mechanical soft diet , speech to see and make further rec reg swallowing    - \"-- GI will order: outpatient follow-up in [facility] for chronic dysphagia, esophageal dysmotility in the setting of scleroderma.   - [facility] phone: [phone], option 1 for clinic scheduling\"  - GI consult for peg tube placement, tentatively scheduled for 6/14      RENAL:  # volume overload  # anasarca   # hypomagnesemia  # hypokalemia   - daily weights, last documented weight > 10 kg above admission weight  - will plan for aggressive diuresis as BP allows, changing diuresis from " lasix to bumex  - Q8H BMP, Mg, Phos with aggressive replacement   - rolle for strict I/O  - Mg Ox 400 daily for chronic hypomagnesemia   - continue to follow electrolytes, renal function, and UOP closely  - Bumex held overnight to mild hypotension, start this am     ID:  # hospital acquired pneumonia   - Tmax: afebrile  - WBC: 10.1 (15.2, 13.5, 9.9)  - cultures:                      - 6/11 blood: NGTD               - 6/11 MRSA swab: negative   - 6/11 Legionella and streptococcus negative   - ABX: vancomycin stopped, zosyn x 5 days    HEME:  # anemia  # chronic anticoagulation   # bilateral lower extremity DVT  - Trend CBC  - will hold apixaban 5 mg PO BID for planned peg 6/14, start ASAP     ENDOCRINE/RHEUM:  # mixed connective tissue disease  # scleroderma  # severe Raynaud's   # chronic steroid use  # hypoglycemia    - glucose: stable   - no insulin needs   - steroids: continue home prednisone 15 mg PO daily  - Requiring mx doses of D50 and D10 gtt   - Rheum consulted, recommended steroids, gabapentin, pain consult, outpatient follow up  PLAN: continue steroids at home dose, if patient clinically deteriorates, low threshold to place on stress dose steroids      MSK:  # weakness and deconditioning   # failure to thrive    - PT/OT consulted  - activity: mobilize, out of bed minimum of 3 times/day     SKIN:  # right breast skin thickening and nipple retraction on CT (incidental CT finding)  - outpatient mammography      PROPHYLAXIS:   - DVT: apixaban 5 mg PO BID, will hold for planned procedure tomorrow  - GI: pantoprazole 40 mg PO daily (home PPI)      Family update by me today: Yes   Current lines are required for patient management      Lines/ tubes/ drains:  Rolle Catheter: PRESENT, indication: Strict 1-2 Hour I&O  Lines: None     - PIV  - nasal feeding tube- NG 5/25  - Right chest tube 6/12  - Rolle catheter 6/11    Code Status: Full Code      Disposition:  - ICU     The patient's care was discussed with the  "Attending Physician, Dr. Lyons .  Critical care time exclusive of procedures: 55 minutes    LÓPEZ Avendaño CNP  [facility]  Securely message with [employer] (more info)  Text page via [employer] Paging/Directory     Clinically Significant Risk Factors          # Hypokalemia: Lowest K = 3.2 mmol/L in last 2 days, will replace as needed       # Hypoalbuminemia: Lowest albumin = 1.6 g/dL at 6/7/2023  5:59 AM, will monitor as appropriate     # Hypertension: Noted on problem list        # Overweight: Estimated body mass index is 26.72 kg/m  as calculated from the following:    Height as of this encounter: 1.727 m (5' 8\").    Weight as of this encounter: 79.7 kg (175 lb 11.3 oz).   # Severe Malnutrition: based on nutrition assessment              ______________________________________________________________________    Interval History   No acute events overnight. Feels much better after thoracentesis and chest tube insertion yesterday.     Physical Exam   Vital Signs: Temp: 97.5  F (36.4  C) Temp src: Axillary BP: 94/59 Pulse: 80   Resp: 30 SpO2: 98 % O2 Device: Oxymask Oxygen Delivery: 2 LPM  Weight: 175 lbs 11.31 oz    Physical Exam  Vitals and nursing note reviewed.   Constitutional:       General: She is awake.      Appearance: She is underweight.   HENT:      Mouth/Throat:      Mouth: Mucous membranes are moist.      Pharynx: Oropharynx is clear.   Eyes:      Pupils: Pupils are equal, round, and reactive to light.   Cardiovascular:      Rate and Rhythm: Normal rate and regular rhythm.      Pulses: Normal pulses.      Heart sounds: Normal heart sounds.      Comments: nsr in 80s on bedside telemetry  Pulmonary:      Effort: Pulmonary effort is normal.      Breath sounds: Normal breath sounds.      Comments: Crackles in left base  Chest:      Comments: Chest tube present on right side with serosanguinous drainage- total 800 mls output since insertion.   Abdominal:      " General: Abdomen is flat.      Palpations: Abdomen is soft.   Musculoskeletal:         General: Normal range of motion.      Right lower le+ Edema present.      Left lower le+ Edema present.   Skin:     General: Skin is warm and dry.      Capillary Refill: Capillary refill takes less than 2 seconds.      Comments: Thin frail skin   Neurological:      General: No focal deficit present.      Mental Status: She is alert.      Comments: Alert and orientated x 4   Psychiatric:         Attention and Perception: Attention normal.         Mood and Affect: Mood normal.         Behavior: Behavior is cooperative.           Data   I reviewed all medications, new labs and imaging results over the last 24 hours.  Arterial Blood Gases   No lab results found in last 7 days.    Complete Blood Count   Recent Labs   Lab 23  0446 23  0513 23  0727 23  0538   WBC 8.3 10.1 15.2* 13.5*   HGB 8.4* 8.7* 7.2* 6.7*   * 457* 472* 413       Basic Metabolic Panel  Recent Labs   Lab 23  0944 23  0817 23  0446 23  0401 23  2358 23  2348 23  1157 23  1142 23  0517 23  0513   NA  --   --  136  --   --  136  --  140  --  136   POTASSIUM  --   --  3.5  3.5  --   --  3.2*  --  3.9  --  3.4   CHLORIDE  --   --  96*  --   --  96*  --  98  --  95*   CO2  --   --  29  --   --  30*  --  31*  --  31*   BUN  --   --  21.7  --   --  22.1  --  23.0  --  25.4*   CR  --   --  0.63  --   --  0.61  --  0.67  --  0.65   GLC 65* 53* 110* 70   < > 123*   < > 151*   < > 92    < > = values in this interval not displayed.       Liver Function Tests  Recent Labs   Lab 23  0513 23  0615 23  0559   AST 20 19 17   ALT 15 14 16   ALKPHOS 62 55 60   BILITOTAL 0.3 0.2 0.2   ALBUMIN 2.0* 1.7* 1.6*       Pancreatic Enzymes  No lab results found in last 7 days.    Coagulation Profile  No lab results found in last 7 days.    IMAGING:  Recent Results (from the past  24 hour(s))   Echo Limited   Result Value    LVEF  40-45%    [facility]    [id number]  [id number]  [id number]  [id number]^NANCI^AMAURY     [facility],[place]  Echocardiography Laboratory  [address] [place] [address]     Name: OSCAR REYES  MRN: [id number]  : 1960  Study Date: 2023 12:08 PM  Age: 62 yrs  Gender: Female  Patient Location: [facility]  Reason For Study: Heart Failure  Ordering Physician: AMAURY CHRISTINE  Performed By: Neeru Zimmerman     BSA: 1.9 m2  Height: 68 in  Weight: 175 lb  BP: 108/84 mmHg  ______________________________________________________________________________  Procedure  Limited Portable Echo Adult.  ______________________________________________________________________________  Interpretation Summary  Left ventricular size is normal.  The visual ejection fraction is 40-45%.  Right ventricular function, chamber size, wall motion, and thickness are  normal.  The inferior vena cava is normal.  No pericardial effusion is present.  ______________________________________________________________________________  Left Ventricle  Left ventricular size is normal. The visual ejection fraction is 40-45%.  Diastolic function not assessed due to arrhythmia.     Right Ventricle  Right ventricular function, chamber size, wall motion, and thickness are  normal.     Mitral Valve  Mild mitral insufficiency is present.     Tricuspid Valve  Mild to moderate tricuspid insufficiency is present.     Vessels  The inferior vena cava is normal.     Pericardium  No pericardial effusion is present.  ______________________________________________________________________________  MMode/2D Measurements & Calculations  TAPSE: 1.4 cm     Doppler Measurements & Calculations  TR max ck: 244.5 cm/sec  TR max P.0 mmHg  RV S Ck: 11.6 cm/sec     ______________________________________________________________________________  Report approved by: Mirza Lyon  06/12/2023 01:02 PM         IR Thoracentesis    Narrative    PROCEDURE: Right chest tube placement, left diagnostic and therapeutic  thoracentesis    Procedural Personnel  Advanced practice provider(s): Mehul Heath PA-C    Pre-procedure diagnosis: Shortness of breath  Post-procedure diagnosis: Same  Indication: Compromised respiration associated with pleural fluid  collection  Additional clinical history: Heart failure, pulmonary HTN, a-fib,  interstitial lung disease, admitted with VTE, transferred to ICU with  increasing oxygen needs, CT shows large loculated right effusion and  moderate left effusion. IR consulted for bilateral diagnostic and  therapeutic thoracenteses    Complications: No immediate complications.      Impression    IMPRESSION:    1. Right 14 Czech chest tube placement, yielding 400 mL of serous  fluid.  2. Diagnostic and therapeutic left thoracentesis. 700 mL clear pink  serous fluid aspirated, a portion sent to lab for analysis, trace  effusion post procedure.    Plan:     Per ICU, IR recommends 2 mg alteplase in 20 mL saline instilled via  chest tube for 45 minutes then opened to drainage BID x 3 days  ______________________________________________________________________    PROCEDURE SUMMARY:  - Percutaneous Unilateral pleural drainage with insertion of  indwelling catheter under ultrasound guidance  - US guided left thoracentesis  - Additional procedure(s): None    PROCEDURE DETAILS:    Pre-procedure  Consent: Informed consent for the procedure including risks, benefits  and alternatives was obtained and time-out was performed prior to the  procedure.  Preparation: The site was prepared and draped using maximal sterile  barrier technique including cutaneous antisepsis.    Anesthesia/sedation  Level of anesthesia/sedation: No sedation  Anesthesia/sedation administered by: Not applicable  Total intra-service sedation time (minutes): N/a    Limited thoracic ultrasound  Limited thoracic  ultrasound was performed.   Left hemithorax findings: Moderate pleural effusion  Right hemithorax findings: Large pleural effusion    Right chest tube placement  The patient was positioned supine. Initial imaging was performed  showing loculated right effusion. Local anesthesia was administered.  The pleural space was accessed with Yueh catheter. Only 30 mL was  aspirated so the Yueh was removed. Discussed with ICU and decision to  place chest tube. Consent updated and the patient was positioned left  lateral decubitus oblique. Imaging again showed large loculated right  effusion. Local anesthesia was administered. The pleural space was  accessed using an access needle followed by wire insertion and serial  dilation and a 14 Tajik non locking drainage catheter was placed.  Position of the drainage catheter within the pleural space was  confirmed.  Initial imaging findings: Large loculated right pleural effusion  Drainage catheter placed: [facility], non locking  Tajik size: 14  External catheter securement:  Non-absorbable suture and adhesive  anchoring device  Post-drainage imaging findings: Partial drainage of the pleural fluid  Additional findings: None    Left thoracentesis  Local anesthesia was administered. A safe window for thoracentesis was  identified with ultrasound. The pleural space was accessed and fluid  return confirmed position. The fluid was drained.  The catheter was  removed and a sterile dressing was applied.  Catheter size (Fr):5  Catheter valve: No  Fluid appearance: serous  Volume drained (mL): 700  Post-drainage ultrasound: Other-trace effusion    Additional Details  Additional description of procedure: None  Device used: None  Equipment details: None  Aspirated fluid was sent for analysis from both sides.  Estimated blood loss (mL): Less than 10  Standardized report: [email or web address]_v3.1    Attestation  Signer name: ANU PHILIPPE PA-C  I attest that I was present for the entire  procedure. I reviewed the  stored images and agree with the report as written.      ANU HEATH PA-C         SYSTEM ID:  [id number]   IR Chest Tube Place Non Tunneled Right    Narrative    PROCEDURE: Right chest tube placement, left diagnostic and therapeutic  thoracentesis    Procedural Personnel  Advanced practice provider(s): Mehul Heath PA-C    Pre-procedure diagnosis: Shortness of breath  Post-procedure diagnosis: Same  Indication: Compromised respiration associated with pleural fluid  collection  Additional clinical history: Heart failure, pulmonary HTN, a-fib,  interstitial lung disease, admitted with VTE, transferred to ICU with  increasing oxygen needs, CT shows large loculated right effusion and  moderate left effusion. IR consulted for bilateral diagnostic and  therapeutic thoracenteses    Complications: No immediate complications.      Impression    IMPRESSION:    1. Right 14 Maltese chest tube placement, yielding 400 mL of serous  fluid.  2. Diagnostic and therapeutic left thoracentesis. 700 mL clear pink  serous fluid aspirated, a portion sent to lab for analysis, trace  effusion post procedure.    Plan:     Per ICU, IR recommends 2 mg alteplase in 20 mL saline instilled via  chest tube for 45 minutes then opened to drainage BID x 3 days  ______________________________________________________________________    PROCEDURE SUMMARY:  - Percutaneous Unilateral pleural drainage with insertion of  indwelling catheter under ultrasound guidance  - US guided left thoracentesis  - Additional procedure(s): None    PROCEDURE DETAILS:    Pre-procedure  Consent: Informed consent for the procedure including risks, benefits  and alternatives was obtained and time-out was performed prior to the  procedure.  Preparation: The site was prepared and draped using maximal sterile  barrier technique including cutaneous antisepsis.    Anesthesia/sedation  Level of anesthesia/sedation: No sedation  Anesthesia/sedation  administered by: Not applicable  Total intra-service sedation time (minutes): N/a    Limited thoracic ultrasound  Limited thoracic ultrasound was performed.   Left hemithorax findings: Moderate pleural effusion  Right hemithorax findings: Large pleural effusion    Right chest tube placement  The patient was positioned supine. Initial imaging was performed  showing loculated right effusion. Local anesthesia was administered.  The pleural space was accessed with Yueh catheter. Only 30 mL was  aspirated so the Yueh was removed. Discussed with ICU and decision to  place chest tube. Consent updated and the patient was positioned left  lateral decubitus oblique. Imaging again showed large loculated right  effusion. Local anesthesia was administered. The pleural space was  accessed using an access needle followed by wire insertion and serial  dilation and a 14 Latvian non locking drainage catheter was placed.  Position of the drainage catheter within the pleural space was  confirmed.  Initial imaging findings: Large loculated right pleural effusion  Drainage catheter placed: [facility], non locking  Latvian size: 14  External catheter securement:  Non-absorbable suture and adhesive  anchoring device  Post-drainage imaging findings: Partial drainage of the pleural fluid  Additional findings: None    Left thoracentesis  Local anesthesia was administered. A safe window for thoracentesis was  identified with ultrasound. The pleural space was accessed and fluid  return confirmed position. The fluid was drained.  The catheter was  removed and a sterile dressing was applied.  Catheter size (Fr):5  Catheter valve: No  Fluid appearance: serous  Volume drained (mL): 700  Post-drainage ultrasound: Other-trace effusion    Additional Details  Additional description of procedure: None  Device used: None  Equipment details: None  Aspirated fluid was sent for analysis from both sides.  Estimated blood loss (mL): Less than  10  Standardized report: [email or web address]_v3.1    Attestation  Signer name: ANU PHILIPPE PA-C  I attest that I was present for the entire procedure. I reviewed the  stored images and agree with the report as written.      ANU PHILIPPE PA-C         SYSTEM ID:  [id number]   Physician Attestation   I have reviewed and discussed with the advanced practice provider their history, physical and plan for Jeanine MARTÍN Schuler. I did not participate in a shared visit by interviewing or examining the patient and this should be billed as an advanced practice provider only visit.    Misael Lyons MD  Date of Service (when I saw the patient): I did see this patient today.

## 2023-06-13 NOTE — CONSULTS
Gastroenterology Consultation      Date of Admission:  5/16/2023  Reason for Admission: DVT, PE, SOB  Date of Consult  6/13/2023   Requesting Physician:  Kaylynn Vasquez,*    Video-Visit Details  Type of service:  Video Visit    Video Start Time: 2:10 PM  Video End Time:  2:25 PM    Originating Location (pt. Location): Other [facility] UNIT 10 ICU ([facility] [place]    Distant Location (provider location):  [facility]  ([facility])    Platform used: Other: [employer]           ASSESSMENT AND RECOMMENDATIONS:   Assessment:  Jeanine Schuler is a 62 year old female with a PMH of HTN, HFpEF (LV EF 45-50%), pulmonary HTN, paroxysmal afib (on Apixaban), ILD, sarcoidosis, mixed connective tissue disease, Raynaud's, chronic dysphagia and s/p combined RNY gastric bypass and temo (2005) who was admitted on 5/16/2023 with FTT and found to have multiple R sided pulmonary emboli and bilateral lower extremity DVT's with course c/b large b/l pleural effusions and pneumonia.  GI consulted for consideration of GJ tube placement.    #Chronic dysphagia due to scleroderma  #Severe protein-calorie malnutrition  #Cirrhosis with portal HTN (on CT) with ascites  #Pulmonary embolism and DVT (dx 5/16/2023) - on apixaban  Progressively worsening dysphagia to liquids/poor po intakes the last 2-3 months due to her scleroderma with wt loss 30# since January.  Patient with chronically poor po intakes both PTA and the duration of this adm requiring 10 Fr naso-FT (previously NJT per radiology placement but more recent appears gastric/in pouch on CTA per review of imaging).  No e/o significant stricturing noted on esophagram this adm (noted opacification of distal esophagus, GE junction and gastric pouch w/o severe narrowing, did note some barium status suspected r/t dysmotility although small stricture possible).      IR on WB consulted for placement of GJ tube but d/t previous gastric bypass and small perc window and  ascites alternatively rec GI or surgery eval.  Noted e/o cirrhosis and portal HTN on CT with hx of small-moderate amount of ascites per review of past imaging (CT and most recent 6/1 US) and would re-evaluate/tap if significant amount of ascites prior to attempt at remnant Gtube placement. Pt with RNY gastric bypass hx and suspect would be appropriate for remnant Gtube and no need for GJ tube placement.  Apixaban on hold since 6/11 for thoracentesis/CT procedures 6/12 and if to proceed with Gtube, would need to continue to hold today and additionally 72 hrs post procedure.    Recommendations:  -Per discussion with  ICU team, agree with continuing to hold Apixaban now and 72 hrs post procedure.  -Repeat complete abd US to evaluate ascites and if approp for paracentesis 6/13 (prior to consideration of remnant Gtube).  -Proceed with EGD/EUS in OR for attempt at remnant Gtube placement with gastropexy, possible  Placement of Edge stent/reversal of gastric bypass on 6/14 at 12:50 on [place] with Dr. Arnold.   *Will need to arrange ride with arrival unit 3C on [place] by 10:50.   *Will plan to transfer back to  after completion of procedure.  -Continue to hold Apixaban (note will need to continue to hold 72 hrs post PEG placement as well)  -NPO/hold TF at midnight  -Analgesia/Antiemetics per primary team  -Discussed with  ICU team and pt/.    Gastroenterology follow up recommendations:   TBD pending clinical course.    Thank you for involving us in this patient's care. Please do not hesitate to contact the GI service with any questions or concerns.     Pt seen and care plan discussed with Dr. Byrne, GI staff physician.    Overall time spent on the date of this encounter preparing to see the patient (including chart review of available notes, clinical status events, imaging and labs); obtaining and/or reviewing separately obtained history; ordering medications, tests or procedures; communicating with  "other health care professionals; and documenting the above clinical information in the electronic medical record was 90 minutes.    Kari Browning PA-C  GI Service  [facility]  Text Page  -------------------------------------------------------------------------------------------------------------------       Reason for Consultation:   \"need j/g tube placement pt with hx of previous gastric bypass, small percutaneous window, and presence of ascites\"         History of Present Illness:   Jeanine Schuler is a 62 year old female with a PMH of HTN, HFpEF (LV EF 45-50%), pulmonary HTN, paroxysmal afib (on Apixaban), ILD, sarcoidosis, mixed connective tissue disease, Raynaud's, chronic dysphagia and s/p combined RNY gastric bypass and temo (2005) who was admitted on 5/16/2023 with FTT and found to have multiple R sided pulmonary emboli and bilateral lower extremity DVT's with course c/b large b/l pleural effusions and pneumonia.  GI consulted for consideration of GJ tube placement.    Patient seen and examined at 14:10. History is obtained from patient and .  Reports progressive issues with dysphagia to liquids the last 2-3 months significantly limiting po intakes with subsequent wt loss of 30# since January.      Previous Procedures:  6/12/2023: IR - Large loculated right effusion with Right CT (14 [place]) placed with 3 way for TPA if desired. 60 mL deanna fluid sent for lab, ~400 drained.  Left diagnostic and therapeutic thoracentesis with 700 mL pink fluid aspirated from left chest. No effusion remains on completion.    6/7/2023: Esophagram  HISTORY:    History of scleroderma with dysphagia. Known esophageal  dysmotility. Evaluate for stricture.  IMPRESSION:  Technically limited examination secondary to patient mobility. No  evidence of severe esophageal stricture. Esophageal dysmotility, as  detailed above, is incompletely evaluated.    5/24/2023: Radiology - Advancement of " feeding tube into the alimentary limb of the Abdi-en-Y  gastrectomy.            Past Medical History:   Reviewed and edited as appropriate  Past Medical History:   Diagnosis Date     Anemia      Bariatric surgery status 06/27/2005    [facility];     Cellulitis of leg 06/06/2013     Esophageal reflux     Better post-hiatal hernia repair and weight loss     Hyperplastic colonic polyp      Hypovitaminosis D 2011     ILD (interstitial lung disease) (H)      Kidney stone 04/29/2007     Kidney stone 05/10/2007    surgically removed     Limb ischemia; ulcers of fingertips 04/22/2013     Other chronic pain     Left shoulder - rheumatoid arthritis     Raynaud's syndrome      Rheumatoid arthritis (H) \     Scleroderma (H)      Sjogren-Jake syndrome      Systemic sclerosis (H) 2009     Unspecified essential hypertension             Past Surgical History:   Reviewed and edited as appropriate   Past Surgical History:   Procedure Laterality Date     BIOPSY MUSCLE DIAGNOSTIC (LOCATION) Right 7/19/2021    Procedure: Right SURAL nerve BIOPSY;  Surgeon: Farooq Abel MD;  [place] [facility] [place]     BRONCHOSCOPY FLEXIBLE,L CRYOBIOPSY N/A 10/06/2020    Procedure: BRONCHOSCOPY, FLEXIBLE, WITH TRANSBRONCHIAL BIOPSY x4 USING CRYOPROBE, bronchial alveolar lavage;  Surgeon: Teddy Mendez MD;  Location:  OR     BYPASS GASTRIC, CHOLECYSTECTOMY, COMBINED  06/27/2005    [facility]     CHOLECYSTECTOMY       COLONOSCOPY       COLONOSCOPY N/A 11/17/2020    Procedure: COLONOSCOPY, WITH POLYPECTOMY AND BIOPSY;  Surgeon: Jamie Cárdenas MD;  [place] [facility] [place]     CV HEART CATHETERIZATION WITH POSSIBLE INTERVENTION Left 12/21/2021    Procedure: Heart Catheterization with Possible Intervention;  Surgeon: Wesley Mclean MD;  Location:  HEART CARDIAC CATH LAB     CV RIGHT HEART CATH MEASUREMENTS RECORDED N/A 4/7/2022    Procedure: Right Heart Cath;  Surgeon: Dieter Brock MD;  Location: [facility] CARDIAC  CATH LAB     ESOPHAGOSCOPY, GASTROSCOPY, DUODENOSCOPY (EGD), COMBINED N/A 03/10/2016    Procedure: COMBINED ESOPHAGOSCOPY, GASTROSCOPY, DUODENOSCOPY (EGD), BIOPSY SINGLE OR MULTIPLE;  Surgeon: Tricia Zambrano MD;  Location:  GI     ESOPHAGOSCOPY, GASTROSCOPY, DUODENOSCOPY (EGD), COMBINED N/A 11/17/2020    Procedure: ESOPHAGOGASTRODUODENOSCOPY, WITH BIOPSY and Dilation;  Surgeon: Jamie Cárdenas MD;  Location: [facility] OR     INNER EAR SURGERY       PICC INSERTION  06/05/2013    5fr DL Power PICC, 44cm, left basilic vein, tip in low SVC     SURGICAL HISTORY OF -       Left ear surgery - incus transition, tympanoplasty     SURGICAL HISTORY OF -   06/01/2005    Hiatal hernia repair     TONSILLECTOMY              Social History:   .  Lives in [place], [place]         Family History:   Patient's family history is reviewed today and is non-contributory    Family History   Problem Relation Age of Onset     Cerebrovascular Disease Father      Heart Disease Father      Hypertension Father      Heart Disease Mother      Hypertension Mother      Chronic Obstructive Pulmonary Disease Sister      Heart Disease Sister      Hypertension Sister      Cerebrovascular Disease Brother      Heart Disease Brother      Kidney Disease Brother         on dialysis     Diabetes Brother         borderline     No Known Problems Son      No Known Problems Son      No Known Problems Daughter      Cancer Brother         small cell     Heart Disease Brother      Heart Disease Brother      Heart Disease Brother      Heart Disease Brother         tripple A     Heart Disease Sister      Substance Abuse Sister         alcoholism     Crohn's Disease No family hx of      Ulcerative Colitis No family hx of      GERD No family hx of      Celiac Disease No family hx of      Nephrolithiasis No family hx of      Parathyroid Disorders No family hx of      Calcium Disorder No family hx of              Allergies:   Reviewed and edited as  appropriate     Allergies   Allergen Reactions     Enoxaparin Sodium Other (See Comments)     Blood clot      Norvasc [Amlodipine Besylate] Swelling     Lip swelling that happened on 2 different occasions     Erythromycin Rash     Rash on arms            Medications:     Current Facility-Administered Medications   Medication     acetaminophen (TYLENOL) tablet 975 mg     amiodarone (PACERONE) tablet 200 mg     [Held by provider] apixaban ANTICOAGULANT (ELIQUIS) tablet 5 mg     bisacodyl (DULCOLAX) suppository 10 mg     bumetanide (BUMEX) 0.25 mg/mL infusion     bumetanide (BUMEX) injection 2 mg     dextrose 10% infusion     glucose gel 15-30 g    Or     dextrose 50 % injection 25-50 mL    Or     glucagon injection 1 mg     folic acid (FOLATE) oral suspension 1 mg     gabapentin (NEURONTIN) capsule 100 mg     HYDROmorphone (DILAUDID) injection 0.2 mg     hydrOXYzine (ATARAX) tablet 25 mg     ipratropium - albuterol 0.5 mg/2.5 mg/3 mL (DUONEB) neb solution 3 mL     Lidocaine (LIDOCARE) 4 % Patch 1 patch    And     lidocaine patch in PLACE     Lidocaine (LIDOCARE) 4 % Patch 1 patch     lidocaine (LMX4) cream     lidocaine 1 % 0.1-1 mL     lidocaine patch in PLACE     magnesium oxide (MAG-OX) tablet 400 mg     melatonin tablet 3 mg     metoclopramide (REGLAN) injection 10 mg     metoprolol (LOPRESSOR) injection 5 mg     multivitamins w/minerals liquid 15 mL     naloxone (NARCAN) injection 0.2 mg    Or     naloxone (NARCAN) injection 0.4 mg    Or     naloxone (NARCAN) injection 0.2 mg    Or     naloxone (NARCAN) injection 0.4 mg     oxyCODONE (ROXICODONE) tablet 5-10 mg     pantoprazole (PROTONIX) 2 mg/mL suspension 40 mg     piperacillin-tazobactam (ZOSYN) 4.5 g vial to attach to  mL bag     polyethylene glycol (MIRALAX) Packet 17 g     predniSONE (DELTASONE) tablet 15 mg     senna-docusate (SENOKOT-S/PERICOLACE) 8.6-50 MG per tablet 2 tablet     sodium chloride (NEBUSAL) 3 % neb solution 3 mL     sodium chloride  (PF) 0.9% PF flush 3 mL     sodium chloride (PF) 0.9% PF flush 3 mL     sodium chloride (PF) 0.9% PF flush 3 mL     tiZANidine (ZANAFLEX) tablet 2 mg             Review of Systems:     A complete review of systems was performed and is negative except as noted in the HPI           Physical Exam:   Temp: 97.8  F (36.6  C) Temp src: Axillary BP: 90/71 Pulse: 69   Resp: 16 SpO2: 100 % O2 Device: Oxymask Oxygen Delivery: 3 LPM  Wt:   Wt Readings from Last 2 Encounters:   06/13/23 79.7 kg (175 lb 11.3 oz)   05/08/23 68 kg (150 lb)      General: Cachectic/chronically ill-appearing female resting in bed in NAD.  Answers appropriately.    HEENT: Head is AT/NC. Sclera anicteric. No conjunctival injection.  10 Fr Cortrak FT bridled in place. Temporal and buccal muscle wasting.  Lungs: Non-labored breathing on RA.  Heart: Regular rate and rhythm (on monitor)  Abdomen: (limted virtual exam) appears soft/non-distended, non-tender (to self-palpation).  No rebound or peritoneal signs.  Extremities: WWP, no pedal edema.  MSK: no gross deformity, temporal/clavicular/UE/LE muscle wasting  Skin: No jaundice or rash  Neurologic: Grossly non-focal.  CN 2-12 grossly intact.   Psych: mood appropriate to situation         Data:   Labs and imaging below were independently reviewed and interpreted    LAB WORK:    BMP  Recent Labs   Lab 06/13/23  0446 06/13/23  0401 06/12/23  2358 06/12/23  2348 06/12/23  1157 06/12/23  1142 06/12/23  0517 06/12/23  0513     --   --  136  --  140  --  136   POTASSIUM 3.5  3.5  --   --  3.2*  --  3.9  --  3.4   CHLORIDE 96*  --   --  96*  --  98  --  95*   DAVID 7.6*  --   --  7.4*  --  7.6*  --  7.9*   CO2 29  --   --  30*  --  31*  --  31*   BUN 21.7  --   --  22.1  --  23.0  --  25.4*   CR 0.63  --   --  0.61  --  0.67  --  0.65   * 70 103* 123*   < > 151*   < > 92    < > = values in this interval not displayed.     CBC  Recent Labs   Lab 06/13/23  0446 06/12/23  0513 06/11/23  07  06/11/23  0538   WBC 8.3 10.1 15.2* 13.5*   RBC 2.77* 2.84* 2.32* 2.21*   HGB 8.4* 8.7* 7.2* 6.7*   HCT 25.9* 26.4* 21.7* 20.6*   MCV 94 93 94 93   MCH 30.3 30.6 31.0 30.3   MCHC 32.4 33.0 33.2 32.5   RDW 15.2* 15.1* 15.2* 15.3*   * 457* 472* 413     INRNo lab results found in last 7 days.  LFTs  Recent Labs   Lab 06/12/23  1800 06/12/23  1142 06/12/23  0513 06/09/23  0615 06/07/23  0559   ALKPHOS  --   --  62 55 60   AST  --   --  20 19 17   ALT  --   --  15 14 16   BILITOTAL  --   --  0.3 0.2 0.2   PROTTOTAL 4.0* 4.5* 4.4* 4.2* 4.1*   ALBUMIN  --   --  2.0* 1.7* 1.6*      PANCNo lab results found in last 7 days.    IMAGING:  (personally reviewed)   6/11/2023: CTA pulmonary angiogram  FINDINGS:  Contrast bolus is: adequate.  Exam is negative for acute  pulmonary embolism. Resolution of previously seen right-sided  pulmonary emboli.       Main pulmonary artery is enlarged measuring up to 3.3 cm. Ectasia of  the ascending aorta. Cardiomegaly. Substantially increased  moderate  to large partially loculated right pleural effusion. No significant  change in small-to-moderate partially loculated left pleural effusion.  There are new groundglass opacities in the left upper lobe and right  upper lobe. There is interlobular septal thickening. Patchy  consolidative opacities in the right middle lobe as well. Partial  right lower lobe atelectasis and left lower lobe atelectasis. Reflux  of contrast into the IVC.     Surgical changes of gastric bypass. Enteric tube terminates in the  stomach. Small sliding-type hiatal hernia. Trace ascites.  Cholecystectomy.     Stable shrunken right breast compared to the left. No suspicious  osseous lesion.                                                                       IMPRESSION:   1. Exam is negative for acute pulmonary embolism. Resolution of  previously seen right sided pulmonary emboli.       Evidence for right heart strain or increased right heart pressures?   is present.  Reflux of contrast into the IVC suggesting right heart  dysfunction     2. Significantly increased moderate to large right partially loculated  pleural effusion. Stable partially loculated left pleural effusion.     3. Multifocal airspace opacities and interlobular septal thickening is  most consistent with pulmonary edema.  4. Cardiomegaly and enlargement of the main pulmonary artery, which  can be seen, hypertension.  5. Stable shrunken right breast compared to the left, continued  recommendation for diagnostic mammogram.    6/1/2023:  Limited Abdominal Ultrasound  IMPRESSION:   1.  Cirrhotic liver morphology with diffuse hepatic steatosis.  2.  Small volume ascites.  3.  Bilateral pleural effusions.    5/24/2023: XR Feeding tube with Radiology - Advancement of feeding tube into the alimentary limb of the Eleazar-en-Y gastrectomy.    5/23/2023: XR ABDOMEN PORT 1 VIEW  Indication: ng tube  Impression:  1. Enteric tube tip projects over the expected location of distal  esophagus/hiatus region. Recommend repositioning/advancement.  2. Bilateral pleural effusion with overlying atelectasis.  *Per personal review, appears to be a ~10 Fr feeding tube (possible Cortrak?)    5/16/2023: CT ABDOMEN PELVIS W CONTRAST  IMPRESSION:   1. Diffusely thickened and edematous colonic wall which is likely  secondary to patient's volume overload state as the small bowel wall  is thickened and edematous as well, however cannot definitively rule  out colitis.  2. Cirrhotic appearing liver with sequela of portal hypertension with  moderate volume ascites, diffuse anasarca, and recanalized umbilical  vein.  3. No evidence of malignancy in the abdomen or pelvis.  4.. Please see same-day CT chest regarding findings in the chest.        =======================================================================

## 2023-06-13 NOTE — PROGRESS NOTES
ICU End of Shift Summary     Changes this shift: Bumex 2 mg given x1 and Bumex gtt started at 1 mg/hr, set rate. BG still reading low on meter checks despite D10% running at 70ml/hr, TF running at 40 ml/hr and apple juice given. Checked BG at the same time a venous blood sugar was taken. Not correlating so MIKKI discontinued order for POCT checks and will just check Q8H with BMP. Patient stating they are still feeling better respiratory wise but has become more tachypneic throughout the day. Chest tube draining serious fluid 30-80 ml/hr throughout the shift. New skin tears found on R buttock and R inner thigh, WOC reconsulted. TF formula changed and increased to 40 ml/hr, patient tolerating well. Having about 500-600 ml/hr out of rolle Q2H. Had one episode of urinary incontinence around rolle.       For complete assessments and vital signs, please see flowsheets.

## 2023-06-14 NOTE — PROGRESS NOTES
[facility]   Cardiology Progress Note    Assessment & Plan   Jeanine is a 61-year-old female with a history of ILD, pulmonary sarcoidosis, scleroderma, Raynaud's phenomenon, nonischemic cardiomyopathy with a mildly reduced ejection fraction 40%, she has history of A-fib with RVR, and chronic systolic heart failure prior nadirs down to 35%.  She recently was evaluated and discharged from the cardiac service for A-fib with RVR, she was discharged on guideline directed medical therapy she underwent right heart catheterization which overall showed normal filling pressures and no normal output.  Cardiac biopsy at the time was deferred due to low suspicion for cardiac sarcoidosis.     [place] [facility] [place]ism was and bilateral DVTs, for which she received Eliquis for this.  She also had issues with hypoxia thought secondary to her PE.  She unfortunately over the last few days has deteriorated requiring more oxygen and becoming progressively hypotensive.  Overall, appears patient has become progressively volume overloaded. Recommended IV diuresis and TTE. Pending this we will decide on further investigations from a cardiac standpoint pending her ejection fraction.  In regards to her atrial fibrillation and RVR, allow would allow for some permissive tachycardia into the 120s, would avoid aggressive IV beta-blockade, and continue oral therapies if possible.    Recommendations:  - Please ensure accurate standing weights when possible and Is/Os. By her current record weights, she is still above pre-admission weight which rangedfrom 140-150 lbs.   - Agree with continuing bumex gtt, currently without JANE or worsening contraction alkalosis suggesting approaching euvolemia.   - IVP lopressor 5 mg if HR consistently >120 bpm.   - As patient clinically stabilizes, recommend metoprolol succinate (low dose), ACEI/ARB, and SGLT2i given her mildly reduced LVEF and history of  "reduced EF  - Continue amiodarone 200 mg daily  - Restart eliquis when able   - Considered further work up of cardiac sarcoid, previously felt to be lower on the differential. Could consider in vs. Outpatient CMR pending clinical improvement.     Pt was discussed Dr. Link, who agrees with the assessment and plan.    Roberto Salvador, PGY-4  Cardiovascular Disease Fellow    Interval History   No acute events overnight. Significant UOP on bumex gtt, though Wt only down 1 lb today.     ROS: A 4-point ROS was otherwise negative except as above.    Data reviewed today: I reviewed all new labs and imaging results over the last 24 hours. I personally reviewed:    Physical Exam   Temp: 98  F (36.7  C) Temp src: Axillary BP: 117/76 Pulse: 81   Resp: 14 SpO2: 99 % O2 Device: Oxymask Oxygen Delivery: 4 LPM  Vitals:    06/11/23 1245 06/13/23 0415 06/14/23 0600   Weight: 79.8 kg (175 lb 14.8 oz) 79.7 kg (175 lb 11.3 oz) 79 kg (174 lb 2.6 oz)     Vital Signs with Ranges  Temp:  [97.7  F (36.5  C)-98  F (36.7  C)] 98  F (36.7  C)  Pulse:  [72-97] 81  Resp:  [14-40] 14  BP: ()/(57-79) 117/76  SpO2:  [89 %-100 %] 99 %  I/O last 3 completed shifts:  In: 2708.93 [I.V.:1988.93; NG/GT:270]  Out: 6460 [Urine:6050; Chest Tube:410]     , Blood pressure 117/76, pulse 81, temperature 98  F (36.7  C), temperature source Axillary, resp. rate 14, height 1.727 m (5' 8\"), weight 79 kg (174 lb 2.6 oz), SpO2 99 %.  174 lbs 2.61 oz  Virtual consult, no exam    Medications     bumetanide 1 mg/hr (06/14/23 0100)     dextrose 1,000 mL (06/14/23 0636)       amiodarone  200 mg Oral Daily     [Held by provider] apixaban ANTICOAGULANT  5 mg Oral BID     cholecalciferol  20 mcg Oral Daily     folic acid  1 mg Oral Daily     gabapentin  100 mg Oral or Feeding Tube At Bedtime     ipratropium - albuterol 0.5 mg/2.5 mg/3 mL  3 mL Nebulization 4x daily     lidocaine  1 patch Transdermal Q24h    And     lidocaine   Transdermal Q8H INOCENCIA     lidocaine  1 patch " Transdermal Q24H     lidocaine   Transdermal Q8H INOCENCIA     magnesium oxide  400 mg Oral Daily     metoclopramide  10 mg Intravenous Q6H     multivitamins w/minerals  15 mL Oral Daily     pantoprazole  40 mg Oral or Feeding Tube QAM AC     piperacillin-tazobactam  4.5 g Intravenous Q6H     polyethylene glycol  17 g Oral Daily     potassium chloride  10 mEq Intravenous Q1H     sodium phosphate  9 mmol Intravenous Once     predniSONE  15 mg Oral Daily     protein modular  1 packet Per Feeding Tube BID     senna-docusate  2 tablet Oral BID     sodium chloride  3 mL Nebulization 4x daily     sodium chloride (PF)  3 mL Intracatheter Q8H     sodium chloride (PF)  3 mL Intracatheter Q8H       Data   Recent Labs   Lab 06/14/23  0523 06/13/23  2213 06/13/23  1556 06/13/23  0817 06/13/23  0446 06/12/23  1823 06/12/23  1800 06/12/23  1157 06/12/23  1142 06/12/23  0517 06/12/23  0513 06/09/23  1446 06/09/23  0615   WBC 7.9  --   --   --  8.3  --   --   --   --   --  10.1   < > 9.9   HGB 9.5*  --   --   --  8.4*  --   --   --   --   --  8.7*   < > 8.1*   MCV 93  --   --   --  94  --   --   --   --   --  93   < > 94   *  --   --   --  507*  --   --   --   --   --  457*   < > 337    136 137  --  136   < >  --   --  140  --  136   < > 132*   POTASSIUM 3.5  3.5 3.4 3.7  --  3.5  3.5   < >  --   --  3.9  --  3.4   < > 4.1   CHLORIDE 93* 93* 94*  --  96*   < >  --   --  98  --  95*   < > 96*   CO2 31* 35* 34*  --  29   < >  --   --  31*  --  31*   < > 29   BUN 19.8 20.2 20.0  --  21.7   < >  --   --  23.0  --  25.4*   < > 25.7*   CR 0.58 0.63 0.60  --  0.63   < >  --   --  0.67  --  0.65   < > 0.57   ANIONGAP 12 8 9  --  11   < >  --   --  11  --  10   < > 7   DAVID 7.6* 7.4* 7.5*  --  7.6*   < >  --   --  7.6*  --  7.9*   < > 7.7*   GLC 82 109* 121*  121*   < > 110*   < >  --    < > 151*   < > 92   < > 106*   ALBUMIN  --   --   --   --   --   --   --   --   --   --  2.0*  --  1.7*   PROTTOTAL  --   --   --   --   --   --   4.0*  --  4.5*  --  4.4*  --  4.2*   BILITOTAL  --   --   --   --   --   --   --   --   --   --  0.3  --  0.2   ALKPHOS  --   --   --   --   --   --   --   --   --   --  62  --  55   ALT  --   --   --   --   --   --   --   --   --   --  15  --  14   AST  --   --   --   --   --   --   --   --   --   --  20  --  19    < > = values in this interval not displayed.       Recent Results (from the past 24 hour(s))   US Abdomen Limited    Narrative    Exam: US ABDOMEN LIMITED  6/13/2023 11:36 AM      History: follow up on ascites    Comparison: 6/1/2023. Chest radiograph from 6/12/2023    Findings/    Impression    impression: Bilateral pleural effusions noted, correlating with recent  chest radiograph. There is a trace amount of ascites throughout the  abdomen and pelvis.    AMAURY LAKE MD         SYSTEM ID:  [id number]     I very much appreciated the opportunity toassess Jeanine Schuler in the hospital with CV Fellow Shaw [place]. I agree with the note above which summarizes my findings and current recommendations.  Please do not hesitate to contact my office if you have any questions or concerns.      Bacilio Link MD  Cardiac Arrhythmia Service  [facility]  [phone]

## 2023-06-14 NOTE — PLAN OF CARE
Shift: 06/14/2023 8981-0197  Neuro: A/O x4, anxious about procedure this am, atarax given x1, active listening utilized. PERRL. Afebrile. C/o back pain this am, tylenol given x1. C/o incision ab pain, IV dilaudid given x1.  CV: A-fib CVR. BP normotensive.  PU: on 2L O2 NC. R CT to suction -20cm H2O, serosanguinous drainage. LS diminished, weak cough, denies SOB.   GI: No BM this shift. PEJ clamped, site WDL, plan to assess q2h x2 and restart TF at 2000.   : rolle patent, good urine output, on bumex gtt at 1mg/hr.  Skin: R/L calf wounds, dressing CDI. PI on coccyx, dressing CDI. R CT dressing, CDI.  Lines: R PIV x2. L PIV.  Other info: transferred to [facility] at 10a for PEJ placement, arrived back to unit at 1530. K+ 3.7, replaced per protocol, recheck tomorrow. alexy WDL.       Goal Outcome Evaluation: continue with plan of care, update provider with changes.      Plan of Care Reviewed With: patient

## 2023-06-14 NOTE — CONSULTS
[facility] Nurse Inpatient Assessment     Consulted for: Bilateral lower calf wounds    Adding EdemaWear stockings to plan of care. Patient with moderate lower leg edema which may be impeding wound healing.     Patient History (according to H&P provider note(s) 5/17/23:      Jeanine Schuler is a 62 year old female with a past medical history of HTN, chronic atrial fibrillation on anticouagulation until recently, multiple autoimmune diseases, sarcoidosis, pulmonary hypertension and CHF, who presented to our service with shortness of breath in rest, anterior pressure like chest pain that irradiated to back and severe weakness that worsen today. For the last couple of months she has generally felt unwell with nausea, poor apetite (last meal 5 days ago) and progressive debilitating weakness to the point of difficulty deambulating. 1 month ago she fell from her wheel chair and injured both legs, needing stitches and 2 rounds of bactrim due to infection. This wounds have been very painful and have contributed for her bedridden status.        Assessment:      Areas visualized during today's visit: Lower extremities     Wound location: Bilateral LE     5/23: Left medial/posterior LE                         6/14 5/23: Right lateral/posterior LE                        6/14    Last photo: 6/14/23  Wound due to: Trauma  Wound history/plan of care: resulted from a fall- see HPI  Wound base: LLE 70/30 % eschar and slough/non-granular tissue- softening, RLE 80 % superficial scab and slough 20% dermis      Palpation of the wound bed: normal      Drainage: moderate from RLE, copious from LLE     Description of drainage: serosanguinous     Measurements (length x width x depth, in cm): LLE 5.5  x 5.5  x  0.4 cm, RLE 4  x 1.9  x  0.3 cm      Tunneling: N/A     Undermining: N/A  Periwound skin: Intact, Superficial erosion and Scar tissue      Color: normal and consistent with  surrounding tissue and red      Temperature: normal   Odor: none  Pain: severe and during dressing change, sharp and tender  Pain interventions prior to dressing change: soaking and slow and gentle cares   Treatment goal: Heal , Infection control/prevention, Maintain (prevention of deterioration), Pain control and Protection  STATUS: improving slowly  Supplies ordered: gathered, discussed with RN and discussed with patient      Pressure Injury Location: sacrum    6/14  Last photo: 6/14  Wound type: Pressure Injury     Pressure Injury Stage: 2, hospital acquired   Wound history/plan of care:  Noted by RN during skin assessment/cares  Wound base: 100 % dermis     Palpation of the wound bed: normal      Drainage: small     Description of drainage: serosanguinous     Measurements (length x width x depth, in cm) 0.5 x 0.5 x 0.2 cm  Periwound skin: Intact      Color: normal and consistent with surrounding tissue      Temperature: normal   Odor: none  Pain: denies , none  Pain intervention prior to dressing change: no significant pain present   Treatment goal: Heal  and Protection  STATUS: initial assessment  Supplies ordered: discussed with RN and discussed with patient     Right posterior thigh: superficial abrasion appears nearly resurfaced. No treatment needed     Treatment Plan:     Sacrum: Every 3 days and as needed  Cleanse the area with microklenz and pat dry.  Apply No sting film barrier to periwound skin.  Cover wound with Sacral Mepilex (#[id number])  Only ONE incontinent pad under pt to allow bed to work properly  Change dressing Q 3 days.  Turn and reposition Q 2hrs side to side only.  Ensure pt has Kenneth-cushion while sitting up in the chair.    FYI- If pt has constant incontinent loose stools needing dressing changes Q shift please discontinue the Mepilex dressing and apply criticaid barrier paste BID and PRN.     Bilateral LE wound(s): Every other day  And PRN when soiled change cover dressing  Gently remove old  "dressing- please spray with wound cleanser or NS to loosen if sticking to wound bed  Cleanse wound bed with wound cleanser and pat dry.  Cut a piece of Hydrofera blue (#968845) the size of open area, moisten with NS, wring the excess amount and place it on open area.  Cover with 4x4 mepilex for RLE and sacral mepilex for LLE- if having to change sacral mepilex more than once daily please change to exudry pad (783722) secued with kerlix wrap and tape instead.  Don bilateral EdemaWear Stockings (stockings can be reused up to 6 months, wash with mild soap and water and air dry).  Use Prevalon boots while in bed. Continue to elevate legs as needed to float heels even when boots in use. May need 1-3 pillows to fully offload heels.     EdemaWear stockings: Use and Care    Rationale for use:     Decreases edema by improving lymphatic and venous function      Safe and gentle compression    Enhances wound healing    Protects skin    General:     EdemaWear can be worn 24/7, but should be removed at least daily for skin inspection and cares      In order to be effective, EdemaWear should DIRECTLY contact the skin as much as possible -- the mesh weave promotes lymphatic drainage when it is pressed into the skin    Choose appropriate size EdemaWear based on leg (or arm) circumference - see table for guidelines    EdemaWear LITE is only for especially fragile or painful skin    Cleanse and moisturize any intact or scaly skin before applying EdemaWear    Ok to apply additional compression over the EdemaWear (ie Lymph wraps)    Application:     Apply the EdemaWear from base of toes to knee, or above knee if tolerated and it is a long stocking    Create a wide 3\" cuff at the top if needed to prevent rolling down    Only trim the stocking if it is excessively long; do NOT cut in half; can often fold over excess length onto foot    When wounds are present:    Wound dressings that directly treat the wound bed can be applied under the " "EdemaWear    Any additional cover dressings (dry gauze, ABD pads, Kerlix, etc) should be applied ON TOP of the stockings whenever feasible     Stockings may get soiled with drainage and will need to be washed; ensure an extra clean, dry pair always available    May need two people to apply the stocking - bunch up stocking and pull against each other, lifting over wounds    Care:    When stockings are soiled, DO NOT THROW AWAY, hand wash with mild soap, rinse, hang dry    Replace approximately every 4 to 6 months        EdemaWear size Max circumference Stripe color PS # # stockings per pack   Small 18\"  (45cm) navy 011099 2   Medium 30\"  (75cm) yellow 770277 1   Large 46\"  (115cm) red 160730 1   X Large 60\"  (150cm) aqua 889089 1   Small LITE 24\"  (60cm) purple 980417 2   Medium LITE 36\"  (90cm) orange 017369 1       Orders: Updated    RECOMMEND PRIMARY TEAM ORDER: wound clinic and home care when discharged for follow-up on wounds  Education provided: plan of care, wound progress and Infection prevention   Discussed plan of care with: Patient and Nurse  WOC nurse follow-up plan: weekly  Notify WOC if wound(s) deteriorate.  Nursing to notify the Provider(s) and re-consult the WOC Nurse if new skin concern.    DATA:     Current support surface: Standard  Standard gel/foam mattress (IsoFlex, Atmos air, etc)  Containment of urine/stool: Incontinent pad in bed  BMI: Body mass index is 26.48 kg/m .   Active diet order: Orders Placed This Encounter      Combination Diet Regular Diet Adult     Output: I/O last 3 completed shifts:  In: 2708.93 [I.V.:1988.93; NG/GT:270]  Out: 6460 [Urine:6050; Chest Tube:410]     Labs:   Recent Labs   Lab 06/14/23  0523 06/13/23  0446 06/12/23  0513   ALBUMIN  --   --  2.0*   HGB 9.5*   < > 8.7*   WBC 7.9   < > 10.1    < > = values in this interval not displayed.     Pressure injury risk assessment:   Sensory Perception: 3-->slightly limited  Moisture: 2-->very moist  Activity: " 2-->chairfast  Mobility: 2-->very limited  Nutrition: 2-->probably inadequate  Friction and Shear: 2-->potential problem  Froy Score: 13    Alexandra Saldana RN BSN CWOCN  Pager no longer is use, please contact through [employer]   [place] group: [facility] Nurse  Dept. Office Number: [phone]

## 2023-06-14 NOTE — ANESTHESIA POSTPROCEDURE EVALUATION
Patient: Jeanine Schuler    Procedure: Procedure(s):  Enteroscopy with feeding tube placement and jejunopexy       Anesthesia Type:  General    Note:  Disposition: Inpatient   Postop Pain Control: Uneventful            Sign Out: Well controlled pain   PONV: No   Neuro/Psych: Uneventful            Sign Out: Acceptable/Baseline neuro status   Airway/Respiratory: Uneventful            Sign Out: Acceptable/Baseline resp. status; O2 supplementation               Oxygen: Nasal Cannula   CV/Hemodynamics:             Sign Out: Acceptable CV status   Other NRE: NONE   DID A NON-ROUTINE EVENT OCCUR? No           Last vitals:  Vitals Value Taken Time   /79 06/14/23 1300   Temp     Pulse 81 06/14/23 1305   Resp 33 06/14/23 1305   SpO2 96 % 06/14/23 1305   Vitals shown include unvalidated device data.    Electronically Signed By: Michelle Pedro MD  June 14, 2023  1:06 PM

## 2023-06-14 NOTE — ANESTHESIA PROCEDURE NOTES
Airway       Patient location during procedure: OR       Procedure Start/Stop Times: 6/14/2023 11:57 AM  Staff -        CRNA: Fallon Herrmann APRN CRNA       Performed By: CRNA  Consent for Airway        Urgency: elective  Indications and Patient Condition       Indications for airway management: laureano-procedural       Induction type:intravenous       Mask difficulty assessment: 1 - vent by mask    Final Airway Details       Final airway type: endotracheal airway       Successful airway: ETT - single  Endotracheal Airway Details        ETT size (mm): 7.0       Cuffed: yes       Successful intubation technique: direct laryngoscopy       DL Blade Type: Jenkins 2       Grade View of Cords: 1       Adjucts: stylet       Position: Right       Measured from: gums/teeth       Secured at (cm): 19       Bite block used: None    Post intubation assessment        Placement verified by: capnometry and equal breath sounds        Number of attempts at approach: 1       Secured with: pink tape       Ease of procedure: easy       Dentition: Intact and Unchanged    Medication(s) Administered   Medication Administration Time: 6/14/2023 11:57 AM

## 2023-06-14 NOTE — PROGRESS NOTES
CLINICAL NUTRITION SERVICES - BRIEF NOTE  Pt gone to [facility] for feeding tube placement this AM, pt was reviewed with ICU team, no suggested changes to nutrition support plan of care, can continue TF as ordered upon pt's return to [facility] once approved to resume TF. Pt is due to follow up assessment today, but in light of pt being gone all day, will defer follow up to tomorrow.     Tiffany Laird RD, CNSC, LD  [address] pager: [phone]  Weekend/holiday pager: [phone]

## 2023-06-14 NOTE — ANESTHESIA PREPROCEDURE EVALUATION
Anesthesia Pre-Procedure Evaluation    Patient: Jeanine Schuler   MRN: [id number] : 1960        Procedure : Procedure(s):  INSERTION, PEG TUBE (into gastric remnant)  possible ESOPHAGOGASTRODUODENOSCOPY, WITH TRANSENDOSCOPIC STENT INSERTION (EDGE for reversal of gastric bypass)  Endoscopic ultrasound upper gastrointestinal tract (GI)          Past Medical History:   Diagnosis Date     Anemia      Bariatric surgery status 2005    abdi en Y- [facility];     Cellulitis of leg 2013     Esophageal reflux     Better post-hiatal hernia repair and weight loss     Hyperplastic colonic polyp      Hypovitaminosis D      ILD (interstitial lung disease) (H)      Kidney stone 2007     Kidney stone 05/10/2007    surgically removed     Limb ischemia; ulcers of fingertips 2013     Other chronic pain     Left shoulder - rheumatoid arthritis     Raynaud's syndrome      Rheumatoid arthritis (H) \     Scleroderma (H)      Sjogren-Jake syndrome      Systemic sclerosis (H)      Unspecified essential hypertension       Past Surgical History:   Procedure Laterality Date     BIOPSY MUSCLE DIAGNOSTIC (LOCATION) Right 2021    Procedure: Right SURAL nerve BIOPSY;  Surgeon: Farooq Abel MD;  [place] [facility] [place]     BRONCHOSCOPY FLEXIBLE,L CRYOBIOPSY N/A 10/06/2020    Procedure: BRONCHOSCOPY, FLEXIBLE, WITH TRANSBRONCHIAL BIOPSY x4 USING CRYOPROBE, bronchial alveolar lavage;  Surgeon: Teddy Mendez MD;  Location: [facility] OR     BYPASS GASTRIC, CHOLECYSTECTOMY, COMBINED  2005    [facility]     CHOLECYSTECTOMY       COLONOSCOPY       COLONOSCOPY N/A 2020    Procedure: COLONOSCOPY, WITH POLYPECTOMY AND BIOPSY;  Surgeon: Jamie Cárdenas MD;  [place] [facility] [place] HEART CATHETERIZATION WITH POSSIBLE INTERVENTION Left 2021    Procedure: Heart Catheterization with Possible Intervention;  Surgeon: Wesley Mclean MD;  Location:  HEART CARDIAC CATH LAB     CV  RIGHT HEART CATH MEASUREMENTS RECORDED N/A 4/7/2022    Procedure: Right Heart Cath;  Surgeon: Dieter Brock MD;  Location:  HEART CARDIAC CATH LAB     ESOPHAGOSCOPY, GASTROSCOPY, DUODENOSCOPY (EGD), COMBINED N/A 03/10/2016    Procedure: COMBINED ESOPHAGOSCOPY, GASTROSCOPY, DUODENOSCOPY (EGD), BIOPSY SINGLE OR MULTIPLE;  Surgeon: Tricia Zambrano MD;  Location:  GI     ESOPHAGOSCOPY, GASTROSCOPY, DUODENOSCOPY (EGD), COMBINED N/A 11/17/2020    Procedure: ESOPHAGOGASTRODUODENOSCOPY, WITH BIOPSY and Dilation;  Surgeon: Jamie Cárdenas MD;  Location: [facility] OR     INNER EAR SURGERY       IR CHEST TUBE PLACEMENT NON-TUNNELED RIGHT  6/12/2023     IR THORACENTESIS  6/12/2023     PICC INSERTION  06/05/2013    5fr DL Power PICC, 44cm, left basilic vein, tip in low SVC     SURGICAL HISTORY OF -       Left ear surgery - incus transition, tympanoplasty     SURGICAL HISTORY OF -   06/01/2005    Hiatal hernia repair     TONSILLECTOMY        Allergies   Allergen Reactions     Enoxaparin Sodium Other (See Comments)     Blood clot      Norvasc [Amlodipine Besylate] Swelling     Lip swelling that happened on 2 different occasions     Erythromycin Rash     Rash on arms      Social History     Tobacco Use     Smoking status: Never     Smokeless tobacco: Never   Vaping Use     Vaping status: Never Used   Substance Use Topics     Alcohol use: Yes     Comment: occassionally      Wt Readings from Last 1 Encounters:   06/14/23 79 kg (174 lb 2.6 oz)        Anesthesia Evaluation   Pt has had prior anesthetic. Type: General.        ROS/MED HX  ENT/Pulmonary: Comment: ILD      Neurologic:       Cardiovascular:     (+) hypertension (controlled)-----dysrhythmias, a-fib, pulmonary hypertension,  (-) CHF and PVD   METS/Exercise Tolerance:     Hematologic:       Musculoskeletal:       GI/Hepatic: Comment: CREST syndrome    (+) GERD, Asymptomatic on medication,     Renal/Genitourinary:    (-) renal disease   Endo:     (+)  Obesity,     Psychiatric/Substance Use:       Infectious Disease:       Malignancy:       Other:      (+) , H/O Chronic Pain,        Physical Exam    Airway      Comment: NG tube in place    Mallampati: II   TM distance: > 3 FB   Neck ROM: full   Mouth opening: > 3 cm    Respiratory Devices and Support     Nasal Canula 3  l/min.     Dental       (+) Edentulous      Cardiovascular          Rhythm and rate: regular     Pulmonary           breath sounds clear to auscultation           OUTSIDE LABS:  CBC:   Lab Results   Component Value Date    WBC 7.9 06/14/2023    WBC 8.3 06/13/2023    HGB 9.5 (L) 06/14/2023    HGB 8.4 (L) 06/13/2023    HCT 28.9 (L) 06/14/2023    HCT 25.9 (L) 06/13/2023     (H) 06/14/2023     (H) 06/13/2023     BMP:   Lab Results   Component Value Date     06/14/2023     06/13/2023    POTASSIUM 3.5 06/14/2023    POTASSIUM 3.5 06/14/2023    CHLORIDE 93 (L) 06/14/2023    CHLORIDE 93 (L) 06/13/2023    CO2 31 (H) 06/14/2023    CO2 35 (H) 06/13/2023    BUN 19.8 06/14/2023    BUN 20.2 06/13/2023    CR 0.58 06/14/2023    CR 0.63 06/13/2023    GLC 82 06/14/2023     (H) 06/13/2023     COAGS:   Lab Results   Component Value Date    INR 1.16 (H) 05/17/2023     POC:   Lab Results   Component Value Date    BGM 59 (L) 10/06/2020    HCG Negative 06/24/2005     HEPATIC:   Lab Results   Component Value Date    ALBUMIN 2.0 (L) 06/12/2023    PROTTOTAL 4.0 (L) 06/12/2023    ALT 15 06/12/2023    AST 20 06/12/2023    GGT 13 02/21/2013    ALKPHOS 62 06/12/2023    BILITOTAL 0.3 06/12/2023     OTHER:   Lab Results   Component Value Date    LACT 1.7 06/11/2023    A1C 4.7 03/04/2014    DAVID 7.6 (L) 06/14/2023    PHOS 2.7 06/14/2023    MAG 2.8 (H) 06/14/2023    LIPASE 27 04/04/2023    TSH 4.26 (H) 05/05/2022    T4 1.74 (H) 05/05/2022    T3 146 04/18/2005    CRP <2.9 06/22/2022    SED 23 06/22/2022       Anesthesia Plan    ASA Status:  4   NPO Status:  NPO Appropriate    Anesthesia Type: General.      - Airway: ETT   Induction: Intravenous, RSI.   Maintenance: Inhalation.        Consents    Anesthesia Plan(s) and associated risks, benefits, and realistic alternatives discussed. Questions answered and patient/representative(s) expressed understanding.    - Discussed:     - Discussed with:  Patient      - Extended Intubation/Ventilatory Support Discussed: Yes.      - Patient is DNR/DNI Status: No    Use of blood products discussed: No .     Postoperative Care    Pain management: IV analgesics.   PONV prophylaxis: Ondansetron (or other 5HT-3), Dexamethasone or Solumedrol     Comments:                Michelle Pedro MD

## 2023-06-14 NOTE — PROGRESS NOTES
[facility]    ICU Progress Note       Date of Admission:  5/16/2023    Assessment: Critical Care   Jeanine Schuler is a 62 year old female admitted on 5/16/2023. She is a 62 year old female with a PMH of HTN, HFpEF (LV EF 45-50%), pulmonary HTN, paroxysmal afib on chronic anticoagulation, interstitial lung disease, sarcoidosis, mixed connective tissue disease, Raynaud's, and chronic dysphagia who was admitted on 5/16/2023 with failure to thrive and found to have multiple R sided pulmonary emboli and bilateral lower extremity DVT's.  She has been convalescing on the general care floor.  She was transferred to the ICU 6/11/2023 for worsening shortness of breath and increased O2 needs.  CT PE protocol done 6/11 morning shows large bilateral pleural effusion, volume overload, and concern for pneumonia.    Major events and changes today    - Plan to get peg today with GI, procedure scheduled for 12:50  - Continue diuresis with bumex gtt    Plan: Critical Care        Neurologic  # pain  - GCS: 15; RASS: 0; CAM ICU: negative  - pain medications: Tylenol PRN, oxycodone PRN, dilaudid PRN, lidoderm patch prn, gabapentin 100 mg PO QHS  - delirium management: none  - sleep management: none    Cardiac  # paroxysmal atrial fibrillation on chronic anticoagulation  # HTN  # HFpEF  # pulmonary HTN  # R heart stain based on CT chest   # elevated troponin   - ECHO (6/3/2023): LV EF 45-50%, no RWMA, normal RV size/fucntion, pulmonary artery systolic pressure normal, no significant valvular heart disease, no effusion, repeat ECHO (6/12/2023) w minimal change  - continue home amiodarone 200 mg PO daily  - holding home metoprolol XL 50 mg PO daily, restart at lower dose when clinically appropriate   - IV metoprolol 5 mg prn for sustained hr greater than 120  - will hold anticoagulation given plan for peg planned for 6/14, will likely need to be held 3 days post procedure, plan for heparin  "gtt post procedure until apixaban able to be restarted   - Cardiology consult    PULMONARY:  # interstitial lung disease  # sarcoidosis  # pulmonary emboli   # hospital acquired pneumonia  # large bilateral pleural effusions  # pulmonary edema  # acute hypoxic respiratory failure    - scheduled duoneb + 3% saline nebs QID, aerobika QID, patient may need NT suctioning if unable to clear secretions  - continue aggressive pulmonary hygiene, mobilize, aerobika   - pulmonary consulted 6/6, recommended steroids, outpatient follow up in ILD clinic   - Bilateral thoracentesis 6/12: Right large loculated effusion with initially only 30 ml out so chest tube inserted, once to floor began draining therefore didn't insert TPA, Left side with 700 ml out.     GI/NUTRITION:  # dysphagia  # severe protein calorie malnutrition   # constipation   # history of gastric bypass surgery    - continue bowel regimen, last BM 6/13  - GI previous consulted, dysmotility esophagogram done 6/7 but was limited due to patients mobility but no major strictures noted, continue PPI, limit opioids and anticholinergic meds as able,   - asked for GI to see, could have strictures from scleroderma also possible esophageal dysmotility  - ok for mechanical soft diet , speech to see and make further rec reg swallowing    - \"-- GI will order: outpatient follow-up in [facility] for chronic dysphagia, esophageal dysmotility in the setting of scleroderma.   - [facility] phone: [phone], option 1 for clinic scheduling\"  - GI consult for peg tube placement, tentatively scheduled for 6/14      RENAL:  # volume overload  # anasarca   # hypomagnesemia, resolved  # hypokalemia, resolved   - daily weights, last documented weight > 10 kg above admission weight  - will plan for aggressive diuresis as BP allows, changing diuresis from lasix to bumex  - Q8H BMP, Mg, Phos with aggressive replacement   - rolle for strict I/O  - Mg Ox " 400 daily for chronic hypomagnesemia   - continue to follow electrolytes, renal function, and UOP closely  - Continue bumex gtt for a net negative 1-2 L     ID:  # hospital acquired pneumonia   - Tmax: afebrile  -  Monitor wbc  - cultures:                      - 6/11 blood: NGTD               - 6/11 MRSA swab: negative   - 6/11 Legionella and streptococcus negative   - ABX: vancomycin stopped, zosyn x 5 days    HEME:  # anemia  # chronic anticoagulation   # bilateral lower extremity DVT  - Trend CBC  - will hold apixaban 5 mg PO BID for planned peg 6/14, will likely need to be held 3 days post procedure, plan for heparin gtt post procedure until apixaban able to be restarted      ENDOCRINE/RHEUM:  # mixed connective tissue disease  # scleroderma  # severe Raynaud's   # chronic steroid use  # hypoglycemia    - glucose: stable   - no insulin needs   - steroids: continue home prednisone 15 mg PO daily  - D/t pts severe Raynaud's pt blood glucoses via fingerstick inaccurate, stopped BG, monitor BG via bmp, if concern for hypoglycemia, needs stat lab draw BG  - Rheum consulted, recommended steroids, gabapentin, pain consult, outpatient follow up  PLAN: continue steroids at home dose, if patient clinically deteriorates, low threshold to place on stress dose steroids      MSK:  # weakness and deconditioning   # failure to thrive    - PT/OT consulted  - activity: mobilize, out of bed minimum of 3 times/day     SKIN:  # right breast skin thickening and nipple retraction on CT (incidental CT finding)  - outpatient mammography      PROPHYLAXIS:   - DVT: apixaban 5 mg PO BID, will hold for planned procedure 6/14  - GI: pantoprazole 40 mg PO daily (home PPI)      Family update by me today: Yes   Current lines are required for patient management      Lines/ tubes/ drains:  Deleon Catheter: PRESENT, indication: Strict 1-2 Hour I&O  Lines: None     - PIV  - nasal feeding tube- NG 5/25  - Right chest tube 6/12  - Deleon catheter  "6/11    Code Status: Full Code      Disposition:  - ICU     The patient's care was discussed with the Attending Physician, Dr. Lyons.  Critical care time exclusive of procedures: 55 minutes    LÓPEZ Avendaño CNP  [facility]  Securely message with [employer] (more info)  Text page via [employer] Paging/Directory     Clinically Significant Risk Factors          # Hypokalemia: Lowest K = 3.2 mmol/L in last 2 days, will replace as needed       # Hypoalbuminemia: Lowest albumin = 1.6 g/dL at 6/7/2023  5:59 AM, will monitor as appropriate     # Hypertension: Noted on problem list        # Overweight: Estimated body mass index is 26.48 kg/m  as calculated from the following:    Height as of this encounter: 1.727 m (5' 8\").    Weight as of this encounter: 79 kg (174 lb 2.6 oz).   # Severe Malnutrition: based on nutrition assessment              ______________________________________________________________________    Interval History   No acute events overnight. Slightly anxious about peg procedure today.    Physical Exam   Vital Signs: Temp: 98  F (36.7  C) Temp src: Axillary BP: 117/76 Pulse: 81   Resp: 14 SpO2: 99 % O2 Device: None (Room air) Oxygen Delivery: 4 LPM  Weight: 174 lbs 2.61 oz    Physical Exam  Vitals and nursing note reviewed.   Constitutional:       General: She is awake.      Appearance: She is underweight.   HENT:      Mouth/Throat:      Mouth: Mucous membranes are moist.      Pharynx: Oropharynx is clear.   Eyes:      Pupils: Pupils are equal, round, and reactive to light.   Cardiovascular:      Rate and Rhythm: Normal rate and regular rhythm.      Pulses: Normal pulses.      Heart sounds: Normal heart sounds.      Comments: nsr in 80s on bedside telemetry  Pulmonary:      Effort: Pulmonary effort is normal.      Breath sounds: Normal breath sounds.      Comments: Crackles in left base  Chest:      Comments: Chest tube present on right side with " serosanguinous drainage- total 800 mls output since insertion.   Abdominal:      General: Abdomen is flat.      Palpations: Abdomen is soft.   Musculoskeletal:         General: Normal range of motion.      Right lower le+ Edema present.      Left lower le+ Edema present.   Skin:     General: Skin is warm and dry.      Capillary Refill: Capillary refill takes less than 2 seconds.      Comments: Thin frail skin   Neurological:      General: No focal deficit present.      Mental Status: She is alert.      Comments: Alert and orientated x 4   Psychiatric:         Attention and Perception: Attention normal.         Mood and Affect: Mood normal.         Behavior: Behavior is cooperative.           Data   I reviewed all medications, new labs and imaging results over the last 24 hours.  Arterial Blood Gases   No lab results found in last 7 days.    Complete Blood Count   Recent Labs   Lab 23  0523 23  0446 23  0513 23  0727   WBC 7.9 8.3 10.1 15.2*   HGB 9.5* 8.4* 8.7* 7.2*   * 507* 457* 472*       Basic Metabolic Panel  Recent Labs   Lab 23  0523 23  2213 23  1556 23  1549 23  0817 23  0446    136 137  --   --  136   POTASSIUM 3.5  3.5 3.4 3.7  --   --  3.5  3.5   CHLORIDE 93* 93* 94*  --   --  96*   CO2 31* 35* 34*  --   --  29   BUN 19.8 20.2 20.0  --   --  21.7   CR 0.58 0.63 0.60  --   --  0.63   GLC 82 109* 121*  121* 58*   < > 110*    < > = values in this interval not displayed.       Liver Function Tests  Recent Labs   Lab 23  0513 23  0615   AST 20 19   ALT 15 14   ALKPHOS 62 55   BILITOTAL 0.3 0.2   ALBUMIN 2.0* 1.7*       Pancreatic Enzymes  No lab results found in last 7 days.    Coagulation Profile  No lab results found in last 7 days.    IMAGING:  Recent Results (from the past 24 hour(s))   US Abdomen Limited    Narrative    Exam: US ABDOMEN LIMITED  2023 11:36 AM      History: follow up on  ascites    Comparison: 6/1/2023. Chest radiograph from 6/12/2023    Findings/    Impression    impression: Bilateral pleural effusions noted, correlating with recent  chest radiograph. There is a trace amount of ascites throughout the  abdomen and pelvis.    AMAURY LAKE MD         SYSTEM ID:  [id number]

## 2023-06-14 NOTE — PROGRESS NOTES
Critical care update:  Pt returned to [facility] ICU around 1515 from [facility] for j-tube. Pt VSS. Denies pain. Will do serial abdominal exams at 1730 and 1930 and if no complications will start tube feeds. Plan to restart apixaban this evening okay per SYDNIE Leiva ICU NP

## 2023-06-14 NOTE — PLAN OF CARE
Major Shift Events: Patient had trouble sleeping last night. Complaints of pain and general aches. Pain managed with pain medication, patient stated it was effective, but she has some anxiety about procedure this AM. Reassurance provided and patient repositioned to provide comfort. O2 sating well on Room air, but patient requested 4L of O2 overnight for comfort. Vital signs stable overnight. Decent urine output overnight, Bumex drip still running. Patient placed NPO at midnight.  Plan: Procedure this AM.   For vital signs and complete assessments, please see documentation flowsheets.      Goal Outcome Evaluation:      Plan of Care Reviewed With: patient       Problem: Plan of Care - These are the overarching goals to be used throughout the patient stay.    Goal: Absence of Hospital-Acquired Illness or Injury  Intervention: Prevent Skin Injury  Recent Flowsheet Documentation  Taken 6/14/2023 0400 by Chante Tinoco RN  Body Position:   turned   left   weight shifting   upper extremity elevated  Taken 6/14/2023 0200 by Chante Tinoco RN  Body Position:   turned   right   weight shifting   upper extremity elevated  Taken 6/14/2023 0000 by Chante Tinoco RN  Body Position:   turned   left   weight shifting   upper extremity elevated  Taken 6/13/2023 2200 by Chante Tinoco RN  Body Position:   turned   right   weight shifting   upper extremity elevated  Taken 6/13/2023 2000 by Chante Tinoco RN  Body Position:   turned   left   weight shifting   upper extremity elevated

## 2023-06-14 NOTE — OP NOTE
Upper GI Endoscopy 06/14/2023 10:59 AM [address] [place] [address] [phone]     Endoscopy Department   _______________________________________________________________________________   Patient Name: Jeanine Schuler       Procedure Date: 6/14/2023 10:59 AM   MRN: [id number]                       Account Number: [id number]   YOB: 1960             Admit Type: Inpatient   Age: 62                               Room: [place] [address]   Gender: Female                        Note Status: Finalized   Attending MD: TETE JACOBS MD,   Total Sedation Time:   _______________________________________________________________________________       Procedure:             Upper GI endoscopy   Indications:           Place PEJ   Providers:             TETE JACOBS MD, SIMBA ROSARIO   Patient Profile:       Ms Schuler is a 62 year old woman status post RYGB                          with HTN, HFpEF (LV EF 45-50%), pulmonary HTN,                          paroxysmal afib on chronic anticoagulation,                          interstitial lung disease, sarcoidosis, mixed                          connective tissue disease, Raynaud's, and chronic                          dysphagiaÂ who presents for placement of a feeding tube.   Referring MD:          PANCHITO COOK   Medicines:             General Anesthesia   Complications:         No immediate complications.   _______________________________________________________________________________   Procedure:             Pre-Anesthesia Assessment:                          - Prior to the procedure, a History and Physical was                          performed, and patient medications and allergies were                          reviewed. The patient is competent. The risks and                          benefits of the procedure and the sedation options and                          risks were  discussed with the patient. All questions                          were answered and informed consent was obtained.                          Patient identification and proposed procedure were                          verified by the nurse in the pre-procedure area.                          Mental Status Examination: alert and oriented. Airway                          Examination: Mallampati Class II (the uvula but not                          tonsillar pillars visualized). Respiratory                          Examination: clear to auscultation. CV Examination:                          systolic murmur. ASA Grade Assessment: III - A patient                          with severe systemic disease. After reviewing the                          risks and benefits, the patient was deemed in                          satisfactory condition to undergo the procedure. The                          anesthesia plan was to use general anesthesia.                          Immediately prior to administration of medications,                          the patient was re-assessed for adequacy to receive                          sedatives. The heart rate, respiratory rate, oxygen                          saturations, blood pressure, adequacy of pulmonary                          ventilation, and response to care were monitored                          throughout the procedure. The physical status of the                          patient was re-assessed after the procedure. After                          obtaining informed consent, the endoscope was passed                          under direct vision. Throughout the procedure, the                          patient's blood pressure, pulse, and oxygen                          saturations were monitored continuously. The pediatric                          colonoscope was introduced through the mouth, and                          advanced to the jejunum. The upper GI endoscopy was                           accomplished without difficulty. The patient tolerated                          the procedure well.                                                                                     Findings:        The patient was placed in the supine position for PEJ placement.        Abdominal examination was without lesion. The pediatric gastroscope was        passed per os demonstrating an circumferential mismatch of the        squamocolumnar line with the gastroesophageal junction on a back-drop of        erythema. The pouch was long and there were two orifices communicating        with the end to side Eleazar. There was no inflammation or erosion of the        pouch. There was a short blind limb and the proximal Eleazar was        unremarkable. The jejunum was insufflated to appose jejunal and        abdominal walls. The abdominal wall was prepped and draped in a sterile        manner. A snare was passed through the endoscope and opened in the        jejunal lumen. A site was located in the proximal jejunum with excellent        fluoroscopy. The abdominal wall was marked and anesthetized with 2 mL of        1% lidocaine. The trocar needle was passed through the abdominal wall        and into the jejunum under both fluoroscopic and direct endoscopic view.        A guide wire was passed through the trocar and into the open snare. The        snare was closed around the guide wire. The endoscope and snare were        removed, pulling the wire out through the mouth. A skin incision was        made at the site of needle insertion. A 20 Fr jejunostomy tube was        lubricated. The J-tube was tied to the guidewire and pulled through the        mouth and into the jejunum. At his juncture we created a T tag        jejunopexy passing two T tags adjacent to the wire using endoscopic        guidance to deploy the tags. The trocar needle was removed, and the        jejunostomy tube was pulled out from the jejunum through the skin.  The        external bumper was passed over the guide wire and on to the jejunal        feeding tube. The guide wire and excess tubing were then removed. The        final position of the jejunostomy tube was confirmed by relook        endoscopy, and skin marking noted to be 2.5 cm at the external bumper.        The feeding tube was capped, and the tube site cleaned and dressed.        Relook endoscopy confirmed placement. There was no bleeding or        extraluminal gas.                                                                                     Impression:            - Uncomplicated removal of the NJ tube                          - Mild esophagitiis with possible Barretts not sampled                          due to active inflammations                          - RYGB anatomy with two functional anastomoses (one a                          fistula) to the end to side Eleazar without ulceration                          - Uncomplicated placement of a 20F feeding tube to the                          proximal Eleazar with adjunct jejunopexy as described                          above   Recommendation:        - General anesthesia recovery with return to [facility]                          when appropriate; our team will evaluate in the                          morning and sign off if without acute issue                          - Abdominal examination in 2 and 4h; without evidence                          of complication such as bleeding or increasing                          abdominal pain the tube may be used for feeds as per                          dietitian                          - White buttons of T tag jejunopexy should be removed                          by cutting the underlying blue strings is still                          present in two weeks                          - Tube may be used for decompression and medications                          without delay                          - Repeat upper endsocopy  with general GI for sampling                          of the distal esophagus electively as an outpatient                          - The findings and recommendations were discussed with                          the patient and their family                                                                                      electronically signed by PATRIC Arnold

## 2023-06-14 NOTE — ANESTHESIA CARE TRANSFER NOTE
Patient: Jeanine Schuler    Procedure: Procedure(s):  Enteroscopy with feeding tube placement and jejunopexy       Diagnosis: History of Eleazar-en-Y gastric bypass [Z98.84]  Severe malnutrition (H) [E43]  Esophageal dysmotility due to systemic disease [K22.4]  Diagnosis Additional Information: No value filed.    Anesthesia Type:   General     Note:    Oropharynx: oropharynx clear of all foreign objects and spontaneously breathing  Level of Consciousness: awake  Oxygen Supplementation: face mask  Level of Supplemental Oxygen (L/min / FiO2): 6  Independent Airway: airway patency satisfactory and stable  Dentition: dentition unchanged  Vital Signs Stable: post-procedure vital signs reviewed and stable  Report to RN Given: handoff report given  Patient transferred to: PACU    Handoff Report: Identifed the Patient, Identified the Reponsible Provider, Reviewed the pertinent medical history, Discussed the surgical course, Reviewed Intra-OP anesthesia mangement and issues during anesthesia, Set expectations for post-procedure period and Allowed opportunity for questions and acknowledgement of understanding      Vitals:  Vitals Value Taken Time   /74 06/14/23 1248   Temp     Pulse 82 06/14/23 1250   Resp 17 06/14/23 1250   SpO2 99 % 06/14/23 1250   Vitals shown include unvalidated device data.    Electronically Signed By: LÓPEZ Grace CRNA  June 14, 2023  12:51 PM

## 2023-06-14 NOTE — CONSULTS
Consulted by Roberto Salvador to run a test claim for Entresto, Jardiance, & Farxiga.    Patient has pharmacy benefits through [facility] Medicare Advantage with $504.09 remaining in 2023 RX deductible. Per insurance, the following are covered and preferred under the patient's plans (one month copay/coinsurance listed):     Before Deductible Met After Deductible Met Coverage Gap/[place] Catastrophic Coverage   Entresto tablets $547.36 $43.27 $179.70 $35.94   Jardiance tablets $530.71 $26.62 $159.68 $31.67   Farxiga tablets $524.47 $20.38 $152.18 $30.44         Please feel free to contact me with any other test claims, prior authorizations, or insurance questions regarding outpatient medications.     Thanks!      [facility]ola Gooden [facility] Liaison  Pronouns: She/Her/Hers    [facility]  [address] [place] [address]  [address] [place] [address] [address] [address]   [email or web address]  [email or web address]   Phone: [phone]  Pager: [phone]  Fax: [phone]

## 2023-06-15 NOTE — PROGRESS NOTES
[facility] ICU PROGRESS NOTE  06/15/2023      Date of Service (when I saw the patient): 06/15/2023    ASSESSMENT: Jeanine Schuler is a 62 year old female admitted on 5/16/2023. She is a 62 year old female with a PMH of HTN, HFpEF (LV EF 45-50%), pulmonary HTN, paroxysmal afib on chronic anticoagulation, interstitial lung disease, sarcoidosis, mixed connective tissue disease, Raynaud's, and chronic dysphagia who was admitted on 5/16/2023 with failure to thrive and found to have multiple R sided pulmonary emboli and bilateral lower extremity DVT's.  She has been convalescing on the general care floor.  She was transferred to the ICU 6/11/2023 for worsening shortness of breath and increased O2 needs.  CT PE protocol done 6/11 morning shows large bilateral pleural effusion, volume overload, and concern for pneumonia.    CHANGES and MAJOR THINGS TODAY:   Ongoing Pulmonary Hygiene  Transition from Bumex infusion to oral Bumex, TID  Goal negative 1 to 1.5 L today  Advance TF as able to goal rate  Out of bed x 3 today  Transfer to hospital medicine      PLAN:    Neurologic  # Acute Pain  - Tylenol PRN   - Oxycodone PRN  - Dilaudid PRN  - Lidoderm patch    - Gabapentin 100 mg PO QHS       Cardiac:  # paroxysmal atrial fibrillation on chronic anticoagulation  # HTN  # HFpEF  # pulmonary HTN  # R heart stain based on CT chest   # elevated troponin   ~ ECHO (6/3/2023): LV EF 45-50%, no RWMA, normal RV size/fucntion, pulmonary artery systolic pressure normal, no significant valvular heart disease, no effusion, repeat ECHO (6/12/2023) w minimal change  - PTA Amiodarone 200 mg PO daily  - HOLD metoprolol XL 50 mg PO daily, restart at lower dose when clinically appropriate   - IV metoprolol 5 mg prn for sustained hr greater than 120  - Cardiology consulted, appreciate recs  - MAP > 65       Pulmonary:  # interstitial lung disease  # sarcoidosis  # pulmonary emboli   # hospital acquired pneumonia  # large bilateral pleural  "effusions  # pulmonary edema  # acute hypoxic respiratory failure   ~ Pulmonary Consult (6/6): recommended steroids, outpatient follow up in ILD clinic   ~ Bilateral Thoracentesis (6/12): Right large loculated effusion with initially only 30 ml out so chest tube inserted, once to floor began draining therefore didn't insert TPA, Left side with 700 ml out.   - SpO2 > 92%  - Supplemental Oxygen as needed   - DuoNeb 4x daily  - Aggressive Pulmonary Hygiene  - Continue CT to -20 wall suction  - Once CT output is < 75 mL in a day, can change to water seal and consider removal     GI/NUTRITION:  # dysphagia  # severe protein calorie malnutrition   # constipation   # history of gastric bypass surgery   ~ GI previous consulted, dysmotility esophagogram done 6/7 but was limited due to patients mobility but no major strictures noted, Recs were to continue PPI, limit opioids and anticholinergic meds as able  ~ outpatient follow-up in [facility] for chronic dysphagia, esophageal dysmotility in the setting of scleroderma: \"[facility] phone: [phone], option 1 for clinic scheduling\"  - Diet: Regular Textures + Thin Liquids  - Speech Evaluated, recommending no further therapy  - GI placed PEG on [place] on 6/14  - Dietician consulted, appreciate recs  - TF ordered, advance as tolerated  - Protein supplement  - Bowel Regimen: Senna + Miralax  - Metoclopramide every 6 hours for gastric motility        Renal:  # Hypervolemia  # Anasarca   # Hypomagnesemia  # Hypokalemia, resolved  - Discontinue Bumex infusion  - Bumex 2 mg PO TID  - Daily Weights  - Strict I&Os  - Continue urethral catheter   - Goal I&O: Negative 1 to 1.5 L  - Q8H BMP + Mg + Phos   - RN Managed Electrolyte Replacement: HIGH  - Mg Ox 400 daily for chronic hypomagnesemia        ID:  # hospital acquired pneumonia  - Tmax: afebrile  - Monitor WBC    Cultures:      6/11 blood: NGTD   6/11 MRSA swab: negative  6/11 Legionella and " streptococcus negative    Antibiotics:  Vancomycin x 2 doses  Zosyn: 6/11 - 6/14  Unasyn: 6/14 - 6/16       Heme:  # anemia  # chronic anticoagulation   # bilateral lower extremity DVT  - CBC, Daily  - Apixaban 5 mg PO BID       Endocrine/Rheum:  # mixed connective tissue disease  # scleroderma  # severe Raynaud's   # chronic steroid use  # hypoglycemia, resolved  - D/t pts severe Raynaud's pt blood glucoses via fingerstick inaccurate, stopped BG, monitor BG via bmp, if concern for hypoglycemia, needs stat lab draw BG  - Rheum consulted, appreciate recs  - PTA Prednisone 15 mg PO daily     MSK:  # weakness and deconditioning   # failure to thrive    - PT/OT consulted  - Activity: mobilize, out of bed minimum of 3 times/day     Skin:  # right breast skin thickening and nipple retraction on CT (incidental CT finding)  - outpatient mammography   - Diligent skin cares per bedside RN    General Cares/Prophylaxis:    DVT Prophylaxis: Pneumatic Compression Devices, apixaban  GI Prophylaxis: PPI  Restraints: Not Indicated  Family Communication:  updated at bedside  Code Status: Full Code    Lines/tubes/drains:  ~ PIV  ~ PEG tube  ~ Chest Tube, right  ~ Urethral Catheter    Disposition:  ~ [facility] ICU  ~ Transfer to Hospital Medicine    Patient seen and findings/plan discussed with medical ICU staff, Dr. Lyons.      I spent 45 minutes providing care for this patient.     MARÍA VillagomezLake Martin Community Hospital  Pager #[phone]  Critical Care  [facility]     ====================================  INTERVAL HISTORY:   Notes, vitals, labs reviewed. No acute events overnight after having PEG placed on [place] yesterday afternoon. Patient reports some mild tenderness at fresh PEG site otherwise offers no complaints. Vitals are stable and she is afebrile. Patient has a right sided chest tube with decreasing output. Lower extremities continue to be edematous but improved, weight is stable around 79 kg and patient was  negative 1.5 L yesterday on her Bumex infusion.     OBJECTIVE:   1. VITAL SIGNS:   Temp:  [97.6  F (36.4  C)-98.1  F (36.7  C)] 97.8  F (36.6  C)  Pulse:  [67-95] 67  Resp:  [13-36] 13  BP: ()/(62-83) 118/82  SpO2:  [89 %-100 %] 96 %  Resp: 13    2. INTAKE/ OUTPUT:   I/O last 3 completed shifts:  In: 2559.8 [I.V.:2099.8; NG/GT:380]  Out: 3930 [Urine:3750; Chest Tube:180]    3. Physical Exam  Constitutional:       Appearance: She is cachectic.   HENT:      Head: Normocephalic.      Mouth/Throat:      Mouth: Mucous membranes are dry.      Pharynx: Oropharynx is clear.   Eyes:      Extraocular Movements: Extraocular movements intact.      Conjunctiva/sclera: Conjunctivae normal.      Pupils: Pupils are equal, round, and reactive to light.   Cardiovascular:      Rate and Rhythm: Normal rate. Rhythm irregular.      Pulses: Normal pulses.   Pulmonary:      Effort: Pulmonary effort is normal.   Abdominal:      General: Bowel sounds are normal.      Tenderness: There is abdominal tenderness.   Musculoskeletal:         General: Normal range of motion.      Cervical back: Normal range of motion.      Right lower leg: Edema present.      Left lower leg: Edema present.   Skin:     General: Skin is warm and dry.      Capillary Refill: Capillary refill takes less than 2 seconds.      Findings: Bruising present.   Neurological:      General: No focal deficit present.      Mental Status: She is alert and oriented to person, place, and time.           4. LABS:   Arterial Blood Gases   No lab results found in last 7 days.  Complete Blood Count   Recent Labs   Lab 06/15/23  0518 06/14/23  0523 06/13/23  0446 06/12/23  0513   WBC 9.7 7.9 8.3 10.1   HGB 8.6* 9.5* 8.4* 8.7*   * 554* 507* 457*     Basic Metabolic Panel  Recent Labs   Lab 06/15/23  0518 06/14/23  2209 06/14/23  1635 06/14/23  1322 06/14/23  1122 06/14/23  0523   * 134* 134*  --   --  136   POTASSIUM 3.9 3.3* 3.8  --   --  3.5  3.5   CHLORIDE 93* 91* 92*   --   --  93*   CO2 36* 36* 31*  --   --  31*   BUN 20.6 20.0 20.0  --   --  19.8   CR 0.58 0.59 0.54  --   --  0.58   GLC 97 107* 93 99   < > 82    < > = values in this interval not displayed.     Liver Function Tests  Recent Labs   Lab 06/12/23  0513 06/09/23  0615   AST 20 19   ALT 15 14   ALKPHOS 62 55   BILITOTAL 0.3 0.2   ALBUMIN 2.0* 1.7*     Coagulation Profile  No lab results found in last 7 days.    5. RADIOLOGY:   Recent Results (from the past 24 hour(s))   XR Surgery RACHEL L/T 5 Min Fluoro w Stills    Narrative    This exam was marked as non-reportable because it will not be read by a   radiologist or a [place] non-radiologist provider.         XR Abdomen Port 1 View    Narrative    XR ABDOMEN PORT 1 VIEW  6/14/2023 9:09 PM      HISTORY: diffuse abdo pain post PEJ placement, evaluate for  malpositioning    COMPARISON: Fluoroscopy images 6/14/2023, a bowel radiograph 5/29/2023    FINDINGS: AP supine view. Percutaneous jejunostomy tube in place with  similar position compared to same day fluoroscopy images. Contrast  primarily within the small bowel. Right-sided chest tube in place.  Nonobstructive bowel gas pattern, which appears similar to 5/29/2023.  Mild gaseous distention of the right colon. No definite pneumatosis or  free air.      Impression    IMPRESSION: Percutaneous jejunostomy tube in place with similar  position compared to same day fluoroscopy images.     I have personally reviewed the examination and initial interpretation  and I agree with the findings.    EMILY FLETCHER MD         SYSTEM ID:  [id number]    Physician Attestation   I have reviewed and discussed with the advanced practice provider their history, physical and plan for Jeanine Schuler. I did not participate in a shared visit by interviewing or examining the patient and this should be billed as an advanced practice provider only visit.    Misael Lyons MD  Date of Service (when I saw the patient): I did not personally see  this patient today.

## 2023-06-15 NOTE — PROGRESS NOTES
Speech Language Therapy Discharge Summary    Reason for therapy discharge:    All goals and outcomes met, no further needs identified.    Progress towards therapy goal(s). See goals on Care Plan in [facility] electronic health record for goal details.  Goals met    Therapy recommendation(s):    No further therapy is recommended.  Continue current diet of regular textures and thin liquids as tolerated.  Patient has little to no interest in PO intake and requires supplemental nutrition.  G-tube placement scheduled for 6/14.  If taking PO, patient should be alert and sitting fully upright, use a slow rate of intake, take small bites/sips, alternate liquids and solids, and follow reflux precautions - remain upright after PO intake for at least 30-60 minutes, smaller, more frequent meals.

## 2023-06-15 NOTE — PROGRESS NOTES
[facility]   Cardiology Progress Note    Assessment & Plan   Jeanine is a 61-year-old female with a history of ILD, pulmonary sarcoidosis, scleroderma, Raynaud's phenomenon, nonischemic cardiomyopathy with a mildly reduced ejection fraction 40%, she has history of A-fib with RVR, and chronic systolic heart failure prior nadirs down to 35%.  She recently was evaluated and discharged from the cardiac service for A-fib with RVR, she was discharged on guideline directed medical therapy she underwent right heart catheterization which overall showed normal filling pressures and normal output.  Cardiac biopsy at the time was deferred due to low suspicion for cardiac sarcoidosis.     She has had a prolonged admission on the [facility] in the setting of pulmonary embolism was and bilateral DVTs, for which she received Eliquis for this.  She also had issues with hypoxia thought secondary to her PE.  She unfortunately she has deteriorated requiring more oxygen and becoming progressively hypotensive.    Overall, appears patient has become progressively volume overloaded with contributors of deconditioning and prior PE. Recommended IV diuresis. TTE LVEF 40-45%, normal RV. demonstrated  In regards to her atrial fibrillation and RVR, allow would allow for some permissive tachycardia into the 120s, would avoid aggressive IV beta-blockade, and continue oral therapies if possible.    Recommendations:  - Continue bumex gtt, interested in weight trend to assess response to diuresis (though consistently remaining the same). There is some suggestion of contraction alkalosis with JANE; she may be approaching euvolemia (net negative 8 L this admit). Still requiring O2. Would continue gtt for now, potentially IVP dosing tomorrow.   - IVP lopressor 5 mg if HR consistently >120 bpm.   - As patient clinically stabilizes, recommend metoprolol succinate (low dose), ACEI/ARB, and SGLT2i given her mildly reduced LVEF and history of reduced EF  - Continue  "amiodarone 200 mg daily  - Continue eliquis 5 mg BID  - Considered further work up of cardiac sarcoid, previously felt to be lower on the differential. Could consider in vs. Outpatient CMR pending clinical improvement.     Pt was discussed Dr. Link, who agrees with the assessment and plan.    Roberto Salvador, PGY-4  Cardiovascular Disease Fellow    Interval History   No acute events overnight s/p PEG tube placement yesterday. Remains net negative UOP.     ROS: A 4-point ROS was otherwise negative except as above.    Data reviewed today: I reviewed all new labs and imaging results over the last 24 hours. I personally reviewed:    Physical Exam   Temp: 97.8  F (36.6  C) Temp src: Oral BP: 118/82 Pulse: 67   Resp: 13 SpO2: 96 % O2 Device: Nasal cannula Oxygen Delivery: 2 LPM  Vitals:    06/13/23 0415 06/14/23 0600 06/15/23 0400   Weight: 79.7 kg (175 lb 11.3 oz) 79 kg (174 lb 2.6 oz) 79.5 kg (175 lb 4.3 oz)     Vital Signs with Ranges  Temp:  [97.6  F (36.4  C)-98.1  F (36.7  C)] 97.8  F (36.6  C)  Pulse:  [67-95] 67  Resp:  [13-37] 13  BP: ()/(62-88) 118/82  SpO2:  [89 %-100 %] 96 %  I/O last 3 completed shifts:  In: 2559.8 [I.V.:2099.8; NG/GT:380]  Out: 3930 [Urine:3750; Chest Tube:180]     , Blood pressure 118/82, pulse 67, temperature 97.8  F (36.6  C), temperature source Oral, resp. rate 13, height 1.727 m (5' 8\"), weight 79.5 kg (175 lb 4.3 oz), SpO2 96 %.  175 lbs 4.25 oz  Virtual consult, no exam    Medications     bumetanide 1 mg/hr (06/15/23 0500)     dextrose 50 mL/hr at 06/15/23 0200       amiodarone  200 mg Oral Daily     ampicillin-sulbactam  3 g Intravenous Q6H     apixaban ANTICOAGULANT  5 mg Oral BID     cholecalciferol  20 mcg Oral Daily     folic acid  1 mg Oral Daily     gabapentin  100 mg Oral or Feeding Tube At Bedtime     ipratropium - albuterol 0.5 mg/2.5 mg/3 mL  3 mL Nebulization 4x daily     lidocaine  1 patch Transdermal Q24h    And     lidocaine   Transdermal Q8H INOCENCIA     lidocaine  1 " patch Transdermal Q24H     lidocaine   Transdermal Q8H INOCENCIA     magnesium oxide  400 mg Oral Daily     metoclopramide  10 mg Intravenous Q6H     multivitamins w/minerals  15 mL Oral Daily     pantoprazole  40 mg Oral or Feeding Tube QAM AC     polyethylene glycol  17 g Oral Daily     potassium & sodium phosphates  1 packet Oral or Feeding Tube Q4H     sodium phosphate  9 mmol Intravenous Once     predniSONE  15 mg Oral Daily     protein modular  1 packet Per Feeding Tube BID     senna-docusate  2 tablet Oral BID     sodium chloride  3 mL Nebulization 4x daily     sodium chloride (PF)  3 mL Intracatheter Q8H     sodium chloride (PF)  3 mL Intracatheter Q8H       Data   Recent Labs   Lab 06/15/23  0518 06/14/23  2209 06/14/23  1635 06/14/23  1122 06/14/23  0523 06/13/23  0817 06/13/23  0446 06/12/23  1823 06/12/23  1800 06/12/23  1157 06/12/23  1142 06/12/23  0517 06/12/23  0513 06/09/23  1446 06/09/23  0615   WBC 9.7  --   --   --  7.9  --  8.3  --   --   --   --   --  10.1   < > 9.9   HGB 8.6*  --   --   --  9.5*  --  8.4*  --   --   --   --   --  8.7*   < > 8.1*   MCV 94  --   --   --  93  --  94  --   --   --   --   --  93   < > 94   *  --   --   --  554*  --  507*  --   --   --   --   --  457*   < > 337   * 134* 134*  --  136   < > 136   < >  --   --  140  --  136   < > 132*   POTASSIUM 3.9 3.3* 3.8  --  3.5  3.5   < > 3.5  3.5   < >  --   --  3.9  --  3.4   < > 4.1   CHLORIDE 93* 91* 92*  --  93*   < > 96*   < >  --   --  98  --  95*   < > 96*   CO2 36* 36* 31*  --  31*   < > 29   < >  --   --  31*  --  31*   < > 29   BUN 20.6 20.0 20.0  --  19.8   < > 21.7   < >  --   --  23.0  --  25.4*   < > 25.7*   CR 0.58 0.59 0.54  --  0.58   < > 0.63   < >  --   --  0.67  --  0.65   < > 0.57   ANIONGAP 6* 7 11  --  12   < > 11   < >  --   --  11  --  10   < > 7   DAVID 7.5* 7.3* 7.7*  --  7.6*   < > 7.6*   < >  --   --  7.6*  --  7.9*   < > 7.7*   GLC 97 107* 93   < > 82   < > 110*   < >  --    < > 151*   < >  92   < > 106*   ALBUMIN  --   --   --   --   --   --   --   --   --   --   --   --  2.0*  --  1.7*   PROTTOTAL  --   --   --   --   --   --   --   --  4.0*  --  4.5*  --  4.4*  --  4.2*   BILITOTAL  --   --   --   --   --   --   --   --   --   --   --   --  0.3  --  0.2   ALKPHOS  --   --   --   --   --   --   --   --   --   --   --   --  62  --  55   ALT  --   --   --   --   --   --   --   --   --   --   --   --  15  --  14   AST  --   --   --   --   --   --   --   --   --   --   --   --  20  --  19    < > = values in this interval not displayed.       Recent Results (from the past 24 hour(s))   XR Surgery RACHEL L/T 5 Min Fluoro w Stills    Narrative    This exam was marked as non-reportable because it will not be read by a   radiologist or a [place] non-radiologist provider.         XR Abdomen Port 1 View    Narrative    XR ABDOMEN PORT 1 VIEW  6/14/2023 9:09 PM      HISTORY: diffuse abdo pain post PEJ placement, evaluate for  malpositioning    COMPARISON: Fluoroscopy images 6/14/2023, a bowel radiograph 5/29/2023    FINDINGS: AP supine view. Percutaneous jejunostomy tube in place with  similar position compared to same day fluoroscopy images. Contrast  primarily within the small bowel. Right-sided chest tube in place.  Nonobstructive bowel gas pattern, which appears similar to 5/29/2023.  Mild gaseous distention of the right colon. No definite pneumatosis or  free air.      Impression    IMPRESSION: Percutaneous jejunostomy tube in place with similar  position compared to same day fluoroscopy images.     I have personally reviewed the examination and initial interpretation  and I agree with the findings.    EMILY FLETCHER MD         SYSTEM ID:  [id number]     I very much appreciated the opportunity to assess Jeanine Weinbergenson in the hospital with CV Fellow Dr Salvador. I agree with the note and recommendations above which summarize my findings and current recommendations.  Please do not hesitate to contact my  office if you have any questions or concerns.      Bacilio Link MD  Cardiac Arrhythmia Service  [facility]  [phone]

## 2023-06-15 NOTE — PLAN OF CARE
Shift: 06/15/2023 [id number]  Neuro: A/O x4, anxious at times, atarax given x2. PERRL. Afebrile. C/o abdomen discomfort/pain, oxycodone given x3, tylenol given x1, zanaflex given x2.   CV: tele a-fib CVR. Soft BPs, SBP in low-100s.   PU: on RA. LS clear/diminished, weak cough. R CT to suction -20cm H2O, serous output.    GI: No BM this shift. Regular diet, poor appetite. J-tube to TF increased to goal rate 40ml/hr w/30 ml flush q4h, D10 gtt stopped.   : rolle patent, good urine output, bumex gtt stopped, switch to oral bumex.  Skin: R CT dressing changed, CDI. R/L calf wounds changed, CDI. PI on coccyx, dressing CDI.  Other info: K+ 3.9, Mg 1.6. phos 2.7, replaced per protocol, recheck tomorrow am. Assist x2 w/sarastedy to chair with OT, up for 2hrs, mechanical lift back to bed. Transferred to [facility] this afternoon. , jenn, at bedside.      Goal Outcome Evaluation: continue with plan of care, update provider with changes.      Plan of Care Reviewed With: patient

## 2023-06-15 NOTE — PROGRESS NOTES
Neuro: A&Ox4, pain in abdomen related to PEG placement, controlled w/ PRN tylenol.  Cardiac: a-fib, rate controlled. Normotensive. Edema in all extremities  Resp: diminished lung sounds. Weak cough. Minimal chest tube output.  GI: PEG tube placement confirmed by x-ray, tube feeds initiated at 2230 at 10mL/hr, advanced to 20mL/hr at 0630.  : rolle intact and patent  Skin: no new skin concerns    Major Shift Events:  No chest tube output overnight. Phosphorus and magnesium replaced this AM.    Plan: Continue POC  For vital signs and complete assessments, please see documentation flowsheets.

## 2023-06-15 NOTE — PROGRESS NOTES
GI Progress Note  Date of Service:  6/15/2023      Assessment:   Malnutrition with failure to thrive s/p PEJ placement   S/p Eleazar en Y bypass surgery  DVT/PE on anticoagulation     62-year-old female with hypertension, CHF, pulmonary hypertension, interstitial lung disease, sarcoidosis, mixed connective tissue disorder, paroxysmal A-fib status post Eleazar-en-Y gastric bypass surgery who was admitted on 5/16/2023 with failure to thrive.  She was subsequently diagnosed to have bilateral lower extremity DVT with pulmonary embolism and right heart strain.  Patient started on anticoagulant therapy.  On 6/11/2023 she developed shortness of breath repeat CT chest showed large bilateral pleural effusions with possible pneumonia, previously seen pulmonary embolism has resolved.  She underwent chest tube on 6/12/2023 and is currently on broad-spectrum antibiotics.  GI therapeutic endoscopy team was consulted because of malnutrition with need for feeding tube placement. Pt underwent PEJ placement on 6/14/2023.       Today, she reports pain around the site of the J tube with some radiation to lower abdomen. She is passing gas and is tolerating tube feeds well so far. Apixiban was resumed yesterday, no evidence of any overt GI bleeding. Labs reviewed, Hb/Hct around her baseline of 8.6/26.3 (suspect that Hb/Hct value on 6/14 is likely due to fluid shifts)     Recommendations:   -- Advance tube feeds as tolerated per protocol  -- Monitor Hb/Hct   -- Inform GI pan bili team if there is any relevant change in clinical condition of the patient (evidence of GI bleeding, fevers/chills or worsening abd pain etc)  -- Will need EGD as an outpatient for follow up of esopahgitis seen during procedure for PEJ placement (OP referral orders placed by [facility])   --Inpatient GI team will sign off from actively rounding on this patient, please call us if any questions.       Patient was discussed with Dr. Catalina Banegas MD   Fellow  "  Gastroenterology & Hepatology     __________________________________________________________________________________  Subjective:  Nursing notes reviewed and noted.  Pt reports pain around the site of the J tube with some radiation to lower abdomen  She is passing gas and feels like she will have a bowel movement today  Tolerating tube feeds so far, no nausea or vomiting      Physical Examination:  Vital Signs:  /82   Pulse 67   Temp 97.8  F (36.6  C) (Oral)   Resp 13   Ht 1.727 m (5' 8\")   Wt 79.5 kg (175 lb 4.3 oz)   SpO2 96%   BMI 26.65 kg/m       General: Resting comfortably.  HEENT:  No scleral icterus of conjunctival injection.  Chest: Chest tube +ve, non labored breathing   Cardiovascular: RRR.  Abdomen: PEJ site clean, tenderness around the site of the tube and lower abdominal wall  Skin:  No jaundice   Neurological:  Alert, calm and oriented, following commands    DATA:  Labs:    Reviewed and noted            "

## 2023-06-16 PROBLEM — Z98.84 HISTORY OF ROUX-EN-Y GASTRIC BYPASS: Status: ACTIVE | Noted: 2023-01-01

## 2023-06-16 PROBLEM — K20.90 ESOPHAGITIS DETERMINED BY ENDOSCOPY: Status: ACTIVE | Noted: 2023-01-01

## 2023-06-16 PROBLEM — E43 SEVERE MALNUTRITION (H): Status: ACTIVE | Noted: 2023-01-01

## 2023-06-16 PROBLEM — K22.4 ESOPHAGEAL DYSMOTILITY: Status: ACTIVE | Noted: 2023-01-01

## 2023-06-16 PROBLEM — M34.9 SCLERODERMA (H): Status: ACTIVE | Noted: 2023-01-01

## 2023-06-16 PROBLEM — E16.2 HYPOGLYCEMIA: Status: ACTIVE | Noted: 2023-01-01

## 2023-06-16 PROBLEM — R13.19 ESOPHAGEAL DYSPHAGIA: Status: ACTIVE | Noted: 2023-01-01

## 2023-06-16 PROBLEM — Z98.84 HISTORY OF GASTRIC BYPASS: Status: ACTIVE | Noted: 2023-01-01

## 2023-06-16 PROBLEM — K22.4 ESOPHAGEAL DYSMOTILITY DUE TO SYSTEMIC DISEASE: Status: ACTIVE | Noted: 2023-01-01

## 2023-06-16 NOTE — PROVIDER NOTIFICATION
Pt complaining of ABD burning/pressure upon waking up. Hospitalist covering pt was paged. TF were paused and feeding tube meds being held until further instructions are given.

## 2023-06-16 NOTE — PLAN OF CARE
Neuro: A/O x4, intermittently anxious, Afebrile.  CV: tele a-fib  PU: RA. LS clear/diminished, weak cough.    GI: No BM this shift. Regular diet, poor appetite. J-tube, TF stopped due to nausea. Constant nausea during night shift with small episodes of emesis; Hospitalist notified multiple times and ordered IV zofran/ativan. Partial relief after administration.  : rolle cath patent and draining.  Skin: Wounds: Bilateral Calf, PI: coccyx, Incision: R CT  Other info: A2 lift for transfers. Atarax/Zanaflex requested by pt at HS. RPIV infusing D10 50ml/hr. R CT to suction -20cm H2O, serous output.

## 2023-06-16 NOTE — PROGRESS NOTES
[facility]    Medicine Progress Note - Hospitalist Service, GOLD TEAM 16    Date of Admission:  5/16/2023    Assessment & Plan   Jeanine Schuler is a 63 yo female w/ past medical of HTN, HFpEF (LV EF 45-50%), pulmonary HTN, paroxysmal afib on chronic anticoagulation, interstitial lung disease, sarcoidosis, mixed connective tissue disease, Raynaud's, and chronic dysphagia who was admitted on 5/16/2023 with failure to thrive and found to have multiple R sided pulmonary emboli and bilateral lower extremity DVT's. She has been convalescing on the general medical castillo.  She was transferred to the ICU 6/11 for worsening SOB and increased O2 needs.  CT PE protocol done 6/11 morning showed large bilateral pleural effusion, volume overload, and concern for pneumonia.     #  Interstitial lung disease  #  Sarcoidosis  #  Acute provoked right lung PE on 5/16, this admission  #  Hospital acquired pneumonia  #  Large bilateral pleural effusions  #  Pulmonary edema  #  Acute hypoxic respiratory failure   ---   Pulm Consult (6/6): recommended steroids, outpatient follow up in ILD clinic   ---   S/ b/l thoracentesis (6/12):  Right large loculated effusion with initially only 30 ml out so chest tube inserted, once to floor began draining therefore didn't inject TPA, Left side with 700 ml out.   ---   SpO2 > 92%  ---   Supplemental Oxygen as needed   ---   DuoNeb 4x daily  ---   Aggressive Pulmonary Hygiene  ---   Continue CT to -20 wall suction  ---   Once CT output is < 75 mL in a day, can change to water seal and consider removal  ---   Will have pulm team manage chest tube  ---   Cultures as follows    6/11 blood cx x 2:  NGTD     6/11 MRSA swab:  negative    6/11 Legionella and streptococcus negative  ---   Antibiotics treatment as follows!     Vancomycin x 2 doses    Zosyn: 6/11 - 6/14    Unasyn: 6/14 - 6/16   ---   Continue Apixiban 5 mg po bid for treatment of acute provoked  "(sedantary life style)  right lung PE     #  Paroxysmal atrial fibrillation on chronic anticoagulation  #  HTN  #  HFpEF  #  Pulmonary HTN  #  R heart stain based on CT chest   #  Elevated troponin   ---   TTE 6/3/23:  EF 45-50%, no RWMA, normal RV size/fucntion, pulmonary artery systolic pressure normal, no significant valvular heart disease, no effusion  ---   Repeat TTE 6/12/23 w minimal change  ---   Continue PTA Amiodarone 200 mg PO daily for rhythm control  ---   Continue holding metoprolol XL 50 mg daily, restart at lower dose when clinically appropriate   ---   Continue IV metoprolol 5 mg prn HR > 120  ---   Cardiology consulted, appreciate recs  ---   Keep MAP > 65     #  Dysphagia  #  Severe protein calorie malnutrition   #  Constipation   #  History of gastric bypass surgery   ---   GI previous consulted, dysmotility esophagogram done 6/7 but was limited due to patients mobility but no major strictures noted, Recs were to continue PPI, limit opioids and anticholinergic meds as able  ---   Outpatient follow-up in [facility] for chronic dysphagia, esophageal dysmotility in the setting of scleroderma: \"[facility] phone: [phone], option 1 for clinic scheduling\"  ---   Diet: Regular Textures + Thin Liquids  ---   SLP recommending no further therapy, currently on regular diet  ---   GI placed PEG on [place] on 6/14  ---   Dietician consulted, appreciate recs  ---   TF ordered, goal rate 40 ml/hr but pt is tolerating tube feed because of N/V  ---   Protein supplement  ---   Bowel Regimen: Senna + Miralax  ---   Continue Metoclopramide q6 hrs for gastric motility  ---   Continue IV Zofran and IV Ativan prn N/V  ---   Will re-consult w/ a RD     #  Hypervolemia  #  Anasarca   #  Hypomagnesemia  #  Hypokalemia  ---   Treated w/ Bumex drip  ---   Currently on Bumex 2 mg po tid  ---   Daily Weights  ---   Strict I&Os  ---   Continue foleyl catheter   ---   Goal I&O:  " negative 1 to 1.5 L per day  ---   I/O net negative 8 L since admit  ---   RN Managed Electrolyte Replacement: HIGH  ---   Continue Mg Ox 400 daily for chronic hypomagnesemia     #  Acute pain  ---   Tylenol, Oxycodone, Dilaudid, Lidoderm patch and Gabapentin 100 mg PO at bedtime     #  Anemia  #  Chronic anticoagulation   #  Acute provoked b/l LE DVT on 5/16, this admission  ---   Apixaban 5 mg PO BID     #  Mixed connective tissue disease  #  Scleroderma  #  Severe Raynaud's   #  Chronic steroid use  #  Hypoglycemia, resolved  ----   D/t pts severe Raynaud's pt blood glucoses via fingerstick inaccurate, stopped BG, monitor BG via bmp, if concern for hypoglycemia, needs stat lab draw BG  ---   Rheum consulted, appreciate recs  ---   Continue PTA Prednisone 15 mg PO daily     #  Generalized weakness and deconditioning   #  Failure to thrive   ---   PT/OT following  ---   Mobilize, out of bed minimum of 3 times/day     #  Right breast skin thickening and nipple retraction on CT (incidental CT finding)  ---   Outpatient mammography   ---   Diligent skin cares per bedside RN            DVT Prophylaxis:  Apixaban  GI Prophylaxis:   PPI  Restraints:   Not Indicated  Family Communication:    updated at bedside  Code Status:   Full Code     Lines/tubes/drains:  ~ PIV  ~ PEG tube  ~ Chest tube, right  ~ Deleon catheter     Disposition:  Anticipate 3-5 days of hospitalization for chest tube removal and optimization of medical care            Althea Tesfaye MD  Hospitalist Service, GOLD TEAM [address] [place] [address]  Securely message with [employer] (more info)  Text page via [employer] Paging/Directory   See signed in provider for up to date coverage information  ______________________________________________________________________    Interval History   C/o nausea and vomiting  Denied SOB or CP  Not tolerating tube feed via PEG very well    Physical Exam   Vital Signs: Temp: 97.5  F (36.4   C) Temp src: Oral BP: 128/79 Pulse: 82   Resp: (!) 33 SpO2: 92 % O2 Device: None (Room air)    Weight: 175 lbs 4.25 oz  General: Cachectic, AAO X 3, NAD.  HEENT:  NC/AT, neck supple  CVS:  Irregularly irregular, no m/r/g.  Lungs:  Decrease BS anteriorly, no wheezing, no rhonchi, right CT present   Abd:  Soft, + bs, distended, PEG insertion site appears non-infected  Ext:  No c/c.  Lymph:  + edema.  Neuro:  Nonfocal.  Musculoskeletal: No calf tenderness to palpation.    Skin:  No rash.  Psychiatry:  Mood and affect appropriate.  :  Pancho lemon present        Data     I have personally reviewed the following data over the past 24 hrs:    10.4  \   9.5 (L)   / 625 (H)     134 (L) 91 (L) 24.9 (H) /  88   3.3 (L) 37 (H) 0.62 \       Imaging results reviewed over the past 24 hrs:   No results found for this or any previous visit (from the past 24 hour(s)).

## 2023-06-16 NOTE — PROGRESS NOTES
Alert and oriented times four. Overall weakness. Small smear BM this early afternoon. See nutrition notes for direction on reinstating tube feeding. If no bowel movement this evening, PRN Dulcolax and contact provider to escalate bowel program. D10 IVF running at 50ml/hr. IV antibiotic ran successfully times two. Deleon catheter. 550ml out this shift. All medications given crushed through PEG-J. Chest tube had minimal output. Lidocaine patch placed on skin under chest tube entrance. Significant other at bedside throughout shift. PRN Atarax given for anxiety. Sacral dressing and bilateral wound dressings to be changed 6/17.     Patient's most recent vital signs are:     Vital signs:  BP: 114/77  Temp: 97.4  HR: 18  RR: 18  SpO2: 91 %     Patient does not have new respiratory symptoms.  Patient does not have new sore throat.  Patient does not have a fever greater than 99.5.

## 2023-06-16 NOTE — PROGRESS NOTES
[facility]  WO Nurse Inpatient Assessment     Consulted for: Bilateral lower calf wounds    Adding EdemaWear stockings to plan of care. Patient with moderate lower leg edema which may be impeding wound healing.     Patient History (according to H&P provider note(s) 5/17/23:      Jeanine Schuler is a 62 year old female with a past medical history of HTN, chronic atrial fibrillation on anticouagulation until recently, multiple autoimmune diseases, sarcoidosis, pulmonary hypertension and CHF, who presented to our service with shortness of breath in rest, anterior pressure like chest pain that irradiated to back and severe weakness that worsen today. For the last couple of months she has generally felt unwell with nausea, poor apetite (last meal 5 days ago) and progressive debilitating weakness to the point of difficulty deambulating. 1 month ago she fell from her wheel chair and injured both legs, needing stitches and 2 rounds of bactrim due to infection. This wounds have been very painful and have contributed for her bedridden status.        Assessment:      Areas visualized during today's visit: Lower extremities     Wound location: Bilateral LE- seen weekly on Tuesdays 5/23: Left medial/posterior LE                         6/14 5/23: Right lateral/posterior LE                        6/14    Last photo: 6/14/23  Wound due to: Trauma  Wound history/plan of care: resulted from a fall- see HPI  Wound base: LLE 70/30 % eschar and slough/non-granular tissue- softening, RLE 80 % superficial scab and slough 20% dermis      Palpation of the wound bed: normal      Drainage: moderate from RLE, copious from LLE     Description of drainage: serosanguinous     Measurements (length x width x depth, in cm): LLE 5.5  x 5.5  x  0.4 cm, RLE 4  x 1.9  x  0.3 cm      Tunneling: N/A     Undermining: N/A  Periwound skin: Intact, Superficial erosion and Scar tissue      Color:  normal and consistent with surrounding tissue and red      Temperature: normal   Odor: none  Pain: severe and during dressing change, sharp and tender  Pain interventions prior to dressing change: soaking and slow and gentle cares   Treatment goal: Heal , Infection control/prevention, Maintain (prevention of deterioration), Pain control and Protection  STATUS: improving slowly  Supplies ordered: gathered, discussed with RN and discussed with patient      Pressure Injury Location: sacrum    6/14  Last photo: 6/16  Wound type: Pressure Injury     Pressure Injury Stage: 2, hospital acquired   Wound history/plan of care:  Noted by RN during skin assessment/cares  Wound base: 100 % dermis     Palpation of the wound bed: normal      Drainage: small     Description of drainage: serosanguinous     Measurements (length x width x depth, in cm) 0.2 x 0.2 x 0.1 cm  Periwound skin: Intact      Color: normal and consistent with surrounding tissue      Temperature: normal   Odor: none  Pain: denies , none  Pain intervention prior to dressing change: no significant pain present   Treatment goal: Heal  and Protection  STATUS: improving  Supplies ordered: discussed with RN and discussed with patient     Right posterior thigh: superficial abrasion appears nearly resurfaced. No treatment needed     Treatment Plan:     Sacrum: Every 3 days and as needed  Cleanse the area with microklenz and pat dry.  Apply No sting film barrier to periwound skin.  Cover wound with Sacral Mepilex (#[id number])  Only ONE incontinent pad under pt to allow bed to work properly  Change dressing Q 3 days.  Turn and reposition Q 2hrs side to side only.  Ensure pt has Kenneth-cushion while sitting up in the chair.    FYI- If pt has constant incontinent loose stools needing dressing changes Q shift please discontinue the Mepilex dressing and apply criticaid barrier paste BID and PRN.     Bilateral LE wound(s): Every other day  And PRN when soiled change cover  "dressing  Gently remove old dressing- please spray with wound cleanser or NS to loosen if sticking to wound bed  Cleanse wound bed with wound cleanser and pat dry.  Cut a piece of Hydrofera blue (#634087) the size of open area, moisten with NS, wring the excess amount and place it on open area.  Cover with 4x4 mepilex for RLE and sacral mepilex for LLE- if having to change sacral mepilex more than once daily please change to exudry pad (325657) secued with kerlix wrap and tape instead.  Don bilateral EdemaWear Stockings (stockings can be reused up to 6 months, wash with mild soap and water and air dry).  Use Prevalon boots while in bed. Continue to elevate legs as needed to float heels even when boots in use. May need 1-3 pillows to fully offload heels.     EdemaWear stockings: Use and Care    Rationale for use:     Decreases edema by improving lymphatic and venous function      Safe and gentle compression    Enhances wound healing    Protects skin    General:     EdemaWear can be worn 24/7, but should be removed at least daily for skin inspection and cares      In order to be effective, EdemaWear should DIRECTLY contact the skin as much as possible -- the mesh weave promotes lymphatic drainage when it is pressed into the skin    Choose appropriate size EdemaWear based on leg (or arm) circumference - see table for guidelines    EdemaWear LITE is only for especially fragile or painful skin    Cleanse and moisturize any intact or scaly skin before applying EdemaWear    Ok to apply additional compression over the EdemaWear (ie Lymph wraps)    Application:     Apply the EdemaWear from base of toes to knee, or above knee if tolerated and it is a long stocking    Create a wide 3\" cuff at the top if needed to prevent rolling down    Only trim the stocking if it is excessively long; do NOT cut in half; can often fold over excess length onto foot    When wounds are present:    Wound dressings that directly treat the wound " "bed can be applied under the EdemaWear    Any additional cover dressings (dry gauze, ABD pads, Kerlix, etc) should be applied ON TOP of the stockings whenever feasible     Stockings may get soiled with drainage and will need to be washed; ensure an extra clean, dry pair always available    May need two people to apply the stocking - bunch up stocking and pull against each other, lifting over wounds    Care:    When stockings are soiled, DO NOT THROW AWAY, hand wash with mild soap, rinse, hang dry    Replace approximately every 4 to 6 months        EdemaWear size Max circumference Stripe color PS # # stockings per pack   Small 18\"  (45cm) navy 015351 2   Medium 30\"  (75cm) yellow 475503 1   Large 46\"  (115cm) red 030024 1   X Large 60\"  (150cm) aqua 068478 1   Small LITE 24\"  (60cm) purple 247839 2   Medium LITE 36\"  (90cm) orange 840658 1       Orders: Reviewed    RECOMMEND PRIMARY TEAM ORDER: wound clinic and home care when discharged for follow-up on wounds  Education provided: plan of care, wound progress and Infection prevention   Discussed plan of care with: Patient and Nurse  WOC nurse follow-up plan: twice weekly  Notify WOC if wound(s) deteriorate.  Nursing to notify the Provider(s) and re-consult the WOC Nurse if new skin concern.    DATA:     Current support surface: Standard  Standard gel/foam mattress (IsoFlex, Atmos air, etc)  Containment of urine/stool: Incontinent pad in bed  BMI: Body mass index is 26.65 kg/m .   Active diet order: Orders Placed This Encounter      Regular Diet Adult Thin Liquids (level 0)     Output: I/O last 3 completed shifts:  In: 1875.17 [P.O.:300; I.V.:905.17; NG/GT:230]  Out: 2255 [Urine:2075; Chest Tube:180]     Labs:   Recent Labs   Lab 06/16/23  0528 06/13/23  0446 06/12/23  0513   ALBUMIN  --   --  2.0*   HGB 9.5*   < > 8.7*   WBC 10.4   < > 10.1    < > = values in this interval not displayed.     Pressure injury risk assessment:   Sensory Perception: 3-->slightly " limited  Moisture: 3-->occasionally moist  Activity: 2-->chairfast  Mobility: 2-->very limited  Nutrition: 2-->probably inadequate  Friction and Shear: 2-->potential problem  Froy Score: 14    Delmy Sigala RN CWOCN  Pager no longer is use, please contact through [employer] group: [facility] Nurse  Dept. Office Number: *[phone]

## 2023-06-16 NOTE — PROGRESS NOTES
Cross covering hospitalist note    I was called regarding patient's complaints of abdominal pain. She had a peg tube which was started today and running at 40 ml per hour which we decreased to 20 ml/hr. She has a dilaudid IV 0.2 mg IV which she can get 2 hourly as needed. Discussed with the pt and ICU charge RN.

## 2023-06-16 NOTE — PROGRESS NOTES
BRIEF CROSSCOVER NOTE:    Notified ~2320 that patient is having ongoing nausea despite Zofran/Reglan/Atarax given. Having small episodes of emesis.     Sounds like provider was paged earlier in evening about nausea and abdominal pain, recommended decreasing TF rate from 40mL/hr to 20mL/hr. In actuality, it sounds like TFs have been held for the past three hours.     On chart review, patient had PEG-J placed yesterday, and TFs were started, with original plan to advance to goal rate today.    - Hold tube feeds tonight; may need re-evaluation by GI in AM (will defer to primary team)  - Has PRN ondansetron ordered; QTc is >500, so I would avoid adding compazine or olanzapine PRN for now. I've ordered a 0.5mg ativan IV x1 for nausea.   - Went and talked to patient-- she's agreeable to plan of care.    Lona Chase MD  Internal Medicine-Pediatrics  [facility]  Pager: [phone]

## 2023-06-16 NOTE — PROGRESS NOTES
"Major Shift Events:    (3p-7p) Patient had one small/medium bowel movement. Reports still feeling \"full in abdomen\". Refusing to restart TF. Tylenol given x1 for incisional site around R chest tube.   Plan: Restart TF if possible   For vital signs and complete assessments, please see documentation flowsheets.     "

## 2023-06-16 NOTE — PROGRESS NOTES
Brief Cardiology Note:  No acute events overnight. Patient reported to have some abd pain with nausea and vomiting. Remains HDS. Started on bumex 2 mg PO TID yesterday by primary.    Discussed case with Rheum yesterday. Patient has had findings suggestive of cardiac sarcoidosis on prior imaging. However, it seems at that time that her primary Cardiologist (Dr. Bentley) and primary Rheumatologist (Dr. De La Torre) thought cardiac sarcoidosis may be less likely (see discussion on Cardiology Consult note 5/5/22). She was offered a second opinion at [facility], though it is not entirely clear if she went. While cardiac sarcoidosis still remains a potential though less likely possibility, Rheumatology does not feel that treatment of this would be necessary at this time. Because of this, the patient may benefit from a repeat CMR as outpatient, second opinion, or follow up with Cardiology here.     In the meantime, would continue to manage BP, fluids as you are. Continue PO diuretics, would likely transition to BID dosing with goal at least net even daily. Recommend metoprolol succinate 12.5 mg daily (patient not in shock). If stable, would add an ACEI or ARB and an SGLT2i prior to discharge if possible. Follow up with Cardiology within 1 month of discharge (ordered).     Discussed with Dr. Link.     Thank you for this consult. Cardiology will sign off at this time. Please page the service with any further questions or concerns.    Roberto Salvador, PGY-4  Cardiovascular Disease Fellow    I very much appreciated the opportunity to assist in the care of Jeanine Schuler in the hospital with CV Fellow Dr Salvador. Cardiac issues appear stable and we will sign off.  Please call if needed.  I agree with the note above which summarizes my findings and current recommendations.  Please do not hesitate to contact my office if you have any questions or concerns.      Bacilio Link MD  Cardiac Arrhythmia Service  [facility]  [place] [id number] [phone]

## 2023-06-16 NOTE — PROGRESS NOTES
CLINICAL NUTRITION SERVICES - BRIEF NOTE     Nutrition Prescription      Recommendations already ordered by Registered Dietitian (RD):  Updated J-TF order to continue with TwoCal HN, if pt continues to have bowel movements today (6/16), resume TF this afternoon at 20 ml/hr x8 hrs, if tolerates well with no GI symptoms advance back to goal of 40 ml/hr    If no bowel movement by this evening, suggest nursing use PRN Dulcolax suppository and or discuss with rounding Provider to escalate bowel regimen     Future/Additional Recommendations:  RD services will continue to monitor tolerance to TF, wt/lab trends      NEW FINDINGS   Chart reviewed. MAR reviewed. Labs reviewed. Noted last recorded BM was on 6/13, abdominal X-ray from 6/14 noted. Noted pt having concern for TF intolerance last evening (bloating/abdominal pain, small emesis) and TF were held and remain on hold as of bedside visit around noon today, pt does have D10 IVF running.     Spoke with pt, family and RN at bedside, RD questions that GI symptoms related to constipation/pt needing to have bowel movement, has had small smear this far this AM per nursing, discussed pt to continue to work on having bowel movement this afternoon with plan for orders as above, pt/family and bedside RN agreeing with plan of care.    Rounding Provider sent additional RD consult, has been addressed as above.     INTERVENTIONS  Implementation  Enteral Nutrition - TF orders updated as above due to concern for GI intolerance   Flushes/modulars - continue as ordered     Po diet - continue regular as tolerated although continues to be minimal and mostly for pleasure/comfort in the setting of pt w/esophageal dysmotility    Monitoring/Evaluation  Will continue to monitor and evaluate per protocol.    Tiffany Laird RD, CNSC, LD  [address] ICU RD pager: [phone]  Weekend/holiday pager: [phone]

## 2023-06-17 NOTE — PROGRESS NOTES
10 A SHIFT NOTE: For vital signs and complete assessments, please see documentation flowsheets.     Assessment:  Afebrile,Alert and oriented x4. Lethargic,Normotensive, Afib rate controlled, HR 70's-80's,on RA, LS clear/dimin, no cough/secretions,Reg diet, poor appetite, ongoing TF . Advanced to 40 ml/hr at 1 PM, FWF 30 ml q4h.Rolle in situ, adequate UO,calf wounds, Right CT incision,PIV's, RT, rolle, CT; q2h repositioning in bed, refused repositioning at times,  Mag and Phos replaced per protocol     Major Shift Events:  Refusal of repositionings, C X-ray to assess pleural effusion    Plan/ Interventions: Hemodynamic monitoring                                    : Pain control

## 2023-06-17 NOTE — PROGRESS NOTES
10A ICU End of Shift Summary: 1900 -0700     Neuro: Afebrile,Alert and oriented x4. lethargic  Cardiac: Normotensive, Afib rate controlled, HR 70's-80's  Respiratory: on RA, LS clear/dimin, no cough/secretions  GI/:Reg diet, poor appetite, TF resumed at 2100 20 ml/hr. Advanced to 30 ml/hr at 0500, FWF 30 ml q4h.Rolle in situ, adequate UO  Skin:  calf wounds, Right CT incision  LDA's: PIV's, RT, rolle, CT;   Activities: q2h repositioning in bed, refused repositioning from 0000  Others: PRN Atarax, lorazepam and Zanaflex administered HS  Per pt request.  Mag and Phos replaced per protocol     Plan: Continue POC

## 2023-06-18 NOTE — PROGRESS NOTES
Report called to Nurse Fuentes, on 6 medsurg. All questions were answered and review of systems was gone over.     Patient packed up and transferred via cart to room 613

## 2023-06-18 NOTE — PLAN OF CARE
Pt is A&Ox4. Calm and cooperative. CMS intact. Denies numbness/tingling, and chest pain. Pain c/o of SOB on RA so O2 @ 2L now for comfort. Pt c/o about abdominal cramping and nausea and requested feeding tube to be stopped. Provider notified and FT was stopped from 1am to 3 am and resumed from 3 am as pt resolved cramping. Now running @ 20 ml/hr. Up w/ 2 A with lift. voiding adequately via rolle. LBM: 6/18 (large loose BM). Significant Skin Findings include, bilateral pitting edema, redness on inner thigh, redness on perineal area, pressure inury wound on sacrum and calf wounds, Right CT incision . Mepilex on sacrum area. Bilateral ankle dressing on incision sites, dressing change done today on ICU as per nurse and next dressing change due 6/19. WOC Nurse has been seeing pt. L PIV running 10% dextrose @50ml/hr. Meds crushed and administered vis peg tube, Chest tube @ -20 continuous suction as per provider, 1700 drainage since came to the floor, No drainage for this shift. Continue with POC and monitor.

## 2023-06-18 NOTE — PROGRESS NOTES
[facility]    Medicine Progress Note - Hospitalist Service, GOLD TEAM 16    Date of Admission:  5/16/2023    Assessment & Plan   Jeanine Schuler is a 63 yo female w/ past medical of HTN, HFpEF (LV EF 45-50%), pulmonary HTN, paroxysmal afib on chronic anticoagulation, interstitial lung disease, sarcoidosis, mixed connective tissue disease, Raynaud's, and chronic dysphagia who was admitted on 5/16/2023 with failure to thrive and found to have multiple R sided pulmonary emboli and bilateral lower extremity DVT's. She has been convalescing on the general medical castillo.  She was transferred to the ICU 6/11 for worsening SOB and increased O2 needs.  CT PE protocol done 6/11 morning showed large bilateral pleural effusion, volume overload, and concern for pneumonia.     #  Interstitial lung disease  #  Sarcoidosis  #  Acute provoked right lung PE on 5/16, this admission  #  Hospital acquired pneumonia  #  Large bilateral pleural effusions  #  Pulmonary edema  #  Acute hypoxic respiratory failure   ---   Pulm Consult (6/6): recommended steroid, outpatient follow up in ILD clinic   ---   S/p b/l thoracentesis (6/12):  Right large loculated effusion with initially only 30 ml out so chest tube inserted, once to floor began draining therefore didn't inject TPA, Left side with 700 ml out.   ---   On RA w/ SpO2 > 94-96%  ---   DuoNeb 4x daily  ---   Aggressive Pulmonary Hygiene  ---   Continue CT to - 20 wall suction  ---   Once CT output is < 75 mL in a day, can change to water seal and consider removal  ---   CT output 110 ml past 24 hrs  ---   CXR on 6/17 revealed interval decrease of b/l pleural effusion  ---   Will consult w/ pulm team for chest tube management  ---   6/11 blood cx x 2:  NGTD   ---   6/11 MRSA swab:  Negative  ---   6/11 Legionella and streptococcus negative  ---   Antibiotics treatment as follows!    Vancomycin x 2 doses   Zosyn: 6/11 - 6/14   Unasyn: 6/14 -  "6/16   ---   Continue Apixiban 5 mg po bid for treatment of acute provoked (sedantary life style)  right lung PE     #  Paroxysmal atrial fibrillation on chronic anticoagulation  #  HTN  #  HFpEF  #  Pulmonary HTN  #  R heart stain based on CT chest   #  Elevated troponin   ---   TTE 6/3/23:  EF 45-50%, no RWMA, normal RV size/fucntion, pulmonary artery systolic pressure normal, no significant valvular heart disease, no effusion  ---   Repeat TTE 6/12/23 w minimal change  ---   Continue PTA Amiodarone 200 mg PO daily for rhythm control  ---   Continue holding metoprolol XL 50 mg daily, restart at lower dose when clinically appropriate   ---   Continue IV metoprolol 5 mg prn HR > 120  ---   Cardiology consulted, appreciate recs  ---   Keep MAP > 65     #  Dysphagia, resolved  #  Severe protein calorie malnutrition   #  Constipation   #  History of gastric bypass surgery   ---   GI previous consulted, dysmotility esophagogram done 6/7 but was limited due to patients mobility but no major strictures noted, Recs were to continue PPI, limit opioids and anticholinergic meds as able  ---   Outpatient follow-up in [facility] for chronic dysphagia, esophageal dysmotility in the setting of scleroderma: \"[facility] phone: [phone], option 1 for clinic scheduling\"  ---   Diet: Regular Textures + Thin Liquids  ---   SLP recommending no further therapy, currently on regular diet  ---   GI placed PEG on [place] on 6/14  ---   Dietician consulted, appreciate recs  ---   TF ordered, goal rate 40 ml/hr but pt is tolerating tube feed because of N/V  ---   Protein supplement  ---   Bowel Regimen: Senna + Miralax  ---   Continue Metoclopramide 10 mg IV q6 hrs for gastric motility  ---   Continue IV Zofran and IV Ativan prn N/V  ---   RD following     #  Hypervolemia  #  Anasarca   #  Hypomagnesemia  #  Hypokalemia  ---   Daily Weights  ---   Strict I&Os  ---   Continue foleyl catheter   ---   " I/O net negative 7.2 L since admit  ---   RN Managed Electrolyte Replacement: High replacement ptrotocol  ---   S/p Bumex drip but currently on Bumex 2 mg po tid  ---   Continue Mg Ox 400 daily for chronic hypomagnesemia     #  Acute pain  ---   Tylenol, Oxycodone, Dilaudid, Lidoderm patch and Gabapentin 100 mg PO at bedtime     #  Anemia  #  Chronic anticoagulation   #  Acute provoked b/l LE DVT on 5/16, this admission  ---   Apixaban 5 mg PO BID     #  Mixed connective tissue disease  #  Scleroderma  #  Severe Raynaud's   #  Chronic steroid use  #  Hypoglycemia, resolved  ----   D/t pts severe Raynaud's pt blood glucoses via fingerstick inaccurate, stopped BG, monitor BG via bmp, if concern for hypoglycemia, needs stat lab draw BG  ---   Rheum consulted, appreciate recs  ---   Continue PTA Prednisone 15 mg PO daily     #  Generalized weakness and deconditioning   #  Failure to thrive   ---   PT/OT following  ---   Mobilize, out of bed minimum of 3 times/day     #  Right breast skin thickening and nipple retraction on CT (incidental CT finding)  ---   Outpatient mammography   ---   Diligent skin cares per bedside RN            DVT Prophylaxis:  Apixaban  GI Prophylaxis:   PPI  Restraints:   Not Indicated  Family Communication:    updated at bedside  Code Status:   Full Code     Lines/tubes/drains:  ~ PIV  ~ PEG tube  ~ Chest tube, right  ~ Deleon catheter     Disposition:  Anticipate 3-5 days of hospitalization for chest tube removal and optimization of medical care            Althea Tesfaye MD  Hospitalist Service, [address]  Securely message with [employer] (more info)  Text page via [facility] Paging/Directory   See signed in provider for up to date coverage information  ______________________________________________________________________    Interval History   Denied nausea or vomiting   Denied SOB or CP  Tolerating tube feed via PEG better     Physical  Exam   Vital Signs: Temp: 97.3  F (36.3  C) Temp src: Oral BP: 116/74 Pulse: 77   Resp: 26 SpO2: 95 % O2 Device: Nasal cannula Oxygen Delivery: 2 LPM  Weight: 175 lbs 4.25 oz  General: Cachectic, AAO X 3, NAD.  HEENT:  NC/AT, neck supple  CVS:  Irregularly irregular, no m/r/g.  Lungs:  Decrease BS anteriorly, no wheezing, no rhonchi, right CT present   Abd:  Soft, + bs, distended, PEG insertion site appears non-infected  Ext:  No c/c.  Lymph:  + edema.  Neuro:  Nonfocal.  Musculoskeletal: No calf tenderness to palpation.    Skin:  No rash.  Psychiatry:  Mood and affect appropriate.  :  Pancho lemon present        Data     I have personally reviewed the following data over the past 24 hrs:    6.7  \   8.6 (L)   / 436     133 (L) 89 (L) 29.3 (H) /  95   3.6 38 (H) 0.62 \       Imaging results reviewed over the past 24 hrs:   Recent Results (from the past 24 hour(s))   XR Chest Port 1 View    Narrative    Exam: XR CHEST PORT 1 VIEW, 6/17/2023 12:20 PM    Indication: Right sided pleural effusion.  pt has a chest tube.  f/u  cxr to assess pleural effusion    Comparison: Ultrasonic and chest x-ray 6/12/2023    Findings:   Borderline cardiac mediastinal silhouette. Decreased right pleural  effusion with a right basilar chest tube in place. Questionable  pleural line along the right apex, artifactual versus trace  pneumothorax. Decreased left effusion. Retrocardiac atelectasis with  perihilar interstitial opacities. Right upper quadrant surgical clips  with retained contrast in the upper abdomen.      Impression    Impression:   1. Decreased right effusion with chest tube in place. Questionable  trace apical pneumothorax, attention on follow-up is recommended.  2. Decreased left pleural effusion.  3. Interstitial pulmonary edema.    I have personally reviewed the examination and initial interpretation  and I agree with the findings.    ANNABELLA BARTH MD         SYSTEM ID:  [id number]

## 2023-06-18 NOTE — PLAN OF CARE
6MS ADMISSION    D: Patient admitted/transferred from ICU via cart for worsening SOB.     I: Upon arrival to the unit patient was oriented to room, unit, and call light. Patient s height, weight, and vital signs were obtained. Allergies reviewed and allergy band applied. Provider notified of patient s arrival on the unit. Adult AVS completed. Head to toe assessment completed. Education assessment completed. Care plan initiated.    A: Vital signs stable upon admission. Patient rates pain at 6/10. Prn atarax and Tizanidine given. Two RN skin assessment completed Yes. Second RN wasHillary Mason. Significant Skin Findings include, bilateral pitting edema, redness on inner thigh, redness on perineal area, pressure inury wound on sacrum and calf wounds, Right CT incision . Mepilex on sacrum area. Bilateral ankle dressing on incision sites, dressing change done today on ICU as per nurse and next dressing change due 6/19. WO Nurse has been seeing pt.   .   P: Continue to monitor patient s condition and intervene as needed. Continue with plan of care. Notify provider with any concerns or changes in patient status.

## 2023-06-18 NOTE — PLAN OF CARE
"Goal Outcome Evaluation:  Blood pressure 116/74, pulse 77, temperature 97.3  F (36.3  C), temperature source Oral, resp. rate 26, height 1.753 m (5' 9\"), weight 79.5 kg (175 lb 4.3 oz), SpO2 95 %.     Pt is A&Ox4. able to make needs known. Turned and repositioned per schedule  On 2L Oxygen for comfort.   Denies numbness/tingling, and chest pain.   Pain rated 7-8/10, managed with PRN Dilaudid   Patient is on TF running @ 20 ml/hr with scheduled water flushes  Deleon in place draining adequately  Incontinent of Bowel. LBM: 6/18 (large loose BM)- refused stool softeners   Mepilex on sacrum area, wound dressing change scheduled for 6/19  L PIV running 10% dextrose @50ml/hr. Meds crushed via PEG tube   Chest tube @ -20 continuous suction per order, 1700cc drainage-No drainage this shift.   Patient raised no new concerns this shift.       Plan of Care Reviewed With: patient    Overall Patient Progress: no changeOverall Patient Progress: no change           "

## 2023-06-18 NOTE — PROGRESS NOTES
[facility] Pulmonology Progress Note    Jeanine Schuler  MRN: [id number]  : 1960    Date of Admission: 2023  Date of Service: 23 - chart review only    Reason for consult:   Chest tube management   Requesting physician:  Althea Tesfaye     Assessment:  Bilateral exudative pleural effusions: complex parapneumonic effusion vs effusion related to rheumatologic process suspected based on chemistries with high LDH. No growth from cultures. The L effusion was especially inflammatory with an LDH of 2000 and glucose of 2. Would recommend repeat CT chest tomorrow to reassess bilateral effusions and ensure they have stabilized/resolved without evidence of unaddressed loculations. As long as output from R chest tube remains < 200 and the CT does not show complex pleural space would pull R chest tube.      Recommendations:  -- Continue R chest tube to -20 cmH20 suction   -- Recommend non-con CT chest in AM    -- if no loculated pockets of fluid remain would pull the chest tube     Review of hospitalization:  Ms. Schuler is a 62 year old woman with HTN, HFpEF (EF 45-50%), pHTN, afib on AC, sarcoidosis, mixed connective tissue disease, Raynaud's, dysphagia who was admitted  with failure to thrive found to have bilateral LE DVTs and R pulmonary emboli. On  she developed worsening SOB and was found to have large bilateral pleural effusions and possible PNA. She was transferred to the ICU on WB. Started on Vanc/Zosyn. She had bilateral thoracentesis on  however the R effusion was loculated so a R chest tube was placed. Did not require lytics. No results from micro data. She was narrowed to Unasyn. She improved from a respiratory standpoint and was transferred back to the medical castillo on ,     Data:    L pleural fluid - protein 1.6, ; 1545 WBC, 80% neuts; Amylase 23; cholesterol 29; glucose 145; TG 23  R pleural fluid - TP 2.3, LD 2051; 2231 WBC, 99% neuts ;Amylase 30;  cholesterol 46; glucose 2; TG 41     R pleural fluid cultures (aerobic, anaerobic) - NGTD   L pleural fluid cultures (aerobic, anaerobic, MTB, fungal) - NGTD    Prudence Rodrigues MD   Pulmonary fellow  Pager: [phone]    Patient discussed with Dr. Johns.

## 2023-06-18 NOTE — PROGRESS NOTES
MEDICINE CROSS COVER:  Contacted by bedside RN due to patient having ongoing abdominal cramping and requesting TF to be stopped.     Per RN, these symptoms have been an ongoing issue. TF are currently running at 20 ml/hr (goal rate 40 ml/hr) - have not increased to goal rate due to ongoing symptoms. RN reports patient had multiple BM yesterday.     PLAN:  - Pause TF x 2 hrs  - At 0300, RN to reevaluate patient's symptoms and attempt to resume TF at 20 ml/hr    RN advised to reach out with any new or worsening symptoms in the interim.     UPDATE AT 0323:   Notified by bedside RN that abdominal cramping has stopped, nausea remains. RN is going to restart TF at 20 ml/hr. Will continue to monitor for return of symptoms.     Leonor Tristan MD  Internal Medicine/Pediatrics Hospitalist   of Internal Medicine and Pediatrics  [facility]  Pager: [phone]

## 2023-06-19 NOTE — PROGRESS NOTES
-Awaiting Ct chest to be done. To decide further on chest tube.   -Sent message to primary pulmonologist to decide whether this could be due to sarcoid and whether we need to up her prednisone or not.     - Will continue to follow.     Ludin Cook MD

## 2023-06-19 NOTE — PLAN OF CARE
"Goal Outcome Evaluation:      Plan of Care Reviewed With: patient    Overall Patient Progress: improving     Shift: 8437-1071    VS: /76 (BP Location: Left arm)   Pulse 80   Temp 99.3  F (37.4  C) (Oral)   Resp 18   Ht 1.753 m (5' 9\")   Wt 75 kg (165 lb 5.5 oz)   SpO2 99%   BMI 24.42 kg/m        Pain: has pain, managed with prn dilaudid  Neuro: A&Ox4, able to make needs known  Resp: Denies SOB and chest pain, is on 2L of oxygen via nasal cannula  Diet: Reg diet, patient does not eat much  Skin: Bilateral lower leg edema. Sacral mepilex, wound on bilateral legs- changed today  LDA: L PIV forearm saline locked, L Wrist PIV running bumex 4mL/hr. PEG tube TF running @20mL/hr. Deleon in place draining adequately. Chest tube @-20 continuous suction. Chest tube container changed in the afternoon by other nurse.   Activity: Not oob, repo q2h.     Call light within reach  Justine Abraham RN on 6/19/2023 at 7:00 PM  "

## 2023-06-19 NOTE — PROGRESS NOTES
CLINICAL NUTRITION SERVICES - BRIEF NOTE     Nutrition Prescription    Recommendations already ordered by Registered Dietitian (RD):  - Continue current TF order--increase TF rate as pt allows.   - Collaborate with other providers--attending, bedside RN (re:D10 IVF--provider would like them stopped and kept as prn for hypoglycemia as ordered/intended).     Future/Additional Recommendations:  - Follow GI function, ability to advance TF, labs, fluid balance w/ goal TF plus additional protein to provide 1.9 g Pro/kg dose weight.      Discussed D10 IVF running along with low free water from TF still @ 20 ml/hr and FWF only @ 30 ml q 4 hr.      Unable to see pt re: possibly increasing her TF rate as she was having a procedure done in room.  Will attempt to stop back later.     EVALUATION OF THE PROGRESS TOWARD GOALS   Diet: regular with thin liquids (mainly for comfort/pleasure)  ONS: Ensure w/ meals (prn only)  Nutrition Support: Two Rommel HN running @ 20 ml/hr since 3am 6/18 with 1 pkt Prosource TF20 BID.  Goal TF is 40 ml/hr.    FWF: 30 ml q 4 hr.  Total free water from current TF and FWF = 725 ml plus Rx given orally or via flush to PEG with tip of tube to jenunum.   D10 @ 50 ml/hr most of weekend due to TF not tolerated at goal rate of 40 ml/hr.   Intake: TF off 6/16 due to GI upset but resumed @ 20 ml/hr 6/16 2100.  Up to 30 ml/hr 6/17 5am and then to goal of 40 ml/hr @ 1pm.  TF was heldd 12 hr later @ 1am 6/18 per pt request due to cramping and nausea. It was resumed @ 3am at 20 ml/hr.      TF @ 20 ml/hr + D10 @ 50 ml/hr provides ~223 g CHO/24 hr if both rates constant which is similar to CHO with TF @ goal of 40 ml/hr.     NEW FINDINGS   GI--per RN charting, 1 sm/medium BM 6/16 PM.  6/18 RN mentions multiple BMs on 6/17 included formed BM noted @ 2116.  Night shift RN charted large loose BM sometime on his shift.  This AM, RN states no stools on day shift yet and pt declined bowel Rx.     Bowel Rx: IV Reglan q 6  hr, scheduled 400 mg Mag Oxide q day (has laxative effect), Miralax a day (ast given 6/17 AM), 2 senna-docusate 2x/d (last given 6/17 am).      Labs: with D510 providing majority of pt's fluids plus diuretics, Na, Cl and K+ are low this AM.  Mg at low end of normal-1.7 w/ scheduled and replacement protocol Mg++.  Phos 2.8 after replacements.   BUN 26.2 (H), Cr wnl but possibly skewed by low muscle mass with scleroderma, low activity.      INTERVENTIONS  Collaborate with other providers.   Enteral nutrition--increase TF as pt allows.    Monitoring/Evaluation  Progress toward goals will be monitored and evaluated per protocol.     Prudence Ulloa) Daphne ROSS, LD   6B and 8A Med/surg (M-F) Pager: [phone]  Weekend/holiday pager: [phone]

## 2023-06-19 NOTE — PROGRESS NOTES
[facility]    Medicine Progress Note - Hospitalist Service, GOLD TEAM 16    Date of Admission:  5/16/2023    Assessment & Plan   Jeanine Schuler is a 63 yo female w/ past medical of HTN, HFpEF (LV EF 45-50%), pulmonary HTN, paroxysmal afib on chronic anticoagulation, interstitial lung disease, sarcoidosis, mixed connective tissue disease, Raynaud's, and chronic dysphagia who was admitted on 5/16/2023 with failure to thrive and found to have multiple R sided pulmonary emboli and bilateral lower extremity DVT's. She has been convalescing on the general medical castillo.  She was transferred to the ICU 6/11 for worsening SOB and increased O2 needs.  CT PE protocol done 6/11 morning showed large bilateral pleural effusion, volume overload, and concern for pneumonia.     #  Interstitial lung disease  #  Sarcoidosis  #  Acute provoked right lung PE on 5/16, this admission  #  Hospital acquired pneumonia  #  Large bilateral pleural effusions  #  Pulmonary edema  #  Acute hypoxic respiratory failure   ---   Pulm Consult (6/6): recommended steroid, outpatient follow up in ILD clinic   ---   S/p b/l thoracentesis (6/12):  Right large loculated effusion with initially only 30 ml out so chest tube inserted, once to floor began draining therefore didn't inject TPA, Left side with 700 ml out.   ---   On RA w/ SpO2 > 94-96%  ---   DuoNeb 4x daily  ---   Aggressive Pulmonary Hygiene  ---   Continue CT to - 20 wall suction  ---   Once CT output is < 75 mL in a day, can change to water seal and consider removal  ---   CT output 110 ml past 24 hrs  ---   CXR on 6/17 revealed interval decrease of b/l pleural effusion  ---   Consulted w/ pulm team for chest tube management on 6/89 and rec is to obtain CT chest w/o contrast   ---   Order placed for CT chest w/o contrast for today.  if no loculated pockets of fluid remain then IR to pull out the chest tube   ---   6/11 blood cx x 2:   "NGTD   ---   6/11 MRSA swab:  Negative  ---   6/11 Legionella and streptococcus negative  ---   Antibiotics treatment as follows!    Vancomycin x 2 doses   Zosyn: 6/11 - 6/14   Unasyn: 6/14 - 6/16   ---   Continue Apixiban 5 mg po bid for treatment of acute provoked (sedantary life style) right lung PE     #  Paroxysmal atrial fibrillation on chronic anticoagulation  #  HTN  #  HFpEF  #  Pulmonary HTN  #  R heart stain based on CT chest   #  Elevated troponin   ---   TTE 6/3/23:  EF 45-50%, no RWMA, normal RV size/fucntion, pulmonary artery systolic pressure normal, no significant valvular heart disease, no effusion  ---   Repeat TTE 6/12/23 w minimal change  ---   Continue PTA Amiodarone 200 mg PO daily for rhythm control  ---   Continue holding metoprolol XL 50 mg daily, restart at lower dose when clinically appropriate   ---   Continue IV metoprolol 5 mg prn HR > 120  ---   Cardiology consulted, appreciate recs  ---   Keep MAP > 65     #  Dysphagia, resolved  #  Severe protein calorie malnutrition   #  Constipation   #  History of gastric bypass surgery   ---   GI previous consulted, dysmotility esophagogram done 6/7 but was limited due to patients mobility but no major strictures noted, Recs were to continue PPI, limit opioids and anticholinergic meds as able  ---   Outpatient follow-up in [facility] for chronic dysphagia, esophageal dysmotility in the setting of scleroderma: \"[facility] phone: [phone], option 1 for clinic scheduling\"  ---   Diet: Regular Textures + Thin Liquids  ---   SLP recommending no further therapy, currently on regular diet  ---   GI placed PEG on [place] on 6/14  ---   Dietician consulted, appreciate recs  ---   TF ordered, goal rate 40 ml/hr but pt is tolerating tube feed because of N/V  ---   Protein supplement  ---   Bowel Regimen: Senna + Miralax  ---   Continue Metoclopramide 10 mg IV q6 hrs for gastric motility  ---   Continue IV Zofran " and IV Ativan prn N/V  ---   RD following  ---   Tolerating tube feed via PEG better at a low rate of 20 ml/hr, goal is 40 ml/hr      #  Hypervolemia  #  Anasarca   #  Hypomagnesemia  #  Hypokalemia  ---   Daily Weights  ---   Strict I&Os  ---   Continue foleyl catheter   ---   I/O net negative 9.9 L since admit  ---   RN Managed Electrolyte Replacement: High replacement ptrotocol  ---   S/p Bumex drip ---> on Bumex 2 mg po tid ---> Bumex drip 1 mg/hr on 6/19 due to persistent anasarca/3rd spacing  ---   Continue Mg Ox 400 daily for chronic hypomagnesemia     #  Acute pain  ---   Tylenol, Oxycodone, Dilaudid, Lidoderm patch and Gabapentin 100 mg PO at bedtime     #  Chronic anticoagulation   #  Acute provoked b/l LE DVT on 5/16, this admission  ---   Apixaban 5 mg PO BID     #  Mixed connective tissue disease  #  Scleroderma  #  Severe Raynaud's   #  Chronic steroid use  #  Hypoglycemia, resolved  ----   D/t pts severe Raynaud's pt blood glucoses via fingerstick inaccurate, stopped BG, monitor BG via bmp, if concern for hypoglycemia, needs stat lab draw BG  ---   Rheum consulted, appreciate recs  ---   Continue PTA Prednisone 15 mg PO daily     #  Generalized weakness and deconditioning   #  Failure to thrive   ---   PT/OT following  ---   Mobilize, out of bed minimum of 3 times/day     #  Right breast skin thickening and nipple retraction on CT (incidental CT finding)  ---   Outpatient mammography   ---   Diligent skin cares per bedside RN            DVT Prophylaxis:  Apixaban  GI Prophylaxis:   PPI  Restraints:   Not Indicated  Family Communication:    updated at bedside  Code Status:   Full Code     Lines/tubes/drains:  ~ PIV  ~ PEG tube  ~ Chest tube, right  ~ Deleon catheter     Disposition:  Anticipate 3-5 days of hospitalization for chest tube removal and optimization of medical care            Althea Tesfaye MD  Hospitalist Service, GOLD TEAM [address] [place] [address]  Center  Securely message with "[employer] (more info)  Text page via [employer] Paging/Directory   See signed in provider for up to date coverage information  ______________________________________________________________________    Interval History   Denied nausea or vomiting   Denied SOB or CP  Tolerating tube feed via PEG better at a low rate of 20 ml/hr, goal is 40 ml/hr     Physical Exam   Vital Signs: Temp: 97.7  F (36.5  C) Temp src: Oral BP: 109/59 Pulse: 73   Resp: 18 SpO2: 95 % O2 Device: Nasal cannula Oxygen Delivery: 2 LPM  Weight: 175 lbs 4.25 oz  General: Cachectic, AAO X 3, NAD.  HEENT:  NC/AT, neck supple  CVS:  Irregularly irregular, no m/r/g.  Lungs:  Decrease BS anteriorly, no wheezing, no rhonchi, right CT present   Abd:  Soft, + bs, distended, PEG insertion site appears non-infected  Ext:  No c/c.  Lymph:  + edema.  Neuro:  Nonfocal.  Musculoskeletal: No calf tenderness to palpation.    Skin:  No rash.  Psychiatry:  Mood and affect appropriate.  :  Pancho lemon present        Data     I have personally reviewed the following data over the past 24 hrs:    6.0  \   8.5 (L)   / 395     133 (L) 91 (L) 26.2 (H) /  89   3.2 (L) 38 (H) 0.54 \       Imaging results reviewed over the past 24 hrs:   No results found for this or any previous visit (from the past 24 hour(s)).

## 2023-06-19 NOTE — PROGRESS NOTES
Jeanine Schuler is a 62 year old female patient.  1. Esophageal dysphagia    2. Shortness of breath    3. Hypervolemia, unspecified hypervolemia type    4. Other pulmonary embolism without acute cor pulmonale, unspecified chronicity (H)    5. Deep vein thrombosis (DVT) of non-extremity vein, unspecified chronicity    6. Hypoglycemia    7. Scleroderma (H)    8. Esophageal dysmotility    9. History of gastric bypass    10. History of Eleazar-en-Y gastric bypass    11. Severe malnutrition (H)    12. Esophageal dysmotility due to systemic disease    13. Esophagitis determined by endoscopy    14. Chronic systolic congestive heart failure (H)      Past Medical History:   Diagnosis Date     Anemia      Bariatric surgery status 06/27/2005    [facility]azar [facility];     Cellulitis of leg 06/06/2013     Esophageal reflux     Better post-hiatal hernia repair and weight loss     Hyperplastic colonic polyp      Hypovitaminosis D 2011     ILD (interstitial lung disease) (H)      Kidney stone 04/29/2007     Kidney stone 05/10/2007    surgically removed     Limb ischemia; ulcers of fingertips 04/22/2013     Other chronic pain     Left shoulder - rheumatoid arthritis     Raynaud's syndrome      Rheumatoid arthritis (H) \     Scleroderma (H)      Sjogren-Jake syndrome      Systemic sclerosis (H) 2009     Unspecified essential hypertension      No current outpatient medications on file.     Allergies   Allergen Reactions     Enoxaparin Sodium Other (See Comments)     Blood clot      Norvasc [Amlodipine Besylate] Swelling     Lip swelling that happened on 2 different occasions     Erythromycin Rash     Rash on arms     Principal Problem:    Deep vein thrombosis (DVT) of non-extremity vein, unspecified chronicity  Active Problems:    Chronic systolic congestive heart failure (H)    Shortness of breath    Hypervolemia, unspecified hypervolemia type    Other pulmonary embolism without acute cor pulmonale, unspecified chronicity  "(H)    Scleroderma (H)    Severe malnutrition (H)    Hypoglycemia    Esophageal dysmotility    Esophageal dysphagia    History of gastric bypass    History of Eleazar-en-Y gastric bypass    Esophagitis determined by endoscopy    Esophageal dysmotility due to systemic disease    Blood pressure 104/63, pulse 71, temperature 97.4  F (36.3  C), temperature source Oral, resp. rate 18, height 1.753 m (5' 9\"), weight 79.5 kg (175 lb 4.3 oz), SpO2 97 %.    Alert and oriented X4   Denies numbness, tingling and SOB  SATing well on 2L nasal cannula   Reports pain managed with PRN hydromorphone  Sacral mepilex changed small pea sized area of broken skin at the top of her gluteal cleft   Continuous feeding running at 20mL/hr + scheduled flushes   Output from chest tube has not changed (reading 1700ml) insertion site clean, dry and intact; suction pulling at -20 continuous; no leaks, bends or occlusions in the line  Repositioned but asked not to be disturbed when sleeping stating \" I will call you if I become uncomfortable and need repositioning\"  PED tube patent insertion site is free of leaks and dressing is clean dry and intact    Bilateral edema legs             Ventura Vail, RN  6/19/2023    "

## 2023-06-19 NOTE — PROGRESS NOTES
"SPIRITUAL HEALTH SERVICES Progress Note  [facility] ([facility]) 6B    Saw pt Jeanine Schuler per length of stay ( self-initiated).    Patient/Family Understanding of Illness and Goals of Care - Jeanine shared that she was initially hospitalized for deep vein thrombosis and now pleural effusions.     Distress and Loss - Jeanine shared that it's been like taking one step forward and two steps back or like a roller coaster ride. She and Lexx () cared for her sister until her sister  and Jeanine hasn't really been able to grieve as shortly after she was needed to support her grandkids and then her health problems started. In addition, the uncertainty over when she'll get better or how much longer it will take has been difficult. 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     Strengths, Coping, and Resources - Jeanine shared that her 13 grandchildren are what keep her going (11 girls, 2 boys) as well as the cards/pictures they send. Her , Lexx, is supportive as well. In addition, she knows that she is an independent and strong woman.     Meaning, Beliefs, and Spirituality - Jeanine \"grew up Jehovah's witness\" but hasn't been too involved in a dora community. \"I still believe in God and Edmond Torres and I know I'm going to [facility].\" This  lifted up the things she is certain of to bring her comfort and strength when so many other things are uncertain. Jeanine appreciated a prayer together.     Plan of Care - I provided a prayer and let Jeanine know she can request future  visits by letting the nurse know. In addition, she is open to a follow up visit from this  at the end of the week.     Jay Le MDiv  Chaplain Resident  Pager [phone]    * [facility] remains available  for emergent requests/referrals, either by having the switchboard page the on-call  or by entering an ASAP/STAT consult in Epic " (this will also page the on-call ). Routine Epic consults receive an initial response within 24 hours.*

## 2023-06-20 NOTE — CONSULTS
"    Interventional Radiology  [facility] Consult Service Note  06/20/23   3:31 PM    Consult Requested: \"left pleural effusion, likely loculated. pulm requesting left chest tube placement for ongoing management\"    Recommendations/Plan:    Patient is on IR schedule tomorrow for a left chest tube placement.   Labs WNL for procedure.  Orders entered for procedure. NPO not needed (plan for local lidocaine only).  Medications to be held include: none. Pt is on 5 mg of eliquis, last taken this morning at 0732.  Consent will be done prior to procedure.     This is a 62 year old female with past medical history of sarcoidosis and ILD. She was admitted with acute hypoxic respiratory failure d/t new PE and bilateral loculated pleural effusions. IR placed a right chest tube and performed a left thoracentesis on 6/12/23. Recent imaging reveals loculated left pleural fluid and IR has been asked to place a left chest tube; requesting team states they plan to use TPA on right and left side.     Pertinent Imaging Reviewed: CT-imaging from yesterday.    Expected date of discharge:  TBD    Vitals:   /81 (BP Location: Left arm)   Pulse 92   Temp 97.6  F (36.4  C) (Oral)   Resp 18   Ht 1.753 m (5' 9\")   Wt 74.6 kg (164 lb 7.4 oz)   SpO2 96%   BMI 24.29 kg/m      Pertinent Labs:   Lab Results   Component Value Date    WBC 8.3 06/20/2023    WBC 6.0 06/19/2023    WBC 6.7 06/18/2023    WBC 6.6 07/06/2021    WBC 6.1 07/01/2021    WBC 8.0 04/09/2021     Lab Results   Component Value Date    HGB 8.6 06/20/2023    HGB 8.5 06/19/2023    HGB 8.6 06/18/2023    HGB 12.9 07/06/2021    HGB 13.2 07/01/2021    HGB 13.2 04/09/2021     Lab Results   Component Value Date     06/20/2023     06/19/2023     06/18/2023     07/06/2021     07/01/2021     04/09/2021     Lab Results   Component Value Date    INR 1.16 (H) 05/17/2023     Lab Results   Component Value Date    POTASSIUM 3.5 " 06/20/2023    POTASSIUM 3.7 05/16/2023    POTASSIUM 3.2 (L) 06/28/2022    POTASSIUM 3.3 (L) 07/06/2021        COVID-19 Antibody Results, Testing for Immunity         No data to display            COVID-19 PCR Results        7/15/2021    16:36 12/20/2021    23:36 3/11/2022    14:56 4/4/2022    06:41 5/5/2022    00:38   COVID-19 PCR Results   SARS CoV2 PCR Negative   Negative   Positive   Negative   Negative             4/4/2023    13:49   COVID-19 PCR Results   SARS CoV2 PCR Negative         Marcela Bee PA-C  Interventional Radiology  Pager: [phone]

## 2023-06-20 NOTE — PROGRESS NOTES
[facility] Pulmonology Progress Note    Jeanine Schuler  MRN: [id number]  : 1960    Date of Admission: 2023  Date of Service: 23 - chart review only    Reason for consult:   Chest tube management   Requesting physician:  Althea Tesfaye     Assessment:  Bilateral exudative pleural effusions: complex parapneumonic effusion vs effusion related to rheumatologic process suspected based on chemistries with high LDH. No growth from cultures. The L effusion was especially inflammatory with an LDH of 2000 and glucose of 2. Would recommend repeat CT chest tomorrow to reassess bilateral effusions and ensure they have stabilized/resolved without evidence of unaddressed loculations.    -Her repeat Ct chest has shown continued pleural effusion b/l.   -Will instill TPA and dornase on the right side.  -Placed an IR order to place a CT tube on the left side.     Recommendations:  -- Continue R chest tube to -20 cmH20 suction   --  TPA dornase on the right side. [ Ordered placed for you]  -IR guided chest tube on the left side.  Please place the consult. Primary will need to decide on holding anticoagulation if needed.     Review of hospitalization:  Ms. Schuler is a 62 year old woman with HTN, HFpEF (EF 45-50%), pHTN, afib on AC, sarcoidosis, mixed connective tissue disease, Raynaud's, dysphagia who was admitted  with failure to thrive found to have bilateral LE DVTs and R pulmonary emboli. On  she developed worsening SOB and was found to have large bilateral pleural effusions and possible PNA. She was transferred to the ICU on WB. Started on Vanc/Zosyn. She had bilateral thoracentesis on  however the R effusion was loculated so a R chest tube was placed. Did not require lytics. No results from micro data. She was narrowed to Unasyn. She improved from a respiratory standpoint and was transferred back to the medical castillo on ,     Data:    L pleural fluid - protein 1.6, ;  1545 WBC, 80% neuts; Amylase 23; cholesterol 29; glucose 145; TG 23  R pleural fluid - TP 2.3, LD 2051; 2231 WBC, 99% neuts ;Amylase 30; cholesterol 46; glucose 2; TG 41     R pleural fluid cultures (aerobic, anaerobic) - NGTD   L pleural fluid cultures (aerobic, anaerobic, MTB, fungal) - NGTD

## 2023-06-20 NOTE — PLAN OF CARE
"Goal Outcome Evaluation:      Plan of Care Reviewed With: patient    Overall Patient Progress: improving     Shift: [id number]    VS: /81 (BP Location: Left arm)   Pulse 92   Temp 97.6  F (36.4  C) (Oral)   Resp 18   Ht 1.753 m (5' 9\")   Wt 74.6 kg (164 lb 7.4 oz)   SpO2 96%   BMI 24.29 kg/m     * Vital signs Q4h    Pain: Has Prn dilaudid and oxycodone  Neuro: A&Ox4, has baseline n/t in august lower exts. Able to make needs known  Resp: No new deficits noted, is on 2L of oxygen via nasal cannula. No SOB and chest pain reported  Diet: regular diet  Skin: BLE and BUE edema, sacral wound, BLE wounds- changed By WOC today.   LDA: L PIV forearm SL, L hand PIV- infusing bumex at 4ml/hr. Chest tube at -20 suction, PEG tube running TF @30mL/hr continously- increased from 25mL/hr and pt tolerated well. Plan is to increase up to 40mL/hr. Deleon catheter in place, draining well.   Activity: Not oob, repo q2h.   Call light within reach     Plan: L chest tube placement tomorrow morning.  Justine Abraham RN on 6/20/2023 at 2:44 PM         "

## 2023-06-20 NOTE — PROGRESS NOTES
[facility]  WO Nurse Inpatient Assessment     Consulted for: Bilateral lower calf wounds, sacral wound    Adding EdemaWear stockings to plan of care. Patient with moderate lower leg edema which may be impeding wound healing.     Patient History (according to H&P provider note(s) 5/17/23:      Jeanine Schuler is a 62 year old female with a past medical history of HTN, chronic atrial fibrillation on anticouagulation until recently, multiple autoimmune diseases, sarcoidosis, pulmonary hypertension and CHF, who presented to our service with shortness of breath in rest, anterior pressure like chest pain that irradiated to back and severe weakness that worsen today. For the last couple of months she has generally felt unwell with nausea, poor apetite (last meal 5 days ago) and progressive debilitating weakness to the point of difficulty deambulating. 1 month ago she fell from her wheel chair and injured both legs, needing stitches and 2 rounds of bactrim due to infection. This wounds have been very painful and have contributed for her bedridden status.        Assessment:      Areas visualized during today's visit: Lower extremities     Wound location: Bilateral LE- seen weekly on Tuesdays 6/14 Left medial                                               6/20 Left medial        6/14 Right lateral                                              6/20 Right lateral     Last photo: 6/20/23  Wound due to: Trauma  Wound history/plan of care: resulted from a fall- see HPI  Wound base: LLE 70/30 % eschar and slough/non-granular tissue- softening, RLE 80 % superficial scab and slough 20% dermis      Palpation of the wound bed: normal      Drainage: moderate from RLE, copious from LLE     Description of drainage: serosanguinous     Measurements (length x width x depth, in cm): LLE 5.5  x 5.5  x  0.4 cm, RLE 4  x 1.9  x  0.3 cm      Tunneling: N/A     Undermining: N/A  Periwound  skin: Intact, Superficial erosion and Scar tissue      Color: normal and consistent with surrounding tissue and red      Temperature: normal   Odor: none  Pain: severe and during dressing change, sharp and tender  Pain interventions prior to dressing change: soaking and slow and gentle cares   Treatment goal: Heal , Infection control/prevention, Maintain (prevention of deterioration), Pain control and Protection  STATUS: improving slowly  Supplies ordered: gathered, discussed with RN and discussed with patient      Pressure Injury Location: sacrum     6/16 6/20  Last photo: 6/16  Wound type: Pressure Injury     Pressure Injury Stage: 2, hospital acquired   Wound history/plan of care:  Noted by RN during skin assessment/cares  Wound base: 100 % dermis     Palpation of the wound bed: normal      Drainage: small     Description of drainage: serosanguinous     Measurements (length x width x depth, in cm) 1 x 1 x 0.5 cm  Periwound skin: Intact      Color: normal and consistent with surrounding tissue      Temperature: normal   Odor: none  Pain: denies , none  Pain intervention prior to dressing change: no significant pain present   Treatment goal: Heal  and Protection  STATUS: deteriorating  Supplies ordered: discussed with RN and discussed with patient     Treatment Plan:     Sacrum: Every 3 days and as needed  Cleanse the area with microklenz and pat dry.  Apply No sting film barrier to periwound skin.  Cover wound with Sacral Mepilex (#[id number])  Only ONE incontinent pad under pt to allow bed to work properly  Change dressing Q 3 days.  Turn and reposition Q 2hrs side to side only.  Ensure pt has Kenneth-cushion while sitting up in the chair.    FYI- If pt has constant incontinent loose stools needing dressing changes Q shift please discontinue the Mepilex dressing and apply criticaid barrier paste BID and PRN.     Bilateral LE wound(s): Every other day  And  "PRN when soiled change cover dressing  Gently remove old dressing- please spray with wound cleanser or NS to loosen if sticking to wound bed  Cleanse wound bed with wound cleanser and pat dry.  Cut a piece of Hydrofera blue (#[id number]) the size of open area, moisten with NS, wring the excess amount and place it on open area.  Cover with 4x4 mepilex for RLE and sacral mepilex for LLE- if having to change sacral mepilex more than once daily please change to exudry pad ([id number]) secued with kerlix wrap and tape instead.  Don bilateral EdemaWear Stockings (stockings can be reused up to 6 months, wash with mild soap and water and air dry).  Use Prevalon boots while in bed. Continue to elevate legs as needed to float heels even when boots in use. May need 1-3 pillows to fully offload heels.     EdemaWear stockings: Use and Care    Rationale for use:     Decreases edema by improving lymphatic and venous function      Safe and gentle compression    Enhances wound healing    Protects skin    General:     EdemaWear can be worn 24/7, but should be removed at least daily for skin inspection and cares      In order to be effective, EdemaWear should DIRECTLY contact the skin as much as possible -- the mesh weave promotes lymphatic drainage when it is pressed into the skin    Choose appropriate size EdemaWear based on leg (or arm) circumference - see table for guidelines    EdemaWear LITE is only for especially fragile or painful skin    Cleanse and moisturize any intact or scaly skin before applying EdemaWear    Ok to apply additional compression over the EdemaWear (ie Lymph wraps)    Application:     Apply the EdemaWear from base of toes to knee, or above knee if tolerated and it is a long stocking    Create a wide 3\" cuff at the top if needed to prevent rolling down    Only trim the stocking if it is excessively long; do NOT cut in half; can often fold over excess length onto foot    When wounds are present:    Wound dressings " "that directly treat the wound bed can be applied under the EdemaWear    Any additional cover dressings (dry gauze, ABD pads, Kerlix, etc) should be applied ON TOP of the stockings whenever feasible     Stockings may get soiled with drainage and will need to be washed; ensure an extra clean, dry pair always available    May need two people to apply the stocking - bunch up stocking and pull against each other, lifting over wounds    Care:    When stockings are soiled, DO NOT THROW AWAY, hand wash with mild soap, rinse, hang dry    Replace approximately every 4 to 6 months        EdemaWear size Max circumference Stripe color PS # # stockings per pack   Small 18\"  (45cm) navy [id number] 2   Medium 30\"  (75cm) yellow [id number] 1   Large 46\"  (115cm) red [id number] 1   X Large 60\"  (150cm) aqua [id number] 1   Small LITE 24\"  (60cm) purple 457158 2   Medium LITE 36\"  (90cm) orange 801797 1       Orders: Reviewed    RECOMMEND PRIMARY TEAM ORDER: wound clinic and home care when discharged for follow-up on wounds  Education provided: plan of care, wound progress and Infection prevention   Discussed plan of care with: Patient and Nurse  WOC nurse follow-up plan: weekly  Notify WOC if wound(s) deteriorate.  Nursing to notify the Provider(s) and re-consult the WOC Nurse if new skin concern.    DATA:     Current support surface: Standard  Standard gel/foam mattress (IsoFlex, Atmos air, etc)  Containment of urine/stool: Incontinent pad in bed  BMI: Body mass index is 24.29 kg/m .   Active diet order: Orders Placed This Encounter      Regular Diet Adult Thin Liquids (level 0)     Output: I/O last 3 completed shifts:  In: 698 [I.V.:418; NG/GT:280]  Out: 1750 [Urine:1750]     Labs:   Recent Labs   Lab 06/20/23  0336   ALBUMIN 1.6*   HGB 8.6*   WBC 8.3     Pressure injury risk assessment:   Sensory Perception: 3-->slightly limited  Moisture: 3-->occasionally moist  Activity: 1-->bedfast  Mobility: 2-->very limited  Nutrition: 2-->probably " inadequate  Friction and Shear: 2-->potential problem  Froy Score: 13    Lisbeth Dougherty RN CWOCN  Pager no longer is use, please contact through [place] group: [facility] Nurse  Dept. Office Number: *[phone]

## 2023-06-20 NOTE — PROGRESS NOTES
[facility]    Medicine Progress Note - Hospitalist Service, GOLD TEAM 20    Date of Admission:  5/16/2023    Assessment & Plan   Jeanine Schuler is a 61 yo female w/ past medical of HTN, HFpEF (LV EF 45-50%), pulmonary HTN, paroxysmal afib on chronic anticoagulation, interstitial lung disease, sarcoidosis, mixed connective tissue disease, Raynaud's, and chronic dysphagia who was admitted on 5/16/2023 with failure to thrive and found to have multiple R sided pulmonary emboli and bilateral lower extremity DVT's. Transferred to the ICU 6/11 for worsening SOB and increased O2 needs.  CT PE protocol done 6/11 morning showed large bilateral pleural effusion, volume overload, and concern for pneumonia.     #  Acute hypoxic respiratory failure, multifactorial  #  Pulmonary sarcoidosis  #  Acute provoked right lung PE  #  Hospital acquired pneumonia  #  Bilateral loculated pleural effusions  #  Pulmonary edema  On room air at baseline. No hypoxia on admission despite new findings of R sided pulmonary emboli, bilateral pleural effusions, and pulmonary edema on CT Chest PE protocol (5/16). Pulm consulted (6/6), recommended ongoing steroids and outpatient follow up in ILD clinic  Progressive hypoxia noted 6/11. Transferred to ICU. Repeat CT PE protocol showed resolution of previously noted PE, significant increase in R partially loculated pleural effusion and ongoing pulmonary edema. Exudative effusion, ? parapneumonic vs inflammatory lung disease. S/p bilateral thoracentesis 6/12, with only 30 ml out from R loculated effusion. IR consulted, s/p chest tube placement. Adequate drainage, therefore no need for lytics.  Clinically improved following chest tube. Currently on O2 at 1-2 L/min. No output x 48h. Repeat CT chest 6/19 showed decreased R partially loculated effusion and stable L partially loculated effusion. Developed new L pleuritic chest pain overnight 6/20, likely d/t  "left sided effusion.   - Pulm following, recommend appreciated  - Start lytics to R chest tube (pulm ordered), monitor output  - Consult IR consult for placement of L chest tube  - Hold AC for pending procedure  - Continue supplemental O2 prn, goal SpO2 >90  - DuoNeb 4x daily  - Aggressive Pulmonary toilet  - Once CT output is < 75 mL in a day, can change to water seal and consider removal      #  PAF   #  HTN  #  HFpEF  #  Pulmonary HTN  #  R heart stain based on CT chest   #  Elevated troponin, suspect type II NSTEMI  TTE 6/3/23:  EF 45-50%, no RWMA, normal RV size/fucntion, pulmonary artery systolic pressure normal, no significant valvular heart disease, no effusion.    - HR and vitals stable  - Continue PTA Amiodarone 200 mg PO daily for rhythm control  - Holding PTA metoprolol XL 50 mg daily, restart at lower dose when clinically appropriate   - Continue IV metoprolol 5 mg prn HR > 120  - Cardiology consulted, appreciate recs (signed off)  - Goal MAP > 65     #  Dysphagia, resolved  #  Severe protein calorie malnutrition   #  Constipation   #  History of gastric bypass surgery   GI previously consulted. Dysmotility esophagogram 6/7 limited due to patients mobility but no major strictures noted. Rec to continue PPI and limit opioids and anticholinergic meds as able. Outpatient follow-up in [facility] for chronic dysphagia, esophageal dysmotility in the setting of scleroderma: \"[facility] phone: [phone], option 1 for clinic scheduling\"  ---   Diet: Regular Textures + Thin Liquids  ---   SLP recommending no further therapy, currently on regular diet  ---   GI placed PEG on [place] on 6/14  ---   Dietician consulted, appreciate recs  ---   TF ordered, goal rate 40 ml/hr but currently only tolerating 25 ml/hr d/t nausea  ---   Protein supplement  ---   Bowel Regimen: Senna + Miralax  ---   Continue Metoclopramide 10 mg IV q6 hrs for gastric motility  ---   Continue IV " Zofran and IV Ativan prn N/V  ---   RD following  ---   Tolerating tube feed via PEG better at a low rate of 20 ml/hr, goal is 40 ml/hr      #  Anasarca   ---   Daily Weights  ---   Strict I&Os  ---   Continue rolle catheter   ---   I&O net negative 12 L since admit, still above baseline weight (150 lbs)  ---   On Bumex 2 mg po tid --> Bumex drip 1 mg/hr on 6/19 due to persistent anasarca,  CONTINUE    #  Hypomagnesemia  #  Hypokalemia  ---   Continue Mg Ox 400 daily for chronic hypomagnesemia   ---   RN Managed Electrolyte Replacement: High replacement ptrotocol    #  Acute pain  ---   Tylenol, Oxycodone, Dilaudid, Lidoderm patch and Gabapentin 100 mg PO at bedtime     #  Chronic anticoagulation   #  Acute provoked b/l LE DVT on 5/16, this admission  ---   Apixaban 5 mg PO BID, HOLD for IR procedure 6/20     #  Mixed connective tissue disease  #  Limited cutaneous scleroderma  #  Severe Raynaud's   #  Chronic steroid use  #  Hypoglycemia, resolved  ----   D/t pts severe Raynaud's pt blood glucoses via fingerstick inaccurate, stopped BG, monitor BG via bmp, if concern for hypoglycemia, needs stat lab draw BG  ---   Rheum consulted, appreciate recs  ---   Continue PTA Prednisone 15 mg PO daily     #  Generalized weakness and deconditioning   #  Failure to thrive   ---   PT/OT following  ---   Mobilize, out of bed minimum of 3 times/day     #  Right breast skin thickening and nipple retraction on CT (incidental CT finding)  ---   Outpatient mammography   ---   Diligent skin cares per bedside RN       Diet: Snacks/Supplements Adult: Ensure Enlive; With Meals  Regular Diet Adult Thin Liquids (level 0)  Adult Formula Drip Feeding: Continuous TwoCal HN; Jejunostomy; Goal Rate: 40 ml/hr; currently @ 30 ml/hr-- increase by 5 ml q 4 hr or 10 ml q 8 hr to reach goal.; mL/hr    DVT Prophylaxis: DOAC  Rolle Catheter: PRESENT, indication: Retention  Lines: None     Cardiac Monitoring: None  Code Status: Full Code      Clinically  "Significant Risk Factors        # Hypokalemia: Lowest K = 3.2 mmol/L in last 2 days, will replace as needed       # Hypoalbuminemia: Lowest albumin = 1.6 g/dL at 6/20/2023  3:36 AM, will monitor as appropriate     # Hypertension: Noted on problem list         # Severe Malnutrition: based on nutrition assessment         Disposition Plan      Expected Discharge Date: 06/23/2023      Destination: home;long-term care facility  Discharge Comments: care conference Monday 6/12 at 1pm        The patient's care was discussed with the Care Coordinator/, Patient, Patient's Family and pulmonary Consultant(s).    Lewis Dumas PA-C  Hospitalist Service, GOLD TEAM 20  [facility]  Securely message with [employer] (more info)  Text page via [employer] Paging/Directory   See signed in provider for up to date coverage information  ______________________________________________________________________    Interval History   Acute onset L sided chest pain noted overnight. Troponin and EKG negative. Felt to be pleuritic pain from pleural effusion.     Patient notes stable, intermittent chest discomfort today. Worse with deep breath. Reports pain came on as soon as R chest tube was clamped. \"I know it was due to the pleural fluid.\" No worsening dyspnea. No cough, fevers, chills, hemoptysis.     No other complaints.    BLE edema improving on bumex drip.     Physical Exam   Vital Signs: Temp: 98.3  F (36.8  C) Temp src: Oral BP: 124/75 Pulse: 85   Resp: 18 SpO2: 98 % O2 Device: Nasal cannula Oxygen Delivery: 2 LPM  Weight: 164 lbs 7.41 oz    General Appearance:  Awake. Alert. Oriented x3. NAD.   HEENT:  No scleral icterus. Moist mucous membranes.   Respiratory:  Normal effort. Absent breath sounds in bilateral bases. Clear in upper lung fields. No wheezing, rhonchi, rales.   Cardiovascular:  Irregularly irregular. S1,S2. No murmurs.   GI:  Soft, ND, mild tenderness around PEG.  "   Extremity:  1+ pitting edema BLE. No calf tenderness.   Skin:  No visible rash.   Neuro:  Grossly non-focal.     Medical Decision Making       50 MINUTES SPENT BY ME on the date of service doing chart review, history, exam, documentation & further activities per the note.      Data     I have personally reviewed the following data over the past 24 hrs:    8.3  \   8.6 (L)   / 399     134 (L) 89 (L) 25.0 (H) /  102 (H)   3.5 38 (H) 0.60 \       ALT: 13 AST: 21 AP: 78 TBILI: 0.3   ALB: 1.6 (L) TOT PROTEIN: 4.0 (L) LIPASE: N/A       Trop: 69 (H) BNP: N/A       Imaging results reviewed over the past 24 hrs:   Recent Results (from the past 24 hour(s))   CT Chest w/o Contrast    Narrative    EXAM: CT CHEST W/O CONTRAST  6/19/2023 10:39 PM     HISTORY:  eval right side loculate pleural effusion       COMPARISON:  X-ray 6/17/2023. CT 6/11/2023., CT chest 3/8/2022 and  9/16/2021    TECHNIQUE: Helical technique CT chest noncontrast;axial slices with  sagittal and coronal reconstructions. Total DLP: 213 mGy*cm.    FINDINGS:  LUNGS:  Central tracheobronchial tree is clear. Moderately decreased  small/moderate residual partially loculated pleural effusion with  right basilar chest tube in place. Small amount of fluid tracks to the  right minor fissure. Mild bilateral associated atelectasis. No  pneumothorax. Numerous bilateral pulmonary nodules are noted  bilaterally, right more than the left. Scattered foci of groundglass  opacity within the scarring believe the traction bronchiectasis in the  right upper lobe and middle lobe.    CHEST, MEDIASTINUM, AND AXILLA:  Visualized thyroid is unremarkable. Left atrium is mildly enlarged,  with questionable enlargement of the left ventricle on this  noncontrast evaluation. Small pericardial fluid. Stable mildly ectatic  ascending thoracic aorta up to 4.3 cm. Stable dilatation of the main  pulmonary artery up to 3.4 cm. Moderate/severe aortic catheter is  chronic calcification and  calcification of the aortic arch and major  great vessel origins. Mitral annular calcification and aortic root  calcification. Mild/moderate scattered coronary artery atherosclerotic  calcification, including the LAD. No appreciable mediastinal,  axillary, or hilar lymphadenopathy in this noncontrast evaluation.  Patent thoracic esophagus.    UPPER ABDOMEN:  Liver demonstrates surface nodularity to the lumbar and lateral  cardiomegaly and prominent fissures. Prior cholecystectomy.  Postoperative changes of gastric bypass. Small volume perihepatic and  perisplenic ascites. Hilar splenule. Mild benign adrenal thickening.  There is pseudopneumatosis visualized in the upper transverse colon,  secondary to stool and layering fluid (series 2 image 60). No  appreciable abdominal lymphadenopathy. Severe atherosclerotic  calcifications throughout the visualized abdominal aorta.    BONES AND SOFT TISSUES:  Degenerative changes of the visualized spine. No aggressive or  suspicious osseous/soft tissue lesion identified.      Impression    IMPRESSION:  1. Decreased small/moderate bilateral partially loculated pleural  effusions with right basilar chest tube in place.  2. Cardiomegaly and stable ascending aortic ectasia up to 4.3 cm.  Stable dilatation of main pulmonary artery, likely representing a  component of pulmonary hypertension.  3. Bilateral pulmonary nodules are unchanged from 3/8/2022, with  associated fibrotic changes, likely represent sequelae of sarcoidosis.    4. Cirrhotic morphology of the liver.  5. Diffuse anasarca.    I have personally reviewed the examination and initial interpretation  and I agree with the findings.    EMILY FLETCHER MD         SYSTEM ID:  [id number]

## 2023-06-20 NOTE — PROGRESS NOTES
CLINICAL NUTRITION SERVICES - BRIEF NOTE   (See RD note on 6/15 for full assessment)     Reason for RD note:   Checking on TF rate advance, tolerance.     New Findings/Chart Review:  Nutrition support:   Two Rommel HN increased to 25 ml/hr overnight and then 30 ml/hr on day shift.  Goal rate is 40 ml/hr.   Continues on 1 pkt Prosource TF20 BID  When reaches goal rate will provide 2080 kcal (32 kcal/kg), ~121 gm protein (~1.9 gm/kg), 206 gm CHO, 5 gm fiber, 672 ml free water, 852 ml with scheduled FWF.  Water with Rx likely brings total fluids to about 1L/day.    Oral Intake:   Not really eating.      Pt is tolerating TF today.  Not feeling any cramping now or nausea.  One stool today. Continues on IV reglan and miralax and 2 senokot-docusate 2x/d given only intermittently.  Pt feels the reglan is helping her nausea. Unclear if this is intended to continue.     Noted per WOC RN note 6/20, BLE leg wounds are slowly healing and stage 2 PI on sacrum is deteriorating.     Pt continues on IV bumex and rolle catheter is leaking.     Noted plans for IR to place L chest tube tomorrow.  Per IR note, NPO not needed.     Interventions:  Clarify order to advance TF rate to goal of 40 ml/hr after 8 hr or, if pt prefers, increase by 5 ml q 4 hr to 40 ml/hr.      Future/Additional Recommendations:    Nutrition will continue to follow per protocol.    Prudence Serna (Becky) RD, LD   6B and 8A Med/surg (M-F) Pager: [phone]  Weekend/holiday pager: [phone]

## 2023-06-20 NOTE — CARE PLAN
" Shift: 1900 - 0730   VS: BP (!) 140/87 (BP Location: Left arm, Patient Position: Semi-Love's, Cuff Size: Adult Regular)   Pulse 94   Temp 99.5  F (37.5  C) (Oral)   Resp 16   Ht 1.753 m (5' 9\")   Wt 75 kg (165 lb 5.5 oz)   SpO2 95%   BMI 24.42 kg/m     O2: SpO2 > 90% and stable on 2L NC. LS clear and equal bilaterally. Reports  chest pain and  denies SOB.    Output: Incontinent  rolle in placed and draining adequately.    Last BM: 6/19, denies abdominal discomfort. Hypoactive bowel sounds x 4 and  passing flatus.    Activity: Assist of 2  With lift. Pt. Transferred with hovermat from bed to stretcher to go for CT scan.   Skin: Bilat. Lower legs edema and  Bilat. forearm edema.    Pain: Pain managed with  PRN IV Dilaudid, and Oxycodone   CMS: Intact, AOx4. Reports baesline  numbness and tingling in fingers and bilat. Lower extremities   Dressing: Chest tube dressing CDI bilat. Lower legs wound dressing CDI   Diet: Regular diet. continuous PEG tube feeding running at 25 ml/hr Denies nausea/vomiting. Advanced tube feeding rate per order, from 20 - 25 ml /hr, feeding well tolerated    LDA:  L PIV forearm saline locked, L Wrist PIV running bumex 4mL/hr. PEG tube TF running. Chest tube output:30ml   Equipment: IV pole, personal belongings,    Plan: Continue with plan of care. Call light within reach, pt able to make needs known.    Additional Info: Patient c/o of left lower chest pain, doctor was paged, labs EKG was ordered.  Decreased in sodium, potassium chloride levels. Potassium replacement given per protocol.        "

## 2023-06-20 NOTE — PROGRESS NOTES
MEDICINE CROSS COVER:  Notified by bedside RN at 0208 that patient is having a new chest pain at left lower chest. VSS except for mild hypertension (/87).     Ordered troponin and EKG.     EKG appears unchanged from previous, no concerning ST changes.     Troponin elevated at 65 (last troponin on 6/11 was 82).     Went to bedside to evaluate patient. She reports pain is at left lower chest wall and is described as burning, stabbing discomfort. She notes it gets worse with burping and thinks it's related to pleural effusion. Denies shortness of breath. She reports hx of GERD symptoms, but this discomfort is different.     Exam reveals normal cardiac findings, diminished breath sounds at bilateral bases on auscultation, no reproducible tenderness of chest wall on palpation.     Patient had CT chest done overnight, which shows moderately decreased small/moderate bilateral partially loculated pleural effusions with right basilar chest tube in place.    Based on patient's overall clinical picture and description of symptoms as well as preliminary data above, suspect that this discomfort is more pleuritic in nature related to ongoing bilateral pleural effusions rather than cardiac or GI in nature.     Will obtain 1 more troponin to trend for completeness and continue to monitor for any change/worsening of symptoms. Pulmonary and daytime provider to follow up on CT chest findings and determine next steps for CT management and any other warranted treatment/intervention.     Leonor Tristan MD  Internal Medicine/Pediatrics Hospitalist   of Internal Medicine and Pediatrics  [facility]  Pager: [phone]

## 2023-06-20 NOTE — CARE PLAN
Pt. Came back from CT scan, Continuous tube feed rate increased from 20 ml/hr to 25 ml/hr.  Goal is to give 40 ml/hr if  Pt is  tolerating feeding well per order. No GI symptoms reported. Pt. Denies nausea/vomiting, abdominal pain or pressure. Pt. Is tolerating feeding well.

## 2023-06-21 NOTE — PLAN OF CARE
Goal Outcome Evaluation: [id number].  Patient is alert and oriented x4, able to make needs known, denied pain, c/o nausea- PRN offered but refused. TF started but pt requested to be turned off after 15 minutes, writer explained but still refused. No new concerns, continue with POC.      Plan of Care Reviewed With: patient    Overall Patient Progress: no changeOverall Patient Progress: no change

## 2023-06-21 NOTE — PLAN OF CARE
"Goal Outcome Evaluation:    Shift: 6926-5386    VS: /85 (BP Location: Left arm)   Pulse 115   Temp 97.4  F (36.3  C) (Oral)   Resp 18   Ht 1.753 m (5' 9\")   Wt 74.6 kg (164 lb 7.4 oz)   SpO2 94%   BMI 24.29 kg/m      Pain:  Has 10/10 pain, has Q4h morphine IV available for comfort  Neuro: A&Ox4, patient able to make needs known. Pt is very depressed about current medical situation  Resp: Pt is SOB due to extra fluids in lung, prn morphine has been effective for her air hunger. Pt has a R sided chest tube to help with pleural effusion and she had a L sided chest tube placement scheduled but patient declined it today  Diet: Pt is on regular diet, pt has tube feedings but refused them today.   Skin: pt refused skin assessment. Had august edema in upper and lower extremities. Pt also has sacral wound and ble wounds but pt refused assessment. Pt refused repositioning  LDA: 2 L PIV saline locked, rolle, R sided chest tube and Peg tube in place   Activity: bedrest  Output: Voids via rolle catheter   Additional info: Potassium and Mag replaced today. Pt requested code status change this AM- she is now DNR/DNI.   Call light within reach     Plan:  Pt to continue working with medicine and palliative team and go from there.         "

## 2023-06-21 NOTE — CONSULTS
Palliative Care Consultation Note  [facility]      Patient: Jeanine Schuler  Date of Admission:  5/16/2023    Requesting Clinician / Team: Lewis Dumas PA-C/Hospitalist  Reason for consult: Symptom management  Goals of care     Recommendations & Counseling     GOALS OF CARE:     Comfort focused . Jeanine states due to her current quality of life she wishes to pursue comfort-focused care and hospice. However, her family is struggling with this decision and she asked for more time alone with her  today. Let her know that whenever they are ready we can formally transition her to comfort-focused measures only.    ADVANCE CARE PLANNING:    No health care directive on file. Per  informed consent policy next of kin should be involved if patient becomes unable.    There is no POLST form on file, plan to complete prior to DC.    Code status: No CPR- Do NOT Intubate    MEDICAL MANAGEMENT:   #Pain, acute 2/2 chest tubes     Opioids: Recommend changing oxycodone to morphine solution 10-15 mg po q4h prn pain, dyspnea. Morphine 2-4 mg iv q4h prn for breakthrough pain, dyspnea    Acetaminophen (Tylenol), PRN    Muscle Relaxers:Tizanidine (Zanaflex) prn    Topical medicines:  Lidocaine Patch 4%      #Anxiety, Situational Anxiety    Lorazepam 0.25 mg po q4h prn    Hydroxyzine 25-50 mg po q6h prn    If transitions to CMO, would increase Lorazepam to solution 0.5-1mg po q3h prn     #Dyspnea, pleural effusions, underlying sarcoidosis and hx ILD    Oxygen    Fan at bedside    Morphine IR 10-15 mg po q4h prn; Morphine 2-4 mg iv q4h prn breakthrough pain or dyspnea    If transitions to CMO, would increase morphine to 10-15 mg po q2h prn and morphine 4-6 mg iv q2h prn    #Nausea  -Pharmacologic management     Reglan 10 mg iv q6h    Zofran 4 mg po/iv q6h prn    If persists after cessation of TF, consider adding Zyprexa 2.5 mg at bedtime and increase to BID if  needed    PSYCHOSOCIAL/SPIRITUAL:    Family     Palliative Care will continue to follow. Thank you for the consult and allowing us to aid in the care of Jeanine Schuler.    These recommendations have been discussed with Lewis Dumas PA-C.    Yoselin David PA-C  Securely message with [employer] (more info)  Text page via [facility] Paging/Directory       Palliative Summary/HPI     Jeanine Schuler is a 62 year old female with a past medical history of HTN, HFpEF (LV EF 45-50%), pulmonary HTN, paroxysmal afib on chronic anticoagulation, interstitial lung disease, sarcoidosis, mixed connective tissue disease, Raynaud's, and chronic dysphagia who was admitted on 5/16/2023 with failure to thrive and found to have multiple R sided pulmonary emboli and bilateral lower extremity DVT's. Transferred to the ICU 6/11 for worsening SOB and increased O2 needs.  CT PE protocol done 6/11 morning showed large bilateral pleural effusion, volume overload, and concern for pneumonia. S/p R chest tube placement. Chest CT 6/19 with continued pleural effusions bilaterally. Pulmonary medicine recommending left chest tube. Patient told hospitalist that she does not want L chest tube and wishes to pursue comfort care and hospice.    Today, the patient was seen for:  Hx pulmonary sarcoidosis  Acute hypoxic respiratory failure  Bilateral loculated pleural effusions  Goals of care    Palliative Care Summary:   Met with Jeanine.   I introduced our role as an extra layer of support and how we help patients and families dealing with serious, potentially life-limiting illnesses. I explained the composition of the palliative care team.  Palliative care helps patients and families navigate their care while focusing on the whole person; providing emotional, social and spiritual support  Palliative care often assists with symptom management, information sharing about what to expect from the illness, available treatment options and what effect those  options may have on the disease course, and provide effective communication and caring support.     Jeanine was very short of breath and stated even speaking was difficult. She did share that she does not want another chest tube. She wants comfort focused care and hospice. She does not want to continue tube feeds. When asked if she has discussed this with family, she stated she had discussed this with her  but he was not supportive of this decision. She has not spoken to her son Yusuf who lives with them, but did state she would wish him to speak for her if she was unable to speak for herself. Asked Jeanine if she had thoughts about where she would wish to be at end of life--she stated she wanted to be in a facility or hospital where she could get the care she needs. She asked for medication for dyspnea as soon as possible. She has not found oxycodone or morphine effective at prior doses. Reviewed her chart. Ordered 4 mg of iv morphine as 2mg of iv morphine earlier today not effective. Spoke with RN.    RN reported later that morphine 4 mg was effective. RN said in the interim patient had consented to another provider about a chest tube. Returned to visit Jeanine later in afternoon.  not at bedside. She affirmed again that she did not want a chest tube. Asked if I could call her --she gave permission. Spoke with hospitalist who stated that she had also declined a chest tube when he spoke with her and that he had communicated her refusal to Dr Cook and to IR. Called  who was on his way to hospital. Agreed to meet with him and his wife in room. Visited Jeanine later in afternoon.  at bedside. Discussed her expressed wishes of transitioning to comfort focused care.  understandably distraught. Jeanine asked for some time alone. Discussed with them that if/when they do decide to make the transition to comfort only measures, to let us know. Discussed in the meantime that we  will have medications for dyspnea and anxiety available. Discussed that she can refuse tube feeds if she does not wish to continue. I mentioned we would check in on her again tomorrow.    Prognosis, Goals, & Planning:      Functional Status just prior to this current hospitalization:    Admitted for weakness and FTT, severeley malnourished       Prognosis, Goals, and/or Advance Care Planning:    Jeanine states she has decided on comfort care and hospice because of poor quality of life. She does not feel she will ever be well enough to take care of herself.      Code Status was addressed today:     No She did affirm she is DNR/DNI      Patient's decision making preferences: independently          Patient has decision-making capacity today for complex decisions:Intact            Coping, Meaning, & Spirituality:     Mood, coping, and/or meaning in the context of serious illness were addressed today: Yes    Social:   Living situation:lives with family    Medications:  I have reviewed this patient's medication profile and medications from this hospitalization. Notable medications:       ROS:  Comprehensive ROS is reviewed and is negative except as here & per HPI:     Physical Exam   Vital Signs with Ranges  Temp:  [97.4  F (36.3  C)-98.6  F (37  C)] 97.4  F (36.3  C)  Pulse:  [72-96] 72  Resp:  [16-26] 22  BP: (119-166)/(74-90) 143/82  SpO2:  [92 %-100 %] 96 %  164 lbs 7.41 oz    PHYSICAL EXAM:  Temp: 97.4  F (36.3  C) Temp src: Oral BP: (!) 143/82 Pulse: 72   Resp: 22 SpO2: 96 % O2 Device: Nasal cannula Oxygen Delivery: 3 LPM  Gen: Lying in bed, uncomfortable but in NAD  Resp: No visible accessory muscle use, but limited speech due to dyspnea  Msk: + sarcopenia  Skin: Warm, dry, no jaundice  Neuro: Grossly non-focal  Mental status/Psych: alert. Oriented. Asks/answers questions appropriately. Affect is flat    Data reviewed:  Reviewed image and report of Chest CT w/o contrast on 6/19  Na 134  K 2.7  Cr 0.58  Mag  1.8  Phos 2.9  WBC 11.2  Hgb 10.2  Plt 459k      75 MINUTES SPENT BY ME on the date of service doing chart review, history, exam, documentation & further activities per the note.

## 2023-06-21 NOTE — PROGRESS NOTES
[facility]    Medicine Progress Note - Hospitalist Service, GOLD TEAM 20    Date of Admission:  5/16/2023    Assessment & Plan   Jeanine Schuler is a 61 yo female w/ past medical of HTN, HFpEF (LV EF 45-50%), pulmonary HTN, paroxysmal afib on chronic anticoagulation, interstitial lung disease, sarcoidosis, mixed connective tissue disease, Raynaud's, and chronic dysphagia who was admitted on 5/16/2023 with failure to thrive and found to have multiple R sided pulmonary emboli and bilateral lower extremity DVT's. Transferred to the ICU 6/11 for worsening SOB and increased O2 needs.  CT PE protocol done 6/11 morning showed large bilateral pleural effusion, volume overload, and concern for pneumonia.     #  Acute hypoxic respiratory failure, multifactorial  #  Pulmonary sarcoidosis  #  Hospital acquired pneumonia  #  Bilateral loculated pleural effusions  #  Pulmonary edema  On room air at baseline. No hypoxia on admission despite new findings of R sided pulmonary emboli, bilateral pleural effusions, and pulmonary edema on CT Chest PE protocol (5/16). Pulm consulted (6/6), recommended ongoing steroids and outpatient follow up in ILD clinic.   Developed progressive hypoxia 6/11. Transferred to ICU. Repeat CT PE protocol showed resolution of previously noted PE, significant increase in R partially loculated pleural effusion and ongoing pulmonary edema. Exudative effusion, ? parapneumonic vs inflammatory lung disease. S/p bilateral thoracentesis 6/12, with only 30 ml out from R loculated effusion. IR consulted, s/p chest tube placement.   Clinically improved following chest tube. O2 sats stable on 1-2 L/min. Pulm following. Repeat CT chest 6/19 showed decreased R partially loculated effusion and stable L partially loculated effusion. Developed new L pleuritic chest pain overnight 6/20, likely d/t left sided effusion. Pulm recommended L chest tube placement to further  "manage.  Patient now wishing to pursue comfort cares. She does not want additional procedures. She does not want BIPAP or intubation. She would like to go on hospice and focus on comfort. Palliative care consulted for goals of care. This is a rather abrupt change in care plan and family not yet aware.   - Appreciate palliative care involvement  - Pulmonology updated   - DNR/DNI per patient request  - No BIPAP  - No further procedures. IR consult cancelled. Will consider removing R sided chest tube pending ongoing GOC discussions.   - Morphine and Ativan PRN for pain, anxiety, and air hunger  - Continue supplemental O2 prn, goal SpO2 >90 and comfort  - DuoNeb 4x daily     #  PAF   #  HTN  #  HFpEF  #  Pulmonary HTN  #  R heart stain based on CT chest   #  Elevated troponin, suspect type II NSTEMI  TTE 6/3/23:  EF 45-50%, no RWMA, normal RV size/fucntion, pulmonary artery systolic pressure normal, no significant valvular heart disease, no effusion.  Clinically stable.   - Continue PTA Amiodarone 200 mg PO daily for rhythm control  - Holding PTA metoprolol XL 50 mg daily   - Continue IV metoprolol 5 mg prn HR > 120  - Cardiology consulted, appreciate recs (signed off)  - Goal MAP > 65     #  Dysphagia, resolved  #  Severe protein calorie malnutrition   #  Constipation   #  History of gastric bypass surgery   GI previously consulted. Dysmotility esophagogram 6/7 limited due to patients mobility but no major strictures noted. Rec to continue PPI and limit opioids and anticholinergic meds as able. Outpatient follow-up in [facility] for chronic dysphagia, esophageal dysmotility in the setting of scleroderma: \"[facility] phone: [phone], option 1 for clinic scheduling\".   - Diet: Regular Textures + Thin Liquids  - SLP recommending no further therapy, currently on regular diet  - GI placed PEG on [place] on 6/14  - Dietician consulted, appreciate recs  - TF on hold as patient not " tolerating and wishing to pursue comfort cares  - Reglan and Zofran prn for nausea      #  Anasarca   - Daily Weights  - Strict I&Os  - Continue rolle catheter   - I&O net negative 12 L since admit, still above baseline weight (150 lbs)  - On Bumex 2 mg po tid --> Bumex drip 1 mg/hr on 6/19 due to persistent anasarca.  - Stop bumex drip and transition to Bumex 2mg TID pending ongoing [facility] discussion    #  Hypomagnesemia  #  Hypokalemia  - Continue Mg Ox 400 daily for chronic hypomagnesemia   - RN Managed Electrolyte Replacement: High replacement ptrotocol    #  Acute pain  - Tylenol, Lidoderm patch and Gabapentin 100 mg PO at bedtime  - Switch oxycodone to morphine per palliative care     #  Chronic anticoagulation   #  Acute provoked b/l LE DVT on 5/16, this admission  #  Acute provoked right lung PE  - Unhold Apixaban 5 mg PO BID     #  Mixed connective tissue disease  #  Limited cutaneous scleroderma  #  Severe Raynaud's   #  Chronic steroid use  #  Hypoglycemia, resolved  - D/t pts severe Raynaud's pt blood glucoses via fingerstick inaccurate, stopped BG, monitor BG via bmp, if concern for hypoglycemia, needs stat lab draw BG  - Rheum consulted, appreciate recs  - Continue PTA Prednisone 15 mg PO daily     #  Generalized weakness and deconditioning   #  Failure to thrive   - PT/OT following  - Mobilize, out of bed minimum of 3 times/day     #  Right breast skin thickening and nipple retraction on CT (incidental CT finding)  - Outpatient mammography pending [facility] discussion  - Diligent skin cares per bedside RN       Diet: Snacks/Supplements Adult: Ensure Enlive; With Meals  Regular Diet Adult Thin Liquids (level 0)  Adult Formula Drip Feeding: Continuous TwoCal HN; Jejunostomy; Goal Rate: HOLD for now; mL/hr    DVT Prophylaxis: DOAC  Rolle Catheter: PRESENT, indication: Strict 1-2 Hour I&O;Retention  Lines: None     Cardiac Monitoring: None  Code Status: No CPR- Do NOT Intubate      Clinically Significant Risk  Factors        # Hypokalemia: Lowest K = 2.7 mmol/L in last 2 days, will replace as needed       # Hypoalbuminemia: Lowest albumin = 1.6 g/dL at 6/20/2023  3:36 AM, will monitor as appropriate     # Hypertension: Noted on problem list         # Severe Malnutrition: based on nutrition assessment         Disposition Plan     Expected Discharge Date: 06/23/2023      Destination: home;long-term care facility  Discharge Comments: care conference Monday 6/12 at 1pm        The patient's care was discussed with the Care Coordinator/, Patient, Patient's Family and pulmonary Consultant(s).    Lewis Dumas PA-C  Hospitalist Service, GOLD TEAM 20  [facility]  Securely message with [employer] (more info)  Text page via [employer] Paging/Directory   See signed in provider for up to date coverage information  ______________________________________________________________________    Interval History   Ongoing R and L sided chest pain, moderate to severe dyspnea, stable o2 sats. Patient states that she would like to pursue comfort cares and hospice given her poor quality of life. She does not want another chest tube. She would like to focus on symptom management. Family not aware.  updated and did not know the patient felt this way.     Patient trialed on ativan and morphine with some relief in dyspnea and pain.  Palliative consulted.  Patient continues to request comfort cares however family not on the same page and would like to discuss further.     Physical Exam   Vital Signs: Temp: 97.4  F (36.3  C) Temp src: Oral BP: (!) 143/82 Pulse: 72   Resp: 22 SpO2: 96 % O2 Device: Nasal cannula Oxygen Delivery: 3 LPM  Weight: 164 lbs 7.41 oz    General Appearance:  Awake. Alert. Oriented x3. Appears uncomfortable.  NO respiratory distress.  HEENT:  No scleral icterus. Dry mucous membranes.   Respiratory:  Normal effort. Absent breath sounds in bilateral bases. R sided chest  tube.   Cardiovascular:  Irregularly irregular. S1,S2. No murmurs.   GI:  Soft, ND, mild tenderness around PEG.    Extremity:  1+ pitting edema BLE. No calf tenderness.   Skin:  No visible rash.   Neuro:  Grossly non-focal.     Medical Decision Making       60 MINUTES SPENT BY ME on the date of service doing chart review, history, exam, documentation & further activities per the note.      Data     I have personally reviewed the following data over the past 24 hrs:    11.2 (H)  \   10.2 (L)   / 459 (H)     134 (L) 88 (L) 29.3 (H) /  93   2.7 (L) 36 (H) 0.58 \       Imaging results reviewed over the past 24 hrs:   No results found for this or any previous visit (from the past 24 hour(s)).

## 2023-06-21 NOTE — PROGRESS NOTES
CLINICAL NUTRITION SERVICES - BRIEF NOTE     Nutrition Prescription      Recommendations already ordered by Registered Dietitian (RD):  - Hold TF per pt request and await results of palliative consult.     Future/Additional Recommendations:  - Await fine determination whether pt would like to proceed with palliative/hospice care and no longer wishes to continue with TF.      EVALUATION OF THE PROGRESS TOWARD GOALS   Diet: regular   Nutrition Support: Two Rommel HN was increased from 30 ml/hr to 35 ml/hr 6/20 on evening shift.  Rate was returned to 30 ml/hr due to nausea despite anti-nausea Rx.  On night shift it was mentioned that TF was restarted (unclear when it was totally stopped) but then turned off after 15 minutes per pt request.      NEW FINDINGS   Noted pt refusing further TF and refused placement of another chest tube.   Noted palliative care consulted.     Labs:  Na 134  K+ 2.7  BUN 29.3  Mg 1.8  Phos 2.9     INTERVENTIONS  Will hold TF for now. Can discontinue order completely with confirmation that pt still wishes to pursue hospice.     Monitoring/Evaluation  Progress toward goals will be monitored and evaluated per protocol.     Prudence Serna RD (Becky), LD   6B and 8A Med/surg (M-F) Pager: [phone]  Weekend/holiday pager: [phone]

## 2023-06-21 NOTE — PLAN OF CARE
"  VS: BP (!) 166/90 (BP Location: Left arm)   Pulse 96   Temp 98.6  F (37  C) (Oral)   Resp 16   Ht 1.753 m (5' 9\")   Wt 74.6 kg (164 lb 7.4 oz)   SpO2 92%   BMI 24.29 kg/m     * Vital signs Q4h    Pain: Has Prn dilaudid and oxycodone. Stay on schedule because patient's pain is uncontrolled when available doses are missed. Right shoulder/scapula pain 9/10 sharp; provider made aware and assessed patient on the floor  Neuro: A&Ox4, has baseline n/t in august lower exts. Able to make needs known  Resp: Tachypneic, dyspnea on exertion, shallow breaths, is on 2L of oxygen via nasal cannula. No SOB and chest pain reported.   Diet: Cont. Tube feeding advanced to 35 then lowered to 30 ml due to nausea despite nausea meds   Skin: BLE and BUE edema, sacral wound, BLE wounds- changed By WOC today.   LDA: L PIV forearm SL, L hand PIV- infusing bumex at 4ml/hr. Chest tube at -20 suction, PEG tube running TF @30mL/hr continously- increased from 25mL/hr and pt tolerated well. Plan is to increase up to 40mL/hr. Deleon catheter in place, draining well.   Activity: Not oob, repo q2h.   Call light within reach     Anxious about tomorrow's procedure. Education provided, listening offered, and atarax administered with minimal improvement  Plan: L chest tube placement tomorrow morning.  Katina Crawford RN          "

## 2023-06-21 NOTE — PROGRESS NOTES
Medicine Cross Cover Note     Jeanine Schuler is a 62 year old female currently admitted to [facility] since 5/16 with multiple pulmonary comorbidities -please see notes from day team.  I was paged this evening with patient reporting increased right scapular pain, shortness of breath, and anxiety.    Patient had right-sided chest tube placed on 6/12.  Today, she was started on dornase and alteplase injections for loculated effusions.  This requires turning every 15 minutes for 2-hour period.  Patient has been having increased discomfort with the turns at her chest tube site.  Reports she gets about 2 hours of relief from every 4 hours oxycodone 10 mg.  Gets short-term relief from 0.2 mg of IV Dilaudid.  She has been tachypneic, however, this is not unexpected in setting of bilateral pleural effusions.    #Discomfort at chest tube site  #Anxiety  #Tachypnea  Discussed plan of care with patient, nurse, and patient's  at bedside.  Goal is to provide patient increased relief over the next 2 hours during turns and then hopefully allow her to rest overnight.  - Ordered one-time dose of IV Dilaudid 0.4 mg.  We will then continue 0.2 mg every 2 hours as needed  - Increase hydroxyzine from 25 mg every 6 hours to 25-50 milligrams every 6 hours as needed for anxiety.  Give one-time dose of 25 mg now.  - Continue oxycodone 5-10 mg every 4 hours as needed  -Continue to plan for left-sided chest tube placement tomorrow deepti Gallagher PA-C  Internal Medicine [facility]  Pager [phone]

## 2023-06-21 NOTE — PROGRESS NOTES
"Care Management Follow Up    Length of Stay (days): 36    Expected Discharge Date: 06/23/2023     Concerns to be Addressed: discharge planning     Patient plan of care discussed at interdisciplinary rounds: Yes    Anticipated Discharge Disposition: Hospice?     Anticipated Discharge Services: Hospice?  Anticipated Discharge DME: Other (see comment) (TBD)    Patient/family educated on Medicare website which has current facility and service quality ratings: no  Education Provided on the Discharge Plan: No  Patient/Family in Agreement with the Plan: unable to assess    Referrals Placed by CM/SW: Internal Clinic Care Coordination  Private pay costs discussed: Not discussed recently    Additional Information:  Per  Physicians in rounds patient has had a change of heart and wants to go on Hospice, She is currently refusing tube feeds and refuses to have another chest tube placed.  A palliative consult is being placed for them to go and talk with this patient to make sure understands as this is a change from yesterdays conversation with patient and treatment team.    Care Coordination to wait until palliative has met with patient and confirmed hospice is the plan before moving forward.    Addendum 3:40  Per Attending  \"Patient now wishing to pursue comfort cares. She does not want additional procedures. She does not want BIPAP or intubation. She would like to go on hospice and focus on comfort. Palliative care consulted for goals of care. This is a rather abrupt change in care plan and family not yet aware.\"       Yasmine IBARRA RN CCM  RN Care Coordinator 8A and 10 ICU  [address] [place] [address]  [email or web address]   Office: (10 ICU) [phone]   Pager: [phone]    For Weekend & Holiday on call RN Care Coordinator:  (Tasks: Home care, home infusion, medical equipment/oxygen, transportation, IMM & PERRY forms, etc.)   [employer] (2407-6435) Saturday & Sunday; " (2705-7482) [facility] Recognized Holidays  Pager #1: [phone] Units: 4A, 4C, 4E, 5A & 5B   Pager #2: [phone] Units: 6A, 6B, 6C, 6D  Pager #3: [phone] Units: 7A, 7B, 7C, 7D & 5C   Pager #4: [phone] Units: 5 Ortho, 8A, 10 ICU, & [facility]      For Weekend & Holiday on call Social Work:  (Tasks: TCU, transportation, Hospice, adjustment to illness counseling, Health Care Directives, Child Protection and Domestic Violence concerns, Vulnerable Adult, IMM forms, etc.)   [place] (0800 - 1630) Saturday and Sunday  Pager: [phone] Units: 4A, 4C, 4E, 5A and 5B   Pager: [phone] Units: 6A, 6B, 6C, 6D   Pager: [phone]: 7A, 7B, 7C, 7D, and 5C      [facility] (0800-1630) Saturday and Sunday  Units: 5 Ortho, 8A, and 10 ICU   Pager: [phone]

## 2023-06-22 PROBLEM — J90 PLEURAL EFFUSION: Status: ACTIVE | Noted: 2023-01-01

## 2023-06-22 NOTE — PROGRESS NOTES
SPIRITUAL HEALTH SERVICES Progress Note  [facility] ([facility]) 6B    Yesterday and today I attempted a follow up  visit for spiritual support. Both times patient was sleeping. Patient was accompanied by  [employer]. I will try to visit again tomorrow.     Jay Le MDiv  Chaplain Resident  Pager [phone]      * [facility] remains available 24/7 for emergent requests/referrals, either by having the switchboard page the on-call  or by entering an ASAP/STAT consult in Epic (this will also page the on-call ). Routine Epic consults receive an initial response within 24 hours.*

## 2023-06-22 NOTE — PROGRESS NOTES
Care Management Follow Up    Length of Stay (days): 37    Expected Discharge Date: 06/23/2023     Concerns to be Addressed: discharge planning     Patient plan of care discussed at interdisciplinary rounds: Yes    Anticipated Discharge Disposition: Home, Long Term Care     Anticipated Discharge Services: None  Anticipated Discharge DME: Other (see comment) (TBD)    Patient/family educated on Medicare website which has current facility and service quality ratings: no  Education Provided on the Discharge Plan: No  Patient/Family in Agreement with the Plan: unable to assess    Referrals Placed by CM/SW: Internal Clinic Care Coordination  Private pay costs discussed: Not applicable    Additional Information:    Writer spoke with the palliative care doctor- Dr. Carol Trivedi about the case. She stated patient has a chest tube in but patient would okay to remove it if it is a barrier to discharge. She ideally wants to keep it in but will remove it if needed. She plans to go to a nursing home with hospice care. She stated her prognosis is weeks. She is bed bound and on oxygen at this time. She has been using a bedside commode since January also.     YOKASTA Drummond, LGSW  6 Med Surg   [facility]  Phone: [phone]  Pager: [phone]

## 2023-06-22 NOTE — PLAN OF CARE
[id number]    Patient is A&O x4. Call light within reach. Able to make needs known. Needs assistance x2 with pericares and repositioning. Agreed to be repositioned twice this shift. Deleon cath in place, emptied during the shift. Wears bried. LBM: 06/21.    On continuous oxygen therapy at 4LPM via NC. 2PIV on R, SL, intact and patent. Pain managed with PRN Morphine. R chest tube in place. PEG tube in place, refused to resume feedings, agreed to flush 30ml water hourly.  Agreed to administer scheduled medications. Offered ice chips.     Bilateral upper and lower extremity edema. Mepilex on sacral area changed on 06/21. RN managed of K(2.7), Mg(1.8) and Phos(2.9). Lab draws placed.     Frequent checks done. Spouse remains with the patient throughout the shift.

## 2023-06-22 NOTE — PROGRESS NOTES
[facility]    Medicine Progress Note - Hospitalist Service, GOLD TEAM 20    Date of Admission:  5/16/2023    Assessment & Plan   Jeanine Schuler is a 63 yo female w/ past medical of HTN, HFpEF (LV EF 45-50%), pulmonary HTN, paroxysmal afib on chronic anticoagulation, interstitial lung disease, sarcoidosis, mixed connective tissue disease, Raynaud's, and chronic dysphagia who was admitted on 5/16/2023 with failure to thrive and found to have multiple R sided pulmonary emboli and bilateral lower extremity DVT's. Transferred to the ICU 6/11 for worsening SOB and increased O2 needs.  CT PE protocol done 6/11 morning showed large bilateral pleural effusion, volume overload, and concern for pneumonia.     #  Acute hypoxic respiratory failure, multifactorial  #  Pulmonary sarcoidosis  #  Hospital acquired pneumonia  #  Bilateral loculated pleural effusions  #  Pulmonary edema  On room air at baseline. No hypoxia on admission despite new findings of R sided pulmonary emboli, bilateral pleural effusions, and pulmonary edema on CT Chest PE protocol (5/16).   Developed progressive hypoxia 6/11. Transferred to ICU. Repeat CT PE protocol showed resolution of previously noted PE, significant increase in R partially loculated pleural effusion and ongoing pulmonary edema. Exudative effusion, ? parapneumonic vs inflammatory lung disease. S/p bilateral thoracentesis 6/12, with only 30 ml out from R loculated effusion. S/p IR chest tube placement.   Hypoxia improved following chest tube. O2 sats stable on 1-2 L/min. Pulm following. Repeat CT chest 6/19 showed decreased R partially loculated effusion and stable L partially loculated effusion. Developed new L pleuritic chest pain and worsening dyspnea overnight 6/20. O2 sats stable. Pulm recommended lytics to R chest tube and IR consult for L chest tube placement to further manage.  Patient now wanting to pursue hospice. She is not happy  with her QOL. She does not want additional procedures. She does not want BIPAP or intubation. She would like to go on hospice and focus on comfort. Palliative care consulted for goals of care. This is a rather abrupt change in care plan and family not adjusting well. Discussed acute interventions suggested by pulmonology and rheumatology that may result in improvement of symptoms, although difficult to say if they will impact her overall QOL. This includes thoracentesis vs chest tube placement for effusions and increase in steroids for pulmonary sarcoidosis (? If this would help with pleural effusions).   - Appreciate palliative care involvement  - Appreciate ongoing recs from pulmonology and rheumatology  - Patient open to trial of higher dose steroids to see if this results in improvement of symptoms. She is OK holding transition to comfort measures. She is not interested in further escalation of care or invasive procedures. She is still leaning towards hospice but is willing to give this a try for a couple of days and then re-evaluate.   - Morphine 2-4mg IV or 10-15mg SL q2h prn for pain and air hunger  - Ativan 0.5mg IV q4h prn for anxiety and air hunger  - DNR/DNI per patient request  - No BIPAP  - No further procedures. Will consider removing R sided chest tube pending ongoing GOC discussions.   - Continue supplemental O2 prn, goal SpO2 >90 and comfort  - DuoNeb 4x daily     #  PAF   #  HTN  #  HFpEF  #  Pulmonary HTN  #  R heart stain based on CT chest   #  Elevated troponin, suspect type II NSTEMI  TTE 6/3/23:  EF 45-50%, no RWMA, normal RV size/fucntion, pulmonary artery systolic pressure normal, no significant valvular heart disease, no effusion.  Clinically stable.   - Continue PTA Amiodarone 200 mg PO daily for rhythm control  - Continue IV metoprolol 5 mg prn HR > 120  - Cardiology consulted, appreciate recs (signed off)  - Goal MAP > 65     #  Dysphagia, resolved  #  Severe protein calorie malnutrition  "  #  Constipation   #  History of gastric bypass surgery   GI previously consulted. Dysmotility esophagogram 6/7 limited due to patients mobility but no major strictures noted. Rec to continue PPI and limit opioids and anticholinergic meds as able. Outpatient follow-up in [facility] for chronic dysphagia, esophageal dysmotility in the setting of scleroderma: \"[facility] phone: [phone], option 1 for clinic scheduling\".   - Diet: Regular Textures + Thin Liquids  - SLP recommending no further therapy, currently on regular diet  - GI placed PEG on [place] on 6/14  - Dietician consulted, appreciate recs  - TF on hold as patient not tolerating and wishing to pursue comfort cares  - Reglan and Zofran prn for nausea      #  Anasarca   - Daily Weights  - Strict I&Os  - Continue rolle catheter   - I&O net negative 12 L since admit, still above baseline weight (150 lbs)  - Bumex 2 mg IV tid     #  Hypomagnesemia  #  Hypokalemia  - Continue Mg Ox 400 daily for chronic hypomagnesemia   - RN Managed Electrolyte Replacement: High replacement ptrotocol    #  Acute pain  - Tylenol, Lidoderm patch and Gabapentin 100 mg PO at bedtime  - Switch oxycodone to morphine per palliative care     #  Chronic anticoagulation   #  Acute provoked b/l LE DVT on 5/16, this admission  #  Acute provoked right lung PE  - Unhold Apixaban 5 mg PO BID     #  Mixed connective tissue disease  #  Limited cutaneous scleroderma  #  Severe Raynaud's   #  Chronic steroid use  #  Hypoglycemia, resolved  - D/t pts severe Raynaud's pt blood glucoses via fingerstick inaccurate, stopped BG, monitor BG via bmp, if concern for hypoglycemia, needs stat lab draw BG  - Rheum consulted, appreciate recs  - Continue PTA Prednisone 15 mg PO daily     #  Generalized weakness and deconditioning   #  Failure to thrive   - PT/OT following  - Mobilize, out of bed minimum of 3 times/day     #  Right breast skin thickening and nipple " retraction on CT (incidental CT finding)  - Outpatient mammography pending [facility] discussion  - Diligent skin cares per bedside RN       Diet: Snacks/Supplements Adult: Ensure Enlive; With Meals  Regular Diet Adult Thin Liquids (level 0)    DVT Prophylaxis: DOAC  Deleon Catheter: PRESENT, indication: Strict 1-2 Hour I&O  Lines: None     Cardiac Monitoring: None  Code Status: No CPR- Do NOT Intubate      Clinically Significant Risk Factors        # Hypokalemia: Lowest K = 2.7 mmol/L in last 2 days, will replace as needed       # Hypoalbuminemia: Lowest albumin = 1.6 g/dL at 6/20/2023  3:36 AM, will monitor as appropriate     # Hypertension: Noted on problem list         # Severe Malnutrition: based on nutrition assessment         Disposition Plan      Expected Discharge Date: 06/23/2023      Destination: home;long-term care facility  Discharge Comments: care conference Monday 6/12 at 1pm        The patient's care was discussed with the Care Coordinator/, Patient, Patient's Family and pulmonary Consultant(s).    Lewis Dumas PA-C  Hospitalist Service, [address]  Securely message with [employer])  Text page via [facility] Paging/Directory   See signed in provider for up to date coverage information  ______________________________________________________________________    Interval History   No acute events overnight. Patient notes improvement in pain control and anxiety with meds. Still having significant dyspnea. Moderate L sided pleuritic chest pain. Mild pain d/t R sided chest pain. Stable edema.     Discussed goals of care with patient and . Also discussed with pulmonology and rheumatology, who offered some recommendations for treatment of symptoms. Patient considered these. She does not want further chest tubes, thoracentesis, or procedures. She would like to remain DNR/DNI. She still feels like she would like to pursue comfort  cares and hospice, but is willing to try a higher dose of prednisone to see if that results in any acute improvement in symptoms. She does not want to continue tube feedings.      Physical Exam   Vital Signs: Temp: 99  F (37.2  C) Temp src: Axillary BP: 99/68 Pulse: 92   Resp: 18 SpO2: 98 % O2 Device: Nasal cannula Oxygen Delivery: 4 LPM  Weight: 166 lbs 7.16 oz    General Appearance:  Awake. Alert. Sleepy but comfortable.  HEENT:  No scleral icterus. Dry mucous membranes.   Respiratory:  Absent breath sounds in bilateral bases. R sided chest tube.   Cardiovascular:  Irregularly irregular. S1,S2. No murmurs.   GI:  Soft, ND, mild tenderness around PEG.    Extremity:  1-2+ pitting edema BLE. No calf tenderness.   Skin:  No visible rash.   Neuro:  Grossly non-focal.     Medical Decision Making       60 MINUTES SPENT BY ME on the date of service doing chart review, history, exam, documentation & further activities per the note.      Data     I have personally reviewed the following data over the past 24 hrs:    N/A  \   N/A   / N/A     N/A N/A N/A /  N/A   3.5 N/A N/A \       Imaging results reviewed over the past 24 hrs:   No results found for this or any previous visit (from the past 24 hour(s)).

## 2023-06-22 NOTE — PROGRESS NOTES
CLINICAL NUTRITION SERVICES    Informed decision made to change pt s status to comfort care. Nutrition interventions discontinued and no further interventions planned at this time. RD can be consulted if needed.    RD signing off on 6/22/2023.    Prudence Serna RD (Becky), LD   6B and 8A Med/surg (M-F) Pager: [phone]  Weekend/holiday pager: [phone]

## 2023-06-22 NOTE — PLAN OF CARE
Occupational Therapy Discharge Summary    Reason for therapy discharge:    Patient going on comfort cares , pursuing hospice at a facility closer to home    Progress towards therapy goal(s). See goals on Care Plan in Epic electronic health record for goal details.  Goals not met.  Barriers to achieving goals:   limited tolerance for therapy.    Therapy recommendation(s):    No further therapy is recommended.

## 2023-06-22 NOTE — PROGRESS NOTES
Care Management Follow Up    Length of Stay (days): 37    Expected Discharge Date: 06/23/2023     Concerns to be Addressed: discharge planning     Patient plan of care discussed at interdisciplinary rounds: Yes    Anticipated Discharge Disposition: Home, Long Term Care     Anticipated Discharge Services: None  Anticipated Discharge DME: Other (see comment) (TBD)    Patient/family educated on Medicare website which has current facility and service quality ratings: no  Education Provided on the Discharge Plan: No  Patient/Family in Agreement with the Plan: unable to assess    Additional Information:  Attempted to meet with patient at bedside to discuss hospice options. Patient was receiving a breathing treatment and her  had just stepped out of the room for a few minutes. Patient has expressed a desire to go to a nursing home with hospice care.   Unable to meet meet with patient and  due to end of shift. Below is the list of TCU's in patient's area. Patient will need to choose a hospice agency. Several weeks ago patient was informed that TCU would be private pay and hospice would be covered by her Medicare benefits. Patient will need self pay quote for TCU.  Referrals have not been made.    [facility]  [address] [place], [place] [id number] [phone]    [facility]  [address] [place], [place] [address] [phone]    [facility]  [address] [place], [place] [id number] [phone]    [facility]  [address] [address]  [phone]    [facility]  [address] [place], [place] [address] [phone]    Vanessa Luque RN, BSN  Care Coordinator, 5 Ortho  Phone [phone]  Pager [phone]

## 2023-06-22 NOTE — PLAN OF CARE
"  VS: BP 99/68 (BP Location: Left arm)   Pulse 92   Temp 99  F (37.2  C) (Axillary)   Resp 18   Ht 1.753 m (5' 9\")   Wt 75.5 kg (166 lb 7.2 oz)   SpO2 98%   BMI 24.58 kg/m      O2: 4 LPM via NC continuous flow   Output: Deleon cath in place and patent; patient has little output d/t very little intake   Last BM:    Activity: A2 w/ bed mobility   Skin: WEN   Pain: C/o pain rated 10/10   CMS: WEN   Dressing: CDI   Diet: Regular   LDA: PIV RUEx2- saline locked   Equipment: IV pump/pole. Chest tube w/ collection cannister. Deleon drainage bag. Kangaroo pump.    Plan: TBD- possible transfer to hospice facility closer to home   Additional Info: Patient has decided on comfort focused care; refused most meds other than comfort meds.        Problem: Plan of Care - These are the overarching goals to be used throughout the patient stay.    Goal: Plan of Care Review  Description: The Plan of Care Review/Shift note should be completed every shift.  The Outcome Evaluation is a brief statement about your assessment that the patient is improving, declining, or no change.  This information will be displayed automatically on your shift note.  Outcome: Not Progressing  Flowsheets (Taken 6/22/2023 1721)  Plan of Care Reviewed With:    patient    spouse  Overall Patient Progress: declining  Goal: Readiness for Transition of Care  Outcome: Not Progressing     Problem: Malnutrition  Goal: Improved Nutritional Intake  Outcome: Not Progressing     Problem: Pain Chronic (Persistent) (Comorbidity Management)  Goal: Acceptable Pain Control and Functional Ability  Outcome: Not Progressing     Problem: VTE (Venous Thromboembolism)  Goal: VTE (Venous Thromboembolism) Symptom Resolution  Outcome: Not Progressing     Problem: Nausea and Vomiting  Goal: Nausea and Vomiting Relief  Outcome: Not Progressing     Problem: Gas Exchange Impaired  Goal: Optimal Gas Exchange  Outcome: Not Progressing     Problem: End-of-Life Care  Goal: Comfort, Peace " and Preserved Dignity  Outcome: Not Progressing     Problem: Pain Acute  Goal: Optimal Pain Control and Function  Outcome: Not Progressing     Problem: Anxiety Signs/Symptoms  Goal: Optimized Energy Level (Anxiety Signs/Symptoms)  Outcome: Not Progressing  Goal: Optimized Cognitive Function (Anxiety Signs/Symptoms)  Outcome: Not Progressing  Goal: Improved Mood Symptoms (Anxiety Signs/Symptoms)  Outcome: Not Progressing  Goal: Improved Sleep (Anxiety Signs/Symptoms)  Outcome: Not Progressing  Goal: Enhanced Social, Occupational or Functional Skills (Anxiety Signs/Symptoms)  Outcome: Not Progressing  Goal: Improved Somatic Symptoms (Anxiety Signs/Symptoms)  Outcome: Not Progressing   Goal Outcome Evaluation:      Plan of Care Reviewed With: patient, spouse    Overall Patient Progress: decliningOverall Patient Progress: declining

## 2023-06-22 NOTE — PROGRESS NOTES
[facility]  Palliative Care Daily Progress Note       Recommendations & Counseling       GOALS OF CARE:     Transition to comfort measures only today    Pursue hospice at facility closer to home    Will need to involve unit coordinator to work on placement options. One barrier might be her chest tube. Jeanine says that if the chest tube is a barrier for discharge then she would be in agreement with removing it. We discussed how her shortness or breath could worsen with removal of chest tube and if that happens we would use more morphine if needed and likely that would lead to increased drowsiness. She was accepting of this.         ADVANCE CARE PLANNING:    No health care directive on file. Per  informed consent policy next of kin should be involved if patient becomes unable.    There is no POLST form on file, plan to complete prior to DC.    Code status: No CPR- Do NOT Intubate     MEDICAL MANAGEMENT:   #Pain, acute 2/2 chest tubes   #Dyspnea, pleural effusions, underlying sarcoidosis and hx ILD    morphine solution 10-15 mg po q2h prn pain, dyspnea.     Morphine 2-4 mg iv q2h prn for breakthrough pain, dyspnea    *given goal to discharge to facility need to shift towards medications per feeding tube and away from IV. Today will monitor how much PRN morphine she uses and consider scheduling morphine solution tomorrow.     Acetaminophen (Tylenol), PRN    Muscle Relaxers:Tizanidine (Zanaflex) prn    Topical medicines:  Lidocaine Patch 4%     Oxygen    Fan at bedside    Would continue bumex, switch to oral/feeding tube option     #Anxiety, Situational Anxiety    Lorazepam 0.5 mg po q4h prn (IV and PO available)    Hydroxyzine 25-50 mg po q6h prn      #Nausea  - not complaining of nausea currently, tube feeding has stopped  -Pharmacologic management    Switch reglan to PRN given she is not on tube feeds anymore.  If nausea returns consider a trial of zyprexa 5mg at  bedtime for nausea which would also help with sleep and only require once daily dosing.     Zofran 4 mg po/iv q6h prn       PSYCHOSOCIAL/SPIRITUAL:    Family      Palliative Care will continue to follow. Thank you for the consult and allowing us to aid in the care of Jeanine Schuler.     These recommendations have been discussed with Lewis Dumas PA-C.     Total time spent was 60 minutes regarding goals of care, symptom management, comfort care transition on the date of the encounter. These recommendations were given to the primary team via direct discussion.    Advance Care Planning Discussion 6/22/2023. Carol DOAN MD met with Patient and their spouse today at the hospital to discuss Advance Care Planning. Jeanine Schuler does have decisional capacity and was present for this discussion.  Those present were informed of the voluntary nature of this discussion and wished to proceed.  The discussion included: formal transition to comfort care and planning for hospice discharge. This discussion began at 1100 and ended at 1140 for a total of 40 minutes.       Daiana Trivedi MD / Palliative Medicine   Securely message with the Vocera Web Console (learn more here)   Text page via [facility] Paging/Directory         Assessments          Jeanine Schuler is a 62 year old female with a past medical history of HTN, HFpEF (LV EF 45-50%), pulmonary HTN, paroxysmal afib on chronic anticoagulation, interstitial lung disease, sarcoidosis, mixed connective tissue disease, Raynaud's, and chronic dysphagia who was admitted on 5/16/2023 with failure to thrive and found to have multiple R sided pulmonary emboli and bilateral lower extremity DVT's. Transferred to the ICU 6/11 for worsening SOB and increased O2 needs.  CT PE protocol done 6/11 morning showed large bilateral pleural effusion, volume overload, and concern for pneumonia. S/p R chest tube placement. Chest CT 6/19 with continued pleural effusions  bilaterally. Pulmonary medicine recommending left chest tube. Patient told hospitalist that she does not want L chest tube and wishes to pursue comfort care and hospice.    Today, the patient was seen for:  Hx pulmonary sarcoidosis  Acute hypoxic respiratory failure  Bilateral loculated pleural effusions  Goals of care      Prognosis, Goals, or Advance Care Planning was addressed today with: Yes.  Mood, coping, and/or meaning in the context of serious illness were addressed today: Yes.  Summary/Comments: Jeanine says since January she feels her health is declined and now she is so weak she cannot care for herself and does not want to live like this or have her life prolonged in this condition and wishes to focus on comfort care with hospice support at a facility            Interval History:     Chart review/discussion with unit or clinical team members:     10/10 pain per nursing note this am  Using prn morphine - RN note says effective for air hunger  Pt declined tube feeds yesterday and overall declining multiple  Nursing assessments of wounds and declining repositioning  Changed code status to DNR/DNI yesterday per pt request    Per patient or family/caregivers today:  Very weak and tired, gets SOB when she has to talk. Feels the morphine is helping her feel better in terms of SOB and also helps with pian from the chest tube.   No significant nausea this morning  Reiterates her wish for comfort care and hospice at facility near home and her  Lexx was present and supportive, though understandably grieving.              Review of Systems:     Besides above, an additional ROS was reviewed and is unremarkable          Medications:     I have reviewed this patient's medication profile and medications during this hospitalization.    Noted meds:      Lidocaine patch  reglan 10mg IV q 6 hours  miralax daily   PPI daily  Prednisone 15mg daily  Senna 2 bid  Gabapentin 100mg bedtime    bumex - switched to IV  6/21    PRN:   apap - minimal use  Bisacodyl  Hydroxyzine  Lorazepam 0.25mg PO  q 4 hours prn anxiety or dyspnea-x 1  Lorazepam IV 0.5mh  4 hours pnr n/v - x1 yesterday, x 1 today 10am  Melatonin  Morphine 2-4mg IV q 4 hours prn- 4mg x 2 yesterday, x 1 3am, x1 7 am  Morphine solution 10-15mg q 4 hours prn -none  Nitroglycerine  zofran 4mg q 6 hours prn- minimal use    Tizanidine 2mg q 6 hours prn spasms- 2-3 per day until 6/20, no use since             Physical Exam:   Vitals were reviewed  Temp: 99  F (37.2  C) Temp src: Axillary BP: 99/68 Pulse: 92   Resp: 18 SpO2: 98 % O2 Device: Nasal cannula Oxygen Delivery: 4 LPM  Gen: sitting/lying in bed. Appears comfortable   HEENT: NCAT. Conjunctiva clear. Sclera anicteric .  CV: irregular rhythm at times, no murmer, Peripheral perfusion intact. Anasarca noted  Resp: unlabored but shallow work of breathing on 4L  Abd: soft, nt, nd, +BS   Msk: no gross deformity, no sarcopenia  Skin:  no jaundice  Ext: warm, well perfused.   Neuro: face symmetric. EOM, vision, hearing grossly intact. Speech fluent. Moves all extremities but profoundly weak  Mental status/Psych: fatigued but awake. Oriented. Asks/answers questions appropriately. Affect is calm.                Data Reviewed:     Reviewed recent pertinent imaging, comments:   6/20 CT chest  IMPRESSION:   1. Decreased small/moderate bilateral partially loculated pleural   effusions with right basilar chest tube in place.   2. Cardiomegaly and stable ascending aortic ectasia up to 4.3 cm.   Stable dilatation of main pulmonary artery, likely representing a   component of pulmonary hypertension.   3. Bilateral pulmonary nodules are unchanged from 3/8/2022, with   associated fibrotic changes, likely represent sequelae of sarcoidosis.     4. Cirrhotic morphology of the liver.   5. Diffuse anasarca.     Reviewed recent labs, comments:   GFR >90      CMPRecent Labs   Lab 06/22/23  0843 06/21/23  0633 06/20/23  2321 06/20/23  1352  06/20/23  0336 06/19/23  1449 06/19/23  0547 06/18/23  2232 06/18/23  1616   NA  --  134* 133* 134* 132*  132*   < > 133*   < >  --    POTASSIUM 3.5 2.7* 3.6 3.5 3.2*  3.2*   < > 3.2*   < >  --    CHLORIDE  --  88* 91* 89* 90*  90*   < > 91*   < >  --    CO2  --  36* 35* 38* 36*  36*   < > 38*   < >  --    ANIONGAP  --  10 7 7 6*  6*   < > 4*   < >  --    GLC  --  93 103* 102* 77  77   < > 89   < >  --    BUN  --  29.3* 24.2* 25.0* 25.5*  25.5*   < > 26.2*   < >  --    CR  --  0.58 0.58 0.60 0.59  0.59   < > 0.54   < >  --    GFRESTIMATED  --  >90 >90 >90 >90  >90   < > >90   < >  --    DAVID  --  7.8* 8.0* 7.9* 7.4*  7.4*   < > 7.5*   < >  --    MAG 1.9 1.8  --   --  1.9  --  1.7  --   --    PHOS 3.5 2.9  --   --   --   --  2.8  --  2.5   PROTTOTAL  --   --   --   --  4.0*  --   --   --   --    ALBUMIN  --   --   --   --  1.6*  --   --   --   --    BILITOTAL  --   --   --   --  0.3  --   --   --   --    ALKPHOS  --   --   --   --  78  --   --   --   --    AST  --   --   --   --  21  --   --   --   --    ALT  --   --   --   --  13  --   --   --   --     < > = values in this interval not displayed.     CBC  Recent Labs   Lab 06/21/23  0633 06/20/23  0336 06/19/23  0547 06/18/23  0651   WBC 11.2* 8.3 6.0 6.7   RBC 3.43* 2.82* 2.86* 2.83*   HGB 10.2* 8.6* 8.5* 8.6*   HCT 31.5* 27.0* 27.2* 26.9*   MCV 92 96 95 95   MCH 29.7 30.5 29.7 30.4   MCHC 32.4 31.9 31.3* 32.0   RDW 15.0 14.9 14.7 14.8   * [phone]     INRNo lab results found in last 7 days.  Arterial Blood GasNo lab results found in last 7 days.

## 2023-06-22 NOTE — PROGRESS NOTES
[facility] Pulmonology Progress Note    Jeanine Schuler  MRN: [id number]  : 1960    Date of Admission: 2023  Date of Service: 23 - chart review only    Reason for consult:   Chest tube management   Requesting physician:  Althea Tesfaye     Assessment:  Bilateral exudative pleural effusions: complex parapneumonic effusion vs effusion related to rheumatologic process suspected based on chemistries with high LDH. No growth from cultures. The L effusion was especially inflammatory with an LDH of 2000 and glucose of 2. Would recommend repeat CT chest tomorrow to reassess bilateral effusions and ensure they have stabilized/resolved without evidence of unaddressed loculations.    -Her repeat Ct chest has shown continued pleural effusion b/l.   -Will instill TPA and dornase on the right side.    -Patient has been endorsing interest in comfort care. Counseling was held. Comfort care is very appropriate if patient desires it so.     -This was discussed with the patient.       -Patient is not interested in further aggressive care. l will remove chest tube tomorrow.     Review of hospitalization:  Ms. Schuler is a 62 year old woman with HTN, HFpEF (EF 45-50%), pHTN, afib on AC, sarcoidosis, mixed connective tissue disease, Raynaud's, dysphagia who was admitted  with failure to thrive found to have bilateral LE DVTs and R pulmonary emboli. On  she developed worsening SOB and was found to have large bilateral pleural effusions and possible PNA. She was transferred to the ICU on WB. Started on Vanc/Zosyn. She had bilateral thoracentesis on  however the R effusion was loculated so a R chest tube was placed. Did not require lytics. No results from micro data. She was narrowed to Unasyn. She improved from a respiratory standpoint and was transferred back to the medical [facility]illo on ,     Data:    L pleural fluid - protein 1.6, ; 1545 WBC, 80% neuts; Amylase 23; cholesterol 29;  glucose 145; TG 23  R pleural fluid - TP 2.3, LD 2051; 2231 WBC, 99% neuts ;Amylase 30; cholesterol 46; glucose 2; TG 41     R pleural fluid cultures (aerobic, anaerobic) - NGTD   L pleural fluid cultures (aerobic, anaerobic, MTB, fungal) - NGTD

## 2023-06-22 NOTE — PROGRESS NOTES
"SPIRITUAL HEALTH SERVICES  PALLIATIVE SPIRITUAL ASSESSMENT   [facility] MB6  REASON FOR CONSULT: goals of care    Summary and Recommendations:    Patient Jeanine Schuler wishes to transition to comfort measures only.    Ideally, she would like to discharge to a facility nearer home ([place]) with hospice services.    Jeanine's family and Mosque dora are sources of comfort for her.     Rey and/or I will follow for spiritual support while Palliative Care is consulted.    Miriam Shannon M.Div., [facility]  Palliative Care Consult Service   Pager [phone]  [facility] Palliative Care Inpatient Team  Securely message with the Vocera Web Console (learn more here) or  Text page via [employer] Paging/Directory       Saw pt Jeanine Schuler per Palliative Care consult, with  Lexx and Palliative Care Dr. Trivedi.    Patient/Family Understanding of Illness and Goals of Care:  Jeanine described the decline she has experienced in the last several months; she now wishes to transition to comfort measures only and hopes for adequate symptom management and time with family. She is clear that being sleepier for better symptom management would be acceptable to her, if necessary.    Distress and Loss: Jeanine's  Lexx and other family are grieving her present condition and are supportive of her. Jeanine also named worry that Lexx has had to carry an undue burden of care recently.    Strengths, Coping, and Resources: Jeanine named support from  Lexx; she does not want her family to have to care for her in addition to spending time with her. One son and his family are also living with Jeanine and Lexx at present. Jeanine finds solace in being able to name her desires for care.     Meaning, Beliefs, and Spirituality: Jeanine finds comfort in her belief in \"finally resting on a soft bed in heaven\". Her personal Mosque spirituality contributes to her sense of meaning, and does the care she " has provided others, and her relationships with loved ones (, children, grandchildren).

## 2023-06-23 NOTE — PLAN OF CARE
[phone]     Patient is A&O x4. Call light within reach. Able to make needs known. Needs assistance x2 with pericares and repositioning. Agreed to be repositioned twice this shift. Deleon cath in place, emptied during the shift. Wears bried. LBM: 06/21.     On continuous oxygen therapy at 4LPM via NC. 2PIV on R, SL, intact and patent. Pain managed with PRN Morphine SL. PRN Ativan given. R chest tube in place. PEG tube in place, refused to resume feedings. Agreed to administer some of her scheduled medications and flushed the PEG with water.      Bilateral upper and lower extremity edema. Mepilex on sacral area changed on 06/21. RN managed of K(3.5), Mg(1.9) and Phos(3.5). Lab draws placed.      Spouse remains with the patient throughout the shift. Possible to transfer on hospice closer to home.

## 2023-06-23 NOTE — PROGRESS NOTES
[facility]  Palliative Care Daily Progress Note       Recommendations & Counseling       GOALS OF CARE:     Not comfort care at this time but overall focus is on comfort with plan of do not escalate care if her condition worsens. For now trying higher dose of steroids to see if this helps her feel better and continuing with right chest tube because it continues to put out considerable fluid. If she does start feeling better after 2 days of steroids she may consider re-starting tube feeds and possibly doing left thoracentesis on left lung but wants to wait until she sees how the steroids inpact her. This should be readdressed early next week.     Continue using morphine for pain and air hunger- scheduled low dose today to avoid getting behind.     Plan had been to pursue hospice at facility closer to home- at this time we are taking it one day at a time to see if she responds to change in steroid dose but unless there is a significant change I anticipate she will still want to pursue hospice after discharge. This will likely need to be re-assessed early next week.     If the chest tube is a barrier to discharge she had told me she would be open to removing it; however today with ongoing output from the chest tube she wants to continue with it for now. If it becomes a barrier for discharge it may be worth offering pleurx catheter. This should be readdressed early next week.        ADVANCE CARE PLANNING:    No health care directive on file. Per  informed consent policy next of kin should be involved if patient becomes unable.    There is no POLST form on file, plan to complete prior to DC.    Code status: No CPR- Do NOT Intubate     MEDICAL MANAGEMENT:   #Pain, acute 2/2 chest tubes   #Dyspnea, pleural effusions, underlying sarcoidosis and hx ILD    Scheduled morphine 5mg q 4 hours (ok to hold if sleeping)    Changed prn morphine to 5-10mg q2 hours prn pain, air hunger  (reduced dose range given the scheduled dose)    Continue backup Morphine 2-4 mg iv q2h prn for breakthrough pain, dyspnea    Acetaminophen (Tylenol), PRN    Muscle Relaxers:Tizanidine (Zanaflex) prn    Topical medicines:  Lidocaine Patch 4%     Oxygen    Fan at bedside    Would continue bumex, switch to oral/feeding tube option     #Anxiety, Situational Anxiety    Lorazepam 0.5 mg po q4h prn (IV and PO available)    Hydroxyzine 25-50 mg po q6h prn      #Nausea  - not complaining of nausea currently, tube feeding has stopped  -Pharmacologic management    Zofran 4 mg po/iv q6h prn    reglan was switched to prn after tube feeds stopped       PSYCHOSOCIAL/SPIRITUAL:    Family      Palliative Care will continue to follow. Thank you for the consult and allowing us to aid in the care of Jeanine Schuler.     These recommendations have been discussed with Lewis Dumas PA-C.     Total time spent was 40 minutes regarding goals of care, symptom management,  on the date of the encounter. These recommendations were given to the primary team via direct discussion.      Daiana Trivedi MD / Palliative Medicine   Securely message with the Vocera Web Console (learn more here)   Text page via [facility] Paging/Directory           Assessments          Jeanine Schuler is a 62 year old female with a past medical history of HTN, HFpEF (LV EF 45-50%), pulmonary HTN, paroxysmal afib on chronic anticoagulation, interstitial lung disease, sarcoidosis, mixed connective tissue disease, Raynaud's, and chronic dysphagia who was admitted on 5/16/2023 with failure to thrive and found to have multiple R sided pulmonary emboli and bilateral lower extremity DVT's. Transferred to the ICU 6/11 for worsening SOB and increased O2 needs.  CT PE protocol done 6/11 morning showed large bilateral pleural effusion, volume overload, and concern for pneumonia. S/p R chest tube placement. Chest CT 6/19 with continued pleural effusions bilaterally. Pulmonary  medicine recommending left chest tube. Patient told hospitalist that she does not want L chest tube and wishes to pursue comfort care and hospice.    Today, the patient was seen for:  Hx pulmonary sarcoidosis  Acute hypoxic respiratory failure  Bilateral loculated pleural effusions  Goals of care      Prognosis, Goals, or Advance Care Planning was addressed today with: Yes.  Mood, coping, and/or meaning in the context of serious illness were addressed today: Yes.  Summary/Comments: today she is feeling conflicted about what to do - wondering if she should be back on tube feeds, wondering about what tod o with the chest tube and comfort care.goals discussed and plan summarized above            Interval History:     Chart review/discussion with unit or clinical team members:     No acute events  Discussed with primary team who provided me with update on plan - pt and  want to wait on transitioning to comfort care and first want to try more diuresis, try higher dose of steroids and see how seh feels    Per patient or family/caregivers today:  Very weak and tired, gets drowsy after bigger dose of morphine  Denies any pain or SOB, feels the morphine is helping but worries about schedule and getting behind  Slept well last night  Taking some sips of ginger ale but no food. She is starting to wonder if she wshould re start tube feeds- she is conflicted and settled on first waiting to see if higher steroids makes her feels better and then will consider  No nausea                Review of Systems:     Besides above, an additional ROS was reviewed and is unremarkable          Medications:     I have reviewed this patient's medication profile and medications during this hospitalization.    Noted meds:      Lidocaine patch  reglan 10mg IV q 6 hours  miralax daily   PPI daily  Prednisone 15mg daily  Senna 2 bid  Gabapentin 100mg bedtime    bumex - switched to IV 6/21    PRN:   apap - minimal  use  Bisacodyl  Hydroxyzine  Lorazepam 0.25mg PO  q 4 hours prn anxiety or dyspnea  Lorazepam IV 0.5mh  4 hours pnr n/v -  Melatonin  Morphine 2-4mg IV q 4 hours prn-   Morphine solution 10-15mg q 4 hours prn-  Nitroglycerine  zofran 4mg q 6 hours prn- minimal use    Tizanidine 2mg q 6 hours prn spasms-              Physical Exam:   Vitals were reviewed  Temp: 96.8  F (36  C) Temp src: Axillary BP: 93/59 Pulse: 83   Resp: 10 SpO2: 96 % O2 Device: None (Room air) Oxygen Delivery: 4 LPM  Gen: sitting/lying in bed. Appears comfortable but fatigued, drowsy  HEENT: NCAT. Conjunctiva clear. Sclera anicteric .  CV: irregular rhythm at times, no murmer, Peripheral perfusion intact. Anasarca noted  Resp: unlabored but shallow work of breathing on 4L  Abd: soft, nt, nd, +BS   Msk: no gross deformity, no sarcopenia  Skin:  no jaundice  Ext: warm, well perfused.   Neuro: face symmetric. EOM, vision, hearing grossly intact. Speech fluent. Moves all extremities but profoundly weak  Mental status/Psych: fatigued but awake. Oriented. Asks/answers questions appropriately. Affect is calm.                Data Reviewed:     Reviewed recent pertinent imaging, comments:   6/20 CT chest  IMPRESSION:   1. Decreased small/moderate bilateral partially loculated pleural   effusions with right basilar chest tube in place.   2. Cardiomegaly and stable ascending aortic ectasia up to 4.3 cm.   Stable dilatation of main pulmonary artery, likely representing a   component of pulmonary hypertension.   3. Bilateral pulmonary nodules are unchanged from 3/8/2022, with   associated fibrotic changes, likely represent sequelae of sarcoidosis.     4. Cirrhotic morphology of the liver.   5. Diffuse anasarca.     Reviewed recent labs, comments:   GFR >90      CMP  Recent Labs   Lab 06/23/23  0809 06/22/23  0843 06/21/23  0633 06/20/23  2321 06/20/23  1352 06/20/23  0336 06/19/23  1449 06/19/23  0547 06/18/23  2232 06/18/23  1616   *  --  134* 133* 134*  132*  132*   < > 133*   < >  --    POTASSIUM 3.6 3.5 2.7* 3.6 3.5 3.2*  3.2*   < > 3.2*   < >  --    CHLORIDE 89*  --  88* 91* 89* 90*  90*   < > 91*   < >  --    CO2 31*  --  36* 35* 38* 36*  36*   < > 38*   < >  --    ANIONGAP 13  --  10 7 7 6*  6*   < > 4*   < >  --    GLC 76  --  93 103* 102* 77  77   < > 89   < >  --    BUN 44.4*  --  29.3* 24.2* 25.0* 25.5*  25.5*   < > 26.2*   < >  --    CR 0.84  --  0.58 0.58 0.60 0.59  0.59   < > 0.54   < >  --    GFRESTIMATED 79  --  >90 >90 >90 >90  >90   < > >90   < >  --    DAVID 7.9*  --  7.8* 8.0* 7.9* 7.4*  7.4*   < > 7.5*   < >  --    MAG  --  1.9 1.8  --   --  1.9  --  1.7  --   --    PHOS  --  3.5 2.9  --   --   --   --  2.8  --  2.5   PROTTOTAL  --   --   --   --   --  4.0*  --   --   --   --    ALBUMIN  --   --   --   --   --  1.6*  --   --   --   --    BILITOTAL  --   --   --   --   --  0.3  --   --   --   --    ALKPHOS  --   --   --   --   --  78  --   --   --   --    AST  --   --   --   --   --  21  --   --   --   --    ALT  --   --   --   --   --  13  --   --   --   --     < > = values in this interval not displayed.     CBC  Recent Labs   Lab 06/23/23  0809 06/21/23  0633 06/20/23  0336 06/19/23  0547   WBC 13.0* 11.2* 8.3 6.0   RBC 2.90* 3.43* 2.82* 2.86*   HGB 8.6* 10.2* 8.6* 8.5*   HCT 27.8* 31.5* 27.0* 27.2*   MCV 96 92 96 95   MCH 29.7 29.7 30.5 29.7   MCHC 30.9* 32.4 31.9 31.3*   RDW 14.8 15.0 14.9 14.7    459* 399 395     INRNo lab results found in last 7 days.  Arterial Blood GasNo lab results found in last 7 days.

## 2023-06-23 NOTE — PROGRESS NOTES
[facility] Pulmonology Progress Note    Jeanine Schuler  MRN: [id number]  : 1960    Date of Admission: 2023  Date of Service: 23 - chart review only    Reason for consult:   Chest tube management   Requesting physician:  Althea Tesfaye     Assessment:  Bilateral exudative pleural effusions: complex parapneumonic effusion vs effusion related to rheumatologic process suspected based on chemistries with high LDH. No growth from cultures. The L effusion was especially inflammatory   -Her repeat Ct chest has shown continued pleural effusion b/l.   -  -3 doses of lytics through the right chest tubes were given. Patient after that refused any further medications. She refused thora/chest tube on the left side as well.  -She has refused any further tube feeding as well.   -On  when I went to remove the chest tube she endorsed that she would like to continue with the medication for chest tueb to see as much of fluid can come out.     -Also explained that sarcoid can also be the cause of pleural effusion and placed her on prednisone.     -At this point patient wants to leave chest tube in and I have restarted remaining last 3 doses of her intra pleural lytics.     Review of hospitalization:  Ms. Schuler is a 62 year old woman with HTN, HFpEF (EF 45-50%), pHTN, afib on AC, sarcoidosis, mixed connective tissue disease, Raynaud's, dysphagia who was admitted  with failure to thrive found to have bilateral LE DVTs and R pulmonary emboli. On  she developed worsening SOB and was found to have large bilateral pleural effusions and possible PNA. She was transferred to the ICU on WB. Started on Vanc/Zosyn. She had bilateral thoracentesis on  however the R effusion was loculated so a R chest tube was placed. Did not require lytics. No results from micro data. She was narrowed to Unasyn. She improved from a respiratory standpoint and was transferred back to the medical castillo on ,      Data:    L pleural fluid - protein 1.6, ; 1545 WBC, 80% neuts; Amylase 23; cholesterol 29; glucose 145; TG 23  R pleural fluid - TP 2.3, LD 2051; 2231 WBC, 99% neuts ;Amylase 30; cholesterol 46; glucose 2; TG 41     R pleural fluid cultures (aerobic, anaerobic) - NGTD   L pleural fluid cultures (aerobic, anaerobic, MTB, fungal) - NGTD

## 2023-06-23 NOTE — PLAN OF CARE
VS: WEN   O2: 4 LPM via NC continuous flow   Output: Incontinent of B&B; rolle cath in place & patent   Last BM: unknown   Activity: A2 w/ bed mobility   Skin: CDI   Pain: Pt has pain; is receiving morphine for comfort scheduled & PRN   CMS: WEN   Dressing: Mepilex to sacrum is CDI   Diet: Regular   LDA: PIV to RUE x2   Equipment: IV pump/pole. JPEG tube. Chest tube. Rolle drainage bag. Kangaroo pump/supplies.   Plan: TBD   Additional Info: Patient continues with cares focused on comfort. PRN morphine and ativan given as ordered for reports of anxiousness, pain, and dyspnea.       Problem: Plan of Care - These are the overarching goals to be used throughout the patient stay.    Goal: Plan of Care Review  Description: The Plan of Care Review/Shift note should be completed every shift.  The Outcome Evaluation is a brief statement about your assessment that the patient is improving, declining, or no change.  This information will be displayed automatically on your shift note.  Outcome: Not Progressing  Flowsheets (Taken 6/23/2023 1842)  Plan of Care Reviewed With:   patient   spouse  Overall Patient Progress: declining  Goal: Readiness for Transition of Care  Outcome: Not Progressing     Problem: Malnutrition  Goal: Improved Nutritional Intake  Outcome: Not Progressing     Problem: Pain Chronic (Persistent) (Comorbidity Management)  Goal: Acceptable Pain Control and Functional Ability  Outcome: Not Progressing     Problem: VTE (Venous Thromboembolism)  Goal: VTE (Venous Thromboembolism) Symptom Resolution  Outcome: Not Progressing     Problem: Nausea and Vomiting  Goal: Nausea and Vomiting Relief  Outcome: Not Progressing     Problem: Gas Exchange Impaired  Goal: Optimal Gas Exchange  Outcome: Not Progressing     Problem: End-of-Life Care  Goal: Comfort, Peace and Preserved Dignity  Outcome: Not Progressing     Problem: Pain Acute  Goal: Optimal Pain Control and Function  Outcome: Not Progressing     Problem: Anxiety  Signs/Symptoms  Goal: Optimized Energy Level (Anxiety Signs/Symptoms)  Outcome: Not Progressing  Goal: Optimized Cognitive Function (Anxiety Signs/Symptoms)  Outcome: Not Progressing  Goal: Improved Mood Symptoms (Anxiety Signs/Symptoms)  Outcome: Not Progressing  Goal: Improved Sleep (Anxiety Signs/Symptoms)  Outcome: Not Progressing  Goal: Enhanced Social, Occupational or Functional Skills (Anxiety Signs/Symptoms)  Outcome: Not Progressing  Goal: Improved Somatic Symptoms (Anxiety Signs/Symptoms)  Outcome: Not Progressing   Goal Outcome Evaluation:      Plan of Care Reviewed With: patient, spouse    Overall Patient Progress: decliningOverall Patient Progress: declining

## 2023-06-23 NOTE — PROGRESS NOTES
[facility]    Medicine Progress Note - Hospitalist Service, GOLD TEAM 20    Date of Admission:  5/16/2023    Assessment & Plan   Jeanine Schuler is a 63 yo female w/ past medical of HTN, HFpEF (LV EF 45-50%), pulmonary HTN, paroxysmal afib on chronic anticoagulation, interstitial lung disease, sarcoidosis, mixed connective tissue disease, Raynaud's, and chronic dysphagia who was admitted on 5/16/2023 with failure to thrive and found to have multiple R sided pulmonary emboli and bilateral lower extremity DVT's. Transferred to the ICU 6/11 for worsening SOB and increased O2 needs.  CT PE protocol done 6/11 morning showed large bilateral pleural effusion, volume overload, and concern for pneumonia.       #  Acute hypoxic respiratory failure, multifactorial  #  Pulmonary sarcoidosis  #  Hospital acquired pneumonia  #  Bilateral loculated pleural effusions  #  Pulmonary edema  Developed progressive hypoxia 6/11. Transferred to ICU. Repeat CT PE protocol showed resolution of previously noted PE, significant increase in R partially loculated pleural effusion and ongoing pulmonary edema. Fluid exudative, ? parapneumonic vs pulmonary sarcoidosis. S/p bilateral thoracentesis 6/12, with only 30 ml out from R loculated effusion. S/p IR chest tube placement.   Repeat CT chest 6/19 showed decreased R partially loculated effusion and stable L partially loculated effusion. Developed new L pleuritic chest pain and worsening dyspnea overnight 6/20. O2 sats stable. Pulm recommended lytics to R chest tube and IR consult for L chest tube placement to further manage.  Patient requested transitioned to hospice on 6/21 due to poor QOL. She does not want additional procedures. She does not want BIPAP or intubation. She would like to go on hospice and focus on comfort. Palliative care consulted for goals of care. Discussed acute interventions suggested by pulmonology and rheumatology that may  result in improvement of symptoms, although difficult to say if they will impact her overall QOL - this includes thoracentesis vs chest tube placement for effusions and increase in steroids for pulmonary sarcoidosis (? If this would help with pleural effusions).   Patient currently comfortable with more aggressive symptom management. She would like to keep the R chest tube as it continues to drain. She is OK with ongoing lytic therapy and increased dose of prednisone but does not want further interventions or escalation of care beyond that.   - Appreciate palliative care involvement  - Appreciate ongoing recs from pulmonology and rheumatology  - Continue R chest tube to water seal  - TPA restarted by pulm (x3 doses)  - Increase prednisone to 40mg daily   - Morphine 2-4mg IV or 10-15mg SL q2h prn for pain and air hunger  - Ativan 0.5mg IV q4h prn for anxiety and air hunger  - DNR/DNI per patient request  - No BIPAP  - No further procedures.    - Continue supplemental O2 prn, goal SpO2 >90 and comfort  - DuoNeb 4x daily     #  PAF   #  HTN  #  HFpEF  #  Pulmonary HTN  #  R heart stain based on CT chest   #  Elevated troponin, suspect type II NSTEMI  TTE 6/3/23:  EF 45-50%, no RWMA, normal RV size/fucntion, pulmonary artery systolic pressure normal, no significant valvular heart disease, no effusion.  Clinically stable.   - Continue PTA Amiodarone 200 mg PO daily for rhythm control  - Continue IV metoprolol 5 mg prn HR > 120  - Cardiology consulted, appreciate recs (signed off)  - Goal MAP > 65     #  Dysphagia, resolved  #  Severe protein calorie malnutrition   #  Constipation   #  History of gastric bypass surgery   GI previously consulted. Dysmotility esophagogram 6/7 limited due to patients mobility but no major strictures noted. Rec to continue PPI and limit opioids and anticholinergic meds as able. Outpatient follow-up in [facility] for chronic dysphagia, esophageal dysmotility in the setting  "of scleroderma: \"[facility] phone: [phone], option 1 for clinic scheduling\".   - Diet: Regular Textures + Thin Liquids  - SLP recommending no further therapy, currently on regular diet  - GI placed PEG on [place] on 6/14  - Dietician consulted, appreciate recs  - Refusing TF, discontinued   - Reglan and Zofran prn for nausea      #  Anasarca   - Daily Weights  - Strict I&Os  - Continue rolle catheter   - I&O net negative 12 L since admit, still above baseline weight (150 lbs)  - Bumex 2 mg IV tid      #  Hypomagnesemia  #  Hypokalemia  - refusing replacement protocols, will discontinue      #  Chronic anticoagulation   #  Acute provoked b/l LE DVT on 5/16, this admission  #  Acute provoked right lung PE  - Apixaban 5 mg PO BID     #  Mixed connective tissue disease  #  Limited cutaneous scleroderma  #  Severe Raynaud's   #  Chronic steroid use  #  Hypoglycemia, resolved  - D/t pts severe Raynaud's pt blood glucoses via fingerstick inaccurate, stopped BG, monitor BG via bmp, if concern for hypoglycemia, needs stat lab draw BG  - Rheum consulted, appreciate recs  - Increase Prednisone to 40mg PO daily     #  Generalized weakness and deconditioning   #  Failure to thrive   - PT/OT consulted but unable to participate     #  Right breast skin thickening and nipple retraction on CT (incidental CT finding)  - Outpatient mammography pending [facility] discussion  - Diligent skin cares per bedside RN       Diet: Snacks/Supplements Adult: Ensure Enlive; With Meals  Regular Diet Adult Thin Liquids (level 0)    DVT Prophylaxis: DOAC  Rolle Catheter: PRESENT, indication: Strict 1-2 Hour I&O  Lines: None     Cardiac Monitoring: None  Code Status: No CPR- Do NOT Intubate      Clinically Significant Risk Factors              # Hypoalbuminemia: Lowest albumin = 1.6 g/dL at 6/20/2023  3:36 AM, will monitor as appropriate     # Hypertension: Noted on problem list         # Severe Malnutrition: based on nutrition " assessment         Disposition Plan     Expected Discharge Date: 06/23/2023      Destination: home;long-term care facility  Discharge Comments: care conference Monday 6/12 at 1pm        The patient's care was discussed with the Care Coordinator/, Patient, Patient's Family and pulmonary Consultant(s).    Lewis Dumas PA-C  Hospitalist Service, GOLD TEAM 20  [facility]  Securely message with [employer] (more info)  Text page via [facility] Paging/Directory   See signed in provider for up to date coverage information  ______________________________________________________________________    Interval History   Pain and dyspnea better today. Chest tube continues to drain, therefore patient would like to continue. Does not want any other acute interventions or escalation of care.       Physical Exam   Vital Signs: Temp: 96.8  F (36  C) Temp src: Axillary BP: 93/59 Pulse: 83   Resp: 10 SpO2: 99 % O2 Device: Nasal cannula Oxygen Delivery: 4 LPM  Weight: 166 lbs 7.16 oz    General Appearance:  Awake. Alert. Sleepy.  HEENT:  Dry mucous membranes.   Respiratory:  Diminished in bilateral bases. No wheezing, rhonchi, rales. R sided chest tube.   Cardiovascular:  Irregularly irregular. S1,S2. No murmurs.   GI:  Soft, ND, mild tenderness around PEG.    Extremity:  1-2+ pitting edema BLE. No calf tenderness.   Skin:  No visible rash.   Neuro:  Grossly non-focal.     Medical Decision Making       55 MINUTES SPENT BY ME on the date of service doing chart review, history, exam, documentation & further activities per the note.      Data     I have personally reviewed the following data over the past 24 hrs:    13.0 (H)  \   8.6 (L)   / 399     133 (L) 89 (L) 44.4 (H) /  76   3.6 31 (H) 0.84 \       Imaging results reviewed over the past 24 hrs:   No results found for this or any previous visit (from the past 24 hour(s)).

## 2023-06-23 NOTE — PROGRESS NOTES
SPIRITUAL HEALTH SERVICES  SPIRITUAL ASSESSMENT Progress Note (Palliative Focus)  [facility] ([facility] [address]    REFERRAL SOURCE: Palliative Care follow up.    Visit with patient Jeanine Schuler,  Lexx, and Palliative Care Dr. Trivedi.    Jeanine was intermittently drowsy during our visit. She and Lexx are amenable to a plan of care that does not escalate from current interventions, focused mainly on comfort. Both want to see whether increased steroid improves her quality of life. Jeanine also asked questions about whether tube feeding would be beneficial, and she and Lexx will continue to think about it.    Lexx and Jeanine, are mutually supportive; Lexx is spending time at bedside and offering comfort and emotional support.    Plan:  Rey and/or I will follow for spiritual support while Palliative Care is consulted.    Miriam Shannon M.Div., [facility]  Palliative Care   Pager [phone]  [facility] Palliative Care Inpatient Team  Securely message with the [employer] Web Console (learn more here) or  Text page via [employer] Paging/Directory

## 2023-06-24 NOTE — PROGRESS NOTES
Care Management Follow Up    Length of Stay (days): 39    Expected Discharge Date: 06/23/2023     Concerns to be Addressed: discharge planning     Patient plan of care discussed at interdisciplinary rounds: Yes    Anticipated Discharge Disposition: Home, Long Term Care, hospice     Anticipated Discharge Services: TBD  Anticipated Discharge DME: Other (see comment) (TBD)    Patient/family educated on Medicare website which has current facility and service quality ratings: yes  Education Provided on the Discharge Plan: No  Patient/Family in Agreement with the Plan: unable to assess    Referrals Placed by CM/SW: Internal Clinic Care Coordination  Private pay costs discussed: insurance costs out of pocket expenses    Additional Information:  Per Palliative Note 6/23/23:  Jeanine Schuler is a 62 year old female with a past medical history of HTN, HFpEF (LV EF 45-50%), pulmonary HTN, paroxysmal afib on chronic anticoagulation, interstitial lung disease, sarcoidosis, mixed connective tissue disease, Raynaud's, and chronic dysphagia who was admitted on 5/16/2023 with failure to thrive and found to have multiple R sided pulmonary emboli and bilateral lower extremity DVT's. Transferred to the ICU 6/11 for worsening SOB and increased O2 needs.  CT PE protocol done 6/11 morning showed large bilateral pleural effusion, volume overload, and concern for pneumonia. S/p R chest tube placement. Chest CT 6/19 with continued pleural effusions bilaterally. Pulmonary medicine recommending left chest tube. Patient told hospitalist that she does not want L chest tube and wishes to pursue comfort care and hospice.    Prognosis, Goals, or Advance Care Planning was addressed today with: Yes.  Mood, coping, and/or meaning in the context of serious illness were addressed today: Yes.  Summary/Comments: today she is feeling conflicted about what to do - wondering if she should be back on tube feeds, wondering about what tod o with the chest  tube and comfort care.goals discussed and plan summarized above    Per rounds on Saturday, will see how patient does over weekend,unclear if patient will improve with steroids or if chest tube will remain or be discontinued. Patient GOC have been very labile from restorative to LTC to hospice. Plan is to reassess early next week to discuss discharge plan. Palliative provided update on plan - pt and  want to wait on transitioning to comfort care and first want to try more diuresis, try higher dose of steroids and see how she feels    SW/RNCC continue to follow and coordinate transition to next level of care    Yasmine Gonzalez BSN RN CCM  RN Care Coordinator 8A and 10 ICU  [address] [address]  [email or web address]   Office: (10 ICU) [phone]   Pager: [phone]    For Weekend & Holiday on call RN Care Coordinator:  (Tasks: Home care, home infusion, medical equipment/oxygen, transportation, IMM & PERRY forms, etc.)   [place] [facility] (0800-1630) Saturday & Sunday; (0800-1630)  Recognized Holidays  Pager #1: [phone] Units: 4A, 4C, 4E, 5A & 5B   Pager #2: [phone] Units: 6A, 6B, 6C, 6D  Pager #3: [phone] Units: 7A, 7B, 7C, 7D & 5C   Pager #4: [phone] Units: 5 Ortho, 8A, 10 ICU, & [facility]      For Weekend & Holiday on call Social Work:  (Tasks: TCU, transportation, Hospice, adjustment to illness counseling, Health Care Directives, Child Protection and Domestic Violence concerns, Vulnerable Adult, IMM forms, etc.)   [place] (0800 - 1630) Saturday and Sunday  Pager: [phone] Units: 4A, 4C, 4E, 5A and 5B   Pager: [phone] Units: 6A, 6B, 6C, 6D   Pager: [phone]: 7A, 7B, 7C, 7D, and 5C      [facility] (0800-1630) Saturday and Sunday  Units: 5 Ortho, 8A, and 10 ICU   Pager: [phone]

## 2023-06-24 NOTE — PROGRESS NOTES
Jeanine Schuler is a 62 year old female patient.  1. Esophageal dysphagia    2. Shortness of breath    3. Hypervolemia, unspecified hypervolemia type    4. Other pulmonary embolism without acute cor pulmonale, unspecified chronicity (H)    5. Deep vein thrombosis (DVT) of non-extremity vein, unspecified chronicity    6. Hypoglycemia    7. Scleroderma (H)    8. Esophageal dysmotility    9. History of gastric bypass    10. History of Eleazar-en-Y gastric bypass    11. Severe malnutrition (H)    12. Esophageal dysmotility due to systemic disease    13. Esophagitis determined by endoscopy    14. Chronic systolic congestive heart failure (H)    15. Pleural effusion      Past Medical History:   Diagnosis Date     Anemia      Bariatric surgery status 06/27/2005    eleazar en Y[facility];     Cellulitis of leg 06/06/2013     Esophageal reflux     Better post-hiatal hernia repair and weight loss     Hyperplastic colonic polyp      Hypovitaminosis D 2011     ILD (interstitial lung disease) (H)      Kidney stone 04/29/2007     Kidney stone 05/10/2007    surgically removed     Limb ischemia; ulcers of fingertips 04/22/2013     Other chronic pain     Left shoulder - rheumatoid arthritis     Raynaud's syndrome      Rheumatoid arthritis (H) \     Scleroderma (H)      Sjogren-Jake syndrome      Systemic sclerosis (H) 2009     Unspecified essential hypertension      No current outpatient medications on file.     Allergies   Allergen Reactions     Enoxaparin Sodium Other (See Comments)     Blood clot      Norvasc [Amlodipine Besylate] Swelling     Lip swelling that happened on 2 different occasions     Erythromycin Rash     Rash on arms     Principal Problem:    Deep vein thrombosis (DVT) of non-extremity vein, unspecified chronicity  Active Problems:    Chronic systolic congestive heart failure (H)    Shortness of breath    Hypervolemia, unspecified hypervolemia type    Other pulmonary embolism without acute cor pulmonale,  "unspecified chronicity (H)    Scleroderma (H)    Severe malnutrition (H)    Hypoglycemia    Esophageal dysmotility    Esophageal dysphagia    History of gastric bypass    History of Eleazar-en-Y gastric bypass    Esophagitis determined by endoscopy    Esophageal dysmotility due to systemic disease    Pleural effusion    Blood pressure 126/68, pulse 81, temperature 97.5  F (36.4  C), temperature source Oral, resp. rate 20, height 1.753 m (5' 9\"), weight 75.5 kg (166 lb 7.2 oz), SpO2 97 %.    Alert and oriented X4   Denies numbness, tingling and SOB  On 3L nasal cannula   Reports pain managed with Morphine   Chest tubeinsertion site clean, dry and intact; suction pulling at -20 continuous; no leaks, bends or occlusions in the line; Chest tube box was changed at 2117 06/23/2023 at that time the box had 1960mL of serosanguineous fluid in it; Output from chest tube(140mL)@ 0600 06/24/2023  Repositioned but asked not to be disturbed when sleeping stating   PEG tube patent insertion site is free of leaks and dressing is clean dry and intact    Bilateral edema legs    Ventura Vail, RN  6/24/2023    "

## 2023-06-24 NOTE — PROGRESS NOTES
[facility]    Medicine Progress Note - Hospitalist Service, GOLD TEAM 20    Date of Admission:  5/16/2023    Assessment & Plan   Jeanine Schuler is a 63 yo female w/ past medical of HTN, HFpEF (LV EF 45-50%), pulmonary HTN, paroxysmal afib on chronic anticoagulation, interstitial lung disease, sarcoidosis, mixed connective tissue disease, Raynaud's, and chronic dysphagia who was admitted on 5/16/2023 with failure to thrive and found to have multiple R sided pulmonary emboli and bilateral lower extremity DVT's. Transferred to the ICU 6/11 for worsening SOB and increased O2 needs.  CT PE protocol done 6/11 morning showed large bilateral pleural effusion, volume overload, and concern for pneumonia.       #  Acute hypoxic respiratory failure, multifactorial  #  Pulmonary sarcoidosis  #  Hospital acquired pneumonia  #  Bilateral loculated pleural effusions  #  Pulmonary edema  See note 6/23 for further details. Repeat CT chest 6/19 showed decreased R partially loculated effusion and stable L partially loculated effusion. Initial plan was for lytics to R chest tube and placement of new L chest tube, however patient requested transitioned to hospice on 6/21 due to poor QOL. She would like to focus on comfort. Palliative care consulted. Patient more comfortable with better management of pain and air hunger. Willing to keep R chest tube in place as it continues to drain, as well as try higher dose steroids to see if this will result in more immediate improvement in QOL. She does not want additional procedures or further escalation of care at this time.  - Appreciate palliative care involvement  - Appreciate ongoing recs from pulmonology and rheumatology  - Continue R chest tube + TPA x3 doses  - Continue prednisone to 40mg daily (increased 6/23, ? sarcoid contributing to effusions)  - Morphine 5mg q4h started 6/23  - Morphine 2-4mg IV or 10-15mg SL q2h prn for pain and air  "hunger  - Ativan 0.5mg IV q4h prn for anxiety and air hunger  - DNR/DNI per patient request  - No BIPAP  - No further procedures.    - Continue supplemental O2 prn, goal SpO2 >90 and comfort     #  PAF   #  HTN  #  HFpEF  #  Pulmonary HTN  #  R heart stain based on CT chest   #  Elevated troponin, suspect type II NSTEMI  TTE 6/3/23:  EF 45-50%, no RWMA, normal RV size/fucntion, pulmonary artery systolic pressure normal, no significant valvular heart disease, no effusion.  Clinically stable.   - Continue PTA Amiodarone 200 mg PO daily for rhythm control  - Cardiology consulted (signed off)  - Goal MAP > 65     #  Dysphagia, resolved  #  Severe protein calorie malnutrition   #  Constipation   #  History of gastric bypass surgery   GI previously consulted. Dysmotility esophagogram 6/7 limited due to patients mobility but no major strictures noted. Outpatient follow-up in [facility] for chronic dysphagia, esophageal dysmotility in the setting of scleroderma: \"[facility] phone: [phone], option 1 for clinic scheduling\".   - Poor PO intake in general. Currently refusing TF d/t discomfort and nausea  - Diet: Regular Textures + Thin Liquids  - GI placed PEG on [place] on 6/14, TF currently on hold  - Reglan and Zofran prn for nausea      #  Anasarca   - Daily Weights  - Strict I&Os  - Continue rolle catheter   - Bumex 2 mg IV tid      #  Hypomagnesemia  #  Hypokalemia  - refusing replacement protocols, will discontinue      #  Chronic anticoagulation   #  Acute provoked b/l LE DVT on 5/16, this admission  #  Acute provoked right lung PE  - Apixaban 5 mg PO BID     #  Mixed connective tissue disease  #  Limited cutaneous scleroderma  #  Severe Raynaud's   #  Chronic steroid use  #  Hypoglycemia, resolved  - D/t pts severe Raynaud's pt blood glucoses via fingerstick inaccurate, stopped BG, monitor BG via bmp, if concern for hypoglycemia, needs stat lab draw BG  - Rheum consulted, " appreciate recs  - Prednisone to 40mg PO daily     #  Generalized weakness and deconditioning   #  Failure to thrive   - PT/OT consulted but unable to participate     #  Right breast skin thickening and nipple retraction on CT (incidental CT finding)  - Outpatient mammography pending [facility] discussion  - Diligent skin cares per bedside RN       Diet: Snacks/Supplements Adult: Ensure Enlive; With Meals  Regular Diet Adult Thin Liquids (level 0)    DVT Prophylaxis: DOAC  Deleon Catheter: PRESENT, indication: Strict 1-2 Hour I&O  Lines: None     Cardiac Monitoring: None  Code Status: No CPR- Do NOT Intubate      Clinically Significant Risk Factors              # Hypoalbuminemia: Lowest albumin = 1.6 g/dL at 6/20/2023  3:36 AM, will monitor as appropriate     # Hypertension: Noted on problem list         # Severe Malnutrition: based on nutrition assessment         Disposition Plan         The patient's care was discussed with the Care Coordinator/, Patient and Patient's Family.    Lewis Dumas PA-C  Hospitalist Service, GOLD TEAM [address] [place] [address]  Securely message with [employer] - [employer] info)  Text page via [facility] Paging/Directory   See signed in provider for up to date coverage information  ______________________________________________________________________    Interval History   Pain well controlled in general. Having moderate pain d/t R chest tube after recent repositioning. Tolerated TPA last night. Still some left sided pleuritic chest pain with deep breaths. Mild dyspnea. Stable O2 needs. No cough. Patient noted to have decreased urine output. No significant worsening of edema. No recent BM. No nausea or vomiting.     Physical Exam   Vital Signs: Temp: 97.2  F (36.2  C) Temp src: Axillary BP: 130/72 Pulse: 79   Resp: 17 SpO2: 97 % O2 Device: Nasal cannula Oxygen Delivery: 3 LPM  Weight: 166 lbs 7.16 oz    General Appearance:  Awake. Alert. Appears  comfortable.  HEENT:  Dry mucous membranes.   Respiratory:  Diminished in bilateral bases. Faint dry crackles noted in mid lung. No wheezing.    Cardiovascular:  Irregularly irregular. S1,S2. No murmurs.   GI:  Soft, ND, mild tenderness around PEG.    Extremity:  2+ pitting edema in thighs. No calf tenderness.   Skin:  No visible rash.   Neuro:  Grossly non-focal.     Medical Decision Making       40 MINUTES SPENT BY ME on the date of service doing chart review, history, exam, documentation & further activities per the note.      Data         Imaging results reviewed over the past 24 hrs:   No results found for this or any previous visit (from the past 24 hour(s)).

## 2023-06-24 NOTE — PLAN OF CARE
"  VS: /72 (BP Location: Left arm, Patient Position: Semi-Love's, Cuff Size: Adult Regular)   Pulse 79   Temp 97.2  F (36.2  C) (Axillary)   Resp 18   Ht 1.753 m (5' 9\")   Wt 75.5 kg (166 lb 7.2 oz)   SpO2 97%   BMI 24.58 kg/m    Pt is q4 vital signs.    O2: 97% on 3 L nasal cannula.    Output: Pt is incontinent of B&B. Pt has rolle cath.    Last BM: 06/23/23   Activity: A2 with liko lift.    Skin: Pt has BLE wounds. Sacral protective Mepilex. Pea sized tear top of sacrum. Pt refuses repositioning unless related to medication administration.    Pain: Pain managed with scheduled, high concentration, morphine.   Pt also has PRN morphine available.    CMS: AOX4. Denies, SOB, numbness, tingling, dizziness. Pt has nausea which was managed with IV Reglan.    Dressing: WEN BLE. Sacral Mepilex CDI.    Diet: Regular diet, thin liquids. All meds X liquid morphine through G tube. Pt can have ice chips. Strict I&O.    LDA: 2 R PIV, SL. PEG tube, chest tube, rolle cath.    Equipment: IV pole, personal belongings.    Plan: LTC, Hospice.    Additional Info: Pt has scheduled Alteplase to be given through chest.   NOC nurse informed writer that med should be given through chest tube. Orders are for med to be given through stopcock on chest tube. Additional nursing order states to \"verify if stopcock is in place.\" \"If not in place, notify ordering provider to place. Nursing not to place stopcock.\" Writer notified provider. Provider informed nurse to push med through chest tube. Writer consulted other nurses on floor to assist as writer had not performed chest tube medication before. Writer consulted charge nurse. ANS consulted. ANS advised nurse to contact ICU nurses. ICU told writer chest tube medication not within scope of practice, stopcock in place or not. ICU nurse consulted another nurse via phone on [facility] ICU. ICU nurse on other end stated that we don't give meds through chest tube. Writer informed provider " who stated that nurses on [place] give meds through chest tube. Writer spoke with ANS who stated that [place] ICU and [facility] nurses are trained to give medication through chest tube, [facility] nurses are not. ANS called [facility],since they are [facility] on [facility]. [facility] said they do not give meds through chest tube either. Writer spoke with provider again who stated that 2000 dose of Alteplase should be held at this time and to pass that information to oncoming nurse.

## 2023-06-25 NOTE — PROGRESS NOTES
Jeanine Schuler is a 62 year old female patient.  1. Esophageal dysphagia    2. Shortness of breath    3. Hypervolemia, unspecified hypervolemia type    4. Other pulmonary embolism without acute cor pulmonale, unspecified chronicity (H)    5. Deep vein thrombosis (DVT) of non-extremity vein, unspecified chronicity    6. Hypoglycemia    7. Scleroderma (H)    8. Esophageal dysmotility    9. History of gastric bypass    10. History of Eleazar-en-Y gastric bypass    11. Severe malnutrition (H)    12. Esophageal dysmotility due to systemic disease    13. Esophagitis determined by endoscopy    14. Chronic systolic congestive heart failure (H)    15. Pleural effusion      Past Medical History:   Diagnosis Date     Anemia      Bariatric surgery status 06/27/2005    [facility]azar [facility] Y[facility];     Cellulitis of leg 06/06/2013     Esophageal reflux     Better post-hiatal hernia repair and weight loss     Hyperplastic colonic polyp      Hypovitaminosis D 2011     ILD (interstitial lung disease) (H)      Kidney stone 04/29/2007     Kidney stone 05/10/2007    surgically removed     Limb ischemia; ulcers of fingertips 04/22/2013     Other chronic pain     Left shoulder - rheumatoid arthritis     Raynaud's syndrome      Rheumatoid arthritis (H) \     Scleroderma (H)      Sjogren-Jake syndrome      Systemic sclerosis (H) 2009     Unspecified essential hypertension      No current outpatient medications on file.     Allergies   Allergen Reactions     Enoxaparin Sodium Other (See Comments)     Blood clot      Norvasc [Amlodipine Besylate] Swelling     Lip swelling that happened on 2 different occasions     Erythromycin Rash     Rash on arms     Principal Problem:    Deep vein thrombosis (DVT) of non-extremity vein, unspecified chronicity  Active Problems:    Chronic systolic congestive heart failure (H)    Shortness of breath    Hypervolemia, unspecified hypervolemia type    Other pulmonary embolism without acute cor pulmonale,  "unspecified chronicity (H)    Scleroderma (H)    Severe malnutrition (H)    Hypoglycemia    Esophageal dysmotility    Esophageal dysphagia    History of gastric bypass    History of Eleazar-en-Y gastric bypass    Esophagitis determined by endoscopy    Esophageal dysmotility due to systemic disease    Pleural effusion    Blood pressure 124/72, pulse 79, temperature 97.2  F (36.2  C), temperature source Axillary, resp. rate 18, height 1.753 m (5' 9\"), weight 75.5 kg (166 lb 7.2 oz), SpO2 97 %.    Alert and oriented X4   Denies numbness, tingling and SOB  On 3L nasal cannula   Reports pain managed with Morphine   Chest tubeinsertion site clean and dry;dressing was peeling at edges was reinforced with a Tegaderm; suction pulling at -20 continuous; no leaks, bends or occlusions in the line; chest tube output was 275mL @2330 (06/24/20230 and 390mL @0550 (06/25/2023) Serosanguineous   Repositioned but asked not to be disturbed when sleeping stating   PEG tube patent insertion site is free of leaks and dressing is clean dry and intact    Bilateral edema legs; Lower leg wounds bilateral dressings changed   Sacral Mepilex changed; pea sized wound at the top of the gluteal cleft small amount of blood on Mepilex that was removed      Ventura Vail RN  6/25/2023    "

## 2023-06-25 NOTE — PLAN OF CARE
RN [id number]  Pt is alert and oriented x4. Able to make needs known. Denies cp or SOB. On O2 3L via NC, sats >95%. Had CXR done today, see results. Pain managed w/ scheduled morphine and PRN pain meds. Dressings are CDI. Chest tube is intact w/ minimal drainage this shift.  Spouse is at bedside, involved in pt care. Call light is in reach, continue w/ POC.

## 2023-06-25 NOTE — PROGRESS NOTES
[facility]    Medicine Progress Note - Hospitalist Service, GOLD TEAM 20    Date of Admission:  5/16/2023    Assessment & Plan   Jaenine Schuler is a 63 yo female w/ past medical of HTN, HFpEF (LVEF 45-50%), pulmonary HTN, paroxysmal afib on chronic anticoagulation, interstitial lung disease, sarcoidosis, mixed connective tissue disease, Raynaud's, and chronic dysphagia who was admitted on 5/16/2023 with failure to thrive and found to have multiple R sided pulmonary emboli and bilateral lower extremity DVT's. Transferred to the ICU 6/11 for worsening SOB and increased O2 needs.  CT PE protocol done 6/11 morning showed large bilateral pleural effusion, volume overload, and concern for pneumonia.    Update for 6/25/2023:  Pain well controlled but  concerned about sedation. No worsening dyspnea. Chest tube continues to drain but overall output appears to be down. Was told by ANS that we cannot do TPA on [facility]. Repeat CXR today shows minimal effusions, probably not large enough on left for intervention. ? Ongoing need for R chest tube.  Plan:  - Continue current plan with focus on comfort; no escalation  - Will reassess need for ongoing chest tube in AM; discuss with pulm  - Ok to hold morphine if patient is sleeping/sedated  - Palliative care to follow up with patient tomorrow for ongoing goals of care discussion  - Will need to address nutrition going forward if not planning on hospice       #  Acute hypoxic respiratory failure, multifactorial  #  Pulmonary sarcoidosis  #  Hospital acquired pneumonia  #  Bilateral loculated pleural effusions  #  Pulmonary edema  See note 6/23 for further details. Repeat CT chest 6/19 showed decreased R partially loculated effusion and stable L partially loculated effusion. Initial plan was for lytics to R chest tube and placement of new L chest tube, however patient requested transitioned to hospice on 6/21 due to poor QOL.  "She would like to focus on comfort. Palliative care consulted. Patient more comfortable with better management of pain and air hunger. Willing to keep R chest tube in place as it continues to drain, as well as try higher dose steroids to see if this will result in more immediate improvement in QOL. She does not want additional procedures or further escalation of care at this time.  - Pulmonology, Rheumatology, and Palliative Care following  - Continue R chest tube to suction  - Unable to give TPA per ANS on-call (placed on hold)  - Continue prednisone to 40mg daily (increased 6/23 in case sarcoid contributing to effusions)  - Morphine 5mg q4h started 6/23, hold if sleeping/sedated   - Morphine 2-4mg IV or 10-15mg SL q2h prn for pain and air hunger  - Ativan 0.5mg IV q4h prn for anxiety and air hunger  - DNR/DNI per patient request  - No BIPAP  - No further procedures.    - Continue supplemental O2 prn, goal SpO2 >90 and comfort     #  PAF   #  HTN  #  HFpEF  #  Pulmonary HTN  #  R heart stain based on CT chest   #  Elevated troponin, suspect type II NSTEMI  TTE 6/3/23:  EF 45-50%, no RWMA, normal RV size/fucntion, pulmonary artery systolic pressure normal, no significant valvular heart disease, no effusion.  Clinically stable.   - Continue PTA Amiodarone 200 mg PO daily for rhythm control  - Cardiology consulted (signed off)  - Goal MAP > 65     #  Dysphagia, resolved  #  Severe protein calorie malnutrition   #  Constipation   #  History of gastric bypass surgery   GI previously consulted. Dysmotility esophagogram 6/7 limited due to patients mobility but no major strictures noted. Outpatient follow-up in [facility] for chronic dysphagia, esophageal dysmotility in the setting of scleroderma: \"[facility] phone: [phone], option 1 for clinic scheduling\".   - Diet: Regular Textures + Thin Liquids  - GI placed PEG on [place] on 6/14, TF currently on hold  - Reglan and Zofran " prn for nausea   - Currently refusing TF d/t discomfort and nausea  - PO nutrition remains suboptimal, but patient taking more liquids and bites of meals     #  Anasarca   - Daily Weights  - Strict I&Os  - Continue rolle catheter   - Bumex 2 mg IV tid      #  Hypomagnesemia  #  Hypokalemia  - refusing replacement protocols, will discontinue      #  Chronic anticoagulation   #  Acute provoked b/l LE DVT on 5/16, this admission  #  Acute provoked right lung PE  - Apixaban 5 mg PO BID     #  Mixed connective tissue disease  #  Limited cutaneous scleroderma  #  Severe Raynaud's   #  Chronic steroid use  #  Hypoglycemia, resolved  - D/t pts severe Raynaud's pt blood glucoses via fingerstick inaccurate, stopped BG, monitor BG via bmp, if concern for hypoglycemia, needs stat lab draw BG  - Rheum consulted, appreciate recs  - Prednisone to 40mg PO daily     #  Generalized weakness and deconditioning   #  Failure to thrive   - PT/OT consulted but unable to participate     #  Right breast skin thickening and nipple retraction on CT (incidental CT finding)  - Outpatient mammography pending [facility] discussion  - Diligent skin cares per bedside RN       Diet: Snacks/Supplements Adult: Ensure Enlive; With Meals  Regular Diet Adult Thin Liquids (level 0)    DVT Prophylaxis: DOAC  Rolle Catheter: PRESENT, indication: Strict 1-2 Hour I&O  Lines: None     Cardiac Monitoring: None  Code Status: No CPR- Do NOT Intubate      Clinically Significant Risk Factors              # Hypoalbuminemia: Lowest albumin = 1.6 g/dL at 6/20/2023  3:36 AM, will monitor as appropriate     # Hypertension: Noted on problem list         # Severe Malnutrition: based on nutrition assessment         Disposition Plan         The patient's care was discussed with the Care Coordinator/, Patient and Patient's Family.    Lewis Dumas PA-C  Hospitalist Service, GOLD TEAM [address] [place] [address]  Securely message  with [employer] (more info)  Text page via [facility] Paging/Directory   See signed in provider for up to date coverage information  ______________________________________________________________________    Interval History   Pain adequately controlled. Only having mild dyspnea with activity. NO dyspnea at rest. Stable edema.  notes that she has been more sleepy since scheduling narcotics, he would like her to be more awake. Patient has been drinking more water, soda, and nutrition shakes. She has also be having some bites of meat and potatoes. She is feeling a little better overall. Still not sure whether she wants to pursue ongoing treatment vs comfort cares. Writer encouraged ongoing conversation with her .     Physical Exam   Vital Signs: Temp: 97.4  F (36.3  C) Temp src: Oral BP: 114/58 Pulse: 67   Resp: 16 SpO2: 92 % O2 Device: Nasal cannula Oxygen Delivery: 3 LPM  Weight: 166 lbs 7.16 oz    General Appearance:  Sleepy but awakens to voice. Otherwise alert. Appears comfortable.  HEENT:  Dry mucous membranes.   Respiratory:  Diminished in bilateral bases. Otherwise clear.     Cardiovascular:  Irregularly irregular. S1,S2. No murmurs.   GI:  Soft, ND, mild tenderness around PEG.    Extremity:  2+ pitting edema in thighs. No calf tenderness.   Skin:  No visible rash.   Neuro:  Grossly non-focal.     Medical Decision Making       50 MINUTES SPENT BY ME on the date of service doing chart review, history, exam, documentation & further activities per the note.      Data         Imaging results reviewed over the past 24 hrs:   Recent Results (from the past 24 hour(s))   XR Chest Port 1 View    Narrative    EXAM: XR CHEST PORT 1 VIEW 6/25/2023 9:06 AM      HISTORY: Chest tube positioning.    COMPARISON: Previous day.     TECHNIQUE: Frontal view of the chest.    FINDINGS: Right basilar pigtail chest tube, unchanged. Stable cardiac  mediastinal silhouette. Small bilateral pleural effusions. No  appreciable  pneumothorax. Stable perihilar and bibasilar mixed  airspace and interstitial opacities.  Contrast visualized within the colon. Cholecystectomy clips present.      Impression    IMPRESSION: Small bilateral pleural effusions. Stable perihilar and  bibasilar pulmonary opacities.    I have personally reviewed the examination and initial interpretation  and I agree with the findings.    ANN GAMEZ MD         SYSTEM ID:  [id number]

## 2023-06-25 NOTE — PLAN OF CARE
"  VS: /58 (BP Location: Left arm)   Pulse 67   Temp 97.4  F (36.3  C) (Oral)   Resp 16   Ht 1.753 m (5' 9\")   Wt 75.5 kg (166 lb 7.2 oz)   SpO2 92%   BMI 24.58 kg/m       O2: On oxygen 3LPM via nasal canula. Denies SOB, chest pain, cough.   Output: Rolle catheter in place patent . LBM 6/23. Scheduled bowel meds given.   Activity: Turned and repositioned every 2-3 hours and as needed per pt. Some times refuses repositioning. Did not get OOB.   Transfers with assists of 2 using liko lift.   Skin: Bilateral lower extremities wounds, and skin tear to sacrum   Pain: Pain controlled with scheduled morphine and zanaflex PRN for muscles spasm. Atarax PRN given per pt request for anxiety.   CMS: Alert and oriented x4. Numbness and tingling to finger tips and toes.   Dressing: BLE wound and sacral mepilex C/D/I  Changed overnight   Diet: Regular diet /thin liquids.    LDA: 2 PIV ;s right arm, saline locked. PEG tube patent and intact, flushed per orders, rolle catheter in place draining, chest tube in Place  Intact.   Plan: TBD. Continue to monitor per POC.    Additional Info: Reports mild dizziness, fluids encouraged.  Chest x-ray done see results.           "

## 2023-06-26 NOTE — PROGRESS NOTES
Jeanine Schuler is a 62 year old female patient.  1. Esophageal dysphagia    2. Shortness of breath    3. Hypervolemia, unspecified hypervolemia type    4. Other pulmonary embolism without acute cor pulmonale, unspecified chronicity (H)    5. Deep vein thrombosis (DVT) of non-extremity vein, unspecified chronicity    6. Hypoglycemia    7. Scleroderma (H)    8. Esophageal dysmotility    9. History of gastric bypass    10. History of Eleazar-en-Y gastric bypass    11. Severe malnutrition (H)    12. Esophageal dysmotility due to systemic disease    13. Esophagitis determined by endoscopy    14. Chronic systolic congestive heart failure (H)    15. Pleural effusion      Past Medical History:   Diagnosis Date     Anemia      Bariatric surgery status 06/27/2005    eleazar en Y- [facility];     Cellulitis of leg 06/06/2013     Esophageal reflux     Better post-hiatal hernia repair and weight loss     Hyperplastic colonic polyp      Hypovitaminosis D 2011     ILD (interstitial lung disease) (H)      Kidney stone 04/29/2007     Kidney stone 05/10/2007    surgically removed     Limb ischemia; ulcers of fingertips 04/22/2013     Other chronic pain     Left shoulder - rheumatoid arthritis     Raynaud's syndrome      Rheumatoid arthritis (H) \     Scleroderma (H)      Sjogren-Jake syndrome      Systemic sclerosis (H) 2009     Unspecified essential hypertension      No current outpatient medications on file.     Allergies   Allergen Reactions     Enoxaparin Sodium Other (See Comments)     Blood clot      Norvasc [Amlodipine Besylate] Swelling     Lip swelling that happened on 2 different occasions     Erythromycin Rash     Rash on arms     Principal Problem:    Deep vein thrombosis (DVT) of non-extremity vein, unspecified chronicity  Active Problems:    Chronic systolic congestive heart failure (H)    Shortness of breath    Hypervolemia, unspecified hypervolemia type    Other pulmonary embolism without acute cor pulmonale,  "unspecified chronicity (H)    Scleroderma (H)    Severe malnutrition (H)    Hypoglycemia    Esophageal dysmotility    Esophageal dysphagia    History of gastric bypass    History of Eleazar-en-Y gastric bypass    Esophagitis determined by endoscopy    Esophageal dysmotility due to systemic disease    Pleural effusion    Blood pressure 110/66, pulse 78, temperature 98.3  F (36.8  C), temperature source Oral, resp. rate 16, height 1.753 m (5' 9\"), weight 75.5 kg (166 lb 7.2 oz), SpO2 97 %.    Alert and oriented X4   Denies numbness, tingling and SOB  On 3L nasal cannula   Reports pain managed with Morphine   Chest tube insertion site clean and dry;suction pulling at -20 continuous; no leaks, bends or occlusions in the line; chest tube output was Serosanguineous   Repositioned but asked not to be disturbed when sleeping stating   PEG tube patent insertion site is free of leaks and dressing is clean dry and intact  Deleon draining well with no dependent loops; Clear yellow urine; significant increase in urine output since yesterday night shift     Bilateral edema legs;   Patient reported increased appetite and increased tolerance of oral intake; Denies nausea and vomiting            Ventura Vail, RN  6/26/2023    "

## 2023-06-26 NOTE — INTERIM SUMMARY
Brief Pulmonary Note:     Patient chart reviewed. Received 2 of 3 doses of tPA dornase over the weekend, last dose held d/t policy of lytics not being able to be [place]. Had adequate output and now on RA. No output since this morning. Per primary team, patient will be transitioning to hospice and wants to go home. Will have IR pull out chest tube.     Will sign off    Discussed with Dr. Jim Monzon MD   Pulmonary/Critical Care Fellow  Pager: [phone]

## 2023-06-26 NOTE — PROGRESS NOTES
[facility]    Medicine Progress Note - Hospitalist Service, GOLD TEAM 20    Date of Admission:  5/16/2023    Assessment & Plan   Jeanine Schuler is a 61 yo female w/ past medical of HTN, HFpEF (LVEF 45-50%), pulmonary HTN, paroxysmal afib on chronic anticoagulation, interstitial lung disease, sarcoidosis, mixed connective tissue disease, Raynaud's, and chronic dysphagia who was admitted on 5/16/2023 with failure to thrive and found to have multiple R sided pulmonary emboli and bilateral lower extremity DVT's. Transferred to the ICU 6/11 for worsening SOB and increased O2 needs.  CT PE protocol done 6/11 morning showed large bilateral pleural effusion, volume overload, and concern for pneumonia, transitioned to comfort cares 6/26/23.     #  GOC  #  Acute hypoxic respiratory failure, multifactorial  #  Pulmonary sarcoidosis  #  Hospital acquired pneumonia  #  Bilateral loculated pleural effusions  #  Pulmonary edema  See note 6/23 for further details. Repeat CT chest 6/19 showed decreased R partially loculated effusion and stable L partially loculated effusion. Initial plan was for lytics to R chest tube and placement of new L chest tube, however patient requested transitioned to hospice on 6/21 due to poor QOL. She would like to focus on comfort. Palliative care consulted. Patient more comfortable with better management of pain and air hunger. Willing to keep R chest tube in place as it continues to drain, as well as try higher dose steroids to see if this will result in more immediate improvement in QOL. She does not want additional procedures or further escalation of care at this time.  - Pulmonology, Rheumatology, and Palliative Care following  - Transitioned to comfort cares after thorough d/w Palliative care today.   - Ordered comfort cares via order set (save meds as this will be done by Palliative Care).   - Continue R chest tube to suction for now and please  document output qshift; likely consult IR to remove tomorrow if little output.   - Continue prednisone to 40mg daily (increased 6/23 in case sarcoid contributing to effusions)  - Morphine 5mg q4h started 6/23, hold if sleeping/sedated   - Morphine 2-4mg IV or 10-15mg SL q2h prn for pain and air hunger  - Ativan 0.5mg IV q4h prn for anxiety and air hunger  - No BIPAP  - No further procedures.    - Continue supplemental O2 prn, goal SpO2 >90 and comfort     #  PAF   #  HTN  #  HFpEF  #  Pulmonary HTN  #  R heart stain based on CT chest   #  Elevated troponin, suspect type II NSTEMI  TTE 6/3/23:  EF 45-50%, no RWMA, normal RV size/fucntion, pulmonary artery systolic pressure normal, no significant valvular heart disease, no effusion.  Clinically stable.   - Continue PTA Amiodarone 200 mg PO daily for rhythm control  - Cardiology consulted (signed off)     #  Dysphagia, resolved  #  Severe protein calorie malnutrition   #  Constipation   #  History of gastric bypass surgery   GI previously consulted. Dysmotility esophagogram 6/7 limited due to patients mobility but no major strictures noted.   - Regular diet for comfort     #  Anasarca   - No further daily wts or strict I&Os now on comfort cares     #  Hypomagnesemia  #  Hypokalemia  #  Hyponatremia  - No further blood draws now on comfort cares     #  Chronic anticoagulation   #  Acute provoked b/l LE DVT on 5/16, this admission  #  Acute provoked right lung PE  - Apixaban 5 mg PO BID if deemed ok to continue per Palliative care     #  Mixed connective tissue disease  #  Limited cutaneous scleroderma  #  Severe Raynaud's   #  Chronic steroid use  #  Hypoglycemia, resolved  - Prednisone to 40mg PO daily if deemed ok to continue per Palliative care     #  Generalized weakness and deconditioning   #  Failure to thrive   - No further therapies now on comfort cares     #  Right breast skin thickening and nipple retraction on CT (incidental CT finding)  - Comfort cares        Diet: Snacks/Supplements Adult: Ensure Enlive; With Meals  Regular Diet Adult Thin Liquids (level 0)    DVT Prophylaxis: DOAC  Deleon Catheter: Present  Lines: None     Cardiac Monitoring: None  Code Status: No CPR- Do NOT Intubate; on comfort cares as of 6/26/23       Disposition Plan To OP hospice     Expected Discharge Date: 06/28/2023        The patient's care was discussed with the Care Coordinator/, Patient and Patient's Family.    Demario Anderson PA-C  Hospitalist Service, GOLD TEAM [address] [place] [address]  Securely message with [employer] (more info)  Text page via [facility] Paging/Directory   See signed in provider for up to date coverage information  ______________________________________________________________________    Interval History   Pt met with Palliative Care again today and had extensive discussion regarding GOC and pt opted to pursue comfort cares.     Physical Exam   Vital Signs: Temp: 97.5  F (36.4  C) Temp src: Oral BP: 104/63 Pulse: 76   Resp: 16 SpO2: 100 % O2 Device: None (Room air) Oxygen Delivery: 3 LPM  Weight: 166 lbs 7.16 oz  GEN: In NAD  HEENT: NCAT; PERRL; sclerae non-icteric; dry MMs  LUNGS: Decreased breath sounds otherwise clear  CV: Irregularly irregular  ABD: +BSs; SNTND; G tube intact  EXT: 2+ BLE edema  SKIN: No acute rashes noted on exposed areas.  NEURO: AAOx3; CNs grossly intact; No acute focal deficits noted.      Medical Decision Making       60 MINUTES SPENT BY ME on the date of service doing chart review, history, exam, documentation & further activities per the note.      Data   CMP  Recent Labs   Lab 06/26/23  0647 06/23/23  0809 06/22/23  0843 06/21/23  0633 06/20/23  2321 06/20/23  1352 06/20/23  0336   * 133*  --  134* 133*   < > 132*  132*   POTASSIUM 3.1* 3.6 3.5 2.7* 3.6   < > 3.2*  3.2*   CHLORIDE 87* 89*  --  88* 91*   < > 90*  90*   CO2 37* 31*  --  36* 35*   < > 36*  36*   ANIONGAP 9 13  --  10 7    < > 6*  6*   GLC 74 76  --  93 103*   < > 77  77   BUN 38.6* 44.4*  --  29.3* 24.2*   < > 25.5*  25.5*   CR 0.69 0.84  --  0.58 0.58   < > 0.59  0.59   GFRESTIMATED >90 79  --  >90 >90   < > >90  >90   DAVID 8.0* 7.9*  --  7.8* 8.0*   < > 7.4*  7.4*   MAG  --   --  1.9 1.8  --   --  1.9   PHOS  --   --  3.5 2.9  --   --   --    PROTTOTAL 4.3*  --   --   --   --   --  4.0*   ALBUMIN 2.0*  --   --   --   --   --  1.6*   BILITOTAL 0.3  --   --   --   --   --  0.3   ALKPHOS 75  --   --   --   --   --  78   AST 21  --   --   --   --   --  21   ALT 17  --   --   --   --   --  13    < > = values in this interval not displayed.     CBC  Recent Labs   Lab 06/26/23  0647 06/23/23  0809 06/21/23  0633 06/20/23  0336   WBC 9.9 13.0* 11.2* 8.3   RBC 3.15* 2.90* 3.43* 2.82*   HGB 9.4* 8.6* 10.2* 8.6*   HCT 29.6* 27.8* 31.5* 27.0*   MCV 94 96 92 96   MCH 29.8 29.7 29.7 30.5   MCHC 31.8 30.9* 32.4 31.9   RDW 14.6 14.8 15.0 14.9    399 459* 399

## 2023-06-26 NOTE — PROGRESS NOTES
CLINICAL NUTRITION SERVICES  Received consult from provider to restart TF per pt request. However, after discussion with Palliative Care, pt has made informed decision to transition to comfort cares.  Will continue Ensure Enlive PRN so pt may order if needed.    No further nutrition interventions planned at this time. RD can be consulted if needed.    RD signing off on 6/26/2023.    Italo Alfaro Clinical Dietitian  6B/8A RD Pager: [phone]  WB Weekend/Holiday Pager: [phone]

## 2023-06-26 NOTE — PROGRESS NOTES
[facility]  Palliative Care Daily Progress Note       Recommendations & Counseling     Goals of care / Palliative Care encounter:    Comfort care - pt very clear that her goals now are comfort. She does not want to have any life prolonging interventions and only wants interventions and medications that are going to provide comfort. Comfort care orders placed    Prognosis of weeks discussed with patient and her     She wants to be able to keep her PEG tube for medications only. She does not want tube feeds for nutrition and instead wants to be able to eat and drink only if she chooses to eat and drink.     Chest tube - Will follow output next 24 hours and then plan to remove it in the coming days. If it becomes a barrier for discharge it may be worth offering pleurx catheter.     Pt would like  to visit tomorrow to discuss discharge to a facility with hospice. She does not want to go home with hospice.     DNR/DNI - Will need POLST prior to discharge    Symptom management:    Pain management: pain currently managed with scheduled and PRN medication - continue     Continue gabapentin 100mg at bedtime, continue lidocaine patch    Continue Morphine sulfate high concentrate SL 5mg q4hrs scheduled and 5-10mg q2hrs PRN     Tizanidine 2mg q6hrs PRN     Nausea / vomiting: Continue zofran 8mg PO / SL TID PRN, lorazepam 0.5-1mg SL / per peg q4hrs PRN,     Anxiety - continue atarax PRN        Thank you for the opportunity to continue to participate in the care of this patient and family.  Please feel free to contact on-call palliative provider with any emergent needs.  We can be reached via Securely message with the Vocera Web Console (learn more here)    Carol Flynn MD / Palliative Medicine     Total time spent was 60 minutes regarding goals of care, symptom management, plan of care on the date of the encounter. These recommendations were given to the  primary team via in person conversation.    Advance Care Planning Discussion 6/26/2023. Carol DOAN MD met with Patient and their spouse today at the hospital to discuss Advance Care Planning. Jeanine Schuler does have decisional capacity and was present for this discussion.  Those present were informed of the voluntary nature of this discussion and wished to proceed.  The discussion included: see documented conversation in recommondation and prognosis sections . This discussion began at 11am and ended at 11:45am for a total of 45 minutes.      Assessments          Jeanine Schuler is a 62 year old female with a past medical history of HTN, HFpEF (LV EF 45-50%), pulmonary HTN, paroxysmal afib on chronic anticoagulation, interstitial lung disease, sarcoidosis, mixed connective tissue disease, Raynaud's, and chronic dysphagia who was admitted on 5/16/2023 with failure to thrive and found to have multiple R sided pulmonary emboli and bilateral lower extremity DVT's. Transferred to the ICU 6/11 for worsening SOB and increased O2 needs.  CT PE protocol done 6/11 morning showed large bilateral pleural effusion, volume overload, and concern for pneumonia. S/p R chest tube placement. Chest CT 6/19 with continued pleural effusions bilaterally.      Today, the patient was seen for:  Hx pulmonary sarcoidosis  Acute hypoxic respiratory failure  Bilateral loculated pleural effusions  Goals of care  Symptom management  Support     Prognosis, Goals, or Advance Care Planning was addressed today with: Yes. Jeanine had weekend to think about things and she no longer wanted to pursue any further interventions, she wants to focus on her comfort until she dies. She does not want to be forced to eat or do physical therapy. She does not want any other procedures and does not want to have to follow up in various clinics. She feels that for 10 years she has done everything possible to prolong her life and now she realizes  that she is not going to feel better and therefore wants to focus on her comfort until she dies.     Prognosis of weeks, possibly longer or shorter given to patient and .     Mood, coping, and/or meaning in the context of serious illness were addressed today: Yes.              Interval History:     Chart review/discussion with unit or clinical team members:   Chart reviewed. No acute events over night.     Per patient or family/caregivers today:  Pain controlled  She would like her  to stop trying to feed her              Review of Systems:     Besides above, a complete 10+ ROS was reviewed and is unremarkable           Medications:     I have reviewed this patient's medication profile and medications during this hospitalization.             Physical Exam:   Vitals were reviewed  Temp: 97.5  F (36.4  C) Temp src: Oral BP: 104/63 Pulse: 76   Resp: 16 SpO2: 100 % O2 Device: Nasal cannula Oxygen Delivery: 3 LPM  Gen: alert, lying in bed, appears frail, chronically ill, engaged, appropriate, sensorium intact, in NAD             Data Reviewed:     Reviewed recent labs and pertinent imaging  GFR > 90, platelets 384, alb 2.0

## 2023-06-26 NOTE — PROGRESS NOTES
"SPIRITUAL HEALTH SERVICES Progress Note  [facility] ([facility]) 6B    Saw pt Jeanine Schuler per Follow Up visit.    Patient/Family Understanding of Illness and Goals of Care - Jeanine had several friends visit today and was expecting her son, Yusuf. Her , Lexx, was present during my visit. Jeanine shared that she made the decision today to not have nutrition through a feeding tube and she feels at peace with that decision.     Distress and Loss - Jeanine shared that her biggest worry regarding end of life is the people she loves that she'll leave behind. She also expressed concern that her daughter who recently completed rehab for alcohol addiction might relapse due to grief/stress. Lexx and this  affirmed that even in the midst of so much going on for herself, her primary concern is for others. Jeanine affirmed the difficulty of receiving care/support when she has been the caregiver for so many for all of her years. She had an additional concern of her youngest grandchild, José (age 1) forgetting her after she's gone.      Strengths, Coping, and Resources - To help with her concern of family forgetting her, Jeanine started a binder with letters and thoughts for family members that she wants read as a remembrance service for her. She shared that they also made some big financial decisions today that took a \"weight off my shoulders.\" Lexx continues to be a big support and Yusuf arrived as this  was ending the visit.     Meaning, Beliefs, and Spirituality - Jeanine identifies as Religious but does not consider herself active in a Jew and declined this  reaching out to a Jew on her behalf. She affirmed that she lived out her belief in God and care for others for at least the past 40 years. Her Religious dora was affirmed by this  acknowledging the communion of saints and Jeanine finds peace knowing that, while many will miss her here, there are many in " "hebricen looking forward to seeing her again. She has many hopes for her family and one hope for herself--that she would have a \"peaceful end.\" This  affirmed letting go of some of her concerns for others and entrusting those to God.     Plan of Care - Jeanine appreciated a prayer. She did not have specific needs from Spiritual Health regarding end of life rituals or support, but she did ask this  to see her again tomorrow, which I plan to do.     Jay Le MDiv  Chaplain Resident  Pager [phone]    * [facility] remains available 24/7 for emergent requests/referrals, either by having the switchboard page the on-call  or by entering an ASAP/STAT consult in Epic (this will also page the on-call ). Routine Epic consults receive an initial response within 24 hours.*    "

## 2023-06-26 NOTE — PROGRESS NOTES
"   VS: /63 (BP Location: Left arm)   Pulse 76   Temp 97.5  F (36.4  C) (Oral)   Resp 16   Ht 1.753 m (5' 9\")   Wt 75.5 kg (166 lb 7.2 oz)   SpO2 100%   BMI 24.58 kg/m     O2: 100% on 3L via NC   Output: Deleon catheter in place with adequate output, incontinent of bowel   Last BM: Pt unable to recall, states few days. Last charted 6/23   Activity: Pt remained in bed throughout shift, repositioning encouraged   Skin: BLE wounds dressed, c/d/i. Sacral wound dressed w/ Mepilex, c/d/i. BLE edema   Pain: Pt endorses pain, 6/10. Scheduled oral Morphine administered with relief              CMS: A&Ox4, denies chest pain. Pt endorses numbness/tingling in all extremities. Endorsed intermittent nausea, PRN IV Zofran administered   Dressing: Dressings to BLE wounds and Mepilex to sacral wound   Diet: Regular    LDA: R PIV x2 SL, PEG tube clamped, R chest tube (patent, dressing c/d/i, suction set at -20 continuous)    Equipment: Med , IV pole, personal belongings   Plan: Call light in reach, continue POC   Additional Info: Morning K+ 3.1, provider notified. RN Managed Potassium protocol started. K+ replaced during shift. Spouse at bedside, involved with cares    Pt now on comfort cares.         "

## 2023-06-27 NOTE — CONSULTS
Social Work Brief Note-    See SW note from YOKASTA Giang LGSW on 6/27 for Social Work IP Consult.     YOKASTA Giang, LISA  Adult Casual   SW Covering 6MS on 6/27  [email or web address]

## 2023-06-27 NOTE — PROGRESS NOTES
"   VS: /63 (BP Location: Left arm)   Pulse 76   Temp 97.5  F (36.4  C) (Oral)   Resp 16   Ht 1.753 m (5' 9\")   Wt 75.5 kg (166 lb 7.2 oz)   SpO2 97%   BMI 24.58 kg/m     O2: >97% 3L via NC, denies SOB   Output: Deleon catheter in place with adequate output, incontinent of bowel   Activity: Not oob this shift, repositioning encouraged   Up for meals? No   Skin: BLE wounds, dressed c/d/i, Sacral wound covered with Mepilex, dressed c/d/i   Pain: Endorses pain, 7/10. Managed with scheduled oral Morphine              CMS: A&Ox4, denies chest pain, n/v, and dizziness   Dressing: BLE wounds and sacral wound   Diet: Regular   LDA: R PIV x2 SL, PEG tube   Equipment: IV pole, personal belongings   Plan: Call light in reach, continue POC  Comfort Cares   Additional Info: IR to remove R chest tube today    Wound cares completed by WOC today        "

## 2023-06-27 NOTE — PROGRESS NOTES
Care Management Follow Up    Length of Stay (days): 42    Expected Discharge Date: 06/28/2023     Concerns to be Addressed: Discharge Planning/Hospice Coordination  Patient plan of care discussed at interdisciplinary rounds: Yes    Anticipated Discharge Disposition: Long Term Care with Hospice      Anticipated Discharge Services: TBD  Anticipated Discharge DME: TBD    Patient/family educated on Medicare website which has current facility and service quality ratings: yes  Education Provided on the Discharge Plan: yes  Patient/Family in Agreement with the Plan: yes    Referrals Placed by CM/SW: DAJUAN provided Medicare.gov list for pt and family to review and select LTC facility and hospice agency for referrals to be placed tomorrow (6/28)  Private pay costs discussed: private room/amenity fees    Additional Information:  SW met with pt and pts  at bedside to discuss hospice services. SW provided pt with hospice philosophy, hospice services, location setting and wishes/goals of care. Pt stated she wants to be closer to her home in [place], [place]. Pt is not interested in receiving hospice services at her home and would prefer to go to a LTC facility near [place]. Pt wishes are to die peacefully, to be treated well by the staff at the facility and for her  to be able to stay overnight with her at the facility.     DAJUAN provided the Medicare.gov list for LTC facilities near [place], [place] for the pt and her  to review. Additionally, SW also provided Medicare.gov list for hospice agencies for pt and pts  to review. Pt plans to review and discuss with her family tonight and requested SW come back tomorrow mid afternoon to gather preferences and place referrals.     DAJUAN will continue following for discharge planning, hospice coordination and ongoing support.     YOKASTA Giang, LGSW  Adult Casual   SW Covering 6MS on 6/27  [email or web address]

## 2023-06-27 NOTE — PROGRESS NOTES
[id number]    Plan of Care Reviewed With: Patient    Overall Patient Progress: No Change    Outcome Evaluation: Pt is A & O x4 on 3 L of O2 via NC. C/O 9/10 generalized pain - administered Q4H scheduled morphine & PRN Atarax. Denies nausea, chest pain & SOB. Pt has 1 R PIV - SL. UTV/WEN BLE & sacrum wounds - dressings changed by [facility] nurse during day shift & are CDI. Pt has PEG tube - irrigated during shift & dressing changed during shift, CDI. Pt has rolle catheter in place - adequately draining, emptied various times throughout shift & catheter cares completed w/ catheter wipes. Pt is not oob, assist x2 & uses call light appropriately. Pt repositioned various times throughout shift.     Shift Updates    One of pt's R PIV's removed during shift d/t occlusion.    PRN Zofran administered w/ PM medications per pt request d/t nausea she experiences after medication administration.     Per pt request - administered PRN Melatonin & Zanaflex at bedtime.    Plan: TBD, waiting hospice placement. Continue w/ comfort cares.

## 2023-06-27 NOTE — PROGRESS NOTES
[facility]    Medicine Progress Note - Hospitalist Service, GOLD TEAM 20    Date of Admission:  5/16/2023    Assessment & Plan   Jeanine Schuler is a 63 yo female w/ past medical of HTN, HFpEF (LVEF 45-50%), pulmonary HTN, paroxysmal afib on chronic anticoagulation, interstitial lung disease, sarcoidosis, mixed connective tissue disease, Raynaud's, and chronic dysphagia who was admitted on 5/16/2023 with failure to thrive and found to have multiple R sided pulmonary emboli and bilateral lower extremity DVT's. Transferred to the ICU 6/11 for worsening SOB and increased O2 needs.  CT PE protocol done 6/11 morning showed large bilateral pleural effusion, volume overload, and concern for pneumonia, transitioned to comfort cares 6/26/23.     #  GOC  #  Acute hypoxic respiratory failure, multifactorial  #  Pulmonary sarcoidosis  #  Hospital acquired pneumonia  #  Bilateral loculated pleural effusions  #  Pulmonary edema  See note 6/23 for further details. Repeat CT chest 6/19 showed decreased R partially loculated effusion and stable L partially loculated effusion. Initial plan was for lytics to R chest tube and placement of new L chest tube, however patient requested transitioned to hospice on 6/21 due to poor QOL. She would like to focus on comfort. Palliative care consulted. Patient more comfortable with better management of pain and air hunger. Willing to keep R chest tube in place as it continues to drain, as well as try higher dose steroids to see if this will result in more immediate improvement in QOL. She does not want additional procedures or further escalation of care at this time.  - Pulmonology, Rheumatology, and Palliative Care consulted and appreciate rec's  - Transitioned to comfort cares 6/26 after thorough d/w Palliative care  - Ordered comfort cares via order set (save meds as this will be done by Palliative Care).   - Consult IR to remove chest tube  -  Continue prednisone, and start taper by 5 mg daily until at PTA dose of 15 mg daily  - Morphine 5mg q4h started 6/23, hold if sleeping/sedated   - Morphine 2-4mg IV or 10-15mg SL q2h prn for pain and air hunger  - Ativan 0.5mg IV q4h prn for anxiety and air hunger  - No BIPAP  - No further procedures.    - Continue supplemental O2 prn, goal SpO2 >90 and comfort     #  PAF   #  HTN  #  HFpEF  #  Pulmonary HTN  #  R heart stain based on CT chest   #  Elevated troponin, suspect type II NSTEMI  TTE 6/3/23:  EF 45-50%, no RWMA, normal RV size/fucntion, pulmonary artery systolic pressure normal, no significant valvular heart disease, no effusion.  Clinically stable.   - Continue PTA Amiodarone 200 mg PO daily for rhythm control if deemed ok to continue per Palliative care  - Cardiology consulted (signed off)     #  Dysphagia, resolved  #  Severe protein calorie malnutrition   #  Constipation   #  History of gastric bypass surgery   GI previously consulted. Dysmotility esophagogram 6/7 limited due to patients mobility but no major strictures noted.   - Regular diet for comfort     #  Anasarca   - No further daily wts or strict I&Os now on comfort cares     #  Hypomagnesemia  #  Hypokalemia  #  Hyponatremia  - No further blood draws now on comfort cares     #  Chronic anticoagulation   #  Acute provoked b/l LE DVT on 5/16, this admission  #  Acute provoked right lung PE  - Apixaban 5 mg PO BID if deemed ok to continue per Palliative care     #  Mixed connective tissue disease  #  Limited cutaneous scleroderma  #  Severe Raynaud's   #  Chronic steroid use  #  Hypoglycemia, resolved  - Prednisone taper as above      #  Generalized weakness and deconditioning   #  Failure to thrive   - No further therapies now on comfort cares     #  Right breast skin thickening and nipple retraction on CT (incidental CT finding)  - Comfort cares       Diet: Snacks/Supplements Adult: Ensure Enlive; With Meals  Regular Diet Adult Thin Liquids  (level 0)    DVT Prophylaxis: DOAC  Deleon Catheter: Present  Lines: None     Cardiac Monitoring: None  Code Status: No CPR- Do NOT Intubate; on comfort cares as of 6/26/23       Disposition Plan To OP hospice     Expected Discharge Date: 06/29/2023        The patient's care was discussed with the Care Coordinator/, Patient and Patient's Family.    Demario Anderson PA-C  Hospitalist Service, GOLD TEAM 20  [facility]  Securely message with [employer] (more info)  Text page via [employer] Paging/Directory   See signed in provider for up to date coverage information  ______________________________________________________________________    Interval History   No acute events overnight. Pt nods yes if she feels comfortable. Chest tube remains inserted with little output from yesterday into today.     Physical Exam   Vital Signs:             SpO2: 97 % O2 Device: Nasal cannula    Weight: 166 lbs 7.16 oz  GEN: In NAD  HEENT: NCAT; PERRL; sclerae non-icteric; dry MMs  LUNGS: Decreased breath sounds otherwise clear  CV: Irregularly irregular  ABD: +BSs; SNTND; G tube intact  EXT: 2+ BLE edema  SKIN: No acute rashes noted on exposed areas.  NEURO: AAOx3; CNs grossly intact; No acute focal deficits noted.      Medical Decision Making       45 MINUTES SPENT BY ME on the date of service doing chart review, history, exam, documentation & further activities per the note.      Data   CMP  Recent Labs   Lab 06/26/23  0647 06/23/23  0809 06/22/23  0843 06/21/23  0633 06/20/23  2321   * 133*  --  134* 133*   POTASSIUM 3.1* 3.6 3.5 2.7* 3.6   CHLORIDE 87* 89*  --  88* 91*   CO2 37* 31*  --  36* 35*   ANIONGAP 9 13  --  10 7   GLC 74 76  --  93 103*   BUN 38.6* 44.4*  --  29.3* 24.2*   CR 0.69 0.84  --  0.58 0.58   GFRESTIMATED >90 79  --  >90 >90   DAVID 8.0* 7.9*  --  7.8* 8.0*   MAG  --   --  1.9 1.8  --    PHOS  --   --  3.5 2.9  --    PROTTOTAL 4.3*  --   --   --   --     ALBUMIN 2.0*  --   --   --   --    BILITOTAL 0.3  --   --   --   --    ALKPHOS 75  --   --   --   --    AST 21  --   --   --   --    ALT 17  --   --   --   --      CBC  Recent Labs   Lab 06/26/23  0647 06/23/23  0809 06/21/23  0633   WBC 9.9 13.0* 11.2*   RBC 3.15* 2.90* 3.43*   HGB 9.4* 8.6* 10.2*   HCT 29.6* 27.8* 31.5*   MCV 94 96 92   MCH 29.8 29.7 29.7   MCHC 31.8 30.9* 32.4   RDW 14.6 14.8 15.0    399 459*

## 2023-06-27 NOTE — PROGRESS NOTES
"SPIRITUAL HEALTH SERVICES Progress Note  [facility] ([facility]) 6B    Saw pt Jeanine Schuler per Follow up visit.    Patient/Family Understanding of Illness and Goals of Care - Today Jeanine was present with Lexx. Her speech and thoughts were labored and she expressed exhaustion and confusion. Her tube has been removed and \"now I can move forward,\" which means going to a hospice center outside the hospital.     Distress and Loss - Jeanine shared that she feels \"confused\" today and is experiencing \"envy\" as she sees others walking around and enjoying their grandchildren and she can't do either.     Strengths, Coping, and Resources - Jeanine wanted to identify where she might be in the stages of grief. This  shared the stages as identified by Carol Ellis, though Jeanine said she doesn't feel anger today, just envy. She had a good conversation with son Yusuf yesterday as they discerned together wether grandchildren would be brought to see her (she believes those that want to will come, though she's nervous about them seeing her \"like this.\"). Lexx continues to provide excellent care.     Meaning, Beliefs, and Spirituality - Jeanine appreciated a prayer and it was important to her to hear from Yusuf that her grandchildren will remember her.     Plan of Care - I provided a prayer and remain available. Depending on her discharge plans I hope to visit again on Friday.     Jay Le MDiv  Chaplain Resident  Pager [phone]    * [facility] remains available 24/7 for emergent requests/referrals, either by having the switchboard page the on-call  or by entering an ASAP/STAT consult in Epic (this will also page the on-call ). Routine Epic consults receive an initial response within 24 hours.*    "

## 2023-06-27 NOTE — PLAN OF CARE
[id number]   No acute events overnight  AOx4. Denies CP, SOB  Deleon in place with adequate output. LBM 6/23  Pt c/o pain to buttocks; managed with scheduled morphine  Dressings to BLE and sacrum CDI  R PIV x2 SL. Chest tube with minimal output. PEG for meds  Spouse at bedside. Call light within reach at all times. Pt able to make needs known.  Continue with POC.

## 2023-06-27 NOTE — PROGRESS NOTES
[facility]  WO Nurse Inpatient Assessment     Consulted for: Bilateral lower calf wounds, sacral wound    Patient transitioning to comfort cares today. Discussed wound care with patient and patient's desire to have minimal pain with cares. [facility] will update orders below.     Patient History (according to H&P provider note(s) 5/17/23:      Jeanine Schuler is a 62 year old female with a past medical history of HTN, chronic atrial fibrillation on anticouagulation until recently, multiple autoimmune diseases, sarcoidosis, pulmonary hypertension and CHF, who presented to our service with shortness of breath in rest, anterior pressure like chest pain that irradiated to back and severe weakness that worsen today. For the last couple of months she has generally felt unwell with nausea, poor apetite (last meal 5 days ago) and progressive debilitating weakness to the point of difficulty deambulating. 1 month ago she fell from her wheel chair and injured both legs, needing stitches and 2 rounds of bactrim due to infection. This wounds have been very painful and have contributed for her bedridden status.        Assessment:      Areas visualized during today's visit: Lower extremities     Wound location: Bilateral LE- seen weekly on Tuesdays 6/14 Left medial                                               6/20 Left medial        6/14 Right lateral                                              6/20 Right lateral     Last photo: 6/20/23  Wound due to: Trauma  Wound history/plan of care: resulted from a fall- see HPI  Wound base: LLE 70/30 % eschar and slough/non-granular tissue- softening, RLE 80 % superficial scab and slough 20% dermis      Palpation of the wound bed: normal      Drainage: moderate from RLE, copious from LLE     Description of drainage: serosanguinous     Measurements (length x width x depth, in cm): LLE 5.5  x 5.5  x  0.4 cm, RLE 4  x 1.9  x  0.3 cm       Tunneling: N/A     Undermining: N/A  Periwound skin: Intact, Superficial erosion and Scar tissue      Color: normal and consistent with surrounding tissue and red      Temperature: normal   Odor: none  Pain: severe and during dressing change, sharp and tender  Pain interventions prior to dressing change: soaking and slow and gentle cares   Treatment goal: Heal , Infection control/prevention, Maintain (prevention of deterioration), Pain control and Protection  STATUS: improving slowly  Supplies ordered: gathered, discussed with RN and discussed with patient      Pressure Injury Location: sacrum     6/16 6/20  Last photo: 6/16  Wound type: Pressure Injury     Pressure Injury Stage: 2, hospital acquired   Wound history/plan of care:  Noted by RN during skin assessment/cares  Wound base: 100 % dermis     Palpation of the wound bed: normal      Drainage: small     Description of drainage: serosanguinous     Measurements (length x width x depth, in cm) 1 x 1 x 0.5 cm  Periwound skin: Intact      Color: normal and consistent with surrounding tissue      Temperature: normal   Odor: none  Pain: denies , none  Pain intervention prior to dressing change: no significant pain present   Treatment goal: Heal  and Protection  STATUS: deteriorating  Supplies ordered: discussed with RN and discussed with patient     Treatment Plan:     Sacrum: Every 3 days and as needed  Cleanse the area with microklenz and pat dry.  Apply No sting film barrier to periwound skin.  Cover wound with Sacral Mepilex (#[id number])  Only ONE incontinent pad under pt to allow bed to work properly  Change dressing Q 3 days.  Turn and reposition Q 2hrs side to side only.  Ensure pt has Kenneth-cushion while sitting up in the chair.    FYI- If pt has constant incontinent loose stools needing dressing changes Q shift please discontinue the Mepilex dressing and apply criticaid barrier paste BID and  "PRN.     Bilateral LE wound(s): Every other day  And PRN when soiled change cover dressing.  PLEASE DO NOT HOLD/MOVE PATIENT'S LEG BY THE ANKLE- HOLD FOOT AND CALF TO MOVE LEG.  Gently remove old dressing- please spray with wound cleanser or NS to loosen if sticking to wound bed  Clean wound bed with Vashe (order #[id number])and gauze.   Cut a piece Polymem foam (order #[id number]) and place over wound beds.  Secure dressing with bilateral EdemaWear Stockings (stockings can be reused up to 6 months, wash with mild soap and water and air dry).  Use Prevalon boots while in bed. Continue to elevate legs as needed to float heels even when boots in use. May need 1-3 pillows to fully offload heels.     EdemaWear stockings: Use and Care    Rationale for use:     Decreases edema by improving lymphatic and venous function      Safe and gentle compression    Enhances wound healing    Protects skin    General:     EdemaWear can be worn 24/7, but should be removed at least daily for skin inspection and cares      In order to be effective, EdemaWear should DIRECTLY contact the skin as much as possible -- the mesh weave promotes lymphatic drainage when it is pressed into the skin    Choose appropriate size EdemaWear based on leg (or arm) circumference - see table for guidelines    EdemaWear LITE is only for especially fragile or painful skin    Cleanse and moisturize any intact or scaly skin before applying EdemaWear    Ok to apply additional compression over the EdemaWear (ie Lymph wraps)    Application:     Apply the EdemaWear from base of toes to knee, or above knee if tolerated and it is a long stocking    Create a wide 3\" cuff at the top if needed to prevent rolling down    Only trim the stocking if it is excessively long; do NOT cut in half; can often fold over excess length onto foot    When wounds are present:    Wound dressings that directly treat the wound bed can be applied under the EdemaWear    Any additional cover dressings " "(dry gauze, ABD pads, Kerlix, etc) should be applied ON TOP of the stockings whenever feasible     Stockings may get soiled with drainage and will need to be washed; ensure an extra clean, dry pair always available    May need two people to apply the stocking - bunch up stocking and pull against each other, lifting over wounds    Care:    When stockings are soiled, DO NOT THROW AWAY, hand wash with mild soap, rinse, hang dry    Replace approximately every 4 to 6 months        EdemaWear size Max circumference Stripe color PS # # stockings per pack   Small 18\"  (45cm) navy [id number] 2   Medium 30\"  (75cm) yellow [id number] 1   Large 46\"  (115cm) red [id number] 1   X Large 60\"  (150cm) aqua [id number] 1   Small LITE 24\"  (60cm) purple [id number] 2   Medium LITE 36\"  (90cm) orange [id number] 1       Orders: Updated    RECOMMEND PRIMARY TEAM ORDER: wound clinic and home care when discharged for follow-up on wounds  Education provided: plan of care, wound progress and Infection prevention   Discussed plan of care with: Patient and Nurse  WOC nurse follow-up plan: weekly  Notify WOC if wound(s) deteriorate.  Nursing to notify the Provider(s) and re-consult the WOC Nurse if new skin concern.    DATA:     Current support surface: Standard  Standard gel/foam mattress (IsoFlex, Atmos air, etc)  Containment of urine/stool: Incontinent pad in bed  BMI: Body mass index is 24.58 kg/m .   Active diet order: Orders Placed This Encounter      Regular Diet Adult Thin Liquids (level 0)     Output: I/O last 3 completed shifts:  In: 145 [NG/GT:145]  Out: 1350 [Urine:1350]     Labs:   Recent Labs   Lab 06/26/23  0647   ALBUMIN 2.0*   HGB 9.4*   WBC 9.9     Pressure injury risk assessment:   Sensory Perception: 3-->slightly limited  Moisture: 3-->occasionally moist  Activity: 1-->bedfast  Mobility: 2-->very limited  Nutrition: 2-->probably inadequate  Friction and Shear: 2-->potential problem  Froy Score: 13    Lisbeth Dougherty RN CWOCN  Pager no longer is use, " please contact through [employer]   Vocera group: [facility] Nurse  Dept. Office Number: *[phone]

## 2023-06-27 NOTE — CONSULTS
30 mL output in last 24 hours, clamped, minimal recurrence. Moving to comfort cares. IR consulted for chest tube removal. Placed 6/12 for loculated right effusion. Will remove chest tube today.

## 2023-06-28 NOTE — PROGRESS NOTES
Care Management Follow Up    Length of Stay (days): 43    Expected Discharge Date: 06/28/2023     Concerns to be Addressed: discharge planning     Patient plan of care discussed at interdisciplinary rounds: Yes    Anticipated Discharge Disposition: Home, Long Term Care     Anticipated Discharge Services: None  Anticipated Discharge DME: Other (see comment) (TBD)    Patient/family educated on Medicare website which has current facility and service quality ratings: no  Education Provided on the Discharge Plan: No  Patient/Family in Agreement with the Plan: unable to assess    Referrals Placed by CM/SW: Internal Clinic Care Coordination  Private pay costs discussed: Not applicable    Additional Information:    Writer received a call from the [facility] and they are full right now/ don't have any beds.     Writer met with patient at bedside to discuss plan of care. Writer informed them [facility] doesn't have any openings right now. The family stated they would like to try [facility] as they live right near there. They also wanted to try [facility]. Writer met with patient, her son and grandson.    Pending referrals:    [facility]  PH: [phone]  F: [phone]  6/28- Sent referral via Epic. Left VM to see if they have openings.    [address] [address]  [phone]  6/28: Sent referral via Epic.      YOKASTA [facility], LGSW  6 Med Surg   [facility]  Phone: [phone]  Pager: [phone]

## 2023-06-28 NOTE — ED NOTES
Bed: ED06  Expected date:   Expected time:   Means of arrival:   Comments:  EMS   [facility] [address] [facility]    NAME: Kendra Ross  AGE: 38 y o  SEX: female  : 1980     DATE: 2023     Assessment and Plan:     Problem List Items Addressed This Visit        Cardiovascular and Mediastinum    Aneurysm ([address] )    Relevant Orders    Ambulatory Referral to Neurosurgery       Other    History of sleeve gastrectomy    Anxiety and depression    Relevant Medications    citalopram (CeleXA) 20 mg tablet    Annual physical exam - Primary    Left upper quadrant abdominal pain       Immunizations and preventive care screenings were discussed with patient today  Appropriate education was printed on patient's after visit summary  Counseling:  Dental Health: discussed importance of regular tooth brushing, flossing, and dental visits  Sexual health: discussed sexually transmitted diseases, partner selection, use of condoms, avoidance of unintended pregnancy, and contraceptive alternatives  Exercise: the importance of regular exercise/physical activity was discussed  Recommend exercise 3-5 times per week for at least 30 minutes  BMI Counseling: Body mass index is 32 44 kg/m²  The BMI is above normal  Nutrition recommendations include encouraging healthy choices of fruits and vegetables, consuming healthier snacks, limiting drinks that contain sugar, moderation in carbohydrate intake, increasing intake of lean protein, reducing intake of saturated and trans fat and reducing intake of cholesterol  Exercise recommendations include moderate physical activity 150 minutes/week  No pharmacotherapy was ordered  Rationale for BMI follow-up plan is due to patient being overweight or obese  No follow-ups on file       Chief Complaint:     Chief Complaint   Patient presents with   • Establish Care   • GI Problem     Was in ER  - issues were unrelated to her gastric bypass    • Neurologic Problem Would like referral placed for neurology - had stent placed 8/2022       History of Present Illness:     Adult Annual Physical   Patient here for a comprehensive physical exam  The patient reports problems - abdominal pain evaluated in ER with referral to GI; h/o venous sinus stenosis and aneurysm-stent placed  Diet and Physical Activity  Diet/Nutrition: well balanced diet  Exercise: moderate cardiovascular exercise, strength training exercises and 5-7 times a week on average  Depression Screening  PHQ-2/9 Depression Screening    Little interest or pleasure in doing things: 0 - not at all  Feeling down, depressed, or hopeless: 0 - not at all  PHQ-2 Score: 0  PHQ-2 Interpretation: Negative depression screen       General Health  Sleep: sleeps well  Hearing: normal - bilateral   Vision: goes for regular eye exams and wears glasses  Dental: regular dental visits  /GYN Health  Patient is: premenopausal  Last menstrual period: 06/18/2023  Contraceptive method: male sterilization  Review of Systems:     Review of Systems   Constitutional: Negative for appetite change, fatigue, fever and unexpected weight change  HENT: Negative for dental problem, ear pain, hearing loss, mouth sores, nosebleeds, rhinorrhea and trouble swallowing  Eyes: Negative for photophobia, pain, discharge and visual disturbance  Respiratory: Negative for cough, chest tightness, shortness of breath and wheezing  Cardiovascular: Negative for chest pain and palpitations  Gastrointestinal: Positive for abdominal pain, diarrhea and nausea  Negative for blood in stool, constipation, rectal pain and vomiting  Endocrine: Negative for cold intolerance, polydipsia, polyphagia and polyuria  Genitourinary: Negative for decreased urine volume, difficulty urinating, dysuria, frequency, genital sores, hematuria and urgency     Musculoskeletal: Negative for arthralgias, back pain, gait problem, joint swelling, myalgias, neck pain and neck stiffness  Skin: Negative for color change and rash  Allergic/Immunologic: Negative for environmental allergies, food allergies and immunocompromised state  Neurological: Negative for dizziness, seizures, speech difficulty, light-headedness and headaches  Hematological: Negative for adenopathy  Does not bruise/bleed easily  Psychiatric/Behavioral: Negative for behavioral problems, confusion, decreased concentration, self-injury and sleep disturbance  The patient is not nervous/anxious and is not hyperactive  Past Medical History:     History reviewed  No pertinent past medical history  Past Surgical History:     Past Surgical History:   Procedure Laterality Date   • GASTRIC BYPASS  2016   • REVISION GASTRIC RESTRICTIVE PROCEDURE FOR MORBID OBESITY  04/2023      Social History:     Social History     Socioeconomic History   • Marital status: /Civil Union     Spouse name: None   • Number of children: None   • Years of education: None   • Highest education level: None   Occupational History   • None   Tobacco Use   • Smoking status: Never   • Smokeless tobacco: None   Substance and Sexual Activity   • Alcohol use: Never   • Drug use: Never   • Sexual activity: Yes   Other Topics Concern   • None   Social History Narrative   • None     Social Determinants of Health     Financial Resource Strain: Not on file   Food Insecurity: Not on file   Transportation Needs: Not on file   Physical Activity: Not on file   Stress: Not on file   Social Connections: Not on file   Intimate Partner Violence: Not on file   Housing Stability: Not on file      Family History:     History reviewed  No pertinent family history     Current Medications:     Current Outpatient Medications   Medication Sig Dispense Refill   • Biotin 10 MG TABS Take 10 mg by mouth daily     • citalopram (CeleXA) 20 mg tablet      • clopidogrel (PLAVIX) 75 mg tablet Take 75 mg by mouth     • cromolyn (NASALCHROM) 5 2 MG/ACT "nasal spray 1 spray into each nostril daily     • famotidine (PEPCID) 40 MG tablet Take 40 mg by mouth 2 (two) times a day as needed       No current facility-administered medications for this visit  Allergies:     No Known Allergies   Physical Exam:     /74 (BP Location: Left arm, Patient Position: Sitting, Cuff Size: Standard)   Pulse 63   Temp (!) 95 °F (35 °C) (Tympanic)   Ht 5' 4\" (1 626 m)   Wt 85 7 kg (189 lb)   SpO2 96%   BMI 32 44 kg/m²     Physical Exam  Vitals and nursing note reviewed  Constitutional:       Appearance: Normal appearance  She is normal weight  HENT:      Head: Normocephalic and atraumatic  Right Ear: Tympanic membrane, ear canal and external ear normal       Left Ear: Tympanic membrane, ear canal and external ear normal       Nose: Nose normal       Mouth/Throat:      Mouth: Mucous membranes are moist       Pharynx: Oropharynx is clear  Eyes:      Extraocular Movements: Extraocular movements intact  Conjunctiva/sclera: Conjunctivae normal    Neck:      Thyroid: No thyromegaly  Vascular: No carotid bruit  Cardiovascular:      Rate and Rhythm: Normal rate and regular rhythm  Pulses: Normal pulses  Heart sounds: Normal heart sounds  Pulmonary:      Effort: Pulmonary effort is normal       Breath sounds: Normal breath sounds  Abdominal:      General: Abdomen is flat  Bowel sounds are normal       Palpations: Abdomen is soft  There is no hepatomegaly, splenomegaly or mass  Tenderness: There is abdominal tenderness in the left upper quadrant  There is no right CVA tenderness or left CVA tenderness  Hernia: No hernia is present  Musculoskeletal:         General: Normal range of motion  Cervical back: Normal range of motion and neck supple  Right lower leg: No edema  Left lower leg: No edema  Lymphadenopathy:      Cervical: No cervical adenopathy  Skin:     General: Skin is warm and dry     Neurological:      " General: No focal deficit present  Mental Status: She is alert and oriented to person, place, and time  Psychiatric:         Mood and Affect: Mood normal          Behavior: Behavior normal          Thought Content:  Thought content normal          Judgment: Judgment normal           MERNA Drew [id number]

## 2023-06-28 NOTE — PLAN OF CARE
"VS: /63 (BP Location: Left arm)   Pulse 69   Temp 97.5  F (36.4  C) (Oral)   Resp 16   Ht 1.753 m (5' 9\")   Wt 75.5 kg (166 lb 7.2 oz)   SpO2 100%   BMI 24.58 kg/m     O2:  3L NC, denies SOB,    Output: Deleon catheter in place with adequate output, incontinent of bowel   Activity: Not oob this shift, repositioning encouraged   Up for meals? No   Skin: BLE wounds, dressed CDI, Sacral wound covered with Mepilex, dressed CDI   Pain: Endorses pain, 9/10. Managed with scheduled oral Morphine and Prn morphine              CMS: A&Ox4, denies chest pain, n/v, and dizziness   Dressing: BLE wounds and sacral wound   Diet: Regular   LDA: Right hand PIV- SL, PEG tube   Equipment: IV pole, personal belongings   Plan: Call light in reach, continue POC  Comfort Cares   Additional Info:   Wound cares completed by WOC today  PRN ativan and hydroxyzine                 "

## 2023-06-28 NOTE — PROGRESS NOTES
[facility]    Medicine Progress Note - Hospitalist Service, GOLD TEAM 20    Date of Admission:  5/16/2023    Assessment & Plan   Jeanine Schuler is a 61 yo female w/ past medical of HTN, HFpEF (LVEF 45-50%), pulmonary HTN, paroxysmal afib on chronic anticoagulation, interstitial lung disease, sarcoidosis, mixed connective tissue disease, Raynaud's, and chronic dysphagia who was admitted on 5/16/2023 with failure to thrive and found to have multiple R sided pulmonary emboli and bilateral lower extremity DVT's. Transferred to the ICU 6/11 for worsening SOB and increased O2 needs.  CT PE protocol done 6/11 morning showed large bilateral pleural effusion, volume overload, and concern for pneumonia, transitioned to comfort cares 6/26/23.     #  GOC  #  Acute hypoxic respiratory failure, multifactorial  #  Pulmonary sarcoidosis  #  Hospital acquired pneumonia  #  Bilateral loculated pleural effusions  #  Pulmonary edema  See note 6/23 for further details. Repeat CT chest 6/19 showed decreased R partially loculated effusion and stable L partially loculated effusion. Initial plan was for lytics to R chest tube and placement of new L chest tube, however patient requested transitioned to hospice on 6/21 due to poor QOL. She would like to focus on comfort. Palliative care consulted. Patient more comfortable with better management of pain and air hunger. Willing to keep R chest tube in place as it continues to drain, as well as try higher dose steroids to see if this will result in more immediate improvement in QOL. She does not want additional procedures or further escalation of care at this time. IR consulted 6/27 and R chest tube removed bedside.   - Pulmonology, Rheumatology, and Palliative Care consulted and appreciate rec's  - Transitioned to comfort cares 6/26 after thorough d/w Palliative care  - Discharge to [facility] hospice in [place], [place], when bed becomes available.   -  Ordered comfort cares via order set (save meds as this will be done by Palliative Care).   - Continue prednisone, and start taper by 5 mg daily until at PTA dose of 15 mg daily  - Morphine 5mg q4h started 6/23, hold if sleeping/sedated   - Morphine 2-4mg IV or 10-15mg SL q2h prn for pain and air hunger  - Ativan 0.5mg IV q4h prn for anxiety and air hunger  - No BIPAP  - No further procedures.    - Continue supplemental O2 prn, goal SpO2 >90 and comfort     #  PAF   #  HTN  #  HFpEF  #  Pulmonary HTN  #  R heart stain based on CT chest   #  Elevated troponin, suspect type II NSTEMI  TTE 6/3/23:  EF 45-50%, no RWMA, normal RV size/fucntion, pulmonary artery systolic pressure normal, no significant valvular heart disease, no effusion.  Clinically stable.   - Continue PTA Amiodarone 200 mg PO daily for rhythm control if deemed ok to continue per Palliative care  - Cardiology consulted (signed off)     #  Dysphagia, resolved  #  Severe protein calorie malnutrition   #  Constipation   #  History of gastric bypass surgery   GI previously consulted. Dysmotility esophagogram 6/7 limited due to patients mobility but no major strictures noted.   - Regular diet for comfort     #  Anasarca   - No further daily wts or strict I&Os now on comfort cares     #  Hypomagnesemia  #  Hypokalemia  #  Hyponatremia  - No further blood draws now on comfort cares     #  Chronic anticoagulation   #  Acute provoked b/l LE DVT on 5/16, this admission  #  Acute provoked right lung PE  - Apixaban 5 mg PO BID if deemed ok to continue per Palliative care     #  Mixed connective tissue disease  #  Limited cutaneous scleroderma  #  Severe Raynaud's   #  Chronic steroid use  #  Hypoglycemia, resolved  - Prednisone taper as above      #  Generalized weakness and deconditioning   #  Failure to thrive   - No further therapies now on comfort cares     #  Right breast skin thickening and nipple retraction on CT (incidental CT finding)  - Comfort cares        Diet: Snacks/Supplements Adult: Ensure Enlive; With Meals  Regular Diet Adult Thin Liquids (level 0)    DVT Prophylaxis: DOAC  Deleon Catheter: Present  Lines: None     Cardiac Monitoring: None  Code Status: No CPR- Do NOT Intubate; on comfort cares as of 6/26/23       Disposition Plan To OP hospice     Expected Discharge Date: 06/29/2023        The patient's care was discussed with the Care Coordinator/, Patient and Patient's Family.    Demario Anderson PA-C  Hospitalist Service, GOLD TEAM 20  [facility]  Securely message with [employer] (more info)  Text page via [facility] Paging/Directory   See signed in provider for up to date coverage information  ______________________________________________________________________    Interval History   No acute events overnight. Pt nods yes if she feels comfortable. No other concerns.     Physical Exam   Vital Signs:     BP: 117/63 Pulse: 63   Resp: 16 SpO2: 98 % O2 Device: Nasal cannula Oxygen Delivery: 3 LPM  Weight: 166 lbs 7.16 oz  GEN: In NAD  HEENT: NCAT; PERRL; sclerae non-icteric; dry MMs  LUNGS: Decreased breath sounds otherwise clear  CV: Irregularly irregular  ABD: +BSs; SNTND; G tube intact  EXT: 2+ BLE edema  SKIN: No acute rashes noted on exposed areas.  NEURO: AAOx3; CNs grossly intact; No acute focal deficits noted.      Medical Decision Making       30 MINUTES SPENT BY ME on the date of service doing chart review, history, exam, documentation & further activities per the note.      Data   CMP  Recent Labs   Lab 06/26/23  0647 06/23/23  0809 06/22/23  0843   * 133*  --    POTASSIUM 3.1* 3.6 3.5   CHLORIDE 87* 89*  --    CO2 37* 31*  --    ANIONGAP 9 13  --    GLC 74 76  --    BUN 38.6* 44.4*  --    CR 0.69 0.84  --    GFRESTIMATED >90 79  --    DAVID 8.0* 7.9*  --    MAG  --   --  1.9   PHOS  --   --  3.5   PROTTOTAL 4.3*  --   --    ALBUMIN 2.0*  --   --    BILITOTAL 0.3  --   --    ALKPHOS 75  --    --    AST 21  --   --    ALT 17  --   --      CBC  Recent Labs   Lab 06/26/23  0647 06/23/23  0809   WBC 9.9 13.0*   RBC 3.15* 2.90*   HGB 9.4* 8.6*   HCT 29.6* 27.8*   MCV 94 96   MCH 29.8 29.7   MCHC 31.8 30.9*   RDW 14.6 14.8    399      None known

## 2023-06-29 NOTE — PROGRESS NOTES
[facility]   Palliative Care Daily Progress Note          Palliative Care was consulted for symptoms and support. At this time the patient does not have any needs that we are actively addressing, so we will sign off. Please feel free to reconsult us if we can be helpful in the future. Thank you for the opportunity to be involved in the care of this patient.    Carol Flynn MD  Securely message with [employer] (more info)  Text page via [employer] Paging/Directory       During regular M-F work hours -- please contact team via Securely message with the Vocera Web Console (learn more here)    After regular work hours and on weekends/holidays, you can call our answering service at [phone]. Also, who's on call for us is available in [employer].

## 2023-06-29 NOTE — PROGRESS NOTES
Care Management Follow Up    Length of Stay (days): 44    Expected Discharge Date: 06/28/2023     Concerns to be Addressed: discharge planning     Patient plan of care discussed at interdisciplinary rounds: Yes    Anticipated Discharge Disposition: Home, Long Term Care     Anticipated Discharge Services: None  Anticipated Discharge DME: Other (see comment) (TBD)    Patient/family educated on Medicare website which has current facility and service quality ratings: no  Education Provided on the Discharge Plan: No  Patient/Family in Agreement with the Plan: unable to assess    Referrals Placed by CM/SW: Internal Clinic Care Coordination  Private pay costs discussed: Not applicable    Additional Information:    [facility] and [facility] through the weekend. They declined her intake at this time. Writer called [facility] and they are fulll and have a waitlist.     [facility] They are only contracted with certain [facility]. [facility] and [facility]. They don't have LTC beds right now. They are waiting for transitons to happen. Writer spoke to Elisabeth Pedraza called and asked if patient was private pay. It would be a $5,000 deposit. They have openings at [place] or [place]. The  can't stay overnight with her unless it's clear she's passing.    Writer met with patient at bedside. The family declined to go to the above listed places. They were adamant to go to [facility].    Accepted referrals:    [facility] [place]  PH: [phone]  F: [phone]   6/29: Sent referral via Epic. Accepted.     [facility] [place]  PH: [phone]  F: [phone]  6/29: Sent referral via Epic. Accepted.    Pending:    [facility]  [address] [place] [address] [phone]  6/29: Sent referral via [employer].      [facility]  [address] [place] [address]  [place] [address]  [phone]  6/29: Sent referral via Epic.      [facility]  [address] [place] [address]  [phone]  6/29: Made referral via Epic.    [facility]  [address]  [place] [address]  [phone]  6/29: Made referral via Epic.    Declined:    [facility]  PH: [phone]   F: [phone]  6/29: Left VM to see if they have openings. Made referral via Epic. Declined via Epic. No beds open.    [facility]  PH: [phone]  F: [phone]  6/28- Sent referral via Epic. Left VM to see if they have openings.  6/29: They received the fax. They are full right now in LTC area. They have a waitlist right now and 4 females who are transferring to LTC area.     [facility]  [address] [address]  [phone]  6/28: Sent referral via Epic.  6/29: Received VM stating they declined referral.     YOKASTA Drummond, LGSW  6 Med Surg   [facility]  Phone: [phone]  Pager: [phone]

## 2023-06-29 NOTE — PROGRESS NOTES
[id number]   O2: >98%, 3L via NC. Denies SOB   Output: Deleon catheter in place, incontinent of bowel   Last BM: 6/28   Activity: Ax2 with repositioning, pt not oob during shift   Skin: BLE wounds, sacral wound   Pain: Pt endorses generalized pain. Managed with scheduled and PRN oral Morphine               CMS: A&Ox4, denies chest pain, n/v, dizziness   Dressing: BLE wound dressing, c/d/i. Sacral mepilex, c/d/i   Diet: Regular   LDA: R PIV SL, PEG tube   Equipment: IV pole, personal belongings   Plan: Discharge pending hospice placement    Call light in reach, continue POC   Additional Info: Bed alarm on, spouse at bedside

## 2023-06-29 NOTE — PLAN OF CARE
[id number]    Patient is A&O x4. Call light within reach. Able to make needs known effectively. Spouse remains in the room the whole night. Needs assistance x2 with repositioning and pericares. Not OOB during the shift. LBM: 06/28.    On continuous O2 at 3LPM via NC. PIV on R, SL, intact and patent. Pain managed with scheduled morphine. Denies SOB, chest pain, n/v and n/t. Repositioned per patient's request.     PEG tube in place. Deleon cath in place, emptied during the shift. WEN/UTV BLE and sacrum wounds, dressing CDI.     Awaiting hospice placement.

## 2023-06-29 NOTE — PROGRESS NOTES
[facility]    Medicine Progress Note - Hospitalist Service, GOLD TEAM 20    Date of Admission:  5/16/2023    Assessment & Plan   Jeanine Schuler is a 61 yo female w/ past medical of HTN, HFpEF (LVEF 45-50%), pulmonary HTN, paroxysmal afib on chronic anticoagulation, interstitial lung disease, sarcoidosis, mixed connective tissue disease, Raynaud's, and chronic dysphagia who was admitted on 5/16/2023 with failure to thrive and found to have multiple R sided pulmonary emboli and bilateral lower extremity DVT's. Transferred to the ICU 6/11 for worsening SOB and increased O2 needs.  CT PE protocol done 6/11 morning showed large bilateral pleural effusion, volume overload, and concern for pneumonia, transitioned to comfort cares 6/26/23.     #  GOC  #  Acute hypoxic respiratory failure, multifactorial  #  Pulmonary sarcoidosis  #  Hospital acquired pneumonia  #  Bilateral loculated pleural effusions  #  Pulmonary edema  See note 6/23 for further details. Repeat CT chest 6/19 showed decreased R partially loculated effusion and stable L partially loculated effusion. Initial plan was for lytics to R chest tube and placement of new L chest tube, however patient requested transitioned to hospice on 6/21 due to poor QOL. She would like to focus on comfort. Palliative care consulted. Patient more comfortable with better management of pain and air hunger. Willing to keep R chest tube in place as it continues to drain, as well as try higher dose steroids to see if this will result in more immediate improvement in QOL. She does not want additional procedures or further escalation of care at this time. IR consulted 6/27 and R chest tube removed bedside.   - Pulmonology, Rheumatology, and Palliative Care consulted and appreciate rec's  - Transitioned to comfort cares 6/26 after thorough d/w Palliative care  - Discharge to hospice facility near her home in [place] when bed becomes  available.   - Ordered comfort cares via order set (save meds as this will be done by Palliative Care).   - Continue prednisone, and start taper by 5 mg daily until at PTA dose of 15 mg daily  - Morphine 5mg q4h started 6/23, hold if sleeping/sedated   - Morphine 2-4mg IV or 10-15mg SL q2h prn for pain and air hunger  - Ativan 0.5mg IV q4h prn for anxiety and air hunger  - No BIPAP  - No further procedures.    - Continue supplemental O2 prn, goal SpO2 >90 and comfort     #  PAF   #  HTN  #  HFpEF  #  Pulmonary HTN  #  R heart stain based on CT chest   #  Elevated troponin, suspect type II NSTEMI  TTE 6/3/23:  EF 45-50%, no RWMA, normal RV size/fucntion, pulmonary artery systolic pressure normal, no significant valvular heart disease, no effusion.  Clinically stable.   - Continue PTA Amiodarone 200 mg PO daily for rhythm control if deemed ok to continue per Palliative care  - Cardiology consulted (signed off)     #  Dysphagia, resolved  #  Severe protein calorie malnutrition   #  Constipation   #  History of gastric bypass surgery   GI previously consulted. Dysmotility esophagogram 6/7 limited due to patients mobility but no major strictures noted.   - Regular diet for comfort     #  Anasarca   - No further daily wts or strict I&Os now on comfort cares     #  Hypomagnesemia  #  Hypokalemia  #  Hyponatremia  - No further blood draws now on comfort cares     #  Chronic anticoagulation   #  Acute provoked b/l LE DVT on 5/16, this admission  #  Acute provoked right lung PE  - Apixaban 5 mg PO BID if deemed ok to continue per Palliative care     #  Mixed connective tissue disease  #  Limited cutaneous scleroderma  #  Severe Raynaud's   #  Chronic steroid use  #  Hypoglycemia, resolved  - Prednisone taper as above      #  Generalized weakness and deconditioning   #  Failure to thrive   - No further therapies now on comfort cares     #  Right breast skin thickening and nipple retraction on CT (incidental CT finding)  -  Comfort cares       Diet: Snacks/Supplements Adult: Ensure Enlive; With Meals  Regular Diet Adult Thin Liquids (level 0)    DVT Prophylaxis: DOAC  Edleon Catheter: Present  Lines: None     Cardiac Monitoring: None  Code Status: No CPR- Do NOT Intubate; on comfort cares as of 6/26/23       Disposition Plan To OP hospice     Expected Discharge Date: 06/29/2023        The patient's care was discussed with the Care Coordinator/, Patient and Patient's Family.    Demario Anderson PA-C  Hospitalist Service, GOLD TEAM 20  [facility]  Securely message with Vocera (more info)  Text page via [facility] Paging/Directory   See signed in provider for up to date coverage information  ______________________________________________________________________    Interval History    No acute events overnight. Pt states she feels comfortable. Has no other concerns.     Physical Exam   Vital Signs:     BP: 108/67 Pulse: 70   Resp: 16 SpO2: 98 % O2 Device: Nasal cannula Oxygen Delivery: 3 LPM  Weight: 166 lbs 7.16 oz  GEN: In NAD  HEENT: NCAT; PERRL; sclerae non-icteric; dry MMs  LUNGS: Decreased breath sounds otherwise clear  CV: Irregularly irregular  ABD: +BSs; SNTND; G tube intact  EXT: 2+ BLE edema  SKIN: No acute rashes noted on exposed areas.  NEURO: AAOx3; CNs grossly intact; No acute focal deficits noted.      Medical Decision Making       30 MINUTES SPENT BY ME on the date of service doing chart review, history, exam, documentation & further activities per the note.      Data   CMP  Recent Labs   Lab 06/26/23  0647 06/23/23  0809   * 133*   POTASSIUM 3.1* 3.6   CHLORIDE 87* 89*   CO2 37* 31*   ANIONGAP 9 13   GLC 74 76   BUN 38.6* 44.4*   CR 0.69 0.84   GFRESTIMATED >90 79   DAVID 8.0* 7.9*   PROTTOTAL 4.3*  --    ALBUMIN 2.0*  --    BILITOTAL 0.3  --    ALKPHOS 75  --    AST 21  --    ALT 17  --      CBC  Recent Labs   Lab 06/26/23  0647 06/23/23  0809   WBC 9.9 13.0*    RBC 3.15* 2.90*   HGB 9.4* 8.6*   HCT 29.6* 27.8*   MCV 94 96   MCH 29.8 29.7   MCHC 31.8 30.9*   RDW 14.6 14.8    399

## 2023-06-29 NOTE — PLAN OF CARE
"       VS: /67   Pulse 70   Temp 97.1  F (36.2  C) (Oral)   Resp 16   Ht 1.753 m (5' 9\")   Wt 75.5 kg (166 lb 7.2 oz)   SpO2 98%   BMI 24.58 kg/m       O2: Sating >95% on 3L NC, denies SOB/Chest pain. Denies N/V   Output: Deleon in place, incontinent of Bowel    Last BM: 6/28, bowel sounds normoactive x4   Activity: Turn/repo Q2H   Up for meals? Yes   Skin: BLE wound dressing, and  Sacral mepilex   Pain: Pain manage with schedule and prn morpine   CMS: AO x4, Denies N/T   Dressing: Dressing to sacral and  BLE   Diet: Regular diet, thin liquids   LDA: Right PIV, Deleon, G-tube    Equipment:  IV pole, personal belongings   Plan: Call light within reach, bed in a low position. Able to make needs known. Continue to monitor with POC.   Additional Info:  On comfort care.      Problem: Pain Chronic (Persistent) (Comorbidity Management)  Goal: Acceptable Pain Control and Functional Ability  Outcome: Progressing  Intervention: Manage Persistent Pain  Recent Flowsheet Documentation  Taken 6/29/2023 0900 by Malia Guerrero, RN  Medication Review/Management: medications reviewed  Intervention: Optimize Psychosocial Wellbeing  Recent Flowsheet Documentation  Taken 6/29/2023 0900 by Malia Guerrero RN  Family/Support System Care: support provided     Problem: Nausea and Vomiting  Goal: Nausea and Vomiting Relief  Outcome: Progressing   Goal Outcome Evaluation:                        "

## 2023-06-30 NOTE — PROGRESS NOTES
Prior Authorization **INITIATED**    Medication: LORAZEPAM 2 MG/ML PO CONC  Insurance Company: [employer] [place] - Phone [phone] Fax [phone]  Pharmacy Filling the Rx: [facility] - [place], [place] - [address]  Filling Pharmacy Phone: [phone]  Filling Pharmacy Fax: [phone]  Start Date: 6/30/2023  Reference #: CoverMyMeds Key: [id number]  Comments:  HRM (high risk medication) for elderly patients per [place] Criteria List--PA required. Requires acknowledgement that the prescriber has assessed risk versus benefit in using this High Risk Medication (HRM). Hospice RX benefit not yet active for billing meds. Discharge pharmacy sent patient with supply while auth is pending. Will retroactively bill once determination received.      Rola Gooden CPhT  [facility]charge [facility] Liaison  Pronouns: She/Her/Hers    [facility]  [address]  [address], [place] [address]   [email or web address]  [email or web address]   Phone: [phone]  Pager: [phone]  Fax: [phone]

## 2023-06-30 NOTE — PROGRESS NOTES
[facility]    Medicine Progress Note - Hospitalist Service, GOLD TEAM 20    Date of Admission:  5/16/2023    Assessment & Plan   Jeanine Schuler is a 61 yo female w/ past medical of HTN, HFpEF (LVEF 45-50%), pulmonary HTN, paroxysmal afib on chronic anticoagulation, interstitial lung disease, sarcoidosis, mixed connective tissue disease, Raynaud's, and chronic dysphagia who was admitted on 5/16/2023 with failure to thrive and found to have multiple R sided pulmonary emboli and bilateral lower extremity DVT's. Transferred to the ICU 6/11 for worsening SOB and increased O2 needs.  CT PE protocol done 6/11 morning showed large bilateral pleural effusion, volume overload, and concern for pneumonia, transitioned to comfort cares 6/26/23.     #  GOC  #  Acute hypoxic respiratory failure, multifactorial  #  Pulmonary sarcoidosis  #  Hospital acquired pneumonia  #  Bilateral loculated pleural effusions  #  Pulmonary edema  See note 6/23 for further details. Repeat CT chest 6/19 showed decreased R partially loculated effusion and stable L partially loculated effusion. Initial plan was for lytics to R chest tube and placement of new L chest tube, however patient requested transitioned to hospice on 6/21 due to poor QOL. She would like to focus on comfort. Palliative care consulted. Patient more comfortable with better management of pain and air hunger. Willing to keep R chest tube in place as it continues to drain, as well as try higher dose steroids to see if this will result in more immediate improvement in QOL. She does not want additional procedures or further escalation of care at this time. IR consulted 6/27 and R chest tube removed bedside.   - Pulmonology, Rheumatology, and Palliative Care consulted and appreciate rec's  - Transitioned to comfort cares 6/26 after thorough d/w Palliative care  - Discharge to hospice facility near her home in [place] when bed becomes  available.   - Ordered comfort cares via order set (save meds as this will be done by Palliative Care).   - Continue prednisone, and start taper by 5 mg daily until at PTA dose of 15 mg daily  - Morphine 5mg q4h started 6/23, hold if sleeping/sedated   - Morphine 2-4mg IV or 10-15mg SL q2h prn for pain and air hunger  - Ativan 0.5mg IV q4h prn for anxiety and air hunger  - No BIPAP  - No further procedures.    - Continue supplemental O2 prn, goal SpO2 >90 and comfort     #  PAF   #  HTN  #  HFpEF  #  Pulmonary HTN  #  R heart stain based on CT chest   #  Elevated troponin, suspect type II NSTEMI  TTE 6/3/23:  EF 45-50%, no RWMA, normal RV size/fucntion, pulmonary artery systolic pressure normal, no significant valvular heart disease, no effusion.  Clinically stable.   - Continue PTA Amiodarone 200 mg PO daily for rhythm control if deemed ok to continue per Palliative care  - Cardiology consulted (signed off)     #  Dysphagia, resolved  #  Severe protein calorie malnutrition   #  Constipation   #  History of gastric bypass surgery   GI previously consulted. Dysmotility esophagogram 6/7 limited due to patients mobility but no major strictures noted.   - Regular diet for comfort     #  Anasarca   - No further daily wts or strict I&Os now on comfort cares     #  Hypomagnesemia  #  Hypokalemia  #  Hyponatremia  - No further blood draws now on comfort cares     #  Chronic anticoagulation   #  Acute provoked b/l LE DVT on 5/16, this admission  #  Acute provoked right lung PE  - Apixaban 5 mg PO BID if deemed ok to continue per Palliative care     #  Mixed connective tissue disease  #  Limited cutaneous scleroderma  #  Severe Raynaud's   #  Chronic steroid use  #  Hypoglycemia, resolved  - Prednisone taper as above      #  Generalized weakness and deconditioning   #  Failure to thrive   - No further therapies now on comfort cares     #  Right breast skin thickening and nipple retraction on CT (incidental CT finding)  -  Comfort cares       Diet: Snacks/Supplements Adult: Ensure Enlive; With Meals  Regular Diet Adult Thin Liquids (level 0)    DVT Prophylaxis: DOAC  Deleon Catheter: Present  Lines: None     Cardiac Monitoring: None  Code Status: No CPR- Do NOT Intubate; on comfort cares as of 6/26/23       Disposition Plan To OP hospice     Expected Discharge Date: 06/29/2023        The patient's care was discussed with the Care Coordinator/, Patient and Patient's Family.    Demario Anderson PA-C  Hospitalist Service, GOLD TEAM 20  [facility]  Securely message with Rob[employer] (more info)  Text page via [facility] Paging/Directory   See signed in provider for up to date coverage information  ______________________________________________________________________    Interval History    No acute events overnight. Pt states she feels comfortable. Has no other concerns.     Physical Exam   Vital Signs:             SpO2: 97 % O2 Device: Nasal cannula Oxygen Delivery: 3 LPM  Weight: 166 lbs 7.16 oz  GEN: In NAD  HEENT: NCAT; PERRL; sclerae non-icteric; dry MMs  LUNGS: Decreased breath sounds otherwise clear  CV: Irregularly irregular  ABD: +BSs; SNTND; G tube intact  EXT: 2+ BLE edema  SKIN: No acute rashes noted on exposed areas.  NEURO: AAOx3; CNs grossly intact; No acute focal deficits noted.      Medical Decision Making       30 MINUTES SPENT BY ME on the date of service doing chart review, history, exam, documentation & further activities per the note.      Data   CMP  Recent Labs   Lab 06/26/23  0647   *   POTASSIUM 3.1*   CHLORIDE 87*   CO2 37*   ANIONGAP 9   GLC 74   BUN 38.6*   CR 0.69   GFRESTIMATED >90   DAVID 8.0*   PROTTOTAL 4.3*   ALBUMIN 2.0*   BILITOTAL 0.3   ALKPHOS 75   AST 21   ALT 17     CBC  Recent Labs   Lab 06/26/23  0647   WBC 9.9   RBC 3.15*   HGB 9.4*   HCT 29.6*   MCV 94   MCH 29.8   MCHC 31.8   RDW 14.6

## 2023-06-30 NOTE — PROGRESS NOTES
"VS: /67   Pulse 70   Temp 97.1  F (36.2  C) (Oral)   Resp 16   Ht 1.753 m (5' 9\")   Wt 75.5 kg (166 lb 7.2 oz)   SpO2 97%   BMI 24.58 kg/m       O2: Sating >90% on 3l NC .  Denies chest pain and SOB.   Output: Rolle in place Incontinent  of Bowel    Last BM: LBM 6/28    Activity: Turn and Repo Q2 Lift     Skin: BLE dressing on wounds (Mepilex)    Pain: Pain was managed with scheduled Morphine and scheduled HS Medication     CMS: A&Ox4. Denies N/T.    Dressing: Dressing to BLE and sacral C/D/I    Diet: Regular Diet Thin liquids and supplement snacks    LDA: R PIV rolle and peg tube    Equipment: IV pole, FWW, gait belt, and personal belongings. Call light within reach and uses appropriately.   Plan:  Pt. On comfort cares  NO CPR or Intubate.    Additional Info: Possible discharge to long term care facility. Pt.slept for most of the night. Family at bedside. Continue POC.      "

## 2023-06-30 NOTE — PROGRESS NOTES
"SPIRITUAL HEALTH SERVICES Progress Note  [facility] ([facility]) 6B    I provided a follow up visit for Jeanine this morning. Her son Yusuf was just leaving and her other son was present with her. Lexx will be arriving later. They have had someone sleeping over night and that helps with Jeanine's anxiety. She appreciated a short visit and wondered about the dying process: her brother was on hospice for three days before dying and she is feeling \"bored\" and impatient as she's still on hospice/alive. This  explored ways to make meaning out of the time remaining and Jeanine thought of five people she would still like to say \"good-bye\" to. We also explored Lex[employer] bringing some of her projects from home to work on. I also provided a prayer. Jeanine is open to a follow up visit but is really hoping to move out of the hospital soon.     Jay Le MDiv  Chaplain Resident  Pager [phone]      * [facility] remains available 24/7 for emergent requests/referrals, either by having the switchboard page the on-call  or by entering an ASAP/STAT consult in Epic (this will also page the on-call ). Routine Epic consults receive an initial response within 24 hours.*    "

## 2023-06-30 NOTE — PROGRESS NOTES
Care Management Follow Up    Length of Stay (days): 45    Expected Discharge Date: 07/01/2023     Concerns to be Addressed: discharge planning     Patient plan of care discussed at interdisciplinary rounds: Yes    Anticipated Discharge Disposition: Home, Long Term Care     Anticipated Discharge Services: None  Anticipated Discharge DME: Other (see comment) (TBD)    Patient/family educated on Medicare website which has current facility and service quality ratings: no  Education Provided on the Discharge Plan: No  Patient/Family in Agreement with the Plan: unable to assess    Referrals Placed by CM/SW: Internal Clinic Care Coordination  Private pay costs discussed: Not applicable    Additional Information:    Writer spoke with patient's son. Patient wants to go to [facility]. Writer called [place] [facility] [place]. Writer left  for Keila requesting a call back as patient wants to go there today. Writer received a Vm from [facility] asking how patient will get to the facility and reminded writer about the $5,000 deposit. She wanted to know what hospice company patient will be using.    Writer spoke with patient's son. He stated his mother wants to stay in the hospital for now. He stated he is not sure if his mother will be here on Monday as she may pass away. Patient thinks the transition will be too difficult now.    [facility]  P# [phone]    [facility]  PH: [phone]  F: [phone]   6/30: Accepted        YOKASTA Drummond, LGSW  6 Med Surg   [facility]  Phone: [phone]  Pager: [phone]

## 2023-07-01 NOTE — PROGRESS NOTES
[facility]    Medicine Progress Note - Hospitalist Service, GOLD TEAM 20    Date of Admission:  5/16/2023    Assessment & Plan   Jeanine Schuler is a 61 yo female w/ past medical of HTN, HFpEF (LVEF 45-50%), pulmonary HTN, paroxysmal afib on chronic anticoagulation, interstitial lung disease, sarcoidosis, mixed connective tissue disease, Raynaud's, and chronic dysphagia who was admitted on 5/16/2023 with failure to thrive and found to have multiple R sided pulmonary emboli and bilateral lower extremity DVT's. Transferred to the ICU 6/11 for worsening SOB and increased O2 needs.  CT PE protocol done 6/11 morning showed large bilateral pleural effusion, volume overload, and concern for pneumonia, transitioned to comfort cares 6/26/23.     #  GOC  #  Acute hypoxic respiratory failure, multifactorial  #  Pulmonary sarcoidosis  #  Hospital acquired pneumonia  #  Bilateral loculated pleural effusions  #  Pulmonary edema  See note 6/23 for further details. Repeat CT chest 6/19 showed decreased R partially loculated effusion and stable L partially loculated effusion. Initial plan was for lytics to R chest tube and placement of new L chest tube, however patient requested transitioned to hospice on 6/21 due to poor QOL. She would like to focus on comfort. Palliative care consulted. Patient more comfortable with better management of pain and air hunger. Willing to keep R chest tube in place as it continues to drain, as well as try higher dose steroids to see if this will result in more immediate improvement in QOL. She does not want additional procedures or further escalation of care at this time. IR consulted 6/27 and R chest tube removed bedside.   - Pulmonology, Rheumatology, and Palliative Care consulted and appreciated rec's  - Transitioned to comfort cares 6/26 after thorough d/w Palliative care  - Discharge to hospice facility when bed becomes available.   - Ordered  comfort cares via order set (save meds as this will be done by Palliative Care).   - Continue prednisone 15 mg daily  - Morphine 5mg q4h, hold if sleeping/sedated   - Morphine 2-4mg IV or 10-15mg SL q2h prn for pain and air hunger  - Ativan 0.5mg IV q4h prn for anxiety and air hunger  - No BIPAP  - No further procedures.    - Continue supplemental O2 prn, goal SpO2 >90 and comfort     #  PAF   #  HTN  #  HFpEF  #  Pulmonary HTN  #  R heart stain based on CT chest   #  Elevated troponin, suspect type II NSTEMI  TTE 6/3/23:  EF 45-50%, no RWMA, normal RV size/fucntion, pulmonary artery systolic pressure normal, no significant valvular heart disease, no effusion.  Clinically stable.   - Continue PTA Amiodarone 200 mg PO daily for rhythm control if deemed ok to continue per Palliative care  - Cardiology consulted (signed off)     #  Dysphagia, resolved  #  Severe protein calorie malnutrition   #  Constipation   #  History of gastric bypass surgery   GI previously consulted. Dysmotility esophagogram 6/7 limited due to patients mobility but no major strictures noted.   - Regular diet for comfort     #  Anasarca   - No further daily wts or strict I&Os now on comfort cares     #  Hypomagnesemia  #  Hypokalemia  #  Hyponatremia  - No further blood draws now on comfort cares     #  Chronic anticoagulation   #  Acute provoked b/l LE DVT on 5/16, this admission  #  Acute provoked right lung PE  - Apixaban 5 mg PO BID      #  Mixed connective tissue disease  #  Limited cutaneous scleroderma  #  Severe Raynaud's   #  Chronic steroid use  #  Hypoglycemia, resolved  - Prednisone as above      #  Generalized weakness and deconditioning   #  Failure to thrive   - No further therapies now on comfort cares     #  Right breast skin thickening and nipple retraction on CT (incidental CT finding)  - Comfort cares       Diet: Snacks/Supplements Adult: Ensure Enlive; With Meals  Regular Diet Adult Thin Liquids (level 0)    DVT Prophylaxis:  DOAC  Deleon Catheter: Present  Lines: None     Cardiac Monitoring: None  Code Status: No CPR- Do NOT Intubate; on comfort cares as of 6/26/23       Disposition Plan To OP hospice     Expected Discharge Date: 07/05/2023        The patient's care was discussed with the Care Coordinator/, Patient and Patient's Family.    Demario Anderson PA-C  Hospitalist Service, GOLD TEAM 20  [facility]  Securely message with [employer] (more info)  Text page via [facility] Paging/Directory   See signed in provider for up to date coverage information  ______________________________________________________________________    Interval History    No acute events overnight. Pt states she feels comfortable. Has no other concerns.     Physical Exam   Vital Signs: Temp: 98  F (36.7  C) Temp src: Oral BP: 113/70 Pulse: 62   Resp: 16 SpO2: 100 % O2 Device: Nasal cannula Oxygen Delivery: 3 LPM  Weight: 166 lbs 7.16 oz  GEN: In NAD  HEENT: NCAT; PERRL; sclerae non-icteric; dry MMs  LUNGS: Decreased breath sounds otherwise clear  CV: Irregularly irregular  ABD: +BSs; SNTND; G tube intact  EXT: 2+ BLE edema  SKIN: No acute rashes noted on exposed areas.  NEURO: AAOx3; CNs grossly intact; No acute focal deficits noted.      Medical Decision Making       30 MINUTES SPENT BY ME on the date of service doing chart review, history, exam, documentation & further activities per the note.      Data   CMP  Recent Labs   Lab 06/26/23  0647   *   POTASSIUM 3.1*   CHLORIDE 87*   CO2 37*   ANIONGAP 9   GLC 74   BUN 38.6*   CR 0.69   GFRESTIMATED >90   DAVID 8.0*   PROTTOTAL 4.3*   ALBUMIN 2.0*   BILITOTAL 0.3   ALKPHOS 75   AST 21   ALT 17     CBC  Recent Labs   Lab 06/26/23  0647   WBC 9.9   RBC 3.15*   HGB 9.4*   HCT 29.6*   MCV 94   MCH 29.8   MCHC 31.8   RDW 14.6

## 2023-07-01 NOTE — PROGRESS NOTES
O2: Stable on 3 L nasal cannula    Output: Deleon catheter in place    Activity: Not oob this shift    Up for meals? No    Pain: Managed with scheduled and PRN morphine    CMS: A/Ox4, denies numbness and tingling   Dressing: Clean, dry, and intact    Diet: Regular    LDA: L PIV- SL    Equipment: Personal belongings, call light within reach    Plan: TBD

## 2023-07-01 NOTE — PLAN OF CARE
[id number]    Patient is A&Ox4; is able to make her needs known. Family members present at bedside whom offer great support to pt. Pt has rolle in place & is incontinent of bowel; needs A2 w/ mobility & is turn/repo Q2 hrs.     Patient remains on comfort cares with PRN morphone, ativan, & tinazidine utilized which offer comfort to patient. PEG tube is intact and patent.     Patient and family have decided to stay at this facility through the weekend with a possible move closer to home on Monday July 3rd.

## 2023-07-01 NOTE — PROGRESS NOTES
VS: Vital signs:  Temp: 98  F (36.7  C) Temp src: Oral BP: 113/70 Pulse: 62   Resp: 16 SpO2: 100 % O2 Device: Nasal cannula Oxygen Delivery: 3 LPM      O2: On 3LMP via nasal cannula    Output: Deleon catheter intact and draining    Last BM: 6/28/23   Activity: Bedfast with turn and repo q 2   Skin: Dressing to sacrum    Pain: Managed with oral morphine    CMS: A&O X4,  Makes needs known    Dressing: sacrum   Diet: Regular    LDA: R PIV SL   Equipment: IV pole and personal belongings   Plan: Comfort care   Additional Info:

## 2023-07-02 NOTE — PLAN OF CARE
Pt A&Ox4, denies numbness and tingling.  Stable on 3LO2. Urethral catheter in place. Fall precautions maintained with bed alarm on, bed locked and in lowest position. Pain managed with PRN and scheduled morphine. POC discussed, questions encouraged and answered. No acute events during shift. Plan for comfort care today. POC continues.

## 2023-07-02 NOTE — PROGRESS NOTES
[facility]    Medicine Progress Note - Hospitalist Service, GOLD TEAM 20    Date of Admission:  5/16/2023    Assessment & Plan   Jeanine Schuler is a 61 yo female w/ past medical of HTN, HFpEF (LVEF 45-50%), pulmonary HTN, paroxysmal afib on chronic anticoagulation, interstitial lung disease, sarcoidosis, mixed connective tissue disease, Raynaud's, and chronic dysphagia who was admitted on 5/16/2023 with failure to thrive and found to have multiple R sided pulmonary emboli and bilateral lower extremity DVT's. Transferred to the ICU 6/11 for worsening SOB and increased O2 needs.  CT PE protocol done 6/11 morning showed large bilateral pleural effusion, volume overload, and concern for pneumonia, transitioned to comfort cares 6/26/23.     #  GOC  #  Acute hypoxic respiratory failure, multifactorial  #  Pulmonary sarcoidosis  #  Hospital acquired pneumonia  #  Bilateral loculated pleural effusions  #  Pulmonary edema  See note 6/23 for further details. Repeat CT chest 6/19 showed decreased R partially loculated effusion and stable L partially loculated effusion. Initial plan was for lytics to R chest tube and placement of new L chest tube, however patient requested transitioned to hospice on 6/21 due to poor QOL. She would like to focus on comfort. Palliative care consulted. Patient more comfortable with better management of pain and air hunger. Willing to keep R chest tube in place as it continues to drain, as well as try higher dose steroids to see if this will result in more immediate improvement in QOL. She does not want additional procedures or further escalation of care at this time. IR consulted 6/27 and R chest tube removed bedside.   - Pulmonology, Rheumatology, and Palliative Care consulted and appreciated rec's  - Transitioned to comfort cares 6/26 after thorough d/w Palliative care  - Discharge to hospice facility when bed becomes available.   - Ordered  comfort cares via order set (save meds as this will be done by Palliative Care).   - Continue prednisone 15 mg daily  - Morphine 5mg q4h, hold if sleeping/sedated   - Morphine 2-4mg IV or 10-15mg SL q2h prn for pain and air hunger  - Ativan 0.5mg IV q4h prn for anxiety and air hunger  - No BIPAP  - No further procedures.    - Continue supplemental O2 prn, goal SpO2 >90 and comfort     #  PAF   #  HTN  #  HFpEF  #  Pulmonary HTN  #  R heart stain based on CT chest   #  Elevated troponin, suspect type II NSTEMI  TTE 6/3/23:  EF 45-50%, no RWMA, normal RV size/fucntion, pulmonary artery systolic pressure normal, no significant valvular heart disease, no effusion.  Clinically stable.   - Continue PTA Amiodarone 200 mg PO daily for rhythm control if deemed ok to continue per Palliative care  - Cardiology consulted (signed off)     #  Dysphagia, resolved  #  Severe protein calorie malnutrition   #  Constipation   #  History of gastric bypass surgery   GI previously consulted. Dysmotility esophagogram 6/7 limited due to patients mobility but no major strictures noted.   - Regular diet for comfort     #  Anasarca   - No further daily wts or strict I&Os now on comfort cares     #  Hypomagnesemia  #  Hypokalemia  #  Hyponatremia  - No further blood draws now on comfort cares     #  Chronic anticoagulation   #  Acute provoked b/l LE DVT on 5/16, this admission  #  Acute provoked right lung PE  - Apixaban 5 mg PO BID      #  Mixed connective tissue disease  #  Limited cutaneous scleroderma  #  Severe Raynaud's   #  Chronic steroid use  #  Hypoglycemia, resolved  - Prednisone as above      #  Generalized weakness and deconditioning   #  Failure to thrive   - No further therapies now on comfort cares     #  Right breast skin thickening and nipple retraction on CT (incidental CT finding)  - Comfort cares       Diet: Snacks/Supplements Adult: Ensure Enlive; With Meals  Regular Diet Adult Thin Liquids (level 0)    DVT Prophylaxis:  DOAC  Deleon Catheter: Present  Lines: None     Cardiac Monitoring: None  Code Status: No CPR- Do NOT Intubate; on comfort cares as of 6/26/23       Disposition Plan To OP hospice     Expected Discharge Date: 07/05/2023        The patient's care was discussed with the Care Coordinator/, Patient and Patient's Family.    Demario Anderson PA-C  Hospitalist Service, GOLD TEAM 20  [facility]  Securely message with [employer] (more info)  Text page via [facility] Paging/Directory   See signed in provider for up to date coverage information  ______________________________________________________________________    Interval History    No acute events overnight. Pt states she feels comfortable. Has no other concerns.     Physical Exam   Vital Signs:               O2 Device: Nasal cannula Oxygen Delivery: 3 LPM  Weight: 166 lbs 7.16 oz  GEN: In NAD  HEENT: NCAT; PERRL; sclerae non-icteric; dry MMs  LUNGS: Decreased breath sounds otherwise clear  CV: Irregularly irregular  ABD: +BSs; SNTND; G tube intact  EXT: 2+ BLE edema  SKIN: No acute rashes noted on exposed areas.  NEURO: AAOx3; CNs grossly intact; No acute focal deficits noted.      Medical Decision Making       30 MINUTES SPENT BY ME on the date of service doing chart review, history, exam, documentation & further activities per the note.      Data   CMP  Recent Labs   Lab 06/26/23  0647   *   POTASSIUM 3.1*   CHLORIDE 87*   CO2 37*   ANIONGAP 9   GLC 74   BUN 38.6*   CR 0.69   GFRESTIMATED >90   DAVID 8.0*   PROTTOTAL 4.3*   ALBUMIN 2.0*   BILITOTAL 0.3   ALKPHOS 75   AST 21   ALT 17     CBC  Recent Labs   Lab 06/26/23  0647   WBC 9.9   RBC 3.15*   HGB 9.4*   HCT 29.6*   MCV 94   MCH 29.8   MCHC 31.8   RDW 14.6

## 2023-07-02 NOTE — PLAN OF CARE
Pt is A&Ox4. Comfort cares. On 3L O2 via NC. Reposition q2h. Pain managed with PO morphine. Gtube is patent and clamped. Deleon patent and draining well. Bilateral BLEs and coccxy due to change. Continue to monitor.

## 2023-07-02 NOTE — PROGRESS NOTES
[facility]    Medicine Progress Note - Hospitalist Service, GOLD TEAM 20    Date of Admission:  5/16/2023    Updates today:  - awaiting hospice placement, referrals placed, appreciate SW input  - continue to maintain comfort  - updated patient's son and  (were here in person today)    Assessment & Plan   Jeanine Schuler is a 63 yo female w/ past medical of HTN, HFpEF (LVEF 45-50%), pulmonary HTN, paroxysmal afib on chronic anticoagulation, interstitial lung disease, sarcoidosis, mixed connective tissue disease, Raynaud's, and chronic dysphagia who was admitted on 5/16/2023 with failure to thrive and found to have multiple R sided pulmonary emboli and bilateral lower extremity DVT's. Transferred to the ICU 6/11 for worsening SOB and increased O2 needs.  CT PE protocol done 6/11 morning showed large bilateral pleural effusion, volume overload, and concern for pneumonia, transitioned to comfort cares 6/26/23.     #  Goals of care assessment  #  Acute hypoxic respiratory failure, multifactorial  #  Pulmonary sarcoidosis  #  Hospital acquired pneumonia  #  Bilateral loculated pleural effusions  #  Pulmonary edema  See note 6/23 for further details. Repeat CT chest 6/19 showed decreased R partially loculated effusion and stable L partially loculated effusion. Initial plan was for lytics to R chest tube and placement of new L chest tube, however patient requested transitioned to hospice on 6/21 due to poor QOL. She would like to focus on comfort. Palliative care consulted. Patient more comfortable with better management of pain and air hunger. Initially kept R chest tube in place while it was draining and has been removed per IR 6/27. On higher dose steroids to see if this will result in more immediate improvement in QOL. She does not want additional procedures or further escalation of care at this time. IR consulted 6/27 and R chest tube removed bedside. Mentating  well and speaking clearly on 7/3.   - Pulmonology, Rheumatology, and Palliative Care consulted and appreciated rec's  - Transitioned to comfort cares 6/26 after thorough d/w Palliative care  - Discharge to hospice facility when bed becomes available.   - Ordered comfort cares via order set (save meds as this will be done by Palliative Care).   - Continue prednisone 15 mg daily  - Morphine 5mg q4h, hold if sleeping/sedated   - Morphine 2-4mg IV or 10-15mg SL q2h prn for pain and air hunger  - Ativan 0.5mg IV q4h prn for anxiety and air hunger  - No BIPAP  - No further procedures.    - Continue supplemental O2 prn, goal SpO2 >90 and comfort     #  Paroxysmal Atrial fibrillation   #  HTN  #  HFpEF  #  Pulmonary HTN  #  R heart stain based on CT chest   #  Elevated troponin, suspect type II NSTEMI  TTE 6/3/23:  EF 45-50%, no RWMA, normal RV size/fucntion, pulmonary artery systolic pressure normal, no significant valvular heart disease, no effusion.  Clinically stable. RRR on exam 7/3.  - Continue PTA Amiodarone 200 mg PO daily for rhythm control if deemed ok to continue per Palliative care  - Cardiology consulted (signed off)     #  Dysphagia, resolved  #  Severe protein calorie malnutrition   #  Constipation   #  History of gastric bypass surgery   GI previously consulted. Dysmotility esophagogram 6/7 limited due to patients mobility but no major strictures noted.   - Regular diet for comfort     #  Anasarca   - No further daily wts or strict I&Os now on comfort cares     #  Hypomagnesemia  #  Hypokalemia  #  Hyponatremia  - No further blood draws now on comfort cares     #  Chronic anticoagulation   #  Acute provoked b/l LE DVT on 5/16, this admission  #  Acute provoked right lung PE  - Apixaban 5 mg PO BID      #  Mixed connective tissue disease  #  Limited cutaneous scleroderma  #  Severe Raynaud's   #  Chronic steroid use  #  Hypoglycemia, resolved  - Prednisone as above      #  Generalized weakness and  deconditioning   #  Failure to thrive   - No further therapies now on comfort cares     #  Right breast skin thickening and nipple retraction on CT (incidental CT finding)  - Comfort cares         Diet: Snacks/Supplements Adult: Ensure Enlive; With Meals  Regular Diet Adult Thin Liquids (level 0)    DVT Prophylaxis: Not applicable, comfort cares  Deleon Catheter: PRESENT, indication: Retention  Lines: None     Cardiac Monitoring: None  Code Status: No CPR- Do NOT Intubate      Clinically Significant Risk Factors              # Hypoalbuminemia: Lowest albumin = 1.6 g/dL at 6/20/2023  3:36 AM, will monitor as appropriate     # Hypertension: Noted on problem list         # Severe Malnutrition: based on nutrition assessment         Disposition Plan    ready to discharge once hospice facility in place     The patient's care was discussed with the Bedside Nurse, Care Coordinator/, Patient and Patient's Family.    Becca Virk PA-C  Hospitalist Service, GOLD TEAM 20  M [facility]  Securely message with [employer] (more info)  Text page via [employer] Paging/Directory   See signed in provider for up to date coverage information  ______________________________________________________________________    Interval History    Jeanine notes that she is still able to take sips of water and fluids and that family have brought her food, but she struggles to find things that taste good, especially in the context of a puree diet. She notes that her concerns with regard to hospice are largely around needing to adjust to a new place and not knowing how long she will be here, whether this is days or longer.     She has experienced recent bouts with panic attack that generally last ~15minutes, triggered by having no-one at the bedside and does not find medications helpful as they don't take effect until the anxiety attack has passed and then only cause side effects.  She is hoping to be  awake and interactive as she is able.     Patient notes to SW that having her  be able to stay overnight is a priority.     Her  is to bedside offering comfort and support. He notes Jeanine's speaking is much improved and recently she was only able to whisper about 5 words and is speaking much more clearly and is able to hold a conversation without pause.     She does not endorse any pain or other complaints at this time.     Physical Exam   Vital Signs: Temp: 97.8  F (36.6  C) Temp src: Oral BP: 101/72 Pulse: 56   Resp: 17 SpO2: 100 % O2 Device: Nasal cannula Oxygen Delivery: 3 LPM  Weight: 166 lbs 7.16 oz  GENERAL: Alert and oriented x 3. NAD. Sitting ~30 degrees supine in bed. Cooperative.   HEENT: Anicteric sclera.. NC. AT. Pupils equal and round  CV: RRR. S1, S2. No murmurs appreciated.   RESPIRATORY: Effort normal . Lungs with bilateral rales, good excursion  GI: NABS   EXTREMITIES: dependent LE peripheral edema, legs floated in air boots.   SKIN: No jaundice. No rashes on exposed skin      Medical Decision Making       45 MINUTES SPENT BY ME on the date of service doing chart review, history, exam, documentation & further activities per the note.      Data

## 2023-07-02 NOTE — PLAN OF CARE
O2: Stable on 3L of oxygen via NC   Output: Voiding adequate output 650 mL   Last BM: Last BM 7/1/23   Activity: Bedfast   Skin: Wounds on BLE, scarum, swelling in extremities   Pain: Pain rated 7/10, managed with PRN and schedule morphine   Neuro: A & O x4, calm and cooperative   Dressing: Mepilex on sacrum and BLE   Diet: Regular diet   LDA: Right PIV saline locked   Equipment: Personal belongings in room, call light, family at bedside   Plan: Continue with plan of care   Additional Info:

## 2023-07-03 NOTE — PROGRESS NOTES
Patient A&O times 4; family in room during shift; repositioning encouraged and patient refused often. Repositioned times 1; perineal care done; incontinent times 1 with medium soft stool; coccyx dressing changed and wound moist and oozing seroussanguinous fluid, small amount. G-Tube flushed with free water times 1. Pain med administered; poor appetite. Continue with patient care.

## 2023-07-03 NOTE — PROGRESS NOTES
Care Management Follow Up    Length of Stay (days): 48    Expected Discharge Date: 07/03/2023     Concerns to be Addressed: discharge planning     Patient plan of care discussed at interdisciplinary rounds: Yes    Anticipated Discharge Disposition: Home, Long Term Care     Anticipated Discharge Services: None  Anticipated Discharge DME: Other (see comment) (TBD)    Patient/family educated on Medicare website which has current facility and service quality ratings: no  Education Provided on the Discharge Plan: No  Patient/Family in Agreement with the Plan: unable to assess    Referrals Placed by CM/SW: Internal Clinic Care Coordination  Private pay costs discussed: Not applicable    Additional Information:    Writer called intake department at [facility] to get patient added onto their list/ to start services for patient. They can go into Epic and check her records. They will give her  a call they stated. They are open 24/7 if writer or patient needs anything else.    Writer called and left a Vm with vianca at [facility] and asked if they allow patient s family to stay overnight since patient is requesting this.    Contacts:  [facility]  PH: [phone]  F: [phone]    Accepted referrals:     [facility]  PH: [phone]  F: [phone]   6/29: Sent referral via Epic. Accepted.      [facility]  PH: [phone]  F: [phone]  6/29: Sent referral via Epic. Accepted.     Pending:     [facility]  [address] [place], [place] [id number] [phone]  6/29: Sent referral via Epic.        [facility]  [address] [place] [address] [phone]  6/29: Sent referral via Epic.        [facility]  [address] [place] [address] [phone]  6/29: Made referral via Epic.     [facility]  [address] [place] [address] [phone]  6/29: Made referral via  Epic.     Declined:     [facility]  PH: [phone]   F: [phone]  6/29: Left VM to see if they have openings. Made referral via Epic. Declined via Epic. No beds open.     [facility]  PH: [phone]  F: [phone]  6/28- Sent referral via Epic. Left VM to see if they have openings.  6/29: They received the fax. They are full right now in LTC area. They have a waitlist right now and 4 females who are transferring to LTC area.     [facility]  [address] [place] [address]  [phone]  6/28: Sent referral via Epic.  6/29: Received VM stating they declined referral.     YOKASTA Drummond, LGSW  6 Med Surg   [facility]  Phone: [phone]  Pager: [phone]

## 2023-07-03 NOTE — PROGRESS NOTES
"SPIRITUAL HEALTH SERVICES Progress Note  [facility] 6B    Saw pt Jeanine Schuler per follow up visit.    Patient/Family Understanding of Illness and Goals of Care - Jeanine was present with Lexx and shared that \"I feel like something's gonna happen sometime soon,\" iterating that she believes she is in her last days/moments.     Distress and Loss - When asked about end of life, Jeanine shared that she feels \"scared\" because there are a lot of \"unknowns.\"     Strengths, Coping, and Resources - Jeanine feels she has done everything she needs to and said good-bye to the people she needs to. Her  Lexx and family continue to be great support in these last days.     Meaning, Beliefs, and Spirituality - When asked if there are things we can do to make her last moments more meaningful or sacred Jeanine requested prayer. We also discussed her Judaism dora and talked about, in the midst of uncertainty, what are the things that she DOES know--that God will be with her here and now and into eternity. This  also offered death as being born--a time of uncertainty/darkness/fear/pain and then entering a space with bright light filled with people who have been waiting for you for a very long time.     Plan of Care - I provided a prayer and continue to be available as needed/by request.     Jay Le MDiv  Chaplain Resident  Pager [phone]    * [facility] remains available 24/7 for emergent requests/referrals, either by having the switchboard page the on-call  or by entering an ASAP/STAT consult in Epic (this will also page the on-call ). Routine Epic consults receive an initial response within 24 hours.*   "

## 2023-07-03 NOTE — PROGRESS NOTES
Jeanine Schuler is a 62 year old female patient.  1. Esophageal dysphagia    2. Shortness of breath    3. Hypervolemia, unspecified hypervolemia type    4. Other pulmonary embolism without acute cor pulmonale, unspecified chronicity (H)    5. Deep vein thrombosis (DVT) of non-extremity vein, unspecified chronicity    6. Hypoglycemia    7. Scleroderma (H)    8. Esophageal dysmotility    9. History of gastric bypass    10. History of Eleazar-en-Y gastric bypass    11. Severe malnutrition (H)    12. Esophageal dysmotility due to systemic disease    13. Esophagitis determined by endoscopy    14. Chronic systolic congestive heart failure (H)    15. Pleural effusion    16. Anxiety    17. End of life care      Past Medical History:   Diagnosis Date     Anemia      Bariatric surgery status 06/27/2005    [facility] Y[facility];     Cellulitis of leg 06/06/2013     Esophageal reflux     Better post-hiatal hernia repair and weight loss     Hyperplastic colonic polyp      Hypovitaminosis D 2011     ILD (interstitial lung disease) (H)      Kidney stone 04/29/2007     Kidney stone 05/10/2007    surgically removed     Limb ischemia; ulcers of fingertips 04/22/2013     Other chronic pain     Left shoulder - rheumatoid arthritis     Raynaud's syndrome      Rheumatoid arthritis (H) \     Scleroderma (H)      Sjogren-Jake syndrome      Systemic sclerosis (H) 2009     Unspecified essential hypertension      Current Outpatient Medications   Medication Sig Dispense Refill     gabapentin (NEURONTIN) 250 MG/5ML solution 2 mLs (100 mg) by Oral or Feeding Tube route At Bedtime 470 mL 0     hydrOXYzine (ATARAX) 25 MG tablet Take 1-2 tablets (25-50 mg) by mouth 4 times daily as needed for itching or anxiety 10 tablet 0     Lidocaine (LIDOCARE) 4 % Patch Place 1 patch onto the skin every 24 hours To prevent lidocaine toxicity, patient should be patch free for 12 hrs daily. 4 patch 0     LORazepam (ATIVAN) 2 MG/ML (HIGH CONC) oral solution  "0.25-0.5 mLs (0.5-1 mg) by Per PEG tube route every 4 hours as needed for anxiety, nausea or vomiting 30 mL 0     morphine sulfate (ROXANOL) 20 mg/mL (HIGH CONC) soln Take 0.25 mLs (5 mg) by mouth every 4 hours 30 mL 0     morphine sulfate (ROXANOL) 20 mg/mL (HIGH CONC) soln Place 0.25-0.5 mLs (5-10 mg) under the tongue every 2 hours as needed for moderate pain or shortness of breath  0     ondansetron (ZOFRAN ODT) 4 MG ODT tab Take 2 tablets (8 mg) by mouth every 8 hours as needed for nausea 10 tablet 0     tiZANidine (ZANAFLEX) 2 MG tablet Take 1 tablet (2 mg) by mouth every 6 hours as needed for muscle spasms 10 tablet 0     Allergies   Allergen Reactions     Enoxaparin Sodium Other (See Comments)     Blood clot      Norvasc [Amlodipine Besylate] Swelling     Lip swelling that happened on 2 different occasions     Erythromycin Rash     Rash on arms     Principal Problem:    Deep vein thrombosis (DVT) of non-extremity vein, unspecified chronicity  Active Problems:    Chronic systolic congestive heart failure (H)    Shortness of breath    Hypervolemia, unspecified hypervolemia type    Other pulmonary embolism without acute cor pulmonale, unspecified chronicity (H)    Scleroderma (H)    Severe malnutrition (H)    Hypoglycemia    Esophageal dysmotility    Esophageal dysphagia    History of gastric bypass    History of Eleazar-en-Y gastric bypass    Esophagitis determined by endoscopy    Esophageal dysmotility due to systemic disease    Pleural effusion    Blood pressure 101/72, pulse 56, temperature 97.8  F (36.6  C), temperature source Oral, resp. rate 17, height 1.753 m (5' 9\"), weight 75.5 kg (166 lb 7.2 oz), SpO2 100 %.    Alert and oriented X4  Pain well controlled with scheduled and PRN morphine   Deleon draining with no dependent loops   PEG tube patent and flushing freely   Repositioned when requested   Posterior lower leg dressings changed bilaterally   at bedside      Ventura Vail RN  7/3/2023    "

## 2023-07-03 NOTE — PLAN OF CARE
O2: Stable, denies SOB and chest pain, shallow breathing.   Output: Voiding adequate, output 400 mL, rolle in place.   Last BM: Last BM 7/3/23   Activity: Bedfast   Skin: Wounds on sacrum, and BLE, edema in extremities.   Pain: Pain rated 6/10, managed with scheduled and PRN morphine   Neuro: A & O x4, calm and cooperative, denies N/T   Dressing: Mepilex on sacrum and BLE wound    Diet: Regular diet with thin liquids   LDA: No IV   Equipment: Personal belongings in room, call light, kangaroo pump, family at bedside.   Plan: Continue with plan of care   Additional Info:

## 2023-07-04 NOTE — PROGRESS NOTES
Pt A&Ox4. Able to make needs known. 3 L O2 via NC. No IV access. Assist of 2 with repositioning and laureano cares. Incontinent bowel and incontinent bladder -- last BM per pt 7/4. Deleon catheter in place and draining. Coccyx wound changed and cleaned per WOC orders; covered with Mepilex. Meds through G tube or in applesauce. Denies SOB, chest pain, and nausea/vomiting. Pt reports numbness/tingling in bilateral hands and feet. Pt c/o L shoulder and hip pain; pain managed with PRNs. Lidocaine on L shoulder. Comfort cares. No acute events. Call light in reach.

## 2023-07-04 NOTE — PROGRESS NOTES
Care Management Follow Up    Length of Stay (days):  48    Expected Discharge Date: 07/03/2023     Concerns to be Addressed:  Discharge planning   Patient plan of care discussed at interdisciplinary rounds: Yes    Anticipated Discharge Disposition:  Hospice in a facility     Anticipated Discharge Services:  Hospice  Anticipated Discharge DME:  Oxygen, hospital bed    Patient/family educated on Medicare website which has current facility and service quality ratings: no  Education Provided on the Discharge Plan:  Yes  Patient/Family in Agreement with the Plan:  Yes    Referrals Placed by CM/SW: Internal Clinic Care Coordination  Private pay costs discussed: Stretcher transport. Facility cost.    Additional Information:  Asked by DAJUAN Drummond, to inform pt she was accepted at [facility].   Called Keila at [facility] and confirmed they have a bed today. Pt will need to choose a hospice agency.   Met with pt and her . Pt alert, in bed with head elevated, oxygen on per n/c. Informed pt [facility] is able to accept her for admission today. They inquired about the visiting hours. Pt said she has anxiety when she is alone and doesn't want to be by herself. Pt said her sonYusuf is handling the details. I will call [facility] and ask about visiting hours and call Yusuf.  Spoke with Keila at [facility]. They do not allow visitors 24/7. Visiting hours are 8am to 8pm. If pt's condition worsens and she is imminently dying they make an exception.  Left a generic voice message for Yusuf michael at the phone number on the facesheet, [phone] (the voice on the message system said, Jonnathan). I called pt's nurse to have her confirm son's phone number with family. Yusuf Michael had arrived. His phone number is [phone] (not [phone]).   Met with pt,  and sonYusuf. Reviewed [facility] pt & they can take her today. Pt asked about staying in the hospital. Informed them pt is  stable to leave the acute hospital setting to the next level of care. If she was imminently dying then we would not transfer her. Pt expressed understanding.   Informed them [facility].00 deposit. Yusuf had more questions about cost and what this covered. They asked about the benefit of hospice vs going into facility without hospice.   I called Keila at [facility] while in pt's room and put her on speakerphone. She reviewed the costs; approximately $300-350.00 per day. If pt is on hospice then pt's insurance would cover some costs, this would lower the facility cost.   Pt asked Keila about having someone stay with her. Keila said they do not allow visitors to stay overnight. This was a priority for pt and at this point I informed Keila I would get back to her and ended the phone call.  While in pt's room I called [facility] [employer] and obtained base rate for stretcher transport (see info at end of note). Communicated this to pt & son, Yusuf.   During our conversation pt stated a few times her priority is pain management and having family stay with her all the times. Pt said she has anxiety when alone; the doctor has mentioned medication for anxiety but pt is reluctant to be sedated.   They inquired about home hospice. Informed them pt would need to have a caregiver. Hospice visits are intermittent and do not provide extended hours of care. Pt had thought about being at home, but decided against it due to the children.     Plan:  --After discussion plan is for RNCC/SW to contact other facilities, with priority on having someone stay with her.   --Will refer to a hospice agency. They did not have a preference. I suggested [facility] to start with.   ---I suggested we also look at residential hospices, they were not familiar with this, but agreeable to looking at this option.       Yusuf, son:  [phone] (not 612 area code). Main contact for discharge details.     [facility]  Phone: [phone]  Base rate stretcher transport: $1228.70, plus $28.67 per mile (cost may change since pt is on oxygen).    Keila  Phone: [phone]   [facility] ashley Davis  Phone: [phone]  Fax: [phone]   _________________       Debby Agosto RN Care Coordinator  [facility]  On 7- RNCC coverage for Unit 6 Med/Surg. Call [phone].

## 2023-07-04 NOTE — PROGRESS NOTES
Plan: Disposition to outpatient hospice possible tomorrow, continue plan of care    O2: Stable, denies SOB and chest pain. Shallow breathing.    Output: Voiding adequate. Deleon in place.    Last BM: 07/02   Activity: Bedfast.    Skin: Wounds on the sacrum and BLE. Mepilex on the locations. Edema in the extremities   Pain: Pain rated from 6-9, managed with scheduled and PRN morphine.    Neuro: AOx4. Calm and cooperative. Denies N/T    Dressing: Mepilex on the coccyx and BLE.    Diet: Regular Diet.   LDA:  G tube NO IV    Equipment: Pt belongings, call light, kangaroo pump and family at bedside..

## 2023-07-04 NOTE — PROGRESS NOTES
Care Management Follow Up    Length of Stay (days): 49    Expected Discharge Date: 07/05/2023     Concerns to be Addressed: discharge planning     Patient plan of care discussed at interdisciplinary rounds: Yes    Anticipated Discharge Disposition:  Long Term Care with hospice vs residential hospice     Anticipated Discharge Services: None  Anticipated Discharge DME: Other (see comment) (TBD)    Patient/family educated on Medicare website which has current facility and service quality ratings: no  Education Provided on the Discharge Plan: No  Patient/Family in Agreement with the Plan: unable to assess    Referrals Placed by CM/SW: Internal Clinic Care Coordination    Contacts:  [facility]  PH: [phone]  F: [phone]       Accepted referrals:  [facility]  PH: [phone]  F: [phone]  7/4: No VM rec'd from admissions on unit  phone. SW spoke with Yessy ([facility] liaison [phone])   7/3: VM left with admissions to see if patient family can stay overnight.   6/29: Sent referral via Epic. Accepted.    Pending:  [facility]  [address] [place] [address] [phone]  7/4: No one in admissions d/t holiday.  6/29: Sent referral via Epic.    [facility]  [address] [place] [address] [phone]  7/4: VM left with care center office to get status update of referral. Requested call back to unit  tomorrow. Contact information provided.  6/29: Made referral via Epic.     Declined:  [facility]  [address] [place] [address] [phone]  7/4: did not receive referral and do not have any LTC beds available     6/29: Sent referral via Servio.    [facility]  PH: [phone]  F: [phone]  7/3: Patient declined d/t family not being able to stay overnight.     6/29: Sent referral via Epic. Accepted.     [facility]  PH: [phone]   F: [phone]  6/29: Left VM  to see if they have openings. Made referral via Epic. Declined via Epic. No beds open.     [facility]  PH: [phone]  F: [phone]  6/28- Sent referral via Epic. Left VM to see if they have openings.  6/29: They received the fax. They are full right now in LTC area. They have a waitlist right now and 4 females who are transferring to LTC area.     [address] [address]  [phone]  6/28: Sent referral via Epic.  6/29: Received VM stating they declined referral.     Private pay costs discussed: private room/amenity fees and transportation costs by RNCC on 7/3/23.    Additional Information:  SW continues to follow for discharge planning. SW notified by RNCC from 7/3/23 that patient declined [facility] as family can't stay overnight. Patient and family requested other referrals be made to LTC that could allow family member to stay overnight with patient.     DAJUAN spoke with son, Yusuf, ([phone]- the area code is 651, not 612, it is wrong on face sheet)  and provided information about residential hospice (cost and locations). Yusuf indicated cost is not main concern. Yusuf agreed for SW to call residential hospice to ask if family can stay overnight. SW discussed locations in [place], [place], and [facility]. Yusuf stated [place] is okay  to check into. Yusuf stated they are open to other LTC as far out as [facility] and [facility] ([place], [place], [place], etc).     SW called Yusuf at 1533 and updated on progress of options. Yusuf stated he will be at hospital around 430 and requested phone number and information be left about Residential Hospice Option. SW advised that family can call and ask any questions.     Yusuf has not heard from [facility]. SW called and spoke with [facility] to get update on referral. Waiting to hear back from intake as of 1546. DAJUAN spoke with  Gloria in intake and she shared Miryam is working on patients referral, but she is off today d/t Holiday. Patients son, Yusuf, was not listed as main point of contact, so SW provided Gloria with phone number. She will send message to Miryam and ask Miryam to follow up with Yusuf tomorrow (7/5/23).     Please see below for what SW checked into. No referrals were sent or information given. Only gathered general information about family being able to stay overnight    Residential Hospice Options- no referrals sent yet:  -[facility] in [place] ([phone])- called and allows family to stay overnight.     -[facility] in [place] ([phone])- left VM to see if family can stay overnight. Left unit SW contact information for them to follow up on Wednesday (7/5/23).    [facility] in [facility] ([phone])- left VM to see if family can stay overnight. Rec'd call back at 1525 stating that family can stay overnight and there is no extra charge. Meals and housing (have guest rooms available) provided in daily rate.     Other LTC Options - no referrals sent yet:  [facility]  [address] [place] [address]  Phone: [phone]  -VM left to ask if family can stay over night. No referral sent yet.     [facility]  [address] [place] [address]  Phone: [phone]  -under construction and no admissions in today. Charge RN was unsure of bed availability. Advised to call back on 7/5/23. Per nurse, typically, one family member is able to stay overnight, but that needs to be confirmed with management. No referral sent yet.        YOKASTA Noel, [facility]   7/4/2023       Social Work and Care Management Department     SEARCHABLE in [facility] - search SOCIAL WORK     [place] (9599 [phone] 2138) Saturday and Sunday   Units: 4A, 4C, & 4E Pager: [phone]   Units: 5A & 5B Pager: [phone]   Units:  6A & 6B   Pager: [phone]   Units: 6C & 6D Pager: [phone]   Units: 7A &7B  Pager: [phone]   Units: 7C, 7D, & 5C Pager: [phone]   Unit: [facility] Pager: [phone]      [facility] ([id number]) Saturday and Sunday    Units: 5 Ortho, 5 Med/Surg & WB ED  Pager: [phone]   Units: 6 Med/Surg, 8A, & 10A ICU  Pager: [phone]     After hours (1630 - 0000) Saturday & Sunday; (5056-1155) Mon-Fri; (1136-9976) [facility] Recognized Holidays     Units: ALL  Pager: [phone]

## 2023-07-04 NOTE — PROGRESS NOTES
[facility]    Medicine Progress Note - Hospitalist Service, GOLD TEAM 20    Date of Admission:  5/16/2023    Updates today:  - continue to keep comfortable  - hospice discharge potentially tomorrow if placed    Assessment & Plan   Jeanine Schuler is a 61 yo female w/ past medical of HTN, HFpEF (LVEF 45-50%), pulmonary HTN, paroxysmal afib on chronic anticoagulation, interstitial lung disease, sarcoidosis, mixed connective tissue disease, Raynaud's, and chronic dysphagia who was admitted on 5/16/2023 with failure to thrive and found to have multiple R sided pulmonary emboli and bilateral lower extremity DVT's. Transferred to the ICU 6/11 for worsening SOB and increased O2 needs.  CT PE protocol done 6/11 morning showed large bilateral pleural effusion, volume overload, and concern for pneumonia, transitioned to comfort cares 6/26/23.     #  Goals of care assessment  #  Acute hypoxic respiratory failure, multifactorial  #  Pulmonary sarcoidosis  #  Hospital acquired pneumonia  #  Bilateral loculated pleural effusions  #  Pulmonary edema  See note 6/23 for further details. Repeat CT chest 6/19 showed decreased R partially loculated effusion and stable L partially loculated effusion. Initial plan was for lytics to R chest tube and placement of new L chest tube, however patient requested transitioned to hospice on 6/21 due to poor QOL. She would like to focus on comfort. Palliative care consulted. Patient more comfortable with better management of pain and air hunger. Initially kept R chest tube in place while it was draining and has been removed per IR 6/27. On higher dose steroids to see if this will result in more immediate improvement in QOL. She does not want additional procedures or further escalation of care at this time. IR consulted 6/27 and R chest tube removed bedside. Mentating well and speaking clearly on 7/3.   - Pulmonology, Rheumatology, and Palliative  Care consulted and appreciated rec's  - Transitioned to comfort cares 6/26 after thorough d/w Palliative care  - Discharge to hospice facility when bed becomes available.   - Ordered comfort cares via order set (save meds as this will be done by Palliative Care).   - Continue prednisone 15 mg daily  - Morphine 5mg q4h, hold if sleeping/sedated   - Morphine 2-4mg IV or 10-15mg SL q2h prn for pain and air hunger  - Ativan 0.5mg IV q4h prn for anxiety and air hunger  - No BIPAP  - No further procedures.    - Continue supplemental O2 prn, goal SpO2 >90 and comfort     #  Paroxysmal Atrial fibrillation   #  HTN  #  HFpEF  #  Pulmonary HTN  #  R heart stain based on CT chest   #  Elevated troponin, suspect type II NSTEMI  TTE 6/3/23:  EF 45-50%, no RWMA, normal RV size/fucntion, pulmonary artery systolic pressure normal, no significant valvular heart disease, no effusion.  Clinically stable. RRR on exam 7/3.  - Continue PTA Amiodarone 200 mg PO daily for rhythm control if deemed ok to continue per Palliative care  - Cardiology consulted (signed off)     #  Dysphagia, resolved  #  Severe protein calorie malnutrition   #  Constipation   #  History of gastric bypass surgery   GI previously consulted. Dysmotility esophagogram 6/7 limited due to patients mobility but no major strictures noted.   - Regular diet for comfort     #  Anasarca   - No further daily wts or strict I&Os now on comfort cares     #  Hypomagnesemia  #  Hypokalemia  #  Hyponatremia  - No further blood draws now on comfort cares     #  Chronic anticoagulation   #  Acute provoked b/l LE DVT on 5/16, this admission  #  Acute provoked right lung PE  - Apixaban 5 mg PO BID      #  Mixed connective tissue disease  #  Limited cutaneous scleroderma  #  Severe Raynaud's   #  Chronic steroid use  #  Hypoglycemia, resolved  - Prednisone as above      #  Generalized weakness and deconditioning   #  Failure to thrive   - No further therapies now on comfort cares     #   Right breast skin thickening and nipple retraction on CT (incidental CT finding)  - Comfort cares       Diet: Snacks/Supplements Adult: Ensure Enlive; With Meals  Regular Diet Adult Thin Liquids (level 0)    DVT Prophylaxis: not indicated, CMO  Deleon Catheter: PRESENT, indication: Retention  Lines: None     Cardiac Monitoring: None  Code Status: No CPR- Do NOT Intubate      Clinically Significant Risk Factors              # Hypoalbuminemia: Lowest albumin = 1.6 g/dL at 6/20/2023  3:36 AM, will monitor as appropriate     # Hypertension: Noted on problem list         # Severe Malnutrition: based on nutrition assessment         Disposition Plan             The patient's care was discussed with the Bedside Nurse and Patient's Family.    Becca Virk PA-C  Hospitalist Service, GOLD TEAM 20  [facility]  Securely message with [employer] (more info)  Text page via [employer] Paging/Directory   See signed in provider for up to date coverage information  ______________________________________________________________________    Interval History   Pt seen from door given sleeping today and  to bedside, no issues per  or nursing    Physical Exam   Vital Signs:               O2 Device: Nasal cannula Oxygen Delivery: 3 LPM  Weight: 166 lbs 7.16 oz  Gen: sleeping and appears comforable  Chest: nonlabored breathing     Medical Decision Making       15 MINUTES SPENT BY ME on the date of service doing chart review, history, exam, documentation & further activities per the note.      Data

## 2023-07-05 NOTE — PROGRESS NOTES
[facility]  WO Nurse Inpatient Assessment     Consulted for: Bilateral lower calf wounds, sacral wound    Patient transitioning to comfort cares today. Discussed wound care with patient and patient's desire to have minimal pain with cares. [facility] will update orders below.     Patient History (according to H&P provider note(s) 5/17/23:      Jeanine Schuler is a 62 year old female with a past medical history of HTN, chronic atrial fibrillation on anticouagulation until recently, multiple autoimmune diseases, sarcoidosis, pulmonary hypertension and CHF, who presented to our service with shortness of breath in rest, anterior pressure like chest pain that irradiated to back and severe weakness that worsen today. For the last couple of months she has generally felt unwell with nausea, poor apetite (last meal 5 days ago) and progressive debilitating weakness to the point of difficulty deambulating. 1 month ago she fell from her wheel chair and injured both legs, needing stitches and 2 rounds of bactrim due to infection. This wounds have been very painful and have contributed for her bedridden status.        Assessment:      Areas visualized during today's visit: Lower extremities     Wound location: Bilateral LE- seen weekly on Tuesdays 6/14 Left medial                                               6/20 Left medial        6/14 Right lateral                                              6/20 Right lateral     Last photo: 6/20/23  Wound due to: Trauma  Wound history/plan of care: resulted from a fall- see HPI  Wound base: LLE 70/30 % eschar and slough/non-granular tissue- softening, RLE 80 % superficial scab and slough 20% dermis      Palpation of the wound bed: normal      Drainage: moderate from RLE, copious from LLE     Description of drainage: serosanguinous     Measurements (length x width x depth, in cm): LLE 5.5  x 5.5  x  0.4 cm, RLE 4  x 1.9  x  0.3 cm       Tunneling: N/A     Undermining: N/A  Periwound skin: Intact, Superficial erosion and Scar tissue      Color: normal and consistent with surrounding tissue and red      Temperature: normal   Odor: none  Pain: severe and during dressing change, sharp and tender  Pain interventions prior to dressing change: soaking and slow and gentle cares   Treatment goal: Heal , Infection control/prevention, Maintain (prevention of deterioration), Pain control and Protection  STATUS: improving slowly  Supplies ordered: gathered, discussed with RN and discussed with patient      7/5: Pt declined turning today - remains on comfort cares and POC in place.     Pressure Injury Location: sacrum     6/16 6/20  Last photo: 6/16  Wound type: Pressure Injury     Pressure Injury Stage: 2, hospital acquired   Wound history/plan of care:  Noted by RN during skin assessment/cares  Wound base: 100 % dermis     Palpation of the wound bed: normal      Drainage: small     Description of drainage: serosanguinous     Measurements (length x width x depth, in cm) 1 x 1 x 0.5 cm  Periwound skin: Intact      Color: normal and consistent with surrounding tissue      Temperature: normal   Odor: none  Pain: denies , none  Pain intervention prior to dressing change: no significant pain present   Treatment goal: Heal  and Protection  STATUS: deteriorating  Supplies ordered: discussed with RN and discussed with patient     Treatment Plan:     Sacrum: Every 3 days and as needed  Cleanse the area with microklenz and pat dry.  Apply No sting film barrier to periwound skin.  Cover wound with Sacral Mepilex (#[id number])  Only ONE incontinent pad under pt to allow bed to work properly  Change dressing Q 3 days.  Turn and reposition Q 2hrs side to side only.  Ensure pt has Kenneth-cushion while sitting up in the chair.    FYI- If pt has constant incontinent loose stools needing dressing changes Q shift please  "discontinue the Mepilex dressing and apply criticaid barrier paste BID and PRN.     Bilateral LE wound(s): Every other day  And PRN when soiled change cover dressing.  PLEASE DO NOT HOLD/MOVE PATIENT'S LEG BY THE ANKLE- HOLD FOOT AND CALF TO MOVE LEG.  Gently remove old dressing- please spray with wound cleanser or NS to loosen if sticking to wound bed  Clean wound bed with Vashe (order #[id number])and gauze.   Cut a piece Polymem foam (order #[id number]) and place over wound beds.  Secure dressing with bilateral EdemaWear Stockings (stockings can be reused up to 6 months, wash with mild soap and water and air dry).  Use Prevalon boots while in bed. Continue to elevate legs as needed to float heels even when boots in use. May need 1-3 pillows to fully offload heels.     EdemaWear stockings: Use and Care    Rationale for use:     Decreases edema by improving lymphatic and venous function      Safe and gentle compression    Enhances wound healing    Protects skin    General:     EdemaWear can be worn 24/7, but should be removed at least daily for skin inspection and cares      In order to be effective, EdemaWear should DIRECTLY contact the skin as much as possible -- the mesh weave promotes lymphatic drainage when it is pressed into the skin    Choose appropriate size EdemaWear based on leg (or arm) circumference - see table for guidelines    EdemaWear LITE is only for especially fragile or painful skin    Cleanse and moisturize any intact or scaly skin before applying EdemaWear    Ok to apply additional compression over the EdemaWear (ie Lymph wraps)    Application:     Apply the EdemaWear from base of toes to knee, or above knee if tolerated and it is a long stocking    Create a wide 3\" cuff at the top if needed to prevent rolling down    Only trim the stocking if it is excessively long; do NOT cut in half; can often fold over excess length onto foot    When wounds are present:    Wound dressings that directly treat the " "wound bed can be applied under the EdemaWear    Any additional cover dressings (dry gauze, ABD pads, Kerlix, etc) should be applied ON TOP of the stockings whenever feasible     Stockings may get soiled with drainage and will need to be washed; ensure an extra clean, dry pair always available    May need two people to apply the stocking - bunch up stocking and pull against each other, lifting over wounds    Care:    When stockings are soiled, DO NOT THROW AWAY, hand wash with mild soap, rinse, hang dry    Replace approximately every 4 to 6 months        EdemaWear size Max circumference Stripe color PS # # stockings per pack   Small 18\"  (45cm) navy 946156 2   Medium 30\"  (75cm) yellow 506026 1   Large 46\"  (115cm) red 660831 1   X Large 60\"  (150cm) aqua 029578 1   Small LITE 24\"  (60cm) purple 040076 2   Medium LITE 36\"  (90cm) orange 963437 1       Orders: Updated    RECOMMEND PRIMARY TEAM ORDER: wound clinic and home care when discharged for follow-up on wounds  Education provided: plan of care, wound progress and Infection prevention   Discussed plan of care with: Patient and Nurse  WOC nurse follow-up plan: weekly  Notify WOC if wound(s) deteriorate.  Nursing to notify the Provider(s) and re-consult the WOC Nurse if new skin concern.    DATA:     Current support surface: Standard  Standard gel/foam mattress (IsoFlex, Atmos air, etc)  Containment of urine/stool: Incontinent pad in bed  BMI: Body mass index is 24.58 kg/m .   Active diet order: Orders Placed This Encounter      Regular Diet Adult Thin Liquids (level 0)     Output: I/O last 3 completed shifts:  In: -   Out: 1750 [Urine:1750]     Labs:   No lab results found in last 7 days.    Invalid input(s): GLUCOMBO  Pressure injury risk assessment:   Sensory Perception: 3-->slightly limited  Moisture: 4-->rarely moist  Activity: 1-->bedfast  Mobility: 2-->very limited  Nutrition: 2-->probably inadequate  Friction and Shear: 2-->potential problem  Froy Score: " 14      Pager no longer is use, please contact through [employer] group: [facility] Nurse  Dept. Office Number: *[phone]

## 2023-07-05 NOTE — PROGRESS NOTES
Care Management Follow Up    Length of Stay (days): 50    Expected Discharge Date: 07/06/2023     Concerns to be Addressed: discharge planning     Patient plan of care discussed at interdisciplinary rounds: Yes    Anticipated Discharge Disposition: Home, Long Term Care     Anticipated Discharge Services: None  Anticipated Discharge DME: Other (see comment) (TBD)    Patient/family educated on Medicare website which has current facility and service quality ratings: no  Education Provided on the Discharge Plan: No  Patient/Family in Agreement with the Plan: unable to assess    Referrals Placed by CM/SW: Internal Clinic Care Coordination  Private pay costs discussed: private room/amenity fees    Additional Information:     PENDING:   [place] [facility]  [address]  [facility] [address]  P: [phone]   Facility is private pay or LTC coverage only - $2705 up front, $495/day, No Medical Assistance. Allows family to stay overnight. Start in shared room then go to private suite if they are closer.  7/5: Sent Epic referral. Have bed avail for Female and are willing to look at her referral.        Accepted referrals:  [facility] [place]  PH: [phone]  F: [phone]   6/29: Sent referral via Epic. Accepted.  7/3: VM left with admissions to see if patient family can stay overnight.   7/4: No VM rec'd from admissions on unit  phone.  spoke with Yessy ([facility] liaison [phone])   7/5: Left VM- awaiting follow up     Declined:  [facility]  [address] [place] [address] [phone]  6/29: Made referral via Epic.  7/4: VM left with care center office to get status update of referral. Requested call back to unit SW tomorrow. Contact information provided.  7/5: no LTC beds and 6 person long wait list     [facility] [place] [facility]  [address] [place] [address] [phone]  6/29: Sent referral via Epic.   7/4: No one in  admissions d/t holiday.  7/5: no LTC beds     [facility]  [address] [place] [address]  [phone]  6/29: Sent referral via Epic.  7/4: did not receive referral and do not have any LTC beds available     7/5: still no beds available and at least a month         PH: [phone]  F: [phone]   6/29: Sent referral via Epic. Accepted.  7/3: Patient declined d/t family not being able to stay overnight.       [facility]  PH: [phone]   F: [phone]  6/29: Left VM to see if they have openings. Made referral via Epic. Declined via Epic. No beds open.  7/5: left VM  7/6: no beds- long waitlist     [facility]  PH: [phone]  F: [phone]  6/28- Sent referral via Epic. Left VM to see if they have openings.  6/29: They received the fax. They are full right now in LTC area. They have a waitlist right now and 4 females who are transferring to LTC area.  7/5: They will follow up- left contact info.   7/6: wait list of 6 months - year     [facility]  [address] [address]  [phone]  6/28: Sent referral via Epic.  6/29: Received VM stating they declined referral.   7/5: called and kept ringing, wouldn't allow a VM      Residential Hospice Options- no referrals sent yet:    -[facility] in [place] ([phone])- left VM to see if family can stay overnight. Left unit SW contact information for them to follow up on Wednesday (7/5/23).     [facility] in [facility] ([phone])- left VM to see if family can stay overnight. Rec'd call back at 1525 stating that family can stay overnight and there is no extra charge. Meals and housing (have guest rooms available) provided in daily rate.        Other LTC Options - no referrals sent yet:  [facility]  [address] [place] [address]  Phone: [phone] -vm left to ask  if family can stay over night. No referral sent yet.      [facility]  [address] [place] [address]  Phone: [phone]  -under construction and no admissions in today. Charge RN was unsure of bed availability. Advised to call back on 7/5/23. Per nurse, typically, one family member is able to stay overnight, but that needs to be confirmed with management. No referral sent yet.        YOKASTA Camp, [facility]  Float   Covering [facility]   Ph: [phone]

## 2023-07-05 NOTE — PLAN OF CARE
O2: Stable on 3L of oxygen, shallow breathing.   Output: Voiding adequate, output 1100 mL, rolle in place.   Last BM: Last BM 7/4/23, small soft   Activity: Bedfast, assist x2 with repositioning   Skin: Wounds on BLE, sacrum, edema in extremities.   Pain: Pain rated 6/10, managed with PRN and scheduled morphine,  and PRN atarax x1, zanaflex x1   Neuro: A & O x4, calm and cooperative   Dressing: Mepilex on sacrum and on BLE wounds   Diet: Regular diet with thin liquids   LDA: No IV   Equipment: Personal belongings in room ,call light, kangaroo pump, family at bedside.   Plan: Continue with plan of care   Additional Info: Patient wanted 10 mg of morphine when writer went in to give her 2200 scheduled morphine, the 5 mg scheduled dose was canceled and the 10 mg PRN dose was administered at this time. Ayi Lailexis machine was still showing undocumented waste, pharmacy was called, and Bacilio from pharmacy said to have another nurse witness the waste, so charge nurse (Flavia ) witness with writer.

## 2023-07-05 NOTE — PROGRESS NOTES
[facility]    Medicine Progress Note - Hospitalist Service, GOLD TEAM 20    Date of Admission:  5/16/2023    Updates today:  - Patient is ready for discharge to hospice once placement available    Assessment & Plan   Jeanine Schuler is a 61 yo female w/ past medical of HTN, HFpEF (LVEF 45-50%), pulmonary HTN, paroxysmal afib on chronic anticoagulation, interstitial lung disease, sarcoidosis, mixed connective tissue disease, Raynaud's, and chronic dysphagia who was admitted on 5/16/2023 with failure to thrive and found to have multiple R sided pulmonary emboli and bilateral lower extremity DVT's. Transferred to the ICU 6/11 for worsening SOB and increased O2 needs.  CT PE protocol done 6/11 morning showed large bilateral pleural effusion, volume overload, and concern for pneumonia, transitioned to comfort cares 6/26/23.     #  Goals of care assessment  #  Acute hypoxic respiratory failure, multifactorial  #  Pulmonary sarcoidosis  #  Hospital acquired pneumonia  #  Bilateral loculated pleural effusions  #  Pulmonary edema  See note 6/23 for further details. Repeat CT chest 6/19 showed decreased R partially loculated effusion and stable L partially loculated effusion. Initial plan was for lytics to R chest tube and placement of new L chest tube, however patient requested transitioned to hospice on 6/21 due to poor QOL. She would like to focus on comfort. Palliative care consulted. Patient more comfortable with better management of pain and air hunger. Initially kept R chest tube in place while it was draining and has been removed per IR 6/27. On higher dose steroids to see if this will result in more immediate improvement in QOL. She does not want additional procedures or further escalation of care at this time. IR consulted 6/27 and R chest tube removed bedside. Mentating well and speaking clearly on 7/3.   - Pulmonology, Rheumatology, and Palliative Care consulted  and appreciated rec's  - Transitioned to comfort cares 6/26 after thorough d/w Palliative care  - Discharge to hospice facility when bed becomes available.   - Ordered comfort cares via order set (save meds as this will be done by Palliative Care).   - Continue prednisone 15 mg daily  - Morphine 5mg q4h, hold if sleeping/sedated   - Morphine 2-4mg IV or 10-15mg SL q2h prn for pain and air hunger  - Ativan 0.5mg IV q4h prn for anxiety and air hunger  - No BIPAP  - No further procedures.    - Continue supplemental O2 prn, goal SpO2 >90 and comfort     #  Paroxysmal Atrial fibrillation   #  HTN  #  HFpEF  #  Pulmonary HTN  #  R heart stain based on CT chest   #  Elevated troponin, suspect type II NSTEMI  TTE 6/3/23:  EF 45-50%, no RWMA, normal RV size/fucntion, pulmonary artery systolic pressure normal, no significant valvular heart disease, no effusion.  Clinically stable. RRR on exam 7/3.  - Continue PTA Amiodarone 200 mg PO daily for rhythm control if deemed ok to continue per Palliative care  - Cardiology consulted (signed off)     #  Dysphagia, resolved  #  Severe protein calorie malnutrition   #  Constipation   #  History of gastric bypass surgery   GI previously consulted. Dysmotility esophagogram 6/7 limited due to patients mobility but no major strictures noted.   - Regular diet for comfort     #  Anasarca   - No further daily wts or strict I&Os now on comfort cares     #  Hypomagnesemia  #  Hypokalemia  #  Hyponatremia  - No further blood draws now on comfort cares     #  Chronic anticoagulation   #  Acute provoked b/l LE DVT on 5/16, this admission  #  Acute provoked right lung PE  - Apixaban 5 mg PO BID      #  Mixed connective tissue disease  #  Limited cutaneous scleroderma  #  Severe Raynaud's   #  Chronic steroid use  #  Hypoglycemia, resolved  - Prednisone as above      #  Generalized weakness and deconditioning   #  Failure to thrive   - No further therapies now on comfort cares     #  Right breast  skin thickening and nipple retraction on CT (incidental CT finding)  - Comfort cares         Diet: Snacks/Supplements Adult: Ensure Enlive; With Meals  Regular Diet Adult Thin Liquids (level 0)    DVT Prophylaxis: VTE Prophylaxis contraindicated due to CMO  Deleon Catheter: PRESENT, indication: Retention  Lines: None     Cardiac Monitoring: None  Code Status: No CPR- Do NOT Intubate      Clinically Significant Risk Factors              # Hypoalbuminemia: Lowest albumin = 1.6 g/dL at 6/20/2023  3:36 AM, will monitor as appropriate     # Hypertension: Noted on problem list         # Severe Malnutrition: based on nutrition assessment         Disposition Plan      Expected Discharge Date: 07/06/2023      Destination: home;long-term care facility  Discharge Comments: care conference Monday 6/12 at 1pm  waiting on LTC placement with hospice.        The patient's care was discussed with the Patient and Patient's Family.    Becca Virk PA-C  Hospitalist Service, GOLD TEAM 20  [facility]  Securely message with Vocera (more info)  Text page via [employer] Paging/Directory   See signed in provider for up to date coverage information  ______________________________________________________________________    Interval History    Patient has family visiting to bedside including her daughter and grandchildren.  She notes that she is seeing more hallucinations for example she noticed that when providers walked in the room we appeared to be wearing hats    She is otherwise comfortable     Physical Exam   Vital Signs:       Pulse: 82   Resp: 16 SpO2: 100 % O2 Device: Nasal cannula Oxygen Delivery: 3 LPM  Weight: 166 lbs 7.16 oz  Generalized: A&O, NAD  PULM: nonlabored on supplemental O2      Medical Decision Making       35 MINUTES SPENT BY ME on the date of service doing chart review, history, exam, documentation & further activities per the note.      Data

## 2023-07-05 NOTE — PROGRESS NOTES
Care Management Follow Up    Length of Stay (days): 51    Expected Discharge Date: 07/07/2023     Concerns to be Addressed: discharge planning     Patient plan of care discussed at interdisciplinary rounds: Yes    Anticipated Discharge Disposition: Home, Long Term Care     Anticipated Discharge Services: None  Anticipated Discharge DME: Other (see comment) (TBD)    Patient/family educated on Medicare website which has current facility and service quality ratings: no  Education Provided on the Discharge Plan: No  Patient/Family in Agreement with the Plan: unable to assess    Referrals Placed by CM/SW: Internal Clinic Care Coordination  Private pay costs discussed: transportation costs and Not applicable    Additional Information:  7/6: [facility] hospice: Miryam called and confirmed that she had tried to call the son Yusuf many times and has not gotten in touch. This SW confirmed that Miryam had the right number and then provided the  Siva's phone number just to check in and confirm she is following.     Spoke to [facility] and they do have availability today. They are willing to accept her at any time and allow family to stay with her on a week-by-week basis. If no one uses the family the suite then they are able to stay there until someone else would need it. They are also able to stay in the room with her if they would like. We would need O2 to be delivered before she arrives.     Spoke with family to give them the above update.    Spoke to Miryam with hospice to let her know that the family is discussing this option and calling son to reach out to her as well.     Meet with Miryam from [facility] and the family in the room to discuss questions and concerns. Agreed to go to [facility] tomorrow morning. Miryam has ordered the oxygen for the patient and expects it to arrive at the facility tonight.     Called [facility] to let them know the update and they are agreeable to  take her tomorrow.       Contacts:  [facility]  PH: [phone]  F: [phone]        PENDING:   [facility]  [address]  [facility] [address]  P: [phone]   Facility is private pay or LTC coverage only - $2705 up front, $495/day, No Medical Assistance. Allows family to stay overnight. Start in shared room then go to private suite if they are closer.  7/5: Sent Epic referral. Have bed avail for Female and are willing to look at her referral.     Accepted referrals:  [facility]  PH: [phone]  F: [phone]   6/29: Sent referral via Epic. Accepted.  7/3: VM left with admissions to see if patient family can stay overnight.   7/4: No VM rec'd from admissions on unit  phone.  spoke with Yessy ([facility] liaison [phone])   7/5: Left VM- awaiting follow up  7/6: Still have not called back after 3 days of Voice mails     Declined:  [facility]  [address] [place] [address] [phone]  6/29: Made referral via Epic.  7/4: VM left with care center office to get status update of referral. Requested call back to unit SW tomorrow. Contact information provided.  7/5: no LTC beds and 6 person long wait list     [facility]  [address] [place] [address] [phone]  6/29: Sent referral via Epic.   7/4: No one in admissions d/t holiday.  7/5: no LTC beds      [facility]  [address] [place] [address] [phone]  6/29: Sent referral via Epic.  7/4: did not receive referral and do not have any LTC beds available     7/5: still no beds available and at least a month          PH: [phone]  F: [phone]   6/29: Sent referral via Epic. Accepted.  7/3: Patient declined d/t family not being able to stay overnight.       [facility]  PH: [phone]   F: [phone]  6/29: Left VM to see if they have openings. Made referral via  Epic. Declined via Epic. No beds open.  7/5: left VM  7/6: no beds- long waitlist     [facility]  PH: [phone]  F: [phone]  6/28- Sent referral via Epic. Left VM to see if they have openings.  6/29: They received the fax. They are full right now in LTC area. They have a waitlist right now and 4 females who are transferring to LTC area.  7/5: They will follow up- left contact info.   7/6: wait list of 6 months - year      [address] [address]  [phone]  6/28: Sent referral via [facility].  6/29: Received VM stating they declined referral.   7/5: called and kept ringing, wouldn't allow a VM         YOKASTA Camp, [facility]  Float   Covering 6MED   Ph: [phone]

## 2023-07-05 NOTE — PROGRESS NOTES
Pt A&Ox4. Able to make needs known. 3 L O2 via NC. No IV access. Assist of 2 with repositioning and laureano cares. Incontinent bowel and bladder -- last BM 7/5. Deleon catheter in place and draining. Sacrum wound noted and cleaned per WOC orders -- covered with Mepilex. Denies SOB, chest pain, and nausea/vomiting. Pt reports numbness/tingling in bilateral hands and feet. BLE wounds noted and changed 7/5. Pt c/o L shoulder and hip pain; pain managed with PRNs. No acute events. Call light in reach. Hospice/comfort cares.

## 2023-07-06 NOTE — PROGRESS NOTES
[facility]    Medicine Progress Note - Hospitalist Service, GOLD TEAM 20    Date of Admission:  5/16/2023    Updates today:  - plan for discharge to hospice tomorrow     Assessment & Plan   Jeanine Schuler is a 61 yo female w/ past medical of HTN, HFpEF (LVEF 45-50%), pulmonary HTN, paroxysmal afib on chronic anticoagulation, interstitial lung disease, sarcoidosis, mixed connective tissue disease, Raynaud's, and chronic dysphagia who was admitted on 5/16/2023 with failure to thrive and found to have multiple R sided pulmonary emboli and bilateral lower extremity DVT's. Transferred to the ICU 6/11 for worsening SOB and increased O2 needs.  CT PE protocol done 6/11 morning showed large bilateral pleural effusion, volume overload, and concern for pneumonia, transitioned to comfort cares 6/26/23.     #  Goals of care assessment  #  Acute hypoxic respiratory failure, multifactorial  #  Pulmonary sarcoidosis  #  Hospital acquired pneumonia  #  Bilateral loculated pleural effusions  #  Pulmonary edema  See note 6/23 for further details. Repeat CT chest 6/19 showed decreased R partially loculated effusion and stable L partially loculated effusion. Initial plan was for lytics to R chest tube and placement of new L chest tube, however patient requested transitioned to hospice on 6/21 due to poor QOL. She would like to focus on comfort. Palliative care consulted. Patient more comfortable with better management of pain and air hunger. Initially kept R chest tube in place while it was draining and has been removed per IR 6/27. On higher dose steroids to see if this will result in more immediate improvement in QOL. She does not want additional procedures or further escalation of care at this time. IR consulted 6/27 and R chest tube removed bedside. Mentating well and speaking clearly on 7/3.   - Pulmonology, Rheumatology, and Palliative Care consulted and appreciated rec's  -  Transitioned to comfort cares 6/26 after thorough d/w Palliative care  - Discharge to hospice facility when bed becomes available.   - Ordered comfort cares via order set (save meds as this will be done by Palliative Care).   - Continue prednisone 15 mg daily  - Morphine 5mg q4h, hold if sleeping/sedated   - Morphine 2-4mg IV or 10-15mg SL q2h prn for pain and air hunger  - Ativan 0.5mg IV q4h prn for anxiety and air hunger  - No BIPAP  - No further procedures.    - Continue supplemental O2 prn, goal SpO2 >90 and comfort     #  Paroxysmal Atrial fibrillation   #  HTN  #  HFpEF  #  Pulmonary HTN  #  R heart stain based on CT chest   #  Elevated troponin, suspect type II NSTEMI  TTE 6/3/23:  EF 45-50%, no RWMA, normal RV size/fucntion, pulmonary artery systolic pressure normal, no significant valvular heart disease, no effusion.  Clinically stable. RRR on exam 7/3.  - Continue PTA Amiodarone 200 mg PO daily for rhythm control if deemed ok to continue per Palliative care  - Cardiology consulted (signed off)     #  Dysphagia, resolved  #  Severe protein calorie malnutrition   #  Constipation   #  History of gastric bypass surgery   GI previously consulted. Dysmotility esophagogram 6/7 limited due to patients mobility but no major strictures noted.   - Regular diet for comfort     #  Anasarca   - No further daily wts or strict I&Os now on comfort cares     #  Hypomagnesemia  #  Hypokalemia  #  Hyponatremia  - No further blood draws now on comfort cares     #  Chronic anticoagulation   #  Acute provoked b/l LE DVT on 5/16, this admission  #  Acute provoked right lung PE  - Apixaban 5 mg PO BID      #  Mixed connective tissue disease  #  Limited cutaneous scleroderma  #  Severe Raynaud's   #  Chronic steroid use  #  Hypoglycemia, resolved  - Prednisone as above      #  Generalized weakness and deconditioning   #  Failure to thrive   - No further therapies now on comfort cares     #  Right breast skin thickening and nipple  retraction on CT (incidental CT finding)  - Comfort cares           Diet: Snacks/Supplements Adult: Ensure Enlive; With Meals  Regular Diet Adult Thin Liquids (level 0)    DVT Prophylaxis: VTE Prophylaxis contraindicated due to CMO    Deleon Catheter: PRESENT, indication: Retention  Lines: None     Cardiac Monitoring: None  Code Status: No CPR- Do NOT Intubate      Clinically Significant Risk Factors              # Hypoalbuminemia: Lowest albumin = 1.6 g/dL at 6/20/2023  3:36 AM, will monitor as appropriate     # Hypertension: Noted on problem list         # Severe Malnutrition: based on nutrition assessment         Disposition Plan     Expected Discharge Date: 07/06/2023      Destination: home;long-term care facility  Discharge Comments: care conference Monday 6/12 at 1pm  waiting on LTC placement with hospice.        The patient's care was discussed with the Care Coordinator/, Patient and Patient's Family.    Becca Virk PA-C  Hospitalist Service, GOLD TEAM 20  [facility]  Securely message with [employer] (more info)  Text page via [facility] Paging/Directory   See signed in provider for up to date coverage information  ______________________________________________________________________    Interval History   Jeanine is doing okay today but appears to be having some hallucinations and states that she was planning to do a cardiac surgery on a gentleman appears to be hallucinating with this.  He does not endorse having any overt complaints and notes that her pain is being treated.  Her  has been present at bedside offering support.    Physical Exam   Vital Signs:       Pulse: 82   Resp: 16 SpO2: 100 % O2 Device: Nasal cannula Oxygen Delivery: 3 LPM  Weight: 166 lbs 7.16 oz  GENERAL: Alert and oriented to person. NAD.  Sitting at 60 degrees in bed. Cooperative.   HEENT: Anicteric sclera. NC. AT.  Pupils equal and round  CV: RRR. S1, S2. No murmurs  appreciated.   RESPIRATORY: Effort normal on 3 L NC. Lungs trace bilateral rales, otherwise CTAB with no wheezing,  rhonchi.   GI: Abdomen scaphoid, NABS.   EXTREMITIES: Dependent bilateral LE edema, heels floated in air boots.      Medical Decision Making       25 MINUTES SPENT BY ME on the date of service doing chart review, history, exam, documentation & further activities per the note.      Data

## 2023-07-06 NOTE — PROGRESS NOTES
"SPIRITUAL HEALTH SERVICES Progress Note  [facility] 6B    I provided a follow up visit for Jeanine. During my brief visit she was in and out of sleep. She said she feels \"confused\" but was no able to elaborate. She also mistook me for the RN and gave instructions on crushing up her pills. Lexx is with her today but was not in the room at the same time as me. Jeanine did not identify any spiritual health needs at this time.    Jay Le MDiv  Chaplain Resident  Pager [phone]      * [facility] remains available 24/7 for emergent requests/referrals, either by having the switchboard page the on-call  or by entering an ASAP/STAT consult in Epic (this will also page the on-call ). Routine Epic consults receive an initial response within 24 hours.*    "

## 2023-07-06 NOTE — PROGRESS NOTES
Patient is alert and oriented x3. Able to make needs known. On 3L NC. Schedule and prn morphine administered throughout the shift. Pt denies SOB, N/V. Turn/repo Q2H. LBM 7/6. Pt assist of 2, bed bath given, wound care to BLE legs and  sacrum completed. Deleon cath in place and draining.  Pt taking sips of water.  at bedside. Pt discharging 7/7 @ [id number] to [facility]. With [facility]

## 2023-07-06 NOTE — PROGRESS NOTES
Care Management Discharge Note    Discharge Date: 07/07/2023       Discharge Disposition: [facility]. With [facility] Team via Stretcher    Discharge Services: [facility] ([phone])   Discharge DME: O2    Discharge Transportation: Stretcher Ride: pickup window: 8:45-9:30 AM.    Private pay costs discussed: transportation costs    Patient/family educated on Medicare website which has current facility and service quality ratings: no    Education Provided on the Discharge Plan: yes  Persons Notified of Discharge Plans: Patient, family, providers, charge, facility, hospice team  Patient/Family in Agreement with the Plan: yes    Handoff Referral Completed: No    Additional Information:  Updated the Son via phone call about his mom agreeing to go to [facility] while being followed up with [facility] hospice. Went over that the $2705 check needs to be given and that Nicolas the spouse will follow the ground transport to the facility. Yusuf plans to show up tomorrow either at the hospital or at the facility.    Miryam is ordering oxygen to the facility and it will arrive tonight.     Updated Provider Becca Virk and explained she will need 3 days worth of meds which the  or a nurse can pickup from the pharmacy before discharge to take with to the facility. Printed POLST to get Provider to go over with patient and get signed. Will put in chart/discharge packet for  or nurse to give to EMS for the stretcher ride to have a copy in their records.     PCS form done and in chart.        Contacts:  [facility]  [address]  [facility] [address]  P: [phone]      [facility]  Miryam: [phone]  PH: [phone]  F: [phone]        YOKASTA Camp, LGSW  Float   Covering 6MED   Ph: [phone]

## 2023-07-06 NOTE — DISCHARGE SUMMARY
[facility]  Hospitalist Discharge Summary      Date of Admission:  5/16/2023  Date of Discharge:  7/7/2023  9:11 AM  Discharging Provider: Becca Virk PA-C    Discharge Diagnoses   End of life cares  Cachexia and severe malnutrition 2/2 dysphagia   Pulmonary sarcoidosis  Pulmonary edema  Hospital acquired pneumonia   Pulmonary HTN  HTN  HFpEF (LVEF 45-50%)  Paroxysmal afib on chronic anticoagulation  Interstitial lung disease  Mixed connective tissue disease  Raynaud's  Anasarca  Acute provoked b/l LE DVT on 5/16, this admission  Acute provoked right lung PE  Severe protein calorie malnutrition   Constipation   History of gastric bypass surgery   Anasarca   Hypomagnesemia  Hypokalemia  Hyponatremia  Generalized weakness and deconditioning   Failure to thrive  Right breast skin thickening and nipple retraction on CT (incidental CT finding)      Clinically Significant Risk Factors     # Severe Malnutrition: based on nutrition assessment      Follow-ups Needed After Discharge   Follow-up Appointments     Follow Up and recommended labs and tests      Follow up with provider in hospice or your primary care provider          Jeanine Schuler is being readmitted to inpatient hospice    Discharge Disposition   Discharged to inpatient hospice  Condition at discharge: Guarded    Hospital Course     In brief:     Jeanine Schuler is a 61 yo female w/ past medical of HTN, HFpEF (LVEF 45-50%), pulmonary HTN, paroxysmal afib on chronic anticoagulation, interstitial lung disease, sarcoidosis, mixed connective tissue disease, Raynaud's, and chronic dysphagia who was admitted on 5/16/2023 with failure to thrive and found to have multiple R sided pulmonary emboli and bilateral lower extremity DVT's. Transferred to the ICU 6/11 for worsening SOB and increased O2 needs.  CT PE protocol done 6/11 morning showed large bilateral pleural effusion, volume overload, and concern for  pneumonia, transitioned to comfort cares 6/26/23. She was ready to transition to hospice and was accepted to hospice care on 7/7/23.    -----------------------------------------------------------------------------------------------------------------  Extended problem-based course:    #  Goals of care assessment  #  Acute hypoxic respiratory failure, multifactorial  #  Pulmonary sarcoidosis  #  Hospital acquired pneumonia  #  Bilateral loculated pleural effusions  #  Pulmonary edema  Developed progressive hypoxia 6/11. Transferred to ICU. Repeat CT PE protocol showed resolution of previously noted PE, significant increase in R partially loculated pleural effusion and ongoing pulmonary edema. Fluid exudative c/f parapneumonic vs pulmonary sarcoidosis. S/p bilateral thoracentesis 6/12, with only 30 ml out from R loculated effusion. IR chest tube placement 6/12.  Repeat CT chest 6/19 showed decreased R partially loculated effusion and stable L partially loculated effusion. Initial plan was for lytics to R chest tube and placement of new L chest tube, however patient requested transitioned to hospice on 6/21 due to poor QOL. She would like to focus on comfort. Palliative care consulted. Patient more comfortable with better management of pain and air hunger. Initially kept R chest tube in place while it was draining and has been removed per IR 6/27. On higher dose steroids to see if this will result in more immediate improvement in QOL. She does not want additional procedures or further escalation of care at this time. IR consulted 6/27 and R chest tube removed bedside. Mentating well and speaking clearly on 7/3.  Pulmonology, Rheumatology, and Palliative Care consulted and appreciated during this hospitalization.   - discharge to hospice care  - POLST completed prior to transfer  - Continue prednisone 15 mg daily  - Morphine 5mg q4h prn  - Morphine 2-4mg IV or 10-15mg SL q2h prn for pain and air hunger  - Ativan 0.5mg IV  q4h prn for anxiety and air hunger  - No BIPAP  - No further procedures.    - Continue supplemental O2 prn, goal SpO2 >90 and comfort     #  Paroxysmal Atrial fibrillation   #  HTN  #  HFpEF  #  Pulmonary HTN  #  R heart stain based on CT chest   #  Elevated troponin, suspect type II NSTEMI  TTE 6/3/23:  EF 45-50%, no RWMA, normal RV size/fucntion, pulmonary artery systolic pressure normal, no significant valvular heart disease, no effusion.  Clinically stable. RRR on exam 7/3. Cardiology saw her inpatient and signed off.   - Continue PTA Amiodarone 200 mg PO daily for rhythm control if deemed ok to continue per Palliative care     #  Dysphagia, resolved  #  Severe protein calorie malnutrition   #  Constipation   #  History of gastric bypass surgery   GI previously consulted. Dysmotility esophagogram 6/7 limited due to patients mobility but no major strictures noted.   - Regular diet for comfort     #  Anasarca   - No further daily wts or strict I&Os now on comfort cares     #  Hypomagnesemia  #  Hypokalemia  #  Hyponatremia  - No further blood draws now on comfort cares     #  Chronic anticoagulation   #  Acute provoked b/l LE DVT on 5/16, this admission  #  Acute provoked right lung PE  - Apixaban 5 mg PO BID, continued but ok to stop if patient desires     #  Mixed connective tissue disease  #  Limited cutaneous scleroderma  #  Severe Raynaud's   #  Chronic steroid use  #  Hypoglycemia, resolved  - Prednisone as above      #  Generalized weakness and deconditioning   #  Failure to thrive   - No further therapies now on comfort cares     #  Right breast skin thickening and nipple retraction on CT (incidental CT finding)  - Comfort cares    Consultations This Hospital Stay   PHARMACY IP CONSULT  PHYSICAL THERAPY ADULT IP CONSULT  OCCUPATIONAL THERAPY ADULT IP CONSULT  WOUND OSTOMY CONTINENCE NURSE  IP CONSULT  NUTRITION SERVICES ADULT IP CONSULT  CARE MANAGEMENT / SOCIAL WORK IP CONSULT  NUTRITION SERVICES ADULT IP  CONSULT  PHARMACY IP CONSULT  INTERVENTIONAL RADIOLOGY ADULT/PEDS IP CONSULT  SURGERY GENERAL ADULT IP CONSULT  PSYCHOLOGY ADULT IP CONSULT  PSYCHOLOGY ADULT IP CONSULT  PALLIATIVE CARE ADULT IP CONSULT  RHEUMATOLOGY IP CONSULT  PULMONARY GENERAL ADULT IP CONSULT  GI LUMINAL ADULT IP CONSULT  PAIN MANAGEMENT ADULT IP CONSULT  SPEECH LANGUAGE PATH ADULT IP CONSULT  GI LUMINAL ADULT IP CONSULT  CARDIOLOGY GENERAL ADULT IP CONSULT  PHARMACY TO DOSE VANCO  INTERVENTIONAL RADIOLOGY ADULT/PEDS IP CONSULT  GI LUMINAL ADULT IP CONSULT  GI PANCREATICOBILIARY ADULT IP CONSULT  WOUND OSTOMY CONTINENCE NURSE  IP CONSULT  PHARMACY LIAISON FOR MEDICATION COVERAGE CONSULT  NUTRITION SERVICES ADULT IP CONSULT  PULMONARY GENERAL ADULT IP CONSULT  INTERVENTIONAL RADIOLOGY ADULT/PEDS IP CONSULT  INTERVENTIONAL RADIOLOGY ADULT/PEDS IP CONSULT  PALLIATIVE CARE ADULT IP CONSULT  NUTRITION SERVICES ADULT IP CONSULT  INTERVENTIONAL RADIOLOGY ADULT/PEDS IP CONSULT  SOCIAL WORK IP CONSULT  NUTRITION SERVICES ADULT IP CONSULT  NUTRITION SERVICES ADULT IP CONSULT  PHARMACY IP CONSULT  PSYCHOLOGY ADULT IP CONSULT  PSYCHOLOGY ADULT IP CONSULT    Code Status   No CPR- Do NOT Intubate    Time Spent on this Encounter   IBecca PA-C, personally saw the patient today and spent greater than 30 minutes discharging this patient.       Becca Virk PA-C  [facility]  [address] [place] [id number]  Phone: [phone]  Fax: [phone]  ______________________________________________________________________    Physical Exam   Vital Signs:               O2 Device: Nasal cannula Oxygen Delivery: 3 LPM  Weight: 166 lbs 7.16 oz  GENERAL: Alert and oriented to person. NAD.  Sitting at 60 degrees in bed. Cooperative.   HEENT: Anicteric sclera. NC. AT.  Pupils equal and round  CV: RRR. S1, S2. No murmurs appreciated.   RESPIRATORY: Effort normal on 3 L NC. Lungs trace bilateral rales, otherwise CTAB with no  wheezing,  rhonchi.   GI: Abdomen scaphoid, NABS.   EXTREMITIES: Dependent bilateral LE edema, heels floated in air boots.       Primary Care Physician   Katia Carver Mai    Discharge Orders      General info for SNF    Length of Stay Estimate: hospice  Condition at Discharge: Terminal  Level of care:skilled   Rehabilitation Potential: Poor  Admission H&P remains valid and up-to-date: Yes  Recent Chemotherapy: N/A  Use Nursing Home Standing Orders: Yes     Mantoux instructions    Give two-step Mantoux (PPD) Per Facility Policy No (if no explain) hospice     Follow Up and recommended labs and tests    Follow up with provider in hospice or your primary care provider     Reason for your hospital stay    Breathing distress caused by pulmonary sarcoidosis and pulmonary edema     Activity - Up with nursing assistance     No CPR- Do NOT Intubate     Diet    Follow this diet upon discharge: Orders Placed This Encounter      Calorie Counts      Snacks/Supplements Adult: Ensure Enlive; With Meals      Regular Diet Adult Thin Liquids (level 0)       Significant Results and Procedures   Labs are available per this chart encounter and were reviewed    Discharge Medications   No current facility-administered medications for this encounter.    Current Outpatient Medications:      acetaminophen (TYLENOL) 325 MG tablet, Take 975 mg by mouth every 8 hours as needed for mild pain, Disp: , Rfl:      albuterol (ACCUNEB) 0.63 MG/3ML neb solution, Take 3 mLs (0.63 mg) by nebulization every 6 hours as needed for shortness of breath, wheezing or cough, Disp: 90 mL, Rfl: 0     bisacodyl (DULCOLAX) 10 MG suppository, Place 1 suppository (10 mg) rectally daily as needed for constipation, Disp: , Rfl:      bumetanide (BUMEX) 2 MG tablet, 1 tablet (2 mg) by Oral or Feeding Tube route 3 times daily, Disp: 9 tablet, Rfl: 0     gabapentin (NEURONTIN) 250 MG/5ML solution, 2 mLs (100 mg) by Oral or Feeding Tube route At Bedtime, Disp: 470 mL, Rfl: 0      hydrOXYzine (ATARAX) 25 MG tablet, Take 1-2 tablets (25-50 mg) by mouth 4 times daily as needed for itching or anxiety, Disp: 10 tablet, Rfl: 0     ipratropium - albuterol 0.5 mg/2.5 mg/3 mL (DUONEB) 0.5-2.5 (3) MG/3ML neb solution, Take 1 vial (3 mLs) by nebulization every 6 hours as needed for shortness of breath, wheezing or cough, Disp: 90 mL, Rfl: 0     Lidocaine (LIDOCARE) 4 % Patch, Place 1 patch onto the skin every 24 hours To prevent lidocaine toxicity, patient should be patch free for 12 hrs daily., Disp: 3 patch, Rfl: 0     LORazepam (ATIVAN) 2 MG/ML (HIGH CONC) oral solution, 0.25-0.5 mLs (0.5-1 mg) by Per PEG tube route every 4 hours as needed for anxiety, nausea or vomiting, Disp: 30 mL, Rfl: 0     morphine sulfate (ROXANOL) 20 mg/mL (HIGH CONC) soln, Take 0.25 mLs (5 mg) by mouth every 4 hours, Disp: 30 mL, Rfl: 0     morphine sulfate (ROXANOL) 20 mg/mL (HIGH CONC) soln, Place 0.25-0.5 mLs (5-10 mg) under the tongue every 2 hours as needed for moderate pain or shortness of breath, Disp: 15 mL, Rfl: 0     ondansetron (ZOFRAN ODT) 4 MG ODT tab, Take 2 tablets (8 mg) by mouth every 8 hours as needed for nausea, Disp: 10 tablet, Rfl: 0     pantoprazole (PROTONIX) 40 MG EC tablet, Take 1 tablet (40 mg) by mouth daily, Disp: 3 tablet, Rfl: 0     predniSONE (DELTASONE) 5 MG tablet, Take 3 tablets (15 mg) by mouth daily for 3 days, Disp: 9 tablet, Rfl: 0     senna-docusate (SENOKOT-S/PERICOLACE) 8.6-50 MG tablet, Take 2 tablets by mouth 2 times daily as needed for constipation, Disp: , Rfl:      thin (NO BRAND SPECIFIED) lancets, Use with lanceting device twice daily, Disp: 100 each, Rfl: 0     tiZANidine (ZANAFLEX) 2 MG tablet, Take 1 tablet (2 mg) by mouth every 6 hours as needed for muscle spasms, Disp: 10 tablet, Rfl: 0     amiodarone (PACERONE) 200 MG tablet, Take 1 tablet (200 mg) by mouth daily, Disp: 30 tablet, Rfl: 0     apixaban ANTICOAGULANT (ELIQUIS) 5 MG tablet, Take 1 tablet (5 mg) by mouth 2  times daily, Disp: 180 tablet, Rfl: 3     blood glucose (NO BRAND SPECIFIED) test strip, Use to test blood sugar 2 times daily or as directed., Disp: 100 strip, Rfl: 6    Allergies   Allergies   Allergen Reactions     Enoxaparin Sodium Other (See Comments)     Blood clot      Norvasc [Amlodipine Besylate] Swelling     Lip swelling that happened on 2 different occasions     Erythromycin Rash     Rash on arms

## 2023-07-07 NOTE — PROGRESS NOTES
[id number]    Plan of Care Reviewed With: Patient    Overall Patient Progress: No Change     Outcome Evaluation: Pt is A & O x4 on 3L of O2 via NC. C/O 6/10 generalized pain - managed w/ scheduled Morphine. Denies nausea, chest pain & SOB. Pt does not have IV access. UTV/WEN BLE & sacrum wounds - dressings changed by nurse yesterday & are CDI. Pt has PEG tube - irrigated during shift & dressing changed during shift, CDI. Pt has rolle catheter in place - adequately draining, emptied various times throughout shift & catheter cares completed w/ catheter wipes. Pt is not oob, assist x2 & uses call light appropriately. Pt repositioned various times throughout shift.        Plan: Discharge to hospice today in AM. Continue w/ plan of care.

## 2023-07-07 NOTE — TELEPHONE ENCOUNTER
Patient Returning Call    Reason for call:  israel wondering if  would sign CTI and Death certification      Information relayed to patient:  *N/A    Patient has additional questions:  No    What are your questions/concerns:  N/A    Could we send this information to you in [facility] or would you prefer to receive a phone call?:   Patient would prefer a phone call   Okay to leave a detailed message?: Yes at Other phone number:  [phone]

## 2023-07-07 NOTE — PROGRESS NOTES
6MS DISCHARGE    D: Patient discharged to [facility] at 0915 via stetcher. Patient accompanied by EMS & .    I: Discharge prescriptions given to pt's spouse. All discharge medications and instructions reviewed with pt & spouse. Patient instructed to seek care if experiencing worsening symptoms. Other phone numbers to call with questions or concerns after discharge reviewed. Education completed.    A: Pt & spouse verbalized understanding of discharge medications and instructions. Prescribed home medications given to patient. Belongings returned to patient.    P: Pt does not have any follow up appointments scheduled.

## 2023-07-07 NOTE — TELEPHONE ENCOUNTER
[facility] notified that Dr. Boss is out of office for 3 weeks.   They will put his name down and have a covering provider sign the form when patient passes.    Nedra Monsivais RN on 7/7/2023 at 4:30 PM

## 2023-07-07 NOTE — PLAN OF CARE
Pt A&Ox3, Comfort care and pain control maintained throughout shift. Q2 hr turn/repo. Fall precautions maintained with bed alarm on, bed locked and in lowest position. Pain managed with scheduled morphine. POC discussed, questions encouraged and answered. No acute events overnight. Plan for discharge to hospice care today. POC continues.

## 2023-07-18 NOTE — PROGRESS NOTES
Prior Authorization **APPROVED**    Authorization Effective Date: 4/1/2023  Authorization Expiration Date: 6/30/2024  Medication: LORAZEPAM 2 MG/ML PO CONC  Approved Dose/Quantity: -  Reference #: CoverMyMeds Key: [id number], Case #: [id number]   [facility] Phone [phone] Fax [phone]  Expected CoPay: $10.00     CoPay Card Available: No    Which Pharmacy is filling the prescription (Not needed for infusion/clinic administered): [facility] [place], [place] - [address]  Pharmacy Notified: Yes  Patient Notified: Yes  Comments:  HRM (high risk medication) for elderly patients per [place] Criteria List--[place] required. Requires acknowledgement that the prescriber has assessed risk versus benefit in using this High Risk Medication (HRM). Hospice RX benefit not yet active for billing meds. Discharge pharmacy sent patient with supply while auth is pending. *Retroactively   Billed*            Rola Gooden CPhT  [facility] Liaison  Pronouns: She/Her/Hers    [facility]  [address]  [address], [place] [address]   [email or web address]  [email or web address]   Phone: [phone]  Pager: [phone]  Fax: [phone]

## 2023-07-27 ENCOUNTER — DOCUMENTATION ONLY (OUTPATIENT)
Dept: OTHER | Facility: CLINIC | Age: 63
End: 2023-07-27
Payer: COMMERCIAL

## 2023-08-28 NOTE — PROGRESS NOTES
Assessment & Plan       ICD-10-CM    1. Hospital discharge follow-up  Z09    2. Chronic atrial fibrillation (H)  I48.20 ASPIRIN NOT PRESCRIBED (INTENTIONAL)   3. Chronic systolic congestive heart failure (H)  I50.22 ASPIRIN NOT PRESCRIBED (INTENTIONAL)   4. ILD (interstitial lung disease) (H)  J84.9    5. Hypokalemia  E87.6    6. Generalized muscle weakness  M62.81      Jeanine has a very complex medical history.  She was admitted to the hospital on 4/5/2022 and was discharged on 4/8/2022 for symptomatic atrial fibrillation with rapid ventricular response.  She was treated conservatively with medications.  She had a right heart catheterization which was normal.  She is known to have chronic systolic heart failure - echo 12/21/21 with EF 35%, global hypokinesis, distal LV segments, RV mildly dilated, mild to moderate regurg, and moderate to severe tricuspid regurg. Cardiac MRI 1/12/22 with LVEF 44% without acute concern for cardiac sarcoidosis per sarcoid team. Myocardial PET 3/31/22 without acute concern for cardiac sarcoidosis.  She is not on any diuretic except of the Aldactone.  No sing CHF exacerbation.  She was discharged home with amiodarone and continue with the losartan, metoprolol XL and Aldactone.  She has been having symptomatic episodes of A. fib with a heart rate of 140s that lasted for several minutes despite of taking the amiodarone. The Eliquis was started couple days ago.  She has an appointment with cardiology in 2 days and pulmonologist in a week.    Hemodynamically she is stable today.  She did not look acutely sick, but does look tire and weak.  I recommended to continue with the current medications and follow-up with the cardiologist as scheduled.  Encouraged to advance her diet as tolerated, avoid high salt and caffeine intake.  Discussed with her about CHF exacerbation symptoms.  Monitor her weights closely, call in if gaining more than 3 pounds in 24 hours, 5 pounds in 3 days, or develop  "significant worsening of orthopnea or dyspnea.    Since she is on the Eliquis, will hold off on the aspirin.  I recommend to discuss with her cardiologist whether she still need to be on aspirin due to her history of ACS.    I encouraged to follow with the pulmonologist as scheduled as well.  Symptoms that need to be seen or call discussed.    Potassium is normal today.  Not on potassium supplement.  On Aldactone.    She is still quite weak and unstable.  Recommend physical therapy but she declined.  She did not respond well to physical therapy in the past.    I also filled out her workability form for her life insurance on her behalf.  I have known her for over a year.  She is chronically sick with a very complex medical history.  She has a significant physical limitation due to pain, shortness of breath and fatigue.        Return in about 3 months (around 7/19/2022) for Physical Exam.    Katia Carver Mai, MD  [facility]    Tete Solorzano is a 61 year old who presents for the following health issues  accompanied by her spouse.    HPI     Post Discharge Outreach 4/11/2022   Admission Date 4/5/2022   Reason for Admission Afib with RVR   Discharge Date 4/8/2022   Discharge Diagnosis Paroxysmal atrial fibrillation with RVR, Chronic systolic heart failure   How are you doing now that you are home? \"I'm good\"   How are your symptoms? (Red Flag symptoms escalate to triage hotline per guidelines) Improved   Do you feel your condition is stable enough to be safe at home until your provider visit? Yes   Does the patient have their discharge instructions?  Yes   Does the patient have questions regarding their discharge instructions?  No   Were you started on any new medications or were there changes to any of your previous medications?  Yes   Does the patient have all of their medications? Yes   Do you have questions regarding any of your medications?  No   Do you have all of your needed medical " supplies or equipment (DME)?  (i.e. oxygen tank, CPAP, cane, etc.) Yes   Discharge follow-up appointment scheduled within 14 calendar days?  Yes   Discharge Follow Up Appointment Date 4/19/2022   Discharge Follow Up Appointment Scheduled with? Primary Care Provider     Hospital Follow-up Visit:    Hospital/Nursing Home/IP Rehab Facility: [facility]  Date of Admission: 4/5/22  Date of Discharge: 4/8/22  Reason(s) for Admission: Afib with RVR.      Was your hospitalization related to COVID-19? No   Problems taking medications regularly:  None  Medication changes since discharge: eliquis and amniodrone  Problems adhering to non-medication therapy:  None    Summary of hospitalization:  [facility] hospital discharge summary reviewed  Diagnostic Tests/Treatments reviewed.  Follow up needed: none  Other Healthcare Providers Involved in Patient s Care:         Specialist appointment - Cardiology  Update since discharge: improved. Post Discharge Medication Reconciliation: discharge medications reconciled, continue medications without change.  Plan of care communicated with patient and family          Jeanine has a very complex medical history who is here today with her  for hospital follow-up.  She was admitted to the [facility] in [place] on 4/5/2022 for paroxysmal atrial fibrillation with rapid ventricular response and chronic systolic heart failure.  She was discharged from the hospital on 4/8/200 and is scheduled to follow-up with cardiology in a couple days and pulmonology is in a week.  She is known to have institutional lung disease, ACS, pulmonary hypertension, ascending aortic aneurysm, hypertension, rheumatoid arthritis with systemic sclerosis, Sjogren's and CREST syndrome, as well as sarcoidosis.  No medication change at the time of discharge - no anticoagulant initiated.  She was instructed continue with the Lipitor, Imuran,  Klonopin, folic acid, losartan, metoprolol, Protonix, prednisone, Aldactone and Bactrim.  She was started on the amiodarone.  Been taking her medications as prescribed with no side effect    During the hospitalization, she was seen by the cardiologist.  He has a right heart catheterization which showed normal right and left-sided filling pressure as well as cardiac output and PA pressure.  She was treated medically metoprolol, losartan, and Aldactone.  He was also started on amiodarone and has been taking it as prescribed.  Not sure if it has helped with his with her heart rate    Stated she is feeling slightly better but continued to have the heart palpitation on and off, multiples times a day.  The oximeter showed multiple episodes of fast heartbeat a day with HR in the 140s that lasted for several minutes.  When it happened, her O2 sat would drop down to about 93% follow by shortness of breath, anxiety and heart palpitation.  HR otherwise running in the 70s-80s with the O2 sat at around 97%.  Continues to feel weak and deconditioned.  Tolerate oral intake well but her appetite remains to be low.  Been drinking a lot of water.  Feel nausea at times but no vomiting.  No diarrhea or constipation.  No fever or chills.  Still get winded easily with minimal exertion.  No leg swelling.  No orthopnea.  Denies of excessive salt or caffeine intake. Still feeling weak and  unstable    That the cardiologist started her on Eliquis a couple days ago.  She stopped taking aspirin since starting the Eliquis.    She also needs a workability form to fill on her behalf for [employer].  She has not been working for years due to her chronic medical conditions as above.  She generally weak, not able to lift anything heavier than a gallon of milk without significant pain for fatigue.  She cannot sit or stand for more than 30-45 minutes without significant pain.  She feel weak and unstable, use the cane at time, definately  not able to climb, bending or kneeling.  She can use her hand and fingers but they get fatigue and achy easily and quickly.  She also gets shortness of breath easily.      Review of Systems   Constitutional, HEENT, cardiovascular, pulmonary, gi and gu systems are negative, except as otherwise noted.      Objective    /80   Temp 98.4  F (36.9  C) (Temporal)   Resp 14   Wt 62.6 kg (138 lb)   BMI 20.38 kg/m    Body mass index is 20.38 kg/m .  Physical Exam   GENERAL: alert and no distress, look tire, but not in acute respiratory distress.  Has a harder time to get up to the exam table.  HENT: ear canals and TM's normal, nose and mouth without ulcers or lesions.  Nares are non-congested. Oropharynx is pink and moist. No tender with palpation to the sinuses.   NECK: Supple, no lymphadenopathy or thyromegaly.  No JV distention or carotid bruits.  RESP: lungs clear to auscultation - no rales, rhonchi or wheezes.  Decreased breath sounds with good respiratory effort throughout  CV: regular rate and rhythm, normal S1 S2, no S3 or S4, no murmur, click or rub, no peripheral edema and peripheral pulses strong  ABDOMEN: soft, nontender, nondistended, no palpable masses organomegaly with normal sound.  MS: no gross musculoskeletal defects noted, no edema.  General weakness appreciated.  No focal weakness.  SKIN: no suspicious lesions or rashes.  No particular ecchymosis.  NEURO: Normal strength and tone, mentation intact and speech normal.  No focal weakness.  PSYCH: mentation appears normal, affect depressed and flat.  Thoughts are intact.    No results found for any visits on 04/19/22.             4

## 2024-02-01 NOTE — PROGRESS NOTES
CLINICAL NUTRITION SERVICES - REASSESSMENT NOTE     Nutrition Prescription    RECOMMENDATIONS FOR MDs/PROVIDERS TO ORDER:  None today     Malnutrition Status:    Severe malnutrition in the context of acute on chronic illness or injury    Recommendations already ordered by Registered Dietitian (RD):  RD updated TF order to reflect Jejunostomy, and encouraged nursing to advance rate back to goal of 40 ml/hr (removed advancement orders as not needed)     Future/Additional Recommendations:  Continue to monitor tolerance to TF, wt/lab trends     EVALUATION OF THE PROGRESS TOWARD GOALS   Diet: Regular as tolerated   Supplement: Strawberry Ensure Enlive PRN only   Intake: Po intakes mostly minimal and for pleasure/comfort in the setting of pt w/esophageal dysmotility, prior calorie counts indicated a minimal 4 day average of <500 kcal and <5 g protein     Nutrition Support: J-TF with TwoCal HN at 40 ml/hr with 1 packet of Prosource TF20 BID, with a 30 ml water flush q 4 hrs to provide 2080 kcal (32 kcal/kg), ~121 gm protein (~1.9 gm/kg), 206 gm CHO, 5 gm fiber, 1090 ml water/day     TF Tolerance: Last bowel movement noted on 6/13, otherwise pt appears to be tolerating TF well prior to hold for J-tube placement. Pt had a 20F J-feeding tube placed on 6/14 (20F feeding tube to the proximal abdi with adjunct jejunopexy).     NEW FINDINGS   MAR reviewed. Labs reviewed. WOC RN note reviewed. Pt reviewed during ICU team rounds, has J-tube placed yesterday, noted lower advancement rates placed, reviewed with nursing to increase pt back to TF goal. After rounds visited pt at bedside, pt reports tolerating TF (no nausea/vomiting/diarrhea), notes only some mild site pain after tube placement, RD reviewed plan as above and reviewed options for future adjustments to cycled feedings, pt open to adjustments as appropriate per RD recommendations, agreeing with plan of care as above.      06/14/23 0600 79 kg (174 lb 2.6 oz) Bed scale    06/13/23 0415 79.7 kg (175 lb 11.3 oz) Bed scale   06/11/23 1245 79.8 kg (175 lb 14.8 oz) Bed scale   06/09/23 0640 91 kg (200 lb 9.9 oz) Bed scale   06/08/23 0617 89.8 kg (197 lb 15.6 oz) Bed scale   06/05/23 0554 83.3 kg (183 lb 10.3 oz) Bed scale   06/04/23 0714 81.6 kg (179 lb 14.3 oz) Bed scale   06/03/23 0900 81.3 kg (179 lb 3.7 oz) Bed scale   06/02/23 0900 84.2 kg (185 lb 10 oz) Bed scale   05/30/23 0712 81.1 kg (178 lb 12.7 oz) --   05/29/23 1300 77.8 kg (171 lb 8.3 oz) Bed scale   05/24/23 1523 64 kg (141 lb 1.5 oz) Bed scale   05/17/23 0429 65 kg (143 lb 4.8 oz) Bed scale   05/16/23 1610 68 kg (150 lb) --     05/08/23 68 kg (150 lb)   04/17/23 68 kg (150 lb)   06/28/22 71.7 kg (158 lb)   06/14/22 68 kg (150 lb)     Weight assessment: 23 lb wt loss in 1 week (fluid status changes/aggressive diuresis/chest tube), pt still up 24 lbs from hospital admission/in 1 month and 1 year.     MALNUTRITION  % Intake: < 75% for > 7 days (moderate)  % Weight Loss: Difficult to assess due to fluid status  Subcutaneous Fat Loss: Globally moderate vs severe   Muscle Loss: Globally moderate vs severe (lower extremities difficult to assess due to fluid status)  Fluid Accumulation/Edema: Trace to moderate   Malnutrition Diagnosis: Severe malnutrition in the context of acute on chronic illness or injury    Previous Goals   Total avg nutritional intake to meet a minimum of 25 kcal/kg and 1.0 g PRO/kg daily (per dosing wt 65 kg)  Evaluation: Not met    Previous Nutrition Diagnosis  Inadequate oral intake related to multifactorial (nausea, poor appetite, altered GI function) as evidenced by pt continues to require enteral nutrition support to meet estimated nutritional needs  Evaluation: No change    CURRENT NUTRITION DIAGNOSIS   Inadequate oral intake related to multifactorial (nausea, poor appetite, altered GI function) as evidenced by pt continues to require enteral nutrition support to meet estimated nutritional  needs    INTERVENTIONS  Implementation  Collaboration with staff/Providers - as noted above   Enteral Nutrition - Advance back to goal, order updated to appropriate reflect tube   Feeding tube flush/modulars - continue as ordered     Goals  Total avg nutritional intake to meet a minimum of 25 kcal/kg and 1.0 g PRO/kg daily (per dosing wt 65 kg)    Monitoring/Evaluation  Progress toward goals will be monitored and evaluated per protocol.    Tiffany Laird RD, CNSC, LD  [address] pager: [phone]  Weekend/holiday pager: [phone]   mild

## 2024-02-09 NOTE — TELEPHONE ENCOUNTER
pantoprazole (PROTONIX) 40 MG EC [id number]  ast Written Prescription Date:  2/12/19  Last Fill Quantity: 90   # refills: 0  Last Office Visit : 2/13/18  Future Office visit:  None    Scheduling has been notified to contact the pt for appointment.    90 day to pharmacy     Received request via: Pharmacy    Was the patient seen in the last year in this department? Yes    Does the patient have an active prescription (recently filled or refills available) for medication(s) requested? No    Pharmacy Name: [facility]    Does the patient have longterm Plus and need 100 day supply (blood pressure, diabetes and cholesterol meds only)? Patient does not have SCP

## 2024-03-11 ENCOUNTER — TELEPHONE (OUTPATIENT)
Dept: FAMILY MEDICINE | Facility: CLINIC | Age: 64
End: 2024-03-11
Payer: COMMERCIAL

## 2024-03-11 NOTE — TELEPHONE ENCOUNTER
Reason for Call:  Form, our goal is to have forms completed with 72 hours, however, some forms may require a visit or additional information.    Type of letter, form or note:  Home Health Certification    Who is the form from?: Home care    Where did the form come from: form was faxed in    What clinic location was the form placed at?: [facility]     Where the form was placed: Given to physician    What number is listed as a contact on the form?:   [phone]  PHONE  [phone]       Additional comments:     Call taken on 3/11/2024 at 8:05 AM by Jacque Schultz

## 2024-10-28 NOTE — PLAN OF CARE
[id number]: A&Ox4. VSS. RA. Angiogram today w/o intervention. Rt groin site CDI, soft. Good CMS. Off bedrest at 1900. Denies pain. Better BG in evening. UA sent to lab. Will continue to monitor.    To be clear in terms of discharge planning, given patient's ongoing critical illness, infection, and respiratory failure, she has no plans for chemotherapy and will not be recommended for chemotherapy as long as she is requiring care in an LTAC.

## (undated) DEVICE — CATH BALLOON MERIT ESOPH FIVE-STAGE 17X21MMX180CM EX18

## (undated) DEVICE — SUCTION MANIFOLD NEPTUNE 2 SYS 4 PORT 0702-020-000

## (undated) DEVICE — INTRO GLIDESHEATH SLENDER 6FR 10X45CM 60-1060

## (undated) DEVICE — TOTE ANGIO CORP PC15AT SAN32CC83O

## (undated) DEVICE — PAD CHUX UNDERPAD 23X24" 7136

## (undated) DEVICE — DRSG DRAIN 2X2" 7087

## (undated) DEVICE — LUBRICANT INST KIT ENDO-LUBE 220-90

## (undated) DEVICE — ENDO FORCEP BX CAPTURA PRO SPIKE G50696

## (undated) DEVICE — SPECIMEN CONTAINER 3OZ W/FORMALIN 59901

## (undated) DEVICE — TONGUE DEPRESSOR STERILE 25-705-ALL

## (undated) DEVICE — INFLATION DEVICE BIG 60 ENDO-AN6012

## (undated) DEVICE — KIT ENDO TURNOVER/PROCEDURE CARRY-ON 101822

## (undated) DEVICE — SOL NACL 0.9% IRRIG 1000ML BOTTLE 2F7124

## (undated) DEVICE — GOWN IMPERVIOUS 2XL BLUE

## (undated) DEVICE — SOL WATER IRRIG 1000ML BOTTLE 2F7114

## (undated) DEVICE — ENDO VALVE BX EVIS MAJ-210

## (undated) DEVICE — CATH GUIDING BLUE YELLOW PTFE XB3.5 6FRX100CM 67005400

## (undated) DEVICE — MANIFOLD KIT ANGIO AUTOMATED 014613

## (undated) DEVICE — INTRO SHEATH 6FRX10CM PINNACLE RSS602

## (undated) DEVICE — DRAPE SHEET MED 44X70" 9355

## (undated) DEVICE — DRAPE MAYO STAND 23X54 8337

## (undated) DEVICE — CATH LAUNCHER 6FR AL 2.0 LA6AL20

## (undated) DEVICE — ENDO BITE BLOCK ADULT OMNI-BLOC

## (undated) DEVICE — TUBING SUCTION 10'X3/16" N510

## (undated) DEVICE — DRSG GAUZE 4X4" TRAY 6939

## (undated) DEVICE — CATH ANGIO JUDKINS JL3.5 6FRX100CM INFINITI 534618T

## (undated) DEVICE — LIGHT HANDLE X1 31140133

## (undated) DEVICE — TAPE MICROFOAM 4" 1528-4

## (undated) DEVICE — TUBING SUCTION 12"X1/4" N612

## (undated) DEVICE — SU MONOCRYL 4-0 PS-2 27" UND Y426H

## (undated) DEVICE — INTRODUCER KIT GASTROSTOMY MIC G 22FR 98433

## (undated) DEVICE — LABEL MEDICATION SYSTEM 3303-P

## (undated) DEVICE — COMPENSATOR PRESSURE MONITORING MANIFOLDS, THREE-VALVE HANDLES OFF, RIGHT PORTS, ROTATING ADAPTER, MEDIUM PRESSURE

## (undated) DEVICE — KIT PEG ENDOVIVE ENFIT 20 FR PULL M00509040

## (undated) DEVICE — SUCTION CATH AIRLIFE TRI-FLO W/CONTROL PORT 14FR  T60C

## (undated) DEVICE — ENDO ADPT BRONCH SWIVEL Y A1002

## (undated) DEVICE — KIT CONNECTOR FOR OLYMPUS ENDOSCOPES DEFENDO 100310

## (undated) DEVICE — SYR 30ML SLIP TIP W/O NDL 302833

## (undated) DEVICE — KIT HAND CONTROL ANGIOTOUCH ACIST 65CM AT-P65

## (undated) DEVICE — DRSG STERI STRIP 1/2X4" R1547

## (undated) DEVICE — SU VICRYL 3-0 SH 27" UND J416H

## (undated) DEVICE — DRAPE C-ARM W/STRAPS 42X72" 07-CA104

## (undated) DEVICE — GLOVE PROTEXIS W/NEU-THERA 7.5  2D73TE75

## (undated) DEVICE — PACK MINOR CUSTOM ASC

## (undated) DEVICE — DRSG TELFA 3X8" 1238

## (undated) DEVICE — ENDO TRAP POLYP E-TRAP 00711099

## (undated) DEVICE — KIT ENDO FIRST STEP DISINFECTANT 200ML W/POUCH EP-4

## (undated) DEVICE — SOL NACL 0.9% IRRIG 500ML BOTTLE 2F7123

## (undated) DEVICE — CATH ANGIO JUDKINS JL4 6FRX100CM INFINITI 534620T

## (undated) DEVICE — GLOVE EXAM NITRILE LG PF LATEX FREE 5064

## (undated) DEVICE — PACK HEART RIGHT CUSTOM SAN32RHF18

## (undated) DEVICE — LINEN TOWEL PACK X5 5464

## (undated) DEVICE — PREP CHLORAPREP 26ML TINTED HI-LITE ORANGE 930815

## (undated) DEVICE — CATH DIAGNOSTIC RADIAL 5FR TIG 4.0

## (undated) DEVICE — KIT RIGHT HEART CATH 60130719

## (undated) DEVICE — GUIDEWIRE L80CM OD.035IN 3 CM J-TIP V

## (undated) DEVICE — NDL COUNTER 20CT 31142493

## (undated) DEVICE — Device

## (undated) DEVICE — INTRODUCER CATH VASC 5FRX10CM  MPIS-501-NT-U-SST

## (undated) DEVICE — LINEN ORTHO PACK 5446

## (undated) DEVICE — SYR 10ML SLIP TIP W/O NDL 303134

## (undated) DEVICE — CATH ANGIO INFINITI 3DRC 6FRX100CM 534676T

## (undated) DEVICE — PITCHER STERILE 1000ML  SSK9004A

## (undated) DEVICE — SPECIMEN CONTAINER URINE 90ML STERILE 75.1435.002

## (undated) DEVICE — PACK ENDOSCOPY GI CUSTOM UMMC

## (undated) DEVICE — ENDO TUBING CO2 SMARTCAP STERILE DISP 100145CO2EXT

## (undated) DEVICE — ENDO VALVE SUCTION BRONCH EVIS MAJ-209

## (undated) DEVICE — SPECIMEN TRAP MUCOUS 40ML LUKI C30200A

## (undated) DEVICE — PREP CHLORAPREP W/ORANGE TINT 10.5ML 260715

## (undated) DEVICE — INTRO SHEATH 7FRX10CM PINNACLE RSS702

## (undated) DEVICE — SUCTION MANIFOLD NEPTUNE 2 SYS 1 PORT 702-025-000

## (undated) DEVICE — DEFIB PRO-PADZ LVP LQD GEL ADULT 8900-2105-01

## (undated) RX ORDER — ONDANSETRON 2 MG/ML
INJECTION INTRAMUSCULAR; INTRAVENOUS
Status: DISPENSED
Start: 2020-10-06

## (undated) RX ORDER — HEPARIN SODIUM 200 [USP'U]/100ML
INJECTION, SOLUTION INTRAVENOUS
Status: DISPENSED
Start: 2021-12-21

## (undated) RX ORDER — METOPROLOL TARTRATE 1 MG/ML
INJECTION, SOLUTION INTRAVENOUS
Status: DISPENSED
Start: 2022-04-05

## (undated) RX ORDER — LIDOCAINE HYDROCHLORIDE 10 MG/ML
INJECTION, SOLUTION EPIDURAL; INFILTRATION; INTRACAUDAL; PERINEURAL
Status: DISPENSED
Start: 2022-04-05

## (undated) RX ORDER — HEPARIN SODIUM 1000 [USP'U]/ML
INJECTION, SOLUTION INTRAVENOUS; SUBCUTANEOUS
Status: DISPENSED
Start: 2021-12-21

## (undated) RX ORDER — LIDOCAINE HYDROCHLORIDE 10 MG/ML
INJECTION, SOLUTION EPIDURAL; INFILTRATION; INTRACAUDAL; PERINEURAL
Status: DISPENSED
Start: 2023-01-01

## (undated) RX ORDER — FENTANYL CITRATE 50 UG/ML
INJECTION, SOLUTION INTRAMUSCULAR; INTRAVENOUS
Status: DISPENSED
Start: 2021-12-21

## (undated) RX ORDER — FENTANYL CITRATE 50 UG/ML
INJECTION, SOLUTION INTRAMUSCULAR; INTRAVENOUS
Status: DISPENSED
Start: 2020-10-06

## (undated) RX ORDER — LIDOCAINE HYDROCHLORIDE 10 MG/ML
INJECTION, SOLUTION EPIDURAL; INFILTRATION; INTRACAUDAL; PERINEURAL
Status: DISPENSED
Start: 2021-12-21

## (undated) RX ORDER — FENTANYL CITRATE 50 UG/ML
INJECTION, SOLUTION INTRAMUSCULAR; INTRAVENOUS
Status: DISPENSED
Start: 2023-01-01

## (undated) RX ORDER — POTASSIUM CHLORIDE 750 MG/1
TABLET, EXTENDED RELEASE ORAL
Status: DISPENSED
Start: 2022-04-05

## (undated) RX ORDER — DEXTROSE MONOHYDRATE 25 G/50ML
INJECTION, SOLUTION INTRAVENOUS
Status: DISPENSED
Start: 2023-01-01

## (undated) RX ORDER — FENTANYL CITRATE 50 UG/ML
INJECTION, SOLUTION INTRAMUSCULAR; INTRAVENOUS
Status: DISPENSED
Start: 2020-11-17

## (undated) RX ORDER — VERAPAMIL HYDROCHLORIDE 2.5 MG/ML
INJECTION, SOLUTION INTRAVENOUS
Status: DISPENSED
Start: 2021-12-21

## (undated) RX ORDER — MAGNESIUM OXIDE 400 MG/1
TABLET ORAL
Status: DISPENSED
Start: 2022-04-05

## (undated) RX ORDER — PANTOPRAZOLE SODIUM 40 MG/1
TABLET, DELAYED RELEASE ORAL
Status: DISPENSED
Start: 2022-04-05

## (undated) RX ORDER — LIDOCAINE 40 MG/G
CREAM TOPICAL
Status: DISPENSED
Start: 2022-04-05